# Patient Record
Sex: FEMALE | Race: WHITE | Employment: OTHER | URBAN - METROPOLITAN AREA
[De-identification: names, ages, dates, MRNs, and addresses within clinical notes are randomized per-mention and may not be internally consistent; named-entity substitution may affect disease eponyms.]

---

## 2017-01-05 ENCOUNTER — HOSPITAL ENCOUNTER (OUTPATIENT)
Dept: LAB | Age: 57
Discharge: HOME | End: 2017-01-05
Payer: MEDICARE

## 2017-01-05 DIAGNOSIS — C92.10 CML (CHRONIC MYELOCYTIC LEUKEMIA) (HCC): ICD-10-CM

## 2017-01-05 LAB
ALBUMIN SERPL BCP-MCNC: 4.3 G/DL (ref 3.5–5)
ALBUMIN/GLOB SERPL: 1.7 {RATIO} (ref 1.2–3.5)
ALP SERPL-CCNC: 75 U/L (ref 50–136)
ALT SERPL-CCNC: 35 U/L (ref 12–65)
ANION GAP BLD CALC-SCNC: 7 MMOL/L (ref 7–16)
AST SERPL W P-5'-P-CCNC: 17 U/L (ref 15–37)
BASOPHILS # BLD AUTO: 0 K/UL (ref 0–0.2)
BASOPHILS # BLD: 1 % (ref 0–2)
BILIRUB SERPL-MCNC: 0.6 MG/DL (ref 0.2–1.1)
BUN SERPL-MCNC: 7 MG/DL (ref 6–23)
CALCIUM SERPL-MCNC: 8.7 MG/DL (ref 8.3–10.4)
CHLORIDE SERPL-SCNC: 110 MMOL/L (ref 98–107)
CO2 SERPL-SCNC: 24 MMOL/L (ref 23–32)
CREAT SERPL-MCNC: 0.63 MG/DL (ref 0.6–1)
DIFFERENTIAL METHOD BLD: ABNORMAL
EOSINOPHIL # BLD: 0.2 K/UL (ref 0–0.8)
EOSINOPHIL NFR BLD: 3 % (ref 0.5–7.8)
ERYTHROCYTE [DISTWIDTH] IN BLOOD BY AUTOMATED COUNT: 17.7 % (ref 11.9–14.6)
GLOBULIN SER CALC-MCNC: 2.6 G/DL (ref 2.3–3.5)
GLUCOSE SERPL-MCNC: 108 MG/DL (ref 65–100)
HCT VFR BLD AUTO: 36 % (ref 35.8–46.3)
HGB BLD-MCNC: 9.8 G/DL (ref 11.7–15.4)
LYMPHOCYTES # BLD AUTO: 10 % (ref 13–44)
LYMPHOCYTES # BLD: 0.6 K/UL (ref 0.5–4.6)
MAGNESIUM SERPL-MCNC: 2.2 MG/DL (ref 1.8–2.4)
MCH RBC QN AUTO: 21.9 PG (ref 26.1–32.9)
MCHC RBC AUTO-ENTMCNC: 27.2 G/DL (ref 31.4–35)
MCV RBC AUTO: 80.4 FL (ref 79.6–97.8)
MONOCYTES # BLD: 0.3 K/UL (ref 0.1–1.3)
MONOCYTES NFR BLD AUTO: 5 % (ref 4–12)
NEUTS SEG # BLD: 4.6 K/UL (ref 1.7–8.2)
NEUTS SEG NFR BLD AUTO: 82 % (ref 43–78)
NRBC # BLD: 0 K/UL (ref 0–0.2)
PLATELET # BLD AUTO: 160 K/UL (ref 150–450)
PMV BLD AUTO: 11.3 FL (ref 10.8–14.1)
POTASSIUM SERPL-SCNC: 3.7 MMOL/L (ref 3.5–5.1)
PROT SERPL-MCNC: 6.9 G/DL (ref 6.3–8.2)
RBC # BLD AUTO: 4.48 M/UL (ref 4.05–5.25)
SODIUM SERPL-SCNC: 141 MMOL/L (ref 136–145)
WBC # BLD AUTO: 5.6 K/UL (ref 4.3–11.1)

## 2017-01-05 PROCEDURE — 80053 COMPREHEN METABOLIC PANEL: CPT | Performed by: INTERNAL MEDICINE

## 2017-01-05 PROCEDURE — 36415 COLL VENOUS BLD VENIPUNCTURE: CPT | Performed by: INTERNAL MEDICINE

## 2017-01-05 PROCEDURE — 85025 COMPLETE CBC W/AUTO DIFF WBC: CPT | Performed by: INTERNAL MEDICINE

## 2017-01-05 PROCEDURE — 83735 ASSAY OF MAGNESIUM: CPT | Performed by: INTERNAL MEDICINE

## 2017-01-08 ENCOUNTER — HOSPITAL ENCOUNTER (EMERGENCY)
Age: 57
Discharge: HOME | End: 2017-01-08
Attending: EMERGENCY MEDICINE
Payer: MEDICARE

## 2017-01-08 VITALS
TEMPERATURE: 98.9 F | BODY MASS INDEX: 22.2 KG/M2 | DIASTOLIC BLOOD PRESSURE: 59 MMHG | SYSTOLIC BLOOD PRESSURE: 111 MMHG | WEIGHT: 130 LBS | HEIGHT: 64 IN | OXYGEN SATURATION: 99 % | HEART RATE: 82 BPM | RESPIRATION RATE: 16 BRPM

## 2017-01-08 DIAGNOSIS — R30.0 DYSURIA: Primary | ICD-10-CM

## 2017-01-08 LAB
AMORPH CRY URNS QL MICRO: NORMAL
BACTERIA URNS QL MICRO: NORMAL /HPF
CASTS URNS QL MICRO: 0 /LPF
CRYSTALS URNS QL MICRO: 0 /LPF
EPI CELLS #/AREA URNS HPF: NORMAL /HPF
MUCOUS THREADS URNS QL MICRO: 0 /LPF
RBC #/AREA URNS HPF: NORMAL /HPF
WBC URNS QL MICRO: NORMAL /HPF

## 2017-01-08 PROCEDURE — 87088 URINE BACTERIA CULTURE: CPT | Performed by: EMERGENCY MEDICINE

## 2017-01-08 PROCEDURE — 87086 URINE CULTURE/COLONY COUNT: CPT | Performed by: EMERGENCY MEDICINE

## 2017-01-08 PROCEDURE — 74011250637 HC RX REV CODE- 250/637: Performed by: EMERGENCY MEDICINE

## 2017-01-08 PROCEDURE — 99284 EMERGENCY DEPT VISIT MOD MDM: CPT | Performed by: EMERGENCY MEDICINE

## 2017-01-08 PROCEDURE — 81015 MICROSCOPIC EXAM OF URINE: CPT | Performed by: EMERGENCY MEDICINE

## 2017-01-08 PROCEDURE — 87186 SC STD MICRODIL/AGAR DIL: CPT | Performed by: EMERGENCY MEDICINE

## 2017-01-08 RX ORDER — CEPHALEXIN 500 MG/1
500 CAPSULE ORAL 3 TIMES DAILY
Qty: 21 CAP | Refills: 0 | Status: SHIPPED | OUTPATIENT
Start: 2017-01-08 | End: 2017-01-15

## 2017-01-08 RX ORDER — CEPHALEXIN 500 MG/1
500 CAPSULE ORAL
Status: COMPLETED | OUTPATIENT
Start: 2017-01-08 | End: 2017-01-08

## 2017-01-08 RX ADMIN — CEPHALEXIN 500 MG: 500 CAPSULE ORAL at 19:24

## 2017-01-08 NOTE — ED PROVIDER NOTES
HPI Comments: For last 12-16 hours patient has had increasing urinary frequency associated with dysuria and some urgency. She has had no shaking chills. He has some slight left flank pain that she first noticed this afternoon. He has not been on antibiotics in a very long time cannot recall her most recent  Time she has had to take antibiotics. Has a history of polycythemia vera , splenomegaly, an clotting disorders. She has had no hematuria with this event. Patient is a 64 y.o. female presenting with urinary pain. The history is provided by the patient. Urinary Pain    This is a new problem. The current episode started 12 to 24 hours ago. The problem has been gradually worsening. The quality of the pain is described as burning. There has been no fever. Associated symptoms include frequency, urgency and flank pain. Pertinent negatives include no chills, no nausea, no vomiting, no hematuria and no hesitancy. Her past medical history does not include recurrent UTIs.         Past Medical History:   Diagnosis Date    Anemia     Chronic pain      from slpeen  - flank pain from kidney     Coagulation disorder ([address]      \"clotting and Bleeding Problem\" dr Christopher Hogue follows     Esophageal spasm      with banding     Esophageal varices (HCC)      grade 3    H/O craniotomy      3-29-15.  due to blood clot - which caused a seizure  - then pt fell and hit     HA (headache)     Hypothyroid     Leukemia ([address]     Nausea & vomiting     Polycythemia vera(238.4) 2008    Portal hypertension ([address]      with varices    Pulmonary embolism ([address] 2006     not on coumadin anymore     S/P small bowel resection      2012.  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 , spleenic and recently superior mesenteric    Transfusion history      many blood tranfusions - last 3-29-15 after brain surgery       Past Surgical History:   Procedure Laterality Date    Hx gi       liver biopsy    Hx gi       bowel removed small - 9 feet     Pr abdomen surgery proc unlisted       umbilical hernia repair    Hx cholecystectomy      Hx breast biopsy Bilateral      Lt - ; Rt -     Hx appendectomy       with small bowel resection    Hx gyn        x 2    Hx gyn       D&C following miscarriage    Hx orthopaedic Left 2008     torn labrum shoulder (screw in place)    Pr neurological procedure unlisted       craniotomy         Family History:   Problem Relation Age of Onset    Diabetes Mother     Stroke Mother      after surgery    Cancer Father      brain    Breast Cancer Maternal Aunt 48       Social History     Social History    Marital status: SINGLE     Spouse name: N/A    Number of children: N/A    Years of education: N/A     Occupational History    Not on file. Social History Main Topics    Smoking status: Former Smoker     Quit date:     Smokeless tobacco: Never Used    Alcohol use No    Drug use: No    Sexual activity: Not on file     Other Topics Concern    Not on file     Social History Narrative         ALLERGIES: Latex; Sulfa (sulfonamide antibiotics); Tramadol; Augmentin [amoxicillin-pot clavulanate]; Morphine; Rizatriptan; Tetanus immune globulin; Xanax [alprazolam]; and Zonegran [zonisamide]    Review of Systems   Constitutional: Negative for chills. Gastrointestinal: Negative for nausea and vomiting. Genitourinary: Positive for flank pain, frequency and urgency. Negative for hematuria and hesitancy. All other systems reviewed and are negative.       Vitals:    17 1600   BP: 117/52   Pulse: 94   Resp: 18   Temp: 99.1 °F (37.3 °C)   SpO2: 96%   Weight: 59 kg (130 lb)   Height: 5' 4\" (1.626 m)            Physical Exam   Constitutional: She appears well-developed and well-nourished. No distress. Pleasant , wearing mask, no distress   HENT:   Head: Atraumatic. Eyes: No scleral icterus. Neck: Neck supple. Cardiovascular: Normal rate and regular rhythm. Pulmonary/Chest: Effort normal. No respiratory distress. She has no wheezes. Abdominal: Soft. She exhibits no distension. There is no tenderness. There is no rebound. Musculoskeletal: Normal range of motion. Neurological: She is alert. Skin: Skin is warm and dry. She is not diaphoretic. Psychiatric: Thought content normal.   Nursing note and vitals reviewed. MDM  Number of Diagnoses or Management Options  Dysuria:   Diagnosis management comments: Will cover possibility of UTI .  Has tolerated Keflex in past. To recheck with any worsening       Amount and/or Complexity of Data Reviewed  Clinical lab tests: ordered and reviewed  Decide to obtain previous medical records or to obtain history from someone other than the patient: yes    Risk of Complications, Morbidity, and/or Mortality  Presenting problems: moderate  Diagnostic procedures: low  Management options: low    Patient Progress  Patient progress: stable    ED Course       Procedures

## 2017-01-08 NOTE — ED TRIAGE NOTES
Reports urinary pain and frequency. States left back pain now. Began 12 hours ago. States has taken pyridium.

## 2017-01-08 NOTE — Clinical Note
Extra fluids May take Pyridium or similar for discomfort Follow for fever Antibiotic: Keflex (you have taken this in the past per your report without problems)

## 2017-01-09 NOTE — DISCHARGE INSTRUCTIONS
Painful Urination (Dysuria): Care Instructions  Your Care Instructions  Burning pain with urination (dysuria) is a common symptom of a urinary tract infection or other urinary problems. The bladder may become inflamed. This can cause pain when the bladder fills and empties. You may also feel pain if the tube that carries urine from the bladder to the outside of the body (urethra) gets irritated or infected. Sexually transmitted infections (STIs) also may cause pain when you urinate. Sometimes the pain can be caused by things other than an infection. The urethra can be irritated by soaps, perfumes, or foreign objects in the urethra. Kidney stones can cause pain when they pass through the urethra. The cause may be hard to find. You may need tests. Treatment for painful urination depends on the cause. Follow-up care is a key part of your treatment and safety. Be sure to make and go to all appointments, and call your doctor if you are having problems. It's also a good idea to know your test results and keep a list of the medicines you take. How can you care for yourself at home? · Drink extra water and juices such as cranberry and blueberry juices for the next day or two. This will help make the urine less concentrated. And it may help wash out any bacteria that may be causing an infection. (If you have kidney, heart, or liver disease and have to limit fluids, talk with your doctor before you increase the amount of fluids you drink.)  · Avoid drinks that are carbonated or have caffeine. They can irritate the bladder. · Urinate often. Try to empty your bladder each time. For women:  · Urinate right after you have sex. · After going to the bathroom, wipe from front to back. · Avoid douches, bubble baths, and feminine hygiene sprays. And avoid other feminine hygiene products that have deodorants. When should you call for help?   Call your doctor now or seek immediate medical care if:  · You have new symptoms, such as fever, nausea, or vomiting. · You have new or worse symptoms of a urinary problem. For example:  ¨ You have blood or pus in your urine. ¨ You have chills or body aches. ¨ It hurts worse to urinate. ¨ You have groin or belly pain. ¨ You have pain in your back just below your rib cage (the flank area). Watch closely for changes in your health, and be sure to contact your doctor if you have any problems. Where can you learn more? Go to http://dena-gino.info/. Enter E560 in the search box to learn more about \"Painful Urination (Dysuria): Care Instructions. \"  Current as of: August 12, 2016  Content Version: 11.1  © 5654-6979 [employer]. Care instructions adapted under license by [employer] (which disclaims liability or warranty for this information). If you have questions about a medical condition or this instruction, always ask your healthcare professional. [place] [address] any warranty or liability for your use of this information.

## 2017-01-09 NOTE — ED NOTES
I have reviewed discharge instructions with the patient. The patient verbalized understanding. Pt aware she can  her prescription at her preferred pharmacy. Patient denies any further needs, questions, or concerns at this time. No adverse reactions to any treatments, meds, or procedures noted. Pt ambulatory with steady gait to waiting room to meet ride. Pt signed hard copy d/c form.

## 2017-01-10 LAB
Lab: NORMAL
REFERENCE LAB,REFLB: NORMAL
TEST DESCRIPTION:,ATST: NORMAL

## 2017-01-11 ENCOUNTER — APPOINTMENT (OUTPATIENT)
Dept: CT IMAGING | Age: 57
End: 2017-01-11
Attending: EMERGENCY MEDICINE
Payer: MEDICARE

## 2017-01-11 ENCOUNTER — HOSPITAL ENCOUNTER (EMERGENCY)
Age: 57
Discharge: HOME | End: 2017-01-11
Attending: EMERGENCY MEDICINE
Payer: MEDICARE

## 2017-01-11 VITALS
RESPIRATION RATE: 16 BRPM | DIASTOLIC BLOOD PRESSURE: 62 MMHG | TEMPERATURE: 98 F | OXYGEN SATURATION: 97 % | HEART RATE: 98 BPM | WEIGHT: 130 LBS | BODY MASS INDEX: 22.2 KG/M2 | SYSTOLIC BLOOD PRESSURE: 110 MMHG | HEIGHT: 64 IN

## 2017-01-11 DIAGNOSIS — M62.830 BACK SPASM: Primary | ICD-10-CM

## 2017-01-11 LAB
ALBUMIN SERPL BCP-MCNC: 4.2 G/DL (ref 3.5–5)
ALBUMIN/GLOB SERPL: 1.7 {RATIO} (ref 1.2–3.5)
ALP SERPL-CCNC: 76 U/L (ref 50–136)
ALT SERPL-CCNC: 24 U/L (ref 12–65)
ANION GAP BLD CALC-SCNC: 9 MMOL/L (ref 7–16)
AST SERPL W P-5'-P-CCNC: 17 U/L (ref 15–37)
BACTERIA SPEC CULT: NORMAL
BACTERIA URNS QL MICRO: 0 /HPF
BASOPHILS # BLD AUTO: 0 K/UL (ref 0–0.2)
BASOPHILS # BLD: 0 % (ref 0–2)
BILIRUB SERPL-MCNC: 0.4 MG/DL (ref 0.2–1.1)
BUN SERPL-MCNC: 11 MG/DL (ref 6–23)
CALCIUM SERPL-MCNC: 8.6 MG/DL (ref 8.3–10.4)
CASTS URNS QL MICRO: 0 /LPF
CHLORIDE SERPL-SCNC: 106 MMOL/L (ref 98–107)
CO2 SERPL-SCNC: 27 MMOL/L (ref 21–32)
CREAT SERPL-MCNC: 0.64 MG/DL (ref 0.6–1)
CRYSTALS URNS QL MICRO: 0 /LPF
D DIMER PPP FEU-MCNC: <0.19 UG/ML(FEU)
DIFFERENTIAL METHOD BLD: ABNORMAL
EOSINOPHIL # BLD: 0.1 K/UL (ref 0–0.8)
EOSINOPHIL NFR BLD: 2 % (ref 0.5–7.8)
EPI CELLS #/AREA URNS HPF: NORMAL /HPF
ERYTHROCYTE [DISTWIDTH] IN BLOOD BY AUTOMATED COUNT: 17.8 % (ref 11.9–14.6)
GLOBULIN SER CALC-MCNC: 2.5 G/DL (ref 2.3–3.5)
GLUCOSE SERPL-MCNC: 83 MG/DL (ref 65–100)
HCT VFR BLD AUTO: 33.1 % (ref 35.8–46.3)
HGB BLD-MCNC: 9.4 G/DL (ref 11.7–15.4)
IMM GRANULOCYTES # BLD: 0 K/UL (ref 0–0.5)
IMM GRANULOCYTES NFR BLD AUTO: 0.2 % (ref 0–5)
LIPASE SERPL-CCNC: 60 U/L (ref 73–393)
LYMPHOCYTES # BLD AUTO: 14 % (ref 13–44)
LYMPHOCYTES # BLD: 0.9 K/UL (ref 0.5–4.6)
MCH RBC QN AUTO: 22.2 PG (ref 26.1–32.9)
MCHC RBC AUTO-ENTMCNC: 28.4 G/DL (ref 31.4–35)
MCV RBC AUTO: 78.3 FL (ref 79.6–97.8)
MONOCYTES # BLD: 0.3 K/UL (ref 0.1–1.3)
MONOCYTES NFR BLD AUTO: 4 % (ref 4–12)
MUCOUS THREADS URNS QL MICRO: 0 /LPF
NEUTS SEG # BLD: 4.7 K/UL (ref 1.7–8.2)
NEUTS SEG NFR BLD AUTO: 80 % (ref 43–78)
PLATELET # BLD AUTO: 162 K/UL (ref 150–450)
PMV BLD AUTO: 10.5 FL (ref 10.8–14.1)
POTASSIUM SERPL-SCNC: 3.9 MMOL/L (ref 3.5–5.1)
PROT SERPL-MCNC: 6.7 G/DL (ref 6.3–8.2)
RBC # BLD AUTO: 4.23 M/UL (ref 4.05–5.25)
RBC #/AREA URNS HPF: 0 /HPF
SERVICE CMNT-IMP: NORMAL
SODIUM SERPL-SCNC: 142 MMOL/L (ref 136–145)
WBC # BLD AUTO: 6 K/UL (ref 4.3–11.1)
WBC URNS QL MICRO: NORMAL /HPF

## 2017-01-11 PROCEDURE — 81003 URINALYSIS AUTO W/O SCOPE: CPT | Performed by: EMERGENCY MEDICINE

## 2017-01-11 PROCEDURE — 80053 COMPREHEN METABOLIC PANEL: CPT | Performed by: EMERGENCY MEDICINE

## 2017-01-11 PROCEDURE — 99285 EMERGENCY DEPT VISIT HI MDM: CPT | Performed by: EMERGENCY MEDICINE

## 2017-01-11 PROCEDURE — 83690 ASSAY OF LIPASE: CPT | Performed by: EMERGENCY MEDICINE

## 2017-01-11 PROCEDURE — 96376 TX/PRO/DX INJ SAME DRUG ADON: CPT | Performed by: EMERGENCY MEDICINE

## 2017-01-11 PROCEDURE — 85379 FIBRIN DEGRADATION QUANT: CPT | Performed by: EMERGENCY MEDICINE

## 2017-01-11 PROCEDURE — 96374 THER/PROPH/DIAG INJ IV PUSH: CPT | Performed by: EMERGENCY MEDICINE

## 2017-01-11 PROCEDURE — 85025 COMPLETE CBC W/AUTO DIFF WBC: CPT | Performed by: EMERGENCY MEDICINE

## 2017-01-11 PROCEDURE — 81015 MICROSCOPIC EXAM OF URINE: CPT | Performed by: EMERGENCY MEDICINE

## 2017-01-11 PROCEDURE — 96375 TX/PRO/DX INJ NEW DRUG ADDON: CPT | Performed by: EMERGENCY MEDICINE

## 2017-01-11 PROCEDURE — 74176 CT ABD & PELVIS W/O CONTRAST: CPT

## 2017-01-11 PROCEDURE — 74011250636 HC RX REV CODE- 250/636: Performed by: EMERGENCY MEDICINE

## 2017-01-11 RX ORDER — HYDROMORPHONE HYDROCHLORIDE 1 MG/ML
1 INJECTION, SOLUTION INTRAMUSCULAR; INTRAVENOUS; SUBCUTANEOUS
Status: COMPLETED | OUTPATIENT
Start: 2017-01-11 | End: 2017-01-11

## 2017-01-11 RX ORDER — SODIUM CHLORIDE 0.9 % (FLUSH) 0.9 %
5-10 SYRINGE (ML) INJECTION AS NEEDED
Status: DISCONTINUED | OUTPATIENT
Start: 2017-01-11 | End: 2017-01-11 | Stop reason: HOSPADM

## 2017-01-11 RX ORDER — SODIUM CHLORIDE 0.9 % (FLUSH) 0.9 %
5-10 SYRINGE (ML) INJECTION EVERY 8 HOURS
Status: DISCONTINUED | OUTPATIENT
Start: 2017-01-11 | End: 2017-01-11 | Stop reason: HOSPADM

## 2017-01-11 RX ORDER — METHOCARBAMOL 750 MG/1
750 TABLET, FILM COATED ORAL 4 TIMES DAILY
Qty: 20 TAB | Refills: 0 | Status: SHIPPED | OUTPATIENT
Start: 2017-01-11 | End: 2017-01-12

## 2017-01-11 RX ORDER — ONDANSETRON 2 MG/ML
4 INJECTION INTRAMUSCULAR; INTRAVENOUS
Status: COMPLETED | OUTPATIENT
Start: 2017-01-11 | End: 2017-01-11

## 2017-01-11 RX ADMIN — HYDROMORPHONE HYDROCHLORIDE 1 MG: 1 INJECTION, SOLUTION INTRAMUSCULAR; INTRAVENOUS; SUBCUTANEOUS at 03:06

## 2017-01-11 RX ADMIN — HYDROMORPHONE HYDROCHLORIDE 1 MG: 1 INJECTION, SOLUTION INTRAMUSCULAR; INTRAVENOUS; SUBCUTANEOUS at 05:23

## 2017-01-11 RX ADMIN — ONDANSETRON 4 MG: 2 INJECTION INTRAMUSCULAR; INTRAVENOUS at 03:06

## 2017-01-11 NOTE — ED TRIAGE NOTES
Patient arrives [facility] for left flank pain back onset 1 hr ago. States here two days ago for uti on keflex.  States blood in urine pressure for ems 140/80 pulse 90

## 2017-01-11 NOTE — ED PROVIDER NOTES
HPI Comments: Patient presents with history of leukemia and left flank pain has been intermittent over the past several days but became significantly severe over the past 1-2 hours. She initially thought she may have injured her back doing some lifting as there was developed a dull ache in the left flank area however today while at rest he became suddenly very severe and coming in waves. The pain does increase somewhat with movement but seems to be better when standing and worse when lying down she denies hematuria or dysuria she has some nausea but no vomiting and no diarrhea or constipation. She was seen in the ER recently diagnosed urinary tract infection for which she is currently taking an antibiotic    Patient is a 64 y.o. female presenting with flank pain. The history is provided by the patient. Flank Pain    This is a new problem. The current episode started more than 2 days ago. The problem has been rapidly worsening. The problem occurs constantly. Patient reports not work related injury. The pain is associated with no known injury. The pain is present in the left side. The quality of the pain is described as sharp and shooting. The pain does not radiate. The pain is at a severity of 9/10. The pain is severe. The symptoms are aggravated by bending and certain positions. The pain is the same all the time. Pertinent negatives include no chest pain, no fever, no numbness, no weight loss, no headaches, no abdominal pain, no abdominal swelling, no bowel incontinence, no perianal numbness, no bladder incontinence, no dysuria, no pelvic pain, no leg pain, no paresthesias, no paresis, no tingling and no weakness. Treatments tried: oral prescription narcotic analgesic. The treatment provided no relief.         Past Medical History:   Diagnosis Date    Anemia     Chronic pain      from slpeen  - flank pain from kidney     Coagulation disorder ([address]      \"clotting and Bleeding Problem\" dr Isha Short follows    [facility] Esophageal spasm      with banding     Esophageal varices (HCC)      grade 3    H/O craniotomy      3-29-15.  due to blood clot - which caused a seizure  - then pt fell and hit     HA (headache)     Hypothyroid     Leukemia (HCC)     Nausea & vomiting     Polycythemia vera(238.4)     Portal hypertension ([address]      with varices    Pulmonary embolism ([address] 2006     not on coumadin anymore     S/P small bowel resection      2012.  9 feet removed due to  gangrene which wa due to blood clot    Seizures ([address]      last one 3- - followed by aniyah     Splenomegaly, congestive, chronic     Stroke ([address]      pt had stroke like symptoms     Thrombosis, portal vein      portal , spleenic and recently superior mesenteric    Transfusion history      many blood tranfusions - last 3-29-15 after brain surgery       Past Surgical History:   Procedure Laterality Date    Hx gi       liver biopsy    Hx gi       bowel removed small - 9 feet     Pr abdomen surgery proc unlisted       umbilical hernia repair    Hx cholecystectomy      Hx breast biopsy Bilateral      Lt - ; Rt -     Hx appendectomy       with small bowel resection    Hx gyn        x 2    Hx gyn       D&C following miscarriage    Hx orthopaedic Left 2008     torn labrum shoulder (screw in place)    Pr neurological procedure unlisted       craniotomy         Family History:   Problem Relation Age of Onset    Diabetes Mother     Stroke Mother      after surgery    Cancer Father      brain    Breast Cancer Maternal Aunt 48       Social History     Social History    Marital status: SINGLE     Spouse name: N/A    Number of children: N/A    Years of education: N/A     Occupational History    Not on file.      Social History Main Topics    Smoking status: Former Smoker     Quit date:     Smokeless tobacco: Never Used    Alcohol use No    Drug use: No    Sexual activity: Not on file     Other Topics Concern  Not on file     Social History Narrative         ALLERGIES: Latex; Sulfa (sulfonamide antibiotics); Tramadol; Augmentin [amoxicillin-pot clavulanate]; Morphine; Rizatriptan; Tetanus immune globulin; Xanax [alprazolam]; and Zonegran [zonisamide]    Review of Systems   Constitutional: Negative for chills, fever and weight loss. Cardiovascular: Negative for chest pain. Gastrointestinal: Negative for abdominal pain and bowel incontinence. Genitourinary: Positive for flank pain. Negative for bladder incontinence, dysuria and pelvic pain. Neurological: Negative for tingling, weakness, numbness, headaches and paresthesias. All other systems reviewed and are negative. Vitals:    01/11/17 0200   BP: 142/76   Pulse: (!) 110   Resp: 16   Temp: 97.8 °F (36.6 °C)   SpO2: 98%   Weight: 59 kg (130 lb)   Height: 5' 4\" (1.626 m)            Physical Exam   Constitutional: She is oriented to person, place, and time. She appears well-developed and well-nourished. She appears distressed. HENT:   Head: Normocephalic and atraumatic. Mouth/Throat: No oropharyngeal exudate. Eyes: Conjunctivae and EOM are normal. Pupils are equal, round, and reactive to light. Neck: Normal range of motion. Neck supple. Cardiovascular: Regular rhythm and normal heart sounds. Pulmonary/Chest: Effort normal.   Abdominal: Soft. She exhibits no distension. There is no tenderness. Musculoskeletal: Normal range of motion. She exhibits tenderness. She exhibits no edema. Some costovertebral angle tenderness is noted to percussion on the left no rashes are noted, and there is no spinal tenderness noted   Neurological: She is alert and oriented to person, place, and time. Skin: Skin is warm and dry. No rash noted. Psychiatric: She has a normal mood and affect. Nursing note and vitals reviewed.        MDM  Number of Diagnoses or Management Options  Diagnosis management comments: Differential diagnosis includes renal colic however given history in the location of the tenderness in the high left flank area possibility of a pleural based etiology or lung etiologies considered patient also has a history of enlarged spleen.   CT urogram will be obtained in addition to standard blood work and a d-dimer       Amount and/or Complexity of Data Reviewed  Clinical lab tests: ordered and reviewed  Tests in the radiology section of CPT®: ordered and reviewed    Risk of Complications, Morbidity, and/or Mortality  Presenting problems: high  Diagnostic procedures: high  Management options: moderate    Patient Progress  Patient progress: stable    ED Course       Procedures    Discussed the lab and CT results and the differential diagnosis now being more likely either neuropathic radiculopathic or musculoskeletal pain in origin also possibility of shingles was discussed and she was advised to monitor for development of a rash in the area of the pain

## 2017-01-25 ENCOUNTER — HOSPITAL ENCOUNTER (OUTPATIENT)
Dept: ONCOLOGY | Age: 57
Discharge: HOME | End: 2017-01-25
Payer: MEDICARE

## 2017-01-25 PROCEDURE — G8978 MOBILITY CURRENT STATUS: HCPCS

## 2017-01-25 PROCEDURE — 97161 PT EVAL LOW COMPLEX 20 MIN: CPT

## 2017-01-25 PROCEDURE — G8979 MOBILITY GOAL STATUS: HCPCS

## 2017-01-25 PROCEDURE — G8980 MOBILITY D/C STATUS: HCPCS

## 2017-01-25 NOTE — PROGRESS NOTES
Ambulatory/Rehab Services H2 Model Falls Risk Assessment    Risk Factor Pts. ·   Confusion/Disorientation/Impulsivity  []    4 ·   Symptomatic Depression  []   2 ·   Altered Elimination  []   1 ·   Dizziness/Vertigo  []   1 ·   Gender (Male)  []   1 ·   Any administered antiepileptics (anticonvulsants):  []   2 ·   Any administered benzodiazepines:  []   1 ·   Visual Impairment (specify):  []   1 ·   Portable Oxygen Use  []   1 ·   Orthostatic ? BP  []   1 ·   History of Recent Falls (within 3 mos.)  []   5     Ability to Rise from Chair (choose one) Pts. ·   Ability to rise in a single movement  [x]   0 ·   Pushes up, successful in one attempt  []   1 ·   Multiple attempts, but successful  []   3 ·   Unable to rise without assistance  []   4   Total: (5 or greater = High Risk) 0     Falls Prevention Plan:   []                Physical Limitations to Exercise (specify):   []                Mobility Assistance Device (type):   []                Exercise/Equipment Adaptation (specify):    ©2010 [facility] [address] Patent #[id number].  Federal Law prohibits the replication, distribution or use without written permission from [facility] [email or web address]

## 2017-01-25 NOTE — PROGRESS NOTES
Yuniel Teto  : 1960 [facility] On[facility]/B[facility]  [address], [place], [address]  [phone]   [phone]        OUTPATIENT PHYSICAL THERAPY:Initial Assessment and Discharge 2017    ICD-10: Treatment Diagnosis:   M62.81 Muscle weakness (generalized)   Precautions/Allergies:   Latex; Sulfa (sulfonamide antibiotics); Tramadol; Augmentin [amoxicillin-pot clavulanate]; Morphine; Rizatriptan; Tetanus immune globulin; Xanax [alprazolam]; and Zonegran [zonisamide]   Fall Risk Score: 0 (? 5 = High Risk)  MD Orders: Oncology Rehab MEDICAL/REFERRING DIAGNOSIS:  Chronic myeloid leukemia, BCR/ABL-positive, not having achieved remission [C92.10]   DATE OF ONSET:  (CML);  (CVA)  REFERRING PHYSICIAN: Mayelin Aranda MD  RETURN PHYSICIAN APPOINTMENT: 17     INITIAL ASSESSMENT:  Ms. Fidel Mcmillan presents with a diagnosis of chronic myeloid leukemia. She has a history of CVA and DVTs as well. Patient was seen in 2016 and demonstrated improved balance on the Dynamic Gait Index and increased distance on the 6 Minute Walk Test.  She does not demonstrate a fall risk with testing and does not have a history of falls. Patient would like to participate in an exercise program to improve her strength and activity tolerance, particularly in preparation for any upcoming surgeries. Patient does not require skilled therapy intervention, but she would benefit from participation in the Oncology Yoga program as well as the [facility] facility. Patient has an appointment scheduled with a medical  at [facility] for . No additional therapy needs identified at this time but patient to call with any concerns. PROBLEM LIST (Impacting functional limitations):  1. Decreased Strength INTERVENTIONS PLANNED:  1. Oncology Yoga and [facility]   TREATMENT PLAN:  Effective Dates: 2017 TO 2017.   Frequency/Duration: Patient does not require additional therapy intervention at this time. Regarding Reyna Couch's therapy, I certify that the treatment plan above will be carried out by a therapist or under their direction. Thank you for this referral,  Tere Resendez PT     Referring Physician Signature: Diego Neil MD              Date                    The information in this section was collected on 1/25/17 (except where otherwise noted). HISTORY:   History of Present Injury/Illness (Reason for Referral):  Patient presents with a diagnosis of chronic myeloid leukemia. Patient does have a history significant for CVA in March of 2015. Patient with superior sagittal sinus thrombosis with subsequent R occipital stroke with hemorrhagic conversion that required evacuation. CVA resulted in homonymous hemianopsia. Patient also has a history significant for DVTs. Patient is being followed for a R upper lobe nodule and is considering surgical resection. Patient presented to therapy in October of 2016 with complaints of weakness, decreased energy, and decreased stamina. She was to participate in therapy for a short term and transition to [facility]. She was only able to attend one therapy session before being hospitalized with a subclavian DVT. She returns today with the hope to still transition to [facility]. She would like to be in better shape prior to having any possible surgeries (lung and spleen). Patient denies any changes with her balance and denies any falls. Past Medical History/Comorbidities:   Ms. Chilo Álvarez  has a past medical history of Anemia; Chronic pain; Coagulation disorder ([address] Esophageal spasm; Esophageal varices ([address] H/O craniotomy; HA (headache); Hypothyroid; Leukemia ([address] Nausea & vomiting; Polycythemia vera(238.4) (2008); Portal hypertension ([address] Pulmonary embolism ([address] (2006); S/P small bowel resection; Seizures ([address] Splenomegaly, congestive, chronic; Stroke ([address]  Thrombosis, portal vein (2004); and Transfusion history. She also has no past medical history of Adverse effect of anesthesia; Difficult intubation; Malignant hyperthermia due to anesthesia; or Pseudocholinesterase deficiency. Ms. Mack Crockett  has a past surgical history that includes gi; gi; abdomen surgery proc unlisted; cholecystectomy; breast biopsy (Bilateral); appendectomy; gyn; gyn; orthopaedic (Left, 2008); and neurological procedure unlisted. Social History/Living Environment:     Patient lives in a [facility] apartment with ground level entry. Patient lives alone and is unable to drive secondary to visual impairments. She does not have any DME. Prior Level of Function/Work/Activity:  Patient is a retired nurse. Current Medications:       Current Outpatient Prescriptions:     oxyCODONE-acetaminophen (PERCOCET 10)  mg per tablet, Take 1 Tab by mouth every eight (8) hours as needed for Pain. Max Daily Amount: 3 Tabs., Disp: 50 Tab, Rfl: 0    lacosamide (VIMPAT) 150 mg tab tablet, Take 1 Tab by mouth two (2) times a day. Max Daily Amount: 300 mg., Disp: 60 Tab, Rfl: 1    ondansetron hcl (ZOFRAN, AS HYDROCHLORIDE,) 4 mg tablet, Take 4 mg by mouth every eight (8) hours as needed for Nausea., Disp: , Rfl:     LANSOPRAZOLE (PREVACID PO), Take  by mouth., Disp: , Rfl:     enoxaparin (LOVENOX) 60 mg/0.6 mL injection, 60 mg by SubCUTAneous route every twelve (12) hours. , Disp: 60 Syringe, Rfl: 2    dasatinib (SPRYCEL) 80 mg tab, Take 80 mg by mouth daily. (Patient taking differently: Take 80 mg by mouth every evening.), Disp: 30 Tab, Rfl: 5    diphenoxylate-atropine (LOMOTIL) 2.5-0.025 mg per tablet, Take 1 Tab by mouth four (4) times daily as needed for Diarrhea. Max Daily Amount: 4 Tabs., Disp: 90 Tab, Rfl: 1    calcium carbonate (TUMS) 200 mg calcium (500 mg) chew, Take 1 Tab by mouth daily as needed. , Disp: , Rfl:     levothyroxine (SYNTHROID) 112 mcg tablet, Take 112 mcg by mouth Daily (before breakfast).  Take day of surgery per anesthesia protocol. 1/2 tablet on Sundays, Disp: , Rfl:    Date Last Reviewed:  1/25/2017   Number of Personal Factors/Comorbidities that affect the Plan of Care: 0: LOW COMPLEXITY   EXAMINATION:   ROM:          WDL in B UEs and LEs  Strength:     RUE Strength  R Shoulder Flexion: 4+  R Shoulder ABduction: 4+  R Elbow Flexion: 4+  R Elbow Extension: 4+  R : 4+      LUE Strength  L Shoulder Flexion: 4+  L Shoulder ABduction: 4+  L Elbow Flexion: 4+  L Elbow Extension: 4+  L : 4+      R Hip Flexion: 4+  R Hip Extension: 4+  R Hip ABduction: 4+  R Knee Flexion: 4+  R Knee Extension: 4+  R Ankle Dorsiflexion: 4+  R Ankle Plantar Flexion: 4+      LLE Strength  L Hip Flexion: 4+  L Hip Extension: 4+  L Hip ABduction: 4+  L Knee Flexion: 4+  L Knee Extension: 4+  L Ankle Dorsiflexion: 4+  L Ankle Plantar Flexion: 4+     Functional Mobility:         Gait/Ambulation:  WDL        Transfers:     · Sit to Stand: Independent  · Stand to Sit: Independent     Balance:     · Sitting: Intact  · Standing: Intact        Body Structures Involved:  1. Muscles Body Functions Affected:  1. None Activities and Participation Affected:  1. Mobility  2. Community, Social and [place]   Number of elements (examined above) that affect the Plan of Care: 3: MODERATE COMPLEXITY   CLINICAL PRESENTATION:   Presentation: Stable and uncomplicated: LOW COMPLEXITY   CLINICAL DECISION MAKING:   Outcome Measure:    Tool Used: 6-MINUTE WALK TEST  Score:  Initial: 1756 feet Most Recent: X feet (Date: -- )   Interpretation of Score: Normal range varies but is approximately 6814-8732 Feet      Distance walked: 1756 feet               Baseline End of Test   Heart Rate 87 114   Dyspnea (Evan Scale) 0 0   Fatigue (Evan Scale) 0 0   SpO2 99 99   /71 130/68     Score 2133 4948-9890 5375-1149 [phone] 426-16 15-0   Modifier CH CI CJ CK CL CM CN     Tool Used: Corey Balance Scale  Score:  Initial: 56/56 Most Recent: X/56 (Date: -- )   Interpretation of Score: Each section is scored on a 0-4 scale, 0 representing the patients inability to perform the task and 4 representing independence. The scores of each section are added together for a total score of 56. The higher the patients score, the more independent the patient is. Any score below 45 indicates increased risk for falls. Score 56 55-45 44-34 33-23 22-12 11-1 0   Modifier CH CI CJ CK CL CM CN     ? Mobility - Walking and Moving Around:     - CURRENT STATUS: CH - 0% impaired, limited or restricted    - GOAL STATUS: CH - 0% impaired, limited or restricted    - D/C STATUS :  CH - 0% impaired, limited or restricted     Tool Used: Dynamic Gait Index  Score:  Initial: 22/24 Most Recent: X/24 (Date: -- )   Interpretation of Score: Each section is scored on a 0-3 scale, 0 representing the patients inability to perform the task and 3 representing independence. The scores of each section are added together for a total score of 24. Any score below 19 indicates increased risk for falls. Score 24 23-19 18-15 14-10 9-5 4-1 0   Modifier CH CI CJ CK CL CM CN     Tool Used: ECOG Performance Survey Score  Score:  Initial: 1 Most Recent:     Interpretation of Score:   0 Fully active, able to carry on all pre-disease performance without restriction   1 Restricted in physically strenuous activity but ambulatory and able to carry out work of a light or sedentary nature, e.g., light house work, office work   2 Ambulatory and capable of all selfcare but unable to carry out any work activities. Up and about more than 50% of waking hours   3 Capable of only limited selfcare, confined to bed or chair more than 50% of waking hours   4 Completely disabled. Cannot carry on any selfcare. Totally confined to bed or chair   5 Dead      Medical Necessity:  Skilled therapy intervention not indicated at this time.      Use of outcome tool(s) and clinical judgement create a POC that gives a: Clear prediction of patient's progress: LOW COMPLEXITY            TREATMENT:   (In addition to Assessment/Re-Assessment sessions the following treatments were rendered)  Pre-treatment Symptoms/Complaints:  No complaints prior to treatment. Patient here for referral to [facility]. Pain: Initial:   Pain Intensity 1: 0  Post Session:  0/10     Treatment/Session Assessment:    · Response to Treatment:  Patient tolerated assessment well. Better score/distance on Dynamic Gait Index and 6 Minute Walk today. Patient to transition to [facility] and participate in yoga. Additional therapy intervention not indicated.       Total Treatment Duration:  PT Patient Time In/Time Out  Time In: 1426  Time Out: Justin Tubbs PT

## 2017-02-15 ENCOUNTER — HOSPITAL ENCOUNTER (OUTPATIENT)
Dept: CT IMAGING | Age: 57
Discharge: HOME | End: 2017-02-15
Attending: SURGERY
Payer: MEDICARE

## 2017-02-15 VITALS — BODY MASS INDEX: 23.22 KG/M2 | HEIGHT: 64 IN | WEIGHT: 136 LBS

## 2017-02-15 DIAGNOSIS — R91.1 LUNG NODULE: ICD-10-CM

## 2017-02-15 PROCEDURE — 71260 CT THORAX DX C+: CPT

## 2017-02-15 PROCEDURE — 74011636320 HC RX REV CODE- 636/320: Performed by: SURGERY

## 2017-02-15 RX ORDER — SODIUM CHLORIDE 0.9 % (FLUSH) 0.9 %
10 SYRINGE (ML) INJECTION
Status: ACTIVE | OUTPATIENT
Start: 2017-02-15 | End: 2017-02-16

## 2017-02-15 RX ADMIN — IOVERSOL 80 ML: 741 INJECTION INTRA-ARTERIAL; INTRAVENOUS at 12:30

## 2017-02-16 ENCOUNTER — HOSPITAL ENCOUNTER (OUTPATIENT)
Dept: LAB | Age: 57
Discharge: HOME | End: 2017-02-16
Payer: MEDICARE

## 2017-02-16 ENCOUNTER — PATIENT OUTREACH (OUTPATIENT)
Dept: CASE MANAGEMENT | Age: 57
End: 2017-02-16

## 2017-02-16 DIAGNOSIS — C92.10 CML (CHRONIC MYELOCYTIC LEUKEMIA) (HCC): ICD-10-CM

## 2017-02-16 DIAGNOSIS — D45 POLYCYTHEMIA VERA (HCC): Chronic | ICD-10-CM

## 2017-02-16 LAB
ALBUMIN SERPL BCP-MCNC: 3.9 G/DL (ref 3.5–5)
ALBUMIN/GLOB SERPL: 1.4 {RATIO} (ref 1.2–3.5)
ALP SERPL-CCNC: 103 U/L (ref 50–136)
ALT SERPL-CCNC: 36 U/L (ref 12–65)
ANION GAP BLD CALC-SCNC: 9 MMOL/L (ref 7–16)
AST SERPL W P-5'-P-CCNC: 31 U/L (ref 15–37)
BASOPHILS # BLD AUTO: 0 K/UL (ref 0–0.2)
BASOPHILS # BLD: 1 % (ref 0–2)
BILIRUB SERPL-MCNC: 0.5 MG/DL (ref 0.2–1.1)
BUN SERPL-MCNC: 10 MG/DL (ref 6–23)
CALCIUM SERPL-MCNC: 8.7 MG/DL (ref 8.3–10.4)
CHLORIDE SERPL-SCNC: 105 MMOL/L (ref 98–107)
CO2 SERPL-SCNC: 26 MMOL/L (ref 23–32)
CREAT SERPL-MCNC: 0.7 MG/DL (ref 0.6–1)
DIFFERENTIAL METHOD BLD: ABNORMAL
EOSINOPHIL # BLD: 0.1 K/UL (ref 0–0.8)
EOSINOPHIL NFR BLD: 2 % (ref 0.5–7.8)
ERYTHROCYTE [DISTWIDTH] IN BLOOD BY AUTOMATED COUNT: 18.4 % (ref 11.9–14.6)
GLOBULIN SER CALC-MCNC: 2.7 G/DL (ref 2.3–3.5)
GLUCOSE SERPL-MCNC: 110 MG/DL (ref 65–100)
HCT VFR BLD AUTO: 35.7 % (ref 35.8–46.3)
HGB BLD-MCNC: 9.8 G/DL (ref 11.7–15.4)
LYMPHOCYTES # BLD AUTO: 12 % (ref 13–44)
LYMPHOCYTES # BLD: 0.9 K/UL (ref 0.5–4.6)
MAGNESIUM SERPL-MCNC: 2.4 MG/DL (ref 1.8–2.4)
MCH RBC QN AUTO: 21.5 PG (ref 26.1–32.9)
MCHC RBC AUTO-ENTMCNC: 27.5 G/DL (ref 31.4–35)
MCV RBC AUTO: 78.5 FL (ref 79.6–97.8)
MONOCYTES # BLD: 0.3 K/UL (ref 0.1–1.3)
MONOCYTES NFR BLD AUTO: 4 % (ref 4–12)
NEUTS SEG # BLD: 6.3 K/UL (ref 1.7–8.2)
NEUTS SEG NFR BLD AUTO: 81 % (ref 43–78)
NRBC # BLD: 0 K/UL (ref 0–0.2)
PHOSPHATE SERPL-MCNC: 4.2 MG/DL (ref 2.5–4.5)
PLATELET # BLD AUTO: 224 K/UL (ref 150–450)
PMV BLD AUTO: 11.3 FL (ref 10.8–14.1)
POTASSIUM SERPL-SCNC: 4 MMOL/L (ref 3.5–5.1)
PROT SERPL-MCNC: 6.6 G/DL (ref 6.3–8.2)
RBC # BLD AUTO: 4.55 M/UL (ref 4.05–5.25)
SODIUM SERPL-SCNC: 140 MMOL/L (ref 136–145)
WBC # BLD AUTO: 7.7 K/UL (ref 4.3–11.1)

## 2017-02-16 PROCEDURE — 83735 ASSAY OF MAGNESIUM: CPT | Performed by: INTERNAL MEDICINE

## 2017-02-16 PROCEDURE — 85025 COMPLETE CBC W/AUTO DIFF WBC: CPT | Performed by: INTERNAL MEDICINE

## 2017-02-16 PROCEDURE — 84100 ASSAY OF PHOSPHORUS: CPT | Performed by: INTERNAL MEDICINE

## 2017-02-16 PROCEDURE — 36415 COLL VENOUS BLD VENIPUNCTURE: CPT | Performed by: INTERNAL MEDICINE

## 2017-02-16 PROCEDURE — 80053 COMPREHEN METABOLIC PANEL: CPT | Performed by: INTERNAL MEDICINE

## 2017-02-16 NOTE — ACP (ADVANCE CARE PLANNING)
2/16/17:  Patient here for follow-up with Dr. Cesar Baltazar. Patient continues to report spleen pain and concerns about splenectomy. Patient also concerned about lung nodule. Patient with hesitations about radiation but willing to discuss with radiation again. Patient to be referred to Dr. Diana Page for second opinion in regards to splenectomy. Patient to follow-up with Dr. Cesar Baltazar in one month for follow-up on above consultations.

## 2017-02-22 LAB
Lab: NORMAL
REFERENCE LAB,REFLB: NORMAL
TEST DESCRIPTION:,ATST: NORMAL

## 2017-03-01 ENCOUNTER — HOSPITAL ENCOUNTER (OUTPATIENT)
Dept: SLEEP MEDICINE | Age: 57
Discharge: HOME | End: 2017-03-01
Payer: MEDICARE

## 2017-03-01 PROCEDURE — 95810 POLYSOM 6/> YRS 4/> PARAM: CPT

## 2017-03-13 ENCOUNTER — PATIENT OUTREACH (OUTPATIENT)
Dept: CASE MANAGEMENT | Age: 57
End: 2017-03-13

## 2017-03-16 ENCOUNTER — HOSPITAL ENCOUNTER (OUTPATIENT)
Dept: LAB | Age: 57
Discharge: HOME | End: 2017-03-16
Payer: MEDICARE

## 2017-03-16 DIAGNOSIS — C92.10 CML (CHRONIC MYELOCYTIC LEUKEMIA) (HCC): ICD-10-CM

## 2017-03-16 DIAGNOSIS — R91.1 LUNG NODULE: ICD-10-CM

## 2017-03-16 DIAGNOSIS — D45 POLYCYTHEMIA VERA (HCC): Chronic | ICD-10-CM

## 2017-03-16 LAB
ALBUMIN SERPL BCP-MCNC: 3.7 G/DL (ref 3.5–5)
ALBUMIN/GLOB SERPL: 1.4 {RATIO} (ref 1.2–3.5)
ALP SERPL-CCNC: 85 U/L (ref 50–136)
ALT SERPL-CCNC: 33 U/L (ref 12–65)
ANION GAP BLD CALC-SCNC: 7 MMOL/L (ref 7–16)
AST SERPL W P-5'-P-CCNC: 26 U/L (ref 15–37)
BASOPHILS # BLD AUTO: 0 K/UL (ref 0–0.2)
BASOPHILS # BLD: 1 % (ref 0–2)
BILIRUB SERPL-MCNC: 0.7 MG/DL (ref 0.2–1.1)
BUN SERPL-MCNC: 13 MG/DL (ref 6–23)
CALCIUM SERPL-MCNC: 8.3 MG/DL (ref 8.3–10.4)
CHLORIDE SERPL-SCNC: 108 MMOL/L (ref 98–107)
CO2 SERPL-SCNC: 25 MMOL/L (ref 23–32)
CREAT SERPL-MCNC: 0.74 MG/DL (ref 0.6–1)
DIFFERENTIAL METHOD BLD: ABNORMAL
EOSINOPHIL # BLD: 0.1 K/UL (ref 0–0.8)
EOSINOPHIL NFR BLD: 2 % (ref 0.5–7.8)
ERYTHROCYTE [DISTWIDTH] IN BLOOD BY AUTOMATED COUNT: 17.4 % (ref 11.9–14.6)
GLOBULIN SER CALC-MCNC: 2.6 G/DL (ref 2.3–3.5)
GLUCOSE SERPL-MCNC: 111 MG/DL (ref 65–100)
HCT VFR BLD AUTO: 28.6 % (ref 35.8–46.3)
HGB BLD-MCNC: 7.8 G/DL (ref 11.7–15.4)
LDH SERPL L TO P-CCNC: 242 U/L (ref 100–190)
LYMPHOCYTES # BLD AUTO: 14 % (ref 13–44)
LYMPHOCYTES # BLD: 0.9 K/UL (ref 0.5–4.6)
MAGNESIUM SERPL-MCNC: 2.3 MG/DL (ref 1.8–2.4)
MCH RBC QN AUTO: 20.7 PG (ref 26.1–32.9)
MCHC RBC AUTO-ENTMCNC: 27.3 G/DL (ref 31.4–35)
MCV RBC AUTO: 76.1 FL (ref 79.6–97.8)
MONOCYTES # BLD: 0.3 K/UL (ref 0.1–1.3)
MONOCYTES NFR BLD AUTO: 5 % (ref 4–12)
NEUTS SEG # BLD: 4.8 K/UL (ref 1.7–8.2)
NEUTS SEG NFR BLD AUTO: 78 % (ref 43–78)
NRBC # BLD: 0.01 K/UL (ref 0–0.2)
PHOSPHATE SERPL-MCNC: 4.3 MG/DL (ref 2.5–4.5)
PLATELET # BLD AUTO: 206 K/UL (ref 150–450)
PMV BLD AUTO: 10.6 FL (ref 10.8–14.1)
POTASSIUM SERPL-SCNC: 4.2 MMOL/L (ref 3.5–5.1)
PROT SERPL-MCNC: 6.3 G/DL (ref 6.3–8.2)
RBC # BLD AUTO: 3.76 M/UL (ref 4.05–5.25)
SODIUM SERPL-SCNC: 140 MMOL/L (ref 136–145)
WBC # BLD AUTO: 6.2 K/UL (ref 4.3–11.1)

## 2017-03-16 PROCEDURE — 80053 COMPREHEN METABOLIC PANEL: CPT | Performed by: INTERNAL MEDICINE

## 2017-03-16 PROCEDURE — 83735 ASSAY OF MAGNESIUM: CPT | Performed by: INTERNAL MEDICINE

## 2017-03-16 PROCEDURE — 83615 LACTATE (LD) (LDH) ENZYME: CPT | Performed by: INTERNAL MEDICINE

## 2017-03-16 PROCEDURE — 36415 COLL VENOUS BLD VENIPUNCTURE: CPT | Performed by: INTERNAL MEDICINE

## 2017-03-16 PROCEDURE — 84100 ASSAY OF PHOSPHORUS: CPT | Performed by: INTERNAL MEDICINE

## 2017-03-16 PROCEDURE — 85025 COMPLETE CBC W/AUTO DIFF WBC: CPT | Performed by: INTERNAL MEDICINE

## 2017-04-03 ENCOUNTER — PATIENT OUTREACH (OUTPATIENT)
Dept: CASE MANAGEMENT | Age: 57
End: 2017-04-03

## 2017-04-03 NOTE — ACP (ADVANCE CARE PLANNING)
4/3/17:  Patient called with c/o abdominal pain around spleen. Patient reports pain has lessened some with recent bowel movement but she is still experiencing pain. Patient unable to come to cancer center today for labs because of potential dangerous weather. Patient will call this navigator back in the am if she needs additional pain medications.

## 2017-04-04 ENCOUNTER — HOSPITAL ENCOUNTER (OUTPATIENT)
Dept: LAB | Age: 57
Discharge: HOME | End: 2017-04-04
Payer: MEDICARE

## 2017-04-04 DIAGNOSIS — D69.6 THROMBOCYTOPENIA (HCC): ICD-10-CM

## 2017-04-04 LAB
BASOPHILS # BLD AUTO: 0.1 K/UL (ref 0–0.2)
BASOPHILS # BLD: 1 % (ref 0–2)
DIFFERENTIAL METHOD BLD: ABNORMAL
EOSINOPHIL # BLD: 0.2 K/UL (ref 0–0.8)
EOSINOPHIL NFR BLD: 3 % (ref 0.5–7.8)
ERYTHROCYTE [DISTWIDTH] IN BLOOD BY AUTOMATED COUNT: 17.6 % (ref 11.9–14.6)
HCT VFR BLD AUTO: 28.8 % (ref 35.8–46.3)
HGB BLD-MCNC: 7.7 G/DL (ref 11.7–15.4)
LYMPHOCYTES # BLD AUTO: 12 % (ref 13–44)
LYMPHOCYTES # BLD: 0.7 K/UL (ref 0.5–4.6)
MCH RBC QN AUTO: 20.1 PG (ref 26.1–32.9)
MCHC RBC AUTO-ENTMCNC: 26.7 G/DL (ref 31.4–35)
MCV RBC AUTO: 75.2 FL (ref 79.6–97.8)
MONOCYTES # BLD: 0.4 K/UL (ref 0.1–1.3)
MONOCYTES NFR BLD AUTO: 6 % (ref 4–12)
NEUTS SEG # BLD: 4.5 K/UL (ref 1.7–8.2)
NEUTS SEG NFR BLD AUTO: 78 % (ref 43–78)
NRBC # BLD: 0 K/UL (ref 0–0.2)
PLATELET # BLD AUTO: 170 K/UL (ref 150–450)
PLATELET COMMENTS,PCOM: ADEQUATE
PMV BLD AUTO: 11.8 FL (ref 10.8–14.1)
RBC # BLD AUTO: 3.83 M/UL (ref 4.05–5.25)
RBC MORPH BLD: ABNORMAL
WBC # BLD AUTO: 5.9 K/UL (ref 4.3–11.1)
WBC MORPH BLD: ABNORMAL

## 2017-04-04 PROCEDURE — 85025 COMPLETE CBC W/AUTO DIFF WBC: CPT | Performed by: INTERNAL MEDICINE

## 2017-04-04 PROCEDURE — 36415 COLL VENOUS BLD VENIPUNCTURE: CPT | Performed by: INTERNAL MEDICINE

## 2017-04-13 ENCOUNTER — PATIENT OUTREACH (OUTPATIENT)
Dept: CASE MANAGEMENT | Age: 57
End: 2017-04-13

## 2017-04-13 ENCOUNTER — HOSPITAL ENCOUNTER (OUTPATIENT)
Dept: LAB | Age: 57
Discharge: HOME | End: 2017-04-13

## 2017-04-13 DIAGNOSIS — C92.10 CML (CHRONIC MYELOCYTIC LEUKEMIA) (HCC): ICD-10-CM

## 2017-04-13 NOTE — ACP (ADVANCE CARE PLANNING)
Pt was seen by Dr. Sharmin Gutierrez. Lab was unable to get blood for labs today, so pt to return on Monday for labs. Will transfuse 1 unit or PRBCs if hgb <8. Pt agrees to restart Jakafi, and verbalized understanding to not take any NSAIDs. Will repeat PET scan and follow up with Dr. Sharmin Gutierrez in 1 month.

## 2017-04-17 ENCOUNTER — HOSPITAL ENCOUNTER (OUTPATIENT)
Dept: LAB | Age: 57
Discharge: HOME | End: 2017-04-17
Payer: MEDICARE

## 2017-04-17 ENCOUNTER — PATIENT OUTREACH (OUTPATIENT)
Dept: CASE MANAGEMENT | Age: 57
End: 2017-04-17

## 2017-04-17 DIAGNOSIS — C92.10 CML (CHRONIC MYELOCYTIC LEUKEMIA) (HCC): ICD-10-CM

## 2017-04-17 DIAGNOSIS — R16.1 SPLENOMEGALY: ICD-10-CM

## 2017-04-17 DIAGNOSIS — R52 PAIN: ICD-10-CM

## 2017-04-17 LAB
ALBUMIN SERPL BCP-MCNC: 3.7 G/DL (ref 3.5–5)
ALBUMIN/GLOB SERPL: 1.5 {RATIO} (ref 1.2–3.5)
ALP SERPL-CCNC: 81 U/L (ref 50–136)
ALT SERPL-CCNC: 25 U/L (ref 12–65)
ANION GAP BLD CALC-SCNC: 9 MMOL/L (ref 7–16)
AST SERPL W P-5'-P-CCNC: 15 U/L (ref 15–37)
BASOPHILS # BLD AUTO: 0 K/UL (ref 0–0.2)
BASOPHILS # BLD: 0 % (ref 0–2)
BILIRUB SERPL-MCNC: 0.7 MG/DL (ref 0.2–1.1)
BUN SERPL-MCNC: 13 MG/DL (ref 6–23)
CALCIUM SERPL-MCNC: 8.5 MG/DL (ref 8.3–10.4)
CHLORIDE SERPL-SCNC: 106 MMOL/L (ref 98–107)
CO2 SERPL-SCNC: 26 MMOL/L (ref 21–32)
CREAT SERPL-MCNC: 0.74 MG/DL (ref 0.6–1)
DIFFERENTIAL METHOD BLD: ABNORMAL
EOSINOPHIL # BLD: 0.1 K/UL (ref 0–0.8)
EOSINOPHIL NFR BLD: 2 % (ref 0.5–7.8)
ERYTHROCYTE [DISTWIDTH] IN BLOOD BY AUTOMATED COUNT: 17.7 % (ref 11.9–14.6)
GLOBULIN SER CALC-MCNC: 2.4 G/DL (ref 2.3–3.5)
GLUCOSE SERPL-MCNC: 127 MG/DL (ref 65–100)
HCT VFR BLD AUTO: 27.6 % (ref 35.8–46.3)
HGB BLD-MCNC: 7.2 G/DL (ref 11.7–15.4)
LYMPHOCYTES # BLD AUTO: 12 % (ref 13–44)
LYMPHOCYTES # BLD: 0.7 K/UL (ref 0.5–4.6)
MAGNESIUM SERPL-MCNC: 2.3 MG/DL (ref 1.8–2.4)
MCH RBC QN AUTO: 19.3 PG (ref 26.1–32.9)
MCHC RBC AUTO-ENTMCNC: 26.1 G/DL (ref 31.4–35)
MCV RBC AUTO: 74 FL (ref 79.6–97.8)
MONOCYTES # BLD: 0.3 K/UL (ref 0.1–1.3)
MONOCYTES NFR BLD AUTO: 6 % (ref 4–12)
NEUTS SEG # BLD: 4.6 K/UL (ref 1.7–8.2)
NEUTS SEG NFR BLD AUTO: 80 % (ref 43–78)
NRBC # BLD: 0 K/UL (ref 0–0.2)
PHOSPHATE SERPL-MCNC: 4.1 MG/DL (ref 2.5–4.5)
PLATELET # BLD AUTO: 142 K/UL (ref 150–450)
PLATELET COMMENTS,PCOM: ABNORMAL
PMV BLD AUTO: 10.4 FL (ref 10.8–14.1)
POTASSIUM SERPL-SCNC: 3.8 MMOL/L (ref 3.5–5.1)
PROT SERPL-MCNC: 6.1 G/DL (ref 6.3–8.2)
RBC # BLD AUTO: 3.73 M/UL (ref 4.05–5.25)
RBC MORPH BLD: ABNORMAL
SODIUM SERPL-SCNC: 141 MMOL/L (ref 136–145)
WBC # BLD AUTO: 5.7 K/UL (ref 4.3–11.1)
WBC MORPH BLD: ABNORMAL

## 2017-04-17 PROCEDURE — 36415 COLL VENOUS BLD VENIPUNCTURE: CPT | Performed by: INTERNAL MEDICINE

## 2017-04-17 PROCEDURE — 84100 ASSAY OF PHOSPHORUS: CPT | Performed by: INTERNAL MEDICINE

## 2017-04-17 PROCEDURE — 80053 COMPREHEN METABOLIC PANEL: CPT | Performed by: INTERNAL MEDICINE

## 2017-04-17 PROCEDURE — 83735 ASSAY OF MAGNESIUM: CPT | Performed by: INTERNAL MEDICINE

## 2017-04-17 PROCEDURE — 85025 COMPLETE CBC W/AUTO DIFF WBC: CPT | Performed by: INTERNAL MEDICINE

## 2017-04-17 NOTE — ACP (ADVANCE CARE PLANNING)
4/17/17:  Patient's labs reviewed today with Dr. Kristin Alvarez. Hgb 7.2. Patient to have 1 unit of blood.

## 2017-04-18 RX ORDER — SODIUM CHLORIDE 9 MG/ML
250 INJECTION, SOLUTION INTRAVENOUS AS NEEDED
Status: DISCONTINUED | OUTPATIENT
Start: 2017-04-18 | End: 2017-05-20 | Stop reason: HOSPADM

## 2017-04-18 RX ORDER — DIPHENHYDRAMINE HCL 25 MG
25 CAPSULE ORAL
Status: DISCONTINUED | OUTPATIENT
Start: 2017-04-18 | End: 2017-05-20 | Stop reason: HOSPADM

## 2017-04-18 RX ORDER — ACETAMINOPHEN 325 MG/1
650 TABLET ORAL ONCE
Status: ACTIVE | OUTPATIENT
Start: 2017-04-18 | End: 2017-04-18

## 2017-04-19 ENCOUNTER — HOSPITAL ENCOUNTER (OUTPATIENT)
Dept: INFUSION THERAPY | Age: 57
Discharge: HOME | End: 2017-04-19

## 2017-04-19 RX ORDER — SODIUM CHLORIDE 9 MG/ML
250 INJECTION, SOLUTION INTRAVENOUS AS NEEDED
Status: CANCELLED | OUTPATIENT
Start: 2017-04-19

## 2017-04-21 ENCOUNTER — HOSPITAL ENCOUNTER (OUTPATIENT)
Dept: LAB | Age: 57
Discharge: HOME | End: 2017-04-21
Payer: MEDICARE

## 2017-04-21 DIAGNOSIS — C92.10 CML (CHRONIC MYELOCYTIC LEUKEMIA) (HCC): ICD-10-CM

## 2017-04-21 DIAGNOSIS — D45 POLYCYTHEMIA VERA (HCC): Chronic | ICD-10-CM

## 2017-04-21 LAB
BASOPHILS # BLD AUTO: 0.1 K/UL (ref 0–0.2)
BASOPHILS # BLD: 1 % (ref 0–2)
DIFFERENTIAL METHOD BLD: ABNORMAL
EOSINOPHIL # BLD: 0.2 K/UL (ref 0–0.8)
EOSINOPHIL NFR BLD: 3 % (ref 0.5–7.8)
ERYTHROCYTE [DISTWIDTH] IN BLOOD BY AUTOMATED COUNT: 17.9 % (ref 11.9–14.6)
HCT VFR BLD AUTO: 27.9 % (ref 35.8–46.3)
HGB BLD-MCNC: 7.3 G/DL (ref 11.7–15.4)
LYMPHOCYTES # BLD AUTO: 18 % (ref 13–44)
LYMPHOCYTES # BLD: 1.1 K/UL (ref 0.5–4.6)
MCH RBC QN AUTO: 19.5 PG (ref 26.1–32.9)
MCHC RBC AUTO-ENTMCNC: 26.2 G/DL (ref 31.4–35)
MCV RBC AUTO: 74.6 FL (ref 79.6–97.8)
MONOCYTES # BLD: 0.4 K/UL (ref 0.1–1.3)
MONOCYTES NFR BLD AUTO: 7 % (ref 4–12)
NEUTS SEG # BLD: 4.4 K/UL (ref 1.7–8.2)
NEUTS SEG NFR BLD AUTO: 71 % (ref 43–78)
NRBC # BLD: 0 K/UL (ref 0–0.2)
PLATELET # BLD AUTO: 138 K/UL (ref 150–450)
PLATELET COMMENTS,PCOM: SLIGHT
RBC # BLD AUTO: 3.74 M/UL (ref 4.05–5.25)
RBC MORPH BLD: ABNORMAL
WBC # BLD AUTO: 6.2 K/UL (ref 4.3–11.1)
WBC MORPH BLD: ABNORMAL

## 2017-04-21 PROCEDURE — 86923 COMPATIBILITY TEST ELECTRIC: CPT | Performed by: INTERNAL MEDICINE

## 2017-04-21 PROCEDURE — 36415 COLL VENOUS BLD VENIPUNCTURE: CPT | Performed by: INTERNAL MEDICINE

## 2017-04-21 PROCEDURE — 85025 COMPLETE CBC W/AUTO DIFF WBC: CPT | Performed by: INTERNAL MEDICINE

## 2017-04-21 PROCEDURE — 86900 BLOOD TYPING SEROLOGIC ABO: CPT | Performed by: INTERNAL MEDICINE

## 2017-04-21 RX ORDER — ACETAMINOPHEN 325 MG/1
650 TABLET ORAL ONCE
Status: CANCELLED | OUTPATIENT
Start: 2017-04-23 | End: 2017-04-23

## 2017-04-23 ENCOUNTER — HOSPITAL ENCOUNTER (OUTPATIENT)
Dept: INFUSION THERAPY | Age: 57
Discharge: HOME | End: 2017-04-23
Payer: MEDICARE

## 2017-04-23 VITALS
BODY MASS INDEX: 23.34 KG/M2 | OXYGEN SATURATION: 98 % | TEMPERATURE: 98.8 F | SYSTOLIC BLOOD PRESSURE: 107 MMHG | HEART RATE: 80 BPM | WEIGHT: 136 LBS | DIASTOLIC BLOOD PRESSURE: 60 MMHG | RESPIRATION RATE: 18 BRPM

## 2017-04-23 PROCEDURE — 36430 TRANSFUSION BLD/BLD COMPNT: CPT

## 2017-04-23 PROCEDURE — P9040 RBC LEUKOREDUCED IRRADIATED: HCPCS | Performed by: INTERNAL MEDICINE

## 2017-04-23 PROCEDURE — 74011250636 HC RX REV CODE- 250/636: Performed by: INTERNAL MEDICINE

## 2017-04-23 PROCEDURE — 74011250637 HC RX REV CODE- 250/637: Performed by: INTERNAL MEDICINE

## 2017-04-23 PROCEDURE — 77030018667 ADMN ST IV BLD FENW -A

## 2017-04-23 RX ORDER — SODIUM CHLORIDE 9 MG/ML
250 INJECTION, SOLUTION INTRAVENOUS AS NEEDED
Status: DISCONTINUED | OUTPATIENT
Start: 2017-04-23 | End: 2017-04-27 | Stop reason: HOSPADM

## 2017-04-23 RX ORDER — DIPHENHYDRAMINE HCL 25 MG
25 CAPSULE ORAL ONCE
Status: COMPLETED | OUTPATIENT
Start: 2017-04-23 | End: 2017-04-23

## 2017-04-23 RX ADMIN — SODIUM CHLORIDE 250 ML: 900 INJECTION, SOLUTION INTRAVENOUS at 09:00

## 2017-04-23 RX ADMIN — DIPHENHYDRAMINE HYDROCHLORIDE 25 MG: 25 CAPSULE ORAL at 08:47

## 2017-04-23 NOTE — PROGRESS NOTES
Arrived to the [facility]. 1 unit PRBC's  completed. Patient tolerated well. Any issues or concerns during appointment: none. Patient aware of next MD appointment on 5-22-17 at 2pm  Pt discharged via ambulatory.

## 2017-04-24 LAB
ABO + RH BLD: NORMAL
BLD PROD TYP BPU: NORMAL
BLOOD GROUP ANTIBODIES SERPL: NORMAL
BPU ID: NORMAL
CROSSMATCH RESULT,%XM: NORMAL
SPECIMEN EXP DATE BLD: NORMAL
STATUS OF UNIT,%ST: NORMAL
UNIT DIVISION, %UDIV: 0

## 2017-05-04 ENCOUNTER — HOSPITAL ENCOUNTER (OUTPATIENT)
Dept: LAB | Age: 57
Discharge: HOME | End: 2017-05-04
Payer: MEDICARE

## 2017-05-04 DIAGNOSIS — C92.10 CML (CHRONIC MYELOCYTIC LEUKEMIA) (HCC): ICD-10-CM

## 2017-05-04 LAB
BASOPHILS # BLD AUTO: 0.1 K/UL (ref 0–0.2)
BASOPHILS # BLD: 1 % (ref 0–2)
DIFFERENTIAL METHOD BLD: ABNORMAL
EOSINOPHIL # BLD: 0.2 K/UL (ref 0–0.8)
EOSINOPHIL NFR BLD: 3 % (ref 0.5–7.8)
ERYTHROCYTE [DISTWIDTH] IN BLOOD BY AUTOMATED COUNT: 20.9 % (ref 11.9–14.6)
HCT VFR BLD AUTO: 36 % (ref 35.8–46.3)
HGB BLD-MCNC: 9.7 G/DL (ref 11.7–15.4)
LYMPHOCYTES # BLD AUTO: 15 % (ref 13–44)
LYMPHOCYTES # BLD: 1.1 K/UL (ref 0.5–4.6)
MCH RBC QN AUTO: 20.6 PG (ref 26.1–32.9)
MCHC RBC AUTO-ENTMCNC: 26.9 G/DL (ref 31.4–35)
MCV RBC AUTO: 76.6 FL (ref 79.6–97.8)
MONOCYTES # BLD: 0.4 K/UL (ref 0.1–1.3)
MONOCYTES NFR BLD AUTO: 5 % (ref 4–12)
NEUTS SEG # BLD: 5.4 K/UL (ref 1.7–8.2)
NEUTS SEG NFR BLD AUTO: 76 % (ref 43–78)
NRBC # BLD: 0.01 K/UL (ref 0–0.2)
PLATELET # BLD AUTO: 196 K/UL (ref 150–450)
PLATELET COMMENTS,PCOM: ABNORMAL
PMV BLD AUTO: 10.8 FL (ref 10.8–14.1)
RBC # BLD AUTO: 4.7 M/UL (ref 4.05–5.25)
RBC MORPH BLD: ABNORMAL
RBC MORPH BLD: ABNORMAL
WBC # BLD AUTO: 7.2 K/UL (ref 4.3–11.1)
WBC MORPH BLD: ABNORMAL

## 2017-05-04 PROCEDURE — 36415 COLL VENOUS BLD VENIPUNCTURE: CPT | Performed by: INTERNAL MEDICINE

## 2017-05-04 PROCEDURE — 85025 COMPLETE CBC W/AUTO DIFF WBC: CPT | Performed by: INTERNAL MEDICINE

## 2017-05-25 ENCOUNTER — HOSPITAL ENCOUNTER (OUTPATIENT)
Dept: PET IMAGING | Age: 57
Discharge: HOME | End: 2017-05-25
Payer: MEDICARE

## 2017-05-25 DIAGNOSIS — C92.10 CML (CHRONIC MYELOCYTIC LEUKEMIA) (HCC): ICD-10-CM

## 2017-05-25 DIAGNOSIS — R16.1 SPLENOMEGALY: ICD-10-CM

## 2017-05-25 DIAGNOSIS — R52 PAIN: ICD-10-CM

## 2017-05-25 PROCEDURE — A9552 F18 FDG: HCPCS

## 2017-05-25 PROCEDURE — 74011636320 HC RX REV CODE- 636/320: Performed by: INTERNAL MEDICINE

## 2017-05-25 RX ADMIN — DIATRIZOATE MEGLUMINE AND DIATRIZOATE SODIUM 10 ML: 660; 100 LIQUID ORAL; RECTAL at 13:58

## 2017-06-01 ENCOUNTER — HOSPITAL ENCOUNTER (OUTPATIENT)
Dept: LAB | Age: 57
Discharge: HOME | End: 2017-06-01
Payer: MEDICARE

## 2017-06-01 ENCOUNTER — PATIENT OUTREACH (OUTPATIENT)
Dept: CASE MANAGEMENT | Age: 57
End: 2017-06-01

## 2017-06-01 DIAGNOSIS — R16.1 SPLENOMEGALY: ICD-10-CM

## 2017-06-01 DIAGNOSIS — R52 PAIN: ICD-10-CM

## 2017-06-01 DIAGNOSIS — D45 POLYCYTHEMIA VERA (HCC): Chronic | ICD-10-CM

## 2017-06-01 DIAGNOSIS — C92.10 CML (CHRONIC MYELOCYTIC LEUKEMIA) (HCC): ICD-10-CM

## 2017-06-01 LAB
ALBUMIN SERPL BCP-MCNC: 4.1 G/DL (ref 3.5–5)
ALBUMIN/GLOB SERPL: 1.6 {RATIO} (ref 1.2–3.5)
ALP SERPL-CCNC: 92 U/L (ref 50–136)
ALT SERPL-CCNC: 43 U/L (ref 12–65)
ANION GAP BLD CALC-SCNC: 7 MMOL/L (ref 7–16)
AST SERPL W P-5'-P-CCNC: 34 U/L (ref 15–37)
BASOPHILS # BLD AUTO: 0.1 K/UL (ref 0–0.2)
BASOPHILS # BLD: 1 % (ref 0–2)
BILIRUB SERPL-MCNC: 0.5 MG/DL (ref 0.2–1.1)
BUN SERPL-MCNC: 12 MG/DL (ref 6–23)
CALCIUM SERPL-MCNC: 8.4 MG/DL (ref 8.3–10.4)
CHLORIDE SERPL-SCNC: 110 MMOL/L (ref 98–107)
CO2 SERPL-SCNC: 23 MMOL/L (ref 21–32)
CREAT SERPL-MCNC: 0.61 MG/DL (ref 0.6–1)
DIFFERENTIAL METHOD BLD: ABNORMAL
EOSINOPHIL # BLD: 0.2 K/UL (ref 0–0.8)
EOSINOPHIL NFR BLD: 3 % (ref 0.5–7.8)
ERYTHROCYTE [DISTWIDTH] IN BLOOD BY AUTOMATED COUNT: 19.6 % (ref 11.9–14.6)
GLOBULIN SER CALC-MCNC: 2.5 G/DL (ref 2.3–3.5)
GLUCOSE SERPL-MCNC: 89 MG/DL (ref 65–100)
HCT VFR BLD AUTO: 32.6 % (ref 35.8–46.3)
HGB BLD-MCNC: 9.1 G/DL (ref 11.7–15.4)
LYMPHOCYTES # BLD AUTO: 19 % (ref 13–44)
LYMPHOCYTES # BLD: 1.3 K/UL (ref 0.5–4.6)
MAGNESIUM SERPL-MCNC: 2.2 MG/DL (ref 1.8–2.4)
MCH RBC QN AUTO: 21.1 PG (ref 26.1–32.9)
MCHC RBC AUTO-ENTMCNC: 27.9 G/DL (ref 31.4–35)
MCV RBC AUTO: 75.6 FL (ref 79.6–97.8)
MONOCYTES # BLD: 0.3 K/UL (ref 0.1–1.3)
MONOCYTES NFR BLD AUTO: 4 % (ref 4–12)
NEUTS SEG # BLD: 4.8 K/UL (ref 1.7–8.2)
NEUTS SEG NFR BLD AUTO: 73 % (ref 43–78)
NRBC # BLD: 0 K/UL (ref 0–0.2)
PLATELET # BLD AUTO: 196 K/UL (ref 150–450)
PLATELET COMMENTS,PCOM: ADEQUATE
PMV BLD AUTO: 11 FL (ref 10.8–14.1)
POTASSIUM SERPL-SCNC: 3.9 MMOL/L (ref 3.5–5.1)
PROT SERPL-MCNC: 6.6 G/DL (ref 6.3–8.2)
RBC # BLD AUTO: 4.31 M/UL (ref 4.05–5.25)
RBC MORPH BLD: ABNORMAL
RBC MORPH BLD: ABNORMAL
SODIUM SERPL-SCNC: 140 MMOL/L (ref 136–145)
TSH SERPL DL<=0.005 MIU/L-ACNC: 0.33 UIU/ML (ref 0.36–3.74)
WBC # BLD AUTO: 6.7 K/UL (ref 4.3–11.1)
WBC MORPH BLD: ABNORMAL

## 2017-06-01 PROCEDURE — 80053 COMPREHEN METABOLIC PANEL: CPT | Performed by: INTERNAL MEDICINE

## 2017-06-01 PROCEDURE — 36415 COLL VENOUS BLD VENIPUNCTURE: CPT | Performed by: INTERNAL MEDICINE

## 2017-06-01 PROCEDURE — 85025 COMPLETE CBC W/AUTO DIFF WBC: CPT | Performed by: INTERNAL MEDICINE

## 2017-06-01 PROCEDURE — 83735 ASSAY OF MAGNESIUM: CPT | Performed by: INTERNAL MEDICINE

## 2017-06-01 PROCEDURE — 84443 ASSAY THYROID STIM HORMONE: CPT | Performed by: INTERNAL MEDICINE

## 2017-06-01 NOTE — PROGRESS NOTES
6/1/17:   Patient in for follow-up after PET scan. Dr. Mir Link pleased with stability of lung nodule but could not absolutely promise patient that the nodule is not cancerous without a biopsy. Patient prefers surgery over radiation at this point. Patient to follow-up with Dr. Isadora Souza to further discuss surgical options. Patient to go back to Dr. Camilo Fang with GI as well for bile duct thickening noted. Dr. Mir Link pleased with labs and would like the patient to continue with Sprycel and Jakafi as prescribed. Labs in 2 weeks and f/u with Dr. Mir Link in one month.

## 2017-06-07 LAB
Lab: NORMAL
REFERENCE LAB,REFLB: NORMAL
TEST DESCRIPTION:,ATST: NORMAL

## 2017-06-22 ENCOUNTER — HOSPITAL ENCOUNTER (OUTPATIENT)
Dept: MRI IMAGING | Age: 57
Discharge: HOME | End: 2017-06-22
Attending: INTERNAL MEDICINE
Payer: MEDICARE

## 2017-06-22 DIAGNOSIS — K83.9 BILE DUCT ABNORMALITY: ICD-10-CM

## 2017-06-22 PROCEDURE — 74181 MRI ABDOMEN W/O CONTRAST: CPT

## 2017-06-29 ENCOUNTER — HOSPITAL ENCOUNTER (OUTPATIENT)
Dept: LAB | Age: 57
Discharge: HOME | End: 2017-06-29
Payer: MEDICARE

## 2017-06-29 DIAGNOSIS — D45 POLYCYTHEMIA VERA (HCC): Chronic | ICD-10-CM

## 2017-06-29 DIAGNOSIS — C92.10 CML (CHRONIC MYELOCYTIC LEUKEMIA) (HCC): ICD-10-CM

## 2017-06-29 LAB
ALBUMIN SERPL BCP-MCNC: 4.2 G/DL (ref 3.5–5)
ALBUMIN/GLOB SERPL: 1.6 {RATIO} (ref 1.2–3.5)
ALP SERPL-CCNC: 71 U/L (ref 50–136)
ALT SERPL-CCNC: 35 U/L (ref 12–65)
ANION GAP BLD CALC-SCNC: 11 MMOL/L (ref 7–16)
AST SERPL W P-5'-P-CCNC: 29 U/L (ref 15–37)
BASOPHILS # BLD AUTO: 0 K/UL (ref 0–0.2)
BASOPHILS # BLD: 1 % (ref 0–2)
BILIRUB SERPL-MCNC: 0.5 MG/DL (ref 0.2–1.1)
BUN SERPL-MCNC: 9 MG/DL (ref 6–23)
CALCIUM SERPL-MCNC: 8.5 MG/DL (ref 8.3–10.4)
CHLORIDE SERPL-SCNC: 113 MMOL/L (ref 98–107)
CO2 SERPL-SCNC: 18 MMOL/L (ref 21–32)
CREAT SERPL-MCNC: 0.6 MG/DL (ref 0.6–1)
DIFFERENTIAL METHOD BLD: ABNORMAL
EOSINOPHIL # BLD: 0.1 K/UL (ref 0–0.8)
EOSINOPHIL NFR BLD: 2 % (ref 0.5–7.8)
ERYTHROCYTE [DISTWIDTH] IN BLOOD BY AUTOMATED COUNT: 18.8 % (ref 11.9–14.6)
GLOBULIN SER CALC-MCNC: 2.6 G/DL (ref 2.3–3.5)
GLUCOSE SERPL-MCNC: 87 MG/DL (ref 65–100)
HCT VFR BLD AUTO: 34.1 % (ref 35.8–46.3)
HGB BLD-MCNC: 9.6 G/DL (ref 11.7–15.4)
LYMPHOCYTES # BLD AUTO: 18 % (ref 13–44)
LYMPHOCYTES # BLD: 0.8 K/UL (ref 0.5–4.6)
MAGNESIUM SERPL-MCNC: 2.4 MG/DL (ref 1.8–2.4)
MCH RBC QN AUTO: 21.6 PG (ref 26.1–32.9)
MCHC RBC AUTO-ENTMCNC: 28.2 G/DL (ref 31.4–35)
MCV RBC AUTO: 76.6 FL (ref 79.6–97.8)
MONOCYTES # BLD: 0.3 K/UL (ref 0.1–1.3)
MONOCYTES NFR BLD AUTO: 6 % (ref 4–12)
NEUTS SEG # BLD: 3.3 K/UL (ref 1.7–8.2)
NEUTS SEG NFR BLD AUTO: 73 % (ref 43–78)
NRBC # BLD: 0.01 K/UL (ref 0–0.2)
PHOSPHATE SERPL-MCNC: 3.6 MG/DL (ref 2.5–4.5)
PLATELET # BLD AUTO: 165 K/UL (ref 150–450)
PLATELET COMMENTS,PCOM: ADEQUATE
PMV BLD AUTO: 11.2 FL (ref 10.8–14.1)
POTASSIUM SERPL-SCNC: 4.3 MMOL/L (ref 3.5–5.1)
PROT SERPL-MCNC: 6.8 G/DL (ref 6.3–8.2)
RBC # BLD AUTO: 4.45 M/UL (ref 4.05–5.25)
RBC MORPH BLD: ABNORMAL
SODIUM SERPL-SCNC: 142 MMOL/L (ref 136–145)
WBC # BLD AUTO: 4.5 K/UL (ref 4.3–11.1)
WBC MORPH BLD: ABNORMAL

## 2017-06-29 PROCEDURE — 83735 ASSAY OF MAGNESIUM: CPT | Performed by: INTERNAL MEDICINE

## 2017-06-29 PROCEDURE — 85025 COMPLETE CBC W/AUTO DIFF WBC: CPT | Performed by: INTERNAL MEDICINE

## 2017-06-29 PROCEDURE — 84100 ASSAY OF PHOSPHORUS: CPT | Performed by: INTERNAL MEDICINE

## 2017-06-29 PROCEDURE — 80053 COMPREHEN METABOLIC PANEL: CPT | Performed by: INTERNAL MEDICINE

## 2017-06-29 PROCEDURE — 36415 COLL VENOUS BLD VENIPUNCTURE: CPT | Performed by: INTERNAL MEDICINE

## 2017-07-07 LAB
Lab: NORMAL
REFERENCE LAB,REFLB: NORMAL
TEST DESCRIPTION:,ATST: NORMAL

## 2017-08-03 ENCOUNTER — PATIENT OUTREACH (OUTPATIENT)
Dept: CASE MANAGEMENT | Age: 57
End: 2017-08-03

## 2017-08-03 ENCOUNTER — HOSPITAL ENCOUNTER (OUTPATIENT)
Dept: LAB | Age: 57
Discharge: HOME | End: 2017-08-03
Payer: MEDICARE

## 2017-08-03 DIAGNOSIS — D45 POLYCYTHEMIA VERA (HCC): Chronic | ICD-10-CM

## 2017-08-03 DIAGNOSIS — C92.10 CML (CHRONIC MYELOCYTIC LEUKEMIA) (HCC): ICD-10-CM

## 2017-08-03 LAB
ALBUMIN SERPL BCP-MCNC: 4.3 G/DL (ref 3.5–5)
ALBUMIN/GLOB SERPL: 1.5 {RATIO} (ref 1.2–3.5)
ALP SERPL-CCNC: 93 U/L (ref 50–136)
ALT SERPL-CCNC: 50 U/L (ref 12–65)
ANION GAP BLD CALC-SCNC: 7 MMOL/L (ref 7–16)
AST SERPL W P-5'-P-CCNC: 37 U/L (ref 15–37)
BASOPHILS # BLD AUTO: 0.1 K/UL (ref 0–0.2)
BASOPHILS # BLD: 1 % (ref 0–2)
BILIRUB SERPL-MCNC: 0.6 MG/DL (ref 0.2–1.1)
BUN SERPL-MCNC: 9 MG/DL (ref 6–23)
CALCIUM SERPL-MCNC: 8.7 MG/DL (ref 8.3–10.4)
CANCER AG19-9 SERPL-ACNC: 7.4 U/ML (ref 2–37)
CHLORIDE SERPL-SCNC: 106 MMOL/L (ref 98–107)
CO2 SERPL-SCNC: 25 MMOL/L (ref 21–32)
CREAT SERPL-MCNC: 0.68 MG/DL (ref 0.6–1)
DIFFERENTIAL METHOD BLD: ABNORMAL
EOSINOPHIL # BLD: 0.2 K/UL (ref 0–0.8)
EOSINOPHIL NFR BLD: 3 % (ref 0.5–7.8)
ERYTHROCYTE [DISTWIDTH] IN BLOOD BY AUTOMATED COUNT: 18 % (ref 11.9–14.6)
GLOBULIN SER CALC-MCNC: 2.8 G/DL (ref 2.3–3.5)
GLUCOSE SERPL-MCNC: 81 MG/DL (ref 65–100)
HCT VFR BLD AUTO: 36 % (ref 35.8–46.3)
HGB BLD-MCNC: 10 G/DL (ref 11.7–15.4)
LYMPHOCYTES # BLD AUTO: 12 % (ref 13–44)
LYMPHOCYTES # BLD: 0.8 K/UL (ref 0.5–4.6)
MCH RBC QN AUTO: 21 PG (ref 26.1–32.9)
MCHC RBC AUTO-ENTMCNC: 27.8 G/DL (ref 31.4–35)
MCV RBC AUTO: 75.5 FL (ref 79.6–97.8)
MONOCYTES # BLD: 0.4 K/UL (ref 0.1–1.3)
MONOCYTES NFR BLD AUTO: 6 % (ref 4–12)
NEUTS SEG # BLD: 5.3 K/UL (ref 1.7–8.2)
NEUTS SEG NFR BLD AUTO: 78 % (ref 43–78)
NRBC # BLD: 0 K/UL (ref 0–0.2)
PLATELET # BLD AUTO: 100 K/UL (ref 150–450)
PLATELET COMMENTS,PCOM: SLIGHT
POTASSIUM SERPL-SCNC: 4.1 MMOL/L (ref 3.5–5.1)
PROT SERPL-MCNC: 7.1 G/DL (ref 6.3–8.2)
RBC # BLD AUTO: 4.77 M/UL (ref 4.05–5.25)
RBC MORPH BLD: ABNORMAL
SODIUM SERPL-SCNC: 138 MMOL/L (ref 136–145)
WBC # BLD AUTO: 6.8 K/UL (ref 4.3–11.1)
WBC MORPH BLD: ABNORMAL

## 2017-08-03 PROCEDURE — 86301 IMMUNOASSAY TUMOR CA 19-9: CPT

## 2017-08-03 PROCEDURE — 36415 COLL VENOUS BLD VENIPUNCTURE: CPT | Performed by: INTERNAL MEDICINE

## 2017-08-03 PROCEDURE — 80053 COMPREHEN METABOLIC PANEL: CPT | Performed by: INTERNAL MEDICINE

## 2017-08-03 PROCEDURE — 85025 COMPLETE CBC W/AUTO DIFF WBC: CPT | Performed by: INTERNAL MEDICINE

## 2017-08-03 NOTE — PROGRESS NOTES
8/3/17:  Patient in for follow-up with NP. Patient reports overall she is doing well but has noticed increase in bone pain throughout. Patient with questions regarding re-start of Jakafi at low dose 5mg bid. Patient to check to see if she has 5mg at home. Navigation to follow-up with Humberto Mendez next week to see if he is ok with re-starting Jakafi at low dose. Patient met with Dr. Michela Tolliver (GI). Patient intends to make follow-up with Dr. Shy Reed (pulmonary). Patient reports she did see Dr. Candido Raygoza at [facility] who is hesitant to remove spleen at this time. Trying to obtain records. Patient to follow-up with NP with labs week prior in one month.

## 2017-09-08 ENCOUNTER — HOSPITAL ENCOUNTER (OUTPATIENT)
Dept: LAB | Age: 57
Discharge: HOME | End: 2017-09-08
Payer: MEDICARE

## 2017-09-08 DIAGNOSIS — D45 POLYCYTHEMIA VERA (HCC): Chronic | ICD-10-CM

## 2017-09-08 DIAGNOSIS — C92.10 CML (CHRONIC MYELOCYTIC LEUKEMIA) (HCC): ICD-10-CM

## 2017-09-08 LAB
ALBUMIN SERPL-MCNC: 4.3 G/DL (ref 3.5–5)
ALBUMIN/GLOB SERPL: 1.6 {RATIO} (ref 1.2–3.5)
ALP SERPL-CCNC: 103 U/L (ref 50–136)
ALT SERPL-CCNC: 49 U/L (ref 12–65)
ANION GAP SERPL CALC-SCNC: 8 MMOL/L (ref 7–16)
AST SERPL-CCNC: 34 U/L (ref 15–37)
BASOPHILS # BLD: 0 K/UL (ref 0–0.2)
BASOPHILS NFR BLD: 0 % (ref 0–2)
BILIRUB SERPL-MCNC: 0.6 MG/DL (ref 0.2–1.1)
BUN SERPL-MCNC: 12 MG/DL (ref 6–23)
CALCIUM SERPL-MCNC: 8.7 MG/DL (ref 8.3–10.4)
CHLORIDE SERPL-SCNC: 105 MMOL/L (ref 98–107)
CO2 SERPL-SCNC: 26 MMOL/L (ref 21–32)
CREAT SERPL-MCNC: 0.61 MG/DL (ref 0.6–1)
DIFFERENTIAL METHOD BLD: ABNORMAL
EOSINOPHIL # BLD: 0.2 K/UL (ref 0–0.8)
EOSINOPHIL NFR BLD: 3 % (ref 0.5–7.8)
ERYTHROCYTE [DISTWIDTH] IN BLOOD BY AUTOMATED COUNT: 18.5 % (ref 11.9–14.6)
GLOBULIN SER CALC-MCNC: 2.7 G/DL (ref 2.3–3.5)
GLUCOSE SERPL-MCNC: 85 MG/DL (ref 65–100)
HCT VFR BLD AUTO: 33.1 % (ref 35.8–46.3)
HGB BLD-MCNC: 9.2 G/DL (ref 11.7–15.4)
LDH SERPL L TO P-CCNC: 228 U/L (ref 100–190)
LYMPHOCYTES # BLD: 1 K/UL (ref 0.5–4.6)
LYMPHOCYTES NFR BLD: 21 % (ref 13–44)
MAGNESIUM SERPL-MCNC: 2.3 MG/DL (ref 1.8–2.4)
MCH RBC QN AUTO: 21.2 PG (ref 26.1–32.9)
MCHC RBC AUTO-ENTMCNC: 27.8 G/DL (ref 31.4–35)
MCV RBC AUTO: 76.4 FL (ref 79.6–97.8)
MONOCYTES # BLD: 0.3 K/UL (ref 0.1–1.3)
MONOCYTES NFR BLD: 6 % (ref 4–12)
NEUTS SEG # BLD: 3.3 K/UL (ref 1.7–8.2)
NEUTS SEG NFR BLD: 70 % (ref 43–78)
NRBC # BLD: 0 K/UL (ref 0–0.2)
PHOSPHATE SERPL-MCNC: 3.6 MG/DL (ref 2.5–4.5)
PLATELET # BLD AUTO: 164 K/UL (ref 150–450)
PMV BLD AUTO: 11.2 FL (ref 10.8–14.1)
POTASSIUM SERPL-SCNC: 3.9 MMOL/L (ref 3.5–5.1)
PROT SERPL-MCNC: 7 G/DL (ref 6.3–8.2)
RBC # BLD AUTO: 4.33 M/UL (ref 4.05–5.25)
SODIUM SERPL-SCNC: 139 MMOL/L (ref 136–145)
WBC # BLD AUTO: 4.6 K/UL (ref 4.3–11.1)

## 2017-09-08 PROCEDURE — 36415 COLL VENOUS BLD VENIPUNCTURE: CPT

## 2017-09-08 PROCEDURE — 84100 ASSAY OF PHOSPHORUS: CPT

## 2017-09-08 PROCEDURE — 85025 COMPLETE CBC W/AUTO DIFF WBC: CPT

## 2017-09-08 PROCEDURE — 83615 LACTATE (LD) (LDH) ENZYME: CPT

## 2017-09-08 PROCEDURE — 83735 ASSAY OF MAGNESIUM: CPT

## 2017-09-08 PROCEDURE — 80053 COMPREHEN METABOLIC PANEL: CPT

## 2017-09-18 LAB
Lab: NORMAL
REFERENCE LAB,REFLB: NORMAL
TEST DESCRIPTION:,ATST: NORMAL

## 2017-10-05 ENCOUNTER — HOSPITAL ENCOUNTER (OUTPATIENT)
Dept: LAB | Age: 57
Discharge: HOME | End: 2017-10-05
Payer: MEDICARE

## 2017-10-05 DIAGNOSIS — C92.10 CML (CHRONIC MYELOCYTIC LEUKEMIA) (HCC): ICD-10-CM

## 2017-10-05 LAB
ALBUMIN SERPL-MCNC: 4 G/DL (ref 3.5–5)
ALBUMIN/GLOB SERPL: 1.7 {RATIO} (ref 1.2–3.5)
ALP SERPL-CCNC: 76 U/L (ref 50–136)
ALT SERPL-CCNC: 37 U/L (ref 12–65)
ANION GAP SERPL CALC-SCNC: 8 MMOL/L (ref 7–16)
AST SERPL-CCNC: 27 U/L (ref 15–37)
BASOPHILS # BLD: 0 K/UL (ref 0–0.2)
BASOPHILS NFR BLD: 1 % (ref 0–2)
BILIRUB SERPL-MCNC: 0.5 MG/DL (ref 0.2–1.1)
BUN SERPL-MCNC: 8 MG/DL (ref 6–23)
CALCIUM SERPL-MCNC: 8.3 MG/DL (ref 8.3–10.4)
CHLORIDE SERPL-SCNC: 109 MMOL/L (ref 98–107)
CO2 SERPL-SCNC: 23 MMOL/L (ref 21–32)
CREAT SERPL-MCNC: 0.6 MG/DL (ref 0.6–1)
DIFFERENTIAL METHOD BLD: ABNORMAL
EOSINOPHIL # BLD: 0.1 K/UL (ref 0–0.8)
EOSINOPHIL NFR BLD: 2 % (ref 0.5–7.8)
ERYTHROCYTE [DISTWIDTH] IN BLOOD BY AUTOMATED COUNT: 19.3 % (ref 11.9–14.6)
GLOBULIN SER CALC-MCNC: 2.4 G/DL (ref 2.3–3.5)
GLUCOSE SERPL-MCNC: 88 MG/DL (ref 65–100)
HCT VFR BLD AUTO: 29.8 % (ref 35.8–46.3)
HGB BLD-MCNC: 8.6 G/DL (ref 11.7–15.4)
LYMPHOCYTES # BLD: 0.4 K/UL (ref 0.5–4.6)
LYMPHOCYTES NFR BLD: 12 % (ref 13–44)
MAGNESIUM SERPL-MCNC: 2.2 MG/DL (ref 1.8–2.4)
MCH RBC QN AUTO: 22 PG (ref 26.1–32.9)
MCHC RBC AUTO-ENTMCNC: 28.9 G/DL (ref 31.4–35)
MCV RBC AUTO: 76.2 FL (ref 79.6–97.8)
MONOCYTES # BLD: 0.3 K/UL (ref 0.1–1.3)
MONOCYTES NFR BLD: 7 % (ref 4–12)
NEUTS SEG # BLD: 3 K/UL (ref 1.7–8.2)
NEUTS SEG NFR BLD: 78 % (ref 43–78)
NRBC # BLD: 0 K/UL (ref 0–0.2)
PHOSPHATE SERPL-MCNC: 3.2 MG/DL (ref 2.5–4.5)
PLATELET # BLD AUTO: 136 K/UL (ref 150–450)
PMV BLD AUTO: 10.8 FL (ref 10.8–14.1)
POTASSIUM SERPL-SCNC: 3.7 MMOL/L (ref 3.5–5.1)
PROT SERPL-MCNC: 6.4 G/DL (ref 6.3–8.2)
RBC # BLD AUTO: 3.91 M/UL (ref 4.05–5.25)
SODIUM SERPL-SCNC: 140 MMOL/L (ref 136–145)
WBC # BLD AUTO: 3.8 K/UL (ref 4.3–11.1)

## 2017-10-05 PROCEDURE — 84100 ASSAY OF PHOSPHORUS: CPT | Performed by: INTERNAL MEDICINE

## 2017-10-05 PROCEDURE — 85025 COMPLETE CBC W/AUTO DIFF WBC: CPT | Performed by: INTERNAL MEDICINE

## 2017-10-05 PROCEDURE — 80053 COMPREHEN METABOLIC PANEL: CPT | Performed by: INTERNAL MEDICINE

## 2017-10-05 PROCEDURE — 83735 ASSAY OF MAGNESIUM: CPT | Performed by: INTERNAL MEDICINE

## 2017-10-05 PROCEDURE — 36415 COLL VENOUS BLD VENIPUNCTURE: CPT | Performed by: INTERNAL MEDICINE

## 2017-10-11 LAB
Lab: NORMAL
REFERENCE LAB,REFLB: NORMAL
TEST DESCRIPTION:,ATST: NORMAL

## 2017-11-03 ENCOUNTER — HOSPITAL ENCOUNTER (OUTPATIENT)
Dept: MRI IMAGING | Age: 57
Discharge: HOME | End: 2017-11-03
Attending: PSYCHIATRY & NEUROLOGY
Payer: MEDICARE

## 2017-11-03 DIAGNOSIS — G24.3 CERVICAL DYSTONIA: ICD-10-CM

## 2017-11-03 DIAGNOSIS — M54.2 NECK PAIN: ICD-10-CM

## 2017-11-03 PROCEDURE — 72141 MRI NECK SPINE W/O DYE: CPT

## 2017-11-11 NOTE — PROGRESS NOTES
C spine mri showed osteophyte and arthritic changes but no compression of spinal cord or nerves. Would explain pain but nothing damaging nerves. If she would like if she has arm pain or weakness we can get NCS/EMG.    But nothing really surgical.

## 2017-12-07 ENCOUNTER — HOSPITAL ENCOUNTER (OUTPATIENT)
Dept: LAB | Age: 57
Discharge: HOME | End: 2017-12-07
Payer: MEDICARE

## 2017-12-07 DIAGNOSIS — C92.10 CML (CHRONIC MYELOCYTIC LEUKEMIA) (HCC): ICD-10-CM

## 2017-12-07 DIAGNOSIS — R91.1 LUNG NODULE: ICD-10-CM

## 2017-12-07 DIAGNOSIS — D45 POLYCYTHEMIA VERA (HCC): Chronic | ICD-10-CM

## 2017-12-07 LAB
ALBUMIN SERPL-MCNC: 3.9 G/DL (ref 3.5–5)
ALBUMIN/GLOB SERPL: 1.4 {RATIO} (ref 1.2–3.5)
ALP SERPL-CCNC: 96 U/L (ref 50–136)
ALT SERPL-CCNC: 45 U/L (ref 12–65)
ANION GAP SERPL CALC-SCNC: 10 MMOL/L (ref 7–16)
AST SERPL-CCNC: 82 U/L (ref 15–37)
BASOPHILS # BLD: 0 K/UL (ref 0–0.2)
BASOPHILS NFR BLD: 1 % (ref 0–2)
BILIRUB SERPL-MCNC: 0.6 MG/DL (ref 0.2–1.1)
BUN SERPL-MCNC: 10 MG/DL (ref 6–23)
CALCIUM SERPL-MCNC: 7.9 MG/DL (ref 8.3–10.4)
CHLORIDE SERPL-SCNC: 107 MMOL/L (ref 98–107)
CO2 SERPL-SCNC: 24 MMOL/L (ref 21–32)
CREAT SERPL-MCNC: 0.71 MG/DL (ref 0.6–1)
DIFFERENTIAL METHOD BLD: ABNORMAL
EOSINOPHIL # BLD: 0.1 K/UL (ref 0–0.8)
EOSINOPHIL NFR BLD: 2 % (ref 0.5–7.8)
ERYTHROCYTE [DISTWIDTH] IN BLOOD BY AUTOMATED COUNT: 18.5 % (ref 11.9–14.6)
GLOBULIN SER CALC-MCNC: 2.7 G/DL (ref 2.3–3.5)
GLUCOSE SERPL-MCNC: 99 MG/DL (ref 65–100)
HCT VFR BLD AUTO: 33.1 % (ref 35.8–46.3)
HGB BLD-MCNC: 9.5 G/DL (ref 11.7–15.4)
LYMPHOCYTES # BLD: 0.8 K/UL (ref 0.5–4.6)
LYMPHOCYTES NFR BLD: 15 % (ref 13–44)
MAGNESIUM SERPL-MCNC: 2.2 MG/DL (ref 1.8–2.4)
MCH RBC QN AUTO: 22.2 PG (ref 26.1–32.9)
MCHC RBC AUTO-ENTMCNC: 28.7 G/DL (ref 31.4–35)
MCV RBC AUTO: 77.3 FL (ref 79.6–97.8)
MONOCYTES # BLD: 0.2 K/UL (ref 0.1–1.3)
MONOCYTES NFR BLD: 4 % (ref 4–12)
NEUTS SEG # BLD: 3.8 K/UL (ref 1.7–8.2)
NEUTS SEG NFR BLD: 78 % (ref 43–78)
NRBC # BLD: 0 K/UL (ref 0–0.2)
PHOSPHATE SERPL-MCNC: 3.6 MG/DL (ref 2.5–4.5)
PLATELET # BLD AUTO: 186 K/UL (ref 150–450)
PMV BLD AUTO: 10.8 FL (ref 10.8–14.1)
POTASSIUM SERPL-SCNC: 3.6 MMOL/L (ref 3.5–5.1)
PROT SERPL-MCNC: 6.6 G/DL (ref 6.3–8.2)
RBC # BLD AUTO: 4.28 M/UL (ref 4.05–5.25)
SODIUM SERPL-SCNC: 141 MMOL/L (ref 136–145)
WBC # BLD AUTO: 4.9 K/UL (ref 4.3–11.1)

## 2017-12-07 PROCEDURE — 84100 ASSAY OF PHOSPHORUS: CPT | Performed by: INTERNAL MEDICINE

## 2017-12-07 PROCEDURE — 80053 COMPREHEN METABOLIC PANEL: CPT | Performed by: INTERNAL MEDICINE

## 2017-12-07 PROCEDURE — 83735 ASSAY OF MAGNESIUM: CPT | Performed by: INTERNAL MEDICINE

## 2017-12-07 PROCEDURE — 85025 COMPLETE CBC W/AUTO DIFF WBC: CPT | Performed by: INTERNAL MEDICINE

## 2017-12-07 PROCEDURE — 36415 COLL VENOUS BLD VENIPUNCTURE: CPT | Performed by: INTERNAL MEDICINE

## 2017-12-14 LAB
Lab: NORMAL
REFERENCE LAB,REFLB: NORMAL
TEST DESCRIPTION:,ATST: NORMAL

## 2018-01-02 ENCOUNTER — PATIENT OUTREACH (OUTPATIENT)
Dept: CASE MANAGEMENT | Age: 58
End: 2018-01-02

## 2018-01-02 NOTE — PROGRESS NOTES
1/2/18:  Patient called reporting cough, body aches and low grade temp 99.2. Patient had previously spoken with NPHasmukh, on 1/1/18 about body aches. Patient reports the muscle relaxer that she was prescribed on 1/1/18 helped some but she continues to have joint pain. She also reported sore throat and feeling like she has the flu. Discussed above with NP, Hasmukh, and patient to come to be worked in.

## 2018-01-03 ENCOUNTER — HOSPITAL ENCOUNTER (OUTPATIENT)
Dept: LAB | Age: 58
Discharge: HOME | End: 2018-01-03
Payer: MEDICARE

## 2018-01-03 DIAGNOSIS — R50.9 LOW GRADE FEVER: ICD-10-CM

## 2018-01-03 DIAGNOSIS — R52 PAIN: ICD-10-CM

## 2018-01-03 DIAGNOSIS — R05.9 COUGH: ICD-10-CM

## 2018-01-03 DIAGNOSIS — J02.9 SORE THROAT: ICD-10-CM

## 2018-01-03 LAB
ALBUMIN SERPL-MCNC: 3.9 G/DL (ref 3.5–5)
ALBUMIN/GLOB SERPL: 1.5 {RATIO} (ref 1.2–3.5)
ALP SERPL-CCNC: 96 U/L (ref 50–136)
ALT SERPL-CCNC: 39 U/L (ref 12–65)
ANION GAP SERPL CALC-SCNC: 8 MMOL/L (ref 7–16)
APPEARANCE UR: CLEAR
AST SERPL-CCNC: 24 U/L (ref 15–37)
BASOPHILS # BLD: 0 K/UL (ref 0–0.2)
BASOPHILS NFR BLD: 1 % (ref 0–2)
BILIRUB SERPL-MCNC: 0.5 MG/DL (ref 0.2–1.1)
BILIRUB UR QL: NEGATIVE
BUN SERPL-MCNC: 8 MG/DL (ref 6–23)
CALCIUM SERPL-MCNC: 7.6 MG/DL (ref 8.3–10.4)
CHLORIDE SERPL-SCNC: 109 MMOL/L (ref 98–107)
CO2 SERPL-SCNC: 24 MMOL/L (ref 21–32)
COLOR UR: YELLOW
CREAT SERPL-MCNC: 0.61 MG/DL (ref 0.6–1)
DIFFERENTIAL METHOD BLD: ABNORMAL
EOSINOPHIL # BLD: 0.2 K/UL (ref 0–0.8)
EOSINOPHIL NFR BLD: 3 % (ref 0.5–7.8)
ERYTHROCYTE [DISTWIDTH] IN BLOOD BY AUTOMATED COUNT: 18.2 % (ref 11.9–14.6)
GLOBULIN SER CALC-MCNC: 2.6 G/DL (ref 2.3–3.5)
GLUCOSE SERPL-MCNC: 111 MG/DL (ref 65–100)
GLUCOSE UR STRIP.AUTO-MCNC: NEGATIVE MG/DL
HCT VFR BLD AUTO: 34.4 % (ref 35.8–46.3)
HGB BLD-MCNC: 9.7 G/DL (ref 11.7–15.4)
HGB UR QL STRIP: NEGATIVE
KETONES UR QL STRIP.AUTO: NEGATIVE MG/DL
LEUKOCYTE ESTERASE UR QL STRIP.AUTO: NEGATIVE
LYMPHOCYTES # BLD: 1 K/UL (ref 0.5–4.6)
LYMPHOCYTES NFR BLD: 19 % (ref 13–44)
MCH RBC QN AUTO: 22 PG (ref 26.1–32.9)
MCHC RBC AUTO-ENTMCNC: 28.2 G/DL (ref 31.4–35)
MCV RBC AUTO: 78 FL (ref 79.6–97.8)
MONOCYTES # BLD: 0.2 K/UL (ref 0.1–1.3)
MONOCYTES NFR BLD: 4 % (ref 4–12)
NEUTS SEG # BLD: 4.1 K/UL (ref 1.7–8.2)
NEUTS SEG NFR BLD: 74 % (ref 43–78)
NITRITE UR QL STRIP.AUTO: NEGATIVE
NRBC # BLD: 0 K/UL (ref 0–0.2)
PH UR STRIP: 6 [PH] (ref 5–9)
PLATELET # BLD AUTO: 187 K/UL (ref 150–450)
PMV BLD AUTO: 10.7 FL (ref 10.8–14.1)
POTASSIUM SERPL-SCNC: 4 MMOL/L (ref 3.5–5.1)
PROT SERPL-MCNC: 6.5 G/DL (ref 6.3–8.2)
PROT UR STRIP-MCNC: NEGATIVE MG/DL
RBC # BLD AUTO: 4.41 M/UL (ref 4.05–5.25)
SODIUM SERPL-SCNC: 141 MMOL/L (ref 136–145)
SP GR UR REFRACTOMETRY: 1.02 (ref 1–1.02)
UROBILINOGEN UR QL STRIP.AUTO: 1 EU/DL (ref 0.2–1)
WBC # BLD AUTO: 5.5 K/UL (ref 4.3–11.1)

## 2018-01-03 PROCEDURE — 81003 URINALYSIS AUTO W/O SCOPE: CPT | Performed by: NURSE PRACTITIONER

## 2018-01-03 PROCEDURE — 36415 COLL VENOUS BLD VENIPUNCTURE: CPT | Performed by: NURSE PRACTITIONER

## 2018-01-03 PROCEDURE — 80053 COMPREHEN METABOLIC PANEL: CPT | Performed by: NURSE PRACTITIONER

## 2018-01-03 PROCEDURE — 85025 COMPLETE CBC W/AUTO DIFF WBC: CPT | Performed by: NURSE PRACTITIONER

## 2018-01-05 ENCOUNTER — APPOINTMENT (OUTPATIENT)
Dept: GENERAL RADIOLOGY | Age: 58
End: 2018-01-05
Attending: EMERGENCY MEDICINE
Payer: MEDICARE

## 2018-01-05 ENCOUNTER — HOSPITAL ENCOUNTER (EMERGENCY)
Age: 58
Discharge: HOME | End: 2018-01-05
Attending: EMERGENCY MEDICINE
Payer: MEDICARE

## 2018-01-05 VITALS
BODY MASS INDEX: 22.2 KG/M2 | DIASTOLIC BLOOD PRESSURE: 62 MMHG | SYSTOLIC BLOOD PRESSURE: 127 MMHG | WEIGHT: 130 LBS | HEIGHT: 64 IN | OXYGEN SATURATION: 98 % | HEART RATE: 113 BPM | RESPIRATION RATE: 16 BRPM | TEMPERATURE: 99.1 F

## 2018-01-05 DIAGNOSIS — N39.0 URINARY TRACT INFECTION WITHOUT HEMATURIA, SITE UNSPECIFIED: Primary | ICD-10-CM

## 2018-01-05 DIAGNOSIS — K92.0 HEMATEMESIS WITH NAUSEA: ICD-10-CM

## 2018-01-05 LAB
ABO + RH BLD: NORMAL
ALBUMIN SERPL-MCNC: 4 G/DL (ref 3.5–5)
ALBUMIN/GLOB SERPL: 1.6 {RATIO} (ref 1.2–3.5)
ALP SERPL-CCNC: 91 U/L (ref 50–136)
ALT SERPL-CCNC: 32 U/L (ref 12–65)
ANION GAP SERPL CALC-SCNC: 9 MMOL/L (ref 7–16)
AST SERPL-CCNC: 21 U/L (ref 15–37)
ATRIAL RATE: 99 BPM
BACTERIA URNS QL MICRO: ABNORMAL /HPF
BASOPHILS # BLD: 0 K/UL (ref 0–0.2)
BASOPHILS NFR BLD: 0 % (ref 0–2)
BILIRUB SERPL-MCNC: 0.9 MG/DL (ref 0.2–1.1)
BLOOD GROUP ANTIBODIES SERPL: NORMAL
BUN SERPL-MCNC: 21 MG/DL (ref 6–23)
CALCIUM SERPL-MCNC: 8.2 MG/DL (ref 8.3–10.4)
CALCULATED P AXIS, ECG09: 47 DEGREES
CALCULATED R AXIS, ECG10: 70 DEGREES
CALCULATED T AXIS, ECG11: 28 DEGREES
CASTS URNS QL MICRO: 0 /LPF
CHLORIDE SERPL-SCNC: 106 MMOL/L (ref 98–107)
CO2 SERPL-SCNC: 27 MMOL/L (ref 21–32)
CREAT SERPL-MCNC: 0.63 MG/DL (ref 0.6–1)
CRYSTALS URNS QL MICRO: 0 /LPF
DIAGNOSIS, 93000: NORMAL
DIFFERENTIAL METHOD BLD: ABNORMAL
EOSINOPHIL # BLD: 0.1 K/UL (ref 0–0.8)
EOSINOPHIL NFR BLD: 1 % (ref 0.5–7.8)
EPI CELLS #/AREA URNS HPF: 0 /HPF
ERYTHROCYTE [DISTWIDTH] IN BLOOD BY AUTOMATED COUNT: 18 % (ref 11.9–14.6)
GLOBULIN SER CALC-MCNC: 2.5 G/DL (ref 2.3–3.5)
GLUCOSE SERPL-MCNC: 113 MG/DL (ref 65–100)
HCT VFR BLD AUTO: 33 % (ref 35.8–46.3)
HGB BLD-MCNC: 9.4 G/DL (ref 11.7–15.4)
IMM GRANULOCYTES # BLD: 0 K/UL (ref 0–0.5)
IMM GRANULOCYTES NFR BLD AUTO: 0 % (ref 0–5)
INR PPP: 1.3
LACTATE BLD-SCNC: 0.7 MMOL/L (ref 0.5–1.9)
LYMPHOCYTES # BLD: 0.5 K/UL (ref 0.5–4.6)
LYMPHOCYTES NFR BLD: 4 % (ref 13–44)
MCH RBC QN AUTO: 21.6 PG (ref 26.1–32.9)
MCHC RBC AUTO-ENTMCNC: 28.5 G/DL (ref 31.4–35)
MCV RBC AUTO: 75.9 FL (ref 79.6–97.8)
MONOCYTES # BLD: 0.4 K/UL (ref 0.1–1.3)
MONOCYTES NFR BLD: 4 % (ref 4–12)
MUCOUS THREADS URNS QL MICRO: 0 /LPF
NEUTS SEG # BLD: 10.1 K/UL (ref 1.7–8.2)
NEUTS SEG NFR BLD: 91 % (ref 43–78)
P-R INTERVAL, ECG05: 142 MS
PLATELET # BLD AUTO: 193 K/UL (ref 150–450)
PMV BLD AUTO: 11 FL (ref 10.8–14.1)
POTASSIUM SERPL-SCNC: 4.2 MMOL/L (ref 3.5–5.1)
PROCALCITONIN SERPL-MCNC: 0.1 NG/ML
PROT SERPL-MCNC: 6.5 G/DL (ref 6.3–8.2)
PROTHROMBIN TIME: 15.9 SEC (ref 11.5–14.5)
Q-T INTERVAL, ECG07: 338 MS
QRS DURATION, ECG06: 72 MS
QTC CALCULATION (BEZET), ECG08: 433 MS
RBC # BLD AUTO: 4.35 M/UL (ref 4.05–5.25)
RBC #/AREA URNS HPF: 0 /HPF
SODIUM SERPL-SCNC: 142 MMOL/L (ref 136–145)
SPECIMEN EXP DATE BLD: NORMAL
VENTRICULAR RATE, ECG03: 99 BPM
WBC # BLD AUTO: 11.1 K/UL (ref 4.3–11.1)
WBC URNS QL MICRO: ABNORMAL /HPF

## 2018-01-05 PROCEDURE — 96361 HYDRATE IV INFUSION ADD-ON: CPT | Performed by: EMERGENCY MEDICINE

## 2018-01-05 PROCEDURE — 87040 BLOOD CULTURE FOR BACTERIA: CPT | Performed by: EMERGENCY MEDICINE

## 2018-01-05 PROCEDURE — 81015 MICROSCOPIC EXAM OF URINE: CPT | Performed by: EMERGENCY MEDICINE

## 2018-01-05 PROCEDURE — 74011250637 HC RX REV CODE- 250/637: Performed by: EMERGENCY MEDICINE

## 2018-01-05 PROCEDURE — 85610 PROTHROMBIN TIME: CPT | Performed by: EMERGENCY MEDICINE

## 2018-01-05 PROCEDURE — 99285 EMERGENCY DEPT VISIT HI MDM: CPT | Performed by: EMERGENCY MEDICINE

## 2018-01-05 PROCEDURE — 74011250636 HC RX REV CODE- 250/636: Performed by: EMERGENCY MEDICINE

## 2018-01-05 PROCEDURE — 71045 X-RAY EXAM CHEST 1 VIEW: CPT

## 2018-01-05 PROCEDURE — 96374 THER/PROPH/DIAG INJ IV PUSH: CPT | Performed by: EMERGENCY MEDICINE

## 2018-01-05 PROCEDURE — 96375 TX/PRO/DX INJ NEW DRUG ADDON: CPT | Performed by: EMERGENCY MEDICINE

## 2018-01-05 PROCEDURE — 80053 COMPREHEN METABOLIC PANEL: CPT | Performed by: EMERGENCY MEDICINE

## 2018-01-05 PROCEDURE — 86900 BLOOD TYPING SEROLOGIC ABO: CPT | Performed by: EMERGENCY MEDICINE

## 2018-01-05 PROCEDURE — 93005 ELECTROCARDIOGRAM TRACING: CPT | Performed by: EMERGENCY MEDICINE

## 2018-01-05 PROCEDURE — 85025 COMPLETE CBC W/AUTO DIFF WBC: CPT | Performed by: EMERGENCY MEDICINE

## 2018-01-05 PROCEDURE — 74011000250 HC RX REV CODE- 250: Performed by: EMERGENCY MEDICINE

## 2018-01-05 PROCEDURE — 84145 PROCALCITONIN (PCT): CPT | Performed by: EMERGENCY MEDICINE

## 2018-01-05 PROCEDURE — 83605 ASSAY OF LACTIC ACID: CPT

## 2018-01-05 RX ORDER — ACETAMINOPHEN 500 MG
1000 TABLET ORAL
Status: COMPLETED | OUTPATIENT
Start: 2018-01-05 | End: 2018-01-05

## 2018-01-05 RX ORDER — MORPHINE SULFATE 10 MG/ML
4 INJECTION, SOLUTION INTRAMUSCULAR; INTRAVENOUS
Status: COMPLETED | OUTPATIENT
Start: 2018-01-05 | End: 2018-01-05

## 2018-01-05 RX ORDER — ONDANSETRON 2 MG/ML
4 INJECTION INTRAMUSCULAR; INTRAVENOUS
Status: COMPLETED | OUTPATIENT
Start: 2018-01-05 | End: 2018-01-05

## 2018-01-05 RX ORDER — ONDANSETRON 8 MG/1
8 TABLET, ORALLY DISINTEGRATING ORAL
Qty: 10 TAB | Refills: 0 | Status: SHIPPED | OUTPATIENT
Start: 2018-01-05 | End: 2019-01-10 | Stop reason: SDUPTHER

## 2018-01-05 RX ORDER — CEPHALEXIN 500 MG/1
500 CAPSULE ORAL 3 TIMES DAILY
Qty: 15 CAP | Refills: 0 | Status: SHIPPED | OUTPATIENT
Start: 2018-01-05 | End: 2018-01-10

## 2018-01-05 RX ADMIN — ONDANSETRON 4 MG: 2 INJECTION INTRAMUSCULAR; INTRAVENOUS at 02:26

## 2018-01-05 RX ADMIN — ACETAMINOPHEN 1000 MG: 500 TABLET, FILM COATED ORAL at 05:53

## 2018-01-05 RX ADMIN — CEFTRIAXONE SODIUM 1 G: 1 INJECTION, POWDER, FOR SOLUTION INTRAMUSCULAR; INTRAVENOUS at 05:22

## 2018-01-05 RX ADMIN — MORPHINE SULFATE 4 MG: 10 INJECTION INTRAMUSCULAR; INTRAVENOUS; SUBCUTANEOUS at 04:23

## 2018-01-05 RX ADMIN — SODIUM CHLORIDE 1000 ML: 900 INJECTION, SOLUTION INTRAVENOUS at 05:05

## 2018-01-05 RX ADMIN — SODIUM CHLORIDE 1000 ML: 900 INJECTION, SOLUTION INTRAVENOUS at 02:26

## 2018-01-05 NOTE — ED PROVIDER NOTES
HPI Comments: 40-year-old white female with history of CML, portal hypertension, esophageal varices presents with nausea and vomiting of blood onset approximately an hour and half prior to arrival.  She states she has had 2 episodes of grossly bloody emesis. No chest pain or abdominal pain. She denies blood in her stool and has had no melena over the past week. She is on Lovenox but denies other blood thinners. She states that she is in CML remission but is still on oral chemotherapy. No other complaints at this time. Patient is a 62 y.o. female presenting with vomiting. The history is provided by the patient. Vomiting    Associated symptoms include diarrhea. Pertinent negatives include no fever, no abdominal pain, no headaches, no cough and no headaches.         Past Medical History:   Diagnosis Date    Anemia     Chronic myelogenous leukemia ([address]     [place] chromosome    Chronic pain     Coagulation disorder ([address]     \"clotting and Bleeding Problem\" dr Savage Galvin follows     Esophageal spasm     with banding     Esophageal varices (HCC)     grade 3    H/O craniotomy     3-29-15.  due to blood clot - which caused a seizure  - then pt fell and hit     Polycythemia vera(238.4)     JAK2 mutation    Portal hypertension ([address]     with varices    Primary hypothyroidism     Pulmonary embolism ([address] 2006    not on coumadin anymore     S/P small bowel resection     2012.  9 feet removed due to  gangrene which wa due to blood clot    Seizures ([address]     last one 3- - followed by aniyah     Splenomegaly, congestive, chronic     Stroke ([address]     pt had stroke like symptoms     Thrombosis, portal vein 2004    portal , spleenic and recently superior mesenteric    Transfusion history     many blood tranfusions - last 3-29-15 after brain surgery       Past Surgical History:   Procedure Laterality Date    ABDOMEN SURGERY PROC UNLISTED      umbilical hernia repair    HX APPENDECTOMY      with small bowel resection    HX BREAST BIOPSY Bilateral     Lt - ; Rt -     HX CHOLECYSTECTOMY      HX GI      liver biopsy    HX GI      bowel removed small - 9 feet     HX GYN       x 2    HX GYN      D&C following miscarriage    HX ORTHOPAEDIC Left 2008    torn labrum shoulder (screw in place)    NEUROLOGICAL PROCEDURE UNLISTED      craniotomy to evacuate subdural hematoma following a fall from a seizure         Family History:   Problem Relation Age of Onset    Diabetes Mother     Stroke Mother      after surgery    Cancer Father      brain    Breast Cancer Maternal Aunt 48    Thyroid Disease Paternal Grandmother        Social History     Social History    Marital status: SINGLE     Spouse name: N/A    Number of children: N/A    Years of education: N/A     Occupational History    Not on file. Social History Main Topics    Smoking status: Former Smoker     Packs/day: 0.20     Years: 10.00     Types: Cigarettes     Quit date:     Smokeless tobacco: Never Used    Alcohol use No    Drug use: No    Sexual activity: Not on file     Other Topics Concern    Not on file     Social History Narrative         ALLERGIES: Latex; Sulfa (sulfonamide antibiotics); Tramadol; Augmentin [amoxicillin-pot clavulanate]; Morphine; Rizatriptan; Tetanus immune globulin; Xanax [alprazolam]; and Zonegran [zonisamide]    Review of Systems   Constitutional: Negative for fever. HENT: Negative for congestion. Respiratory: Negative for cough and shortness of breath. Cardiovascular: Negative for chest pain. Gastrointestinal: Positive for diarrhea, nausea and vomiting. Negative for abdominal pain, blood in stool and constipation. Genitourinary: Negative for dysuria. Musculoskeletal: Negative for back pain and neck pain. Skin: Negative for rash. Neurological: Negative for headaches.        Vitals:    18 0152 18 0153   BP: 151/65 151/65   Pulse: (!) 113 (!) 111   Resp:  20 Temp:  99.1 °F (37.3 °C)   SpO2: 97% 98%   Weight:  59 kg (130 lb)   Height:  5' 4\" (1.626 m)            Physical Exam   Constitutional: She is oriented to person, place, and time. She appears well-developed and well-nourished. No distress. HENT:   Head: Normocephalic and atraumatic. Mouth/Throat: Oropharynx is clear and moist.   Eyes: Conjunctivae are normal. Pupils are equal, round, and reactive to light. No scleral icterus. Neck: Normal range of motion. Neck supple. Cardiovascular: Regular rhythm. Tachycardia present. Murmur heard. Pulmonary/Chest: Effort normal and breath sounds normal. She has no wheezes. Abdominal: Soft. She exhibits no distension and no mass. There is no tenderness. Musculoskeletal: Normal range of motion. She exhibits no edema. Neurological: She is alert and oriented to person, place, and time. Skin: Skin is warm and dry. Psychiatric: She has a normal mood and affect. Nursing note and vitals reviewed. MDM  Number of Diagnoses or Management Options  Hematemesis with nausea:   Urinary tract infection without hematuria, site unspecified:   Diagnosis management comments: Blood work is unremarkable. Patient has a stable anemia with hemoglobin 9.4 hematocrit 33. Basic panel unremarkable. BUN is normal at 21. procalcitonin and lactic acid normal. INR 1.3. X-ray normal.  Patient has been hydrated with normal saline and treated with Zofran. She has had no vomiting while being observed in the emergency department. She is tolerating oral fluids. She has been able to ambulate to the bathroom several times. Urinalysis does show a mild urinary tract infection. She has received IV Rocephin. Heart rate has remained mildly elevated. This may be related to underlying infection. She has a nonsurgical abdominal exam and appears safe for outpatient management. We'll discharge home with Zofran and Keflex.        Amount and/or Complexity of Data Reviewed  Clinical lab tests: ordered and reviewed  Tests in the radiology section of CPT®: ordered and reviewed    Risk of Complications, Morbidity, and/or Mortality  Presenting problems: moderate  Diagnostic procedures: moderate  Management options: moderate      ED Course       Procedures

## 2018-01-05 NOTE — LETTER
[address] [facility] EMERGENCY DEPT [address] [address] 
[phone] Work/School Note Date: 1/5/2018 To Whom It May concern: 
 
Vincent Bray was seen and treated today in the emergency room by the following provider(s): 
Attending Provider: Farideh Whitten MD. Vincent Bray may return to work on 1/5/18. Sincerely, Jeane Crouch

## 2018-01-05 NOTE — DISCHARGE INSTRUCTIONS
Urinary Tract Infection in Women: Care Instructions  Your Care Instructions    A urinary tract infection, or UTI, is a general term for an infection anywhere between the kidneys and the urethra (where urine comes out). Most UTIs are bladder infections. They often cause pain or burning when you urinate. UTIs are caused by bacteria and can be cured with antibiotics. Be sure to complete your treatment so that the infection goes away. Follow-up care is a key part of your treatment and safety. Be sure to make and go to all appointments, and call your doctor if you are having problems. It's also a good idea to know your test results and keep a list of the medicines you take. How can you care for yourself at home? · Take your antibiotics as directed. Do not stop taking them just because you feel better. You need to take the full course of antibiotics. · Drink extra water and other fluids for the next day or two. This may help wash out the bacteria that are causing the infection. (If you have kidney, heart, or liver disease and have to limit fluids, talk with your doctor before you increase your fluid intake.)  · Avoid drinks that are carbonated or have caffeine. They can irritate the bladder. · Urinate often. Try to empty your bladder each time. · To relieve pain, take a hot bath or lay a heating pad set on low over your lower belly or genital area. Never go to sleep with a heating pad in place. To prevent UTIs  · Drink plenty of water each day. This helps you urinate often, which clears bacteria from your system. (If you have kidney, heart, or liver disease and have to limit fluids, talk with your doctor before you increase your fluid intake.)  · Urinate when you need to. · Urinate right after you have sex. · Change sanitary pads often. · Avoid douches, bubble baths, feminine hygiene sprays, and other feminine hygiene products that have deodorants.   · After going to the bathroom, wipe from front to back.  When should you call for help? Call your doctor now or seek immediate medical care if:  ? · Symptoms such as fever, chills, nausea, or vomiting get worse or appear for the first time. ? · You have new pain in your back just below your rib cage. This is called flank pain. ? · There is new blood or pus in your urine. ? · You have any problems with your antibiotic medicine. ? Watch closely for changes in your health, and be sure to contact your doctor if:  ? · You are not getting better after taking an antibiotic for 2 days. ? · Your symptoms go away but then come back. Where can you learn more? Go to http://dena-gino.info/. Enter T509 in the search box to learn more about \"Urinary Tract Infection in Women: Care Instructions. \"  Current as of: May 12, 2017  Content Version: 11.4  © 4019-9958 [employer]. Care instructions adapted under license by [employer] (which disclaims liability or warranty for this information). If you have questions about a medical condition or this instruction, always ask your healthcare professional. [address] any warranty or liability for your use of this information. Learning About a Melanie-Juarez Tear  What is a Melanie-Juarez tear? A Melanie-Juarez tear is a rip or tear of a mucous membrane in the digestive tract. Mucous membranes are tissues that line body cavities or canals. They make a thick, slippery liquid called mucus. The mucus protects the membranes and keeps them moist.  This kind of tear is most often caused by severe vomiting. It usually happens where the lower esophagus and the stomach meet. (The esophagus is the tube that connects the throat to the stomach.)  How is a Melanie-Juarez tear diagnosed? You will have a physical exam by your doctor. You may also have blood tests. And you might have an endoscopy.  This is a test that uses a thin, flexible, lighted viewing tool (endoscope) to allow a doctor to see the insides of organs, canals, and cavities in the body. What are the symptoms? Symptoms may include:  · Belly pain. · Vomiting blood. · Dark stools that look like tar or that contain dark red blood. How is a Melanie-Juarez tear treated? In most cases, the tear heals on its own in a few days. You may not need treatment. If you do need treatment, you may have to stay in the hospital. Treatment may include:  · Intravenous (IV) fluids to help prevent dehydration. · Medicines that block stomach acid. · Repair through the endoscope with a special clip (endoclip) to stop the bleeding. · A blood transfusion to replace heavy blood loss. · Surgery (only in severe cases). Follow-up care is a key part of your treatment and safety. Be sure to make and go to all appointments, and call your doctor if you are having problems. It's also a good idea to know your test results and keep a list of the medicines you take. Where can you learn more? Go to http://dena-gino.info/. Enter B410 in the search box to learn more about \"Learning About a Melanie-Juarez Tear. \"  Current as of: May 12, 2017  Content Version: 11.4  © 8558-4656 [employer]. Care instructions adapted under license by [employer] (which disclaims liability or warranty for this information). If you have questions about a medical condition or this instruction, always ask your healthcare professional. [place] [address] any warranty or liability for your use of this information.

## 2018-01-05 NOTE — ED TRIAGE NOTES
PT arrived to ED c/o vomiting blood that started to night secondary to taking NSAIDs. PT also has a Hx of cancer and esophageal veracious.

## 2018-01-10 LAB
BACTERIA SPEC CULT: NORMAL
BACTERIA SPEC CULT: NORMAL
SERVICE CMNT-IMP: NORMAL
SERVICE CMNT-IMP: NORMAL

## 2018-01-31 ENCOUNTER — HOSPITAL ENCOUNTER (OUTPATIENT)
Dept: MAMMOGRAPHY | Age: 58
Discharge: HOME | End: 2018-01-31
Attending: INTERNAL MEDICINE
Payer: MEDICARE

## 2018-01-31 DIAGNOSIS — Z12.31 VISIT FOR SCREENING MAMMOGRAM: ICD-10-CM

## 2018-01-31 PROCEDURE — 77067 SCR MAMMO BI INCL CAD: CPT

## 2018-02-06 ENCOUNTER — HOSPITAL ENCOUNTER (OUTPATIENT)
Dept: MAMMOGRAPHY | Age: 58
Discharge: HOME | End: 2018-02-06
Attending: INTERNAL MEDICINE
Payer: MEDICARE

## 2018-02-06 DIAGNOSIS — R92.8 ABNORMAL SCREENING MAMMOGRAM: ICD-10-CM

## 2018-02-06 PROCEDURE — 76642 ULTRASOUND BREAST LIMITED: CPT

## 2018-02-06 PROCEDURE — 77065 DX MAMMO INCL CAD UNI: CPT

## 2018-02-23 ENCOUNTER — HOSPITAL ENCOUNTER (OUTPATIENT)
Dept: PET IMAGING | Age: 58
Discharge: HOME | End: 2018-02-23
Payer: MEDICARE

## 2018-02-23 DIAGNOSIS — R91.1 LUNG NODULE: ICD-10-CM

## 2018-02-23 PROCEDURE — 74011636320 HC RX REV CODE- 636/320: Performed by: INTERNAL MEDICINE

## 2018-02-23 PROCEDURE — A9552 F18 FDG: HCPCS

## 2018-02-23 RX ORDER — SODIUM CHLORIDE 0.9 % (FLUSH) 0.9 %
5-10 SYRINGE (ML) INJECTION
Status: COMPLETED | OUTPATIENT
Start: 2018-02-23 | End: 2018-02-23

## 2018-02-23 RX ADMIN — Medication 10 ML: at 11:05

## 2018-02-23 RX ADMIN — DIATRIZOATE MEGLUMINE AND DIATRIZOATE SODIUM 10 ML: 660; 100 LIQUID ORAL; RECTAL at 11:05

## 2018-03-01 ENCOUNTER — HOSPITAL ENCOUNTER (OUTPATIENT)
Dept: LAB | Age: 58
Discharge: HOME | End: 2018-03-01
Payer: MEDICARE

## 2018-03-01 DIAGNOSIS — C92.10 CML (CHRONIC MYELOCYTIC LEUKEMIA) (HCC): ICD-10-CM

## 2018-03-01 DIAGNOSIS — D45 POLYCYTHEMIA VERA (HCC): Chronic | ICD-10-CM

## 2018-03-01 LAB
ALBUMIN SERPL-MCNC: 3.9 G/DL (ref 3.5–5)
ALBUMIN/GLOB SERPL: 1.5 {RATIO} (ref 1.2–3.5)
ALP SERPL-CCNC: 104 U/L (ref 50–136)
ALT SERPL-CCNC: 42 U/L (ref 12–65)
ANION GAP SERPL CALC-SCNC: 8 MMOL/L (ref 7–16)
AST SERPL-CCNC: 25 U/L (ref 15–37)
BASOPHILS # BLD: 0 K/UL (ref 0–0.2)
BASOPHILS NFR BLD: 1 % (ref 0–2)
BILIRUB SERPL-MCNC: 0.6 MG/DL (ref 0.2–1.1)
BUN SERPL-MCNC: 13 MG/DL (ref 6–23)
CALCIUM SERPL-MCNC: 8.4 MG/DL (ref 8.3–10.4)
CHLORIDE SERPL-SCNC: 108 MMOL/L (ref 98–107)
CO2 SERPL-SCNC: 24 MMOL/L (ref 21–32)
CREAT SERPL-MCNC: 0.62 MG/DL (ref 0.6–1)
DIFFERENTIAL METHOD BLD: ABNORMAL
EOSINOPHIL # BLD: 0.1 K/UL (ref 0–0.8)
EOSINOPHIL NFR BLD: 2 % (ref 0.5–7.8)
ERYTHROCYTE [DISTWIDTH] IN BLOOD BY AUTOMATED COUNT: 18.4 % (ref 11.9–14.6)
GLOBULIN SER CALC-MCNC: 2.6 G/DL (ref 2.3–3.5)
GLUCOSE SERPL-MCNC: 83 MG/DL (ref 65–100)
HCT VFR BLD AUTO: 29 % (ref 35.8–46.3)
HGB BLD-MCNC: 8.1 G/DL (ref 11.7–15.4)
LYMPHOCYTES # BLD: 0.6 K/UL (ref 0.5–4.6)
LYMPHOCYTES NFR BLD: 14 % (ref 13–44)
Lab: NORMAL
MAGNESIUM SERPL-MCNC: 2.4 MG/DL (ref 1.8–2.4)
MCH RBC QN AUTO: 21.3 PG (ref 26.1–32.9)
MCHC RBC AUTO-ENTMCNC: 27.9 G/DL (ref 31.4–35)
MCV RBC AUTO: 76.3 FL (ref 79.6–97.8)
MONOCYTES # BLD: 0.2 K/UL (ref 0.1–1.3)
MONOCYTES NFR BLD: 5 % (ref 4–12)
NEUTS SEG # BLD: 3.2 K/UL (ref 1.7–8.2)
NEUTS SEG NFR BLD: 77 % (ref 43–78)
NRBC # BLD: 0 K/UL (ref 0–0.2)
PLATELET # BLD AUTO: 158 K/UL (ref 150–450)
PMV BLD AUTO: 10.5 FL (ref 10.8–14.1)
POTASSIUM SERPL-SCNC: 3.6 MMOL/L (ref 3.5–5.1)
PROT SERPL-MCNC: 6.5 G/DL (ref 6.3–8.2)
RBC # BLD AUTO: 3.8 M/UL (ref 4.05–5.25)
REFERENCE LAB,REFLB: NORMAL
SODIUM SERPL-SCNC: 140 MMOL/L (ref 136–145)
TEST DESCRIPTION:,ATST: NORMAL
WBC # BLD AUTO: 4.1 K/UL (ref 4.3–11.1)

## 2018-03-01 PROCEDURE — 85025 COMPLETE CBC W/AUTO DIFF WBC: CPT | Performed by: INTERNAL MEDICINE

## 2018-03-01 PROCEDURE — 83735 ASSAY OF MAGNESIUM: CPT | Performed by: INTERNAL MEDICINE

## 2018-03-01 PROCEDURE — 36415 COLL VENOUS BLD VENIPUNCTURE: CPT | Performed by: INTERNAL MEDICINE

## 2018-03-01 PROCEDURE — 80053 COMPREHEN METABOLIC PANEL: CPT | Performed by: INTERNAL MEDICINE

## 2018-03-16 ENCOUNTER — HOSPITAL ENCOUNTER (OUTPATIENT)
Dept: GENERAL RADIOLOGY | Age: 58
Discharge: HOME | End: 2018-03-16
Attending: INTERNAL MEDICINE
Payer: MEDICARE

## 2018-03-16 DIAGNOSIS — C92.10 CML (CHRONIC MYELOCYTIC LEUKEMIA) (HCC): ICD-10-CM

## 2018-03-16 PROCEDURE — 73502 X-RAY EXAM HIP UNI 2-3 VIEWS: CPT

## 2018-04-02 ENCOUNTER — HOSPITAL ENCOUNTER (OUTPATIENT)
Dept: LAB | Age: 58
Discharge: HOME | End: 2018-04-02
Payer: MEDICARE

## 2018-04-02 DIAGNOSIS — C92.10 CML (CHRONIC MYELOCYTIC LEUKEMIA) (HCC): ICD-10-CM

## 2018-04-02 LAB
ALBUMIN SERPL-MCNC: 4 G/DL (ref 3.5–5)
ALBUMIN/GLOB SERPL: 1.4 {RATIO} (ref 1.2–3.5)
ALP SERPL-CCNC: 92 U/L (ref 50–136)
ALT SERPL-CCNC: 39 U/L (ref 12–65)
ANION GAP SERPL CALC-SCNC: 8 MMOL/L (ref 7–16)
AST SERPL-CCNC: 16 U/L (ref 15–37)
BASOPHILS # BLD: 0 K/UL (ref 0–0.2)
BASOPHILS NFR BLD: 0 % (ref 0–2)
BILIRUB SERPL-MCNC: 0.6 MG/DL (ref 0.2–1.1)
BUN SERPL-MCNC: 12 MG/DL (ref 6–23)
CALCIUM SERPL-MCNC: 8.8 MG/DL (ref 8.3–10.4)
CHLORIDE SERPL-SCNC: 109 MMOL/L (ref 98–107)
CO2 SERPL-SCNC: 25 MMOL/L (ref 21–32)
CREAT SERPL-MCNC: 0.54 MG/DL (ref 0.6–1)
DIFFERENTIAL METHOD BLD: ABNORMAL
EOSINOPHIL # BLD: 0.1 K/UL (ref 0–0.8)
EOSINOPHIL NFR BLD: 3 % (ref 0.5–7.8)
ERYTHROCYTE [DISTWIDTH] IN BLOOD BY AUTOMATED COUNT: 18.6 % (ref 11.9–14.6)
GLOBULIN SER CALC-MCNC: 2.8 G/DL (ref 2.3–3.5)
GLUCOSE SERPL-MCNC: 98 MG/DL (ref 65–100)
HCT VFR BLD AUTO: 29.3 % (ref 35.8–46.3)
HGB BLD-MCNC: 8 G/DL (ref 11.7–15.4)
LYMPHOCYTES # BLD: 0.5 K/UL (ref 0.5–4.6)
LYMPHOCYTES NFR BLD: 11 % (ref 13–44)
MCH RBC QN AUTO: 21 PG (ref 26.1–32.9)
MCHC RBC AUTO-ENTMCNC: 27.3 G/DL (ref 31.4–35)
MCV RBC AUTO: 76.9 FL (ref 79.6–97.8)
MONOCYTES # BLD: 0.2 K/UL (ref 0.1–1.3)
MONOCYTES NFR BLD: 5 % (ref 4–12)
NEUTS SEG # BLD: 3.7 K/UL (ref 1.7–8.2)
NEUTS SEG NFR BLD: 81 % (ref 43–78)
NRBC # BLD: 0 K/UL (ref 0–0.2)
PLATELET # BLD AUTO: 160 K/UL (ref 150–450)
PMV BLD AUTO: 10.6 FL (ref 10.8–14.1)
POTASSIUM SERPL-SCNC: 4 MMOL/L (ref 3.5–5.1)
PROT SERPL-MCNC: 6.8 G/DL (ref 6.3–8.2)
RBC # BLD AUTO: 3.81 M/UL (ref 4.05–5.25)
SODIUM SERPL-SCNC: 142 MMOL/L (ref 136–145)
WBC # BLD AUTO: 4.6 K/UL (ref 4.3–11.1)

## 2018-04-02 PROCEDURE — 36415 COLL VENOUS BLD VENIPUNCTURE: CPT | Performed by: INTERNAL MEDICINE

## 2018-04-02 PROCEDURE — 80053 COMPREHEN METABOLIC PANEL: CPT | Performed by: INTERNAL MEDICINE

## 2018-04-02 PROCEDURE — 85025 COMPLETE CBC W/AUTO DIFF WBC: CPT | Performed by: INTERNAL MEDICINE

## 2018-04-09 ENCOUNTER — HOSPITAL ENCOUNTER (OUTPATIENT)
Dept: LAB | Age: 58
Discharge: HOME | End: 2018-04-09
Payer: MEDICARE

## 2018-04-09 ENCOUNTER — HOSPITAL ENCOUNTER (OUTPATIENT)
Dept: ULTRASOUND IMAGING | Age: 58
Discharge: HOME | End: 2018-04-09
Attending: NURSE PRACTITIONER
Payer: MEDICARE

## 2018-04-09 DIAGNOSIS — R22.1 NECK SWELLING: ICD-10-CM

## 2018-04-09 DIAGNOSIS — C92.10 CML (CHRONIC MYELOCYTIC LEUKEMIA) (HCC): ICD-10-CM

## 2018-04-09 DIAGNOSIS — I82.90 THROMBUS: ICD-10-CM

## 2018-04-09 LAB
ALBUMIN SERPL-MCNC: 3.6 G/DL (ref 3.5–5)
ALBUMIN/GLOB SERPL: 1.4 {RATIO} (ref 1.2–3.5)
ALP SERPL-CCNC: 96 U/L (ref 50–136)
ALT SERPL-CCNC: 25 U/L (ref 12–65)
ANION GAP SERPL CALC-SCNC: 8 MMOL/L (ref 7–16)
AST SERPL-CCNC: 19 U/L (ref 15–37)
BASOPHILS # BLD: 0 K/UL (ref 0–0.2)
BASOPHILS NFR BLD: 1 % (ref 0–2)
BILIRUB SERPL-MCNC: 0.5 MG/DL (ref 0.2–1.1)
BUN SERPL-MCNC: 11 MG/DL (ref 6–23)
CALCIUM SERPL-MCNC: 8.4 MG/DL (ref 8.3–10.4)
CHLORIDE SERPL-SCNC: 111 MMOL/L (ref 98–107)
CO2 SERPL-SCNC: 23 MMOL/L (ref 21–32)
CREAT SERPL-MCNC: 0.61 MG/DL (ref 0.6–1)
D DIMER PPP FEU-MCNC: 2.25 UG/ML(FEU)
DIFFERENTIAL METHOD BLD: ABNORMAL
EOSINOPHIL # BLD: 0.1 K/UL (ref 0–0.8)
EOSINOPHIL NFR BLD: 2 % (ref 0.5–7.8)
ERYTHROCYTE [DISTWIDTH] IN BLOOD BY AUTOMATED COUNT: 20.4 % (ref 11.9–14.6)
GLOBULIN SER CALC-MCNC: 2.6 G/DL (ref 2.3–3.5)
GLUCOSE SERPL-MCNC: 137 MG/DL (ref 65–100)
HCT VFR BLD AUTO: 36.2 % (ref 35.8–46.3)
HGB BLD-MCNC: 10.1 G/DL (ref 11.7–15.4)
LYMPHOCYTES # BLD: 0.7 K/UL (ref 0.5–4.6)
LYMPHOCYTES NFR BLD: 11 % (ref 13–44)
MCH RBC QN AUTO: 21.8 PG (ref 26.1–32.9)
MCHC RBC AUTO-ENTMCNC: 27.9 G/DL (ref 31.4–35)
MCV RBC AUTO: 78 FL (ref 79.6–97.8)
MONOCYTES # BLD: 0.3 K/UL (ref 0.1–1.3)
MONOCYTES NFR BLD: 5 % (ref 4–12)
NEUTS SEG # BLD: 5.1 K/UL (ref 1.7–8.2)
NEUTS SEG NFR BLD: 82 % (ref 43–78)
NRBC # BLD: 0.01 K/UL (ref 0–0.2)
PLATELET # BLD AUTO: 157 K/UL (ref 150–450)
PMV BLD AUTO: 10.6 FL (ref 10.8–14.1)
POTASSIUM SERPL-SCNC: 3.7 MMOL/L (ref 3.5–5.1)
PROT SERPL-MCNC: 6.2 G/DL (ref 6.3–8.2)
RBC # BLD AUTO: 4.64 M/UL (ref 4.05–5.25)
SODIUM SERPL-SCNC: 142 MMOL/L (ref 136–145)
WBC # BLD AUTO: 6.2 K/UL (ref 4.3–11.1)

## 2018-04-09 PROCEDURE — 36415 COLL VENOUS BLD VENIPUNCTURE: CPT | Performed by: NURSE PRACTITIONER

## 2018-04-09 PROCEDURE — 93971 EXTREMITY STUDY: CPT

## 2018-04-09 PROCEDURE — 85379 FIBRIN DEGRADATION QUANT: CPT | Performed by: INTERNAL MEDICINE

## 2018-04-09 PROCEDURE — 85025 COMPLETE CBC W/AUTO DIFF WBC: CPT | Performed by: NURSE PRACTITIONER

## 2018-04-09 PROCEDURE — 76536 US EXAM OF HEAD AND NECK: CPT

## 2018-04-09 PROCEDURE — 80053 COMPREHEN METABOLIC PANEL: CPT | Performed by: NURSE PRACTITIONER

## 2018-04-10 DIAGNOSIS — K83.9 BILE DUCT ABNORMALITY: ICD-10-CM

## 2018-04-13 ENCOUNTER — HOSPITAL ENCOUNTER (OUTPATIENT)
Dept: LAB | Age: 58
Discharge: HOME | End: 2018-04-13
Payer: MEDICARE

## 2018-04-13 DIAGNOSIS — D64.9 ANEMIA, UNSPECIFIED TYPE: ICD-10-CM

## 2018-04-13 DIAGNOSIS — R10.9 ACUTE LEFT FLANK PAIN: ICD-10-CM

## 2018-04-13 DIAGNOSIS — C92.10 CML (CHRONIC MYELOCYTIC LEUKEMIA) (HCC): ICD-10-CM

## 2018-04-13 DIAGNOSIS — R10.9 FLANK PAIN: ICD-10-CM

## 2018-04-13 LAB
ALBUMIN SERPL-MCNC: 4.1 G/DL (ref 3.5–5)
ALBUMIN/GLOB SERPL: 1.6 {RATIO} (ref 1.2–3.5)
ALP SERPL-CCNC: 107 U/L (ref 50–136)
ALT SERPL-CCNC: 32 U/L (ref 12–65)
AMYLASE SERPL-CCNC: 27 U/L (ref 25–115)
APPEARANCE UR: CLEAR
AST SERPL-CCNC: 23 U/L (ref 15–37)
BACTERIA URNS QL MICRO: 0 /HPF
BILIRUB DIRECT SERPL-MCNC: 0.2 MG/DL
BILIRUB SERPL-MCNC: 0.5 MG/DL (ref 0.2–1.1)
BILIRUB UR QL: NEGATIVE
CASTS URNS QL MICRO: 0 /LPF
COLLECTION COMMENT, COLCM: NORMAL
COLOR UR: YELLOW
CRYSTALS URNS QL MICRO: 0 /LPF
DIFFERENTIAL METHOD BLD: ABNORMAL
EOSINOPHIL # BLD: 0.1 K/UL (ref 0–0.8)
EOSINOPHIL NFR BLD MANUAL: 2 % (ref 1–8)
EPI CELLS #/AREA URNS HPF: 0 /HPF
ERYTHROCYTE [DISTWIDTH] IN BLOOD BY AUTOMATED COUNT: 20.9 % (ref 11.9–14.6)
FERRITIN SERPL-MCNC: 18 NG/ML (ref 8–388)
GLOBULIN SER CALC-MCNC: 2.6 G/DL (ref 2.3–3.5)
GLUCOSE UR STRIP.AUTO-MCNC: NEGATIVE MG/DL
HCT VFR BLD AUTO: 38.5 % (ref 35.8–46.3)
HGB BLD-MCNC: 10.5 G/DL (ref 11.7–15.4)
HGB UR QL STRIP: NEGATIVE
IRON SATN MFR SERPL: 5 %
IRON SERPL-MCNC: 19 UG/DL (ref 35–150)
KETONES UR QL STRIP.AUTO: NEGATIVE MG/DL
LEUKOCYTE ESTERASE UR QL STRIP.AUTO: NEGATIVE
LIPASE SERPL-CCNC: 63 U/L (ref 73–393)
LYMPHOCYTES # BLD: 0.7 K/UL (ref 0.5–4.6)
LYMPHOCYTES NFR BLD MANUAL: 14 % (ref 16–44)
MCH RBC QN AUTO: 21.5 PG (ref 26.1–32.9)
MCHC RBC AUTO-ENTMCNC: 27.3 G/DL (ref 31.4–35)
MCV RBC AUTO: 78.7 FL (ref 79.6–97.8)
MONOCYTES # BLD: 0.2 K/UL (ref 0.1–1.3)
MONOCYTES NFR BLD MANUAL: 4 % (ref 3–9)
MUCOUS THREADS URNS QL MICRO: 0 /LPF
NEUTS BAND NFR BLD MANUAL: 2 % (ref 0–10)
NEUTS SEG # BLD: 3.7 K/UL (ref 1.7–8.2)
NEUTS SEG NFR BLD MANUAL: 78 % (ref 47–75)
NITRITE UR QL STRIP.AUTO: NEGATIVE
NRBC # BLD: 0.01 K/UL (ref 0–0.2)
PH UR STRIP: 7 [PH] (ref 5–9)
PLATELET # BLD AUTO: 143 K/UL (ref 150–450)
PLATELET COMMENTS,PCOM: ADEQUATE
PMV BLD AUTO: 10.7 FL (ref 10.8–14.1)
PROT SERPL-MCNC: 6.7 G/DL (ref 6.3–8.2)
PROT UR STRIP-MCNC: NEGATIVE MG/DL
RBC # BLD AUTO: 4.89 M/UL (ref 4.05–5.25)
RBC #/AREA URNS HPF: 0 /HPF
RBC MORPH BLD: ABNORMAL
SP GR UR REFRACTOMETRY: 1.02 (ref 1–1.02)
TIBC SERPL-MCNC: 361 UG/DL (ref 250–450)
UROBILINOGEN UR QL STRIP.AUTO: 1 EU/DL (ref 0.2–1)
WBC # BLD AUTO: 4.7 K/UL (ref 4.3–11.1)
WBC MORPH BLD: ABNORMAL
WBC URNS QL MICRO: 0 /HPF

## 2018-04-13 PROCEDURE — 36415 COLL VENOUS BLD VENIPUNCTURE: CPT | Performed by: INTERNAL MEDICINE

## 2018-04-13 PROCEDURE — 83540 ASSAY OF IRON: CPT | Performed by: INTERNAL MEDICINE

## 2018-04-13 PROCEDURE — 81015 MICROSCOPIC EXAM OF URINE: CPT | Performed by: NURSE PRACTITIONER

## 2018-04-13 PROCEDURE — 85025 COMPLETE CBC W/AUTO DIFF WBC: CPT | Performed by: INTERNAL MEDICINE

## 2018-04-13 PROCEDURE — 80076 HEPATIC FUNCTION PANEL: CPT | Performed by: INTERNAL MEDICINE

## 2018-04-13 PROCEDURE — 81003 URINALYSIS AUTO W/O SCOPE: CPT | Performed by: NURSE PRACTITIONER

## 2018-04-13 PROCEDURE — 83690 ASSAY OF LIPASE: CPT | Performed by: INTERNAL MEDICINE

## 2018-04-13 PROCEDURE — 82150 ASSAY OF AMYLASE: CPT | Performed by: INTERNAL MEDICINE

## 2018-04-13 PROCEDURE — 82728 ASSAY OF FERRITIN: CPT | Performed by: INTERNAL MEDICINE

## 2018-04-23 ENCOUNTER — HOSPITAL ENCOUNTER (OUTPATIENT)
Dept: MAMMOGRAPHY | Age: 58
Discharge: HOME | End: 2018-04-23
Attending: NURSE PRACTITIONER
Payer: MEDICARE

## 2018-04-23 DIAGNOSIS — S22.000A COMPRESSION FRACTURE OF BODY OF THORACIC VERTEBRA (HCC): ICD-10-CM

## 2018-04-23 PROCEDURE — 77080 DXA BONE DENSITY AXIAL: CPT

## 2018-04-30 ENCOUNTER — HOSPITAL ENCOUNTER (OUTPATIENT)
Dept: LAB | Age: 58
Discharge: HOME | End: 2018-04-30
Payer: MEDICARE

## 2018-04-30 DIAGNOSIS — C92.10 CML (CHRONIC MYELOCYTIC LEUKEMIA) (HCC): ICD-10-CM

## 2018-04-30 LAB
ALBUMIN SERPL-MCNC: 3.8 G/DL (ref 3.5–5)
ALBUMIN/GLOB SERPL: 1.5 {RATIO} (ref 1.2–3.5)
ALP SERPL-CCNC: 89 U/L (ref 50–136)
ALT SERPL-CCNC: 52 U/L (ref 12–65)
ANION GAP SERPL CALC-SCNC: 7 MMOL/L (ref 7–16)
AST SERPL-CCNC: 37 U/L (ref 15–37)
BASOPHILS # BLD: 0 K/UL (ref 0–0.2)
BASOPHILS NFR BLD: 0 % (ref 0–2)
BILIRUB SERPL-MCNC: 0.5 MG/DL (ref 0.2–1.1)
BUN SERPL-MCNC: 10 MG/DL (ref 6–23)
CALCIUM SERPL-MCNC: 9 MG/DL (ref 8.3–10.4)
CHLORIDE SERPL-SCNC: 105 MMOL/L (ref 98–107)
CO2 SERPL-SCNC: 25 MMOL/L (ref 21–32)
CREAT SERPL-MCNC: 0.62 MG/DL (ref 0.6–1)
DIFFERENTIAL METHOD BLD: ABNORMAL
EOSINOPHIL # BLD: 0.1 K/UL (ref 0–0.8)
EOSINOPHIL NFR BLD: 2 % (ref 0.5–7.8)
ERYTHROCYTE [DISTWIDTH] IN BLOOD BY AUTOMATED COUNT: 21 % (ref 11.9–14.6)
GLOBULIN SER CALC-MCNC: 2.6 G/DL (ref 2.3–3.5)
GLUCOSE SERPL-MCNC: 105 MG/DL (ref 65–100)
HCT VFR BLD AUTO: 33.6 % (ref 35.8–46.3)
HGB BLD-MCNC: 9.7 G/DL (ref 11.7–15.4)
LYMPHOCYTES # BLD: 0.8 K/UL (ref 0.5–4.6)
LYMPHOCYTES NFR BLD: 15 % (ref 13–44)
MCH RBC QN AUTO: 22.8 PG (ref 26.1–32.9)
MCHC RBC AUTO-ENTMCNC: 28.9 G/DL (ref 31.4–35)
MCV RBC AUTO: 79.1 FL (ref 79.6–97.8)
MONOCYTES # BLD: 0.3 K/UL (ref 0.1–1.3)
MONOCYTES NFR BLD: 6 % (ref 4–12)
NEUTS SEG # BLD: 3.7 K/UL (ref 1.7–8.2)
NEUTS SEG NFR BLD: 76 % (ref 43–78)
NRBC # BLD: 0 K/UL (ref 0–0.2)
PLATELET # BLD AUTO: 175 K/UL (ref 150–450)
PMV BLD AUTO: 10.8 FL (ref 10.8–14.1)
POTASSIUM SERPL-SCNC: 3.9 MMOL/L (ref 3.5–5.1)
PROT SERPL-MCNC: 6.4 G/DL (ref 6.3–8.2)
RBC # BLD AUTO: 4.25 M/UL (ref 4.05–5.25)
REFERENCE LAB,REFLB: NORMAL
SODIUM SERPL-SCNC: 137 MMOL/L (ref 136–145)
TEST DESCRIPTION:,ATST: NORMAL
WBC # BLD AUTO: 4.9 K/UL (ref 4.3–11.1)

## 2018-04-30 PROCEDURE — 85025 COMPLETE CBC W/AUTO DIFF WBC: CPT | Performed by: INTERNAL MEDICINE

## 2018-04-30 PROCEDURE — 36415 COLL VENOUS BLD VENIPUNCTURE: CPT | Performed by: INTERNAL MEDICINE

## 2018-04-30 PROCEDURE — 80053 COMPREHEN METABOLIC PANEL: CPT | Performed by: INTERNAL MEDICINE

## 2018-05-04 ENCOUNTER — HOSPITAL ENCOUNTER (OUTPATIENT)
Dept: INFUSION THERAPY | Age: 58
Discharge: HOME | End: 2018-05-04
Payer: MEDICARE

## 2018-05-04 VITALS
BODY MASS INDEX: 22.49 KG/M2 | SYSTOLIC BLOOD PRESSURE: 134 MMHG | DIASTOLIC BLOOD PRESSURE: 72 MMHG | TEMPERATURE: 98.5 F | HEART RATE: 76 BPM | WEIGHT: 131 LBS | OXYGEN SATURATION: 99 % | RESPIRATION RATE: 17 BRPM

## 2018-05-04 DIAGNOSIS — D50.9 IRON DEFICIENCY ANEMIA, UNSPECIFIED IRON DEFICIENCY ANEMIA TYPE: ICD-10-CM

## 2018-05-04 PROCEDURE — 74011250636 HC RX REV CODE- 250/636: Performed by: INTERNAL MEDICINE

## 2018-05-04 PROCEDURE — 74011000258 HC RX REV CODE- 258: Performed by: INTERNAL MEDICINE

## 2018-05-04 PROCEDURE — 96374 THER/PROPH/DIAG INJ IV PUSH: CPT

## 2018-05-04 RX ORDER — SODIUM CHLORIDE 0.9 % (FLUSH) 0.9 %
10 SYRINGE (ML) INJECTION AS NEEDED
Status: ACTIVE | OUTPATIENT
Start: 2018-05-04 | End: 2018-05-04

## 2018-05-04 RX ORDER — ACETAMINOPHEN 325 MG/1
650 TABLET ORAL AS NEEDED
Status: ACTIVE | OUTPATIENT
Start: 2018-05-04 | End: 2018-05-04

## 2018-05-04 RX ORDER — DIPHENHYDRAMINE HYDROCHLORIDE 50 MG/ML
50 INJECTION, SOLUTION INTRAMUSCULAR; INTRAVENOUS AS NEEDED
Status: ACTIVE | OUTPATIENT
Start: 2018-05-04 | End: 2018-05-04

## 2018-05-04 RX ADMIN — Medication 10 ML: at 07:45

## 2018-05-04 RX ADMIN — SODIUM CHLORIDE 500 ML: 900 INJECTION, SOLUTION INTRAVENOUS at 07:46

## 2018-05-04 RX ADMIN — FERUMOXYTOL 510 MG: 510 INJECTION INTRAVENOUS at 07:52

## 2018-05-04 NOTE — PROGRESS NOTES
Arrived to the [facility]. Allan completed.  Patient tolerated without problems  Any issues or concerns during appointment: Lovenox prescription called to [facility] by Xi Felder NP  Patient aware of next infusion appointment on 5/11/18 at [phone]  Discharged ambulatory

## 2018-05-08 ENCOUNTER — APPOINTMENT (OUTPATIENT)
Dept: INFUSION THERAPY | Age: 58
End: 2018-05-08

## 2018-05-11 ENCOUNTER — HOSPITAL ENCOUNTER (OUTPATIENT)
Dept: INFUSION THERAPY | Age: 58
Discharge: HOME | End: 2018-05-11
Payer: MEDICARE

## 2018-05-11 VITALS
OXYGEN SATURATION: 98 % | WEIGHT: 134.2 LBS | HEART RATE: 76 BPM | BODY MASS INDEX: 23.04 KG/M2 | SYSTOLIC BLOOD PRESSURE: 114 MMHG | DIASTOLIC BLOOD PRESSURE: 66 MMHG | RESPIRATION RATE: 18 BRPM | TEMPERATURE: 99 F

## 2018-05-11 DIAGNOSIS — D50.9 IRON DEFICIENCY ANEMIA, UNSPECIFIED IRON DEFICIENCY ANEMIA TYPE: ICD-10-CM

## 2018-05-11 PROCEDURE — 96374 THER/PROPH/DIAG INJ IV PUSH: CPT

## 2018-05-11 PROCEDURE — 74011250636 HC RX REV CODE- 250/636: Performed by: INTERNAL MEDICINE

## 2018-05-11 PROCEDURE — 74011000258 HC RX REV CODE- 258: Performed by: INTERNAL MEDICINE

## 2018-05-11 RX ORDER — SODIUM CHLORIDE 0.9 % (FLUSH) 0.9 %
10 SYRINGE (ML) INJECTION AS NEEDED
Status: ACTIVE | OUTPATIENT
Start: 2018-05-11 | End: 2018-05-12

## 2018-05-11 RX ORDER — SODIUM CHLORIDE 9 MG/ML
10 INJECTION INTRAMUSCULAR; INTRAVENOUS; SUBCUTANEOUS AS NEEDED
Status: ACTIVE | OUTPATIENT
Start: 2018-05-11 | End: 2018-05-12

## 2018-05-11 RX ADMIN — Medication 10 ML: at 14:15

## 2018-05-11 RX ADMIN — SODIUM CHLORIDE 500 ML: 900 INJECTION, SOLUTION INTRAVENOUS at 14:15

## 2018-05-11 RX ADMIN — FERUMOXYTOL 510 MG: 510 INJECTION INTRAVENOUS at 14:18

## 2018-05-11 NOTE — PROGRESS NOTES
Arrived to the [facility]. Labs reviewed. Assessment and feraheme completed. Patient tolerated well. Any issues or concerns during appointment: Patient had a temperature of 99 at end of 30 min observation and was asymptomatic. Patient was advised that if her temperature increases at home, to take tylenol prn. Patient verbalized understanding. Patient aware of next infusion appointment on 06/05/2018 (date) at 56 (time) with Dr. Severiano Saeed' office. Discharged ambulatory, with self. Patient advised that if she has any problems or concerns to contact Dr. Severiano Saeed' office. Patient verbalized understanding.

## 2018-05-15 ENCOUNTER — APPOINTMENT (OUTPATIENT)
Dept: INFUSION THERAPY | Age: 58
End: 2018-05-15

## 2018-05-31 ENCOUNTER — HOSPITAL ENCOUNTER (OUTPATIENT)
Dept: LAB | Age: 58
Discharge: HOME | End: 2018-05-31
Payer: MEDICARE

## 2018-05-31 DIAGNOSIS — C92.10 CML (CHRONIC MYELOCYTIC LEUKEMIA) (HCC): ICD-10-CM

## 2018-05-31 LAB
ALBUMIN SERPL-MCNC: 4.2 G/DL (ref 3.5–5)
ALBUMIN/GLOB SERPL: 1.5 {RATIO} (ref 1.2–3.5)
ALP SERPL-CCNC: 117 U/L (ref 50–136)
ALT SERPL-CCNC: 115 U/L (ref 12–65)
ANION GAP SERPL CALC-SCNC: 5 MMOL/L (ref 7–16)
AST SERPL-CCNC: 51 U/L (ref 15–37)
BASOPHILS # BLD: 0.1 K/UL (ref 0–0.2)
BASOPHILS NFR BLD: 1 % (ref 0–2)
BILIRUB SERPL-MCNC: 0.6 MG/DL (ref 0.2–1.1)
BUN SERPL-MCNC: 11 MG/DL (ref 6–23)
CALCIUM SERPL-MCNC: 8.8 MG/DL (ref 8.3–10.4)
CHLORIDE SERPL-SCNC: 106 MMOL/L (ref 98–107)
CO2 SERPL-SCNC: 27 MMOL/L (ref 21–32)
CREAT SERPL-MCNC: 0.63 MG/DL (ref 0.6–1)
DIFFERENTIAL METHOD BLD: ABNORMAL
EOSINOPHIL # BLD: 0.1 K/UL (ref 0–0.8)
EOSINOPHIL NFR BLD: 2 % (ref 0.5–7.8)
ERYTHROCYTE [DISTWIDTH] IN BLOOD BY AUTOMATED COUNT: 25.4 % (ref 11.9–14.6)
GLOBULIN SER CALC-MCNC: 2.8 G/DL (ref 2.3–3.5)
GLUCOSE SERPL-MCNC: 110 MG/DL (ref 65–100)
HCT VFR BLD AUTO: 41.8 % (ref 35.8–46.3)
HGB BLD-MCNC: 12.9 G/DL (ref 11.7–15.4)
LYMPHOCYTES # BLD: 0.7 K/UL (ref 0.5–4.6)
LYMPHOCYTES NFR BLD: 14 % (ref 13–44)
MCH RBC QN AUTO: 27.7 PG (ref 26.1–32.9)
MCHC RBC AUTO-ENTMCNC: 30.9 G/DL (ref 31.4–35)
MCV RBC AUTO: 89.9 FL (ref 79.6–97.8)
MONOCYTES # BLD: 0.2 K/UL (ref 0.1–1.3)
MONOCYTES NFR BLD: 4 % (ref 4–12)
NEUTS SEG # BLD: 4.1 K/UL (ref 1.7–8.2)
NEUTS SEG NFR BLD: 79 % (ref 43–78)
NRBC # BLD: 0 K/UL (ref 0–0.2)
PLATELET # BLD AUTO: 178 K/UL (ref 150–450)
PMV BLD AUTO: 10.7 FL (ref 10.8–14.1)
POTASSIUM SERPL-SCNC: 4.1 MMOL/L (ref 3.5–5.1)
PROT SERPL-MCNC: 7 G/DL (ref 6.3–8.2)
RBC # BLD AUTO: 4.65 M/UL (ref 4.05–5.25)
SODIUM SERPL-SCNC: 138 MMOL/L (ref 136–145)
WBC # BLD AUTO: 5.2 K/UL (ref 4.3–11.1)

## 2018-05-31 PROCEDURE — 80053 COMPREHEN METABOLIC PANEL: CPT | Performed by: NURSE PRACTITIONER

## 2018-05-31 PROCEDURE — 85025 COMPLETE CBC W/AUTO DIFF WBC: CPT | Performed by: NURSE PRACTITIONER

## 2018-05-31 PROCEDURE — 36415 COLL VENOUS BLD VENIPUNCTURE: CPT | Performed by: NURSE PRACTITIONER

## 2018-06-18 ENCOUNTER — HOSPITAL ENCOUNTER (OUTPATIENT)
Dept: MRI IMAGING | Age: 58
Discharge: HOME | End: 2018-06-18
Attending: RADIOLOGY
Payer: MEDICARE

## 2018-06-18 DIAGNOSIS — S22.009A CLOSED FRACTURE OF THORACIC VERTEBRA (HCC): ICD-10-CM

## 2018-06-18 DIAGNOSIS — S32.009A CLOSED FRACTURE OF LUMBAR VERTEBRA (HCC): ICD-10-CM

## 2018-06-18 PROCEDURE — 72148 MRI LUMBAR SPINE W/O DYE: CPT

## 2018-06-18 PROCEDURE — 72146 MRI CHEST SPINE W/O DYE: CPT

## 2018-07-03 ENCOUNTER — HOSPITAL ENCOUNTER (OUTPATIENT)
Dept: LAB | Age: 58
Discharge: HOME | End: 2018-07-03
Payer: MEDICARE

## 2018-07-03 DIAGNOSIS — C92.10 CML (CHRONIC MYELOCYTIC LEUKEMIA) (HCC): ICD-10-CM

## 2018-07-03 LAB
ALBUMIN SERPL-MCNC: 4.3 G/DL (ref 3.5–5)
ALBUMIN/GLOB SERPL: 1.8 {RATIO} (ref 1.2–3.5)
ALP SERPL-CCNC: 97 U/L (ref 50–136)
ALT SERPL-CCNC: 92 U/L (ref 12–65)
ANION GAP SERPL CALC-SCNC: 12 MMOL/L (ref 7–16)
AST SERPL-CCNC: 45 U/L (ref 15–37)
BASOPHILS # BLD: 0 K/UL (ref 0–0.2)
BASOPHILS NFR BLD: 1 % (ref 0–2)
BILIRUB SERPL-MCNC: 0.5 MG/DL (ref 0.2–1.1)
BUN SERPL-MCNC: 11 MG/DL (ref 6–23)
CALCIUM SERPL-MCNC: 8.8 MG/DL (ref 8.3–10.4)
CHLORIDE SERPL-SCNC: 107 MMOL/L (ref 98–107)
CO2 SERPL-SCNC: 22 MMOL/L (ref 21–32)
CREAT SERPL-MCNC: 0.62 MG/DL (ref 0.6–1)
DIFFERENTIAL METHOD BLD: ABNORMAL
EOSINOPHIL # BLD: 0.1 K/UL (ref 0–0.8)
EOSINOPHIL NFR BLD: 3 % (ref 0.5–7.8)
ERYTHROCYTE [DISTWIDTH] IN BLOOD BY AUTOMATED COUNT: 19.9 % (ref 11.9–14.6)
GLOBULIN SER CALC-MCNC: 2.4 G/DL (ref 2.3–3.5)
GLUCOSE SERPL-MCNC: 83 MG/DL (ref 65–100)
HCT VFR BLD AUTO: 41.1 % (ref 35.8–46.3)
HGB BLD-MCNC: 13.4 G/DL (ref 11.7–15.4)
LYMPHOCYTES # BLD: 0.7 K/UL (ref 0.5–4.6)
LYMPHOCYTES NFR BLD: 15 % (ref 13–44)
Lab: NORMAL
MAGNESIUM SERPL-MCNC: 2.2 MG/DL (ref 1.8–2.4)
MCH RBC QN AUTO: 29.6 PG (ref 26.1–32.9)
MCHC RBC AUTO-ENTMCNC: 32.6 G/DL (ref 31.4–35)
MCV RBC AUTO: 90.7 FL (ref 79.6–97.8)
MONOCYTES # BLD: 0.3 K/UL (ref 0.1–1.3)
MONOCYTES NFR BLD: 6 % (ref 4–12)
NEUTS SEG # BLD: 3.4 K/UL (ref 1.7–8.2)
NEUTS SEG NFR BLD: 75 % (ref 43–78)
NRBC # BLD: 0 K/UL (ref 0–0.2)
PLATELET # BLD AUTO: 177 K/UL (ref 150–450)
PMV BLD AUTO: 10.4 FL (ref 10.8–14.1)
POTASSIUM SERPL-SCNC: 3.8 MMOL/L (ref 3.5–5.1)
PROT SERPL-MCNC: 6.7 G/DL (ref 6.3–8.2)
RBC # BLD AUTO: 4.53 M/UL (ref 4.05–5.25)
REFERENCE LAB,REFLB: NORMAL
SODIUM SERPL-SCNC: 141 MMOL/L (ref 136–145)
TEST DESCRIPTION:,ATST: NORMAL
WBC # BLD AUTO: 4.5 K/UL (ref 4.3–11.1)

## 2018-07-03 PROCEDURE — 80053 COMPREHEN METABOLIC PANEL: CPT | Performed by: INTERNAL MEDICINE

## 2018-07-03 PROCEDURE — 85025 COMPLETE CBC W/AUTO DIFF WBC: CPT | Performed by: INTERNAL MEDICINE

## 2018-07-03 PROCEDURE — 36415 COLL VENOUS BLD VENIPUNCTURE: CPT | Performed by: INTERNAL MEDICINE

## 2018-07-03 PROCEDURE — 83735 ASSAY OF MAGNESIUM: CPT | Performed by: INTERNAL MEDICINE

## 2018-07-26 ENCOUNTER — HOSPITAL ENCOUNTER (OUTPATIENT)
Dept: PHYSICAL THERAPY | Age: 58
Discharge: HOME | End: 2018-07-26
Payer: MEDICARE

## 2018-07-26 ENCOUNTER — HOSPITAL ENCOUNTER (OUTPATIENT)
Dept: PHYSICAL THERAPY | Age: 58
Discharge: HOME | End: 2018-07-26
Attending: INTERNAL MEDICINE

## 2018-07-26 PROCEDURE — G8979 MOBILITY GOAL STATUS: HCPCS

## 2018-07-26 PROCEDURE — 97161 PT EVAL LOW COMPLEX 20 MIN: CPT

## 2018-07-26 PROCEDURE — 97110 THERAPEUTIC EXERCISES: CPT

## 2018-07-26 PROCEDURE — G8978 MOBILITY CURRENT STATUS: HCPCS

## 2018-07-26 NOTE — THERAPY EVALUATION
Zabrina Couch  : 1960  Primary: [facility]  Secondary:  [address]  at [facility]  [address] , [address], [address] [address]  [phone]   [phone]         OUTPATIENT PHYSICAL THERAPY:Initial Assessment 2018   ICD-10: Treatment Diagnosis:   · Generalized Muscle Weakness (M62.81)   · Other abnormalities of gait and mobility (R26.89)  Precautions/Allergies:   Latex; Sulfa (sulfonamide antibiotics); Tramadol; Augmentin [amoxicillin-pot clavulanate]; Morphine; Rizatriptan; Tetanus immune globulin; Xanax [alprazolam]; and Zonegran [zonisamide]   Fall Risk Score: 0 (? 5 = High Risk)  MD Orders: Evaluate and Treat MEDICAL/REFERRING DIAGNOSIS:  Muscular deconditioning [R29.898]   DATE OF ONSET: Chronic  REFERRING PHYSICIAN: Shahid Cohn MD  RETURN PHYSICIAN APPOINTMENT: TBD      INITIAL ASSESSMENT:  Ms. Ian Gonzalez presents with decreased mobility, decreased strength, and decreased endurance. After discussing with patient, she agreed she would benefit from physical therapy to improve above deficits. Please sign this plan of treatment if you concur. Thank you for the opportunity to serve this patient.         PROBLEM LIST (Impacting functional limitations):  1. Decreased Strength  2. Decreased ADL/Functional Activities  3. Decreased Balance  4. Decreased Activity Tolerance INTERVENTIONS PLANNED:  1. Balance Exercise  2. Manual Therapy  3. Range of Motion (ROM)  4. Therapeutic Activites  5. Therapeutic Exercise/Strengthening   TREATMENT PLAN:  Effective Dates: 2018 TO 10/23/2018 (90 days). Frequency/Duration: 2 times a week for 90 Days  GOALS: (Goals have been discussed and agreed upon with patient.)  Short-Term Functional Goals: Time Frame: 30 days  1. Patient will be independent with home exercise program without exacerbation of symptoms or cueing needed.    2. Patient will rate lower back pain no greater than 2-3/10 in order to improve ADL performance and safety. Discharge Goals: Time Frame: 90 days  1. Patient will be independent with all ADLs with minimal onset of back pain and no deficits with ADL tasks. 2. Patient will report no fear avoidance with social or recreational activities due to back pain. Rehabilitation Potential For Stated Goals: GOOD  Regarding Alireza Couch's therapy, I certify that the treatment plan above will be carried out by a therapist or under their direction. Thank you for this referral,  Yves Somers, PT, DPT      Referring Physician Signature: Cat Casper MD                                                                                    Date                                                     The information in this section was collected on 7/26/2018 (except where otherwise noted). 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 She reports a history also of a fall 3-4 years ago, which resulted in a craniotomy and partial L field vision loss. She also has been diagnosed with compression fractures at the lower lumbar spine. Past Medical History/Comorbidities:   Ms. Mack Crockett  has a past medical history of Anemia; C. difficile diarrhea (4/1/2015); Cerebral edema (HCC) (4/1/2015); Chronic migraine (9/22/2016); Chronic myelogenous leukemia ([address] Chronic pain; Coagulation disorder ([address] Esophageal spasm; Esophageal varices ([address] GI bleed (8/3/2016); H/O craniotomy; Leukocytosis (3/14/2015); MRSA colonization (6/25/2012); Polycythemia vera(238.4); Portal hypertension ([address] Portal vein thrombosis (6/20/2012); Primary hypothyroidism; Pulmonary embolism ([address] (2006); S/P exploratory laparotomy, 6/20/12  (6/25/2012); S/P small bowel resection; Seizure ([address] (3/14/2015); Seizure disorder ([address] (3/30/2015); Seizures ([address] Splenomegaly, congestive, chronic; Stroke (cerebrum) ([address] (4/11/2015);  Stroke [facility]); Thrombocytopenia, 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 She also has no past medical history of Adverse effect of anesthesia; Difficult intubation; Malignant hyperthermia due to anesthesia; or Pseudocholinesterase deficiency. Ms. Rowdy Donato  has a past surgical history that includes hx gi; hx gi; pr abdomen surgery proc unlisted; hx cholecystectomy; hx breast biopsy (Bilateral); hx appendectomy; hx gyn; hx gyn; hx orthopaedic (Left, 2008); and pr neurological procedure unlisted. Social History/Living Environment:   Home Environment: Private residence  Support Systems: Family member(s), Friends \ neighbors  Prior Level of Function/Work/Activity:  Independent  Dominant Side:         RIGHT  Personal Factors:          Sex:  female        Age:  62 y.o. Current Medications:         Current Outpatient Prescriptions:     oxyCODONE-acetaminophen (PERCOCET 10)  mg per tablet, 1 tab every 8 to 12 hours as needed for severe pain., Disp: 60 Tab, Rfl: 0    enoxaparin (LOVENOX) 60 mg/0.6 mL injection, INJECT 60 MG BY SUBCUTANEOUS ROUTE EVERY TWELVE (12) HOURS., Disp: 60 Syringe, Rfl: 3    cyclobenzaprine (FLEXERIL) 5 mg tablet, Take 1 Tab by mouth three (3) times daily as needed for Muscle Spasm(s). Indications: Muscle Spasm, Disp: 30 Tab, Rfl: 0    dasatinib (SPRYCEL) 80 mg tab, Take 1 Tab by mouth daily. , Disp: 30 Tab, Rfl: 5    ruxolitinib 5 mg tab, Take 1 Tab by mouth two (2) times a day., Disp: 60 Tab, Rfl: 5    SYNTHROID 112 mcg tablet, 1 tablet once a day with an extra 1/2 tablet on Sundays, Disp: 32 Tab, Rfl: 11    ondansetron (ZOFRAN ODT) 8 mg disintegrating tablet, Take 1 Tab by mouth every twelve (12) hours as needed for Nausea., Disp: 10 Tab, Rfl: 0    promethazine (PHENERGAN) 25 mg tablet, .5 tab every three times daily as needed for nausea, Disp: 30 Tab, Rfl: 0    lansoprazole (PREVACID) 30 mg capsule, Take  by mouth Daily (before breakfast). , Disp: , Rfl:     ibuprofen (ADVIL) 200 mg tablet, Take  by mouth., Disp: , Rfl:     OMEPRAZOLE (PRILOSEC PO), Take  by mouth., Disp: , Rfl:     diphenoxylate-atropine (LOMOTIL) 2.5-0.025 mg per tablet, Take 1 Tab by mouth four (4) times daily as needed for Diarrhea. Max Daily Amount: 4 Tabs., Disp: 90 Tab, Rfl: 1    calcium carbonate (TUMS) 200 mg calcium (500 mg) chew, Take 1 Tab by mouth daily as needed. , Disp: , Rfl:    Date Last Reviewed:  7/26/2018     Number of Personal Factors/Comorbidities that affect the Plan of Care: 1-2: MODERATE COMPLEXITY   EXAMINATION:                  Observation/Orthostatic Postural Assessment:          Posture Assessment: Rounded shoulders, Forward head   ROM:           UE Assessment (AROM):  All WNL      LE Assessment (AROM):    · All WNL             LE Assessment (Strength):    · Hip Flexion: 4/5 with manual muscle testing     · Hip Extension: 4/5 with manual muscle testing     · Hip Abduction: 4/5 with manual muscle testing     · Hip Adduction: 4/5 with manual muscle testing     · Hip Internal rotation: 4/5 with manual muscle testing     · Hip External rotation: 4/5 with manual muscle testing     · Knee Flexion: 4/5 with manual muscle testing     · Knee Extension: 4/5 with manual muscle testing           Body Structures Involved:  1. Bones  2. Joints  3. Muscles Body Functions Affected:  1. Sensory/Pain  2. Movement Related Activities and Participation Affected:  1. General Tasks and Demands  2. Mobility  3. Domestic Life  4. Interpersonal Interactions and Relationships  5. Community, Social and [place]   Number of elements (examined above) that affect the Plan of Care: 1-2: LOW COMPLEXITY   CLINICAL PRESENTATION:   Presentation: Stable and uncomplicated: LOW COMPLEXITY   CLINICAL DECISION MAKING:   Outcome Measure:    Tool Used: Modified Oswestry Low Back Pain Questionnaire  Score:  Initial: 7/50  Most Recent: X/50 (Date: -- ) Interpretation of Score: Each section is scored on a 0-5 scale, 5 representing the greatest disability. The scores of each section are added together for a total score of 50. Score 0 1-10 11-20 21-30 31-40 41-49 50   Modifier CH CI CJ CK CL CM CN     ? Mobility - Walking and Moving Around:     - CURRENT STATUS: CI - 1%-19% impaired, limited or restricted    - GOAL STATUS: CH - 0% impaired, limited or restricted    - D/C STATUS:  ---------------To be determined---------------    Medical Necessity:   · Patient is expected to demonstrate progress in strength, range of motion, balance and coordination to improve safety during daily activities. · Patient demonstrates good rehab potential due to higher previous functional level. · Skilled intervention continues to be required due to decreased mobility and endurance. Reason for Services/Other Comments:  · Patient continues to demonstrate capacity to improve overall mobility which will increase independence and increase safety. Use of outcome tool(s) and clinical judgement create a POC that gives a: Clear prediction of patient's progress: LOW COMPLEXITY                    TREATMENT:   (In addition to Assessment/Re-Assessment sessions the following treatments were rendered)  Pre-treatment Symptoms/Complaints:  7/26/2018: Patient reports her main concern is regaining muscular strength and conditioning. She rates back pain minimally, 3/10 at worst, which is aggravated with sitting in low positions such as a soft couch. Pain: Initial:     0/10 Post Session:  0/10      THERAPEUTIC EXERCISE: (15 minutes):  Exercises per grid below to improve mobility and strength. Required minimal verbal cues to promote proper body alignment and promote proper body posture.   Progressed complexity of movement as indicated.    Date:  7/26/2018   Activity/Exercise Parameters   Recumbent stepper 15 minutes  Level 2                                                   Treatment/Session Assessment:    · Response to Treatment:  Patient tolerated assessment without complaints of increased pain. Patient verbalized and demonstrated understanding of HEP . · Compliance with Program/Exercises: Will assess as treatment progresses. · Recommendations/Intent for next treatment session: \"Next visit will focus on advancements to more challenging activities\".   Total Treatment Duration:  PT Patient Time In/Time Out  Time In: 0815  Time Out: 0900       Rip Celaya, PT, DPT

## 2018-07-26 NOTE — PROGRESS NOTES
Ambulatory/Rehab Services H2 Model Falls Risk Assessment    Risk Factor Pts. ·   Confusion/Disorientation/Impulsivity  []    4 ·   Symptomatic Depression  []   2 ·   Altered Elimination  []   1 ·   Dizziness/Vertigo  []   1 ·   Gender (Male)  []   1 ·   Any administered antiepileptics (anticonvulsants):  []   2 ·   Any administered benzodiazepines:  []   1 ·   Visual Impairment (specify):  []   1 ·   Portable Oxygen Use  []   1 ·   Orthostatic ? BP  []   1 ·   History of Recent Falls (within 3 mos.)  []   5     Ability to Rise from Chair (choose one) Pts. ·   Ability to rise in a single movement  [x]   0 ·   Pushes up, successful in one attempt  []   1 ·   Multiple attempts, but successful  []   3 ·   Unable to rise without assistance  []   4   Total: (5 or greater = High Risk) 0     Falls Prevention Plan:   []                Physical Limitations to Exercise (specify):   []                Mobility Assistance Device (type):   []                Exercise/Equipment Adaptation (specify):    ©2010 [facility] [address] [place] [address] Patent #[id number].  Federal Law prohibits the replication, distribution or use without written permission from [facility]

## 2018-07-31 ENCOUNTER — HOSPITAL ENCOUNTER (OUTPATIENT)
Dept: LAB | Age: 58
Discharge: HOME | End: 2018-07-31
Payer: MEDICARE

## 2018-07-31 DIAGNOSIS — C92.10 CML (CHRONIC MYELOCYTIC LEUKEMIA) (HCC): ICD-10-CM

## 2018-07-31 LAB
ALBUMIN SERPL-MCNC: 3.8 G/DL (ref 3.5–5)
ALBUMIN/GLOB SERPL: 1.7 {RATIO} (ref 1.2–3.5)
ALP SERPL-CCNC: 94 U/L (ref 50–136)
ALT SERPL-CCNC: 86 U/L (ref 12–65)
ANION GAP SERPL CALC-SCNC: 8 MMOL/L (ref 7–16)
AST SERPL-CCNC: 56 U/L (ref 15–37)
BASOPHILS # BLD: 0 K/UL (ref 0–0.2)
BASOPHILS NFR BLD: 0 % (ref 0–2)
BILIRUB SERPL-MCNC: 0.6 MG/DL (ref 0.2–1.1)
BUN SERPL-MCNC: 17 MG/DL (ref 6–23)
CALCIUM SERPL-MCNC: 8.5 MG/DL (ref 8.3–10.4)
CHLORIDE SERPL-SCNC: 109 MMOL/L (ref 98–107)
CO2 SERPL-SCNC: 24 MMOL/L (ref 21–32)
CREAT SERPL-MCNC: 0.69 MG/DL (ref 0.6–1)
DIFFERENTIAL METHOD BLD: ABNORMAL
EOSINOPHIL # BLD: 0.1 K/UL (ref 0–0.8)
EOSINOPHIL NFR BLD: 2 % (ref 0.5–7.8)
ERYTHROCYTE [DISTWIDTH] IN BLOOD BY AUTOMATED COUNT: 17.3 % (ref 11.9–14.6)
GLOBULIN SER CALC-MCNC: 2.3 G/DL (ref 2.3–3.5)
GLUCOSE SERPL-MCNC: 115 MG/DL (ref 65–100)
HCT VFR BLD AUTO: 39 % (ref 35.8–46.3)
HGB BLD-MCNC: 12.8 G/DL (ref 11.7–15.4)
LYMPHOCYTES # BLD: 0.9 K/UL (ref 0.5–4.6)
LYMPHOCYTES NFR BLD: 16 % (ref 13–44)
MCH RBC QN AUTO: 30 PG (ref 26.1–32.9)
MCHC RBC AUTO-ENTMCNC: 32.8 G/DL (ref 31.4–35)
MCV RBC AUTO: 91.5 FL (ref 79.6–97.8)
MONOCYTES # BLD: 0.4 K/UL (ref 0.1–1.3)
MONOCYTES NFR BLD: 7 % (ref 4–12)
NEUTS SEG # BLD: 3.8 K/UL (ref 1.7–8.2)
NEUTS SEG NFR BLD: 74 % (ref 43–78)
NRBC # BLD: 0 K/UL (ref 0–0.2)
NRBC BLD-RTO: 0 PER 100 WBC (ref 0–2)
PLATELET # BLD AUTO: 159 K/UL (ref 150–450)
PMV BLD AUTO: 10.6 FL (ref 10.8–14.1)
POTASSIUM SERPL-SCNC: 3.7 MMOL/L (ref 3.5–5.1)
PROT SERPL-MCNC: 6.1 G/DL (ref 6.3–8.2)
RBC # BLD AUTO: 4.26 M/UL (ref 4.05–5.25)
SODIUM SERPL-SCNC: 141 MMOL/L (ref 136–145)
WBC # BLD AUTO: 5.2 K/UL (ref 4.3–11.1)

## 2018-07-31 PROCEDURE — 85025 COMPLETE CBC W/AUTO DIFF WBC: CPT | Performed by: INTERNAL MEDICINE

## 2018-07-31 PROCEDURE — 36415 COLL VENOUS BLD VENIPUNCTURE: CPT | Performed by: INTERNAL MEDICINE

## 2018-07-31 PROCEDURE — 80053 COMPREHEN METABOLIC PANEL: CPT | Performed by: INTERNAL MEDICINE

## 2018-08-02 ENCOUNTER — HOSPITAL ENCOUNTER (OUTPATIENT)
Dept: PHYSICAL THERAPY | Age: 58
Discharge: HOME | End: 2018-08-02
Payer: MEDICARE

## 2018-08-02 PROCEDURE — 97110 THERAPEUTIC EXERCISES: CPT

## 2018-08-02 NOTE — PROGRESS NOTES
Kelsey Couch  : 1960  Primary: [facility]  Secondary:  [address]  at [facility]  [address], [address], [place]  [phone]   [phone]         OUTPATIENT PHYSICAL THERAPY: Daily NOTE 2018   ICD-10: Treatment Diagnosis:   · Generalized Muscle Weakness (M62.81)   · Other abnormalities of gait and mobility (R26.89)  Precautions/Allergies:   Latex; Sulfa (sulfonamide antibiotics); Tramadol; Augmentin [amoxicillin-pot clavulanate]; Morphine; Rizatriptan; Tetanus immune globulin; Xanax [alprazolam]; and Zonegran [zonisamide]   Fall Risk Score: 0 (? 5 = High Risk)  MD Orders: Evaluate and Treat MEDICAL/REFERRING DIAGNOSIS:  Muscular deconditioning [R29.898]   DATE OF ONSET: Chronic  REFERRING PHYSICIAN: Stella Fofana MD  RETURN PHYSICIAN APPOINTMENT: TBD       ASSESSMENT:  Ms. Cristal Pickett initiated PT on 18 and has attended 2 out of 3 sessions, not showing for her visit on 18. She was initially scheduled for PT at the [address] location, but came here to [facility] instead. She agreed with evaluation here because she did not want to organize transportation back to [address] and stated that she was comfortable with this location due to proximity to her CA care. However, today she stated that she would like to transfer to [address], due to proximity to her home. I gave her the number for [address] and instructed her to call and schedule follow-up PT there.        PROBLEM LIST (Impacting functional limitations):  1. Decreased Strength  2. Decreased ADL/Functional Activities  3. Decreased Balance  4. Decreased Activity Tolerance INTERVENTIONS PLANNED:  1. Balance Exercise  2. Manual Therapy  3. Range of Motion (ROM)  4. Therapeutic Activites  5. Therapeutic Exercise/Strengthening   TREATMENT PLAN:  Effective Dates: 2018 TO 10/23/2018 (90 days).   Frequency/Duration: 2 times a week for 90 Days  GOALS: (Goals have been discussed and agreed upon with patient.)  Short-Term Functional Goals: Time Frame: 30 days  1. Patient will be independent with home exercise program without exacerbation of symptoms or cueing needed. 2. Patient will rate lower back pain no greater than 2-3/10 in order to improve ADL performance and safety. Discharge Goals: Time Frame: 90 days  1. Patient will be independent with all ADLs with minimal onset of back pain and no deficits with ADL tasks. 2. Patient will report no fear avoidance with social or recreational activities due to back pain. Rehabilitation Potential For Stated Goals: GOOD  Regarding Hoa Couch's therapy, I certify that the treatment plan above will be carried out by a therapist or under their direction. Thank you for this referral,  Bruna Powell, PT, DPT      Referring Physician Signature: Roseanna Rose MD                                                                                    Date                                                     The information in this section was collected on 7/26/2018 (except where otherwise noted). 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 She reports a history also of a fall 3-4 years ago, which resulted in a craniotomy and partial L field vision loss. She also has been diagnosed with compression fractures at the lower lumbar spine. Past Medical History/Comorbidities:   Ms. Gloria Trujillo  has a past medical history of Anemia; C. difficile diarrhea (4/1/2015); Cerebral edema (HCC) (4/1/2015); Chronic migraine (9/22/2016); Chronic myelogenous leukemia ([address] Chronic pain; Coagulation disorder ([address] Esophageal spasm; Esophageal varices ([address] GI bleed (8/3/2016); H/O craniotomy; Leukocytosis (3/14/2015); MRSA colonization (6/25/2012); Polycythemia vera(238.4); Portal hypertension ([address] Portal vein thrombosis (6/20/2012);  Primary hypothyroidism; Pulmonary embolism ([facility] (2006); S/P exploratory laparotomy, 6/20/12  (6/25/2012); S/P small bowel resection; Seizure ([facility] (3/14/2015); Seizure disorder ([facility] (3/30/2015); Seizures ([facility] Splenomegaly, congestive, chronic; St[facility]cerebrum) [facility] (4/11/2015); Stroke [facility]); Thrombocytopenia, 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 She also has no past medical history of Adverse effect of anesthesia; Difficult intubation; Malignant hyperthermia due to anesthesia; or Pseudocholinesterase deficiency. Ms. Phillip Laboy  has a past surgical history that includes hx gi; hx gi; pr abdomen surgery proc unlisted; hx cholecystectomy; hx breast biopsy (Bilateral); hx appendectomy; hx gyn; hx gyn; hx orthopaedic (Left, 2008); and pr neurological procedure unlisted. Social History/Living Environment:   Home Environment: Private residence  Support Systems: Family member(s), Friends \ neighbors  Prior Level of Function/Work/Activity:  Independent  Dominant Side:         RIGHT  Personal Factors:          Sex:  female        Age:  62 y.o. Current Medications:         Current Outpatient Prescriptions:     oxyCODONE-acetaminophen (PERCOCET 10)  mg per tablet, 1 tab every 8 to 12 hours as needed for severe pain., Disp: 60 Tab, Rfl: 0    enoxaparin (LOVENOX) 60 mg/0.6 mL injection, INJECT 60 MG BY SUBCUTANEOUS ROUTE EVERY TWELVE (12) HOURS., Disp: 60 Syringe, Rfl: 3    cyclobenzaprine (FLEXERIL) 5 mg tablet, Take 1 Tab by mouth three (3) times daily as needed for Muscle Spasm(s). Indications: Muscle Spasm, Disp: 30 Tab, Rfl: 0    dasatinib (SPRYCEL) 80 mg tab, Take 1 Tab by mouth daily. , Disp: 30 Tab, Rfl: 5    ruxolitinib 5 mg tab, Take 1 Tab by mouth two (2) times a day., Disp: 60 Tab, Rfl: 5    SYNTHROID 112 mcg tablet, 1 tablet once a day with an extra 1/2 tablet on Sundays, Disp: 32 Tab, Rfl: 11    ondansetron (ZOFRAN ODT) 8 mg disintegrating tablet, Take 1 Tab by mouth every twelve (12) hours as needed for Nausea., Disp: 10 Tab, Rfl: 0    promethazine (PHENERGAN) 25 mg tablet, .5 tab every three times daily as needed for nausea, Disp: 30 Tab, Rfl: 0    lansoprazole (PREVACID) 30 mg capsule, Take  by mouth Daily (before breakfast). , Disp: , Rfl:     ibuprofen (ADVIL) 200 mg tablet, Take  by mouth., Disp: , Rfl:     OMEPRAZOLE (PRILOSEC PO), Take  by mouth., Disp: , Rfl:     diphenoxylate-atropine (LOMOTIL) 2.5-0.025 mg per tablet, Take 1 Tab by mouth four (4) times daily as needed for Diarrhea. Max Daily Amount: 4 Tabs., Disp: 90 Tab, Rfl: 1    calcium carbonate (TUMS) 200 mg calcium (500 mg) chew, Take 1 Tab by mouth daily as needed. , Disp: , Rfl:    Date Last Reviewed:  8/2/2018     Number of Personal Factors/Comorbidities that affect the Plan of Care: 1-2: MODERATE COMPLEXITY   EXAMINATION:                  Observation/Orthostatic Postural Assessment:          Posture Assessment: Rounded shoulders, Forward head   ROM:           UE Assessment (AROM):  All WNL      LE Assessment (AROM):    · All WNL             LE Assessment (Strength):    · Hip Flexion: 4/5 with manual muscle testing     · Hip Extension: 4/5 with manual muscle testing     · Hip Abduction: 4/5 with manual muscle testing     · Hip Adduction: 4/5 with manual muscle testing     · Hip Internal rotation: 4/5 with manual muscle testing     · Hip External rotation: 4/5 with manual muscle testing     · Knee Flexion: 4/5 with manual muscle testing     · Knee Extension: 4/5 with manual muscle testing           Body Structures Involved:  1. Bones  2. Joints  3. Muscles Body Functions Affected:  1. Sensory/Pain  2. Movement Related Activities and Participation Affected:  1. General Tasks and Demands  2. Mobility  3. Domestic Life  4. Interpersonal Interactions and Relationships  5.  Community, Social and [place]   Number of elements (examined above) that affect the Plan of Care: 1-2: LOW COMPLEXITY   CLINICAL PRESENTATION:   Presentation: Stable and uncomplicated: LOW COMPLEXITY   CLINICAL DECISION MAKING:   Outcome Measure: Tool Used: Modified Oswestry Low Back Pain Questionnaire  Score:  Initial: 7/50  Most Recent: X/50 (Date: -- )   Interpretation of Score: Each section is scored on a 0-5 scale, 5 representing the greatest disability. The scores of each section are added together for a total score of 50. Score 0 1-10 11-20 21-30 31-40 41-49 50   Modifier CH CI CJ CK CL CM CN     ? Mobility - Walking and Moving Around:     - CURRENT STATUS: CI - 1%-19% impaired, limited or restricted    - GOAL STATUS: CH - 0% impaired, limited or restricted    - D/C STATUS:  ---------------To be determined---------------    Medical Necessity:   · Patient is expected to demonstrate progress in strength, range of motion, balance and coordination to improve safety during daily activities. · Patient demonstrates good rehab potential due to higher previous functional level. · Skilled intervention continues to be required due to decreased mobility and endurance. Reason for Services/Other Comments:  · Patient continues to demonstrate capacity to improve overall mobility which will increase independence and increase safety. Use of outcome tool(s) and clinical judgement create a POC that gives a: Clear prediction of patient's progress: LOW COMPLEXITY                    TREATMENT:   (In addition to Assessment/Re-Assessment sessions the following treatments were rendered)  Pre-treatment Symptoms/Complaints:  Patient reports she is feeling stronger, but that lack of sleep will significantly interfere with his function and overall strength. Pain: Initial:     0/10 Post Session:  0/10      THERAPEUTIC EXERCISE: (45 minutes):  Exercises per grid below to improve mobility and strength. Required minimal verbal cues to promote proper body alignment and promote proper body posture. Progressed complexity of movement as indicated.    Date:  7/26/2018 Date  8-2-18   Activity/Exercise Parameters    Recumbent stepper 15 minutes  Level 2 10 minutes  Level 1    Machine hamstring     25 lb  2x10   Machine leg extension    20 lb   2x10   Machine leg press      40 lb  2x10   Machine row       40 lb   2x10                        Treatment/Session Assessment:    · Response to Treatment:  Patient did well with focus on machine strengthening, to familiarize herself with machines at [place]. She verbalized understanding to call [facility] to schedule more PT sessions. · Compliance with Program/Exercises: Will assess as treatment progresses. · Recommendations/Intent for next treatment session: \"Next visit will focus on advancements to more challenging activities\".   Total Treatment Duration:  PT Patient Time In/Time Out  Time In: 0986  Time Out: 1030       Sebastian Gonzales PT, DPT

## 2018-09-06 ENCOUNTER — HOSPITAL ENCOUNTER (OUTPATIENT)
Dept: LAB | Age: 58
Discharge: HOME | End: 2018-09-06
Payer: MEDICARE

## 2018-09-06 DIAGNOSIS — C92.10 CML (CHRONIC MYELOCYTIC LEUKEMIA) (HCC): ICD-10-CM

## 2018-09-06 DIAGNOSIS — D50.9 IRON DEFICIENCY ANEMIA, UNSPECIFIED IRON DEFICIENCY ANEMIA TYPE: ICD-10-CM

## 2018-09-06 DIAGNOSIS — D45 POLYCYTHEMIA VERA (HCC): Chronic | ICD-10-CM

## 2018-09-06 LAB
ALBUMIN SERPL-MCNC: 4 G/DL (ref 3.5–5)
ALBUMIN/GLOB SERPL: 1.6 {RATIO} (ref 1.2–3.5)
ALP SERPL-CCNC: 70 U/L (ref 50–136)
ALT SERPL-CCNC: 57 U/L (ref 12–65)
ANION GAP SERPL CALC-SCNC: 8 MMOL/L (ref 7–16)
AST SERPL-CCNC: 36 U/L (ref 15–37)
BASOPHILS # BLD: 0 K/UL (ref 0–0.2)
BASOPHILS NFR BLD: 0 % (ref 0–2)
BILIRUB SERPL-MCNC: 0.8 MG/DL (ref 0.2–1.1)
BUN SERPL-MCNC: 16 MG/DL (ref 6–23)
CALCIUM SERPL-MCNC: 8.3 MG/DL (ref 8.3–10.4)
CHLORIDE SERPL-SCNC: 111 MMOL/L (ref 98–107)
CO2 SERPL-SCNC: 21 MMOL/L (ref 21–32)
CREAT SERPL-MCNC: 0.66 MG/DL (ref 0.6–1)
DIFFERENTIAL METHOD BLD: ABNORMAL
EOSINOPHIL # BLD: 0.1 K/UL (ref 0–0.8)
EOSINOPHIL NFR BLD: 2 % (ref 0.5–7.8)
ERYTHROCYTE [DISTWIDTH] IN BLOOD BY AUTOMATED COUNT: 16.4 % (ref 11.9–14.6)
FERRITIN SERPL-MCNC: 25 NG/ML (ref 8–388)
GLOBULIN SER CALC-MCNC: 2.5 G/DL (ref 2.3–3.5)
GLUCOSE SERPL-MCNC: 77 MG/DL (ref 65–100)
HCT VFR BLD AUTO: 40.2 % (ref 35.8–46.3)
HGB BLD-MCNC: 13.3 G/DL (ref 11.7–15.4)
HGB RETIC QN AUTO: 38 PG (ref 29–35)
IMM GRANULOCYTES # BLD: 0 K/UL (ref 0–0.5)
IMM GRANULOCYTES NFR BLD AUTO: 0 % (ref 0–5)
IMM RETICS NFR: 12.9 % (ref 3–15.9)
IRON SATN MFR SERPL: 33 %
IRON SERPL-MCNC: 121 UG/DL (ref 35–150)
LYMPHOCYTES # BLD: 0.8 K/UL (ref 0.5–4.6)
LYMPHOCYTES NFR BLD: 17 % (ref 13–44)
MAGNESIUM SERPL-MCNC: 2.2 MG/DL (ref 1.8–2.4)
MCH RBC QN AUTO: 31.6 PG (ref 26.1–32.9)
MCHC RBC AUTO-ENTMCNC: 33.1 G/DL (ref 31.4–35)
MCV RBC AUTO: 95.5 FL (ref 79.6–97.8)
MONOCYTES # BLD: 0.3 K/UL (ref 0.1–1.3)
MONOCYTES NFR BLD: 6 % (ref 4–12)
NEUTS SEG # BLD: 3.5 K/UL (ref 1.7–8.2)
NEUTS SEG NFR BLD: 75 % (ref 43–78)
NRBC # BLD: 0 K/UL (ref 0–0.2)
PLATELET # BLD AUTO: 143 K/UL (ref 150–450)
PMV BLD AUTO: 10.7 FL (ref 9.4–12.3)
POTASSIUM SERPL-SCNC: 3.9 MMOL/L (ref 3.5–5.1)
PROT SERPL-MCNC: 6.5 G/DL (ref 6.3–8.2)
RBC # BLD AUTO: 4.21 M/UL (ref 4.05–5.25)
REFERENCE LAB,REFLB: NORMAL
RETICS # AUTO: 0.08 M/UL (ref 0.03–0.1)
RETICS/RBC NFR AUTO: 2 % (ref 0.3–2)
SODIUM SERPL-SCNC: 140 MMOL/L (ref 136–145)
TEST DESCRIPTION:,ATST: NORMAL
TIBC SERPL-MCNC: 366 UG/DL (ref 250–450)
WBC # BLD AUTO: 4.6 K/UL (ref 4.3–11.1)

## 2018-09-06 PROCEDURE — 85046 RETICYTE/HGB CONCENTRATE: CPT

## 2018-09-06 PROCEDURE — 83540 ASSAY OF IRON: CPT

## 2018-09-06 PROCEDURE — 83735 ASSAY OF MAGNESIUM: CPT

## 2018-09-06 PROCEDURE — 82728 ASSAY OF FERRITIN: CPT

## 2018-09-06 PROCEDURE — 85025 COMPLETE CBC W/AUTO DIFF WBC: CPT

## 2018-09-06 PROCEDURE — 80053 COMPREHEN METABOLIC PANEL: CPT

## 2018-09-06 PROCEDURE — 36415 COLL VENOUS BLD VENIPUNCTURE: CPT

## 2018-10-01 ENCOUNTER — HOSPITAL ENCOUNTER (OUTPATIENT)
Dept: LAB | Age: 58
Discharge: HOME | End: 2018-10-01
Payer: MEDICARE

## 2018-10-01 DIAGNOSIS — I81 PORTAL VEIN THROMBOSIS: ICD-10-CM

## 2018-10-01 DIAGNOSIS — R52 PAIN: ICD-10-CM

## 2018-10-01 DIAGNOSIS — R16.1 SPLENOMEGALY: ICD-10-CM

## 2018-10-01 DIAGNOSIS — D45 POLYCYTHEMIA VERA (HCC): ICD-10-CM

## 2018-10-01 DIAGNOSIS — C92.10 CML (CHRONIC MYELOCYTIC LEUKEMIA) (HCC): ICD-10-CM

## 2018-10-01 DIAGNOSIS — D50.9 IRON DEFICIENCY ANEMIA, UNSPECIFIED IRON DEFICIENCY ANEMIA TYPE: ICD-10-CM

## 2018-10-01 LAB
ALBUMIN SERPL-MCNC: 3.8 G/DL (ref 3.5–5)
ALBUMIN/GLOB SERPL: 1.5 {RATIO} (ref 1.2–3.5)
ALP SERPL-CCNC: 83 U/L (ref 50–136)
ALT SERPL-CCNC: 85 U/L (ref 12–65)
ANION GAP SERPL CALC-SCNC: 5 MMOL/L (ref 7–16)
AST SERPL-CCNC: 60 U/L (ref 15–37)
BASOPHILS # BLD: 0 K/UL (ref 0–0.2)
BASOPHILS NFR BLD: 1 % (ref 0–2)
BILIRUB SERPL-MCNC: 0.7 MG/DL (ref 0.2–1.1)
BUN SERPL-MCNC: 10 MG/DL (ref 6–23)
CALCIUM SERPL-MCNC: 8.9 MG/DL (ref 8.3–10.4)
CHLORIDE SERPL-SCNC: 107 MMOL/L (ref 98–107)
CO2 SERPL-SCNC: 28 MMOL/L (ref 21–32)
CREAT SERPL-MCNC: 0.7 MG/DL (ref 0.6–1)
DIFFERENTIAL METHOD BLD: ABNORMAL
EOSINOPHIL # BLD: 0.1 K/UL (ref 0–0.8)
EOSINOPHIL NFR BLD: 2 % (ref 0.5–7.8)
ERYTHROCYTE [DISTWIDTH] IN BLOOD BY AUTOMATED COUNT: 15.7 % (ref 11.9–14.6)
GLOBULIN SER CALC-MCNC: 2.6 G/DL (ref 2.3–3.5)
GLUCOSE SERPL-MCNC: 76 MG/DL (ref 65–100)
HCT VFR BLD AUTO: 39.1 % (ref 35.8–46.3)
HGB BLD-MCNC: 13.2 G/DL (ref 11.7–15.4)
IMM GRANULOCYTES # BLD: 0 K/UL (ref 0–0.5)
IMM GRANULOCYTES NFR BLD AUTO: 0 % (ref 0–5)
LYMPHOCYTES # BLD: 0.9 K/UL (ref 0.5–4.6)
LYMPHOCYTES NFR BLD: 15 % (ref 13–44)
MCH RBC QN AUTO: 31.7 PG (ref 26.1–32.9)
MCHC RBC AUTO-ENTMCNC: 33.8 G/DL (ref 31.4–35)
MCV RBC AUTO: 94 FL (ref 79.6–97.8)
MONOCYTES # BLD: 0.3 K/UL (ref 0.1–1.3)
MONOCYTES NFR BLD: 5 % (ref 4–12)
NEUTS SEG # BLD: 4.5 K/UL (ref 1.7–8.2)
NEUTS SEG NFR BLD: 77 % (ref 43–78)
NRBC # BLD: 0 K/UL (ref 0–0.2)
PLATELET # BLD AUTO: 155 K/UL (ref 150–450)
PMV BLD AUTO: 10.2 FL (ref 9.4–12.3)
POTASSIUM SERPL-SCNC: 4 MMOL/L (ref 3.5–5.1)
PROT SERPL-MCNC: 6.4 G/DL (ref 6.3–8.2)
RBC # BLD AUTO: 4.16 M/UL (ref 4.05–5.25)
SODIUM SERPL-SCNC: 140 MMOL/L (ref 136–145)
WBC # BLD AUTO: 5.9 K/UL (ref 4.3–11.1)

## 2018-10-01 PROCEDURE — 80053 COMPREHEN METABOLIC PANEL: CPT

## 2018-10-01 PROCEDURE — 36415 COLL VENOUS BLD VENIPUNCTURE: CPT

## 2018-10-01 PROCEDURE — 85025 COMPLETE CBC W/AUTO DIFF WBC: CPT

## 2018-10-22 ENCOUNTER — HOSPITAL ENCOUNTER (OUTPATIENT)
Dept: LAB | Age: 58
Discharge: HOME | End: 2018-10-22
Attending: INTERNAL MEDICINE
Payer: MEDICARE

## 2018-10-22 ENCOUNTER — HOSPITAL ENCOUNTER (OUTPATIENT)
Dept: CT IMAGING | Age: 58
Discharge: HOME | End: 2018-10-22
Attending: INTERNAL MEDICINE
Payer: MEDICARE

## 2018-10-22 VITALS
RESPIRATION RATE: 16 BRPM | WEIGHT: 130 LBS | HEART RATE: 86 BPM | SYSTOLIC BLOOD PRESSURE: 100 MMHG | TEMPERATURE: 98.6 F | OXYGEN SATURATION: 94 % | BODY MASS INDEX: 22.31 KG/M2 | DIASTOLIC BLOOD PRESSURE: 52 MMHG

## 2018-10-22 DIAGNOSIS — D50.9 IRON DEFICIENCY ANEMIA, UNSPECIFIED IRON DEFICIENCY ANEMIA TYPE: ICD-10-CM

## 2018-10-22 DIAGNOSIS — C92.10 CML (CHRONIC MYELOCYTIC LEUKEMIA) (HCC): ICD-10-CM

## 2018-10-22 DIAGNOSIS — D45 POLYCYTHEMIA VERA (HCC): ICD-10-CM

## 2018-10-22 DIAGNOSIS — I81 PORTAL VEIN THROMBOSIS: ICD-10-CM

## 2018-10-22 DIAGNOSIS — R52 PAIN: ICD-10-CM

## 2018-10-22 DIAGNOSIS — R16.1 SPLENOMEGALY: ICD-10-CM

## 2018-10-22 LAB
BASOPHILS # BLD: 0.1 K/UL (ref 0–0.2)
BASOPHILS NFR BLD MANUAL: 1 % (ref 0–2)
BONE MARROW PREP & W,BMA: NORMAL
DIFFERENTIAL METHOD BLD: ABNORMAL
EOSINOPHIL # BLD: 0.1 K/UL (ref 0–0.8)
EOSINOPHIL NFR BLD MANUAL: 1 % (ref 1–8)
ERYTHROCYTE [DISTWIDTH] IN BLOOD BY AUTOMATED COUNT: 15.5 %
HCT VFR BLD AUTO: 41.4 % (ref 35.8–46.3)
HGB BLD-MCNC: 13.3 G/DL (ref 11.7–15.4)
LYMPHOCYTES # BLD: 1.4 K/UL (ref 0.5–4.6)
LYMPHOCYTES NFR BLD MANUAL: 23 % (ref 16–44)
MCH RBC QN AUTO: 31.7 PG (ref 26.1–32.9)
MCHC RBC AUTO-ENTMCNC: 32.1 G/DL (ref 31.4–35)
MCV RBC AUTO: 98.6 FL (ref 79.6–97.8)
MONOCYTES # BLD: 0.3 K/UL (ref 0.1–1.3)
MONOCYTES NFR BLD MANUAL: 5 % (ref 3–9)
MYELOCYTES NFR BLD MANUAL: 1 %
NEUTS SEG # BLD: 4.4 K/UL (ref 1.7–8.2)
NEUTS SEG NFR BLD MANUAL: 69 % (ref 47–75)
NRBC # BLD: 0 K/UL (ref 0–0.2)
PLATELET # BLD AUTO: 160 K/UL (ref 150–450)
PLATELET COMMENTS,PCOM: ADEQUATE
PMV BLD AUTO: 10.5 FL (ref 9.4–12.3)
RBC # BLD AUTO: 4.2 M/UL (ref 4.05–5.2)
RBC MORPH BLD: ABNORMAL
WBC # BLD AUTO: 6.3 K/UL (ref 4.3–11.1)

## 2018-10-22 PROCEDURE — 88341 IMHCHEM/IMCYTCHM EA ADD ANTB: CPT

## 2018-10-22 PROCEDURE — 77030028872 CT BX BONE MARROW AND ASPIRATION

## 2018-10-22 PROCEDURE — 88369 M/PHMTRC ALYSISHQUANT/SEMIQ: CPT

## 2018-10-22 PROCEDURE — 99152 MOD SED SAME PHYS/QHP 5/>YRS: CPT

## 2018-10-22 PROCEDURE — 88184 FLOWCYTOMETRY/ TC 1 MARKER: CPT

## 2018-10-22 PROCEDURE — 88377 M/PHMTRC ALYS ISHQUANT/SEMIQ: CPT

## 2018-10-22 PROCEDURE — 74011250636 HC RX REV CODE- 250/636

## 2018-10-22 PROCEDURE — 85025 COMPLETE CBC W/AUTO DIFF WBC: CPT

## 2018-10-22 PROCEDURE — 88342 IMHCHEM/IMCYTCHM 1ST ANTB: CPT

## 2018-10-22 PROCEDURE — 88311 DECALCIFY TISSUE: CPT

## 2018-10-22 PROCEDURE — 88237 TISSUE CULTURE BONE MARROW: CPT

## 2018-10-22 PROCEDURE — 88305 TISSUE EXAM BY PATHOLOGIST: CPT

## 2018-10-22 PROCEDURE — 88264 CHROMOSOME ANALYSIS 20-25: CPT

## 2018-10-22 PROCEDURE — 88312 SPECIAL STAINS GROUP 1: CPT

## 2018-10-22 PROCEDURE — 88185 FLOWCYTOMETRY/TC ADD-ON: CPT

## 2018-10-22 PROCEDURE — 88368 INSITU HYBRIDIZATION MANUAL: CPT

## 2018-10-22 PROCEDURE — 88313 SPECIAL STAINS GROUP 2: CPT

## 2018-10-22 PROCEDURE — 74011250636 HC RX REV CODE- 250/636: Performed by: RADIOLOGY

## 2018-10-22 PROCEDURE — 99153 MOD SED SAME PHYS/QHP EA: CPT

## 2018-10-22 RX ORDER — MIDAZOLAM HYDROCHLORIDE 1 MG/ML
.25-2 INJECTION, SOLUTION INTRAMUSCULAR; INTRAVENOUS
Status: DISCONTINUED | OUTPATIENT
Start: 2018-10-22 | End: 2018-10-22 | Stop reason: ALTCHOICE

## 2018-10-22 RX ORDER — LIDOCAINE HYDROCHLORIDE 10 MG/ML
1-20 INJECTION, SOLUTION EPIDURAL; INFILTRATION; INTRACAUDAL; PERINEURAL
Status: DISCONTINUED | OUTPATIENT
Start: 2018-10-22 | End: 2018-10-22 | Stop reason: ALTCHOICE

## 2018-10-22 RX ORDER — FENTANYL CITRATE 50 UG/ML
25-100 INJECTION, SOLUTION INTRAMUSCULAR; INTRAVENOUS
Status: DISCONTINUED | OUTPATIENT
Start: 2018-10-22 | End: 2018-10-22 | Stop reason: ALTCHOICE

## 2018-10-22 RX ORDER — SODIUM CHLORIDE 9 MG/ML
25 INJECTION, SOLUTION INTRAVENOUS ONCE
Status: COMPLETED | OUTPATIENT
Start: 2018-10-22 | End: 2018-10-22

## 2018-10-22 RX ADMIN — FENTANYL CITRATE 50 MCG: 50 INJECTION, SOLUTION INTRAMUSCULAR; INTRAVENOUS at 11:50

## 2018-10-22 RX ADMIN — MIDAZOLAM HYDROCHLORIDE 1 MG: 1 INJECTION, SOLUTION INTRAMUSCULAR; INTRAVENOUS at 11:59

## 2018-10-22 RX ADMIN — FENTANYL CITRATE 50 MCG: 50 INJECTION, SOLUTION INTRAMUSCULAR; INTRAVENOUS at 11:44

## 2018-10-22 RX ADMIN — FENTANYL CITRATE 50 MCG: 50 INJECTION, SOLUTION INTRAMUSCULAR; INTRAVENOUS at 11:58

## 2018-10-22 RX ADMIN — MIDAZOLAM HYDROCHLORIDE 1 MG: 1 INJECTION, SOLUTION INTRAMUSCULAR; INTRAVENOUS at 11:44

## 2018-10-22 RX ADMIN — MIDAZOLAM HYDROCHLORIDE 1 MG: 1 INJECTION, SOLUTION INTRAMUSCULAR; INTRAVENOUS at 11:50

## 2018-10-22 RX ADMIN — SODIUM CHLORIDE 25 ML/HR: 900 INJECTION, SOLUTION INTRAVENOUS at 11:43

## 2018-10-22 RX ADMIN — Medication 8 ML: at 12:01

## 2018-10-22 NOTE — H&P
Department of Interventional Radiology  [phone]    History and Physical    Patient:  Nahomy Siddiqui MRN:  [id number]  SSN:  [id number]    YOB: 1960  Age:  62 y.o. Sex:  female      Primary Care Provider:  Krystle Briseno MD  Referring Physician:  Marta Foley MD    Subjective:     Chief Complaint: biopsy    History of the Present Illness: The patient is a 62 y.o. female who presents for bone marrow biopsy. Lymphoma. Splenomegaly with associated tenderness; therefore, requesting sedation. Lovenox for UE DVT held. NPO. Past Medical History:   Diagnosis Date    Anemia     C. difficile diarrhea 4/1/2015    Cerebral edema (HCC) 4/1/2015    Chronic migraine 9/22/2016    Chronic myelogenous leukemia ([facility]     Pickett chromosome    Chronic pain     Coagulation disorder ([facility]     \"clotting and Bleeding Problem\" dr Farnaz Ellis follows     Esophageal spasm     with banding     Esophageal varices (HCC)     grade 3    GI bleed 8/3/2016    H/O craniotomy     3-29-15.  due to blood clot - which caused a seizure  - then pt fell and hit     Leukocytosis 3/14/2015    MRSA colonization 6/25/2012    Polycythemia vera(238.4)     JAK2 mutation    Portal hypertension (HCC)     with varices    Portal vein thrombosis 6/20/2012    Ct scan 6-21-2 Apparent occlusion of the portal vein. In addition, the splenic vein, and superior mesenteric vein are not definitely seen and are also likely  occluded. Splenomegaly, and extensive varices are seen as described above consistent with the portal vein occlusion 7-05-12 on arixtra HIT negative on repeat 7-27-12 re admitted Cirrhotic appearance of the liver. Mild to moderate ascites, as    Primary hypothyroidism     Pulmonary embolism ([facility] 2006    not on coumadin anymore     S/P exploratory laparotomy, 6/20/12 6/25/2012    Bowel resection:  336 cm of small bowel removed, approximately 9 feet and placement of feeding jejunostomy.      S/P small bowel resection     .  9 feet removed due to  gangrene which wa due to blood clot    Seizure ([facility] 3/14/2015    Seizure disorder ([facility] 3/30/2015    Seizures ([address]     last one 3- - followed by aniyah     Splenomegaly, congestive, chronic     Stroke (cerebrum) ([address] 2015    Stroke ([facility]     pt had stroke like symptoms     Thrombocytopenia, HIT negative 2012 platelets lower repeat HIT 12 platelets in 02,547'Y    Thrombosis, portal vein     portal , spleenic and recently superior mesenteric    Transfusion history     many blood tranfusions - last 3-29-15 after brain surgery    Traumatic hemorrhage of right cerebrum ([facility] 3/30/2015     Past Surgical History:   Procedure Laterality Date    ABDOMEN SURGERY PROC UNLISTED      umbilical hernia repair    HX APPENDECTOMY      with small bowel resection    HX BREAST BIOPSY Bilateral     Lt - ; Rt -     HX CHOLECYSTECTOMY      HX GI      liver biopsy    HX GI      bowel removed small - 9 feet     HX GYN       x 2    HX GYN      D&C following miscarriage    HX ORTHOPAEDIC Left 2008    torn labrum shoulder (screw in place)    NEUROLOGICAL PROCEDURE UNLISTED      craniotomy to evacuate subdural hematoma following a fall from a seizure        Review of Systems:    Pertinent items are noted in the History of Present Illness. Current Outpatient Medications   Medication Sig    oxyCODONE-acetaminophen (PERCOCET 10)  mg per tablet 1 tab every 8 to 12 hours as needed for severe pain.  zolpidem (AMBIEN) 5 mg tablet Take 1 Tab by mouth nightly as needed for Sleep. Max Daily Amount: 5 mg.  dasatinib (SPRYCEL) 80 mg tab Take 1 Tab by mouth daily.  calcium carbonate (CALTREX) 600 mg calcium (1,500 mg) tablet Take 600 mg by mouth daily.  cholecalciferol (VITAMIN D3) 1,000 unit cap Take 1,000 Units by mouth daily.     SYNTHROID 112 mcg tablet 1 tablet once a day with an extra 1/2 tablet on Sundays    ondansetron (ZOFRAN ODT) 8 mg disintegrating tablet Take 1 Tab by mouth every twelve (12) hours as needed for Nausea.  lansoprazole (PREVACID) 30 mg capsule Take  by mouth Daily (before breakfast).  ibuprofen (ADVIL) 200 mg tablet Take  by mouth.  diphenoxylate-atropine (LOMOTIL) 2.5-0.025 mg per tablet Take 1 Tab by mouth four (4) times daily as needed for Diarrhea. Max Daily Amount: 4 Tabs.  calcium carbonate (TUMS) 200 mg calcium (500 mg) chew Take 1 Tab by mouth daily as needed.  ruxolitinib 5 mg tab Take 1 Tab by mouth two (2) times a day.  enoxaparin (LOVENOX) 60 mg/0.6 mL injection INJECT 60 MG BY SUBCUTANEOUS ROUTE EVERY TWELVE (12) HOURS. Current Facility-Administered Medications   Medication Dose Route Frequency    0.9% sodium chloride infusion  25 mL/hr IntraVENous ONCE    fentaNYL citrate (PF) injection  mcg   mcg IntraVENous Multiple    midazolam (VERSED) injection 0.25-2 mg  0.25-2 mg IntraVENous Multiple    lidocaine (PF) (XYLOCAINE) 10 mg/mL (1 %) injection 1-20 mL  1-20 mL SubCUTAneous Rad Multiple        Allergies   Allergen Reactions    Latex Other (comments)     Testing for latex allergy was positive as a 2 (on a scale of 1 to 3).     Sulfa (Sulfonamide Antibiotics) Anaphylaxis    Tramadol Other (comments)     Top lip swelled    Augmentin [Amoxicillin-Pot Clavulanate] Rash    Morphine Nausea and Vomiting    Rizatriptan Rash    Tetanus Immune Globulin Other (comments)     Heat, bruising, and swelling at  Site of injection    Xanax [Alprazolam] Other (comments)     Hallucinations    Zonegran [Zonisamide] Rash       Family History   Problem Relation Age of Onset    Diabetes Mother     Stroke Mother         after surgery    Cancer Father         brain    Breast Cancer Maternal Aunt 48    Thyroid Disease Paternal Grandmother      Social History     Tobacco Use    Smoking status: Former Smoker     Packs/day: 0.20     Years: 10.00     Pack years: 2.00     Types: Cigarettes     Last attempt to quit: 1990     Years since quittin.8    Smokeless tobacco: Never Used   Substance Use Topics    Alcohol use: No        Objective:       Physical Examination:    Vitals:    10/22/18 1045   BP: 130/75   Pulse: 86   Resp: 20   Temp: 98.6 °F (37 °C)   SpO2: 98%     Blood pressure 130/75, pulse 86, temperature 98.6 °F (37 °C), resp. rate 20, SpO2 98 %, not currently breastfeeding.   HEART: regular rate and rhythm  LUNG: clear to auscultation bilaterally  ABDOMEN: soft, protuberant, tender, splenomegaly  EXTREMITIES: normal strength, tone, and muscle mass, no deformities, no erythema, induration, or nodules    Laboratory:     Lab Results   Component Value Date/Time    Sodium 140 10/01/2018 01:12 PM    Sodium 140 2018 01:26 PM    Potassium 4.0 10/01/2018 01:12 PM    Potassium 3.9 2018 01:26 PM    Chloride 107 10/01/2018 01:12 PM    Chloride 111 (H) 2018 01:26 PM    CO2 28 10/01/2018 01:12 PM    CO2 21 2018 01:26 PM    Anion gap 5 (L) 10/01/2018 01:12 PM    Anion gap 8 2018 01:26 PM    Glucose 76 10/01/2018 01:12 PM    Glucose 77 2018 01:26 PM    BUN 10 10/01/2018 01:12 PM    BUN 16 2018 01:26 PM    Creatinine 0.70 10/01/2018 01:12 PM    Creatinine 0.66 2018 01:26 PM    GFR est AA >60 10/01/2018 01:12 PM    GFR est AA >60 2018 01:26 PM    GFR est non-AA >60 10/01/2018 01:12 PM    GFR est non-AA >60 2018 01:26 PM    Calcium 8.9 10/01/2018 01:12 PM    Calcium 8.3 2018 01:26 PM    Magnesium 2.2 2018 01:26 PM    Magnesium 2.2 2018 10:34 AM    Phosphorus 3.6 2017 02:34 PM    Phosphorus 3.2 10/05/2017 02:48 PM    Albumin 3.8 10/01/2018 01:12 PM    Albumin 4.0 2018 01:26 PM    Protein, total 6.4 10/01/2018 01:12 PM    Protein, total 6.5 2018 01:26 PM    Globulin 2.6 10/01/2018 01:12 PM    Globulin 2.5 2018 01:26 PM    A-G Ratio 1.5 10/01/2018 01:12 PM    A-G Ratio 1.6 09/06/2018 01:26 PM    AST (SGOT) 60 (H) 10/01/2018 01:12 PM    AST (SGOT) 36 09/06/2018 01:26 PM    ALT (SGPT) 85 (H) 10/01/2018 01:12 PM    ALT (SGPT) 57 09/06/2018 01:26 PM     Lab Results   Component Value Date/Time    WBC 5.9 10/01/2018 01:12 PM    WBC 4.6 09/06/2018 01:26 PM    HGB 13.2 10/01/2018 01:12 PM    HGB 13.3 09/06/2018 01:26 PM    HCT 39.1 10/01/2018 01:12 PM    HCT 40.2 09/06/2018 01:26 PM    PLATELET 511 71/49/8581 01:12 PM    PLATELET 964 (L) 44/88/9452 01:26 PM     Lab Results   Component Value Date/Time    aPTT 21.1 (L) 10/26/2016 08:00 PM    aPTT 27.3 08/05/2016 07:12 AM    Prothrombin time 15.9 (H) 01/05/2018 02:00 AM    Prothrombin time 11.8 10/26/2016 08:00 PM    INR 1.3 01/05/2018 02:00 AM    INR 1.1 10/26/2016 08:00 PM       Assessment:     lymphoma    Plan:     Planned Procedure:  Bone marrow biopsy    Risks, benefits, and alternatives reviewed with patient and she agrees to proceed with the procedure.       Signed By: Miguel Garcia PA-C     October 22, 2018

## 2018-10-22 NOTE — DISCHARGE INSTRUCTIONS
[address]  Department of Interventional Radiology  [facility]  [phone] Office  [phone] Fax    BIOPSY DISCHARGE INSTRUCTIONS    General Instructions:     A biopsy is the removal of a small piece of tissue for microscopic examination or testing. Healthy tissue can be obtained for the purpose of tissue-type matching for transplants. Unhealthy tissues are more commonly biopsied to diagnose disease. Lung Biopsy:     A needle lung biopsy is performed when there is a mass discovered in the lung area. The most serious risk is infection, bleeding, and pneumothorax (a collapsed lung). Signs of pneumothorax include shortness of breath, rapid heart rate, and blueness of the skin. If any of these occur, call 911. Liver Biopsy: This test helps detect cancer, infections, and the cause of an enlargement of the liver or elevated liver enzymes. It also helps to diagnose a number of liver diseases. The pain associated with the procedure may be felt in the shoulder. The risks include internal bleeding, pneumothorax, and injury to the surrounding organs. Renal Biopsy: This procedure is sometimes done to evaluate a transplanted kidney. It is also used to evaluate unexplained decrease in kidney function, blood, or protein in the urine. You may see bright red blood in the urine the first 24 hours after the test. If the bleeding lasts longer, you need to call your doctor. There is a risk of infection and bleeding into the muscle, which may cause soreness. Spinal Biopsy: This test is sometimes done in conjunction with other procedures. Your back will be sore, as we are taking a small sample of bone, which is slightly more difficult to sample than tissue. General Biopsy:     A mass can grow in any area of the body, and we are taking a specimen as ordered by your doctor. The risks are the same.  They include bleeding, pain, and infection. Home Care Instructions: You may resume your regular diet and medication regimen. Do not drink alcohol, drive, or make any important legal decisions in the next 24 hours. Do not lift anything heavier than a gallon of milk until the soreness goes away. You may use over the counter acetaminophen or ibuprofen for the soreness. You may apply an ice pack to the affected area for 20-30 minutes at time for the first 24 hours. After that, you may apply a heat pack. Call If: You should call your Physician and/or the Radiology Nurse if you have any questions or concerns about the biopsy site. Call if you should have increased pain, fever, redness, drainage, or bleeding more than a small spot on the bandage. Follow-Up Instructions: Please see your ordering doctor as he/she has requested. To Reach Us: If you have any questions about your procedure, please call the Interventional Radiology department at [phone]. After business hours (5pm) and weekends, call the answering service at [phone] and ask for the Radiologist on call to be paged. Si tiene Preguntas acerca del procedimiento, por favor llame al departamento de Radiología Intervencional al [phone]. Después de horas de oficina (5 pm) y los fines de [place], llamar al [facility] de llamadas al [phone] y pregunte por el Radiologo de [facility].        Patient Signature:  Date: 10/22/2018  Discharging Nurse: Deven Pete RN

## 2018-10-22 NOTE — PROGRESS NOTES
IR Nurse Pre-Procedure Checklist Part 2          Consent signed: Yes    H&P complete:  Yes    Antibiotics: No    Airway/Mallampati Done: Yes    Shaved: Not applicable    Pregnancy Form:Not applicable    Patient Position: Yes    MD Side: Yes     Biopsy Worksheet: Not applicable    Specimen Medium: Not applicable

## 2018-10-22 NOTE — PROCEDURES
Department of Interventional Radiology  [phone]        Interventional Radiology Brief Procedure Note    Patient: Matteo Rodriguez MRN: [id number]  SSN: [id number]    YOB: 1960  Age: 62 y.o.   Sex: female      Date of Procedure: 10/22/2018    Pre-Procedure Diagnosis: CML    Post-Procedure Diagnosis: SAME    Procedure(s): Image Guided Biopsy    Brief Description of Procedure: CT guided bone marrow biopsy    Performed By: Emi Krishnan MD     Assistants: None    Anesthesia:Moderate Sedation    Estimated Blood Loss: Less than 10ml    Specimens: 1 x core and 10 ml  aspirate    Implants: None    Findings: Left iliac    Complications: None    Recommendations: routine post care     Follow Up: as needed    Signed By: Emi Krishnan MD     October 22, 2018

## 2018-10-22 NOTE — PROGRESS NOTES
TRANSFER - OUT REPORT:    Verbal report given to Maricruz Adhikari RN (name) on [facility]  being transferred to IR recovery (unit) for routine progression of care       Report consisted of patients Situation, Background, Assessment and   Recommendations(SBAR). Information from the following report(s) Procedure Summary and MAR was reviewed with the receiving nurse. Opportunity for questions and clarification was provided. Conscious Sedation:   150 Mcg of Fentanyl administered  3 Mg of Versed administered    Pt tolerated procedure well. Visit Vitals  /58   Pulse 88   Temp 98.6 °F (37 °C)   Resp 16   Wt 59 kg (130 lb)   SpO2 100%   Breastfeeding? No   BMI 22.31 kg/m²     Past Medical History:   Diagnosis Date    Anemia     C. difficile diarrhea 4/1/2015    Cerebral edema (HCC) 4/1/2015    Chronic migraine 9/22/2016    Chronic myelogenous leukemia ([facility] [address]     [place] chromosome    Chronic pain     Coagulation disorder ([facility] [address]     \"clotting and Bleeding Problem\" dr Anabelle Cortes follows     Esophageal spasm     with banding     Esophageal varices (HCC)     grade 3    GI bleed 8/3/2016    H/O craniotomy     3-29-15.  due to blood clot - which caused a seizure  - then pt fell and hit     Leukocytosis 3/14/2015    MRSA colonization 6/25/2012    Polycythemia vera(238.4)     JAK2 mutation    Portal hypertension (HCC)     with varices    Portal vein thrombosis 6/20/2012    Ct scan 6-21-2 Apparent occlusion of the portal vein. In addition, the splenic vein, and superior mesenteric vein are not definitely seen and are also likely  occluded. Splenomegaly, and extensive varices are seen as described above consistent with the portal vein occlusion 7-05-12 on arixtra HIT negative on repeat 7-27-12 re admitted Cirrhotic appearance of the liver.  Mild to moderate ascites, as    Primary hypothyroidism     Pulmonary embolism ([facility] [address] 2006    not on coumadin anymore     S/P exploratory laparotomy, 6/20/12 6/25/2012    Bowel resection:  336 cm of small bowel removed, approximately 9 feet and placement of feeding jejunostomy.      S/P small bowel resection     2012.  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 6/25/2012 7-01-12 platelets lower repeat HIT 7-02-12 platelets in 10,340'U    Thrombosis, portal vein 2004    portal , spleenic and recently superior mesenteric    Transfusion history     many blood tranfusions - last 3-29-15 after brain surgery    Traumatic hemorrhage of right cerebrum ([address] 3/30/2015     Peripheral IV 10/22/18 Left;Posterior Hand (Active)   Site Assessment Clean, dry, & intact 10/22/2018 11:25 AM   Phlebitis Assessment 0 10/22/2018 11:25 AM   Infiltration Assessment 0 10/22/2018 11:25 AM   Dressing Status Clean, dry, & intact 10/22/2018 11:25 AM   Dressing Type Tape;Transparent 10/22/2018 11:25 AM   Hub Color/Line Status Blue;Flushed;Patent 10/22/2018 11:25 AM

## 2018-10-26 LAB
FLOW CYTOMETRY, FBTC1: NORMAL
SPECIMEN SOURCE: NORMAL
TEST ORDERED:: NORMAL

## 2018-11-01 ENCOUNTER — HOSPITAL ENCOUNTER (OUTPATIENT)
Dept: MRI IMAGING | Age: 58
Discharge: HOME | End: 2018-11-01
Attending: INTERNAL MEDICINE
Payer: MEDICARE

## 2018-11-01 DIAGNOSIS — D45 POLYCYTHEMIA VERA (HCC): ICD-10-CM

## 2018-11-01 DIAGNOSIS — D50.9 IRON DEFICIENCY ANEMIA, UNSPECIFIED IRON DEFICIENCY ANEMIA TYPE: ICD-10-CM

## 2018-11-01 DIAGNOSIS — R16.1 SPLENOMEGALY: ICD-10-CM

## 2018-11-01 DIAGNOSIS — C92.10 CML (CHRONIC MYELOCYTIC LEUKEMIA) (HCC): ICD-10-CM

## 2018-11-01 DIAGNOSIS — R52 PAIN: ICD-10-CM

## 2018-11-01 DIAGNOSIS — I81 PORTAL VEIN THROMBOSIS: ICD-10-CM

## 2018-11-01 PROCEDURE — A9575 INJ GADOTERATE MEGLUMI 0.1ML: HCPCS | Performed by: INTERNAL MEDICINE

## 2018-11-01 PROCEDURE — 74011000258 HC RX REV CODE- 258: Performed by: INTERNAL MEDICINE

## 2018-11-01 PROCEDURE — 74011250636 HC RX REV CODE- 250/636: Performed by: INTERNAL MEDICINE

## 2018-11-01 PROCEDURE — 74183 MRI ABD W/O CNTR FLWD CNTR: CPT

## 2018-11-01 RX ORDER — GADOTERATE MEGLUMINE 376.9 MG/ML
12 INJECTION INTRAVENOUS
Status: COMPLETED | OUTPATIENT
Start: 2018-11-01 | End: 2018-11-01

## 2018-11-01 RX ADMIN — GADOTERATE MEGLUMINE 12 ML: 376.9 INJECTION INTRAVENOUS at 17:17

## 2018-11-01 RX ADMIN — SODIUM CHLORIDE 100 ML: 900 INJECTION, SOLUTION INTRAVENOUS at 17:17

## 2018-11-08 ENCOUNTER — HOSPITAL ENCOUNTER (OUTPATIENT)
Dept: LAB | Age: 58
Discharge: HOME | End: 2018-11-08
Payer: MEDICARE

## 2018-11-08 DIAGNOSIS — C92.10 CML (CHRONIC MYELOCYTIC LEUKEMIA) (HCC): ICD-10-CM

## 2018-11-08 LAB
ALBUMIN SERPL-MCNC: 4.1 G/DL (ref 3.5–5)
ALBUMIN/GLOB SERPL: 1.5 {RATIO} (ref 1.2–3.5)
ALP SERPL-CCNC: 91 U/L (ref 50–136)
ALT SERPL-CCNC: 70 U/L (ref 12–65)
ANION GAP SERPL CALC-SCNC: 6 MMOL/L (ref 7–16)
AST SERPL-CCNC: 37 U/L (ref 15–37)
BASOPHILS # BLD: 0 K/UL (ref 0–0.2)
BASOPHILS NFR BLD: 1 % (ref 0–2)
BILIRUB SERPL-MCNC: 0.9 MG/DL (ref 0.2–1.1)
BUN SERPL-MCNC: 17 MG/DL (ref 6–23)
CALCIUM SERPL-MCNC: 8.7 MG/DL (ref 8.3–10.4)
CHLORIDE SERPL-SCNC: 109 MMOL/L (ref 98–107)
CO2 SERPL-SCNC: 24 MMOL/L (ref 21–32)
CREAT SERPL-MCNC: 0.72 MG/DL (ref 0.6–1)
DIFFERENTIAL METHOD BLD: ABNORMAL
EOSINOPHIL # BLD: 0.1 K/UL (ref 0–0.8)
EOSINOPHIL NFR BLD: 2 % (ref 0.5–7.8)
ERYTHROCYTE [DISTWIDTH] IN BLOOD BY AUTOMATED COUNT: 15.3 % (ref 11.9–14.6)
GLOBULIN SER CALC-MCNC: 2.7 G/DL (ref 2.3–3.5)
GLUCOSE SERPL-MCNC: 85 MG/DL (ref 65–100)
HCT VFR BLD AUTO: 38.4 % (ref 35.8–46.3)
HGB BLD-MCNC: 12.9 G/DL (ref 11.7–15.4)
IMM GRANULOCYTES # BLD: 0 K/UL (ref 0–0.5)
IMM GRANULOCYTES NFR BLD AUTO: 0 % (ref 0–5)
LYMPHOCYTES # BLD: 0.8 K/UL (ref 0.5–4.6)
LYMPHOCYTES NFR BLD: 13 % (ref 13–44)
Lab: NORMAL
MCH RBC QN AUTO: 31.2 PG (ref 26.1–32.9)
MCHC RBC AUTO-ENTMCNC: 33.6 G/DL (ref 31.4–35)
MCV RBC AUTO: 93 FL (ref 79.6–97.8)
MONOCYTES # BLD: 0.4 K/UL (ref 0.1–1.3)
MONOCYTES NFR BLD: 7 % (ref 4–12)
NEUTS SEG # BLD: 4.4 K/UL (ref 1.7–8.2)
NEUTS SEG NFR BLD: 77 % (ref 43–78)
NRBC # BLD: 0.01 K/UL (ref 0–0.2)
PLATELET # BLD AUTO: 179 K/UL (ref 150–450)
PMV BLD AUTO: 10.5 FL (ref 9.4–12.3)
POTASSIUM SERPL-SCNC: 3.8 MMOL/L (ref 3.5–5.1)
PROT SERPL-MCNC: 6.8 G/DL (ref 6.3–8.2)
RBC # BLD AUTO: 4.13 M/UL (ref 4.05–5.25)
REFERENCE LAB,REFLB: NORMAL
SODIUM SERPL-SCNC: 139 MMOL/L (ref 136–145)
TEST DESCRIPTION:,ATST: NORMAL
WBC # BLD AUTO: 5.7 K/UL (ref 4.3–11.1)

## 2018-11-08 PROCEDURE — 36415 COLL VENOUS BLD VENIPUNCTURE: CPT

## 2018-11-08 PROCEDURE — 80053 COMPREHEN METABOLIC PANEL: CPT

## 2018-11-08 PROCEDURE — 85025 COMPLETE CBC W/AUTO DIFF WBC: CPT

## 2018-12-06 ENCOUNTER — HOSPITAL ENCOUNTER (OUTPATIENT)
Dept: LAB | Age: 58
Discharge: HOME | End: 2018-12-06
Payer: MEDICARE

## 2018-12-06 DIAGNOSIS — C92.10 CML (CHRONIC MYELOCYTIC LEUKEMIA) (HCC): ICD-10-CM

## 2018-12-06 LAB
ALBUMIN SERPL-MCNC: 3.9 G/DL (ref 3.5–5)
ALBUMIN/GLOB SERPL: 1.6 {RATIO} (ref 1.2–3.5)
ALP SERPL-CCNC: 78 U/L (ref 50–136)
ALT SERPL-CCNC: 53 U/L (ref 12–65)
ANION GAP SERPL CALC-SCNC: 6 MMOL/L (ref 7–16)
AST SERPL-CCNC: 29 U/L (ref 15–37)
BASOPHILS # BLD: 0 K/UL (ref 0–0.2)
BASOPHILS NFR BLD: 1 % (ref 0–2)
BILIRUB SERPL-MCNC: 0.6 MG/DL (ref 0.2–1.1)
BUN SERPL-MCNC: 9 MG/DL (ref 6–23)
CALCIUM SERPL-MCNC: 8.7 MG/DL (ref 8.3–10.4)
CHLORIDE SERPL-SCNC: 110 MMOL/L (ref 98–107)
CO2 SERPL-SCNC: 24 MMOL/L (ref 21–32)
CREAT SERPL-MCNC: 0.63 MG/DL (ref 0.6–1)
DIFFERENTIAL METHOD BLD: ABNORMAL
EOSINOPHIL # BLD: 0.1 K/UL (ref 0–0.8)
EOSINOPHIL NFR BLD: 3 % (ref 0.5–7.8)
ERYTHROCYTE [DISTWIDTH] IN BLOOD BY AUTOMATED COUNT: 15 % (ref 11.9–14.6)
GLOBULIN SER CALC-MCNC: 2.4 G/DL (ref 2.3–3.5)
GLUCOSE SERPL-MCNC: 101 MG/DL (ref 65–100)
HCT VFR BLD AUTO: 37.4 % (ref 35.8–46.3)
HGB BLD-MCNC: 12.2 G/DL (ref 11.7–15.4)
IMM GRANULOCYTES # BLD: 0 K/UL (ref 0–0.5)
IMM GRANULOCYTES NFR BLD AUTO: 1 % (ref 0–5)
LYMPHOCYTES # BLD: 0.6 K/UL (ref 0.5–4.6)
LYMPHOCYTES NFR BLD: 17 % (ref 13–44)
MCH RBC QN AUTO: 30.3 PG (ref 26.1–32.9)
MCHC RBC AUTO-ENTMCNC: 32.6 G/DL (ref 31.4–35)
MCV RBC AUTO: 92.8 FL (ref 79.6–97.8)
MONOCYTES # BLD: 0.2 K/UL (ref 0.1–1.3)
MONOCYTES NFR BLD: 6 % (ref 4–12)
NEUTS SEG # BLD: 2.8 K/UL (ref 1.7–8.2)
NEUTS SEG NFR BLD: 73 % (ref 43–78)
NRBC # BLD: 0 K/UL (ref 0–0.2)
PLATELET # BLD AUTO: 139 K/UL (ref 150–450)
PMV BLD AUTO: 10.5 FL (ref 9.4–12.3)
POTASSIUM SERPL-SCNC: 3.6 MMOL/L (ref 3.5–5.1)
PROT SERPL-MCNC: 6.3 G/DL (ref 6.3–8.2)
RBC # BLD AUTO: 4.03 M/UL (ref 4.05–5.25)
SODIUM SERPL-SCNC: 140 MMOL/L (ref 136–145)
WBC # BLD AUTO: 3.8 K/UL (ref 4.3–11.1)

## 2018-12-06 PROCEDURE — 80053 COMPREHEN METABOLIC PANEL: CPT

## 2018-12-06 PROCEDURE — 85025 COMPLETE CBC W/AUTO DIFF WBC: CPT

## 2018-12-06 PROCEDURE — 36415 COLL VENOUS BLD VENIPUNCTURE: CPT

## 2019-01-10 ENCOUNTER — HOSPITAL ENCOUNTER (EMERGENCY)
Age: 59
Discharge: HOME | End: 2019-01-10
Attending: EMERGENCY MEDICINE
Payer: MEDICARE

## 2019-01-10 ENCOUNTER — APPOINTMENT (OUTPATIENT)
Dept: GENERAL RADIOLOGY | Age: 59
End: 2019-01-10
Attending: EMERGENCY MEDICINE
Payer: MEDICARE

## 2019-01-10 VITALS
WEIGHT: 130 LBS | HEIGHT: 64 IN | SYSTOLIC BLOOD PRESSURE: 152 MMHG | HEART RATE: 80 BPM | TEMPERATURE: 98.2 F | BODY MASS INDEX: 22.2 KG/M2 | DIASTOLIC BLOOD PRESSURE: 75 MMHG | OXYGEN SATURATION: 99 % | RESPIRATION RATE: 18 BRPM

## 2019-01-10 DIAGNOSIS — K92.0 HEMATEMESIS WITH NAUSEA: Primary | ICD-10-CM

## 2019-01-10 LAB
ABO + RH BLD: NORMAL
ALBUMIN SERPL-MCNC: 3.9 G/DL (ref 3.5–5)
ALBUMIN/GLOB SERPL: 1.4 {RATIO} (ref 1.2–3.5)
ALP SERPL-CCNC: 96 U/L (ref 50–136)
ALT SERPL-CCNC: 65 U/L (ref 12–65)
ANION GAP SERPL CALC-SCNC: 5 MMOL/L (ref 7–16)
AST SERPL-CCNC: 48 U/L (ref 15–37)
BASOPHILS # BLD: 0 K/UL (ref 0–0.2)
BASOPHILS NFR BLD: 1 % (ref 0–2)
BILIRUB SERPL-MCNC: 0.5 MG/DL (ref 0.2–1.1)
BLOOD GROUP ANTIBODIES SERPL: NORMAL
BUN SERPL-MCNC: 13 MG/DL (ref 6–23)
CALCIUM SERPL-MCNC: 8.2 MG/DL (ref 8.3–10.4)
CHLORIDE SERPL-SCNC: 106 MMOL/L (ref 98–107)
CO2 SERPL-SCNC: 27 MMOL/L (ref 21–32)
CREAT SERPL-MCNC: 0.74 MG/DL (ref 0.6–1)
DIFFERENTIAL METHOD BLD: ABNORMAL
EOSINOPHIL # BLD: 0.1 K/UL (ref 0–0.8)
EOSINOPHIL NFR BLD: 3 % (ref 0.5–7.8)
ERYTHROCYTE [DISTWIDTH] IN BLOOD BY AUTOMATED COUNT: 15.9 % (ref 11.9–14.6)
GLOBULIN SER CALC-MCNC: 2.8 G/DL (ref 2.3–3.5)
GLUCOSE SERPL-MCNC: 102 MG/DL (ref 65–100)
HCT VFR BLD AUTO: 40.8 % (ref 35.8–46.3)
HGB BLD-MCNC: 13.3 G/DL (ref 11.7–15.4)
IMM GRANULOCYTES # BLD AUTO: 0 K/UL (ref 0–0.5)
IMM GRANULOCYTES NFR BLD AUTO: 1 % (ref 0–5)
INR PPP: 1.2
LIPASE SERPL-CCNC: 74 U/L (ref 73–393)
LYMPHOCYTES # BLD: 0.8 K/UL (ref 0.5–4.6)
LYMPHOCYTES NFR BLD: 18 % (ref 13–44)
MCH RBC QN AUTO: 30 PG (ref 26.1–32.9)
MCHC RBC AUTO-ENTMCNC: 32.6 G/DL (ref 31.4–35)
MCV RBC AUTO: 92.1 FL (ref 79.6–97.8)
MONOCYTES # BLD: 0.3 K/UL (ref 0.1–1.3)
MONOCYTES NFR BLD: 6 % (ref 4–12)
NEUTS SEG # BLD: 3.3 K/UL (ref 1.7–8.2)
NEUTS SEG NFR BLD: 71 % (ref 43–78)
NRBC # BLD: 0 K/UL (ref 0–0.2)
PLATELET # BLD AUTO: 126 K/UL (ref 150–450)
PMV BLD AUTO: 11.3 FL (ref 9.4–12.3)
POTASSIUM SERPL-SCNC: 4.4 MMOL/L (ref 3.5–5.1)
PROT SERPL-MCNC: 6.7 G/DL (ref 6.3–8.2)
PROTHROMBIN TIME: 15.1 SEC (ref 11.7–14.5)
RBC # BLD AUTO: 4.43 M/UL (ref 4.05–5.2)
SODIUM SERPL-SCNC: 138 MMOL/L (ref 136–145)
SPECIMEN EXP DATE BLD: NORMAL
WBC # BLD AUTO: 4.5 K/UL (ref 4.3–11.1)

## 2019-01-10 PROCEDURE — 85610 PROTHROMBIN TIME: CPT

## 2019-01-10 PROCEDURE — 86900 BLOOD TYPING SEROLOGIC ABO: CPT

## 2019-01-10 PROCEDURE — 85025 COMPLETE CBC W/AUTO DIFF WBC: CPT

## 2019-01-10 PROCEDURE — 74011250636 HC RX REV CODE- 250/636: Performed by: EMERGENCY MEDICINE

## 2019-01-10 PROCEDURE — 99284 EMERGENCY DEPT VISIT MOD MDM: CPT | Performed by: EMERGENCY MEDICINE

## 2019-01-10 PROCEDURE — 83690 ASSAY OF LIPASE: CPT

## 2019-01-10 PROCEDURE — C9113 INJ PANTOPRAZOLE SODIUM, VIA: HCPCS | Performed by: EMERGENCY MEDICINE

## 2019-01-10 PROCEDURE — 96374 THER/PROPH/DIAG INJ IV PUSH: CPT | Performed by: EMERGENCY MEDICINE

## 2019-01-10 PROCEDURE — 80053 COMPREHEN METABOLIC PANEL: CPT

## 2019-01-10 PROCEDURE — 74011000250 HC RX REV CODE- 250: Performed by: EMERGENCY MEDICINE

## 2019-01-10 PROCEDURE — 96375 TX/PRO/DX INJ NEW DRUG ADDON: CPT | Performed by: EMERGENCY MEDICINE

## 2019-01-10 PROCEDURE — 71045 X-RAY EXAM CHEST 1 VIEW: CPT

## 2019-01-10 RX ORDER — ONDANSETRON 2 MG/ML
4 INJECTION INTRAMUSCULAR; INTRAVENOUS
Status: COMPLETED | OUTPATIENT
Start: 2019-01-10 | End: 2019-01-10

## 2019-01-10 RX ADMIN — SODIUM CHLORIDE 1000 ML: 900 INJECTION, SOLUTION INTRAVENOUS at 03:23

## 2019-01-10 RX ADMIN — ONDANSETRON 4 MG: 2 INJECTION INTRAMUSCULAR; INTRAVENOUS at 03:23

## 2019-01-10 RX ADMIN — SODIUM CHLORIDE 40 MG: 9 INJECTION, SOLUTION INTRAMUSCULAR; INTRAVENOUS; SUBCUTANEOUS at 03:23

## 2019-01-10 NOTE — ED TRIAGE NOTES
Pt called EMS, after she vomited bright red blood about an hour ago, pt estimates about a cup full. Pt was feeling sick prior to this, pt has nausea currently. Pt does have leukemia and another cancer that causes her to clot. Pt took her lovenox tonight, she takes this twice a day. Pt explains that she has had her liver biopsied and her pressures tested and they were normal however they have told her that she does have increased pressure in her esophagus. Pt denies dark, tarry stools. Pt states she also took some advil and her percocet tonight. Pt denies abd pain, denies chest pain, denies shob, denies fever/chills.

## 2019-01-10 NOTE — DISCHARGE INSTRUCTIONS
Follow-up with GI tomorrow as discussed  Continue all your current medications  Return to the ER for any severe nausea, recurrent vomiting of blood or any other concerning symptoms

## 2019-01-10 NOTE — ED PROVIDER NOTES
27-year-old female with multiple medical problems including esophageal varices, cirrhosis, splenomegaly, factor VIII deficiency    Presents to the ER via EMS after an isolated episode of bright red blood with vomiting  She has some persistent nausea. No fever    She takes Lovenox twice daily. She reports that if she stops she always develops   clotting  Patient denies dark stools or any recent diarrhea  She has ongoing abdominal pain which is not significantly worse from her baseline. Last endoscopy by Dr. Armstrong Fell about one year ago      The history is provided by the patient and the EMS personnel. Hemoptysis    This is a recurrent problem. The current episode started 1 to 2 hours ago. Episode frequency: Patient with persistent nausea, one episode of vomiting with bright red blood. The problem has not changed since onset. The emesis has an appearance of bright red blood. There has been no fever. Associated symptoms include abdominal pain. Pertinent negatives include no chills, no fever, no diarrhea, no headaches, no arthralgias, no myalgias, no cough and no headaches. Risk factors: patient on Lovenox twice a day due to factor VIII deficiency. Her past medical history is significant for bowel resection.         Past Medical History:   Diagnosis Date    Anemia     C. difficile diarrhea 4/1/2015    Cerebral edema (HCC) 4/1/2015    Chronic migraine 9/22/2016    Chronic myelogenous leukemia ([facility] [address]     [place] chromosome    Chronic pain     Coagulation disorder ([address]     \"clotting and Bleeding Problem\" dr Zee Augustin follows     Esophageal spasm     with banding     Esophageal varices (HCC)     grade 3    GI bleed 8/3/2016    H/O craniotomy     3-29-15.  due to blood clot - which caused a seizure  - then pt fell and hit     Leukocytosis 3/14/2015    MRSA colonization 6/25/2012    Polycythemia vera(238.4)     JAK2 mutation    Portal hypertension ([address]     with varices    Portal vein thrombosis 6/20/2012 Ct scan  Apparent occlusion of the portal vein. In addition, the splenic vein, and superior mesenteric vein are not definitely seen and are also likely  occluded. Splenomegaly, and extensive varices are seen as described above consistent with the portal vein occlusion 12 on arixtra HIT negative on repeat 12 re admitted Cirrhotic appearance of the liver. Mild to moderate ascites, as    Primary hypothyroidism     Pulmonary embolism ([address]     not on coumadin anymore     S/P exploratory laparotomy, 12    Bowel resection:  336 cm of small bowel removed, approximately 9 feet and placement of feeding jejunostomy.      S/P small bowel resection     .  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 2012 platelets lower repeat HIT 12 platelets in 37,366'E    Thrombosis, portal vein 2004    portal , spleenic and recently superior mesenteric    Transfusion history     many blood tranfusions - last 3-29-15 after brain surgery    Traumatic hemorrhage of right cerebrum ([address] 3/30/2015       Past Surgical History:   Procedure Laterality Date    ABDOMEN SURGERY PROC UNLISTED      umbilical hernia repair    HX APPENDECTOMY      with small bowel resection    HX BREAST BIOPSY Bilateral     Lt - ; Rt -     HX CHOLECYSTECTOMY      HX GI      liver biopsy    HX GI      bowel removed small - 9 feet     HX GYN       x 2    HX GYN      D&C following miscarriage    HX ORTHOPAEDIC Left 2008    torn labrum shoulder (screw in place)    NEUROLOGICAL PROCEDURE UNLISTED      craniotomy to evacuate subdural hematoma following a fall from a seizure Family History:   Problem Relation Age of Onset   Rene Mcfadden Diabetes Mother     Stroke Mother         after surgery    Cancer Father         brain    Breast Cancer Maternal Aunt 48    Thyroid Disease Paternal Grandmother        Social History     Socioeconomic History    Marital status: SINGLE     Spouse name: Not on file    Number of children: Not on file    Years of education: Not on file    Highest education level: Not on file   Social Needs    Financial resource strain: Not on file    Food insecurity - worry: Not on file    Food insecurity - inability: Not on file   Penemarie K Murphy needs - medical: Not on file   Penemarie K Murphy needs - non-medical: Not on file   Occupational History    Not on file   Tobacco Use    Smoking status: Former Smoker     Packs/day: 0.20     Years: 10.00     Pack years: 2.00     Types: Cigarettes     Last attempt to quit:      Years since quittin.0    Smokeless tobacco: Never Used   Substance and Sexual Activity    Alcohol use: No    Drug use: No    Sexual activity: Not on file   Other Topics Concern    Not on file   Social History Narrative    Not on file         ALLERGIES: Latex; Sulfa (sulfonamide antibiotics); Tramadol; Augmentin [amoxicillin-pot clavulanate]; Morphine; Rizatriptan; Tetanus immune globulin; Xanax [alprazolam]; and Zonegran [zonisamide]    Review of Systems   Constitutional: Negative for chills and fever. HENT: Negative for congestion, ear pain and rhinorrhea. Eyes: Negative for photophobia and discharge. Respiratory: Positive for hemoptysis. Negative for cough and shortness of breath. Cardiovascular: Negative for chest pain and palpitations. Gastrointestinal: Positive for abdominal pain. Negative for constipation, diarrhea and vomiting. Endocrine: Negative for cold intolerance and heat intolerance. Genitourinary: Negative for dysuria and flank pain. Musculoskeletal: Negative for arthralgias, myalgias and neck pain.    Skin: Negative for rash and wound. Allergic/Immunologic: Negative for environmental allergies and food allergies. Neurological: Negative for syncope and headaches. Hematological: Negative for adenopathy. Does not bruise/bleed easily. Psychiatric/Behavioral: Negative for dysphoric mood. The patient is not nervous/anxious. All other systems reviewed and are negative. Vitals:    01/10/19 0203   BP: 157/69   Pulse: 91   Resp: 16   Temp: 98.2 °F (36.8 °C)   SpO2: 98%   Weight: 59 kg (130 lb)   Height: 5' 4\" (1.626 m)            Physical Exam   Constitutional: She is oriented to person, place, and time. She appears well-developed and well-nourished. She appears distressed. HENT:   Head: Normocephalic and atraumatic. Right Ear: External ear normal.   Left Ear: External ear normal.   Mouth/Throat: Oropharynx is clear and moist. No oropharyngeal exudate. Eyes: Conjunctivae and EOM are normal. Pupils are equal, round, and reactive to light. Neck: Normal range of motion. Neck supple. No JVD present. Cardiovascular: Normal rate, regular rhythm, normal heart sounds and intact distal pulses. Exam reveals no gallop and no friction rub. No murmur heard. Pulmonary/Chest: Effort normal and breath sounds normal.   Abdominal: Soft. Bowel sounds are normal. She exhibits distension. She exhibits no mass. There is splenomegaly and hepatomegaly. There is generalized tenderness. There is no CVA tenderness. Musculoskeletal: Normal range of motion. She exhibits no edema or deformity. Neurological: She is alert and oriented to person, place, and time. She has normal strength. No cranial nerve deficit or sensory deficit. She displays a negative Romberg sign. Gait normal.   Skin: Skin is warm and dry. Capillary refill takes less than 2 seconds. No rash noted. Psychiatric: She has a normal mood and affect.  Her speech is normal and behavior is normal. Judgment and thought content normal. Cognition and memory are normal.   Nursing note and vitals reviewed. MDM  Number of Diagnoses or Management Options  Hematemesis with nausea: established and worsening  Diagnosis management comments: Differential diagnosis  Gastritis, peptic ulcer disease, esophageal varices    Case reviewed with Dr. Méndez Sessions  Patient cleared for discharge  They will follow her up in the GI office in the next 1-2 days    Patient is comfortable with plan, understands reasons to return to the ER and need for follow-up with both her GI and hematology physicians       Amount and/or Complexity of Data Reviewed  Clinical lab tests: ordered and reviewed  Tests in the radiology section of CPT®: ordered and reviewed  Tests in the medicine section of CPT®: ordered and reviewed  Review and summarize past medical records: yes    Risk of Complications, Morbidity, and/or Mortality  Presenting problems: moderate  Diagnostic procedures: moderate  Management options: moderate  General comments: Elements of this note have been dictated via voice recognition software. Text and phrases may be limited by the accuracy of the software. The chart has been reviewed, but errors may still be present.       Patient Progress  Patient progress: stable         Procedures

## 2019-01-10 NOTE — ED NOTES
I have reviewed discharge instructions with the patient. The patient verbalized understanding. Patient left ED via Discharge Method: ambulatory to Home with self    Opportunity for questions and clarification provided. Patient given 0 scripts. To continue your aftercare when you leave the hospital, you may receive an automated call from our care team to check in on how you are doing. This is a free service and part of our promise to provide the best care and service to meet your aftercare needs.  If you have questions, or wish to unsubscribe from this service please call [phone]. Thank you for Choosing our [facility] Emergency Department.

## 2019-02-14 ENCOUNTER — HOSPITAL ENCOUNTER (OUTPATIENT)
Dept: LAB | Age: 59
Discharge: HOME | End: 2019-02-14
Payer: SELF-PAY

## 2019-02-14 DIAGNOSIS — C92.10 CML (CHRONIC MYELOCYTIC LEUKEMIA) (HCC): ICD-10-CM

## 2019-02-14 DIAGNOSIS — D45 POLYCYTHEMIA VERA (HCC): Chronic | ICD-10-CM

## 2019-02-14 LAB
ALBUMIN SERPL-MCNC: 4 G/DL (ref 3.5–5)
ALBUMIN/GLOB SERPL: 1.5 {RATIO} (ref 1.2–3.5)
ALP SERPL-CCNC: 74 U/L (ref 50–136)
ALT SERPL-CCNC: 32 U/L (ref 12–65)
ANION GAP SERPL CALC-SCNC: 4 MMOL/L (ref 7–16)
AST SERPL-CCNC: 20 U/L (ref 15–37)
BASOPHILS # BLD: 0 K/UL (ref 0–0.2)
BASOPHILS NFR BLD: 1 % (ref 0–2)
BILIRUB SERPL-MCNC: 0.7 MG/DL (ref 0.2–1.1)
BUN SERPL-MCNC: 14 MG/DL (ref 6–23)
CALCIUM SERPL-MCNC: 8.7 MG/DL (ref 8.3–10.4)
CHLORIDE SERPL-SCNC: 108 MMOL/L (ref 98–107)
CO2 SERPL-SCNC: 28 MMOL/L (ref 21–32)
CREAT SERPL-MCNC: 0.75 MG/DL (ref 0.6–1)
DIFFERENTIAL METHOD BLD: ABNORMAL
EOSINOPHIL # BLD: 0.1 K/UL (ref 0–0.8)
EOSINOPHIL NFR BLD: 2 % (ref 0.5–7.8)
ERYTHROCYTE [DISTWIDTH] IN BLOOD BY AUTOMATED COUNT: 15.8 % (ref 11.9–14.6)
GLOBULIN SER CALC-MCNC: 2.6 G/DL (ref 2.3–3.5)
GLUCOSE SERPL-MCNC: 93 MG/DL (ref 65–100)
HCT VFR BLD AUTO: 41.1 % (ref 35.8–46.3)
HGB BLD-MCNC: 13 G/DL (ref 11.7–15.4)
IMM GRANULOCYTES # BLD AUTO: 0 K/UL (ref 0–0.5)
IMM GRANULOCYTES NFR BLD AUTO: 0 % (ref 0–5)
LYMPHOCYTES # BLD: 0.6 K/UL (ref 0.5–4.6)
LYMPHOCYTES NFR BLD: 11 % (ref 13–44)
Lab: NORMAL
MCH RBC QN AUTO: 29.2 PG (ref 26.1–32.9)
MCHC RBC AUTO-ENTMCNC: 31.6 G/DL (ref 31.4–35)
MCV RBC AUTO: 92.4 FL (ref 79.6–97.8)
MONOCYTES # BLD: 0.2 K/UL (ref 0.1–1.3)
MONOCYTES NFR BLD: 4 % (ref 4–12)
NEUTS SEG # BLD: 4.7 K/UL (ref 1.7–8.2)
NEUTS SEG NFR BLD: 82 % (ref 43–78)
NRBC # BLD: 0 K/UL (ref 0–0.2)
PLATELET # BLD AUTO: 163 K/UL (ref 150–450)
PMV BLD AUTO: 10.3 FL (ref 9.4–12.3)
POTASSIUM SERPL-SCNC: 3.9 MMOL/L (ref 3.5–5.1)
PROT SERPL-MCNC: 6.6 G/DL (ref 6.3–8.2)
RBC # BLD AUTO: 4.45 M/UL (ref 4.05–5.25)
REFERENCE LAB,REFLB: NORMAL
SODIUM SERPL-SCNC: 140 MMOL/L (ref 136–145)
TEST DESCRIPTION:,ATST: NORMAL
WBC # BLD AUTO: 5.7 K/UL (ref 4.3–11.1)

## 2019-02-14 PROCEDURE — 85025 COMPLETE CBC W/AUTO DIFF WBC: CPT

## 2019-02-14 PROCEDURE — 80053 COMPREHEN METABOLIC PANEL: CPT

## 2019-02-14 PROCEDURE — 36415 COLL VENOUS BLD VENIPUNCTURE: CPT

## 2019-02-28 ENCOUNTER — HOSPITAL ENCOUNTER (OUTPATIENT)
Dept: PHYSICAL THERAPY | Age: 59
Discharge: HOME | End: 2019-02-28
Attending: INTERNAL MEDICINE
Payer: MEDICARE

## 2019-02-28 DIAGNOSIS — R53.1 GENERALIZED WEAKNESS: ICD-10-CM

## 2019-02-28 DIAGNOSIS — C92.10 CML (CHRONIC MYELOCYTIC LEUKEMIA) (HCC): ICD-10-CM

## 2019-02-28 PROCEDURE — 97161 PT EVAL LOW COMPLEX 20 MIN: CPT

## 2019-02-28 PROCEDURE — 97110 THERAPEUTIC EXERCISES: CPT

## 2019-02-28 NOTE — THERAPY EVALUATION
Laura Menchaca Armandjani  : 1960  Primary: Malathi Espitia Self Pay  Secondary:  [address]  at [facility]  [address], [address], [address]  [phone]   [phone]          OUTPATIENT PHYSICAL THERAPY:Initial Assessment 2019   ICD-10: Treatment Diagnosis: muscle weakness generalized M 62.81  Precautions/Allergies:   Latex; Sulfa (sulfonamide antibiotics); Tramadol; Augmentin [amoxicillin-pot clavulanate]; Morphine; Rizatriptan; Tetanus immune globulin; Xanax [alprazolam]; and Zonegran [zonisamide]    MD Orders: oncology rehab MEDICAL/REFERRING DIAGNOSIS:  CML (chronic myelocytic leukemia) ([facility] [C92.10]  Generalized weakness [R53.1]    DATE OF ONSET:   REFERRING PHYSICIAN: Suzette Amato MD  RETURN PHYSICIAN APPOINTMENT: 19  Will see physician 3/6/19 at [facility]. Will also go to [address] [place] [address]:  Ms. Varma presents due to generalized weakness following treatment of CML. She is preparing for upcoming surgery to remove her spleen. She would like to build her strength prior to surgery. She would like an extensive HEP. PROBLEM LIST (Impacting functional limitations):  1. Decreased Strength  2. Decreased Activity Tolerance  3. Increased Fatigue  4. Decreased [place] with Home Exercise Program INTERVENTIONS PLANNED: (Treatment may consist of any combination of the following)  1. Home Exercise Program (HEP)  2. Therapeutic Exercise/Strengthening   TREATMENT PLAN:  Effective Dates: 2019 TO 2019 (90 days). Frequency/Duration: 1 time a week for 90 Day(s)  GOALS: (Goals have been discussed and agreed upon with patient.)  Short-Term Functional Goals: Time Frame: 4 weeks  1. The patient will be independent with HEP for strength within 4 weeks. 2. The patient will report a fatigue level of 54 or less within  4 weeks. 3. The patient will gain 1/2 grade strength of all 4 extremities within 4 weeks.   Discharge Goals: Time Frame: 8 weeks  1. The patient will improve her 6 minute walk by 50 feet within 8 weeks. Rehabilitation Potential For Stated Goals: Good  Regarding Sherri Couch's therapy, I certify that the treatment plan above will be carried out by a therapist or under their direction. Thank you for this referral,  Lashay Granados PT                 The information in this section was collected on 2/28/19 (except where otherwise noted). HISTORY:   History of Present Injury/Illness (Reason for Referral):  Treatment for polycythemia and CML. General weakness. Would like to prepare for upcoming splenectomy. Past Medical History/Comorbidities:   Ms. Vikram Lin  has a past medical history of Anemia, C. difficile diarrhea (4/1/2015), Cerebral edema ([address] (4/1/2015), Chronic migraine (9/22/2016), Chronic myelogenous leukemia ([address] Chronic pain, Coagulation disorder ([address] Esophageal spasm, Esophageal varices ([address] GI bleed (8/3/2016), H/O craniotomy, Leukocytosis (3/14/2015), MRSA colonization (6/25/2012), Polycythemia vera(238.4), Portal hypertension ([address] Portal vein thrombosis (6/20/2012), Primary hypothyroidism, Pulmonary embolism ([address] (2006), S/P exploratory laparotomy, 6/20/12  (6/25/2012), S/P small bowel resection, Seizure ([address] (3/14/2015), Seizure disorder ([address] (3/30/2015), Seizures ([address] Splenomegaly, congestive, chronic, Stroke (cerebrum) ([address] (4/11/2015), Stroke ([address] Thrombocytopenia, 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 She also has no past medical history of Adverse effect of anesthesia, Difficult intubation, Malignant hyperthermia due to anesthesia, or Pseudocholinesterase deficiency.   Ms. Vikram Lin  has a past surgical history that includes hx gyn; hx gyn; pr neurological procedure unlisted (2010-12); hx gi; hx gi; pr abdomen surgery proc unlisted; hx cholecystectomy; pr abdomen surgery proc unlisted; hx appendectomy; hx breast biopsy (Bilateral); and hx orthopaedic (Left, 2008). Past Medical History:   Diagnosis Date    Anemia     C. difficile diarrhea 4/1/2015    Cerebral edema ([address] 4/1/2015    Chronic migraine 9/22/2016    Chronic myelogenous leukemia ([address]     Vista chromosome/ converted from polycythemia in 2009- to 2012 when she was dx w leukemia    Chronic pain     Coagulation disorder ([address]     \"clotting and Bleeding Problem\" dr Pia Oneil follows     Esophageal spasm     with banding     Esophageal varices ([address]     grade 3    GI bleed 8/3/2016    H/O craniotomy     3-29-15.  due to blood clot - which caused a seizure  - then pt fell and hit     Leukocytosis 3/14/2015    MRSA colonization 6/25/2012    Polycythemia vera(238.4)     JAK2 mutation    Portal hypertension ([address]     with varices    Portal vein thrombosis 6/20/2012    Ct scan 6-21-2 Apparent occlusion of the portal vein. In addition, the splenic vein, and superior mesenteric vein are not definitely seen and are also likely  occluded. Splenomegaly, and extensive varices are seen as described above consistent with the portal vein occlusion 7-05-12 on arixtra HIT negative on repeat 7-27-12 re admitted Cirrhotic appearance of the liver. Mild to moderate ascites, as    Primary hypothyroidism     Pulmonary embolism ([address] 2006    not on coumadin anymore     S/P exploratory laparotomy, 6/20/12 6/25/2012    Bowel resection:  336 cm of small bowel removed, approximately 9 feet and placement of feeding jejunostomy.      S/P small bowel resection     2012.  9 feet removed due to  gangrene which wa due to blood clot    Seizure ([address] 3/14/2015    Seizure disorder ([address] 3/30/2015    due to clotting factor    Seizures ([address]     last one 3- - followed by aniyah     Splenomegaly, congestive, chronic     Stroke (cerebrum) ([address] 4/11/2015    had bled into head- surgery    Stroke [facility])     pt had stroke like symptoms     Thrombocytopenia, HIT negative 2012 platelets lower repeat HIT 12 platelets in 61,888'V    Thrombosis, portal vein     portal , spleenic and recently superior mesenteric    Transfusion history     many blood tranfusions - last 3-29-15 after brain surgery    Traumatic hemorrhage of right cerebrum ([facility] 3/30/2015     Past Surgical History:   Procedure Laterality Date    ABDOMEN SURGERY PROC UNLISTED      umbilical hernia repair    ABDOMEN SURGERY PROC UNLISTED      9ft of small bowel excised then reconnected    HX APPENDECTOMY      with small bowel resection    HX BREAST BIOPSY Bilateral     Lt - ; Rt -     HX CHOLECYSTECTOMY      HX GI      liver biopsy    HX GI      bowel removed small - 9 feet     HX GYN       x 2    HX GYN      D&C following miscarriage    HX ORTHOPAEDIC Left 2008    torn labrum shoulder (screw in place)    NEUROLOGICAL PROCEDURE UNLISTED  2010    craniotomy to evacuate subdural hematoma following a fall from a seizure       Social History/Living Environment:     lives alone, independent  Prior Level of Function/Work/Activity:  Nurse, not currently working  Dominant Side:         RIGHT     Ambulatory/Rehab Services H2 Model Falls Risk Assessment    Risk Factors:       No Risk Factors Identified Ability to Rise from Chair:       (0)  Ability to rise in a single movement    Falls Prevention Plan:       No modifications necessary   Total: (5 or greater = High Risk): 0     [facility] [address] [place] [address] States Patent #[id number]. Federal Law prohibits the replication, distribution or use without written permission from [facility] Everywhere     Current Medications:       Current Outpatient Medications:     oxyCODONE-acetaminophen (PERCOCET 10)  mg per tablet, Take 1 Tab by mouth every eight (8) hours as needed for Pain for up to 30 days. Max Daily Amount: 3 Tabs.  1 tab every 8 to 12 hours as needed for severe pain., Disp: 90 Tab, Rfl: 0    SYNTHROID 112 mcg tablet, 1 tablet once a day with an extra 1/2 tablet on Sundays, Disp: 32 Tab, Rfl: 11    zolpidem (AMBIEN) 5 mg tablet, Take 1 Tab by mouth nightly as needed for Sleep. Max Daily Amount: 5 mg., Disp: 30 Tab, Rfl: 2    ondansetron (ZOFRAN ODT) 4 mg disintegrating tablet, Take 1 Tab by mouth every eight (8) hours as needed for Nausea., Disp: 90 Tab, Rfl: 0    dasatinib (SPRYCEL) 80 mg tab, Take 1 Tab by mouth daily. , Disp: 30 Tab, Rfl: 5    ruxolitinib 5 mg tab, Take 1 Tab by mouth two (2) times a day., Disp: 60 Tab, Rfl: 5    enoxaparin (LOVENOX) 60 mg/0.6 mL injection, INJECT 60 MG BY SUBCUTANEOUS ROUTE EVERY TWELVE (12) HOURS. (Patient taking differently: INJECT 60 MG BY SUBCUTANEOUS ROUTE EVERY TWELVE (12) HOURS. Pt says she is to hold this the night prior to and the morning of the GI procedure per Dr Dianna Davis), Disp: 60 Syringe, Rfl: 11    calcium carbonate (CALTREX) 600 mg calcium (1,500 mg) tablet, Take 600 mg by mouth daily. , Disp: , Rfl:     cholecalciferol (VITAMIN D3) 1,000 unit cap, Take 1,000 Units by mouth daily. , Disp: , Rfl:     lansoprazole (PREVACID) 30 mg capsule, Take  by mouth two (2) times a day., Disp: , Rfl:     ibuprofen (ADVIL) 200 mg tablet, Take  by mouth. Last dose 1/16/19, Disp: , Rfl:     diphenoxylate-atropine (LOMOTIL) 2.5-0.025 mg per tablet, Take 1 Tab by mouth four (4) times daily as needed for Diarrhea. Max Daily Amount: 4 Tabs., Disp: 90 Tab, Rfl: 1    calcium carbonate (TUMS) 200 mg calcium (500 mg) chew, Take 1 Tab by mouth daily as needed. , Disp: , Rfl:    Date Last Reviewed:  2/28/19   Number of Personal Factors/Comorbidities that affect the Plan of Care: 0: LOW COMPLEXITY   EXAMINATION:   ROM:          Within normal limts  Strength:          Grossly 4+/5 x 4 extremities  Functional Mobility:         Gait/Ambulation:  independent        Transfers:  independent  Balance:          intact   Body Structures Involved:  1.  Muscles Body Functions Affected:  1. Neuromusculoskeletal Activities and Participation Affected:  1. None   Number of elements (examined above) that affect the Plan of Care: 1-2: LOW COMPLEXITY   CLINICAL PRESENTATION:   Presentation: Stable and uncomplicated: LOW COMPLEXITY   CLINICAL DECISION MAKING:   Outcome Measure: Tool Used: 6-MINUTE WALK TEST  Score:  Initial: 1675 feet Most Recent: X feet (Date: -- )   Interpretation of Score: Normal range varies but is approximately 6146-3509 Feet      Distance walked: 1675 feet               Baseline End of Test   Heart Rate 72 99   Dyspnea (Evan Scale)     Fatigue (Evan Scale) 5/10 4/10   SpO2 98 95   /70 122/73             Tool Used: TINETTI  Score:  Initial:   Gait: 12/12  Balance: 16/16  TOTAL: 28/28 Most Recent:  Gait: /12  Balance: /16  TOTAL: /28   Interpretation of Score: The maximum score for the gait component is 12 points. The maximum score for the balance component is 16 points. The maximum total score is 28 points. In general, patients who score below 19 are at a high risk for falls. Patients who score in the range of 19-24 indicate that the patient has a risk for falls. Tool Used: Timed Up and Go (TUG)  Score:  Initial: 7 seconds Most Recent: X seconds (Date: -- )   Interpretation of Score: The test measures, in seconds, the time taken by an individual to stand up from a standard arm chair (seat height 46 cm [18 in], arm height 65 cm [25.6 in]), walk a distance of 3 meters (118 in, approx 10 ft), turn, walk back to the chair and sit down. If the individual takes longer than 14 seconds to complete TUG, this indicates risk for falls.       Tool Used: ECOG Performance Survey Score  Score:  Initial: 1 Most Recent:      Interpretation of Score:   0 Fully active, able to carry on all pre-disease performance without restriction   1 Restricted in physically strenuous activity but ambulatory and able to carry out work of a light or sedentary nature, e.g., light house work, office work   2 Ambulatory and capable of all selfcare but unable to carry out any work activities. Up and about more than 50% of waking hours   3 Capable of only limited selfcare, confined to bed or chair more than 50% of waking hours   4 Completely disabled. Cannot carry on any selfcare. Totally confined to bed or chair   5 Dead        Medical Necessity:   · Patient is expected to demonstrate progress in strength and overall  conditioning to increase strength prior to surgery. Reason for Services/Other Comments:  · Patient continues to demonstrate capacity to improve strength and overall conditnioning  which will allow her to recover from surgery sooner.    Use of outcome tool(s) and clinical judgement create a POC that gives a: Clear prediction of patient's progress: LOW COMPLEXITY                 Gurmeet Lawton, PT    Future Appointments   Date Time Provider Maggy Toney   3/7/2019  1:45 PM Nadeen Rodriguez, [facility]   3/14/2019 11:15 AM Zoe Dinh, PT [facility]   3/14/2019  1:30 PM [address] [place] [address] OUTREACH INSURANCE [facility] [address] [place] [address]   3/14/2019  2:00 PM Sourav Lord NP [facility]   4/1/2019 10:00 AM Devorah Goodell, MD END BS ENDO   4/25/2019  1:50 PM [address] [place] [address] OUTREACH INSURANCE [facility]   4/25/2019  2:15 PM Willistine Cowden, MD [facility]

## 2019-02-28 NOTE — PROGRESS NOTES
Shelby Couch  : 1960  Primary: Fantasma Kimball Self Pay  Secondary:  [address]  at [facility]  [address], [address], [address]  [phone]   [phone]        OUTPATIENT PHYSICAL THERAPY: Daily Treatment Note 2019  Pre-treatment Symptoms/Complaints:  The patient reports she would like to improve her strength prior to her surgery  Pain: Initial: Pain Intensity 1: 0  Post Session:  0/10   Medications Last Reviewed:  19  Updated Objective Findings:  See evaluation note from today   TREATMENT:   THERAPEUTIC EXERCISE: (15 minutes):  Exercises per grid below to improve mobility, strength and balance. Required minimal verbal cues to promote proper body breathing techniques. Progressed complexity of movement as indicated.   Activity  Date   19 Date    Date    Date  Date  Date  Date    Fatigue Score   weekly  5         Resting BP  110/70         Heart Rate   Before   After    72             O2 level Before   After    80             Machines  Wt/   sets/   reps  Wt/   sets/   reps  Wt/   sets/   reps  Wt/   sets/   reps  Wt/   sets/   reps  Wt/   sets/   reps  Wt/   sets/   reps    Leg Press            Leg Extension            Leg Curl            Dips/Pull Ups            Overhead Press            Compound Row            Low Back Ext            Cardiovascular  Time   Intensity, etc  Time   Intensity, etc  Time   Intensity, etc  Time   Intensity, etc  Time   Intensity, etc  Time   Intensity, etc  Time   Intensity, etc    Upper body ergometer   level 1 x 2 min         Airdyne bikes            Recumbent Bike            Treadmill            Nustep   level 1 x 5 min         Elliptical            Other Exercises/   Activities  Parameters  Parameters  Parameters  Parameters  Parameters  Parameters  Parameters    Gastroc stretch on incline board            Sit to stand  X 10 reps                     [place] [address]  Treatment/Session Summary:    · Response to Treatment: tolerated the treatment well. Enjoyed the exercise. Felt more energetic. .  · Communication/Consultation:  advised to walk daily  · Equipment provided today:  None today  · Recommendations/Intent for next treatment session: Next visit will focus on advancing minutes and resistance.   Treatment Plan of Care Effective Dates:  2/28/19 to 4/28/19  Total Treatment Billable Duration:  47  PT Patient Time In/Time Out  Time In: 1311  Time Out: [facility]    Future Appointments   Date Time Provider Maggy Toney   3/7/2019  1:45 PM Bucky Cota, PT [facility]   3/14/2019 11:15 AM Zoe Dinh, PT [facility]   3/14/2019  1:30 PM [address] [place] [address] OUTREACH INSURANCE [facility] [address] [place] [address]   3/14/2019  2:00 PM Avinash Aponte NP [facility]   4/1/2019 10:00 AM Keila Palomino MD END BS ENDO   4/25/2019  1:50 PM [address] [place] [address] OUTREACH INSURANCE [facility] [address] [place] [address]   4/25/2019  2:15 PM Katalina Blair MD [facility]

## 2019-03-14 ENCOUNTER — HOSPITAL ENCOUNTER (OUTPATIENT)
Dept: LAB | Age: 59
Discharge: HOME | End: 2019-03-14
Payer: MEDICARE

## 2019-03-14 ENCOUNTER — HOSPITAL ENCOUNTER (OUTPATIENT)
Dept: PHYSICAL THERAPY | Age: 59
Discharge: HOME | End: 2019-03-14
Attending: INTERNAL MEDICINE
Payer: MEDICARE

## 2019-03-14 DIAGNOSIS — C92.10 CML (CHRONIC MYELOCYTIC LEUKEMIA) (HCC): ICD-10-CM

## 2019-03-14 LAB
ALBUMIN SERPL-MCNC: 4.1 G/DL (ref 3.5–5)
ALBUMIN/GLOB SERPL: 1.7 {RATIO} (ref 1.2–3.5)
ALP SERPL-CCNC: 73 U/L (ref 50–136)
ALT SERPL-CCNC: 29 U/L (ref 12–65)
ANION GAP SERPL CALC-SCNC: 6 MMOL/L (ref 7–16)
AST SERPL-CCNC: 18 U/L (ref 15–37)
BASOPHILS # BLD: 0 K/UL (ref 0–0.2)
BASOPHILS NFR BLD: 0 % (ref 0–2)
BILIRUB SERPL-MCNC: 0.6 MG/DL (ref 0.2–1.1)
BUN SERPL-MCNC: 19 MG/DL (ref 6–23)
CALCIUM SERPL-MCNC: 8.6 MG/DL (ref 8.3–10.4)
CHLORIDE SERPL-SCNC: 106 MMOL/L (ref 98–107)
CO2 SERPL-SCNC: 28 MMOL/L (ref 21–32)
CREAT SERPL-MCNC: 0.72 MG/DL (ref 0.6–1)
DIFFERENTIAL METHOD BLD: ABNORMAL
EOSINOPHIL # BLD: 0.2 K/UL (ref 0–0.8)
EOSINOPHIL NFR BLD: 3 % (ref 0.5–7.8)
ERYTHROCYTE [DISTWIDTH] IN BLOOD BY AUTOMATED COUNT: 15.3 % (ref 11.9–14.6)
GLOBULIN SER CALC-MCNC: 2.4 G/DL (ref 2.3–3.5)
GLUCOSE SERPL-MCNC: 83 MG/DL (ref 65–100)
HCT VFR BLD AUTO: 36.6 % (ref 35.8–46.3)
HGB BLD-MCNC: 11.8 G/DL (ref 11.7–15.4)
IMM GRANULOCYTES # BLD AUTO: 0 K/UL (ref 0–0.5)
IMM GRANULOCYTES NFR BLD AUTO: 0 % (ref 0–5)
LYMPHOCYTES # BLD: 1 K/UL (ref 0.5–4.6)
LYMPHOCYTES NFR BLD: 21 % (ref 13–44)
MCH RBC QN AUTO: 29.1 PG (ref 26.1–32.9)
MCHC RBC AUTO-ENTMCNC: 32.2 G/DL (ref 31.4–35)
MCV RBC AUTO: 90.1 FL (ref 79.6–97.8)
MONOCYTES # BLD: 0.3 K/UL (ref 0.1–1.3)
MONOCYTES NFR BLD: 6 % (ref 4–12)
NEUTS SEG # BLD: 3.1 K/UL (ref 1.7–8.2)
NEUTS SEG NFR BLD: 69 % (ref 43–78)
NRBC # BLD: 0.01 K/UL (ref 0–0.2)
PLATELET # BLD AUTO: 143 K/UL (ref 150–450)
PMV BLD AUTO: 11 FL (ref 9.4–12.3)
POTASSIUM SERPL-SCNC: 3.9 MMOL/L (ref 3.5–5.1)
PROT SERPL-MCNC: 6.5 G/DL (ref 6.3–8.2)
RBC # BLD AUTO: 4.06 M/UL (ref 4.05–5.25)
SODIUM SERPL-SCNC: 140 MMOL/L (ref 136–145)
WBC # BLD AUTO: 4.5 K/UL (ref 4.3–11.1)

## 2019-03-14 PROCEDURE — 36415 COLL VENOUS BLD VENIPUNCTURE: CPT

## 2019-03-14 PROCEDURE — 97110 THERAPEUTIC EXERCISES: CPT

## 2019-03-14 PROCEDURE — 85025 COMPLETE CBC W/AUTO DIFF WBC: CPT

## 2019-03-14 PROCEDURE — 80053 COMPREHEN METABOLIC PANEL: CPT

## 2019-03-14 NOTE — PROGRESS NOTES
Blanca Florentino Fojani  : 1960  Primary: Jordan Lopez Self Pay  Secondary:  [address]  at [facility]  [address], [address], [address]  [phone]   [phone]        OUTPATIENT PHYSICAL THERAPY: Daily Treatment Note 3/14/2019  Pre-treatment Symptoms/Complaints:  The patient reports she is doing well today. Pain: Initial: Pain Intensity 1: 0  Post Session:  0/10   Medications Last Reviewed:  3/14/19  Updated Objective Findings:  None Today   TREATMENT:   THERAPEUTIC EXERCISE: (15 minutes):  Exercises per grid below to improve mobility, strength and balance. Required minimal verbal cues to promote proper body breathing techniques. Progressed complexity of movement as indicated.   Activity  Date   19 Date   3/14/19   Date    Date  Date  Date  Date    Fatigue Score   weekly  5 3        Resting BP  110/70 114/79        Heart Rate   Before   After    72     85  122        O2 level Before   After    98     98  95        Machines  Wt/   sets/   reps  Wt/   sets/   reps  Wt/   sets/   reps  Wt/   sets/   reps  Wt/   sets/   reps  Wt/   sets/   reps  Wt/   sets/   reps    Leg Press            Leg Extension    7)X 10 reps with 5 count hold        Leg Curl            Dips/Pull Ups            Overhead Press    8)X 10 reps with 3#        Compound Row            Low Back Ext            Cardiovascular  Time   Intensity, etc  Time   Intensity, etc  Time   Intensity, etc  Time   Intensity, etc  Time   Intensity, etc  Time   Intensity, etc  Time   Intensity, etc    Upper body ergometer   level 1 x 2 min 2) level 1 x 4 min        Airdyne bikes            Recumbent Bike            laps   1) 650' x 1  3) 650' x 1  5) 650' x 1  9) 650' x 1          Nustep   level 1 x 5 min 4) level 2 x 7 min  Spm 72  METS 2.8        Elliptical            Other Exercises/   Activities  Parameters  Parameters  Parameters  Parameters  Parameters  Parameters  Parameters    Bicep curls, abduction and flexion 8)X 10 reps with 3#        Sit to stand  X 10 reps 6)X 10 reps                    [place] Portal  Treatment/Session Summary:    · Response to Treatment:  tolerated the treatment well. Enjoyed the exercise. Felt more energetic. .  · Communication/Consultation:  advised to incorporate sit to stands and long arc quads into daily routine  · Equipment provided today:  None today  · Recommendations/Intent for next treatment session: Next visit will focus on advancing minutes and resistance.   Treatment Plan of Care Effective Dates:  2/28/19 to 4/28/19  Total Treatment Billable Duration:  39  PT Patient Time In/Time Out  Time In: 9064  Time Out: [address], PT    Future Appointments   Date Time Provider Maggy Toney   3/14/2019  1:30 PM [facility] OUTREACH INSURANCE [facility]   3/14/2019  2:00 PM Mari Lee NP [facility]   3/18/2019 11:15 AM Zoe Dinh, PT [facility]   4/1/2019 10:00 AM eJnnifer Argueta MD END BS ENDO   4/25/2019  1:50 PM [facility] OUTREACH INSURANCE [facility]   4/25/2019  2:15 PM Bonnie Ellis MD [facility]

## 2019-03-21 ENCOUNTER — HOSPITAL ENCOUNTER (OUTPATIENT)
Dept: LAB | Age: 59
Discharge: HOME | End: 2019-03-21
Payer: MEDICARE

## 2019-03-21 DIAGNOSIS — C92.10 CML (CHRONIC MYELOCYTIC LEUKEMIA) (HCC): ICD-10-CM

## 2019-03-21 PROBLEM — R16.1 SPLENOMEGALY: Status: ACTIVE | Noted: 2019-03-21

## 2019-03-21 LAB
ALBUMIN SERPL-MCNC: 4.2 G/DL (ref 3.5–5)
ALBUMIN/GLOB SERPL: 1.7 {RATIO} (ref 1.2–3.5)
ALP SERPL-CCNC: 71 U/L (ref 50–136)
ALT SERPL-CCNC: 58 U/L (ref 12–65)
ANION GAP SERPL CALC-SCNC: 6 MMOL/L (ref 7–16)
AST SERPL-CCNC: 35 U/L (ref 15–37)
BASOPHILS # BLD: 0 K/UL (ref 0–0.2)
BASOPHILS NFR BLD: 1 % (ref 0–2)
BILIRUB SERPL-MCNC: 0.7 MG/DL (ref 0.2–1.1)
BUN SERPL-MCNC: 14 MG/DL (ref 6–23)
CALCIUM SERPL-MCNC: 8.8 MG/DL (ref 8.3–10.4)
CHLORIDE SERPL-SCNC: 107 MMOL/L (ref 98–107)
CO2 SERPL-SCNC: 25 MMOL/L (ref 21–32)
CREAT SERPL-MCNC: 0.73 MG/DL (ref 0.6–1)
DIFFERENTIAL METHOD BLD: ABNORMAL
EOSINOPHIL # BLD: 0.1 K/UL (ref 0–0.8)
EOSINOPHIL NFR BLD: 3 % (ref 0.5–7.8)
ERYTHROCYTE [DISTWIDTH] IN BLOOD BY AUTOMATED COUNT: 15.2 % (ref 11.9–14.6)
GLOBULIN SER CALC-MCNC: 2.5 G/DL (ref 2.3–3.5)
GLUCOSE SERPL-MCNC: 90 MG/DL (ref 65–100)
HCT VFR BLD AUTO: 39.6 % (ref 35.8–46.3)
HGB BLD-MCNC: 12.7 G/DL (ref 11.7–15.4)
IMM GRANULOCYTES # BLD AUTO: 0 K/UL (ref 0–0.5)
IMM GRANULOCYTES NFR BLD AUTO: 0 % (ref 0–5)
LYMPHOCYTES # BLD: 0.9 K/UL (ref 0.5–4.6)
LYMPHOCYTES NFR BLD: 20 % (ref 13–44)
MCH RBC QN AUTO: 28.7 PG (ref 26.1–32.9)
MCHC RBC AUTO-ENTMCNC: 32.1 G/DL (ref 31.4–35)
MCV RBC AUTO: 89.6 FL (ref 79.6–97.8)
MONOCYTES # BLD: 0.3 K/UL (ref 0.1–1.3)
MONOCYTES NFR BLD: 6 % (ref 4–12)
NEUTS SEG # BLD: 3.2 K/UL (ref 1.7–8.2)
NEUTS SEG NFR BLD: 70 % (ref 43–78)
NRBC # BLD: 0 K/UL (ref 0–0.2)
PLATELET # BLD AUTO: 158 K/UL (ref 150–450)
PMV BLD AUTO: 10.4 FL (ref 9.4–12.3)
POTASSIUM SERPL-SCNC: 4 MMOL/L (ref 3.5–5.1)
PROT SERPL-MCNC: 6.7 G/DL (ref 6.3–8.2)
RBC # BLD AUTO: 4.42 M/UL (ref 4.05–5.25)
SODIUM SERPL-SCNC: 138 MMOL/L (ref 136–145)
WBC # BLD AUTO: 4.5 K/UL (ref 4.3–11.1)

## 2019-03-21 PROCEDURE — 80053 COMPREHEN METABOLIC PANEL: CPT

## 2019-03-21 PROCEDURE — 85025 COMPLETE CBC W/AUTO DIFF WBC: CPT

## 2019-03-21 PROCEDURE — 36415 COLL VENOUS BLD VENIPUNCTURE: CPT

## 2019-03-28 ENCOUNTER — HOSPITAL ENCOUNTER (OUTPATIENT)
Dept: PHYSICAL THERAPY | Age: 59
Discharge: HOME | End: 2019-03-28
Attending: INTERNAL MEDICINE
Payer: MEDICARE

## 2019-03-28 PROCEDURE — 97110 THERAPEUTIC EXERCISES: CPT

## 2019-03-28 NOTE — PROGRESS NOTES
Felicia Couch  : 1960  Primary: [address] Medic*  Secondary:  [address]  at [facility]  [address], [address], [address]  [phone]   [phone]        OUTPATIENT PHYSICAL THERAPY: Daily Treatment Note 3/28/2019  Pre-treatment Symptoms/Complaints:  The patient reports she is doing well today. She reports she is wearing a back brace to help support her hernia. She would like to join [employer] now. Pain: Initial: Pain Intensity 1: 4  Post Session:  4/10   Medications Last Reviewed:  3/28/19  Updated Objective Findings:  None Today   TREATMENT:   THERAPEUTIC EXERCISE: (31 minutes):  Exercises per grid below to improve mobility, strength and balance. Required minimal verbal cues to promote proper body breathing techniques. Progressed complexity of movement as indicated.   Activity  Date   19 Date   3/14/19   Date   3/28/19 Date  Date  Date  Date    Fatigue Score   weekly  5 3 5       Resting BP  110/70 114/79 121/82       Heart Rate   Before   After    72     85  122 82       O2 level Before   After    98     98  95 97       Machines  Wt/   sets/   reps  Wt/   sets/   reps  Wt/   sets/   reps  Wt/   sets/   reps  Wt/   sets/   reps  Wt/   sets/   reps  Wt/   sets/   reps    Leg Press            Leg Extension    7)X 10 reps with 5 count hold        Leg Curl            Dips/Pull Ups            Overhead Press    8)X 10 reps with 3#        Compound Row            Low Back Ext            Cardiovascular  Time   Intensity, etc  Time   Intensity, etc  Time   Intensity, etc  Time   Intensity, etc  Time   Intensity, etc  Time   Intensity, etc  Time   Intensity, etc    Upper body ergometer   level 1 x 2 min 2) level 1 x 4 min 2) level 1 x 5 min       Airdyne bikes            Recumbent Bike            laps   1) 650' x 1  3) 650' x 1  5) 650' x 1  9) 650' x 1   1) 650' x 1  4) 650' x 1       Nustep   level 1 x 5 min 4) level 2 x 7 min  Spm 72  METS 2. 8 3)Level 2 x 9 min       Elliptical            Other Exercises/   Activities  Parameters  Parameters  Parameters  Parameters  Parameters  Parameters  Parameters    Bicep curls, abduction and flexion   8)X 10 reps with 3#        Sit to stand  X 10 reps 6)X 10 reps                    [place] Portal  Treatment/Session Summary:    · Response to Treatment:  tolerated the treatment well. Enjoyed the exercise. Felt more energetic. .  · Communication/Consultation:  given information on joining [employer]. · Equipment provided today:  None today  · Recommendations/Intent for next treatment session: Next visit will focus on advancing minutes and resistance.   Treatment Plan of Care Effective Dates:  2/28/19 to 4/28/19  Total Treatment Billable Duration:  31  PT Patient Time In/Time Out  Time In: 4774  Time Out: 1200  Maria Teresa Santos PT    Future Appointments   Date Time Provider Maggy Toney   4/1/2019 10:00 AM Micha Duenas MD END BS ENDO   4/1/2019 11:00 AM NUR7 [facility]   4/25/2019 11:30 AM [facility] OUTREACH INSURANCE [facility]   4/25/2019 12:00 PM Glenn Avitia NP [facility]

## 2019-04-03 ENCOUNTER — HOSPITAL ENCOUNTER (OUTPATIENT)
Dept: INFUSION THERAPY | Age: 59
Discharge: HOME | End: 2019-04-03
Payer: MEDICARE

## 2019-04-03 VITALS
HEART RATE: 76 BPM | WEIGHT: 133.2 LBS | DIASTOLIC BLOOD PRESSURE: 70 MMHG | RESPIRATION RATE: 16 BRPM | SYSTOLIC BLOOD PRESSURE: 121 MMHG | TEMPERATURE: 98 F | OXYGEN SATURATION: 98 % | BODY MASS INDEX: 22.86 KG/M2

## 2019-04-03 DIAGNOSIS — R16.1 SPLENOMEGALY: Primary | ICD-10-CM

## 2019-04-03 PROCEDURE — 90670 PCV13 VACCINE IM: CPT | Performed by: INTERNAL MEDICINE

## 2019-04-03 PROCEDURE — 90472 IMMUNIZATION ADMIN EACH ADD: CPT

## 2019-04-03 PROCEDURE — 90648 HIB PRP-T VACCINE 4 DOSE IM: CPT | Performed by: INTERNAL MEDICINE

## 2019-04-03 PROCEDURE — 90471 IMMUNIZATION ADMIN: CPT

## 2019-04-03 PROCEDURE — 74011250636 HC RX REV CODE- 250/636: Performed by: INTERNAL MEDICINE

## 2019-04-03 RX ADMIN — Medication 0.5 ML: at 09:43

## 2019-04-03 RX ADMIN — PNEUMOCOCCAL 13-VALENT CONJUGATE VACCINE 0.5 ML: 2.2; 2.2; 2.2; 2.2; 2.2; 4.4; 2.2; 2.2; 2.2; 2.2; 2.2; 2.2; 2.2 INJECTION, SUSPENSION INTRAMUSCULAR at 09:49

## 2019-04-03 NOTE — PROGRESS NOTES
Arrived to the [facility]. PREVNAR-13 and ACTHIB vaccines completed. Patient tolerated well. Any issues or concerns during appointment: none. Patient aware of next infusion appointment on 04.25.2019 (date) at [facility] (time). Discharged ambulatory.

## 2019-04-23 ENCOUNTER — HOSPITAL ENCOUNTER (OUTPATIENT)
Dept: LAB | Age: 59
Discharge: HOME | End: 2019-04-23
Payer: MEDICARE

## 2019-04-23 DIAGNOSIS — E03.9 PRIMARY HYPOTHYROIDISM: ICD-10-CM

## 2019-04-23 DIAGNOSIS — C92.10 CML (CHRONIC MYELOCYTIC LEUKEMIA) (HCC): ICD-10-CM

## 2019-04-23 DIAGNOSIS — I82.409 DEEP VEIN THROMBOSIS (DVT) OF LOWER EXTREMITY, UNSPECIFIED CHRONICITY, UNSPECIFIED LATERALITY, UNSPECIFIED VEIN (HCC): ICD-10-CM

## 2019-04-23 LAB
ALBUMIN SERPL-MCNC: 4.1 G/DL (ref 3.5–5)
ALBUMIN/GLOB SERPL: 1.6 {RATIO} (ref 1.2–3.5)
ALP SERPL-CCNC: 89 U/L (ref 50–136)
ALT SERPL-CCNC: 63 U/L (ref 12–65)
ANION GAP SERPL CALC-SCNC: 8 MMOL/L (ref 7–16)
AST SERPL-CCNC: 41 U/L (ref 15–37)
BASOPHILS # BLD: 0 K/UL (ref 0–0.2)
BASOPHILS NFR BLD: 1 % (ref 0–2)
BILIRUB SERPL-MCNC: 0.5 MG/DL (ref 0.2–1.1)
BUN SERPL-MCNC: 8 MG/DL (ref 6–23)
CALCIUM SERPL-MCNC: 8.4 MG/DL (ref 8.3–10.4)
CHLORIDE SERPL-SCNC: 110 MMOL/L (ref 98–107)
CO2 SERPL-SCNC: 23 MMOL/L (ref 21–32)
CREAT SERPL-MCNC: 0.73 MG/DL (ref 0.6–1)
DIFFERENTIAL METHOD BLD: ABNORMAL
EOSINOPHIL # BLD: 0.1 K/UL (ref 0–0.8)
EOSINOPHIL NFR BLD: 2 % (ref 0.5–7.8)
ERYTHROCYTE [DISTWIDTH] IN BLOOD BY AUTOMATED COUNT: 15.9 % (ref 11.9–14.6)
GLOBULIN SER CALC-MCNC: 2.6 G/DL (ref 2.3–3.5)
GLUCOSE SERPL-MCNC: 92 MG/DL (ref 65–100)
HCT VFR BLD AUTO: 38.9 % (ref 35.8–46.3)
HGB BLD-MCNC: 12.4 G/DL (ref 11.7–15.4)
IMM GRANULOCYTES # BLD AUTO: 0 K/UL (ref 0–0.5)
IMM GRANULOCYTES NFR BLD AUTO: 1 % (ref 0–5)
LYMPHOCYTES # BLD: 0.8 K/UL (ref 0.5–4.6)
LYMPHOCYTES NFR BLD: 17 % (ref 13–44)
MCH RBC QN AUTO: 28.5 PG (ref 26.1–32.9)
MCHC RBC AUTO-ENTMCNC: 31.9 G/DL (ref 31.4–35)
MCV RBC AUTO: 89.4 FL (ref 79.6–97.8)
MONOCYTES # BLD: 0.3 K/UL (ref 0.1–1.3)
MONOCYTES NFR BLD: 7 % (ref 4–12)
NEUTS SEG # BLD: 3.2 K/UL (ref 1.7–8.2)
NEUTS SEG NFR BLD: 73 % (ref 43–78)
NRBC # BLD: 0 K/UL (ref 0–0.2)
PLATELET # BLD AUTO: 151 K/UL (ref 150–450)
PMV BLD AUTO: 10.8 FL (ref 9.4–12.3)
POTASSIUM SERPL-SCNC: 3.7 MMOL/L (ref 3.5–5.1)
PROT SERPL-MCNC: 6.7 G/DL (ref 6.3–8.2)
RBC # BLD AUTO: 4.35 M/UL (ref 4.05–5.25)
SODIUM SERPL-SCNC: 141 MMOL/L (ref 136–145)
T4 FREE SERPL-MCNC: 1.4 NG/DL (ref 0.78–1.46)
TSH SERPL DL<=0.005 MIU/L-ACNC: 0.2 UIU/ML (ref 0.36–3.74)
WBC # BLD AUTO: 4.3 K/UL (ref 4.3–11.1)

## 2019-04-23 PROCEDURE — 36415 COLL VENOUS BLD VENIPUNCTURE: CPT

## 2019-04-23 PROCEDURE — 80053 COMPREHEN METABOLIC PANEL: CPT

## 2019-04-23 PROCEDURE — 84443 ASSAY THYROID STIM HORMONE: CPT

## 2019-04-23 PROCEDURE — 84439 ASSAY OF FREE THYROXINE: CPT

## 2019-04-23 PROCEDURE — 85025 COMPLETE CBC W/AUTO DIFF WBC: CPT

## 2019-05-24 ENCOUNTER — HOSPITAL ENCOUNTER (OUTPATIENT)
Dept: LAB | Age: 59
Discharge: HOME | End: 2019-05-24
Payer: MEDICARE

## 2019-05-24 DIAGNOSIS — C92.10 CML (CHRONIC MYELOCYTIC LEUKEMIA) (HCC): ICD-10-CM

## 2019-05-24 LAB
ALBUMIN SERPL-MCNC: 3.8 G/DL (ref 3.5–5)
ALBUMIN/GLOB SERPL: 1.6 {RATIO} (ref 1.2–3.5)
ALP SERPL-CCNC: 93 U/L (ref 50–136)
ALT SERPL-CCNC: 70 U/L (ref 12–65)
ANION GAP SERPL CALC-SCNC: 8 MMOL/L (ref 7–16)
AST SERPL-CCNC: 36 U/L (ref 15–37)
BASOPHILS # BLD: 0.1 K/UL (ref 0–0.2)
BASOPHILS NFR BLD: 1 % (ref 0–2)
BILIRUB SERPL-MCNC: 0.7 MG/DL (ref 0.2–1.1)
BUN SERPL-MCNC: 12 MG/DL (ref 6–23)
CALCIUM SERPL-MCNC: 9.2 MG/DL (ref 8.3–10.4)
CHLORIDE SERPL-SCNC: 106 MMOL/L (ref 98–107)
CO2 SERPL-SCNC: 24 MMOL/L (ref 21–32)
CREAT SERPL-MCNC: 0.72 MG/DL (ref 0.6–1)
DIFFERENTIAL METHOD BLD: ABNORMAL
EOSINOPHIL # BLD: 0.1 K/UL (ref 0–0.8)
EOSINOPHIL NFR BLD: 3 % (ref 0.5–7.8)
ERYTHROCYTE [DISTWIDTH] IN BLOOD BY AUTOMATED COUNT: 15.5 % (ref 11.9–14.6)
GLOBULIN SER CALC-MCNC: 2.4 G/DL (ref 2.3–3.5)
GLUCOSE SERPL-MCNC: 78 MG/DL (ref 65–100)
HCT VFR BLD AUTO: 38.3 % (ref 35.8–46.3)
HGB BLD-MCNC: 11.8 G/DL (ref 11.7–15.4)
IMM GRANULOCYTES # BLD AUTO: 0 K/UL (ref 0–0.5)
IMM GRANULOCYTES NFR BLD AUTO: 0 % (ref 0–5)
LYMPHOCYTES # BLD: 0.9 K/UL (ref 0.5–4.6)
LYMPHOCYTES NFR BLD: 17 % (ref 13–44)
Lab: NORMAL
MCH RBC QN AUTO: 27.4 PG (ref 26.1–32.9)
MCHC RBC AUTO-ENTMCNC: 30.8 G/DL (ref 31.4–35)
MCV RBC AUTO: 88.9 FL (ref 79.6–97.8)
MONOCYTES # BLD: 0.3 K/UL (ref 0.1–1.3)
MONOCYTES NFR BLD: 5 % (ref 4–12)
NEUTS SEG # BLD: 3.8 K/UL (ref 1.7–8.2)
NEUTS SEG NFR BLD: 73 % (ref 43–78)
NRBC # BLD: 0 K/UL (ref 0–0.2)
PLATELET # BLD AUTO: 143 K/UL (ref 150–450)
PMV BLD AUTO: 11.3 FL (ref 9.4–12.3)
POTASSIUM SERPL-SCNC: 4 MMOL/L (ref 3.5–5.1)
PROT SERPL-MCNC: 6.2 G/DL (ref 6.3–8.2)
RBC # BLD AUTO: 4.31 M/UL (ref 4.05–5.25)
REFERENCE LAB,REFLB: NORMAL
SODIUM SERPL-SCNC: 138 MMOL/L (ref 136–145)
TEST DESCRIPTION:,ATST: NORMAL
WBC # BLD AUTO: 5.2 K/UL (ref 4.3–11.1)

## 2019-05-24 PROCEDURE — 80053 COMPREHEN METABOLIC PANEL: CPT

## 2019-05-24 PROCEDURE — 36415 COLL VENOUS BLD VENIPUNCTURE: CPT

## 2019-05-24 PROCEDURE — 85025 COMPLETE CBC W/AUTO DIFF WBC: CPT

## 2019-06-18 ENCOUNTER — HOSPITAL ENCOUNTER (OUTPATIENT)
Dept: LAB | Age: 59
Discharge: HOME | End: 2019-06-18
Payer: MEDICARE

## 2019-06-18 DIAGNOSIS — C92.10 CML (CHRONIC MYELOCYTIC LEUKEMIA) (HCC): ICD-10-CM

## 2019-06-18 LAB
ALBUMIN SERPL-MCNC: 4.1 G/DL (ref 3.5–5)
ALBUMIN/GLOB SERPL: 1.6 {RATIO} (ref 1.2–3.5)
ALP SERPL-CCNC: 88 U/L (ref 50–136)
ALT SERPL-CCNC: 43 U/L (ref 12–65)
ANION GAP SERPL CALC-SCNC: 9 MMOL/L (ref 7–16)
AST SERPL-CCNC: 26 U/L (ref 15–37)
BASOPHILS # BLD: 0 K/UL (ref 0–0.2)
BASOPHILS NFR BLD: 1 % (ref 0–2)
BILIRUB SERPL-MCNC: 0.6 MG/DL (ref 0.2–1.1)
BUN SERPL-MCNC: 11 MG/DL (ref 6–23)
CALCIUM SERPL-MCNC: 8.5 MG/DL (ref 8.3–10.4)
CHLORIDE SERPL-SCNC: 107 MMOL/L (ref 98–107)
CO2 SERPL-SCNC: 24 MMOL/L (ref 21–32)
CREAT SERPL-MCNC: 0.68 MG/DL (ref 0.6–1)
DIFFERENTIAL METHOD BLD: ABNORMAL
EOSINOPHIL # BLD: 0.2 K/UL (ref 0–0.8)
EOSINOPHIL NFR BLD: 3 % (ref 0.5–7.8)
ERYTHROCYTE [DISTWIDTH] IN BLOOD BY AUTOMATED COUNT: 16.6 % (ref 11.9–14.6)
GLOBULIN SER CALC-MCNC: 2.5 G/DL (ref 2.3–3.5)
GLUCOSE SERPL-MCNC: 83 MG/DL (ref 65–100)
HCT VFR BLD AUTO: 41.5 % (ref 35.8–46.3)
HGB BLD-MCNC: 13 G/DL (ref 11.7–15.4)
IMM GRANULOCYTES # BLD AUTO: 0 K/UL (ref 0–0.5)
IMM GRANULOCYTES NFR BLD AUTO: 1 % (ref 0–5)
LYMPHOCYTES # BLD: 0.7 K/UL (ref 0.5–4.6)
LYMPHOCYTES NFR BLD: 11 % (ref 13–44)
MCH RBC QN AUTO: 27.5 PG (ref 26.1–32.9)
MCHC RBC AUTO-ENTMCNC: 31.3 G/DL (ref 31.4–35)
MCV RBC AUTO: 87.9 FL (ref 79.6–97.8)
MONOCYTES # BLD: 0.4 K/UL (ref 0.1–1.3)
MONOCYTES NFR BLD: 6 % (ref 4–12)
NEUTS SEG # BLD: 5 K/UL (ref 1.7–8.2)
NEUTS SEG NFR BLD: 79 % (ref 43–78)
NRBC # BLD: 0 K/UL (ref 0–0.2)
PLATELET # BLD AUTO: 156 K/UL (ref 150–450)
PMV BLD AUTO: 11.5 FL (ref 9.4–12.3)
POTASSIUM SERPL-SCNC: 3.8 MMOL/L (ref 3.5–5.1)
PROT SERPL-MCNC: 6.6 G/DL (ref 6.3–8.2)
RBC # BLD AUTO: 4.72 M/UL (ref 4.05–5.25)
REFERENCE LAB,REFLB: NORMAL
SODIUM SERPL-SCNC: 140 MMOL/L (ref 136–145)
TEST DESCRIPTION:,ATST: NORMAL
WBC # BLD AUTO: 6.3 K/UL (ref 4.3–11.1)

## 2019-06-18 PROCEDURE — 85025 COMPLETE CBC W/AUTO DIFF WBC: CPT

## 2019-06-18 PROCEDURE — 36415 COLL VENOUS BLD VENIPUNCTURE: CPT

## 2019-06-18 PROCEDURE — 80053 COMPREHEN METABOLIC PANEL: CPT

## 2019-07-18 ENCOUNTER — HOSPITAL ENCOUNTER (OUTPATIENT)
Dept: LAB | Age: 59
Discharge: HOME | End: 2019-07-18
Payer: MEDICARE

## 2019-07-18 DIAGNOSIS — C92.10 CML (CHRONIC MYELOCYTIC LEUKEMIA) (HCC): ICD-10-CM

## 2019-07-18 LAB
ALBUMIN SERPL-MCNC: 4 G/DL (ref 3.5–5)
ALBUMIN/GLOB SERPL: 1.6 {RATIO} (ref 1.2–3.5)
ALP SERPL-CCNC: 82 U/L (ref 50–136)
ALT SERPL-CCNC: 60 U/L (ref 12–65)
ANION GAP SERPL CALC-SCNC: 6 MMOL/L (ref 7–16)
AST SERPL-CCNC: 40 U/L (ref 15–37)
BASOPHILS # BLD: 0 K/UL (ref 0–0.2)
BASOPHILS NFR BLD: 1 % (ref 0–2)
BILIRUB SERPL-MCNC: 0.5 MG/DL (ref 0.2–1.1)
BUN SERPL-MCNC: 12 MG/DL (ref 6–23)
CALCIUM SERPL-MCNC: 8.6 MG/DL (ref 8.3–10.4)
CHLORIDE SERPL-SCNC: 106 MMOL/L (ref 98–107)
CO2 SERPL-SCNC: 27 MMOL/L (ref 21–32)
CREAT SERPL-MCNC: 0.7 MG/DL (ref 0.6–1)
DIFFERENTIAL METHOD BLD: ABNORMAL
EOSINOPHIL # BLD: 0.1 K/UL (ref 0–0.8)
EOSINOPHIL NFR BLD: 2 % (ref 0.5–7.8)
ERYTHROCYTE [DISTWIDTH] IN BLOOD BY AUTOMATED COUNT: 16.5 % (ref 11.9–14.6)
GLOBULIN SER CALC-MCNC: 2.5 G/DL (ref 2.3–3.5)
GLUCOSE SERPL-MCNC: 87 MG/DL (ref 65–100)
HCT VFR BLD AUTO: 39.7 % (ref 35.8–46.3)
HGB BLD-MCNC: 12.3 G/DL (ref 11.7–15.4)
IMM GRANULOCYTES # BLD AUTO: 0 K/UL (ref 0–0.5)
IMM GRANULOCYTES NFR BLD AUTO: 0 % (ref 0–5)
LYMPHOCYTES # BLD: 0.8 K/UL (ref 0.5–4.6)
LYMPHOCYTES NFR BLD: 16 % (ref 13–44)
Lab: NORMAL
MCH RBC QN AUTO: 27.5 PG (ref 26.1–32.9)
MCHC RBC AUTO-ENTMCNC: 31 G/DL (ref 31.4–35)
MCV RBC AUTO: 88.8 FL (ref 79.6–97.8)
MONOCYTES # BLD: 0.3 K/UL (ref 0.1–1.3)
MONOCYTES NFR BLD: 6 % (ref 4–12)
NEUTS SEG # BLD: 3.8 K/UL (ref 1.7–8.2)
NEUTS SEG NFR BLD: 75 % (ref 43–78)
NRBC # BLD: 0 K/UL (ref 0–0.2)
PLATELET # BLD AUTO: 156 K/UL (ref 150–450)
PMV BLD AUTO: 11.1 FL (ref 9.4–12.3)
POTASSIUM SERPL-SCNC: 3.8 MMOL/L (ref 3.5–5.1)
PROT SERPL-MCNC: 6.5 G/DL (ref 6.3–8.2)
RBC # BLD AUTO: 4.47 M/UL (ref 4.05–5.25)
REFERENCE LAB,REFLB: NORMAL
SODIUM SERPL-SCNC: 139 MMOL/L (ref 136–145)
TEST DESCRIPTION:,ATST: NORMAL
WBC # BLD AUTO: 5.1 K/UL (ref 4.3–11.1)

## 2019-07-18 PROCEDURE — 85025 COMPLETE CBC W/AUTO DIFF WBC: CPT

## 2019-07-18 PROCEDURE — 80053 COMPREHEN METABOLIC PANEL: CPT

## 2019-07-18 PROCEDURE — 36415 COLL VENOUS BLD VENIPUNCTURE: CPT

## 2019-08-16 ENCOUNTER — HOSPITAL ENCOUNTER (OUTPATIENT)
Dept: LAB | Age: 59
Discharge: HOME | End: 2019-08-16
Payer: MEDICARE

## 2019-08-16 DIAGNOSIS — C92.10 CML (CHRONIC MYELOCYTIC LEUKEMIA) (HCC): ICD-10-CM

## 2019-08-16 DIAGNOSIS — D64.9 ANEMIA, UNSPECIFIED TYPE: ICD-10-CM

## 2019-08-16 LAB
ALBUMIN SERPL-MCNC: 4.2 G/DL (ref 3.5–5)
ALBUMIN/GLOB SERPL: 1.8 {RATIO} (ref 1.2–3.5)
ALP SERPL-CCNC: 79 U/L (ref 50–136)
ALT SERPL-CCNC: 52 U/L (ref 12–65)
ANION GAP SERPL CALC-SCNC: 4 MMOL/L (ref 7–16)
AST SERPL-CCNC: 29 U/L (ref 15–37)
BASOPHILS # BLD: 0 K/UL (ref 0–0.2)
BASOPHILS NFR BLD: 1 % (ref 0–2)
BILIRUB SERPL-MCNC: 0.6 MG/DL (ref 0.2–1.1)
BUN SERPL-MCNC: 13 MG/DL (ref 6–23)
CALCIUM SERPL-MCNC: 8.8 MG/DL (ref 8.3–10.4)
CHLORIDE SERPL-SCNC: 109 MMOL/L (ref 98–107)
CO2 SERPL-SCNC: 29 MMOL/L (ref 21–32)
CREAT SERPL-MCNC: 0.68 MG/DL (ref 0.6–1)
DIFFERENTIAL METHOD BLD: ABNORMAL
EOSINOPHIL # BLD: 0.1 K/UL (ref 0–0.8)
EOSINOPHIL NFR BLD: 2 % (ref 0.5–7.8)
ERYTHROCYTE [DISTWIDTH] IN BLOOD BY AUTOMATED COUNT: 16.2 % (ref 11.9–14.6)
FERRITIN SERPL-MCNC: 7 NG/ML (ref 8–388)
GLOBULIN SER CALC-MCNC: 2.4 G/DL (ref 2.3–3.5)
GLUCOSE SERPL-MCNC: 74 MG/DL (ref 65–100)
HCT VFR BLD AUTO: 35.9 % (ref 35.8–46.3)
HGB BLD-MCNC: 10.9 G/DL (ref 11.7–15.4)
IMM GRANULOCYTES # BLD AUTO: 0 K/UL (ref 0–0.5)
IMM GRANULOCYTES NFR BLD AUTO: 0 % (ref 0–5)
IRON SATN MFR SERPL: 13 %
IRON SERPL-MCNC: 45 UG/DL (ref 35–150)
LYMPHOCYTES # BLD: 0.9 K/UL (ref 0.5–4.6)
LYMPHOCYTES NFR BLD: 16 % (ref 13–44)
MCH RBC QN AUTO: 27 PG (ref 26.1–32.9)
MCHC RBC AUTO-ENTMCNC: 30.4 G/DL (ref 31.4–35)
MCV RBC AUTO: 88.9 FL (ref 79.6–97.8)
MONOCYTES # BLD: 0.3 K/UL (ref 0.1–1.3)
MONOCYTES NFR BLD: 6 % (ref 4–12)
NEUTS SEG # BLD: 4.1 K/UL (ref 1.7–8.2)
NEUTS SEG NFR BLD: 75 % (ref 43–78)
NRBC # BLD: 0 K/UL (ref 0–0.2)
PLATELET # BLD AUTO: 180 K/UL (ref 150–450)
PMV BLD AUTO: 10.1 FL (ref 9.4–12.3)
POTASSIUM SERPL-SCNC: 4.2 MMOL/L (ref 3.5–5.1)
PROT SERPL-MCNC: 6.6 G/DL (ref 6.3–8.2)
RBC # BLD AUTO: 4.04 M/UL (ref 4.05–5.25)
SODIUM SERPL-SCNC: 142 MMOL/L (ref 136–145)
TIBC SERPL-MCNC: 352 UG/DL (ref 250–450)
WBC # BLD AUTO: 5.4 K/UL (ref 4.3–11.1)

## 2019-08-16 PROCEDURE — 82728 ASSAY OF FERRITIN: CPT

## 2019-08-16 PROCEDURE — 36415 COLL VENOUS BLD VENIPUNCTURE: CPT

## 2019-08-16 PROCEDURE — 85025 COMPLETE CBC W/AUTO DIFF WBC: CPT

## 2019-08-16 PROCEDURE — 83540 ASSAY OF IRON: CPT

## 2019-08-16 PROCEDURE — 80053 COMPREHEN METABOLIC PANEL: CPT

## 2019-09-10 ENCOUNTER — HOSPITAL ENCOUNTER (OUTPATIENT)
Dept: LAB | Age: 59
Discharge: HOME | End: 2019-09-10
Payer: MEDICARE

## 2019-09-10 DIAGNOSIS — C92.10 CML (CHRONIC MYELOCYTIC LEUKEMIA) (HCC): ICD-10-CM

## 2019-09-10 LAB
ALBUMIN SERPL-MCNC: 3.5 G/DL (ref 3.5–5)
ALBUMIN/GLOB SERPL: 1.3 {RATIO} (ref 1.2–3.5)
ALP SERPL-CCNC: 91 U/L (ref 50–136)
ALT SERPL-CCNC: 38 U/L (ref 12–65)
ANION GAP SERPL CALC-SCNC: 6 MMOL/L (ref 7–16)
AST SERPL-CCNC: 21 U/L (ref 15–37)
BASOPHILS # BLD: 0 K/UL (ref 0–0.2)
BASOPHILS NFR BLD: 1 % (ref 0–2)
BILIRUB SERPL-MCNC: 0.6 MG/DL (ref 0.2–1.1)
BUN SERPL-MCNC: 8 MG/DL (ref 6–23)
CALCIUM SERPL-MCNC: 8.6 MG/DL (ref 8.3–10.4)
CHLORIDE SERPL-SCNC: 110 MMOL/L (ref 98–107)
CO2 SERPL-SCNC: 26 MMOL/L (ref 21–32)
CREAT SERPL-MCNC: 0.68 MG/DL (ref 0.6–1)
DIFFERENTIAL METHOD BLD: ABNORMAL
EOSINOPHIL # BLD: 0.1 K/UL (ref 0–0.8)
EOSINOPHIL NFR BLD: 3 % (ref 0.5–7.8)
ERYTHROCYTE [DISTWIDTH] IN BLOOD BY AUTOMATED COUNT: 15.9 % (ref 11.9–14.6)
GLOBULIN SER CALC-MCNC: 2.7 G/DL (ref 2.3–3.5)
GLUCOSE SERPL-MCNC: 107 MG/DL (ref 65–100)
HCT VFR BLD AUTO: 25.8 % (ref 35.8–46.3)
HGB BLD-MCNC: 7.5 G/DL (ref 11.7–15.4)
IMM GRANULOCYTES # BLD AUTO: 0 K/UL (ref 0–0.5)
IMM GRANULOCYTES NFR BLD AUTO: 1 % (ref 0–5)
LYMPHOCYTES # BLD: 0.5 K/UL (ref 0.5–4.6)
LYMPHOCYTES NFR BLD: 9 % (ref 13–44)
Lab: NORMAL
MCH RBC QN AUTO: 24.8 PG (ref 26.1–32.9)
MCHC RBC AUTO-ENTMCNC: 29.1 G/DL (ref 31.4–35)
MCV RBC AUTO: 85.1 FL (ref 79.6–97.8)
MONOCYTES # BLD: 0.3 K/UL (ref 0.1–1.3)
MONOCYTES NFR BLD: 6 % (ref 4–12)
NEUTS SEG # BLD: 4.2 K/UL (ref 1.7–8.2)
NEUTS SEG NFR BLD: 81 % (ref 43–78)
NRBC # BLD: 0 K/UL (ref 0–0.2)
PLATELET # BLD AUTO: 131 K/UL (ref 150–450)
PMV BLD AUTO: 11.4 FL (ref 9.4–12.3)
POTASSIUM SERPL-SCNC: 3.4 MMOL/L (ref 3.5–5.1)
PROT SERPL-MCNC: 6.2 G/DL (ref 6.3–8.2)
RBC # BLD AUTO: 3.03 M/UL (ref 4.05–5.25)
REFERENCE LAB,REFLB: NORMAL
SODIUM SERPL-SCNC: 142 MMOL/L (ref 136–145)
TEST DESCRIPTION:,ATST: NORMAL
WBC # BLD AUTO: 5.2 K/UL (ref 4.3–11.1)

## 2019-09-10 PROCEDURE — 85025 COMPLETE CBC W/AUTO DIFF WBC: CPT

## 2019-09-10 PROCEDURE — 36415 COLL VENOUS BLD VENIPUNCTURE: CPT

## 2019-09-10 PROCEDURE — 80053 COMPREHEN METABOLIC PANEL: CPT

## 2019-09-16 ENCOUNTER — HOSPITAL ENCOUNTER (OUTPATIENT)
Dept: INFUSION THERAPY | Age: 59
Discharge: HOME | End: 2019-09-16
Payer: MEDICARE

## 2019-09-16 VITALS
RESPIRATION RATE: 16 BRPM | BODY MASS INDEX: 23.53 KG/M2 | SYSTOLIC BLOOD PRESSURE: 131 MMHG | DIASTOLIC BLOOD PRESSURE: 72 MMHG | OXYGEN SATURATION: 98 % | WEIGHT: 132.8 LBS | TEMPERATURE: 99.4 F | HEART RATE: 96 BPM

## 2019-09-16 DIAGNOSIS — R16.1 SPLENOMEGALY: ICD-10-CM

## 2019-09-16 DIAGNOSIS — D50.9 IRON DEFICIENCY ANEMIA, UNSPECIFIED IRON DEFICIENCY ANEMIA TYPE: Primary | ICD-10-CM

## 2019-09-16 PROCEDURE — 96374 THER/PROPH/DIAG INJ IV PUSH: CPT

## 2019-09-16 PROCEDURE — 74011250636 HC RX REV CODE- 250/636: Performed by: INTERNAL MEDICINE

## 2019-09-16 PROCEDURE — 74011000258 HC RX REV CODE- 258: Performed by: INTERNAL MEDICINE

## 2019-09-16 RX ORDER — SODIUM CHLORIDE 9 MG/ML
10 INJECTION INTRAMUSCULAR; INTRAVENOUS; SUBCUTANEOUS AS NEEDED
Status: DISCONTINUED | OUTPATIENT
Start: 2019-09-16 | End: 2019-09-17 | Stop reason: HOSPADM

## 2019-09-16 RX ADMIN — SODIUM CHLORIDE 500 ML: 900 INJECTION, SOLUTION INTRAVENOUS at 15:25

## 2019-09-16 RX ADMIN — FERUMOXYTOL 510 MG: 510 INJECTION INTRAVENOUS at 15:31

## 2019-09-16 RX ADMIN — SODIUM CHLORIDE 10 ML: 9 INJECTION INTRAMUSCULAR; INTRAVENOUS; SUBCUTANEOUS at 15:05

## 2019-09-16 NOTE — PROGRESS NOTES
Arrived to the [facility]. Feraheme infusion completed. Patient tolerated well. Any issues or concerns during appointment: none. Patient aware of next infusion appointment on 09.23.2019 (date) at 36 (time). Discharged ambulatory to home.

## 2019-09-23 ENCOUNTER — HOSPITAL ENCOUNTER (OUTPATIENT)
Dept: INFUSION THERAPY | Age: 59
Discharge: HOME | End: 2019-09-23
Payer: MEDICARE

## 2019-09-23 VITALS
DIASTOLIC BLOOD PRESSURE: 61 MMHG | WEIGHT: 136.6 LBS | HEART RATE: 81 BPM | BODY MASS INDEX: 24.2 KG/M2 | TEMPERATURE: 98.1 F | OXYGEN SATURATION: 100 % | RESPIRATION RATE: 16 BRPM | SYSTOLIC BLOOD PRESSURE: 121 MMHG

## 2019-09-23 DIAGNOSIS — D50.9 IRON DEFICIENCY ANEMIA, UNSPECIFIED IRON DEFICIENCY ANEMIA TYPE: Primary | ICD-10-CM

## 2019-09-23 DIAGNOSIS — R16.1 SPLENOMEGALY: ICD-10-CM

## 2019-09-23 LAB
BASOPHILS # BLD: 0.1 K/UL (ref 0–0.2)
BASOPHILS NFR BLD: 1 % (ref 0–2)
DIFFERENTIAL METHOD BLD: ABNORMAL
EOSINOPHIL # BLD: 0.2 K/UL (ref 0–0.8)
EOSINOPHIL NFR BLD: 2 % (ref 0.5–7.8)
ERYTHROCYTE [DISTWIDTH] IN BLOOD BY AUTOMATED COUNT: 22.9 % (ref 11.9–14.6)
FERRITIN SERPL-MCNC: 82 NG/ML (ref 8–388)
HCT VFR BLD AUTO: 30.1 % (ref 35.8–46.3)
HGB BLD-MCNC: 8.6 G/DL (ref 11.7–15.4)
IMM GRANULOCYTES # BLD AUTO: 0 K/UL (ref 0–0.5)
IMM GRANULOCYTES NFR BLD AUTO: 1 % (ref 0–5)
IRON SATN MFR SERPL: 19 %
IRON SERPL-MCNC: 53 UG/DL (ref 35–150)
LYMPHOCYTES # BLD: 1.1 K/UL (ref 0.5–4.6)
LYMPHOCYTES NFR BLD: 15 % (ref 13–44)
MCH RBC QN AUTO: 25.5 PG (ref 26.1–32.9)
MCHC RBC AUTO-ENTMCNC: 28.6 G/DL (ref 31.4–35)
MCV RBC AUTO: 89.3 FL (ref 79.6–97.8)
MONOCYTES # BLD: 0.4 K/UL (ref 0.1–1.3)
MONOCYTES NFR BLD: 5 % (ref 4–12)
NEUTS SEG # BLD: 5.4 K/UL (ref 1.7–8.2)
NEUTS SEG NFR BLD: 76 % (ref 43–78)
NRBC # BLD: 0 K/UL (ref 0–0.2)
PLATELET # BLD AUTO: 140 K/UL (ref 150–450)
PMV BLD AUTO: 10.9 FL (ref 9.4–12.3)
RBC # BLD AUTO: 3.37 M/UL (ref 4.05–5.25)
TIBC SERPL-MCNC: 283 UG/DL (ref 250–450)
WBC # BLD AUTO: 7.1 K/UL (ref 4.3–11.1)

## 2019-09-23 PROCEDURE — 74011000258 HC RX REV CODE- 258: Performed by: INTERNAL MEDICINE

## 2019-09-23 PROCEDURE — 82728 ASSAY OF FERRITIN: CPT

## 2019-09-23 PROCEDURE — 83540 ASSAY OF IRON: CPT

## 2019-09-23 PROCEDURE — 74011250636 HC RX REV CODE- 250/636: Performed by: INTERNAL MEDICINE

## 2019-09-23 PROCEDURE — 36415 COLL VENOUS BLD VENIPUNCTURE: CPT

## 2019-09-23 PROCEDURE — 85025 COMPLETE CBC W/AUTO DIFF WBC: CPT

## 2019-09-23 PROCEDURE — 96374 THER/PROPH/DIAG INJ IV PUSH: CPT

## 2019-09-23 RX ORDER — SODIUM CHLORIDE 9 MG/ML
10 INJECTION INTRAMUSCULAR; INTRAVENOUS; SUBCUTANEOUS AS NEEDED
Status: ACTIVE | OUTPATIENT
Start: 2019-09-23 | End: 2019-09-23

## 2019-09-23 RX ADMIN — FERUMOXYTOL 510 MG: 510 INJECTION INTRAVENOUS at 12:15

## 2019-09-23 RX ADMIN — SODIUM CHLORIDE 500 ML: 900 INJECTION, SOLUTION INTRAVENOUS at 11:50

## 2019-09-23 NOTE — PROGRESS NOTES
Arrived to the [facility]. Feraheme infusion completed. Patient tolerated well. Any issues or concerns during appointment: none. Patient aware of next infusion appointment not scheduled at this time. Discharged ambulatory to home.

## 2019-10-15 ENCOUNTER — HOSPITAL ENCOUNTER (OUTPATIENT)
Dept: LAB | Age: 59
Discharge: HOME | End: 2019-10-15
Payer: MEDICARE

## 2019-10-15 DIAGNOSIS — C92.10 CML (CHRONIC MYELOCYTIC LEUKEMIA) (HCC): ICD-10-CM

## 2019-10-15 LAB
ALBUMIN SERPL-MCNC: 4 G/DL (ref 3.5–5)
ALBUMIN/GLOB SERPL: 1.5 {RATIO} (ref 1.2–3.5)
ALP SERPL-CCNC: 94 U/L (ref 50–136)
ALT SERPL-CCNC: 81 U/L (ref 12–65)
ANION GAP SERPL CALC-SCNC: 7 MMOL/L (ref 7–16)
AST SERPL-CCNC: 66 U/L (ref 15–37)
BASOPHILS # BLD: 0 K/UL (ref 0–0.2)
BASOPHILS NFR BLD: 1 % (ref 0–2)
BILIRUB SERPL-MCNC: 0.6 MG/DL (ref 0.2–1.1)
BUN SERPL-MCNC: 11 MG/DL (ref 6–23)
CALCIUM SERPL-MCNC: 8.6 MG/DL (ref 8.3–10.4)
CHLORIDE SERPL-SCNC: 109 MMOL/L (ref 98–107)
CO2 SERPL-SCNC: 25 MMOL/L (ref 21–32)
CREAT SERPL-MCNC: 0.75 MG/DL (ref 0.6–1)
DIFFERENTIAL METHOD BLD: ABNORMAL
EOSINOPHIL # BLD: 0.1 K/UL (ref 0–0.8)
EOSINOPHIL NFR BLD: 2 % (ref 0.5–7.8)
ERYTHROCYTE [DISTWIDTH] IN BLOOD BY AUTOMATED COUNT: 20 % (ref 11.9–14.6)
GLOBULIN SER CALC-MCNC: 2.6 G/DL (ref 2.3–3.5)
GLUCOSE SERPL-MCNC: 93 MG/DL (ref 65–100)
HCT VFR BLD AUTO: 37 % (ref 35.8–46.3)
HGB BLD-MCNC: 11.2 G/DL (ref 11.7–15.4)
IMM GRANULOCYTES # BLD AUTO: 0 K/UL (ref 0–0.5)
IMM GRANULOCYTES NFR BLD AUTO: 0 % (ref 0–5)
LYMPHOCYTES # BLD: 0.7 K/UL (ref 0.5–4.6)
LYMPHOCYTES NFR BLD: 18 % (ref 13–44)
MCH RBC QN AUTO: 27.7 PG (ref 26.1–32.9)
MCHC RBC AUTO-ENTMCNC: 30.3 G/DL (ref 31.4–35)
MCV RBC AUTO: 91.6 FL (ref 79.6–97.8)
MONOCYTES # BLD: 0.2 K/UL (ref 0.1–1.3)
MONOCYTES NFR BLD: 6 % (ref 4–12)
NEUTS SEG # BLD: 2.9 K/UL (ref 1.7–8.2)
NEUTS SEG NFR BLD: 73 % (ref 43–78)
NRBC # BLD: 0 K/UL (ref 0–0.2)
PLATELET # BLD AUTO: 158 K/UL (ref 150–450)
PMV BLD AUTO: 11 FL (ref 9.4–12.3)
POTASSIUM SERPL-SCNC: 3.9 MMOL/L (ref 3.5–5.1)
PROT SERPL-MCNC: 6.6 G/DL (ref 6.3–8.2)
RBC # BLD AUTO: 4.04 M/UL (ref 4.05–5.25)
SODIUM SERPL-SCNC: 141 MMOL/L (ref 136–145)
WBC # BLD AUTO: 4 K/UL (ref 4.3–11.1)

## 2019-10-15 PROCEDURE — 80053 COMPREHEN METABOLIC PANEL: CPT

## 2019-10-15 PROCEDURE — 36415 COLL VENOUS BLD VENIPUNCTURE: CPT

## 2019-10-15 PROCEDURE — 85025 COMPLETE CBC W/AUTO DIFF WBC: CPT

## 2019-11-05 ENCOUNTER — HOSPITAL ENCOUNTER (OUTPATIENT)
Dept: LAB | Age: 59
Discharge: HOME | End: 2019-11-05
Payer: MEDICARE

## 2019-11-05 DIAGNOSIS — C92.10 CML (CHRONIC MYELOCYTIC LEUKEMIA) (HCC): ICD-10-CM

## 2019-11-05 LAB
ALBUMIN SERPL-MCNC: 3.9 G/DL (ref 3.5–5)
ALBUMIN/GLOB SERPL: 1.6 {RATIO} (ref 1.2–3.5)
ALP SERPL-CCNC: 95 U/L (ref 50–136)
ALT SERPL-CCNC: 42 U/L (ref 12–65)
ANION GAP SERPL CALC-SCNC: 9 MMOL/L (ref 7–16)
AST SERPL-CCNC: 25 U/L (ref 15–37)
BASOPHILS # BLD: 0 K/UL (ref 0–0.2)
BASOPHILS NFR BLD: 1 % (ref 0–2)
BILIRUB SERPL-MCNC: 0.4 MG/DL (ref 0.2–1.1)
BUN SERPL-MCNC: 6 MG/DL (ref 6–23)
CALCIUM SERPL-MCNC: 8.6 MG/DL (ref 8.3–10.4)
CHLORIDE SERPL-SCNC: 109 MMOL/L (ref 98–107)
CO2 SERPL-SCNC: 25 MMOL/L (ref 21–32)
CREAT SERPL-MCNC: 0.65 MG/DL (ref 0.6–1)
DIFFERENTIAL METHOD BLD: ABNORMAL
EOSINOPHIL # BLD: 0.1 K/UL (ref 0–0.8)
EOSINOPHIL NFR BLD: 2 % (ref 0.5–7.8)
ERYTHROCYTE [DISTWIDTH] IN BLOOD BY AUTOMATED COUNT: 17.4 % (ref 11.9–14.6)
FERRITIN SERPL-MCNC: 18 NG/ML (ref 8–388)
GLOBULIN SER CALC-MCNC: 2.5 G/DL (ref 2.3–3.5)
GLUCOSE SERPL-MCNC: 103 MG/DL (ref 65–100)
HCT VFR BLD AUTO: 37.7 % (ref 35.8–46.3)
HGB BLD-MCNC: 11.7 G/DL (ref 11.7–15.4)
HGB RETIC QN AUTO: 35 PG (ref 29–35)
IMM GRANULOCYTES # BLD AUTO: 0 K/UL (ref 0–0.5)
IMM GRANULOCYTES NFR BLD AUTO: 1 % (ref 0–5)
IMM RETICS NFR: 6.2 % (ref 3–15.9)
IRON SATN MFR SERPL: 20 %
IRON SERPL-MCNC: 51 UG/DL (ref 35–150)
LYMPHOCYTES # BLD: 0.3 K/UL (ref 0.5–4.6)
LYMPHOCYTES NFR BLD: 8 % (ref 13–44)
MCH RBC QN AUTO: 28.4 PG (ref 26.1–32.9)
MCHC RBC AUTO-ENTMCNC: 31 G/DL (ref 31.4–35)
MCV RBC AUTO: 91.5 FL (ref 79.6–97.8)
MONOCYTES # BLD: 0.2 K/UL (ref 0.1–1.3)
MONOCYTES NFR BLD: 4 % (ref 4–12)
NEUTS SEG # BLD: 3.2 K/UL (ref 1.7–8.2)
NEUTS SEG NFR BLD: 85 % (ref 43–78)
NRBC # BLD: 0 K/UL (ref 0–0.2)
PLATELET # BLD AUTO: 164 K/UL (ref 150–450)
PMV BLD AUTO: 9.9 FL (ref 9.4–12.3)
POTASSIUM SERPL-SCNC: 3.5 MMOL/L (ref 3.5–5.1)
PROT SERPL-MCNC: 6.4 G/DL (ref 6.3–8.2)
RBC # BLD AUTO: 4.12 M/UL (ref 4.05–5.25)
REFERENCE LAB,REFLB: NORMAL
RETICS # AUTO: 0.06 M/UL (ref 0.03–0.1)
RETICS/RBC NFR AUTO: 1.4 % (ref 0.3–2)
SODIUM SERPL-SCNC: 143 MMOL/L (ref 136–145)
TEST DESCRIPTION:,ATST: NORMAL
TIBC SERPL-MCNC: 252 UG/DL (ref 250–450)
WBC # BLD AUTO: 3.8 K/UL (ref 4.3–11.1)

## 2019-11-05 PROCEDURE — 36415 COLL VENOUS BLD VENIPUNCTURE: CPT

## 2019-11-05 PROCEDURE — 80053 COMPREHEN METABOLIC PANEL: CPT

## 2019-11-05 PROCEDURE — 85046 RETICYTE/HGB CONCENTRATE: CPT

## 2019-11-05 PROCEDURE — 85025 COMPLETE CBC W/AUTO DIFF WBC: CPT

## 2019-11-05 PROCEDURE — 83540 ASSAY OF IRON: CPT

## 2019-11-05 PROCEDURE — 82728 ASSAY OF FERRITIN: CPT

## 2019-11-14 ENCOUNTER — HOSPITAL ENCOUNTER (OUTPATIENT)
Dept: MRI IMAGING | Age: 59
Discharge: HOME | End: 2019-11-14
Payer: MEDICARE

## 2019-11-14 DIAGNOSIS — K43.9 VENTRAL HERNIA: ICD-10-CM

## 2019-11-14 DIAGNOSIS — I81 PORTAL VEIN THROMBOSIS: ICD-10-CM

## 2019-11-14 DIAGNOSIS — R16.1 SPLENOMEGALY: ICD-10-CM

## 2019-11-14 DIAGNOSIS — K76.6 PORTAL HYPERTENSION (HCC): ICD-10-CM

## 2019-11-14 DIAGNOSIS — I82.891 CHRONIC THROMBOSIS OF SPLENIC VEIN: ICD-10-CM

## 2019-11-14 PROCEDURE — 74183 MRI ABD W/O CNTR FLWD CNTR: CPT

## 2019-11-14 PROCEDURE — 74011000258 HC RX REV CODE- 258

## 2019-11-14 PROCEDURE — A9575 INJ GADOTERATE MEGLUMI 0.1ML: HCPCS

## 2019-11-14 PROCEDURE — 74011250636 HC RX REV CODE- 250/636

## 2019-11-14 RX ORDER — GADOTERATE MEGLUMINE 376.9 MG/ML
11 INJECTION INTRAVENOUS
Status: COMPLETED | OUTPATIENT
Start: 2019-11-14 | End: 2019-11-14

## 2019-11-14 RX ORDER — SODIUM CHLORIDE 0.9 % (FLUSH) 0.9 %
10 SYRINGE (ML) INJECTION
Status: COMPLETED | OUTPATIENT
Start: 2019-11-14 | End: 2019-11-14

## 2019-11-14 RX ADMIN — SODIUM CHLORIDE 100 ML: 900 INJECTION, SOLUTION INTRAVENOUS at 16:36

## 2019-11-14 RX ADMIN — GADOTERATE MEGLUMINE 11 ML: 376.9 INJECTION INTRAVENOUS at 16:36

## 2019-11-14 RX ADMIN — Medication 10 ML: at 16:36

## 2019-11-19 ENCOUNTER — HOSPITAL ENCOUNTER (OUTPATIENT)
Dept: INFUSION THERAPY | Age: 59
Discharge: HOME | End: 2019-11-19
Payer: MEDICARE

## 2019-11-19 VITALS
SYSTOLIC BLOOD PRESSURE: 127 MMHG | TEMPERATURE: 98.9 F | BODY MASS INDEX: 24.14 KG/M2 | RESPIRATION RATE: 18 BRPM | HEART RATE: 80 BPM | WEIGHT: 132 LBS | DIASTOLIC BLOOD PRESSURE: 67 MMHG | OXYGEN SATURATION: 97 %

## 2019-11-19 DIAGNOSIS — D50.9 IRON DEFICIENCY ANEMIA, UNSPECIFIED IRON DEFICIENCY ANEMIA TYPE: Primary | ICD-10-CM

## 2019-11-19 PROCEDURE — 74011000258 HC RX REV CODE- 258: Performed by: INTERNAL MEDICINE

## 2019-11-19 PROCEDURE — 74011250636 HC RX REV CODE- 250/636: Performed by: INTERNAL MEDICINE

## 2019-11-19 PROCEDURE — 96361 HYDRATE IV INFUSION ADD-ON: CPT

## 2019-11-19 PROCEDURE — 96374 THER/PROPH/DIAG INJ IV PUSH: CPT

## 2019-11-19 RX ORDER — SODIUM CHLORIDE 0.9 % (FLUSH) 0.9 %
10 SYRINGE (ML) INJECTION AS NEEDED
Status: DISCONTINUED | OUTPATIENT
Start: 2019-11-19 | End: 2019-11-20 | Stop reason: HOSPADM

## 2019-11-19 RX ADMIN — FERUMOXYTOL 510 MG: 510 INJECTION INTRAVENOUS at 15:10

## 2019-11-19 RX ADMIN — SODIUM CHLORIDE 500 ML: 900 INJECTION, SOLUTION INTRAVENOUS at 15:25

## 2019-11-19 RX ADMIN — Medication 10 ML: at 14:50

## 2019-11-19 NOTE — PROGRESS NOTES
Arrived to the [facility]. Allan completed. Patient tolerated well. Any issues or concerns during appointment: None. Patient aware of next appointment on December 5th at 1400. Discharged ambulatory.

## 2019-12-05 ENCOUNTER — HOSPITAL ENCOUNTER (OUTPATIENT)
Dept: LAB | Age: 59
Discharge: HOME | End: 2019-12-05
Payer: MEDICARE

## 2019-12-05 DIAGNOSIS — C92.10 CML (CHRONIC MYELOCYTIC LEUKEMIA) (HCC): ICD-10-CM

## 2019-12-05 LAB
ALBUMIN SERPL-MCNC: 3.9 G/DL (ref 3.5–5)
ALBUMIN/GLOB SERPL: 1.6 {RATIO} (ref 1.2–3.5)
ALP SERPL-CCNC: 77 U/L (ref 50–136)
ALT SERPL-CCNC: 56 U/L (ref 12–65)
ANION GAP SERPL CALC-SCNC: 5 MMOL/L (ref 7–16)
AST SERPL-CCNC: 26 U/L (ref 15–37)
BASOPHILS # BLD: 0 K/UL (ref 0–0.2)
BASOPHILS NFR BLD: 0 % (ref 0–2)
BILIRUB SERPL-MCNC: 0.4 MG/DL (ref 0.2–1.1)
BUN SERPL-MCNC: 13 MG/DL (ref 6–23)
CALCIUM SERPL-MCNC: 9.1 MG/DL (ref 8.3–10.4)
CHLORIDE SERPL-SCNC: 108 MMOL/L (ref 98–107)
CO2 SERPL-SCNC: 25 MMOL/L (ref 21–32)
CREAT SERPL-MCNC: 0.67 MG/DL (ref 0.6–1)
DIFFERENTIAL METHOD BLD: ABNORMAL
EOSINOPHIL # BLD: 0.1 K/UL (ref 0–0.8)
EOSINOPHIL NFR BLD: 2 % (ref 0.5–7.8)
ERYTHROCYTE [DISTWIDTH] IN BLOOD BY AUTOMATED COUNT: 17.7 % (ref 11.9–14.6)
GLOBULIN SER CALC-MCNC: 2.5 G/DL (ref 2.3–3.5)
GLUCOSE SERPL-MCNC: 94 MG/DL (ref 65–100)
HCT VFR BLD AUTO: 34.3 % (ref 35.8–46.3)
HGB BLD-MCNC: 11.1 G/DL (ref 11.7–15.4)
IMM GRANULOCYTES # BLD AUTO: 0 K/UL (ref 0–0.5)
IMM GRANULOCYTES NFR BLD AUTO: 1 % (ref 0–5)
LYMPHOCYTES # BLD: 0.6 K/UL (ref 0.5–4.6)
LYMPHOCYTES NFR BLD: 11 % (ref 13–44)
MAGNESIUM SERPL-MCNC: 2.2 MG/DL (ref 1.8–2.4)
MCH RBC QN AUTO: 29.1 PG (ref 26.1–32.9)
MCHC RBC AUTO-ENTMCNC: 32.4 G/DL (ref 31.4–35)
MCV RBC AUTO: 90 FL (ref 79.6–97.8)
MONOCYTES # BLD: 0.3 K/UL (ref 0.1–1.3)
MONOCYTES NFR BLD: 5 % (ref 4–12)
NEUTS SEG # BLD: 4 K/UL (ref 1.7–8.2)
NEUTS SEG NFR BLD: 80 % (ref 43–78)
NRBC # BLD: 0 K/UL (ref 0–0.2)
PLATELET # BLD AUTO: 176 K/UL (ref 150–450)
PMV BLD AUTO: 9.8 FL (ref 9.4–12.3)
POTASSIUM SERPL-SCNC: 3.9 MMOL/L (ref 3.5–5.1)
PROT SERPL-MCNC: 6.4 G/DL (ref 6.3–8.2)
RBC # BLD AUTO: 3.81 M/UL (ref 4.05–5.25)
SODIUM SERPL-SCNC: 138 MMOL/L (ref 136–145)
WBC # BLD AUTO: 5 K/UL (ref 4.3–11.1)

## 2019-12-05 PROCEDURE — 36415 COLL VENOUS BLD VENIPUNCTURE: CPT

## 2019-12-05 PROCEDURE — 83735 ASSAY OF MAGNESIUM: CPT

## 2019-12-05 PROCEDURE — 80053 COMPREHEN METABOLIC PANEL: CPT

## 2019-12-05 PROCEDURE — 85025 COMPLETE CBC W/AUTO DIFF WBC: CPT

## 2020-01-06 ENCOUNTER — HOSPITAL ENCOUNTER (OUTPATIENT)
Dept: LAB | Age: 60
Discharge: HOME | End: 2020-01-06
Payer: MEDICARE

## 2020-01-06 DIAGNOSIS — C92.10 CML (CHRONIC MYELOCYTIC LEUKEMIA) (HCC): ICD-10-CM

## 2020-01-06 LAB
ALBUMIN SERPL-MCNC: 3.9 G/DL (ref 3.5–5)
ALBUMIN/GLOB SERPL: 1.7 {RATIO} (ref 1.2–3.5)
ALP SERPL-CCNC: 83 U/L (ref 50–136)
ALT SERPL-CCNC: 57 U/L (ref 12–65)
ANION GAP SERPL CALC-SCNC: 4 MMOL/L (ref 7–16)
AST SERPL-CCNC: 42 U/L (ref 15–37)
BASOPHILS # BLD: 0 K/UL (ref 0–0.2)
BASOPHILS NFR BLD: 1 % (ref 0–2)
BILIRUB SERPL-MCNC: 0.5 MG/DL (ref 0.2–1.1)
BUN SERPL-MCNC: 11 MG/DL (ref 6–23)
CALCIUM SERPL-MCNC: 8.6 MG/DL (ref 8.3–10.4)
CHLORIDE SERPL-SCNC: 109 MMOL/L (ref 98–107)
CO2 SERPL-SCNC: 28 MMOL/L (ref 21–32)
CREAT SERPL-MCNC: 0.68 MG/DL (ref 0.6–1)
DIFFERENTIAL METHOD BLD: ABNORMAL
EOSINOPHIL # BLD: 0.2 K/UL (ref 0–0.8)
EOSINOPHIL NFR BLD: 4 % (ref 0.5–7.8)
ERYTHROCYTE [DISTWIDTH] IN BLOOD BY AUTOMATED COUNT: 17 % (ref 11.9–14.6)
FERRITIN SERPL-MCNC: 14 NG/ML (ref 8–388)
GLOBULIN SER CALC-MCNC: 2.3 G/DL (ref 2.3–3.5)
GLUCOSE SERPL-MCNC: 96 MG/DL (ref 65–100)
HCT VFR BLD AUTO: 34.1 % (ref 35.8–46.3)
HGB BLD-MCNC: 10.9 G/DL (ref 11.7–15.4)
HGB RETIC QN AUTO: 30 PG (ref 29–35)
IMM GRANULOCYTES # BLD AUTO: 0 K/UL (ref 0–0.5)
IMM GRANULOCYTES NFR BLD AUTO: 1 % (ref 0–5)
IMM RETICS NFR: 17.7 % (ref 3–15.9)
IRON SATN MFR SERPL: 16 %
IRON SERPL-MCNC: 45 UG/DL (ref 35–150)
LYMPHOCYTES # BLD: 0.6 K/UL (ref 0.5–4.6)
LYMPHOCYTES NFR BLD: 14 % (ref 13–44)
Lab: NORMAL
MAGNESIUM SERPL-MCNC: 2.1 MG/DL (ref 1.8–2.4)
MCH RBC QN AUTO: 30.1 PG (ref 26.1–32.9)
MCHC RBC AUTO-ENTMCNC: 32 G/DL (ref 31.4–35)
MCV RBC AUTO: 94.2 FL (ref 79.6–97.8)
MONOCYTES # BLD: 0.3 K/UL (ref 0.1–1.3)
MONOCYTES NFR BLD: 7 % (ref 4–12)
NEUTS SEG # BLD: 3.1 K/UL (ref 1.7–8.2)
NEUTS SEG NFR BLD: 75 % (ref 43–78)
NRBC # BLD: 0 K/UL (ref 0–0.2)
PLATELET # BLD AUTO: 171 K/UL (ref 150–450)
PMV BLD AUTO: 9.9 FL (ref 9.4–12.3)
POTASSIUM SERPL-SCNC: 3.7 MMOL/L (ref 3.5–5.1)
PROT SERPL-MCNC: 6.2 G/DL (ref 6.3–8.2)
RBC # BLD AUTO: 3.62 M/UL (ref 4.05–5.25)
REFERENCE LAB,REFLB: NORMAL
RETICS # AUTO: 0.11 M/UL (ref 0.03–0.1)
RETICS/RBC NFR AUTO: 2.9 % (ref 0.3–2)
SODIUM SERPL-SCNC: 141 MMOL/L (ref 136–145)
TEST DESCRIPTION:,ATST: NORMAL
TIBC SERPL-MCNC: 274 UG/DL (ref 250–450)
WBC # BLD AUTO: 4.2 K/UL (ref 4.3–11.1)

## 2020-01-06 PROCEDURE — 80053 COMPREHEN METABOLIC PANEL: CPT

## 2020-01-06 PROCEDURE — 82728 ASSAY OF FERRITIN: CPT

## 2020-01-06 PROCEDURE — 36415 COLL VENOUS BLD VENIPUNCTURE: CPT

## 2020-01-06 PROCEDURE — 83735 ASSAY OF MAGNESIUM: CPT

## 2020-01-06 PROCEDURE — 83540 ASSAY OF IRON: CPT

## 2020-01-06 PROCEDURE — 85025 COMPLETE CBC W/AUTO DIFF WBC: CPT

## 2020-01-06 PROCEDURE — 85046 RETICYTE/HGB CONCENTRATE: CPT

## 2020-01-22 ENCOUNTER — HOSPITAL ENCOUNTER (OUTPATIENT)
Dept: INFUSION THERAPY | Age: 60
Discharge: HOME | End: 2020-01-22
Payer: MEDICARE

## 2020-01-22 VITALS
HEART RATE: 85 BPM | DIASTOLIC BLOOD PRESSURE: 59 MMHG | TEMPERATURE: 99.1 F | OXYGEN SATURATION: 94 % | RESPIRATION RATE: 18 BRPM | SYSTOLIC BLOOD PRESSURE: 127 MMHG

## 2020-01-22 DIAGNOSIS — D50.9 IRON DEFICIENCY ANEMIA, UNSPECIFIED IRON DEFICIENCY ANEMIA TYPE: Primary | ICD-10-CM

## 2020-01-22 PROCEDURE — 74011000258 HC RX REV CODE- 258: Performed by: INTERNAL MEDICINE

## 2020-01-22 PROCEDURE — 74011250636 HC RX REV CODE- 250/636: Performed by: INTERNAL MEDICINE

## 2020-01-22 PROCEDURE — 96374 THER/PROPH/DIAG INJ IV PUSH: CPT

## 2020-01-22 RX ORDER — SODIUM CHLORIDE 0.9 % (FLUSH) 0.9 %
10 SYRINGE (ML) INJECTION AS NEEDED
Status: ACTIVE | OUTPATIENT
Start: 2020-01-22 | End: 2020-01-22

## 2020-01-22 RX ADMIN — Medication 10 ML: at 12:31

## 2020-01-22 RX ADMIN — FERUMOXYTOL 510 MG: 510 INJECTION INTRAVENOUS at 11:55

## 2020-01-22 RX ADMIN — SODIUM CHLORIDE 500 ML: 900 INJECTION, SOLUTION INTRAVENOUS at 11:48

## 2020-01-22 NOTE — PROGRESS NOTES
Arrived to the [facility]. Allan completed. Patient tolerated well. Any issues or concerns during appointment: none. Discharged ambulatory.     Marie Ga RN

## 2020-02-12 ENCOUNTER — HOSPITAL ENCOUNTER (OUTPATIENT)
Dept: LAB | Age: 60
Discharge: HOME | End: 2020-02-12
Payer: MEDICARE

## 2020-02-12 DIAGNOSIS — C92.10 CML (CHRONIC MYELOCYTIC LEUKEMIA) (HCC): ICD-10-CM

## 2020-02-12 LAB
ALBUMIN SERPL-MCNC: 3.8 G/DL (ref 3.5–5)
ALBUMIN/GLOB SERPL: 1.6 {RATIO} (ref 1.2–3.5)
ALP SERPL-CCNC: 75 U/L (ref 50–136)
ALT SERPL-CCNC: 53 U/L (ref 12–65)
ANION GAP SERPL CALC-SCNC: 6 MMOL/L (ref 7–16)
AST SERPL-CCNC: 35 U/L (ref 15–37)
BASOPHILS # BLD: 0 K/UL (ref 0–0.2)
BASOPHILS NFR BLD: 1 % (ref 0–2)
BILIRUB SERPL-MCNC: 0.6 MG/DL (ref 0.2–1.1)
BUN SERPL-MCNC: 18 MG/DL (ref 6–23)
CALCIUM SERPL-MCNC: 8.4 MG/DL (ref 8.3–10.4)
CHLORIDE SERPL-SCNC: 109 MMOL/L (ref 98–107)
CO2 SERPL-SCNC: 25 MMOL/L (ref 21–32)
CREAT SERPL-MCNC: 0.74 MG/DL (ref 0.6–1)
DIFFERENTIAL METHOD BLD: ABNORMAL
EOSINOPHIL # BLD: 0.2 K/UL (ref 0–0.8)
EOSINOPHIL NFR BLD: 3 % (ref 0.5–7.8)
ERYTHROCYTE [DISTWIDTH] IN BLOOD BY AUTOMATED COUNT: 16.1 % (ref 11.9–14.6)
FERRITIN SERPL-MCNC: 34 NG/ML (ref 8–388)
GLOBULIN SER CALC-MCNC: 2.4 G/DL (ref 2.3–3.5)
GLUCOSE SERPL-MCNC: 84 MG/DL (ref 65–100)
HCT VFR BLD AUTO: 30.8 % (ref 35.8–46.3)
HGB BLD-MCNC: 9.4 G/DL (ref 11.7–15.4)
IMM GRANULOCYTES # BLD AUTO: 0 K/UL (ref 0–0.5)
IMM GRANULOCYTES NFR BLD AUTO: 0 % (ref 0–5)
IRON SATN MFR SERPL: 11 %
IRON SERPL-MCNC: 37 UG/DL (ref 35–150)
LYMPHOCYTES # BLD: 0.8 K/UL (ref 0.5–4.6)
LYMPHOCYTES NFR BLD: 17 % (ref 13–44)
MCH RBC QN AUTO: 28.2 PG (ref 26.1–32.9)
MCHC RBC AUTO-ENTMCNC: 30.5 G/DL (ref 31.4–35)
MCV RBC AUTO: 92.5 FL (ref 79.6–97.8)
MONOCYTES # BLD: 0.3 K/UL (ref 0.1–1.3)
MONOCYTES NFR BLD: 7 % (ref 4–12)
NEUTS SEG # BLD: 3.4 K/UL (ref 1.7–8.2)
NEUTS SEG NFR BLD: 72 % (ref 43–78)
NRBC # BLD: 0 K/UL (ref 0–0.2)
PLATELET # BLD AUTO: 165 K/UL (ref 150–450)
PMV BLD AUTO: 10.4 FL (ref 9.4–12.3)
POTASSIUM SERPL-SCNC: 3.6 MMOL/L (ref 3.5–5.1)
PROT SERPL-MCNC: 6.2 G/DL (ref 6.3–8.2)
RBC # BLD AUTO: 3.33 M/UL (ref 4.05–5.25)
SODIUM SERPL-SCNC: 140 MMOL/L (ref 136–145)
TIBC SERPL-MCNC: 332 UG/DL (ref 250–450)
WBC # BLD AUTO: 4.7 K/UL (ref 4.3–11.1)

## 2020-02-12 PROCEDURE — 85025 COMPLETE CBC W/AUTO DIFF WBC: CPT

## 2020-02-12 PROCEDURE — 80053 COMPREHEN METABOLIC PANEL: CPT

## 2020-02-12 PROCEDURE — 82728 ASSAY OF FERRITIN: CPT

## 2020-02-12 PROCEDURE — 83540 ASSAY OF IRON: CPT

## 2020-02-12 PROCEDURE — 36415 COLL VENOUS BLD VENIPUNCTURE: CPT

## 2020-02-20 ENCOUNTER — HOSPITAL ENCOUNTER (OUTPATIENT)
Dept: INFUSION THERAPY | Age: 60
Discharge: HOME | End: 2020-02-20
Payer: MEDICARE

## 2020-02-20 VITALS
SYSTOLIC BLOOD PRESSURE: 111 MMHG | OXYGEN SATURATION: 98 % | DIASTOLIC BLOOD PRESSURE: 65 MMHG | RESPIRATION RATE: 18 BRPM | TEMPERATURE: 98.7 F | HEART RATE: 87 BPM

## 2020-02-20 DIAGNOSIS — D50.9 IRON DEFICIENCY ANEMIA, UNSPECIFIED IRON DEFICIENCY ANEMIA TYPE: Primary | ICD-10-CM

## 2020-02-20 PROCEDURE — 74011000258 HC RX REV CODE- 258: Performed by: NURSE PRACTITIONER

## 2020-02-20 PROCEDURE — 96361 HYDRATE IV INFUSION ADD-ON: CPT

## 2020-02-20 PROCEDURE — 96374 THER/PROPH/DIAG INJ IV PUSH: CPT

## 2020-02-20 PROCEDURE — 74011250636 HC RX REV CODE- 250/636: Performed by: NURSE PRACTITIONER

## 2020-02-20 RX ORDER — SODIUM CHLORIDE 0.9 % (FLUSH) 0.9 %
10 SYRINGE (ML) INJECTION AS NEEDED
Status: ACTIVE | OUTPATIENT
Start: 2020-02-20 | End: 2020-02-20

## 2020-02-20 RX ADMIN — Medication 10 ML: at 11:00

## 2020-02-20 RX ADMIN — FERUMOXYTOL 510 MG: 510 INJECTION INTRAVENOUS at 11:20

## 2020-02-20 RX ADMIN — SODIUM CHLORIDE 500 ML: 900 INJECTION, SOLUTION INTRAVENOUS at 11:35

## 2020-02-20 NOTE — PROGRESS NOTES
Arrived to the [facility]. Allan completed. Patient tolerated well. Any issues or concerns during appointment: None  Patient aware of next infusion appointment on February 27th at 1030. Discharged ambulatory.

## 2020-02-27 ENCOUNTER — HOSPITAL ENCOUNTER (OUTPATIENT)
Dept: INFUSION THERAPY | Age: 60
Discharge: HOME | End: 2020-02-27
Payer: MEDICARE

## 2020-02-27 VITALS
TEMPERATURE: 98.6 F | HEART RATE: 81 BPM | OXYGEN SATURATION: 98 % | SYSTOLIC BLOOD PRESSURE: 146 MMHG | RESPIRATION RATE: 18 BRPM | DIASTOLIC BLOOD PRESSURE: 77 MMHG

## 2020-02-27 DIAGNOSIS — D50.9 IRON DEFICIENCY ANEMIA, UNSPECIFIED IRON DEFICIENCY ANEMIA TYPE: Primary | ICD-10-CM

## 2020-02-27 PROCEDURE — 96374 THER/PROPH/DIAG INJ IV PUSH: CPT

## 2020-02-27 PROCEDURE — 74011000258 HC RX REV CODE- 258: Performed by: NURSE PRACTITIONER

## 2020-02-27 PROCEDURE — 74011250636 HC RX REV CODE- 250/636: Performed by: NURSE PRACTITIONER

## 2020-02-27 RX ORDER — SODIUM CHLORIDE 0.9 % (FLUSH) 0.9 %
10 SYRINGE (ML) INJECTION AS NEEDED
Status: ACTIVE | OUTPATIENT
Start: 2020-02-27 | End: 2020-02-27

## 2020-02-27 RX ADMIN — SODIUM CHLORIDE 500 ML: 900 INJECTION, SOLUTION INTRAVENOUS at 11:25

## 2020-02-27 RX ADMIN — Medication 10 ML: at 11:00

## 2020-02-27 RX ADMIN — FERUMOXYTOL 510 MG: 510 INJECTION INTRAVENOUS at 11:10

## 2020-02-27 NOTE — PROGRESS NOTES
Arrived to the [facility]. Feraheme infusion completed. Patient tolerated well. Any issues or concerns during appointment: NO.  Patient aware of next infusion appointment on 03/05/20 (date) at 0 (time). Discharged ambulatory.

## 2020-03-13 ENCOUNTER — HOSPITAL ENCOUNTER (OUTPATIENT)
Dept: LAB | Age: 60
Discharge: HOME | End: 2020-03-13
Payer: MEDICARE

## 2020-03-13 DIAGNOSIS — C92.10 CML (CHRONIC MYELOCYTIC LEUKEMIA) (HCC): ICD-10-CM

## 2020-03-13 DIAGNOSIS — D50.9 IRON DEFICIENCY ANEMIA, UNSPECIFIED IRON DEFICIENCY ANEMIA TYPE: ICD-10-CM

## 2020-03-13 LAB
ALBUMIN SERPL-MCNC: 4 G/DL (ref 3.5–5)
ALBUMIN/GLOB SERPL: 1.5 {RATIO} (ref 1.2–3.5)
ALP SERPL-CCNC: 93 U/L (ref 50–136)
ALT SERPL-CCNC: 53 U/L (ref 12–65)
ANION GAP SERPL CALC-SCNC: 4 MMOL/L (ref 7–16)
AST SERPL-CCNC: 36 U/L (ref 15–37)
BASOPHILS # BLD: 0.1 K/UL (ref 0–0.2)
BASOPHILS NFR BLD: 1 % (ref 0–2)
BILIRUB SERPL-MCNC: 0.5 MG/DL (ref 0.2–1.1)
BUN SERPL-MCNC: 15 MG/DL (ref 6–23)
CALCIUM SERPL-MCNC: 9.1 MG/DL (ref 8.3–10.4)
CHLORIDE SERPL-SCNC: 106 MMOL/L (ref 98–107)
CO2 SERPL-SCNC: 28 MMOL/L (ref 21–32)
CREAT SERPL-MCNC: 0.73 MG/DL (ref 0.6–1)
DIFFERENTIAL METHOD BLD: ABNORMAL
EOSINOPHIL # BLD: 0.1 K/UL (ref 0–0.8)
EOSINOPHIL NFR BLD: 2 % (ref 0.5–7.8)
ERYTHROCYTE [DISTWIDTH] IN BLOOD BY AUTOMATED COUNT: 19.9 % (ref 11.9–14.6)
FERRITIN SERPL-MCNC: 177 NG/ML (ref 8–388)
GLOBULIN SER CALC-MCNC: 2.7 G/DL (ref 2.3–3.5)
GLUCOSE SERPL-MCNC: 114 MG/DL (ref 65–100)
HCT VFR BLD AUTO: 39.4 % (ref 35.8–46.3)
HGB BLD-MCNC: 12.3 G/DL (ref 11.7–15.4)
HGB RETIC QN AUTO: 38 PG (ref 29–35)
IMM GRANULOCYTES # BLD AUTO: 0 K/UL (ref 0–0.5)
IMM GRANULOCYTES NFR BLD AUTO: 1 % (ref 0–5)
IMM RETICS NFR: 12.7 % (ref 3–15.9)
IRON SATN MFR SERPL: 48 %
IRON SERPL-MCNC: 129 UG/DL (ref 35–150)
LYMPHOCYTES # BLD: 0.9 K/UL (ref 0.5–4.6)
LYMPHOCYTES NFR BLD: 18 % (ref 13–44)
MCH RBC QN AUTO: 28.5 PG (ref 26.1–32.9)
MCHC RBC AUTO-ENTMCNC: 31.2 G/DL (ref 31.4–35)
MCV RBC AUTO: 91.2 FL (ref 79.6–97.8)
MONOCYTES # BLD: 0.2 K/UL (ref 0.1–1.3)
MONOCYTES NFR BLD: 4 % (ref 4–12)
NEUTS SEG # BLD: 3.8 K/UL (ref 1.7–8.2)
NEUTS SEG NFR BLD: 74 % (ref 43–78)
NRBC # BLD: 0 K/UL (ref 0–0.2)
PLATELET # BLD AUTO: 195 K/UL (ref 150–450)
PMV BLD AUTO: 10.3 FL (ref 9.4–12.3)
POTASSIUM SERPL-SCNC: 3.6 MMOL/L (ref 3.5–5.1)
PROT SERPL-MCNC: 6.7 G/DL (ref 6.3–8.2)
RBC # BLD AUTO: 4.32 M/UL (ref 4.05–5.25)
REFERENCE LAB,REFLB: NORMAL
RETICS # AUTO: 0.12 M/UL (ref 0.03–0.1)
RETICS/RBC NFR AUTO: 2.7 % (ref 0.3–2)
SODIUM SERPL-SCNC: 138 MMOL/L (ref 136–145)
T4 FREE SERPL-MCNC: 1.4 NG/DL (ref 0.78–1.46)
TEST DESCRIPTION:,ATST: NORMAL
TIBC SERPL-MCNC: 270 UG/DL (ref 250–450)
TSH SERPL DL<=0.005 MIU/L-ACNC: 1.32 UIU/ML (ref 0.36–3.74)
WBC # BLD AUTO: 5.1 K/UL (ref 4.3–11.1)

## 2020-03-13 PROCEDURE — 85025 COMPLETE CBC W/AUTO DIFF WBC: CPT

## 2020-03-13 PROCEDURE — 36415 COLL VENOUS BLD VENIPUNCTURE: CPT

## 2020-03-13 PROCEDURE — 85046 RETICYTE/HGB CONCENTRATE: CPT

## 2020-03-13 PROCEDURE — 84443 ASSAY THYROID STIM HORMONE: CPT

## 2020-03-13 PROCEDURE — 80053 COMPREHEN METABOLIC PANEL: CPT

## 2020-03-13 PROCEDURE — 82728 ASSAY OF FERRITIN: CPT

## 2020-03-13 PROCEDURE — 83540 ASSAY OF IRON: CPT

## 2020-03-13 PROCEDURE — 84439 ASSAY OF FREE THYROXINE: CPT

## 2020-05-05 ENCOUNTER — HOSPITAL ENCOUNTER (OUTPATIENT)
Dept: LAB | Age: 60
Discharge: HOME | End: 2020-05-05
Payer: MEDICARE

## 2020-05-05 DIAGNOSIS — C92.10 CML (CHRONIC MYELOCYTIC LEUKEMIA) (HCC): ICD-10-CM

## 2020-05-05 LAB
ALBUMIN SERPL-MCNC: 3.8 G/DL (ref 3.5–5)
ALBUMIN/GLOB SERPL: 1.5 {RATIO} (ref 1.2–3.5)
ALP SERPL-CCNC: 82 U/L (ref 50–136)
ALT SERPL-CCNC: 58 U/L (ref 12–65)
ANION GAP SERPL CALC-SCNC: 4 MMOL/L (ref 7–16)
AST SERPL-CCNC: 36 U/L (ref 15–37)
BASOPHILS # BLD: 0 K/UL (ref 0–0.2)
BASOPHILS NFR BLD: 1 % (ref 0–2)
BILIRUB SERPL-MCNC: 0.5 MG/DL (ref 0.2–1.1)
BUN SERPL-MCNC: 12 MG/DL (ref 6–23)
CALCIUM SERPL-MCNC: 8.6 MG/DL (ref 8.3–10.4)
CHLORIDE SERPL-SCNC: 107 MMOL/L (ref 98–107)
CO2 SERPL-SCNC: 28 MMOL/L (ref 21–32)
CREAT SERPL-MCNC: 0.63 MG/DL (ref 0.6–1)
DIFFERENTIAL METHOD BLD: ABNORMAL
EOSINOPHIL # BLD: 0.1 K/UL (ref 0–0.8)
EOSINOPHIL NFR BLD: 2 % (ref 0.5–7.8)
ERYTHROCYTE [DISTWIDTH] IN BLOOD BY AUTOMATED COUNT: 18.1 % (ref 11.9–14.6)
GLOBULIN SER CALC-MCNC: 2.6 G/DL (ref 2.3–3.5)
GLUCOSE SERPL-MCNC: 73 MG/DL (ref 65–100)
HCT VFR BLD AUTO: 38.3 % (ref 35.8–46.3)
HGB BLD-MCNC: 12.6 G/DL (ref 11.7–15.4)
IMM GRANULOCYTES # BLD AUTO: 0 K/UL (ref 0–0.5)
IMM GRANULOCYTES NFR BLD AUTO: 0 % (ref 0–5)
LYMPHOCYTES # BLD: 0.7 K/UL (ref 0.5–4.6)
LYMPHOCYTES NFR BLD: 12 % (ref 13–44)
Lab: NORMAL
MCH RBC QN AUTO: 29.7 PG (ref 26.1–32.9)
MCHC RBC AUTO-ENTMCNC: 32.9 G/DL (ref 31.4–35)
MCV RBC AUTO: 90.3 FL (ref 79.6–97.8)
MONOCYTES # BLD: 0.3 K/UL (ref 0.1–1.3)
MONOCYTES NFR BLD: 5 % (ref 4–12)
NEUTS SEG # BLD: 4.2 K/UL (ref 1.7–8.2)
NEUTS SEG NFR BLD: 80 % (ref 43–78)
NRBC # BLD: 0 K/UL (ref 0–0.2)
PLATELET # BLD AUTO: 177 K/UL (ref 150–450)
PMV BLD AUTO: 9.9 FL (ref 9.4–12.3)
POTASSIUM SERPL-SCNC: 3.6 MMOL/L (ref 3.5–5.1)
PROT SERPL-MCNC: 6.4 G/DL (ref 6.3–8.2)
RBC # BLD AUTO: 4.24 M/UL (ref 4.05–5.25)
REFERENCE LAB,REFLB: NORMAL
SODIUM SERPL-SCNC: 139 MMOL/L (ref 136–145)
TEST DESCRIPTION:,ATST: NORMAL
WBC # BLD AUTO: 5.2 K/UL (ref 4.3–11.1)

## 2020-05-05 PROCEDURE — 80053 COMPREHEN METABOLIC PANEL: CPT

## 2020-05-05 PROCEDURE — 36415 COLL VENOUS BLD VENIPUNCTURE: CPT

## 2020-05-05 PROCEDURE — 85025 COMPLETE CBC W/AUTO DIFF WBC: CPT

## 2020-06-09 ENCOUNTER — HOSPITAL ENCOUNTER (OUTPATIENT)
Dept: LAB | Age: 60
Discharge: HOME | End: 2020-06-09
Payer: MEDICARE

## 2020-06-09 DIAGNOSIS — D75.81 MYELOFIBROSIS (HCC): ICD-10-CM

## 2020-06-09 LAB
ALBUMIN SERPL-MCNC: 3.9 G/DL (ref 3.5–5)
ALBUMIN/GLOB SERPL: 1.4 {RATIO} (ref 1.2–3.5)
ALP SERPL-CCNC: 91 U/L (ref 50–136)
ALT SERPL-CCNC: 82 U/L (ref 12–65)
ANION GAP SERPL CALC-SCNC: 5 MMOL/L (ref 7–16)
AST SERPL-CCNC: 56 U/L (ref 15–37)
BASOPHILS # BLD: 0.1 K/UL (ref 0–0.2)
BASOPHILS NFR BLD: 1 % (ref 0–2)
BILIRUB SERPL-MCNC: 0.6 MG/DL (ref 0.2–1.1)
BUN SERPL-MCNC: 13 MG/DL (ref 6–23)
CALCIUM SERPL-MCNC: 8.3 MG/DL (ref 8.3–10.4)
CHLORIDE SERPL-SCNC: 107 MMOL/L (ref 98–107)
CO2 SERPL-SCNC: 26 MMOL/L (ref 21–32)
CREAT SERPL-MCNC: 0.7 MG/DL (ref 0.6–1)
DIFFERENTIAL METHOD BLD: ABNORMAL
EOSINOPHIL # BLD: 0.2 K/UL (ref 0–0.8)
EOSINOPHIL NFR BLD: 4 % (ref 0.5–7.8)
ERYTHROCYTE [DISTWIDTH] IN BLOOD BY AUTOMATED COUNT: 16.2 % (ref 11.9–14.6)
FERRITIN SERPL-MCNC: 14 NG/ML (ref 8–388)
GLOBULIN SER CALC-MCNC: 2.8 G/DL (ref 2.3–3.5)
GLUCOSE SERPL-MCNC: 78 MG/DL (ref 65–100)
HCT VFR BLD AUTO: 39.7 % (ref 35.8–46.3)
HGB BLD-MCNC: 13 G/DL (ref 11.7–15.4)
HGB RETIC QN AUTO: 35 PG (ref 29–35)
IMM GRANULOCYTES # BLD AUTO: 0 K/UL (ref 0–0.5)
IMM GRANULOCYTES NFR BLD AUTO: 0 % (ref 0–5)
IMM RETICS NFR: 10.2 % (ref 3–15.9)
IRON SATN MFR SERPL: 15 %
IRON SERPL-MCNC: 50 UG/DL (ref 35–150)
LYMPHOCYTES # BLD: 0.7 K/UL (ref 0.5–4.6)
LYMPHOCYTES NFR BLD: 12 % (ref 13–44)
MCH RBC QN AUTO: 30.4 PG (ref 26.1–32.9)
MCHC RBC AUTO-ENTMCNC: 32.7 G/DL (ref 31.4–35)
MCV RBC AUTO: 92.8 FL (ref 79.6–97.8)
MONOCYTES # BLD: 0.3 K/UL (ref 0.1–1.3)
MONOCYTES NFR BLD: 5 % (ref 4–12)
NEUTS SEG # BLD: 4.7 K/UL (ref 1.7–8.2)
NEUTS SEG NFR BLD: 78 % (ref 43–78)
NRBC # BLD: 0 K/UL (ref 0–0.2)
PLATELET # BLD AUTO: 198 K/UL (ref 150–450)
PMV BLD AUTO: 10.6 FL (ref 9.4–12.3)
POTASSIUM SERPL-SCNC: 4.1 MMOL/L (ref 3.5–5.1)
PROT SERPL-MCNC: 6.7 G/DL (ref 6.3–8.2)
RBC # BLD AUTO: 4.28 M/UL (ref 4.05–5.25)
RETICS # AUTO: 0.09 M/UL (ref 0.03–0.1)
RETICS/RBC NFR AUTO: 2.1 % (ref 0.3–2)
SODIUM SERPL-SCNC: 138 MMOL/L (ref 136–145)
TIBC SERPL-MCNC: 323 UG/DL (ref 250–450)
WBC # BLD AUTO: 6 K/UL (ref 4.3–11.1)

## 2020-06-09 PROCEDURE — 85046 RETICYTE/HGB CONCENTRATE: CPT

## 2020-06-09 PROCEDURE — 85025 COMPLETE CBC W/AUTO DIFF WBC: CPT

## 2020-06-09 PROCEDURE — 83540 ASSAY OF IRON: CPT

## 2020-06-09 PROCEDURE — 82728 ASSAY OF FERRITIN: CPT

## 2020-06-09 PROCEDURE — 80053 COMPREHEN METABOLIC PANEL: CPT

## 2020-06-09 PROCEDURE — 36415 COLL VENOUS BLD VENIPUNCTURE: CPT

## 2020-06-15 ENCOUNTER — HOSPITAL ENCOUNTER (OUTPATIENT)
Dept: LAB | Age: 60
Discharge: HOME | End: 2020-06-15
Payer: MEDICARE

## 2020-06-15 DIAGNOSIS — R16.1 SPLENOMEGALY: ICD-10-CM

## 2020-06-15 DIAGNOSIS — C92.10 CML (CHRONIC MYELOCYTIC LEUKEMIA) (HCC): ICD-10-CM

## 2020-06-15 LAB
ABO + RH BLD: NORMAL
BASOPHILS # BLD: 0.1 K/UL (ref 0–0.2)
BASOPHILS NFR BLD: 1 % (ref 0–2)
BLOOD GROUP ANTIBODIES SERPL: NORMAL
DIFFERENTIAL METHOD BLD: ABNORMAL
EOSINOPHIL # BLD: 0.3 K/UL (ref 0–0.8)
EOSINOPHIL NFR BLD: 3 % (ref 0.5–7.8)
ERYTHROCYTE [DISTWIDTH] IN BLOOD BY AUTOMATED COUNT: 15.9 % (ref 11.9–14.6)
HCT VFR BLD AUTO: 38.3 % (ref 35.8–46.3)
HGB BLD-MCNC: 12.5 G/DL (ref 11.7–15.4)
IMM GRANULOCYTES # BLD AUTO: 0 K/UL (ref 0–0.5)
IMM GRANULOCYTES NFR BLD AUTO: 0 % (ref 0–5)
LYMPHOCYTES # BLD: 0.7 K/UL (ref 0.5–4.6)
LYMPHOCYTES NFR BLD: 8 % (ref 13–44)
MCH RBC QN AUTO: 30.3 PG (ref 26.1–32.9)
MCHC RBC AUTO-ENTMCNC: 32.6 G/DL (ref 31.4–35)
MCV RBC AUTO: 92.7 FL (ref 79.6–97.8)
MONOCYTES # BLD: 0.4 K/UL (ref 0.1–1.3)
MONOCYTES NFR BLD: 5 % (ref 4–12)
NEUTS SEG # BLD: 7.1 K/UL (ref 1.7–8.2)
NEUTS SEG NFR BLD: 83 % (ref 43–78)
NRBC # BLD: 0 K/UL (ref 0–0.2)
PLATELET # BLD AUTO: 232 K/UL (ref 150–450)
PMV BLD AUTO: 10.7 FL (ref 9.4–12.3)
RBC # BLD AUTO: 4.13 M/UL (ref 4.05–5.2)
SPECIMEN EXP DATE BLD: NORMAL
WBC # BLD AUTO: 8.6 K/UL (ref 4.3–11.1)

## 2020-06-15 PROCEDURE — 86900 BLOOD TYPING SEROLOGIC ABO: CPT

## 2020-06-15 PROCEDURE — 85025 COMPLETE CBC W/AUTO DIFF WBC: CPT

## 2020-06-30 ENCOUNTER — HOSPITAL ENCOUNTER (OUTPATIENT)
Dept: INFUSION THERAPY | Age: 60
Discharge: HOME | End: 2020-06-30
Payer: MEDICARE

## 2020-06-30 VITALS
HEART RATE: 95 BPM | TEMPERATURE: 98.4 F | DIASTOLIC BLOOD PRESSURE: 66 MMHG | SYSTOLIC BLOOD PRESSURE: 130 MMHG | RESPIRATION RATE: 18 BRPM | OXYGEN SATURATION: 99 %

## 2020-06-30 DIAGNOSIS — D50.9 IRON DEFICIENCY ANEMIA, UNSPECIFIED IRON DEFICIENCY ANEMIA TYPE: Primary | ICD-10-CM

## 2020-06-30 PROCEDURE — 74011000258 HC RX REV CODE- 258: Performed by: INTERNAL MEDICINE

## 2020-06-30 PROCEDURE — 96365 THER/PROPH/DIAG IV INF INIT: CPT

## 2020-06-30 PROCEDURE — 74011250636 HC RX REV CODE- 250/636: Performed by: INTERNAL MEDICINE

## 2020-06-30 PROCEDURE — 96361 HYDRATE IV INFUSION ADD-ON: CPT

## 2020-06-30 RX ORDER — SODIUM CHLORIDE 0.9 % (FLUSH) 0.9 %
10 SYRINGE (ML) INJECTION AS NEEDED
Status: DISCONTINUED | OUTPATIENT
Start: 2020-06-30 | End: 2020-07-01 | Stop reason: HOSPADM

## 2020-06-30 RX ADMIN — Medication 10 ML: at 13:35

## 2020-06-30 RX ADMIN — FERUMOXYTOL 510 MG: 510 INJECTION INTRAVENOUS at 15:46

## 2020-06-30 RX ADMIN — SODIUM CHLORIDE 500 ML: 900 INJECTION, SOLUTION INTRAVENOUS at 16:02

## 2020-06-30 NOTE — PROGRESS NOTES
Arrived to the [facility]. Allan completed. Patient tolerated well. 30 minute wait time completed with no observed reaction. Any issues or concerns during appointment: none. Patient aware of next infusion appointment on 7/7 at 4:00pm.  Discharged ambulatory to home.

## 2020-07-07 ENCOUNTER — HOSPITAL ENCOUNTER (OUTPATIENT)
Dept: LAB | Age: 60
Discharge: HOME | End: 2020-07-07

## 2020-07-07 ENCOUNTER — HOSPITAL ENCOUNTER (OUTPATIENT)
Dept: INFUSION THERAPY | Age: 60
Discharge: HOME | End: 2020-07-07
Payer: MEDICARE

## 2020-07-07 DIAGNOSIS — D50.9 IRON DEFICIENCY ANEMIA, UNSPECIFIED IRON DEFICIENCY ANEMIA TYPE: ICD-10-CM

## 2020-07-07 DIAGNOSIS — R16.1 SPLENOMEGALY: Primary | ICD-10-CM

## 2020-07-07 DIAGNOSIS — C92.10 CML (CHRONIC MYELOCYTIC LEUKEMIA) (HCC): ICD-10-CM

## 2020-07-07 LAB
ALBUMIN SERPL-MCNC: 3.9 G/DL (ref 3.5–5)
ALBUMIN/GLOB SERPL: 1.4 {RATIO} (ref 1.2–3.5)
ALP SERPL-CCNC: 90 U/L (ref 50–136)
ALT SERPL-CCNC: 39 U/L (ref 12–65)
ANION GAP SERPL CALC-SCNC: 3 MMOL/L (ref 7–16)
AST SERPL-CCNC: 29 U/L (ref 15–37)
BASOPHILS # BLD: 0 K/UL (ref 0–0.2)
BASOPHILS NFR BLD: 1 % (ref 0–2)
BILIRUB SERPL-MCNC: 0.4 MG/DL (ref 0.2–1.1)
BUN SERPL-MCNC: 13 MG/DL (ref 6–23)
CALCIUM SERPL-MCNC: 8.3 MG/DL (ref 8.3–10.4)
CHLORIDE SERPL-SCNC: 107 MMOL/L (ref 98–107)
CO2 SERPL-SCNC: 27 MMOL/L (ref 21–32)
CREAT SERPL-MCNC: 0.8 MG/DL (ref 0.6–1)
DIFFERENTIAL METHOD BLD: ABNORMAL
EOSINOPHIL # BLD: 0.2 K/UL (ref 0–0.8)
EOSINOPHIL NFR BLD: 3 % (ref 0.5–7.8)
ERYTHROCYTE [DISTWIDTH] IN BLOOD BY AUTOMATED COUNT: 16 % (ref 11.9–14.6)
FERRITIN SERPL-MCNC: 252 NG/ML (ref 8–388)
GLOBULIN SER CALC-MCNC: 2.7 G/DL (ref 2.3–3.5)
GLUCOSE SERPL-MCNC: 87 MG/DL (ref 65–100)
HCT VFR BLD AUTO: 37.3 % (ref 35.8–46.3)
HGB BLD-MCNC: 11.9 G/DL (ref 11.7–15.4)
HGB RETIC QN AUTO: 38 PG (ref 29–35)
IMM GRANULOCYTES # BLD AUTO: 0 K/UL (ref 0–0.5)
IMM GRANULOCYTES NFR BLD AUTO: 0 % (ref 0–5)
IMM RETICS NFR: 7.9 % (ref 3–15.9)
IRON SATN MFR SERPL: 34 %
IRON SERPL-MCNC: 79 UG/DL (ref 35–150)
LYMPHOCYTES # BLD: 0.8 K/UL (ref 0.5–4.6)
LYMPHOCYTES NFR BLD: 14 % (ref 13–44)
Lab: NORMAL
MCH RBC QN AUTO: 29.2 PG (ref 26.1–32.9)
MCHC RBC AUTO-ENTMCNC: 31.9 G/DL (ref 31.4–35)
MCV RBC AUTO: 91.6 FL (ref 79.6–97.8)
MONOCYTES # BLD: 0.3 K/UL (ref 0.1–1.3)
MONOCYTES NFR BLD: 5 % (ref 4–12)
NEUTS SEG # BLD: 4.5 K/UL (ref 1.7–8.2)
NEUTS SEG NFR BLD: 78 % (ref 43–78)
NRBC # BLD: 0 K/UL (ref 0–0.2)
PLATELET # BLD AUTO: 175 K/UL (ref 150–450)
PMV BLD AUTO: 10.1 FL (ref 9.4–12.3)
POTASSIUM SERPL-SCNC: 3.8 MMOL/L (ref 3.5–5.1)
PROT SERPL-MCNC: 6.6 G/DL (ref 6.3–8.2)
RBC # BLD AUTO: 4.07 M/UL (ref 4.05–5.25)
REFERENCE LAB,REFLB: NORMAL
RETICS # AUTO: 0.13 M/UL (ref 0.03–0.1)
RETICS/RBC NFR AUTO: 3.2 % (ref 0.3–2)
SODIUM SERPL-SCNC: 137 MMOL/L (ref 136–145)
TEST DESCRIPTION:,ATST: NORMAL
TIBC SERPL-MCNC: 234 UG/DL (ref 250–450)
WBC # BLD AUTO: 5.7 K/UL (ref 4.3–11.1)

## 2020-07-07 PROCEDURE — 80053 COMPREHEN METABOLIC PANEL: CPT

## 2020-07-07 PROCEDURE — 74011250636 HC RX REV CODE- 250/636: Performed by: INTERNAL MEDICINE

## 2020-07-07 PROCEDURE — 74011000258 HC RX REV CODE- 258: Performed by: INTERNAL MEDICINE

## 2020-07-07 PROCEDURE — 96361 HYDRATE IV INFUSION ADD-ON: CPT

## 2020-07-07 PROCEDURE — 83540 ASSAY OF IRON: CPT

## 2020-07-07 PROCEDURE — 82728 ASSAY OF FERRITIN: CPT

## 2020-07-07 PROCEDURE — 85046 RETICYTE/HGB CONCENTRATE: CPT

## 2020-07-07 PROCEDURE — 85025 COMPLETE CBC W/AUTO DIFF WBC: CPT

## 2020-07-07 PROCEDURE — 96365 THER/PROPH/DIAG IV INF INIT: CPT

## 2020-07-07 PROCEDURE — 36415 COLL VENOUS BLD VENIPUNCTURE: CPT

## 2020-07-07 RX ORDER — SODIUM CHLORIDE 0.9 % (FLUSH) 0.9 %
10 SYRINGE (ML) INJECTION AS NEEDED
Status: DISCONTINUED | OUTPATIENT
Start: 2020-07-07 | End: 2020-07-08 | Stop reason: HOSPADM

## 2020-07-07 RX ADMIN — FERUMOXYTOL 510 MG: 510 INJECTION INTRAVENOUS at 16:28

## 2020-07-07 RX ADMIN — SODIUM CHLORIDE 500 ML: 900 INJECTION, SOLUTION INTRAVENOUS at 16:15

## 2020-07-07 RX ADMIN — Medication 10 ML: at 16:15

## 2020-07-07 NOTE — PROGRESS NOTES
Pt arrived ambulatory today at 1522, to receive IV iron. Pt tolerated without difficulty. Patient discharged via ambulatory accompanied by self. Instructed to notify physician of any problems, questions or concerns. Allowed opportunity for patient/family to ask questions. Verbalized understanding. Next appointment is July 10  with MCKENZIE.

## 2020-07-09 VITALS
TEMPERATURE: 97.8 F | RESPIRATION RATE: 18 BRPM | DIASTOLIC BLOOD PRESSURE: 74 MMHG | SYSTOLIC BLOOD PRESSURE: 132 MMHG | HEART RATE: 88 BPM

## 2020-07-31 ENCOUNTER — HOSPITAL ENCOUNTER (OUTPATIENT)
Dept: LAB | Age: 60
Discharge: HOME | End: 2020-07-31
Payer: MEDICARE

## 2020-07-31 DIAGNOSIS — C92.10 CML (CHRONIC MYELOCYTIC LEUKEMIA) (HCC): ICD-10-CM

## 2020-07-31 LAB
ALBUMIN SERPL-MCNC: 3.7 G/DL (ref 3.5–5)
ALBUMIN/GLOB SERPL: 1.4 {RATIO} (ref 1.2–3.5)
ALP SERPL-CCNC: 86 U/L (ref 50–136)
ALT SERPL-CCNC: 73 U/L (ref 12–65)
ANION GAP SERPL CALC-SCNC: 3 MMOL/L (ref 7–16)
AST SERPL-CCNC: 36 U/L (ref 15–37)
BASOPHILS # BLD: 0 K/UL (ref 0–0.2)
BASOPHILS NFR BLD: 1 % (ref 0–2)
BILIRUB SERPL-MCNC: 0.6 MG/DL (ref 0.2–1.1)
BUN SERPL-MCNC: 16 MG/DL (ref 6–23)
CALCIUM SERPL-MCNC: 8.4 MG/DL (ref 8.3–10.4)
CHLORIDE SERPL-SCNC: 108 MMOL/L (ref 98–107)
CO2 SERPL-SCNC: 28 MMOL/L (ref 21–32)
CREAT SERPL-MCNC: 0.7 MG/DL (ref 0.6–1)
DIFFERENTIAL METHOD BLD: ABNORMAL
EOSINOPHIL # BLD: 0.2 K/UL (ref 0–0.8)
EOSINOPHIL NFR BLD: 3 % (ref 0.5–7.8)
ERYTHROCYTE [DISTWIDTH] IN BLOOD BY AUTOMATED COUNT: 18.6 % (ref 11.9–14.6)
GLOBULIN SER CALC-MCNC: 2.6 G/DL (ref 2.3–3.5)
GLUCOSE SERPL-MCNC: 98 MG/DL (ref 65–100)
HCT VFR BLD AUTO: 35.6 % (ref 35.8–46.3)
HGB BLD-MCNC: 11.7 G/DL (ref 11.7–15.4)
IMM GRANULOCYTES # BLD AUTO: 0 K/UL (ref 0–0.5)
IMM GRANULOCYTES NFR BLD AUTO: 0 % (ref 0–5)
LYMPHOCYTES # BLD: 1 K/UL (ref 0.5–4.6)
LYMPHOCYTES NFR BLD: 19 % (ref 13–44)
MCH RBC QN AUTO: 30.5 PG (ref 26.1–32.9)
MCHC RBC AUTO-ENTMCNC: 32.9 G/DL (ref 31.4–35)
MCV RBC AUTO: 93 FL (ref 79.6–97.8)
MONOCYTES # BLD: 0.3 K/UL (ref 0.1–1.3)
MONOCYTES NFR BLD: 5 % (ref 4–12)
NEUTS SEG # BLD: 3.8 K/UL (ref 1.7–8.2)
NEUTS SEG NFR BLD: 72 % (ref 43–78)
NRBC # BLD: 0 K/UL (ref 0–0.2)
PLATELET # BLD AUTO: 203 K/UL (ref 150–450)
PMV BLD AUTO: 10.3 FL (ref 9.4–12.3)
POTASSIUM SERPL-SCNC: 3.9 MMOL/L (ref 3.5–5.1)
PROT SERPL-MCNC: 6.3 G/DL (ref 6.3–8.2)
RBC # BLD AUTO: 3.83 M/UL (ref 4.05–5.25)
SODIUM SERPL-SCNC: 139 MMOL/L (ref 136–145)
WBC # BLD AUTO: 5.3 K/UL (ref 4.3–11.1)

## 2020-07-31 PROCEDURE — 85025 COMPLETE CBC W/AUTO DIFF WBC: CPT

## 2020-07-31 PROCEDURE — 36415 COLL VENOUS BLD VENIPUNCTURE: CPT

## 2020-07-31 PROCEDURE — 80053 COMPREHEN METABOLIC PANEL: CPT

## 2020-08-31 ENCOUNTER — HOSPITAL ENCOUNTER (OUTPATIENT)
Dept: LAB | Age: 60
Discharge: HOME | End: 2020-08-31
Payer: MEDICARE

## 2020-08-31 DIAGNOSIS — C92.10 CML (CHRONIC MYELOCYTIC LEUKEMIA) (HCC): ICD-10-CM

## 2020-08-31 DIAGNOSIS — D50.9 IRON DEFICIENCY ANEMIA, UNSPECIFIED IRON DEFICIENCY ANEMIA TYPE: ICD-10-CM

## 2020-08-31 LAB
ALBUMIN SERPL-MCNC: 4 G/DL (ref 3.2–4.6)
ALBUMIN/GLOB SERPL: 1.4 {RATIO} (ref 1.2–3.5)
ALP SERPL-CCNC: 117 U/L (ref 50–136)
ALT SERPL-CCNC: 116 U/L (ref 12–65)
ANION GAP SERPL CALC-SCNC: 3 MMOL/L (ref 7–16)
AST SERPL-CCNC: 89 U/L (ref 15–37)
BASOPHILS # BLD: 0.1 K/UL (ref 0–0.2)
BASOPHILS NFR BLD: 1 % (ref 0–2)
BILIRUB SERPL-MCNC: 0.6 MG/DL (ref 0.2–1.1)
BUN SERPL-MCNC: 14 MG/DL (ref 8–23)
CALCIUM SERPL-MCNC: 8.9 MG/DL (ref 8.3–10.4)
CHLORIDE SERPL-SCNC: 107 MMOL/L (ref 98–107)
CO2 SERPL-SCNC: 28 MMOL/L (ref 21–32)
CREAT SERPL-MCNC: 0.7 MG/DL (ref 0.6–1)
DIFFERENTIAL METHOD BLD: ABNORMAL
EOSINOPHIL # BLD: 0.2 K/UL (ref 0–0.8)
EOSINOPHIL NFR BLD: 3 % (ref 0.5–7.8)
ERYTHROCYTE [DISTWIDTH] IN BLOOD BY AUTOMATED COUNT: 17 % (ref 11.9–14.6)
FERRITIN SERPL-MCNC: 35 NG/ML (ref 8–388)
GLOBULIN SER CALC-MCNC: 2.8 G/DL (ref 2.3–3.5)
GLUCOSE SERPL-MCNC: 73 MG/DL (ref 65–100)
HCT VFR BLD AUTO: 42.9 % (ref 35.8–46.3)
HGB BLD-MCNC: 14 G/DL (ref 11.7–15.4)
HGB RETIC QN AUTO: 33 PG (ref 29–35)
IMM GRANULOCYTES # BLD AUTO: 0 K/UL (ref 0–0.5)
IMM GRANULOCYTES NFR BLD AUTO: 1 % (ref 0–5)
IMM RETICS NFR: 11.2 % (ref 3–15.9)
IRON SATN MFR SERPL: 15 %
IRON SERPL-MCNC: 42 UG/DL (ref 35–150)
LYMPHOCYTES # BLD: 0.9 K/UL (ref 0.5–4.6)
LYMPHOCYTES NFR BLD: 13 % (ref 13–44)
Lab: NORMAL
MCH RBC QN AUTO: 30.6 PG (ref 26.1–32.9)
MCHC RBC AUTO-ENTMCNC: 32.6 G/DL (ref 31.4–35)
MCV RBC AUTO: 93.7 FL (ref 79.6–97.8)
MONOCYTES # BLD: 0.4 K/UL (ref 0.1–1.3)
MONOCYTES NFR BLD: 6 % (ref 4–12)
NEUTS SEG # BLD: 5 K/UL (ref 1.7–8.2)
NEUTS SEG NFR BLD: 76 % (ref 43–78)
NRBC # BLD: 0 K/UL (ref 0–0.2)
PLATELET # BLD AUTO: 172 K/UL (ref 150–450)
PMV BLD AUTO: 10.5 FL (ref 9.4–12.3)
POTASSIUM SERPL-SCNC: 4 MMOL/L (ref 3.5–5.1)
PROT SERPL-MCNC: 6.8 G/DL (ref 6.3–8.2)
RBC # BLD AUTO: 4.58 M/UL (ref 4.05–5.25)
REFERENCE LAB,REFLB: NORMAL
RETICS # AUTO: 0.12 M/UL (ref 0.03–0.1)
RETICS/RBC NFR AUTO: 2.6 % (ref 0.3–2)
SODIUM SERPL-SCNC: 138 MMOL/L (ref 136–145)
TEST DESCRIPTION:,ATST: NORMAL
TIBC SERPL-MCNC: 282 UG/DL (ref 250–450)
WBC # BLD AUTO: 6.6 K/UL (ref 4.3–11.1)

## 2020-08-31 PROCEDURE — 83540 ASSAY OF IRON: CPT

## 2020-08-31 PROCEDURE — 82728 ASSAY OF FERRITIN: CPT

## 2020-08-31 PROCEDURE — 85025 COMPLETE CBC W/AUTO DIFF WBC: CPT

## 2020-08-31 PROCEDURE — 80053 COMPREHEN METABOLIC PANEL: CPT

## 2020-08-31 PROCEDURE — 85046 RETICYTE/HGB CONCENTRATE: CPT

## 2020-08-31 PROCEDURE — 36415 COLL VENOUS BLD VENIPUNCTURE: CPT

## 2020-09-19 ENCOUNTER — HOSPITAL ENCOUNTER (OUTPATIENT)
Dept: INFUSION THERAPY | Age: 60
Discharge: HOME | End: 2020-09-19
Payer: MEDICARE

## 2020-09-19 VITALS
SYSTOLIC BLOOD PRESSURE: 125 MMHG | TEMPERATURE: 98.8 F | DIASTOLIC BLOOD PRESSURE: 68 MMHG | RESPIRATION RATE: 18 BRPM | OXYGEN SATURATION: 99 % | HEART RATE: 96 BPM

## 2020-09-19 DIAGNOSIS — D50.9 IRON DEFICIENCY ANEMIA, UNSPECIFIED IRON DEFICIENCY ANEMIA TYPE: Primary | ICD-10-CM

## 2020-09-19 PROCEDURE — 96374 THER/PROPH/DIAG INJ IV PUSH: CPT

## 2020-09-19 PROCEDURE — 74011000258 HC RX REV CODE- 258: Performed by: NURSE PRACTITIONER

## 2020-09-19 PROCEDURE — 74011250636 HC RX REV CODE- 250/636: Performed by: NURSE PRACTITIONER

## 2020-09-19 RX ADMIN — SODIUM CHLORIDE 500 ML: 900 INJECTION, SOLUTION INTRAVENOUS at 12:12

## 2020-09-19 RX ADMIN — FERUMOXYTOL 510 MG: 510 INJECTION INTRAVENOUS at 11:56

## 2020-09-19 NOTE — PROGRESS NOTES
Arrived to the [facility]. Allan completed. Patient tolerated well. Any issues or concerns during appointment: none  Patient aware of next infusion appointment on 9/26/20 at 1130am  Discharged ambulatory with self.

## 2020-09-25 ENCOUNTER — HOSPITAL ENCOUNTER (OUTPATIENT)
Dept: LAB | Age: 60
Discharge: HOME | End: 2020-09-25
Payer: MEDICARE

## 2020-09-25 DIAGNOSIS — C92.10 CML (CHRONIC MYELOCYTIC LEUKEMIA) (HCC): ICD-10-CM

## 2020-09-25 DIAGNOSIS — D50.9 IRON DEFICIENCY ANEMIA, UNSPECIFIED IRON DEFICIENCY ANEMIA TYPE: ICD-10-CM

## 2020-09-25 LAB
ALBUMIN SERPL-MCNC: 4.1 G/DL (ref 3.2–4.6)
ALBUMIN/GLOB SERPL: 1.8 {RATIO} (ref 1.2–3.5)
ALP SERPL-CCNC: 100 U/L (ref 50–136)
ALT SERPL-CCNC: 132 U/L (ref 12–65)
ANION GAP SERPL CALC-SCNC: 5 MMOL/L (ref 7–16)
AST SERPL-CCNC: 96 U/L (ref 15–37)
BASOPHILS # BLD: 0 K/UL (ref 0–0.2)
BASOPHILS NFR BLD: 1 % (ref 0–2)
BILIRUB SERPL-MCNC: 0.6 MG/DL (ref 0.2–1.1)
BUN SERPL-MCNC: 15 MG/DL (ref 8–23)
CALCIUM SERPL-MCNC: 8.5 MG/DL (ref 8.3–10.4)
CHLORIDE SERPL-SCNC: 107 MMOL/L (ref 98–107)
CO2 SERPL-SCNC: 27 MMOL/L (ref 21–32)
CREAT SERPL-MCNC: 0.7 MG/DL (ref 0.6–1)
DIFFERENTIAL METHOD BLD: ABNORMAL
EOSINOPHIL # BLD: 0.2 K/UL (ref 0–0.8)
EOSINOPHIL NFR BLD: 3 % (ref 0.5–7.8)
ERYTHROCYTE [DISTWIDTH] IN BLOOD BY AUTOMATED COUNT: 16.4 % (ref 11.9–14.6)
FERRITIN SERPL-MCNC: 311 NG/ML (ref 8–388)
GLOBULIN SER CALC-MCNC: 2.3 G/DL (ref 2.3–3.5)
GLUCOSE SERPL-MCNC: 85 MG/DL (ref 65–100)
HCT VFR BLD AUTO: 40.8 % (ref 35.8–46.3)
HGB BLD-MCNC: 13.5 G/DL (ref 11.7–15.4)
HGB RETIC QN AUTO: 39 PG (ref 29–35)
IMM GRANULOCYTES # BLD AUTO: 0 K/UL (ref 0–0.5)
IMM GRANULOCYTES NFR BLD AUTO: 1 % (ref 0–5)
IMM RETICS NFR: 6 % (ref 3–15.9)
IRON SATN MFR SERPL: 65 %
IRON SERPL-MCNC: 182 UG/DL (ref 35–150)
LYMPHOCYTES # BLD: 0.8 K/UL (ref 0.5–4.6)
LYMPHOCYTES NFR BLD: 15 % (ref 13–44)
Lab: NORMAL
MCH RBC QN AUTO: 31.2 PG (ref 26.1–32.9)
MCHC RBC AUTO-ENTMCNC: 33.1 G/DL (ref 31.4–35)
MCV RBC AUTO: 94.2 FL (ref 79.6–97.8)
MONOCYTES # BLD: 0.3 K/UL (ref 0.1–1.3)
MONOCYTES NFR BLD: 6 % (ref 4–12)
NEUTS SEG # BLD: 4.2 K/UL (ref 1.7–8.2)
NEUTS SEG NFR BLD: 75 % (ref 43–78)
NRBC # BLD: 0 K/UL (ref 0–0.2)
PLATELET # BLD AUTO: 191 K/UL (ref 150–450)
PMV BLD AUTO: 10.2 FL (ref 9.4–12.3)
POTASSIUM SERPL-SCNC: 4 MMOL/L (ref 3.5–5.1)
PROT SERPL-MCNC: 6.4 G/DL (ref 6.3–8.2)
RBC # BLD AUTO: 4.33 M/UL (ref 4.05–5.25)
REFERENCE LAB,REFLB: NORMAL
RETICS # AUTO: 0.1 M/UL (ref 0.03–0.1)
RETICS/RBC NFR AUTO: 2.2 % (ref 0.3–2)
SODIUM SERPL-SCNC: 139 MMOL/L (ref 136–145)
TEST DESCRIPTION:,ATST: NORMAL
TIBC SERPL-MCNC: 280 UG/DL (ref 250–450)
WBC # BLD AUTO: 5.6 K/UL (ref 4.3–11.1)

## 2020-09-25 PROCEDURE — 85025 COMPLETE CBC W/AUTO DIFF WBC: CPT

## 2020-09-25 PROCEDURE — 82728 ASSAY OF FERRITIN: CPT

## 2020-09-25 PROCEDURE — 85046 RETICYTE/HGB CONCENTRATE: CPT

## 2020-09-25 PROCEDURE — 83540 ASSAY OF IRON: CPT

## 2020-09-25 PROCEDURE — 80053 COMPREHEN METABOLIC PANEL: CPT

## 2020-09-25 PROCEDURE — 36415 COLL VENOUS BLD VENIPUNCTURE: CPT

## 2020-09-26 ENCOUNTER — HOSPITAL ENCOUNTER (OUTPATIENT)
Dept: INFUSION THERAPY | Age: 60
Discharge: HOME | End: 2020-09-26
Payer: MEDICARE

## 2020-09-26 VITALS
SYSTOLIC BLOOD PRESSURE: 133 MMHG | HEART RATE: 90 BPM | OXYGEN SATURATION: 98 % | TEMPERATURE: 98.9 F | RESPIRATION RATE: 18 BRPM | DIASTOLIC BLOOD PRESSURE: 78 MMHG

## 2020-09-26 DIAGNOSIS — D50.9 IRON DEFICIENCY ANEMIA, UNSPECIFIED IRON DEFICIENCY ANEMIA TYPE: Primary | ICD-10-CM

## 2020-09-26 PROCEDURE — 74011000258 HC RX REV CODE- 258: Performed by: NURSE PRACTITIONER

## 2020-09-26 PROCEDURE — 96374 THER/PROPH/DIAG INJ IV PUSH: CPT

## 2020-09-26 PROCEDURE — 74011250636 HC RX REV CODE- 250/636: Performed by: NURSE PRACTITIONER

## 2020-09-26 RX ORDER — SODIUM CHLORIDE 0.9 % (FLUSH) 0.9 %
10 SYRINGE (ML) INJECTION AS NEEDED
Status: ACTIVE | OUTPATIENT
Start: 2020-09-26 | End: 2020-09-26

## 2020-09-26 RX ADMIN — SODIUM CHLORIDE 500 ML: 900 INJECTION, SOLUTION INTRAVENOUS at 11:50

## 2020-09-26 RX ADMIN — Medication 10 ML: at 12:38

## 2020-09-26 RX ADMIN — FERUMOXYTOL 510 MG: 510 INJECTION INTRAVENOUS at 12:00

## 2020-10-06 ENCOUNTER — HOSPITAL ENCOUNTER (OUTPATIENT)
Age: 60
Setting detail: OBSERVATION
Discharge: HOME | End: 2020-10-08
Attending: EMERGENCY MEDICINE | Admitting: HOSPITALIST
Payer: MEDICARE

## 2020-10-06 DIAGNOSIS — K92.0 HEMATEMESIS, PRESENCE OF NAUSEA NOT SPECIFIED: Primary | ICD-10-CM

## 2020-10-06 LAB
ABO + RH BLD: NORMAL
ALBUMIN SERPL-MCNC: 3.8 G/DL (ref 3.2–4.6)
ALBUMIN/GLOB SERPL: 1.4 {RATIO} (ref 1.2–3.5)
ALP SERPL-CCNC: 90 U/L (ref 50–136)
ALT SERPL-CCNC: 45 U/L (ref 12–65)
ANION GAP SERPL CALC-SCNC: 3 MMOL/L (ref 7–16)
APTT PPP: 41 SEC (ref 24.3–35.4)
AST SERPL-CCNC: 29 U/L (ref 15–37)
BASOPHILS # BLD: 0.1 K/UL (ref 0–0.2)
BASOPHILS NFR BLD: 1 % (ref 0–2)
BILIRUB SERPL-MCNC: 0.5 MG/DL (ref 0.2–1.1)
BLOOD GROUP ANTIBODIES SERPL: NORMAL
BUN SERPL-MCNC: 24 MG/DL (ref 8–23)
CALCIUM SERPL-MCNC: 8.5 MG/DL (ref 8.3–10.4)
CHLORIDE SERPL-SCNC: 106 MMOL/L (ref 98–107)
CO2 SERPL-SCNC: 30 MMOL/L (ref 21–32)
CREAT SERPL-MCNC: 0.65 MG/DL (ref 0.6–1)
DIFFERENTIAL METHOD BLD: ABNORMAL
EOSINOPHIL # BLD: 0.3 K/UL (ref 0–0.8)
EOSINOPHIL NFR BLD: 3 % (ref 0.5–7.8)
ERYTHROCYTE [DISTWIDTH] IN BLOOD BY AUTOMATED COUNT: 16.6 % (ref 11.9–14.6)
ERYTHROCYTE [DISTWIDTH] IN BLOOD BY AUTOMATED COUNT: 16.8 % (ref 11.9–14.6)
GLOBULIN SER CALC-MCNC: 2.8 G/DL (ref 2.3–3.5)
GLUCOSE SERPL-MCNC: 97 MG/DL (ref 65–100)
HCT VFR BLD AUTO: 32 % (ref 35.8–46.3)
HCT VFR BLD AUTO: 34.6 % (ref 35.8–46.3)
HCT VFR BLD AUTO: 36 % (ref 35.8–46.3)
HCT VFR BLD AUTO: 41.3 % (ref 35.8–46.3)
HGB BLD-MCNC: 10.7 G/DL (ref 11.7–15.4)
HGB BLD-MCNC: 11 G/DL (ref 11.7–15.4)
HGB BLD-MCNC: 11.9 G/DL (ref 11.7–15.4)
HGB BLD-MCNC: 13.8 G/DL (ref 11.7–15.4)
IMM GRANULOCYTES # BLD AUTO: 0 K/UL (ref 0–0.5)
IMM GRANULOCYTES NFR BLD AUTO: 0 % (ref 0–5)
INR PPP: 1.2
LIPASE SERPL-CCNC: 74 U/L (ref 73–393)
LYMPHOCYTES # BLD: 1.4 K/UL (ref 0.5–4.6)
LYMPHOCYTES NFR BLD: 16 % (ref 13–44)
MAGNESIUM SERPL-MCNC: 2.1 MG/DL (ref 1.8–2.4)
MCH RBC QN AUTO: 31.4 PG (ref 26.1–32.9)
MCH RBC QN AUTO: 31.6 PG (ref 26.1–32.9)
MCHC RBC AUTO-ENTMCNC: 33.1 G/DL (ref 31.4–35)
MCHC RBC AUTO-ENTMCNC: 33.4 G/DL (ref 31.4–35)
MCV RBC AUTO: 93.9 FL (ref 79.6–97.8)
MCV RBC AUTO: 95.7 FL (ref 79.6–97.8)
MONOCYTES # BLD: 0.3 K/UL (ref 0.1–1.3)
MONOCYTES NFR BLD: 4 % (ref 4–12)
NEUTS SEG # BLD: 6.5 K/UL (ref 1.7–8.2)
NEUTS SEG NFR BLD: 76 % (ref 43–78)
NRBC # BLD: 0 K/UL (ref 0–0.2)
NRBC # BLD: 0 K/UL (ref 0–0.2)
PLATELET # BLD AUTO: 157 K/UL (ref 150–450)
PLATELET # BLD AUTO: 173 K/UL (ref 150–450)
PMV BLD AUTO: 10.4 FL (ref 9.4–12.3)
PMV BLD AUTO: 10.5 FL (ref 9.4–12.3)
POTASSIUM SERPL-SCNC: 4.4 MMOL/L (ref 3.5–5.1)
PROT SERPL-MCNC: 6.6 G/DL (ref 6.3–8.2)
PROTHROMBIN TIME: 15.1 SEC (ref 12–14.7)
RBC # BLD AUTO: 3.76 M/UL (ref 4.05–5.2)
RBC # BLD AUTO: 4.4 M/UL (ref 4.05–5.2)
SODIUM SERPL-SCNC: 139 MMOL/L (ref 136–145)
SPECIMEN EXP DATE BLD: NORMAL
WBC # BLD AUTO: 6.5 K/UL (ref 4.3–11.1)
WBC # BLD AUTO: 8.5 K/UL (ref 4.3–11.1)

## 2020-10-06 PROCEDURE — 99218 HC RM OBSERVATION: CPT

## 2020-10-06 PROCEDURE — 96375 TX/PRO/DX INJ NEW DRUG ADDON: CPT

## 2020-10-06 PROCEDURE — 83735 ASSAY OF MAGNESIUM: CPT

## 2020-10-06 PROCEDURE — 74011000250 HC RX REV CODE- 250: Performed by: HOSPITALIST

## 2020-10-06 PROCEDURE — C9113 INJ PANTOPRAZOLE SODIUM, VIA: HCPCS | Performed by: EMERGENCY MEDICINE

## 2020-10-06 PROCEDURE — 2709999900 HC NON-CHARGEABLE SUPPLY

## 2020-10-06 PROCEDURE — 96374 THER/PROPH/DIAG INJ IV PUSH: CPT

## 2020-10-06 PROCEDURE — 85018 HEMOGLOBIN: CPT

## 2020-10-06 PROCEDURE — 74011250637 HC RX REV CODE- 250/637: Performed by: INTERNAL MEDICINE

## 2020-10-06 PROCEDURE — 74011250636 HC RX REV CODE- 250/636: Performed by: INTERNAL MEDICINE

## 2020-10-06 PROCEDURE — 36415 COLL VENOUS BLD VENIPUNCTURE: CPT

## 2020-10-06 PROCEDURE — 86900 BLOOD TYPING SEROLOGIC ABO: CPT

## 2020-10-06 PROCEDURE — 83690 ASSAY OF LIPASE: CPT

## 2020-10-06 PROCEDURE — 74011000250 HC RX REV CODE- 250: Performed by: EMERGENCY MEDICINE

## 2020-10-06 PROCEDURE — 96376 TX/PRO/DX INJ SAME DRUG ADON: CPT

## 2020-10-06 PROCEDURE — 85610 PROTHROMBIN TIME: CPT

## 2020-10-06 PROCEDURE — 85730 THROMBOPLASTIN TIME PARTIAL: CPT

## 2020-10-06 PROCEDURE — 74011250636 HC RX REV CODE- 250/636: Performed by: HOSPITALIST

## 2020-10-06 PROCEDURE — 74011250636 HC RX REV CODE- 250/636: Performed by: EMERGENCY MEDICINE

## 2020-10-06 PROCEDURE — 85027 COMPLETE CBC AUTOMATED: CPT

## 2020-10-06 PROCEDURE — C9113 INJ PANTOPRAZOLE SODIUM, VIA: HCPCS | Performed by: HOSPITALIST

## 2020-10-06 PROCEDURE — 85025 COMPLETE CBC W/AUTO DIFF WBC: CPT

## 2020-10-06 PROCEDURE — 80053 COMPREHEN METABOLIC PANEL: CPT

## 2020-10-06 PROCEDURE — 99284 EMERGENCY DEPT VISIT MOD MDM: CPT

## 2020-10-06 RX ORDER — SODIUM CHLORIDE 0.9 % (FLUSH) 0.9 %
5-40 SYRINGE (ML) INJECTION EVERY 8 HOURS
Status: DISCONTINUED | OUTPATIENT
Start: 2020-10-06 | End: 2020-10-08 | Stop reason: HOSPADM

## 2020-10-06 RX ORDER — HYDROMORPHONE HYDROCHLORIDE 1 MG/ML
0.5 INJECTION, SOLUTION INTRAMUSCULAR; INTRAVENOUS; SUBCUTANEOUS
Status: DISCONTINUED | OUTPATIENT
Start: 2020-10-06 | End: 2020-10-08 | Stop reason: HOSPADM

## 2020-10-06 RX ORDER — LEVOTHYROXINE SODIUM 112 UG/1
112 TABLET ORAL
Status: DISCONTINUED | OUTPATIENT
Start: 2020-10-06 | End: 2020-10-08 | Stop reason: HOSPADM

## 2020-10-06 RX ORDER — LEVOTHYROXINE SODIUM 112 UG/1
168 TABLET ORAL
Status: DISCONTINUED | OUTPATIENT
Start: 2020-10-11 | End: 2020-10-08 | Stop reason: HOSPADM

## 2020-10-06 RX ORDER — OXYCODONE HYDROCHLORIDE 5 MG/1
5 TABLET ORAL
Status: DISCONTINUED | OUTPATIENT
Start: 2020-10-06 | End: 2020-10-08 | Stop reason: HOSPADM

## 2020-10-06 RX ORDER — ONDANSETRON 2 MG/ML
4 INJECTION INTRAMUSCULAR; INTRAVENOUS
Status: DISCONTINUED | OUTPATIENT
Start: 2020-10-06 | End: 2020-10-08 | Stop reason: HOSPADM

## 2020-10-06 RX ORDER — METOCLOPRAMIDE HYDROCHLORIDE 5 MG/ML
10 INJECTION INTRAMUSCULAR; INTRAVENOUS
Status: DISPENSED | OUTPATIENT
Start: 2020-10-06 | End: 2020-10-06

## 2020-10-06 RX ORDER — ONDANSETRON 2 MG/ML
4 INJECTION INTRAMUSCULAR; INTRAVENOUS
Status: COMPLETED | OUTPATIENT
Start: 2020-10-06 | End: 2020-10-06

## 2020-10-06 RX ORDER — SODIUM CHLORIDE 0.9 % (FLUSH) 0.9 %
5-40 SYRINGE (ML) INJECTION AS NEEDED
Status: DISCONTINUED | OUTPATIENT
Start: 2020-10-06 | End: 2020-10-08 | Stop reason: HOSPADM

## 2020-10-06 RX ORDER — SODIUM CHLORIDE 9 MG/ML
75 INJECTION, SOLUTION INTRAVENOUS CONTINUOUS
Status: DISCONTINUED | OUTPATIENT
Start: 2020-10-06 | End: 2020-10-08 | Stop reason: HOSPADM

## 2020-10-06 RX ORDER — METOCLOPRAMIDE HYDROCHLORIDE 5 MG/ML
5 INJECTION INTRAMUSCULAR; INTRAVENOUS ONCE
Status: ACTIVE | OUTPATIENT
Start: 2020-10-06 | End: 2020-10-07

## 2020-10-06 RX ORDER — SODIUM CHLORIDE 9 MG/ML
1000 INJECTION, SOLUTION INTRAVENOUS ONCE
Status: COMPLETED | OUTPATIENT
Start: 2020-10-06 | End: 2020-10-06

## 2020-10-06 RX ORDER — OCTREOTIDE ACETATE 100 UG/ML
50 INJECTION, SOLUTION INTRAVENOUS; SUBCUTANEOUS ONCE
Status: COMPLETED | OUTPATIENT
Start: 2020-10-06 | End: 2020-10-06

## 2020-10-06 RX ADMIN — Medication 10 ML: at 05:40

## 2020-10-06 RX ADMIN — SODIUM CHLORIDE 75 ML/HR: 900 INJECTION, SOLUTION INTRAVENOUS at 18:03

## 2020-10-06 RX ADMIN — OCTREOTIDE ACETATE 50 MCG: 100 INJECTION, SOLUTION INTRAVENOUS; SUBCUTANEOUS at 09:34

## 2020-10-06 RX ADMIN — LEVOTHYROXINE SODIUM 112 MCG: 0.11 TABLET ORAL at 09:34

## 2020-10-06 RX ADMIN — OXYCODONE HYDROCHLORIDE 5 MG: 5 TABLET ORAL at 17:54

## 2020-10-06 RX ADMIN — ONDANSETRON 4 MG: 2 INJECTION INTRAMUSCULAR; INTRAVENOUS at 03:14

## 2020-10-06 RX ADMIN — HYDROMORPHONE HYDROCHLORIDE 0.5 MG: 1 INJECTION, SOLUTION INTRAMUSCULAR; INTRAVENOUS; SUBCUTANEOUS at 06:10

## 2020-10-06 RX ADMIN — SODIUM CHLORIDE 40 MG: 9 INJECTION, SOLUTION INTRAMUSCULAR; INTRAVENOUS; SUBCUTANEOUS at 08:43

## 2020-10-06 RX ADMIN — Medication 10 ML: at 14:28

## 2020-10-06 RX ADMIN — OCTREOTIDE ACETATE 50 MCG/HR: 500 INJECTION, SOLUTION INTRAVENOUS; SUBCUTANEOUS at 11:10

## 2020-10-06 RX ADMIN — SODIUM CHLORIDE 40 MG: 9 INJECTION, SOLUTION INTRAMUSCULAR; INTRAVENOUS; SUBCUTANEOUS at 17:55

## 2020-10-06 RX ADMIN — SODIUM CHLORIDE 40 MG: 9 INJECTION, SOLUTION INTRAMUSCULAR; INTRAVENOUS; SUBCUTANEOUS at 03:14

## 2020-10-06 RX ADMIN — SODIUM CHLORIDE 1000 ML/HR: 9 INJECTION, SOLUTION INTRAVENOUS at 19:00

## 2020-10-06 NOTE — ED PROVIDER NOTES
80-year-old  female with history of portal vein thrombosis currently on Lovenox with a history of splenomegaly and esophageal varices presents the emergency department complaining of vomiting bright red blood x2. Patient denies any significant abdominal pain, denies any change in bowel habits, melena or hematochezia. As dose of Lovenox was this earlier this evening. The history is provided by the patient. Vomiting Blood    This is a new problem. The current episode started less than 1 hour ago. The problem occurs 2 to 4 times per day. The problem has been gradually improving. The emesis has an appearance of bright red blood. There has been no fever. Pertinent negatives include no chills, no fever, no sweats, no abdominal pain, no diarrhea, no headaches, no arthralgias, no myalgias, no cough, no URI and no headaches. Risk factors: Lovenox injections, esophageal varices. Her pertinent negatives include no irritable bowel syndrome, no inflammatory bowel disease, no short gut syndrome, no bowel resection, no recent abdominal surgery, no malabsorption, no gastric bypass and no DM.         Past Medical History:   Diagnosis Date    Anemia     C. difficile diarrhea 4/1/2015    Cerebral edema ([address] 4/1/2015    Chronic migraine 9/22/2016    Chronic myelogenous leukemia ([address]     [place] chromosome/ converted from polycythemia in 2009- to 2012 when she was dx w leukemia    Chronic pain     Coagulation disorder ([address]     \"clotting and Bleeding Problem\" dr Jm Menchaca follows     Esophageal spasm     with banding     Esophageal varices ([address]     grade 3    GI bleed 8/3/2016    H/O craniotomy     3-29-15.  due to blood clot - which caused a seizure  - then pt fell and hit     Leukocytosis 3/14/2015    MRSA colonization 6/25/2012    Polycythemia vera(238.4)     JAK2 mutation    Portal hypertension ([address]     with varices    Portal vein thrombosis 6/20/2012    Ct scan 6-21-2 Apparent occlusion of the portal vein. In addition, the splenic vein, and superior mesenteric vein are not definitely seen and are also likely  occluded. Splenomegaly, and extensive varices are seen as described above consistent with the portal vein occlusion 12 on arixtra HIT negative on repeat 12 re admitted Cirrhotic appearance of the liver. Mild to moderate ascites, as    Primary hypothyroidism     Pulmonary embolism ([address]     not on coumadin anymore     S/P exploratory laparotomy, 12    Bowel resection:  336 cm of small bowel removed, approximately 9 feet and placement of feeding jejunostomy.      S/P small bowel resection     .  9 feet removed due to  gangrene which wa due to blood clot    Seizure ([address] 3/14/2015    Seizure disorder ([address] 3/30/2015    due to clotting factor    Seizures ([address]     last one 3- - followed by aniyah     Splenomegaly, congestive, chronic     Stroke (cerebrum) ([address] 2015    had bled into head- surgery    Stroke [facility])     pt had stroke like symptoms     Thrombocytopenia, HIT negative 2012 platelets lower repeat HIT 12 platelets in 27,717'R    Thrombosis, portal vein 2004    portal , spleenic and recently superior mesenteric    Transfusion history     many blood tranfusions - last 3-29-15 after brain surgery    Traumatic hemorrhage of right cerebrum ([address] 3/30/2015       Past Surgical History:   Procedure Laterality Date    ABDOMEN SURGERY PROC UNLISTED      umbilical hernia repair    ABDOMEN SURGERY PROC UNLISTED      9ft of small bowel excised then reconnected    HX APPENDECTOMY      with small bowel resection    HX BREAST BIOPSY Bilateral     Lt - ; Rt -     HX CHOLECYSTECTOMY      HX GI      liver biopsy    HX GI      bowel removed small - 9 feet     HX GYN       x 2    HX GYN      D&C following miscarriage    HX ORTHOPAEDIC Left 2008    torn labrum shoulder (screw in place)    NEUROLOGICAL PROCEDURE UNLISTED  2010    craniotomy to evacuate subdural hematoma following a fall from a seizure         Family History:   Problem Relation Age of Onset    Diabetes Mother     Stroke Mother         after surgery    Cancer Father         brain    No Known Problems Sister     Other Sister         diverticulitis    Other Sister         diverticulitis    Cancer Sister         lyjmphoma    Breast Cancer Maternal Aunt 48    Thyroid Disease Paternal Grandmother        Social History     Socioeconomic History    Marital status: SINGLE     Spouse name: Not on file    Number of children: Not on file    Years of education: Not on file    Highest education level: Not on file   Occupational History    Not on file   Social Needs    Financial resource strain: Not on file    Food insecurity     Worry: Not on file     Inability: Not on file    Transportation needs     Medical: Not on file     Non-medical: Not on file   Tobacco Use    Smoking status: Former Smoker     Packs/day: 0.20     Years: 10.00     Pack years: 2.00     Types: Cigarettes     Last attempt to quit:      Years since quittin.7    Smokeless tobacco: Never Used   Substance and Sexual Activity    Alcohol use: No    Drug use: No    Sexual activity: Not on file   Lifestyle    Physical activity     Days per week: Not on file     Minutes per session: Not on file    Stress: Not on file   Relationships    Social connections     Talks on phone: Not on file     Gets together: Not on file     Attends Yazdanism service: Not on file     Active member of club or organization: Not on file     Attends meetings of clubs or organizations: Not on file     Relationship status: Not on file    Intimate partner violence     Fear of current or ex partner: Not on file     Emotionally abused: Not on file     Physically abused: Not on file     Forced sexual activity: Not on file   Other Topics Concern    Not on file   Social History Narrative    Not on file ALLERGIES: Latex; Sulfa (sulfonamide antibiotics); Tramadol; Augmentin [amoxicillin-pot clavulanate]; Morphine; Rizatriptan; Tetanus immune globulin; Xanax [alprazolam]; and Zonegran [zonisamide]    Review of Systems   Constitutional: Negative for chills and fever. Respiratory: Negative for cough. Gastrointestinal: Positive for blood in stool, nausea and vomiting. Negative for abdominal pain, constipation and diarrhea. Genitourinary: Negative for dysuria. Musculoskeletal: Negative for arthralgias and myalgias. Neurological: Negative for headaches. All other systems reviewed and are negative. Vitals:    10/06/20 0148 10/06/20 0235   BP: (!) 152/74 (!) 146/67   Pulse: 92 78   Resp: 16    Temp: 98.7 °F (37.1 °C)    SpO2: 94% 99%   Weight: 58.1 kg (128 lb)    Height: 5' 3\" (1.6 m)             Physical Exam  Vitals signs and nursing note reviewed. Constitutional:       General: She is not in acute distress. Appearance: She is well-developed. HENT:      Head: Normocephalic and atraumatic. Right Ear: External ear normal.      Left Ear: External ear normal.   Eyes:      Conjunctiva/sclera: Conjunctivae normal.      Pupils: Pupils are equal, round, and reactive to light. Neck:      Musculoskeletal: Normal range of motion and neck supple. Cardiovascular:      Rate and Rhythm: Normal rate and regular rhythm. Pulses: Normal pulses. Heart sounds: Murmur (2/6) present. Pulmonary:      Effort: Pulmonary effort is normal.      Breath sounds: Normal breath sounds. Abdominal:      General: Bowel sounds are normal. There is no abdominal bruit. Palpations: Abdomen is soft. There is splenomegaly. There is no shifting dullness, hepatomegaly or mass. Tenderness: There is abdominal tenderness in the epigastric area. There is no right CVA tenderness, left CVA tenderness, guarding or rebound. Negative signs include Saunders's sign and McBurney's sign.       Hernia: No hernia is present. Musculoskeletal: Normal range of motion. Skin:     General: Skin is warm and dry. Capillary Refill: Capillary refill takes less than 2 seconds. Neurological:      Mental Status: She is alert and oriented to person, place, and time. MDM  Number of Diagnoses or Management Options     Amount and/or Complexity of Data Reviewed  Clinical lab tests: ordered and reviewed  Review and summarize past medical records: yes (Copy of the patient's last oncology notes:  Heraclio Hawley MD   Oncology  Myelofibrosis [facility]) +5 more   Dx  Referred by Heraclio Hawley MD   Reason for Visit   Progress Notes          Humberto Casiano is a 61 y.o. female who was seen by synchronous (real-time) audio-video technology on 9/28/2020 for No chief complaint on file.             Assessment & Plan:  Diagnoses and all orders for this visit:     1. Myelofibrosis ([address]     2. Splenomegaly     3. Portal vein thrombosis     4. CML (chronic myelocytic leukemia) (HCC)     5. Iron deficiency anemia, unspecified iron deficiency anemia type           9/28/2020: Patient seen over telemedicine. Reports being in touch with [place] Dr. Enedelia Donovan, with further studies including EGD/[place]/abdominal MRI planned for 10/28/2020 in anticipation of splenectomy thereafter. Last BCR ABL PCR with detectable disease at 0.036% for first time (she had been negative up till now, remains in MMR). Reports at times misses her dasatinib 3-4 times last month. Educated on compliance. Also on ruxolitinib however only able to take this 5 mg twice daily every other day or so. Remains on enoxaparin, denies any significant GI bleeding or change in color of stools. Status post recent IV iron x2. Not anemic today, iron stores adequate today. Percocet as needed refills provided. BCR ABL PCR pending from today and will be followed.   Continue monthly visits.         )    Risk of Complications, Morbidity, and/or Mortality  Presenting problems: high  Diagnostic procedures: minimal  Management options: moderate    Patient Progress  Patient progress: stable         Procedures    The patient was observed in the ED. Thus far, there is been no further vomiting and vital signs remained stable. For that reason there was no rush to reverse her Lovenox with protamine. Case was discussed with the hospitalist due to the high risk nature of the patient's problem who will evaluate in the emergency department. Results Reviewed:      Recent Results (from the past 24 hour(s))   CBC WITH AUTOMATED DIFF    Collection Time: 10/06/20  1:57 AM   Result Value Ref Range    WBC 8.5 4.3 - 11.1 K/uL    RBC 4.40 4.05 - 5.2 M/uL    HGB 13.8 11.7 - 15.4 g/dL    HCT 41.3 35.8 - 46.3 %    MCV 93.9 79.6 - 97.8 FL    MCH 31.4 26.1 - 32.9 PG    MCHC 33.4 31.4 - 35.0 g/dL    RDW 16.8 (H) 11.9 - 14.6 %    PLATELET 322 046 - 407 K/uL    MPV 10.5 9.4 - 12.3 FL    ABSOLUTE NRBC 0.00 0.0 - 0.2 K/uL    DF AUTOMATED      NEUTROPHILS 76 43 - 78 %    LYMPHOCYTES 16 13 - 44 %    MONOCYTES 4 4.0 - 12.0 %    EOSINOPHILS 3 0.5 - 7.8 %    BASOPHILS 1 0.0 - 2.0 %    IMMATURE GRANULOCYTES 0 0.0 - 5.0 %    ABS. NEUTROPHILS 6.5 1.7 - 8.2 K/UL    ABS. LYMPHOCYTES 1.4 0.5 - 4.6 K/UL    ABS. MONOCYTES 0.3 0.1 - 1.3 K/UL    ABS. EOSINOPHILS 0.3 0.0 - 0.8 K/UL    ABS. BASOPHILS 0.1 0.0 - 0.2 K/UL    ABS. IMM. GRANS. 0.0 0.0 - 0.5 K/UL   METABOLIC PANEL, COMPREHENSIVE    Collection Time: 10/06/20  1:57 AM   Result Value Ref Range    Sodium 139 136 - 145 mmol/L    Potassium 4.4 3.5 - 5.1 mmol/L    Chloride 106 98 - 107 mmol/L    CO2 30 21 - 32 mmol/L    Anion gap 3 (L) 7 - 16 mmol/L    Glucose 97 65 - 100 mg/dL    BUN 24 (H) 8 - 23 MG/DL    Creatinine 0.65 0.6 - 1.0 MG/DL    GFR est AA >60 >60 ml/min/1.73m2    GFR est non-AA >60 >60 ml/min/1.73m2    Calcium 8.5 8.3 - 10.4 MG/DL    Bilirubin, total 0.5 0.2 - 1.1 MG/DL    ALT (SGPT) 45 12 - 65 U/L    AST (SGOT) 29 15 - 37 U/L    Alk.  phosphatase 90 50 - 136 U/L    Protein, total 6.6 6.3 - 8.2 g/dL    Albumin 3.8 3.2 - 4.6 g/dL    Globulin 2.8 2.3 - 3.5 g/dL    A-G Ratio 1.4 1.2 - 3.5     LIPASE    Collection Time: 10/06/20  1:57 AM   Result Value Ref Range    Lipase 74 73 - 393 U/L   MAGNESIUM    Collection Time: 10/06/20  1:57 AM   Result Value Ref Range    Magnesium 2.1 1.8 - 2.4 mg/dL   PROTHROMBIN TIME + INR    Collection Time: 10/06/20  1:57 AM   Result Value Ref Range    Prothrombin time 15.1 (H) 12.0 - 14.7 sec    INR 1.2     PTT    Collection Time: 10/06/20  1:57 AM   Result Value Ref Range    aPTT 41.0 (H) 24.3 - 35.4 SEC   TYPE & SCREEN    Collection Time: 10/06/20  2:08 AM   Result Value Ref Range    Crossmatch Expiration 10/09/2020     ABO/Rh(D) B NEGATIVE     Antibody screen NEG

## 2020-10-06 NOTE — PROGRESS NOTES
10/06/20 0500   Dual Skin Pressure Injury Assessment   Dual Skin Pressure Injury Assessment WDL   Second Care Provider (Based on [address] [place] [address]) Julian Payne RN   Skin Integumentary   Skin Integumentary (WDL) WDL    Pressure  Injury Documentation No Pressure Injury Noted-Pressure Ulcer Prevention Initiated   Skin Color Appropriate for ethnicity   Skin Condition/Temp Flaky; Warm;Dry   Skin Integrity Intact   Turgor Epidermis thin w/ loss of subcut tissue   Wound Prevention and Protection Methods   Orientation of Wound Prevention Posterior   Location of Wound Prevention Sacrum/Coccyx   Dressing Present  No   Wound Offloading (Prevention Methods) Bed, pressure reduction mattress

## 2020-10-06 NOTE — ED TRIAGE NOTES
Arrives with face mask in place. Arrives from home via [facility] with report vomiting bright red blood x2 episodes. Onset approx 45mins pta. Reports LUQ abdominal pain, lightheadedness. Denies blood to stool. Hx esophageal varices r/t spleen. Scheduled for EGD/colonoscopy 10/28 at [facility].  Currently on lovenox

## 2020-10-06 NOTE — PROGRESS NOTES
Hospitalist Note     Admit Date:  10/6/2020  1:54 AM   Name:  Terence Becker   Age:  61 y.o.  :  1960   MRN:  [id number]   PCP:  Anatoliy Reich MD  Treatment Team: Attending Provider: Juarez Davalos MD; Consulting Provider: Efrain Baxter MD; Utilization Review: Qi Hahn RN    HPI/Subjective:   Ms. Marion Cleveland is a nice 62 y/o WF with a h/o CML, PV thrombosis on Lovenox, esophageal varices, splenomegaly, polycythemia who presented on 10/6 with hematemesis. Hb at time of presentation was 13.8.    10/6: No further bleeding since admission. Some left sided abdominal pain. HD stable. She refused octreotide this AM but is now agreeable to it. No other complaints  Objective:     Patient Vitals for the past 24 hrs:   Temp Pulse Resp BP SpO2   10/06/20 0710 98.5 °F (36.9 °C) 67 18 118/64 97 %   10/06/20 0500 98.9 °F (37.2 °C) 82 19 109/67 99 %   10/06/20 0433 98 °F (36.7 °C) 80 16 (!) 116/58 99 %   10/06/20 0306 -- -- -- (!) 116/58 --   10/06/20 0235 -- 78 -- (!) 146/67 99 %   10/06/20 0148 98.7 °F (37.1 °C) 92 16 (!) 152/74 94 %     Oxygen Therapy  O2 Sat (%): 97 % (10/06/20 0710)  Pulse via Oximetry: 78 beats per minute (10/06/20 0235)  O2 Device: Room air (10/06/20 0433)    Estimated body mass index is 22.67 kg/m² as calculated from the following:    Height as of this encounter: 5' 3\" (1.6 m). Weight as of this encounter: 58.1 kg (128 lb). Intake/Output Summary (Last 24 hours) at 10/6/2020 0839  Last data filed at 10/6/2020 3595  Gross per 24 hour   Intake --   Output 200 ml   Net -200 ml       *Note that automatically entered I/Os may not be accurate; dependent on patient compliance with collection and accurate  by [employer]. General:    Well nourished. Alert. CV:   RRR. No murmur, rub, or gallop. Lungs:   CTAB. No wheezing, rhonchi, or rales. Abdomen:   Soft, nontender, nondistended. Easily appreciable splenomegaly, non-tender. Extremities: Warm and dry.   No cyanosis or edema. Skin:     No rashes or jaundice. Neuro:  No gross focal deficits    Data Reviewed:  I have reviewed all labs, meds, and studies from the last 24 hours:  Recent Results (from the past 24 hour(s))   CBC WITH AUTOMATED DIFF    Collection Time: 10/06/20  1:57 AM   Result Value Ref Range    WBC 8.5 4.3 - 11.1 K/uL    RBC 4.40 4.05 - 5.2 M/uL    HGB 13.8 11.7 - 15.4 g/dL    HCT 41.3 35.8 - 46.3 %    MCV 93.9 79.6 - 97.8 FL    MCH 31.4 26.1 - 32.9 PG    MCHC 33.4 31.4 - 35.0 g/dL    RDW 16.8 (H) 11.9 - 14.6 %    PLATELET 644 076 - 764 K/uL    MPV 10.5 9.4 - 12.3 FL    ABSOLUTE NRBC 0.00 0.0 - 0.2 K/uL    DF AUTOMATED      NEUTROPHILS 76 43 - 78 %    LYMPHOCYTES 16 13 - 44 %    MONOCYTES 4 4.0 - 12.0 %    EOSINOPHILS 3 0.5 - 7.8 %    BASOPHILS 1 0.0 - 2.0 %    IMMATURE GRANULOCYTES 0 0.0 - 5.0 %    ABS. NEUTROPHILS 6.5 1.7 - 8.2 K/UL    ABS. LYMPHOCYTES 1.4 0.5 - 4.6 K/UL    ABS. MONOCYTES 0.3 0.1 - 1.3 K/UL    ABS. EOSINOPHILS 0.3 0.0 - 0.8 K/UL    ABS. BASOPHILS 0.1 0.0 - 0.2 K/UL    ABS. IMM. GRANS. 0.0 0.0 - 0.5 K/UL   METABOLIC PANEL, COMPREHENSIVE    Collection Time: 10/06/20  1:57 AM   Result Value Ref Range    Sodium 139 136 - 145 mmol/L    Potassium 4.4 3.5 - 5.1 mmol/L    Chloride 106 98 - 107 mmol/L    CO2 30 21 - 32 mmol/L    Anion gap 3 (L) 7 - 16 mmol/L    Glucose 97 65 - 100 mg/dL    BUN 24 (H) 8 - 23 MG/DL    Creatinine 0.65 0.6 - 1.0 MG/DL    GFR est AA >60 >60 ml/min/1.73m2    GFR est non-AA >60 >60 ml/min/1.73m2    Calcium 8.5 8.3 - 10.4 MG/DL    Bilirubin, total 0.5 0.2 - 1.1 MG/DL    ALT (SGPT) 45 12 - 65 U/L    AST (SGOT) 29 15 - 37 U/L    Alk.  phosphatase 90 50 - 136 U/L    Protein, total 6.6 6.3 - 8.2 g/dL    Albumin 3.8 3.2 - 4.6 g/dL    Globulin 2.8 2.3 - 3.5 g/dL    A-G Ratio 1.4 1.2 - 3.5     LIPASE    Collection Time: 10/06/20  1:57 AM   Result Value Ref Range    Lipase 74 73 - 393 U/L   MAGNESIUM    Collection Time: 10/06/20  1:57 AM   Result Value Ref Range    Magnesium 2.1 1.8 - 2.4 mg/dL   PROTHROMBIN TIME + INR    Collection Time: 10/06/20  1:57 AM   Result Value Ref Range    Prothrombin time 15.1 (H) 12.0 - 14.7 sec    INR 1.2     PTT    Collection Time: 10/06/20  1:57 AM   Result Value Ref Range    aPTT 41.0 (H) 24.3 - 35.4 SEC   TYPE & SCREEN    Collection Time: 10/06/20  2:08 AM   Result Value Ref Range    Crossmatch Expiration 10/09/2020     ABO/Rh(D) B NEGATIVE     Antibody screen NEG    CBC W/O DIFF    Collection Time: 10/06/20  7:14 AM   Result Value Ref Range    WBC 6.5 4.3 - 11.1 K/uL    RBC 3.76 (L) 4.05 - 5.2 M/uL    HGB 11.9 11.7 - 15.4 g/dL    HCT 36.0 35.8 - 46.3 %    MCV 95.7 79.6 - 97.8 FL    MCH 31.6 26.1 - 32.9 PG    MCHC 33.1 31.4 - 35.0 g/dL    RDW 16.6 (H) 11.9 - 14.6 %    PLATELET 865 454 - 166 K/uL    MPV 10.4 9.4 - 12.3 FL    ABSOLUTE NRBC 0.00 0.0 - 0.2 K/uL        Current Meds:  Current Facility-Administered Medications   Medication Dose Route Frequency    sodium chloride (NS) flush 5-40 mL  5-40 mL IntraVENous Q8H    sodium chloride (NS) flush 5-40 mL  5-40 mL IntraVENous PRN    ondansetron (ZOFRAN) injection 4 mg  4 mg IntraVENous Q4H PRN    pantoprazole (PROTONIX) 40 mg in 0.9% sodium chloride 10 mL injection  40 mg IntraVENous BID    octreotide (SANDOSTATIN) 500 mcg in 0.9% sodium chloride 500 mL infusion  50 mcg/hr IntraVENous ONCE    octreotide (SANDOSTATIN) injection 50 mcg  50 mcg IntraVENous ONCE    HYDROmorphone (PF) (DILAUDID) injection 0.5 mg  0.5 mg IntraVENous Q4H PRN       Other Studies:  No results found for this visit on 10/06/20. No results found.     All Micro Results     None          SARS-CoV-2 Lab Results  \"Novel Coronavirus\" Test: No results found for: COV2NT   \"Emergent Disease\" Test: No results found for: EDPR  \"SARS-COV-2\" Test: No results found for: XGCOVT  Rapid Test: No results found for: COVR         Assessment and Plan:     Hospital Problems as of 10/6/2020 Date Reviewed: 8/31/2020          Codes Class Noted - Resolved POA    * (Principal) Hematemesis ICD-10-CM: K92.0  ICD-9-CM: 578.0  10/6/2020 - Present Yes        Esophageal varices ([facility] ICD-10-CM: I85.00  ICD-9-CM: 456.1  Unknown - Present Yes    Overview Signed 10/6/2020  4:12 AM by Trena Murray MD     grade 3             Splenomegaly ICD-10-CM: R16.1  ICD-9-CM: 789.2  3/21/2019 - Present Yes        Primary hypothyroidism ICD-10-CM: E03.9  ICD-9-CM: 244.9  Unknown - Present Yes        DVT (deep venous thrombosis) ([facility] ICD-10-CM: I82.409  ICD-9-CM: 453.40  10/26/2016 - Present Yes    Overview Signed 10/26/2016 10:27 PM by Kaity Sanders. Right subclavian               CML (chronic myelocytic leukemia) ([facility] ICD-10-CM: C92.10  ICD-9-CM: 205.10  3/30/2015 - Present Yes        Acquired hypercoagulable state ([facility] ICD-10-CM: M83.67  ICD-9-CM: 289.81  3/30/2015 - Present Yes        AKOSUA (iron deficiency anemia) ICD-10-CM: D50.9  ICD-9-CM: 280.9  7/28/2012 - Present Yes        Cirrhosis ([facility] ICD-10-CM: K74.60  ICD-9-CM: 571.5  7/27/2012 - Present Yes        Portal vein thrombosis ICD-10-CM: S87  ICD-9-CM: 526  6/20/2012 - Present Yes    Overview Signed 10/6/2020  4:12 AM by Trena Murray MD     Ct scan 6-21-2 Apparent occlusion of the portal vein. In addition, the splenic vein, and superior mesenteric vein are not definitely seen and are also likely  occluded. Splenomegaly, and extensive varices are seen as described above consistent with the portal vein occlusion 7-05-12 on arixtra HIT negative on repeat 7-27-12 re admitted Cirrhotic appearance of the liver. Mild to moderate ascites, as                   Plan:  # Hematemesis   - Hx esophageal varices, on Lovenox for PV thrombosis. Now agreeable to octreotide, monitor H/H, PPI, GI to see, NPO. # H/o PV thrombosis   - Lovenox held 2/2 above    # H/o CML   - Outpatient f/u    # Hypothyroidism   - Synthroid    DC planning/Dispo: Likely home when able. Diet:  DIET NPO  DVT ppx: SCDs only. Signed:  Dylan Anaya MD

## 2020-10-06 NOTE — ED NOTES
TRANSFER - OUT REPORT:    Verbal report given to [facility] RN on Rubi Knight  being transferred to 2 floor for routine progression of care       Report consisted of patients Situation, Background, Assessment and   Recommendations(SBAR). Information from the following report(s) SBAR, ED Summary and MAR was reviewed with the receiving nurse. Lines:   Peripheral IV 10/06/20 Right Antecubital (Active)        Opportunity for questions and clarification was provided.       Patient transported with:   [employer]

## 2020-10-06 NOTE — PROGRESS NOTES
END OF SHIFT NOTE:    INTAKE/OUTPUT  10/05 0701 - 10/06 0700  In: -   Out: 200 [Urine:200]  Voiding: YES  Catheter: NO  Drain:              Flatus: Patient does have flatus present. Stool:  1 occurrences. Characteristics:  Stool Assessment  Stool Color: Leydi Ignacio  Stool Appearance: Loose  Stool Amount: Medium  Stool Source/Status: Rectum    Emesis: 0 occurrences. Characteristics:        VITAL SIGNS  Patient Vitals for the past 12 hrs:   Temp Pulse Resp BP SpO2   10/06/20 1819 -- 74 -- 98/62 98 %   10/06/20 1522 98 °F (36.7 °C) 76 19 (!) 96/51 96 %   10/06/20 1055 98.7 °F (37.1 °C) 81 18 (!) 104/59 97 %   10/06/20 0710 98.5 °F (36.9 °C) 67 18 118/64 97 %       Pain Assessment  Pain Intensity 1: 0 (10/06/20 1430)  Pain Location 1: Flank, Abdomen  Pain Intervention(s) 1: Medication (see MAR)  Patient Stated Pain Goal: 0    Ambulating  Yes    Shift report given to oncoming nurse at the bedside.     Katie Slater

## 2020-10-06 NOTE — PROGRESS NOTES
END OF SHIFT NOTE:    INTAKE/OUTPUT  10/05 0701 - 10/06 0700  In: -   Out: 200 [Urine:200]  Voiding: YES  Catheter: NO  Drain:              Flatus: Patient does have flatus present. Stool:  0 occurrences. Characteristics:       Emesis: 0 occurrences. Characteristics:        VITAL SIGNS  Patient Vitals for the past 12 hrs:   Temp Pulse Resp BP SpO2   10/06/20 0710 98.5 °F (36.9 °C) 67 18 118/64 97 %   10/06/20 0500 98.9 °F (37.2 °C) 82 19 109/67 99 %   10/06/20 0433 98 °F (36.7 °C) 80 16 (!) 116/58 99 %   10/06/20 0306 -- -- -- (!) 116/58 --   10/06/20 0235 -- 78 -- (!) 146/67 99 %   10/06/20 0148 98.7 °F (37.1 °C) 92 16 (!) 152/74 94 %       Pain Assessment  Pain Intensity 1: 5 (10/06/20 0610)  Pain Location 1: Flank, Abdomen  Pain Intervention(s) 1: Medication (see MAR)  Patient Stated Pain Goal: 0    Ambulating  Yes    Shift report given to oncoming nurse at the bedside.     [address]

## 2020-10-06 NOTE — CONSULTS
[facility] Consult Note       Primary GI Physician: Dr Rosalind Verma    Referring Provider:  Dr Norris Born Date:  10/6/2020    Admit Date:  10/6/2020    Chief Complaint:  GIB    Subjective:     History of Present Illness:  Patient is a 61 y.o. 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 Prashant Espino for UGIB. Her last EGD was 2/25/19 with grade 2 esophageal varices, diffuse portal hypertensive gastropathy, more prominent in the antrum and gastric varices suspected at the GE junction. Colonscopy 1/31/25 with diminuative transverse colon polyp not removed due to pt being on coumadin at that time. She has been followed with [place]  Dr. Ray Sands, with further studies including EGD/[place]/abdominal MRI planned for 10/28/2020 in anticipation of splenectomy thereafter. Last BCR ABL PCR with detectable disease at 0.036% for first time (she had been negative up till now, remains in MMR). Reports at times misses her dasatinib 3-4 times last month. Also on ruxolitinib however only able to take this 5 mg twice daily every other day or so. She is also followed by hematology and gets IV iron. She was admitted last night after vomiting bright red blood x2. She reports feeling hungry w/o nausea and vomiting a large amount of bright red blood with clots. No nausea now and no more vomiting since last night. Reports a normal stool yesterday with no tarry stools or diarrhea. She denies NSAID or ETOH use. Hgb on admission was 11.9 with elevated BUN 24.   She has been put on Sandostatin, and PPI IV BID        ESOPHAGOGASTRODUODENOSCOPY  2/25/2019  FINDINGS:  OROPHARYNX: Cords and pyriform recesses normal.  ESOPHAGUS: The esophagus is normal. The proximal, mid and distal portions are normal. The Z-Line is intact. 2 columns grade 2 varices  STOMACH: There is diffuse portal hypertensive gastropathy, more prominent in the antrum. There are gastric varices suspected at the GE junction. DUODENUM-the bulb and second portions are normal  ASSESSMENT:  1. Gastric and esophageal varices, begin nadolol unless there is a contraindication. Rahul Delcid MD  [facility]    Electronically signed by Rahul Delcid MD at 02/25/2019 12:30 PM EST          PMH:  Past Medical History:   Diagnosis Date    Anemia     C. difficile diarrhea 4/1/2015    Cerebral edema ([address] 4/1/2015    Chronic migraine 9/22/2016    Chronic myelogenous leukemia ([address]     [place] chromosome/ converted from polycythemia in 2009- to 2012 when she was dx w leukemia    Chronic pain     Coagulation disorder ([address]     \"clotting and Bleeding Problem\" dr Terence Sandoval follows     Esophageal spasm     with banding     Esophageal varices ([address]     grade 3    GI bleed 8/3/2016    H/O craniotomy     3-29-15.  due to blood clot - which caused a seizure  - then pt fell and hit     Leukocytosis 3/14/2015    MRSA colonization 6/25/2012    Polycythemia vera(238.4)     JAK2 mutation    Portal hypertension ([address]     with varices    Portal vein thrombosis 6/20/2012    Ct scan 6-21-2 Apparent occlusion of the portal vein. In addition, the splenic vein, and superior mesenteric vein are not definitely seen and are also likely  occluded. Splenomegaly, and extensive varices are seen as described above consistent with the portal vein occlusion 7-05-12 on arixtra HIT negative on repeat 7-27-12 re admitted Cirrhotic appearance of the liver.  Mild to moderate ascites, as    Primary hypothyroidism     Pulmonary embolism ([address] 2006    not on coumadin anymore     S/P exploratory laparotomy, 6/20/12 6/25/2012    Bowel resection:  336 cm of small bowel removed, approximately 9 feet and placement of feeding jejunostomy.  S/P small bowel resection     .  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 2012 platelets lower repeat HIT 12 platelets in 03,930'K    Thrombosis, portal vein     portal , spleenic and recently superior mesenteric    Transfusion history     many blood tranfusions - last 3-29-15 after brain surgery    Traumatic hemorrhage of right cerebrum ([address] 3/30/2015       PSH:  Past Surgical History:   Procedure Laterality Date    ABDOMEN SURGERY PROC UNLISTED      umbilical hernia repair    ABDOMEN SURGERY PROC UNLISTED      9ft of small bowel excised then reconnected    HX APPENDECTOMY      with small bowel resection    HX BREAST BIOPSY Bilateral     Lt - 1998; Rt -     HX CHOLECYSTECTOMY      HX GI      liver biopsy    HX GI      bowel removed small - 9 feet     HX GYN       x 2    HX GYN      D&C following miscarriage    HX ORTHOPAEDIC Left 2008    torn labrum shoulder (screw in place)    NEUROLOGICAL PROCEDURE UNLISTED  2010    craniotomy to evacuate subdural hematoma following a fall from a seizure       Allergies: Allergies   Allergen Reactions    Latex Other (comments)     Testing for latex allergy was positive as a 2 (on a scale of 1 to 3).     Sulfa (Sulfonamide Antibiotics) Anaphylaxis    Tramadol Other (comments)     Top lip swelled    Augmentin [Amoxicillin-Pot Clavulanate] Rash    Morphine Nausea and Vomiting    Rizatriptan Rash    Tetanus Immune Globulin Other (comments)     Heat, bruising, and swelling at  Site of injection    Xanax [Alprazolam] Other (comments)     Hallucinations    Zonegran [Zonisamide] Rash       Home Medications:  Prior to Admission medications    Medication Sig Start Date End Date Taking? Authorizing Provider   oxyCODONE-acetaminophen (PERCOCET 10)  mg per tablet Take 1 Tab by mouth every eight (8) hours as needed for Pain for up to 30 days. Max Daily Amount: 3 Tabs. 9/29/20 10/29/20 Yes Marta Kennedy MD   ondansetron (ZOFRAN ODT) 4 mg disintegrating tablet Take 1 Tab by mouth every eight (8) hours as needed for Nausea. 8/3/20  Yes Marta Kennedy MD   zolpidem (AMBIEN) 10 mg tablet Take 1 Tab by mouth nightly as needed for Sleep. Max Daily Amount: 10 mg. 7/30/20  Yes Marta Kennedy MD   Synthroid 112 mcg tablet 1 tablet once a day with an extra 1/2 tablet on Sundays 4/3/20  Yes Claude Berliner, MD   ruxolitinib 5 mg tab Take 1 Tab by mouth two (2) times a day. 2/27/20  Yes Marta Kennedy MD   dasatinib [facility]) 80 mg tab Take 1 Tab by mouth daily. 1/13/20  Yes Marta Kennedy MD   enoxaparin (LOVENOX) 60 mg/0.6 mL injection INJECT 60 MG BY SUBCUTANEOUS ROUTE EVERY TWELVE (12) HOURS. 9/10/19  Yes Marta Kennedy MD   pseudoephedrine (SUDAFED) 30 mg tablet Take  by mouth every four (4) hours as needed for Congestion. Yes Provider, Historical   calcium carbonate (CALTREX) 600 mg calcium (1,500 mg) tablet Take 600 mg by mouth daily. Yes Provider, Historical   cholecalciferol (VITAMIN D3) 1,000 unit cap Take 1,000 Units by mouth daily. Yes Provider, Historical   lansoprazole (PREVACID) 30 mg capsule Take 30 mg by mouth daily. Yes Provider, Historical   ibuprofen (ADVIL) 200 mg tablet Take  by mouth. Last dose 1/16/19   Yes Provider, Historical   diphenoxylate-atropine (LOMOTIL) 2.5-0.025 mg per tablet Take 1 Tab by mouth four (4) times daily as needed for Diarrhea. Max Daily Amount: 4 Tabs.  3/21/17  Yes Marta Kennedy MD   predniSONE (DELTASONE) 10 mg tablet Take 40 mg (4 pills) for 2 days, then 30 mg (3 pills) x 2 days, then 20 mg (2 pills) x 2 days, then 10 mg (1 pill) x 2 days 20   Marco Antonio MD   calcium carbonate (TUMS) 200 mg calcium (500 mg) chew Take 1 Tab by mouth daily as needed. Provider, Historical       Hospital Medications:  Current Facility-Administered Medications   Medication Dose Route Frequency    sodium chloride (NS) flush 5-40 mL  5-40 mL IntraVENous Q8H    sodium chloride (NS) flush 5-40 mL  5-40 mL IntraVENous PRN    ondansetron (ZOFRAN) injection 4 mg  4 mg IntraVENous Q4H PRN    pantoprazole (PROTONIX) 40 mg in 0.9% sodium chloride 10 mL injection  40 mg IntraVENous BID    octreotide (SANDOSTATIN) 500 mcg in 0.9% sodium chloride 500 mL infusion  50 mcg/hr IntraVENous ONCE    octreotide (SANDOSTATIN) injection 50 mcg  50 mcg IntraVENous ONCE    HYDROmorphone (PF) (DILAUDID) injection 0.5 mg  0.5 mg IntraVENous Q4H PRN       Social History:  Social History     Tobacco Use    Smoking status: Former Smoker     Packs/day: 0.20     Years: 10.00     Pack years: 2.00     Types: Cigarettes     Last attempt to quit:      Years since quittin.7    Smokeless tobacco: Never Used   Substance Use Topics    Alcohol use: No       Pt denies any history of drug use, blood transfusions, or tattoos. Family History:  Family History   Problem Relation Age of Onset    Diabetes Mother     Stroke Mother         after surgery    Cancer Father         brain    No Known Problems Sister     Other Sister         diverticulitis    Other Sister         diverticulitis    Cancer Sister         lyjmphoma    Breast Cancer Maternal Aunt 48    Thyroid Disease Paternal Grandmother        Review of Systems:  A detailed 10 system ROS is obtained, with pertinent positives as listed above. All others are negative.     Diet:  NPO    Objective:     Physical Exam:  Vitals:  Visit Vitals  /64 (BP 1 Location: Left arm, BP Patient Position: Supine)   Pulse 67   Temp 98.5 °F (36.9 °C)   Resp 18   Ht 5' 3\" (1.6 m)   Wt 58.1 kg (128 lb)   SpO2 97%   BMI 22.67 kg/m²     Gen:  Pt is alert, cooperative, no acute distress  Skin:  Extremities and face reveal no rashes. HEENT: Sclerae anicteric. Extra-occular muscles are intact. No oral ulcers. No abnormal pigmentation of the lips. The neck is supple. Cardiovascular: Regular rate and rhythm. No murmurs, gallops, or rubs. Respiratory:  Comfortable breathing with no accessory muscle use. Clear breath sounds anteriorly with no wheezes, rales, or rhonchi. GI:  Abdomen nondistended, soft, and milt ttp LUQ. Normal active bowel sounds. No enlargement of the liver or spleen. No masses palpable. Rectal:  Deferred  Musculoskeletal:  +1 pitting edema of the lower legs. Neurological:  Gross memory appears intact. Patient is alert and oriented. Psychiatric:  Mood appears appropriate with judgement intact. Laboratory:    Recent Labs     10/06/20  0157   WBC 8.5   HGB 13.8   HCT 41.3      MCV 93.9      K 4.4      CO2 30   BUN 24*   CREA 0.65   CA 8.5   MG 2.1   GLU 97   AP 90   ALT 45   TBILI 0.5   ALB 3.8   TP 6.6   LPSE 74   PTP 15.1*   INR 1.2   APTT 41.0*          Assessment:     Principal Problem:    Hematemesis (10/6/2020)    Active Problems:    Cirrhosis ([facility] (7/27/2012)      AKOSUA (iron deficiency anemia) (7/28/2012)      CML (chronic myelocytic leukemia) ([facility] (3/30/2015)      Acquired hypercoagulable state ([facility] (3/30/2015)      DVT (deep venous thrombosis) ([facility] (10/26/2016)      Overview: Right subclavian            Primary hypothyroidism ()      Splenomegaly (3/21/2019)      Esophageal varices ([facility]) ()      Overview: grade 3      Portal vein thrombosis (6/20/2012)      Overview: Ct scan 6-21-2 Apparent occlusion of the portal vein. In addition, the       splenic vein, and superior mesenteric vein are not definitely seen and are       also likely  occluded.  Splenomegaly, and extensive varices are seen as       described above consistent with the portal vein occlusion 7-05-12 on       arixtra HIT negative on repeat 7-27-12 re admitted Cirrhotic appearance of       the liver. Mild to moderate ascites, as      61 y.o. 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 Last Colonscopy 1/31/25 with diminuative transverse colon polyp not removed due to pt being on coumadin at that time. She has been followed with [place]  Dr. Horacio Hayward, with further studies including EGD/[place]/abdominal MRI planned for 10/28/2020 in anticipation of splenectomy thereafter. Plan:     - NPO  - Monitor for signs of GIG, tarry stool, hematemesis  - Monitor H&H and transfuse for hgb < 7  - EGD - pt would like to defer esophageal banding as has appt 10/28 for this   - [place] appointment for EGD/[place]/abdominal MRI planned for 10/28/2020 in anticipation of splenectomy thereafter.    - Continue Sandostatin and PPI IV BID    Munira Paige NP  Patient is seen and examined in collaboration with Dr. Antwan Gonzalez. Assessment and plan as per Dr. Vinicius Ellis.

## 2020-10-06 NOTE — H&P (VIEW-ONLY)
[facility] Consult Note Primary GI Physician: Dr Bruce Lora Referring Provider:  Dr Cam Files Consult Date:  10/6/2020 Admit Date:  10/6/2020 Chief Complaint:  GIB Subjective:  
 
History of Present Illness:  Patient is a 61 y.o. 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 Hoang Conroy for UGIB. Her last EGD was 2/25/19 with grade 2 esophageal varices, diffuse portal hypertensive gastropathy, more prominent in the antrum and gastric varices suspected at the GE junction. Colonscopy 1/31/25 with diminuative transverse colon polyp not removed due to pt being on coumadin at that time. She has been followed with [facility]  Dr. Devaughn Pate, with further studies including EGD/[place]/abdominal MRI planned for 10/28/2020 in anticipation of splenectomy thereafter. Last BCR ABL PCR with detectable disease at 0.036% for first time (she had been negative up till now, remains in MMR). Reports at times misses her dasatinib 3-4 times last month. Also on ruxolitinib however only able to take this 5 mg twice daily every other day or so. She is also followed by hematology and gets IV iron. She was admitted last night after vomiting bright red blood x2. She reports feeling hungry w/o nausea and vomiting a large amount of bright red blood with clots. No nausea now and no more vomiting since last night. Reports a normal stool yesterday with no tarry stools or diarrhea. She denies NSAID or ETOH use. Hgb on admission was 11.9 with elevated BUN 24. She has been put on Sandostatin, and PPI IV BID 
  
  
ESOPHAGOGASTRODUODENOSCOPY  2/25/2019 FINDINGS: 
 OROPHARYNX: Cords and pyriform recesses normal. 
ESOPHAGUS: The esophagus is normal. The proximal, mid and distal portions are normal. The Z-Line is intact. 2 columns grade 2 varices STOMACH: There is diffuse portal hypertensive gastropathy, more prominent in the antrum. There are gastric varices suspected at the GE junction. DUODENUM-the bulb and second portions are normal 
ASSESSMENT: 
1. Gastric and esophageal varices, begin nadolol unless there is a contraindication. Naun Dickerson MD 
[facility]   
Electronically signed by Naun Dickerson MD at 02/25/2019 12:30 PM EST   
 
 
 
PMH: 
Past Medical History:  
Diagnosis Date  Anemia  C. difficile diarrhea 4/1/2015  Cerebral edema ([address] 4/1/2015  Chronic migraine 9/22/2016  Chronic myelogenous leukemia ([address] chromosome/ converted from polycythemia in 2009- to 2012 when she was dx w leukemia  Chronic pain  Coagulation disorder ([address] \"clotting and Bleeding Problem\" dr Eula Falcon follows  Esophageal spasm   
 with banding  Esophageal varices ([address] grade 3  
 GI bleed 8/3/2016  H/O craniotomy 3-29-15.  due to blood clot - which caused a seizure  - then pt fell and hit  Leukocytosis 3/14/2015  MRSA colonization 6/25/2012  Polycythemia vera(238.4) JAK2 mutation  Portal hypertension ([address] with varices  Portal vein thrombosis 6/20/2012 Ct scan 6-21-2 Apparent occlusion of the portal vein. In addition, the splenic vein, and superior mesenteric vein are not definitely seen and are also likely  occluded. Splenomegaly, and extensive varices are seen as described above consistent with the portal vein occlusion 7-05-12 on arixtra HIT negative on repeat 7-27-12 re admitted Cirrhotic appearance of the liver. Mild to moderate ascites, as  
 Primary hypothyroidism  Pulmonary embolism ([address] 2006  
 not on coumadin anymore  S/P exploratory laparotomy, 12 Bowel resection:  336 cm of small bowel removed, approximately 9 feet and placement of feeding jejunostomy.  S/P small bowel resection .  9 feet removed due to  gangrene which wa due to blood clot  Seizure ([address] 3/14/2015  Seizure disorder ([address] 3/30/2015  
 due to clotting factor  Seizures ([address]   
 last one 3- - followed by [facility]iy[facility]  Splenomegaly, congestive, chronic  Stroke (cerebrum) ([address] 2015  
 had bled into head- surgery  Stroke [facility])   
 pt had stroke like symptoms  Thrombocytopenia, HIT negative 2012 platelets lower repeat HIT 12 platelets in 77,681'R  Thrombosis, portal vein 2004  
 portal , spleenic and recently superior mesenteric  Transfusion history   
 many blood tranfusions - last 3-29-15 after brain surgery  Traumatic hemorrhage of right cerebrum ([address] 3/30/2015 PSH: 
Past Surgical History:  
Procedure Laterality Date  ABDOMEN SURGERY PROC UNLISTED    
 umbilical hernia repair  ABDOMEN SURGERY PROC UNLISTED    
 9ft of small bowel excised then reconnected  HX APPENDECTOMY    
 with small bowel resection  HX BREAST BIOPSY Bilateral   
 Lt - ; Rt -   HX CHOLECYSTECTOMY  HX GI    
 liver biopsy  HX GI    
 bowel removed small - 9 feet  HX GYN    
  x 2  
 HX GYN    
 D&C following miscarriage  HX ORTHOPAEDIC Left   
 torn labrum shoulder (screw in place)  NEUROLOGICAL PROCEDURE UNLISTED  2010  
 craniotomy to evacuate subdural hematoma following a fall from a seizure Allergies: Allergies Allergen Reactions  Latex Other (comments) Testing for latex allergy was positive as a 2 (on a scale of 1 to 3).  Sulfa (Sulfonamide Antibiotics) Anaphylaxis  Tramadol Other (comments) Top lip swelled  Augmentin [Amoxicillin-Pot Clavulanate] Rash  Morphine Nausea and Vomiting  Rizatriptan Rash  Tetanus Immune Globulin Other (comments) Heat, bruising, and swelling at  Site of injection  Xanax [Alprazolam] Other (comments) Hallucinations  Zonegran [Zonisamide] Rash Home Medications: 
Prior to Admission medications Medication Sig Start Date End Date Taking? Authorizing Provider  
oxyCODONE-acetaminophen (PERCOCET 10)  mg per tablet Take 1 Tab by mouth every eight (8) hours as needed for Pain for up to 30 days. Max Daily Amount: 3 Tabs. 9/29/20 10/29/20 Yes Sarita Henriquez MD  
ondansetron (ZOFRAN ODT) 4 mg disintegrating tablet Take 1 Tab by mouth every eight (8) hours as needed for Nausea. 8/3/20  Yes Sarita Henriquez MD  
zolpidem (AMBIEN) 10 mg tablet Take 1 Tab by mouth nightly as needed for Sleep. Max Daily Amount: 10 mg. 7/30/20  Yes Sarita Henriquez MD  
Synthroid 112 mcg tablet 1 tablet once a day with an extra 1/2 tablet on Sundays 4/3/20  Yes Ko Rodriguez MD  
ruxolitinib 5 mg tab Take 1 Tab by mouth two (2) times a day. 2/27/20  Yes Sarita Henriquez MD  
dasatinib [facility]) 80 mg tab Take 1 Tab by mouth daily. 1/13/20  Yes Sarita Henriquez MD  
enoxaparin (LOVENOX) 60 mg/0.6 mL injection INJECT 60 MG BY SUBCUTANEOUS ROUTE EVERY TWELVE (12) HOURS. 9/10/19  Yes Sarita Henriquez MD  
pseudoephedrine (SUDAFED) 30 mg tablet Take  by mouth every four (4) hours as needed for Congestion. Yes Provider, Historical  
calcium carbonate (CALTREX) 600 mg calcium (1,500 mg) tablet Take 600 mg by mouth daily. Yes Provider, Historical  
cholecalciferol (VITAMIN D3) 1,000 unit cap Take 1,000 Units by mouth daily. Yes Provider, Historical  
lansoprazole (PREVACID) 30 mg capsule Take 30 mg by mouth daily. Yes Provider, Historical  
ibuprofen (ADVIL) 200 mg tablet Take  by mouth. Last dose 1/16/19   Yes Provider, Historical  
diphenoxylate-atropine (LOMOTIL) 2.5-0.025 mg per tablet Take 1 Tab by mouth four (4) times daily as needed for Diarrhea.  Max Daily Amount: 4 Tabs. 3/21/17  Yes Bentley Couch MD  
predniSONE (DELTASONE) 10 mg tablet Take 40 mg (4 pills) for 2 days, then 30 mg (3 pills) x 2 days, then 20 mg (2 pills) x 2 days, then 10 mg (1 pill) x 2 days 20   Bentley Couch MD  
calcium carbonate (TUMS) 200 mg calcium (500 mg) chew Take 1 Tab by mouth daily as needed. Provider, Historical  
 
 
Hospital Medications: 
Current Facility-Administered Medications Medication Dose Route Frequency  sodium chloride (NS) flush 5-40 mL  5-40 mL IntraVENous Q8H  
 sodium chloride (NS) flush 5-40 mL  5-40 mL IntraVENous PRN  
 ondansetron (ZOFRAN) injection 4 mg  4 mg IntraVENous Q4H PRN  pantoprazole (PROTONIX) 40 mg in 0.9% sodium chloride 10 mL injection  40 mg IntraVENous BID  
 octreotide (SANDOSTATIN) 500 mcg in 0.9% sodium chloride 500 mL infusion  50 mcg/hr IntraVENous ONCE  
 octreotide (SANDOSTATIN) injection 50 mcg  50 mcg IntraVENous ONCE  
 HYDROmorphone (PF) (DILAUDID) injection 0.5 mg  0.5 mg IntraVENous Q4H PRN Social History: 
Social History Tobacco Use  Smoking status: Former Smoker Packs/day: 0.20 Years: 10.00 Pack years: 2.00 Types: Cigarettes Last attempt to quit:  Years since quittin.7  Smokeless tobacco: Never Used Substance Use Topics  Alcohol use: No  
 
 
Pt denies any history of drug use, blood transfusions, or tattoos. Family History: 
Family History Problem Relation Age of Onset  Diabetes Mother  Stroke Mother   
     after surgery  Cancer Father   
     brain  No Known Problems Sister  Other Sister   
     diverticulitis  Other Sister   
     diverticulitis  Cancer Sister   
     lyjmphoma  Breast Cancer Maternal Aunt 50  Thyroid Disease Paternal Grandmother Review of Systems: A detailed 10 system ROS is obtained, with pertinent positives as listed above. All others are negative. Diet:  NPO Objective:  
 
Physical Exam: 
Vitals: Visit Vitals /64 (BP 1 Location: Left arm, BP Patient Position: Supine) Pulse 67 Temp 98.5 °F (36.9 °C) Resp 18 Ht 5' 3\" (1.6 m) Wt 58.1 kg (128 lb) SpO2 97% BMI 22.67 kg/m² Gen:  Pt is alert, cooperative, no acute distress Skin:  Extremities and face reveal no rashes. HEENT: Sclerae anicteric. Extra-occular muscles are intact. No oral ulcers. No abnormal pigmentation of the lips. The neck is supple. Cardiovascular: Regular rate and rhythm. No murmurs, gallops, or rubs. Respiratory:  Comfortable breathing with no accessory muscle use. Clear breath sounds anteriorly with no wheezes, rales, or rhonchi. GI:  Abdomen nondistended, soft, and milt ttp LUQ. Normal active bowel sounds. No enlargement of the liver or spleen. No masses palpable. Rectal:  Deferred Musculoskeletal:  +1 pitting edema of the lower legs. Neurological:  Gross memory appears intact. Patient is alert and oriented. Psychiatric:  Mood appears appropriate with judgement intact. Laboratory:   
Recent Labs 10/06/20 
0157 WBC 8.5 HGB 13.8 HCT 41.3  MCV 93.9   
K 4.4  
 CO2 30 BUN 24* CREA 0.65 CA 8.5 MG 2.1 GLU 97 AP 90 ALT 45  
TBILI 0.5 ALB 3.8 TP 6.6 LPSE 74 PTP 15.1* INR 1.2 APTT 41.0* Assessment:  
 
Principal Problem: 
  Hematemesis (10/6/2020) Active Problems: 
  Cirrhosis ([facility] (7/27/2012) AKOSUA (iron deficiency anemia) (7/28/2012) CML (chronic myelocytic leukemia) ([facility] (3/30/2015) Acquired hypercoagulable state ([facility] (3/30/2015) DVT (deep venous thrombosis) [facility] (10/26/2016) Overview: Right subclavian Primary hypothyroidism () Splenomegaly (3/21/2019) Esophageal varices ([facility]) () Overview: grade 3 Portal vein thrombosis (6/20/2012) Overview: Ct scan 6-21-2 Apparent occlusion of the portal vein.  In addition, the  
 splenic vein, and superior mesenteric vein are not definitely seen and are  
    also likely  occluded. Splenomegaly, and extensive varices are seen as  
    described above consistent with the portal vein occlusion 7-05-12 on  
    arixtra HIT negative on repeat 7-27-12 re admitted Cirrhotic appearance of  
    the liver. Mild to moderate ascites, as 
 
 
61 y.o. 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 Last Colonscopy 1/31/25 with diminuative transverse colon polyp not removed due to pt being on coumadin at that time. She has been followed with [place]  Dr. Nancy Kinsey, with further studies including EGD/[place]/abdominal MRI planned for 10/28/2020 in anticipation of splenectomy thereafter. Plan:  
 
- NPO 
- Monitor for signs of GIG, tarry stool, hematemesis - Monitor H&H and transfuse for hgb < 7 
- EGD - pt would like to defer esophageal banding as has appt 10/28 for this - [facility] appointment for EGD/[place]/abdominal MRI planned for 10/28/2020 in anticipation of splenectomy thereafter.   
- Continue Sandostatin and PPI IV BID Marino Esquivel NP Patient is seen and examined in collaboration with Dr. Susie Regalado. Assessment and plan as per Dr. Lurdes Joseph.

## 2020-10-06 NOTE — PROGRESS NOTES
TRANSFER - IN REPORT:    Verbal report received from Andres roper RN(name) on Shahana Favorite  being received from ED(unit) for routine progression of care      Report consisted of patients Situation, Background, Assessment and   Recommendations(SBAR). Information from the following report(s) SBAR, ED Summary, Intake/Output, MAR and Recent Results was reviewed with the receiving nurse. Opportunity for questions and clarification was provided. Assessment to be completed upon patients arrival to unit and care assumed.

## 2020-10-06 NOTE — PROGRESS NOTES
Chart screened by  for discharge planning. No needs identified at this time. Please consult  if any new issues arise. CM will remain available for dc needs. Care Management Interventions  PCP Verified by CM:  Yes  Discharge Durable Medical Equipment: No  Physical Therapy Consult: No  Occupational Therapy Consult: No  Speech Therapy Consult: No  Current Support Network: Own Home, Lives Alone  Confirm Follow Up Transport: Family  Discharge Location  Discharge Placement: Home

## 2020-10-06 NOTE — H&P
Hospitalist H&P/Consult Note     Admit Date:  10/6/2020  1:54 AM   Name:  Bernardino Johnston   Age:  61 y.o.  :  1960   MRN:  [id number]   PCP:  Jeanne Bates MD  Treatment Team: Attending Provider: Scooby Garcia MD; Primary Nurse: Abdullahi Tran RN    HPI:   Patient is a pleasant 60 y/o female with hx CML, PCV, hypercoagulable state--on lovenox, esophageal varices, splenomegaly, portal HTN with hx portal vein thrombosis, seizure disorder who presents to ED tonight with 2 episodes of vomiting of bright red blood. She describes it as somewhat large volume. No real clots. Is not dark or coffee ground appearance. She has some epigastric discomfort. Has chronic pain from her splenic enlargement. She is due for EGD 10/28 at [facility]. She is also being evaluated for possible splenectomy. Her hemoglobin tonight is 13.8 which is actually up from  when it was 13.5. she is hemodynamically stable at time of admission. Hospitalist service called for admission. Will consult GI in am for possible EGD. 10 systems reviewed and negative except as noted in HPI. Past Medical History:   Diagnosis Date    Anemia     C. difficile diarrhea 2015    Cerebral edema ([address] 2015    Chronic migraine 2016    Chronic myelogenous leukemia ([address]     [place] chromosome/ converted from polycythemia in - to  when she was dx w leukemia    Chronic pain     Coagulation disorder ([address]     \"clotting and Bleeding Problem\" dr Roro Howard follows     Esophageal spasm     with banding     Esophageal varices ([address]     grade 3    GI bleed 8/3/2016    H/O craniotomy     3-29-15.  due to blood clot - which caused a seizure  - then pt fell and hit     Leukocytosis 3/14/2015    MRSA colonization 2012    Polycythemia vera(238.4)     JAK2 mutation    Portal hypertension ([address]     with varices    Portal vein thrombosis 2012    Ct scan  Apparent occlusion of the portal vein.  In addition, the splenic vein, and superior mesenteric vein are not definitely seen and are also likely  occluded. Splenomegaly, and extensive varices are seen as described above consistent with the portal vein occlusion 12 on arixtra HIT negative on repeat 12 re admitted Cirrhotic appearance of the liver. Mild to moderate ascites, as    Primary hypothyroidism     Pulmonary embolism ([address]     not on coumadin anymore     S/P exploratory laparotomy, 12    Bowel resection:  336 cm of small bowel removed, approximately 9 feet and placement of feeding jejunostomy.      S/P small bowel resection     .  9 feet removed due to  gangrene which wa due to blood clot    Seizure ([address] 3/14/2015    Seizure disorder ([address] 3/30/2015    due to clotting factor    Seizures ([address]     last one 3- - followed by aniyah     Splenomegaly, congestive, chronic     Stroke (cerebrum) ([address] 2015    had bled into head- surgery    Stroke [facility])     pt had stroke like symptoms     Thrombocytopenia, HIT negative 2012 platelets lower repeat HIT 12 platelets in 36,654'M    Thrombosis, portal vein 2004    portal , spleenic and recently superior mesenteric    Transfusion history     many blood tranfusions - last 3-29-15 after brain surgery    Traumatic hemorrhage of right cerebrum ([address] 3/30/2015      Past Surgical History:   Procedure Laterality Date    ABDOMEN SURGERY PROC UNLISTED      umbilical hernia repair    ABDOMEN SURGERY PROC UNLISTED      9ft of small bowel excised then reconnected    HX APPENDECTOMY      with small bowel resection    HX BREAST BIOPSY Bilateral     Lt - ; Rt -     HX CHOLECYSTECTOMY      HX GI      liver biopsy    HX GI      bowel removed small - 9 feet     HX GYN       x 2    HX GYN      D&C following miscarriage    HX ORTHOPAEDIC Left 2008    torn labrum shoulder (screw in place)    NEUROLOGICAL PROCEDURE UNLISTED  2010 craniotomy to evacuate subdural hematoma following a fall from a seizure      Prior to Admission Medications   Prescriptions Last Dose Informant Patient Reported? Taking? Synthroid 112 mcg tablet   No No   Si tablet once a day with an extra 1/2 tablet on Sundays   calcium carbonate (CALTREX) 600 mg calcium (1,500 mg) tablet   Yes No   Sig: Take 600 mg by mouth daily. calcium carbonate (TUMS) 200 mg calcium (500 mg) chew   Yes No   Sig: Take 1 Tab by mouth daily as needed. cholecalciferol (VITAMIN D3) 1,000 unit cap   Yes No   Sig: Take 1,000 Units by mouth daily. dasatinib (SPRYCEL) 80 mg tab   No No   Sig: Take 1 Tab by mouth daily. diphenoxylate-atropine (LOMOTIL) 2.5-0.025 mg per tablet   No No   Sig: Take 1 Tab by mouth four (4) times daily as needed for Diarrhea. Max Daily Amount: 4 Tabs.   enoxaparin (LOVENOX) 60 mg/0.6 mL injection   No No   Sig: INJECT 60 MG BY SUBCUTANEOUS ROUTE EVERY TWELVE (12) HOURS.   ibuprofen (ADVIL) 200 mg tablet   Yes No   Sig: Take  by mouth. Last dose 19   lansoprazole (PREVACID) 30 mg capsule   Yes No   Sig: Take 30 mg by mouth daily. ondansetron (ZOFRAN ODT) 4 mg disintegrating tablet   No No   Sig: Take 1 Tab by mouth every eight (8) hours as needed for Nausea. oxyCODONE-acetaminophen (PERCOCET 10)  mg per tablet   No No   Sig: Take 1 Tab by mouth every eight (8) hours as needed for Pain for up to 30 days. Max Daily Amount: 3 Tabs. predniSONE (DELTASONE) 10 mg tablet   No No   Sig: Take 40 mg (4 pills) for 2 days, then 30 mg (3 pills) x 2 days, then 20 mg (2 pills) x 2 days, then 10 mg (1 pill) x 2 days   pseudoephedrine (SUDAFED) 30 mg tablet   Yes No   Sig: Take  by mouth every four (4) hours as needed for Congestion. ruxolitinib 5 mg tab   No No   Sig: Take 1 Tab by mouth two (2) times a day. zolpidem (AMBIEN) 10 mg tablet   No No   Sig: Take 1 Tab by mouth nightly as needed for Sleep. Max Daily Amount: 10 mg.       Facility-Administered Medications: None     Allergies   Allergen Reactions    Latex Other (comments)     Testing for latex allergy was positive as a 2 (on a scale of 1 to 3).  Sulfa (Sulfonamide Antibiotics) Anaphylaxis    Tramadol Other (comments)     Top lip swelled    Augmentin [Amoxicillin-Pot Clavulanate] Rash    Morphine Nausea and Vomiting    Rizatriptan Rash    Tetanus Immune Globulin Other (comments)     Heat, bruising, and swelling at  Site of injection    Xanax [Alprazolam] Other (comments)     Hallucinations    Zonegran [Zonisamide] Rash      Social History     Tobacco Use    Smoking status: Former Smoker     Packs/day: 0.20     Years: 10.00     Pack years: 2.00     Types: Cigarettes     Last attempt to quit:      Years since quittin.7    Smokeless tobacco: Never Used   Substance Use Topics    Alcohol use: No      Family History   Problem Relation Age of Onset    Diabetes Mother     Stroke Mother         after surgery    Cancer Father         brain    No Known Problems Sister     Other Sister         diverticulitis    Other Sister         diverticulitis    Cancer Sister         lyjmphoma    Breast Cancer Maternal Aunt 48    Thyroid Disease Paternal Grandmother       Immunization History   Administered Date(s) Administered    (RETIRED) Pneumococcal Vaccine (Unspecified Type) 2008    Hib (PRP-T) 2019    Influenza Vaccine 10/01/2016    Pneumococcal Conjugate (PCV-13) 2019    TB Skin Test (PPD) Intradermal 2014       Objective:     Patient Vitals for the past 24 hrs:   Temp Pulse Resp BP SpO2   10/06/20 0306 -- -- -- (!) 116/58 --   10/06/20 0235 -- 78 -- (!) 146/67 99 %   10/06/20 0148 98.7 °F (37.1 °C) 92 16 (!) 152/74 94 %     Oxygen Therapy  O2 Sat (%): 99 % (10/06/20 0235)  Pulse via Oximetry: 78 beats per minute (10/06/20 0235)  O2 Device: Room air (10/06/20 0148)  No intake or output data in the 24 hours ending 10/06/20 0417    Physical Exam:  General:    thin. Alert.    Eyes:   Normal sclera. Extraocular movements intact. ENT:  Normocephalic, atraumatic. Moist mucous membranes  CV:   RRR. No murmur, rub, or gallop. Lungs:  CTAB. No wheezing, rhonchi, or rales. Abdomen: Soft, mild epigastric tenderness. Markedly enlarged spleen which is mildly, diffusely tender  Extremities: Warm and dry. No cyanosis or edema. Neurologic: CN II-XII grossly intact. Sensation intact. Skin:     No rashes or jaundice. No wounds. Psych:  Normal mood and affect. I reviewed the labs, imaging, EKGs, telemetry, and other studies done this admission. Data Review:   Recent Results (from the past 24 hour(s))   CBC WITH AUTOMATED DIFF    Collection Time: 10/06/20  1:57 AM   Result Value Ref Range    WBC 8.5 4.3 - 11.1 K/uL    RBC 4.40 4.05 - 5.2 M/uL    HGB 13.8 11.7 - 15.4 g/dL    HCT 41.3 35.8 - 46.3 %    MCV 93.9 79.6 - 97.8 FL    MCH 31.4 26.1 - 32.9 PG    MCHC 33.4 31.4 - 35.0 g/dL    RDW 16.8 (H) 11.9 - 14.6 %    PLATELET 007 038 - 580 K/uL    MPV 10.5 9.4 - 12.3 FL    ABSOLUTE NRBC 0.00 0.0 - 0.2 K/uL    DF AUTOMATED      NEUTROPHILS 76 43 - 78 %    LYMPHOCYTES 16 13 - 44 %    MONOCYTES 4 4.0 - 12.0 %    EOSINOPHILS 3 0.5 - 7.8 %    BASOPHILS 1 0.0 - 2.0 %    IMMATURE GRANULOCYTES 0 0.0 - 5.0 %    ABS. NEUTROPHILS 6.5 1.7 - 8.2 K/UL    ABS. LYMPHOCYTES 1.4 0.5 - 4.6 K/UL    ABS. MONOCYTES 0.3 0.1 - 1.3 K/UL    ABS. EOSINOPHILS 0.3 0.0 - 0.8 K/UL    ABS. BASOPHILS 0.1 0.0 - 0.2 K/UL    ABS. IMM.  GRANS. 0.0 0.0 - 0.5 K/UL   METABOLIC PANEL, COMPREHENSIVE    Collection Time: 10/06/20  1:57 AM   Result Value Ref Range    Sodium 139 136 - 145 mmol/L    Potassium 4.4 3.5 - 5.1 mmol/L    Chloride 106 98 - 107 mmol/L    CO2 30 21 - 32 mmol/L    Anion gap 3 (L) 7 - 16 mmol/L    Glucose 97 65 - 100 mg/dL    BUN 24 (H) 8 - 23 MG/DL    Creatinine 0.65 0.6 - 1.0 MG/DL    GFR est AA >60 >60 ml/min/1.73m2    GFR est non-AA >60 >60 ml/min/1.73m2    Calcium 8.5 8.3 - 10.4 MG/DL Bilirubin, total 0.5 0.2 - 1.1 MG/DL    ALT (SGPT) 45 12 - 65 U/L    AST (SGOT) 29 15 - 37 U/L    Alk. phosphatase 90 50 - 136 U/L    Protein, total 6.6 6.3 - 8.2 g/dL    Albumin 3.8 3.2 - 4.6 g/dL    Globulin 2.8 2.3 - 3.5 g/dL    A-G Ratio 1.4 1.2 - 3.5     LIPASE    Collection Time: 10/06/20  1:57 AM   Result Value Ref Range    Lipase 74 73 - 393 U/L   MAGNESIUM    Collection Time: 10/06/20  1:57 AM   Result Value Ref Range    Magnesium 2.1 1.8 - 2.4 mg/dL   PROTHROMBIN TIME + INR    Collection Time: 10/06/20  1:57 AM   Result Value Ref Range    Prothrombin time 15.1 (H) 12.0 - 14.7 sec    INR 1.2     PTT    Collection Time: 10/06/20  1:57 AM   Result Value Ref Range    aPTT 41.0 (H) 24.3 - 35.4 SEC   TYPE & SCREEN    Collection Time: 10/06/20  2:08 AM   Result Value Ref Range    Crossmatch Expiration 10/09/2020     ABO/Rh(D) B NEGATIVE     Antibody screen NEG        Imaging Studies:  CXR Results  (Last 48 hours)    None        CT Results  (Last 48 hours)    None          Assessment and Plan:     Hospital Problems as of 10/6/2020 Date Reviewed: 8/31/2020          Codes Class Noted - Resolved POA    * (Principal) Hematemesis ICD-10-CM: K92.0  ICD-9-CM: 578.0  10/6/2020 - Present Yes        Esophageal varices ([facility] ICD-10-CM: I85.00  ICD-9-CM: 456.1  Unknown - Present Yes    Overview Signed 10/6/2020  4:12 AM by Brandie Daley MD     grade 3             Splenomegaly ICD-10-CM: R16.1  ICD-9-CM: 789.2  3/21/2019 - Present Yes        Primary hypothyroidism ICD-10-CM: E03.9  ICD-9-CM: 244.9  Unknown - Present Yes        DVT (deep venous thrombosis) ([facility] ICD-10-CM: I82.409  ICD-9-CM: 453.40  10/26/2016 - Present Yes    Overview Signed 10/26/2016 10:27 PM by Leo Falcon.      Right subclavian               CML (chronic myelocytic leukemia) ([facility] ICD-10-CM: C92.10  ICD-9-CM: 205.10  3/30/2015 - Present Yes        Acquired hypercoagulable state ([facility] ICD-10-CM: P17.62  ICD-9-CM: 289.81  3/30/2015 - Present Yes AKOSUA (iron deficiency anemia) ICD-10-CM: D50.9  ICD-9-CM: 280.9  7/28/2012 - Present Yes        Cirrhosis ([address] ICD-10-CM: K74.60  ICD-9-CM: 571.5  7/27/2012 - Present Yes        Portal vein thrombosis ICD-10-CM: O37  ICD-9-CM: 250  6/20/2012 - Present Yes    Overview Signed 10/6/2020  4:12 AM by Juan Miguel Castellon MD     Ct scan 6-21-2 Apparent occlusion of the portal vein. In addition, the splenic vein, and superior mesenteric vein are not definitely seen and are also likely  occluded. Splenomegaly, and extensive varices are seen as described above consistent with the portal vein occlusion 7-05-12 on arixtra HIT negative on repeat 7-27-12 re admitted Cirrhotic appearance of the liver. Mild to moderate ascites, as                   PLAN:  · Admit as observation  · Keep NPO for now  · Bedrest with bedside commode  · Keep head of bed elevated  · Type and screen. Transfuse if needed  · Hold lovenox for now. Use SCDs  · IV protonix BID.  Octeotride bolus and infusion  · CBC Q 8 hours and prn  · zofran IV prn N/V  · GI consultation in am      Estimated LOS:  Pending clinical course    Signed:  Jeramy Bowie MD

## 2020-10-07 ENCOUNTER — ANESTHESIA EVENT (OUTPATIENT)
Dept: ENDOSCOPY | Age: 60
End: 2020-10-07
Payer: MEDICARE

## 2020-10-07 ENCOUNTER — ANESTHESIA (OUTPATIENT)
Dept: ENDOSCOPY | Age: 60
End: 2020-10-07
Payer: MEDICARE

## 2020-10-07 LAB
HCT VFR BLD AUTO: 32.2 % (ref 35.8–46.3)
HCT VFR BLD AUTO: 32.7 % (ref 35.8–46.3)
HCT VFR BLD AUTO: 33.4 % (ref 35.8–46.3)
HGB BLD-MCNC: 10.3 G/DL (ref 11.7–15.4)
HGB BLD-MCNC: 10.4 G/DL (ref 11.7–15.4)
HGB BLD-MCNC: 10.9 G/DL (ref 11.7–15.4)

## 2020-10-07 PROCEDURE — 76060000031 HC ANESTHESIA FIRST 0.5 HR: Performed by: INTERNAL MEDICINE

## 2020-10-07 PROCEDURE — 74011250637 HC RX REV CODE- 250/637: Performed by: INTERNAL MEDICINE

## 2020-10-07 PROCEDURE — 96372 THER/PROPH/DIAG INJ SC/IM: CPT

## 2020-10-07 PROCEDURE — 85018 HEMOGLOBIN: CPT

## 2020-10-07 PROCEDURE — 96376 TX/PRO/DX INJ SAME DRUG ADON: CPT

## 2020-10-07 PROCEDURE — 2709999900 HC NON-CHARGEABLE SUPPLY

## 2020-10-07 PROCEDURE — 74011250636 HC RX REV CODE- 250/636: Performed by: NURSE ANESTHETIST, CERTIFIED REGISTERED

## 2020-10-07 PROCEDURE — C9113 INJ PANTOPRAZOLE SODIUM, VIA: HCPCS | Performed by: HOSPITALIST

## 2020-10-07 PROCEDURE — 74011250636 HC RX REV CODE- 250/636: Performed by: ANESTHESIOLOGY

## 2020-10-07 PROCEDURE — 74011250636 HC RX REV CODE- 250/636: Performed by: HOSPITALIST

## 2020-10-07 PROCEDURE — 74011250636 HC RX REV CODE- 250/636: Performed by: INTERNAL MEDICINE

## 2020-10-07 PROCEDURE — 2709999900 HC NON-CHARGEABLE SUPPLY: Performed by: INTERNAL MEDICINE

## 2020-10-07 PROCEDURE — 96375 TX/PRO/DX INJ NEW DRUG ADDON: CPT

## 2020-10-07 PROCEDURE — 76040000025: Performed by: INTERNAL MEDICINE

## 2020-10-07 PROCEDURE — 99218 HC RM OBSERVATION: CPT

## 2020-10-07 PROCEDURE — 36415 COLL VENOUS BLD VENIPUNCTURE: CPT

## 2020-10-07 PROCEDURE — 74011000250 HC RX REV CODE- 250: Performed by: ANESTHESIOLOGY

## 2020-10-07 RX ORDER — ENOXAPARIN SODIUM 100 MG/ML
60 INJECTION SUBCUTANEOUS EVERY 12 HOURS
Status: DISCONTINUED | OUTPATIENT
Start: 2020-10-07 | End: 2020-10-08 | Stop reason: HOSPADM

## 2020-10-07 RX ORDER — FAMOTIDINE 10 MG/ML
INJECTION INTRAVENOUS
Status: ACTIVE
Start: 2020-10-07 | End: 2020-10-07

## 2020-10-07 RX ORDER — PROPOFOL 10 MG/ML
INJECTION, EMULSION INTRAVENOUS AS NEEDED
Status: DISCONTINUED | OUTPATIENT
Start: 2020-10-07 | End: 2020-10-07 | Stop reason: HOSPADM

## 2020-10-07 RX ORDER — SODIUM CHLORIDE, SODIUM LACTATE, POTASSIUM CHLORIDE, CALCIUM CHLORIDE 600; 310; 30; 20 MG/100ML; MG/100ML; MG/100ML; MG/100ML
1000 INJECTION, SOLUTION INTRAVENOUS CONTINUOUS
Status: DISCONTINUED | OUTPATIENT
Start: 2020-10-07 | End: 2020-10-07 | Stop reason: HOSPADM

## 2020-10-07 RX ADMIN — OXYCODONE HYDROCHLORIDE 5 MG: 5 TABLET ORAL at 01:29

## 2020-10-07 RX ADMIN — SODIUM CHLORIDE 40 MG: 9 INJECTION, SOLUTION INTRAMUSCULAR; INTRAVENOUS; SUBCUTANEOUS at 09:56

## 2020-10-07 RX ADMIN — ENOXAPARIN SODIUM 60 MG: 60 INJECTION SUBCUTANEOUS at 18:14

## 2020-10-07 RX ADMIN — SODIUM CHLORIDE 40 MG: 9 INJECTION, SOLUTION INTRAMUSCULAR; INTRAVENOUS; SUBCUTANEOUS at 16:31

## 2020-10-07 RX ADMIN — LEVOTHYROXINE SODIUM 112 MCG: 0.11 TABLET ORAL at 05:07

## 2020-10-07 RX ADMIN — PROPOFOL 50 MG: 10 INJECTION, EMULSION INTRAVENOUS at 09:04

## 2020-10-07 RX ADMIN — SODIUM CHLORIDE 75 ML/HR: 900 INJECTION, SOLUTION INTRAVENOUS at 10:55

## 2020-10-07 RX ADMIN — SODIUM CHLORIDE, SODIUM LACTATE, POTASSIUM CHLORIDE, AND CALCIUM CHLORIDE 1000 ML: 600; 310; 30; 20 INJECTION, SOLUTION INTRAVENOUS at 08:41

## 2020-10-07 RX ADMIN — OXYCODONE HYDROCHLORIDE 5 MG: 5 TABLET ORAL at 21:57

## 2020-10-07 RX ADMIN — PROPOFOL 20 MG: 10 INJECTION, EMULSION INTRAVENOUS at 09:05

## 2020-10-07 RX ADMIN — FAMOTIDINE 20 MG: 10 INJECTION INTRAVENOUS at 08:41

## 2020-10-07 RX ADMIN — Medication 10 ML: at 13:47

## 2020-10-07 RX ADMIN — OXYCODONE HYDROCHLORIDE 5 MG: 5 TABLET ORAL at 13:44

## 2020-10-07 NOTE — INTERVAL H&P NOTE
Update History & Physical    The Patient's History and Physical of October 6, 2020 was reviewed with the patient and I examined the patient. There was no change. The surgical site was confirmed by the patient and me. Plan:  The risk, benefits, expected outcome, and alternative to the recommended procedure have been discussed with the patient. Patient understands and wants to proceed with the procedure.     Electronically signed by Valery Lomas MD on 10/7/2020 at 8:36 AM

## 2020-10-07 NOTE — PROCEDURES
Esophagogastroduodenoscopy    DATE of PROCEDURE: 10/7/2020    INDICATION:  1. Upper GI bleeding    POSTPROCEDURE DIAGNOSIS:  1. Esophageal varices  2. Portal hypertensive gastropathy    MEDICATIONS ADMINISTERED: MAC. Further details per anesthesia note. INSTRUMENT: [id number]    PROCEDURE:  After obtaining informed consent, the patient was placed in the left lateral position and sedated. The endoscope was advanced under direct vision without difficulty. The esophagus, stomach (including retroflexed views) and duodenum were evaluated. The patient was taken to the recovery area in stable condition. FINDINGS:  ESOPHAGUS: Four columns of grade II-III varices. Single varix with very faint possible red venkat sign. Banding was not performed. Otherwise normal exam.  STOMACH: Exam negative for gastric varices. Mild to moderate PHG. Otherwise normal exam.  DUODENUM: Normal    Estimated blood loss: 0-minimal   Specimens obtained during procedure: none    PLAN:  1. Would consider non-selective beta-blocker if BP improves  2. Possible discharge home in next 24 hours if no further bleeding  3. Full liquid diet  4. Keep outpatient follow-up at [facility] as scheduled    Please call with questions. Will be available as needed.     Dolores Youngblood MD

## 2020-10-07 NOTE — ROUTINE PROCESS
TRANSFER - OUT REPORT:    Verbal report given to Elke Hartman RN(name) on Fareed Lock  being transferred to room 207(unit) for routine post - op       Report consisted of patients Situation, Background, Assessment and   Recommendations(SBAR). Information from the following report(s) SBAR and MAR was reviewed with the receiving nurse. Lines:   Peripheral IV 10/06/20 Right Antecubital (Active)   Site Assessment Clean, dry, & intact 10/07/20 0825   Phlebitis Assessment 0 10/07/20 0825   Infiltration Assessment 0 10/07/20 0825   Dressing Status Clean, dry, & intact 10/07/20 0825   Dressing Type Tape;Transparent 10/07/20 0825   Hub Color/Line Status Infusing;Flushed 10/07/20 0825   Alcohol Cap Used No 10/07/20 0825        Opportunity for questions and clarification was provided.

## 2020-10-07 NOTE — PROGRESS NOTES
END OF SHIFT NOTE:    INTAKE/OUTPUT  10/06 0701 - 10/07 0700  In: 1300 [I.V.:1300]  Out: 2400 [Urine:2400]  Voiding: YES  Catheter: NO  Drain:              Flatus: Patient does have flatus present. Stool:  4 occurrences. Characteristics:  Stool Assessment  Stool Color: Brown  Stool Appearance: Watery  Stool Amount: Medium  Stool Source/Status: Rectum    Emesis: 0 occurrences. Characteristics:        VITAL SIGNS  Patient Vitals for the past 12 hrs:   Temp Pulse Resp BP SpO2   10/07/20 0259 98 °F (36.7 °C) 81 16 106/73 94 %   10/06/20 2249 98.4 °F (36.9 °C) 71 16 (!) 95/53 95 %   10/06/20 1957 98 °F (36.7 °C) 80 16 114/60 96 %       Pain Assessment  Pain Intensity 1: 7 (10/07/20 0129)  Pain Location 1: Flank, Abdomen  Pain Intervention(s) 1: Medication (see MAR)  Patient Stated Pain Goal: 0    Ambulating  Yes    Shift report given to oncoming nurse at the bedside.     [address]

## 2020-10-07 NOTE — ANESTHESIA PREPROCEDURE EVALUATION
Relevant Problems   No relevant active problems       Anesthetic History     PONV          Review of Systems / Medical History  Patient summary reviewed and pertinent labs reviewed    Pulmonary  Within defined limits                 Neuro/Psych     seizures: well controlled  CVA (2015 visual field disturbance)  TIA and headaches     Cardiovascular      Valvular problems/murmurs            Exercise tolerance: >4 METS  Comments: Hx murmur no treatment  On lovenox 60 mg BID   GI/Hepatic/Renal                Endo/Other      Hypothyroidism       Other Findings   Comments: PCV  Portal vein thrombosis  Esophageal varices  hemtemesis    Pt is RN           Physical Exam    Airway  Mallampati: II  TM Distance: 4 - 6 cm  Neck ROM: normal range of motion   Mouth opening: Normal     Cardiovascular  Regular rate and rhythm,  S1 and S2 normal,  no murmur, click, rub, or gallop  Rhythm: regular  Rate: normal         Dental    Dentition: Caps/crowns  Comments: right upper cap missing   Pulmonary  Breath sounds clear to auscultation               Abdominal  Abdominal exam normal       Other Findings            Anesthetic Plan    ASA: 3  Anesthesia type: total IV anesthesia          Induction: Intravenous  Anesthetic plan and risks discussed with: Patient

## 2020-10-07 NOTE — ROUTINE PROCESS
TRANSFER - OUT REPORT:    Verbal report given to Jevon Carvalho on Bernardino [place]  being transferred to GI LAB  for ordered procedure       Report consisted of patients Situation, Background, Assessment and   Recommendations(SBAR). Information from the following report(s) SBAR and [employer] was reviewed with the receiving nurse. Lines:   Peripheral IV 10/06/20 Right Antecubital (Active)   Site Assessment Clean, dry, & intact 10/07/20 0710   Phlebitis Assessment 0 10/07/20 0710   Infiltration Assessment 0 10/07/20 0710   Dressing Status Clean, dry, & intact 10/07/20 0710   Dressing Type Tape;Transparent 10/07/20 0710   Hub Color/Line Status Pink; Infusing 10/07/20 0710        Opportunity for questions and clarification was provided.       Patient transported with:   [employer]

## 2020-10-07 NOTE — ANESTHESIA POSTPROCEDURE EVALUATION
Procedure(s):  ESOPHAGOGASTRODUODENOSCOPY (EGD) /22/ Room 207. total IV anesthesia    Anesthesia Post Evaluation      Multimodal analgesia: multimodal analgesia used between 6 hours prior to anesthesia start to PACU discharge  Patient location during evaluation: bedside  Patient participation: complete - patient participated  Level of consciousness: awake  Pain management: adequate  Airway patency: patent  Anesthetic complications: no  Cardiovascular status: acceptable and stable  Respiratory status: acceptable and room air  Hydration status: acceptable  Post anesthesia nausea and vomiting:  none      INITIAL Post-op Vital signs:   Vitals Value Taken Time   /59 10/7/2020  9:16 AM   Temp 36.7 °C (98 °F) 10/7/2020  9:16 AM   Pulse 81 10/7/2020  9:23 AM   Resp 16 10/7/2020  9:16 AM   SpO2 95 % 10/7/2020  9:23 AM   Vitals shown include unvalidated device data.

## 2020-10-07 NOTE — PROGRESS NOTES
Hospitalist Note     Admit Date:  10/6/2020  1:54 AM   Name:  Rosie Mishra   Age:  61 y.o.  :  1960   MRN:  [id number]   PCP:  Robbie Hayes MD  Treatment Team: Attending Provider: Serena Guerra MD; Consulting Provider: Corey Martinez MD; Care Manager: Karen Hernandez RN; Utilization Review: Yesi Smallwood RN    HPI/Subjective:   Ms. 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 Hb at time of presentation was 13.8. Drifted down to low 10s but has not had recurrent hematemesis. Had EGD 10/7, no active bleeding, no banding. 10/7: EGD today, no banding or active bleeding. She is anxious to resume Lovenox tonight. Tolerating liquids so far. No pain. ROS neg otherwise. No other complaints  Objective:     Patient Vitals for the past 24 hrs:   Temp Pulse Resp BP SpO2   10/07/20 0933 -- 77 18 (!) 110/59 93 %   10/07/20 0916 98 °F (36.7 °C) 65 16 (!) 106/59 97 %   10/07/20 0915 -- 82 14 (!) 106/59 97 %   10/07/20 0859 -- 78 16 115/65 94 %   10/07/20 0820 97.6 °F (36.4 °C) 68 16 111/64 97 %   10/07/20 0750 97.5 °F (36.4 °C) 75 17 (!) 97/42 95 %   10/07/20 0259 98 °F (36.7 °C) 81 16 106/73 94 %   10/06/20 2249 98.4 °F (36.9 °C) 71 16 (!) 95/53 95 %   10/06/20 1957 98 °F (36.7 °C) 80 16 114/60 96 %   10/06/20 1819 -- 74 -- 98/62 98 %   10/06/20 1522 98 °F (36.7 °C) 76 19 (!) 96/51 96 %     Oxygen Therapy  O2 Sat (%): 93 % (10/07/20 0933)  Pulse via Oximetry: 75 beats per minute (10/07/20 0933)  O2 Device: Room air (10/07/20 0933)    Estimated body mass index is 22.67 kg/m² as calculated from the following:    Height as of this encounter: 5' 3\" (1.6 m). Weight as of this encounter: 58.1 kg (128 lb).       Intake/Output Summary (Last 24 hours) at 10/7/2020 1120  Last data filed at 10/7/2020 0912  Gross per 24 hour   Intake 1800 ml   Output 1901 ml   Net -101 ml       *Note that automatically entered I/Os may not be accurate; dependent on patient compliance with collection and accurate  by techs. General:    Well nourished. Alert. CV:   RRR. No murmur, rub, or gallop. Lungs:   CTAB. No wheezing, rhonchi, or rales. Abdomen:   Soft, nontender, nondistended. Palpable splenomegaly. Extremities: Warm and dry. No cyanosis or edema. Skin:     No rashes or jaundice. Neuro:  No gross focal deficits    Data Reviewed:  I have reviewed all labs, meds, and studies from the last 24 hours:  Recent Results (from the past 24 hour(s))   HGB & HCT    Collection Time: 10/06/20  1:39 PM   Result Value Ref Range    HGB 10.7 (L) 11.7 - 15.4 g/dL    HCT 32.0 (L) 35.8 - 46.3 %   HGB & HCT    Collection Time: 10/06/20  8:36 PM   Result Value Ref Range    HGB 11.0 (L) 11.7 - 15.4 g/dL    HCT 34.6 (L) 35.8 - 46.3 %   HGB & HCT    Collection Time: 10/07/20  4:22 AM   Result Value Ref Range    HGB 10.3 (L) 11.7 - 15.4 g/dL    HCT 32.7 (L) 35.8 - 46.3 %        Current Meds:  Current Facility-Administered Medications   Medication Dose Route Frequency    famotidine (PF) (PEPCID) 20 mg/2 mL injection        sodium chloride (NS) flush 5-40 mL  5-40 mL IntraVENous Q8H    sodium chloride (NS) flush 5-40 mL  5-40 mL IntraVENous PRN    ondansetron (ZOFRAN) injection 4 mg  4 mg IntraVENous Q4H PRN    pantoprazole (PROTONIX) 40 mg in 0.9% sodium chloride 10 mL injection  40 mg IntraVENous BID    HYDROmorphone (PF) (DILAUDID) injection 0.5 mg  0.5 mg IntraVENous Q4H PRN    levothyroxine (SYNTHROID) tablet 112 mcg  112 mcg Oral Once per day on Mon Tue Wed Thu Fri Sat    [START ON 10/11/2020] levothyroxine (SYNTHROID) tablet 168 mcg  168 mcg Oral every Sunday    oxyCODONE IR (ROXICODONE) tablet 5 mg  5 mg Oral Q6H PRN    0.9% sodium chloride infusion  75 mL/hr IntraVENous CONTINUOUS       Other Studies:  No results found for this visit on 10/06/20. No results found.     All Micro Results None          SARS-CoV-2 Lab Results  \"Novel Coronavirus\" Test: No results found for: COV2NT   \"Emergent Disease\" Test: No results found for: EDPR  \"SARS-COV-2\" Test: No results found for: XGCOVT  Rapid Test: No results found for: COVR         Assessment and Plan:     Hospital Problems as of 10/7/2020 Date Reviewed: 8/31/2020          Codes Class Noted - Resolved POA    * (Principal) Hematemesis ICD-10-CM: K92.0  ICD-9-CM: 578.0  10/6/2020 - Present Yes        Esophageal varices ([facility] ICD-10-CM: I85.00  ICD-9-CM: 456.1  Unknown - Present Yes    Overview Signed 10/6/2020  4:12 AM by Garrison Diego MD     grade 3             Splenomegaly ICD-10-CM: R16.1  ICD-9-CM: 789.2  3/21/2019 - Present Yes        Primary hypothyroidism ICD-10-CM: E03.9  ICD-9-CM: 244.9  Unknown - Present Yes        DVT (deep venous thrombosis) ([facility] ICD-10-CM: I82.409  ICD-9-CM: 453.40  10/26/2016 - Present Yes    Overview Signed 10/26/2016 10:27 PM by Cassi Farris. Right subclavian               CML (chronic myelocytic leukemia) ([facility] ICD-10-CM: C92.10  ICD-9-CM: 205.10  3/30/2015 - Present Yes        Acquired hypercoagulable state ([facility] ICD-10-CM: O26.32  ICD-9-CM: 289.81  3/30/2015 - Present Yes        AKOSUA (iron deficiency anemia) ICD-10-CM: D50.9  ICD-9-CM: 280.9  7/28/2012 - Present Yes        Cirrhosis ([facility] ICD-10-CM: K74.60  ICD-9-CM: 571.5  7/27/2012 - Present Yes        Portal vein thrombosis ICD-10-CM: V17  ICD-9-CM: 005  6/20/2012 - Present Yes    Overview Signed 10/6/2020  4:12 AM by Garrison Diego MD     Ct scan 6-21-2 Apparent occlusion of the portal vein. In addition, the splenic vein, and superior mesenteric vein are not definitely seen and are also likely  occluded. Splenomegaly, and extensive varices are seen as described above consistent with the portal vein occlusion 7-05-12 on arixtra HIT negative on repeat 7-27-12 re admitted Cirrhotic appearance of the liver.  Mild to moderate ascites, as Plan:  # Hematemesis              - Con't octreotide. EGD 10/7 with EV and PHG, no banding performed, though did see one varix with faint red venkat sign. No bleeding since admission, Hb stable low 10s. Follow H/H today and potentially can resume Lovenox tonight vs tomorrow AM.     # H/o PV thrombosis              - Lovenox held 2/2 above. She is eager to resume. No bleeding since admission.     # H/o CML              - Outpatient f/u     # Hypothyroidism              - Synthroid    DC planning/Dispo: Home when able, likely tomorrow.   Diet:  DIET FULL LIQUID  DVT ppx: SCDs     Signed:  Rose Abraham MD

## 2020-10-08 VITALS
BODY MASS INDEX: 22.68 KG/M2 | DIASTOLIC BLOOD PRESSURE: 61 MMHG | HEART RATE: 84 BPM | WEIGHT: 128 LBS | OXYGEN SATURATION: 94 % | SYSTOLIC BLOOD PRESSURE: 101 MMHG | TEMPERATURE: 98.2 F | RESPIRATION RATE: 19 BRPM | HEIGHT: 63 IN

## 2020-10-08 PROBLEM — K92.0 HEMATEMESIS: Status: RESOLVED | Noted: 2020-10-06 | Resolved: 2020-10-08

## 2020-10-08 LAB
HCT VFR BLD AUTO: 30.2 % (ref 35.8–46.3)
HGB BLD-MCNC: 10.1 G/DL (ref 11.7–15.4)

## 2020-10-08 PROCEDURE — 36415 COLL VENOUS BLD VENIPUNCTURE: CPT

## 2020-10-08 PROCEDURE — 2709999900 HC NON-CHARGEABLE SUPPLY

## 2020-10-08 PROCEDURE — 74011250636 HC RX REV CODE- 250/636: Performed by: HOSPITALIST

## 2020-10-08 PROCEDURE — 99218 HC RM OBSERVATION: CPT

## 2020-10-08 PROCEDURE — 74011250637 HC RX REV CODE- 250/637: Performed by: INTERNAL MEDICINE

## 2020-10-08 PROCEDURE — 74011250636 HC RX REV CODE- 250/636: Performed by: INTERNAL MEDICINE

## 2020-10-08 PROCEDURE — 74011000250 HC RX REV CODE- 250: Performed by: HOSPITALIST

## 2020-10-08 PROCEDURE — 85018 HEMOGLOBIN: CPT

## 2020-10-08 PROCEDURE — 96376 TX/PRO/DX INJ SAME DRUG ADON: CPT

## 2020-10-08 PROCEDURE — C9113 INJ PANTOPRAZOLE SODIUM, VIA: HCPCS | Performed by: HOSPITALIST

## 2020-10-08 RX ADMIN — SODIUM CHLORIDE 40 MG: 9 INJECTION, SOLUTION INTRAMUSCULAR; INTRAVENOUS; SUBCUTANEOUS at 08:48

## 2020-10-08 RX ADMIN — LEVOTHYROXINE SODIUM 112 MCG: 0.11 TABLET ORAL at 05:35

## 2020-10-08 RX ADMIN — OXYCODONE HYDROCHLORIDE 5 MG: 5 TABLET ORAL at 10:16

## 2020-10-08 RX ADMIN — OXYCODONE HYDROCHLORIDE 5 MG: 5 TABLET ORAL at 06:12

## 2020-10-08 NOTE — DISCHARGE INSTRUCTIONS
Esophageal Varices: Care Instructions  Your Care Instructions     Esophageal varices (say \"ee-sof-uh-PEDRO-ul GXVJ-ax-fgdz\") are veins in your esophagus that are bigger than normal. Your esophagus is a tube. It carries food from your throat to your stomach. These veins get big because of extra pressure. This pressure makes the walls of the veins weak. Then they can rupture and cause very serious bleeding. This problem is usually found in people who have serious liver disease. Treatments include medicines and procedures to help lower the pressure in the veins. Talk with your doctor about the best treatment for you. If you have bleeding from this problem, there is a risk that it will happen again. In this case, it's important to go back and follow up with your doctor. Follow-up care is a key part of your treatment and safety. Be sure to make and go to all appointments, and call your doctor if you are having problems. It's also a good idea to know your test results and keep a list of the medicines you take. How can you care for yourself at home? · Be safe with medicines. Take your medicines exactly as prescribed. Call your doctor if you think you are having a problem with your medicine. You will get more details on the specific medicines your doctor prescribes. · Talk to your doctor before you take any other medicines. These include over-the-counter medicines, vitamins, and herbal products. · Avoid aspirin, ibuprofen (Advil, Motrin), and naproxen (Aleve). These can cause sores in your stomach or esophagus. · Do not drink alcohol. It increases your risk of bleeding. It can also make liver damage worse. Tell your doctor if you need help to quit. Counseling, support groups, and sometimes medicines can help you stay sober. When should you call for help? Call 911 anytime you think you may need emergency care.  For example, call if:    · You have trouble breathing.     · You vomit blood or what looks like coffee grounds. Call your doctor now or seek immediate medical care if:    · You feel very sleepy or confused.     · You have new or worse belly pain.     · You have a fever.     · There is a new or increasing yellow tint to your skin or the whites of your eyes.     · You have any abnormal bleeding, such as:  ? Nosebleeds. ? Vaginal bleeding that is different (heavier, more frequent, at a different time of the month) than what you are used to.  ? Bloody or black stools, or rectal bleeding. ? Bloody or pink urine. Watch closely for changes in your health, and be sure to contact your doctor if:    · You have any problems.     · Your belly is getting bigger.     · You are gaining weight. Where can you learn more? Go to http://www.gray.com/  Enter [id number] in the search box to learn more about \"Esophageal Varices: Care Instructions. \"  Current as of: April 15, 2020               Content Version: 12.6  © 2006-2020 [employer]. Care instructions adapted under license by [employer] (which disclaims liability or warranty for this information). If you have questions about a medical condition or this instruction, always ask your healthcare professional. [address] any warranty or liability for your use of this information.

## 2020-10-08 NOTE — DISCHARGE SUMMARY
Hospitalist Discharge Summary     Admit Date:  10/6/2020  1:54 AM   DC note date: 10/8/2020  Name:  Dada Alexander   Age:  61 y.o.  :  1960   MRN:  [id number]   PCP:  Higinio Le MD  Treatment Team: Attending Provider: Paul Mancilla MD; Care Manager: Enriqueta Frost RN; Utilization Review: Yossi Navas RN    Problem List for this Hospitalization:  Hospital Problems as of 10/8/2020 Date Reviewed: 2020          Codes Class Noted - Resolved POA    Esophageal varices ([facility] ICD-10-CM: I85.00  ICD-9-CM: 456.1  Unknown - Present Yes    Overview Signed 10/6/2020  4:12 AM by Noe Youssef MD     grade 3             Splenomegaly ICD-10-CM: R16.1  ICD-9-CM: 789.2  3/21/2019 - Present Yes        Primary hypothyroidism ICD-10-CM: E03.9  ICD-9-CM: 244.9  Unknown - Present Yes        DVT (deep venous thrombosis) ([facility] ICD-10-CM: I82.409  ICD-9-CM: 453.40  10/26/2016 - Present Yes    Overview Signed 10/26/2016 10:27 PM by Mary Odonnell. Right subclavian               CML (chronic myelocytic leukemia) ([facility] ICD-10-CM: C92.10  ICD-9-CM: 205.10  3/30/2015 - Present Yes        Acquired hypercoagulable state ([facility] ICD-10-CM: Z39.91  ICD-9-CM: 289.81  3/30/2015 - Present Yes        AKOSUA (iron deficiency anemia) ICD-10-CM: D50.9  ICD-9-CM: 280.9  2012 - Present Yes        Cirrhosis ([facility] ICD-10-CM: K74.60  ICD-9-CM: 571.5  2012 - Present Yes        Portal vein thrombosis ICD-10-CM: Y56  ICD-9-CM: 643  2012 - Present Yes    Overview Signed 10/6/2020  4:12 AM by Noe Youssef MD     Ct scan 6-21-2 Apparent occlusion of the portal vein. In addition, the splenic vein, and superior mesenteric vein are not definitely seen and are also likely  occluded. Splenomegaly, and extensive varices are seen as described above consistent with the portal vein occlusion 12 on arixtra HIT negative on repeat 12 re admitted Cirrhotic appearance of the liver.  Mild to moderate ascites, as             * (Principal) RESOLVED: Hematemesis ICD-10-CM: K92.0  ICD-9-CM: 578.0  10/6/2020 - 10/8/2020 Yes            Hospital Course:  Ms. Juancarlos Arreguin is a nice 60 y/o WF with a h/o CML, PV thrombosis on Lovenox, esophageal varices, splenomegaly, polycythemia who presented on 10/6 with hematemesis. Hb at time of presentation was 13.8. This drifted down to low 10s and has remained stable. GI was consulted. She was started on octreotide. She initially refused octreotide and EGD but eventually changed her mind. EGD was done on 10/7 and showed EV, PHG; one single varix with a faint possible red venkat sign; EVBL was not performed. She has not had recurrent hematemesis. Hb remains stable >10. She is tolerating her diet. Lovenox was resumed. She has scheduled follow up with Heme/Onc and a GI appointment at [facility] later this month. Red flag signs and symptoms given. Con't PPI at home. Hospital course was otherwise unremarkable and she is medically stable for discharge home today. Disposition: Home or Self Care  Activity: Activity as tolerated  Diet: DIET FULL LIQUID  Code Status: Full Code    Follow Up Orders:  No orders of the defined types were placed in this encounter. Follow-up Information     Follow up With Specialties Details Why Contact Info    Lupillo Kwon MD [address]  [address]  [phone]      Marta Kennedy MD Hematology and Oncology, Internal Medicine, Hematology, Oncology   [address]  [phone]      [facility]  Schedule an appointment as soon as possible for a visit As needed. Hospital follow up, established patient. Esophageal varices. [phone] [phone]          Discharge meds at bottom of this note. Plan was discussed with patient, nursing, CM. All questions answered. Patient was stable at time of discharge.   Given instructions to call a physician or return if any concerns. Discharge summary and encounter summary was sent to PCP electronically via \"Comm Mgt\" link in [facility], if possible. Diagnostic Imaging/Tests:   No results found. Echocardiogram results:  No results found for this visit on 10/06/20. Procedures done this admission:  Procedure(s):  ESOPHAGOGASTRODUODENOSCOPY (EGD) /22/ Room 207    All Micro Results     None          SARS-CoV-2 Lab Results  \"Novel Coronavirus\" Test: No results found for: COV2NT   \"Emergent Disease\" Test: No results found for: EDPR  \"SARS-COV-2\" Test: No results found for: XGCOVT  Rapid Test: No results found for: COVR         Labs: Results:       BMP, Mg, Phos Recent Labs     10/06/20  0157      K 4.4      CO2 30   AGAP 3*   BUN 24*   CREA 0.65   CA 8.5   GLU 97   MG 2.1      CBC Recent Labs     10/08/20  0422 10/07/20  2018 10/07/20  1308  10/06/20  0714 10/06/20  0157   WBC  --   --   --   --  6.5 8.5   RBC  --   --   --   --  3.76* 4.40   HGB 10.1* 10.9* 10.4*   < > 11.9 13.8   HCT 30.2* 33.4* 32.2*   < > 36.0 41.3   PLT  --   --   --   --  173 157   GRANS  --   --   --   --   --  76   LYMPH  --   --   --   --   --  16   EOS  --   --   --   --   --  3   MONOS  --   --   --   --   --  4   BASOS  --   --   --   --   --  1   IG  --   --   --   --   --  0   ANEU  --   --   --   --   --  6.5   ABL  --   --   --   --   --  1.4   ORESTES  --   --   --   --   --  0.3   ABM  --   --   --   --   --  0.3   ABB  --   --   --   --   --  0.1   AIG  --   --   --   --   --  0.0    < > = values in this interval not displayed.       LFT Recent Labs     10/06/20  0157   ALT 45   AP 90   TP 6.6   ALB 3.8   GLOB 2.8   AGRAT 1.4      Cardiac Testing No results found for: BNPP, BNP, CPK, RCK1, RCK2, RCK3, RCK4, CKMB, CKNDX, CKND1, TROPT, TROIQ   Coagulation Tests Lab Results   Component Value Date/Time    Prothrombin time 15.1 (H) 10/06/2020 01:57 AM    Prothrombin time 15.1 (H) 01/10/2019 02:10 AM    Prothrombin time 15.9 (H) 01/05/2018 02:00 AM    INR 1.2 10/06/2020 01:57 AM    INR 1.2 01/10/2019 02:10 AM    INR 1.3 01/05/2018 02:00 AM    aPTT 41.0 (H) 10/06/2020 01:57 AM    aPTT 21.1 (L) 10/26/2016 08:00 PM    aPTT 27.3 08/05/2016 07:12 AM      A1c No results found for: HBA1C, HGBE8, WAE3NEBC   Lipid Panel Lab Results   Component Value Date/Time    Cholesterol, total 105 03/16/2015 06:30 AM    HDL Cholesterol 37 (L) 03/16/2015 06:30 AM    LDL, calculated 54.8 03/16/2015 06:30 AM    VLDL, calculated 13.2 03/16/2015 06:30 AM    Triglyceride 66 03/16/2015 06:30 AM    CHOL/HDL Ratio 2.8 03/16/2015 06:30 AM      Thyroid Panel Lab Results   Component Value Date/Time    TSH 1.320 03/13/2020 09:39 AM    TSH 0.205 (L) 04/23/2019 09:22 AM    T4, Total 9.7 03/16/2015 10:15 AM    T4, Free 1.4 03/13/2020 09:39 AM    T4, Free 1.4 04/23/2019 09:22 AM        Most Recent UA Lab Results   Component Value Date/Time    Color YELLOW 04/13/2018 12:34 PM    Appearance CLEAR 04/13/2018 12:34 PM    Specific gravity 1.007 03/31/2015 03:00 PM    pH (UA) 7.0 04/13/2018 12:34 PM    Protein NEGATIVE  04/13/2018 12:34 PM    Glucose NEGATIVE  04/13/2018 12:34 PM    Ketone NEGATIVE  04/13/2018 12:34 PM    Bilirubin NEGATIVE  04/13/2018 12:34 PM    Blood NEGATIVE  04/13/2018 12:34 PM    Urobilinogen 1.0 04/13/2018 12:34 PM    Nitrites NEGATIVE  04/13/2018 12:34 PM    Leukocyte Esterase NEGATIVE  04/13/2018 12:34 PM    WBC 0 04/13/2018 12:34 PM    RBC 0 04/13/2018 12:34 PM    Epithelial cells 0 04/13/2018 12:34 PM    Bacteria 0 04/13/2018 12:34 PM    Casts 0 04/13/2018 12:34 PM    Crystals, urine 0 04/13/2018 12:34 PM    Mucus 0 04/13/2018 12:34 PM        Allergies   Allergen Reactions    Latex Other (comments)     Testing for latex allergy was positive as a 2 (on a scale of 1 to 3).     Sulfa (Sulfonamide Antibiotics) Anaphylaxis    Tramadol Other (comments)     Top lip swelled    Augmentin [Amoxicillin-Pot Clavulanate] Rash    Morphine Nausea and Vomiting    Rizatriptan Rash    Tetanus Immune Globulin Other (comments)     Heat, bruising, and swelling at  Site of injection    Xanax [Alprazolam] Other (comments)     Hallucinations    Zonegran [Zonisamide] Rash     Immunization History   Administered Date(s) Administered    (RETIRED) Pneumococcal Vaccine (Unspecified Type) 09/27/2008    Hib (PRP-T) 04/03/2019    Influenza Vaccine 10/01/2016    Pneumococcal Conjugate (PCV-13) 04/03/2019    TB Skin Test (PPD) Intradermal 07/21/2014       All Labs from Last 24 Hrs:  Recent Results (from the past 24 hour(s))   HGB & HCT    Collection Time: 10/07/20  1:08 PM   Result Value Ref Range    HGB 10.4 (L) 11.7 - 15.4 g/dL    HCT 32.2 (L) 35.8 - 46.3 %   HGB & HCT    Collection Time: 10/07/20  8:18 PM   Result Value Ref Range    HGB 10.9 (L) 11.7 - 15.4 g/dL    HCT 33.4 (L) 35.8 - 46.3 %   HGB & HCT    Collection Time: 10/08/20  4:22 AM   Result Value Ref Range    HGB 10.1 (L) 11.7 - 15.4 g/dL    HCT 30.2 (L) 35.8 - 46.3 %       Discharge Exam:  Patient Vitals for the past 24 hrs:   Temp Pulse Resp BP SpO2   10/08/20 0820 98.2 °F (36.8 °C) 84 19 101/61 94 %   10/08/20 0354 98.3 °F (36.8 °C) 69 19 (!) 99/55 92 %   10/08/20 0032 98.1 °F (36.7 °C) 90 18 (!) 103/54 90 %   10/07/20 2009 98.3 °F (36.8 °C) 85 18 (!) 99/56 93 %   10/07/20 1545 97.8 °F (36.6 °C) 78 20 113/65 96 %   10/07/20 1110 97.8 °F (36.6 °C) 80 19 108/62 95 %   10/07/20 0933 -- 77 18 (!) 110/59 93 %   10/07/20 0916 98 °F (36.7 °C) 65 16 (!) 106/59 97 %   10/07/20 0915 -- 82 14 (!) 106/59 97 %     Oxygen Therapy  O2 Sat (%): 94 % (10/08/20 0820)  Pulse via Oximetry: 75 beats per minute (10/07/20 0933)  O2 Device: Room air (10/07/20 0933)    Estimated body mass index is 22.67 kg/m² as calculated from the following:    Height as of this encounter: 5' 3\" (1.6 m). Weight as of this encounter: 58.1 kg (128 lb).       Intake/Output Summary (Last 24 hours) at 10/8/2020 0902  Last data filed at 10/8/2020 0537  Gross per 24 hour   Intake 1258.58 ml   Output 2251 ml   Net -992.42 ml       *Note that automatically entered I/Os may not be accurate; dependent on patient compliance with collection and accurate  by assistants. General:    Well nourished. Alert. Eyes:   Normal sclerae. Extraocular movements intact. ENT:  Normocephalic, atraumatic. Moist mucous membranes  CV:   Regular rate and rhythm. No murmur, rub, or gallop. Lungs:  Clear to auscultation bilaterally. No wheezing, rhonchi, or rales. Abdomen: Soft, nondistended. Palpable splenomegaly, mild tenderness. Extremities: Warm and dry. No cyanosis or edema. Neurologic: CN II-XII grossly intact. No gross focal deficits   Skin:     No rashes or jaundice. Psych:  Normal mood and affect. Current Med List in Hospital:   Current Facility-Administered Medications   Medication Dose Route Frequency    enoxaparin (LOVENOX) injection 60 mg  60 mg SubCUTAneous Q12H    sodium chloride (NS) flush 5-40 mL  5-40 mL IntraVENous Q8H    sodium chloride (NS) flush 5-40 mL  5-40 mL IntraVENous PRN    ondansetron (ZOFRAN) injection 4 mg  4 mg IntraVENous Q4H PRN    pantoprazole (PROTONIX) 40 mg in 0.9% sodium chloride 10 mL injection  40 mg IntraVENous BID    HYDROmorphone (PF) (DILAUDID) injection 0.5 mg  0.5 mg IntraVENous Q4H PRN    levothyroxine (SYNTHROID) tablet 112 mcg  112 mcg Oral Once per day on Mon Tue Wed Thu Fri Sat    [START ON 10/11/2020] levothyroxine (SYNTHROID) tablet 168 mcg  168 mcg Oral every Sunday    oxyCODONE IR (ROXICODONE) tablet 5 mg  5 mg Oral Q6H PRN    0.9% sodium chloride infusion  75 mL/hr IntraVENous CONTINUOUS       Discharge Info:   Current Discharge Medication List      CONTINUE these medications which have NOT CHANGED    Details   oxyCODONE-acetaminophen (PERCOCET 10)  mg per tablet Take 1 Tab by mouth every eight (8) hours as needed for Pain for up to 30 days.  Max Daily Amount: 3 Tabs.  Qty: 90 Tab, Refills: 0    Associated Diagnoses: CML (chronic myelocytic leukemia) ([facility] Chronic pain due to neoplasm      ondansetron (ZOFRAN ODT) 4 mg disintegrating tablet Take 1 Tab by mouth every eight (8) hours as needed for Nausea. Qty: 90 Tab, Refills: 0      zolpidem (AMBIEN) 10 mg tablet Take 1 Tab by mouth nightly as needed for Sleep. Max Daily Amount: 10 mg.  Qty: 30 Tab, Refills: 2    Associated Diagnoses: Insomnia due to medical condition      Synthroid 112 mcg tablet 1 tablet once a day with an extra 1/2 tablet on Sundays  Qty: 100 Tab, Refills: 3    Associated Diagnoses: Primary hypothyroidism      ruxolitinib 5 mg tab Take 1 Tab by mouth two (2) times a day. Qty: 60 Tab, Refills: 11    Comments: Called in to pharmacy      dasatinib (SPRYCEL) 80 mg tab Take 1 Tab by mouth daily. Qty: 30 Tab, Refills: 11    Associated Diagnoses: Polycythemia vera ([facility] CML (chronic myelocytic leukemia) (HCC)      enoxaparin (LOVENOX) 60 mg/0.6 mL injection INJECT 60 MG BY SUBCUTANEOUS ROUTE EVERY TWELVE (12) HOURS. Qty: 60 Syringe, Refills: 11    Associated Diagnoses: CML (chronic myelocytic leukemia) ([facility] Splenomegaly; Pain; Portal vein thrombosis; Iron deficiency anemia, unspecified iron deficiency anemia type; Polycythemia vera (HCC)      pseudoephedrine (SUDAFED) 30 mg tablet Take  by mouth every four (4) hours as needed for Congestion. calcium carbonate (CALTREX) 600 mg calcium (1,500 mg) tablet Take 600 mg by mouth daily. cholecalciferol (VITAMIN D3) 1,000 unit cap Take 1,000 Units by mouth daily. lansoprazole (PREVACID) 30 mg capsule Take 30 mg by mouth daily. ibuprofen (ADVIL) 200 mg tablet Take  by mouth. Last dose 1/16/19      diphenoxylate-atropine (LOMOTIL) 2.5-0.025 mg per tablet Take 1 Tab by mouth four (4) times daily as needed for Diarrhea. Max Daily Amount: 4 Tabs.   Qty: 90 Tab, Refills: 1      predniSONE (DELTASONE) 10 mg tablet Take 40 mg (4 pills) for 2 days, then 30 mg (3 pills) x 2 days, then 20 mg (2 pills) x 2 days, then 10 mg (1 pill) x 2 days  Qty: 20 Tab, Refills: 0      calcium carbonate (TUMS) 200 mg calcium (500 mg) chew Take 1 Tab by mouth daily as needed. Time spent in patient discharge planning and coordination 35 minutes.     Signed:  Juventino Cerda MD

## 2020-10-08 NOTE — PROGRESS NOTES
Pt medically ready for dc home today with family, no dc needs noted. Care Management Interventions  PCP Verified by CM:  Yes  Discharge Durable Medical Equipment: No  Physical Therapy Consult: No  Occupational Therapy Consult: No  Speech Therapy Consult: No  Current Support Network: Own Home, Lives Alone  Confirm Follow Up Transport: Family  Discharge Location  Discharge Placement: Home

## 2020-10-08 NOTE — PROGRESS NOTES
END OF SHIFT NOTE:    INTAKE/OUTPUT  10/07 0701 - 10/08 0700  In: 1258.6 [I.V.:1258.6]  Out: 7948 [Urine:2250]  Voiding: YES  Catheter: NO  Drain:              Flatus: Patient does have flatus present. Stool:  2 occurrences. Characteristics:  Stool Assessment  Stool Color: Brown, Yellow  Stool Appearance: Loose, Watery  Stool Amount: Medium  Stool Source/Status: Rectum    Emesis: 0 occurrences. Characteristics:        VITAL SIGNS  Patient Vitals for the past 12 hrs:   Temp Pulse Resp BP SpO2   10/08/20 0354 98.3 °F (36.8 °C) 69 19 (!) 99/55 92 %   10/08/20 0032 98.1 °F (36.7 °C) 90 18 (!) 103/54 90 %   10/07/20 2009 98.3 °F (36.8 °C) 85 18 (!) 99/56 93 %       Pain Assessment  Pain Intensity 1: 6 (10/08/20 0613)  Pain Location 1: Abdomen, Flank  Pain Intervention(s) 1: Medication (see MAR)  Patient Stated Pain Goal: 0    Ambulating  Yes    Shift report to be given to oncoming nurse at the bedside.     [facility]

## 2020-10-08 NOTE — PROGRESS NOTES
Patient refused morning Lovenox injection. Patient states she \"received a shot last night and does not want to over do it. \"

## 2020-10-09 ENCOUNTER — PATIENT OUTREACH (OUTPATIENT)
Dept: CASE MANAGEMENT | Age: 60
End: 2020-10-09

## 2020-10-12 NOTE — PROGRESS NOTES
Patient contacted regarding recent visit for viral symptoms. This Shelva Hallstead contacted the patient by telephone to perform post discharge call. Verified name and  with patient as identifiers. Provided introduction to self, and reason for call due to high risk for COVID-19. Discussed COVID-19 related testing which was not done at this time. Test results were not done. Patient informed of results, if available? Not done    Advance Care Planning:   Does patient have an Advance Directive: Reviewed and current    Discussed exposure protocols and quarantine with CDC Guidelines What To Do If You Are Sick    patient was given an opportunity for questions and concerns. Stay home except to get medical care  Separate yourself from other people and animals in your home  Call ahead before visiting your doctor  Wear a facemask  Cover your coughs and sneezes  Clean your hands often  Avoid sharing personal household items  Clean all high-touch surfaces everyday    Monitor your symptoms  Seek prompt medical attention if your illness is worsening (e.g., difficulty breathing). Before seeking care, call your healthcare provider and tell them that you have, or are being evaluated for, COVID-19. Put on a facemask before you enter the facility. These steps will help the healthcare provider's office to keep other people in the office or waiting room from getting infected or exposed. Ask your healthcare provider to call the local or state health department. Persons who are placed under If you have a medical emergency and need to call 911, notify the dispatch personnel that you have, or are being evaluated for COVID-19. If possible, put on a facemask before emergency medical services arrive. The patient agrees to contact the [facility] exposure line [phone], local health department [facility] and PCP office for questions related to their healthcare.  Author provided contact information for future reference.     Patient/family/caregiver given information for [employer] and agrees to enroll NO

## 2020-10-20 ENCOUNTER — APPOINTMENT (OUTPATIENT)
Dept: CT IMAGING | Age: 60
End: 2020-10-20
Attending: EMERGENCY MEDICINE
Payer: MEDICARE

## 2020-10-20 ENCOUNTER — HOSPITAL ENCOUNTER (EMERGENCY)
Age: 60
Discharge: HOME | End: 2020-10-20
Attending: EMERGENCY MEDICINE
Payer: MEDICARE

## 2020-10-20 VITALS
RESPIRATION RATE: 20 BRPM | HEART RATE: 80 BPM | OXYGEN SATURATION: 100 % | SYSTOLIC BLOOD PRESSURE: 154 MMHG | HEIGHT: 63 IN | WEIGHT: 120 LBS | TEMPERATURE: 98.7 F | BODY MASS INDEX: 21.26 KG/M2 | DIASTOLIC BLOOD PRESSURE: 62 MMHG

## 2020-10-20 DIAGNOSIS — R10.9 FLANK PAIN: Primary | ICD-10-CM

## 2020-10-20 LAB
ALBUMIN SERPL-MCNC: 3.1 G/DL (ref 3.2–4.6)
ALBUMIN/GLOB SERPL: 1.2 {RATIO} (ref 1.2–3.5)
ALP SERPL-CCNC: 82 U/L (ref 50–130)
ALT SERPL-CCNC: 32 U/L (ref 12–65)
ANION GAP SERPL CALC-SCNC: 7 MMOL/L (ref 7–16)
APTT PPP: 23.5 SEC (ref 24.3–35.4)
AST SERPL-CCNC: 32 U/L (ref 15–37)
BASOPHILS # BLD: 0.1 K/UL (ref 0–0.2)
BASOPHILS NFR BLD: 1 % (ref 0–2)
BILIRUB SERPL-MCNC: 0.4 MG/DL (ref 0.2–1.1)
BUN SERPL-MCNC: 2 MG/DL (ref 8–23)
CALCIUM SERPL-MCNC: 8.5 MG/DL (ref 8.3–10.4)
CHLORIDE SERPL-SCNC: 109 MMOL/L (ref 98–107)
CO2 SERPL-SCNC: 26 MMOL/L (ref 21–32)
CREAT SERPL-MCNC: 0.54 MG/DL (ref 0.6–1)
DIFFERENTIAL METHOD BLD: ABNORMAL
EOSINOPHIL # BLD: 0.6 K/UL (ref 0–0.8)
EOSINOPHIL NFR BLD: 8 % (ref 0.5–7.8)
ERYTHROCYTE [DISTWIDTH] IN BLOOD BY AUTOMATED COUNT: 17.9 % (ref 11.9–14.6)
GLOBULIN SER CALC-MCNC: 2.6 G/DL (ref 2.3–3.5)
GLUCOSE SERPL-MCNC: 92 MG/DL (ref 65–100)
HCT VFR BLD AUTO: 35.7 % (ref 35.8–46.3)
HGB BLD-MCNC: 12 G/DL (ref 11.7–15.4)
IMM GRANULOCYTES # BLD AUTO: 0.1 K/UL (ref 0–0.5)
IMM GRANULOCYTES NFR BLD AUTO: 1 % (ref 0–5)
INR PPP: 1.2
LIPASE SERPL-CCNC: 86 U/L (ref 73–393)
LYMPHOCYTES # BLD: 0.5 K/UL (ref 0.5–4.6)
LYMPHOCYTES NFR BLD: 8 % (ref 13–44)
MCH RBC QN AUTO: 32.2 PG (ref 26.1–32.9)
MCHC RBC AUTO-ENTMCNC: 33.6 G/DL (ref 31.4–35)
MCV RBC AUTO: 95.7 FL (ref 79.6–97.8)
MONOCYTES # BLD: 0.3 K/UL (ref 0.1–1.3)
MONOCYTES NFR BLD: 5 % (ref 4–12)
NEUTS SEG # BLD: 5.4 K/UL (ref 1.7–8.2)
NEUTS SEG NFR BLD: 78 % (ref 43–78)
NRBC # BLD: 0 K/UL (ref 0–0.2)
PLATELET # BLD AUTO: 171 K/UL (ref 150–450)
PMV BLD AUTO: 10.3 FL (ref 9.4–12.3)
POTASSIUM SERPL-SCNC: 3.5 MMOL/L (ref 3.5–5.1)
PROT SERPL-MCNC: 5.7 G/DL (ref 6.3–8.2)
PROTHROMBIN TIME: 15.7 SEC (ref 12–14.7)
RBC # BLD AUTO: 3.73 M/UL (ref 4.05–5.2)
SODIUM SERPL-SCNC: 142 MMOL/L (ref 136–145)
WBC # BLD AUTO: 6.9 K/UL (ref 4.3–11.1)

## 2020-10-20 PROCEDURE — 80053 COMPREHEN METABOLIC PANEL: CPT

## 2020-10-20 PROCEDURE — 99283 EMERGENCY DEPT VISIT LOW MDM: CPT

## 2020-10-20 PROCEDURE — 83690 ASSAY OF LIPASE: CPT

## 2020-10-20 PROCEDURE — 74177 CT ABD & PELVIS W/CONTRAST: CPT

## 2020-10-20 PROCEDURE — 96375 TX/PRO/DX INJ NEW DRUG ADDON: CPT

## 2020-10-20 PROCEDURE — 74011250636 HC RX REV CODE- 250/636: Performed by: EMERGENCY MEDICINE

## 2020-10-20 PROCEDURE — 85610 PROTHROMBIN TIME: CPT

## 2020-10-20 PROCEDURE — 74011000636 HC RX REV CODE- 636: Performed by: EMERGENCY MEDICINE

## 2020-10-20 PROCEDURE — 85730 THROMBOPLASTIN TIME PARTIAL: CPT

## 2020-10-20 PROCEDURE — 96374 THER/PROPH/DIAG INJ IV PUSH: CPT

## 2020-10-20 PROCEDURE — 74011000258 HC RX REV CODE- 258: Performed by: EMERGENCY MEDICINE

## 2020-10-20 PROCEDURE — 85025 COMPLETE CBC W/AUTO DIFF WBC: CPT

## 2020-10-20 RX ORDER — MORPHINE SULFATE 4 MG/ML
6 INJECTION INTRAVENOUS
Status: COMPLETED | OUTPATIENT
Start: 2020-10-20 | End: 2020-10-20

## 2020-10-20 RX ORDER — VANCOMYCIN HYDROCHLORIDE 125 MG/1
125 CAPSULE ORAL 4 TIMES DAILY
COMMUNITY
End: 2021-01-18 | Stop reason: ALTCHOICE

## 2020-10-20 RX ORDER — SODIUM CHLORIDE 0.9 % (FLUSH) 0.9 %
10 SYRINGE (ML) INJECTION
Status: COMPLETED | OUTPATIENT
Start: 2020-10-20 | End: 2020-10-20

## 2020-10-20 RX ORDER — ONDANSETRON 2 MG/ML
4 INJECTION INTRAMUSCULAR; INTRAVENOUS
Status: COMPLETED | OUTPATIENT
Start: 2020-10-20 | End: 2020-10-20

## 2020-10-20 RX ORDER — OXYCODONE AND ACETAMINOPHEN 10; 325 MG/1; MG/1
1 TABLET ORAL
Qty: 15 TAB | Refills: 0 | Status: SHIPPED | OUTPATIENT
Start: 2020-10-20 | End: 2020-10-25

## 2020-10-20 RX ADMIN — MORPHINE SULFATE 6 MG: 4 INJECTION INTRAVENOUS at 10:40

## 2020-10-20 RX ADMIN — ONDANSETRON 4 MG: 2 INJECTION INTRAMUSCULAR; INTRAVENOUS at 10:40

## 2020-10-20 RX ADMIN — SODIUM CHLORIDE 100 ML: 900 INJECTION, SOLUTION INTRAVENOUS at 11:59

## 2020-10-20 RX ADMIN — DIATRIZOATE MEGLUMINE AND DIATRIZOATE SODIUM 15 ML: 660; 100 LIQUID ORAL; RECTAL at 10:41

## 2020-10-20 RX ADMIN — SODIUM CHLORIDE 1000 ML: 900 INJECTION, SOLUTION INTRAVENOUS at 10:40

## 2020-10-20 RX ADMIN — Medication 10 ML: at 11:59

## 2020-10-20 RX ADMIN — IOPAMIDOL 100 ML: 755 INJECTION, SOLUTION INTRAVENOUS at 11:59

## 2020-10-20 NOTE — DISCHARGE INSTRUCTIONS
Patient Education        Flank Pain: Care Instructions  Your Care Instructions  Flank pain is pain on the side of the back just below the rib cage and above the waist. It can be on one or both sides. Flank pain has many possible causes, including a kidney stone, a urinary tract infection, or back strain. Flank pain may get better on its own. But don't ignore new symptoms, such as fever, nausea and vomiting, urination problems, pain that gets worse, and dizziness. These may be signs of a more serious problem. You may have to have tests or other treatment. Follow-up care is a key part of your treatment and safety. Be sure to make and go to all appointments, and call your doctor if you are having problems. It's also a good idea to know your test results and keep a list of the medicines you take. How can you care for yourself at home? · Rest until you feel better. · Take pain medicines exactly as directed. ? If the doctor gave you a prescription medicine for pain, take it as prescribed. ? If you are not taking a prescription pain medicine, ask your doctor if you can take an over-the-counter pain medicine, such as acetaminophen (Tylenol), ibuprofen (Advil, Motrin), or naproxen (Aleve). Read and follow all instructions on the label. · If your doctor prescribed antibiotics, take them as directed. Do not stop taking them just because you feel better. You need to take the full course of antibiotics. · To apply heat, put a warm water bottle, a heating pad set on low, or a warm cloth on the painful area. Do not go to sleep with a heating pad on your skin. · To prevent dehydration, drink plenty of fluids, enough so that your urine is light yellow or clear like water. Choose water and other caffeine-free clear liquids until you feel better. If you have kidney, heart, or liver disease and have to limit fluids, talk with your doctor before you increase the amount of fluids you drink. When should you call for help? Call your doctor now or seek immediate medical care if:    · Your flank pain gets worse.     · You have new symptoms, such as fever, nausea, or vomiting.     · You have symptoms of a urinary problem. For example:  ? You have blood or pus in your urine. ? You have chills or body aches. ? It hurts to urinate. ? You have groin or belly pain. Watch closely for changes in your health, and be sure to contact your doctor if you do not get better as expected. Where can you learn more? Go to http://www.story.com/  Enter S191 in the search box to learn more about \"Flank Pain: Care Instructions. \"  Current as of: June 26, 2019               Content Version: 12.6  © 6594-2351 [employer]. Care instructions adapted under license by [employer] (which disclaims liability or warranty for this information). If you have questions about a medical condition or this instruction, always ask your healthcare professional. [place] [address] any warranty or liability for your use of this information.

## 2020-10-20 NOTE — ED TRIAGE NOTES
Pt presents to ED today with c/o left mid/side back pain that hurts when she takes a deep breath and move. Hx of Cdiff as of last week at [facility] and hx pf enlarged spleen. Pt masked from home.

## 2020-10-20 NOTE — ED NOTES
I have reviewed discharge instructions with the patient. The patient verbalized understanding. Patient left ED via Discharge Method: ambulatory to Home with self. Opportunity for questions and clarification provided. Patient given 1 scripts. To continue your aftercare when you leave the hospital, you may receive an automated call from our care team to check in on how you are doing. This is a free service and part of our promise to provide the best care and service to meet your aftercare needs.  If you have questions, or wish to unsubscribe from this service please call [phone]. Thank you for Choosing our [facility] Emergency Department.

## 2020-10-20 NOTE — ED PROVIDER NOTES
Patient with a history of polycythemia vera and CML managed by Dr. Paulo Espinal. Was at [facility] recently with a GI bleed. He did not discharge. Then admitted to [address] [place] [address] for diarrhea diagnosed with C. difficile. On oral vancomycin currently with some improvement in this. On Lovenox twice a day. With the C. difficile infection had to stop her cancer treatments. Has an enlarged spleen and is having some increased left mid back pain wrapping around the left flank. Feels this is worse after stopping the chemo. Also had increased pain with the C. difficile and went through her Percocet at home quicker than normal.  No chest pain or shortness of breath. No dysuria or hematuria. The history is provided by the patient. No  was used. Other   This is a new problem. The current episode started more than 2 days ago. The problem occurs constantly. The problem has been gradually worsening. Associated symptoms include abdominal pain (left flank). Pertinent negatives include no chest pain, no headaches and no shortness of breath. Nothing aggravates the symptoms. Nothing relieves the symptoms. She has tried nothing for the symptoms. Back Pain    Associated symptoms include abdominal pain (left flank). Pertinent negatives include no chest pain, no fever, no numbness, no headaches, no dysuria and no weakness.         Past Medical History:   Diagnosis Date    Anemia     C. difficile diarrhea 4/1/2015    Cerebral edema ([address] 4/1/2015    Chronic migraine 9/22/2016    Chronic myelogenous leukemia ([address]     Dubois chromosome/ converted from polycythemia in 2009- to 2012 when she was dx w leukemia    Chronic pain     Coagulation disorder ([address]     \"clotting and Bleeding Problem\" dr Stein Angry follows     Esophageal spasm     with banding     Esophageal varices ([address]     grade 3    GI bleed 8/3/2016    H/O craniotomy     3-29-15.  due to blood clot - which caused a seizure  - then pt fell and hit     Leukocytosis 3/14/2015    MRSA colonization 6/25/2012    Polycythemia vera(238.4)     JAK2 mutation    Portal hypertension (HCC)     with varices    Portal vein thrombosis 6/20/2012    Ct scan 6-21-2 Apparent occlusion of the portal vein. In addition, the splenic vein, and superior mesenteric vein are not definitely seen and are also likely  occluded. Splenomegaly, and extensive varices are seen as described above consistent with the portal vein occlusion 7-05-12 on arixtra HIT negative on repeat 7-27-12 re admitted Cirrhotic appearance of the liver. Mild to moderate ascites, as    Primary hypothyroidism     Pulmonary embolism ([address] 2006    not on coumadin anymore     S/P exploratory laparotomy, 6/20/12 6/25/2012    Bowel resection:  336 cm of small bowel removed, approximately 9 feet and placement of feeding jejunostomy.      S/P small bowel resection     2012.  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 6/25/2012 7-01-12 platelets lower repeat HIT 7-02-12 platelets in 02,841'E    Thrombosis, portal vein 2004    portal , spleenic and recently superior mesenteric    Transfusion history     many blood tranfusions - last 3-29-15 after brain surgery    Traumatic hemorrhage of right cerebrum ([address] 3/30/2015       Past Surgical History:   Procedure Laterality Date    ABDOMEN SURGERY PROC UNLISTED      umbilical hernia repair    ABDOMEN SURGERY PROC UNLISTED      9ft of small bowel excised then reconnected    HX APPENDECTOMY      with small bowel resection    HX BREAST BIOPSY Bilateral     Lt - 1998; Rt - 2001    HX CHOLECYSTECTOMY  HX GI      liver biopsy    HX GI      bowel removed small - 9 feet     HX GYN       x 2    HX GYN      D&C following miscarriage    HX ORTHOPAEDIC Left 2008    torn labrum shoulder (screw in place)    NEUROLOGICAL PROCEDURE UNLISTED  2010    craniotomy to evacuate subdural hematoma following a fall from a seizure         Family History:   Problem Relation Age of Onset    Diabetes Mother     Stroke Mother         after surgery    Cancer Father         brain    No Known Problems Sister     Other Sister         diverticulitis    Other Sister         diverticulitis    Cancer Sister         lyjmphoma    Breast Cancer Maternal Aunt 48    Thyroid Disease Paternal Grandmother        Social History     Socioeconomic History    Marital status: SINGLE     Spouse name: Not on file    Number of children: Not on file    Years of education: Not on file    Highest education level: Not on file   Occupational History    Not on file   Social Needs    Financial resource strain: Not on file    Food insecurity     Worry: Not on file     Inability: Not on file    Transportation needs     Medical: Not on file     Non-medical: Not on file   Tobacco Use    Smoking status: Former Smoker     Packs/day: 0.20     Years: 10.00     Pack years: 2.00     Types: Cigarettes     Last attempt to quit: 1990     Years since quittin.8    Smokeless tobacco: Never Used   Substance and Sexual Activity    Alcohol use: No    Drug use: No    Sexual activity: Not on file   Lifestyle    Physical activity     Days per week: Not on file     Minutes per session: Not on file    Stress: Not on file   Relationships    Social connections     Talks on phone: Not on file     Gets together: Not on file     Attends Christianity service: Not on file     Active member of club or organization: Not on file     Attends meetings of clubs or organizations: Not on file     Relationship status: Not on file    Intimate partner violence Fear of current or ex partner: Not on file     Emotionally abused: Not on file     Physically abused: Not on file     Forced sexual activity: Not on file   Other Topics Concern    Not on file   Social History Narrative    Not on file         ALLERGIES: Latex; Sulfa (sulfonamide antibiotics); Tramadol; Augmentin [amoxicillin-pot clavulanate]; Morphine; Rizatriptan; Tetanus immune globulin; Xanax [alprazolam]; and Zonegran [zonisamide]    Review of Systems   Constitutional: Negative for chills and fever. HENT: Negative for rhinorrhea and sore throat. Eyes: Negative for pain and redness. Respiratory: Negative for chest tightness, shortness of breath and wheezing. Cardiovascular: Negative for chest pain and leg swelling. Gastrointestinal: Positive for abdominal pain (left flank). Negative for diarrhea, nausea and vomiting. Genitourinary: Positive for flank pain. Negative for dysuria and hematuria. Musculoskeletal: Positive for back pain. Negative for gait problem, neck pain and neck stiffness. Skin: Negative for color change and rash. Neurological: Negative for weakness, numbness and headaches. Vitals:    10/20/20 0938   BP: (!) 166/74   Pulse: 84   Resp: 20   Temp: 98.3 °F (36.8 °C)   SpO2: 100%   Weight: 54.4 kg (120 lb)   Height: 5' 3\" (1.6 m)            Physical Exam  Constitutional:       Appearance: Normal appearance. She is well-developed. HENT:      Head: Normocephalic and atraumatic. Neck:      Musculoskeletal: Normal range of motion and neck supple. Cardiovascular:      Rate and Rhythm: Normal rate and regular rhythm. Pulmonary:      Effort: Pulmonary effort is normal.      Breath sounds: Normal breath sounds. Abdominal:      General: Bowel sounds are normal.      Palpations: Abdomen is soft. Tenderness: There is abdominal tenderness (LUQ TTP). Musculoskeletal: Normal range of motion. General: Tenderness (left mid back. ) present.    Skin:     General: Skin is warm and dry. Neurological:      Mental Status: She is alert and oriented to person, place, and time. MDM  Number of Diagnoses or Management Options  Diagnosis management comments: Enlarge spleen on CT known to pt. Will discharge with refill of pain medication and follow up with Dr. Corey Ferguson. Amount and/or Complexity of Data Reviewed  Clinical lab tests: ordered and reviewed  Tests in the radiology section of CPT®: ordered and reviewed  Tests in the medicine section of CPT®: ordered and reviewed    Patient Progress  Patient progress: stable         Procedures          Results Include:    Recent Results (from the past 24 hour(s))   CBC WITH AUTOMATED DIFF    Collection Time: 10/20/20 10:50 AM   Result Value Ref Range    WBC 6.9 4.3 - 11.1 K/uL    RBC 3.73 (L) 4.05 - 5.2 M/uL    HGB 12.0 11.7 - 15.4 g/dL    HCT 35.7 (L) 35.8 - 46.3 %    MCV 95.7 79.6 - 97.8 FL    MCH 32.2 26.1 - 32.9 PG    MCHC 33.6 31.4 - 35.0 g/dL    RDW 17.9 (H) 11.9 - 14.6 %    PLATELET 921 160 - 539 K/uL    MPV 10.3 9.4 - 12.3 FL    ABSOLUTE NRBC 0.00 0.0 - 0.2 K/uL    DF AUTOMATED      NEUTROPHILS 78 43 - 78 %    LYMPHOCYTES 8 (L) 13 - 44 %    MONOCYTES 5 4.0 - 12.0 %    EOSINOPHILS 8 (H) 0.5 - 7.8 %    BASOPHILS 1 0.0 - 2.0 %    IMMATURE GRANULOCYTES 1 0.0 - 5.0 %    ABS. NEUTROPHILS 5.4 1.7 - 8.2 K/UL    ABS. LYMPHOCYTES 0.5 0.5 - 4.6 K/UL    ABS. MONOCYTES 0.3 0.1 - 1.3 K/UL    ABS. EOSINOPHILS 0.6 0.0 - 0.8 K/UL    ABS. BASOPHILS 0.1 0.0 - 0.2 K/UL    ABS. IMM.  GRANS. 0.1 0.0 - 0.5 K/UL   METABOLIC PANEL, COMPREHENSIVE    Collection Time: 10/20/20 10:50 AM   Result Value Ref Range    Sodium 142 136 - 145 mmol/L    Potassium 3.5 3.5 - 5.1 mmol/L    Chloride 109 (H) 98 - 107 mmol/L    CO2 26 21 - 32 mmol/L    Anion gap 7 7 - 16 mmol/L    Glucose 92 65 - 100 mg/dL    BUN 2 (L) 8 - 23 MG/DL    Creatinine 0.54 (L) 0.6 - 1.0 MG/DL    GFR est AA >60 >60 ml/min/1.73m2    GFR est non-AA >60 >60 ml/min/1.73m2    Calcium 8.5 8.3 - 10.4 MG/DL    Bilirubin, total 0.4 0.2 - 1.1 MG/DL    ALT (SGPT) 32 12 - 65 U/L    AST (SGOT) 32 15 - 37 U/L    Alk. phosphatase 82 50 - 130 U/L    Protein, total 5.7 (L) 6.3 - 8.2 g/dL    Albumin 3.1 (L) 3.2 - 4.6 g/dL    Globulin 2.6 2.3 - 3.5 g/dL    A-G Ratio 1.2 1.2 - 3.5     PTT    Collection Time: 10/20/20 10:50 AM   Result Value Ref Range    aPTT 23.5 (L) 24.3 - 35.4 SEC   PROTHROMBIN TIME + INR    Collection Time: 10/20/20 10:50 AM   Result Value Ref Range    Prothrombin time 15.7 (H) 12.0 - 14.7 sec    INR 1.2     LIPASE    Collection Time: 10/20/20 10:50 AM   Result Value Ref Range    Lipase 86 73 - 393 U/L              CT ABD PELV W CONT (Final result)   Result time 10/20/20 13:03:32   Final result by Meliza Downs MD (10/20/20 13:03:32)                 Impression:     IMPRESSION:     1. Upper abdominal ascites, small volume. 2. Colon not well opacified with oral contrast. Cannot exclude mild colitis at   the level of the hepatic flexure. 3. Marked splenomegaly. No definite portal vein demonstrated. 4. Trace pericardial effusion. Narrative:     HISTORY:  Abd pain, left mid side. History of C. diff colitis. History of   splenomegaly. COMPARISON: MRI abdomen dated 11/14/2019     EXAM: CT abdomen and pelvis with iv contrast     TECHNIQUE:   Thin section axial CT was performed from the lung bases through the symphysis   pubis during uneventful rapid bolus intravenous administration of 125 mL of   Isovue 370. Oral contrast was also administered. Radiation dose reduction   techniques were used for this study.  Our CT scanners use one or all of the   following: Automated exposure control, adjustment of the mA and/or kV according   to patient size, use of iterative reconstruction. FINDINGS:     The lung bases are clear. There is trace pericardial effusion. CT abdomen: There is small volume perihepatic and perisplenic ascites. There is   marked splenomegaly.  There are perisplenic varices. The portal vein is not well   opacified. There is no biliary ductal dilatation. The patient is status post   cholecystectomy. The pancreas and adrenal glands are normal. The kidneys enhance   symmetrically. Bowel loops in the upper abdomen are normal. No definite upper   abdominal lymphadenopathy seen. CT pelvis: There is edema within the ventral subcutaneous soft tissues. The   colon is not well opacified. The level of the hepatic flexure, there is question   of colonic bowel wall thickening. The bladder and rectum are normal. No pelvic   adenopathy seen. No free air or free fluid seen within the pelvis.      Bone window evaluation demonstrates no aggressive osseous lesions.

## 2020-10-29 ENCOUNTER — PATIENT OUTREACH (OUTPATIENT)
Dept: CASE MANAGEMENT | Age: 60
End: 2020-10-29

## 2020-10-29 NOTE — PROGRESS NOTES
Referral via Shared Patient ED Discharge List  Patient sent to ED 10/20 by oncology support team and subsequently followed up by them  Reena education provided previously - see note 10/12 KEILA Browne    No plans for further outreach

## 2020-11-23 ENCOUNTER — HOSPITAL ENCOUNTER (OUTPATIENT)
Dept: LAB | Age: 60
Discharge: HOME | End: 2020-11-23
Payer: MEDICARE

## 2020-11-23 DIAGNOSIS — D50.9 IRON DEFICIENCY ANEMIA, UNSPECIFIED IRON DEFICIENCY ANEMIA TYPE: ICD-10-CM

## 2020-11-23 DIAGNOSIS — C92.10 CML (CHRONIC MYELOCYTIC LEUKEMIA) (HCC): ICD-10-CM

## 2020-11-23 LAB
ALBUMIN SERPL-MCNC: 4 G/DL (ref 3.2–4.6)
ALBUMIN/GLOB SERPL: 1.5 {RATIO} (ref 1.2–3.5)
ALP SERPL-CCNC: 90 U/L (ref 50–136)
ALT SERPL-CCNC: 61 U/L (ref 12–65)
ANION GAP SERPL CALC-SCNC: 6 MMOL/L (ref 7–16)
AST SERPL-CCNC: 38 U/L (ref 15–37)
BASOPHILS # BLD: 0 K/UL (ref 0–0.2)
BASOPHILS NFR BLD: 1 % (ref 0–2)
BILIRUB SERPL-MCNC: 0.7 MG/DL (ref 0.2–1.1)
BUN SERPL-MCNC: 9 MG/DL (ref 8–23)
CALCIUM SERPL-MCNC: 9 MG/DL (ref 8.3–10.4)
CHLORIDE SERPL-SCNC: 107 MMOL/L (ref 98–107)
CO2 SERPL-SCNC: 28 MMOL/L (ref 21–32)
CREAT SERPL-MCNC: 0.6 MG/DL (ref 0.6–1)
DIFFERENTIAL METHOD BLD: ABNORMAL
EOSINOPHIL # BLD: 0.2 K/UL (ref 0–0.8)
EOSINOPHIL NFR BLD: 3 % (ref 0.5–7.8)
ERYTHROCYTE [DISTWIDTH] IN BLOOD BY AUTOMATED COUNT: 15.9 % (ref 11.9–14.6)
FERRITIN SERPL-MCNC: 35 NG/ML (ref 8–388)
GLOBULIN SER CALC-MCNC: 2.7 G/DL (ref 2.3–3.5)
GLUCOSE SERPL-MCNC: 81 MG/DL (ref 65–100)
HCT VFR BLD AUTO: 37.7 % (ref 35.8–46.3)
HGB BLD-MCNC: 12.6 G/DL (ref 11.7–15.4)
HGB RETIC QN AUTO: 37 PG (ref 29–35)
IMM GRANULOCYTES # BLD AUTO: 0 K/UL (ref 0–0.5)
IMM GRANULOCYTES NFR BLD AUTO: 0 % (ref 0–5)
IMM RETICS NFR: 10.6 % (ref 3–15.9)
IRON SATN MFR SERPL: 32 %
IRON SERPL-MCNC: 87 UG/DL (ref 35–150)
LYMPHOCYTES # BLD: 0.4 K/UL (ref 0.5–4.6)
LYMPHOCYTES NFR BLD: 8 % (ref 13–44)
Lab: NORMAL
MCH RBC QN AUTO: 32.1 PG (ref 26.1–32.9)
MCHC RBC AUTO-ENTMCNC: 33.4 G/DL (ref 31.4–35)
MCV RBC AUTO: 96.2 FL (ref 79.6–97.8)
MONOCYTES # BLD: 0.3 K/UL (ref 0.1–1.3)
MONOCYTES NFR BLD: 6 % (ref 4–12)
NEUTS SEG # BLD: 3.7 K/UL (ref 1.7–8.2)
NEUTS SEG NFR BLD: 81 % (ref 43–78)
NRBC # BLD: 0 K/UL (ref 0–0.2)
PLATELET # BLD AUTO: 173 K/UL (ref 150–450)
PMV BLD AUTO: 10 FL (ref 9.4–12.3)
POTASSIUM SERPL-SCNC: 3.6 MMOL/L (ref 3.5–5.1)
PROT SERPL-MCNC: 6.7 G/DL (ref 6.3–8.2)
RBC # BLD AUTO: 3.92 M/UL (ref 4.05–5.25)
REFERENCE LAB,REFLB: NORMAL
RETICS # AUTO: 0.1 M/UL (ref 0.03–0.1)
RETICS/RBC NFR AUTO: 2.6 % (ref 0.3–2)
SODIUM SERPL-SCNC: 141 MMOL/L (ref 136–145)
TEST DESCRIPTION:,ATST: NORMAL
TIBC SERPL-MCNC: 272 UG/DL (ref 250–450)
WBC # BLD AUTO: 4.5 K/UL (ref 4.3–11.1)

## 2020-11-23 PROCEDURE — 36415 COLL VENOUS BLD VENIPUNCTURE: CPT

## 2020-11-23 PROCEDURE — 80053 COMPREHEN METABOLIC PANEL: CPT

## 2020-11-23 PROCEDURE — 85046 RETICYTE/HGB CONCENTRATE: CPT

## 2020-11-23 PROCEDURE — 82728 ASSAY OF FERRITIN: CPT

## 2020-11-23 PROCEDURE — 85025 COMPLETE CBC W/AUTO DIFF WBC: CPT

## 2020-11-23 PROCEDURE — 83540 ASSAY OF IRON: CPT

## 2020-12-01 ENCOUNTER — HOSPITAL ENCOUNTER (OUTPATIENT)
Dept: INFUSION THERAPY | Age: 60
Discharge: HOME | End: 2020-12-01
Payer: MEDICARE

## 2020-12-01 VITALS
HEART RATE: 87 BPM | OXYGEN SATURATION: 97 % | RESPIRATION RATE: 18 BRPM | TEMPERATURE: 98.9 F | DIASTOLIC BLOOD PRESSURE: 71 MMHG | SYSTOLIC BLOOD PRESSURE: 129 MMHG

## 2020-12-01 DIAGNOSIS — D50.9 IRON DEFICIENCY ANEMIA, UNSPECIFIED IRON DEFICIENCY ANEMIA TYPE: ICD-10-CM

## 2020-12-01 DIAGNOSIS — R16.1 SPLENOMEGALY: Primary | ICD-10-CM

## 2020-12-01 PROCEDURE — 96361 HYDRATE IV INFUSION ADD-ON: CPT

## 2020-12-01 PROCEDURE — 74011000258 HC RX REV CODE- 258: Performed by: INTERNAL MEDICINE

## 2020-12-01 PROCEDURE — 96374 THER/PROPH/DIAG INJ IV PUSH: CPT

## 2020-12-01 PROCEDURE — 74011250636 HC RX REV CODE- 250/636: Performed by: INTERNAL MEDICINE

## 2020-12-01 RX ORDER — SODIUM CHLORIDE 0.9 % (FLUSH) 0.9 %
10 SYRINGE (ML) INJECTION AS NEEDED
Status: DISCONTINUED | OUTPATIENT
Start: 2020-12-01 | End: 2020-12-02 | Stop reason: HOSPADM

## 2020-12-01 RX ADMIN — SODIUM CHLORIDE 500 ML: 900 INJECTION, SOLUTION INTRAVENOUS at 16:40

## 2020-12-01 RX ADMIN — FERUMOXYTOL 510 MG: 510 INJECTION INTRAVENOUS at 16:25

## 2020-12-01 RX ADMIN — Medication 10 ML: at 17:15

## 2020-12-01 RX ADMIN — Medication 10 ML: at 16:25

## 2020-12-01 NOTE — PROGRESS NOTES
Arrived to the [facility]. Allan completed. Patient tolerated well. Any issues or concerns during appointment: none. Patient aware of next infusion appointment on 12/8/2020 at 3pm.  Discharged ambulatory.

## 2020-12-15 ENCOUNTER — HOSPITAL ENCOUNTER (OUTPATIENT)
Dept: INFUSION THERAPY | Age: 60
Discharge: HOME | End: 2020-12-15
Payer: MEDICARE

## 2020-12-15 VITALS
RESPIRATION RATE: 16 BRPM | OXYGEN SATURATION: 99 % | SYSTOLIC BLOOD PRESSURE: 121 MMHG | TEMPERATURE: 98.4 F | DIASTOLIC BLOOD PRESSURE: 63 MMHG | HEART RATE: 89 BPM

## 2020-12-15 DIAGNOSIS — D50.9 IRON DEFICIENCY ANEMIA, UNSPECIFIED IRON DEFICIENCY ANEMIA TYPE: Primary | ICD-10-CM

## 2020-12-15 PROCEDURE — 74011000258 HC RX REV CODE- 258: Performed by: INTERNAL MEDICINE

## 2020-12-15 PROCEDURE — 74011250636 HC RX REV CODE- 250/636: Performed by: INTERNAL MEDICINE

## 2020-12-15 PROCEDURE — 96374 THER/PROPH/DIAG INJ IV PUSH: CPT

## 2020-12-15 RX ORDER — SODIUM CHLORIDE 0.9 % (FLUSH) 0.9 %
10 SYRINGE (ML) INJECTION AS NEEDED
Status: DISCONTINUED | OUTPATIENT
Start: 2020-12-15 | End: 2020-12-16 | Stop reason: HOSPADM

## 2020-12-15 RX ADMIN — Medication 10 ML: at 15:50

## 2020-12-15 RX ADMIN — SODIUM CHLORIDE 500 ML: 900 INJECTION, SOLUTION INTRAVENOUS at 15:50

## 2020-12-15 RX ADMIN — Medication 10 ML: at 16:56

## 2020-12-15 RX ADMIN — FERUMOXYTOL 510 MG: 510 INJECTION INTRAVENOUS at 16:13

## 2020-12-15 NOTE — PROGRESS NOTES
Arrived to the [facility]. Allan completed. Patient tolerated well. Any issues or concerns during appointment: no.  Patient aware of next lab/OV appointment on 12/22/2020 (date) at 12:30 pm (time). Discharged ambulatory with self.

## 2020-12-22 ENCOUNTER — HOSPITAL ENCOUNTER (OUTPATIENT)
Dept: LAB | Age: 60
Discharge: HOME | End: 2020-12-22
Payer: MEDICARE

## 2020-12-22 DIAGNOSIS — D50.9 IRON DEFICIENCY ANEMIA, UNSPECIFIED IRON DEFICIENCY ANEMIA TYPE: ICD-10-CM

## 2020-12-22 LAB
ALBUMIN SERPL-MCNC: 4.1 G/DL (ref 3.2–4.6)
ALBUMIN/GLOB SERPL: 1.6 {RATIO} (ref 1.2–3.5)
ALP SERPL-CCNC: 103 U/L (ref 50–136)
ALT SERPL-CCNC: 84 U/L (ref 12–65)
ANION GAP SERPL CALC-SCNC: 6 MMOL/L (ref 7–16)
AST SERPL-CCNC: 53 U/L (ref 15–37)
BASOPHILS # BLD: 0.1 K/UL (ref 0–0.2)
BASOPHILS NFR BLD: 1 % (ref 0–2)
BILIRUB SERPL-MCNC: 0.7 MG/DL (ref 0.2–1.1)
BUN SERPL-MCNC: 11 MG/DL (ref 8–23)
CALCIUM SERPL-MCNC: 8.4 MG/DL (ref 8.3–10.4)
CHLORIDE SERPL-SCNC: 109 MMOL/L (ref 98–107)
CO2 SERPL-SCNC: 25 MMOL/L (ref 21–32)
CREAT SERPL-MCNC: 0.6 MG/DL (ref 0.6–1)
DIFFERENTIAL METHOD BLD: ABNORMAL
EOSINOPHIL # BLD: 0.2 K/UL (ref 0–0.8)
EOSINOPHIL NFR BLD: 3 % (ref 0.5–7.8)
ERYTHROCYTE [DISTWIDTH] IN BLOOD BY AUTOMATED COUNT: 15.9 % (ref 11.9–14.6)
FERRITIN SERPL-MCNC: 343 NG/ML (ref 8–388)
GLOBULIN SER CALC-MCNC: 2.5 G/DL (ref 2.3–3.5)
GLUCOSE SERPL-MCNC: 81 MG/DL (ref 65–100)
HCT VFR BLD AUTO: 42.9 % (ref 35.8–46.3)
HGB BLD-MCNC: 14.4 G/DL (ref 11.7–15.4)
HGB RETIC QN AUTO: 39 PG (ref 29–35)
IMM GRANULOCYTES # BLD AUTO: 0 K/UL (ref 0–0.5)
IMM GRANULOCYTES NFR BLD AUTO: 0 % (ref 0–5)
IMM RETICS NFR: 7.9 % (ref 3–15.9)
IRON SATN MFR SERPL: 54 %
IRON SERPL-MCNC: 143 UG/DL (ref 35–150)
LYMPHOCYTES # BLD: 1.1 K/UL (ref 0.5–4.6)
LYMPHOCYTES NFR BLD: 16 % (ref 13–44)
MCH RBC QN AUTO: 32.1 PG (ref 26.1–32.9)
MCHC RBC AUTO-ENTMCNC: 33.6 G/DL (ref 31.4–35)
MCV RBC AUTO: 95.5 FL (ref 79.6–97.8)
MONOCYTES # BLD: 0.5 K/UL (ref 0.1–1.3)
MONOCYTES NFR BLD: 7 % (ref 4–12)
NEUTS SEG # BLD: 5.2 K/UL (ref 1.7–8.2)
NEUTS SEG NFR BLD: 73 % (ref 43–78)
NRBC # BLD: 0 K/UL (ref 0–0.2)
PLATELET # BLD AUTO: 139 K/UL (ref 150–450)
PMV BLD AUTO: 10.5 FL (ref 9.4–12.3)
POTASSIUM SERPL-SCNC: 4 MMOL/L (ref 3.5–5.1)
PROT SERPL-MCNC: 6.6 G/DL (ref 6.3–8.2)
RBC # BLD AUTO: 4.49 M/UL (ref 4.05–5.25)
RETICS # AUTO: 0.09 M/UL (ref 0.03–0.1)
RETICS/RBC NFR AUTO: 2.1 % (ref 0.3–2)
SODIUM SERPL-SCNC: 140 MMOL/L (ref 136–145)
TIBC SERPL-MCNC: 265 UG/DL (ref 250–450)
WBC # BLD AUTO: 7 K/UL (ref 4.3–11.1)

## 2020-12-22 PROCEDURE — 85025 COMPLETE CBC W/AUTO DIFF WBC: CPT

## 2020-12-22 PROCEDURE — 83540 ASSAY OF IRON: CPT

## 2020-12-22 PROCEDURE — 80053 COMPREHEN METABOLIC PANEL: CPT

## 2020-12-22 PROCEDURE — 36415 COLL VENOUS BLD VENIPUNCTURE: CPT

## 2020-12-22 PROCEDURE — 82728 ASSAY OF FERRITIN: CPT

## 2020-12-22 PROCEDURE — 85046 RETICYTE/HGB CONCENTRATE: CPT

## 2021-01-01 ENCOUNTER — HOSPITAL ENCOUNTER (OUTPATIENT)
Dept: LAB | Age: 61
Discharge: HOME | End: 2021-09-01
Payer: MEDICARE

## 2021-01-01 ENCOUNTER — APPOINTMENT (OUTPATIENT)
Dept: ULTRASOUND IMAGING | Age: 61
DRG: 603 | End: 2021-01-01
Attending: STUDENT IN AN ORGANIZED HEALTH CARE EDUCATION/TRAINING PROGRAM
Payer: MEDICARE

## 2021-01-01 ENCOUNTER — HOSPITAL ENCOUNTER (OUTPATIENT)
Age: 61
Setting detail: OBSERVATION
Discharge: HOME | End: 2021-07-14
Attending: EMERGENCY MEDICINE | Admitting: HOSPITALIST
Payer: MEDICARE

## 2021-01-01 ENCOUNTER — HOSPITAL ENCOUNTER (OUTPATIENT)
Dept: LAB | Age: 61
Discharge: HOME | End: 2021-04-15
Payer: MEDICARE

## 2021-01-01 ENCOUNTER — HOSPITAL ENCOUNTER (INPATIENT)
Age: 61
LOS: 6 days | Discharge: HOME | DRG: 432 | End: 2021-08-26
Attending: EMERGENCY MEDICINE | Admitting: INTERNAL MEDICINE
Payer: MEDICARE

## 2021-01-01 ENCOUNTER — APPOINTMENT (OUTPATIENT)
Dept: GENERAL RADIOLOGY | Age: 61
End: 2021-01-01
Attending: EMERGENCY MEDICINE
Payer: MEDICARE

## 2021-01-01 ENCOUNTER — HOSPITAL ENCOUNTER (OUTPATIENT)
Dept: LAB | Age: 61
Discharge: HOME | End: 2021-06-17
Payer: MEDICARE

## 2021-01-01 ENCOUNTER — HOSPITAL ENCOUNTER (OUTPATIENT)
Dept: INFUSION THERAPY | Age: 61
End: 2021-01-01
Payer: MEDICARE

## 2021-01-01 ENCOUNTER — HOSPITAL ENCOUNTER (OUTPATIENT)
Dept: LAB | Age: 61
Discharge: HOME | End: 2021-12-13
Payer: MEDICARE

## 2021-01-01 ENCOUNTER — HOSPITAL ENCOUNTER (OUTPATIENT)
Dept: INFUSION THERAPY | Age: 61
Discharge: HOME | End: 2021-12-13
Payer: MEDICARE

## 2021-01-01 ENCOUNTER — HOSPITAL ENCOUNTER (OUTPATIENT)
Dept: INFUSION THERAPY | Age: 61
End: 2021-01-01

## 2021-01-01 ENCOUNTER — HOSPITAL ENCOUNTER (OUTPATIENT)
Dept: LAB | Age: 61
Discharge: HOME | End: 2021-07-15
Payer: MEDICARE

## 2021-01-01 ENCOUNTER — HOSPITAL ENCOUNTER (EMERGENCY)
Age: 61
Discharge: ANOTHER HOSPITAL | DRG: 603 | End: 2021-08-08
Payer: MEDICARE

## 2021-01-01 ENCOUNTER — HOSPITAL ENCOUNTER (EMERGENCY)
Age: 61
Discharge: HOME | End: 2021-07-26
Attending: EMERGENCY MEDICINE
Payer: MEDICARE

## 2021-01-01 ENCOUNTER — HOSPITAL ENCOUNTER (EMERGENCY)
Age: 61
Discharge: LEFT BEFORE DISCHARGE | End: 2021-07-25
Payer: MEDICARE

## 2021-01-01 ENCOUNTER — HOSPITAL ENCOUNTER (OUTPATIENT)
Dept: LAB | Age: 61
Discharge: HOME | End: 2021-10-15
Payer: MEDICARE

## 2021-01-01 ENCOUNTER — APPOINTMENT (OUTPATIENT)
Dept: GENERAL RADIOLOGY | Age: 61
DRG: 432 | End: 2021-01-01
Attending: INTERNAL MEDICINE
Payer: MEDICARE

## 2021-01-01 ENCOUNTER — HOSPITAL ENCOUNTER (OUTPATIENT)
Dept: LAB | Age: 61
Discharge: HOME | End: 2021-10-06
Payer: MEDICARE

## 2021-01-01 ENCOUNTER — ANESTHESIA EVENT (OUTPATIENT)
Dept: ENDOSCOPY | Age: 61
DRG: 432 | End: 2021-01-01
Payer: MEDICARE

## 2021-01-01 ENCOUNTER — ANESTHESIA (OUTPATIENT)
Dept: ENDOSCOPY | Age: 61
DRG: 432 | End: 2021-01-01
Payer: MEDICARE

## 2021-01-01 ENCOUNTER — HOSPITAL ENCOUNTER (INPATIENT)
Age: 61
LOS: 3 days | Discharge: HOME | DRG: 603 | End: 2021-08-11
Attending: FAMILY MEDICINE | Admitting: INTERNAL MEDICINE
Payer: MEDICARE

## 2021-01-01 ENCOUNTER — HOSPITAL ENCOUNTER (OUTPATIENT)
Dept: INFUSION THERAPY | Age: 61
Discharge: HOME | End: 2021-07-05
Payer: MEDICARE

## 2021-01-01 ENCOUNTER — HOSPITAL ENCOUNTER (EMERGENCY)
Age: 61
Discharge: HOME | DRG: 603 | End: 2021-08-05
Attending: STUDENT IN AN ORGANIZED HEALTH CARE EDUCATION/TRAINING PROGRAM
Payer: MEDICARE

## 2021-01-01 ENCOUNTER — HOSPITAL ENCOUNTER (OUTPATIENT)
Dept: LAB | Age: 61
Discharge: HOME | End: 2021-09-17
Payer: MEDICARE

## 2021-01-01 ENCOUNTER — HOSPITAL ENCOUNTER (OUTPATIENT)
Dept: INFUSION THERAPY | Age: 61
Discharge: HOME | End: 2021-05-19
Payer: MEDICARE

## 2021-01-01 ENCOUNTER — HOSPITAL ENCOUNTER (OUTPATIENT)
Dept: LAB | Age: 61
Discharge: HOME | End: 2021-05-19
Payer: MEDICARE

## 2021-01-01 ENCOUNTER — HOSPITAL ENCOUNTER (OUTPATIENT)
Dept: INFUSION THERAPY | Age: 61
Discharge: HOME | End: 2021-11-29
Payer: MEDICARE

## 2021-01-01 ENCOUNTER — APPOINTMENT (OUTPATIENT)
Dept: GENERAL RADIOLOGY | Age: 61
DRG: 603 | End: 2021-01-01
Payer: MEDICARE

## 2021-01-01 ENCOUNTER — HOSPITAL ENCOUNTER (OUTPATIENT)
Dept: LAB | Age: 61
Discharge: HOME | End: 2021-11-12
Payer: MEDICARE

## 2021-01-01 ENCOUNTER — HOSPITAL ENCOUNTER (OUTPATIENT)
Dept: INFUSION THERAPY | Age: 61
Discharge: HOME | End: 2021-06-28
Payer: MEDICARE

## 2021-01-01 ENCOUNTER — APPOINTMENT (OUTPATIENT)
Dept: CT IMAGING | Age: 61
DRG: 432 | End: 2021-01-01
Attending: NURSE PRACTITIONER
Payer: MEDICARE

## 2021-01-01 ENCOUNTER — HOSPITAL ENCOUNTER (OUTPATIENT)
Dept: LAB | Age: 61
Discharge: HOME | End: 2021-08-13
Payer: MEDICARE

## 2021-01-01 ENCOUNTER — APPOINTMENT (OUTPATIENT)
Dept: CT IMAGING | Age: 61
DRG: 603 | End: 2021-01-01
Attending: NURSE PRACTITIONER
Payer: MEDICARE

## 2021-01-01 ENCOUNTER — HOSPITAL ENCOUNTER (OUTPATIENT)
Dept: INFUSION THERAPY | Age: 61
Discharge: HOME | End: 2021-04-30
Payer: MEDICARE

## 2021-01-01 ENCOUNTER — HOSPITAL ENCOUNTER (OUTPATIENT)
Dept: LAB | Age: 61
Discharge: HOME | DRG: 432 | End: 2021-08-19
Payer: MEDICARE

## 2021-01-01 VITALS
TEMPERATURE: 99 F | DIASTOLIC BLOOD PRESSURE: 80 MMHG | RESPIRATION RATE: 16 BRPM | OXYGEN SATURATION: 100 % | HEART RATE: 96 BPM | SYSTOLIC BLOOD PRESSURE: 142 MMHG

## 2021-01-01 VITALS
RESPIRATION RATE: 18 BRPM | DIASTOLIC BLOOD PRESSURE: 59 MMHG | HEART RATE: 75 BPM | TEMPERATURE: 98.1 F | SYSTOLIC BLOOD PRESSURE: 132 MMHG | OXYGEN SATURATION: 97 %

## 2021-01-01 VITALS
WEIGHT: 125 LBS | BODY MASS INDEX: 22.15 KG/M2 | OXYGEN SATURATION: 100 % | HEART RATE: 78 BPM | TEMPERATURE: 98.3 F | SYSTOLIC BLOOD PRESSURE: 107 MMHG | HEIGHT: 63 IN | RESPIRATION RATE: 20 BRPM | DIASTOLIC BLOOD PRESSURE: 61 MMHG

## 2021-01-01 VITALS
HEART RATE: 94 BPM | WEIGHT: 130 LBS | TEMPERATURE: 98.6 F | OXYGEN SATURATION: 97 % | SYSTOLIC BLOOD PRESSURE: 134 MMHG | DIASTOLIC BLOOD PRESSURE: 67 MMHG | BODY MASS INDEX: 23.04 KG/M2 | HEIGHT: 63 IN | RESPIRATION RATE: 18 BRPM

## 2021-01-01 VITALS
OXYGEN SATURATION: 99 % | TEMPERATURE: 98.6 F | HEART RATE: 97 BPM | RESPIRATION RATE: 16 BRPM | BODY MASS INDEX: 23.04 KG/M2 | HEIGHT: 63 IN | SYSTOLIC BLOOD PRESSURE: 134 MMHG | DIASTOLIC BLOOD PRESSURE: 72 MMHG | WEIGHT: 130 LBS

## 2021-01-01 VITALS
HEIGHT: 63 IN | WEIGHT: 138 LBS | HEART RATE: 75 BPM | TEMPERATURE: 99 F | DIASTOLIC BLOOD PRESSURE: 72 MMHG | BODY MASS INDEX: 24.45 KG/M2 | OXYGEN SATURATION: 98 % | SYSTOLIC BLOOD PRESSURE: 113 MMHG | RESPIRATION RATE: 17 BRPM

## 2021-01-01 VITALS
OXYGEN SATURATION: 100 % | DIASTOLIC BLOOD PRESSURE: 70 MMHG | HEART RATE: 82 BPM | TEMPERATURE: 98 F | SYSTOLIC BLOOD PRESSURE: 121 MMHG | RESPIRATION RATE: 16 BRPM

## 2021-01-01 VITALS
HEART RATE: 94 BPM | HEIGHT: 63 IN | RESPIRATION RATE: 16 BRPM | OXYGEN SATURATION: 100 % | SYSTOLIC BLOOD PRESSURE: 104 MMHG | TEMPERATURE: 98.6 F | WEIGHT: 130 LBS | BODY MASS INDEX: 23.04 KG/M2 | DIASTOLIC BLOOD PRESSURE: 55 MMHG

## 2021-01-01 VITALS
HEART RATE: 85 BPM | RESPIRATION RATE: 16 BRPM | TEMPERATURE: 98.6 F | BODY MASS INDEX: 24.56 KG/M2 | OXYGEN SATURATION: 93 % | WEIGHT: 138.6 LBS | SYSTOLIC BLOOD PRESSURE: 131 MMHG | DIASTOLIC BLOOD PRESSURE: 75 MMHG | HEIGHT: 63 IN

## 2021-01-01 VITALS
RESPIRATION RATE: 18 BRPM | DIASTOLIC BLOOD PRESSURE: 83 MMHG | OXYGEN SATURATION: 98 % | TEMPERATURE: 98.4 F | SYSTOLIC BLOOD PRESSURE: 147 MMHG | HEART RATE: 77 BPM

## 2021-01-01 VITALS
RESPIRATION RATE: 25 BRPM | BODY MASS INDEX: 23.04 KG/M2 | SYSTOLIC BLOOD PRESSURE: 132 MMHG | HEIGHT: 63 IN | HEART RATE: 115 BPM | OXYGEN SATURATION: 98 % | DIASTOLIC BLOOD PRESSURE: 94 MMHG | TEMPERATURE: 100 F | WEIGHT: 130 LBS

## 2021-01-01 VITALS
RESPIRATION RATE: 18 BRPM | OXYGEN SATURATION: 100 % | DIASTOLIC BLOOD PRESSURE: 68 MMHG | SYSTOLIC BLOOD PRESSURE: 130 MMHG | TEMPERATURE: 98.1 F | HEART RATE: 84 BPM

## 2021-01-01 DIAGNOSIS — R16.1 SPLEEN ENLARGED: ICD-10-CM

## 2021-01-01 DIAGNOSIS — D75.81 MYELOFIBROSIS (HCC): ICD-10-CM

## 2021-01-01 DIAGNOSIS — G89.3 CHRONIC PAIN DUE TO NEOPLASM: ICD-10-CM

## 2021-01-01 DIAGNOSIS — D50.9 IRON DEFICIENCY ANEMIA, UNSPECIFIED IRON DEFICIENCY ANEMIA TYPE: ICD-10-CM

## 2021-01-01 DIAGNOSIS — C92.10 CML (CHRONIC MYELOCYTIC LEUKEMIA) (HCC): ICD-10-CM

## 2021-01-01 DIAGNOSIS — D50.9 IRON DEFICIENCY ANEMIA, UNSPECIFIED IRON DEFICIENCY ANEMIA TYPE: Primary | ICD-10-CM

## 2021-01-01 DIAGNOSIS — K92.1 GASTROINTESTINAL HEMORRHAGE WITH MELENA: Primary | ICD-10-CM

## 2021-01-01 DIAGNOSIS — R16.1 SPLENOMEGALY: ICD-10-CM

## 2021-01-01 DIAGNOSIS — D45 POLYCYTHEMIA VERA (HCC): ICD-10-CM

## 2021-01-01 DIAGNOSIS — I85.11 SECONDARY ESOPHAGEAL VARICES WITH BLEEDING (HCC): ICD-10-CM

## 2021-01-01 DIAGNOSIS — I77.6 ACUTE VASCULITIS (HCC): Primary | ICD-10-CM

## 2021-01-01 DIAGNOSIS — D45 POLYCYTHEMIA VERA (HCC): Chronic | ICD-10-CM

## 2021-01-01 DIAGNOSIS — A41.9 SEPSIS, DUE TO UNSPECIFIED ORGANISM, UNSPECIFIED WHETHER ACUTE ORGAN DYSFUNCTION PRESENT (HCC): Primary | ICD-10-CM

## 2021-01-01 DIAGNOSIS — C92.10 CHRONIC MYELOGENOUS LEUKEMIA (HCC): ICD-10-CM

## 2021-01-01 DIAGNOSIS — G47.01 INSOMNIA DUE TO MEDICAL CONDITION: ICD-10-CM

## 2021-01-01 DIAGNOSIS — C92.10 CML (CHRONIC MYELOCYTIC LEUKEMIA) (HCC): Chronic | ICD-10-CM

## 2021-01-01 DIAGNOSIS — I77.6 VASCULITIS (HCC): Primary | ICD-10-CM

## 2021-01-01 DIAGNOSIS — R60.9 PERIPHERAL EDEMA: Primary | ICD-10-CM

## 2021-01-01 DIAGNOSIS — Z79.01 CHRONIC ANTICOAGULATION: ICD-10-CM

## 2021-01-01 DIAGNOSIS — L03.90 CELLULITIS, UNSPECIFIED CELLULITIS SITE: ICD-10-CM

## 2021-01-01 LAB
ABO + RH BLD: NORMAL
ACC. NO. FROM MICRO ORDER, ACCP: ABNORMAL
ALBUMIN SERPL-MCNC: 2.7 G/DL (ref 3.2–4.6)
ALBUMIN SERPL-MCNC: 2.7 G/DL (ref 3.2–4.6)
ALBUMIN SERPL-MCNC: 2.9 G/DL (ref 3.2–4.6)
ALBUMIN SERPL-MCNC: 3.1 G/DL (ref 3.2–4.6)
ALBUMIN SERPL-MCNC: 3.1 G/DL (ref 3.2–4.6)
ALBUMIN SERPL-MCNC: 3.2 G/DL (ref 3.2–4.6)
ALBUMIN SERPL-MCNC: 3.2 G/DL (ref 3.2–4.6)
ALBUMIN SERPL-MCNC: 3.3 G/DL (ref 3.2–4.6)
ALBUMIN SERPL-MCNC: 3.4 G/DL (ref 3.2–4.6)
ALBUMIN SERPL-MCNC: 3.5 G/DL (ref 3.2–4.6)
ALBUMIN SERPL-MCNC: 3.6 G/DL (ref 3.2–4.6)
ALBUMIN SERPL-MCNC: 3.7 G/DL (ref 3.2–4.6)
ALBUMIN SERPL-MCNC: 3.7 G/DL (ref 3.2–4.6)
ALBUMIN SERPL-MCNC: 3.9 G/DL (ref 3.2–4.6)
ALBUMIN SERPL-MCNC: 4 G/DL (ref 3.2–4.6)
ALBUMIN SERPL-MCNC: 4.1 G/DL (ref 3.2–4.6)
ALBUMIN/GLOB SERPL: 0.7 {RATIO} (ref 1.2–3.5)
ALBUMIN/GLOB SERPL: 0.8 {RATIO} (ref 1.2–3.5)
ALBUMIN/GLOB SERPL: 0.9 {RATIO} (ref 1.2–3.5)
ALBUMIN/GLOB SERPL: 1 {RATIO} (ref 1.2–3.5)
ALBUMIN/GLOB SERPL: 1 {RATIO} (ref 1.2–3.5)
ALBUMIN/GLOB SERPL: 1.1 {RATIO} (ref 1.2–3.5)
ALBUMIN/GLOB SERPL: 1.2 {RATIO} (ref 1.2–3.5)
ALBUMIN/GLOB SERPL: 1.4 {RATIO} (ref 1.2–3.5)
ALBUMIN/GLOB SERPL: 1.4 {RATIO} (ref 1.2–3.5)
ALBUMIN/GLOB SERPL: 1.5 {RATIO} (ref 1.2–3.5)
ALBUMIN/GLOB SERPL: 1.6 {RATIO} (ref 1.2–3.5)
ALP SERPL-CCNC: 100 U/L (ref 50–136)
ALP SERPL-CCNC: 101 U/L (ref 50–136)
ALP SERPL-CCNC: 104 U/L (ref 50–136)
ALP SERPL-CCNC: 104 U/L (ref 50–136)
ALP SERPL-CCNC: 107 U/L (ref 50–136)
ALP SERPL-CCNC: 109 U/L (ref 50–136)
ALP SERPL-CCNC: 110 U/L (ref 50–136)
ALP SERPL-CCNC: 69 U/L (ref 50–136)
ALP SERPL-CCNC: 78 U/L (ref 50–136)
ALP SERPL-CCNC: 81 U/L (ref 50–136)
ALP SERPL-CCNC: 87 U/L (ref 50–136)
ALP SERPL-CCNC: 91 U/L (ref 50–136)
ALP SERPL-CCNC: 93 U/L (ref 50–130)
ALP SERPL-CCNC: 93 U/L (ref 50–136)
ALP SERPL-CCNC: 93 U/L (ref 50–136)
ALP SERPL-CCNC: 94 U/L (ref 50–136)
ALP SERPL-CCNC: 98 U/L (ref 50–136)
ALP SERPL-CCNC: 99 U/L (ref 50–136)
ALT SERPL-CCNC: 16 U/L (ref 12–65)
ALT SERPL-CCNC: 16 U/L (ref 12–65)
ALT SERPL-CCNC: 20 U/L (ref 12–65)
ALT SERPL-CCNC: 24 U/L (ref 12–65)
ALT SERPL-CCNC: 32 U/L (ref 12–65)
ALT SERPL-CCNC: 32 U/L (ref 12–65)
ALT SERPL-CCNC: 37 U/L (ref 12–65)
ALT SERPL-CCNC: 38 U/L (ref 12–65)
ALT SERPL-CCNC: 38 U/L (ref 12–65)
ALT SERPL-CCNC: 44 U/L (ref 12–65)
ALT SERPL-CCNC: 44 U/L (ref 12–65)
ALT SERPL-CCNC: 46 U/L (ref 12–65)
ALT SERPL-CCNC: 50 U/L (ref 12–65)
ALT SERPL-CCNC: 62 U/L (ref 12–65)
ALT SERPL-CCNC: 74 U/L (ref 12–65)
ALT SERPL-CCNC: 79 U/L (ref 12–65)
ALT SERPL-CCNC: 84 U/L (ref 12–65)
ALT SERPL-CCNC: 84 U/L (ref 12–65)
ANION GAP SERPL CALC-SCNC: 10 MMOL/L (ref 7–16)
ANION GAP SERPL CALC-SCNC: 3 MMOL/L (ref 7–16)
ANION GAP SERPL CALC-SCNC: 4 MMOL/L (ref 7–16)
ANION GAP SERPL CALC-SCNC: 5 MMOL/L (ref 7–16)
ANION GAP SERPL CALC-SCNC: 6 MMOL/L (ref 7–16)
ANION GAP SERPL CALC-SCNC: 7 MMOL/L (ref 7–16)
ANION GAP SERPL CALC-SCNC: 7 MMOL/L (ref 7–16)
ANION GAP SERPL CALC-SCNC: 8 MMOL/L (ref 7–16)
APTT PPP: 55.9 SEC (ref 24.1–35.1)
AST SERPL-CCNC: 11 U/L (ref 15–37)
AST SERPL-CCNC: 16 U/L (ref 15–37)
AST SERPL-CCNC: 17 U/L (ref 15–37)
AST SERPL-CCNC: 21 U/L (ref 15–37)
AST SERPL-CCNC: 25 U/L (ref 15–37)
AST SERPL-CCNC: 27 U/L (ref 15–37)
AST SERPL-CCNC: 28 U/L (ref 15–37)
AST SERPL-CCNC: 29 U/L (ref 15–37)
AST SERPL-CCNC: 31 U/L (ref 15–37)
AST SERPL-CCNC: 37 U/L (ref 15–37)
AST SERPL-CCNC: 39 U/L (ref 15–37)
AST SERPL-CCNC: 42 U/L (ref 15–37)
AST SERPL-CCNC: 57 U/L (ref 15–37)
AST SERPL-CCNC: 80 U/L (ref 15–37)
ATRIAL RATE: 85 BPM
ATRIAL RATE: 93 BPM
ATRIAL RATE: 98 BPM
B HENSELAE IGG TITR SER IF: NEGATIVE TITER
B HENSELAE IGM TITR SER IF: NEGATIVE TITER
B PERT DNA SPEC QL NAA+PROBE: NOT DETECTED
B QUINTANA IGG TITR SER: NEGATIVE TITER
B QUINTANA IGM TITR SER: NEGATIVE TITER
BACTERIA SPEC CULT: ABNORMAL
BACTERIA SPEC CULT: ABNORMAL
BACTERIA SPEC CULT: NORMAL
BASOPHILS # BLD: 0 K/UL (ref 0–0.2)
BASOPHILS # BLD: 0.1 K/UL (ref 0–0.2)
BASOPHILS NFR BLD: 0 % (ref 0–2)
BASOPHILS NFR BLD: 1 % (ref 0–2)
BILIRUB DIRECT SERPL-MCNC: 0.2 MG/DL
BILIRUB INDIRECT SERPL-MCNC: 0.3 MG/DL (ref 0–1.1)
BILIRUB SERPL-MCNC: 0.2 MG/DL (ref 0.2–1.1)
BILIRUB SERPL-MCNC: 0.3 MG/DL (ref 0.2–1.1)
BILIRUB SERPL-MCNC: 0.3 MG/DL (ref 0.2–1.1)
BILIRUB SERPL-MCNC: 0.4 MG/DL (ref 0.2–1.1)
BILIRUB SERPL-MCNC: 0.5 MG/DL (ref 0.2–1.1)
BILIRUB SERPL-MCNC: 0.6 MG/DL (ref 0.2–1.1)
BILIRUB SERPL-MCNC: 0.7 MG/DL (ref 0.2–1.1)
BILIRUB SERPL-MCNC: 0.7 MG/DL (ref 0.2–1.1)
BILIRUB SERPL-MCNC: 0.8 MG/DL (ref 0.2–1.1)
BILIRUB SERPL-MCNC: 0.9 MG/DL (ref 0.2–1.1)
BILIRUB SERPL-MCNC: 1.2 MG/DL (ref 0.2–1.1)
BLD PROD TYP BPU: NORMAL
BLOOD GROUP ANTIBODIES SERPL: NORMAL
BNP SERPL-MCNC: 176 PG/ML (ref 5–125)
BORDETELLA PARAPERTUSSIS PCR, BORPAR: NOT DETECTED
BPU ID: NORMAL
BUN SERPL-MCNC: 10 MG/DL (ref 8–23)
BUN SERPL-MCNC: 10 MG/DL (ref 8–23)
BUN SERPL-MCNC: 11 MG/DL (ref 8–23)
BUN SERPL-MCNC: 12 MG/DL (ref 8–23)
BUN SERPL-MCNC: 13 MG/DL (ref 8–23)
BUN SERPL-MCNC: 13 MG/DL (ref 8–23)
BUN SERPL-MCNC: 14 MG/DL (ref 8–23)
BUN SERPL-MCNC: 14 MG/DL (ref 8–23)
BUN SERPL-MCNC: 16 MG/DL (ref 8–23)
BUN SERPL-MCNC: 19 MG/DL (ref 8–23)
BUN SERPL-MCNC: 19 MG/DL (ref 8–23)
BUN SERPL-MCNC: 22 MG/DL (ref 8–23)
BUN SERPL-MCNC: 34 MG/DL (ref 8–23)
BUN SERPL-MCNC: 4 MG/DL (ref 8–23)
BUN SERPL-MCNC: 5 MG/DL (ref 8–23)
BUN SERPL-MCNC: 6 MG/DL (ref 8–23)
BUN SERPL-MCNC: 7 MG/DL (ref 8–23)
BUN SERPL-MCNC: 8 MG/DL (ref 8–23)
BUN SERPL-MCNC: 8 MG/DL (ref 8–23)
BUN SERPL-MCNC: 9 MG/DL (ref 8–23)
C PNEUM DNA SPEC QL NAA+PROBE: NOT DETECTED
CALCIUM SERPL-MCNC: 7.6 MG/DL (ref 8.3–10.4)
CALCIUM SERPL-MCNC: 7.6 MG/DL (ref 8.3–10.4)
CALCIUM SERPL-MCNC: 7.7 MG/DL (ref 8.3–10.4)
CALCIUM SERPL-MCNC: 7.8 MG/DL (ref 8.3–10.4)
CALCIUM SERPL-MCNC: 7.9 MG/DL (ref 8.3–10.4)
CALCIUM SERPL-MCNC: 8.1 MG/DL (ref 8.3–10.4)
CALCIUM SERPL-MCNC: 8.2 MG/DL (ref 8.3–10.4)
CALCIUM SERPL-MCNC: 8.3 MG/DL (ref 8.3–10.4)
CALCIUM SERPL-MCNC: 8.4 MG/DL (ref 8.3–10.4)
CALCIUM SERPL-MCNC: 8.5 MG/DL (ref 8.3–10.4)
CALCIUM SERPL-MCNC: 8.6 MG/DL (ref 8.3–10.4)
CALCIUM SERPL-MCNC: 8.6 MG/DL (ref 8.3–10.4)
CALCIUM SERPL-MCNC: 8.7 MG/DL (ref 8.3–10.4)
CALCIUM SERPL-MCNC: 8.8 MG/DL (ref 8.3–10.4)
CALCIUM SERPL-MCNC: 8.8 MG/DL (ref 8.3–10.4)
CALCIUM SERPL-MCNC: 8.9 MG/DL (ref 8.3–10.4)
CALCIUM SERPL-MCNC: 9 MG/DL (ref 8.3–10.4)
CALCIUM SERPL-MCNC: 9 MG/DL (ref 8.3–10.4)
CALCIUM SERPL-MCNC: 9.1 MG/DL (ref 8.3–10.4)
CALCULATED P AXIS, ECG09: 65 DEGREES
CALCULATED P AXIS, ECG09: 70 DEGREES
CALCULATED P AXIS, ECG09: 71 DEGREES
CALCULATED R AXIS, ECG10: 53 DEGREES
CALCULATED R AXIS, ECG10: 62 DEGREES
CALCULATED R AXIS, ECG10: 75 DEGREES
CALCULATED T AXIS, ECG11: 41 DEGREES
CALCULATED T AXIS, ECG11: 47 DEGREES
CALCULATED T AXIS, ECG11: 52 DEGREES
CHLORIDE SERPL-SCNC: 102 MMOL/L (ref 98–107)
CHLORIDE SERPL-SCNC: 102 MMOL/L (ref 98–107)
CHLORIDE SERPL-SCNC: 105 MMOL/L (ref 98–107)
CHLORIDE SERPL-SCNC: 106 MMOL/L (ref 98–107)
CHLORIDE SERPL-SCNC: 107 MMOL/L (ref 98–107)
CHLORIDE SERPL-SCNC: 108 MMOL/L (ref 98–107)
CHLORIDE SERPL-SCNC: 109 MMOL/L (ref 98–107)
CHLORIDE SERPL-SCNC: 110 MMOL/L (ref 98–107)
CHLORIDE SERPL-SCNC: 112 MMOL/L (ref 98–107)
CHLORIDE SERPL-SCNC: 114 MMOL/L (ref 98–107)
CK SERPL-CCNC: 162 U/L (ref 21–215)
CK SERPL-CCNC: 63 U/L (ref 21–215)
CO2 SERPL-SCNC: 24 MMOL/L (ref 21–32)
CO2 SERPL-SCNC: 25 MMOL/L (ref 21–32)
CO2 SERPL-SCNC: 26 MMOL/L (ref 21–32)
CO2 SERPL-SCNC: 27 MMOL/L (ref 21–32)
CO2 SERPL-SCNC: 28 MMOL/L (ref 21–32)
CO2 SERPL-SCNC: 29 MMOL/L (ref 21–32)
CO2 SERPL-SCNC: 30 MMOL/L (ref 21–32)
CREAT SERPL-MCNC: 0.4 MG/DL (ref 0.6–1)
CREAT SERPL-MCNC: 0.41 MG/DL (ref 0.6–1)
CREAT SERPL-MCNC: 0.43 MG/DL (ref 0.6–1)
CREAT SERPL-MCNC: 0.46 MG/DL (ref 0.6–1)
CREAT SERPL-MCNC: 0.47 MG/DL (ref 0.6–1)
CREAT SERPL-MCNC: 0.49 MG/DL (ref 0.6–1)
CREAT SERPL-MCNC: 0.5 MG/DL (ref 0.6–1)
CREAT SERPL-MCNC: 0.51 MG/DL (ref 0.6–1)
CREAT SERPL-MCNC: 0.54 MG/DL (ref 0.6–1)
CREAT SERPL-MCNC: 0.55 MG/DL (ref 0.6–1)
CREAT SERPL-MCNC: 0.56 MG/DL (ref 0.6–1)
CREAT SERPL-MCNC: 0.56 MG/DL (ref 0.6–1)
CREAT SERPL-MCNC: 0.58 MG/DL (ref 0.6–1)
CREAT SERPL-MCNC: 0.6 MG/DL (ref 0.6–1)
CREAT SERPL-MCNC: 0.7 MG/DL (ref 0.6–1)
CREAT SERPL-MCNC: 0.7 MG/DL (ref 0.6–1)
CREAT SERPL-MCNC: 0.8 MG/DL (ref 0.6–1)
CROSSMATCH RESULT,%XM: NORMAL
CRP SERPL-MCNC: 6.2 MG/DL (ref 0–0.9)
CRP SERPL-MCNC: 7.3 MG/DL (ref 0–0.9)
D DIMER PPP FEU-MCNC: 6.37 UG/ML(FEU)
DIAGNOSIS, 93000: NORMAL
DIFFERENTIAL METHOD BLD: ABNORMAL
EOSINOPHIL # BLD: 0.1 K/UL (ref 0–0.8)
EOSINOPHIL # BLD: 0.2 K/UL (ref 0–0.8)
EOSINOPHIL # BLD: 0.3 K/UL (ref 0–0.8)
EOSINOPHIL NFR BLD: 0 % (ref 0.5–7.8)
EOSINOPHIL NFR BLD: 1 % (ref 0.5–7.8)
EOSINOPHIL NFR BLD: 1 % (ref 0.5–7.8)
EOSINOPHIL NFR BLD: 2 % (ref 0.5–7.8)
EOSINOPHIL NFR BLD: 3 % (ref 0.5–7.8)
ERYTHROCYTE [DISTWIDTH] IN BLOOD BY AUTOMATED COUNT: 14.8 % (ref 11.9–14.6)
ERYTHROCYTE [DISTWIDTH] IN BLOOD BY AUTOMATED COUNT: 16.2 % (ref 11.9–14.6)
ERYTHROCYTE [DISTWIDTH] IN BLOOD BY AUTOMATED COUNT: 16.5 % (ref 11.9–14.6)
ERYTHROCYTE [DISTWIDTH] IN BLOOD BY AUTOMATED COUNT: 16.7 % (ref 11.9–14.6)
ERYTHROCYTE [DISTWIDTH] IN BLOOD BY AUTOMATED COUNT: 16.8 % (ref 11.9–14.6)
ERYTHROCYTE [DISTWIDTH] IN BLOOD BY AUTOMATED COUNT: 17.2 % (ref 11.9–14.6)
ERYTHROCYTE [DISTWIDTH] IN BLOOD BY AUTOMATED COUNT: 17.5 % (ref 11.9–14.6)
ERYTHROCYTE [DISTWIDTH] IN BLOOD BY AUTOMATED COUNT: 18.1 % (ref 11.9–14.6)
ERYTHROCYTE [DISTWIDTH] IN BLOOD BY AUTOMATED COUNT: 18.4 % (ref 11.9–14.6)
ERYTHROCYTE [DISTWIDTH] IN BLOOD BY AUTOMATED COUNT: 18.4 % (ref 11.9–14.6)
ERYTHROCYTE [DISTWIDTH] IN BLOOD BY AUTOMATED COUNT: 18.8 % (ref 11.9–14.6)
ERYTHROCYTE [DISTWIDTH] IN BLOOD BY AUTOMATED COUNT: 18.9 % (ref 11.9–14.6)
ERYTHROCYTE [DISTWIDTH] IN BLOOD BY AUTOMATED COUNT: 18.9 % (ref 11.9–14.6)
ERYTHROCYTE [DISTWIDTH] IN BLOOD BY AUTOMATED COUNT: 19.1 % (ref 11.9–14.6)
ERYTHROCYTE [DISTWIDTH] IN BLOOD BY AUTOMATED COUNT: 19.1 % (ref 11.9–14.6)
ERYTHROCYTE [DISTWIDTH] IN BLOOD BY AUTOMATED COUNT: 19.2 % (ref 11.9–14.6)
ERYTHROCYTE [DISTWIDTH] IN BLOOD BY AUTOMATED COUNT: 19.3 % (ref 11.9–14.6)
ERYTHROCYTE [DISTWIDTH] IN BLOOD BY AUTOMATED COUNT: 19.3 % (ref 11.9–14.6)
ERYTHROCYTE [DISTWIDTH] IN BLOOD BY AUTOMATED COUNT: 19.6 % (ref 11.9–14.6)
ERYTHROCYTE [DISTWIDTH] IN BLOOD BY AUTOMATED COUNT: 20.3 % (ref 11.9–14.6)
ERYTHROCYTE [DISTWIDTH] IN BLOOD BY AUTOMATED COUNT: 20.4 % (ref 11.9–14.6)
ERYTHROCYTE [DISTWIDTH] IN BLOOD BY AUTOMATED COUNT: 20.9 % (ref 11.9–14.6)
ERYTHROCYTE [DISTWIDTH] IN BLOOD BY AUTOMATED COUNT: 21 % (ref 11.9–14.6)
ERYTHROCYTE [SEDIMENTATION RATE] IN BLOOD: 34 MM/HR (ref 0–30)
ESCHERICHIA COLI: DETECTED
EST. AVERAGE GLUCOSE BLD GHB EST-MCNC: NORMAL MG/DL
FERRITIN SERPL-MCNC: 107 NG/ML (ref 8–388)
FERRITIN SERPL-MCNC: 110 NG/ML (ref 8–388)
FERRITIN SERPL-MCNC: 143 NG/ML (ref 8–388)
FERRITIN SERPL-MCNC: 17 NG/ML (ref 8–388)
FERRITIN SERPL-MCNC: 256 NG/ML (ref 8–388)
FERRITIN SERPL-MCNC: 274 NG/ML (ref 8–388)
FERRITIN SERPL-MCNC: 28 NG/ML (ref 8–388)
FERRITIN SERPL-MCNC: 353 NG/ML (ref 8–388)
FERRITIN SERPL-MCNC: 39 NG/ML (ref 8–388)
FERRITIN SERPL-MCNC: 43 NG/ML (ref 8–388)
FIBRINOGEN PPP-MCNC: 591 MG/DL (ref 190–501)
FLUAV SUBTYP SPEC NAA+PROBE: NOT DETECTED
FLUBV RNA SPEC QL NAA+PROBE: NOT DETECTED
FOLATE SERPL-MCNC: 2.6 NG/ML (ref 3.1–17.5)
FOLATE SERPL-MCNC: 3.5 NG/ML (ref 3.1–17.5)
GLOBULIN SER CALC-MCNC: 2.2 G/DL (ref 2.3–3.5)
GLOBULIN SER CALC-MCNC: 2.5 G/DL (ref 2.3–3.5)
GLOBULIN SER CALC-MCNC: 2.6 G/DL (ref 2.3–3.5)
GLOBULIN SER CALC-MCNC: 2.7 G/DL (ref 2.3–3.5)
GLOBULIN SER CALC-MCNC: 2.7 G/DL (ref 2.3–3.5)
GLOBULIN SER CALC-MCNC: 2.8 G/DL (ref 2.3–3.5)
GLOBULIN SER CALC-MCNC: 2.8 G/DL (ref 2.3–3.5)
GLOBULIN SER CALC-MCNC: 2.9 G/DL (ref 2.3–3.5)
GLOBULIN SER CALC-MCNC: 3.1 G/DL (ref 2.3–3.5)
GLOBULIN SER CALC-MCNC: 3.3 G/DL (ref 2.3–3.5)
GLOBULIN SER CALC-MCNC: 3.4 G/DL (ref 2.3–3.5)
GLOBULIN SER CALC-MCNC: 3.6 G/DL (ref 2.3–3.5)
GLOBULIN SER CALC-MCNC: 3.8 G/DL (ref 2.3–3.5)
GLOBULIN SER CALC-MCNC: 4.4 G/DL (ref 2.3–3.5)
GLUCOSE SERPL-MCNC: 101 MG/DL (ref 65–100)
GLUCOSE SERPL-MCNC: 104 MG/DL (ref 65–100)
GLUCOSE SERPL-MCNC: 107 MG/DL (ref 65–100)
GLUCOSE SERPL-MCNC: 108 MG/DL (ref 65–100)
GLUCOSE SERPL-MCNC: 109 MG/DL (ref 65–100)
GLUCOSE SERPL-MCNC: 111 MG/DL (ref 65–100)
GLUCOSE SERPL-MCNC: 113 MG/DL (ref 65–100)
GLUCOSE SERPL-MCNC: 116 MG/DL (ref 65–100)
GLUCOSE SERPL-MCNC: 116 MG/DL (ref 65–100)
GLUCOSE SERPL-MCNC: 118 MG/DL (ref 65–100)
GLUCOSE SERPL-MCNC: 120 MG/DL (ref 65–100)
GLUCOSE SERPL-MCNC: 122 MG/DL (ref 65–100)
GLUCOSE SERPL-MCNC: 123 MG/DL (ref 65–100)
GLUCOSE SERPL-MCNC: 124 MG/DL (ref 65–100)
GLUCOSE SERPL-MCNC: 126 MG/DL (ref 65–100)
GLUCOSE SERPL-MCNC: 138 MG/DL (ref 65–100)
GLUCOSE SERPL-MCNC: 58 MG/DL (ref 65–100)
GLUCOSE SERPL-MCNC: 74 MG/DL (ref 65–100)
GLUCOSE SERPL-MCNC: 82 MG/DL (ref 65–100)
GLUCOSE SERPL-MCNC: 87 MG/DL (ref 65–100)
GLUCOSE SERPL-MCNC: 88 MG/DL (ref 65–100)
GLUCOSE SERPL-MCNC: 91 MG/DL (ref 65–100)
GLUCOSE SERPL-MCNC: 93 MG/DL (ref 65–100)
GLUCOSE SERPL-MCNC: 96 MG/DL (ref 65–100)
GRAM STN SPEC: ABNORMAL
HADV DNA SPEC QL NAA+PROBE: NOT DETECTED
HAPTOGLOB SERPL-MCNC: 203 MG/DL (ref 30–200)
HBA1C MFR BLD: 4.9 % (ref 4.2–6.3)
HCOV 229E RNA SPEC QL NAA+PROBE: NOT DETECTED
HCOV HKU1 RNA SPEC QL NAA+PROBE: NOT DETECTED
HCOV NL63 RNA SPEC QL NAA+PROBE: NOT DETECTED
HCOV OC43 RNA SPEC QL NAA+PROBE: NOT DETECTED
HCT VFR BLD AUTO: 20.5 % (ref 35.8–46.3)
HCT VFR BLD AUTO: 20.7 % (ref 35.8–46.3)
HCT VFR BLD AUTO: 21.7 % (ref 35.8–46.3)
HCT VFR BLD AUTO: 22.3 % (ref 35.8–46.3)
HCT VFR BLD AUTO: 23.2 % (ref 35.8–46.3)
HCT VFR BLD AUTO: 23.8 % (ref 35.8–46.3)
HCT VFR BLD AUTO: 24 % (ref 35.8–46.3)
HCT VFR BLD AUTO: 24.3 % (ref 35.8–46.3)
HCT VFR BLD AUTO: 24.6 % (ref 35.8–46.3)
HCT VFR BLD AUTO: 24.8 % (ref 35.8–46.3)
HCT VFR BLD AUTO: 26 % (ref 35.8–46.3)
HCT VFR BLD AUTO: 26 % (ref 35.8–46.3)
HCT VFR BLD AUTO: 30.6 % (ref 35.8–46.3)
HCT VFR BLD AUTO: 31.1 % (ref 35.8–46.3)
HCT VFR BLD AUTO: 31.4 % (ref 35.8–46.3)
HCT VFR BLD AUTO: 32.9 % (ref 35.8–46.3)
HCT VFR BLD AUTO: 34.2 % (ref 35.8–46.3)
HCT VFR BLD AUTO: 35.6 % (ref 35.8–46.3)
HCT VFR BLD AUTO: 37.1 % (ref 35.8–46.3)
HCT VFR BLD AUTO: 37.7 % (ref 35.8–46.3)
HCT VFR BLD AUTO: 38.4 % (ref 35.8–46.3)
HCT VFR BLD AUTO: 38.8 % (ref 35.8–46.3)
HCT VFR BLD AUTO: 39.1 % (ref 35.8–46.3)
HCT VFR BLD AUTO: 39.4 % (ref 35.8–46.3)
HCT VFR BLD AUTO: 41.4 % (ref 35.8–46.3)
HCT VFR BLD AUTO: 42.1 % (ref 35.8–46.3)
HCT VFR BLD AUTO: 42.6 % (ref 35.8–46.3)
HCT VFR BLD AUTO: 44.1 % (ref 35.8–46.3)
HCT VFR BLD AUTO: 44.9 % (ref 35.8–46.3)
HGB BLD-MCNC: 10.2 G/DL (ref 11.7–15.4)
HGB BLD-MCNC: 10.3 G/DL (ref 11.7–15.4)
HGB BLD-MCNC: 10.7 G/DL (ref 11.7–15.4)
HGB BLD-MCNC: 10.7 G/DL (ref 11.7–15.4)
HGB BLD-MCNC: 11 G/DL (ref 11.7–15.4)
HGB BLD-MCNC: 12 G/DL (ref 11.7–15.4)
HGB BLD-MCNC: 12.2 G/DL (ref 11.7–15.4)
HGB BLD-MCNC: 12.2 G/DL (ref 11.7–15.4)
HGB BLD-MCNC: 12.3 G/DL (ref 11.7–15.4)
HGB BLD-MCNC: 12.7 G/DL (ref 11.7–15.4)
HGB BLD-MCNC: 12.8 G/DL (ref 11.7–15.4)
HGB BLD-MCNC: 13.4 G/DL (ref 11.7–15.4)
HGB BLD-MCNC: 13.4 G/DL (ref 11.7–15.4)
HGB BLD-MCNC: 13.7 G/DL (ref 11.7–15.4)
HGB BLD-MCNC: 14.3 G/DL (ref 11.7–15.4)
HGB BLD-MCNC: 14.3 G/DL (ref 11.7–15.4)
HGB BLD-MCNC: 6.6 G/DL (ref 11.7–15.4)
HGB BLD-MCNC: 6.6 G/DL (ref 11.7–15.4)
HGB BLD-MCNC: 7.1 G/DL (ref 11.7–15.4)
HGB BLD-MCNC: 7.2 G/DL (ref 11.7–15.4)
HGB BLD-MCNC: 7.4 G/DL (ref 11.7–15.4)
HGB BLD-MCNC: 7.6 G/DL (ref 11.7–15.4)
HGB BLD-MCNC: 7.7 G/DL (ref 11.7–15.4)
HGB BLD-MCNC: 7.7 G/DL (ref 11.7–15.4)
HGB BLD-MCNC: 7.8 G/DL (ref 11.7–15.4)
HGB BLD-MCNC: 7.9 G/DL (ref 11.7–15.4)
HGB BLD-MCNC: 8 G/DL (ref 11.7–15.4)
HGB BLD-MCNC: 8.3 G/DL (ref 11.7–15.4)
HGB BLD-MCNC: 8.3 G/DL (ref 11.7–15.4)
HGB BLD-MCNC: 8.7 G/DL (ref 11.7–15.4)
HGB BLD-MCNC: 9.7 G/DL (ref 11.7–15.4)
HGB RETIC QN AUTO: 28 PG (ref 29–35)
HGB RETIC QN AUTO: 30 PG (ref 29–35)
HGB RETIC QN AUTO: 31 PG (ref 29–35)
HGB RETIC QN AUTO: 33 PG (ref 29–35)
HGB RETIC QN AUTO: 33 PG (ref 29–35)
HGB RETIC QN AUTO: 36 PG (ref 29–35)
HGB RETIC QN AUTO: 38 PG (ref 29–35)
HISTORY CHECKED?,CKHIST: NORMAL
HMPV RNA SPEC QL NAA+PROBE: NOT DETECTED
HPIV1 RNA SPEC QL NAA+PROBE: NOT DETECTED
HPIV2 RNA SPEC QL NAA+PROBE: NOT DETECTED
HPIV3 RNA SPEC QL NAA+PROBE: NOT DETECTED
HPIV4 RNA SPEC QL NAA+PROBE: NOT DETECTED
IMM GRANULOCYTES # BLD AUTO: 0 K/UL (ref 0–0.5)
IMM GRANULOCYTES # BLD AUTO: 0.1 K/UL (ref 0–0.5)
IMM GRANULOCYTES # BLD AUTO: 0.4 K/UL (ref 0–0.5)
IMM GRANULOCYTES NFR BLD AUTO: 0 % (ref 0–5)
IMM GRANULOCYTES NFR BLD AUTO: 1 % (ref 0–5)
IMM GRANULOCYTES NFR BLD AUTO: 5 % (ref 0–5)
IMM RETICS NFR: 11.3 % (ref 3–15.9)
IMM RETICS NFR: 11.5 % (ref 3–15.9)
IMM RETICS NFR: 14.7 % (ref 3–15.9)
IMM RETICS NFR: 16.5 % (ref 3–15.9)
IMM RETICS NFR: 17.5 % (ref 3–15.9)
IMM RETICS NFR: 25.6 % (ref 3–15.9)
IMM RETICS NFR: 26.4 % (ref 3–15.9)
IMM RETICS NFR: 6 % (ref 3–15.9)
IMM RETICS NFR: 9 % (ref 3–15.9)
INR PPP: 1.2
INR PPP: 1.8
INTERPRETATION: ABNORMAL
IRON SATN MFR SERPL: 11 %
IRON SATN MFR SERPL: 14 %
IRON SATN MFR SERPL: 16 %
IRON SATN MFR SERPL: 20 %
IRON SATN MFR SERPL: 20 %
IRON SATN MFR SERPL: 31 %
IRON SATN MFR SERPL: 41 %
IRON SATN MFR SERPL: 46 %
IRON SATN MFR SERPL: 66 %
IRON SATN MFR SERPL: 82 %
IRON SERPL-MCNC: 121 UG/DL (ref 35–150)
IRON SERPL-MCNC: 146 UG/DL (ref 35–150)
IRON SERPL-MCNC: 154 UG/DL (ref 35–150)
IRON SERPL-MCNC: 25 UG/DL (ref 35–150)
IRON SERPL-MCNC: 33 UG/DL (ref 35–150)
IRON SERPL-MCNC: 47 UG/DL (ref 35–150)
IRON SERPL-MCNC: 54 UG/DL (ref 35–150)
IRON SERPL-MCNC: 54 UG/DL (ref 35–150)
IRON SERPL-MCNC: 64 UG/DL (ref 35–150)
IRON SERPL-MCNC: 86 UG/DL (ref 35–150)
KPC (CARBAPENEM RESISTANCE GENE): NOT DETECTED
LACTATE SERPL-SCNC: 0.9 MMOL/L (ref 0.4–2)
LACTATE SERPL-SCNC: 0.9 MMOL/L (ref 0.4–2)
LACTATE SERPL-SCNC: 1.2 MMOL/L (ref 0.4–2)
LACTATE SERPL-SCNC: 2.9 MMOL/L (ref 0.4–2)
LDH SERPL L TO P-CCNC: 337 U/L (ref 110–210)
LIPASE SERPL-CCNC: 92 U/L (ref 73–393)
LYMPHOCYTES # BLD: 0.4 K/UL (ref 0.5–4.6)
LYMPHOCYTES # BLD: 0.4 K/UL (ref 0.5–4.6)
LYMPHOCYTES # BLD: 0.5 K/UL (ref 0.5–4.6)
LYMPHOCYTES # BLD: 0.5 K/UL (ref 0.5–4.6)
LYMPHOCYTES # BLD: 0.6 K/UL (ref 0.5–4.6)
LYMPHOCYTES # BLD: 0.7 K/UL (ref 0.5–4.6)
LYMPHOCYTES # BLD: 0.8 K/UL (ref 0.5–4.6)
LYMPHOCYTES # BLD: 0.9 K/UL (ref 0.5–4.6)
LYMPHOCYTES # BLD: 0.9 K/UL (ref 0.5–4.6)
LYMPHOCYTES # BLD: 1 K/UL (ref 0.5–4.6)
LYMPHOCYTES # BLD: 1.1 K/UL (ref 0.5–4.6)
LYMPHOCYTES NFR BLD: 10 % (ref 13–44)
LYMPHOCYTES NFR BLD: 11 % (ref 13–44)
LYMPHOCYTES NFR BLD: 12 % (ref 13–44)
LYMPHOCYTES NFR BLD: 13 % (ref 13–44)
LYMPHOCYTES NFR BLD: 14 % (ref 13–44)
LYMPHOCYTES NFR BLD: 14 % (ref 13–44)
LYMPHOCYTES NFR BLD: 5 % (ref 13–44)
LYMPHOCYTES NFR BLD: 5 % (ref 13–44)
LYMPHOCYTES NFR BLD: 6 % (ref 13–44)
LYMPHOCYTES NFR BLD: 6 % (ref 13–44)
LYMPHOCYTES NFR BLD: 7 % (ref 13–44)
LYMPHOCYTES NFR BLD: 7 % (ref 13–44)
LYMPHOCYTES NFR BLD: 8 % (ref 13–44)
LYMPHOCYTES NFR BLD: 9 % (ref 13–44)
Lab: NORMAL
M PNEUMO DNA SPEC QL NAA+PROBE: NOT DETECTED
MAGNESIUM SERPL-MCNC: 2 MG/DL (ref 1.8–2.4)
MCH RBC QN AUTO: 28.9 PG (ref 26.1–32.9)
MCH RBC QN AUTO: 29.1 PG (ref 26.1–32.9)
MCH RBC QN AUTO: 29.2 PG (ref 26.1–32.9)
MCH RBC QN AUTO: 29.3 PG (ref 26.1–32.9)
MCH RBC QN AUTO: 29.4 PG (ref 26.1–32.9)
MCH RBC QN AUTO: 29.5 PG (ref 26.1–32.9)
MCH RBC QN AUTO: 29.6 PG (ref 26.1–32.9)
MCH RBC QN AUTO: 29.7 PG (ref 26.1–32.9)
MCH RBC QN AUTO: 29.8 PG (ref 26.1–32.9)
MCH RBC QN AUTO: 29.8 PG (ref 26.1–32.9)
MCH RBC QN AUTO: 29.9 PG (ref 26.1–32.9)
MCH RBC QN AUTO: 30 PG (ref 26.1–32.9)
MCH RBC QN AUTO: 30.2 PG (ref 26.1–32.9)
MCH RBC QN AUTO: 30.2 PG (ref 26.1–32.9)
MCH RBC QN AUTO: 30.3 PG (ref 26.1–32.9)
MCH RBC QN AUTO: 30.6 PG (ref 26.1–32.9)
MCH RBC QN AUTO: 30.8 PG (ref 26.1–32.9)
MCH RBC QN AUTO: 30.8 PG (ref 26.1–32.9)
MCH RBC QN AUTO: 30.9 PG (ref 26.1–32.9)
MCH RBC QN AUTO: 31.1 PG (ref 26.1–32.9)
MCH RBC QN AUTO: 31.1 PG (ref 26.1–32.9)
MCH RBC QN AUTO: 31.4 PG (ref 26.1–32.9)
MCHC RBC AUTO-ENTMCNC: 30.9 G/DL (ref 31.4–35)
MCHC RBC AUTO-ENTMCNC: 30.9 G/DL (ref 31.4–35)
MCHC RBC AUTO-ENTMCNC: 31 G/DL (ref 31.4–35)
MCHC RBC AUTO-ENTMCNC: 31.2 G/DL (ref 31.4–35)
MCHC RBC AUTO-ENTMCNC: 31.3 G/DL (ref 31.4–35)
MCHC RBC AUTO-ENTMCNC: 31.4 G/DL (ref 31.4–35)
MCHC RBC AUTO-ENTMCNC: 31.7 G/DL (ref 31.4–35)
MCHC RBC AUTO-ENTMCNC: 31.7 G/DL (ref 31.4–35)
MCHC RBC AUTO-ENTMCNC: 31.8 G/DL (ref 31.4–35)
MCHC RBC AUTO-ENTMCNC: 31.8 G/DL (ref 31.4–35)
MCHC RBC AUTO-ENTMCNC: 31.9 G/DL (ref 31.4–35)
MCHC RBC AUTO-ENTMCNC: 31.9 G/DL (ref 31.4–35)
MCHC RBC AUTO-ENTMCNC: 32.2 G/DL (ref 31.4–35)
MCHC RBC AUTO-ENTMCNC: 32.2 G/DL (ref 31.4–35)
MCHC RBC AUTO-ENTMCNC: 32.3 G/DL (ref 31.4–35)
MCHC RBC AUTO-ENTMCNC: 32.3 G/DL (ref 31.4–35)
MCHC RBC AUTO-ENTMCNC: 32.4 G/DL (ref 31.4–35)
MCHC RBC AUTO-ENTMCNC: 32.4 G/DL (ref 31.4–35)
MCHC RBC AUTO-ENTMCNC: 32.5 G/DL (ref 31.4–35)
MCHC RBC AUTO-ENTMCNC: 32.6 G/DL (ref 31.4–35)
MCHC RBC AUTO-ENTMCNC: 32.7 G/DL (ref 31.4–35)
MCHC RBC AUTO-ENTMCNC: 33.1 G/DL (ref 31.4–35)
MCHC RBC AUTO-ENTMCNC: 33.1 G/DL (ref 31.4–35)
MCHC RBC AUTO-ENTMCNC: 33.3 G/DL (ref 31.4–35)
MCV RBC AUTO: 90.2 FL (ref 79.6–97.8)
MCV RBC AUTO: 90.6 FL (ref 79.6–97.8)
MCV RBC AUTO: 91.8 FL (ref 79.6–97.8)
MCV RBC AUTO: 91.9 FL (ref 79.6–97.8)
MCV RBC AUTO: 92.3 FL (ref 79.6–97.8)
MCV RBC AUTO: 92.4 FL (ref 79.6–97.8)
MCV RBC AUTO: 92.5 FL (ref 79.6–97.8)
MCV RBC AUTO: 92.5 FL (ref 79.6–97.8)
MCV RBC AUTO: 92.8 FL (ref 79.6–97.8)
MCV RBC AUTO: 93 FL (ref 79.6–97.8)
MCV RBC AUTO: 93.2 FL (ref 79.6–97.8)
MCV RBC AUTO: 93.3 FL (ref 79.6–97.8)
MCV RBC AUTO: 93.7 FL (ref 79.6–97.8)
MCV RBC AUTO: 93.9 FL (ref 79.6–97.8)
MCV RBC AUTO: 94 FL (ref 79.6–97.8)
MCV RBC AUTO: 94.2 FL (ref 79.6–97.8)
MCV RBC AUTO: 94.3 FL (ref 79.6–97.8)
MCV RBC AUTO: 94.4 FL (ref 79.6–97.8)
MCV RBC AUTO: 95 FL (ref 79.6–97.8)
MCV RBC AUTO: 95.1 FL (ref 79.6–97.8)
MCV RBC AUTO: 95.2 FL (ref 79.6–97.8)
MCV RBC AUTO: 96 FL (ref 79.6–97.8)
MCV RBC AUTO: 96.5 FL (ref 79.6–97.8)
MCV RBC AUTO: 96.7 FL (ref 79.6–97.8)
MONOCYTES # BLD: 0.2 K/UL (ref 0.1–1.3)
MONOCYTES # BLD: 0.3 K/UL (ref 0.1–1.3)
MONOCYTES # BLD: 0.4 K/UL (ref 0.1–1.3)
MONOCYTES # BLD: 0.5 K/UL (ref 0.1–1.3)
MONOCYTES # BLD: 0.6 K/UL (ref 0.1–1.3)
MONOCYTES # BLD: 0.8 K/UL (ref 0.1–1.3)
MONOCYTES NFR BLD: 3 % (ref 4–12)
MONOCYTES NFR BLD: 4 % (ref 4–12)
MONOCYTES NFR BLD: 5 % (ref 4–12)
MONOCYTES NFR BLD: 6 % (ref 4–12)
MONOCYTES NFR BLD: 7 % (ref 4–12)
MONOCYTES NFR BLD: 8 % (ref 4–12)
MONOCYTES NFR BLD: 8 % (ref 4–12)
NEUTS SEG # BLD: 10.2 K/UL (ref 1.7–8.2)
NEUTS SEG # BLD: 13.1 K/UL (ref 1.7–8.2)
NEUTS SEG # BLD: 3.4 K/UL (ref 1.7–8.2)
NEUTS SEG # BLD: 3.9 K/UL (ref 1.7–8.2)
NEUTS SEG # BLD: 4.2 K/UL (ref 1.7–8.2)
NEUTS SEG # BLD: 4.3 K/UL (ref 1.7–8.2)
NEUTS SEG # BLD: 4.3 K/UL (ref 1.7–8.2)
NEUTS SEG # BLD: 4.4 K/UL (ref 1.7–8.2)
NEUTS SEG # BLD: 4.5 K/UL (ref 1.7–8.2)
NEUTS SEG # BLD: 5.1 K/UL (ref 1.7–8.2)
NEUTS SEG # BLD: 5.2 K/UL (ref 1.7–8.2)
NEUTS SEG # BLD: 6 K/UL (ref 1.7–8.2)
NEUTS SEG # BLD: 7.2 K/UL (ref 1.7–8.2)
NEUTS SEG # BLD: 7.7 K/UL (ref 1.7–8.2)
NEUTS SEG # BLD: 8 K/UL (ref 1.7–8.2)
NEUTS SEG # BLD: 8.3 K/UL (ref 1.7–8.2)
NEUTS SEG # BLD: 8.3 K/UL (ref 1.7–8.2)
NEUTS SEG # BLD: 9.5 K/UL (ref 1.7–8.2)
NEUTS SEG # BLD: 9.6 K/UL (ref 1.7–8.2)
NEUTS SEG NFR BLD: 76 % (ref 43–78)
NEUTS SEG NFR BLD: 77 % (ref 43–78)
NEUTS SEG NFR BLD: 79 % (ref 43–78)
NEUTS SEG NFR BLD: 80 % (ref 43–78)
NEUTS SEG NFR BLD: 81 % (ref 43–78)
NEUTS SEG NFR BLD: 81 % (ref 43–78)
NEUTS SEG NFR BLD: 82 % (ref 43–78)
NEUTS SEG NFR BLD: 83 % (ref 43–78)
NEUTS SEG NFR BLD: 83 % (ref 43–78)
NEUTS SEG NFR BLD: 84 % (ref 43–78)
NEUTS SEG NFR BLD: 84 % (ref 43–78)
NEUTS SEG NFR BLD: 88 % (ref 43–78)
NEUTS SEG NFR BLD: 88 % (ref 43–78)
NEUTS SEG NFR BLD: 89 % (ref 43–78)
NEUTS SEG NFR BLD: 90 % (ref 43–78)
NRBC # BLD: 0 K/UL (ref 0–0.2)
NRBC # BLD: 0.02 K/UL (ref 0–0.2)
NRBC # BLD: 0.04 K/UL (ref 0–0.2)
NRBC # BLD: 0.04 K/UL (ref 0–0.2)
NRBC # BLD: 0.05 K/UL (ref 0–0.2)
NRBC # BLD: 0.07 K/UL (ref 0–0.2)
NRBC # BLD: 0.12 K/UL (ref 0–0.2)
P-R INTERVAL, ECG05: 136 MS
P-R INTERVAL, ECG05: 136 MS
P-R INTERVAL, ECG05: 138 MS
PATH REV BLD -IMP: NORMAL
PLATELET # BLD AUTO: 111 K/UL (ref 150–450)
PLATELET # BLD AUTO: 116 K/UL (ref 150–450)
PLATELET # BLD AUTO: 121 K/UL (ref 150–450)
PLATELET # BLD AUTO: 132 K/UL (ref 150–450)
PLATELET # BLD AUTO: 139 K/UL (ref 150–450)
PLATELET # BLD AUTO: 143 K/UL (ref 150–450)
PLATELET # BLD AUTO: 143 K/UL (ref 150–450)
PLATELET # BLD AUTO: 144 K/UL (ref 150–450)
PLATELET # BLD AUTO: 165 K/UL (ref 150–450)
PLATELET # BLD AUTO: 168 K/UL (ref 150–450)
PLATELET # BLD AUTO: 172 K/UL (ref 150–450)
PLATELET # BLD AUTO: 189 K/UL (ref 150–450)
PLATELET # BLD AUTO: 192 K/UL (ref 150–450)
PLATELET # BLD AUTO: 196 K/UL (ref 150–450)
PLATELET # BLD AUTO: 197 K/UL (ref 150–450)
PLATELET # BLD AUTO: 201 K/UL (ref 150–450)
PLATELET # BLD AUTO: 201 K/UL (ref 150–450)
PLATELET # BLD AUTO: 212 K/UL (ref 150–450)
PLATELET # BLD AUTO: 213 K/UL (ref 150–450)
PLATELET # BLD AUTO: 219 K/UL (ref 150–450)
PLATELET # BLD AUTO: 220 K/UL (ref 150–450)
PLATELET # BLD AUTO: 221 K/UL (ref 150–450)
PLATELET # BLD AUTO: 238 K/UL (ref 150–450)
PLATELET # BLD AUTO: 252 K/UL (ref 150–450)
PLATELET # BLD AUTO: 286 K/UL (ref 150–450)
PLATELET # BLD AUTO: 73 K/UL (ref 150–450)
PMV BLD AUTO: 10.1 FL (ref 9.4–12.3)
PMV BLD AUTO: 10.1 FL (ref 9.4–12.3)
PMV BLD AUTO: 10.2 FL (ref 9.4–12.3)
PMV BLD AUTO: 10.3 FL (ref 9.4–12.3)
PMV BLD AUTO: 10.4 FL (ref 9.4–12.3)
PMV BLD AUTO: 10.5 FL (ref 9.4–12.3)
PMV BLD AUTO: 10.5 FL (ref 9.4–12.3)
PMV BLD AUTO: 10.6 FL (ref 9.4–12.3)
PMV BLD AUTO: 10.7 FL (ref 9.4–12.3)
PMV BLD AUTO: 10.8 FL (ref 9.4–12.3)
PMV BLD AUTO: 10.9 FL (ref 9.4–12.3)
PMV BLD AUTO: 11.1 FL (ref 9.4–12.3)
PMV BLD AUTO: 11.1 FL (ref 9.4–12.3)
PMV BLD AUTO: 11.3 FL (ref 9.4–12.3)
PMV BLD AUTO: 11.6 FL (ref 9.4–12.3)
PMV BLD AUTO: 9.9 FL (ref 9.4–12.3)
POTASSIUM SERPL-SCNC: 3.3 MMOL/L (ref 3.5–5.1)
POTASSIUM SERPL-SCNC: 3.4 MMOL/L (ref 3.5–5.1)
POTASSIUM SERPL-SCNC: 3.4 MMOL/L (ref 3.5–5.1)
POTASSIUM SERPL-SCNC: 3.5 MMOL/L (ref 3.5–5.1)
POTASSIUM SERPL-SCNC: 3.6 MMOL/L (ref 3.5–5.1)
POTASSIUM SERPL-SCNC: 3.7 MMOL/L (ref 3.5–5.1)
POTASSIUM SERPL-SCNC: 3.8 MMOL/L (ref 3.5–5.1)
POTASSIUM SERPL-SCNC: 3.8 MMOL/L (ref 3.5–5.1)
POTASSIUM SERPL-SCNC: 3.9 MMOL/L (ref 3.5–5.1)
POTASSIUM SERPL-SCNC: 4 MMOL/L (ref 3.5–5.1)
POTASSIUM SERPL-SCNC: 4.1 MMOL/L (ref 3.5–5.1)
POTASSIUM SERPL-SCNC: 4.2 MMOL/L (ref 3.5–5.1)
PROCALCITONIN SERPL-MCNC: 0.14 NG/ML
PROCALCITONIN SERPL-MCNC: 2.94 NG/ML
PROT SERPL-MCNC: 5.4 G/DL (ref 6.3–8.2)
PROT SERPL-MCNC: 5.4 G/DL (ref 6.3–8.2)
PROT SERPL-MCNC: 6.1 G/DL (ref 6.3–8.2)
PROT SERPL-MCNC: 6.5 G/DL (ref 6.3–8.2)
PROT SERPL-MCNC: 6.5 G/DL (ref 6.3–8.2)
PROT SERPL-MCNC: 6.6 G/DL (ref 6.3–8.2)
PROT SERPL-MCNC: 6.7 G/DL (ref 6.3–8.2)
PROT SERPL-MCNC: 6.8 G/DL (ref 6.3–8.2)
PROT SERPL-MCNC: 7.1 G/DL (ref 6.3–8.2)
PROT SERPL-MCNC: 7.3 G/DL (ref 6.3–8.2)
PROTHROMBIN TIME: 15.3 SEC (ref 12.6–14.5)
PROTHROMBIN TIME: 21 SEC (ref 12.6–14.5)
Q-T INTERVAL, ECG07: 342 MS
Q-T INTERVAL, ECG07: 360 MS
Q-T INTERVAL, ECG07: 376 MS
QRS DURATION, ECG06: 80 MS
QRS DURATION, ECG06: 80 MS
QRS DURATION, ECG06: 84 MS
QTC CALCULATION (BEZET), ECG08: 436 MS
QTC CALCULATION (BEZET), ECG08: 447 MS
QTC CALCULATION (BEZET), ECG08: 447 MS
RBC # BLD AUTO: 2.23 M/UL (ref 4.05–5.2)
RBC # BLD AUTO: 2.35 M/UL (ref 4.05–5.2)
RBC # BLD AUTO: 2.4 M/UL (ref 4.05–5.2)
RBC # BLD AUTO: 2.43 M/UL (ref 4.05–5.2)
RBC # BLD AUTO: 2.64 M/UL (ref 4.05–5.2)
RBC # BLD AUTO: 2.66 M/UL (ref 4.05–5.2)
RBC # BLD AUTO: 2.81 M/UL (ref 4.05–5.2)
RBC # BLD AUTO: 3.25 M/UL (ref 4.05–5.2)
RBC # BLD AUTO: 3.27 M/UL (ref 4.05–5.2)
RBC # BLD AUTO: 3.31 M/UL (ref 4.05–5.2)
RBC # BLD AUTO: 3.46 M/UL (ref 4.05–5.2)
RBC # BLD AUTO: 3.6 M/UL (ref 4.05–5.2)
RBC # BLD AUTO: 3.8 M/UL (ref 4.05–5.2)
RBC # BLD AUTO: 3.98 M/UL (ref 4.05–5.2)
RBC # BLD AUTO: 4 M/UL (ref 4.05–5.2)
RBC # BLD AUTO: 4.02 M/UL (ref 4.05–5.2)
RBC # BLD AUTO: 4.09 M/UL (ref 4.05–5.2)
RBC # BLD AUTO: 4.11 M/UL (ref 4.05–5.2)
RBC # BLD AUTO: 4.13 M/UL (ref 4.05–5.2)
RBC # BLD AUTO: 4.19 M/UL (ref 4.05–5.2)
RBC # BLD AUTO: 4.26 M/UL (ref 4.05–5.2)
RBC # BLD AUTO: 4.42 M/UL (ref 4.05–5.2)
RBC # BLD AUTO: 4.48 M/UL (ref 4.05–5.2)
RBC # BLD AUTO: 4.59 M/UL (ref 4.05–5.2)
RBC # BLD AUTO: 4.83 M/UL (ref 4.05–5.2)
RBC # BLD AUTO: 4.87 M/UL (ref 4.05–5.2)
REFERENCE LAB,REFLB: NORMAL
RETICS # AUTO: 0.06 M/UL (ref 0.03–0.1)
RETICS # AUTO: 0.06 M/UL (ref 0.03–0.1)
RETICS # AUTO: 0.08 M/UL (ref 0.03–0.1)
RETICS # AUTO: 0.09 M/UL (ref 0.03–0.1)
RETICS # AUTO: 0.09 M/UL (ref 0.03–0.1)
RETICS # AUTO: 0.1 M/UL (ref 0.03–0.1)
RETICS # AUTO: 0.11 M/UL (ref 0.03–0.1)
RETICS # AUTO: 0.11 M/UL (ref 0.03–0.1)
RETICS # AUTO: 0.15 M/UL (ref 0.03–0.1)
RETICS/RBC NFR AUTO: 1.3 % (ref 0.3–2)
RETICS/RBC NFR AUTO: 1.6 % (ref 0.3–2)
RETICS/RBC NFR AUTO: 1.8 % (ref 0.3–2)
RETICS/RBC NFR AUTO: 2 % (ref 0.3–2)
RETICS/RBC NFR AUTO: 2 % (ref 0.3–2)
RETICS/RBC NFR AUTO: 2.6 % (ref 0.3–2)
RETICS/RBC NFR AUTO: 2.8 % (ref 0.3–2)
RETICS/RBC NFR AUTO: 2.9 % (ref 0.3–2)
RETICS/RBC NFR AUTO: 6.7 % (ref 0.3–2)
RSV RNA SPEC QL NAA+PROBE: NOT DETECTED
RV+EV RNA SPEC QL NAA+PROBE: NOT DETECTED
SARS-COV-2 PCR, COVPCR: NOT DETECTED
SERVICE CMNT-IMP: ABNORMAL
SERVICE CMNT-IMP: NORMAL
SODIUM SERPL-SCNC: 136 MMOL/L (ref 136–145)
SODIUM SERPL-SCNC: 137 MMOL/L (ref 136–145)
SODIUM SERPL-SCNC: 138 MMOL/L (ref 136–145)
SODIUM SERPL-SCNC: 139 MMOL/L (ref 136–145)
SODIUM SERPL-SCNC: 140 MMOL/L (ref 136–145)
SODIUM SERPL-SCNC: 141 MMOL/L (ref 136–145)
SODIUM SERPL-SCNC: 142 MMOL/L (ref 136–145)
SODIUM SERPL-SCNC: 142 MMOL/L (ref 136–145)
SODIUM SERPL-SCNC: 143 MMOL/L (ref 136–145)
SODIUM SERPL-SCNC: 143 MMOL/L (ref 136–145)
SPECIMEN EXP DATE BLD: NORMAL
STATUS OF UNIT,%ST: NORMAL
T4 FREE SERPL-MCNC: 1.1 NG/DL (ref 0.78–1.46)
TEST DESCRIPTION:,ATST: NORMAL
TIBC SERPL-MCNC: 177 UG/DL (ref 250–450)
TIBC SERPL-MCNC: 187 UG/DL (ref 250–450)
TIBC SERPL-MCNC: 204 UG/DL (ref 250–450)
TIBC SERPL-MCNC: 212 UG/DL (ref 250–450)
TIBC SERPL-MCNC: 220 UG/DL (ref 250–450)
TIBC SERPL-MCNC: 261 UG/DL (ref 250–450)
TIBC SERPL-MCNC: 273 UG/DL (ref 250–450)
TIBC SERPL-MCNC: 275 UG/DL (ref 250–450)
TIBC SERPL-MCNC: 294 UG/DL (ref 250–450)
TIBC SERPL-MCNC: 305 UG/DL (ref 250–450)
TSH SERPL DL<=0.005 MIU/L-ACNC: 10.7 UIU/ML
TSH SERPL DL<=0.005 MIU/L-ACNC: 12.1 UIU/ML (ref 0.36–3.74)
TSH SERPL DL<=0.005 MIU/L-ACNC: 2.64 UIU/ML (ref 0.36–3.74)
UNIT DIVISION, %UDIV: 0
URATE SERPL-MCNC: 3.6 MG/DL (ref 2.6–6)
VANCOMYCIN TROUGH SERPL-MCNC: 14.2 UG/ML (ref 5–20)
VANCOMYCIN TROUGH SERPL-MCNC: 19 UG/ML (ref 5–20)
VENTRICULAR RATE, ECG03: 85 BPM
VENTRICULAR RATE, ECG03: 93 BPM
VENTRICULAR RATE, ECG03: 98 BPM
VIT B12 SERPL-MCNC: 1466 PG/ML (ref 193–986)
VIT B12 SERPL-MCNC: 562 PG/ML (ref 193–986)
WBC # BLD AUTO: 10.1 K/UL (ref 4.3–11.1)
WBC # BLD AUTO: 10.7 K/UL (ref 4.3–11.1)
WBC # BLD AUTO: 10.8 K/UL (ref 4.3–11.1)
WBC # BLD AUTO: 10.9 K/UL (ref 4.3–11.1)
WBC # BLD AUTO: 11.3 K/UL (ref 4.3–11.1)
WBC # BLD AUTO: 11.3 K/UL (ref 4.3–11.1)
WBC # BLD AUTO: 13.5 K/UL (ref 4.3–11.1)
WBC # BLD AUTO: 14.9 K/UL (ref 4.3–11.1)
WBC # BLD AUTO: 4.3 K/UL (ref 4.3–11.1)
WBC # BLD AUTO: 5.1 K/UL (ref 4.3–11.1)
WBC # BLD AUTO: 5.2 K/UL (ref 4.3–11.1)
WBC # BLD AUTO: 5.3 K/UL (ref 4.3–11.1)
WBC # BLD AUTO: 5.3 K/UL (ref 4.3–11.1)
WBC # BLD AUTO: 5.6 K/UL (ref 4.3–11.1)
WBC # BLD AUTO: 5.6 K/UL (ref 4.3–11.1)
WBC # BLD AUTO: 5.7 K/UL (ref 4.3–11.1)
WBC # BLD AUTO: 6.4 K/UL (ref 4.3–11.1)
WBC # BLD AUTO: 6.5 K/UL (ref 4.3–11.1)
WBC # BLD AUTO: 6.8 K/UL (ref 4.3–11.1)
WBC # BLD AUTO: 7.4 K/UL (ref 4.3–11.1)
WBC # BLD AUTO: 8.5 K/UL (ref 4.3–11.1)
WBC # BLD AUTO: 8.7 K/UL (ref 4.3–11.1)
WBC # BLD AUTO: 9.1 K/UL (ref 4.3–11.1)
WBC # BLD AUTO: 9.5 K/UL (ref 4.3–11.1)
WBC # BLD AUTO: 9.6 K/UL (ref 4.3–11.1)
WBC # BLD AUTO: 9.8 K/UL (ref 4.3–11.1)

## 2021-01-01 PROCEDURE — 80053 COMPREHEN METABOLIC PANEL: CPT

## 2021-01-01 PROCEDURE — 65270000029 HC RM PRIVATE

## 2021-01-01 PROCEDURE — 87205 SMEAR GRAM STAIN: CPT

## 2021-01-01 PROCEDURE — 74011250636 HC RX REV CODE- 250/636: Performed by: INTERNAL MEDICINE

## 2021-01-01 PROCEDURE — 87040 BLOOD CULTURE FOR BACTERIA: CPT

## 2021-01-01 PROCEDURE — 0DJ08ZZ INSPECTION OF UPPER INTESTINAL TRACT, VIA NATURAL OR ARTIFICIAL OPENING ENDOSCOPIC: ICD-10-PCS | Performed by: INTERNAL MEDICINE

## 2021-01-01 PROCEDURE — 74011636637 HC RX REV CODE- 636/637: Performed by: INTERNAL MEDICINE

## 2021-01-01 PROCEDURE — 74011000258 HC RX REV CODE- 258: Performed by: NURSE PRACTITIONER

## 2021-01-01 PROCEDURE — 74011250636 HC RX REV CODE- 250/636: Performed by: EMERGENCY MEDICINE

## 2021-01-01 PROCEDURE — 81003 URINALYSIS AUTO W/O SCOPE: CPT

## 2021-01-01 PROCEDURE — 74011000636 HC RX REV CODE- 636: Performed by: HOSPITALIST

## 2021-01-01 PROCEDURE — 85046 RETICYTE/HGB CONCENTRATE: CPT

## 2021-01-01 PROCEDURE — 80048 BASIC METABOLIC PNL TOTAL CA: CPT

## 2021-01-01 PROCEDURE — 94762 N-INVAS EAR/PLS OXIMTRY CONT: CPT

## 2021-01-01 PROCEDURE — 74011250637 HC RX REV CODE- 250/637: Performed by: INTERNAL MEDICINE

## 2021-01-01 PROCEDURE — 36415 COLL VENOUS BLD VENIPUNCTURE: CPT

## 2021-01-01 PROCEDURE — 83540 ASSAY OF IRON: CPT

## 2021-01-01 PROCEDURE — 85025 COMPLETE CBC W/AUTO DIFF WBC: CPT

## 2021-01-01 PROCEDURE — 74011250637 HC RX REV CODE- 250/637: Performed by: HOSPITALIST

## 2021-01-01 PROCEDURE — 71045 X-RAY EXAM CHEST 1 VIEW: CPT

## 2021-01-01 PROCEDURE — 85014 HEMATOCRIT: CPT

## 2021-01-01 PROCEDURE — 74011250637 HC RX REV CODE- 250/637: Performed by: NURSE PRACTITIONER

## 2021-01-01 PROCEDURE — 82248 BILIRUBIN DIRECT: CPT

## 2021-01-01 PROCEDURE — 82728 ASSAY OF FERRITIN: CPT

## 2021-01-01 PROCEDURE — 2709999900 HC NON-CHARGEABLE SUPPLY: Performed by: INTERNAL MEDICINE

## 2021-01-01 PROCEDURE — 96374 THER/PROPH/DIAG INJ IV PUSH: CPT

## 2021-01-01 PROCEDURE — 74011250637 HC RX REV CODE- 250/637: Performed by: FAMILY MEDICINE

## 2021-01-01 PROCEDURE — APPSS45 APP SPLIT SHARED TIME 31-45 MINUTES: Performed by: NURSE PRACTITIONER

## 2021-01-01 PROCEDURE — 73700 CT LOWER EXTREMITY W/O DYE: CPT

## 2021-01-01 PROCEDURE — C9113 INJ PANTOPRAZOLE SODIUM, VIA: HCPCS | Performed by: EMERGENCY MEDICINE

## 2021-01-01 PROCEDURE — 74011000250 HC RX REV CODE- 250: Performed by: INTERNAL MEDICINE

## 2021-01-01 PROCEDURE — 96361 HYDRATE IV INFUSION ADD-ON: CPT

## 2021-01-01 PROCEDURE — 80202 ASSAY OF VANCOMYCIN: CPT

## 2021-01-01 PROCEDURE — 83605 ASSAY OF LACTIC ACID: CPT

## 2021-01-01 PROCEDURE — 83735 ASSAY OF MAGNESIUM: CPT

## 2021-01-01 PROCEDURE — 99283 EMERGENCY DEPT VISIT LOW MDM: CPT

## 2021-01-01 PROCEDURE — 74011000250 HC RX REV CODE- 250: Performed by: EMERGENCY MEDICINE

## 2021-01-01 PROCEDURE — 83550 IRON BINDING TEST: CPT

## 2021-01-01 PROCEDURE — 99232 SBSQ HOSP IP/OBS MODERATE 35: CPT | Performed by: INTERNAL MEDICINE

## 2021-01-01 PROCEDURE — 99218 HC RM OBSERVATION: CPT

## 2021-01-01 PROCEDURE — 85027 COMPLETE CBC AUTOMATED: CPT

## 2021-01-01 PROCEDURE — 77030020751 HC FLTR TBNG TRNSFUS HAEM -A: Performed by: ANESTHESIOLOGY

## 2021-01-01 PROCEDURE — 74011250636 HC RX REV CODE- 250/636: Performed by: HOSPITALIST

## 2021-01-01 PROCEDURE — 36430 TRANSFUSION BLD/BLD COMPNT: CPT

## 2021-01-01 PROCEDURE — 74011000258 HC RX REV CODE- 258: Performed by: INTERNAL MEDICINE

## 2021-01-01 PROCEDURE — 82746 ASSAY OF FOLIC ACID SERUM: CPT

## 2021-01-01 PROCEDURE — 82607 VITAMIN B-12: CPT

## 2021-01-01 PROCEDURE — 82550 ASSAY OF CK (CPK): CPT

## 2021-01-01 PROCEDURE — 85384 FIBRINOGEN ACTIVITY: CPT

## 2021-01-01 PROCEDURE — 93005 ELECTROCARDIOGRAM TRACING: CPT | Performed by: STUDENT IN AN ORGANIZED HEALTH CARE EDUCATION/TRAINING PROGRAM

## 2021-01-01 PROCEDURE — 74011000250 HC RX REV CODE- 250: Performed by: REGISTERED NURSE

## 2021-01-01 PROCEDURE — 74011250637 HC RX REV CODE- 250/637: Performed by: EMERGENCY MEDICINE

## 2021-01-01 PROCEDURE — 96372 THER/PROPH/DIAG INJ SC/IM: CPT

## 2021-01-01 PROCEDURE — 96376 TX/PRO/DX INJ SAME DRUG ADON: CPT

## 2021-01-01 PROCEDURE — 2709999900 HC NON-CHARGEABLE SUPPLY

## 2021-01-01 PROCEDURE — 83880 ASSAY OF NATRIURETIC PEPTIDE: CPT

## 2021-01-01 PROCEDURE — 99285 EMERGENCY DEPT VISIT HI MDM: CPT

## 2021-01-01 PROCEDURE — 85018 HEMOGLOBIN: CPT

## 2021-01-01 PROCEDURE — 96365 THER/PROPH/DIAG IV INF INIT: CPT

## 2021-01-01 PROCEDURE — 77030020407 HC IV BLD WRMR ST 3M -A: Performed by: ANESTHESIOLOGY

## 2021-01-01 PROCEDURE — 93970 EXTREMITY STUDY: CPT

## 2021-01-01 PROCEDURE — 84439 ASSAY OF FREE THYROXINE: CPT

## 2021-01-01 PROCEDURE — 74011250636 HC RX REV CODE- 250/636: Performed by: NURSE PRACTITIONER

## 2021-01-01 PROCEDURE — 74011000250 HC RX REV CODE- 250: Performed by: ANESTHESIOLOGY

## 2021-01-01 PROCEDURE — 74011250637 HC RX REV CODE- 250/637: Performed by: PHYSICIAN ASSISTANT

## 2021-01-01 PROCEDURE — 76040000026: Performed by: INTERNAL MEDICINE

## 2021-01-01 PROCEDURE — 84443 ASSAY THYROID STIM HORMONE: CPT

## 2021-01-01 PROCEDURE — 87077 CULTURE AEROBIC IDENTIFY: CPT

## 2021-01-01 PROCEDURE — 76937 US GUIDE VASCULAR ACCESS: CPT

## 2021-01-01 PROCEDURE — 85610 PROTHROMBIN TIME: CPT

## 2021-01-01 PROCEDURE — 86611 BARTONELLA ANTIBODY: CPT

## 2021-01-01 PROCEDURE — 87186 SC STD MICRODIL/AGAR DIL: CPT

## 2021-01-01 PROCEDURE — 76060000033 HC ANESTHESIA 1 TO 1.5 HR: Performed by: INTERNAL MEDICINE

## 2021-01-01 PROCEDURE — 83690 ASSAY OF LIPASE: CPT

## 2021-01-01 PROCEDURE — 77030039425 HC BLD LARYNG TRULITE DISP TELE -A: Performed by: STUDENT IN AN ORGANIZED HEALTH CARE EDUCATION/TRAINING PROGRAM

## 2021-01-01 PROCEDURE — 74011000258 HC RX REV CODE- 258: Performed by: HOSPITALIST

## 2021-01-01 PROCEDURE — 83036 HEMOGLOBIN GLYCOSYLATED A1C: CPT

## 2021-01-01 PROCEDURE — 74011000258 HC RX REV CODE- 258: Performed by: EMERGENCY MEDICINE

## 2021-01-01 PROCEDURE — 0202U NFCT DS 22 TRGT SARS-COV-2: CPT

## 2021-01-01 PROCEDURE — 85730 THROMBOPLASTIN TIME PARTIAL: CPT

## 2021-01-01 PROCEDURE — 74011250636 HC RX REV CODE- 250/636: Performed by: FAMILY MEDICINE

## 2021-01-01 PROCEDURE — 85652 RBC SED RATE AUTOMATED: CPT

## 2021-01-01 PROCEDURE — 74011250636 HC RX REV CODE- 250/636

## 2021-01-01 PROCEDURE — 84145 PROCALCITONIN (PCT): CPT

## 2021-01-01 PROCEDURE — 86923 COMPATIBILITY TEST ELECTRIC: CPT

## 2021-01-01 PROCEDURE — 77030014243 HC BND LIG VRCES BSC -D: Performed by: INTERNAL MEDICINE

## 2021-01-01 PROCEDURE — 99222 1ST HOSP IP/OBS MODERATE 55: CPT | Performed by: INTERNAL MEDICINE

## 2021-01-01 PROCEDURE — 30233N1 TRANSFUSION OF NONAUTOLOGOUS RED BLOOD CELLS INTO PERIPHERAL VEIN, PERCUTANEOUS APPROACH: ICD-10-PCS | Performed by: INTERNAL MEDICINE

## 2021-01-01 PROCEDURE — APPSS180 APP SPLIT SHARED TIME > 60 MINUTES: Performed by: NURSE PRACTITIONER

## 2021-01-01 PROCEDURE — 74011250636 HC RX REV CODE- 250/636: Performed by: ANESTHESIOLOGY

## 2021-01-01 PROCEDURE — 93005 ELECTROCARDIOGRAM TRACING: CPT

## 2021-01-01 PROCEDURE — P9040 RBC LEUKOREDUCED IRRADIATED: HCPCS

## 2021-01-01 PROCEDURE — 77030037088 HC TUBE ENDOTRACH ORAL NSL COVD-A: Performed by: STUDENT IN AN ORGANIZED HEALTH CARE EDUCATION/TRAINING PROGRAM

## 2021-01-01 PROCEDURE — 83615 LACTATE (LD) (LDH) ENZYME: CPT

## 2021-01-01 PROCEDURE — 84550 ASSAY OF BLOOD/URIC ACID: CPT

## 2021-01-01 PROCEDURE — 86901 BLOOD TYPING SEROLOGIC RH(D): CPT

## 2021-01-01 PROCEDURE — 96375 TX/PRO/DX INJ NEW DRUG ADDON: CPT

## 2021-01-01 PROCEDURE — 74011250636 HC RX REV CODE- 250/636: Performed by: REGISTERED NURSE

## 2021-01-01 PROCEDURE — 85379 FIBRIN DEGRADATION QUANT: CPT

## 2021-01-01 PROCEDURE — 93005 ELECTROCARDIOGRAM TRACING: CPT | Performed by: EMERGENCY MEDICINE

## 2021-01-01 PROCEDURE — 77030020847 HC STATLOK BARD -A

## 2021-01-01 PROCEDURE — 86140 C-REACTIVE PROTEIN: CPT

## 2021-01-01 PROCEDURE — 75810000275 HC EMERGENCY DEPT VISIT NO LEVEL OF CARE

## 2021-01-01 PROCEDURE — 87150 DNA/RNA AMPLIFIED PROBE: CPT

## 2021-01-01 PROCEDURE — 77030019908 HC STETH ESOPH SIMS -A: Performed by: ANESTHESIOLOGY

## 2021-01-01 PROCEDURE — 74177 CT ABD & PELVIS W/CONTRAST: CPT

## 2021-01-01 PROCEDURE — 77030041974 HC CATH SYS PERIPH TELE -B

## 2021-01-01 PROCEDURE — 99284 EMERGENCY DEPT VISIT MOD MDM: CPT

## 2021-01-01 PROCEDURE — 83010 ASSAY OF HAPTOGLOBIN QUANT: CPT

## 2021-01-01 RX ORDER — CEFADROXIL 1000 MG/1
500 TABLET ORAL 2 TIMES DAILY
Qty: 14 TABLET | Refills: 0 | Status: SHIPPED | OUTPATIENT
Start: 2021-01-01 | End: 2021-01-01

## 2021-01-01 RX ORDER — LEVOTHYROXINE SODIUM 112 UG/1
112 TABLET ORAL
Status: DISCONTINUED | OUTPATIENT
Start: 2021-01-01 | End: 2021-01-01 | Stop reason: HOSPADM

## 2021-01-01 RX ORDER — HYDROCORTISONE SODIUM SUCCINATE 100 MG/2ML
100 INJECTION, POWDER, FOR SOLUTION INTRAMUSCULAR; INTRAVENOUS AS NEEDED
Status: DISCONTINUED | OUTPATIENT
Start: 2021-01-01 | End: 2021-01-01 | Stop reason: HOSPADM

## 2021-01-01 RX ORDER — ONDANSETRON 2 MG/ML
4 INJECTION INTRAMUSCULAR; INTRAVENOUS
Status: COMPLETED | OUTPATIENT
Start: 2021-01-01 | End: 2021-01-01

## 2021-01-01 RX ORDER — POTASSIUM CHLORIDE 14.9 MG/ML
20 INJECTION INTRAVENOUS
Status: COMPLETED | OUTPATIENT
Start: 2021-01-01 | End: 2021-01-01

## 2021-01-01 RX ORDER — SODIUM CHLORIDE 0.9 % (FLUSH) 0.9 %
10 SYRINGE (ML) INJECTION AS NEEDED
Status: CANCELLED | OUTPATIENT
Start: 2021-01-01

## 2021-01-01 RX ORDER — MORPHINE SULFATE 4 MG/ML
4 INJECTION INTRAVENOUS
Status: DISCONTINUED | OUTPATIENT
Start: 2021-01-01 | End: 2021-01-01

## 2021-01-01 RX ORDER — SODIUM CHLORIDE 0.9 % (FLUSH) 0.9 %
5-10 SYRINGE (ML) INJECTION EVERY 8 HOURS
Status: DISCONTINUED | OUTPATIENT
Start: 2021-01-01 | End: 2021-01-01 | Stop reason: HOSPADM

## 2021-01-01 RX ORDER — VANCOMYCIN HYDROCHLORIDE
1250 ONCE
Status: COMPLETED | OUTPATIENT
Start: 2021-01-01 | End: 2021-01-01

## 2021-01-01 RX ORDER — SODIUM CHLORIDE 0.9 % (FLUSH) 0.9 %
5-40 SYRINGE (ML) INJECTION AS NEEDED
Status: DISCONTINUED | OUTPATIENT
Start: 2021-01-01 | End: 2021-01-01 | Stop reason: HOSPADM

## 2021-01-01 RX ORDER — CIPROFLOXACIN 2 MG/ML
400 INJECTION, SOLUTION INTRAVENOUS EVERY 12 HOURS
Status: DISCONTINUED | OUTPATIENT
Start: 2021-01-01 | End: 2021-01-01

## 2021-01-01 RX ORDER — ONDANSETRON 2 MG/ML
8 INJECTION INTRAMUSCULAR; INTRAVENOUS AS NEEDED
Status: CANCELLED | OUTPATIENT
Start: 2021-01-01

## 2021-01-01 RX ORDER — ZOLPIDEM TARTRATE 5 MG/1
5 TABLET ORAL
Status: DISCONTINUED | OUTPATIENT
Start: 2021-01-01 | End: 2021-01-01 | Stop reason: HOSPADM

## 2021-01-01 RX ORDER — ONDANSETRON 2 MG/ML
4 INJECTION INTRAMUSCULAR; INTRAVENOUS
Status: DISCONTINUED | OUTPATIENT
Start: 2021-01-01 | End: 2021-01-01 | Stop reason: HOSPADM

## 2021-01-01 RX ORDER — ALBUTEROL SULFATE 0.83 MG/ML
2.5 SOLUTION RESPIRATORY (INHALATION) AS NEEDED
Status: CANCELLED
Start: 2021-01-01

## 2021-01-01 RX ORDER — FLUCONAZOLE 50 MG/1
150 TABLET ORAL
Qty: 6 TABLET | Refills: 0 | Status: SHIPPED | OUTPATIENT
Start: 2021-01-01 | End: 2021-01-01

## 2021-01-01 RX ORDER — POLYETHYLENE GLYCOL 3350 17 G/17G
17 POWDER, FOR SOLUTION ORAL DAILY PRN
Status: DISCONTINUED | OUTPATIENT
Start: 2021-01-01 | End: 2021-01-01 | Stop reason: HOSPADM

## 2021-01-01 RX ORDER — ACETAMINOPHEN 325 MG/1
650 TABLET ORAL
Status: CANCELLED | OUTPATIENT
Start: 2021-01-01

## 2021-01-01 RX ORDER — ONDANSETRON 4 MG/1
4 TABLET, ORALLY DISINTEGRATING ORAL
Status: DISCONTINUED | OUTPATIENT
Start: 2021-01-01 | End: 2021-01-01 | Stop reason: HOSPADM

## 2021-01-01 RX ORDER — CLINDAMYCIN HYDROCHLORIDE 150 MG/1
300 CAPSULE ORAL EVERY 6 HOURS
Status: DISCONTINUED | OUTPATIENT
Start: 2021-01-01 | End: 2021-01-01 | Stop reason: HOSPADM

## 2021-01-01 RX ORDER — MORPHINE SULFATE 4 MG/ML
4 INJECTION INTRAVENOUS
Status: COMPLETED | OUTPATIENT
Start: 2021-01-01 | End: 2021-01-01

## 2021-01-01 RX ORDER — ACETAMINOPHEN 650 MG/1
650 SUPPOSITORY RECTAL
Status: DISCONTINUED | OUTPATIENT
Start: 2021-01-01 | End: 2021-01-01 | Stop reason: HOSPADM

## 2021-01-01 RX ORDER — SODIUM CHLORIDE 0.9 % (FLUSH) 0.9 %
10 SYRINGE (ML) INJECTION AS NEEDED
Status: DISCONTINUED | OUTPATIENT
Start: 2021-01-01 | End: 2021-01-01 | Stop reason: HOSPADM

## 2021-01-01 RX ORDER — SAME BUTANEDISULFONATE/BETAINE 400-600 MG
500 POWDER IN PACKET (EA) ORAL 2 TIMES DAILY
Status: DISCONTINUED | OUTPATIENT
Start: 2021-01-01 | End: 2021-01-01 | Stop reason: HOSPADM

## 2021-01-01 RX ORDER — SODIUM CHLORIDE 9 MG/ML
250 INJECTION, SOLUTION INTRAVENOUS AS NEEDED
Status: DISCONTINUED | OUTPATIENT
Start: 2021-01-01 | End: 2021-01-01

## 2021-01-01 RX ORDER — SODIUM CHLORIDE 9 MG/ML
10 INJECTION INTRAMUSCULAR; INTRAVENOUS; SUBCUTANEOUS AS NEEDED
Status: ACTIVE | OUTPATIENT
Start: 2021-01-01 | End: 2021-01-01

## 2021-01-01 RX ORDER — ENOXAPARIN SODIUM 100 MG/ML
60 INJECTION SUBCUTANEOUS EVERY 12 HOURS
Status: DISCONTINUED | OUTPATIENT
Start: 2021-01-01 | End: 2021-01-01 | Stop reason: HOSPADM

## 2021-01-01 RX ORDER — SODIUM CHLORIDE 9 MG/ML
10 INJECTION INTRAMUSCULAR; INTRAVENOUS; SUBCUTANEOUS AS NEEDED
Status: CANCELLED | OUTPATIENT
Start: 2021-01-01

## 2021-01-01 RX ORDER — DIPHENHYDRAMINE HYDROCHLORIDE 50 MG/ML
50 INJECTION, SOLUTION INTRAMUSCULAR; INTRAVENOUS AS NEEDED
Status: DISCONTINUED | OUTPATIENT
Start: 2021-01-01 | End: 2021-01-01 | Stop reason: HOSPADM

## 2021-01-01 RX ORDER — METRONIDAZOLE 500 MG/1
500 TABLET ORAL 2 TIMES DAILY
Qty: 6 TABLET | Refills: 0 | Status: SHIPPED | OUTPATIENT
Start: 2021-01-01 | End: 2021-01-01

## 2021-01-01 RX ORDER — ACETAMINOPHEN 500 MG
1000 TABLET ORAL
Status: COMPLETED | OUTPATIENT
Start: 2021-01-01 | End: 2021-01-01

## 2021-01-01 RX ORDER — ONDANSETRON 2 MG/ML
4 INJECTION INTRAMUSCULAR; INTRAVENOUS
Status: DISCONTINUED | OUTPATIENT
Start: 2021-01-01 | End: 2021-01-01

## 2021-01-01 RX ORDER — ENOXAPARIN SODIUM 100 MG/ML
40 INJECTION SUBCUTANEOUS DAILY
Status: DISCONTINUED | OUTPATIENT
Start: 2021-01-01 | End: 2021-01-01

## 2021-01-01 RX ORDER — HEPARIN 100 UNIT/ML
300-500 SYRINGE INTRAVENOUS AS NEEDED
Status: CANCELLED
Start: 2021-01-01

## 2021-01-01 RX ORDER — OXYCODONE HYDROCHLORIDE 5 MG/1
10 TABLET ORAL
Status: DISCONTINUED | OUTPATIENT
Start: 2021-01-01 | End: 2021-01-01 | Stop reason: HOSPADM

## 2021-01-01 RX ORDER — METOCLOPRAMIDE HYDROCHLORIDE 5 MG/ML
10 INJECTION INTRAMUSCULAR; INTRAVENOUS ONCE
Status: ACTIVE | OUTPATIENT
Start: 2021-01-01 | End: 2021-01-01

## 2021-01-01 RX ORDER — LANOLIN ALCOHOL/MO/W.PET/CERES
400 CREAM (GRAM) TOPICAL
Status: DISCONTINUED | OUTPATIENT
Start: 2021-01-01 | End: 2021-01-01 | Stop reason: HOSPADM

## 2021-01-01 RX ORDER — SENNOSIDES 8.6 MG/1
2 TABLET ORAL
Status: DISCONTINUED | OUTPATIENT
Start: 2021-01-01 | End: 2021-01-01 | Stop reason: HOSPADM

## 2021-01-01 RX ORDER — HYDROMORPHONE HYDROCHLORIDE 1 MG/ML
0.5 INJECTION, SOLUTION INTRAMUSCULAR; INTRAVENOUS; SUBCUTANEOUS
Status: DISCONTINUED | OUTPATIENT
Start: 2021-01-01 | End: 2021-01-01 | Stop reason: HOSPADM

## 2021-01-01 RX ORDER — OXYCODONE AND ACETAMINOPHEN 10; 325 MG/1; MG/1
1 TABLET ORAL
Status: DISCONTINUED | OUTPATIENT
Start: 2021-01-01 | End: 2021-01-01 | Stop reason: HOSPADM

## 2021-01-01 RX ORDER — POTASSIUM CHLORIDE 20 MEQ/1
40 TABLET, EXTENDED RELEASE ORAL
Status: DISCONTINUED | OUTPATIENT
Start: 2021-01-01 | End: 2021-01-01 | Stop reason: HOSPADM

## 2021-01-01 RX ORDER — ACETAMINOPHEN 325 MG/1
650 TABLET ORAL AS NEEDED
Status: CANCELLED | OUTPATIENT
Start: 2021-01-01

## 2021-01-01 RX ORDER — FOLIC ACID 1 MG/1
1 TABLET ORAL DAILY
Status: DISCONTINUED | OUTPATIENT
Start: 2021-01-01 | End: 2021-01-01 | Stop reason: HOSPADM

## 2021-01-01 RX ORDER — SODIUM CHLORIDE 0.9 % (FLUSH) 0.9 %
5-40 SYRINGE (ML) INJECTION EVERY 8 HOURS
Status: DISCONTINUED | OUTPATIENT
Start: 2021-01-01 | End: 2021-01-01 | Stop reason: HOSPADM

## 2021-01-01 RX ORDER — SODIUM CHLORIDE 0.9 % (FLUSH) 0.9 %
5-10 SYRINGE (ML) INJECTION AS NEEDED
Status: DISCONTINUED | OUTPATIENT
Start: 2021-01-01 | End: 2021-01-01 | Stop reason: HOSPADM

## 2021-01-01 RX ORDER — MORPHINE SULFATE 4 MG/ML
4 INJECTION INTRAVENOUS
Status: DISCONTINUED | OUTPATIENT
Start: 2021-01-01 | End: 2021-01-01 | Stop reason: HOSPADM

## 2021-01-01 RX ORDER — EPINEPHRINE 1 MG/ML
0.3 INJECTION, SOLUTION, CONCENTRATE INTRAVENOUS AS NEEDED
Status: CANCELLED | OUTPATIENT
Start: 2021-01-01

## 2021-01-01 RX ORDER — CLINDAMYCIN PHOSPHATE 900 MG/50ML
900 INJECTION INTRAVENOUS
Status: COMPLETED | OUTPATIENT
Start: 2021-01-01 | End: 2021-01-01

## 2021-01-01 RX ORDER — MORPHINE SULFATE 15 MG/1
15 TABLET ORAL
Status: CANCELLED | OUTPATIENT
Start: 2021-01-01

## 2021-01-01 RX ORDER — ACETAMINOPHEN 650 MG/1
650 SUPPOSITORY RECTAL
Status: CANCELLED | OUTPATIENT
Start: 2021-01-01

## 2021-01-01 RX ORDER — DIPHENHYDRAMINE HYDROCHLORIDE 50 MG/ML
25 INJECTION, SOLUTION INTRAMUSCULAR; INTRAVENOUS
Status: DISCONTINUED | OUTPATIENT
Start: 2021-01-01 | End: 2021-01-01 | Stop reason: HOSPADM

## 2021-01-01 RX ORDER — CLINDAMYCIN HYDROCHLORIDE 300 MG/1
300 CAPSULE ORAL EVERY 6 HOURS
Qty: 16 CAPSULE | Refills: 0 | Status: SHIPPED | OUTPATIENT
Start: 2021-01-01 | End: 2021-01-01

## 2021-01-01 RX ORDER — AZITHROMYCIN 250 MG/1
500 TABLET, FILM COATED ORAL DAILY
Status: DISCONTINUED | OUTPATIENT
Start: 2021-01-01 | End: 2021-01-01 | Stop reason: HOSPADM

## 2021-01-01 RX ORDER — DIPHENHYDRAMINE HYDROCHLORIDE 50 MG/ML
50 INJECTION, SOLUTION INTRAMUSCULAR; INTRAVENOUS AS NEEDED
Status: CANCELLED | OUTPATIENT
Start: 2021-01-01

## 2021-01-01 RX ORDER — DIPHENHYDRAMINE HYDROCHLORIDE 50 MG/ML
50 INJECTION, SOLUTION INTRAMUSCULAR; INTRAVENOUS AS NEEDED
Status: CANCELLED
Start: 2021-01-01

## 2021-01-01 RX ORDER — SODIUM CHLORIDE 0.9 % (FLUSH) 0.9 %
10 SYRINGE (ML) INJECTION
Status: COMPLETED | OUTPATIENT
Start: 2021-01-01 | End: 2021-01-01

## 2021-01-01 RX ORDER — HYDROCORTISONE SODIUM SUCCINATE 100 MG/2ML
100 INJECTION, POWDER, FOR SOLUTION INTRAMUSCULAR; INTRAVENOUS AS NEEDED
Status: CANCELLED | OUTPATIENT
Start: 2021-01-01

## 2021-01-01 RX ORDER — ACETAMINOPHEN 325 MG/1
650 TABLET ORAL
Status: DISCONTINUED | OUTPATIENT
Start: 2021-01-01 | End: 2021-01-01 | Stop reason: HOSPADM

## 2021-01-01 RX ORDER — LIDOCAINE HYDROCHLORIDE 20 MG/ML
INJECTION, SOLUTION EPIDURAL; INFILTRATION; INTRACAUDAL; PERINEURAL AS NEEDED
Status: DISCONTINUED | OUTPATIENT
Start: 2021-01-01 | End: 2021-01-01 | Stop reason: HOSPADM

## 2021-01-01 RX ORDER — PROMETHAZINE HYDROCHLORIDE 25 MG/1
12.5 TABLET ORAL
Status: DISCONTINUED | OUTPATIENT
Start: 2021-01-01 | End: 2021-01-01

## 2021-01-01 RX ORDER — ROCURONIUM BROMIDE 10 MG/ML
INJECTION, SOLUTION INTRAVENOUS AS NEEDED
Status: DISCONTINUED | OUTPATIENT
Start: 2021-01-01 | End: 2021-01-01 | Stop reason: HOSPADM

## 2021-01-01 RX ORDER — SODIUM CHLORIDE, SODIUM LACTATE, POTASSIUM CHLORIDE, CALCIUM CHLORIDE 600; 310; 30; 20 MG/100ML; MG/100ML; MG/100ML; MG/100ML
1000 INJECTION, SOLUTION INTRAVENOUS CONTINUOUS
Status: DISCONTINUED | OUTPATIENT
Start: 2021-01-01 | End: 2021-01-01 | Stop reason: HOSPADM

## 2021-01-01 RX ORDER — MORPHINE SULFATE 2 MG/ML
2 INJECTION, SOLUTION INTRAMUSCULAR; INTRAVENOUS
Status: DISCONTINUED | OUTPATIENT
Start: 2021-01-01 | End: 2021-01-01

## 2021-01-01 RX ORDER — UREA 10 %
2 LOTION (ML) TOPICAL 2 TIMES DAILY
Status: DISCONTINUED | OUTPATIENT
Start: 2021-01-01 | End: 2021-01-01 | Stop reason: HOSPADM

## 2021-01-01 RX ORDER — LEVOTHYROXINE SODIUM 125 UG/1
125 TABLET ORAL
Status: DISCONTINUED | OUTPATIENT
Start: 2021-01-01 | End: 2021-01-01 | Stop reason: HOSPADM

## 2021-01-01 RX ORDER — ENOXAPARIN SODIUM 100 MG/ML
60 INJECTION SUBCUTANEOUS
Status: DISCONTINUED | OUTPATIENT
Start: 2021-01-01 | End: 2021-01-01

## 2021-01-01 RX ORDER — PROPOFOL 10 MG/ML
INJECTION, EMULSION INTRAVENOUS AS NEEDED
Status: DISCONTINUED | OUTPATIENT
Start: 2021-01-01 | End: 2021-01-01 | Stop reason: HOSPADM

## 2021-01-01 RX ORDER — OXYCODONE AND ACETAMINOPHEN 10; 325 MG/1; MG/1
1 TABLET ORAL
Status: DISCONTINUED | OUTPATIENT
Start: 2021-01-01 | End: 2021-01-01

## 2021-01-01 RX ORDER — MELATONIN
1000 DAILY
Status: DISCONTINUED | OUTPATIENT
Start: 2021-01-01 | End: 2021-01-01 | Stop reason: HOSPADM

## 2021-01-01 RX ORDER — SODIUM CHLORIDE, SODIUM LACTATE, POTASSIUM CHLORIDE, CALCIUM CHLORIDE 600; 310; 30; 20 MG/100ML; MG/100ML; MG/100ML; MG/100ML
75 INJECTION, SOLUTION INTRAVENOUS CONTINUOUS
Status: DISCONTINUED | OUTPATIENT
Start: 2021-01-01 | End: 2021-01-01 | Stop reason: HOSPADM

## 2021-01-01 RX ORDER — ZOLPIDEM TARTRATE 5 MG/1
5 TABLET ORAL
Status: DISCONTINUED | OUTPATIENT
Start: 2021-01-01 | End: 2021-01-01 | Stop reason: SDUPTHER

## 2021-01-01 RX ORDER — SODIUM CHLORIDE 9 MG/ML
100 INJECTION, SOLUTION INTRAVENOUS CONTINUOUS
Status: DISPENSED | OUTPATIENT
Start: 2021-01-01 | End: 2021-01-01

## 2021-01-01 RX ORDER — VANCOMYCIN/0.9 % SOD CHLORIDE 1.5G/250ML
1500 PLASTIC BAG, INJECTION (ML) INTRAVENOUS ONCE
Status: COMPLETED | OUTPATIENT
Start: 2021-01-01 | End: 2021-01-01

## 2021-01-01 RX ORDER — PANTOPRAZOLE SODIUM 40 MG/1
40 TABLET, DELAYED RELEASE ORAL
Status: DISCONTINUED | OUTPATIENT
Start: 2021-01-01 | End: 2021-01-01 | Stop reason: HOSPADM

## 2021-01-01 RX ORDER — DIPHENHYDRAMINE HCL 25 MG
25 CAPSULE ORAL
Status: DISCONTINUED | OUTPATIENT
Start: 2021-01-01 | End: 2021-01-01 | Stop reason: HOSPADM

## 2021-01-01 RX ORDER — ONDANSETRON 2 MG/ML
8 INJECTION INTRAMUSCULAR; INTRAVENOUS AS NEEDED
Status: DISCONTINUED | OUTPATIENT
Start: 2021-01-01 | End: 2021-01-01 | Stop reason: HOSPADM

## 2021-01-01 RX ORDER — SODIUM CHLORIDE, SODIUM LACTATE, POTASSIUM CHLORIDE, CALCIUM CHLORIDE 600; 310; 30; 20 MG/100ML; MG/100ML; MG/100ML; MG/100ML
100 INJECTION, SOLUTION INTRAVENOUS CONTINUOUS
Status: DISCONTINUED | OUTPATIENT
Start: 2021-01-01 | End: 2021-01-01

## 2021-01-01 RX ORDER — OXYCODONE AND ACETAMINOPHEN 5; 325 MG/1; MG/1
1 TABLET ORAL AS NEEDED
Status: DISCONTINUED | OUTPATIENT
Start: 2021-01-01 | End: 2021-01-01 | Stop reason: HOSPADM

## 2021-01-01 RX ORDER — NALOXONE HYDROCHLORIDE 0.4 MG/ML
0.2 INJECTION, SOLUTION INTRAMUSCULAR; INTRAVENOUS; SUBCUTANEOUS AS NEEDED
Status: DISCONTINUED | OUTPATIENT
Start: 2021-01-01 | End: 2021-01-01 | Stop reason: HOSPADM

## 2021-01-01 RX ORDER — CEPHALEXIN 500 MG/1
500 CAPSULE ORAL 3 TIMES DAILY
Qty: 18 CAPSULE | Refills: 0 | Status: SHIPPED | OUTPATIENT
Start: 2021-01-01 | End: 2021-01-01

## 2021-01-01 RX ORDER — HYDROMORPHONE HYDROCHLORIDE 1 MG/ML
1 INJECTION, SOLUTION INTRAMUSCULAR; INTRAVENOUS; SUBCUTANEOUS
Status: COMPLETED | OUTPATIENT
Start: 2021-01-01 | End: 2021-01-01

## 2021-01-01 RX ORDER — ZOLPIDEM TARTRATE 5 MG/1
10 TABLET ORAL
Status: DISCONTINUED | OUTPATIENT
Start: 2021-01-01 | End: 2021-01-01 | Stop reason: HOSPADM

## 2021-01-01 RX ORDER — SUCCINYLCHOLINE CHLORIDE 20 MG/ML
INJECTION INTRAMUSCULAR; INTRAVENOUS AS NEEDED
Status: DISCONTINUED | OUTPATIENT
Start: 2021-01-01 | End: 2021-01-01 | Stop reason: HOSPADM

## 2021-01-01 RX ORDER — ACETAMINOPHEN 325 MG/1
650 TABLET ORAL AS NEEDED
Status: CANCELLED
Start: 2021-01-01

## 2021-01-01 RX ORDER — POTASSIUM CHLORIDE 20 MEQ/1
40 TABLET, EXTENDED RELEASE ORAL
Status: COMPLETED | OUTPATIENT
Start: 2021-01-01 | End: 2021-01-01

## 2021-01-01 RX ORDER — ACETAMINOPHEN 500 MG
500 TABLET ORAL
Status: DISCONTINUED | OUTPATIENT
Start: 2021-01-01 | End: 2021-01-01 | Stop reason: HOSPADM

## 2021-01-01 RX ORDER — METRONIDAZOLE 500 MG/1
500 TABLET ORAL EVERY 12 HOURS
Status: DISCONTINUED | OUTPATIENT
Start: 2021-01-01 | End: 2021-01-01 | Stop reason: HOSPADM

## 2021-01-01 RX ORDER — DOXYCYCLINE 100 MG/1
100 TABLET ORAL 2 TIMES DAILY
Qty: 12 TABLET | Refills: 0 | Status: SHIPPED | OUTPATIENT
Start: 2021-01-01 | End: 2021-01-01

## 2021-01-01 RX ADMIN — METRONIDAZOLE 500 MG: 500 TABLET ORAL at 21:31

## 2021-01-01 RX ADMIN — PANTOPRAZOLE SODIUM 40 MG: 40 TABLET, DELAYED RELEASE ORAL at 22:10

## 2021-01-01 RX ADMIN — Medication 10 ML: at 21:32

## 2021-01-01 RX ADMIN — VITAMIN D, TAB 1000IU (100/BT) 1000 UNITS: 25 TAB at 08:53

## 2021-01-01 RX ADMIN — Medication 10 ML: at 06:20

## 2021-01-01 RX ADMIN — Medication 10 ML: at 14:00

## 2021-01-01 RX ADMIN — LEVOTHYROXINE SODIUM 112 MCG: 0.11 TABLET ORAL at 09:04

## 2021-01-01 RX ADMIN — IOPAMIDOL 100 ML: 755 INJECTION, SOLUTION INTRAVENOUS at 17:55

## 2021-01-01 RX ADMIN — SODIUM CHLORIDE 40 MG: 9 INJECTION, SOLUTION INTRAMUSCULAR; INTRAVENOUS; SUBCUTANEOUS at 09:04

## 2021-01-01 RX ADMIN — SODIUM CHLORIDE 40 MG: 9 INJECTION, SOLUTION INTRAMUSCULAR; INTRAVENOUS; SUBCUTANEOUS at 08:19

## 2021-01-01 RX ADMIN — OXYCODONE 10 MG: 5 TABLET ORAL at 01:40

## 2021-01-01 RX ADMIN — LEVOTHYROXINE SODIUM 112 MCG: 0.11 TABLET ORAL at 06:04

## 2021-01-01 RX ADMIN — AZITHROMYCIN MONOHYDRATE 500 MG: 250 TABLET ORAL at 08:53

## 2021-01-01 RX ADMIN — Medication 10 ML: at 15:06

## 2021-01-01 RX ADMIN — LACTOBACILLUS TAB 2 TABLET: TAB at 08:16

## 2021-01-01 RX ADMIN — OXYCODONE 10 MG: 5 TABLET ORAL at 01:49

## 2021-01-01 RX ADMIN — PIPERACILLIN AND TAZOBACTAM 3.38 G: 3; .375 INJECTION, POWDER, LYOPHILIZED, FOR SOLUTION INTRAVENOUS at 00:22

## 2021-01-01 RX ADMIN — SODIUM CHLORIDE 40 MG: 9 INJECTION, SOLUTION INTRAMUSCULAR; INTRAVENOUS; SUBCUTANEOUS at 21:18

## 2021-01-01 RX ADMIN — Medication 10 ML: at 03:15

## 2021-01-01 RX ADMIN — CLINDAMYCIN HYDROCHLORIDE 300 MG: 150 CAPSULE ORAL at 03:57

## 2021-01-01 RX ADMIN — CIPROFLOXACIN 400 MG: 2 INJECTION, SOLUTION INTRAVENOUS at 21:14

## 2021-01-01 RX ADMIN — ROCURONIUM BROMIDE 5 MG: 10 INJECTION, SOLUTION INTRAVENOUS at 16:57

## 2021-01-01 RX ADMIN — RDII 250 MG CAPSULE 500 MG: at 13:01

## 2021-01-01 RX ADMIN — SODIUM CHLORIDE, SODIUM LACTATE, POTASSIUM CHLORIDE, AND CALCIUM CHLORIDE 100 ML/HR: 600; 310; 30; 20 INJECTION, SOLUTION INTRAVENOUS at 05:42

## 2021-01-01 RX ADMIN — VANCOMYCIN HYDROCHLORIDE 1500 MG: 10 INJECTION, POWDER, LYOPHILIZED, FOR SOLUTION INTRAVENOUS at 18:23

## 2021-01-01 RX ADMIN — SODIUM CHLORIDE 500 ML: 900 INJECTION, SOLUTION INTRAVENOUS at 12:15

## 2021-01-01 RX ADMIN — OXYCODONE 10 MG: 5 TABLET ORAL at 08:17

## 2021-01-01 RX ADMIN — OXYCODONE 10 MG: 5 TABLET ORAL at 04:20

## 2021-01-01 RX ADMIN — OXYCODONE AND ACETAMINOPHEN 1 TABLET: 10; 325 TABLET ORAL at 00:52

## 2021-01-01 RX ADMIN — SODIUM CHLORIDE 10 ML: 9 INJECTION INTRAMUSCULAR; INTRAVENOUS; SUBCUTANEOUS at 07:50

## 2021-01-01 RX ADMIN — SODIUM CHLORIDE 500 ML: 900 INJECTION, SOLUTION INTRAVENOUS at 14:15

## 2021-01-01 RX ADMIN — Medication 10 ML: at 05:42

## 2021-01-01 RX ADMIN — ONDANSETRON 4 MG: 2 INJECTION INTRAMUSCULAR; INTRAVENOUS at 03:02

## 2021-01-01 RX ADMIN — ONDANSETRON 4 MG: 2 INJECTION INTRAMUSCULAR; INTRAVENOUS at 17:33

## 2021-01-01 RX ADMIN — OXYCODONE 10 MG: 5 TABLET ORAL at 03:20

## 2021-01-01 RX ADMIN — DIATRIZOATE MEGLUMINE AND DIATRIZOATE SODIUM 15 ML: 660; 100 LIQUID ORAL; RECTAL at 15:47

## 2021-01-01 RX ADMIN — Medication 10 ML: at 17:27

## 2021-01-01 RX ADMIN — VANCOMYCIN HYDROCHLORIDE 1000 MG: 1 INJECTION, POWDER, LYOPHILIZED, FOR SOLUTION INTRAVENOUS at 05:32

## 2021-01-01 RX ADMIN — MORPHINE SULFATE 4 MG: 4 INJECTION INTRAVENOUS at 06:38

## 2021-01-01 RX ADMIN — PANTOPRAZOLE SODIUM 40 MG: 40 TABLET, DELAYED RELEASE ORAL at 09:25

## 2021-01-01 RX ADMIN — Medication 10 ML: at 13:02

## 2021-01-01 RX ADMIN — FOLIC ACID 1 MG: 1 TABLET ORAL at 08:35

## 2021-01-01 RX ADMIN — CIPROFLOXACIN 400 MG: 2 INJECTION, SOLUTION INTRAVENOUS at 08:19

## 2021-01-01 RX ADMIN — Medication 10 ML: at 23:06

## 2021-01-01 RX ADMIN — AZITHROMYCIN MONOHYDRATE 500 MG: 250 TABLET ORAL at 09:28

## 2021-01-01 RX ADMIN — OXYCODONE HYDROCHLORIDE AND ACETAMINOPHEN 1 TABLET: 10; 325 TABLET ORAL at 23:13

## 2021-01-01 RX ADMIN — CEFEPIME HYDROCHLORIDE 2 G: 2 INJECTION, POWDER, FOR SOLUTION INTRAVENOUS at 15:54

## 2021-01-01 RX ADMIN — ONDANSETRON 4 MG: 2 INJECTION INTRAMUSCULAR; INTRAVENOUS at 16:05

## 2021-01-01 RX ADMIN — OCTREOTIDE ACETATE 50 MCG/HR: 500 INJECTION, SOLUTION INTRAVENOUS; SUBCUTANEOUS at 20:22

## 2021-01-01 RX ADMIN — ACETAMINOPHEN 650 MG: 325 TABLET ORAL at 01:51

## 2021-01-01 RX ADMIN — ENOXAPARIN SODIUM 60 MG: 60 INJECTION SUBCUTANEOUS at 15:05

## 2021-01-01 RX ADMIN — FERUMOXYTOL 510 MG: 510 INJECTION INTRAVENOUS at 14:20

## 2021-01-01 RX ADMIN — Medication 10 ML: at 05:35

## 2021-01-01 RX ADMIN — OXYCODONE 10 MG: 5 TABLET ORAL at 12:33

## 2021-01-01 RX ADMIN — VANCOMYCIN HYDROCHLORIDE 1000 MG: 1 INJECTION, POWDER, LYOPHILIZED, FOR SOLUTION INTRAVENOUS at 05:41

## 2021-01-01 RX ADMIN — SUCCINYLCHOLINE CHLORIDE 140 MG: 20 INJECTION, SOLUTION INTRAMUSCULAR; INTRAVENOUS at 16:59

## 2021-01-01 RX ADMIN — SODIUM CHLORIDE 40 MG: 9 INJECTION, SOLUTION INTRAMUSCULAR; INTRAVENOUS; SUBCUTANEOUS at 20:29

## 2021-01-01 RX ADMIN — Medication 10 ML: at 15:51

## 2021-01-01 RX ADMIN — ENOXAPARIN SODIUM 60 MG: 60 INJECTION SUBCUTANEOUS at 05:32

## 2021-01-01 RX ADMIN — OXYCODONE 10 MG: 5 TABLET ORAL at 06:17

## 2021-01-01 RX ADMIN — Medication 10 ML: at 16:04

## 2021-01-01 RX ADMIN — CIPROFLOXACIN 400 MG: 2 INJECTION, SOLUTION INTRAVENOUS at 20:00

## 2021-01-01 RX ADMIN — Medication 5 ML: at 17:34

## 2021-01-01 RX ADMIN — ACETAMINOPHEN 650 MG: 325 TABLET ORAL at 21:38

## 2021-01-01 RX ADMIN — RDII 250 MG CAPSULE 500 MG: at 08:17

## 2021-01-01 RX ADMIN — SODIUM CHLORIDE 500 ML: 900 INJECTION, SOLUTION INTRAVENOUS at 15:20

## 2021-01-01 RX ADMIN — SODIUM CHLORIDE 100 ML/HR: 900 INJECTION, SOLUTION INTRAVENOUS at 23:05

## 2021-01-01 RX ADMIN — LACTOBACILLUS TAB 2 TABLET: TAB at 17:00

## 2021-01-01 RX ADMIN — Medication 10 ML: at 05:34

## 2021-01-01 RX ADMIN — Medication 10 ML: at 05:25

## 2021-01-01 RX ADMIN — Medication 10 ML: at 14:43

## 2021-01-01 RX ADMIN — SODIUM CHLORIDE 40 MG: 9 INJECTION, SOLUTION INTRAMUSCULAR; INTRAVENOUS; SUBCUTANEOUS at 20:00

## 2021-01-01 RX ADMIN — OXYCODONE 10 MG: 5 TABLET ORAL at 07:09

## 2021-01-01 RX ADMIN — Medication 10 ML: at 17:28

## 2021-01-01 RX ADMIN — PROMETHAZINE HYDROCHLORIDE 6.25 MG: 25 INJECTION INTRAMUSCULAR; INTRAVENOUS at 18:19

## 2021-01-01 RX ADMIN — PIPERACILLIN SODIUM AND TAZOBACTAM SODIUM 4.5 G: 4; .5 INJECTION, POWDER, LYOPHILIZED, FOR SOLUTION INTRAVENOUS at 17:49

## 2021-01-01 RX ADMIN — PIPERACILLIN SODIUM AND TAZOBACTAM SODIUM 3.38 G: 3; .375 INJECTION, POWDER, LYOPHILIZED, FOR SOLUTION INTRAVENOUS at 04:08

## 2021-01-01 RX ADMIN — OXYCODONE 10 MG: 5 TABLET ORAL at 09:55

## 2021-01-01 RX ADMIN — POTASSIUM CHLORIDE 40 MEQ: 20 TABLET, EXTENDED RELEASE ORAL at 14:00

## 2021-01-01 RX ADMIN — Medication 10 ML: at 06:00

## 2021-01-01 RX ADMIN — Medication 10 ML: at 07:10

## 2021-01-01 RX ADMIN — FERUMOXYTOL 510 MG: 510 INJECTION INTRAVENOUS at 08:22

## 2021-01-01 RX ADMIN — Medication 10 ML: at 15:48

## 2021-01-01 RX ADMIN — PIPERACILLIN AND TAZOBACTAM 3.38 G: 3; .375 INJECTION, POWDER, LYOPHILIZED, FOR SOLUTION INTRAVENOUS at 06:35

## 2021-01-01 RX ADMIN — PIPERACILLIN SODIUM AND TAZOBACTAM SODIUM 3.38 G: 3; .375 INJECTION, POWDER, LYOPHILIZED, FOR SOLUTION INTRAVENOUS at 20:59

## 2021-01-01 RX ADMIN — Medication 10 ML: at 05:27

## 2021-01-01 RX ADMIN — OXYCODONE AND ACETAMINOPHEN 1 TABLET: 10; 325 TABLET ORAL at 21:06

## 2021-01-01 RX ADMIN — PANTOPRAZOLE SODIUM 40 MG: 40 TABLET, DELAYED RELEASE ORAL at 21:43

## 2021-01-01 RX ADMIN — OXYCODONE AND ACETAMINOPHEN 1 TABLET: 10; 325 TABLET ORAL at 20:20

## 2021-01-01 RX ADMIN — SODIUM CHLORIDE 500 ML: 900 INJECTION, SOLUTION INTRAVENOUS at 13:45

## 2021-01-01 RX ADMIN — Medication 400 MG: at 21:43

## 2021-01-01 RX ADMIN — OXYCODONE 10 MG: 5 TABLET ORAL at 21:36

## 2021-01-01 RX ADMIN — LEVOTHYROXINE SODIUM 125 MCG: 0.12 TABLET ORAL at 06:20

## 2021-01-01 RX ADMIN — AZITHROMYCIN MONOHYDRATE 500 MG: 250 TABLET ORAL at 08:16

## 2021-01-01 RX ADMIN — ACETAMINOPHEN 650 MG: 325 TABLET ORAL at 00:44

## 2021-01-01 RX ADMIN — CEFEPIME HYDROCHLORIDE 2 G: 2 INJECTION, POWDER, FOR SOLUTION INTRAVENOUS at 15:47

## 2021-01-01 RX ADMIN — OXYCODONE 10 MG: 5 TABLET ORAL at 00:44

## 2021-01-01 RX ADMIN — OXYCODONE 10 MG: 5 TABLET ORAL at 19:52

## 2021-01-01 RX ADMIN — ONDANSETRON 4 MG: 2 INJECTION INTRAMUSCULAR; INTRAVENOUS at 11:10

## 2021-01-01 RX ADMIN — PANTOPRAZOLE SODIUM 40 MG: 40 TABLET, DELAYED RELEASE ORAL at 21:51

## 2021-01-01 RX ADMIN — POTASSIUM CHLORIDE 20 MEQ: 14.9 INJECTION, SOLUTION INTRAVENOUS at 17:14

## 2021-01-01 RX ADMIN — CIPROFLOXACIN 400 MG: 2 INJECTION, SOLUTION INTRAVENOUS at 09:55

## 2021-01-01 RX ADMIN — OXYCODONE 10 MG: 5 TABLET ORAL at 08:20

## 2021-01-01 RX ADMIN — SODIUM CHLORIDE 40 MG: 9 INJECTION, SOLUTION INTRAMUSCULAR; INTRAVENOUS; SUBCUTANEOUS at 09:56

## 2021-01-01 RX ADMIN — SODIUM CHLORIDE, SODIUM LACTATE, POTASSIUM CHLORIDE, AND CALCIUM CHLORIDE 75 ML/HR: 600; 310; 30; 20 INJECTION, SOLUTION INTRAVENOUS at 20:19

## 2021-01-01 RX ADMIN — ENOXAPARIN SODIUM 60 MG: 60 INJECTION SUBCUTANEOUS at 05:04

## 2021-01-01 RX ADMIN — FOLIC ACID 1 MG: 1 TABLET ORAL at 09:03

## 2021-01-01 RX ADMIN — FOLIC ACID 1 MG: 1 TABLET ORAL at 09:25

## 2021-01-01 RX ADMIN — Medication 10 ML: at 16:05

## 2021-01-01 RX ADMIN — FERUMOXYTOL 510 MG: 510 INJECTION INTRAVENOUS at 15:05

## 2021-01-01 RX ADMIN — SODIUM CHLORIDE 1000 ML: 900 INJECTION, SOLUTION INTRAVENOUS at 11:09

## 2021-01-01 RX ADMIN — PROPOFOL 160 MG: 10 INJECTION, EMULSION INTRAVENOUS at 16:58

## 2021-01-01 RX ADMIN — LEVOTHYROXINE SODIUM 112 MCG: 0.11 TABLET ORAL at 05:34

## 2021-01-01 RX ADMIN — VANCOMYCIN HYDROCHLORIDE 1000 MG: 1 INJECTION, POWDER, LYOPHILIZED, FOR SOLUTION INTRAVENOUS at 04:54

## 2021-01-01 RX ADMIN — CLINDAMYCIN HYDROCHLORIDE 300 MG: 150 CAPSULE ORAL at 08:16

## 2021-01-01 RX ADMIN — OXYCODONE 10 MG: 5 TABLET ORAL at 17:27

## 2021-01-01 RX ADMIN — CEFEPIME HYDROCHLORIDE 2 G: 2 INJECTION, POWDER, FOR SOLUTION INTRAVENOUS at 16:00

## 2021-01-01 RX ADMIN — SODIUM CHLORIDE, SODIUM LACTATE, POTASSIUM CHLORIDE, AND CALCIUM CHLORIDE 100 ML/HR: 600; 310; 30; 20 INJECTION, SOLUTION INTRAVENOUS at 06:43

## 2021-01-01 RX ADMIN — POTASSIUM CHLORIDE 20 MEQ: 14.9 INJECTION, SOLUTION INTRAVENOUS at 15:47

## 2021-01-01 RX ADMIN — OXYCODONE 10 MG: 5 TABLET ORAL at 12:02

## 2021-01-01 RX ADMIN — Medication 10 ML: at 17:02

## 2021-01-01 RX ADMIN — METRONIDAZOLE 500 MG: 500 TABLET ORAL at 08:35

## 2021-01-01 RX ADMIN — ONDANSETRON 4 MG: 2 INJECTION INTRAMUSCULAR; INTRAVENOUS at 06:38

## 2021-01-01 RX ADMIN — VANCOMYCIN HYDROCHLORIDE 1500 MG: 10 INJECTION, POWDER, LYOPHILIZED, FOR SOLUTION INTRAVENOUS at 17:18

## 2021-01-01 RX ADMIN — SODIUM CHLORIDE, SODIUM LACTATE, POTASSIUM CHLORIDE, AND CALCIUM CHLORIDE 1000 ML: 600; 310; 30; 20 INJECTION, SOLUTION INTRAVENOUS at 05:27

## 2021-01-01 RX ADMIN — OXYCODONE 10 MG: 5 TABLET ORAL at 17:20

## 2021-01-01 RX ADMIN — SODIUM CHLORIDE, SODIUM LACTATE, POTASSIUM CHLORIDE, AND CALCIUM CHLORIDE 1000 ML: 600; 310; 30; 20 INJECTION, SOLUTION INTRAVENOUS at 15:11

## 2021-01-01 RX ADMIN — Medication 10 ML: at 21:03

## 2021-01-01 RX ADMIN — OXYCODONE 10 MG: 5 TABLET ORAL at 17:14

## 2021-01-01 RX ADMIN — SODIUM CHLORIDE 80 MG: 9 INJECTION, SOLUTION INTRAMUSCULAR; INTRAVENOUS; SUBCUTANEOUS at 11:10

## 2021-01-01 RX ADMIN — VANCOMYCIN HYDROCHLORIDE 1250 MG: 10 INJECTION, POWDER, LYOPHILIZED, FOR SOLUTION INTRAVENOUS at 21:04

## 2021-01-01 RX ADMIN — SODIUM CHLORIDE, SODIUM LACTATE, POTASSIUM CHLORIDE, AND CALCIUM CHLORIDE 75 ML/HR: 600; 310; 30; 20 INJECTION, SOLUTION INTRAVENOUS at 16:07

## 2021-01-01 RX ADMIN — METRONIDAZOLE 500 MG: 500 TABLET ORAL at 20:27

## 2021-01-01 RX ADMIN — OXYCODONE AND ACETAMINOPHEN 1 TABLET: 10; 325 TABLET ORAL at 14:06

## 2021-01-01 RX ADMIN — METRONIDAZOLE 500 MG: 500 TABLET ORAL at 09:03

## 2021-01-01 RX ADMIN — MORPHINE SULFATE 4 MG: 4 INJECTION INTRAVENOUS at 03:02

## 2021-01-01 RX ADMIN — HYDROMORPHONE HYDROCHLORIDE 1 MG: 1 INJECTION, SOLUTION INTRAMUSCULAR; INTRAVENOUS; SUBCUTANEOUS at 17:33

## 2021-01-01 RX ADMIN — Medication 10 ML: at 21:15

## 2021-01-01 RX ADMIN — SODIUM CHLORIDE, SODIUM LACTATE, POTASSIUM CHLORIDE, AND CALCIUM CHLORIDE 1000 ML: 600; 310; 30; 20 INJECTION, SOLUTION INTRAVENOUS at 15:09

## 2021-01-01 RX ADMIN — Medication 10 ML: at 03:16

## 2021-01-01 RX ADMIN — PIPERACILLIN AND TAZOBACTAM 3.38 G: 3; .375 INJECTION, POWDER, LYOPHILIZED, FOR SOLUTION INTRAVENOUS at 14:57

## 2021-01-01 RX ADMIN — SODIUM CHLORIDE 40 MG: 9 INJECTION, SOLUTION INTRAMUSCULAR; INTRAVENOUS; SUBCUTANEOUS at 09:33

## 2021-01-01 RX ADMIN — SODIUM CHLORIDE 40 MG: 9 INJECTION, SOLUTION INTRAMUSCULAR; INTRAVENOUS; SUBCUTANEOUS at 21:37

## 2021-01-01 RX ADMIN — OXYCODONE 10 MG: 5 TABLET ORAL at 16:05

## 2021-01-01 RX ADMIN — OXYCODONE 10 MG: 5 TABLET ORAL at 02:12

## 2021-01-01 RX ADMIN — OXYCODONE AND ACETAMINOPHEN 1 TABLET: 10; 325 TABLET ORAL at 06:26

## 2021-01-01 RX ADMIN — SODIUM CHLORIDE, SODIUM LACTATE, POTASSIUM CHLORIDE, AND CALCIUM CHLORIDE 1743 ML: 600; 310; 30; 20 INJECTION, SOLUTION INTRAVENOUS at 17:34

## 2021-01-01 RX ADMIN — SODIUM CHLORIDE 40 MG: 9 INJECTION, SOLUTION INTRAMUSCULAR; INTRAVENOUS; SUBCUTANEOUS at 20:22

## 2021-01-01 RX ADMIN — OXYCODONE AND ACETAMINOPHEN 1 TABLET: 10; 325 TABLET ORAL at 08:52

## 2021-01-01 RX ADMIN — ACETAMINOPHEN 650 MG: 325 TABLET ORAL at 16:18

## 2021-01-01 RX ADMIN — ENOXAPARIN SODIUM 60 MG: 60 INJECTION SUBCUTANEOUS at 18:31

## 2021-01-01 RX ADMIN — ENOXAPARIN SODIUM 60 MG: 60 INJECTION SUBCUTANEOUS at 21:31

## 2021-01-01 RX ADMIN — Medication 10 ML: at 21:43

## 2021-01-01 RX ADMIN — Medication 10 ML: at 21:57

## 2021-01-01 RX ADMIN — CIPROFLOXACIN 400 MG: 2 INJECTION, SOLUTION INTRAVENOUS at 20:26

## 2021-01-01 RX ADMIN — VITAMIN D, TAB 1000IU (100/BT) 1000 UNITS: 25 TAB at 08:16

## 2021-01-01 RX ADMIN — Medication 400 MG: at 22:10

## 2021-01-01 RX ADMIN — SODIUM CHLORIDE 40 MG: 9 INJECTION, SOLUTION INTRAMUSCULAR; INTRAVENOUS; SUBCUTANEOUS at 20:17

## 2021-01-01 RX ADMIN — FERUMOXYTOL 510 MG: 510 INJECTION INTRAVENOUS at 14:25

## 2021-01-01 RX ADMIN — ONDANSETRON 4 MG: 2 INJECTION INTRAMUSCULAR; INTRAVENOUS at 08:19

## 2021-01-01 RX ADMIN — Medication 10 ML: at 04:21

## 2021-01-01 RX ADMIN — Medication 10 ML: at 17:55

## 2021-01-01 RX ADMIN — METRONIDAZOLE 500 MG: 500 TABLET ORAL at 20:17

## 2021-01-01 RX ADMIN — VANCOMYCIN HYDROCHLORIDE 1000 MG: 1 INJECTION, POWDER, LYOPHILIZED, FOR SOLUTION INTRAVENOUS at 05:34

## 2021-01-01 RX ADMIN — SODIUM CHLORIDE, SODIUM LACTATE, POTASSIUM CHLORIDE, AND CALCIUM CHLORIDE 100 ML/HR: 600; 310; 30; 20 INJECTION, SOLUTION INTRAVENOUS at 12:40

## 2021-01-01 RX ADMIN — OXYCODONE 10 MG: 5 TABLET ORAL at 17:26

## 2021-01-01 RX ADMIN — LEVOTHYROXINE SODIUM 112 MCG: 0.11 TABLET ORAL at 05:24

## 2021-01-01 RX ADMIN — LEVOTHYROXINE SODIUM 112 MCG: 0.11 TABLET ORAL at 05:42

## 2021-01-01 RX ADMIN — CEFEPIME HYDROCHLORIDE 2 G: 2 INJECTION, POWDER, FOR SOLUTION INTRAVENOUS at 03:21

## 2021-01-01 RX ADMIN — OXYCODONE 10 MG: 5 TABLET ORAL at 10:17

## 2021-01-01 RX ADMIN — OCTREOTIDE ACETATE 50 MCG/HR: 500 INJECTION, SOLUTION INTRAVENOUS; SUBCUTANEOUS at 12:59

## 2021-01-01 RX ADMIN — PIPERACILLIN AND TAZOBACTAM 3.38 G: 3; .375 INJECTION, POWDER, LYOPHILIZED, FOR SOLUTION INTRAVENOUS at 15:00

## 2021-01-01 RX ADMIN — FERUMOXYTOL 510 MG: 510 INJECTION INTRAVENOUS at 14:15

## 2021-01-01 RX ADMIN — CEFEPIME HYDROCHLORIDE 2 G: 2 INJECTION, POWDER, FOR SOLUTION INTRAVENOUS at 06:23

## 2021-01-01 RX ADMIN — OCTREOTIDE ACETATE 50 MCG/HR: 500 INJECTION, SOLUTION INTRAVENOUS; SUBCUTANEOUS at 22:50

## 2021-01-01 RX ADMIN — SODIUM CHLORIDE, SODIUM LACTATE, POTASSIUM CHLORIDE, AND CALCIUM CHLORIDE 100 ML/HR: 600; 310; 30; 20 INJECTION, SOLUTION INTRAVENOUS at 14:56

## 2021-01-01 RX ADMIN — ACETAMINOPHEN 1000 MG: 500 TABLET ORAL at 17:49

## 2021-01-01 RX ADMIN — Medication 10 ML: at 05:33

## 2021-01-01 RX ADMIN — Medication 10 ML: at 15:08

## 2021-01-01 RX ADMIN — CLINDAMYCIN HYDROCHLORIDE 300 MG: 150 CAPSULE ORAL at 17:00

## 2021-01-01 RX ADMIN — OXYCODONE 10 MG: 5 TABLET ORAL at 15:56

## 2021-01-01 RX ADMIN — OXYCODONE 10 MG: 5 TABLET ORAL at 13:45

## 2021-01-01 RX ADMIN — LEVOTHYROXINE SODIUM 112 MCG: 0.11 TABLET ORAL at 04:20

## 2021-01-01 RX ADMIN — ONDANSETRON 4 MG: 2 INJECTION INTRAMUSCULAR; INTRAVENOUS at 19:13

## 2021-01-01 RX ADMIN — FERUMOXYTOL 510 MG: 510 INJECTION INTRAVENOUS at 12:39

## 2021-01-01 RX ADMIN — VANCOMYCIN HYDROCHLORIDE 1000 MG: 1 INJECTION, POWDER, LYOPHILIZED, FOR SOLUTION INTRAVENOUS at 17:20

## 2021-01-01 RX ADMIN — PIPERACILLIN AND TAZOBACTAM 3.38 G: 3; .375 INJECTION, POWDER, LYOPHILIZED, FOR SOLUTION INTRAVENOUS at 22:10

## 2021-01-01 RX ADMIN — VITAMIN D, TAB 1000IU (100/BT) 1000 UNITS: 25 TAB at 09:27

## 2021-01-01 RX ADMIN — SODIUM CHLORIDE 500 ML: 900 INJECTION, SOLUTION INTRAVENOUS at 08:40

## 2021-01-01 RX ADMIN — Medication 10 ML: at 21:53

## 2021-01-01 RX ADMIN — ENOXAPARIN SODIUM 60 MG: 60 INJECTION SUBCUTANEOUS at 23:05

## 2021-01-01 RX ADMIN — Medication 10 ML: at 21:31

## 2021-01-01 RX ADMIN — VANCOMYCIN HYDROCHLORIDE 1000 MG: 1 INJECTION, POWDER, LYOPHILIZED, FOR SOLUTION INTRAVENOUS at 17:27

## 2021-01-01 RX ADMIN — Medication 10 ML: at 21:21

## 2021-01-01 RX ADMIN — SODIUM CHLORIDE, SODIUM LACTATE, POTASSIUM CHLORIDE, AND CALCIUM CHLORIDE 75 ML/HR: 600; 310; 30; 20 INJECTION, SOLUTION INTRAVENOUS at 09:15

## 2021-01-01 RX ADMIN — RDII 250 MG CAPSULE 500 MG: at 17:11

## 2021-01-01 RX ADMIN — SODIUM CHLORIDE 500 ML: 9 INJECTION, SOLUTION INTRAVENOUS at 14:00

## 2021-01-01 RX ADMIN — Medication 400 MG: at 21:51

## 2021-01-01 RX ADMIN — METRONIDAZOLE 500 MG: 500 TABLET ORAL at 09:25

## 2021-01-01 RX ADMIN — SODIUM CHLORIDE 100 ML: 900 INJECTION, SOLUTION INTRAVENOUS at 17:55

## 2021-01-01 RX ADMIN — HYDROMORPHONE HYDROCHLORIDE 0.5 MG: 1 INJECTION, SOLUTION INTRAMUSCULAR; INTRAVENOUS; SUBCUTANEOUS at 18:25

## 2021-01-01 RX ADMIN — PIPERACILLIN AND TAZOBACTAM 3.38 G: 3; .375 INJECTION, POWDER, LYOPHILIZED, FOR SOLUTION INTRAVENOUS at 07:24

## 2021-01-01 RX ADMIN — ACETAMINOPHEN 650 MG: 325 TABLET ORAL at 02:13

## 2021-01-01 RX ADMIN — CLINDAMYCIN HYDROCHLORIDE 300 MG: 150 CAPSULE ORAL at 21:50

## 2021-01-01 RX ADMIN — FAMOTIDINE 20 MG: 10 INJECTION, SOLUTION INTRAVENOUS at 14:36

## 2021-01-01 RX ADMIN — Medication 10 ML: at 22:11

## 2021-01-01 RX ADMIN — OXYCODONE AND ACETAMINOPHEN 1 TABLET: 10; 325 TABLET ORAL at 12:43

## 2021-01-01 RX ADMIN — AZITHROMYCIN MONOHYDRATE 500 MG: 250 TABLET ORAL at 15:00

## 2021-01-01 RX ADMIN — Medication 10 ML: at 12:15

## 2021-01-01 RX ADMIN — LEVOTHYROXINE SODIUM 112 MCG: 0.11 TABLET ORAL at 05:33

## 2021-01-01 RX ADMIN — SODIUM CHLORIDE 40 MG: 9 INJECTION, SOLUTION INTRAMUSCULAR; INTRAVENOUS; SUBCUTANEOUS at 10:06

## 2021-01-01 RX ADMIN — CLINDAMYCIN PHOSPHATE 900 MG: 900 INJECTION, SOLUTION INTRAVENOUS at 06:37

## 2021-01-01 RX ADMIN — SODIUM CHLORIDE, SODIUM LACTATE, POTASSIUM CHLORIDE, AND CALCIUM CHLORIDE 75 ML/HR: 600; 310; 30; 20 INJECTION, SOLUTION INTRAVENOUS at 21:38

## 2021-01-01 RX ADMIN — LEVOTHYROXINE SODIUM 112 MCG: 0.11 TABLET ORAL at 06:25

## 2021-01-01 RX ADMIN — LIDOCAINE HYDROCHLORIDE 100 MG: 20 INJECTION, SOLUTION EPIDURAL; INFILTRATION; INTRACAUDAL; PERINEURAL at 16:57

## 2021-01-01 RX ADMIN — Medication 10 ML: at 13:45

## 2021-01-18 ENCOUNTER — HOSPITAL ENCOUNTER (OUTPATIENT)
Dept: LAB | Age: 61
Discharge: HOME | End: 2021-01-18
Payer: MEDICARE

## 2021-01-18 DIAGNOSIS — C92.10 CML (CHRONIC MYELOCYTIC LEUKEMIA) (HCC): ICD-10-CM

## 2021-01-18 DIAGNOSIS — D50.9 IRON DEFICIENCY ANEMIA, UNSPECIFIED IRON DEFICIENCY ANEMIA TYPE: ICD-10-CM

## 2021-01-18 LAB
ALBUMIN SERPL-MCNC: 3.7 G/DL (ref 3.2–4.6)
ALBUMIN/GLOB SERPL: 1.4 {RATIO} (ref 1.2–3.5)
ALP SERPL-CCNC: 94 U/L (ref 50–136)
ALT SERPL-CCNC: 49 U/L (ref 12–65)
ANION GAP SERPL CALC-SCNC: 7 MMOL/L (ref 7–16)
AST SERPL-CCNC: 31 U/L (ref 15–37)
BASOPHILS # BLD: 0.1 K/UL (ref 0–0.2)
BASOPHILS NFR BLD: 1 % (ref 0–2)
BILIRUB SERPL-MCNC: 0.4 MG/DL (ref 0.2–1.1)
BUN SERPL-MCNC: 15 MG/DL (ref 8–23)
CALCIUM SERPL-MCNC: 8.9 MG/DL (ref 8.3–10.4)
CHLORIDE SERPL-SCNC: 107 MMOL/L (ref 98–107)
CO2 SERPL-SCNC: 25 MMOL/L (ref 21–32)
CREAT SERPL-MCNC: 0.7 MG/DL (ref 0.6–1)
DIFFERENTIAL METHOD BLD: ABNORMAL
EOSINOPHIL # BLD: 0.3 K/UL (ref 0–0.8)
EOSINOPHIL NFR BLD: 3 % (ref 0.5–7.8)
ERYTHROCYTE [DISTWIDTH] IN BLOOD BY AUTOMATED COUNT: 17.9 % (ref 11.9–14.6)
FERRITIN SERPL-MCNC: 102 NG/ML (ref 8–388)
GLOBULIN SER CALC-MCNC: 2.7 G/DL (ref 2.3–3.5)
GLUCOSE SERPL-MCNC: 84 MG/DL (ref 65–100)
HCT VFR BLD AUTO: 35 % (ref 35.8–46.3)
HGB BLD-MCNC: 11.3 G/DL (ref 11.7–15.4)
HGB RETIC QN AUTO: 39 PG (ref 29–35)
IMM GRANULOCYTES # BLD AUTO: 0.1 K/UL (ref 0–0.5)
IMM GRANULOCYTES NFR BLD AUTO: 1 % (ref 0–5)
IMM RETICS NFR: 15.4 % (ref 3–15.9)
IRON SATN MFR SERPL: 45 %
IRON SERPL-MCNC: 105 UG/DL (ref 35–150)
LYMPHOCYTES # BLD: 1.2 K/UL (ref 0.5–4.6)
LYMPHOCYTES NFR BLD: 13 % (ref 13–44)
Lab: NORMAL
MCH RBC QN AUTO: 32 PG (ref 26.1–32.9)
MCHC RBC AUTO-ENTMCNC: 32.3 G/DL (ref 31.4–35)
MCV RBC AUTO: 99.2 FL (ref 79.6–97.8)
MONOCYTES # BLD: 0.4 K/UL (ref 0.1–1.3)
MONOCYTES NFR BLD: 5 % (ref 4–12)
NEUTS SEG # BLD: 7.4 K/UL (ref 1.7–8.2)
NEUTS SEG NFR BLD: 77 % (ref 43–78)
NRBC # BLD: 0 K/UL (ref 0–0.2)
PLATELET # BLD AUTO: 222 K/UL (ref 150–450)
PMV BLD AUTO: 11 FL (ref 9.4–12.3)
POTASSIUM SERPL-SCNC: 3.8 MMOL/L (ref 3.5–5.1)
PROT SERPL-MCNC: 6.4 G/DL (ref 6.3–8.2)
RBC # BLD AUTO: 3.53 M/UL (ref 4.05–5.25)
REFERENCE LAB,REFLB: NORMAL
RETICS # AUTO: 0.16 M/UL (ref 0.03–0.1)
RETICS/RBC NFR AUTO: 4.4 % (ref 0.3–2)
SODIUM SERPL-SCNC: 139 MMOL/L (ref 136–145)
TEST DESCRIPTION:,ATST: NORMAL
TIBC SERPL-MCNC: 235 UG/DL (ref 250–450)
WBC # BLD AUTO: 9.5 K/UL (ref 4.3–11.1)

## 2021-01-18 PROCEDURE — 82728 ASSAY OF FERRITIN: CPT

## 2021-01-18 PROCEDURE — 85046 RETICYTE/HGB CONCENTRATE: CPT

## 2021-01-18 PROCEDURE — 83540 ASSAY OF IRON: CPT

## 2021-01-18 PROCEDURE — 85025 COMPLETE CBC W/AUTO DIFF WBC: CPT

## 2021-01-18 PROCEDURE — 36415 COLL VENOUS BLD VENIPUNCTURE: CPT

## 2021-01-18 PROCEDURE — 80053 COMPREHEN METABOLIC PANEL: CPT

## 2021-01-27 ENCOUNTER — HOSPITAL ENCOUNTER (OUTPATIENT)
Dept: INFUSION THERAPY | Age: 61
Discharge: HOME | End: 2021-01-27

## 2021-01-29 ENCOUNTER — HOSPITAL ENCOUNTER (OUTPATIENT)
Dept: INFUSION THERAPY | Age: 61
Discharge: HOME | End: 2021-01-29
Payer: MEDICARE

## 2021-01-29 VITALS
DIASTOLIC BLOOD PRESSURE: 54 MMHG | HEART RATE: 80 BPM | SYSTOLIC BLOOD PRESSURE: 112 MMHG | TEMPERATURE: 98.2 F | RESPIRATION RATE: 18 BRPM | OXYGEN SATURATION: 94 %

## 2021-01-29 DIAGNOSIS — D50.9 IRON DEFICIENCY ANEMIA, UNSPECIFIED IRON DEFICIENCY ANEMIA TYPE: Primary | ICD-10-CM

## 2021-01-29 PROCEDURE — 96409 CHEMO IV PUSH SNGL DRUG: CPT

## 2021-01-29 PROCEDURE — 74011000258 HC RX REV CODE- 258: Performed by: INTERNAL MEDICINE

## 2021-01-29 PROCEDURE — 74011250636 HC RX REV CODE- 250/636: Performed by: INTERNAL MEDICINE

## 2021-01-29 PROCEDURE — 96365 THER/PROPH/DIAG IV INF INIT: CPT

## 2021-01-29 RX ORDER — ONDANSETRON 2 MG/ML
8 INJECTION INTRAMUSCULAR; INTRAVENOUS AS NEEDED
Status: DISCONTINUED | OUTPATIENT
Start: 2021-01-29 | End: 2021-01-30 | Stop reason: HOSPADM

## 2021-01-29 RX ORDER — DIPHENHYDRAMINE HYDROCHLORIDE 50 MG/ML
50 INJECTION, SOLUTION INTRAMUSCULAR; INTRAVENOUS AS NEEDED
Status: DISCONTINUED | OUTPATIENT
Start: 2021-01-29 | End: 2021-01-30 | Stop reason: HOSPADM

## 2021-01-29 RX ORDER — HYDROCORTISONE SODIUM SUCCINATE 100 MG/2ML
100 INJECTION, POWDER, FOR SOLUTION INTRAMUSCULAR; INTRAVENOUS AS NEEDED
Status: DISCONTINUED | OUTPATIENT
Start: 2021-01-29 | End: 2021-01-30 | Stop reason: HOSPADM

## 2021-01-29 RX ADMIN — SODIUM CHLORIDE 500 ML: 900 INJECTION, SOLUTION INTRAVENOUS at 16:10

## 2021-01-29 RX ADMIN — FERUMOXYTOL 510 MG: 510 INJECTION INTRAVENOUS at 16:25

## 2021-01-29 NOTE — PROGRESS NOTES
Arrived to the [facility]. Assessment completed, labs reviewed. Feraheme completed. Patient tolerated without problems. Any issues or concerns during appointment: None  Instructed to call Dr Hudson Rodrigues  with any side effects or concerns  Patient aware of next infusion appointment on 2/5/21(date) at 9 AM (time).   Discharged ambulatory

## 2021-02-01 ENCOUNTER — APPOINTMENT (OUTPATIENT)
Dept: INFUSION THERAPY | Age: 61
End: 2021-02-01

## 2021-02-18 ENCOUNTER — HOSPITAL ENCOUNTER (OUTPATIENT)
Dept: LAB | Age: 61
Discharge: HOME | End: 2021-02-18
Payer: MEDICARE

## 2021-02-18 DIAGNOSIS — C92.10 CML (CHRONIC MYELOCYTIC LEUKEMIA) (HCC): ICD-10-CM

## 2021-02-18 DIAGNOSIS — D50.9 IRON DEFICIENCY ANEMIA, UNSPECIFIED IRON DEFICIENCY ANEMIA TYPE: ICD-10-CM

## 2021-02-18 LAB
ALBUMIN SERPL-MCNC: 4.1 G/DL (ref 3.2–4.6)
ALBUMIN/GLOB SERPL: 1.6 {RATIO} (ref 1.2–3.5)
ALP SERPL-CCNC: 97 U/L (ref 50–136)
ALT SERPL-CCNC: 57 U/L (ref 12–65)
ANION GAP SERPL CALC-SCNC: 6 MMOL/L (ref 7–16)
AST SERPL-CCNC: 35 U/L (ref 15–37)
BASOPHILS # BLD: 0.1 K/UL (ref 0–0.2)
BASOPHILS NFR BLD: 1 % (ref 0–2)
BILIRUB SERPL-MCNC: 0.6 MG/DL (ref 0.2–1.1)
BUN SERPL-MCNC: 9 MG/DL (ref 8–23)
CALCIUM SERPL-MCNC: 8.4 MG/DL (ref 8.3–10.4)
CHLORIDE SERPL-SCNC: 106 MMOL/L (ref 98–107)
CO2 SERPL-SCNC: 27 MMOL/L (ref 21–32)
CREAT SERPL-MCNC: 0.7 MG/DL (ref 0.6–1)
DIFFERENTIAL METHOD BLD: ABNORMAL
EOSINOPHIL # BLD: 0.2 K/UL (ref 0–0.8)
EOSINOPHIL NFR BLD: 3 % (ref 0.5–7.8)
ERYTHROCYTE [DISTWIDTH] IN BLOOD BY AUTOMATED COUNT: 16.4 % (ref 11.9–14.6)
FERRITIN SERPL-MCNC: 91 NG/ML (ref 8–388)
GLOBULIN SER CALC-MCNC: 2.6 G/DL (ref 2.3–3.5)
GLUCOSE SERPL-MCNC: 90 MG/DL (ref 65–100)
HCT VFR BLD AUTO: 40.2 % (ref 35.8–46.3)
HGB BLD-MCNC: 13.4 G/DL (ref 11.7–15.4)
HGB RETIC QN AUTO: 37 PG (ref 29–35)
IMM GRANULOCYTES # BLD AUTO: 0 K/UL (ref 0–0.5)
IMM GRANULOCYTES NFR BLD AUTO: 1 % (ref 0–5)
IMM RETICS NFR: 10.2 % (ref 3–15.9)
IRON SATN MFR SERPL: 39 %
IRON SERPL-MCNC: 107 UG/DL (ref 35–150)
LYMPHOCYTES # BLD: 0.7 K/UL (ref 0.5–4.6)
LYMPHOCYTES NFR BLD: 12 % (ref 13–44)
MCH RBC QN AUTO: 32.3 PG (ref 26.1–32.9)
MCHC RBC AUTO-ENTMCNC: 33.3 G/DL (ref 31.4–35)
MCV RBC AUTO: 96.9 FL (ref 79.6–97.8)
MONOCYTES # BLD: 0.3 K/UL (ref 0.1–1.3)
MONOCYTES NFR BLD: 5 % (ref 4–12)
NEUTS SEG # BLD: 4.8 K/UL (ref 1.7–8.2)
NEUTS SEG NFR BLD: 79 % (ref 43–78)
NRBC # BLD: 0 K/UL (ref 0–0.2)
PLATELET # BLD AUTO: 198 K/UL (ref 150–450)
PMV BLD AUTO: 10.3 FL (ref 9.4–12.3)
POTASSIUM SERPL-SCNC: 3.8 MMOL/L (ref 3.5–5.1)
PROT SERPL-MCNC: 6.7 G/DL (ref 6.3–8.2)
RBC # BLD AUTO: 4.15 M/UL (ref 4.05–5.25)
RETICS # AUTO: 0.12 M/UL (ref 0.03–0.1)
RETICS/RBC NFR AUTO: 2.9 % (ref 0.3–2)
SODIUM SERPL-SCNC: 139 MMOL/L (ref 136–145)
TIBC SERPL-MCNC: 271 UG/DL (ref 250–450)
WBC # BLD AUTO: 6.1 K/UL (ref 4.3–11.1)

## 2021-02-18 PROCEDURE — 83540 ASSAY OF IRON: CPT

## 2021-02-18 PROCEDURE — 85046 RETICYTE/HGB CONCENTRATE: CPT

## 2021-02-18 PROCEDURE — 36415 COLL VENOUS BLD VENIPUNCTURE: CPT

## 2021-02-18 PROCEDURE — 80053 COMPREHEN METABOLIC PANEL: CPT

## 2021-02-18 PROCEDURE — 85025 COMPLETE CBC W/AUTO DIFF WBC: CPT

## 2021-02-18 PROCEDURE — 82728 ASSAY OF FERRITIN: CPT

## 2021-03-18 ENCOUNTER — HOSPITAL ENCOUNTER (OUTPATIENT)
Dept: LAB | Age: 61
Discharge: HOME | End: 2021-03-18
Payer: MEDICARE

## 2021-03-18 DIAGNOSIS — E03.9 PRIMARY HYPOTHYROIDISM: ICD-10-CM

## 2021-03-18 DIAGNOSIS — C92.10 CML (CHRONIC MYELOCYTIC LEUKEMIA) (HCC): ICD-10-CM

## 2021-03-18 DIAGNOSIS — D50.9 IRON DEFICIENCY ANEMIA, UNSPECIFIED IRON DEFICIENCY ANEMIA TYPE: ICD-10-CM

## 2021-03-18 LAB
ALBUMIN SERPL-MCNC: 4.1 G/DL (ref 3.2–4.6)
ALBUMIN/GLOB SERPL: 1.8 {RATIO} (ref 1.2–3.5)
ALP SERPL-CCNC: 84 U/L (ref 50–136)
ALT SERPL-CCNC: 53 U/L (ref 12–65)
ANION GAP SERPL CALC-SCNC: 4 MMOL/L (ref 7–16)
AST SERPL-CCNC: 31 U/L (ref 15–37)
BASOPHILS # BLD: 0.1 K/UL (ref 0–0.2)
BASOPHILS NFR BLD: 1 % (ref 0–2)
BILIRUB SERPL-MCNC: 0.6 MG/DL (ref 0.2–1.1)
BUN SERPL-MCNC: 16 MG/DL (ref 8–23)
CALCIUM SERPL-MCNC: 8.9 MG/DL (ref 8.3–10.4)
CHLORIDE SERPL-SCNC: 105 MMOL/L (ref 98–107)
CO2 SERPL-SCNC: 30 MMOL/L (ref 21–32)
CREAT SERPL-MCNC: 0.6 MG/DL (ref 0.6–1)
DIFFERENTIAL METHOD BLD: ABNORMAL
EOSINOPHIL # BLD: 0.2 K/UL (ref 0–0.8)
EOSINOPHIL NFR BLD: 3 % (ref 0.5–7.8)
ERYTHROCYTE [DISTWIDTH] IN BLOOD BY AUTOMATED COUNT: 16 % (ref 11.9–14.6)
FERRITIN SERPL-MCNC: 52 NG/ML (ref 8–388)
GLOBULIN SER CALC-MCNC: 2.3 G/DL (ref 2.3–3.5)
GLUCOSE SERPL-MCNC: 60 MG/DL (ref 65–100)
HCT VFR BLD AUTO: 42 % (ref 35.8–46.3)
HGB BLD-MCNC: 13.8 G/DL (ref 11.7–15.4)
HGB RETIC QN AUTO: 37 PG (ref 29–35)
IMM GRANULOCYTES # BLD AUTO: 0 K/UL (ref 0–0.5)
IMM GRANULOCYTES NFR BLD AUTO: 0 % (ref 0–5)
IMM RETICS NFR: 9.7 % (ref 3–15.9)
IRON SATN MFR SERPL: 41 %
IRON SERPL-MCNC: 108 UG/DL (ref 35–150)
LYMPHOCYTES # BLD: 1 K/UL (ref 0.5–4.6)
LYMPHOCYTES NFR BLD: 16 % (ref 13–44)
Lab: NORMAL
MCH RBC QN AUTO: 32.2 PG (ref 26.1–32.9)
MCHC RBC AUTO-ENTMCNC: 32.9 G/DL (ref 31.4–35)
MCV RBC AUTO: 97.9 FL (ref 79.6–97.8)
MONOCYTES # BLD: 0.4 K/UL (ref 0.1–1.3)
MONOCYTES NFR BLD: 7 % (ref 4–12)
NEUTS SEG # BLD: 4.5 K/UL (ref 1.7–8.2)
NEUTS SEG NFR BLD: 73 % (ref 43–78)
NRBC # BLD: 0 K/UL (ref 0–0.2)
PLATELET # BLD AUTO: 205 K/UL (ref 150–450)
PMV BLD AUTO: 10.2 FL (ref 9.4–12.3)
POTASSIUM SERPL-SCNC: 4 MMOL/L (ref 3.5–5.1)
PROT SERPL-MCNC: 6.4 G/DL (ref 6.3–8.2)
RBC # BLD AUTO: 4.29 M/UL (ref 4.05–5.25)
REFERENCE LAB,REFLB: NORMAL
RETICS # AUTO: 0.1 M/UL (ref 0.03–0.1)
RETICS/RBC NFR AUTO: 2.3 % (ref 0.3–2)
SODIUM SERPL-SCNC: 139 MMOL/L (ref 136–145)
TEST DESCRIPTION:,ATST: NORMAL
TIBC SERPL-MCNC: 263 UG/DL (ref 250–450)
TSH SERPL DL<=0.005 MIU/L-ACNC: 21.4 UIU/ML (ref 0.36–3.74)
WBC # BLD AUTO: 6.2 K/UL (ref 4.3–11.1)

## 2021-03-18 PROCEDURE — 85046 RETICYTE/HGB CONCENTRATE: CPT

## 2021-03-18 PROCEDURE — 82728 ASSAY OF FERRITIN: CPT

## 2021-03-18 PROCEDURE — 36415 COLL VENOUS BLD VENIPUNCTURE: CPT

## 2021-03-18 PROCEDURE — 85025 COMPLETE CBC W/AUTO DIFF WBC: CPT

## 2021-03-18 PROCEDURE — 83540 ASSAY OF IRON: CPT

## 2021-03-18 PROCEDURE — 84443 ASSAY THYROID STIM HORMONE: CPT

## 2021-03-18 PROCEDURE — 80053 COMPREHEN METABOLIC PANEL: CPT

## 2021-04-30 NOTE — PROGRESS NOTES
Pt arrived ambulatory, fereheme completed, pt tolerated well, monitored for 30 min, discharged home ambulatory

## 2021-05-19 NOTE — PROGRESS NOTES
Arrived to the [facility]. Allan completed. Patient tolerated well. Any issues or concerns during appointment: none. Discharged ambulatory.     Gabriel Collado RN

## 2021-06-28 NOTE — PROGRESS NOTES
Pt arrived ambulatory today at [phone], to receive IV iron. Pt tolerated without difficulty. Patient discharged via ambulatory accompanied by self. Instructed to notify physician of any problems, questions or concerns. Allowed opportunity for patient/family to ask questions. Verbalized understanding. Next appointment is July 5 at 1330 with Theresa 84Valentino.

## 2021-07-05 NOTE — PROGRESS NOTES
Arrived to the [facility]. Assessment and Feraheme completed. Patient tolerated well. Any issues or concerns during appointment: Approximately 10 min into infusion, pt c/o of nausea. Pepcid given and iron restarted after 10 min. No further issues. Discharged ambulatory.

## 2021-07-12 PROBLEM — L03.90 CELLULITIS: Status: ACTIVE | Noted: 2021-01-01

## 2021-07-12 NOTE — ED PROVIDER NOTES
59-year-old female with a history of CML on oral chemotherapy, polycythemia vera on Lovenox, intracranial hemorrhage, small bowel resection from intestinal ischemia, seizure, splenomegaly presents with redness extending from left abdomen to breast since waking up this morning. She reports extreme pain at the site and chills at home. She has nausea and diarrhea, no vomiting. Patient denies cough or shortness of breath. She has a headache and diffuse body soreness is concerned she may have had a seizure last night. She has been compliant with Keppra. She is prescribed 60 mg of Lovenox twice daily, but usually only takes 30. She recently increased dose to 60 and states this is when the redness occurred. Rash     Abdominal Pain   Associated symptoms include a fever, diarrhea, headaches and chest pain. Pertinent negatives include no nausea, no vomiting, no dysuria and no back pain. Past Medical History:   Diagnosis Date    Anemia     C. difficile diarrhea 4/1/2015    Cerebral edema ([address] 4/1/2015    Chronic migraine 9/22/2016    Chronic myelogenous leukemia ([facility]     [place] chromosome/ converted from polycythemia in 2009- to 2012 when she was dx w leukemia    Chronic pain     Coagulation disorder ([address]     \"clotting and Bleeding Problem\" dr Shruthi Weiss follows     Esophageal spasm     with banding     Esophageal varices ([address]     grade 3    GI bleed 8/3/2016    H/O craniotomy     3-29-15.  due to blood clot - which caused a seizure  - then pt fell and hit     Leukocytosis 3/14/2015    MRSA colonization 6/25/2012    Polycythemia vera(238.4)     JAK2 mutation    Portal hypertension ([address]     with varices    Portal vein thrombosis 6/20/2012    Ct scan 6-21-2 Apparent occlusion of the portal vein. In addition, the splenic vein, and superior mesenteric vein are not definitely seen and are also likely  occluded.  Splenomegaly, and extensive varices are seen as described above consistent with the portal vein occlusion 12 on arixtra HIT negative on repeat 12 re admitted Cirrhotic appearance of the liver. Mild to moderate ascites, as    Primary hypothyroidism     Pulmonary embolism ([address]     not on coumadin anymore     S/P exploratory laparotomy, 12    Bowel resection:  336 cm of small bowel removed, approximately 9 feet and placement of feeding jejunostomy.      S/P small bowel resection     .  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 2012 platelets lower repeat HIT 12 platelets in 12,168'F    Thrombosis, portal vein 2004    portal , spleenic and recently superior mesenteric    Transfusion history     many blood tranfusions - last 3-29-15 after brain surgery    Traumatic hemorrhage of right cerebrum ([address] 3/30/2015       Past Surgical History:   Procedure Laterality Date    HX APPENDECTOMY      with small bowel resection    HX BREAST BIOPSY Bilateral     Lt - ; Rt -     HX CHOLECYSTECTOMY      HX GI      liver biopsy    HX GI      bowel removed small - 9 feet     HX GYN       x 2    HX GYN      D&C following miscarriage    HX ORTHOPAEDIC Left     torn labrum shoulder (screw in place)    NEUROLOGICAL PROCEDURE UNLISTED  2010    craniotomy to evacuate subdural hematoma following a fall from a seizure    MA ABDOMEN SURGERY PROC UNLISTED      umbilical hernia repair    MA ABDOMEN SURGERY PROC UNLISTED      9ft of small bowel excised then reconnected         Family History:   Problem Relation Age of Onset    Diabetes Mother     Stroke Mother         after surgery    Cancer Father         brain    No Known Problems Sister     Other Sister         diverticulitis    Other Sister         diverticulitis    Cancer Sister         lyjmphoma    Breast Cancer Maternal Aunt 48    Thyroid Disease Paternal Grandmother        Social History     Socioeconomic History    Marital status: SINGLE     Spouse name: Not on file    Number of children: Not on file    Years of education: Not on file    Highest education level: Not on file   Occupational History    Not on file   Tobacco Use    Smoking status: Former Smoker     Packs/day: 0.20     Years: 10.00     Pack years: 2.00     Types: Cigarettes     Quit date:      Years since quittin.    Smokeless tobacco: Never Used   Substance and Sexual Activity    Alcohol use: No    Drug use: No    Sexual activity: Not on file   Other Topics Concern    Not on file   Social History Narrative    Not on file     Social Determinants of Health     Financial Resource Strain:     Difficulty of Paying Living Expenses:    Food Insecurity:     Worried About [address] in the Last Year:     [address] in the Last Year:    Transportation Needs:     Lack of Transportation (Medical):  Lack of Transportation (Non-Medical):    Physical Activity:     Days of Exercise per Week:     Minutes of Exercise per Session:    Stress:     Feeling of Stress :    Social Connections:     Frequency of Communication with Friends and Family:     Frequency of Social Gatherings with Friends and Family:     Attends Jain Services:     Active Member of Clubs or Organizations:     Attends Club or Organization Meetings:     Marital Status:    Intimate Partner Violence:     Fear of Current or Ex-Partner:     Emotionally Abused:     Physically Abused:     Sexually Abused:           ALLERGIES: Latex, Sulfa (sulfonamide antibiotics), Tramadol, Augmentin [amoxicillin-pot clavulanate], Morphine, Rizatriptan, Tetanus immune globulin, Xanax [alprazolam], and Zonegran [zonisamide]    Review of Systems   Constitutional: Positive for chills, fatigue and fever. HENT: Negative for hearing loss. Eyes: Negative for visual disturbance. Respiratory: Negative for cough and shortness of breath. Cardiovascular: Positive for chest pain. Gastrointestinal: Positive for abdominal pain and diarrhea. Negative for nausea and vomiting. Genitourinary: Negative for dysuria. Musculoskeletal: Negative for back pain. Skin: Positive for rash. Neurological: Positive for headaches. Psychiatric/Behavioral: Negative for confusion. All other systems reviewed and are negative. Vitals:    07/12/21 1428   BP: (!) 144/82   Pulse: (!) 120   Resp: 18   Temp: (!) 100.8 °F (38.2 °C)   SpO2: 98%   Weight: 58.1 kg (128 lb)   Height: 5' 3\" (1.6 m)            Physical Exam  Vitals and nursing note reviewed. Constitutional:       Appearance: Normal appearance. She is well-developed. She is ill-appearing. HENT:      Head: Normocephalic and atraumatic. Nose: Nose normal.      Mouth/Throat:      Mouth: Mucous membranes are moist.   Eyes:      Pupils: Pupils are equal, round, and reactive to light. Cardiovascular:      Rate and Rhythm: Regular rhythm. Tachycardia present. Heart sounds: Normal heart sounds. Pulmonary:      Effort: Pulmonary effort is normal.      Breath sounds: Normal breath sounds. Abdominal:      Palpations: Abdomen is soft. Tenderness: There is abdominal tenderness in the left upper quadrant and left lower quadrant. Musculoskeletal:         General: No deformity. Normal range of motion. Cervical back: Normal range of motion and neck supple. Skin:     General: Skin is warm and dry. Findings: Erythema and rash present. Neurological:      General: No focal deficit present. Mental Status: She is alert. Mental status is at baseline.    Psychiatric:         Mood and Affect: Mood normal. Behavior: Behavior normal.          MDM  Number of Diagnoses or Management Options  Diagnosis management comments: Parts of this document were created using dragon voice recognition software. The chart has been reviewed but errors may still be present. I wore appropriate PPE throughout this patient's ED visit. Maricarmen Spencer MD, 5:08 PM    Sepsis from cellulitis in immunocompromised patient. Broad-spectrum antibiotics initiated. Will await labs and admit to hospitalist.       Amount and/or Complexity of Data Reviewed  Clinical lab tests: ordered and reviewed (Results for orders placed or performed during the hospital encounter of 07/12/21  -CBC WITH AUTOMATED DIFF       Result                      Value             Ref Range           WBC                         14.9 (H)          4.3 - 11.1 K*       RBC                         4.83              4.05 - 5.2 M*       HGB                         14.3              11.7 - 15.4 *       HCT                         44.9              35.8 - 46.3 %       MCV                         93.0              79.6 - 97.8 *       MCH                         29.6              26.1 - 32.9 *       MCHC                        31.8              31.4 - 35.0 *       RDW                         18.4 (H)          11.9 - 14.6 %       PLATELET                    212               150 - 450 K/*       MPV                         10.8              9.4 - 12.3 FL       ABSOLUTE NRBC               0.00              0.0 - 0.2 K/*       DF                          AUTOMATED                             NEUTROPHILS                 88 (H)            43 - 78 %           LYMPHOCYTES                 5 (L)             13 - 44 %           MONOCYTES                   6                 4.0 - 12.0 %        EOSINOPHILS                 0 (L)             0.5 - 7.8 %         BASOPHILS                   0                 0.0 - 2.0 %         IMMATURE GRANULOCYTES       1                 0.0 - 5.0 %         ABS. NEUTROPHILS            13.1 (H)          1.7 - 8.2 K/*       ABS. LYMPHOCYTES            0.8               0.5 - 4.6 K/*       ABS. MONOCYTES              0.8               0.1 - 1.3 K/*       ABS. EOSINOPHILS            0.1               0.0 - 0.8 K/*       ABS. BASOPHILS              0.0               0.0 - 0.2 K/*       ABS. IMM. GRANS.            0.1               0.0 - 0.5 K/*  -METABOLIC PANEL, COMPREHENSIVE       Result                      Value             Ref Range           Sodium                      139               136 - 145 mm*       Potassium                   3.9               3.5 - 5.1 mm*       Chloride                    107               98 - 107 mmo*       CO2                         26                21 - 32 mmol*       Anion gap                   6 (L)             7 - 16 mmol/L       Glucose                     91                65 - 100 mg/*       BUN                         13                8 - 23 MG/DL        Creatinine                  0.46 (L)          0.6 - 1.0 MG*       GFR est AA                  >60               >60 ml/min/1*       GFR est non-AA              >60               >60 ml/min/1*       Calcium                     8.8               8.3 - 10.4 M*       Bilirubin, total            1.2 (H)           0.2 - 1.1 MG*       ALT (SGPT)                  84 (H)            12 - 65 U/L         AST (SGOT)                  57 (H)            15 - 37 U/L         Alk.  phosphatase            91                50 - 136 U/L        Protein, total              6.6               6.3 - 8.2 g/*       Albumin                     4.0               3.2 - 4.6 g/*       Globulin                    2.6               2.3 - 3.5 g/*       A-G Ratio                   1.5               1.2 - 3.5      -LIPASE       Result                      Value             Ref Range           Lipase                      92                73 - 393 U/L   -LACTIC ACID       Result                      Value             Ref Range           Lactic acid                 1.2               0.4 - 2.0 MM*    *Note: Due to a large number of results and/or encounters for the requested time period, some results have not been displayed. A complete set of results can be found in Results Review.  )  Tests in the radiology section of CPT®: ordered and reviewed (XR CHEST PORT    Result Date: 7/12/2021  PORTABLE CHEST, July 12, 2021 at 1711 hours CLINICAL HISTORY:  Abdominal and left breast rash and erythema. COMPARISON:  January 10, 2019. FINDINGS:  AP erect image demonstrates no confluent infiltrate or significant pleural fluid. The heart size is within normal limits without evidence of congestive heart failure or pneumothorax. The bony thorax appears intact on this view. There are overlying radiopaque support devices.      NO ACUTE CARDIOPULMONARY DISEASE IDENTIFIED.    )  Tests in the medicine section of CPT®: reviewed and ordered           EKG    Date/Time: 7/12/2021 5:09 PM  Performed by: Mike Butt MD  Authorized by: Mike Butt MD     ECG reviewed by ED Physician in the absence of a cardiologist: yes    Interpretation:     Interpretation: normal    Rate:     ECG rate:  98    ECG rate assessment: normal    Rhythm:     Rhythm: sinus rhythm    Ectopy:     Ectopy: none    Conduction:     Conduction: normal    ST segments:     ST segments:  Normal

## 2021-07-12 NOTE — ED TRIAGE NOTES
Pt arrives via POV c/o redness to the abd and left breast area. Hx of enlarged spleen and polycythemia vera, taking heparin. Pt reports taking an increased dose of heparin last night and this redness appeared. Pt has hx of brain surgery and seizures. Presents with even and unlabored respirations. States possibly had a seizure last night, presents with headache and body soreness.

## 2021-07-12 NOTE — H&P
INTERNAL MEDICINE H&P/CONSULT    Subjective:     80-year-old female with a history of CML on oral chemotherapy, polycythemia vera on Lovenox, DCT and PV thrombosis, intracranial hemorrhage, small bowel resection from intestinal ischemia, seizure, splenomegaly presents with redness extending from left abdomen to breast since waking up this morning. She reports extreme pain at the site and chills at home. She has nausea and diarrhea, no vomiting. Patient denies cough or shortness of breath. She has a headache and diffuse body soreness is concerned she may have had a seizure last night. She has been compliant with Keppra. She is prescribed 60 mg of Lovenox twice daily, but usually only takes 30. She recently increased dose to 60 and states this is when the redness occurred. On further questioning, her skin redness extending all the way from LEFT lower abdomen to her left side of chest started today. No fever or chills or injuries other than her daily lovenox SQ,    Past Medical History:   Diagnosis Date    Anemia     C. difficile diarrhea 4/1/2015    Cerebral edema (HCC) 4/1/2015    Chronic migraine 9/22/2016    Chronic myelogenous leukemia ([address]     [place] chromosome/ converted from polycythemia in 2009- to 2012 when she was dx w leukemia    Chronic pain     Coagulation disorder ([address]     \"clotting and Bleeding Problem\" dr Gonzales Brightly follows     Esophageal spasm     with banding     Esophageal varices ([address]     grade 3    GI bleed 8/3/2016    H/O craniotomy     3-29-15.  due to blood clot - which caused a seizure  - then pt fell and hit     Leukocytosis 3/14/2015    MRSA colonization 6/25/2012    Polycythemia vera(238.4)     JAK2 mutation    Portal hypertension ([address]     with varices    Portal vein thrombosis 6/20/2012    Ct scan 6-21-2 Apparent occlusion of the portal vein.  In addition, the splenic vein, and superior mesenteric vein are not definitely seen and are also likely occluded. Splenomegaly, and extensive varices are seen as described above consistent with the portal vein occlusion 12 on arixtra HIT negative on repeat 12 re admitted Cirrhotic appearance of the liver. Mild to moderate ascites, as    Primary hypothyroidism     Pulmonary embolism ([address]     not on coumadin anymore     S/P exploratory laparotomy, 12    Bowel resection:  336 cm of small bowel removed, approximately 9 feet and placement of feeding jejunostomy.      S/P small bowel resection     .  9 feet removed due to  gangrene which wa due to blood clot    Seizure ([address] 3/14/2015    Seizure disorder ([address] 3/30/2015    due to clotting factor    Seizures ([address]     last one 3- - followed by aniyah     Splenomegaly, congestive, chronic     Stroke (cerebrum) ([address] 2015    had bled into head- surgery    Stroke [facility])     pt had stroke like symptoms     Thrombocytopenia, HIT negative 2012 platelets lower repeat HIT 12 platelets in 99,922'P    Thrombosis, portal vein 2004    portal , spleenic and recently superior mesenteric    Transfusion history     many blood tranfusions - last 3-29-15 after brain surgery    Traumatic hemorrhage of right cerebrum ([address] 3/30/2015      Past Surgical History:   Procedure Laterality Date    HX APPENDECTOMY      with small bowel resection    HX BREAST BIOPSY Bilateral     Lt - ; Rt -     HX CHOLECYSTECTOMY      HX GI      liver biopsy    HX GI      bowel removed small - 9 feet     HX GYN       x 2    HX GYN      D&C following miscarriage    HX ORTHOPAEDIC Left 2008    torn labrum shoulder (screw in place)    NEUROLOGICAL PROCEDURE UNLISTED  2010    craniotomy to evacuate subdural hematoma following a fall from a seizure    RI ABDOMEN SURGERY PROC UNLISTED      umbilical hernia repair    RI ABDOMEN SURGERY PROC UNLISTED      9ft of small bowel excised then reconnected      Prior to Admission medications    Medication Sig Start Date End Date Taking? Authorizing Provider   oxyCODONE-acetaminophen (PERCOCET 10)  mg per tablet Take 1 Tablet by mouth every eight (8) hours as needed for Pain for up to 30 days. Max Daily Amount: 3 Tablets. 6/17/21 7/17/21  Van Domingo MD   zolpidem (AMBIEN) 10 mg tablet Take 1 Tablet by mouth nightly as needed for Sleep. Max Daily Amount: 10 mg. 6/17/21   Van Domingo MD   ondansetron (ZOFRAN ODT) 8 mg disintegrating tablet Take 1 Tab by mouth every eight (8) hours as needed for Nausea or Vomiting. 4/23/21   Edwin Garcia NP   magnesium 250 mg tab Take  by mouth. Provider, Historical   Synthroid 112 mcg tablet 1 tablet once a day with an extra 1/2 tablet on Sundays 3/29/21   Marcello To MD   ruxolitinib 5 mg tab Take 1 Tab by mouth two (2) times a day. 3/10/21   Van Domingo MD   enoxaparin (LOVENOX) 60 mg/0.6 mL injection INJECT 60 MG BY SUBCUTANEOUS ROUTE EVERY TWELVE (12) HOURS. 1/18/21   Van Domingo MD   dasatinib (SpryceL) 80 mg tab Take 1 Tab by mouth daily. 11/9/20   Van Domingo MD   ondansetron (ZOFRAN ODT) 4 mg disintegrating tablet Take 1 Tab by mouth every eight (8) hours as needed for Nausea. 8/3/20   Van Domingo MD   pseudoephedrine (SUDAFED) 30 mg tablet Take  by mouth every four (4) hours as needed for Congestion. Provider, Historical   calcium carbonate (CALTREX) 600 mg calcium (1,500 mg) tablet Take 600 mg by mouth daily. Provider, Historical   cholecalciferol (VITAMIN D3) 1,000 unit cap Take 1,000 Units by mouth daily. Provider, Historical   lansoprazole (PREVACID) 30 mg capsule Take 30 mg by mouth daily. prn    Provider, Historical   ibuprofen (ADVIL) 200 mg tablet Take  by mouth. Last dose 1/16/19    Provider, Historical   diphenoxylate-atropine (LOMOTIL) 2.5-0.025 mg per tablet Take 1 Tab by mouth four (4) times daily as needed for Diarrhea. Max Daily Amount: 4 Tabs.  3/21/17   Van Domingo MD   calcium carbonate (TUMS) 200 mg calcium (500 mg) chew Take 1 Tab by mouth daily as needed. Provider, Historical     Allergies   Allergen Reactions    Latex Other (comments)     Testing for latex allergy was positive as a 2 (on a scale of 1 to 3).  Sulfa (Sulfonamide Antibiotics) Anaphylaxis    Tramadol Other (comments)     Top lip swelled    Augmentin [Amoxicillin-Pot Clavulanate] Rash    Morphine Nausea and Vomiting    Rizatriptan Rash    Tetanus Immune Globulin Other (comments)     Heat, bruising, and swelling at  Site of injection    Xanax [Alprazolam] Other (comments)     Hallucinations    Zonegran [Zonisamide] Rash      Social History     Tobacco Use    Smoking status: Former Smoker     Packs/day: 0.20     Years: 10.00     Pack years: 2.00     Types: Cigarettes     Quit date:      Years since quittin.5    Smokeless tobacco: Never Used   Substance Use Topics    Alcohol use: No        Family History:  HTN    Review of Systems   A comprehensive review of systems was negative except for that written in the HPI. Objective: Intake / Output:  No intake/output data recorded. No intake/output data recorded. Physical Exam:  Visit Vitals  BP (!) 143/80   Pulse 97   Temp 99.9 °F (37.7 °C)   Resp 18   Ht 5' 3\" (1.6 m)   Wt 58.1 kg (128 lb)   SpO2 99%   BMI 22.67 kg/m²     General appearance: awake, alert, cooperative, moderate distress, appears stated age - Pale, No icterus  Head: Normocephalic, without obvious abnormality, atraumatic  Back: symmetric, no curvature. ROM normal. No CVA tenderness. Lungs: clear to auscultation bilaterally   Heart: regular rhythm, S1, S2 normal, no murmur, click, rub or gallop. - Tachycardia, Mild  Abdomen: soft, no tenderness, no distension, normal bowel sound, no masses, no organomegaly  Extremities: atraumatic, no cyanosis - Bilateral lower limbs edema none.   Skin: extensive redness of left admen and left chest wall w/ intact skin  Neurologic: Grossly intact ECG: sinus rhythm     Data Review (Labs):   Recent Results (from the past 24 hour(s))   CBC WITH AUTOMATED DIFF    Collection Time: 07/12/21  2:47 PM   Result Value Ref Range    WBC 14.9 (H) 4.3 - 11.1 K/uL    RBC 4.83 4.05 - 5.2 M/uL    HGB 14.3 11.7 - 15.4 g/dL    HCT 44.9 35.8 - 46.3 %    MCV 93.0 79.6 - 97.8 FL    MCH 29.6 26.1 - 32.9 PG    MCHC 31.8 31.4 - 35.0 g/dL    RDW 18.4 (H) 11.9 - 14.6 %    PLATELET 926 245 - 355 K/uL    MPV 10.8 9.4 - 12.3 FL    ABSOLUTE NRBC 0.00 0.0 - 0.2 K/uL    DF AUTOMATED      NEUTROPHILS 88 (H) 43 - 78 %    LYMPHOCYTES 5 (L) 13 - 44 %    MONOCYTES 6 4.0 - 12.0 %    EOSINOPHILS 0 (L) 0.5 - 7.8 %    BASOPHILS 0 0.0 - 2.0 %    IMMATURE GRANULOCYTES 1 0.0 - 5.0 %    ABS. NEUTROPHILS 13.1 (H) 1.7 - 8.2 K/UL    ABS. LYMPHOCYTES 0.8 0.5 - 4.6 K/UL    ABS. MONOCYTES 0.8 0.1 - 1.3 K/UL    ABS. EOSINOPHILS 0.1 0.0 - 0.8 K/UL    ABS. BASOPHILS 0.0 0.0 - 0.2 K/UL    ABS. IMM. GRANS. 0.1 0.0 - 0.5 K/UL   METABOLIC PANEL, COMPREHENSIVE    Collection Time: 07/12/21  2:47 PM   Result Value Ref Range    Sodium 139 136 - 145 mmol/L    Potassium 3.9 3.5 - 5.1 mmol/L    Chloride 107 98 - 107 mmol/L    CO2 26 21 - 32 mmol/L    Anion gap 6 (L) 7 - 16 mmol/L    Glucose 91 65 - 100 mg/dL    BUN 13 8 - 23 MG/DL    Creatinine 0.46 (L) 0.6 - 1.0 MG/DL    GFR est AA >60 >60 ml/min/1.73m2    GFR est non-AA >60 >60 ml/min/1.73m2    Calcium 8.8 8.3 - 10.4 MG/DL    Bilirubin, total 1.2 (H) 0.2 - 1.1 MG/DL    ALT (SGPT) 84 (H) 12 - 65 U/L    AST (SGOT) 57 (H) 15 - 37 U/L    Alk.  phosphatase 91 50 - 136 U/L    Protein, total 6.6 6.3 - 8.2 g/dL    Albumin 4.0 3.2 - 4.6 g/dL    Globulin 2.6 2.3 - 3.5 g/dL    A-G Ratio 1.5 1.2 - 3.5     LIPASE    Collection Time: 07/12/21  2:47 PM   Result Value Ref Range    Lipase 92 73 - 393 U/L   PROCALCITONIN    Collection Time: 07/12/21  2:47 PM   Result Value Ref Range    Procalcitonin 0.14 ng/mL   EKG, 12 LEAD, INITIAL    Collection Time: 07/12/21  4:58 PM   Result Value Ref Range    Ventricular Rate 98 BPM    Atrial Rate 98 BPM    P-R Interval 138 ms    QRS Duration 80 ms    Q-T Interval 342 ms    QTC Calculation (Bezet) 436 ms    Calculated P Axis 71 degrees    Calculated R Axis 75 degrees    Calculated T Axis 52 degrees    Diagnosis       Normal sinus rhythm  Normal ECG  No previous ECGs available     LACTIC ACID    Collection Time: 07/12/21  5:30 PM   Result Value Ref Range    Lactic acid 1.2 0.4 - 2.0 MMOL/L       Assessment:     1- Abdominal wall cellulitis, on daily Lovenox SQ bid  2- Hx of DVT, PV thrombosis, cirrhosis, CML and PCV. Stable. Plan:     Vancomycin IV  Follow cultures  Pain control  IVF hydration, gentle  AM lab as needed  Continue essential home medications. Patient is full code. Further management depends on patient progress. Thank you for the oppourtinity to contribute in the care of your patient.     Signed By: Lenin Santo MD     July 12, 2021

## 2021-07-13 NOTE — ED NOTES
TRANSFER - OUT REPORT:    Verbal report given to 2nd floor nurse(name) on Sharkey Pass  being transferred to 2nd floor(unit) for routine progression of care       Report consisted of patients Situation, Background, Assessment and   Recommendations(SBAR). Information from the following report(s) SBAR was reviewed with the receiving nurse. Lines:   Peripheral IV 07/12/21 Right Antecubital (Active)   Site Assessment Clean, dry, & intact 07/12/21 1449       Peripheral IV 07/12/21 Left Antecubital (Active)   Site Assessment Clean, dry, & intact 07/12/21 1744   Phlebitis Assessment 0 07/12/21 1744   Infiltration Assessment 0 07/12/21 1744   Dressing Status Clean, dry, & intact 07/12/21 1744        Opportunity for questions and clarification was provided.       Patient transported with:   [employer]

## 2021-07-13 NOTE — PROGRESS NOTES
07/12/21 2104   Dual Skin Pressure Injury Assessment   Dual Skin Pressure Injury Assessment WDL   Second Care Provider (Based on Facility Policy) Marichuy RN   Skin Integumentary   Skin Integumentary (WDL) X   Skin Color Red  (reddness to abd; L chest; L groin)   Skin Condition/Temp Dry; Warm   Skin Integrity Other (comment); Scars (comment)  (reddness; abd scar )   Hair Growth Present   Nails WDL    Pressure  Injury Documentation No Pressure Injury Noted-Pressure Ulcer Prevention Initiated   Wound Prevention and Protection Methods   Orientation of Wound Prevention Posterior   Location of Wound Prevention Sacrum/Coccyx   Dressing Present  No   Wound Offloading (Prevention Methods) Bed, pressure reduction mattress   no pressure injury noted at this time

## 2021-07-13 NOTE — PROGRESS NOTES
Pharmacokinetic Consult to Pharmacist    Maria R Funes is a 61 y.o. female being treated with vancomycin. Height: 5' 3\" (160 cm)  Weight: 58.1 kg (128 lb)  Lab Results   Component Value Date/Time    BUN 13 07/12/2021 02:47 PM    Creatinine 0.46 (L) 07/12/2021 02:47 PM    WBC 14.9 (H) 07/12/2021 02:47 PM    Procalcitonin 0.14 07/12/2021 02:47 PM    Lactic acid 1.2 07/12/2021 05:30 PM    Lactic acid 1.2 04/12/2016 11:08 AM    Lactic Acid (POC) 0.7 01/05/2018 02:08 AM      Estimated Creatinine Clearance: 70.7 mL/min (A) (by C-G formula based on SCr of 0.46 mg/dL (L)). Day 1 of vancomycin. Goal trough is 10-20. Vancomycin dose initiated at 1500 mg x 1 dose; followed by Vanc 1000 mg IV q12h. Will continue to follow patient and order levels when clinically indicated.     Thank you,  Lucas Castro, PharmD

## 2021-07-13 NOTE — PROGRESS NOTES
TRANSFER - IN REPORT:    Verbal report received from NISHA Noe on Trupti Mittal  being received from ER(unit) for routine progression of care      Report consisted of patients Situation, Background, Assessment and   Recommendations(SBAR). Information from the following report(s) SBAR, Kardex, Intake/Output and MAR was reviewed with the receiving nurse. Opportunity for questions and clarification was provided. Assessment to be completed upon patients arrival to unit and care assumed.

## 2021-07-13 NOTE — PROGRESS NOTES
Comprehensive Nutrition Assessment    Type and Reason for Visit: Initial, Positive nutrition screen  Best Practice Alert for Malnutrition Screening Tool: Recently Lost Weight Without Trying: Unsure,  , Eating Poorly Due to Decreased Appetite: No    Nutrition Recommendations/Plan:   Meals and Snacks:  Continue current diet. Nutrition Supplement Therapy:   Medical food supplement therapy:  Initiate Ensure Enlive twice per day (this provides 350 kcal and 20 grams protein per bottle) vanilla preference noted     Malnutrition Assessment:  Malnutrition Status: At risk for malnutrition (specify) (variable tolerance of oral diet and variable wt)    Nutrition Assessment:   Nutrition History: Nutrition history notable for EN and PN in 2012 following significant bowel surgery. Patient confirms this stating that she had both TPN and jtube feeds at one point, but it has been a long time. She does voice several diet restrictions due to her cancer and enlarged spleen. She reports early satiety and difficulty digesting certain foods as primary barrier to PO intake. She reports she usually eats 2 meals per day and several snacks. She states that breakfast is usually eggs, hall, and toast and will sometimes eat over several hours. She states other meal is a light dinner which is variable. She states that she feels like she digests chicken and fish better, but does occasionally eats beef and pork depending on how it is cooked. She states that she tends to eat her portein and vegetable portions first and will hold off on her starches if she starts becoming too full. She states that snacks include fruit, yogurt, ice cream. She states that she occasionally drinks Ensure at home depending on her ablilty to prepare and consume meals, but prefers to eat real food most often. She states that she does not know current body weight but was ~128# at most recent office visit.  She states that her weight fluctuates due to her PO intake and diarrhea when taking chemo. Nutrition Background: Patient with complex medical history significant for CML, polycythemia vera, DCT, intracranial hemorrage s/p craniotomy, small bowel resection from due to bowel ischemia (noted 9 ft removed), seizure, GI bleed, esophageal varices. She presented with redness to abdomen and headache with questionable seizure. She was admitted with cellulitis. Daily Update:  Patient seen lying in bed. She states that so far she has been happy with the meals. She states that she ate all of breakfast except the potatoes. She states that her lunch was taken from her because she was going to have and abdominal ultrasound. She states that she was told that dinner might also have to be held so she asked if procedure could be postponed until tomorrow am. She states that she does not like to miss subsequent meals and snacks due to variable tolerances, wt fluctuations, and overall nutrition status due to multiple medical conditions. She is agreeable to Ensure while inpatient to use as a snack or if food tolerances limit PO intake of meals. Nutrition Related Findings: Thin appearing but no physical signs of malnutrition. Belly noted to appear more full than rest of body habitus. Current Nutrition Therapies:  ADULT DIET Regular    Current Intake:   Average Meal Intake: 51-75% Average Supplement Intake: None ordered      Anthropometric Measures:  Height: 5' 3\" (160 cm)  Current Body Wt: 62.6 kg (138 lb 0.1 oz) (7/13), Weight source: Bed scale  BMI: 24.5, Normal weight (BMI 18.5-24. 9)  Admission Body Weight: 128 lb 1.4 oz (stated 7/12)  Ideal Body Weight (lbs) (Calculated): 115 lbs (52 kg), 120 %  Usual Body Wt:  , Percent weight change:            Edema: No data recorded   Estimated Daily Nutrient Needs:  Energy (kcal/day): 1913-6524 (Kcal/kg (25-30), Weight Used: Current (62.6 kg (7/13)))  Protein (g/day): 63-75 Weight Used: (Current)  Fluid (ml/day):   (1 ml/kcal)    Nutrition Diagnosis:   · Predicted inadequate energy intake related to  (early satiety) as evidenced by  (patient reported barrier to PO intake, diet recall)    Nutrition Interventions:   Food and/or Nutrient Delivery: Continue current diet, Start oral nutrition supplement     Coordination of Nutrition Care: Continue to monitor while inpatient  Plan of Care discussed with Maryann Gonzalez RN    Goals: Active Goal: Meet at least 75% nutrition needs by next RD follow-up.     Nutrition Monitoring and Evaluation:      Food/Nutrient Intake Outcomes: Food and nutrient intake, Supplement intake       Discharge Planning:    Continue oral nutrition supplement, Continue current diet    Alhaji Mack RD, , LD on 7/13/2021 at 4:16 PM  Contact: [phone]

## 2021-07-13 NOTE — PROGRESS NOTES
END OF SHIFT NOTE:    INTAKE/OUTPUT  07/12 0701 - 07/13 0700  In: 3410 [I.V.:3099]  Out: 600 [Urine:600]  Voiding: YES  Catheter: NO  Drain:              Flatus: Patient does have flatus present. Stool:  0 occurrences. Characteristics:       Emesis: 0 occurrences. Characteristics:        VITAL SIGNS  Patient Vitals for the past 12 hrs:   Temp Pulse Resp BP SpO2   07/13/21 0350 98.6 °F (37 °C) 96 18 103/62 95 %   07/12/21 2240 98.6 °F (37 °C) 88 18 111/60 94 %   07/12/21 2054 98.6 °F (37 °C) 81 18 107/70 97 %   07/12/21 2001 -- 81 -- (!) 116/57 98 %       Pain Assessment  Pain Intensity 1: 6 (07/13/21 0617)  Pain Location 1: Abdomen  Pain Intervention(s) 1: Medication (see MAR)  Patient Stated Pain Goal: 3    Ambulating  Yes    Shift report given to oncoming nurse at the bedside.     Adrienne Mercado RN [FreeTextEntry1] : RADHA Patient compliant to CPAP therapy and having positive clinical response to treatment. Continue present settings.\par \par DM\par Anxiety

## 2021-07-13 NOTE — PROGRESS NOTES
Problem: Cellulitis Care Plan (Adult)  Goal: *Control of acute pain  7/12/2021 2330 by Lindsey Stratton RN  Outcome: Progressing Towards Goal  7/12/2021 2328 by Lindsey Stratton RN  Outcome: Progressing Towards Goal  Goal: *Skin integrity maintained  7/12/2021 2330 by Lindsey Stratton RN  Outcome: Progressing Towards Goal  7/12/2021 2328 by Lindsey Stratton RN  Outcome: Progressing Towards Goal  Goal: *Absence of infection signs and symptoms  7/12/2021 2330 by Lindsey Stratton RN  Outcome: Progressing Towards Goal  7/12/2021 2328 by Lindsey Stratton RN  Outcome: Not Met     Problem: Patient Education: Go to Patient Education Activity  Goal: Patient/Family Education  7/12/2021 2330 by Lindsey Stratton RN  Outcome: Progressing Towards Goal  7/12/2021 2328 by Lindsey Stratton RN  Outcome: Not Met

## 2021-07-13 NOTE — PROGRESS NOTES
Care Management Interventions  Mode of Transport at Discharge: Self  Transition of Care Consult (CM Consult): Discharge Planning  Discharge Durable Medical Equipment: No  Physical Therapy Consult: No  Occupational Therapy Consult: No  Speech Therapy Consult: No  Confirm Follow Up Transport: Self   Resource Information Provided?: No  Discharge Location  Discharge Placement: Unable to determine at this time    Chart screened by  for discharge planning. Patient will likely return home at discharge. PT/OT have not been consulted at this time. CM will continue to follow patient during hospitalization for discharge planning and needs. No needs identified at this time. Please consult  if any new issues arise.

## 2021-07-13 NOTE — PROGRESS NOTES
Physician Progress Note      PATIENT:               James Israel  CSN #:                  [id number]  :                       1960  ADMIT DATE:       2021 4:30 PM  DISCH DATE:  RESPONDING  PROVIDER #:        MARTHA SOUZA DO          QUERY TEXT:    Pt admitted with Abdominal Wall Cellulitis. Pt noted to have 100.8--120--18--144/82 98% RA, WBC 14.9 . If possible, please document in the progress notes and discharge summary if you are evaluating and /or treating any of the following:  [SIRS without Sepsis[de-identified] This patient has SIRS without Sepsis.]]    The medical record reflects the following:  Risk Factors: CML with oral Chemotherapy, self-injections of Lovenox. MRSA colonization  Clinical Indicators: T 100.8 in a immuno-compromised patient 2/2 to oral chemotherapy for CML, tachycardia, Leukocytosis (WBC 14.9)  Treatment: Monitoring, NS @ 100 cc/HR, LR IV Bolus 1,743 cc x1, IV Zosyn, IV Vancomycin,    Thank you,  Merlin Sinha RN, C BSN  Clinical   [email or web address]  Options provided:  -- Sepsis, present on admission  -- Sepsis, present on admission now resolved  -- Sepsis, not present on admission  -- Abdominal Wall Cellulitis without Sepsis  -- Sepsis was ruled out  -- Other - I will add my own diagnosis  -- Disagree - Not applicable / Not valid  -- Disagree - Clinically unable to determine / Unknown  -- Refer to Clinical Documentation Reviewer    PROVIDER RESPONSE TEXT:    This patient has sepsis which was present on admission. Query created by:  Lalito duckworth 2021 4:58 PM      Electronically signed by:  Larisa Morales DO 2021 6:08 PM

## 2021-07-14 PROBLEM — A41.9 SEPSIS DUE TO CELLULITIS (HCC): Status: ACTIVE | Noted: 2021-01-01

## 2021-07-14 NOTE — PROGRESS NOTES
Care Management Interventions  Mode of Transport at Discharge: Self  Transition of Care Consult (CM Consult): Discharge Planning  Discharge Durable Medical Equipment: No  Physical Therapy Consult: No  Occupational Therapy Consult: No  Speech Therapy Consult: No  Current Support Network: Own Home  Confirm Follow Up Transport: Family  The Patient and/or Patient Representative was Provided with a Choice of Provider and Agrees with the Discharge Plan?: Yes  Name of the Patient Representative Who was Provided with a Choice of Provider and Agrees with the Discharge Plan: self  Freedom of Choice List was Provided with Basic Dialogue that Supports the Patient's Individualized Plan of Care/Goals, Treatment Preferences and Shares the Quality Data Associated with the Providers?: Yes  [place] Resource Information Provided?: No  Discharge Location  Discharge Placement: Home    Patient to discharge home today. No CM needs have been identified. All milestones for discharge have been met. Patient to transport home with family.

## 2021-07-14 NOTE — PROGRESS NOTES
END OF SHIFT NOTE:    INTAKE/OUTPUT  07/13 0701 - 07/14 0700  In: 1628 [P.O.:600; I.V.:799]  Out: 2250 [Urine:2250]  Voiding: YES  Catheter: NO  Drain:              Flatus: Patient does have flatus present. Stool:  1 occurrences. Characteristics:  Stool Assessment  Stool Color: Brown  Stool Appearance: Loose  Stool Amount: Medium  Stool Source/Status: Rectum    Emesis: 0 occurrences. Characteristics:        VITAL SIGNS  Patient Vitals for the past 12 hrs:   Temp Pulse Resp BP SpO2   07/14/21 0313 98.2 °F (36.8 °C) 85 17 106/63 --   07/14/21 0312 98.2 °F (36.8 °C) 86 17 106/63 92 %   07/14/21 0023 -- -- -- 113/65 --   07/13/21 2308 98.6 °F (37 °C) 78 18 (!) 94/46 93 %   07/13/21 1937 99.2 °F (37.3 °C) 84 18 (!) 122/54 94 %       Pain Assessment  Pain Intensity 1: 0 (07/14/21 0311)  Pain Location 1: Abdomen, Groin  Pain Intervention(s) 1: Medication (see MAR), Environmental changes  Patient Stated Pain Goal: 3    Ambulating  Yes    Shift report given to oncoming nurse at the bedside.     Santosh Sarkar RN

## 2021-07-14 NOTE — PROGRESS NOTES
Problem: Cellulitis Care Plan (Adult)  Goal: *Control of acute pain  Outcome: Resolved/Met  Goal: *Skin integrity maintained  Outcome: Resolved/Met  Goal: *Absence of infection signs and symptoms  Outcome: Resolved/Met     Problem: Patient Education: Go to Patient Education Activity  Goal: Patient/Family Education  Outcome: Resolved/Met

## 2021-07-14 NOTE — DISCHARGE SUMMARY
Hospitalist Discharge Summary     Admit Date:  2021  4:30 PM   DC note date: 2021  Name:  Shanel Anton   Age:  61 y.o.  :  1960   MRN:  [id number]   PCP:  Van Domingo MD  Treatment Team: Attending Provider: Earlyne Schwab, MD; Care Manager: Quincy Ross RN; Utilization Review: Asael Mcclain; Staff Nurse: Ale Reyes RN; Consulting Provider: Van Domingo MD  Presenting Complaint: Rash and Abdominal Pain    Initial Admission Diagnosis: Cellulitis [L03.90]     Problem List for this Hospitalization:  Hospital Problems as of 2021 Date Reviewed: 2021        Codes Class Noted - Resolved POA    * (Principal) Sepsis due to cellulitis [facility]) ICD-10-CM: L03.90, A41.9  ICD-9-CM: 682.9, 995.91  2021 - Present Yes        Esophageal varices ([facility] (Chronic) ICD-10-CM: I85.00  ICD-9-CM: 456.1  Unknown - Present Yes    Overview Signed 10/6/2020  4:12 AM by Mirela Mccarthy MD     grade 3             CML (chronic myelocytic leukemia) ([facility] (Chronic) ICD-10-CM: C92.10  ICD-9-CM: 205.10  3/30/2015 - Present Yes        Acquired hypercoagulable state ([facility] (Chronic) ICD-10-CM: Q14.23  ICD-9-CM: 289.81  3/30/2015 - Present Yes        Cirrhosis ([facility] (Chronic) ICD-10-CM: K74.60  ICD-9-CM: 571.5  2012 - Present Yes        Polycythemia vera ([facility] (Chronic) ICD-10-CM: D45  ICD-9-CM: 238.4  2012 - Present Yes    Overview Addendum 2012 12:06 PM by Twin Levy     Diagnosed 2008 by kiana-2 analysis however portal vein thrombosis  And and splenomegaly since   Hydroxyurea for 6 months poor tolerance spleen did not shrink   Pegasys 90 mcg weekly  till    Restarted 12-30-09 45 mcg q 2 weeks   reduced to q month   Increased 45 mcg 2/month   Held  thrombocytopenia  12 restarted 45 mcg q 2 weeks  11 45 mcg q 3 weeks  11 45 mcg q 4 weeks  Uncertain dosing thereafter   Possibly q 3 weeks 10-15-11 and held and restarted on 4-1-12     6-12 ct scan Small bowel wall thickening suggesting an enteritis. In addition, there is well thickening of the right colon which can suggest an additional   colitis although this can also be seen with severe portal hypertension. Likely reactive edematous changes it scattered fluid collections are seen of   the small bowel mesentery. . Apparent occlusion of the portal vein. In addition, the splenic vein, and superior mesenteric vein are not definitely seen and are also likely   occluded. Splenomegaly, and extensive varices are seen as described above consistent with the portal vein occlusion. Omid Brittle Heterogeneous enhancement of the liver and mild periportal edema. An acute hepatitis cannot be excluded. Admission HPI from 7/12/2021:    \" 70-year-old female with a history of CML on oral chemotherapy, polycythemia vera on Lovenox, DCT and PV thrombosis, intracranial hemorrhage, small bowel resection from intestinal ischemia, seizure, splenomegaly presents with redness extending from left abdomen to breast since waking up this morning.  She reports extreme pain at the site and chills at home. Robby Rush has nausea and diarrhea, no vomiting.  Patient denies cough or shortness of breath.  She has a headache and diffuse body soreness is concerned she may have had a seizure last night.  She has been compliant with Keppra.  She is prescribed 60 mg of Lovenox twice daily, but usually only takes 30.  She recently increased dose to 60 and states this is when the redness occurred. On further questioning, her skin redness extending all the way from LEFT lower abdomen to her left side of chest started today. No fever or chills or injuries other than her daily lovenox SQ, \"    Hospital Course:  Pt admitted for sepsis due to cellulitis. Started on vanc with improvement in symptoms and WBC count. Suspect infection from lovenox injection.   She follows prescribed practices for sterilization of injection site.  She says she \"scrubs\" her sloughing skin every other day which actually may make her more susceptible to infection by breaking down the skin barrier. She is noncompliant with prescribed dose of lovenox; she only takes it once daily most days because she says taking it twice makes her \"skinny\", which is strange to me. unclear if there is an underlying psych issue or misunderstanding about how lovenox works. I told her she should be taking twice daily and informed her of the risks of noncompliance. Will have her follow up with her hematologist for this and to recheck her abdomen. Blood cx neg x2d.  6 more days of abx prescribed. Disposition: Home or Self Care  Activity: Activity as tolerated  Diet: ADULT DIET Regular  ADULT ORAL NUTRITION SUPPLEMENT Breakfast, Dinner; Standard High Calorie/High Protein  Code Status: Full Code    Follow Up Orders: Follow-up Appointments   Procedures    FOLLOW UP VISIT Appointment in: One Week Dr Bettye Mares oncology for follow up     Dr Bettye Mares oncology for follow up     Standing Status:   Standing     Number of Occurrences:   1     Order Specific Question:   Appointment in     Answer: One Week       Follow-up Information     Follow up With Specialties Details Why Contact Info    Van Domingo MD Hematology and Oncology, Internal Medicine, Hematology, Oncology In 1 week follow up hospital stay [address]  [phone]            Time spent in patient discharge and coordination 36 minutes. Plan was discussed with pt. All questions answered. Patient was stable at time of discharge. Given instructions to call a physician or return if any concerns. Discharge summary and encounter summary was sent to PCP electronically via \"Comm Mgt\" link in [facility], if possible.     Discharge Info:   Current Discharge Medication List      START taking these medications    Details   cephALEXin (KEFLEX) 500 mg capsule Take 1 Capsule by mouth three (3) times daily for 6 days. Qty: 18 Capsule, Refills: 0  Start date: 7/14/2021, End date: 7/20/2021      doxycycline (ADOXA) 100 mg tablet Take 1 Tablet by mouth two (2) times a day for 6 days. Qty: 12 Tablet, Refills: 0  Start date: 7/14/2021, End date: 7/20/2021         CONTINUE these medications which have NOT CHANGED    Details   oxyCODONE-acetaminophen (PERCOCET 10)  mg per tablet Take 1 Tablet by mouth every eight (8) hours as needed for Pain for up to 30 days. Max Daily Amount: 3 Tablets. Qty: 90 Tablet, Refills: 0    Comments: Okay to fill early per Dr. Isidro Araujo Diagnoses: [facility]chronic myel[facility]) [facility] Spleen enlarged; Chronic pain due to neoplasm      zolpidem (AMBIEN) 10 mg tablet Take 1 Tablet by mouth nightly as needed for Sleep. Max Daily Amount: 10 mg.  Qty: 20 Tablet, Refills: 0    Comments: Okay to fill early per Dr. Isidro Araujo Diagnoses: Insomnia due to medical condition      !! ondansetron (ZOFRAN ODT) 8 mg disintegrating tablet Take 1 Tab by mouth every eight (8) hours as needed for Nausea or Vomiting. Qty: 60 Tab, Refills: 0      magnesium 250 mg tab Take  by mouth. Synthroid 112 mcg tablet 1 tablet once a day with an extra 1/2 tablet on Sundays  Qty: 100 Tab, Refills: 3    Associated Diagnoses: Primary hypothyroidism      ruxolitinib 5 mg tab Take 1 Tab by mouth two (2) times a day. Qty: 60 Tab, Refills: 11    Comments: Called in to pharmacy      enoxaparin (LOVENOX) 60 mg/0.6 mL injection INJECT 60 MG BY SUBCUTANEOUS ROUTE EVERY TWELVE (12) HOURS. Qty: 60 Syringe, Refills: 11    Associated Diagnoses: CML (chronic myelocytic leukemia) ([facility] Splenomegaly; Pain; Portal vein thrombosis; Iron deficiency anemia, unspecified iron deficiency anemia type; Polycythemia vera (HCC)      dasatinib (SpryceL) 80 mg tab Take 1 Tab by mouth daily.   Qty: 30 Tab, Refills: 11    Associated Diagnoses: Polycythemia vera ([facility] CML (chronic myelocytic leukemia) ([facility]      !! ondansetron (ZOFRAN ODT) 4 mg disintegrating tablet Take 1 Tab by mouth every eight (8) hours as needed for Nausea. Qty: 90 Tab, Refills: 0      pseudoephedrine (SUDAFED) 30 mg tablet Take  by mouth every four (4) hours as needed for Congestion. calcium carbonate (CALTREX) 600 mg calcium (1,500 mg) tablet Take 600 mg by mouth nightly. cholecalciferol (VITAMIN D3) 1,000 unit cap Take 1,000 Units by mouth nightly. lansoprazole (PREVACID) 30 mg capsule Take 30 mg by mouth daily. prn      ibuprofen (ADVIL) 200 mg tablet Take  by mouth. Last dose 1/16/19      diphenoxylate-atropine (LOMOTIL) 2.5-0.025 mg per tablet Take 1 Tab by mouth four (4) times daily as needed for Diarrhea. Max Daily Amount: 4 Tabs. Qty: 90 Tab, Refills: 1      calcium carbonate (TUMS) 200 mg calcium (500 mg) chew Take 1 Tab by mouth daily as needed. !! - Potential duplicate medications found. Please discuss with provider. Procedures done this admission:  * No surgery found *    Consults this admission:  None    Echocardiogram/EKG results:  Results for orders placed or performed during the hospital encounter of 07/12/21   EKG, 12 LEAD, INITIAL   Result Value Ref Range    Ventricular Rate 98 BPM    Atrial Rate 98 BPM    P-R Interval 138 ms    QRS Duration 80 ms    Q-T Interval 342 ms    QTC Calculation (Bezet) 436 ms    Calculated P Axis 71 degrees    Calculated R Axis 75 degrees    Calculated T Axis 52 degrees    Diagnosis       Normal sinus rhythm  Normal ECG  No previous ECGs available  Confirmed by Boby Paige ([id number]) on 7/12/2021 8:48:13 PM       *Note: Due to a large number of results and/or encounters for the requested time period, some results have not been displayed. A complete set of results can be found in Results Review.        Diagnostic Imaging/Tests:   XR CHEST PORT    Result Date: 7/12/2021  PORTABLE CHEST, July 12, 2021 at 1711 hours CLINICAL HISTORY:  Abdominal and left breast rash and erythema. COMPARISON:  January 10, 2019. FINDINGS:  AP erect image demonstrates no confluent infiltrate or significant pleural fluid. The heart size is within normal limits without evidence of congestive heart failure or pneumothorax. The bony thorax appears intact on this view. There are overlying radiopaque support devices. NO ACUTE CARDIOPULMONARY DISEASE IDENTIFIED.       All Micro Results     Procedure Component Value Units Date/Time    BLOOD CULTURE [[id number]] Collected: 07/12/21 1730    Order Status: Completed Specimen: Blood Updated: 07/14/21 0714     Special Requests: --        LEFT  FOREARM       Culture result: NO GROWTH 2 DAYS       BLOOD CULTURE [[id number]] Collected: 07/12/21 1742    Order Status: Completed Specimen: Blood Updated: 07/14/21 0714     Special Requests: --        LEFT  Antecubital       Culture result: NO GROWTH 2 DAYS             SARS-CoV-2 Lab Results  \"Novel Coronavirus\" Test: No results found for: COV2NT   \"Emergent Disease\" Test: No results found for: EDPR  \"SARS-COV-2\" Test: No results found for: XGCOVT  Rapid Test: No results found for: COVR         Labs: Results:       BMP, Mg, Phos Recent Labs     07/14/21  0527 07/12/21  1447    139   K 3.9 3.9   * 107   CO2 25 26   AGAP 3* 6*   BUN 13 13   CREA 0.49* 0.46*   CA 8.2* 8.8   GLU 88 91      CBC Recent Labs     07/14/21 0527 07/12/21  1447   WBC 5.6 14.9*   RBC 4.11 4.83   HGB 12.2 14.3   HCT 38.8 44.9   * 212   GRANS 76 88*   LYMPH 13 5*   EOS 3 0*   MONOS 8 6   BASOS 1 0   IG 0 1   ANEU 4.2 13.1*   ABL 0.7 0.8   ORESTES 0.1 0.1   ABM 0.4 0.8   ABB 0.0 0.0   AIG 0.0 0.1      LFT Recent Labs     07/14/21 0527 07/12/21  1447   ALT 74* 84*   AP 78 91   TP 5.4* 6.6   ALB 3.2 4.0   GLOB 2.2* 2.6   AGRAT 1.5 1.5      Cardiac Testing No results found for: BNPP, BNP, CPK, RCK1, RCK2, RCK3, RCK4, CKMB, CKNDX, CKND1, TROPT, TROIQ   Coagulation Tests Lab Results   Component Value Date/Time    Prothrombin time 15.7 (H) 10/20/2020 10:50 AM    Prothrombin time 15.1 (H) 10/06/2020 01:57 AM    Prothrombin time 15.1 (H) 01/10/2019 02:10 AM    INR 1.2 10/20/2020 10:50 AM    INR 1.2 10/06/2020 01:57 AM    INR 1.2 01/10/2019 02:10 AM    aPTT 23.5 (L) 10/20/2020 10:50 AM    aPTT 41.0 (H) 10/06/2020 01:57 AM    aPTT 21.1 (L) 10/26/2016 08:00 PM      A1c No results found for: HBA1C, ITN1JGZN, FAO1VIRY   Lipid Panel Lab Results   Component Value Date/Time    Cholesterol, total 105 03/16/2015 06:30 AM    HDL Cholesterol 37 (L) 03/16/2015 06:30 AM    LDL, calculated 54.8 03/16/2015 06:30 AM    VLDL, calculated 13.2 03/16/2015 06:30 AM    Triglyceride 66 03/16/2015 06:30 AM    CHOL/HDL Ratio 2.8 03/16/2015 06:30 AM      Thyroid Panel Lab Results   Component Value Date/Time    TSH 2.640 05/19/2021 12:14 PM    TSH 21.400 (H) 03/18/2021 12:29 PM    T4, Total 9.7 03/16/2015 10:15 AM    T4, Free 1.1 05/19/2021 12:14 PM    T4, Free 1.4 03/13/2020 09:39 AM        Most Recent UA Lab Results   Component Value Date/Time    Color YELLOW 04/13/2018 12:34 PM    Appearance CLEAR 04/13/2018 12:34 PM    Specific gravity 1.007 03/31/2015 03:00 PM    pH (UA) 7.0 04/13/2018 12:34 PM    Protein NEGATIVE  04/13/2018 12:34 PM    Glucose NEGATIVE  04/13/2018 12:34 PM    Ketone NEGATIVE  04/13/2018 12:34 PM    Bilirubin NEGATIVE  04/13/2018 12:34 PM    Blood NEGATIVE  04/13/2018 12:34 PM    Urobilinogen 1.0 04/13/2018 12:34 PM    Nitrites NEGATIVE  04/13/2018 12:34 PM    Leukocyte Esterase NEGATIVE  04/13/2018 12:34 PM    WBC 0 04/13/2018 12:34 PM    RBC 0 04/13/2018 12:34 PM    Epithelial cells 0 04/13/2018 12:34 PM    Bacteria 0 04/13/2018 12:34 PM    Casts 0 04/13/2018 12:34 PM    Crystals, urine 0 04/13/2018 12:34 PM    Mucus 0 04/13/2018 12:34 PM          All Labs from Last 24 Hrs:  Recent Results (from the past 24 hour(s))   VANCOMYCIN, TROUGH    Collection Time: 07/14/21  5:27 AM   Result Value Ref Range    Vancomycin,trough 14.2 5 - 20 ug/mL   METABOLIC PANEL, COMPREHENSIVE    Collection Time: 07/14/21  5:27 AM   Result Value Ref Range    Sodium 142 136 - 145 mmol/L    Potassium 3.9 3.5 - 5.1 mmol/L    Chloride 114 (H) 98 - 107 mmol/L    CO2 25 21 - 32 mmol/L    Anion gap 3 (L) 7 - 16 mmol/L    Glucose 88 65 - 100 mg/dL    BUN 13 8 - 23 MG/DL    Creatinine 0.49 (L) 0.6 - 1.0 MG/DL    GFR est AA >60 >60 ml/min/1.73m2    GFR est non-AA >60 >60 ml/min/1.73m2    Calcium 8.2 (L) 8.3 - 10.4 MG/DL    Bilirubin, total 0.7 0.2 - 1.1 MG/DL    ALT (SGPT) 74 (H) 12 - 65 U/L    AST (SGOT) 39 (H) 15 - 37 U/L    Alk. phosphatase 78 50 - 136 U/L    Protein, total 5.4 (L) 6.3 - 8.2 g/dL    Albumin 3.2 3.2 - 4.6 g/dL    Globulin 2.2 (L) 2.3 - 3.5 g/dL    A-G Ratio 1.5 1.2 - 3.5     CBC WITH AUTOMATED DIFF    Collection Time: 07/14/21  5:27 AM   Result Value Ref Range    WBC 5.6 4.3 - 11.1 K/uL    RBC 4.11 4.05 - 5.2 M/uL    HGB 12.2 11.7 - 15.4 g/dL    HCT 38.8 35.8 - 46.3 %    MCV 94.4 79.6 - 97.8 FL    MCH 29.7 26.1 - 32.9 PG    MCHC 31.4 31.4 - 35.0 g/dL    RDW 18.8 (H) 11.9 - 14.6 %    PLATELET 423 (L) 727 - 450 K/uL    MPV 11.1 9.4 - 12.3 FL    ABSOLUTE NRBC 0.00 0.0 - 0.2 K/uL    DF AUTOMATED      NEUTROPHILS 76 43 - 78 %    LYMPHOCYTES 13 13 - 44 %    MONOCYTES 8 4.0 - 12.0 %    EOSINOPHILS 3 0.5 - 7.8 %    BASOPHILS 1 0.0 - 2.0 %    IMMATURE GRANULOCYTES 0 0.0 - 5.0 %    ABS. NEUTROPHILS 4.2 1.7 - 8.2 K/UL    ABS. LYMPHOCYTES 0.7 0.5 - 4.6 K/UL    ABS. MONOCYTES 0.4 0.1 - 1.3 K/UL    ABS. EOSINOPHILS 0.1 0.0 - 0.8 K/UL    ABS. BASOPHILS 0.0 0.0 - 0.2 K/UL    ABS. IMM.  GRANS. 0.0 0.0 - 0.5 K/UL       Discharge Exam:  Patient Vitals for the past 24 hrs:   Temp Pulse Resp BP SpO2   07/14/21 0744 98.5 °F (36.9 °C) 82 17 (!) 92/51 95 %   07/14/21 0313 98.2 °F (36.8 °C) 85 17 106/63 --   07/14/21 0312 98.2 °F (36.8 °C) 86 17 106/63 92 %   07/14/21 0023 -- -- -- 113/65 --   07/13/21 2308 98.6 °F (37 °C) 78 18 (!) 94/46 93 %   07/13/21 1937 99.2 °F (37.3 °C) 84 18 (!) 122/54 94 % 07/13/21 1533 99.1 °F (37.3 °C) 89 18 125/75 97 %   07/13/21 1136 97.7 °F (36.5 °C) 83 18 (!) 106/56 98 %     Oxygen Therapy  O2 Sat (%): 95 % (07/14/21 0744)  Pulse via Oximetry: 82 beats per minute (07/12/21 2001)  O2 Device: None (Room air) (07/12/21 1428)    Estimated body mass index is 24.45 kg/m² as calculated from the following:    Height as of this encounter: 5' 3\" (1.6 m). Weight as of this encounter: 62.6 kg (138 lb). Intake/Output Summary (Last 24 hours) at 7/14/2021 0854  Last data filed at 7/14/2021 0548  Gross per 24 hour   Intake 1159 ml   Output 2250 ml   Net -1091 ml       *Note that automatically entered I/Os may not be accurate; dependent on patient compliance with collection and accurate  by assistants. General:    Well nourished. No overt distress  Eyes:   Normal sclerae. Extraocular movements intact. ENT:  Normocephalic, atraumatic. Moist mucous membranes  CV:   No edema. Lungs:  Even Unlabored  Abdomen: Soft, nontender, nondistended. Erythema on abd and chest faded compared to prior per pt  Extremities: Warm and dry. No cyanosis or clubbing  Neurologic: CN II-XII grossly intact. No gross focal deficits. Alert. Skin:     No rashes. No jaundice. Psych:  Normal mood and affect.     Current Med List in Hospital:   Current Facility-Administered Medications   Medication Dose Route Frequency    [START ON 7/15/2021] Vancomycin trough reminder   Other ONCE    oxyCODONE IR (ROXICODONE) tablet 10 mg  10 mg Oral Q4H PRN    Saccharomyces boulardii (FLORASTOR) capsule 500 mg  500 mg Oral BID    sodium chloride (NS) flush 5-10 mL  5-10 mL IntraVENous Q8H    sodium chloride (NS) flush 5-10 mL  5-10 mL IntraVENous PRN    enoxaparin (LOVENOX) injection 60 mg  60 mg SubCUTAneous Q12H    levothyroxine (SYNTHROID) tablet 112 mcg  112 mcg Oral 6am    ruxolitinib tab 5 mg (Patient Supplied)  5 mg Oral BID    zolpidem (AMBIEN) tablet 10 mg  10 mg Oral QHS PRN    sodium chloride (NS) flush 5-40 mL  5-40 mL IntraVENous Q8H    sodium chloride (NS) flush 5-40 mL  5-40 mL IntraVENous PRN    senna (SENOKOT) tablet 17.2 mg  2 Tablet Oral BID PRN    ondansetron (ZOFRAN) injection 4 mg  4 mg IntraVENous Q6H PRN    vancomycin (VANCOCIN) 1,000 mg in 0.9% sodium chloride 250 mL (VIAL-MATE)  1,000 mg IntraVENous Q12H    acetaminophen (TYLENOL) tablet 500 mg  500 mg Oral Q6H PRN    dasatinib tab 80 mg (Patient Supplied)  80 mg Oral QHS       Allergies   Allergen Reactions    Latex Other (comments)     Testing for latex allergy was positive as a 2 (on a scale of 1 to 3).     Sulfa (Sulfonamide Antibiotics) Anaphylaxis    Tramadol Other (comments)     Top lip swelled    Augmentin [Amoxicillin-Pot Clavulanate] Rash    Morphine Nausea and Vomiting     Not a true allergy    Rizatriptan Rash    Tetanus Immune Globulin Other (comments)     Heat, bruising, and swelling at  Site of injection    Xanax [Alprazolam] Other (comments)     Hallucinations    Zonegran [Zonisamide] Rash     Immunization History   Administered Date(s) Administered    (RETIRED) Pneumococcal Vaccine (Unspecified Type) 09/27/2008    Covid-19, MODERNA, Mrna, Lnp-s, Pf, 100mcg/0.5mL 03/27/2021, 04/27/2021    Hib (PRP-T) 04/03/2019    Influenza Vaccine 10/01/2014, 10/01/2016    Influenza Vaccine (Quad) Mdck Pf (>4 Yrs Flucelvax QUAD 25058) 09/28/2020    Influenza Vaccine [employer]) PF (>6 Mo Flulaval, Fluarix, and >3 Yrs Afluria, Fluzone 51482) 10/25/2018, 10/08/2019    Pneumococcal Conjugate (PCV-13) 04/03/2019    Pneumococcal Polysaccharide (PPSV-23) 01/01/2004    TB Skin Test (PPD) Intradermal 07/21/2014    Zoster Recombinant 03/13/2020, 08/09/2020       Signed:  Shon Bradley MD

## 2021-07-14 NOTE — PROGRESS NOTES
Patient discharged home at this time with friend. A&ox4. IV's taken out and no tele on. Discharge instructions gone over with patient and she is aware to  prescriptions at the pharmacy. Her abdomen looks much better and is just slightly pink. MD aware. Safety maintained. Discharged home at this time.

## 2021-07-14 NOTE — H&P
Hospitalist Progress Note     Name:  Ashley Xavier  Age:60 y.o. 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  She reports extreme pain at the site and chills at home.  She has nausea and diarrhea, no vomiting.  Patient denies cough or shortness of breath.  She has a headache and diffuse body soreness is concerned she may have had a seizure last night.  She has been compliant with Keppra.  She is prescribed 60 mg of Lovenox twice daily, but usually only takes 30.  She recently increased dose to 60 and states this is when the redness occurred. On further questioning, her skin redness extending all the way from LEFT lower abdomen to her left side of chest started today. No fever or chills or injuries other than her daily lovenox SQ,    Patient admitted for abdominal wall/chest cellulitis. Started on IV atbx. Subjective (21):    Reports she is feeling better and thinks her redness of abdomen is improving but redness of chest is not at that point. Denies itching, fever, nausea. No new complaints. Discussed abdominal girth and she believes it is baseline and r/t splenomegaly. Has had an episode of diarrhea but relates this to her Jakafi (for PV). Review of Systems: 14 point review of systems is otherwise negative with the exception of the elements mentioned above.   Assessment & Plan     # abdominal wall/chest cellulitis  -  - continue vancomycin IV D2, small clinical improvement  - repeat procal in AM    # CML/ PV/ Splenomegaly/ hx of DVT's  - pt reports taking lovenox only once daily (rx'd twice daily)  - ?s/e's of Jakafi as diarrhea  - will consult heme/onc for AM to address these issues as well as review if cellulitis could be medication related?   - add florastor    # hypothyroidism  - synthroid      Dispo/Discharge Planning:  ?home 1-2 days pending cellulitis improvement    Diet:  ADULT DIET Regular  ADULT ORAL NUTRITION SUPPLEMENT Breakfast, Dinner; Standard High Calorie/High Protein  DVT PPx: lovenox sq bid  Code status: Full Code    Objective:     Patient Vitals for the past 24 hrs:   Temp Pulse Resp BP SpO2   07/13/21 1937 99.2 °F (37.3 °C) 84 18 (!) 122/54 94 %   07/13/21 1533 99.1 °F (37.3 °C) 89 18 125/75 97 %   07/13/21 1136 97.7 °F (36.5 °C) 83 18 (!) 106/56 98 %   07/13/21 0717 98.7 °F (37.1 °C) 93 18 111/64 94 %   07/13/21 0350 98.6 °F (37 °C) 96 18 103/62 95 %   07/12/21 2240 98.6 °F (37 °C) 88 18 111/60 94 %   07/12/21 2054 98.6 °F (37 °C) 81 18 107/70 97 %     Oxygen Therapy  O2 Sat (%): 94 % (07/13/21 1937)  Pulse via Oximetry: 82 beats per minute (07/12/21 2001)  O2 Device: None (Room air) (07/12/21 1428)    Body mass index is 24.45 kg/m². Physical Exam:   General:  No acute distress, speaking in full sentences, no use of accessory muscles   Lungs:  Clear to auscultation bilaterally   CV:  Regular rate and rhythm with normal S1 and S2   Abdomen:  Soft distended with palpable spleen below rib margins and healed vertical scar. Diffuse erythema over abdomen and chest without RAGHAV noted or flunctuance. Extremities:  No cyanosis clubbing or edema   Neuro:  Nonfocal, A&O x3   Psych:  Normal affect     Data Review:  I have reviewed all labs, meds, and studies from the last 24 hours:    Labs:    No results found for this or any previous visit (from the past 24 hour(s)).     All Micro Results     Procedure Component Value Units Date/Time    BLOOD CULTURE [[id number]] Collected: 07/12/21 1730    Order Status: Completed Specimen: Blood Updated: 07/13/21 0651     Special Requests: --        LEFT  FOREARM       Culture result: NO GROWTH AFTER 12 HOURS       BLOOD CULTURE [[id number]] Collected: 07/12/21 1742    Order Status: Completed Specimen: Blood Updated: 07/13/21 0651     Special Requests: --        LEFT  Antecubital       Culture result: NO GROWTH AFTER 12 HOURS             EKG Results     Procedure 720 Value Units Date/Time    EKG, 12 LEAD, INITIAL [[id number]] Collected: 07/12/21 1658    Order Status: Completed Updated: 07/12/21 2048     Ventricular Rate 98 BPM      Atrial Rate 98 BPM      P-R Interval 138 ms      QRS Duration 80 ms      Q-T Interval 342 ms      QTC Calculation (Bezet) 436 ms      Calculated P Axis 71 degrees      Calculated R Axis 75 degrees      Calculated T Axis 52 degrees      Diagnosis --     Normal sinus rhythm  Normal ECG  No previous ECGs available  Confirmed by Lauri Vasquez ([id number]) on 7/12/2021 8:48:13 PM            Other Studies:  No results found.     Current Meds:   Current Facility-Administered Medications   Medication Dose Route Frequency    morphine injection 4 mg  4 mg IntraVENous Q4H PRN    oxyCODONE IR (ROXICODONE) tablet 10 mg  10 mg Oral Q4H PRN    [START ON 7/14/2021] Vancomycin Trough Level Reminder   Other ONCE    Saccharomyces boulardii (FLORASTOR) capsule 500 mg  500 mg Oral BID    sodium chloride (NS) flush 5-10 mL  5-10 mL IntraVENous Q8H    sodium chloride (NS) flush 5-10 mL  5-10 mL IntraVENous PRN    enoxaparin (LOVENOX) injection 60 mg  60 mg SubCUTAneous Q12H    levothyroxine (SYNTHROID) tablet 112 mcg  112 mcg Oral 6am    ruxolitinib tab 5 mg (Patient Supplied)  5 mg Oral BID    zolpidem (AMBIEN) tablet 10 mg  10 mg Oral QHS PRN    sodium chloride (NS) flush 5-40 mL  5-40 mL IntraVENous Q8H    sodium chloride (NS) flush 5-40 mL  5-40 mL IntraVENous PRN    senna (SENOKOT) tablet 17.2 mg  2 Tablet Oral BID PRN    ondansetron (ZOFRAN) injection 4 mg  4 mg IntraVENous Q6H PRN    vancomycin (VANCOCIN) 1,000 mg in 0.9% sodium chloride 250 mL (VIAL-MATE)  1,000 mg IntraVENous Q12H    acetaminophen (TYLENOL) tablet 500 mg  500 mg Oral Q6H PRN    dasatinib tab 80 mg (Patient Supplied)  80 mg Oral QHS       Problem List:  Hospital Problems as of 7/13/2021 Date Reviewed: 5/19/2021        Codes Class Noted - Resolved POA    Cellulitis ICD-10-CM: L03.90  ICD-9-CM: 682.9  7/12/2021 - Present Unknown                   Part of this note was written by using a voice dictation software and the note has been proof read but may still contain some grammatical/other typographical errors.     Signed By: DO Shruthi Chou Hospitalist Service    July 13, 2021  5:15 PM

## 2021-07-26 NOTE — ED TRIAGE NOTES
Arrives with face mask in place. Reports redness/swelling/pain to bilateral LEs. Onset this AM approx 0300 waking pt. Increased redness noted to lateral aspect left knee. Also reports redness/pain to back. Recently admitted here for sepsis/cellulitis to \"trunk\". Discharged home on doxycycline and keflex and reports completed. Denies other med changes. Associated chills. Denies n/v/d, cough, shortness of breath.

## 2021-07-26 NOTE — ED TRIAGE NOTES
Pt is a leukemia pt, states she take oral chemo BID. Pt states she was recently hospitalized. Pt states over the last week she has developed red splotchy areas that are warm to touch through out her body. Some areas are darker in color than others. Pt states the darker areas are painful.

## 2021-07-27 NOTE — ED PROVIDER NOTES
72-year-old female with chronic myelogenous leukemia and myelofibrosis presents to the ER with concerns of a rash and ankle swelling. Symptoms onset about 2 days ago, ankles are swollen with a stocking glove degree of mild hyperpigmentation to the right foot and ankle, both feet are swollen    She has a purple slightly painful bruise-like macular rash to the left lateral knee and a smaller scattered amount to the left medial knee    Patient basically feels fine no fevers or chills no nausea no vomiting no pains other than the rash. She has a different rash about the lower and mid back which is blanching and looks like a sunburn, though she has not been in the sun. Patient recently admitted to the hospital from [facility] to July 14, for DIS-similar rash felt to be cellulitis related to Lovenox injection sites. She was deemed to have sepsis and was admitted on IV antibiotics temporarily, discharged home on doxycycline and Keflex. Past Medical History:   Diagnosis Date    Anemia     C. difficile diarrhea 4/1/2015    Cerebral edema ([address] 4/1/2015    Chronic migraine 9/22/2016    Chronic myelogenous leukemia ([address]     Juniata chromosome/ converted from polycythemia in 2009- to 2012 when she was dx w leukemia    Chronic pain     Coagulation disorder ([address]     \"clotting and Bleeding Problem\" dr Fang Mathur follows     Esophageal spasm     with banding     Esophageal varices ([address]     grade 3    GI bleed 8/3/2016    H/O craniotomy     3-29-15.  due to blood clot - which caused a seizure  - then pt fell and hit     Leukocytosis 3/14/2015    MRSA colonization 6/25/2012    Polycythemia vera(238.4)     JAK2 mutation    Portal hypertension ([address]     with varices    Portal vein thrombosis 6/20/2012    Ct scan 6-21-2 Apparent occlusion of the portal vein. In addition, the splenic vein, and superior mesenteric vein are not definitely seen and are also likely  occluded.  Splenomegaly, and extensive varices are seen as described above consistent with the portal vein occlusion 12 on arixtra HIT negative on repeat 12 re admitted Cirrhotic appearance of the liver. Mild to moderate ascites, as    Primary hypothyroidism     Pulmonary embolism ([address]     not on coumadin anymore     S/P exploratory laparotomy, 12    Bowel resection:  336 cm of small bowel removed, approximately 9 feet and placement of feeding jejunostomy.      S/P small bowel resection     .  9 feet removed due to  gangrene which wa due to blood clot    Seizure ([address] 3/14/2015    Seizure disorder ([address] 3/30/2015    due to clotting factor    Seizures ([address]     last one 3- - followed by aniyah     Splenomegaly, congestive, chronic     Stroke (cerebrum) ([address] 2015    had bled into head- surgery    Stroke [facility])     pt had stroke like symptoms     Thrombocytopenia, HIT negative 2012 platelets lower repeat HIT 12 platelets in 31,941'D    Thrombosis, portal vein 2004    portal , spleenic and recently superior mesenteric    Transfusion history     many blood tranfusions - last 3-29-15 after brain surgery    Traumatic hemorrhage of right cerebrum ([address] 3/30/2015       Past Surgical History:   Procedure Laterality Date    HX APPENDECTOMY      with small bowel resection    HX BREAST BIOPSY Bilateral     Lt - ; Rt -     HX CHOLECYSTECTOMY      HX GI      liver biopsy    HX GI      bowel removed small - 9 feet     HX GYN       x 2    HX GYN      D&C following miscarriage    HX ORTHOPAEDIC Left 2008    torn labrum shoulder (screw in place)    NEUROLOGICAL PROCEDURE UNLISTED  2010    craniotomy to evacuate subdural hematoma following a fall from a seizure    OK ABDOMEN SURGERY PROC UNLISTED      umbilical hernia repair    OK ABDOMEN SURGERY PROC UNLISTED      9ft of small bowel excised then reconnected         Family History:   Problem Relation Age of Onset  Diabetes Mother     Stroke Mother         after surgery    Cancer Father         brain    No Known Problems Sister     Other Sister         diverticulitis    Other Sister         diverticulitis    Cancer Sister         lyjmphoma    Breast Cancer Maternal Aunt 48    Thyroid Disease Paternal Grandmother        Social History     Socioeconomic History    Marital status: SINGLE     Spouse name: Not on file    Number of children: Not on file    Years of education: Not on file    Highest education level: Not on file   Occupational History    Not on file   Tobacco Use    Smoking status: Former Smoker     Packs/day: 0.20     Years: 10.00     Pack years: 2.00     Types: Cigarettes     Quit date:      Years since quittin.    Smokeless tobacco: Never Used   Substance and Sexual Activity    Alcohol use: No    Drug use: No    Sexual activity: Not on file   Other Topics Concern    Not on file   Social History Narrative    Not on file     Social Determinants of Health     Financial Resource Strain:     Difficulty of Paying Living Expenses:    Food Insecurity:     Worried About Running Out of Food in the Last Year:     [address] in the Last Year:    Transportation Needs:     Lack of Transportation (Medical):  Lack of Transportation (Non-Medical):    Physical Activity:     Days of Exercise per Week:     Minutes of Exercise per Session:    Stress:     Feeling of Stress :    Social Connections:     Frequency of Communication with Friends and Family:     Frequency of Social Gatherings with Friends and Family:     Attends Yaz[employer] Services:     Active Member of Clubs or Organizations:     Attends Club or Organization Meetings:     Marital Status:    Intimate Partner Violence:     Fear of Current or Ex-Partner:     Emotionally Abused:     Physically Abused:     Sexually Abused:           ALLERGIES: Latex, Sulfa (sulfonamide antibiotics), Tramadol, Augmentin [amoxicillin-pot clavulanate], Morphine, Rizatriptan, Tetanus immune globulin, Xanax [alprazolam], and Zonegran [zonisamide]    Review of Systems   Constitutional: Negative for chills and fever. HENT: Negative for rhinorrhea and sore throat. Eyes: Negative for discharge and redness. Respiratory: Negative for cough and shortness of breath. Cardiovascular: Positive for leg swelling. Negative for chest pain and palpitations. Gastrointestinal: Negative for abdominal pain, diarrhea, nausea and vomiting. Musculoskeletal: Negative for arthralgias and back pain. Skin: Positive for rash. Neurological: Negative for dizziness and headaches. All other systems reviewed and are negative. Vitals:    07/26/21 1817 07/26/21 2129   BP: 136/77 134/67   Pulse: (!) 107 94   Resp: 18 18   Temp: 98.8 °F (37.1 °C) 98.6 °F (37 °C)   SpO2: 98% 97%   Weight: 59 kg (130 lb)    Height: 5' 3\" (1.6 m)             Physical Exam  Vitals and nursing note reviewed. Constitutional:       General: She is not in acute distress. Appearance: Normal appearance. She is well-developed. She is not ill-appearing, toxic-appearing or diaphoretic. HENT:      Head: Normocephalic and atraumatic. Right Ear: External ear normal.      Left Ear: External ear normal.   Eyes:      General: No scleral icterus. Right eye: No discharge. Left eye: No discharge. Extraocular Movements: Extraocular movements intact. Conjunctiva/sclera: Conjunctivae normal.      Pupils: Pupils are equal, round, and reactive to light. Cardiovascular:      Rate and Rhythm: Normal rate and regular rhythm. Pulmonary:      Effort: Pulmonary effort is normal. No respiratory distress. Breath sounds: Normal breath sounds. No wheezing or rales. Abdominal:      General: Abdomen is flat. Palpations: Abdomen is soft. Tenderness: There is no abdominal tenderness. There is no guarding or rebound.    Musculoskeletal:         General: Normal range of motion. Cervical back: Normal range of motion and neck supple. Skin:     General: Skin is warm and dry. Findings: Rash present. Comments: See photos under the scans/media section   Neurological:      General: No focal deficit present. Mental Status: She is alert and oriented to person, place, and time. Mental status is at baseline. Motor: No abnormal muscle tone. Comments: cni 2-12 grossly  Nl gait,  Nl speech     Psychiatric:         Mood and Affect: Mood normal.         Behavior: Behavior normal.          MDM  Number of Diagnoses or Management Options  Vasculitis [facility]): new and requires workup  Diagnosis management comments: Medical decision making note:  Vasculitic rash no suggestion of infection patient looks well clinically, discussed with oncology on-call, will add sed rate and CRP, they will see in the office. No indication for antibiotics  This concludes the \"medical decision making note\" part of this emergency department visit note.          Amount and/or Complexity of Data Reviewed  Clinical lab tests: ordered and reviewed (Results Include:    Recent Results (from the past 24 hour(s))  -CBC WITH AUTOMATED DIFF  Collection Time: 07/26/21  5:04 PM       Result                      Value             Ref Range           WBC                         10.8              4.3 - 11.1 K*       RBC                         4.26              4.05 - 5.2 M*       HGB                         12.8              11.7 - 15.4 *       HCT                         39.4              35.8 - 46.3 %       MCV                         92.5              79.6 - 97.8 *       MCH                         30.0              26.1 - 32.9 *       MCHC                        32.5              31.4 - 35.0 *       RDW                         19.1 (H)          11.9 - 14.6 %       PLATELET                    252               150 - 450 K/*       MPV                         11.6              9.4 - 12.3 FL ABSOLUTE NRBC               0.00              0.0 - 0.2 K/*       DF                          AUTOMATED                             NEUTROPHILS                 89 (H)            43 - 78 %           LYMPHOCYTES                 5 (L)             13 - 44 %           MONOCYTES                   3 (L)             4.0 - 12.0 %        EOSINOPHILS                 1                 0.5 - 7.8 %         BASOPHILS                   1                 0.0 - 2.0 %         IMMATURE GRANULOCYTES       1                 0.0 - 5.0 %         ABS. NEUTROPHILS            9.6 (H)           1.7 - 8.2 K/*       ABS. LYMPHOCYTES            0.6               0.5 - 4.6 K/*       ABS. MONOCYTES              0.4               0.1 - 1.3 K/*       ABS. EOSINOPHILS            0.1               0.0 - 0.8 K/*       ABS. BASOPHILS              0.1               0.0 - 0.2 K/*       ABS. IMM.  GRANS.            0.1               0.0 - 0.5 K/*  -METABOLIC PANEL, COMPREHENSIVE  Collection Time: 07/26/21  5:04 PM       Result                      Value             Ref Range           Sodium                      136               136 - 145 mm*       Potassium                   4.2               3.5 - 5.1 mm*       Chloride                    102               98 - 107 mmo*       CO2                         26                21 - 32 mmol*       Anion gap                   8                 7 - 16 mmol/L       Glucose                     87                65 - 100 mg/*       BUN                         22                8 - 23 MG/DL        Creatinine                  0.54 (L)          0.6 - 1.0 MG*       GFR est AA                  >60               >60 ml/min/1*       GFR est non-AA              >60               >60 ml/min/1*       Calcium                     8.6               8.3 - 10.4 M*       Bilirubin, total            0.8               0.2 - 1.1 MG*       ALT (SGPT)                  50                12 - 65 U/L         AST (SGOT)                  37 15 - 37 U/L         Alk.  phosphatase            93                50 - 130 U/L        Protein, total              6.8               6.3 - 8.2 g/*       Albumin                     3.7               3.2 - 4.6 g/*       Globulin                    3.1               2.3 - 3.5 g/*       A-G Ratio                   1.2               1.2 - 3.5      )  Decide to obtain previous medical records or to obtain history from someone other than the patient: yes  Discuss the patient with other providers: yes (SANDI  )    Risk of Complications, Morbidity, and/or Mortality  Presenting problems: low  Diagnostic procedures: minimal  Management options: minimal    Patient Progress  Patient progress: improved                     Procedures

## 2021-07-27 NOTE — ED NOTES
I have reviewed discharge instructions with the patient. The patient verbalized understanding. Patient left ED via Discharge Method: ambulatory to Home with self. Opportunity for questions and clarification provided. Patient given 0 scripts. To continue your aftercare when you leave the hospital, you may receive an automated call from our care team to check in on how you are doing. This is a free service and part of our promise to provide the best care and service to meet your aftercare needs.  If you have questions, or wish to unsubscribe from this service please call [phone]. Thank you for Choosing our [facility] Emergency Department.

## 2021-08-05 NOTE — ED PROVIDER NOTES
71-year-old female presents to the ER complaining of bilateral lower extremity swelling. States has been ongoing and worsening for the last 2 weeks. Patient reports her right lower extremity is worse than left. Reports tenderness to the dorsum of her feet as well as distal aspect of bilateral calves. Reports normal sensation and muscle strength. Denies any trauma. Has a history of polycythemia vera is on chemotherapy agents for this. Patient does have history of DVT in the past but has been compliant on her twice daily Lovenox 60 mg. Patient reports previously she did develop a blood clot while on Lovenox. Denies chest pain or shortness of breath. Denies fever or chills. Past Medical History:   Diagnosis Date    Anemia     C. difficile diarrhea 4/1/2015    Cerebral edema ([address] 4/1/2015    Chronic migraine 9/22/2016    Chronic myelogenous leukemia ([facility]     [place] chromosome/ converted from polycythemia in 2009- to 2012 when she was dx w leukemia    Chronic pain     Coagulation disorder ([facility]     \"clotting and Bleeding Problem\" dr Villegas Flank follows     Esophageal spasm     with banding     Esophageal varices ([address]     grade 3    GI bleed 8/3/2016    H/O craniotomy     3-29-15.  due to blood clot - which caused a seizure  - then pt fell and hit     Leukocytosis 3/14/2015    MRSA colonization 6/25/2012    Polycythemia vera(238.4)     JAK2 mutation    Portal hypertension ([facility]     with varices    Portal vein thrombosis 6/20/2012    Ct scan 6-21-2 Apparent occlusion of the portal vein. In addition, the splenic vein, and superior mesenteric vein are not definitely seen and are also likely  occluded. Splenomegaly, and extensive varices are seen as described above consistent with the portal vein occlusion 7-05-12 on arixtra HIT negative on repeat 7-27-12 re admitted Cirrhotic appearance of the liver.  Mild to moderate ascites, as    Primary hypothyroidism     Pulmonary embolism (HCC) 2006    not on coumadin anymore     S/P exploratory laparotomy, 12    Bowel resection:  336 cm of small bowel removed, approximately 9 feet and placement of feeding jejunostomy.      S/P small bowel resection     .  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 2012 platelets lower repeat HIT 12 platelets in 30,785'J    Thrombosis, portal vein 2004    portal , spleenic and recently superior mesenteric    Transfusion history     many blood tranfusions - last 3-29-15 after brain surgery    Traumatic hemorrhage of right cerebrum ([address] 3/30/2015       Past Surgical History:   Procedure Laterality Date    HX APPENDECTOMY      with small bowel resection    HX BREAST BIOPSY Bilateral     Lt - ; Rt -     HX CHOLECYSTECTOMY      HX GI      liver biopsy    HX GI      bowel removed small - 9 feet     HX GYN       x 2    HX GYN      D&C following miscarriage    HX ORTHOPAEDIC Left 2008    torn labrum shoulder (screw in place)    NEUROLOGICAL PROCEDURE UNLISTED  2010    craniotomy to evacuate subdural hematoma following a fall from a seizure    CT ABDOMEN SURGERY PROC UNLISTED      umbilical hernia repair    CT ABDOMEN SURGERY PROC UNLISTED      9ft of small bowel excised then reconnected         Family History:   Problem Relation Age of Onset    Diabetes Mother     Stroke Mother         after surgery    Cancer Father         brain    No Known Problems Sister     Other Sister         diverticulitis    Other Sister         diverticulitis    Cancer Sister         lyjmphoma    Breast Cancer Maternal Aunt 48    Thyroid Disease Paternal Grandmother        Social History     Socioeconomic History    Marital status: SINGLE     Spouse name: Not on file    Number of children: Not on file    Years of education: Not on file    Highest education level: Not on file   Occupational History    Not on file   Tobacco Use    Smoking status: Former Smoker     Packs/day: 0.20     Years: 10.00     Pack years: 2.00     Types: Cigarettes     Quit date:      Years since quittin.6    Smokeless tobacco: Never Used   Substance and Sexual Activity    Alcohol use: No    Drug use: No    Sexual activity: Not on file   Other Topics Concern    Not on file   Social History Narrative    Not on file     Social Determinants of Health     Financial Resource Strain:     Difficulty of Paying Living Expenses:    Food Insecurity:     Worried About [address] in the Last Year:     [employer] [employer] in the Last Year:    Transportation Needs:     Lack of Transportation (Medical):  Lack of Transportation (Non-Medical):    Physical Activity:     Days of Exercise per Week:     Minutes of Exercise per Session:    Stress:     Feeling of Stress :    Social Connections:     Frequency of Communication with Friends and Family:     Frequency of Social Gatherings with Friends and Family:     Attends Zoroastrianism Services:     Active Member of Clubs or Organizations:     Attends Club or Organization Meetings:     Marital Status:    Intimate Partner Violence:     Fear of Current or Ex-Partner:     Emotionally Abused:     Physically Abused:     Sexually Abused: ALLERGIES: Latex, Sulfa (sulfonamide antibiotics), Tramadol, Augmentin [amoxicillin-pot clavulanate], Morphine, Rizatriptan, Tetanus immune globulin, Xanax [alprazolam], and Zonegran [zonisamide]    Review of Systems   Constitutional: Negative for chills and fever. HENT: Negative for sinus pressure and sore throat.     Eyes: Negative for visual disturbance. Respiratory: Negative for cough and shortness of breath. Cardiovascular: Negative for chest pain. Gastrointestinal: Negative for abdominal pain, diarrhea, nausea and vomiting. Endocrine: Negative for polyuria. Genitourinary: Negative for difficulty urinating and dysuria. Musculoskeletal: Negative for neck pain and neck stiffness. Skin: Negative for rash. Neurological: Negative for weakness and headaches. Psychiatric/Behavioral: Negative for confusion. All other systems reviewed and are negative. Vitals:    08/05/21 1635   BP: 132/75   Pulse: 94   Resp: 16   Temp: 98.6 °F (37 °C)   SpO2: 100%   Weight: 59 kg (130 lb)   Height: 5' 3\" (1.6 m)            Physical Exam  Vitals and nursing note reviewed. Constitutional:       Appearance: Normal appearance. She is not ill-appearing or toxic-appearing. HENT:      Head: Normocephalic and atraumatic. Nose: Nose normal.      Mouth/Throat:      Mouth: Mucous membranes are moist.   Eyes:      Extraocular Movements: Extraocular movements intact. Cardiovascular:      Rate and Rhythm: Normal rate and regular rhythm. Pulses: Normal pulses. Heart sounds: Normal heart sounds. Pulmonary:      Effort: Pulmonary effort is normal. No respiratory distress. Breath sounds: Normal breath sounds. Abdominal:      General: Abdomen is flat. There is no distension. Palpations: Abdomen is soft. Tenderness: There is no abdominal tenderness. Musculoskeletal:         General: Normal range of motion. Cervical back: Normal range of motion. No rigidity. Right lower leg: Edema present. Left lower leg: Edema present. Comments: 2+ pitting edema bilateral lower extremities, distal aspect including the dorsum of bilateral feet, right greater than left. Skin:     General: Skin is warm and dry. Neurological:      General: No focal deficit present.       Mental Status: She is alert and oriented to person, place, and time. Psychiatric:         Mood and Affect: Mood normal.          MDM  Number of Diagnoses or Management Options  Peripheral edema  Diagnosis management comments: 25-year-old female presents the ER complaining of bilateral lower extremity swelling. No evidence of cellulitis, no history of trauma. No history of congestive heart failure. Labs are normal white count, stable H&H, normal electrolytes and kidney function, normal liver enzymes, . Duplex bilateral lower extremities was ordered, Revealed no evidence of DVT. Patient will be prescribed compression stockings advised to consume a low-sodium diet and elevate her lower extremities as much as possible. Patient will follow up with family physician within 1 week. Return to the ER for worsening or worrisome symptoms. She voiced understanding and agreement.         Amount and/or Complexity of Data Reviewed  Clinical lab tests: ordered and reviewed  Tests in the radiology section of CPT®: ordered and reviewed    Risk of Complications, Morbidity, and/or Mortality  Presenting problems: moderate  Diagnostic procedures: moderate  Management options: moderate           Procedures

## 2021-08-05 NOTE — ED TRIAGE NOTES
Pt reports bilateral foot swelling x 2 weeks. Pt states the right foot has become more swollen. Pt sates she has been elevating. Pt reports history of vasculitis.     Juany Edmonds RN

## 2021-08-06 NOTE — DISCHARGE INSTRUCTIONS
Wear compression stockings daily. Continue to elevate your lower extremities bilaterally as much as able. Consume a low sodium diet. Follow-up with primary care physician within 1 week. Return to the ER for worsening or worrisome symptoms.

## 2021-08-06 NOTE — ED NOTES
I have reviewed discharge instructions with the patient. The patient verbalized understanding. Patient left ED via Discharge Method: ambulatory to Home with , self, ). Opportunity for questions and clarification provided. Patient given 1 scripts. To continue your aftercare when you leave the hospital, you may receive an automated call from our care team to check in on how you are doing. This is a free service and part of our promise to provide the best care and service to meet your aftercare needs.  If you have questions, or wish to unsubscribe from this service please call [phone]. Thank you for Choosing our [facility] Emergency Department.

## 2021-08-08 PROBLEM — L03.115 CELLULITIS OF LEG, RIGHT: Status: ACTIVE | Noted: 2021-01-01

## 2021-08-08 NOTE — PROGRESS NOTES
INITIAL SPIRITUAL ASSESSMENT     NOTED:    PATIENT IS BEING ADMITTED TO FLOOR FROM ER    Zoroastrianism    DAUGHTER -  DEZ    PRIOR CODE    HAS ACP ON FILE    4TH ER VISIT PAST YR    CHAPLAINS HAVE VISITED WITH PT SINCE 2012    HIGH RISK FOR READMISSION      WILL ASSESS HOW WE CAN BEST SERVE THIS FAMILY

## 2021-08-08 NOTE — H&P
I have examined and evaluated patient independently. Briefly, 51-year-old female with extensive past medical history including polycythemia vera [reportedly progressed to myelofibrosis] and subsequently diagnosis of CML [on regimen], history of thromboembolic episode [on chronic Lovenox], recent hospitalization for abdominal cellulitis presented to emergency room with right leg swelling. As per patient her symptoms started approximately 3 days ago and got worse. Simultaneously there is also history of a cat scratching her right leg. Her pain in right lower extremity got worse so she went to the emergency room. Past medical history, social history, family history reviewed. Vitals: Reviewed. Significant physical examination: Right lower extremity cellulitis involving the upper thigh from almost ankle [no pain out of area involved]. Tender lymphadenopathy of right inguinal area. Mucous membranes slightly dry. Nose red and concern for rhinorrhea. Labs and imaging: Reviewed. Recent Doppler negative for DVT and patient is on anticoagulation. Assessment: Right lower extremity cellulitis, history of cat scratch, CML, history of thromboembolism on chronic anticoagulation, history of polycythemia vera. Plan: 1 L IV fluid bolus followed by IV infusion. CK level ordered.  -Started patient on vancomycin [given pseudoephedrine cannot use linezolid]. Given her immunocompromise status, started patient on Zosyn for now. Given history of cat scratch, started patient on azithromycin and Bartonella test ordered. Resume home anticoagulation. Oncology consultation. Rule out Covid. Patient is of high risk given significant cellulitis requiring vancomycin, Zosyn and monitoring of vancomycin.     Full note from Chriss Strauss NP to follow

## 2021-08-08 NOTE — ROUTINE PROCESS
Received from [facility], [place]. Right leg with redness and edema noted. Positive pulses noted. Moves all extremities. Patient says its very painful. Ambulated to bathroom with one assist with much difficulty. Room air noted. Saline well intact . Requesting a diet. Lungs clear. Bowel sounds in all 4 quadrants. Skin intact.

## 2021-08-08 NOTE — ROUTINE PROCESS
TRANSFER - IN REPORT:    Verbal report received from Jose AlejandroRN(name) on Ashley Xavier  being received from ER(unit) for routine progression of care      Report consisted of patients Situation, Background, Assessment and   Recommendations(SBAR). Information from the following report(s) SBAR was reviewed with the receiving nurse. Opportunity for questions and clarification was provided. Assessment completed upon patients arrival to unit and care assumed.

## 2021-08-08 NOTE — H&P
Hospitalist History and Physical   Admit Date:  2021 12:10 PM   Name:  Redmond Soulier   Age:  61 y.o. Sex:  female  :  1960   MRN:  [id number]     Presenting Complaint: No chief complaint on file. Reason(s) for Admission: Cellulitis of leg, right [L03.115]     History of Present Illness:   Redmond Soulier is a 61 y.o. 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 She was recently discharged from the hospital on cephalexin and doxycycline on  for cellulitis to her abdomen and chest.  She states that this R leg problem started 2 weeks ago but it worsened over the past 3 days. She was experiencing low-grade fevers at home. She denies any CP/SOB/N/V/D. On exam, her R leg is red, warm, and tender to touch. L leg appears normal.  Pedal pulses are 2+ bilaterally. Check CT to r/o any necrotizing fasciitis. Blood cultures were collected. Broad spectrum abx started. Give bolus and start IVF. Also, start azithromycin d/t her report of cat bite/scratch about 2 weeks ago; check Bartonella. Covid PCR ordered due to recent URI and irritated nares noted on exam.  Will ask Hem/Onc to see her in the morning regarding her other chronic issues and if recurrent cellulitis is med related. Ms. Deep Leon states she does not have a PCP. Review of Systems:  10 systems reviewed and negative except as noted in HPI. Assessment & Plan:    Active Problems:  # Cellulitis of leg, right (2021)  - Discharged on  w/ Keflex and doxy; was treated for cellulitis of abdomen, chest at that time  - Reports low-grade fevers for past 3 days with increased redness and pain to leg  - Blood cultures collected today  - Give IVF bolus and start continuous IVF  - Start vancomycin  - Start Zosyn  - Check CT to eval for necrotizing fasciitis  - US 3 days prior in ER negative for DVT; on enoxaparin 60mg BID at home but takes daily. # Feline scratch/bite to R leg  - Patient reports this occurred 2 weeks ago  - Check Bartonella  - Start azithromycin 500mg x 5 days    # Recent URI  - Order Covid PCR    # CML  # PV  # Splenomegaly  # Hx of DVTs  - On enoxaparin 60mg BID at home but taking only daily  - Cont dasatinib and ruxolitinib although questionable home compliance  - Consulted Heme/onc (patient of Dr. Carlyn Campbell) to address these problems and see if cellulitis could be medication related     # Hx hypothyroidism  - Cont home levothyroxine      Disposition/Discharge Plannin-2 midnights    Diet: ADULT DIET Regular  VTE ppx: enoxaparin  Code status: Full Code      Past Medical History:   Diagnosis Date    Anemia     C. difficile diarrhea 2015    Cerebral edema ([address] 2015    Chronic migraine 2016    Chronic myel[facility] [address]     [place] chromosome/ converted from polycythemia in - to  when she was dx w leukemia    Chronic pain     Coagulation disorder ([address]     \"clotting and Bleeding Problem\" dr Karthik Fall follows     Esophageal spasm     with banding     Esophageal varices ([address]     grade 3    GI bleed 8/3/2016    H/O craniotomy     3-29-15.  due to blood clot - which caused a seizure  - then pt fell and hit     Leukocytosis 3/14/2015    MRSA colonization 2012    Polycythemia vera(238.4)     JAK2 mutation    Portal hypertension ([address]     with varices    Portal vein thrombosis 2012    Ct scan  Apparent occlusion of the portal vein. In addition, the splenic vein, and superior mesenteric vein are not definitely seen and are also likely  occluded.  Splenomegaly, and extensive varices are seen as described above consistent with the portal vein occlusion 12 on arixtra HIT negative on repeat 12 re admitted Cirrhotic appearance of the liver. Mild to moderate ascites, as    Primary hypothyroidism     Pulmonary embolism ([address]     not on coumadin anymore     S/P exploratory laparotomy, 12    Bowel resection:  336 cm of small bowel removed, approximately 9 feet and placement of feeding jejunostomy.  S/P small bowel resection     .  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 2012 platelets lower repeat HIT 12 platelets in 15,601'Z    Thrombosis, portal vein 2004    portal , spleenic and recently superior mesenteric    Transfusion history     many blood tranfusions - last 3-29-15 after brain surgery    Traumatic hemorrhage of right cerebrum ([address] 3/30/2015     Past Surgical History:   Procedure Laterality Date    HX APPENDECTOMY      with small bowel resection    HX BREAST BIOPSY Bilateral     Lt - ; Rt -     HX CHOLECYSTECTOMY      HX GI      liver biopsy    HX GI      bowel removed small - 9 feet     HX GYN       x 2    HX GYN      D&C following miscarriage    HX ORTHOPAEDIC Left     torn labrum shoulder (screw in place)    NEUROLOGICAL PROCEDURE UNLISTED  2010    craniotomy to evacuate subdural hematoma following a fall from a seizure    HI ABDOMEN SURGERY PROC UNLISTED      umbilical hernia repair    HI ABDOMEN SURGERY PROC UNLISTED      9ft of small bowel excised then reconnected      Allergies   Allergen Reactions    Latex Other (comments)     Testing for latex allergy was positive as a 2 (on a scale of 1 to 3).     Sulfa (Sulfonamide Antibiotics) Anaphylaxis    Tramadol Other (comments)     Top lip swelled    Augmentin [Amoxicillin-Pot Clavulanate] Rash    Morphine Nausea and Vomiting     Not a true allergy    Rizatriptan Rash    Tetanus Immune Globulin Other (comments)     Heat, bruising, and swelling at  Site of injection    Xanax [Alprazolam] Other (comments)     Hallucinations    Zonegran [Zonisamide] Rash      Social History     Tobacco Use    Smoking status: Former Smoker     Packs/day: 0.20     Years: 10.00     Pack years: 2.00     Types: Cigarettes     Quit date:      Years since quittin.6    Smokeless tobacco: Never Used   Substance Use Topics    Alcohol use: No      Family History   Problem Relation Age of Onset    Diabetes Mother     Stroke Mother         after surgery    Cancer Father         brain    No Known Problems Sister     Other Sister         diverticulitis    Other Sister         diverticulitis    Cancer Sister         lyjmphoma    Breast Cancer Maternal Aunt 48    Thyroid Disease Paternal Grandmother       Family history reviewed and noncontributory to patient's acute condition; no relevant family history unless otherwise noted above.   Immunization History   Administered Date(s) Administered    (RETIRED) Pneumococcal Vaccine (Unspecified Type) 2008    Covid-19, MODERNA, Mrna, Lnp-s, Pf, 100mcg/0.5mL 2021, 2021    Hib (PRP-T) 2019    Influenza Vaccine 10/01/2014, 10/01/2016    Influenza Vaccine (Quad) Mdck Pf (>4 Yrs Flucelvax QUAD [id number]) 2020    Influenza Vaccine (Quad) PF (>6 Mo Flulaval, Fluarix, and >3 Yrs Afluria, Fluzone [id number]) 10/25/2018, 10/08/2019    Pneumococcal Conjugate (PCV-13) 2019    Pneumococcal Polysaccharide (PPSV-23) 2004    TB Skin Test (PPD) Intradermal 2014    Zoster Recombinant 2020, 2020     PTA Medications:  Current Outpatient Medications   Medication Instructions    calcium carbonate (CALTREX) 600 mg, Oral, EVERY BEDTIME    cholecalciferol (VITAMIN D3) 1,000 Units, Oral, EVERY BEDTIME    Comp Stocking,Knee,Regular,Sml misc 1 Each, Does Not Apply, DAILY    diphenoxylate-atropine (LOMOTIL) 2.5-0.025 mg per tablet 1 Tablet, Oral, 4 TIMES DAILY AS NEEDED    enoxaparin (LOVENOX) 60 mg/0.6 mL injection INJECT 60 MG BY SUBCUTANEOUS ROUTE EVERY TWELVE (12) HOURS.  lansoprazole (PREVACID) 30 mg, Oral, DAILY, prn    magnesium 250 mg tab Oral    ondansetron (ZOFRAN ODT) 4 mg, Oral, EVERY 8 HOURS AS NEEDED    ondansetron (ZOFRAN ODT) 8 mg, Oral, EVERY 8 HOURS AS NEEDED    ruxolitinib 5 mg, Oral, 2 TIMES DAILY    SpryceL 80 mg, Oral, DAILY    Synthroid 112 mcg tablet 1 tablet once a day with an extra 1/2 tablet on Sundays    zolpidem (AMBIEN) 10 mg, Oral, BEDTIME PRN       Objective:     Patient Vitals for the past 24 hrs:   Temp Pulse Resp BP SpO2   08/08/21 1225 99.8 °F (37.7 °C) 88 16 101/70 97 %     Oxygen Therapy  O2 Sat (%): 97 % (08/08/21 1225)    Estimated body mass index is 23.03 kg/m² as calculated from the following:    Height as of an earlier encounter on 8/8/21: 5' 3\" (1.6 m). Weight as of an earlier encounter on 8/8/21: 59 kg (130 lb). No intake or output data in the 24 hours ending 08/08/21 1341      Physical Exam:  General:    No acute distress  Head:  Normocephalic  Eyes:  Sclerae appear normal.  Pupils equally round. HENT:  Nares erythematous. Dry mucous membranes  Neck:  No restricted ROM. Trachea midline  CV:   RRR.  (+) murmur. No JVD  Lungs:   CTAB. No wheezing, rhonchi, or rales. Appears even, unlabored on room air. Abdomen: Bowel sounds present. Masses, tender to palpation, splenomegaly   Extremities: No cyanosis or clubbing. 1+ edema to R foot. 2+ pedal pulses bilaterally. Skin:     Erythema and warmth to R LL. Normal coloration to L LL. Neuro:  Cranial nerves II-XII grossly intact. Sensation intact  Psych:  Anxious.   Alert and oriented x3    Data Ordered and Personally Reviewed:    Last 24hr Labs:  Recent Results (from the past 24 hour(s))   EKG, 12 LEAD, INITIAL    Collection Time: 08/08/21  5:04 AM   Result Value Ref Range    Ventricular Rate 93 BPM    Atrial Rate 93 BPM    P-R Interval 136 ms    QRS Duration 84 ms    Q-T Interval 360 ms    QTC Calculation (Bezet) 447 ms    Calculated P Axis 70 degrees    Calculated R Axis 62 degrees    Calculated T Axis 41 degrees    Diagnosis       !! AGE AND GENDER SPECIFIC ECG ANALYSIS !! Normal sinus rhythm  Cannot rule out Anterior infarct , age undetermined  Abnormal ECG  When compared with ECG of 05-AUG-2021 16:46,  No significant change was found     LACTIC ACID    Collection Time: 08/08/21  5:06 AM   Result Value Ref Range    Lactic acid 0.9 0.4 - 2.0 MMOL/L   CBC WITH AUTOMATED DIFF    Collection Time: 08/08/21  5:06 AM   Result Value Ref Range    WBC 11.3 (H) 4.3 - 11.1 K/uL    RBC 4.00 (L) 4.05 - 5.2 M/uL    HGB 12.3 11.7 - 15.4 g/dL    HCT 37.7 35.8 - 46.3 %    MCV 94.3 79.6 - 97.8 FL    MCH 30.8 26.1 - 32.9 PG    MCHC 32.6 31.4 - 35.0 g/dL    RDW 19.2 (H) 11.9 - 14.6 %    PLATELET 893 527 - 046 K/uL    MPV 10.1 9.4 - 12.3 FL    ABSOLUTE NRBC 0.00 0.0 - 0.2 K/uL    DF AUTOMATED      NEUTROPHILS 90 (H) 43 - 78 %    LYMPHOCYTES 6 (L) 13 - 44 %    MONOCYTES 3 (L) 4.0 - 12.0 %    EOSINOPHILS 1 0.5 - 7.8 %    BASOPHILS 0 0.0 - 2.0 %    IMMATURE GRANULOCYTES 1 0.0 - 5.0 %    ABS. NEUTROPHILS 10.2 (H) 1.7 - 8.2 K/UL    ABS. LYMPHOCYTES 0.7 0.5 - 4.6 K/UL    ABS. MONOCYTES 0.3 0.1 - 1.3 K/UL    ABS. EOSINOPHILS 0.1 0.0 - 0.8 K/UL    ABS. BASOPHILS 0.0 0.0 - 0.2 K/UL    ABS. IMM.  GRANS. 0.1 0.0 - 0.5 K/UL   METABOLIC PANEL, COMPREHENSIVE    Collection Time: 08/08/21  5:06 AM   Result Value Ref Range    Sodium 137 136 - 145 mmol/L    Potassium 3.7 3.5 - 5.1 mmol/L    Chloride 105 98 - 107 mmol/L    CO2 26 21 - 32 mmol/L    Anion gap 6 (L) 7 - 16 mmol/L    Glucose 101 (H) 65 - 100 mg/dL    BUN 8 8 - 23 MG/DL    Creatinine 0.60 0.6 - 1.0 MG/DL    GFR est AA >60 >60 ml/min/1.73m2    GFR est non-AA >60 >60 ml/min/1.73m2    Calcium 8.7 8.3 - 10.4 MG/DL    Bilirubin, total 0.9 0.2 - 1.1 MG/DL    ALT (SGPT) 38 12 - 65 U/L    AST (SGOT) 29 15 - 37 U/L    Alk. phosphatase 98 50 - 136 U/L    Protein, total 6.6 6.3 - 8.2 g/dL    Albumin 3.5 3.2 - 4.6 g/dL    Globulin 3.1 2.3 - 3.5 g/dL    A-G Ratio 1.1 (L) 1.2 - 3.5     SED RATE, AUTOMATED    Collection Time: 08/08/21  5:06 AM   Result Value Ref Range    Sed rate, automated 34 (H) 0 - 30 mm/hr   C REACTIVE PROTEIN, QT    Collection Time: 08/08/21  5:06 AM   Result Value Ref Range    C-Reactive protein 6.2 (H) 0.0 - 0.9 mg/dL       All Micro Results     None          Other Studies:  XR CHEST PORT    Result Date: 8/8/2021  EXAM: XR CHEST PORT HISTORY: meets SIRS criteria. TECHNIQUE: Frontal chest. COMPARISON: 7/12/2021 FINDINGS: The cardiac silhouette, mediastinum, and pulmonary vasculature are within normal limits. There is no consolidation, pleural effusion, or pneumothorax. No significant osseous abnormalities are observed. No evidence of an acute intrathoracic process. Signed:  Unknown LAZARO Boyd    Part of this note may have been written by using a voice dictation software. The note has been proof read but may still contain some grammatical/other typographical errors.

## 2021-08-08 NOTE — PROGRESS NOTES
08/08/21 1248   Dual Skin Pressure Injury Assessment   Second Care Provider (Based on [facility]) NISHA Browne   intact

## 2021-08-08 NOTE — ED PROVIDER NOTES
HPI     Past Medical History:   Diagnosis Date    Anemia     C. difficile diarrhea 4/1/2015    Cerebral edema ([address] 4/1/2015    Chronic migraine 9/22/2016    Chronic myelogenous leukemia ([address]     [place] chromosome/ converted from polycythemia in 2009- to 2012 when she was dx w leukemia    Chronic pain     Coagulation disorder ([address]     \"clotting and Bleeding Problem\" dr Sandra Treadwell follows     Esophageal spasm     with banding     Esophageal varices ([address]     grade 3    GI bleed 8/3/2016    H/O craniotomy     3-29-15.  due to blood clot - which caused a seizure  - then pt fell and hit     Leukocytosis 3/14/2015    MRSA colonization 6/25/2012    Polycythemia vera(238.4)     JAK2 mutation    Portal hypertension ([address]     with varices    Portal vein thrombosis 6/20/2012    Ct scan 6-21-2 Apparent occlusion of the portal vein. In addition, the splenic vein, and superior mesenteric vein are not definitely seen and are also likely  occluded. Splenomegaly, and extensive varices are seen as described above consistent with the portal vein occlusion 7-05-12 on arixtra HIT negative on repeat 7-27-12 re admitted Cirrhotic appearance of the liver. Mild to moderate ascites, as    Primary hypothyroidism     Pulmonary embolism ([address] 2006    not on coumadin anymore     S/P exploratory laparotomy, 6/20/12 6/25/2012    Bowel resection:  336 cm of small bowel removed, approximately 9 feet and placement of feeding jejunostomy.      S/P small bowel resection     2012.  9 feet removed due to  gangrene which wa due to blood clot    Seizure ([address] 3/14/2015    Seizure disorder ([address] 3/30/2015    due to clotting factor    Seizures ([address]     last one 3- - followed by aniyah     Splenomegaly, congestive, chronic     Stroke (cerebrum) ([address] 4/11/2015    had bled into head- surgery    Stroke [facility])     pt had stroke like symptoms     Thrombocytopenia, HIT negative 6/25/2012 7-01-12 platelets lower repeat HIT 12 platelets in 26,336'K    Thrombosis, portal vein     portal , spleenic and recently superior mesenteric    Transfusion history     many blood tranfusions - last 3-29-15 after brain surgery    Traumatic hemorrhage of right cerebrum ([facility] 3/30/2015       Past Surgical History:   Procedure Laterality Date    HX APPENDECTOMY      with small bowel resection    HX BREAST BIOPSY Bilateral     Lt - ; Rt -     HX CHOLECYSTECTOMY      HX GI      liver biopsy    HX GI      bowel removed small - 9 feet     HX GYN       x 2    HX GYN      D&C following miscarriage    HX ORTHOPAEDIC Left 2008    torn labrum shoulder (screw in place)    NEUROLOGICAL PROCEDURE UNLISTED  2010    craniotomy to evacuate subdural hematoma following a fall from a seizure    [place] ABDOMEN SURGERY PROC UNLISTED      umbilical hernia repair    [place] ABDOMEN SURGERY PROC UNLISTED      9ft of small bowel excised then reconnected         Family History:   Problem Relation Age of Onset    Diabetes Mother     Stroke Mother         after surgery    Cancer Father         brain    No Known Problems Sister     Other Sister         diverticulitis    Other Sister         diverticulitis    Cancer Sister         lyjmphoma    Breast Cancer Maternal Aunt 48    Thyroid Disease Paternal Grandmother        Social History     Socioeconomic History    Marital status: SINGLE     Spouse name: Not on file    Number of children: Not on file    Years of education: Not on file    Highest education level: Not on file   Occupational History    Not on file   Tobacco Use    Smoking status: Former Smoker     Packs/day: 0.20     Years: 10.00     Pack years: 2.00     Types: Cigarettes     Quit date:      Years since quittin.6    Smokeless tobacco: Never Used   Substance and Sexual Activity    Alcohol use: No    Drug use: No    Sexual activity: Not on file   Other Topics Concern    Not on file   Social History Narrative  Not on file     Social Determinants of Health     Financial Resource Strain:     Difficulty of Paying Living Expenses:    Food Insecurity:     Worried About Running Out of Food in the Last Year:     [address] in the Last Year:    Transportation Needs:     Lack of Transportation (Medical):  Lack of Transportation (Non-Medical):    Physical Activity:     Days of Exercise per Week:     Minutes of Exercise per Session:    Stress:     Feeling of Stress :    Social Connections:     Frequency of Communication with Friends and Family:     Frequency of Social Gatherings with Friends and Family:     Attends [employer] Services:     Active Member of Clubs or Organizations:     Attends Club or Organization Meetings:     Marital Status:    Intimate Partner Violence:     Fear of Current or Ex-Partner:     Emotionally Abused:     Physically Abused:     Sexually Abused:           ALLERGIES: Latex, Sulfa (sulfonamide antibiotics), Tramadol, Augmentin [amoxicillin-pot clavulanate], Morphine, Rizatriptan, Tetanus immune globulin, Xanax [alprazolam], and Zonegran [zonisamide]    Review of Systems    Vitals:    08/08/21 0438   BP: 108/60   Pulse: (!) 109   Resp: 18   Temp: 100 °F (37.8 °C)   SpO2: 97%   Weight: 59 kg (130 lb)   Height: 5' 3\" (1.6 m)            Physical Exam     MDM       Procedures

## 2021-08-08 NOTE — ED PROVIDER NOTES
80-year-old female complaining of right lower leg pain and redness. Patient was seen in the emergency department 3 days ago for the same complaint had a pretty extensive work-up including a negative Doppler of that leg patient was diagnosed with pedal edema. Patient is concerned of vasculitis versus cellulitis. She was seen at several facilities in the past for this. The history is provided by the patient. Leg Pain   This is a recurrent problem. The current episode started more than 2 days ago. The problem occurs constantly. The problem has been gradually worsening. The pain is present in the right lower leg. The quality of the pain is described as aching. The pain is at a severity of 10/10. The pain is severe. Pertinent negatives include no numbness and no stiffness. She has tried nothing for the symptoms. Past Medical History:   Diagnosis Date    Anemia     C. difficile diarrhea 4/1/2015    Cerebral edema ([address] 4/1/2015    Chronic migraine 9/22/2016    Chronic myelogenous leukemia ([address]     Newton chromosome/ converted from polycythemia in 2009- to 2012 when she was dx w leukemia    Chronic pain     Coagulation disorder ([address]     \"clotting and Bleeding Problem\" dr Prachi Herzog follows     Esophageal spasm     with banding     Esophageal varices ([address]     grade 3    GI bleed 8/3/2016    H/O craniotomy     3-29-15.  due to blood clot - which caused a seizure  - then pt fell and hit     Leukocytosis 3/14/2015    MRSA colonization 6/25/2012    Polycythemia vera(238.4)     JAK2 mutation    Portal hypertension ([address]     with varices    Portal vein thrombosis 6/20/2012    Ct scan 6-21-2 Apparent occlusion of the portal vein. In addition, the splenic vein, and superior mesenteric vein are not definitely seen and are also likely  occluded.  Splenomegaly, and extensive varices are seen as described above consistent with the portal vein occlusion 7-05-12 on arixtra HIT negative on repeat 7-27-12 re admitted Cirrhotic appearance of the liver. Mild to moderate ascites, as    Primary hypothyroidism     Pulmonary embolism ([address]     not on coumadin anymore     S/P exploratory laparotomy, 12    Bowel resection:  336 cm of small bowel removed, approximately 9 feet and placement of feeding jejunostomy.      S/P small bowel resection     .  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 2012 platelets lower repeat HIT 12 platelets in 41,251'Y    Thrombosis, portal vein 2004    portal , spleenic and recently superior mesenteric    Transfusion history     many blood tranfusions - last 3-29-15 after brain surgery    Traumatic hemorrhage of right cerebrum ([address] 3/30/2015       Past Surgical History:   Procedure Laterality Date    HX APPENDECTOMY      with small bowel resection    HX BREAST BIOPSY Bilateral     Lt - ; Rt -     HX CHOLECYSTECTOMY      HX GI      liver biopsy    HX GI      bowel removed small - 9 feet     HX GYN       x 2    HX GYN      D&C following miscarriage    HX ORTHOPAEDIC Left 2008    torn labrum shoulder (screw in place)    NEUROLOGICAL PROCEDURE UNLISTED  2010    craniotomy to evacuate subdural hematoma following a fall from a seizure    IL ABDOMEN SURGERY PROC UNLISTED      umbilical hernia repair    IL ABDOMEN SURGERY PROC UNLISTED      9ft of small bowel excised then reconnected         Family History:   Problem Relation Age of Onset    Diabetes Mother     Stroke Mother         after surgery    Cancer Father         brain    No Known Problems Sister    [facility] Other Sister         diverticulitis    Other Sister         diverticulitis    Cancer Sister         lyjmphoma    Breast Cancer Maternal Aunt 48    Thyroid Disease Paternal Grandmother        Social History     Socioeconomic History    Marital status: SINGLE     Spouse name: Not on file    Number of children: Not on file    Years of education: Not on file    Highest education level: Not on file   Occupational History    Not on file   Tobacco Use    Smoking status: Former Smoker     Packs/day: 0.20     Years: 10.00     Pack years: 2.00     Types: Cigarettes     Quit date:      Years since quittin.6    Smokeless tobacco: Never Used   Substance and Sexual Activity    Alcohol use: No    Drug use: No    Sexual activity: Not on file   Other Topics Concern    Not on file   Social History Narrative    Not on file     Social Determinants of Health     Financial Resource Strain:     Difficulty of Paying Living Expenses:    Food Insecurity:     Worried About [employer] in the Last Year:     [employer] in the Last Year:    Transportation Needs:     Lack of Transportation (Medical):  Lack of Transportation (Non-Medical):    Physical Activity:     Days of Exercise per Week:     Minutes of Exercise per Session:    Stress:     Feeling of Stress :    Social Connections:     Frequency of Communication with Friends and Family:     Frequency of Social Gatherings with Friends and Family:     Attends Islam Services:     Active Member of Clubs or Organizations:     Attends Club or Organization Meetings:     Marital Status:    Intimate Partner Violence:     Fear of Current or Ex-Partner:     Emotionally Abused:     Physically Abused:     Sexually Abused:           ALLERGIES: Latex, Sulfa (sulfonamide antibiotics), Tramadol, Augmentin [amoxicillin-pot clavulanate], Morphine, Rizatriptan, Tetanus immune globulin, Xanax [alprazolam], and Zonegran [zonisamide]    Review of Systems Constitutional: Negative. Negative for activity change. HENT: Negative. Eyes: Negative. Respiratory: Negative. Cardiovascular: Negative. Gastrointestinal: Negative. Genitourinary: Negative. Musculoskeletal: Negative. Negative for stiffness. Skin: Negative. Neurological: Negative. Negative for numbness. Psychiatric/Behavioral: Negative. All other systems reviewed and are negative. Vitals:    08/08/21 0438   BP: 108/60   Pulse: (!) 109   Resp: 18   Temp: 100 °F (37.8 °C)   SpO2: 97%   Weight: 59 kg (130 lb)   Height: 5' 3\" (1.6 m)            Physical Exam  Vitals and nursing note reviewed. Constitutional:       General: She is not in acute distress. Appearance: She is well-developed. HENT:      Head: Normocephalic and atraumatic. Right Ear: External ear normal.      Left Ear: External ear normal.      Nose: Nose normal.   Eyes:      General: No scleral icterus. Right eye: No discharge. Left eye: No discharge. Conjunctiva/sclera: Conjunctivae normal.      Pupils: Pupils are equal, round, and reactive to light. Cardiovascular:      Rate and Rhythm: Regular rhythm. Pulmonary:      Effort: Pulmonary effort is normal. No respiratory distress. Breath sounds: Normal breath sounds. No stridor. No wheezing or rales. Abdominal:      General: Bowel sounds are normal. There is no distension. Palpations: Abdomen is soft. Tenderness: There is no abdominal tenderness. Musculoskeletal:         General: Normal range of motion. Cervical back: Normal range of motion. Right lower leg: Tenderness present. Edema present. Legs:    Skin:     General: Skin is warm and dry. Findings: No rash. Neurological:      Mental Status: She is alert and oriented to person, place, and time. Motor: No abnormal muscle tone.       Coordination: Coordination normal.   Psychiatric:         Behavior: Behavior normal.          [facility]  Number of Diagnoses or Management Options  Acute vasculitis ([facility]  Diagnosis management comments: Patient is very anxious does not want to be sent home she is afraid that her health is getting worse. Her leg could be early cellulitis versus vasculitis. We will have the hospitalist admit. Hospital wants non-Covid patients at the side.        Amount and/or Complexity of Data Reviewed  Clinical lab tests: ordered and reviewed  Tests in the radiology section of CPT®: reviewed and ordered  Tests in the medicine section of CPT®: ordered and reviewed  Decide to obtain previous medical records or to obtain history from someone other than the patient: yes  Review and summarize past medical records: yes  Independent visualization of images, tracings, or specimens: yes    Risk of Complications, Morbidity, and/or Mortality  Presenting problems: high  Diagnostic procedures: high  Management options: high    Patient Progress  Patient progress: stable         Procedures

## 2021-08-09 NOTE — PROGRESS NOTES
Care Management Interventions  PCP Verified by CM: No  Current Support Network: Lives Alone  Confirm Follow Up Transport: Friends  Discharge Location  Discharge Placement: Home    Saw pt in interdisciplinarily rounds with the following disciplines: Physician, Case Management, Nursing, Dietary,Therapy and Pharmacy. The plan of care was discussed along with goals for discharge date/ location. SW met w/ pt and pt confirmed demographic info. Pt lives alone and is independent (bathes, dresses, and cooks). Pt' home does not have any steps. Pt can drive, but does not own a car, does not have any transportation barriers, pt's friends pick her up and take her to appts. Pt does not use any medical equipment at home. Pt does not have a PCP. [facility] gave pt a pt's choice list and pt is interested in using [facility], SW will put in a referral. Pt's pharmacy of choice is [facility] on [address] does not have any issues getting her medications    Per  pt may possibly be  medically ready for d/c on tomorrow. Pt does not agree and feels that she needs to stay longer because of her cellulitis.      Kameron Purdy MSW

## 2021-08-09 NOTE — PROGRESS NOTES
Pt in bed resting, stated that she was just feeling unwell. Allowed patient to continue resting. No other complaints verbalized.

## 2021-08-09 NOTE — PROGRESS NOTES
Hospitalist Progress Note   Admit Date:  2021 12:10 PM   Name:  Ko Fontaine   Age:  61 y.o. Sex:  female  :  1960   MRN:  [id number]     Presenting Complaint: No chief complaint on file. Reason(s) for Admission: Cellulitis of leg, right [L03.115]     Hospital Course & Interval History:     Ko Fontaine is a 61 y.o. 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 She was recently discharged from the hospital on cephalexin and doxycycline on  for cellulitis to her abdomen and chest.  She states that this R leg problem started 2 weeks ago but it worsened over the past 3 days. She was experiencing low-grade fevers at home. Pt started on Vanc and zosyn, azithromycin. Did report recent cat scratch/bite about 2 weeks ago. Bartonella pending. CT RLE shows cellulitis, no gas collection or focal fluid collections. Subjective (21):  Reports erythema and pain improved today. Questioning her synthroid dose, she is on 112 mcg at home and getting 125 mcg inpatient. TSH 10.70, explained reasoning. Denies CP, SOB, n/v/d, abd pain. Oncology consult pending. Assessment & Plan:     Cellulitis of leg, right (2021)  - Discharged on  w/ Keflex and doxy; was treated for cellulitis of abdomen, chest at that time  - Reports low-grade fevers for past 3 days with increased redness and pain to leg  - Blood cultures collected today  - Give IVF bolus and start continuous IVF  - Start vancomycin  - Start Zosyn  - Check CT to eval for necrotizing fasciitis  - US 3 days prior in ER negative for DVT; on enoxaparin 60mg BID at home but takes daily.    CT RLE without gas or fluid collections, shows cellulitis.      # Feline scratch/bite to R leg  - Patient reports this occurred 2 weeks ago  - Check Bartonella  - Start azithromycin 500mg x 5 days     # Recent URI  - Order Covid PCR     # CML  # PV  # Splenomegaly  # Hx of DVTs  - On enoxaparin 60mg BID at home but taking only daily  - Cont dasatinib and ruxolitinib although questionable home compliance  - Consulted Heme/onc (patient of Dr. Cachorro [place]) to address these problems and see if cellulitis could be medication related     # Hx hypothyroidism  - Cont home levothyroxine   TSH 10.7,  Increased synthroid         Disposition/Discharge Plannin-2 midnights     Diet: ADULT DIET Regular  VTE ppx: enoxaparin  Code status: Full Code       Active Hospital Problems    Diagnosis Date Noted    Cellulitis of leg, right 2021       Objective:     Patient Vitals for the past 24 hrs:   Temp Pulse Resp BP SpO2   21 0735 98.4 °F (36.9 °C) 96 18 125/83 100 %   21 0358 98.5 °F (36.9 °C) 87 18 (!) 93/55 93 %   21 2321 98.8 °F (37.1 °C) 85 16 103/71 94 %   21 1936 97.7 °F (36.5 °C) 82 16 102/60 97 %   21 1606 98.7 °F (37.1 °C) 87 16 99/62 93 %   21 1225 99.8 °F (37.7 °C) 88 16 101/70 97 %     Oxygen Therapy  O2 Sat (%): 100 % (21 0735)    Estimated body mass index is 24.55 kg/m² as calculated from the following:    Height as of this encounter: 5' 3\" (1.6 m). Weight as of this encounter: 62.9 kg (138 lb 9.6 oz). Intake/Output Summary (Last 24 hours) at 2021 1027  Last data filed at 2021 1013  Gross per 24 hour   Intake 3037 ml   Output 575 ml   Net 2462 ml         Physical Exam:   General:          No acute distress  Head:               Normocephalic  Eyes:               Sclerae appear normal.  Pupils equally round. HENT:             Nares erythematous. Dry mucous membranes  Neck:               No restricted ROM. Trachea midline  CV:                  RRR.  (+) murmur. No JVD  Lungs:             CTAB. No wheezing, rhonchi, or rales. Appears even, unlabored on room air. Abdomen: Bowel sounds present. Masses, tender to palpation, splenomegaly   Extremities:     No cyanosis or clubbing. 1+ edema to R foot. 2+ pedal pulses bilaterally. Skin:                Erythema and warmth to R LL improved. Normal coloration to L LL. Neuro:             Cranial nerves II-XII grossly intact. Sensation intact  Psych:             Anxious.   Alert and oriented x3    Data Ordered and Personally Reviewed:    Last 24hr Labs:  Recent Results (from the past 24 hour(s))   RESPIRATORY VIRUS PANEL W/COVID-19, PCR    Collection Time: 08/08/21  2:30 PM    Specimen: Nasopharyngeal   Result Value Ref Range    Adenovirus NOT DETECTED NOTDET      Coronavirus 229E NOT DETECTED NOTDET      Coronavirus HKU1 NOT DETECTED NOTDET      Coronavirus CVNL63 NOT DETECTED NOTDET      Coronavirus OC43 NOT DETECTED NOTDET      SARS-CoV-2, PCR NOT DETECTED NOTDET      Metapneumovirus NOT DETECTED NOTDET      Rhinovirus and Enterovirus NOT DETECTED NOTDET      Influenza A NOT DETECTED NOTDET      Influenza B NOT DETECTED NOTDET      Parainfluenza 1 NOT DETECTED NOTDET      Parainfluenza 2 NOT DETECTED NOTDET      Parainfluenza 3 NOT DETECTED NOTDET      Parainfluenza virus 4 NOT DETECTED NOTDET      RSV by PCR NOT DETECTED NOTDET      B. parapertussis, PCR NOT DETECTED NOTDET      Bordetella pertussis - PCR NOT DETECTED NOTDET      Chlamydophila pneumoniae DNA, QL, PCR NOT DETECTED NOTDET      Mycoplasma pneumoniae DNA, QL, PCR NOT DETECTED NOTDET     METABOLIC PANEL, BASIC    Collection Time: 08/09/21  4:41 AM   Result Value Ref Range    Sodium 139 136 - 145 mmol/L    Potassium 4.2 3.5 - 5.1 mmol/L    Chloride 107 98 - 107 mmol/L    CO2 27 21 - 32 mmol/L    Anion gap 5 (L) 7 - 16 mmol/L    Glucose 122 (H) 65 - 100 mg/dL    BUN 11 8 - 23 MG/DL    Creatinine 0.56 (L) 0.6 - 1.0 MG/DL    GFR est AA >60 >60 ml/min/1.73m2    GFR est non-AA >60 >60 ml/min/1.73m2    Calcium 7.8 (L) 8.3 - 10.4 MG/DL   CBC W/O DIFF    Collection Time: 08/09/21  4:41 AM   Result Value Ref Range    WBC 6.5 4.3 - 11.1 K/uL    RBC 3.25 (L) 4.05 - 5.2 M/uL    HGB 10.2 (L) 11.7 - 15.4 g/dL    HCT 30.6 (L) 35.8 - 46.3 %    MCV 94.2 79.6 - 97.8 FL    MCH 31.4 26.1 - 32.9 PG    MCHC 33.3 31.4 - 35.0 g/dL    RDW 19.3 (H) 11.9 - 14.6 %    PLATELET 352 (L) 302 - 450 K/uL    MPV 11.3 9.4 - 12.3 FL    ABSOLUTE NRBC 0.00 0.0 - 0.2 K/uL   TSH 3RD GENERATION    Collection Time: 08/09/21  4:41 AM   Result Value Ref Range    TSH 10.700 uIU/mL   HEMOGLOBIN A1C WITH EAG    Collection Time: 08/09/21  4:41 AM   Result Value Ref Range    Hemoglobin A1c 4.9 4.2 - 6.3 %    Est. average glucose Cannot be calculated mg/dL       All Micro Results     Procedure Component Value Units Date/Time    RESPIRATORY VIRUS PANEL W/COVID-19, PCR [[id number]] Collected: 08/08/21 1430    Order Status: Completed Specimen: Nasopharyngeal Updated: 08/08/21 1759     Adenovirus NOT DETECTED        Coronavirus 229E NOT DETECTED        Coronavirus HKU1 NOT DETECTED        Coronavirus CVNL63 NOT DETECTED        Coronavirus OC43 NOT DETECTED        SARS-CoV-2, PCR NOT DETECTED        Metapneumovirus NOT DETECTED        Rhinovirus and Enterovirus NOT DETECTED        Influenza A NOT DETECTED        Influenza B NOT DETECTED        Parainfluenza 1 NOT DETECTED        Parainfluenza 2 NOT DETECTED        Parainfluenza 3 NOT DETECTED        Parainfluenza virus 4 NOT DETECTED        RSV by PCR NOT DETECTED        B. parapertussis, PCR NOT DETECTED        Bordetella pertussis - PCR NOT DETECTED        Chlamydophila pneumoniae DNA, QL, PCR NOT DETECTED        Mycoplasma pneumoniae DNA, QL, PCR NOT DETECTED             Other Studies:  CT LOW EXT RT WO CONT    Result Date: 8/8/2021  CT of the right lower extremity without contrast INDICATION:  Subacute worsening severe swelling, pain, discomfort and skin color changes comments cellulitis. Evaluate for necrotizing fasciitis. Patient with history of DVT, CML, polycythemia, splenomegaly, portal venous thrombus, colitis. COMPARISON: Ultrasound duplex of the lower extremity 8/5/2021 TECHNIQUE: Dose reduction technique used: Automated exposure Control and/or adjustment of mA and kV according to patient size. Multiple axial images were obtained through the right lower extremity without intravenous contrast. Coronal and sagittal reformatted images were also performed for further evaluation of osseous structures. FINDINGS: There is no evidence of fracture or subluxation. No focal aggressive bony lesions are seen. Note that fine detail of bones is limited given the large field of view on this examination to include the entire lower extremity, and settings tailored for soft tissue evaluation given the indication. No evidence of a focal fluid collection to suggest drainable abscess. Unchanged mildly enlarged inguinal lymph nodes, nonspecific but most likely benign reactive in this setting. Generalized skin thickening and subcutaneous edema are noted in keeping with cellulitis. No abnormal subcutaneous gas collections are evident. Note of partially visualized abdominopelvic small volume ascites, and nonspecific appearance of several bowel loops. 1. No evidence of abnormal subcutaneous gas collection or focal fluid collection. 2. Cellulitis. 3. No acute bony abnormalities. Note that necrotizing fasciitis is predominantly a clinical diagnosis.       Current Meds:  Current Facility-Administered Medications   Medication Dose Route Frequency    [START ON 8/10/2021] VANCOMYCIN INFORMATION NOTE   Other ONCE    sodium chloride (NS) flush 5-40 mL  5-40 mL IntraVENous Q8H    sodium chloride (NS) flush 5-40 mL  5-40 mL IntraVENous PRN    acetaminophen (TYLENOL) tablet 650 mg  650 mg Oral Q6H PRN    Or    acetaminophen (TYLENOL) suppository 650 mg  650 mg Rectal Q6H PRN    polyethylene glycol (MIRALAX) packet 17 g  17 g Oral DAILY PRN    ondansetron (ZOFRAN ODT) tablet 4 mg  4 mg Oral Q8H PRN    Or    ondansetron (ZOFRAN) injection 4 mg  4 mg IntraVENous Q6H PRN    cholecalciferol (VITAMIN D3) (1000 Units /25 mcg) tablet 1,000 Units  1,000 Units Oral DAILY    magnesium oxide (MAG-OX) tablet 400 mg  400 mg Oral QHS    levothyroxine (SYNTHROID) tablet 125 mcg  125 mcg Oral 6am    zolpidem (AMBIEN) tablet 5 mg  5 mg Oral QHS PRN    JAKAFI (ruxolitinib) 5 mg (Patient Supplied)  5 mg Oral BID    dasatinib (SpryceL) 80 mg tab (Patient Supplied)  80 mg Oral QHS    pantoprazole (PROTONIX) tablet 40 mg  40 mg Oral QHS    oxyCODONE-acetaminophen (PERCOCET 10)  mg per tablet 1 Tablet  1 Tablet Oral Q8H PRN    lactated Ringers infusion  100 mL/hr IntraVENous CONTINUOUS    enoxaparin (LOVENOX) injection 60 mg  60 mg SubCUTAneous Q12H    azithromycin (ZITHROMAX) tablet 500 mg  500 mg Oral DAILY    piperacillin-tazobactam (ZOSYN) 3.375 g in 0.9% sodium chloride (MBP/ADV) 100 mL MBP  3.375 g IntraVENous Q8H    vancomycin (VANCOCIN) 1,000 mg in 0.9% sodium chloride 250 mL (VIAL-MATE)  1,000 mg IntraVENous Q12H       Signed:  Elier Lancaster NP    Notes, labs, VS, diagnostic testing reviewed  Case discussed with pt, care team, Dr. Dexter Ryan

## 2021-08-09 NOTE — PROGRESS NOTES
Awake with no c/o. No distress noted. Call light in reach, door open. Report given to Kristopher Aguirre RN.

## 2021-08-10 NOTE — PROGRESS NOTES
Progress Note    Patient: Mattie Cardneas MRN: [id number]  SSN: [id number]    YOB: 1960  Age: 61 y.o. Sex: female      Admit Date: 8/8/2021    LOS: 2 days     Subjective:   F/U right leg cellulitis    \"60 y.o. 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  She was recently discharged from the hospital on cephalexin and doxycycline on Jul/14/2021 for cellulitis to her abdomen and chest.  She states that this R leg problem started 2 weeks ago but it worsened over the past 3 days. Linwood Joseph was experiencing low-grade fevers at home.  Pt started on Vanc and zosyn, azithromycin. Did report recent cat scratch/bite about 2 weeks ago. Bartonella pending. CT RLE shows cellulitis, no gas collection or focal fluid collections. \" Cellulitis has resolved but she is very afraid her cellulitis will worsen with oral antibiotics alone. Discontinued Vancomycin and Zosyn and changed to oral azithromycin on 8/8 and Clindamycin on 8/10. Afebrile, no chest pain or SOB. Platelets decreased.    Current Facility-Administered Medications   Medication Dose Route Frequency    clindamycin (CLEOCIN) capsule 300 mg  300 mg Oral Q6H    zolpidem (AMBIEN) tablet 5 mg  5 mg Oral QHS PRN    sodium chloride (NS) flush 5-40 mL  5-40 mL IntraVENous Q8H    sodium chloride (NS) flush 5-40 mL  5-40 mL IntraVENous PRN    acetaminophen (TYLENOL) tablet 650 mg  650 mg Oral Q6H PRN    Or    acetaminophen (TYLENOL) suppository 650 mg  650 mg Rectal Q6H PRN    polyethylene glycol (MIRALAX) packet 17 g  17 g Oral DAILY PRN    ondansetron (ZOFRAN ODT) tablet 4 mg  4 mg Oral Q8H PRN    Or    ondansetron (ZOFRAN) injection 4 mg  4 mg IntraVENous Q6H PRN    cholecalciferol (VITAMIN D3) (1000 Units /25 mcg) tablet 1,000 Units  1,000 Units Oral DAILY    magnesium oxide (MAG-OX) tablet 400 mg  400 mg Oral QHS    levothyroxine (SYNTHROID) tablet 125 mcg  125 mcg Oral 6am    JAKAFI (ruxolitinib) 5 mg (Patient Supplied)  5 mg Oral BID    dasatinib (SpryceL) 80 mg tab (Patient Supplied)  80 mg Oral QHS    pantoprazole (PROTONIX) tablet 40 mg  40 mg Oral QHS    oxyCODONE-acetaminophen (PERCOCET 10)  mg per tablet 1 Tablet  1 Tablet Oral Q8H PRN    lactated Ringers infusion  100 mL/hr IntraVENous CONTINUOUS    enoxaparin (LOVENOX) injection 60 mg  60 mg SubCUTAneous Q12H    azithromycin (ZITHROMAX) tablet 500 mg  500 mg Oral DAILY       Objective:     Vitals:    08/09/21 2258 08/10/21 0350 08/10/21 0726 08/10/21 1116   BP: (!) 106/58 108/62 (!) 97/52 119/61   Pulse: 85 92 82 85   Resp: 18 16 18 16   Temp: 98 °F (36.7 °C) 98.3 °F (36.8 °C) 97.9 °F (36.6 °C) 97.9 °F (36.6 °C)   SpO2: 92% 94% 91% 99%   Weight:       Height:             Intake and Output:  Current Shift: No intake/output data recorded. Last three shifts: 08/08 1901 - 08/10 0700  In: 3423 [I.V.:3423]  Out: 2025 [Urine:2025]    Physical Exam:   General:  Alert, cooperative, no distress, appears stated age. Eyes:  Conjunctivae/corneas clear. Ears:  Normal TMs and external ear canals both ears. Nose: Nares normal. Septum midline. Mouth/Throat: Lips, mucosa, and tongue normal.   Neck:  no JVD. Back:   deferred. Lungs:   Clear to auscultation bilaterally. Heart:  Regular rate and rhythm, S1, S2 normal   Abdomen:   Soft, non-tender. Bowel sounds normal.    Extremities: Right LE with 1+ edema, good right popliteal pulse. Good right femoral pulse. Small lymph node can be palpated at right groin. No erythema noted to either legs   Pulses: 2+ and symmetric all extremities.    Skin: Skin color, texture, turgor normal. No rashes or lesions   Lymph nodes: Cervical, supraclavicular, and axillary nodes normal.   Neurologic: CNII-XII intact. Normal strength, sensation and reflexes throughout. Lab/Data Review:    Recent Results (from the past 24 hour(s))   METABOLIC PANEL, BASIC    Collection Time: 08/10/21  4:15 AM   Result Value Ref Range    Sodium 143 136 - 145 mmol/L    Potassium 4.0 3.5 - 5.1 mmol/L    Chloride 112 (H) 98 - 107 mmol/L    CO2 26 21 - 32 mmol/L    Anion gap 5 (L) 7 - 16 mmol/L    Glucose 104 (H) 65 - 100 mg/dL    BUN 10 8 - 23 MG/DL    Creatinine 0.58 (L) 0.6 - 1.0 MG/DL    GFR est AA >60 >60 ml/min/1.73m2    GFR est non-AA >60 >60 ml/min/1.73m2    Calcium 7.8 (L) 8.3 - 10.4 MG/DL   CBC W/O DIFF    Collection Time: 08/10/21  4:15 AM   Result Value Ref Range    WBC 5.3 4.3 - 11.1 K/uL    RBC 3.46 (L) 4.05 - 5.2 M/uL    HGB 10.7 (L) 11.7 - 15.4 g/dL    HCT 32.9 (L) 35.8 - 46.3 %    MCV 95.1 79.6 - 97.8 FL    MCH 30.9 26.1 - 32.9 PG    MCHC 32.5 31.4 - 35.0 g/dL    RDW 19.1 (H) 11.9 - 14.6 %    PLATELET 73 (L) 338 - 450 K/uL    MPV 10.4 9.4 - 12.3 FL    ABSOLUTE NRBC 0.00 0.0 - 0.2 K/uL   VANCOMYCIN, TROUGH    Collection Time: 08/10/21  4:15 AM   Result Value Ref Range    Vancomycin,trough 19.0 5 - 20 ug/mL       Assessment/ Plan:     Principal Problem:    Cellulitis of leg, right (8/8/2021)    Right leg cellulitis - Azithromycin for 5 days. DC vancomycin and zosyn. Add clindamycin 300mg 4x/day for the next 5 days. If leg still looks well like it does today, likely can dc tomorrow. Patient is comfortable with this plan. CLL- sprycel, JAKAFI    Synthroid    PPI    DVT prophylaxis - SCDs.      Dc hopefully tomorrow if afebrile and no worsening cellulitis   Signed By: Ginette Fisher DO     August 10, 2021

## 2021-08-10 NOTE — PROGRESS NOTES
Pharmacokinetic Consult to Pharmacist    Se Olivo is a 61 y.o. female being treated for SSTI with zosyn and vancomycin. Height: 5' 3\" (160 cm)  Weight: 62.9 kg (138 lb 9.6 oz)  Lab Results   Component Value Date/Time    BUN 10 08/10/2021 04:15 AM    Creatinine 0.58 (L) 08/10/2021 04:15 AM    WBC 5.3 08/10/2021 04:15 AM    Procalcitonin 0.14 07/12/2021 02:47 PM    Lactic acid 0.9 08/08/2021 05:06 AM    Lactic acid 1.2 04/12/2016 11:08 AM    Lactic Acid (POC) 0.7 01/05/2018 02:08 AM      Estimated Creatinine Clearance: 76.4 mL/min (A) (by C-G formula based on SCr of 0.58 mg/dL (L)).     CULTURES:  Results     Procedure Component Value Units Date/Time    RESPIRATORY VIRUS PANEL W/COVID-19, PCR [[id number]] Collected: 08/08/21 1430    Order Status: Completed Specimen: Nasopharyngeal Updated: 08/08/21 1759     Adenovirus NOT DETECTED        Coronavirus 229E NOT DETECTED        Coronavirus HKU1 NOT DETECTED        Coronavirus CVNL63 NOT DETECTED        Coronavirus OC43 NOT DETECTED        SARS-CoV-2, PCR NOT DETECTED        Metapneumovirus NOT DETECTED        Rhinovirus and Enterovirus NOT DETECTED        Influenza A NOT DETECTED        Influenza B NOT DETECTED        Parainfluenza 1 NOT DETECTED        Parainfluenza 2 NOT DETECTED        Parainfluenza 3 NOT DETECTED        Parainfluenza virus 4 NOT DETECTED        RSV by PCR NOT DETECTED        B. parapertussis, PCR NOT DETECTED        Bordetella pertussis - PCR NOT DETECTED        Chlamydophila pneumoniae DNA, QL, PCR NOT DETECTED        Mycoplasma pneumoniae DNA, QL, PCR NOT DETECTED       BLOOD CULTURE [[id number]] Collected: 08/08/21 0602    Order Status: Completed Specimen: Blood Updated: 08/10/21 0708     Special Requests: --        RIGHT  HAND       Culture result: NO GROWTH 2 DAYS       BLOOD CULTURE [[id number]] Collected: 08/08/21 0506    Order Status: Completed Specimen: Blood Updated: 08/10/21 0708     Special Requests: --        RIGHT  FOREARM Culture result: NO GROWTH 2 DAYS               Lab Results   Component Value Date/Time    Vancomycin,trough 19.0 08/10/2021 04:15 AM       Day 3 of vancomycin. Goal trough is 10-20. We will decrease the dose to 750mg q12h. We will continue to follow the patient and order levels when clinically indicated.       Thank you,  Swetha Griffin, AndreaD

## 2021-08-11 NOTE — PROGRESS NOTES
Problem: Falls - Risk of  Goal: *Absence of Falls  Description: Document Karen Mcclellan Fall Risk and appropriate interventions in the flowsheet.   Outcome: Progressing Towards Goal  Note: Fall Risk Interventions:  Mobility Interventions: Patient to call before getting OOB         Medication Interventions: Teach patient to arise slowly    Elimination Interventions: Call light in reach, Patient to call for help with toileting needs              Problem: Pain  Goal: *Control of Pain  Outcome: Progressing Towards Goal     Problem: Patient Education: Go to Patient Education Activity  Goal: Patient/Family Education  Outcome: Progressing Towards Goal

## 2021-08-11 NOTE — PROGRESS NOTES
Care Management Interventions  PCP Verified by CM: No  Current Support Network: Lives Alone  Confirm Follow Up Transport: Friends  Discharge Location  Discharge Placement: Home    Saw pt in interdisciplinarily rounds with the following disciplines: Physician, Case Management, Nursing, Dietary,Therapy and Pharmacy. The plan of care was discussed along with goals for discharge date/ location. Per , Pt is medically ready for d/c today. Pt is being  by family member and going home.  Pt states that she doesn't have any needs or concerns at this time    BG López

## 2021-08-11 NOTE — DISCHARGE SUMMARY
Hospitalist Discharge Summary     Patient ID:  Manolo Maldonado  [id number]  86 y.o.  1960  Admit date: 8/8/2021 12:10 PM  Discharge date and time: 8/11/2021  Attending: Heber Peng DO  PCP:  Jhonny Smiley MD  Treatment Team: Attending Provider: Heber Peng DO; Hospitalist: Estephania Gonzalez NP; Consulting Provider: Jhonny Smiley MD; Utilization Review: Pool Logan; : Cherry Alegre    Principal Diagnosis Cellulitis of leg, right   Principal Problem:    Cellulitis of leg, right (8/8/2021)     Hospital Course: \"60 y.o. 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  She was recently discharged from the hospital on cephalexin and doxycycline on Jul/14/2021 for cellulitis to her abdomen and chest.  She states that this R leg problem started 2 weeks ago but it worsened over the past 3 days. Josh Sánchez was experiencing low-grade fevers at home.  Pt started on Vanc and zosyn, azithromycin.  Did report recent cat scratch/bite about 2 weeks ago. Rustam Cowart pending.  CT RLE shows cellulitis, no gas collection or focal fluid collections. \" Cellulitis has resolved but she is very afraid her cellulitis will worsen with oral antibiotics alone. Discontinued Vancomycin and Zosyn and changed to oral azithromycin on 8/8 and Clindamycin on 8/10. Bartonella negative so will stop Azithromycin. No worsening of leg wound and actually appears back to her baseline. Continue Clindamycin for the next 4 days. Of note, platelets as low as 73. No obvious bleeding on exam. Hg actually has improved to 10.7. Currently on Lovenox BID for her hx of polycythemia vera. Hematology aware of patient and will gladly see outpatient per previous notes I have reviewed.  Repeat CBC without diff in two days. Can continue Lovenox for now. If obvious bleeding, AMS, worsening leg lesions, please come back to the ED. Please refer to the admission H&P for details of presentation. In summary, the patient is stable for discharge. Significant Diagnostic Studies:       Labs: Results:       Chemistry Recent Labs     08/10/21  0415 08/09/21  0441   * 122*    139   K 4.0 4.2   * 107   CO2 26 27   BUN 10 11   CREA 0.58* 0.56*   CA 7.8* 7.8*   AGAP 5* 5*      CBC w/Diff Recent Labs     08/10/21  0415 08/09/21  0441   WBC 5.3 6.5   RBC 3.46* 3.25*   HGB 10.7* 10.2*   HCT 32.9* 30.6*   PLT 73* 121*      Cardiac Enzymes No results for input(s): CPK, CKND1, ZAID in the last 72 hours. No lab exists for component: CKRMB, TROIP   Coagulation No results for input(s): PTP, INR, APTT, INREXT in the last 72 hours. Lipid Panel Lab Results   Component Value Date/Time    Cholesterol, total 105 03/16/2015 06:30 AM    HDL Cholesterol 37 (L) 03/16/2015 06:30 AM    LDL, calculated 54.8 03/16/2015 06:30 AM    VLDL, calculated 13.2 03/16/2015 06:30 AM    Triglyceride 66 03/16/2015 06:30 AM    CHOL/HDL Ratio 2.8 03/16/2015 06:30 AM      BNP No results for input(s): BNPP in the last 72 hours. Liver Enzymes No results for input(s): TP, ALB, TBIL, AP in the last 72 hours. No lab exists for component: SGOT, GPT, DBIL   Thyroid Studies Lab Results   Component Value Date/Time    T4, Total 9.7 03/16/2015 10:15 AM    TSH 10.700 08/09/2021 04:41 AM            Discharge Exam:  Visit Vitals  BP (!) 109/51 (BP 1 Location: Left upper arm, BP Patient Position: At rest)   Pulse 81   Temp 98.2 °F (36.8 °C)   Resp 16   Ht 5' 3\" (1.6 m)   Wt 62.9 kg (138 lb 9.6 oz)   SpO2 94%   Breastfeeding No   BMI 24.55 kg/m²     General appearance: alert, cooperative, no distress, appears stated age  Lungs: clear to auscultation bilaterally  Heart: regular rate and rhythm, S1, S2 normal  Abdomen: soft, non-tender. Bowel sounds normal. No masses,  no organomegaly  Extremities: Right leg with no erythema. Good right popliteal, DP, PT pulses. Mild chronic edema to right LE. Neurologic: Grossly normal    Disposition:Home   Discharge Condition: stable  Patient Instructions: As above   Current Discharge Medication List      START taking these medications    Details   clindamycin (CLEOCIN) 300 mg capsule Take 1 Capsule by mouth every six (6) hours. Qty: 16 Capsule, Refills: 0  Start date: 8/11/2021         CONTINUE these medications which have NOT CHANGED    Details   Synthroid 112 mcg tablet 1 tablet once a day with an extra 1/2 tablet on Sundays  Qty: 100 Tab, Refills: 3    Associated Diagnoses: Primary hypothyroidism      Comp Stocking,Knee,Regular,Sml misc 1 Each by Does Not Apply route daily. Qty: 1 Box, Refills: 0    Comments: Please dispense 2 pairs of compression stockings      zolpidem (AMBIEN) 10 mg tablet Take 1 Tablet by mouth nightly as needed for Sleep. Max Daily Amount: 10 mg.  Qty: 20 Tablet, Refills: 0    Comments: Okay to fill early per Dr. Santiago January Diagnoses: Insomnia due to medical condition      !! ondansetron (ZOFRAN ODT) 8 mg disintegrating tablet Take 1 Tab by mouth every eight (8) hours as needed for Nausea or Vomiting. Qty: 60 Tab, Refills: 0      magnesium 250 mg tab Take  by mouth. ruxolitinib 5 mg tab Take 1 Tab by mouth two (2) times a day. Qty: 60 Tab, Refills: 11    Comments: Called in to pharmacy      enoxaparin (LOVENOX) 60 mg/0.6 mL injection INJECT 60 MG BY SUBCUTANEOUS ROUTE EVERY TWELVE (12) HOURS. Qty: 60 Syringe, Refills: 11    Associated Diagnoses: [facility]chronic myelocytic [facility]) ([facility] Splenomegaly; Pain; Portal vein thrombosis; Iron deficiency anemia, unspecified iron deficiency anemia type; Polycythemia vera (HCC)      dasatinib (SpryceL) 80 mg tab Take 1 Tab by mouth daily.   Qty: 30 Tab, Refills: 11    Associated Diagnoses: Polycythemia vera ([facility] CML (chronic myelocytic leukemia) ([facility]      ! ! ondansetron (ZOFRAN ODT) 4 mg disintegrating tablet Take 1 Tab by mouth every eight (8) hours as needed for Nausea. Qty: 90 Tab, Refills: 0      calcium carbonate (CALTREX) 600 mg calcium (1,500 mg) tablet Take 600 mg by mouth nightly. cholecalciferol (VITAMIN D3) 1,000 unit cap Take 1,000 Units by mouth nightly. lansoprazole (PREVACID) 30 mg capsule Take 30 mg by mouth daily. prn      diphenoxylate-atropine (LOMOTIL) 2.5-0.025 mg per tablet Take 1 Tab by mouth four (4) times daily as needed for Diarrhea. Max Daily Amount: 4 Tabs. Qty: 90 Tab, Refills: 1       !! - Potential duplicate medications found. Please discuss with provider. STOP taking these medications       oxyCODONE-acetaminophen (PERCOCET 10)  mg per tablet Comments:   Reason for Stopping:               Activity:Up and polina   Diet:Regular   Wound Care:None     Follow-up PCP in one week. CBC in two days. Hematology in one week.    ·     Time spent to discharge patient 35 minutes  Signed:  lCif Mccarthy DO  8/11/2021  10:02 AM

## 2021-08-20 PROBLEM — K92.0 HEMATEMESIS: Status: ACTIVE | Noted: 2021-01-01

## 2021-08-20 PROBLEM — D62 ACUTE BLOOD LOSS ANEMIA: Status: ACTIVE | Noted: 2021-01-01

## 2021-08-20 PROBLEM — K92.1 MELENA: Status: ACTIVE | Noted: 2021-01-01

## 2021-08-20 NOTE — ANESTHESIA POSTPROCEDURE EVALUATION
Procedure(s):  ESOPHAGOGASTRODUODENOSCOPY (EGD) ROOM 215  ENDOSCOPIC BANDING OR LIGATION. general    Anesthesia Post Evaluation      Multimodal analgesia: multimodal analgesia used between 6 hours prior to anesthesia start to PACU discharge  Patient location during evaluation: PACU  Patient participation: complete - patient participated  Level of consciousness: awake and alert  Pain management: adequate  Airway patency: patent  Anesthetic complications: no  Cardiovascular status: acceptable  Respiratory status: acceptable  Hydration status: acceptable  Post anesthesia nausea and vomiting:  none  Final Post Anesthesia Temperature Assessment:  Normothermia (36.0-37.5 degrees C)      INITIAL Post-op Vital signs:   Vitals Value Taken Time   /58 08/20/21 1914   Temp 37.9 °C (100.2 °F) 08/20/21 1900   Pulse 100 08/20/21 1917   Resp 18 08/20/21 1900   SpO2 100 % 08/20/21 1916   Vitals shown include unvalidated device data.

## 2021-08-20 NOTE — PROCEDURES
Esophagogastroduodenoscopy    DATE of PROCEDURE: 8/20/2021    INDICATION:  1. Upper GI bleed  2. History of esophageal varices    POSTPROCEDURE DIAGNOSIS:  1. Esophageal varices  2. Large blood in stomach    MEDICATIONS ADMINISTERED: General. Further details per anesthesia note. INSTRUMENT: [id number]    PROCEDURE:  After obtaining informed consent, the patient was placed in the left lateral position and sedated. The endoscope was advanced under direct vision without difficulty. The esophagus, stomach (including retroflexed views) and duodenum were evaluated. The patient was taken to the recovery area in stable condition. FINDINGS:  ESOPHAGUS: 4 columns of grade II/III varices with positive red venkat sign. Banding performed x6 successfully. Otherwise normal exam.  STOMACH: Single prominent mucosal fold in cardia but no clear gastric varix. Large mass of blood clots in body limited views of body and fundus. No significant PHG seen on exam.  No clear bleed source in distal body, incisura, antrum, and pylorus. Otherwise normal exam.  DUODENUM: Normal    Estimated blood loss: 0-minimal   Specimens obtained during procedure: none    PLAN:  1. NPO overnight. Will consider advancing to clear liquids in AM  2. Continue octreotide 50 mcg/hr  3. Continue Cipro  4. Monitor Hgb and transfuse pRBC PRN for goal Hgb of 7.  Platelets and INR pravin at goal.      Will follow    Dolores Youngblood MD

## 2021-08-20 NOTE — CONSULTS
[facility] Consult Note       Primary GI Physician: Dr. Richard Abraham    Referring Provider:  Dr. Rafael Ellis Date:  8/20/2021    Admit Date:  8/20/2021    Chief Complaint:  GI bleed, known hx varices    Subjective:     History of Present Illness:  Patient is a 64 y.o. 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 Kayla Parker for UGI bleed. Beginning this morning at 8:15AM she started vomiting red blood with clots and she did josé 3-4 separate times, each time with similar appearance. She has not vomited any more since arriving to the ED. She did pass several black and tarry stools since 8:15AM and this has continued 2-3 additional times since arriving to the ED. She has had associated nausea and weakness. She did have some postprandial epigastric discomfort yesterday evening after eating hot wings and currently she has some epigastric tenderness on palpation. Otherwise she reports recent \"spleen pains. \"  Her last dose of Lovenox was last evening. She will take occasional NSAIDs but says none recently. She denies tobacco or ETOH. Labs in ED reveal Hgb 6.6 from Hgb 7.4, decreased from 10.3 yesterday and Hgb 12.3 on 8/13/21 with normal MCV, platelets, WBC, INR 1.2, LFTs. BUN is elevated at 34 with Creatinine of only 0.47. She was seen by GI for hematemesis in 10/2020 and then EGD 10/7/20 for UGIB showed mild to moderate PHG, and four columns of grade II-III esophageal varices with single varix that showed very faint possible red venkat sign. No banding was performed. She was seen by Dr. Мария Galo yesterday.   She has followed at [facility] with Dr. Atul Ball. She was supposed to be seen at [facility] around the end of 10/2020 for possible EGD, Colonoscopy, eventual splenectomy but she says this has been delayed due to [place] pandemic. PMH:  Past Medical History:   Diagnosis Date    Anemia     C. difficile diarrhea 4/1/2015    Cerebral edema ([address] 4/1/2015    Chronic migraine 9/22/2016    Chronic myelogenous leukemia ([address]     [place] chromosome/ converted from polycythemia in 2009- to 2012 when she was dx w leukemia    Chronic pain     Coagulation disorder ([address]     \"clotting and Bleeding Problem\" dr Haley Rodriguez follows     Esophageal spasm     with banding     Esophageal varices ([address]     grade 3    GI bleed 8/3/2016    H/O craniotomy     3-29-15.  due to blood clot - which caused a seizure  - then pt fell and hit     Leukocytosis 3/14/2015    MRSA colonization 6/25/2012    Polycythemia vera(238.4)     JAK2 mutation    Portal hypertension ([address]     with varices    Portal vein thrombosis 6/20/2012    Ct scan 6-21-2 Apparent occlusion of the portal vein. In addition, the splenic vein, and superior mesenteric vein are not definitely seen and are also likely  occluded. Splenomegaly, and extensive varices are seen as described above consistent with the portal vein occlusion 7-05-12 on arixtra HIT negative on repeat 7-27-12 re admitted Cirrhotic appearance of the liver. Mild to moderate ascites, as    Primary hypothyroidism     Pulmonary embolism ([address] 2006    not on coumadin anymore     S/P exploratory laparotomy, 6/20/12 6/25/2012    Bowel resection:  336 cm of small bowel removed, approximately 9 feet and placement of feeding jejunostomy.      S/P small bowel resection     2012.  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 2012 platelets lower repeat HIT 12 platelets in 60,795'U    Thrombosis, portal vein 2004    portal , spleenic and recently superior mesenteric    Transfusion history     many blood tranfusions - last 3--15 after brain surgery    Traumatic hemorrhage of right cerebrum ([facility] 3/30/2015       PSH:  Past Surgical History:   Procedure Laterality Date    HX APPENDECTOMY      with small bowel resection    HX BREAST BIOPSY Bilateral     Lt - ; Rt -     HX CHOLECYSTECTOMY      HX GI      liver biopsy    HX GI      bowel removed small - 9 feet     HX GYN       x 2    HX GYN      D&C following miscarriage    HX ORTHOPAEDIC Left 2008    torn labrum shoulder (screw in place)    NEUROLOGICAL PROCEDURE UNLISTED  2010    craniotomy to evacuate subdural hematoma following a fall from a seizure    MN ABDOMEN SURGERY PROC UNLISTED      umbilical hernia repair    MN ABDOMEN SURGERY PROC UNLISTED      9ft of small bowel excised then reconnected       Allergies: Allergies   Allergen Reactions    Latex Other (comments)     Testing for latex allergy was positive as a 2 (on a scale of 1 to 3).  Sulfa (Sulfonamide Antibiotics) Anaphylaxis    Tramadol Other (comments)     Top lip swelled    Augmentin [Amoxicillin-Pot Clavulanate] Rash    Morphine Nausea and Vomiting     Not a true allergy    Rizatriptan Rash    Tetanus Immune Globulin Other (comments)     Heat, bruising, and swelling at  Site of injection    Xanax [Alprazolam] Other (comments)     Hallucinations    Zonegran [Zonisamide] Rash       Home Medications:  Prior to Admission medications    Medication Sig Start Date End Date Taking?  Authorizing Provider   b complex vitamins tablet     Provider, Historical   zolpidem (AMBIEN) 10 mg tablet Take 1 Tablet by mouth nightly as needed for Sleep. Max Daily Amount: 10 mg. 8/17/21   Sandrine Weldon MD   oxyCODONE-acetaminophen (PERCOCET 10)  mg per tablet Take 1 Tablet by mouth every eight (8) hours as needed for Pain for up to 30 days. Max Daily Amount: 3 Tablets. 8/17/21 9/16/21  Sandrine Weldon MD   clindamycin (CLEOCIN) 300 mg capsule Take 1 Capsule by mouth every six (6) hours. Patient not taking: Reported on 8/19/2021 8/11/21   Sammye Kehr,    Comp Stocking,Knee,Regular,Sml misc 1 Each by Does Not Apply route daily. 8/5/21   Herb Roy DO   ondansetron (ZOFRAN ODT) 8 mg disintegrating tablet Take 1 Tab by mouth every eight (8) hours as needed for Nausea or Vomiting. 4/23/21   Felix Chao NP   magnesium 250 mg tab Take  by mouth. Provider, Historical   Synthroid 112 mcg tablet 1 tablet once a day with an extra 1/2 tablet on Sundays 3/29/21   Rishabh Cantu MD   ruxolitinib 5 mg tab Take 1 Tab by mouth two (2) times a day. Patient taking differently: Take 5 mg by mouth two (2) times a day. Sometimes only takes at night due to N/V/D 3/10/21   Sandrine Weldon MD   enoxaparin (LOVENOX) 60 mg/0.6 mL injection INJECT 60 MG BY SUBCUTANEOUS ROUTE EVERY TWELVE (12) HOURS. Patient taking differently: 60 mg nightly. INJECT 60 MG BY SUBCUTANEOUS ROUTE EVERY TWELVE (12) HOURS. Pt has only been taking lovenox nightly. 1/18/21   Sandrine Weldon MD   dasatinib (SpryceL) 80 mg tab Take 1 Tab by mouth daily. Patient taking differently: Take 80 mg by mouth nightly. 11/9/20   Sandrine Weldon MD   ondansetron (ZOFRAN ODT) 4 mg disintegrating tablet Take 1 Tab by mouth every eight (8) hours as needed for Nausea. 8/3/20   Sandrine Weldon MD   calcium carbonate (CALTREX) 600 mg calcium (1,500 mg) tablet Take 600 mg by mouth nightly. Provider, Historical   cholecalciferol (VITAMIN D3) 1,000 unit cap Take 1,000 Units by mouth nightly.     Provider, Historical   lansoprazole (PREVACID) 30 mg capsule Take 30 mg by mouth daily. prn  Patient not taking: Reported on 8/19/2021    Provider, Historical   diphenoxylate-atropine (LOMOTIL) 2.5-0.025 mg per tablet Take 1 Tab by mouth four (4) times daily as needed for Diarrhea. Max Daily Amount: 4 Tabs. 3/21/17   Elizabeth Vo MD       [facility] Medications:  Current Facility-Administered Medications   Medication Dose Route Frequency    sodium chloride (NS) flush 5-10 mL  5-10 mL IntraVENous Q8H    sodium chloride (NS) flush 5-10 mL  5-10 mL IntraVENous PRN    sodium chloride 0.9 % bolus infusion 1,000 mL  1,000 mL IntraVENous ONCE    octreotide (SANDOSTATIN) 500 mcg in 0.9% sodium chloride 500 mL infusion  50 mcg/hr IntraVENous CONTINUOUS    0.9% sodium chloride infusion 250 mL  250 mL IntraVENous PRN    pantoprazole (PROTONIX) 40 mg in 0.9% sodium chloride 10 mL injection  40 mg IntraVENous Q12H     Current Outpatient Medications   Medication Sig    b complex vitamins tablet     zolpidem (AMBIEN) 10 mg tablet Take 1 Tablet by mouth nightly as needed for Sleep. Max Daily Amount: 10 mg.    oxyCODONE-acetaminophen (PERCOCET 10)  mg per tablet Take 1 Tablet by mouth every eight (8) hours as needed for Pain for up to 30 days. Max Daily Amount: 3 Tablets.  clindamycin (CLEOCIN) 300 mg capsule Take 1 Capsule by mouth every six (6) hours. (Patient not taking: Reported on 8/19/2021)    Comp Stocking,Knee,Regular,Sml misc 1 Each by Does Not Apply route daily.  ondansetron (ZOFRAN ODT) 8 mg disintegrating tablet Take 1 Tab by mouth every eight (8) hours as needed for Nausea or Vomiting.  magnesium 250 mg tab Take  by mouth.  Synthroid 112 mcg tablet 1 tablet once a day with an extra 1/2 tablet on Sundays    ruxolitinib 5 mg tab Take 1 Tab by mouth two (2) times a day. (Patient taking differently: Take 5 mg by mouth two (2) times a day.  Sometimes only takes at night due to N/V/D)    enoxaparin (LOVENOX) 60 mg/0.6 mL injection INJECT 60 MG BY SUBCUTANEOUS ROUTE EVERY TWELVE (12) HOURS. (Patient taking differently: 60 mg nightly. INJECT 60 MG BY SUBCUTANEOUS ROUTE EVERY TWELVE (12) HOURS. Pt has only been taking lovenox nightly.)    dasatinib (SpryceL) 80 mg tab Take 1 Tab by mouth daily. (Patient taking differently: Take 80 mg by mouth nightly.)    ondansetron (ZOFRAN ODT) 4 mg disintegrating tablet Take 1 Tab by mouth every eight (8) hours as needed for Nausea.  calcium carbonate (CALTREX) 600 mg calcium (1,500 mg) tablet Take 600 mg by mouth nightly.  cholecalciferol (VITAMIN D3) 1,000 unit cap Take 1,000 Units by mouth nightly.  lansoprazole (PREVACID) 30 mg capsule Take 30 mg by mouth daily. prn (Patient not taking: Reported on 2021)    diphenoxylate-atropine (LOMOTIL) 2.5-0.025 mg per tablet Take 1 Tab by mouth four (4) times daily as needed for Diarrhea. Max Daily Amount: 4 Tabs. Social History:  Social History     Tobacco Use    Smoking status: Former Smoker     Packs/day: 0.20     Years: 10.00     Pack years: 2.00     Types: Cigarettes     Quit date:      Years since quittin.6    Smokeless tobacco: Never Used   Substance Use Topics    Alcohol use: No     Family History:  Family History   Problem Relation Age of Onset    Diabetes Mother     Stroke Mother         after surgery    Cancer Father         brain    No Known Problems Sister     Other Sister         diverticulitis    Other Sister         diverticulitis    Cancer Sister         lyjmphoma    Breast Cancer Maternal Aunt 48    Thyroid Disease Paternal Grandmother        Review of Systems:  A detailed 10 system ROS is obtained, with pertinent positives as listed above. All others are negative.     Diet:  NPO    Objective:     Physical Exam:  Vitals:  Visit Vitals  BP (!) 117/57   Pulse 93   Temp 99.1 °F (37.3 °C)   Resp 18   Ht 5' 3\" (1.6 m)   Wt 56.7 kg (125 lb)   SpO2 96%   BMI 22.14 kg/m²     Gen:  Pt is alert, cooperative, no acute distress  Skin:  Extremities and face reveal no rashes. HEENT: Sclerae anicteric. Cardiovascular: Regular rate and rhythm. Respiratory:  Comfortable breathing with no accessory muscle use. Clear breath sounds anteriorly with no wheezes, rales, or rhonchi. GI:  Abdomen nondistended, soft. +Mild epigastric and LUQ ttp. Normal active bowel sounds. No masses palpable. Rectal:  Deferred  Musculoskeletal:  No pitting edema of the lower legs. Neurological:  Gross memory appears intact. Patient is alert and oriented. Psychiatric:  Mood appears appropriate with judgement intact. Laboratory:    Recent Labs     08/20/21  1025 08/19/21  1335   WBC 9.5 8.5   HGB 7.4* 10.3*   HCT 23.2* 31.1*    221   MCV 96.7 94.0    139   K 4.0 3.9   * 109*   CO2 26 24   BUN 34* 19   CREA 0.47* 0.60   CA 8.1* 8.4   * 126*   AP 69 81   AST 11* 16   ALT 16 20   TBILI 0.2 0.4   ALB 2.7* 3.1*   TP 5.4* 6.5   PTP 15.3*  --    INR 1.2  --       EGD 2/25/19 grade 2 esophageal varices, diffuse portal hypertensive gastropathy, more prominent in the antrum and gastric varices suspected at the GE junction. Colonscopy with diminuative transverse colon polyp not removed due to pt being on coumadin at that time. EGD 10/7/20 for UGIB: mild to moderate PHG, and four columns of grade II-III esophageal varices with single varix that showed very faint possible red venkat sign. No banding was performed. Assessment:     Active Problems:    * No active hospital problems. *    62 y.o. female being seen in GI consult for UGIB in pt who presented with hematemesis and melena since 8:15AM today, no ongoing hematemesis but ongoing melanotic stools x2-3 since arriving to ED. Also, nausea, postprandial epigastric discomfort last night, recent \"spleen pain. 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 Labs in ED reveal Hgb 6.6 from Hgb 7.4, decreased from 10.3 yesterday and Hgb 12.3 on 8/13/21 with normal MCV, platelets, WBC, INR 1.2, LFTs. BUN is elevated at 34 with Creatinine of only 0.47. EGD 10/7/20 for UGIB showed mild to moderate PHG, and four columns of grade II-III esophageal varices with single varix that showed very faint possible red venkat sign. No banding was performed. She is followed by Dr. Antwan Sands and Dr. Arnold Green at [facility]. Plan:     - Keep NPO  - Monitor for ongoing GI bleeding.  - Agree with transfusion 2u pRBCs. Monitor H&H and transfuse for hgb < 7  - Continue Octreotide IV gtt and PPI IV BID  - EGD today to evaluate for source of UGIB (e.g. varices, pHG, PUD, AVMs, polypoid lesions). Further recommendations will be based upon findings on EGD, pt clinical course. Leon Romero PA-C  [facility]    Patient is seen and examined in collaboration with Dr. Cnostance Callaway. Assessment and plan as per Dr. Constance Callaway.

## 2021-08-20 NOTE — PROGRESS NOTES
Patient complaining of 6/10 pain in her spleen. She states the pain started after her bowel movement. BP is 97/50 HR 98 O2 96 on RA. Blood and sandostatin is currently transfusing.

## 2021-08-20 NOTE — ED PROVIDER NOTES
Presents with complaint of vomiting blood at 8 AM.  Patient reports he vomited clots. She had a nosebleed the other night but took her Lovenox last p.m. She took it approximately 2200 . She had abdominal pain prior to vomiting but it has improved since throwing up. She had black tarry loose stools x1 at home. Patient has a complicated past medical history and sees oncology. She has history of chronic splenomegaly and is being seen at [facility] for anticipated splenectomy. She denies pain at this time. The history is provided by the patient. GI Problem  This is a new problem. Episode onset: 0800. The problem occurs constantly. The problem has been gradually worsening. Associated symptoms include abdominal pain. Pertinent negatives include no chest pain and no shortness of breath. Nothing aggravates the symptoms. Nothing relieves the symptoms. She has tried nothing for the symptoms. Past Medical History:   Diagnosis Date    Anemia     C. difficile diarrhea 4/1/2015    Cerebral edema ([address] 4/1/2015    Chronic migraine 9/22/2016    Chronic myelogenous leukemia ([address]     Milliken chromosome/ converted from polycythemia in 2009- to 2012 when she was dx w leukemia    Chronic pain     Coagulation disorder ([address]     \"clotting and Bleeding Problem\" dr Lisa Valentino follows     Esophageal spasm     with banding     Esophageal varices ([address]     grade 3    GI bleed 8/3/2016    H/O craniotomy     3-29-15.  due to blood clot - which caused a seizure  - then pt fell and hit     Leukocytosis 3/14/2015    MRSA colonization 6/25/2012    Polycythemia vera(238.4)     JAK2 mutation    Portal hypertension ([address]     with varices    Portal vein thrombosis 6/20/2012    Ct scan 6-21-2 Apparent occlusion of the portal vein. In addition, the splenic vein, and superior mesenteric vein are not definitely seen and are also likely  occluded.  Splenomegaly, and extensive varices are seen as described above consistent with the portal vein occlusion 12 on arixtra HIT negative on repeat 12 re admitted Cirrhotic appearance of the liver. Mild to moderate ascites, as    Primary hypothyroidism     Pulmonary embolism ([address]     not on coumadin anymore     S/P exploratory laparotomy, 12    Bowel resection:  336 cm of small bowel removed, approximately 9 feet and placement of feeding jejunostomy.      S/P small bowel resection     .  9 feet removed due to  gangrene which wa due to blood clot    Seizure ([address] 3/14/2015    Seizure disorder ([address] 3/30/2015    due to clotting factor    Seizures ([address]     last one 3- - followed by aniyah     Splenomegaly, congestive, chronic     Stroke (cerebrum) ([address] 2015    had bled into head- surgery    Stroke [facility])     pt had stroke like symptoms     Thrombocytopenia, HIT negative 2012 platelets lower repeat HIT 12 platelets in 33,451'L    Thrombosis, portal vein 2004    portal , spleenic and recently superior mesenteric    Transfusion history     many blood tranfusions - last 3-29-15 after brain surgery    Traumatic hemorrhage of right cerebrum ([address] 3/30/2015       Past Surgical History:   Procedure Laterality Date    HX APPENDECTOMY      with small bowel resection    HX BREAST BIOPSY Bilateral     Lt - ; Rt -     HX CHOLECYSTECTOMY      HX GI      liver biopsy    HX GI      bowel removed small - 9 feet     HX GYN       x 2    HX GYN      D&C following miscarriage    HX ORTHOPAEDIC Left 2008    torn labrum shoulder (screw in place)    NEUROLOGICAL PROCEDURE UNLISTED  2010    craniotomy to evacuate subdural hematoma following a fall from a seizure    [place] ABDOM[place] SUR[place] PROC UNLISTED      umbilical hernia repair    [place] ABDOM[place] SURG[place] PROC UNLISTED      9ft of small bowel excised then reconnected         Family History:   Problem Relation Age of Onset    Diabetes Mother     Stroke Mother after surgery    Cancer Father         brain    No Known Problems Sister     Other Sister         diverticulitis    Other Sister         diverticulitis    Cancer Sister         lyjmphoma    Breast Cancer Maternal Aunt 48    Thyroid Disease Paternal Grandmother        Social History     Socioeconomic History    Marital status: SINGLE     Spouse name: Not on file    Number of children: Not on file    Years of education: Not on file    Highest education level: Not on file   Occupational History    Not on file   Tobacco Use    Smoking status: Former Smoker     Packs/day: 0.20     Years: 10.00     Pack years: 2.00     Types: Cigarettes     Quit date:      Years since quittin.6    Smokeless tobacco: Never Used   Substance and Sexual Activity    Alcohol use: No    Drug use: No    Sexual activity: Not on file   Other Topics Concern    Not on file   Social History Narrative    Not on file     Social Determinants of Health     Financial Resource Strain:     Difficulty of Paying Living Expenses:    Food Insecurity:     Worried About [address] [employer] in the Last Year:     [address] [employer] in the Last Year:    Transportation Needs:     Lack of Transportation (Medical):  Lack of Transportation (Non-Medical):    Physical Activity:     Days of Exercise per Week:     Minutes of Exercise per Session:    Stress:     Feeling of Stress :    Social Connections:     Frequency of Communication with Friends and Family:     Frequency of Social Gatherings with Friends and Family:     Attends Jewish Services:     Active Member of Clubs or Organizations:     Attends Club or Organization Meetings:     Marital Status:    Intimate Partner Violence:     Fear of Current or Ex-Partner:     Emotionally Abused:     Physically Abused:     Sexually Abused:           ALLERGIES: Latex, Sulfa (sulfonamide antibiotics), Tramadol, Augmentin [amoxicillin-pot clavulanate], Morphine, Rizatriptan, Tetanus immune globulin, Xanax [alprazolam], and Zonegran [zonisamide]    Review of Systems   Constitutional: Negative for chills and fever. Respiratory: Negative for cough and shortness of breath. Cardiovascular: Negative for chest pain and leg swelling. Gastrointestinal: Positive for abdominal pain, diarrhea, nausea and vomiting. Skin: Negative for rash and wound. All other systems reviewed and are negative. Vitals:    08/20/21 1020 08/20/21 1038 08/20/21 1044 08/20/21 1124   BP: (!) 106/54  (!) 117/57    Pulse: 86 93     Resp: 18      Temp: 99.1 °F (37.3 °C)      SpO2: 99% 99% 96% 98%   Weight: 56.7 kg (125 lb)      Height: 5' 3\" (1.6 m)               Physical Exam  Vitals and nursing note reviewed. Constitutional:       General: She is in acute distress. Appearance: Normal appearance. She is well-developed. She is ill-appearing. She is not diaphoretic. HENT:      Head: Normocephalic and atraumatic. Eyes:      General:         Right eye: No discharge. Left eye: No discharge. Conjunctiva/sclera: Conjunctivae normal.   Cardiovascular:      Rate and Rhythm: Normal rate and regular rhythm. Pulmonary:      Effort: Pulmonary effort is normal. No respiratory distress. Breath sounds: Normal breath sounds. Abdominal:      General: There is no distension. Palpations: Abdomen is soft. There is splenomegaly. Tenderness: There is no abdominal tenderness. Musculoskeletal:         General: Normal range of motion. Cervical back: Normal range of motion and neck supple. Right lower leg: No edema. Left lower leg: No edema. Skin:     General: Skin is warm and dry. Capillary Refill: Capillary refill takes less than 2 seconds. Neurological:      General: No focal deficit present. Mental Status: She is alert. Cranial Nerves: No cranial nerve deficit. Motor: Weakness (generalized) present.    Psychiatric:         Mood and Affect: Mood normal. Behavior: Behavior normal.          MDM  Number of Diagnoses or Management Options  Chronic anticoagulation  Gastrointestinal hemorrhage with melena  Secondary esophageal varices with bleeding (HCC)  Splenomegaly  Diagnosis management comments: Patient had 2 loose black bloody bowel movements while in the ER. Her hemoglobin has dropped approximately 3 g since yesterday. She will get H&H's every 2 hours. She was given Protonix. I spoke with oncology Dr. Jayshree Reyes and no reversal agents were recommended as patient's last dose of Lovenox was over 12 hours ago. I spoke with GI to see the patient today. I recommended to the hospitalist that the patient be admitted to the ICU. Amount and/or Complexity of Data Reviewed  Clinical lab tests: ordered and reviewed  Review and summarize past medical records: yes (Extensive past medical history with both clotting and bleeding issues. Seen by oncology yesterday.)  Discuss the patient with other providers: yes    Risk of Complications, Morbidity, and/or Mortality  Presenting problems: high  Diagnostic procedures: minimal  Management options: high    Patient Progress  Patient progress: stable         Critical Care  Performed by: Susy Mcclain MD  Authorized by: Susy Mcclain MD     Critical care provider statement:     Critical care time (minutes):  40    Critical care time was exclusive of:  Separately billable procedures and treating other patients    Critical care was necessary to treat or prevent imminent or life-threatening deterioration of the following conditions: Acute GI bleeding.     Critical care was time spent personally by me on the following activities:  Blood draw for specimens, re-evaluation of patient's condition, review of old charts, obtaining history from patient or surrogate, examination of patient, evaluation of patient's response to treatment, discussions with consultants and ordering and review of laboratory studies    I assumed direction of critical care for this patient from another provider in my specialty: no

## 2021-08-20 NOTE — PERIOP NOTES
[id number]: Patient found with tarry stool underneath her. She says, \"I think I'm going to the bathroom I feel wet. \" VSS. Large amount of black, tarry liquid stool noted on bed linens, and gown. Stool on inner thighs and perineal area. Patient cleaned with warm wipes, skin care provided. All stool removed. Bed linens and gown changed. Patient tolerated well and verbalized gratitude. VSS.

## 2021-08-20 NOTE — H&P
[facility]   History and Physical       Name:  Shanel Anton  Age: 64 y.o. Sex: female   :  1960    MRN:  [id number]   PCP:  Van Domingo MD      Admit Date:  2021 10:16 AM   Presenting Complaint: GI Problem      Reason for Admission:  Acute blood loss anemia [D62]  Hematemesis [K92.0]  Melena [K92.1]    Assessment & Plan:     Acute symptomatic blood loss anemia   Hematemesis and melanotic stools likely due to upper and lower GI bleed  Esophageal Varices  - GI consulted  - start on octreotide gtt  - NPO  - hold Lovenox   - Protonix BID  - Cipro Q12 for ppx  - 2units of PRBC ordered     CML on chemotherapy  -Follows with Hematology and Oncology. ED spoke with hematology and Ramin Lee will take see her. Polycythemia vera  DVT // Portal vein thrombosis  Acquired hypercoagulable state  -Patient is on enoxaparin 60mg BID but will need to be held given current acute bleed. Discussed with patient and patient is agreeable to this plan. -Cont dasatinib and ruxolitinib although questionable home compliance  -ED spoke with hematology and Ramin Lee will take see her. Diet: NPO pending GI recs  VTE ppx: SCDs  GI ppx: PPI  CODE STATUS: FULL      History of Presenting Illness:     Shanel Anton is a 64 y.o. female with medical history of CML on oral chemotherapy, polycythemia vera on Lovenox, DVT and PV thrombosis, intracranial hemorrhage s/p craniotomy, small bowel resection from intestinal ischemia, seizure, splenomegaly presented to ED with hematemesis and melena that started this morning. She reports abdominal pain around her spleen and stomach after eating yesterday. Pain appeared to be pressured sensation, 8-9 out of 10. Reports she woke up this morning with 3 episodes of hematemesis with clots which she described as a pile of cottage cheese. She also reports loose watery black tarry stool right after her hematemesis.   Upon arrival to the ED, she continues to have 3 more episodes of tarry stools. Reports dizziness this morning which has gotten worse in the ED. Reports taking Lovenox twice daily for polycythemia and DCT, portal vein thrombosis. Last dose was last night. Of note, patient was admitted to [address] [place] [address] on 8/8-8/11 for right lower leg cellulitis and completed treatment with clindamycin. In the ED, patient is saturating well on room air with SBP around 100s. Laboratory work-up is notable for hemoglobin of 7.4 down to 6.6 (after 3 episodes of melanotic stool in the ED), BUN 34, creatinine 0.47, iron 154, TIBC 187, INR 1.2, thrombin time of 15.3. Review of Systems: 10 systems reviewed and negative except as noted in HPI. Past Medical History:   Diagnosis Date    Anemia     C. difficile diarrhea 4/1/2015    Cerebral edema ([address] 4/1/2015    Chronic migraine 9/22/2016    Chronic myelogenous leukemia ([facility]     [place] chromosome/ converted from polycythemia in 2009- to 2012 when she was dx w leukemia    Chronic pain     Coagulation disorder ([address]     \"clotting and Bleeding Problem\" dr Lisa Valentino follows     Esophageal spasm     with banding     Esophageal varices ([address]     grade 3    GI bleed 8/3/2016    H/O craniotomy     3-29-15.  due to blood clot - which caused a seizure  - then pt fell and hit     Leukocytosis 3/14/2015    MRSA colonization 6/25/2012    Polycythemia vera(238.4)     JAK2 mutation    Portal hypertension ([address]     with varices    Portal vein thrombosis 6/20/2012    Ct scan 6-21-2 Apparent occlusion of the portal vein. In addition, the splenic vein, and superior mesenteric vein are not definitely seen and are also likely  occluded. Splenomegaly, and extensive varices are seen as described above consistent with the portal vein occlusion 7-05-12 on arixtra HIT negative on repeat 7-27-12 re admitted Cirrhotic appearance of the liver.  Mild to moderate ascites, as    Primary hypothyroidism     Pulmonary embolism ([address] 2006    not on coumadin anymore     S/P exploratory laparotomy, 12    Bowel resection:  336 cm of small bowel removed, approximately 9 feet and placement of feeding jejunostomy.  S/P small bowel resection     .  9 feet removed due to  gangrene which wa due to blood clot    Seizure ([address] 3/14/2015    Seizure disorder ([address] 3/30/2015    due to clotting factor    Seizures ([address]     last one 3- - followed by aniyah     Splenomegaly, congestive, chronic     Stroke (cerebrum) ([address] 2015    had bled into head- surgery    Stroke [facility])     pt had stroke like symptoms     Thrombocytopenia, HIT negative 2012 platelets lower repeat HIT 12 platelets in 39,902'A    Thrombosis, portal vein 2004    portal , spleenic and recently superior mesenteric    Transfusion history     many blood tranfusions - last 3-29-15 after brain surgery    Traumatic hemorrhage of right cerebrum ([address] 3/30/2015       Past Surgical History:   Procedure Laterality Date    HX APPENDECTOMY      with small bowel resection    HX BREAST BIOPSY Bilateral     Lt - ; Rt -     HX CHOLECYSTECTOMY      HX GI      liver biopsy    HX GI      bowel removed small - 9 feet     HX GYN       x 2    HX GYN      D&C following miscarriage    HX ORTHOPAEDIC Left 2008    torn labrum shoulder (screw in place)    NEUROLOGICAL PROCEDURE UNLISTED  2010    craniotomy to evacuate subdural hematoma following a fall from a seizure    [place] ABDOMEN SURGERY PROC UNLISTED      umbilical hernia repair    [place] ABDOMEN SURGERY PROC UNLISTED      9ft of small bowel excised then reconnected       Family History : reviewed.    Family History   Problem Relation Age of Onset    Diabetes Mother     Stroke Mother         after surgery    Cancer Father         brain    No Known Problems Sister     Other Sister         diverticulitis    Other Sister         diverticulitis    Cancer Sister         lyjmphoma    Breast Cancer Maternal Aunt 48    Thyroid Disease Paternal Grandmother         Social History     Tobacco Use    Smoking status: Former Smoker     Packs/day: 0.20     Years: 10.00     Pack years: 2.00     Types: Cigarettes     Quit date:      Years since quittin.6    Smokeless tobacco: Never Used   Substance Use Topics    Alcohol use: No       Allergies   Allergen Reactions    Latex Other (comments)     Testing for latex allergy was positive as a 2 (on a scale of 1 to 3).  Sulfa (Sulfonamide Antibiotics) Anaphylaxis    Tramadol Other (comments)     Top lip swelled    Augmentin [Amoxicillin-Pot Clavulanate] Rash    Morphine Nausea and Vomiting     Not a true allergy    Rizatriptan Rash    Tetanus Immune Globulin Other (comments)     Heat, bruising, and swelling at  Site of injection    Xanax [Alprazolam] Other (comments)     Hallucinations    Zonegran [Zonisamide] Rash       Immunization History   Administered Date(s) Administered    (RETIRED) Pneumococcal Vaccine (Unspecified Type) 2008    Covid-19, MODERNA, Mrna, Lnp-s, Pf, 100mcg/0.5mL 2021, 2021    Hib (PRP-T) 2019    Influenza Vaccine 10/01/2014, 10/01/2016    Influenza Vaccine (Quad) Mdck Pf (>4 Yrs Flucelvax QUAD 48968) 2020    Influenza Vaccine Shoebox) PF (>6 Mo Flulaval, Fluarix, and >3 Yrs Afluria, Fluzone 79549) 10/25/2018, 10/08/2019    Pneumococcal Conjugate (PCV-13) 2019    Pneumococcal Polysaccharide (PPSV-23) 2004    TB Skin Test (PPD) Intradermal 2014    Zoster Recombinant 2020, 2020         PTA Medications:  Current Outpatient Medications   Medication Instructions    b complex vitamins tablet No dose, route, or frequency recorded.     calcium carbonate (CALTREX) 600 mg, Oral, EVERY BEDTIME    cholecalciferol (VITAMIN D3) 1,000 Units, Oral, EVERY BEDTIME    Comp Stocking,Knee,Regular,Sml misc 1 Each, Does Not Apply, DAILY    diphenoxylate-atropine (LOMOTIL) 2.5-0.025 mg per tablet 1 Tablet, Oral, 4 TIMES DAILY AS NEEDED    enoxaparin (LOVENOX) 60 mg/0.6 mL injection INJECT 60 MG BY SUBCUTANEOUS ROUTE EVERY TWELVE (12) HOURS.  lansoprazole (PREVACID) 30 mg, DAILY    magnesium 250 mg tab Oral    ondansetron (ZOFRAN ODT) 4 mg, Oral, EVERY 8 HOURS AS NEEDED    ondansetron (ZOFRAN ODT) 8 mg, Oral, EVERY 8 HOURS AS NEEDED    oxyCODONE-acetaminophen (PERCOCET 10)  mg per tablet 1 Tablet, Oral, EVERY 8 HOURS AS NEEDED    ruxolitinib 5 mg, Oral, 2 TIMES DAILY    SpryceL 80 mg, Oral, DAILY    Synthroid 112 mcg tablet 1 tablet once a day with an extra 1/2 tablet on Sundays    zolpidem (AMBIEN) 10 mg, Oral, BEDTIME PRN       Objective:     Patient Vitals for the past 24 hrs:   Temp Pulse Resp BP SpO2   08/20/21 1330 -- 92 -- -- 100 %   08/20/21 1231 -- 96 -- 100/64 97 %   08/20/21 1124 -- -- -- -- 98 %   08/20/21 1044 -- -- -- (!) 117/57 96 %   08/20/21 1038 -- 93 -- -- 99 %   08/20/21 1020 99.1 °F (37.3 °C) 86 18 (!) 106/54 99 %       Oxygen Therapy  O2 Sat (%): 100 % (08/20/21 1330)  Pulse via Oximetry: 92 beats per minute (08/20/21 1330)  O2 Device: None (Room air) (08/20/21 1020)    Body mass index is 22.14 kg/m². Physical Exam:     General:     alert, awake, pale appearing. HENT:   normocephalic, atraumatic. Eyes:   anicteric sclerae, moist conjunctiva, EOMI  Neck:    supple, non-tender. Trachea midline. Lungs:   CTAB, no wheezing  Cardiac:   Tachycardic, Normal S1 and S2. .   Abdomen:   Soft, non distended, nontender, +BS, no guarding/rebound  Extremities:   No edema , pedal pulses present  Skin:   Warn, dry, normnal turgor and texture  Neuro:  AAOx3. No gross focal neurological deficit  Psychiatric:  No anxiety, calm, cooperative    Data Reviewed: I have reviewed all labs, meds, and studies.     Recent Results (from the past 24 hour(s))   TYPE & SCREEN    Collection Time: 08/20/21 10:24 AM   Result Value Ref Range    Crossmatch Expiration 08/23/2021,2359     ABO/Rh(D) B NEGATIVE     Antibody screen NEG     Unit number [id number]     Blood component type RC LRIR     Unit division 00     Status of unit ALLOCATED     Crossmatch result Compatible    CBC WITH AUTOMATED DIFF    Collection Time: 08/20/21 10:25 AM   Result Value Ref Range    WBC 9.5 4.3 - 11.1 K/uL    RBC 2.40 (L) 4.05 - 5.2 M/uL    HGB 7.4 (L) 11.7 - 15.4 g/dL    HCT 23.2 (L) 35.8 - 46.3 %    MCV 96.7 79.6 - 97.8 FL    MCH 30.8 26.1 - 32.9 PG    MCHC 31.9 31.4 - 35.0 g/dL    RDW 19.3 (H) 11.9 - 14.6 %    PLATELET 791 504 - 322 K/uL    MPV 10.8 9.4 - 12.3 FL    ABSOLUTE NRBC 0.00 0.0 - 0.2 K/uL    DF AUTOMATED      NEUTROPHILS 88 (H) 43 - 78 %    LYMPHOCYTES 6 (L) 13 - 44 %    MONOCYTES 3 (L) 4.0 - 12.0 %    EOSINOPHILS 2 0.5 - 7.8 %    BASOPHILS 0 0.0 - 2.0 %    IMMATURE GRANULOCYTES 1 0.0 - 5.0 %    ABS. NEUTROPHILS 8.3 (H) 1.7 - 8.2 K/UL    ABS. LYMPHOCYTES 0.6 0.5 - 4.6 K/UL    ABS. MONOCYTES 0.3 0.1 - 1.3 K/UL    ABS. EOSINOPHILS 0.1 0.0 - 0.8 K/UL    ABS. BASOPHILS 0.0 0.0 - 0.2 K/UL    ABS. IMM. GRANS. 0.1 0.0 - 0.5 K/UL   METABOLIC PANEL, COMPREHENSIVE    Collection Time: 08/20/21 10:25 AM   Result Value Ref Range    Sodium 139 136 - 145 mmol/L    Potassium 4.0 3.5 - 5.1 mmol/L    Chloride 108 (H) 98 - 107 mmol/L    CO2 26 21 - 32 mmol/L    Anion gap 5 (L) 7 - 16 mmol/L    Glucose 109 (H) 65 - 100 mg/dL    BUN 34 (H) 8 - 23 MG/DL    Creatinine 0.47 (L) 0.6 - 1.0 MG/DL    GFR est AA >60 >60 ml/min/1.73m2    GFR est non-AA >60 >60 ml/min/1.73m2    Calcium 8.1 (L) 8.3 - 10.4 MG/DL    Bilirubin, total 0.2 0.2 - 1.1 MG/DL    ALT (SGPT) 16 12 - 65 U/L    AST (SGOT) 11 (L) 15 - 37 U/L    Alk.  phosphatase 69 50 - 136 U/L    Protein, total 5.4 (L) 6.3 - 8.2 g/dL    Albumin 2.7 (L) 3.2 - 4.6 g/dL    Globulin 2.7 2.3 - 3.5 g/dL    A-G Ratio 1.0 (L) 1.2 - 3.5     PROTHROMBIN TIME + INR    Collection Time: 08/20/21 10:25 AM   Result Value Ref Range    Prothrombin time 15.3 (H) 12.6 - 14.5 sec INR 1.2     TRANSFERRIN SATURATION    Collection Time: 08/20/21 10:25 AM   Result Value Ref Range    Iron 154 (H) 35 - 150 ug/dL    TIBC 187 (L) 250 - 450 ug/dL    Transferrin Saturation 82 >20 %   HGB & HCT    Collection Time: 08/20/21 12:00 PM   Result Value Ref Range    HGB 6.6 (LL) 11.7 - 15.4 g/dL    HCT 20.7 (L) 35.8 - 46.3 %   RBC, ALLOCATE    Collection Time: 08/20/21 12:00 PM   Result Value Ref Range    HISTORY CHECKED? Historical check performed        All Micro Results     None          Other Studies:  No results found.       Medications:  Medications Administered     octreotide (SANDOSTATIN) 500 mcg in 0.9% sodium chloride 500 mL infusion     Admin Date  08/20/2021 Action  New Bag Dose  50 mcg/hr Rate  50 mL/hr Route  IntraVENous Administered By  Doroteo Robles RN          ondansetron [facility]) injection 4 mg     Admin Date  08/20/2021 Action  Given Dose  4 mg Route  IntraVENous Administered By  Nuokang Medicinee [employer]          pantoprazole (PROTONIX) 80 mg in 0.9% sodium chloride 20 mL injection     Admin Date  08/20/2021 Action  Given Dose  80 mg Route  IntraVENous Administered By  Nuokang Medicinee Press          sodium chloride 0.9 % bolus infusion 1,000 mL     Admin Date  08/20/2021 Action  New Bag Dose  1,000 mL Rate  1,000 mL/hr Route  IntraVENous Administered By  ARPU Press                Problem List:     Hospital Problems as of 8/20/2021 Date Reviewed: 7/15/2021        Codes Class Noted - Resolved POA    Hematemesis ICD-10-CM: K92.0  ICD-9-CM: 578.0  8/20/2021 - Present Yes        Melena ICD-10-CM: K92.1  ICD-9-CM: 578.1  8/20/2021 - Present Yes        Acute blood loss anemia ICD-10-CM: D62  ICD-9-CM: 285.1  8/20/2021 - Present Yes        Esophageal varices (HCC) (Chronic) ICD-10-CM: I85.00  ICD-9-CM: 456.1  Unknown - Present Yes        Splenomegaly ICD-10-CM: R16.1  ICD-9-CM: 789.2  3/21/2019 - Present Yes        Primary hypothyroidism ICD-10-CM: E03.9  ICD-9-CM: 244.9  Unknown - Present Yes        DVT (deep venous thrombosis) ([facility] ICD-10-CM: I82.409  ICD-9-CM: 453.40  10/26/2016 - Present Yes        CML (chronic myelocytic leukemia) (HCC) (Chronic) ICD-10-CM: C92.10  ICD-9-CM: 205.10  3/30/2015 - Present Yes        Acquired hypercoagulable state ([facility] (Chronic) ICD-10-CM: O66.57  ICD-9-CM: 289.81  3/30/2015 - Present Yes        Anemia ICD-10-CM: D64.9  ICD-9-CM: 285.9  7/28/2012 - Present Yes        Portal vein thrombosis ICD-10-CM: W52  ICD-9-CM: 482  6/20/2012 - Present Yes        Polycythemia vera ([facility] (Chronic) ICD-10-CM: D45  ICD-9-CM: 238.4  6/19/2012 - Present Yes                         Part of this note was written by using a voice dictation software and the note has been proof read but may still contain some grammatical/other typographical errors.        Josué Day MD  [facility] Hospitalist Service  August 20, 2021    1:12 PM

## 2021-08-20 NOTE — ED TRIAGE NOTES
Pt arrives via [facility] from home with mask in place. Pt complains of vomiting blood with clots x1 hour. Reports she is also having copious black tarry stools that began around the same time. Pt reports she is taking heparin and has a \"unknown bleeding disorder\". Denies abdominal pain.

## 2021-08-20 NOTE — PERIOP NOTES
TRANSFER - OUT REPORT:    Verbal report given to Adrien Sullivan RN on Gwbaytte Ards being transferred to room 215 for routine post - op       Report consisted of patients Situation, Background, Assessment and   Recommendations(SBAR). Information from the following report(s) SBAR, OR Summary, Procedure Summary, Intake/Output, MAR and Recent Results was reviewed with the receiving nurse. Lines:   Peripheral IV 08/20/21 Left Antecubital (Active)   Site Assessment Clean, dry, & intact 08/20/21 1750   Phlebitis Assessment 0 08/20/21 1750   Infiltration Assessment 0 08/20/21 1750   Dressing Status Clean, dry, & intact; Occlusive 08/20/21 1750   Dressing Type Tape;Transparent 08/20/21 1750   Hub Color/Line Status Pink; Infusing;Patent; Positional;Capped 08/20/21 1750   Alcohol Cap Used No 08/20/21 1750       Peripheral IV 08/20/21 Right Antecubital (Active)   Site Assessment Clean, dry, & intact 08/20/21 1750   Phlebitis Assessment 0 08/20/21 1750   Infiltration Assessment 0 08/20/21 1750   Dressing Status Clean, dry, & intact; Occlusive 08/20/21 1750   Dressing Type Tape;Transparent 08/20/21 1750   Hub Color/Line Status Blue; Infusing;Capped; Patent; Positional 08/20/21 1750   Alcohol Cap Used No 08/20/21 1750       Peripheral IV 08/20/21 Left;Posterior Hand (Active)   Site Assessment Clean, dry, & intact 08/20/21 1750   Phlebitis Assessment 0 08/20/21 1750   Infiltration Assessment 0 08/20/21 1750   Dressing Status Clean, dry, & intact; Occlusive 08/20/21 1750   Dressing Type Tape;Transparent 08/20/21 1750   Hub Color/Line Status Blue; Infusing;Patent 08/20/21 1750   Alcohol Cap Used No 08/20/21 1750        Opportunity for questions and clarification was provided. Patient transported with:   O2 @ 2 liters    VTE prophylaxis orders have been written for Gwenette Ards. Patient and family given floor number and nurses name. Family updated re: pt status after security code verified.

## 2021-08-20 NOTE — PERIOP NOTES
[id number]: Patient placed on bedpan for urge to defecate. Flatus passed. No stool noted in bedpan. Cleansing skin wipes to perineal/rectal area with minimal dark tarry stool.

## 2021-08-20 NOTE — PROGRESS NOTES
Initial visit in the ER, a spiritual presence, emotional presence and prayer were provided for the patient.        Maribell Rooney, [address] [facility], [facility]

## 2021-08-20 NOTE — H&P
Gastroenterology Outpatient History and Physical    Patient: Ritesh Couch    Physician: Ginette Yadav MD    Vital Signs: Blood pressure (!) 97/55, pulse 94, temperature 98.7 °F (37.1 °C), resp. rate 16, height 5' 3\" (1.6 m), weight 56.7 kg (125 lb), SpO2 95 %. Allergies: Allergies   Allergen Reactions    Latex Other (comments)     Testing for latex allergy was positive as a 2 (on a scale of 1 to 3).  Sulfa (Sulfonamide Antibiotics) Anaphylaxis    Tramadol Other (comments)     Top lip swelled    Augmentin [Amoxicillin-Pot Clavulanate] Rash    Morphine Nausea and Vomiting     Not a true allergy    Rizatriptan Rash    Tetanus Immune Globulin Other (comments)     Heat, bruising, and swelling at  Site of injection    Xanax [Alprazolam] Other (comments)     Hallucinations    Zonegran [Zonisamide] Rash       Chief Complaint: GI bleed    History of Present Illness: As Above    Justification for Procedure: As Above    History:  Past Medical History:   Diagnosis Date    Anemia     C. difficile diarrhea 4/1/2015    Cerebral edema ([address] 4/1/2015    Chronic migraine 9/22/2016    Chronic myelogenous leukemia ([address]     Hendry chromosome/ converted from polycythemia in 2009- to 2012 when she was dx w leukemia    Chronic pain     Coagulation disorder ([address]     \"clotting and Bleeding Problem\" dr Ashley Sun follows     Esophageal spasm     with banding     Esophageal varices (HCC)     grade 3    GI bleed 8/3/2016    H/O craniotomy     3-29-15.  due to blood clot - which caused a seizure  - then pt fell and hit     Leukocytosis 3/14/2015    MRSA colonization 6/25/2012    Polycythemia vera(238.4)     JAK2 mutation    Portal hypertension (HCC)     with varices    Portal vein thrombosis 6/20/2012    Ct scan 6-21-2 Apparent occlusion of the portal vein. In addition, the splenic vein, and superior mesenteric vein are not definitely seen and are also likely  occluded.  Splenomegaly, and extensive varices are seen as described above consistent with the portal vein occlusion 12 on arixtra HIT negative on repeat 12 re admitted Cirrhotic appearance of the liver. Mild to moderate ascites, as    Primary hypothyroidism     Pulmonary embolism ([address]     not on coumadin anymore     S/P exploratory laparotomy, 12    Bowel resection:  336 cm of small bowel removed, approximately 9 feet and placement of feeding jejunostomy.      S/P small bowel resection     .  9 feet removed due to  gangrene which wa due to blood clot    Seizure ([address] 3/14/2015    Seizure disorder ([address] 3/30/2015    due to clotting factor    Seizures ([address]     last one 3- - followed by aniyah     Splenomegaly, congestive, chronic     Stroke (cerebrum) ([address] 2015    had bled into head- surgery    Stroke [facility])     pt had stroke like symptoms     Thrombocytopenia, HIT negative 2012 platelets lower repeat HIT 12 platelets in 70,443'T    Thrombosis, portal vein     portal , spleenic and recently superior mesenteric    Transfusion history     many blood tranfusions - last 3-29-15 after brain surgery    Traumatic hemorrhage of right cerebrum ([address] 3/30/2015      Past Surgical History:   Procedure Laterality Date    HX APPENDECTOMY      with small bowel resection    HX BREAST BIOPSY Bilateral     Lt - ; Rt -     HX CHOLECYSTECTOMY      HX GI      liver biopsy    HX GI      bowel removed small - 9 feet     HX GYN       x 2    HX GYN      D&C following miscarriage    HX ORTHOPAEDIC Left     torn labrum shoulder (screw in place)    NEUROLOGICAL PROCEDURE UNLISTED  2010    craniotomy to evacuate subdural hematoma following a fall from a seizure    MO ABDOMEN SURGERY PROC UNLISTED      umbilical hernia repair    MO ABDOMEN SURGERY PROC UNLISTED      9ft of small bowel excised then reconnected      Social History     Socioeconomic History    Marital status: SINGLE     Spouse name: Not on file    Number of children: Not on file    Years of education: Not on file    Highest education level: Not on file   Tobacco Use    Smoking status: Former Smoker     Packs/day: 0.20     Years: 10.00     Pack years: 2.00     Types: Cigarettes     Quit date:      Years since quittin.6    Smokeless tobacco: Never Used   Substance and Sexual Activity    Alcohol use: No    Drug use: No     Social Determinants of Health     Financial Resource Strain:     Difficulty of Paying Living Expenses:    Food Insecurity:     Worried About Running Out of Food in the Last Year:     [address] in the Last Year:    Transportation Needs:     Lack of Transportation (Medical):  Lack of Transportation (Non-Medical):    Physical Activity:     Days of Exercise per Week:     Minutes of Exercise per Session:    Stress:     Feeling of Stress :    Social Connections:     Frequency of Communication with Friends and Family:     Frequency of Social Gatherings with Friends and Family:     Attends Taoism Services:     Active Member of Clubs or Organizations:     Attends Club or Organization Meetings:     Marital Status:       Family History   Problem Relation Age of Onset    Diabetes Mother     Stroke Mother         after surgery    Cancer Father         brain    No Known Problems Sister     Other Sister         diverticulitis    Other Sister         diverticulitis    Cancer Sister         lyjmphoma    Breast Cancer Maternal Aunt 48    Thyroid Disease Paternal Grandmother        Medications:   Prior to Admission medications    Medication Sig Start Date End Date Taking? Authorizing Provider   b complex vitamins tablet     ProviderNoa   zolpidem (AMBIEN) 10 mg tablet Take 1 Tablet by mouth nightly as needed for Sleep.  Max Daily Amount: 10 mg. 21   Bola Chery MD   oxyCODONE-acetaminophen (PERCOCET 10)  mg per tablet Take 1 Tablet by mouth every eight (8) hours as needed for Pain for up to 30 days. Max Daily Amount: 3 Tablets. 8/17/21 9/16/21  Juliette Boast, MD   Comp Stocking,Knee,Regular,Sml misc 1 Each by Does Not Apply route daily. 8/5/21   Herb Roy DO   ondansetron (ZOFRAN ODT) 8 mg disintegrating tablet Take 1 Tab by mouth every eight (8) hours as needed for Nausea or Vomiting. 4/23/21   Mohamud Duarte NP   magnesium 250 mg tab Take  by mouth. Provider, Historical   Synthroid 112 mcg tablet 1 tablet once a day with an extra 1/2 tablet on Sundays 3/29/21   Adeola Anderson MD   ruxolitinib 5 mg tab Take 1 Tab by mouth two (2) times a day. Patient taking differently: Take 5 mg by mouth two (2) times a day. Sometimes only takes at night due to N/V/D 3/10/21   Juliette Boast, MD   enoxaparin (LOVENOX) 60 mg/0.6 mL injection INJECT 60 MG BY SUBCUTANEOUS ROUTE EVERY TWELVE (12) HOURS. Patient taking differently: 60 mg nightly. INJECT 60 MG BY SUBCUTANEOUS ROUTE EVERY TWELVE (12) HOURS. Pt has only been taking lovenox nightly. 1/18/21   Juliette Boast, MD   dasatinib (SpryceL) 80 mg tab Take 1 Tab by mouth daily. Patient taking differently: Take 80 mg by mouth nightly. 11/9/20   Juliette Boast, MD   ondansetron (ZOFRAN ODT) 4 mg disintegrating tablet Take 1 Tab by mouth every eight (8) hours as needed for Nausea. 8/3/20   Juliette Boast, MD   calcium carbonate (CALTREX) 600 mg calcium (1,500 mg) tablet Take 600 mg by mouth nightly. Provider, Historical   cholecalciferol (VITAMIN D3) 1,000 unit cap Take 1,000 Units by mouth nightly. Provider, Historical   lansoprazole (PREVACID) 30 mg capsule Take 30 mg by mouth daily. prn  Patient not taking: Reported on 8/19/2021    Provider, Historical   diphenoxylate-atropine (LOMOTIL) 2.5-0.025 mg per tablet Take 1 Tab by mouth four (4) times daily as needed for Diarrhea. Max Daily Amount: 4 Tabs.  3/21/17   Juliette Boast, MD       Physical Exam:         Heart: regular rate and rhythm, S1, S2 normal, no murmur, click, rub or gallop   Lungs: chest clear, no wheezing, rales, normal symmetric air entry, Heart exam - S1, S2 normal, no murmur, no gallop, rate regular   Abdominal: Bowel sounds are normal, Bowel sounds active, liver is not enlarged, spleen is not enlarged           Findings/Diagnosis: GI bleed  EGD with possible banding        Signed:  Madelaine Pacheco MD 8/20/2021

## 2021-08-20 NOTE — ED NOTES
Pt ambulated to restroom and became weak, states \"feels like I'm going to pass out\". Black tarry stools noted.

## 2021-08-20 NOTE — ED NOTES
TRANSFER - OUT REPORT:    Verbal report given to Zoë Jackson RN on Maria R Fuens  being transferred to GI Lab for ordered procedure       Report consisted of patients Situation, Background, Assessment and   Recommendations(SBAR). Information from the following report(s) SBAR, Kardex, ED Summary, Intake/Output, MAR and Recent Results was reviewed with the receiving nurse. Lines:   Peripheral IV 08/20/21 Left Antecubital (Active)        Opportunity for questions and clarification was provided.       Patient transported with:   Registered Nurse when GI Lab is ready for patient to arrive

## 2021-08-20 NOTE — PROGRESS NOTES
TRANSFER - IN REPORT:    Verbal report received from [facility] on Gloria Galo  being received from ER A for ordered procedure      Report consisted of patients Situation, Background, Assessment and   Recommendations(SBAR). Information from the following report(s) SBAR, Intake/Output, MAR, Recent Results, Med Rec Status and Pre Procedure Checklist was reviewed with the receiving nurse. Opportunity for questions and clarification was provided.

## 2021-08-20 NOTE — ANESTHESIA PREPROCEDURE EVALUATION
Relevant Problems   NEUROLOGY   (+) Analgesic rebound headache      GASTROINTESTINAL   (+) Cirrhosis (HCC)   (+) Portal vein thrombosis      ENDOCRINE   (+) Primary hypothyroidism      HEMATOLOGY   (+) Acute blood loss anemia   (+) CML (chronic myelocytic leukemia) (HCC)   (+) AKOSUA (iron deficiency anemia)   (+) Polycythemia vera (HCC)   (+) Splenomegaly   (+) Symptomatic anemia      PERSONAL HX & FAMILY HX OF CANCER   (+) CML (chronic myelocytic leukemia) (HCC)   (+) Polycythemia vera (HCC)       Anesthetic History     PONV          Review of Systems / Medical History  Patient summary reviewed and pertinent labs reviewed    Pulmonary  Within defined limits              Comments: H/O PE in 2006   Neuro/Psych     seizures: well controlled  CVA (2015 visual field disturbance)  TIA and headaches     Cardiovascular      Valvular problems/murmurs            Exercise tolerance: >4 METS  Comments: Hx murmur no treatment     GI/Hepatic/Renal           Liver disease (Portal vein thrombosis with splenomegaly)     Endo/Other      Hypothyroidism  Cancer (CML) and anemia     Other Findings   Comments: PCV  Portal vein thrombosis  Esophageal varices  hemtemesis    Pt is RN           Physical Exam    Airway  Mallampati: II  TM Distance: 4 - 6 cm  Neck ROM: normal range of motion   Mouth opening: Normal     Cardiovascular  Regular rate and rhythm,  S1 and S2 normal,  no murmur, click, rub, or gallop  Rhythm: regular  Rate: normal         Dental    Dentition: Caps/crowns  Comments: right upper cap missing   Pulmonary  Breath sounds clear to auscultation               Abdominal  Abdominal exam normal       Other Findings            Anesthetic Plan    ASA: 3  Anesthesia type: general          Induction: Intravenous  Anesthetic plan and risks discussed with: Patient

## 2021-08-20 NOTE — ED NOTES
Transport arrives to take pt to GI Lab. Roger Esquivel RN in GI Lab is aware that pt's blood transfusion is not ready yet in the lab and has not yet been started. Prior to pt being transported to GI Lab, NEERU BECERRA states that pt needs to be held in ER until the blood has been started, then pt can be transported to GI Lab. Pt will remain in the ER at this time.

## 2021-08-20 NOTE — PROGRESS NOTES
Patient aware waiting for procedure to be completed. Vitals stable blood continues to infuse. Patient awake alert and oriented times 4.

## 2021-08-20 NOTE — ED NOTES
Report given to [facility], rn to resume care at this time

## 2021-08-21 NOTE — PROGRESS NOTES
END OF SHIFT NOTE:    INTAKE/OUTPUT  08/20 0701 - 08/21 0700  In: 3544.9 [I.V.:2887]  Out: 800 [Urine:800]  Voiding: YES  Catheter: NO  Drain:              Flatus: Patient does have flatus present. Stool:  2 occurrences. Characteristics:  Stool Assessment  Stool Color: Black, Maroon  Stool Appearance: Loose, Watery  Stool Amount: Medium  Stool Source/Status: Rectum    Emesis: 0 occurrences. Characteristics:        VITAL SIGNS  Patient Vitals for the past 12 hrs:   Temp Pulse Resp BP SpO2   08/21/21 1528 99.2 °F (37.3 °C) 100 17 117/66 90 %   08/21/21 1118 99.5 °F (37.5 °C) (!) 103 17 (!) 96/51 93 %   08/21/21 0731 99.3 °F (37.4 °C) (!) 107 16 (!) 101/54 93 %       Pain Assessment  Pain Intensity 1: 0 (08/21/21 1245)  Pain Location 1: Abdomen  Pain Intervention(s) 1: Medication (see MAR)  Patient Stated Pain Goal: 0    Ambulating  Yes; to bathroom. Shift report given to oncoming nurse at the bedside.     Gayla Fisher RN

## 2021-08-21 NOTE — PROGRESS NOTES
END OF SHIFT NOTE:    INTAKE/OUTPUT  08/20 0701 - 08/21 0700  In: 3544.9 [I.V.:2887]  Out: 800 [Urine:800]  Voiding: YES  Catheter: NO  Drain:              Flatus: Patient does have flatus present. Stool:  1 occurrences. Characteristics:  Stool Assessment  Stool Color: Maroon  Stool Appearance: Loose  Stool Amount: Large  Stool Source/Status: Rectum    Emesis: 0 occurrences. Characteristics:        VITAL SIGNS  Patient Vitals for the past 12 hrs:   Temp Pulse Resp BP SpO2   08/21/21 0307 98.6 °F (37 °C) 87 20 110/61 99 %   08/20/21 2233 98.5 °F (36.9 °C) 91 17 (!) 97/59 98 %   08/20/21 1937 99.4 °F (37.4 °C) 92 17 119/70 100 %       Pain Assessment  Pain Intensity 1: 7 (08/21/21 0407)  Pain Location 1: Abdomen  Pain Intervention(s) 1: Medication (see MAR)  Patient Stated Pain Goal: 0    Ambulating  Yes few steps in room to go to bedside commode  0630-Calm, pain lessened but not completely gone. She will speak with MDs regarding pain meds. Shift report given to oncoming nurse at the bedside.     Destiny Marie RN

## 2021-08-21 NOTE — PROGRESS NOTES
GI DAILY PROGRESS NOTE    Admit Date:  8/20/2021    Today's Date:  8/21/2021    CC:  Upper GI bleed    Subjective:     Patient is s/p EGD with banding of varices yesterday. She has had one small episode of bleeding this AM.  Amount of blood passed is greatly decreased from initial onset of bleeding. Mild discomfort s/p banding that is improved. Mild abdominal pain that is chronic. +Nausesa. Negative emesis today. Asking for clear liquids. Medications:   Current Facility-Administered Medications   Medication Dose Route Frequency    oxyCODONE IR (ROXICODONE) tablet 10 mg  10 mg Oral Q4H PRN    morphine injection 4 mg  4 mg IntraVENous Q4H PRN    sodium chloride (NS) flush 5-10 mL  5-10 mL IntraVENous Q8H    sodium chloride (NS) flush 5-10 mL  5-10 mL IntraVENous PRN    octreotide (SANDOSTATIN) 500 mcg in 0.9% sodium chloride 500 mL infusion  50 mcg/hr IntraVENous CONTINUOUS    0.9% sodium chloride infusion 250 mL  250 mL IntraVENous PRN    pantoprazole (PROTONIX) 40 mg in 0.9% sodium chloride 10 mL injection  40 mg IntraVENous Q12H    sodium chloride (NS) flush 5-40 mL  5-40 mL IntraVENous Q8H    sodium chloride (NS) flush 5-40 mL  5-40 mL IntraVENous PRN    acetaminophen (TYLENOL) tablet 650 mg  650 mg Oral Q6H PRN    Or    acetaminophen (TYLENOL) suppository 650 mg  650 mg Rectal Q6H PRN    polyethylene glycol (MIRALAX) packet 17 g  17 g Oral DAILY PRN    levothyroxine (SYNTHROID) tablet 112 mcg  112 mcg Oral 6am    ciprofloxacin (CIPRO) 400 mg in D5W IVPB (premix)  400 mg IntraVENous Q12H    0.9% sodium chloride infusion 250 mL  250 mL IntraVENous PRN    0.9% sodium chloride infusion 250 mL  250 mL IntraVENous PRN    ondansetron (ZOFRAN) injection 4 mg  4 mg IntraVENous Q6H PRN       Review of Systems:  ROS was obtained, with pertinent positives as listed above. No chest pain or SOB.     Diet:  NPO    Objective:   Vitals:  Visit Vitals  BP (!) 96/51   Pulse (!) 103   Temp 99.5 °F (37.5 °C) Resp 17   Ht 5' 3\" (1.6 m)   Wt 56.7 kg (125 lb)   SpO2 93%   BMI 22.14 kg/m²     Intake/Output:  No intake/output data recorded.   08/19 1901 - 08/21 0700  In: 3544.9 [I.V.:2887]  Out: 800 [Urine:800]  Exam:  General appearance: NAD  HEENT: anicteric sclera, mmm  Lungs: clear to auscultation bilaterally anteriorly  Heart: regular rate and rhythm  Abdomen: soft, ND, +BS, mild LUQ tenderness, neg rebound/guarding  Neuro:  alert and oriented x3    Data Review (Labs):    Recent Labs     08/21/21  1155 08/21/21  0449 08/20/21 2027 08/20/21  1200 08/20/21  1025 08/19/21  1335   WBC 13.5*  --   --   --  9.5 8.5   HGB 7.8* 8.7* 8.3* 6.6* 7.4* 10.3*   HCT 24.0*  --  26.0* 20.7* 23.2* 31.1*     --   --   --  286 221   MCV 90.2  --   --   --  96.7 94.0     --   --   --  139 139   K 3.5  --   --   --  4.0 3.9   *  --   --   --  108* 109*   CO2 24  --   --   --  26 24   BUN 19  --   --   --  34* 19   CREA 0.60  --   --   --  0.47* 0.60   CA 7.6*  --   --   --  8.1* 8.4   *  --   --   --  109* 126*   AP  --   --   --   --  69 81   ALB  --   --   --   --  2.7* 3.1*   TP  --   --   --   --  5.4* 6.5   PTP  --   --   --   --  15.3*  --    INR  --   --   --   --  1.2  --        Assessment:     Principal Problem:    Acute blood loss anemia (8/20/2021)    Active Problems:    Polycythemia vera ([facility] (6/19/2012)      Overview: Diagnosed February 2008 by kiana-2 analysis however portal vein thrombosis        And and splenomegaly since 2004      Hydroxyurea for 6 months poor tolerance spleen did not shrink       Pegasys 90 mcg weekly 4-2009 till        Restarted 12-30-09 45 mcg q 2 weeks      2-2010 reduced to q month       Increased 45 mcg 2/month 4-2010      Held 8-2010 thrombocytopenia      12-29-12 restarted 45 mcg q 2 weeks      4-1-11 45 mcg q 3 weeks      4-22-11 45 mcg q 4 weeks      Uncertain dosing thereafter       Possibly q 3 weeks 10-15-11 and held and restarted on 4-1-12 6-12 ct scan Small bowel wall thickening suggesting an enteritis. In       addition, there is well thickening of the right colon which can suggest an       additional       colitis although this can also be seen with severe portal hypertension. Likely reactive edematous changes it scattered fluid collections are seen       of       the small bowel mesentery. . Apparent occlusion of the portal vein. In       addition, the splenic vein, and superior mesenteric vein are not       definitely seen and are also likely       occluded. Splenomegaly, and extensive varices are seen as described above       consistent with the portal vein occlusion. Xuan Linger Heterogeneous enhancement of       the liver and mild periportal edema. An acute hepatitis cannot be       excluded. Symptomatic anemia (7/28/2012)      Overview: Hemoglobin 8.0 gram       7/25/2012 17:03       Iron 27 (L)       TIBC 221 (L)       Transferrin Saturation 12 (L)             CML (chronic myelocytic leukemia) ([facility] (3/30/2015)      Acquired hypercoagulable state ([facility] (3/30/2015)      DVT (deep venous thrombosis) ([facility] (10/26/2016)      Overview: Right subclavian            Primary hypothyroidism ()      Splenomegaly (3/21/2019)      Esophageal varices (HCC) ()      Overview: grade 3      Portal vein thrombosis (6/20/2012)      Overview: Ct scan 6-21-2 Apparent occlusion of the portal vein. In addition, the       splenic vein, and superior mesenteric vein are not definitely seen and are       also likely  occluded. Splenomegaly, and extensive varices are seen as       described above consistent with the portal vein occlusion 7-05-12 on       arixtra HIT negative on repeat 7-27-12 re admitted Cirrhotic appearance of       the liver.  Mild to moderate ascites, as      Hematemesis (8/20/2021)      Melena (8/20/2021)      64 y.o. female with h/o liver cirrhosis complicated by esophageal varices, portal hypertensive gastropathy, CML on oral chemotherapy, intracranial hemorrhage s/p craniotomy, small bowel resection from intestinal ischemia, seizures, hypothyroidism,  polycythemia vera, hx Pulmonary embolism, PV thrombosis on Lovenox with last dose on 8/19/21, splenomegaly who was admitted on 8/20/20 with hematemesis, melena, and anemia. Prior EGD 10/2020 with medium varices, red venkat sign on single varix, and PHG. Banding was not performed at that time due to patient's wishes. She is followed by Dr. Madai De Jesus with Hematology and Dr. Shilo Parnell with [facility] hepatology. EGD on 8/20/21 with Grade II varices with red venkat. Banding was performed. Large blood noted in stomach. Gastric cardia was seen and did not demonstrate gastric varices. Plan:     1. Sips of clear liquids. Patient agrees to discontinue if causes nausea. 2. Continue Octreotide 50 mcg/hr  3. Continue Cipro  4. Monitor Hgb. Transfuse for goal Hgb of 7-8  5.  Protonix 40 mg BID    Will follow    Matthew Foy MD

## 2021-08-21 NOTE — PROGRESS NOTES
TRANSFER - IN REPORT:    Verbal report received from Juan(name) on Bobby Lopez  being received from [facility]) for routine progression of care      Report consisted of patients Situation, Background, Assessment and   Recommendations(SBAR). Information from the following report(s) Kardex and ED Summary was reviewed with the receiving nurse. Opportunity for questions and clarification was provided. Assessment completed upon patients arrival to unit and care assumed. Arrived drowsy, pale. Opens eyes to  Name. and when moved to bed. Sleeping with O2 on. Sats 95 and above. No bleeding seen at this time. Passing flatus.

## 2021-08-21 NOTE — PROGRESS NOTES
Had been sleeping well but awoke suddenly in pain, crying, gripping right side of abdomen stating that the \"pain is in my spleen. It's always in my spleen\" from misshapen blood cells. Tearful, in obvious pain. Had gotten up to bedside commode to void and have large loose  marroon stool mixed with urine. States ordered prn tylenol won't be enough to help her. Messaged hospitalists with orders received for Morphine. This was given with prn Zofran to prevent nausea. Calmer after 30 minutes. 0425 Still in pain. Declines Tylenol. \"it's not good for my liver. \" given oxycodone. Sats 95% on 1 Liter nc.

## 2021-08-21 NOTE — PROGRESS NOTES
08/20/21 1937   Dual Skin Pressure Injury Assessment   Dual Skin Pressure Injury Assessment WDL   Second Care Provider (Based on [address] [place] [address]) [place] rn   Skin Integumentary   Skin Integumentary (WDL) WDL    Pressure  Injury Documentation No Pressure Injury Noted-Pressure Ulcer Prevention Initiated   Wound Prevention and Protection Methods   Orientation of Wound Prevention Posterior   Location of Wound Prevention Sacrum/Coccyx   Dressing Present  No   Wound Offloading (Prevention Methods) Bed, pressure reduction mattress

## 2021-08-21 NOTE — PROGRESS NOTES
Hospitalist Progress Note   Admit Date:  2021 10:16 AM   Name:  Brandy Molina   Age:  64 y.o. Sex:  female  :  1960   MRN:  [id number]     Presenting Complaint: GI Problem    Reason(s) for Admission: Acute blood loss anemia [D62]  Hematemesis [K92.0]  Melena [K92.1]     Hospital Course & Interval History:   Brandy Molina is a 64 y.o. 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 Reports taking Lovenox twice daily for polycythemia and DCT, portal vein thrombosis. Of note, patient was admitted to [facility] on - for right lower leg cellulitis and completed treatment with clindamycin. Subjective (21):  EGD performed yesterday w/ findings of varices that required banding x 6. Large blood clots also noted in stomach. Monitoring H&H. Ms. Salvador Rivera endorses a generalized abdominal pain. She wants a diet. Assessment & Plan:     Acute symptomatic blood loss anemia   Hematemesis and melanotic stools likely due to upper and lower GI bleed  Esophageal Varices  - GI consulted  - start on octreotide gtt  - NPO  - hold Lovenox   - Protonix BID  - Cipro Q12 for ppx  - 2units of PRBC ordered   Aug/21: EGD yesterday; varices were banded; blood clots noted in stomach. - Cont octreotide; cont cipro; cont PPI   - Hgb 8.7 this morning; transfuse if <  7     CML on chemotherapy  -Follows with Hematology and Oncology. ED spoke with hematology and Joceline Stephens will see her.      Polycythemia vera  DVT // Portal vein thrombosis  Acquired hypercoagulable state  -Patient is on enoxaparin 60mg BID but will need to be held given current acute bleed. Discussed with patient and patient is agreeable to this plan.   -Cont dasatinib and ruxolitinib although questionable home compliance  -ER spoke with hematology and Art Jose Raul will see her. Dispo/Discharge Plannin-3 midnights    Diet:  DIET NPO  DVT PPx:   Code status: Full Code    Active Hospital Problems    Diagnosis Date Noted    Hematemesis 2021    Melena 2021    Acute blood loss anemia 2021    Esophageal varices (HCC)      grade 3      Splenomegaly 2019    Primary hypothyroidism     DVT (deep venous thrombosis) (HCC) 10/26/2016     Right subclavian        CML [facility]chronic myelocytic [facility]) ([facility] 2015    Acquired hypercoagulable state ([facility] 2015    Symptomatic anemia 2012     Hemoglobin 8.0 gram   2012 17:03   Iron 27 (L)   TIBC 221 (L)   Transferrin Saturation 12 (L)         Portal vein thrombosis 2012     Ct scan  Apparent occlusion of the portal vein. In addition, the splenic vein, and superior mesenteric vein are not definitely seen and are also likely  occluded. Splenomegaly, and extensive varices are seen as described above consistent with the portal vein occlusion 12 on arixtra HIT negative on repeat 12 re admitted Cirrhotic appearance of the liver. Mild to moderate ascites, as      Polycythemia vera ([facility] 2012     Diagnosed 2008 by kiana-2 analysis however portal vein thrombosis  And and splenomegaly since 2004  Hydroxyurea for 6 months poor tolerance spleen did not shrink   Pegasys 90 mcg weekly  till    Restarted 09 45 mcg q 2 weeks   reduced to q month   Increased 45 mcg 2/month   Held  thrombocytopenia  12 restarted 45 mcg q 2 weeks  11 45 mcg q 3 weeks  11 45 mcg q 4 weeks  Uncertain dosing thereafter   Possibly q 3 weeks 10-15-11 and held and restarted on 12 ct scan Small bowel wall thickening suggesting an enteritis.  In addition, there is well thickening of the right colon which can suggest an additional   colitis although this can also be seen with severe portal hypertension. Likely reactive edematous changes it scattered fluid collections are seen of   the small bowel mesentery. . Apparent occlusion of the portal vein. In addition, the splenic vein, and superior mesenteric vein are not definitely seen and are also likely   occluded. Splenomegaly, and extensive varices are seen as described above consistent with the portal vein occlusion. Willetta Smiles Heterogeneous enhancement of the liver and mild periportal edema. An acute hepatitis cannot be excluded.                   Objective:     Patient Vitals for the past 24 hrs:   Temp Pulse Resp BP SpO2   08/21/21 0731 99.3 °F (37.4 °C) (!) 107 16 (!) 101/54 93 %   08/21/21 0307 98.6 °F (37 °C) 87 20 110/61 99 %   08/20/21 2233 98.5 °F (36.9 °C) 91 17 (!) 97/59 98 %   08/20/21 1937 99.4 °F (37.4 °C) 92 17 119/70 100 %   08/20/21 1915 -- 93 16 (!) 116/58 93 %   08/20/21 1900 100.2 °F (37.9 °C) 97 18 128/61 100 %   08/20/21 1845 -- 96 18 126/62 100 %   08/20/21 1841 100.1 °F (37.8 °C) 98 17 134/62 100 %   08/20/21 1830 -- 97 18 128/60 100 %   08/20/21 1815 -- 87 18 (!) 131/58 100 %   08/20/21 1810 -- 92 19 (!) 121/57 100 %   08/20/21 1805 -- 91 20 (!) 103/51 95 %   08/20/21 1800 -- 91 20 (!) 105/59 100 %   08/20/21 1755 99 °F (37.2 °C) 86 18 (!) 111/52 100 %   08/20/21 1750 -- 91 17 (!) 112/55 100 %   08/20/21 1701 -- 100 16 132/66 100 %   08/20/21 1623 -- 98 18 116/64 --   08/20/21 1605 -- (!) 102 16 105/62 95 %   08/20/21 1555 -- (!) 102 16 102/61 95 %   08/20/21 1533 -- 99 16 (!) 93/55 94 %   08/20/21 1447 -- 94 16 (!) 97/55 95 %   08/20/21 1428 98.7 °F (37.1 °C) 94 16 (!) 95/55 97 %   08/20/21 1426 -- 95 18 (!) 95/55 97 %   08/20/21 1423 98.7 °F (37.1 °C) 90 16 (!) 104/59 99 %   08/20/21 1418 98.4 °F (36.9 °C) 89 16 (!) 101/55 98 %   08/20/21 1411 98.6 °F (37 °C) 97 16 (!) 98/54 96 %   08/20/21 1403 -- 88 16 (!) 98/54 96 %   08/20/21 1401 -- 89 16 (!) 99/52 98 %   08/20/21 1358 98.6 °F (37 °C) 93 16 (!) 99/52 97 % 08/20/21 1330 -- 92 -- -- 100 %   08/20/21 1231 -- 96 -- 100/64 97 %     Oxygen Therapy  O2 Sat (%): 93 % (08/21/21 0731)  Pulse via Oximetry: 100 beats per minute (08/20/21 1915)  O2 Device: Nasal cannula (08/20/21 1937)  O2 Flow Rate (L/min): 2 l/min (08/20/21 1937)    Estimated body mass index is 22.14 kg/m² as calculated from the following:    Height as of this encounter: 5' 3\" (1.6 m). Weight as of this encounter: 56.7 kg (125 lb). Intake/Output Summary (Last 24 hours) at 8/21/2021 1200  Last data filed at 8/21/2021 0631  Gross per 24 hour   Intake 3544.9 ml   Output 800 ml   Net 2744.9 ml         Physical Exam:   General:    No overt distress  Head:  Normocephalic, atraumatic  Eyes:  Sclerae appear normal.  Pupils equally round. ENT:  Nares appear normal, no drainage. Moist oral mucosa  Neck:  No restricted ROM. Trachea midline   CV:   RRR. No jugular venous distension. Lungs: No wheezing. Respirations even, unlabored on room air. Abdomen:   Soft, tender, nondistended. Extremities: No cyanosis or clubbing. No edema  Skin:     No rashes and normal coloration. Warm and dry. Neuro:  Cranial nerves II-XII grossly intact. Psych:  Anxious. Alert and oriented x3    I have reviewed ordered lab tests and independently visualized imaging below:    Last 24hr Labs:  Recent Results (from the past 24 hour(s))   HGB & HCT    Collection Time: 08/20/21  8:27 PM   Result Value Ref Range    HGB 8.3 (L) 11.7 - 15.4 g/dL    HCT 26.0 (L) 35.8 - 46.3 %   HEMOGLOBIN    Collection Time: 08/21/21  4:49 AM   Result Value Ref Range    HGB 8.7 (L) 11.7 - 15.4 g/dL       All Micro Results     None          Other Studies:  No results found.     Current Meds:  Current Facility-Administered Medications   Medication Dose Route Frequency    oxyCODONE IR (ROXICODONE) tablet 10 mg  10 mg Oral Q4H PRN    morphine injection 4 mg  4 mg IntraVENous Q4H PRN    sodium chloride (NS) flush 5-10 mL  5-10 mL IntraVENous Q8H    sodium chloride (NS) flush 5-10 mL  5-10 mL IntraVENous PRN    octreotide (SANDOSTATIN) 500 mcg in 0.9% sodium chloride 500 mL infusion  50 mcg/hr IntraVENous CONTINUOUS    0.9% sodium chloride infusion 250 mL  250 mL IntraVENous PRN    pantoprazole (PROTONIX) 40 mg in 0.9% sodium chloride 10 mL injection  40 mg IntraVENous Q12H    sodium chloride (NS) flush 5-40 mL  5-40 mL IntraVENous Q8H    sodium chloride (NS) flush 5-40 mL  5-40 mL IntraVENous PRN    acetaminophen (TYLENOL) tablet 650 mg  650 mg Oral Q6H PRN    Or    acetaminophen (TYLENOL) suppository 650 mg  650 mg Rectal Q6H PRN    polyethylene glycol (MIRALAX) packet 17 g  17 g Oral DAILY PRN    levothyroxine (SYNTHROID) tablet 112 mcg  112 mcg Oral 6am    ciprofloxacin (CIPRO) 400 mg in D5W IVPB (premix)  400 mg IntraVENous Q12H    0.9% sodium chloride infusion 250 mL  250 mL IntraVENous PRN    0.9% sodium chloride infusion 250 mL  250 mL IntraVENous PRN    ondansetron (ZOFRAN) injection 4 mg  4 mg IntraVENous Q6H PRN       Signed:  Bunny Baltazar NP    Part of this note may have been written by using a voice dictation software. The note has been proof read but may still contain some grammatical/other typographical errors.

## 2021-08-22 NOTE — PROGRESS NOTES
Pt politely requests octreotide gtt to be stopped, states \"it gives me horrible headache ever since it was started\".  Octreotide ivf stopped

## 2021-08-22 NOTE — PROGRESS NOTES
GI DAILY PROGRESS NOTE    Admit Date:  8/20/2021    Today's Date:  8/22/2021    CC:  Upper GI bleed    Subjective:     Patient negative for N/V. Tolerating clear liquids. Once small BM with dark stool overnight. She refused octreotide drip due to side effects and this has been discontinued. Medications:   Current Facility-Administered Medications   Medication Dose Route Frequency    oxyCODONE IR (ROXICODONE) tablet 10 mg  10 mg Oral Q4H PRN    morphine injection 4 mg  4 mg IntraVENous Q4H PRN    sodium chloride (NS) flush 5-10 mL  5-10 mL IntraVENous Q8H    sodium chloride (NS) flush 5-10 mL  5-10 mL IntraVENous PRN    octreotide (SANDOSTATIN) 500 mcg in 0.9% sodium chloride 500 mL infusion  50 mcg/hr IntraVENous CONTINUOUS    0.9% sodium chloride infusion 250 mL  250 mL IntraVENous PRN    pantoprazole (PROTONIX) 40 mg in 0.9% sodium chloride 10 mL injection  40 mg IntraVENous Q12H    sodium chloride (NS) flush 5-40 mL  5-40 mL IntraVENous Q8H    sodium chloride (NS) flush 5-40 mL  5-40 mL IntraVENous PRN    acetaminophen (TYLENOL) tablet 650 mg  650 mg Oral Q6H PRN    Or    acetaminophen (TYLENOL) suppository 650 mg  650 mg Rectal Q6H PRN    polyethylene glycol (MIRALAX) packet 17 g  17 g Oral DAILY PRN    levothyroxine (SYNTHROID) tablet 112 mcg  112 mcg Oral 6am    ciprofloxacin (CIPRO) 400 mg in D5W IVPB (premix)  400 mg IntraVENous Q12H    0.9% sodium chloride infusion 250 mL  250 mL IntraVENous PRN    0.9% sodium chloride infusion 250 mL  250 mL IntraVENous PRN    ondansetron (ZOFRAN) injection 4 mg  4 mg IntraVENous Q6H PRN       Review of Systems:  ROS was obtained, with pertinent positives as listed above. No chest pain or SOB.     Diet:  Clear liquid diet    Objective:   Vitals:  Visit Vitals  BP (!) 100/57   Pulse (!) 116   Temp 100.1 °F (37.8 °C)   Resp 17   Ht 5' 3\" (1.6 m)   Wt 56.7 kg (125 lb)   SpO2 95%   BMI 22.14 kg/m²     Intake/Output:  08/22 0701 - 08/22 1900  In: -   Out: 350 [Urine:350]  08/20 1901 - 08/22 0700  In: 2692 [I.V.:2692]  Out: 1300 [Urine:1300]  Exam:  General appearance: NAD  HEENT: anicteric sclera, mmm  Lungs: clear to auscultation bilaterally anteriorly  Heart: regular rate and rhythm  Abdomen: soft, ND, +BS, mild LUQ tenderness (chronic), neg rebound/guarding  Neuro:  alert and oriented x3    Data Review (Labs):    Recent Labs     08/22/21  1003 08/22/21  0426 08/21/21  1155 08/21/21  0449 08/20/21 2027 08/20/21  1200 08/20/21  1025 08/19/21  1335   WBC  --  10.7 13.5*  --   --   --  9.5 8.5   HGB 7.9* 7.1* 7.8* 8.7* 8.3* 6.6* 7.4* 10.3*   HCT  --  21.7* 24.0*  --  26.0* 20.7* 23.2* 31.1*   PLT  --  192 219  --   --   --  286 221   MCV  --  92.3 90.2  --   --   --  96.7 94.0   NA  --  140 142  --   --   --  139 139   K  --  3.7 3.5  --   --   --  4.0 3.9   CL  --  109* 112*  --   --   --  108* 109*   CO2  --  25 24  --   --   --  26 24   BUN  --  16 19  --   --   --  34* 19   CREA  --  0.56* 0.60  --   --   --  0.47* 0.60   CA  --  7.8* 7.6*  --   --   --  8.1* 8.4   GLU  --  124* 138*  --   --   --  109* 126*   AP  --   --   --   --   --   --  69 81   ALB  --   --   --   --   --   --  2.7* 3.1*   TP  --   --   --   --   --   --  5.4* 6.5   PTP  --   --   --   --   --   --  15.3*  --    INR  --   --   --   --   --   --  1.2  --        Assessment:     Principal Problem:    Acute blood loss anemia (8/20/2021)    Active Problems:    [facility] (6/19/2012)      Overview: Diagnosed February 2008 by kiana-2 analysis however portal vein thrombosis        And and splenomegaly since 2004      Hydroxyurea for 6 months poor tolerance spleen did not shrink       Pegasys 90 mcg weekly 4-2009 till        Restarted 12-30-09 45 mcg q 2 weeks      2-2010 reduced to q month       Increased 45 mcg 2/month 4-2010      Held 8-2010 thrombocytopenia      12-29-12 restarted 45 mcg q 2 weeks      4-1-11 45 mcg q 3 weeks      4-22-11 45 mcg q 4 weeks      Uncertain dosing thereafter       Possibly q 3 weeks 10-15-11 and held and restarted on 4-1-12 6-12 ct scan Small bowel wall thickening suggesting an enteritis. In       addition, there is well thickening of the right colon which can suggest an       additional       colitis although this can also be seen with severe portal hypertension. Likely reactive edematous changes it scattered fluid collections are seen       of       the small bowel mesentery. . Apparent occlusion of the portal vein. In       addition, the splenic vein, and superior mesenteric vein are not       definitely seen and are also likely       occluded. Splenomegaly, and extensive varices are seen as described above       consistent with the portal vein occlusion. Theodora Grumbles Heterogeneous enhancement of       the liver and mild periportal edema. An acute hepatitis cannot be       excluded. Symptomatic anemia (7/28/2012)      Overview: Hemoglobin 8.0 gram       7/25/2012 17:03       Iron 27 (L)       TIBC 221 (L)       Transferrin Saturation 12 (L)             CML (chronic myelocytic leukemia) ([facility] [address] (3/30/2015)      Acquired hypercoagulable state ([facility] [facility] (3/30/2015)      DVT (deep venous thrombosis) ([facility] (10/26/2016)      Overview: Right subclavian            Primary hypothyroidism ()      Splenomegaly (3/21/2019)      Esophageal varices (HCC) ()      Overview: grade 3      Portal vein thrombosis (6/20/2012)      Overview: Ct scan 6-21-2 Apparent occlusion of the portal vein. In addition, the       splenic vein, and superior mesenteric vein are not definitely seen and are       also likely  occluded. Splenomegaly, and extensive varices are seen as       described above consistent with the portal vein occlusion 7-05-12 on       arixtra HIT negative on repeat 7-27-12 re admitted Cirrhotic appearance of       the liver.  Mild to moderate ascites, as      Hematemesis (8/20/2021)      Melena (8/20/2021)      61 y.o. 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 Prior EGD 10/2020 with medium varices, red venkat sign on single varix, and PHG. Banding was not performed at that time due to patient's wishes.  Linwood Joseph is followed by Dr. Linda Bliss with Hematology and Dr. Skylar Bianchi with [facility] hepatology. EGD on 8/20/21 with Grade II/III varices with red venkat. Banding was performed. Large blood noted in stomach at time of EGD. Gastric cardia was seen and did not demonstrate gastric varices. Bleeding is clinically much improved. Plan:     1. Clear liquid diet. Advance diet in AM if no further bleeding. 2. Continue Cipro for total of 5 days  3. Octreotide discontinued as patient refused medication  4. Monitor Hgb. Transfuse for goal Hgb of 7-8. Hgb stabilized in last 24 hours  5. Patient would normally have repeat EGD in 2-4 weeks to reevaluate varices for additional banding but has stated she refuses to have a repeat exam  6. She wishes to follow-up with [facility] GI/Hepatology  7. Protonix 40 mg BID  8. Will not be able to resume Lovenox for a lest 7 days following banding of esophageal varices   9. Heme/Onc reportedly will see her as inpatient per ER request. However, I do not see an order for consult. Will be available as needed.  Please call with questions.     Ashkan Khan MD

## 2021-08-22 NOTE — PROGRESS NOTES
END OF SHIFT NOTE:    INTAKE/OUTPUT  08/21 0701 - 08/22 0700  In: 3258 [I.V.:1305]  Out: 500 [Urine:500]  Voiding: YES  Catheter: NO  Drain:              Flatus: Patient does have flatus present. Stool:  1 occurrences. Characteristics:  Stool Assessment  Stool Color: Black  Stool Appearance: Loose  Stool Amount: Medium  Stool Source/Status: Rectum    Emesis: 0 occurrences. Characteristics:        VITAL SIGNS  Patient Vitals for the past 12 hrs:   Temp Pulse Resp BP SpO2   08/22/21 0703 -- -- -- 106/75 --   08/22/21 0532 98.7 °F (37.1 °C) 99 17 (!) 90/44 94 %   08/22/21 0228 99.8 °F (37.7 °C) (!) 104 17 (!) 100/55 94 %   08/21/21 2310 99.7 °F (37.6 °C) 100 17 106/66 94 %   08/21/21 2137 99.7 °F (37.6 °C) 100 16 (!) 109/58 93 %       Pain Assessment  Pain Intensity 1: 0 (08/22/21 0140)  Pain Location 1: Abdomen, Head  Pain Intervention(s) 1: Medication (see MAR)  Patient Stated Pain Goal: 0    Ambulating  Yes    Shift report given to oncoming nurse at the bedside.     Julian Pulido RN

## 2021-08-23 NOTE — MANAGEMENT PLAN
[facility] Hematology & Oncology        Inpatient Hematology / Oncology Plan of Care    Reason for Consult:  Acute blood loss anemia [D62]  Hematemesis [K92.0]  Melena [K92.1]  Referring Physician:  Denice Romero MD    History of Present Illness:  Ms. Bard Wright is a 64 y.o. female admitted on 8/20/2021. The primary encounter diagnosis was Gastrointestinal hemorrhage with melena. Diagnoses of Secondary esophageal varices with bleeding ([address] Splenomegaly, and Chronic anticoagulation were also pertinent to this visit. José Samuels Her PMH includes splenomegaly, recurrent DVTs, esophageal/gastric varices, AKOSUA, intracranial hemorrhage s/p craniotomy, small bowel resection from intestinal ischemia, and seizures. She is a known patient of Dr. Nelli Pollard with CML on dasatinib/ruxolitinib and PV on Lovenox. She was recently admitted 8/8 - 8/11 for RLE cellulitis, treated with clindamycin. She presented to ED with c/o hematemesis, melena, and abdominal pain. Also endorsed dizziness. Hgb 7.4 on admission, down to 6.6. She was started on octreotide gtt which has since been DC'd as pt declined gtt. She is s/p EGD on 8/21 with esophageal varices and large blood in stomach. She spiked temp up to 101.3 on 8/22. BCx-NGTD. On Vanc/Zosyn (pt declining antibx as she feels this has lowered her platelets). Plt 144k today. We were consulted d/t pt request.    Allergies   Allergen Reactions    Latex Other (comments)     Testing for latex allergy was positive as a 2 (on a scale of 1 to 3).     Sulfa (Sulfonamide Antibiotics) Anaphylaxis    Tramadol Other (comments)     Top lip swelled    Augmentin [Amoxicillin-Pot Clavulanate] Rash    Morphine Nausea and Vomiting     Not a true allergy    Rizatriptan Rash    Tetanus Immune Globulin Other (comments)     Heat, bruising, and swelling at  Site of injection    Xanax [Alprazolam] Other (comments)     Hallucinations    Zonegran [Zonisamide] Rash     Past Medical History:   Diagnosis Date    Anemia     C. difficile diarrhea 4/1/2015    Cerebral edema (HCC) 4/1/2015    Chronic migraine 9/22/2016    Chronic myelogenous leukemia ([address]     Ponder chromosome/ converted from polycythemia in 2009- to 2012 when she was dx w leukemia    Chronic pain     Coagulation disorder ([address]     \"clotting and Bleeding Problem\" dr Shruthi Weiss follows     Esophageal spasm     with banding     Esophageal varices (HCC)     grade 3    GI bleed 8/3/2016    H/O craniotomy     3-29-15.  due to blood clot - which caused a seizure  - then pt fell and hit     Leukocytosis 3/14/2015    MRSA colonization 6/25/2012    Polycythemia vera(238.4)     JAK2 mutation    Portal hypertension ([address]     with varices    Portal vein thrombosis 6/20/2012    Ct scan 6-21-2 Apparent occlusion of the portal vein. In addition, the splenic vein, and superior mesenteric vein are not definitely seen and are also likely  occluded. Splenomegaly, and extensive varices are seen as described above consistent with the portal vein occlusion 7-05-12 on arixtra HIT negative on repeat 7-27-12 re admitted Cirrhotic appearance of the liver. Mild to moderate ascites, as    Primary hypothyroidism     Pulmonary embolism ([address] 2006    not on coumadin anymore     S/P exploratory laparotomy, 6/20/12 6/25/2012    Bowel resection:  336 cm of small bowel removed, approximately 9 feet and placement of feeding jejunostomy.      S/P small bowel resection     2012.  9 feet removed due to  gangrene which wa due to blood clot    Seizure ([address] 3/14/2015    Seizure disorder ([address] 3/30/2015    due to clotting factor    Seizures ([address]     last one 3- - followed by aniyah     Splenomegaly, congestive, chronic     Stroke (cerebrum) ([address] 4/11/2015    had bled into head- surgery    Stroke [facility])     pt had stroke like symptoms     Thrombocytopenia, HIT negative 6/25/2012 7-01-12 platelets lower repeat HIT 7-02-12 platelets in 53,411'E    Thrombosis, portal vein 2004    portal , spleenic and recently superior mesenteric    Transfusion history     many blood tranfusions - last 3-29-15 after brain surgery    Traumatic hemorrhage of right cerebrum ([facility] 3/30/2015     Past Surgical History:   Procedure Laterality Date    HX APPENDECTOMY      with small bowel resection    HX BREAST BIOPSY Bilateral     Lt - ; Rt -     HX CHOLECYSTECTOMY      HX GI      liver biopsy    HX GI      bowel removed small - 9 feet     HX GYN       x 2    HX GYN      D&C following miscarriage    HX ORTHOPAEDIC Left 2008    torn labrum shoulder (screw in place)    NEUROLOGICAL PROCEDURE UNLISTED  2010    craniotomy to evacuate subdural hematoma following a fall from a seizure    DC ABDOMEN SURGERY PROC UNLISTED      umbilical hernia repair    DC ABDOMEN SURGERY PROC UNLISTED      9ft of small bowel excised then reconnected     Family History   Problem Relation Age of Onset    Diabetes Mother     Stroke Mother         after surgery    Cancer Father         brain    No Known Problems Sister     Other Sister         diverticulitis    Other Sister         diverticulitis    Cancer Sister         lyjmphoma    Breast Cancer Maternal Aunt 48    Thyroid Disease Paternal Grandmother      Social History     Socioeconomic History    Marital status: SINGLE     Spouse name: Not on file    Number of children: Not on file    Years of education: Not on file    Highest education level: Not on file   Occupational History    Not on file   Tobacco Use    Smoking status: Former Smoker     Packs/day: 0.20     Years: 10.00     Pack years: 2.00     Types: Cigarettes     Quit date:      Years since quittin.6    Smokeless tobacco: Never Used   Substance and Sexual Activity    Alcohol use: No    Drug use: No    Sexual activity: Not on file   Other Topics Concern    Not on file   Social History Narrative    Not on file     Social Determinants of Health     Financial Resource Strain:     Difficulty of Paying Living Expenses:    Food Insecurity:     Worried About Running Out of Food in the Last Year:     [address] in the Last Year:    Transportation Needs:     Lack of Transportation (Medical):      Lack of Transportation (Non-Medical):    Physical Activity:     Days of Exercise per Week:     Minutes of Exercise per Session:    Stress:     Feeling of Stress :    Social Connections:     Frequency of Communication with Friends and Family:     Frequency of Social Gatherings with Friends and Family:     Attends Muslim Services:     Active Member of Clubs or Organizations:     Attends Club or Organization Meetings:     Marital Status:    Intimate Partner Violence:     Fear of Current or Ex-Partner:     Emotionally Abused:     Physically Abused:     Sexually Abused:      Current Facility-Administered Medications   Medication Dose Route Frequency Provider Last Rate Last Admin    0.9% sodium chloride infusion 250 mL  250 mL IntraVENous PRN Jm Hernandez MD        0.9% sodium chloride infusion 250 mL  250 mL IntraVENous PRN Batsheva Briscoe NP        oxyCODONE IR (ROXICODONE) tablet 10 mg  10 mg Oral Q4H PRN Jm Hernandez MD   10 mg at 08/23/21 9397    morphine injection 4 mg  4 mg IntraVENous Q4H PRN Jm Hernandez MD   4 mg at 08/21/21 0302    sodium chloride (NS) flush 5-10 mL  5-10 mL IntraVENous Q8H Mee Rodriguez MD   10 mL at 08/23/21 0534    sodium chloride (NS) flush 5-10 mL  5-10 mL IntraVENous PRN Mee Rodriguez MD        0.9% sodium chloride infusion 250 mL  250 mL IntraVENous PRN Isis Andrade MD        pantoprazole (PROTONIX) 40 mg in 0.9% sodium chloride 10 mL injection  40 mg IntraVENous Q12H Zoe Telles MD   40 mg at 08/23/21 1006    sodium chloride (NS) flush 5-40 mL  5-40 mL IntraVENous Q8H Alisha Mejias MD   10 mL at 08/23/21 0534    sodium chloride (NS) flush 5-40 mL  5-40 mL IntraVENous PRN Mindi Khan MD   10 mL at 21 0421    acetaminophen (TYLENOL) tablet 650 mg  650 mg Oral Q6H PRN Mindi Khan MD   650 mg at 21 0213    Or    acetaminophen (TYLENOL) suppository 650 mg  650 mg Rectal Q6H PRN Mindi Khan MD        polyethylene glycol (MIRALAX) packet 17 g  17 g Oral DAILY PRN Mindi Khan MD        levothyroxine (SYNTHROID) tablet 112 mcg  112 mcg Oral Sapphire Ramsay MD   112 mcg at 21 0533    ciprofloxacin (CIPRO) 400 mg in D5W IVPB (premix)  400 mg IntraVENous Q12H Danny Manjarrez  mL/hr at 21 400 mg at 21    0.9% sodium chloride infusion 250 mL  250 mL IntraVENous PRN NAPOLEON Aaron        0.9% sodium chloride infusion 250 mL  250 mL IntraVENous PRN NAPOLEON Aaron        ondansetron [facility]) injection 4 mg  4 mg IntraVENous Q6H PRN Danny Manjarrez MD   4 mg at 21 1605       OBJECTIVE:  Patient Vitals for the past 8 hrs:   BP Temp Pulse Resp SpO2   21 0722 (!) 106/56 98.3 °F (36.8 °C) 93 16 98 %   21 0231 (!) 121/55 100 °F (37.8 °C) (!) 111 17 98 %     Temp (24hrs), Av.6 °F (37.6 °C), Min:98.3 °F (36.8 °C), Max:101.3 °F (38.5 °C)    No intake/output data recorded. Physical Exam:  Constitutional: Well developed, well nourished female in no acute distress, sitting comfortably in the hospital bed. HEENT: Normocephalic and atraumatic. Oropharynx is clear, mucous membranes are moist.  Extraocular muscles are intact. Sclerae anicteric. Neck supple without JVD. No thyromegaly present. Skin Warm and dry. No bruising and no rash noted. No erythema. +pallor. Respiratory Lungs are clear to auscultation bilaterally without wheezes, rales or rhonchi, normal air exchange without accessory muscle use. CVS Normal rate, regular rhythm and normal S1 and S2. No murmurs, gallops, or rubs. Abdomen Soft, nontender and nondistended, normoactive bowel sounds. No palpable mass.   No hepatosplenomegaly. Neuro Grossly nonfocal with no obvious sensory or motor deficits. MSK Normal range of motion in general.  No edema and no tenderness. Psych Appropriate mood and affect. Labs:    Recent Results (from the past 24 hour(s))   CULTURE, BLOOD    Collection Time: 08/22/21  8:52 PM    Specimen: Blood   Result Value Ref Range    Special Requests: LEFT  Antecubital        Culture result: NO GROWTH AFTER 9 HOURS     CULTURE, BLOOD    Collection Time: 08/22/21  8:52 PM    Specimen: Blood   Result Value Ref Range    Special Requests: LEFT  HAND        Culture result: NO GROWTH AFTER 9 HOURS     LACTIC ACID    Collection Time: 08/22/21  8:52 PM   Result Value Ref Range    Lactic acid 2.9 (H) 0.4 - 2.0 MMOL/L   PROCALCITONIN    Collection Time: 08/22/21  8:52 PM   Result Value Ref Range    Procalcitonin 4.83 ng/mL   METABOLIC PANEL, BASIC    Collection Time: 08/23/21  4:17 AM   Result Value Ref Range    Sodium 137 136 - 145 mmol/L    Potassium 3.3 (L) 3.5 - 5.1 mmol/L    Chloride 106 98 - 107 mmol/L    CO2 24 21 - 32 mmol/L    Anion gap 7 7 - 16 mmol/L    Glucose 116 (H) 65 - 100 mg/dL    BUN 11 8 - 23 MG/DL    Creatinine 0.51 (L) 0.6 - 1.0 MG/DL    GFR est AA >60 >60 ml/min/1.73m2    GFR est non-AA >60 >60 ml/min/1.73m2    Calcium 7.8 (L) 8.3 - 10.4 MG/DL   CBC W/O DIFF    Collection Time: 08/23/21  4:17 AM   Result Value Ref Range    WBC 10.9 4.3 - 11.1 K/uL    RBC 2.23 (L) 4.05 - 5.2 M/uL    HGB 6.6 (LL) 11.7 - 15.4 g/dL    HCT 20.5 (L) 35.8 - 46.3 %    MCV 91.9 79.6 - 97.8 FL    MCH 29.6 26.1 - 32.9 PG    MCHC 32.2 31.4 - 35.0 g/dL    RDW 20.3 (H) 11.9 - 14.6 %    PLATELET 621 (L) 623 - 450 K/uL    MPV 10.4 9.4 - 12.3 FL    ABSOLUTE NRBC 0.05 0.0 - 0.2 K/uL   LACTIC ACID    Collection Time: 08/23/21  4:17 AM   Result Value Ref Range    Lactic acid 0.9 0.4 - 2.0 MMOL/L   RBC, ALLOCATE    Collection Time: 08/23/21  4:45 AM   Result Value Ref Range    HISTORY CHECKED?  Historical check performed Imaging:  XR CHEST Ailyn Macias [[id number]] Collected: 08/22/21 2041   Order Status: Completed Updated: 08/22/21 2043   Narrative:     Portable chest x-ray     CLINICAL INDICATION: Sepsis     FINDINGS: Single AP view the chest compared to a similar exam dated 8/8/2021   show the lungs to be slightly underexpanded but otherwise clear. No pleural   effusion or pneumothorax. The cardiac silhouette and mediastinum are   unremarkable. Impression:     Slightly under expanded lungs, otherwise no acute abnormality. ASSESSMENT:  Problem List  Date Reviewed: 7/15/2021        Codes Class Noted    Hematemesis ICD-10-CM: K92.0  ICD-9-CM: 578.0  8/20/2021        Melena ICD-10-CM: K92.1  ICD-9-CM: 578.1  8/20/2021        * (Principal) Acute blood loss anemia ICD-10-CM: D62  ICD-9-CM: 285.1  8/20/2021        Cellulitis of leg, right ICD-10-CM: L03.115  ICD-9-CM: 682.6  8/8/2021        Sepsis due to cellulitis [facility]) ICD-10-CM: L03.90, A41.9  ICD-9-CM: 682.9, 995.91  7/12/2021        Esophageal varices (HCC) (Chronic) ICD-10-CM: I85.00  ICD-9-CM: 456.1  Unknown    Overview Signed 10/6/2020  4:12 AM by Ilda Jolley MD     grade 3             Splenomegaly ICD-10-CM: R16.1  ICD-9-CM: 789.2  3/21/2019        Primary hypothyroidism ICD-10-CM: E03.9  ICD-9-CM: 244.9  Unknown        Chronic migraine without aura with status migrainosus, not intractable ICD-10-CM: F03.733  ICD-9-CM: 346.72  11/1/2016        Lung nodule ICD-10-CM: R91.1  ICD-9-CM: 793.11  10/28/2016    Overview Signed 10/28/2016 10:05 AM by Alistair Clifford     IMPRESSION:    1. Lobulated 2 cm mass in the right lung apex, malignancy versus an atypical  inflammatory process. 2. Occlusion of the right subclavian vein. 3. Chronic occlusion of the portal vein with associated abnormal tissue  encasing the common bile duct, most likely fibrosis. 4. Splenomegaly.              DVT (deep venous thrombosis) ([facility] ICD-10-CM: I82.409  ICD-9-CM: 453.40  10/26/2016 Overview Signed 10/26/2016 10:27 PM by Selin Dixon. Right subclavian               Analgesic rebound headache ICD-10-CM: G44.40, T39.95XA  ICD-9-CM: 339.3, E935.9  9/22/2016        Headache, chronic daily ICD-10-CM: R51.9  ICD-9-CM: 784.0  9/22/2016        Migraine with aura and with status migrainosus, not intractable ICD-10-CM: G43. 101  ICD-9-CM: 346.03  9/22/2016        Left homonymous hemianopsia ICD-10-CM: H53.462  ICD-9-CM: 368.46  3/30/2015        CML (chronic myelocytic leukemia) (HCC) (Chronic) ICD-10-CM: C92.10  ICD-9-CM: 205.10  3/30/2015        Acquired hypercoagulable state ([address] (Chronic) ICD-10-CM: W00.80  ICD-9-CM: 289.81  3/30/2015        AKOSUA (iron deficiency anemia) ICD-10-CM: D50.9  ICD-9-CM: 280.9  7/28/2012        Symptomatic anemia ICD-10-CM: D64.9  ICD-9-CM: 285.9  7/28/2012    Overview Signed 7/28/2012  2:11 PM by Graham Wisdom     Hemoglobin 8.0 gram   7/25/2012 17:03   Iron 27 (L)   TIBC 221 (L)   Transferrin Saturation 12 (L)                Cirrhosis (HCC) (Chronic) ICD-10-CM: K74.60  ICD-9-CM: 571.5  7/27/2012        Ascites ICD-10-CM: R18.8  ICD-9-CM: 789.59  7/26/2012        Portal vein thrombosis ICD-10-CM: D40  ICD-9-CM: 012  6/20/2012    Overview Signed 10/6/2020  4:12 AM by Rayo Anaya MD     Ct scan 6-21-2 Apparent occlusion of the portal vein. In addition, the splenic vein, and superior mesenteric vein are not definitely seen and are also likely  occluded. Splenomegaly, and extensive varices are seen as described above consistent with the portal vein occlusion 7-05-12 on arixtra HIT negative on repeat 7-27-12 re admitted Cirrhotic appearance of the liver.  Mild to moderate ascites, as             Polycythemia vera (HCC) (Chronic) ICD-10-CM: D45  ICD-9-CM: 238.4  6/19/2012    Overview Addendum 6/30/2012 12:06 PM by Graham Wisdom     Diagnosed February 2008 by [email or web address] analysis however portal vein thrombosis  And and splenomegaly since 2004  Hydroxyurea for 6 months poor tolerance spleen did not shrink   Pegasys 90 mcg weekly 4-2009 till    Restarted 12-30-09 45 mcg q 2 weeks  2-2010 reduced to q month   Increased 45 mcg 2/month 4-2010  Held 8-2010 thrombocytopenia  12-29-12 restarted 45 mcg q 2 weeks  4-1-11 45 mcg q 3 weeks  4-22-11 45 mcg q 4 weeks  Uncertain dosing thereafter   Possibly q 3 weeks 10-15-11 and held and restarted on 4-1-12 6-12 ct scan Small bowel wall thickening suggesting an enteritis. In addition, there is well thickening of the right colon which can suggest an additional   colitis although this can also be seen with severe portal hypertension. Likely reactive edematous changes it scattered fluid collections are seen of   the small bowel mesentery. . Apparent occlusion of the portal vein. In addition, the splenic vein, and superior mesenteric vein are not definitely seen and are also likely   occluded. Splenomegaly, and extensive varices are seen as described above consistent with the portal vein occlusion. Darvin Kincaid Heterogeneous enhancement of the liver and mild periportal edema. An acute hepatitis cannot be excluded. RECOMMENDATIONS:  CML  - on dasatinib/ruxolitinib    Polycythemia Vera  - on Lovenox, hold for GIB - she will also need to con't to hold this upon discharge    GI bleed  - s/p EGD on 8/21 with esophageal varices and large blood in stomach  - was on octreotide gtt, DC'd as pt declined gtt  - transfuse prn to keep Hgb >7    Fever  - CXR neg  - BCx-NGTD  - On Vanc/Zosyn (pt declining antibx)    Anemia / Thrombocytopenia secondary to chemotherapy  - Check iron studies, B12, folate, DIC labs, hemolysis labs, smear  - Transfuse prn to keep Hgb > 7 and Plt > 50k with active bleeding    Lab studies and imaging studies were personally reviewed. Thank you for allowing us to participate in the care of Ms. Couch. Formal consult note by Dr. Liban Retana to follow.          Gurmeet Fall, LAZARO   [facility] Hematology & Oncology  [address]  Office : [phone]  Fax : [phone]

## 2021-08-23 NOTE — PROGRESS NOTES
END OF SHIFT NOTE:    INTAKE/OUTPUT  08/21 0701 - 08/22 0700  In: 8279 [I.V.:1305]  Out: 500 [Urine:500]  Voiding: YES  Catheter: NO  Drain:        Flatus: Patient does have flatus present. Stool:  1 occurrences. Characteristics:  Stool Assessment  Stool Color: Black  Stool Appearance: Loose  Stool Amount: Medium  Stool Source/Status: Rectum    Emesis: 0 occurrences. Characteristics:        VITAL SIGNS  Patient Vitals for the past 12 hrs:   Temp Pulse Resp BP SpO2   08/22/21 1939 (!) 101.3 °F (38.5 °C) (!) 113 17 126/64 92 %   08/22/21 1554 98.5 °F (36.9 °C) (!) 112 17 122/66 93 %   08/22/21 1153 -- -- -- -- 95 %   08/22/21 1124 100.1 °F (37.8 °C) (!) 116 17 (!) 100/57 92 %       Pain Assessment  Pain Intensity 1: 0 (08/22/21 1230)  Pain Location 1: Abdomen, Head  Pain Intervention(s) 1: Medication (see MAR)  Patient Stated Pain Goal: 0    Ambulating  Yes; to bathroom. Shift report given to oncoming nurse at the bedside.     Pat Valentin RN

## 2021-08-23 NOTE — PROGRESS NOTES
Pt unhappy with having BC, abx added. Requests to speak with MD. Brittney Mittal all new orders to be completed.  Dr Junior Rodriguez notified

## 2021-08-23 NOTE — PROGRESS NOTES
Pt believes antibiotics are causing her platelets to decrease so asked that we stop, stopped antibiotics and will message primary MD.

## 2021-08-23 NOTE — PROGRESS NOTES
Temp 101.3; /64; , O2 92. Pt c/o generalized body aches which she contributes to \"being in the bed all day\". Refuses tylenol.  Dr Lurdes Grullon notified, new orders placed

## 2021-08-23 NOTE — PROGRESS NOTES
Chart review complete, CM met with pt at bedside, pt found laying on stretcher alert and oriented x4, CM maintained social distance and PPE in place. Pt states she lives alone in 2 level [facility] with no steps to enter and 13 steps to reach 2nd floor, no current DME in home for use, no working states she is disabled and receives income, does not drive d/t no car at this time, affords medications without difficulty. Demographics, insurance and PCP confirmed. Pt is pending Oncology consult today, currently receiving blood transfusion, pt verbalized no needs at this time, CM will remain available for DC needs, none anticipated at this time time. Care Management Interventions  PCP Verified by CM:  Yes (no PCP, Dr Carlyn Campbell is primary md at this time)  Discharge Durable Medical Equipment: No  Physical Therapy Consult: No  Occupational Therapy Consult: No  Speech Therapy Consult: No  Current Support Network: Own Home, Lives Alone (lives in [place])  Confirm Follow Up Transport: Family  Discharge Location  Discharge Placement: Home

## 2021-08-23 NOTE — PROGRESS NOTES
Informed Pt that Hgb 6.6 and order for  1 Unit PRBCs. Pt appears upset, concerned about drop in platelets and states \"this must be caused by antibiotics I have been getting. I want to talk to the hospitalist\".  Dr Smita Montelongo notified

## 2021-08-23 NOTE — PROGRESS NOTES
END OF SHIFT NOTE:    INTAKE/OUTPUT  08/22 0701 - 08/23 0700  In: 803 [I.V.:791]  Out: 1250 [Urine:1250]  Voiding: YES  Catheter: NO  Drain:              Flatus: Patient does not have flatus present. Stool:  0 occurrences. Characteristics:  Stool Assessment  Stool Color: Black  Stool Appearance: Loose  Stool Amount: Medium  Stool Source/Status: Rectum    Emesis: 0 occurrences. Characteristics:        VITAL SIGNS  Patient Vitals for the past 12 hrs:   Temp Pulse Resp BP SpO2   08/23/21 1556 98.8 °F (37.1 °C) 99 16 120/66 98 %   08/23/21 1137 99.8 °F (37.7 °C) 97 16 (!) 104/48 92 %   08/23/21 1037 98.6 °F (37 °C) 94 -- (!) 100/53 91 %   08/23/21 1012 98.4 °F (36.9 °C) 97 -- (!) 105/56 92 %   08/23/21 0722 98.3 °F (36.8 °C) 93 16 (!) 106/56 98 %       Pain Assessment  Pain Intensity 1: 8 (08/23/21 1712)  Pain Location 1: Abdomen  Pain Intervention(s) 1: Medication (see MAR)  Patient Stated Pain Goal: 0    Ambulating  Yes    Shift report given to oncoming nurse at the bedside.     Kaela Phillips, RN

## 2021-08-23 NOTE — PROGRESS NOTES
Asked to start a PIV. Using US assistance, placed a 20g 6cm Endurance catheter in patients left upper arm, brachial vein on first attempt. Primary RN informed.   Toro Guzmán RN, VAT

## 2021-08-23 NOTE — PROGRESS NOTES
Night shift note:    Paged by RN for fever of 101F and tachycardia, patient is refusing Tylenol. Will order blood cultures x2, LA, procalcitonin, and CXR. Start empiric vancomycin and Zosyn. Can hold ciprofloxacin. Follow labs. Rest per progress notes.

## 2021-08-23 NOTE — CONSULTS
Infectious Disease Consult    Today's Date: 8/23/2021   Admit Date: 8/20/2021    Impression:   · E Coli bacteremia, source GI  · Esophageal varices s/p banding 8/20 Secondary to portal hypertensive gastropathy  · CML on sprycel/ruxolitinib  · Sulfa allergy    Plan:   · Stop ceftriaxone-change to cefepime/Flagyl for now. · Follow E. coli susceptibilities  · Check CT A/P with contrast to evaluate intra-abdominal abscess    Anti-infectives:   · Vanc x 1 dose  · CTX 8/22-    Subjective:   Date of Consultation:  August 23, 2021  Referring Physician: Carmen Petersen NP     Patient is a 64 y.o. 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 She was recently discharged for RLE cellulitis, treated with Clindamycin. RLE cellulitis has resolved. BCs were negative at that time. For this admission, BCs 1/4 bottle + GNR, PCR E coli. GI was consulted and performed EGD--finding esophageal varices and underwent banding. Patient has mild abdominal pain. Hematemesis has stopped. She denies cough, shortness of breath, diarrhea, nausea, dysuria, recent cellulitis. Patient Active Problem List   Diagnosis Code    Polycythemia vera ([facility] D45    Ascites R18.8    Cirrhosis (HCC) K74.60    AKOSUA (iron deficiency anemia) D50.9    Symptomatic anemia D64.9    Left homonymous hemianopsia H53.462    CML (chronic myelocytic leukemia) (HCC) C92.10    Acquired hypercoagulable state ([facility] D68.59    Analgesic rebound headache G44.40, T39.95XA    Headache, chronic daily R51.9    Migraine with aura and with status migrainosus, not intractable G43. 101    DVT (deep venous thrombosis) (HCC) I82.409    Lung nodule R91.1    Chronic migraine without aura with status migrainosus, not intractable G43.701    Primary hypothyroidism E03.9    Splenomegaly R16.1    Esophageal varices (HCC) I85.00    Portal vein thrombosis I81    Sepsis due to cellulitis (HCC) L03.90, A41.9    Cellulitis of leg, right L03.115    Hematemesis K92.0    Melena K92.1    Acute blood loss anemia D62     Past Medical History:   Diagnosis Date    Anemia     C. difficile diarrhea 4/1/2015    Cerebral edema (HCC) 4/1/2015    Chronic migraine 9/22/2016    Chronic myelogenous leukemia ([address]     [place] chromosome/ converted from polycythemia in 2009- to 2012 when she was dx w leukemia    Chronic pain     Coagulation disorder ([address]     \"clotting and Bleeding Problem\" dr Shruthi Weiss follows     Esophageal spasm     with banding     Esophageal varices ([address]     grade 3    GI bleed 8/3/2016    H/O craniotomy     3-29-15.  due to blood clot - which caused a seizure  - then pt fell and hit     Leukocytosis 3/14/2015    MRSA colonization 6/25/2012    Polycythemia vera(238.4)     JAK2 mutation    Portal hypertension ([address]     with varices    Portal vein thrombosis 6/20/2012    Ct scan 6-21-2 Apparent occlusion of the portal vein. In addition, the splenic vein, and superior mesenteric vein are not definitely seen and are also likely  occluded. Splenomegaly, and extensive varices are seen as described above consistent with the portal vein occlusion 7-05-12 on arixtra HIT negative on repeat 7-27-12 re admitted Cirrhotic appearance of the liver. Mild to moderate ascites, as    Primary hypothyroidism     Pulmonary embolism ([address] 2006    not on coumadin anymore     S/P exploratory laparotomy, 6/20/12 6/25/2012    Bowel resection:  336 cm of small bowel removed, approximately 9 feet and placement of feeding jejunostomy.      S/P small bowel resection     2012.  9 feet removed due to  gangrene which wa due to blood clot    Seizure ([address] 3/14/2015    Seizure disorder ([address] 3/30/2015    due to clotting factor    Seizures ([address]     last one 3- - followed by aniyah     Splenomegaly, congestive, chronic     Stroke (cerebrum) ([address] 4/11/2015    had bled into head- surgery    Stroke [facility])     pt had stroke like symptoms     Thrombocytopenia, HIT negative 2012 platelets lower repeat HIT 12 platelets in 40,443'P    Thrombosis, portal vein     portal , spleenic and recently superior mesenteric    Transfusion history     many blood tranfusions - last 3--15 after brain surgery    Traumatic hemorrhage of right cerebrum ([address] 3/30/2015      Family History   Problem Relation Age of Onset    Diabetes Mother     Stroke Mother         after surgery    Cancer Father         brain    No Known Problems Sister     Other Sister         diverticulitis    Other Sister         diverticulitis    Cancer Sister         lyjmphoma    Breast Cancer Maternal Aunt 48    Thyroid Disease Paternal Grandmother       Social History     Tobacco Use    Smoking status: Former Smoker     Packs/day: 0.20     Years: 10.00     Pack years: 2.00     Types: Cigarettes     Quit date:      Years since quittin.6    Smokeless tobacco: Never Used   Substance Use Topics    Alcohol use: No     Past Surgical History:   Procedure Laterality Date    HX APPENDECTOMY      with small bowel resection    HX BREAST BIOPSY Bilateral     Lt - ; Rt -     HX CHOLECYSTECTOMY      HX GI      liver biopsy    HX GI      bowel removed small - 9 feet     HX GYN       x 2    HX GYN      D&C following miscarriage    HX ORTHOPAEDIC Left 2008    torn labrum shoulder (screw in place)    NEUROLOGICAL PROCEDURE UNLISTED  2010    craniotomy to evacuate subdural hematoma following a fall from a seizure    NM ABDOMEN SURGERY PROC UNLISTED      umbilical hernia repair    NM ABDOMEN SURGERY PROC UNLISTED      9ft of small bowel excised then reconnected      Prior to Admission medications    Medication Sig Start Date End Date Taking? Authorizing Provider   b complex vitamins tablet 1 Tablet daily.    Yes Provider, Historical   magnesium 250 mg tab Take 1 Tablet by mouth daily. Yes Provider, Historical   Synthroid 112 mcg tablet 1 tablet once a day with an extra 1/2 tablet on Sundays 3/29/21  Yes Bethann Burkitt, MD   ruxolitinib 5 mg tab Take 1 Tab by mouth two (2) times a day. Patient taking differently: Take 5 mg by mouth two (2) times a day. Sometimes only takes at night due to N/V/D 3/10/21  Yes Jeniffer Saravia MD   enoxaparin (LOVENOX) 60 mg/0.6 mL injection INJECT 60 MG BY SUBCUTANEOUS ROUTE EVERY TWELVE (12) HOURS. Patient taking differently: 60 mg nightly. INJECT 60 MG BY SUBCUTANEOUS ROUTE EVERY TWELVE (12) HOURS. Pt has only been taking lovenox nightly. 1/18/21  Yes Jeniffer Saravia MD   dasatinib (SpryceL) 80 mg tab Take 1 Tab by mouth daily. Patient taking differently: Take 80 mg by mouth nightly. 11/9/20  Yes Jeniffer Saravia MD   calcium carbonate (CALTREX) 600 mg calcium (1,500 mg) tablet Take 600 mg by mouth nightly. Yes Provider, Historical   cholecalciferol (VITAMIN D3) 1,000 unit cap Take 1,000 Units by mouth nightly. Yes Provider, Historical   lansoprazole (PREVACID) 30 mg capsule Take 30 mg by mouth daily. prn   Yes Provider, Historical   zolpidem (AMBIEN) 10 mg tablet Take 1 Tablet by mouth nightly as needed for Sleep. Max Daily Amount: 10 mg. 8/17/21   Jeniffer Saravia MD   oxyCODONE-acetaminophen (PERCOCET 10)  mg per tablet Take 1 Tablet by mouth every eight (8) hours as needed for Pain for up to 30 days. Max Daily Amount: 3 Tablets. 8/17/21 9/16/21  Jeniffer Saravia MD   Comp Stocking,Knee,Regular,Sml misc 1 Each by Does Not Apply route daily. 8/5/21   Herb Roy DO   ondansetron (ZOFRAN ODT) 8 mg disintegrating tablet Take 1 Tab by mouth every eight (8) hours as needed for Nausea or Vomiting. 4/23/21   Carla Westbrook NP   ondansetron (ZOFRAN ODT) 4 mg disintegrating tablet Take 1 Tab by mouth every eight (8) hours as needed for Nausea.  8/3/20   Jeniffer Saravia MD   diphenoxylate-atropine (LOMOTIL) 2.5-0.025 mg per tablet Take 1 Tab by mouth four (4) times daily as needed for Diarrhea. Max Daily Amount: 4 Tabs. 3/21/17   Chasity Sanchez MD       Allergies   Allergen Reactions    Latex Other (comments)     Testing for latex allergy was positive as a 2 (on a scale of 1 to 3).  Sulfa (Sulfonamide Antibiotics) Anaphylaxis    Tramadol Other (comments)     Top lip swelled    Augmentin [Amoxicillin-Pot Clavulanate] Rash    Morphine Nausea and Vomiting     Not a true allergy    Rizatriptan Rash    Tetanus Immune Globulin Other (comments)     Heat, bruising, and swelling at  Site of injection    Xanax [Alprazolam] Other (comments)     Hallucinations    Zonegran [Zonisamide] Rash        Review of Systems:  A comprehensive review of systems was negative except for that written in the History of Present Illness. Objective:     Visit Vitals  BP (!) 104/48   Pulse 97   Temp 99.8 °F (37.7 °C)   Resp 16   Ht 5' 3\" (1.6 m)   Wt 56.7 kg (125 lb)   SpO2 92%   BMI 22.14 kg/m²     Temp (24hrs), Av.3 °F (37.4 °C), Min:98.3 °F (36.8 °C), Max:101.3 °F (38.5 °C)       Lines:  Peripheral IV:       Physical Exam:    General:  Alert, cooperative, well noursished, well developed, appears stated age   Eyes:  Sclera anicteric. Pupils equally round and reactive to light. Mouth/Throat: Mucous membranes normal, oral pharynx clear   Neck: Supple   Lungs:   Clear to auscultation bilaterally, good effort   CV:  Regular rate and rhythm,no murmur, click, rub or gallop   Abdomen:   Soft, mild tenderness. bowel sounds normal. distended   Extremities: No cyanosis.  Trace edema   Skin: Skin color, texture, turgor normal. no acute rash or lesions   Lymph nodes: Cervical and supraclavicular normal   Musculoskeletal: No swelling or deformity   Lines/Devices:  Intact, no erythema, drainage or tenderness   Psych: Alert and oriented, normal mood affect given the setting       Data Review:     CBC:  Recent Labs     21  0417 21  1003 21  0426 21  1155 08/21/21  1155   WBC 10.9  --  10.7  --  13.5*   HGB 6.6* 7.9* 7.1*   < > 7.8*   HCT 20.5*  --  21.7*  --  24.0*   *  --  192  --  219    < > = values in this interval not displayed. BMP:  Recent Labs     08/23/21  0417 08/22/21  0426 08/21/21  1155   CREA 0.51* 0.56* 0.60   BUN 11 16 19    140 142   K 3.3* 3.7 3.5    109* 112*   CO2 24 25 24   AGAP 7 6* 6*   * 124* 138*       LFTS:  No results for input(s): TBILI, ALT, AP, TP, ALB in the last 72 hours. No lab exists for component: SGOT    Microbiology:     All Micro Results     Procedure Component Value Units Date/Time    CULTURE, BLOOD [[id number]] Collected: 08/22/21 2052    Order Status: Completed Specimen: Blood Updated: 08/23/21 1353     Special Requests: --        LEFT  Antecubital       GRAM STAIN GRAM NEGATIVE RODS         AEROBIC BOTTLE POSITIVE               RESULTS VERIFIED, PHONED TO AND READ BACK BY Thomas Oliver AT 2012 ON 8/23/21, [address]           Culture result:       CULTURE IN [address] UPDATES TO FOLLOW                  Refer to Blood Culture ID Panel Accession  [id number]      BLOOD CULTURE ID PANEL [[id number]]  (Abnormal) Collected: 08/22/21 2052    Order Status: Completed Specimen: Blood Updated: 08/23/21 1351     Acc. no. from Micro Order [id number]     Escherichia coli Detected        Comment: RESULTS VERIFIED, PHONED TO AND READ BACK BY  DYAN Ibarra AT 8063 ON 8/23/21, DERIK          KPC (Carbapenem Resistance Gene) NOT DETECTED        Comment: WARNING:  A Not Detected result for the KPC gene does not indicate susceptibility to carbapenems. Gram negative bacteria can be resistant to carbapenems by mechanisms other than carrying the KPC gene. INTERPRETATION       Gram negative gopi.  Identified by realtime PCR as E. coli          CULTURE, BLOOD [[id number]] Collected: 08/22/21 2052    Order Status: Completed Specimen: Blood Updated: 08/23/21 4716     Special Requests: --        LEFT  HAND       Culture result: NO GROWTH AFTER 9 HOURS             Imaging:   See HPI    Signed By: Veto Morales NP     August 23, 2021

## 2021-08-23 NOTE — PROGRESS NOTES
Pharmacokinetic Consult to Pharmacist    Alisamber Waite is a 64 y.o. female being treated for sepsis with vancomycin. Height: 5' 3\" (160 cm)  Weight: 56.7 kg (125 lb)  Lab Results   Component Value Date/Time    BUN 16 08/22/2021 04:26 AM    Creatinine 0.56 (L) 08/22/2021 04:26 AM    WBC 10.7 08/22/2021 04:26 AM    Procalcitonin 0.14 07/12/2021 02:47 PM    Lactic acid 2.9 (H) 08/22/2021 08:52 PM    Lactic acid 1.2 04/12/2016 11:08 AM    Lactic Acid (POC) 0.7 01/05/2018 02:08 AM      Estimated Creatinine Clearance: 69.8 mL/min (A) (by C-G formula based on SCr of 0.56 mg/dL (L)). CULTURES:  8/22 blood - pending        Day 1 of vancomycin. Goal trough is 15-20. Will treat with 1000 mg q12 after 1250 mg x1 dose. Will continue to follow patient and order levels when clinically indicated.       Thank you,  Olga Cheng, PharmD, BCPS

## 2021-08-23 NOTE — PROGRESS NOTES
Hospitalist Progress Note   Admit Date:  2021 10:16 AM   Name:  Redmond Soulier   Age:  64 y.o. Sex:  female  :  1960   MRN:  [id number]     Presenting Complaint: GI Problem    Reason(s) for Admission: Acute blood loss anemia [D62]  Hematemesis [K92.0]  Melena [K92.1]     Hospital Course & Interval History:   Ms. Deep Leon is a 64 y.o. CF with medical history of CML on oral chemotherapy, polycythemia vera on Lovenox, DVT and PV thrombosis, intracranial hemorrhage s/p craniotomy, small bowel resection from intestinal ischemia, seizure, splenomegaly who presented to ER with hematemesis and melena that started Aug/20.  Reports taking Lovenox twice daily for polycythemia and DCT, portal vein thrombosis.  Of note, patient was admitted to [facility] on - for right lower leg cellulitis and completed treatment with clindamycin. Subjective (21): Overnight patient developed fever. Nocturnist ordered blood cultures, LA, PCT, and CXR. Broad spectrum was started but patient began refusing these this morning as she states it is affecting her platelet count. Cipro was held but not resumed; she was to receive 5 days total starting Aug/20. PCT and LA elevated overnight; LA normalized this morning. Morning labs indicated hgb of 6.6; nocturnist ordered 1 unit PRBC. This afternoon a critical lab was called to the RN dmitry HODGE in her blood culture; based on this result we have consulted Infectious Disease for expert recommendations. Assessment & Plan:     Acute symptomatic blood loss anemia   Hematemesis and melanotic stools likely due to upper and lower GI bleed  Esophageal Varices  Aug/21: EGD yesterday; varices were banded; blood clots noted in stomach. - Cont octreotide; cont cipro; cont PPI              - Hgb 8.7 this morning; transfuse if <  7   hemoglobin 7.9. no active bleeding noted. Refusing Octreotide.   Aug/23: Hgb 6.6; receiving 1 unit PRBC today with another unit on standby   - Patient refuses another EGD; refuses octreotide. - Desires follow up with her Gastroenterologist in [place] was discontinued    BSI w/ blood culture positive for GNR   Aug/23: Critical result called this afternoon   - PCT and LA were elevated overnight   - Patient rcvd one dose of vanc but refusing vanc and Zosyn now   - Start LR IVF   - Ceftriaxone started   - Consult ID    Acute thrombocytopenia  Aug/23: Infection vs medication related; possibly both   - Trend    Acute hypokalemia  Aug/23: Replace     CML on chemotherapy  [facility]. ER spoke with hematology and Deandre Sandra will see her.   Aug/23: Appreciate Hem/Onc input; will f/u on their recommendations     Polycythemia vera  DVT // Portal vein thrombosis  Acquired hypercoagulable state  -Patient is on enoxaparin 60mg BID but will need to be held given current acute bleed.  Discussed with patient and patient is agreeable to this plan. -Cont dasatinib and ruxolitinib although questionable home compliance  -ER spoke with hematology and  to see her.         Dispo/Discharge Planning: > 2 midnights    Diet:  ADULT DIET Full Liquid  DVT PPx: SCD  Code status: Full Code    Active Hospital Problems    Diagnosis Date Noted    Hematemesis 08/20/2021    Melena 08/20/2021    Acute blood loss anemia 08/20/2021    Esophageal varices (HCC)      grade 3      Splenomegaly 03/21/2019    Primary hypothyroidism     DVT (deep venous thrombosis) ([facility] [address] 10/26/2016     Right subclavian        CML (chronic myelocytic leukemia) ([facility] [address] 03/30/2015    Acquired hypercoagulable state ([facility] [address] 03/30/2015    Symptomatic anemia 07/28/2012     Hemoglobin 8.0 gram   7/25/2012 17:03   Iron 27 (L)   TIBC 221 (L)   Transferrin Saturation 12 (L)         Cirrhosis ([facility] [address] 07/27/2012    Portal vein thrombosis 06/20/2012     Ct scan 6-21-2 Apparent occlusion of the portal vein.  In addition, the splenic vein, and superior mesenteric vein are not definitely seen and are also likely  occluded. Splenomegaly, and extensive varices are seen as described above consistent with the portal vein occlusion 7-05-12 on arixtra HIT negative on repeat 7-27-12 re admitted Cirrhotic appearance of the liver. Mild to moderate ascites, as      Polycythemia vera ([address] 06/19/2012     Diagnosed February 2008 by kiana-2 analysis however portal vein thrombosis  And and splenomegaly since 2004  Hydroxyurea for 6 months poor tolerance spleen did not shrink   Pegasys 90 mcg weekly 4-2009 till    Restarted 12-30-09 45 mcg q 2 weeks  2-2010 reduced to q month   Increased 45 mcg 2/month 4-2010  Held 8-2010 thrombocytopenia  12-29-12 restarted 45 mcg q 2 weeks  4-1-11 45 mcg q 3 weeks  4-22-11 45 mcg q 4 weeks  Uncertain dosing thereafter   Possibly q 3 weeks 10-15-11 and held and restarted on 4-1-12 6-12 ct scan Small bowel wall thickening suggesting an enteritis. In addition, there is well thickening of the right colon which can suggest an additional   colitis although this can also be seen with severe portal hypertension. Likely reactive edematous changes it scattered fluid collections are seen of   the small bowel mesentery. . Apparent occlusion of the portal vein. In addition, the splenic vein, and superior mesenteric vein are not definitely seen and are also likely   occluded. Splenomegaly, and extensive varices are seen as described above consistent with the portal vein occlusion. Yohana Biggs Heterogeneous enhancement of the liver and mild periportal edema. An acute hepatitis cannot be excluded.                   Objective:     Patient Vitals for the past 24 hrs:   Temp Pulse Resp BP SpO2   08/23/21 1137 99.8 °F (37.7 °C) 97 16 (!) 104/48 92 %   08/23/21 1037 98.6 °F (37 °C) 94 -- (!) 100/53 91 %   08/23/21 1012 98.4 °F (36.9 °C) 97 -- (!) 105/56 92 %   08/23/21 0722 98.3 °F (36.8 °C) 93 16 (!) 106/56 98 %   08/23/21 0231 100 °F (37.8 °C) (!) 111 17 (!) 121/55 98 %   08/22/21 2309 99.6 °F (37.6 °C) (!) 108 16 116/82 96 %   08/22/21 1939 (!) 101.3 °F (38.5 °C) (!) 113 17 126/64 92 %   08/22/21 1554 98.5 °F (36.9 °C) (!) 112 17 122/66 93 %     Oxygen Therapy  O2 Sat (%): 92 % (08/23/21 1137)  Pulse via Oximetry: 100 beats per minute (08/20/21 1915)  O2 Device: Nasal cannula (08/23/21 0754)  Skin Assessment: Clean, dry, & intact (08/23/21 0754)  Skin Protection for O2 Device: No (08/23/21 0754)  O2 Flow Rate (L/min): 1 l/min (08/23/21 0754)    Estimated body mass index is 22.14 kg/m² as calculated from the following:    Height as of this encounter: 5' 3\" (1.6 m). Weight as of this encounter: 56.7 kg (125 lb). Intake/Output Summary (Last 24 hours) at 8/23/2021 1400  Last data filed at 8/23/2021 1252  Gross per 24 hour   Intake 1126.8 ml   Output 900 ml   Net 226.8 ml         Physical Exam:   General:    No acute distress   Head:  Normocephalic  Eyes:  Sclerae appear normal.  Pupils equally round. ENT:  Nares appear normal, no drainage. Moist oral mucosa  Neck:  No restricted ROM. Trachea midline   CV:   Normal perfusion. No jugular venous distension. Lungs: No wheezing. Respirations even, unlabored on room air. Abdomen:   Tender, distended. Extremities: No cyanosis or clubbing. No edema  Skin:     No rashes and normal coloration. Warm and dry. Neuro:  Cranial nerves II-XII grossly intact. Sensation intact  Psych:  Normal mood and affect.   Alert and oriented x3    I have reviewed ordered lab tests and independently visualized imaging below:    Last 24hr Labs:  Recent Results (from the past 24 hour(s))   CULTURE, BLOOD    Collection Time: 08/22/21  8:52 PM    Specimen: Blood   Result Value Ref Range    Special Requests: LEFT  Antecubital        GRAM STAIN GRAM NEGATIVE RODS      GRAM STAIN AEROBIC BOTTLE POSITIVE      GRAM STAIN        RESULTS VERIFIED, PHONED TO AND READ BACK BY Jaspreet Doshi AT 6582 ON 8/23/21, [address]    Culture result: CULTURE IN PROGRESS,FURTHER UPDATES TO FOLLOW      Culture result:        Refer to Blood Culture ID Panel Accession  [id number]     CULTURE, BLOOD    Collection Time: 08/22/21  8:52 PM    Specimen: Blood   Result Value Ref Range    Special Requests: LEFT  HAND        Culture result: NO GROWTH AFTER 9 HOURS     LACTIC ACID    Collection Time: 08/22/21  8:52 PM   Result Value Ref Range    Lactic acid 2.9 (H) 0.4 - 2.0 MMOL/L   PROCALCITONIN    Collection Time: 08/22/21  8:52 PM   Result Value Ref Range    Procalcitonin 2.94 ng/mL   BLOOD CULTURE ID PANEL    Collection Time: 08/22/21  8:52 PM    Specimen: Blood   Result Value Ref Range    Acc. no. from Micro Order [id number]     Escherichia coli Detected (A) NOTDET      KPC (Carbapenem Resistance Gene) NOT DETECTED NOTDET      INTERPRETATION        Gram negative gopi. Identified by realtime PCR as E. coli   METABOLIC PANEL, BASIC    Collection Time: 08/23/21  4:17 AM   Result Value Ref Range    Sodium 137 136 - 145 mmol/L    Potassium 3.3 (L) 3.5 - 5.1 mmol/L    Chloride 106 98 - 107 mmol/L    CO2 24 21 - 32 mmol/L    Anion gap 7 7 - 16 mmol/L    Glucose 116 (H) 65 - 100 mg/dL    BUN 11 8 - 23 MG/DL    Creatinine 0.51 (L) 0.6 - 1.0 MG/DL    GFR est AA >60 >60 ml/min/1.73m2    GFR est non-AA >60 >60 ml/min/1.73m2    Calcium 7.8 (L) 8.3 - 10.4 MG/DL   CBC W/O DIFF    Collection Time: 08/23/21  4:17 AM   Result Value Ref Range    WBC 10.9 4.3 - 11.1 K/uL    RBC 2.23 (L) 4.05 - 5.2 M/uL    HGB 6.6 (LL) 11.7 - 15.4 g/dL    HCT 20.5 (L) 35.8 - 46.3 %    MCV 91.9 79.6 - 97.8 FL    MCH 29.6 26.1 - 32.9 PG    MCHC 32.2 31.4 - 35.0 g/dL    RDW 20.3 (H) 11.9 - 14.6 %    PLATELET 341 (L) 060 - 450 K/uL    MPV 10.4 9.4 - 12.3 FL    ABSOLUTE NRBC 0.05 0.0 - 0.2 K/uL   LACTIC ACID    Collection Time: 08/23/21  4:17 AM   Result Value Ref Range    Lactic acid 0.9 0.4 - 2.0 MMOL/L   RBC, ALLOCATE    Collection Time: 08/23/21  4:45 AM   Result Value Ref Range    HISTORY CHECKED?  Historical check performed        All Micro Results     Procedure Component Value Units Date/Time    CULTURE, BLOOD [[id number]] Collected: 08/22/21 2052    Order Status: Completed Specimen: Blood Updated: 08/23/21 1353     Special Requests: --        LEFT  Antecubital       GRAM STAIN GRAM NEGATIVE RODS         AEROBIC BOTTLE POSITIVE               RESULTS VERIFIED, PHONED TO AND READ BACK BY Jose Martin Velasquez AT 5892 ON 8/23/21, [address]           Culture result:       CULTURE IN [address] UPDATES TO FOLLOW                  Refer to Blood Culture ID Panel Accession  [id number]      BLOOD CULTURE ID PANEL [[id number]]  (Abnormal) Collected: 08/22/21 2052    Order Status: Completed Specimen: Blood Updated: 08/23/21 1351     Acc. no. from Micro Order [id number]     Escherichia coli Detected        Comment: RESULTS VERIFIED, PHONED TO AND READ BACK BY  DYAN Stewart AT 9463 ON 8/23/21, DERIK          KPC (Carbapenem Resistance Gene) NOT DETECTED        Comment: WARNING:  A Not Detected result for the KPC gene does not indicate susceptibility to carbapenems. Gram negative bacteria can be resistant to carbapenems by mechanisms other than carrying the KPC gene. INTERPRETATION       Gram negative gopi. Identified by realtime PCR as E. coli          CULTURE, BLOOD [[id number]] Collected: 08/22/21 2052    Order Status: Completed Specimen: Blood Updated: 08/23/21 0648     Special Requests: --        LEFT  HAND       Culture result: NO GROWTH AFTER 9 HOURS             Other Studies:  XR CHEST SNGL V    Result Date: 8/22/2021  Portable chest x-ray CLINICAL INDICATION: Sepsis FINDINGS: Single AP view the chest compared to a similar exam dated 8/8/2021 show the lungs to be slightly underexpanded but otherwise clear. No pleural effusion or pneumothorax. The cardiac silhouette and mediastinum are unremarkable. Slightly under expanded lungs, otherwise no acute abnormality.       Current Meds:  Current Facility-Administered Medications   Medication Dose Route Frequency    0.9% sodium chloride infusion 250 mL  250 mL IntraVENous PRN    0.9% sodium chloride infusion 250 mL  250 mL IntraVENous PRN    0.9% sodium chloride infusion 250 mL  250 mL IntraVENous PRN    oxyCODONE IR (ROXICODONE) tablet 10 mg  10 mg Oral Q4H PRN    morphine injection 4 mg  4 mg IntraVENous Q4H PRN    sodium chloride (NS) flush 5-10 mL  5-10 mL IntraVENous Q8H    sodium chloride (NS) flush 5-10 mL  5-10 mL IntraVENous PRN    0.9% sodium chloride infusion 250 mL  250 mL IntraVENous PRN    pantoprazole (PROTONIX) 40 mg in 0.9% sodium chloride 10 mL injection  40 mg IntraVENous Q12H    sodium chloride (NS) flush 5-40 mL  5-40 mL IntraVENous Q8H    sodium chloride (NS) flush 5-40 mL  5-40 mL IntraVENous PRN    acetaminophen (TYLENOL) tablet 650 mg  650 mg Oral Q6H PRN    Or    acetaminophen (TYLENOL) suppository 650 mg  650 mg Rectal Q6H PRN    polyethylene glycol (MIRALAX) packet 17 g  17 g Oral DAILY PRN    levothyroxine (SYNTHROID) tablet 112 mcg  112 mcg Oral 6am    ciprofloxacin (CIPRO) 400 mg in D5W IVPB (premix)  400 mg IntraVENous Q12H    0.9% sodium chloride infusion 250 mL  250 mL IntraVENous PRN    0.9% sodium chloride infusion 250 mL  250 mL IntraVENous PRN    ondansetron (ZOFRAN) injection 4 mg  4 mg IntraVENous Q6H PRN       Signed:  Zaira Mason NP    Part of this note may have been written by using a voice dictation software. The note has been proof read but may still contain some grammatical/other typographical errors.

## 2021-08-24 NOTE — PROGRESS NOTES
Hospitalist Progress Note   Admit Date:  2021 10:16 AM   Name:  Manolo Maldonado   Age:  64 y.o. Sex:  female  :  1960   MRN:  [id number]     Presenting Complaint: GI Problem    Reason(s) for Admission: Acute blood loss anemia [D62]  Hematemesis [K92.0]  Melena [K92.1]     Hospital Course & Interval History:   Ms. Ysabel Cabral is a 64 y.o. CF with medical history of CML on oral chemotherapy, polycythemia vera on Lovenox, DVT and PV thrombosis, intracranial hemorrhage s/p craniotomy, small bowel resection from intestinal ischemia, seizure, splenomegaly who presented to ER with hematemesis and melena that started Aug/20.  Reports taking Lovenox twice daily for polycythemia and DCT, portal vein thrombosis.  Of note, patient was admitted to [facility] on - for right lower leg cellulitis and completed treatment with clindamycin. Subjective (21):  Seen by ID and antibiotic regimen adjusted for E. Coli bacteremia. Tm 100.3 without leukocytosis and stable hemodynamics. CT abd / pelvis reviewed without evidence of abd abscess though interval development small-moderate b/l pleural effusions. Pt denies SOB and remains stable on RA. Assessment & Plan:     Acute symptomatic blood loss anemia   Hematemesis and melanotic stools likely due to upper and lower GI bleed  Esophageal Varices  Aug/21: EGD yesterday; varices were banded; blood clots noted in stomach. - Cont octreotide; cont cipro; cont PPI              - Hgb 8.7 this morning; transfuse if <  7   hemoglobin 7.9. no active bleeding noted. Refusing Octreotide. Aug/23: Hgb 6.6; receiving 1 unit PRBC today with another unit on standby   - Patient refuses another EGD; refuses octreotide.    - Desires follow up with her Gastroenterologist in [facility] 9 was discontinued  Aug/24: Hgb 7.2; plans to transfuse if Hgb <7.0   - again pt refuses repeat EGD and octreotide gtt      BSI w/ blood culture positive for GNR   Aug/23: Critical result called this afternoon   - PCT and LA were elevated overnight   - Patient rcvd one dose of vanc but refusing vanc and Zosyn now   - Start LR IVF   - Ceftriaxone started   - Consult ID  Aug/24: broad-spectrum abx regimen adjusted per ID (cefepime / flagyl)   - f/u susceptibilities to narrow abx course      Acute thrombocytopenia  Aug/23: Infection vs medication related; possibly both   - Trend  Aug/24: no gross bleeding, ongoing mgmt per hem/onc      Acute hypokalemia  Aug/23: Replace  Aug/24: Replete 40meq, check mag levels in AM     CML on chemotherapy  -[facility]. ER spoke with hematology and Mohit Ayala will see her.   Aug/23: Appreciate Hem/Onc input; will f/u on their recommendations  Aug/24: cont chemo agents per hem/onc     Polycythemia vera  DVT // Portal vein thrombosis  Acquired hypercoagulable state  -Patient is on enoxaparin 60mg BID but will need to be held given current acute bleed.  Discussed with patient and patient is agreeable to this plan.   -Cont dasatinib and ruxolitinib although questionable home compliance  -ER spoke with hematology and  to see her.   Aug/24: lovenox on hold in setting of acute GIB          Dispo/Discharge Planning: > 2 midnights    Diet:  ADULT DIET Easy to Chew  DVT PPx: SCD  Code status: Full Code    Active Hospital Problems    Diagnosis Date Noted    Hematemesis 08/20/2021    Melena 08/20/2021    Acute blood loss anemia 08/20/2021    Esophageal varices (HCC)      grade 3      Splenomegaly 03/21/2019    Primary hypothyroidism     DVT (deep venous thrombosis) ([facility] 10/26/2016     Right subclavian        CML (chronic myelocytic leukemia) ([facility] 03/30/2015    Acquired hypercoagulable state ([facility] 03/30/2015    Symptomatic anemia 07/28/2012     Hemoglobin 8.0 gram   7/25/2012 17:03   Iron 27 (L)   TIBC 221 (L)   Transferrin Saturation 12 (L)         Cirrhosis ([facility] 07/27/2012    Portal vein thrombosis 06/20/2012     Ct scan 6-21-2 Apparent occlusion of the portal vein. In addition, the splenic vein, and superior mesenteric vein are not definitely seen and are also likely  occluded. Splenomegaly, and extensive varices are seen as described above consistent with the portal vein occlusion 7-05-12 on arixtra HIT negative on repeat 7-27-12 re admitted Cirrhotic appearance of the liver. Mild to moderate ascites, as      Polycythemia vera ([address] 06/19/2012     Diagnosed February 2008 by kiana-2 analysis however portal vein thrombosis  And and splenomegaly since 2004  Hydroxyurea for 6 months poor tolerance spleen did not shrink   Pegasys 90 mcg weekly 4-2009 till    Restarted 12-30-09 45 mcg q 2 weeks  2-2010 reduced to q month   Increased 45 mcg 2/month 4-2010  Held 8-2010 thrombocytopenia  12-29-12 restarted 45 mcg q 2 weeks  4-1-11 45 mcg q 3 weeks  4-22-11 45 mcg q 4 weeks  Uncertain dosing thereafter   Possibly q 3 weeks 10-15-11 and held and restarted on 4-1-12 6-12 ct scan Small bowel wall thickening suggesting an enteritis. In addition, there is well thickening of the right colon which can suggest an additional   colitis although this can also be seen with severe portal hypertension. Likely reactive edematous changes it scattered fluid collections are seen of   the small bowel mesentery. . Apparent occlusion of the portal vein. In addition, the splenic vein, and superior mesenteric vein are not definitely seen and are also likely   occluded. Splenomegaly, and extensive varices are seen as described above consistent with the portal vein occlusion. Theodora Grumbles Heterogeneous enhancement of the liver and mild periportal edema. An acute hepatitis cannot be excluded.                   Objective:     Patient Vitals for the past 24 hrs:   Temp Pulse Resp BP SpO2   08/24/21 1135 98.8 °F (37.1 °C) 94 16 110/65 92 %   08/24/21 0725 98.9 °F (37.2 °C) 92 16 103/60 (!) 86 %   08/24/21 0413 99 °F (37.2 °C) (!) 102 17 109/65 95 %   08/23/21 2240 100.3 °F (37.9 °C) 99 16 (!) 109/55 94 %   08/23/21 2005 99.1 °F (37.3 °C) (!) 101 17 118/79 91 %   08/23/21 1556 98.8 °F (37.1 °C) 99 16 120/66 98 %     Oxygen Therapy  O2 Sat (%): 92 % (08/24/21 1135)  Pulse via Oximetry: 95 beats per minute (08/23/21 1437)  O2 Device: None (Room air) (08/24/21 1110)  Skin Assessment: Clean, dry, & intact (08/23/21 0754)  Skin Protection for O2 Device: No (08/23/21 0754)  O2 Flow Rate (L/min): 1 l/min (08/23/21 0754)    Estimated body mass index is 22.14 kg/m² as calculated from the following:    Height as of this encounter: 5' 3\" (1.6 m). Weight as of this encounter: 56.7 kg (125 lb). Intake/Output Summary (Last 24 hours) at 8/24/2021 0810  Last data filed at 8/24/2021 0413  Gross per 24 hour   Intake 335.8 ml   Output 1000 ml   Net -664.2 ml       Physical Exam  Vitals reviewed. Constitutional:       General: She is not in acute distress. Appearance: Normal appearance. She is ill-appearing. She is not toxic-appearing. HENT:      Head: Normocephalic and atraumatic. Right Ear: External ear normal.      Left Ear: External ear normal.      Nose: Nose normal.      Mouth/Throat:      Mouth: Mucous membranes are moist.      Pharynx: Oropharynx is clear. Eyes:      Extraocular Movements: Extraocular movements intact. Conjunctiva/sclera: Conjunctivae normal.      Pupils: Pupils are equal, round, and reactive to light. Cardiovascular:      Rate and Rhythm: Normal rate and regular rhythm. Pulmonary:      Effort: Pulmonary effort is normal.      Breath sounds: Normal breath sounds. Comments: decreased bases b/l  Abdominal:      General: Bowel sounds are normal.      Palpations: Abdomen is soft. Tenderness: There is no abdominal tenderness. There is no guarding. Comments: Protuberant, non-distended   Musculoskeletal:         General: Normal range of motion. Cervical back: Normal range of motion. No tenderness. Skin:     General: Skin is warm and dry. Neurological:      General: No focal deficit present. Mental Status: She is alert and oriented to person, place, and time. Psychiatric:         Mood and Affect: Mood normal.         Behavior: Behavior normal.       I have reviewed ordered lab tests and independently visualized imaging below:        Last 24hr Labs:  Recent Results (from the past 24 hour(s))   RBC, ALLOCATE    Collection Time: 08/23/21 11:00 AM   Result Value Ref Range    HISTORY CHECKED?  Historical check performed    HGB & HCT    Collection Time: 08/23/21  5:20 PM   Result Value Ref Range    HGB 7.7 (L) 11.7 - 15.4 g/dL    HCT 24.3 (L) 35.8 - 46.3 %   TRANSFERRIN SATURATION    Collection Time: 08/23/21  7:15 PM   Result Value Ref Range    Iron 25 (L) 35 - 150 ug/dL    TIBC 177 (L) 250 - 450 ug/dL    Transferrin Saturation 14 (L) >20 %   FERRITIN    Collection Time: 08/23/21  7:15 PM   Result Value Ref Range    Ferritin 353 8 - 388 NG/ML   VITAMIN B12    Collection Time: 08/23/21  7:15 PM   Result Value Ref Range    Vitamin B12 1,466 (H) 193 - 986 pg/mL   FOLATE    Collection Time: 08/23/21  7:15 PM   Result Value Ref Range    Folate 2.6 (L) 3.1 - 17.5 ng/mL   LD    Collection Time: 08/23/21  7:15 PM   Result Value Ref Range     (H) 110 - 210 U/L   BILIRUBIN, FRACTIONATED    Collection Time: 08/23/21  7:15 PM   Result Value Ref Range    Bilirubin, total 0.5 0.2 - 1.1 MG/DL    Bilirubin, direct 0.2 <0.4 MG/DL    Bilirubin, indirect 0.3 0.0 - 1.1 MG/DL   PROTHROMBIN TIME + INR    Collection Time: 08/23/21  7:15 PM   Result Value Ref Range    Prothrombin time 21.0 (H) 12.6 - 14.5 sec    INR 1.8     PTT    Collection Time: 08/23/21  7:15 PM   Result Value Ref Range    aPTT 55.9 (H) 24.1 - 35.1 SEC   FIBRINOGEN    Collection Time: 08/23/21  7:15 PM   Result Value Ref Range    Fibrinogen 591 (H) 190 - 501 mg/dL   D DIMER    Collection Time: 08/23/21  7:15 PM   Result Value Ref Range    D DIMER 6.37 (H) <0.56 ug/ml(FEU)   METABOLIC PANEL, BASIC    Collection Time: 08/24/21  5:47 AM   Result Value Ref Range    Sodium 139 136 - 145 mmol/L    Potassium 3.4 (L) 3.5 - 5.1 mmol/L    Chloride 109 (H) 98 - 107 mmol/L    CO2 24 21 - 32 mmol/L    Anion gap 6 (L) 7 - 16 mmol/L    Glucose 116 (H) 65 - 100 mg/dL    BUN 7 (L) 8 - 23 MG/DL    Creatinine 0.41 (L) 0.6 - 1.0 MG/DL    GFR est AA >60 >60 ml/min/1.73m2    GFR est non-AA >60 >60 ml/min/1.73m2    Calcium 7.6 (L) 8.3 - 10.4 MG/DL   CBC W/O DIFF    Collection Time: 08/24/21  5:47 AM   Result Value Ref Range    WBC 8.7 4.3 - 11.1 K/uL    RBC 2.43 (L) 4.05 - 5.2 M/uL    HGB 7.2 (L) 11.7 - 15.4 g/dL    HCT 22.3 (L) 35.8 - 46.3 %    MCV 91.8 79.6 - 97.8 FL    MCH 29.6 26.1 - 32.9 PG    MCHC 32.3 31.4 - 35.0 g/dL    RDW 19.2 (H) 11.9 - 14.6 %    PLATELET 051 (L) 966 - 450 K/uL    MPV 10.3 9.4 - 12.3 FL    ABSOLUTE NRBC 0.04 0.0 - 0.2 K/uL       All Micro Results     Procedure Component Value Units Date/Time    CULTURE, BLOOD [[id number]] Collected: 08/22/21 2052    Order Status: Completed Specimen: Blood Updated: 08/24/21 0657     Special Requests: --        LEFT  HAND       Culture result: NO GROWTH 2 DAYS       CULTURE, BLOOD [[id number]]  (Abnormal) Collected: 08/22/21 2052    Order Status: Completed Specimen: Blood Updated: 08/24/21 0650     Special Requests: --        LEFT  Antecubital       GRAM STAIN GRAM NEGATIVE RODS         AEROBIC BOTTLE POSITIVE               RESULTS VERIFIED, PHONED TO AND READ BACK BY Karan Smith AT 8200 ON 8/23/21, [address] [facility]           Culture result:       [id number] [facility] IDENTIFICATION AND SUSCEPTIBILITY TO FOLLOW                  Refer to Blood Culture ID Panel Accession  [id number]      BLOOD CULTURE ID PANEL [[id number]]  (Abnormal) Collected: 08/22/21 2052    Order Status: Completed Specimen: Blood Updated: 08/23/21 1351     Acc. no. from Micro Order [id number]     Escherichia coli Detected        Comment: RESULTS VERIFIED, PHONED TO AND READ BACK BY  DYAN Juarez AT 5626 ON 8/23/21, DERIK          KPC (Carbapenem Resistance Gene) NOT DETECTED        Comment: WARNING:  A Not Detected result for the KPC gene does not indicate susceptibility to carbapenems. Gram negative bacteria can be resistant to carbapenems by mechanisms other than carrying the KPC gene. INTERPRETATION       Gram negative gopi. Identified by realtime PCR as E. coli                Other Studies:  CT ABD PELV W CONT    Result Date: 8/23/2021  CT of the Abdomen and Pelvis with contrast CLINICAL INDICATION:  Severe weakness and anemia, recurrent blood loss, recurrent infection, E. coli bacteremia. History of colitis, splenomegaly, ascites, portal vein occlusion, CML polycythemia, lung nodules. COMPARISON: 10/20/2020 CT and 11/14/2019 MRI TECHNIQUE: Dose reduction technique used: Automated exposure Control and/or adjustment of mA and kV according to patient size. Multiple axial images were obtained through the abdomen and pelvis after intravenous injection of 125cc of isovue 370 IV contrast to further evaluate vessels and organs. Coronal reformatted images were done for further evaluation of bones and organs. Oral contrast was given for further evaluation of GI tract and adjacent organs. FINDINGS: Partially included lung bases demonstrate small bilateral posterior layering pleural effusions and partial atelectasis of both lower lungs, stable cardiomegaly, and a small pericardial effusion. There is generalized edema throughout the body wall. Abdomen: There are chronic varices in the paraesophageal mediastinum, upper abdomen, associated with chronic portal vein thrombus and occlusion (not definitely changed). Stable chronic splenomegaly. There are no acute abnormalities in the liver or spleen. The adrenal glands and pancreas appear unremarkable. Cholecystectomy again noted without interval complication evident. There is normal enhancement of the kidneys, no hydronephrosis.   Appendix not definitely seen but no retrocecal inflammation. No interval lymphadenopathy. Small volume ascites has slightly increased. No free air, focal inflammatory changes or focal drainable fluid collections in the abdomen. Aorta normal caliber. Patent major vessels. There is no evidence of bowel obstruction. Small chronic hernia in the anterior abdominal wall containing fluid and mesenteric vessels but no bowel. There is nonspecific wall thickening of multiple loops of small and large bowel, with similar finding on prior imaging, which could be passive congestion (less likely infection or inflammation). Surgical changes again noted compatible with partial resection of proximal small bowel. Pelvis:  No acute inflammatory changes or fluid collections in the pelvis. No lymphadenopathy or mass. Bones: Multilevel compression deformities are noted of the thoracolumbar spine, not definitely changed from prior imaging. No evidence of a displaced fracture or dislocation. 1. No evidence of abdominal abscess. 2. Ascites and bilateral pleural effusions, worsened since prior. 3. Chronic findings compatible with known portal vein thrombus including splenomegaly, varices.         Current Meds:  Current Facility-Administered Medications   Medication Dose Route Frequency    folic acid (FOLVITE) tablet 1 mg  1 mg Oral DAILY    0.9% sodium chloride infusion 250 mL  250 mL IntraVENous PRN    0.9% sodium chloride infusion 250 mL  250 mL IntraVENous PRN    0.9% sodium chloride infusion 250 mL  250 mL IntraVENous PRN    lactated Ringers infusion  75 mL/hr IntraVENous CONTINUOUS    cefepime (MAXIPIME) 2 g in 0.9% sodium chloride (MBP/ADV) 100 mL MBP  2 g IntraVENous Q12H    metroNIDAZOLE (FLAGYL) tablet 500 mg  500 mg Oral Q12H    oxyCODONE IR (ROXICODONE) tablet 10 mg  10 mg Oral Q4H PRN    morphine injection 4 mg  4 mg IntraVENous Q4H PRN    sodium chloride (NS) flush 5-10 mL  5-10 mL IntraVENous Q8H    sodium chloride (NS) flush 5-10 mL  5-10 mL IntraVENous PRN    0.9% sodium chloride infusion 250 mL  250 mL IntraVENous PRN    pantoprazole (PROTONIX) 40 mg in 0.9% sodium chloride 10 mL injection  40 mg IntraVENous Q12H    sodium chloride (NS) flush 5-40 mL  5-40 mL IntraVENous Q8H    sodium chloride (NS) flush 5-40 mL  5-40 mL IntraVENous PRN    acetaminophen (TYLENOL) tablet 650 mg  650 mg Oral Q6H PRN    Or    acetaminophen (TYLENOL) suppository 650 mg  650 mg Rectal Q6H PRN    polyethylene glycol (MIRALAX) packet 17 g  17 g Oral DAILY PRN    levothyroxine (SYNTHROID) tablet 112 mcg  112 mcg Oral 6am    0.9% sodium chloride infusion 250 mL  250 mL IntraVENous PRN    0.9% sodium chloride infusion 250 mL  250 mL IntraVENous PRN    ondansetron (ZOFRAN) injection 4 mg  4 mg IntraVENous Q6H PRN       Signed:  NAPOLEON Kimble

## 2021-08-24 NOTE — PROGRESS NOTES
Infectious Disease Note    Today's Date: 8/24/2021   Admit Date: 8/20/2021    Impression:   · E Coli bacteremia, source GI (?translocation from bleeding varices)  · Esophageal varices s/p banding 8/20 Secondary to portal hypertensive gastropathy  · CML on sprycel/ruxolitinib  · Sulfa allergy  · Hx of CDI    Plan:   · Continue Cefepime/Flagyl for now. I will discuss duration with Dr Gambino. · Follow E. coli susceptibilities    Anti-infectives:   · Vanc x 1 dose  · CTX 8/22-8/23  · Cefepime/Flagyl 8/23-    Subjective:   Doing well, I updated her on ID plans. + loose stools but \"not C diff loose. \"       Patient is a 64 y.o. 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 She was recently discharged for RLE cellulitis, treated with Clindamycin. RLE cellulitis has resolved. BCs were negative at that time. For this admission, BCs 1/4 bottle + GNR, PCR E coli. GI was consulted and performed EGD--finding esophageal varices and underwent banding. Patient has mild abdominal pain. Hematemesis has stopped. She denies cough, shortness of breath, diarrhea, nausea, dysuria, recent cellulitis. Allergies   Allergen Reactions    Latex Other (comments)     Testing for latex allergy was positive as a 2 (on a scale of 1 to 3).  Sulfa (Sulfonamide Antibiotics) Anaphylaxis    Tramadol Other (comments)     Top lip swelled    Augmentin [Amoxicillin-Pot Clavulanate] Rash    Morphine Nausea and Vomiting     Not a true allergy    Rizatriptan Rash    Tetanus Immune Globulin Other (comments)     Heat, bruising, and swelling at  Site of injection    Xanax [Alprazolam] Other (comments)     Hallucinations    Zonegran [Zonisamide] Rash        Review of Systems:  A comprehensive review of systems was negative except for that written in the History of Present Illness.     Objective:     Visit Vitals  /60 Pulse 92   Temp 98.9 °F (37.2 °C)   Resp 16   Ht 5' 3\" (1.6 m)   Wt 56.7 kg (125 lb)   SpO2 (!) 86%   BMI 22.14 kg/m²     Temp (24hrs), Av.1 °F (37.3 °C), Min:98.4 °F (36.9 °C), Max:100.3 °F (37.9 °C)       Lines:  Peripheral IV:       Physical Exam:    General:  Alert, cooperative, well noursished, well developed, appears stated age   Eyes:  Sclera anicteric. Pupils equally round and reactive to light. Mouth/Throat: Mucous membranes normal, oral pharynx clear   Neck: Supple   Lungs:   Clear to auscultation bilaterally, good effort   CV:  Regular rate and rhythm,no murmur, click, rub or gallop   Abdomen:   Soft, mild tenderness. bowel sounds normal. distended   Extremities: No cyanosis. Trace edema   Skin: Skin color, texture, turgor normal. no acute rash or lesions   Lymph nodes: Cervical and supraclavicular normal   Musculoskeletal: No swelling or deformity   Lines/Devices:  Intact, no erythema, drainage or tenderness   Psych: Alert and oriented, normal mood affect given the setting       Data Review:     CBC:  Recent Labs     21  0547 21  1720 21  0417 21  1003 21  0426   WBC 8.7  --  10.9  --  10.7   HGB 7.2* 7.7* 6.6*   < > 7.1*   HCT 22.3* 24.3* 20.5*  --  21.7*   *  --  144*  --  192    < > = values in this interval not displayed.        BMP:  Recent Labs     21  0547 21  0417 21  0426   CREA 0.41* 0.51* 0.56*   BUN 7* 11 16    137 140   K 3.4* 3.3* 3.7   * 106 109*   CO2 24 24 25   AGAP 6* 7 6*   * 116* 124*       LFTS:  Recent Labs     21  1915   TBILI 0.5       Microbiology:     All Micro Results     Procedure Component Value Units Date/Time    CULTURE, BLOOD [[id number]] Collected: 21    Order Status: Completed Specimen: Blood Updated: 2157     Special Requests: --        LEFT  HAND       Culture result: NO GROWTH 2 DAYS       CULTURE, BLOOD [[id number]]  (Abnormal) Collected: 21    Order Status: Completed Specimen: Blood Updated: 08/24/21 0650     Special Requests: --        LEFT  Antecubital       GRAM STAIN GRAM NEGATIVE RODS         AEROBIC BOTTLE POSITIVE               RESULTS VERIFIED, PHONED TO AND READ BACK BY Tyrell Cao AT 4876 ON 8/23/21, [address] [facility]           Culture result:       [id number] [facility] IDENTIFICATION AND SUSCEPTIBILITY TO FOLLOW                  Refer to Blood Culture ID Panel Accession  [id number]      BLOOD CULTURE ID PANEL [[id number]]  (Abnormal) Collected: 08/22/21 2052    Order Status: Completed Specimen: Blood Updated: 08/23/21 1351     Acc. no. from Micro Order [id number]     Escherichia coli Detected        Comment: RESULTS VERIFIED, PHONED TO AND READ BACK BY  DYAN Guidry AT 2609 ON 8/23/21, DERIK          KPC (Carbapenem Resistance Gene) NOT DETECTED        Comment: WARNING:  A Not Detected result for the KPC gene does not indicate susceptibility to carbapenems. Gram negative bacteria can be resistant to carbapenems by mechanisms other than carrying the KPC gene. INTERPRETATION       Gram negative gopi.  Identified by realtime PCR as E. coli                Imaging:   See HPI    Signed By: Veto Morales NP     August 24, 2021

## 2021-08-24 NOTE — CONSULTS
Inpatient Hematology / Oncology Consult Note    Reason for Consult:  Acute blood loss anemia [D62]; Hematemesis [K92.0]; Melena [K92.1]  Referring Physician:  Candance Nares, MD    History of Present Illness:  Ms. Jung Eisenberg is a 64 y.o. female admitted on 8/20/2021 with a primary diagnosis of The primary encounter diagnosis was Gastrointestinal hemorrhage with melena. Diagnoses of Secondary esophageal varices with bleeding ([address] Splenomegaly, and Chronic anticoagulation were also pertinent to this visit. .      She has CML, Polycythemia Vera and gastro-intestinal bleeding      Review of Systems:  Constitutional Denies fever, chills, weight loss, appetite changes, fatigue, night sweats. HEENT Denies trauma, blurry vision, hearing loss, ear pain, nosebleeds, sore throat, neck pain and ear discharge. Skin Denies lesions or rashes. Lungs Denies dyspnea, cough, sputum production or hemoptysis. Cardiovascular Denies chest pain, palpitations, or lower extremity edema. Gastrointestinal Denies nausea, vomiting, changes in bowel habits, bloody or black stools, abdominal pain.  Denies dysuria, frequency or hesitancy of urination. Neuro Denies headaches, visual changes or ataxia. Denies dizziness, tingling, tremors, sensory change, speech change, focal weakness or headaches. Hematology Denies easy bruising or bleeding, denies gingival bleeding or epistaxis. Endo Denies heat/cold intolerance, denies diabetes or thyroid abnormalities. MSK Denies back pain, arthralgias, myalgias or frequent falls. Psychiatric/Behavioral Denies depression and substance abuse. The patient is not nervous/anxious. Allergies   Allergen Reactions    Latex Other (comments)     Testing for latex allergy was positive as a 2 (on a scale of 1 to 3).     Sulfa (Sulfonamide Antibiotics) Anaphylaxis    Tramadol Other (comments)     Top lip swelled    Augmentin [Amoxicillin-Pot Clavulanate] Rash    Morphine Nausea and Vomiting     Not a true allergy    Rizatriptan Rash    Tetanus Immune Globulin Other (comments)     Heat, bruising, and swelling at  Site of injection    Xanax [Alprazolam] Other (comments)     Hallucinations    Zonegran [Zonisamide] Rash     Past Medical History:   Diagnosis Date    Anemia     C. difficile diarrhea 4/1/2015    Cerebral edema (HCC) 4/1/2015    Chronic migraine 9/22/2016    Chronic myelogenous leukemia ([facility]     [place] chromosome/ converted from polycythemia in 2009- to 2012 when she was dx w leukemia    Chronic pain     Coagulation disorder ([address]     \"clotting and Bleeding Problem\" dr Karthik Fall follows     Esophageal spasm     with banding     Esophageal varices ([address]     grade 3    GI bleed 8/3/2016    H/O craniotomy     3-29-15.  due to blood clot - which caused a seizure  - then pt fell and hit     Leukocytosis 3/14/2015    MRSA colonization 6/25/2012    Polycythemia vera(238.4)     JAK2 mutation    Portal hypertension ([address]     with varices    Portal vein thrombosis 6/20/2012    Ct scan 6-21-2 Apparent occlusion of the portal vein. In addition, the splenic vein, and superior mesenteric vein are not definitely seen and are also likely  occluded. Splenomegaly, and extensive varices are seen as described above consistent with the portal vein occlusion 7-05-12 on arixtra HIT negative on repeat 7-27-12 re admitted Cirrhotic appearance of the liver. Mild to moderate ascites, as    Primary hypothyroidism     Pulmonary embolism ([address] 2006    not on coumadin anymore     S/P exploratory laparotomy, 6/20/12 6/25/2012    Bowel resection:  336 cm of small bowel removed, approximately 9 feet and placement of feeding jejunostomy.      S/P small bowel resection     2012.  9 feet removed due to  gangrene which wa due to blood clot    Seizure ([address] 3/14/2015    Seizure disorder ([address] 3/30/2015    due to clotting factor    Seizures ([address]     last one 3- - followed by Lisa Yin  Splenomegaly, congestive, chronic     Stroke (cerebrum) ([facility] 2015    had bled into head- surgery    Stroke [facility])     pt had stroke like symptoms     Thrombocytopenia, HIT negative 2012 platelets lower repeat HIT 12 platelets in 02,570'Q    Thrombosis, portal vein     portal , spleenic and recently superior mesenteric    Transfusion history     many blood tranfusions - last 3-29-15 after brain surgery    Traumatic hemorrhage of right cerebrum ([facility] 3/30/2015     Past Surgical History:   Procedure Laterality Date    HX APPENDECTOMY      with small bowel resection    HX BREAST BIOPSY Bilateral     Lt - ; Rt -     HX CHOLECYSTECTOMY      HX GI      liver biopsy    HX GI      bowel removed small - 9 feet     HX GYN       x 2    HX GYN      D&C following miscarriage    HX ORTHOPAEDIC Left 2008    torn labrum shoulder (screw in place)    NEUROLOGICAL PROCEDURE UNLISTED  2010    craniotomy to evacuate subdural hematoma following a fall from a seizure    OK ABDOMEN SURGERY PROC UNLISTED      umbilical hernia repair    OK ABDOMEN SURGERY PROC UNLISTED      9ft of small bowel excised then reconnected     Family History   Problem Relation Age of Onset    Diabetes Mother     Stroke Mother         after surgery    Cancer Father         brain    No Known Problems Sister     Other Sister         diverticulitis    Other Sister         diverticulitis    Cancer Sister         lyjmphoma    Breast Cancer Maternal Aunt 48    Thyroid Disease Paternal Grandmother      Social History     Socioeconomic History    Marital status: SINGLE     Spouse name: Not on file    Number of children: Not on file    Years of education: Not on file    Highest education level: Not on file   Occupational History    Not on file   Tobacco Use    Smoking status: Former Smoker     Packs/day: 0.20     Years: 10.00     Pack years: 2.00     Types: Cigarettes     Quit date:  Years since quittin.6    Smokeless tobacco: Never Used   Substance and Sexual Activity    Alcohol use: No    Drug use: No    Sexual activity: Not on file   Other Topics Concern    Not on file   Social History Narrative    Not on file     Social Determinants of Health     Financial Resource Strain:     Difficulty of Paying Living Expenses:    Food Insecurity:     Worried About Running Out of Food in the Last Year:     [address] in the Last Year:    Transportation Needs:     Lack of Transportation (Medical):      Lack of Transportation (Non-Medical):    Physical Activity:     Days of Exercise per Week:     Minutes of Exercise per Session:    Stress:     Feeling of Stress :    Social Connections:     Frequency of Communication with Friends and Family:     Frequency of Social Gatherings with Friends and Family:     Attends [employer] Services:     Active Member of Clubs or Organizations:     Attends Club or Organization Meetings:     Marital Status:    Intimate Partner Violence:     Fear of Current or Ex-Partner:     Emotionally Abused:     Physically Abused:     Sexually Abused:      Current Facility-Administered Medications   Medication Dose Route Frequency Provider Last Rate Last Admin    0.9% sodium chloride infusion 250 mL  250 mL IntraVENous PRN Steve Verma MD        0.9% sodium chloride infusion 250 mL  250 mL IntraVENous PRN Liang Moise NP        0.9% sodium chloride infusion 250 mL  250 mL IntraVENous PRN Liang Moise NP        lactated Ringers infusion  75 mL/hr IntraVENous CONTINUOUS Liang Moise NP 75 mL/hr at 21 1607 75 mL/hr at 21 1607    cefepime (MAXIPIME) 2 g in 0.9% sodium chloride (MBP/ADV) 100 mL MBP  2 g IntraVENous Q12H Yaron Galvez  mL/hr at 21 1547 2 g at 21 1547    metroNIDAZOLE (FLAGYL) tablet 500 mg  500 mg Oral Q12H April Joyce NP   500 mg at 21    oxyCODONE IR (Norma Poe) tablet 10 mg  10 mg Oral Q4H PRN Eileen Nielson MD   10 mg at 21 1714    morphine injection 4 mg  4 mg IntraVENous Q4H PRN Eileen Nielson MD   4 mg at 21 0302    sodium chloride (NS) flush 5-10 mL  5-10 mL IntraVENous Q8H Zoe Telles MD   10 mL at 21 1548    sodium chloride (NS) flush 5-10 mL  5-10 mL IntraVENous PRN Luis Armando Telles MD        0.9% sodium chloride infusion 250 mL  250 mL IntraVENous PRN Robyn Myles MD        pantoprazole (PROTONIX) 40 mg in 0.9% sodium chloride 10 mL injection  40 mg IntraVENous Q12H Zoe Telles MD   40 mg at 21 2029    sodium chloride (NS) flush 5-40 mL  5-40 mL IntraVENous Q8H Eva George MD   10 mL at 21 1548    sodium chloride (NS) flush 5-40 mL  5-40 mL IntraVENous PRN Eva George MD   10 mL at 21 0421    acetaminophen (TYLENOL) tablet 650 mg  650 mg Oral Q6H PRN Eva George MD   650 mg at 21 0736    Or    acetaminophen (TYLENOL) suppository 650 mg  650 mg Rectal Q6H PRN Eva George MD        polyethylene glycol (MIRALAX) packet 17 g  17 g Oral DAILY PRN Eva George MD        levothyroxine (SYNTHROID) tablet 112 mcg  112 mcg Oral Rush Moscoso MD   112 mcg at 21 0533    0.9% sodium chloride infusion 250 mL  250 mL IntraVENous PRN NAPOLEON Gold        0.9% sodium chloride infusion 250 mL  250 mL IntraVENous PRN NAPOLEON Gold        [facility] injection 4 mg  4 mg IntraVENous Q6H PRN Quan Drake MD   4 mg at 21 1913       OBJECTIVE:  Patient Vitals for the past 8 hrs:   BP Temp Pulse Resp SpO2   21 118/79 99.1 °F (37.3 °C) (!) 101 17 91 %   08/23/21 1556 120/66 98.8 °F (37.1 °C) 99 16 98 %     Temp (24hrs), Av.1 °F (37.3 °C), Min:98.3 °F (36.8 °C), Max:100 °F (37.8 °C)    No intake/output data recorded.     Physical Exam:  Constitutional: Well developed, well nourished female in no acute distress, sitting comfortably in the hospital bed.    HEENT: Normocephalic and atraumatic. Oropharynx is clear, mucous membranes are moist.  Pupils are equal, round, and reactive to light. Extraocular muscles are intact. Sclerae anicteric. Neck supple without JVD. No thyromegaly present. Lymph node   No palpable submandibular, cervical, supraclavicular, axillary or inguinal lymph nodes. Skin Warm and dry. No bruising and no rash noted. No erythema. No pallor. Respiratory Lungs are clear to auscultation bilaterally without wheezes, rales or rhonchi, normal air exchange without accessory muscle use. CVS Normal rate, regular rhythm and normal S1 and S2. No murmurs, gallops, or rubs. Abdomen Soft, nontender and nondistended, normoactive bowel sounds. No palpable mass. No hepatosplenomegaly. Neuro Grossly nonfocal with no obvious sensory or motor deficits. MSK Normal range of motion in general.  No edema and no tenderness. Psych Appropriate mood and affect.         Labs:    Recent Results (from the past 24 hour(s))   METABOLIC PANEL, BASIC    Collection Time: 08/23/21  4:17 AM   Result Value Ref Range    Sodium 137 136 - 145 mmol/L    Potassium 3.3 (L) 3.5 - 5.1 mmol/L    Chloride 106 98 - 107 mmol/L    CO2 24 21 - 32 mmol/L    Anion gap 7 7 - 16 mmol/L    Glucose 116 (H) 65 - 100 mg/dL    BUN 11 8 - 23 MG/DL    Creatinine 0.51 (L) 0.6 - 1.0 MG/DL    GFR est AA >60 >60 ml/min/1.73m2    GFR est non-AA >60 >60 ml/min/1.73m2    Calcium 7.8 (L) 8.3 - 10.4 MG/DL   CBC W/O DIFF    Collection Time: 08/23/21  4:17 AM   Result Value Ref Range    WBC 10.9 4.3 - 11.1 K/uL    RBC 2.23 (L) 4.05 - 5.2 M/uL    HGB 6.6 (LL) 11.7 - 15.4 g/dL    HCT 20.5 (L) 35.8 - 46.3 %    MCV 91.9 79.6 - 97.8 FL    MCH 29.6 26.1 - 32.9 PG    MCHC 32.2 31.4 - 35.0 g/dL    RDW 20.3 (H) 11.9 - 14.6 %    PLATELET 064 (L) 577 - 450 K/uL    MPV 10.4 9.4 - 12.3 FL    ABSOLUTE NRBC 0.05 0.0 - 0.2 K/uL   LACTIC ACID    Collection Time: 08/23/21  4:17 AM   Result Value Ref Range Lactic acid 0.9 0.4 - 2.0 MMOL/L   PATHOLOGIST REVIEW SMEARS    Collection Time: 08/23/21  4:17 AM   Result Value Ref Range    PATHOLOGIST REVIEW PENDING    RETICULOCYTE COUNT    Collection Time: 08/23/21  4:17 AM   Result Value Ref Range    Reticulocyte count 6.7 (H) 0.3 - 2.0 %    Absolute Retic Cnt. 0.1543 (H) 0.026 - 0.095 M/ul    Immature Retic Fraction 25.6 (H) 3.0 - 15.9 %    Retic Hgb Conc. 33 29 - 35 pg   RBC, ALLOCATE    Collection Time: 08/23/21  4:45 AM   Result Value Ref Range    HISTORY CHECKED? Historical check performed    RBC, ALLOCATE    Collection Time: 08/23/21 11:00 AM   Result Value Ref Range    HISTORY CHECKED?  Historical check performed    HGB & HCT    Collection Time: 08/23/21  5:20 PM   Result Value Ref Range    HGB 7.7 (L) 11.7 - 15.4 g/dL    HCT 24.3 (L) 35.8 - 46.3 %   TRANSFERRIN SATURATION    Collection Time: 08/23/21  7:15 PM   Result Value Ref Range    Iron 25 (L) 35 - 150 ug/dL    TIBC 177 (L) 250 - 450 ug/dL    Transferrin Saturation 14 (L) >20 %   FERRITIN    Collection Time: 08/23/21  7:15 PM   Result Value Ref Range    Ferritin 353 8 - 388 NG/ML   VITAMIN B12    Collection Time: 08/23/21  7:15 PM   Result Value Ref Range    Vitamin B12 1,466 (H) 193 - 986 pg/mL   FOLATE    Collection Time: 08/23/21  7:15 PM   Result Value Ref Range    Folate 2.6 (L) 3.1 - 17.5 ng/mL   LD    Collection Time: 08/23/21  7:15 PM   Result Value Ref Range     (H) 110 - 210 U/L   BILIRUBIN, FRACTIONATED    Collection Time: 08/23/21  7:15 PM   Result Value Ref Range    Bilirubin, total 0.5 0.2 - 1.1 MG/DL    Bilirubin, direct 0.2 <0.4 MG/DL    Bilirubin, indirect 0.3 0.0 - 1.1 MG/DL   PROTHROMBIN TIME + INR    Collection Time: 08/23/21  7:15 PM   Result Value Ref Range    Prothrombin time 21.0 (H) 12.6 - 14.5 sec    INR 1.8     PTT    Collection Time: 08/23/21  7:15 PM   Result Value Ref Range    aPTT 55.9 (H) 24.1 - 35.1 SEC   FIBRINOGEN    Collection Time: 08/23/21  7:15 PM   Result Value Ref Range    Fibrinogen 591 (H) 190 - 501 mg/dL   D DIMER    Collection Time: 08/23/21  7:15 PM   Result Value Ref Range    D DIMER 6.37 (H) <0.56 ug/ml(FEU)       Imaging:                          ASSESSMENT:  Problem List  Date Reviewed: 7/15/2021        Codes Class Noted    Hematemesis ICD-10-CM: K92.0  ICD-9-CM: 578.0  8/20/2021        Melena ICD-10-CM: K92.1  ICD-9-CM: 578.1  8/20/2021        * (Principal) Acute blood loss anemia ICD-10-CM: D62  ICD-9-CM: 285.1  8/20/2021        Cellulitis of leg, right ICD-10-CM: L03.115  ICD-9-CM: 682.6  8/8/2021        Sepsis due to cellulitis [facility]) ICD-10-CM: L03.90, A41.9  ICD-9-CM: 682.9, 995.91  7/12/2021        Esophageal varices (HCC) (Chronic) ICD-10-CM: I85.00  ICD-9-CM: 456.1  Unknown    Overview Signed 10/6/2020  4:12 AM by Tracy Courtney MD     grade 3             Splenomegaly ICD-10-CM: R16.1  ICD-9-CM: 789.2  3/21/2019        Primary hypothyroidism ICD-10-CM: E03.9  ICD-9-CM: 244.9  Unknown        Chronic migraine without aura with status migrainosus, not intractable ICD-10-CM: F52.444  ICD-9-CM: 346.72  11/1/2016        Lung nodule ICD-10-CM: R91.1  ICD-9-CM: 793.11  10/28/2016    Overview Signed 10/28/2016 10:05 AM by Boston Sample     IMPRESSION:    1. Lobulated 2 cm mass in the right lung apex, malignancy versus an atypical  inflammatory process. 2. Occlusion of the right subclavian vein. 3. Chronic occlusion of the portal vein with associated abnormal tissue  encasing the common bile duct, most likely fibrosis. 4. Splenomegaly. DVT (deep venous thrombosis) ([facility] ICD-10-CM: I82.409  ICD-9-CM: 453.40  10/26/2016    Overview Signed 10/26/2016 10:27 PM by Lida Calle.      Right subclavian               Analgesic rebound headache ICD-10-CM: G44.40, T39.95XA  ICD-9-CM: 339.3, E935.9  9/22/2016        Headache, chronic daily ICD-10-CM: R51.9  ICD-9-CM: 784.0  9/22/2016        Migraine with aura and with status migrainosus, not intractable ICD-10-CM: G43. 101  ICD-9-CM: 346.03  9/22/2016        Left homonymous hemianopsia ICD-10-CM: H53.462  ICD-9-CM: 368.46  3/30/2015        CML (chronic myelocytic leukemia) (HCC) (Chronic) ICD-10-CM: C92.10  ICD-9-CM: 205.10  3/30/2015        Acquired hypercoagulable state ([address] (Chronic) ICD-10-CM: X33.60  ICD-9-CM: 289.81  3/30/2015        AKOSUA (iron deficiency anemia) ICD-10-CM: D50.9  ICD-9-CM: 280.9  7/28/2012        Symptomatic anemia ICD-10-CM: D64.9  ICD-9-CM: 285.9  7/28/2012    Overview Signed 7/28/2012  2:11 PM by Lanie Rhodes     Hemoglobin 8.0 gram   7/25/2012 17:03   Iron 27 (L)   TIBC 221 (L)   Transferrin Saturation 12 (L)                Cirrhosis (HCC) (Chronic) ICD-10-CM: K74.60  ICD-9-CM: 571.5  7/27/2012        Ascites ICD-10-CM: R18.8  ICD-9-CM: 789.59  7/26/2012        Portal vein thrombosis ICD-10-CM: X35  ICD-9-CM: 864  6/20/2012    Overview Signed 10/6/2020  4:12 AM by Kasia Naqvi MD     Ct scan 6-21-2 Apparent occlusion of the portal vein. In addition, the splenic vein, and superior mesenteric vein are not definitely seen and are also likely  occluded. Splenomegaly, and extensive varices are seen as described above consistent with the portal vein occlusion 7-05-12 on arixtra HIT negative on repeat 7-27-12 re admitted Cirrhotic appearance of the liver.  Mild to moderate ascites, as             Polycythemia vera (HCC) (Chronic) ICD-10-CM: D45  ICD-9-CM: 238.4  6/19/2012    Overview Addendum 6/30/2012 12:06 PM by Lanie Rhodes     Diagnosed February 2008 by [email or web address] analysis however portal vein thrombosis  And and splenomegaly since 2004  Hydroxyurea for 6 months poor tolerance spleen did not shrink   Pegasys 90 mcg weekly 4-2009 till    Restarted 12-30-09 45 mcg q 2 weeks  2-2010 reduced to q month   Increased 45 mcg 2/month 4-2010  Held 8-2010 thrombocytopenia  12-29-12 restarted 45 mcg q 2 weeks  4-1-11 45 mcg q 3 weeks  4-22-11 45 mcg q 4 weeks  Uncertain dosing thereafter   Possibly q 3 weeks 10-15-11 and held and restarted on 4-1-12 6-12 ct scan Small bowel wall thickening suggesting an enteritis. In addition, there is well thickening of the right colon which can suggest an additional   colitis although this can also be seen with severe portal hypertension. Likely reactive edematous changes it scattered fluid collections are seen of   the small bowel mesentery. . Apparent occlusion of the portal vein. In addition, the splenic vein, and superior mesenteric vein are not definitely seen and are also likely   occluded. Splenomegaly, and extensive varices are seen as described above consistent with the portal vein occlusion. Theodora Grumbles Heterogeneous enhancement of the liver and mild periportal edema. An acute hepatitis cannot be excluded. RECOMMENDATIONS:   Hold Lovenoix   Transfuse PRBCs to keep Hemoglobin above 7 gm/dl   We will follow this patient. Lab studies and imaging studies were personally reviewed. Pertinent old records were [facility]. Thank you for allowing us to participate in the care of Ms. Couch.               Fern Arciniega MD  [address] [place] [address]  Office : [phone]  Fax : [phone]

## 2021-08-24 NOTE — PROGRESS NOTES
[employer] [facility]        Inpatient Hematology / Oncology Plan of Care    Reason for Consult:  Acute blood loss anemia [D62]  Hematemesis [K92.0]  Melena [K92.1]  Referring Physician:  Bree Metzger MD    24 Hour Events:  Afebrile, VSS  Hgb 7.2 s/p tx  Plt 111  BCx +E Coli    ROS:  Constitutional: Negative for fever, chills. CV: Negative for chest pain, palpitations, edema. Respiratory: Negative for dyspnea, cough, wheezing. GI: +abd pain. Negative for nausea, diarrhea. 10 point review of systems is otherwise negative with the exception of the elements mentioned above in the HPI. Allergies   Allergen Reactions    Latex Other (comments)     Testing for latex allergy was positive as a 2 (on a scale of 1 to 3).     Sulfa (Sulfonamide Antibiotics) Anaphylaxis    Tramadol Other (comments)     Top lip swelled    Augmentin [Amoxicillin-Pot Clavulanate] Rash    Morphine Nausea and Vomiting     Not a true allergy    Rizatriptan Rash    Tetanus Immune Globulin Other (comments)     Heat, bruising, and swelling at  Site of injection    Xanax [Alprazolam] Other (comments)     Hallucinations    Zonegran [Zonisamide] Rash     Past Medical History:   Diagnosis Date    Anemia     C. difficile diarrhea 4/1/2015    Cerebral edema (HCC) 4/1/2015    Chronic migraine 9/22/2016    Chronic myelogenous leukemia ([facility]     Kings chromosome/ converted from polycythemia in 2009- to 2012 when she was dx w leukemia    Chronic pain     Coagulation disorder ([facility]     \"clotting and Bleeding Problem\" dr Gonzales Brightly follows     Esophageal spasm     with banding     Esophageal varices ([facility]     grade 3    GI bleed 8/3/2016    H/O craniotomy     3-29-15.  due to blood clot - which caused a seizure  - then pt fell and hit     Leukocytosis 3/14/2015    MRSA colonization 6/25/2012    Polycythemia vera(238.4)     JAK2 mutation    Portal hypertension ([facility]     with varices    Portal vein thrombosis 2012    Ct scan  Apparent occlusion of the portal vein. In addition, the splenic vein, and superior mesenteric vein are not definitely seen and are also likely  occluded. Splenomegaly, and extensive varices are seen as described above consistent with the portal vein occlusion 12 on arixtra HIT negative on repeat 12 re admitted Cirrhotic appearance of the liver. Mild to moderate ascites, as    Primary hypothyroidism     Pulmonary embolism ([address]     not on coumadin anymore     S/P exploratory laparotomy, 12    Bowel resection:  336 cm of small bowel removed, approximately 9 feet and placement of feeding jejunostomy.      S/P small bowel resection     .  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 2012 platelets lower repeat HIT 12 platelets in 76,816'Z    Thrombosis, portal vein 2004    portal , spleenic and recently superior mesenteric    Transfusion history     many blood tranfusions - last 3-29-15 after brain surgery    Traumatic hemorrhage of right cerebrum ([address] 3/30/2015     Past Surgical History:   Procedure Laterality Date    HX APPENDECTOMY      with small bowel resection    HX BREAST BIOPSY Bilateral     Lt - ; Rt -     HX CHOLECYSTECTOMY      HX GI      liver biopsy    HX GI      bowel removed small - 9 feet     HX GYN       x 2    HX GYN      D&C following miscarriage    HX ORTHOPAEDIC Left     torn labrum shoulder (screw in place)    NEUROLOGICAL PROCEDURE UNLISTED  2010    craniotomy to evacuate subdural hematoma following a fall from a seizure  NE ABDOMEN SURGERY PROC UNLISTED      umbilical hernia repair    NE ABDOMEN SURGERY PROC UNLISTED      9ft of small bowel excised then reconnected     Family History   Problem Relation Age of Onset    Diabetes Mother     Stroke Mother         after surgery    Cancer Father         brain    No Known Problems Sister     Other Sister         diverticulitis    Other Sister         diverticulitis    Cancer Sister         lyjmphoma    Breast Cancer Maternal Aunt 48    Thyroid Disease Paternal Grandmother      Social History     Socioeconomic History    Marital status: SINGLE     Spouse name: Not on file    Number of children: Not on file    Years of education: Not on file    Highest education level: Not on file   Occupational History    Not on file   Tobacco Use    Smoking status: Former Smoker     Packs/day: 0.20     Years: 10.00     Pack years: 2.00     Types: Cigarettes     Quit date:      Years since quittin.6    Smokeless tobacco: Never Used   Substance and Sexual Activity    Alcohol use: No    Drug use: No    Sexual activity: Not on file   Other Topics Concern    Not on file   Social History Narrative    Not on file     Social Determinants of Health     Financial Resource Strain:     Difficulty of Paying Living Expenses:    Food Insecurity:     Worried About Running Out of Food in the Last Year:     [address] in the Last Year:    Transportation Needs:     Lack of Transportation (Medical):      Lack of Transportation (Non-Medical):    Physical Activity:     Days of Exercise per Week:     Minutes of Exercise per Session:    Stress:     Feeling of Stress :    Social Connections:     Frequency of Communication with Friends and Family:     Frequency of Social Gatherings with Friends and Family:     Attends [employer] Services:     Active Member of Clubs or Organizations:     Attends Club or Organization Meetings:     Marital Status:    Intimate Partner Violence:     Fear of Current or Ex-Partner:     Emotionally Abused:     Physically Abused:     Sexually Abused:      Current Facility-Administered Medications   Medication Dose Route Frequency Provider Last Rate Last Admin    folic acid (FOLVITE) tablet 1 mg  1 mg Oral DAILY Sravan Mclain NP   1 mg at 08/24/21 8211    0.9% sodium chloride infusion 250 mL  250 mL IntraVENous PRN Gildardo Clark NP        lactated Ringers infusion  75 mL/hr IntraVENous CONTINUOUS Gildardo Clark NP 75 mL/hr at 08/24/21 0915 75 mL/hr at 08/24/21 0915    cefepime (MAXIPIME) 2 g in 0.9% sodium chloride (MBP/ADV) 100 mL MBP  2 g IntraVENous Q12H Matheus Akira,  mL/hr at 08/24/21 0321 2 g at 08/24/21 0321    metroNIDAZOLE (FLAGYL) tablet 500 mg  500 mg Oral Q12H Matheus Grippe, NP   500 mg at 08/24/21 0903    oxyCODONE IR (ROXICODONE) tablet 10 mg  10 mg Oral Q4H PRN Taylor Martinez MD   10 mg at 08/24/21 0320    morphine injection 4 mg  4 mg IntraVENous Q4H PRN Taylor Martinez MD   4 mg at 08/21/21 0302    sodium chloride (NS) flush 5-10 mL  5-10 mL IntraVENous Q8H Zoe Telles MD   10 mL at 08/24/21 0600    sodium chloride (NS) flush 5-10 mL  5-10 mL IntraVENous PRN Senia Silva MD        pantoprazole (PROTONIX) 40 mg in 0.9% sodium chloride 10 mL injection  40 mg IntraVENous Q12H Zoe Telles MD   40 mg at 08/24/21 0904    sodium chloride (NS) flush 5-40 mL  5-40 mL IntraVENous Q8H Ventura Piedra MD   10 mL at 08/24/21 0600    sodium chloride (NS) flush 5-40 mL  5-40 mL IntraVENous PRN Ventura Piedra MD   10 mL at 08/21/21 0421    acetaminophen (TYLENOL) tablet 650 mg  650 mg Oral Q6H PRN Ventura Piedra MD   650 mg at 08/23/21 8307    Or    acetaminophen (TYLENOL) suppository 650 mg  650 mg Rectal Q6H PRN Ventura Piedra MD        polyethylene glycol (MIRALAX) packet 17 g  17 g Oral DAILY PRN Ventura Piedra MD        levothyroxine (SYNTHROID) tablet 112 mcg  112 mcg Oral 6am Ventura Piedra MD   112 mcg at 21 0904    ondansetron (ZOFRAN) injection 4 mg  4 mg IntraVENous Q6H PRN Best Hurst MD   4 mg at 21       OBJECTIVE:  Patient Vitals for the past 8 hrs:   BP Temp Pulse Resp SpO2   21 0725 103/60 98.9 °F (37.2 °C) 92 16 (!) 86 %   21 0413 109/65 99 °F (37.2 °C) (!) 102 17 95 %     Temp (24hrs), Av.1 °F (37.3 °C), Min:98.4 °F (36.9 °C), Max:100.3 °F (37.9 °C)    701 - 1900  In: 120 [P.O.:120]  Out: -     Physical Exam:  Constitutional: Well developed, well nourished female in no acute distress, sitting comfortably in the hospital bed. HEENT: Normocephalic and atraumatic. Oropharynx is clear, mucous membranes are moist.  Extraocular muscles are intact. Sclerae anicteric. Neck supple without JVD. No thyromegaly present. Skin Warm and dry. No bruising and no rash noted. No erythema. +pallor. Respiratory Lungs are clear to auscultation bilaterally without wheezes, rales or rhonchi, normal air exchange without accessory muscle use. CVS Normal rate, regular rhythm and normal S1 and S2. No murmurs, gallops, or rubs. Abdomen Soft, nontender and nondistended, normoactive bowel sounds. No palpable mass. No hepatosplenomegaly. Neuro Grossly nonfocal with no obvious sensory or motor deficits. MSK Normal range of motion in general.  No edema and no tenderness. Psych Appropriate mood and affect. Labs:    Recent Results (from the past 24 hour(s))   RBC, ALLOCATE    Collection Time: 21 11:00 AM   Result Value Ref Range    HISTORY CHECKED?  Historical check performed    HGB & HCT    Collection Time: 21  5:20 PM   Result Value Ref Range    HGB 7.7 (L) 11.7 - 15.4 g/dL    HCT 24.3 (L) 35.8 - 46.3 %   TRANSFERRIN SATURATION    Collection Time: 21  7:15 PM   Result Value Ref Range    Iron 25 (L) 35 - 150 ug/dL    TIBC 177 (L) 250 - 450 ug/dL    Transferrin Saturation 14 (L) >20 %   FERRITIN    Collection Time: 21  7:15 PM Result Value Ref Range    Ferritin 353 8 - 388 NG/ML   VITAMIN B12    Collection Time: 08/23/21  7:15 PM   Result Value Ref Range    Vitamin B12 1,466 (H) 193 - 986 pg/mL   FOLATE    Collection Time: 08/23/21  7:15 PM   Result Value Ref Range    Folate 2.6 (L) 3.1 - 17.5 ng/mL   LD    Collection Time: 08/23/21  7:15 PM   Result Value Ref Range     (H) 110 - 210 U/L   BILIRUBIN, FRACTIONATED    Collection Time: 08/23/21  7:15 PM   Result Value Ref Range    Bilirubin, total 0.5 0.2 - 1.1 MG/DL    Bilirubin, direct 0.2 <0.4 MG/DL    Bilirubin, indirect 0.3 0.0 - 1.1 MG/DL   PROTHROMBIN TIME + INR    Collection Time: 08/23/21  7:15 PM   Result Value Ref Range    Prothrombin time 21.0 (H) 12.6 - 14.5 sec    INR 1.8     PTT    Collection Time: 08/23/21  7:15 PM   Result Value Ref Range    aPTT 55.9 (H) 24.1 - 35.1 SEC   FIBRINOGEN    Collection Time: 08/23/21  7:15 PM   Result Value Ref Range    Fibrinogen 591 (H) 190 - 501 mg/dL   D DIMER    Collection Time: 08/23/21  7:15 PM   Result Value Ref Range    D DIMER 6.37 (H) <0.56 ug/ml(FEU)   METABOLIC PANEL, BASIC    Collection Time: 08/24/21  5:47 AM   Result Value Ref Range    Sodium 139 136 - 145 mmol/L    Potassium 3.4 (L) 3.5 - 5.1 mmol/L    Chloride 109 (H) 98 - 107 mmol/L    CO2 24 21 - 32 mmol/L    Anion gap 6 (L) 7 - 16 mmol/L    Glucose 116 (H) 65 - 100 mg/dL    BUN 7 (L) 8 - 23 MG/DL    Creatinine 0.41 (L) 0.6 - 1.0 MG/DL    GFR est AA >60 >60 ml/min/1.73m2    GFR est non-AA >60 >60 ml/min/1.73m2    Calcium 7.6 (L) 8.3 - 10.4 MG/DL   CBC W/O DIFF    Collection Time: 08/24/21  5:47 AM   Result Value Ref Range    WBC 8.7 4.3 - 11.1 K/uL    RBC 2.43 (L) 4.05 - 5.2 M/uL    HGB 7.2 (L) 11.7 - 15.4 g/dL    HCT 22.3 (L) 35.8 - 46.3 %    MCV 91.8 79.6 - 97.8 FL    MCH 29.6 26.1 - 32.9 PG    MCHC 32.3 31.4 - 35.0 g/dL    RDW 19.2 (H) 11.9 - 14.6 %    PLATELET 664 (L) 315 - 450 K/uL    MPV 10.3 9.4 - 12.3 FL    ABSOLUTE NRBC 0.04 0.0 - 0.2 K/uL       Imaging:  XR CHEST Erikatraci Martinez [[id number]] Collected: 08/22/21 2041   Order Status: Completed Updated: 08/22/21 2043   Narrative:     Portable chest x-ray     CLINICAL INDICATION: Sepsis     FINDINGS: Single AP view the chest compared to a similar exam dated 8/8/2021   show the lungs to be slightly underexpanded but otherwise clear. No pleural   effusion or pneumothorax. The cardiac silhouette and mediastinum are   unremarkable. Impression:     Slightly under expanded lungs, otherwise no acute abnormality. ASSESSMENT:  Problem List  Date Reviewed: 7/15/2021        Codes Class Noted    Hematemesis ICD-10-CM: K92.0  ICD-9-CM: 578.0  8/20/2021        Melena ICD-10-CM: K92.1  ICD-9-CM: 578.1  8/20/2021        * (Principal) Acute blood loss anemia ICD-10-CM: D62  ICD-9-CM: 285.1  8/20/2021        Cellulitis of leg, right ICD-10-CM: L03.115  ICD-9-CM: 682.6  8/8/2021        Sepsis due to cellulitis [facility]) ICD-10-CM: L03.90, A41.9  ICD-9-CM: 682.9, 995.91  7/12/2021        Esophageal varices (HCC) (Chronic) ICD-10-CM: I85.00  ICD-9-CM: 456.1  Unknown    Overview Signed 10/6/2020  4:12 AM by Aniyah De Los Santos MD     grade 3             Splenomegaly ICD-10-CM: R16.1  ICD-9-CM: 789.2  3/21/2019        Primary hypothyroidism ICD-10-CM: E03.9  ICD-9-CM: 244.9  Unknown        Chronic migraine without aura with status migrainosus, not intractable ICD-10-CM: K70.363  ICD-9-CM: 346.72  11/1/2016        Lung nodule ICD-10-CM: R91.1  ICD-9-CM: 793.11  10/28/2016    Overview Signed 10/28/2016 10:05 AM by Jayro Valle     IMPRESSION:    1. Lobulated 2 cm mass in the right lung apex, malignancy versus an atypical  inflammatory process. 2. Occlusion of the right subclavian vein. 3. Chronic occlusion of the portal vein with associated abnormal tissue  encasing the common bile duct, most likely fibrosis. 4. Splenomegaly.              DVT (deep venous thrombosis) ([facility] ICD-10-CM: I82.409  ICD-9-CM: 453.40  10/26/2016    Overview Signed 10/26/2016 10:27 PM by Silverio Mckeon. Right subclavian               Analgesic rebound headache ICD-10-CM: G44.40, T39.95XA  ICD-9-CM: 339.3, E935.9  9/22/2016        Headache, chronic daily ICD-10-CM: R51.9  ICD-9-CM: 784.0  9/22/2016        Migraine with aura and with status migrainosus, not intractable ICD-10-CM: G43. 101  ICD-9-CM: 346.03  9/22/2016        Left homonymous hemianopsia ICD-10-CM: H53.462  ICD-9-CM: 368.46  3/30/2015        CML (chronic myelocytic leukemia) (HCC) (Chronic) ICD-10-CM: C92.10  ICD-9-CM: 205.10  3/30/2015        Acquired hypercoagulable state ([facility] (Chronic) ICD-10-CM: I89.47  ICD-9-CM: 289.81  3/30/2015        AKOSUA (iron deficiency anemia) ICD-10-CM: D50.9  ICD-9-CM: 280.9  7/28/2012        Symptomatic anemia ICD-10-CM: D64.9  ICD-9-CM: 285.9  7/28/2012    Overview Signed 7/28/2012  2:11 PM by Kari Wilburn     Hemoglobin 8.0 gram   7/25/2012 17:03   Iron 27 (L)   TIBC 221 (L)   Transferrin Saturation 12 (L)                Cirrhosis (HCC) (Chronic) ICD-10-CM: K74.60  ICD-9-CM: 571.5  7/27/2012        Ascites ICD-10-CM: R18.8  ICD-9-CM: 789.59  7/26/2012        Portal vein thrombosis ICD-10-CM: A89  ICD-9-CM: 708  6/20/2012    Overview Signed 10/6/2020  4:12 AM by Manuel Heard MD     Ct scan 6-21-2 Apparent occlusion of the portal vein. In addition, the splenic vein, and superior mesenteric vein are not definitely seen and are also likely  occluded. Splenomegaly, and extensive varices are seen as described above consistent with the portal vein occlusion 7-05-12 on arixtra HIT negative on repeat 7-27-12 re admitted Cirrhotic appearance of the liver.  Mild to moderate ascites, as             Polycythemia vera (HCC) (Chronic) ICD-10-CM: D45  ICD-9-CM: 238.4  6/19/2012    Overview Addendum 6/30/2012 12:06 PM by Kari Wilburn     Diagnosed February 2008 by [email or web address] analysis however portal vein thrombosis  And and splenomegaly since 2004  Hydroxyurea for 6 months poor tolerance spleen did not shrink   Pegasys 90 mcg weekly 4-2009 till    Restarted 12-30-09 45 mcg q 2 weeks  2-2010 reduced to q month   Increased 45 mcg 2/month 4-2010  Held 8-2010 thrombocytopenia  12-29-12 restarted 45 mcg q 2 weeks  4-1-11 45 mcg q 3 weeks  4-22-11 45 mcg q 4 weeks  Uncertain dosing thereafter   Possibly q 3 weeks 10-15-11 and held and restarted on 4-1-12 6-12 ct scan Small bowel wall thickening suggesting an enteritis. In addition, there is well thickening of the right colon which can suggest an additional   colitis although this can also be seen with severe portal hypertension. Likely reactive edematous changes it scattered fluid collections are seen of   the small bowel mesentery. . Apparent occlusion of the portal vein. In addition, the splenic vein, and superior mesenteric vein are not definitely seen and are also likely   occluded. Splenomegaly, and extensive varices are seen as described above consistent with the portal vein occlusion. Anya Tan Heterogeneous enhancement of the liver and mild periportal edema. An acute hepatitis cannot be excluded. Ms. Rubbie Gottron is a 64 y.o. female admitted on 8/20/2021. The primary encounter diagnosis was Gastrointestinal hemorrhage with melena. Diagnoses of Secondary esophageal varices with bleeding ([address] Splenomegaly, Chronic anticoagulation, CML (chronic myelocytic leukemia) ([address] and Polycythemia vera ([address] were also pertinent to this visit. Anya Tan Her PMH includes splenomegaly, recurrent DVTs, esophageal/gastric varices, AKOSUA, intracranial hemorrhage s/p craniotomy, small bowel resection from intestinal ischemia, and seizures. She is a known patient of Dr. Rena Tavarez with CML on dasatinib/ruxolitinib and PV on Lovenox. She was recently admitted 8/8 - 8/11 for RLE cellulitis, treated with clindamycin. She presented to ED with c/o hematemesis, melena, and abdominal pain. Also endorsed dizziness. Hgb 7.4 on admission, down to 6.6.   She was started on octreotide gtt which has since been DC'd as pt declined gtt. She is s/p EGD on 8/21 with esophageal varices and large blood in stomach. She spiked temp up to 101.3 on 8/22. BCx-NGTD. On Vanc/Zosyn (pt declining antibx as she feels this has lowered her platelets). Plt 144k today. We were consulted d/t pt request.    RECOMMENDATIONS:  CML  - on dasatinib/ruxolitinib    Polycythemia Vera  - on Lovenox, hold for GIB - she will also need to con't to hold this upon discharge    GI bleed  - s/p EGD on 8/21 with esophageal varices and large blood in stomach  - was on octreotide gtt, DC'd as pt declined gtt  - transfuse prn to keep Hgb >7    Fever / E Coli Bactermia  - CXR neg  - BCx-NGTD  - On Vanc/Zosyn (pt declining antibx)  8/24 BCx +E Coli, sensitivities pending. ID following. On Cef/Flagyl. Anemia / Thrombocytopenia secondary to chemotherapy / Folate deficiency  - Check iron studies, B12, folate, DIC labs, hemolysis labs, smear  - Transfuse prn to keep Hgb > 7 and Plt > 50k with active bleeding  8/24 Hgb 7.2 s/p tx. Plt down to 111. Folate deficiency noted, folic acid ordered. Hapto and smear pending. Thank you for allowing us to participate in the care of Ms. Couch. She will need to f/u with Dr. Tania Mcdonnell within one week of discharge. Cranford Hodgkins, NP   [facility]  [address] [place] [address]  Office : [phone]  Fax : [phone]         Attending Addendum:  I have personally performed a face to face diagnostic evaluation on this patient. I have reviewed and agree with the care plan as documented by Cranford Hodgkins, N.P. My findings are as follows: She has Polycythemia Vera and CML, appears weak, heart rate regular without murmurs, abdomen is non-tender, bowel sounds are positive, we will continue IV ABX and Folic acid.               Aziza Kenyon MD      [facility]  [address] [place] [address] [address]  Office : [phone]  Fax : [phone]

## 2021-08-24 NOTE — PROGRESS NOTES
END OF SHIFT NOTE:    INTAKE/OUTPUT  08/23 0701 - 08/24 0700  In: 335.8   Out: 1000 [Urine:1000]  Voiding: YES  Catheter: NO  Drain:              Flatus: Patient does have flatus present. Stool:  1 occurrences. Characteristics:  Stool Assessment  Stool Color: Yellow  Stool Appearance: Loose (per pt)  Stool Amount: Medium  Stool Source/Status: Rectum    Emesis: 0 occurrences. Characteristics:        VITAL SIGNS  Patient Vitals for the past 12 hrs:   Temp Pulse Resp BP SpO2   08/24/21 1506 98.7 °F (37.1 °C) 89 16 123/76 94 %   08/24/21 1135 98.8 °F (37.1 °C) 94 16 110/65 92 %   08/24/21 0725 98.9 °F (37.2 °C) 92 16 103/60 (!) 86 %       Pain Assessment  Pain Intensity 1: 8 (08/24/21 1345)  Pain Location 1: Abdomen  Pain Intervention(s) 1: Medication (see MAR)  Patient Stated Pain Goal: 0    Ambulating  Yes    Shift report given to oncoming nurse at the bedside.     Deena Officer, RN

## 2021-08-25 NOTE — PROGRESS NOTES
Problem: Falls - Risk of  Goal: *Absence of Falls  Description: Document Amy Chandra Fall Risk and appropriate interventions in the flowsheet.   Outcome: Progressing Towards Goal  Note: Fall Risk Interventions:  Mobility Interventions: Bed/chair exit alarm         Medication Interventions: Patient to call before getting OOB    Elimination Interventions: Bed/chair exit alarm, Call light in reach              Problem: Patient Education: Go to Patient Education Activity  Goal: Patient/Family Education  Outcome: Progressing Towards Goal     Problem: Patient Education: Go to Patient Education Activity  Goal: Patient/Family Education  Outcome: Progressing Towards Goal     Problem: Upper and Lower GI Bleed: Day 1  Goal: Off Pathway (Use only if patient is Off Pathway)  Outcome: Progressing Towards Goal  Goal: Activity/Safety  Outcome: Progressing Towards Goal  Goal: Consults, if ordered  Outcome: Progressing Towards Goal  Goal: Diagnostic Test/Procedures  Outcome: Progressing Towards Goal  Goal: Nutrition/Diet  Outcome: Progressing Towards Goal  Goal: Discharge Planning  Outcome: Progressing Towards Goal  Goal: Medications  Outcome: Progressing Towards Goal  Goal: Respiratory  Outcome: Progressing Towards Goal  Goal: Treatments/Interventions/Procedures  Outcome: Progressing Towards Goal  Goal: Psychosocial  Outcome: Progressing Towards Goal  Goal: *Optimal pain control at patient's stated goal  Outcome: Progressing Towards Goal  Goal: *Hemodynamically stable  Outcome: Progressing Towards Goal  Goal: *Demonstrates progressive activity  Outcome: Progressing Towards Goal     Problem: Upper and Lower GI Bleed: Day 2  Goal: Off Pathway (Use only if patient is Off Pathway)  Outcome: Progressing Towards Goal  Goal: Activity/Safety  Outcome: Progressing Towards Goal  Goal: Consults, if ordered  Outcome: Progressing Towards Goal  Goal: Diagnostic Test/Procedures  Outcome: Progressing Towards Goal  Goal: Nutrition/Diet  Outcome: Progressing Towards Goal  Goal: Discharge Planning  Outcome: Progressing Towards Goal  Goal: Medications  Outcome: Progressing Towards Goal  Goal: Respiratory  Outcome: Progressing Towards Goal  Goal: Treatments/Interventions/Procedures  Outcome: Progressing Towards Goal  Goal: Psychosocial  Outcome: Progressing Towards Goal  Goal: *Optimal pain control at patient's stated goal  Outcome: Progressing Towards Goal  Goal: *Hemodynamically stable  Outcome: Progressing Towards Goal  Goal: *Tolerating diet  Outcome: Progressing Towards Goal  Goal: *Demonstrates progressive activity  Outcome: Progressing Towards Goal     Problem: Upper and Lower GI Bleed: Day 3  Goal: Off Pathway (Use only if patient is Off Pathway)  Outcome: Progressing Towards Goal  Goal: Activity/Safety  Outcome: Progressing Towards Goal  Goal: Diagnostic Test/Procedures  Outcome: Progressing Towards Goal  Goal: Nutrition/Diet  Outcome: Progressing Towards Goal  Goal: Discharge Planning  Outcome: Progressing Towards Goal  Goal: Medications  Outcome: Progressing Towards Goal  Goal: Treatments/Interventions/Procedures  Outcome: Progressing Towards Goal  Goal: Psychosocial  Outcome: Progressing Towards Goal     Problem: Upper and Lower GI Bleed:  Discharge Outcomes  Goal: *Hemodynamically stable  Outcome: Progressing Towards Goal  Goal: *Lungs clear or at baseline  Outcome: Progressing Towards Goal  Goal: *Demonstrates independent activity or return to baseline  Outcome: Progressing Towards Goal  Goal: *Pain is controlled to three or less  Outcome: Progressing Towards Goal  Goal: *Verbalizes understanding and describes prescribed diet  Outcome: Progressing Towards Goal  Goal: *Tolerating diet  Outcome: Progressing Towards Goal  Goal: *Verbalizes name, dosage, time, side effects, and number of days to continue medications  Outcome: Progressing Towards Goal  Goal: *Anxiety reduced or absent  Outcome: Progressing Towards Goal  Goal: *Understands and describes signs and symptoms to report to providers(Stroke Metric)  Outcome: Progressing Towards Goal  Goal: *Describes follow-up/return visits to physicians  Outcome: Progressing Towards Goal  Goal: *Describes available resources and support systems  Outcome: Progressing Towards Goal

## 2021-08-25 NOTE — PROGRESS NOTES
Chart review complete, pt remains on IV antibiotics with EOT 8/30/2021, pt per NP noted refusing to have EGD done. CM will remain available to assist as needed with DC needs.

## 2021-08-25 NOTE — PROGRESS NOTES
[facility] Hematology & Oncology        Inpatient Hematology / Oncology Plan of Care    Reason for Consult:  Acute blood loss anemia [D62]  Hematemesis [K92.0]  Melena [K92.1]  Referring Physician:  Saurabh Gan MD    24 Hour Events:  Afebrile (.3), VSS  Hgb 7.7  Plt 116  BCx +E Coli, ID following  On Cef/Flagyl     ROS:  Constitutional: Negative for fever, chills. CV: Negative for chest pain, palpitations, edema. Respiratory: Negative for dyspnea, cough, wheezing. GI: +abd pain. Negative for nausea, diarrhea. 10 point review of systems is otherwise negative with the exception of the elements mentioned above in the HPI. Allergies   Allergen Reactions    Latex Other (comments)     Testing for latex allergy was positive as a 2 (on a scale of 1 to 3).     Sulfa (Sulfonamide Antibiotics) Anaphylaxis    Tramadol Other (comments)     Top lip swelled    Augmentin [Amoxicillin-Pot Clavulanate] Rash    Morphine Nausea and Vomiting     Not a true allergy    Rizatriptan Rash    Tetanus Immune Globulin Other (comments)     Heat, bruising, and swelling at  Site of injection    Xanax [Alprazolam] Other (comments)     Hallucinations    Zonegran [Zonisamide] Rash     Past Medical History:   Diagnosis Date    Anemia     C. difficile diarrhea 4/1/2015    Cerebral edema (HCC) 4/1/2015    Chronic migraine 9/22/2016    Chronic myelogenous leukemia ([facility]     [place] chromosome/ converted from polycythemia in 2009- to 2012 when she was dx w leukemia    Chronic pain     Coagulation disorder ([facility]     \"clotting and Bleeding Problem\" dr Nataly Young follows     Esophageal spasm     with banding     Esophageal varices ([facility]     grade 3    GI bleed 8/3/2016    H/O craniotomy     3-29-15.  due to blood clot - which caused a seizure  - then pt fell and hit     Leukocytosis 3/14/2015    MRSA colonization 6/25/2012    Polycythemia vera(238.4)     JAK2 mutation    Portal hypertension ([facility]     with varices    Portal vein thrombosis 2012    Ct scan  Apparent occlusion of the portal vein. In addition, the splenic vein, and superior mesenteric vein are not definitely seen and are also likely  occluded. Splenomegaly, and extensive varices are seen as described above consistent with the portal vein occlusion 12 on arixtra HIT negative on repeat 12 re admitted Cirrhotic appearance of the liver. Mild to moderate ascites, as    Primary hypothyroidism     Pulmonary embolism ([address]     not on coumadin anymore     S/P exploratory laparotomy, 12    Bowel resection:  336 cm of small bowel removed, approximately 9 feet and placement of feeding jejunostomy.      S/P small bowel resection     .  9 feet removed due to  gangrene which wa due to blood clot    Seizure ([address] 3/14/2015    Seizure disorder ([address] 3/30/2015    due to clotting factor    Seizures ([address]     last one 3- - followed by aniyah     Splenomegaly, congestive, chronic     Stroke (cerebrum) ([address] 2015    had bled into head- surgery    Stroke [facility])     pt had stroke like symptoms     Thrombocytopenia, HIT negative 2012 platelets lower repeat HIT 12 platelets in 62,732'C    Thrombosis, portal vein     portal , spleenic and recently superior mesenteric    Transfusion history     many blood tranfusions - last 3-29-15 after brain surgery    Traumatic hemorrhage of right cerebrum ([address] 3/30/2015     Past Surgical History:   Procedure Laterality Date    HX APPENDECTOMY      with small bowel resection    HX BREAST BIOPSY Bilateral     Lt - ; Rt -     HX CHOLECYSTECTOMY      HX GI      liver biopsy    HX GI      bowel removed small - 9 feet     HX GYN       x 2    HX GYN      D&C following miscarriage    HX ORTHOPAEDIC Left     torn labrum shoulder (screw in place)    NEUROLOGICAL PROCEDURE UNLISTED  2010    craniotomy to evacuate subdural hematoma following a fall from a seizure    OR ABDOMEN SURGERY PROC UNLISTED      umbilical hernia repair    OR ABDOMEN SURGERY PROC UNLISTED      9ft of small bowel excised then reconnected     Family History   Problem Relation Age of Onset    Diabetes Mother     Stroke Mother         after surgery    Cancer Father         brain    No Known Problems Sister     Other Sister         diverticulitis    Other Sister         diverticulitis    Cancer Sister         lyjmphoma    Breast Cancer Maternal Aunt 48    Thyroid Disease Paternal Grandmother      Social History     Socioeconomic History    Marital status: SINGLE     Spouse name: Not on file    Number of children: Not on file    Years of education: Not on file    Highest education level: Not on file   Occupational History    Not on file   Tobacco Use    Smoking status: Former Smoker     Packs/day: 0.20     Years: 10.00     Pack years: 2.00     Types: Cigarettes     Quit date:      Years since quittin.6    Smokeless tobacco: Never Used   Substance and Sexual Activity    Alcohol use: No    Drug use: No    Sexual activity: Not on file   Other Topics Concern    Not on file   Social History Narrative    Not on file     Social Determinants of Health     Financial Resource Strain:     Difficulty of Paying Living Expenses:    Food Insecurity:     Worried About Running Out of Food in the Last Year:     [address] in the Last Year:    Transportation Needs:     Lack of Transportation (Medical):      Lack of Transportation (Non-Medical):    Physical Activity:     Days of Exercise per Week:     Minutes of Exercise per Session:    Stress:     Feeling of Stress :    Social Connections:     Frequency of Communication with Friends and Family:     Frequency of Social Gatherings with Friends and Family:     Attends [employer] Services:     Active Member of Clubs or Organizations:     Attends Club or Organization Meetings:     Marital Status:    Intimate Partner Violence:     Fear of Current or Ex-Partner:     Emotionally Abused:     Physically Abused:     Sexually Abused:      Current Facility-Administered Medications   Medication Dose Route Frequency Provider Last Rate Last Admin    folic acid (FOLVITE) tablet 1 mg  1 mg Oral DAILY Xander Marrero NP   1 mg at 08/25/21 0835    diphenhydrAMINE (BENADRYL) injection 25 mg  25 mg IntraVENous Q6H PRN Lynne Maldonado PA        diphenhydrAMINE (BENADRYL) capsule 25 mg  25 mg Oral Q6H PRN Lynne Maldonado PA        0.9% sodium chloride infusion 250 mL  250 mL IntraVENous PRN Jhonny Fuentes NP        lactated Ringers infusion  75 mL/hr IntraVENous CONTINUOUS Jhonny Fuentes NP 75 mL/hr at 08/24/21 2138 75 mL/hr at 08/24/21 2138    cefepime (MAXIPIME) 2 g in 0.9% sodium chloride (MBP/ADV) 100 mL MBP  2 g IntraVENous Q12H Yeimy Valdez  mL/hr at 08/25/21 0623 2 g at 08/25/21 0015    metroNIDAZOLE (FLAGYL) tablet 500 mg  500 mg Oral Q12H Carlo Galvez NP   500 mg at 08/25/21 0835    oxyCODONE IR (ROXICODONE) tablet 10 mg  10 mg Oral Q4H PRN Yoanna Prasad MD   10 mg at 08/25/21 0709    morphine injection 4 mg  4 mg IntraVENous Q4H PRN Yoanna Prasad MD   4 mg at 08/21/21 0302    sodium chloride (NS) flush 5-10 mL  5-10 mL IntraVENous Q8H Zoe Telles MD   10 mL at 08/25/21 0710    sodium chloride (NS) flush 5-10 mL  5-10 mL IntraVENous PRN Zoe Telles MD        pantoprazole (PROTONIX) 40 mg in 0.9% sodium chloride 10 mL injection  40 mg IntraVENous Q12H Zoe Telles MD   40 mg at 08/25/21 0933    sodium chloride (NS) flush 5-40 mL  5-40 mL IntraVENous Q8H Surinder Huerta MD   10 mL at 08/25/21 0710    sodium chloride (NS) flush 5-40 mL  5-40 mL IntraVENous PRN Surinder Huerta MD   10 mL at 08/21/21 0421    acetaminophen (TYLENOL) tablet 650 mg  650 mg Oral Q6H PRN Surinder Huerta MD   650 mg at 08/25/21 0151    Or    acetaminophen (TYLENOL) suppository 650 mg  650 mg Rectal Q6H PRN Deion Pineda MD        polyethylene glycol (MIRALAX) packet 17 g  17 g Oral DAILY PRN Deion Pineda MD        levothyroxine (SYNTHROID) tablet 112 mcg  112 mcg Oral Dolores Dawn MD   112 mcg at 21 0625    ondansetron (ZOFRAN) injection 4 mg  4 mg IntraVENous Q6H PRN Diana Cruz MD   4 mg at 21 1913       OBJECTIVE:  Patient Vitals for the past 8 hrs:   BP Temp Pulse Resp SpO2   21 1119 (!) 103/57 98 °F (36.7 °C) 79 18 97 %   21 0706 (!) 104/54 98.2 °F (36.8 °C) 85 16 90 %     Temp (24hrs), Av.9 °F (37.2 °C), Min:98 °F (36.7 °C), Max:100.3 °F (37.9 °C)     07 -  190  In: 240 [P.O.:240]  Out: -     Physical Exam:  Constitutional: Well developed, well nourished female in no acute distress, sitting comfortably in the hospital bed. HEENT: Normocephalic and atraumatic. Oropharynx is clear, mucous membranes are moist.  Extraocular muscles are intact. Sclerae anicteric. Neck supple without JVD. No thyromegaly present. Skin Warm and dry. No bruising and no rash noted. No erythema. +pallor. Respiratory Lungs are clear to auscultation bilaterally without wheezes, rales or rhonchi, normal air exchange without accessory muscle use. CVS Normal rate, regular rhythm and normal S1 and S2. No murmurs, gallops, or rubs. Abdomen Soft, nontender and nondistended, normoactive bowel sounds. No palpable mass. No hepatosplenomegaly. Neuro Grossly nonfocal with no obvious sensory or motor deficits. MSK Normal range of motion in general.  No edema and no tenderness. Psych Appropriate mood and affect.         Labs:    Recent Results (from the past 24 hour(s))   HGB & HCT    Collection Time: 21  5:20 PM   Result Value Ref Range    HGB 7.6 (L) 11.7 - 15.4 g/dL    HCT 23.8 (L) 35.8 - 00.1 %   METABOLIC PANEL, BASIC    Collection Time: 21  4:42 AM   Result Value Ref Range    Sodium 140 136 - 145 mmol/L    Potassium 3.5 3.5 - 5.1 mmol/L    Chloride 109 (H) 98 - 107 mmol/L    CO2 27 21 - 32 mmol/L    Anion gap 4 (L) 7 - 16 mmol/L    Glucose 108 (H) 65 - 100 mg/dL    BUN 5 (L) 8 - 23 MG/DL    Creatinine 0.43 (L) 0.6 - 1.0 MG/DL    GFR est AA >60 >60 ml/min/1.73m2    GFR est non-AA >60 >60 ml/min/1.73m2    Calcium 7.9 (L) 8.3 - 10.4 MG/DL   CBC W/O DIFF    Collection Time: 08/25/21  4:42 AM   Result Value Ref Range    WBC 9.1 4.3 - 11.1 K/uL    RBC 2.64 (L) 4.05 - 5.2 M/uL    HGB 7.7 (L) 11.7 - 15.4 g/dL    HCT 24.8 (L) 35.8 - 46.3 %    MCV 93.9 79.6 - 97.8 FL    MCH 29.2 26.1 - 32.9 PG    MCHC 31.0 (L) 31.4 - 35.0 g/dL    RDW 19.2 (H) 11.9 - 14.6 %    PLATELET 606 (L) 940 - 450 K/uL    MPV 10.8 9.4 - 12.3 FL    ABSOLUTE NRBC 0.07 0.0 - 0.2 K/uL   MAGNESIUM    Collection Time: 08/25/21  4:42 AM   Result Value Ref Range    Magnesium 2.0 1.8 - 2.4 mg/dL       Imaging:  XR CHEST SNGL V [[id number]] Collected: 08/22/21 2041   Order Status: Completed Updated: 08/22/21 2043   Narrative:     Portable chest x-ray     CLINICAL INDICATION: Sepsis     FINDINGS: Single AP view the chest compared to a similar exam dated 8/8/2021   show the lungs to be slightly underexpanded but otherwise clear. No pleural   effusion or pneumothorax. The cardiac silhouette and mediastinum are   unremarkable. Impression:     Slightly under expanded lungs, otherwise no acute abnormality.          ASSESSMENT:  Problem List  Date Reviewed: 7/15/2021        Codes Class Noted    Hematemesis ICD-10-CM: K92.0  ICD-9-CM: 578.0  8/20/2021        Melena ICD-10-CM: K92.1  ICD-9-CM: 578.1  8/20/2021        * (Principal) Acute blood loss anemia ICD-10-CM: D62  ICD-9-CM: 285.1  8/20/2021        Cellulitis of leg, right ICD-10-CM: L03.115  ICD-9-CM: 682.6  8/8/2021        Sepsis due to cellulitis [facility]) ICD-10-CM: L03.90, A41.9  ICD-9-CM: 682.9, 995.91  7/12/2021        Esophageal varices (HCC) (Chronic) ICD-10-CM: I85.00  ICD-9-CM: 456.1  Unknown    Overview Signed 10/6/2020  4:12 AM by Osbaldo Lorenzana MD     grade 3             Splenomegaly ICD-10-CM: R16.1  ICD-9-CM: 789.2  3/21/2019        Primary hypothyroidism ICD-10-CM: E03.9  ICD-9-CM: 244.9  Unknown        Chronic migraine without aura with status migrainosus, not intractable ICD-10-CM: R43.161  ICD-9-CM: 346.72  11/1/2016        Lung nodule ICD-10-CM: R91.1  ICD-9-CM: 793.11  10/28/2016    Overview Signed 10/28/2016 10:05 AM by Jaylan Casiano     IMPRESSION:    1. Lobulated 2 cm mass in the right lung apex, malignancy versus an atypical  inflammatory process. 2. Occlusion of the right subclavian vein. 3. Chronic occlusion of the portal vein with associated abnormal tissue  encasing the common bile duct, most likely fibrosis. 4. Splenomegaly. DVT (deep venous thrombosis) ([facility] ICD-10-CM: I82.409  ICD-9-CM: 453.40  10/26/2016    Overview Signed 10/26/2016 10:27 PM by Hilda Santiago. Right subclavian               Analgesic rebound headache ICD-10-CM: G44.40, T39.95XA  ICD-9-CM: 339.3, E935.9  9/22/2016        Headache, chronic daily ICD-10-CM: R51.9  ICD-9-CM: 784.0  9/22/2016        Migraine with aura and with status migrainosus, not intractable ICD-10-CM: G43. 101  ICD-9-CM: 346.03  9/22/2016        Left homonymous hemianopsia ICD-10-CM: H53.462  ICD-9-CM: 368.46  3/30/2015        CML (chronic myelocytic leukemia) (HCC) (Chronic) ICD-10-CM: C92.10  ICD-9-CM: 205.10  3/30/2015        Acquired hypercoagulable state ([facility] (Chronic) ICD-10-CM: I43.64  ICD-9-CM: 289.81  3/30/2015        AKOSUA (iron deficiency anemia) ICD-10-CM: D50.9  ICD-9-CM: 280.9  7/28/2012        Symptomatic anemia ICD-10-CM: D64.9  ICD-9-CM: 285.9  7/28/2012    Overview Signed 7/28/2012  2:11 PM by Anthony Cheng     Hemoglobin 8.0 gram   7/25/2012 17:03   Iron 27 (L)   TIBC 221 (L)   Transferrin Saturation 12 (L)                Cirrhosis (HCC) (Chronic) ICD-10-CM: K74.60  ICD-9-CM: 571.5  7/27/2012        Ascites ICD-10-CM: R18.8  ICD-9-CM: 789.59  7/26/2012        Portal vein thrombosis ICD-10-CM: D22  ICD-9-CM: 337  6/20/2012    Overview Signed 10/6/2020  4:12 AM by Olivia Brunson MD     Ct scan 6-21-2 Apparent occlusion of the portal vein. In addition, the splenic vein, and superior mesenteric vein are not definitely seen and are also likely  occluded. Splenomegaly, and extensive varices are seen as described above consistent with the portal vein occlusion 7-05-12 on arixtra HIT negative on repeat 7-27-12 re admitted Cirrhotic appearance of the liver. Mild to moderate ascites, as             Polycythemia vera (HCC) (Chronic) ICD-10-CM: D45  ICD-9-CM: 238.4  6/19/2012    Overview Addendum 6/30/2012 12:06 PM by Bouchra Inman     Diagnosed February 2008 by [email or web address] analysis however portal vein thrombosis  And and splenomegaly since 2004  Hydroxyurea for 6 months poor tolerance spleen did not shrink   Pegasys 90 mcg weekly 4-2009 till    Restarted 12-30-09 45 mcg q 2 weeks  2-2010 reduced to q month   Increased 45 mcg 2/month 4-2010  Held 8-2010 thrombocytopenia  12-29-12 restarted 45 mcg q 2 weeks  4-1-11 45 mcg q 3 weeks  4-22-11 45 mcg q 4 weeks  Uncertain dosing thereafter   Possibly q 3 weeks 10-15-11 and held and restarted on 4-1-12 6-12 ct scan Small bowel wall thickening suggesting an enteritis. In addition, there is well thickening of the right colon which can suggest an additional   colitis although this can also be seen with severe portal hypertension. Likely reactive edematous changes it scattered fluid collections are seen of   the small bowel mesentery. . Apparent occlusion of the portal vein. In addition, the splenic vein, and superior mesenteric vein are not definitely seen and are also likely   occluded. Splenomegaly, and extensive varices are seen as described above consistent with the portal vein occlusion. Gregory Rich Heterogeneous enhancement of the liver and mild periportal edema. An acute hepatitis cannot be excluded. Ms. Elizabeth Wise is a 64 y.o. female admitted on 8/20/2021. The primary encounter diagnosis was Gastrointestinal hemorrhage with melena. Diagnoses of Secondary esophageal varices with bleeding ([facility] Splenomegaly, Chronic anticoagulation, CML (chronic myelocytic leukemia) ([facility] and Polycythemia vera ([facility] were also pertinent to this visit. Morales Nieto Her PMH includes splenomegaly, recurrent DVTs, esophageal/gastric varices, AKOSUA, intracranial hemorrhage s/p craniotomy, small bowel resection from intestinal ischemia, and seizures. She is a known patient of Dr. Linda Bliss with CML on dasatinib/ruxolitinib and PV on Lovenox. She was recently admitted 8/8 - 8/11 for RLE cellulitis, treated with clindamycin. She presented to ED with c/o hematemesis, melena, and abdominal pain. Also endorsed dizziness. Hgb 7.4 on admission, down to 6.6. She was started on octreotide gtt which has since been DC'd as pt declined gtt. She is s/p EGD on 8/21 with esophageal varices and large blood in stomach. She spiked temp up to 101.3 on 8/22. BCx-NGTD. On Vanc/Zosyn (pt declining antibx as she feels this has lowered her platelets). Plt 144k today. We were consulted d/t pt request.    RECOMMENDATIONS:  CML  - on dasatinib/ruxolitinib  8/25 Holding these for now d/t decreasing counts, GIB    Polycythemia Vera  - on Lovenox, hold for GIB - she will also need to con't to hold this upon discharge  8/25 Do not restart Lovenox until evaluated at clinic OP      GI bleed  - s/p EGD on 8/21 with esophageal varices and large blood in stomach  - was on octreotide gtt, DC'd as pt declined gtt  - transfuse prn to keep Hgb >7    Fever / E Coli Bactermia  - CXR neg  - BCx-NGTD  - On Vanc/Zosyn (pt declining antibx)  8/24 BCx +E Coli, sensitivities pending. ID following. On Cef/Flagyl. 8/25 Sensitivities pending and should result tomorrow. EOT 8/30 for ABX.       Anemia / Thrombocytopenia secondary to chemotherapy / Folate deficiency  - Check iron studies, B12, folate, DIC labs, hemolysis labs, smear  - Transfuse prn to keep Hgb > 7 and Plt > 50k with active bleeding  8/24 Hgb 7.2 s/p tx. Plt down to 111. Folate deficiency noted, folic acid ordered. Hapto and smear pending. 8/25 Hgb 7.7. Plt 116k. Hapto just slightly above ULN at 203. Smear with no evidence of schistocytes, with absolute neutrophilia with toxic changes such as vacuolated cytoplasm. Thank you for allowing us to participate in the care of Ms. Couch. She will need to f/u with Dr. Jarrod Mujica within one week of discharge. Madi Kuo NP   [facility]  [address] [place] [address]  Office : [phone]  Fax : [phone]         Attending Addendum:  I have personally performed a face to face diagnostic evaluation on this patient. I have reviewed and agree with the care plan as documented by Madi Kuo N.P. My findings are as follows: She has CML, Polycythemia Vera and GI bleeding, appears weak, heart rate regular without murmurs, abdomen is non-tender, bowel sounds are positive, we will hold Lovenox and continue Folic acid.               Bee Bruner MD      [facility]  [address] [place] [address]  Office : [phone]  Fax : [phone]

## 2021-08-25 NOTE — PROGRESS NOTES
Infectious Disease Note    Today's Date: 8/25/2021   Admit Date: 8/20/2021    Impression:   · E Coli bacteremia (8/22), source GI (?translocation from bleeding varices)  · Esophageal varices s/p banding 8/20 Secondary to portal hypertensive gastropathy  · CML on sprycel/ruxolitinib  · Sulfa allergy  · Hx of CDI    Plan:   · Continue Cefepime/Flagyl for now. Duration 7 days eot 8/30/21  · Follow E. coli susceptibilities--I talked to Micro and repeat susceptibility is being done, should result tomorrow AM. Pt is high risk for resistant organisms so we all prefer to wait for this to come back before discharge. Anti-infectives:   · Vanc x 1 dose  · CTX 8/22-8/23  · Cefepime/Flagyl 8/23-    Subjective:   Doing well, long discussion about above info. Tolerating Cefepime and Flagyl well. Patient is a 64 y.o. 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 She was recently discharged for RLE cellulitis, treated with Clindamycin. RLE cellulitis has resolved. BCs were negative at that time. For this admission, BCs 1/4 bottle + GNR, PCR E coli. GI was consulted and performed EGD--finding esophageal varices and underwent banding. Patient has mild abdominal pain. Hematemesis has stopped. She denies cough, shortness of breath, diarrhea, nausea, dysuria, recent cellulitis. Allergies   Allergen Reactions    Latex Other (comments)     Testing for latex allergy was positive as a 2 (on a scale of 1 to 3).     Sulfa (Sulfonamide Antibiotics) Anaphylaxis    Tramadol Other (comments)     Top lip swelled    Augmentin [Amoxicillin-Pot Clavulanate] Rash    Morphine Nausea and Vomiting     Not a true allergy    Rizatriptan Rash    Tetanus Immune Globulin Other (comments)     Heat, bruising, and swelling at  Site of injection    Xanax [Alprazolam] Other (comments)     Hallucinations    Zonegran [Zonisamide] Rash        Review of Systems:  A comprehensive review of systems was negative except for that written in the History of Present Illness. Objective:     Visit Vitals  BP (!) 104/54   Pulse 85   Temp 98.2 °F (36.8 °C)   Resp 16   Ht 5' 3\" (1.6 m)   Wt 56.7 kg (125 lb)   SpO2 90%   BMI 22.14 kg/m²     Temp (24hrs), Av °F (37.2 °C), Min:98.2 °F (36.8 °C), Max:100.3 °F (37.9 °C)       Lines:  Peripheral IV:       Physical Exam:    General:  Alert, cooperative, well noursished, well developed, appears stated age   Eyes:  Sclera anicteric. Pupils equally round and reactive to light. Mouth/Throat: Mucous membranes normal, oral pharynx clear   Neck: Supple   Lungs:   Clear to auscultation bilaterally, good effort   CV:  Regular rate and rhythm,no murmur, click, rub or gallop   Abdomen:   Soft, mild tenderness. bowel sounds normal. distended   Extremities: No cyanosis. Trace edema   Skin: Skin color, texture, turgor normal. no acute rash or lesions   Lymph nodes: Cervical and supraclavicular normal   Musculoskeletal: No swelling or deformity   Lines/Devices:  Intact, no erythema, drainage or tenderness   Psych: Alert and oriented, normal mood affect given the setting       Data Review:     CBC:  Recent Labs     21  0442 21  1720 21  0547 21  1720 217   WBC 9.1  --  8.7  --  10.9   HGB 7.7* 7.6* 7.2*   < > 6.6*   HCT 24.8* 23.8* 22.3*   < > 20.5*   *  --  111*  --  144*    < > = values in this interval not displayed.        BMP:  Recent Labs     21  0442 21  0547 21   CREA 0.43* 0.41* 0.51*   BUN 5* 7* 11    139 137   K 3.5 3.4* 3.3*   * 109* 106   CO2 27 24 24   AGAP 4* 6* 7   * 116* 116*       LFTS:  Recent Labs     21   TBILI 0.5       Microbiology:     All Micro Results     Procedure Component Value Units Date/Time    CULTURE, BLOOD [[id number]]  (Abnormal) Collected: 21    Order Status: Completed Specimen: Blood Updated: 08/25/21 0658     Special Requests: --        LEFT  Antecubital       GRAM STAIN GRAM NEGATIVE RODS         AEROBIC BOTTLE POSITIVE               RESULTS VERIFIED, PHONED TO AND READ BACK BY Lawrence Rai AT 9965 ON 8/23/21, [address] [facility] 285           Culture result: [id number] [facility]               Refer to Blood Culture ID Panel Accession  [id number]        REPEATING ID AND SUSCEPTIBILITY    CULTURE, BLOOD [[id number]] Collected: 08/22/21 2052    Order Status: Completed Specimen: Blood Updated: 08/24/21 0657     Special Requests: --        LEFT  HAND       Culture result: NO GROWTH 2 DAYS       BLOOD CULTURE ID PANEL [[id number]]  (Abnormal) Collected: 08/22/21 2052    Order Status: Completed Specimen: Blood Updated: 08/23/21 1351     Acc. no. from Micro Order [id number]     Escherichia coli Detected        Comment: RESULTS VERIFIED, PHONED TO AND READ BACK BY  DYAN Huffman AT 9356 ON 8/23/21, DERIK          KPC (Carbapenem Resistance Gene) NOT DETECTED        Comment: WARNING:  A Not Detected result for the KPC gene does not indicate susceptibility to carbapenems. Gram negative bacteria can be resistant to carbapenems by mechanisms other than carrying the KPC gene. INTERPRETATION       Gram negative gopi.  Identified by realtime PCR as E. coli                Imaging:   See HPI    Signed By: Doe Solis NP     August 25, 2021

## 2021-08-25 NOTE — PROGRESS NOTES
END OF SHIFT NOTE:    INTAKE/OUTPUT  08/24 0701 - 08/25 0700  In: 120 [P.O.:120]  Out: 260 [Urine:260]  Voiding: YES  Catheter: NO  Drain:              Flatus: Patient does have flatus present. Stool:  0 occurrences. Characteristics:  Stool Assessment  Stool Color: Yellow  Stool Appearance: Loose (per pt)  Stool Amount: Medium  Stool Source/Status: Rectum    Emesis: 0 occurrences. Characteristics:        VITAL SIGNS  Patient Vitals for the past 12 hrs:   Temp Pulse Resp BP SpO2   08/25/21 1515 99.2 °F (37.3 °C) 88 17 (!) 105/56 92 %   08/25/21 1119 98 °F (36.7 °C) 79 18 (!) 103/57 97 %   08/25/21 0706 98.2 °F (36.8 °C) 85 16 (!) 104/54 90 %       Pain Assessment  Pain Intensity 1: 3 (08/25/21 1650)  Pain Location 1: Abdomen  Pain Intervention(s) 1: Medication (see MAR)  Patient Stated Pain Goal: 2    Ambulating  Yes    Shift report given to oncoming nurse at the bedside.     Suzanne Khan RN

## 2021-08-25 NOTE — PROGRESS NOTES
Pt with hx of luis eduardo dvt's  So Davion mar NP was contacted about lack of iv access and pt unable to receive abx  Per NP ok to place piv for tonight and she will place order for pt to go to IR tomorrow. IV access was not able to be obtained by nursing staff due to the patient having very difficult vein access. Skin was cleaned and disinfected prior to IV puncture. Ultrasound was used to find the vein which was compressible and does not have any ultrasound features of an artery or nerve bundle. Under real-time ultrasound guidance 22g peripheral access was obtained in lfa  Blood return was present and IV flushed without difficulty with no clinical signs of infiltration. IV dressing applied and there were no immediate complications noted and patient tolerated the procedure well.

## 2021-08-25 NOTE — PROGRESS NOTES
Hospitalist Progress Note   Admit Date:  2021 10:16 AM   Name:  Kaitlin Hernandez   Age:  64 y.o. Sex:  female  :  1960   MRN:  [id number]     Presenting Complaint: GI Problem    Reason(s) for Admission: Acute blood loss anemia [D62]  Hematemesis [K92.0]  Melena [K92.1]     Hospital Course & Interval History:   Ms. Neda Solorzano is a 64 y.o. CF with medical history of CML on oral chemotherapy, polycythemia vera on Lovenox, DVT and PV thrombosis, intracranial hemorrhage s/p craniotomy, small bowel resection from intestinal ischemia, seizure, splenomegaly who presented to ER with hematemesis and melena that started Aug/20.  Reports taking Lovenox twice daily for polycythemia and DCT, portal vein thrombosis.  Of note, patient was admitted to [place] on - for right lower leg cellulitis and completed treatment with clindamycin. Subjective (21):  Clinically stable, awaiting for susceptibilities to appropriately tx E. Coli bactermia. Chemotx agents for CML on hold given thrombocytopenia; h/h stable with lovenox (for PCV) on hold secondary to GI bleed. Good spirited today feeling overall well, denying SOB, abd pain, N/V, hematemesis, BRBPR    Assessment & Plan:     Acute symptomatic blood loss anemia   Hematemesis and melanotic stools likely due to upper and lower GI bleed  Esophageal Varices  Aug/21: EGD yesterday; varices were banded; blood clots noted in stomach. - Cont octreotide; cont cipro; cont PPI              - Hgb 8.7 this morning; transfuse if <  7  Aug/22: hemoglobin 7.9. no active bleeding noted. Refusing Octreotide. Aug/23: Hgb 6.6; receiving 1 unit PRBC today with another unit on standby   - Patient refuses another EGD; refuses octreotide.    - Desires follow up with her Gastroenterologist in [facility] 9 was discontinued  Aug/24: Hgb 7.2; plans to transfuse if Hgb <7.0   - again pt refuses repeat EGD and octreotide gtt  Aug/25: Hgb 7.7; stable   - no overt signs of further bleeding          BSI w/ blood culture positive for GNR (E. Coli)   Aug/23: Critical result called this afternoon   - PCT and LA were elevated overnight   - Patient rcvd one dose of vanc but refusing vanc and Zosyn now   - Start LR IVF   - Ceftriaxone started   - Consult ID  Aug/24: broad-spectrum abx regimen adjusted per ID (cefepime / flagyl)   - f/u susceptibilities to narrow abx course  Aug/25: cont'd on cefepime / flagyl pending susceptibilities    - appreciate ID's cont'd assistance; EOT 8/30/2021      Acute thrombocytopenia  Aug/23: Infection vs medication related; possibly both   - Trend  Aug/24: no gross bleeding, ongoing mgmt per hem/onc  Aug/25: chemotx agents remain on hold due to thrombocytopenia   - h/h remains stable, platelet count slightly improved this AM, appreciate hem/onc ongoing assistance   - etiology could be from bone marrow suppression in setting of bacteremia / sepsis ongoing assistance      Acute hypokalemia  Aug/23: Replace  Aug/24: Replete 40meq, check mag levels in AM  Aug/25: not on diuretics, mag levels wnl; cont monitoring, no need to replace today     CML on chemotherapy  -[facility]. ER spoke with hematology and Gomez Negro will see her.   Aug/23: Appreciate Hem/Onc input; will f/u on their recommendations  Aug/24: cont chemo agents per hem/onc  Aug/25: as above, hem/onc continues to follow; chemotx agents on hold     Polycythemia vera  DVT // Portal vein thrombosis  Acquired hypercoagulable state  -Patient is on enoxaparin 60mg BID but will need to be held given current acute bleed.  Discussed with patient and patient is agreeable to this plan.   -Cont dasatinib and ruxolitinib although questionable home compliance  -ER spoke with hematology and  to see her.   Aug/24: lovenox on hold in setting of acute GIB  Aug/25: per hem/onc notes, lovenox cont to be held until reassess outpatient          Dispo/Discharge Planning: > 2 midnights; potential dc next 24hrs pending bcx susceptibilities    Diet:  ADULT DIET Easy to Chew  DVT PPx: SCD  Code status: Full Code    Active Hospital Problems    Diagnosis Date Noted    Hematemesis 08/20/2021    Melena 08/20/2021    Acute blood loss anemia 08/20/2021    Esophageal varices (HCC)      grade 3      Splenomegaly 03/21/2019    Primary hypothyroidism     DVT (deep venous thrombosis) (HCC) 10/26/2016     Right subclavian        CML (chronic myelocytic leukemia) ([facility] 03/30/2015    Acquired hypercoagulable state ([facility] 03/30/2015    Symptomatic anemia 07/28/2012     Hemoglobin 8.0 gram   7/25/2012 17:03   Iron 27 (L)   TIBC 221 (L)   Transferrin Saturation 12 (L)         Cirrhosis ([facility] 07/27/2012    Portal vein thrombosis 06/20/2012     Ct scan 6-21-2 Apparent occlusion of the portal vein. In addition, the splenic vein, and superior mesenteric vein are not definitely seen and are also likely  occluded. Splenomegaly, and extensive varices are seen as described above consistent with the portal vein occlusion 7-05-12 on arixtra HIT negative on repeat 7-27-12 re admitted Cirrhotic appearance of the liver. Mild to moderate ascites, as      Polycythemia vera ([facility] 06/19/2012     Diagnosed February 2008 by kiana-2 analysis however portal vein thrombosis  And and splenomegaly since 2004  Hydroxyurea for 6 months poor tolerance spleen did not shrink   Pegasys 90 mcg weekly 4-2009 till    Restarted 12-30-09 45 mcg q 2 weeks  2-2010 reduced to q month   Increased 45 mcg 2/month 4-2010  Held 8-2010 thrombocytopenia  12-29-12 restarted 45 mcg q 2 weeks  4-1-11 45 mcg q 3 weeks  4-22-11 45 mcg q 4 weeks  Uncertain dosing thereafter   Possibly q 3 weeks 10-15-11 and held and restarted on 4-1-12 6-12 ct scan Small bowel wall thickening suggesting an enteritis.  In addition, there is well thickening of the right colon which can suggest an additional   colitis although this can also be seen with severe portal hypertension. Likely reactive edematous changes it scattered fluid collections are seen of   the small bowel mesentery. . Apparent occlusion of the portal vein. In addition, the splenic vein, and superior mesenteric vein are not definitely seen and are also likely   occluded. Splenomegaly, and extensive varices are seen as described above consistent with the portal vein occlusion. [place] Heterogeneous enhancement of the liver and mild periportal edema. An acute hepatitis cannot be excluded. Objective:     Patient Vitals for the past 24 hrs:   Temp Pulse Resp BP SpO2   08/25/21 1119 98 °F (36.7 °C) 79 18 (!) 103/57 97 %   08/25/21 0706 98.2 °F (36.8 °C) 85 16 (!) 104/54 90 %   08/25/21 0313 98.4 °F (36.9 °C) 86 18 114/62 95 %   08/25/21 0033 100.3 °F (37.9 °C) 87 16 120/74 95 %   08/24/21 2139 98.8 °F (37.1 °C) 92 18 120/76 --   08/24/21 1915 99.8 °F (37.7 °C) 95 17 120/76 93 %   08/24/21 1506 98.7 °F (37.1 °C) 89 16 123/76 94 %     Oxygen Therapy  O2 Sat (%): 97 % (08/25/21 1119)  Pulse via Oximetry: 95 beats per minute (08/23/21 1437)  O2 Device: None (Room air) (08/24/21 7220)  Skin Assessment: Clean, dry, & intact (08/23/21 0754)  Skin Protection for O2 Device: No (08/23/21 0754)  O2 Flow Rate (L/min): 1 l/min (08/23/21 0754)    Estimated body mass index is 22.14 kg/m² as calculated from the following:    Height as of this encounter: 5' 3\" (1.6 m). Weight as of this encounter: 56.7 kg (125 lb). Intake/Output Summary (Last 24 hours) at 8/25/2021 1324  Last data filed at 8/25/2021 0928  Gross per 24 hour   Intake 240 ml   Output 260 ml   Net -20 ml       Physical Exam  Vitals reviewed. Constitutional:       General: She is not in acute distress. Appearance: Normal appearance. She is not toxic-appearing or diaphoretic. HENT:      Head: Normocephalic and atraumatic.       Right Ear: External ear normal.      Left Ear: External ear normal.      Nose: Nose normal. Mouth/Throat:      Mouth: Mucous membranes are moist.      Pharynx: Oropharynx is clear. Eyes:      Extraocular Movements: Extraocular movements intact. Conjunctiva/sclera: Conjunctivae normal.      Pupils: Pupils are equal, round, and reactive to light. Cardiovascular:      Rate and Rhythm: Normal rate and regular rhythm. Pulmonary:      Effort: Pulmonary effort is normal.      Breath sounds: Normal breath sounds. Comments: decreased bases b/l  Abdominal:      General: Bowel sounds are normal.      Palpations: Abdomen is soft. Tenderness: There is no abdominal tenderness. There is no guarding or rebound. Comments: Protuberant, non-distended   Musculoskeletal:         General: Normal range of motion. Cervical back: Normal range of motion. No tenderness. Skin:     General: Skin is warm and dry. Neurological:      General: No focal deficit present. Mental Status: She is alert and oriented to person, place, and time.    Psychiatric:         Mood and Affect: Mood normal.         Behavior: Behavior normal.           Last 24hr Labs:  Recent Results (from the past 24 hour(s))   HGB & HCT    Collection Time: 08/24/21  5:20 PM   Result Value Ref Range    HGB 7.6 (L) 11.7 - 15.4 g/dL    HCT 23.8 (L) 35.8 - 03.5 %   METABOLIC PANEL, BASIC    Collection Time: 08/25/21  4:42 AM   Result Value Ref Range    Sodium 140 136 - 145 mmol/L    Potassium 3.5 3.5 - 5.1 mmol/L    Chloride 109 (H) 98 - 107 mmol/L    CO2 27 21 - 32 mmol/L    Anion gap 4 (L) 7 - 16 mmol/L    Glucose 108 (H) 65 - 100 mg/dL    BUN 5 (L) 8 - 23 MG/DL    Creatinine 0.43 (L) 0.6 - 1.0 MG/DL    GFR est AA >60 >60 ml/min/1.73m2    GFR est non-AA >60 >60 ml/min/1.73m2    Calcium 7.9 (L) 8.3 - 10.4 MG/DL   CBC W/O DIFF    Collection Time: 08/25/21  4:42 AM   Result Value Ref Range    WBC 9.1 4.3 - 11.1 K/uL    RBC 2.64 (L) 4.05 - 5.2 M/uL    HGB 7.7 (L) 11.7 - 15.4 g/dL    HCT 24.8 (L) 35.8 - 46.3 %    MCV 93.9 79.6 - 97.8 FL MCH 29.2 26.1 - 32.9 PG    MCHC 31.0 (L) 31.4 - 35.0 g/dL    RDW 19.2 (H) 11.9 - 14.6 %    PLATELET 260 (L) 197 - 450 K/uL    MPV 10.8 9.4 - 12.3 FL    ABSOLUTE NRBC 0.07 0.0 - 0.2 K/uL   MAGNESIUM    Collection Time: 08/25/21  4:42 AM   Result Value Ref Range    Magnesium 2.0 1.8 - 2.4 mg/dL       All Micro Results     Procedure Component Value Units Date/Time    CULTURE, BLOOD [[id number]]  (Abnormal) Collected: 08/22/21 2052    Order Status: Completed Specimen: Blood Updated: 08/25/21 0658     Special Requests: --        LEFT  Antecubital       GRAM STAIN GRAM NEGATIVE RODS         AEROBIC BOTTLE POSITIVE               RESULTS VERIFIED, PHONED TO AND READ BACK BY Prachi Barth AT 6295 ON 8/23/21, [address] [facility] [place] 285           Culture result: [id number] [facility]               Refer to Blood Culture ID Panel Accession  [id number]        REPEATING ID AND SUSCEPTIBILITY    CULTURE, BLOOD [[id number]] Collected: 08/22/21 2052    Order Status: Completed Specimen: Blood Updated: 08/24/21 0657     Special Requests: --        LEFT  HAND       Culture result: NO GROWTH 2 DAYS       BLOOD CULTURE ID PANEL [[id number]]  (Abnormal) Collected: 08/22/21 2052    Order Status: Completed Specimen: Blood Updated: 08/23/21 1351     Acc. no. from Micro Order [id number]     Escherichia coli Detected        Comment: RESULTS VERIFIED, PHONED TO AND READ BACK BY  DYAN Umaña AT 4941 ON 8/23/21, DERIK          KPC (Carbapenem Resistance Gene) NOT DETECTED        Comment: WARNING:  A Not Detected result for the KPC gene does not indicate susceptibility to carbapenems. Gram negative bacteria can be resistant to carbapenems by mechanisms other than carrying the KPC gene. INTERPRETATION       Gram negative gopi. Identified by realtime PCR as E. coli                Other Studies:  No results found.     Current Meds:  Current Facility-Administered Medications   Medication Dose Route Frequency    folic acid (FOLVITE) tablet 1 mg  1 mg Oral DAILY    diphenhydrAMINE (BENADRYL) injection 25 mg  25 mg IntraVENous Q6H PRN    diphenhydrAMINE (BENADRYL) capsule 25 mg  25 mg Oral Q6H PRN    0.9% sodium chloride infusion 250 mL  250 mL IntraVENous PRN    lactated Ringers infusion  75 mL/hr IntraVENous CONTINUOUS    cefepime (MAXIPIME) 2 g in 0.9% sodium chloride (MBP/ADV) 100 mL MBP  2 g IntraVENous Q12H    metroNIDAZOLE (FLAGYL) tablet 500 mg  500 mg Oral Q12H    oxyCODONE IR (ROXICODONE) tablet 10 mg  10 mg Oral Q4H PRN    morphine injection 4 mg  4 mg IntraVENous Q4H PRN    sodium chloride (NS) flush 5-10 mL  5-10 mL IntraVENous Q8H    sodium chloride (NS) flush 5-10 mL  5-10 mL IntraVENous PRN    pantoprazole (PROTONIX) 40 mg in 0.9% sodium chloride 10 mL injection  40 mg IntraVENous Q12H    sodium chloride (NS) flush 5-40 mL  5-40 mL IntraVENous Q8H    sodium chloride (NS) flush 5-40 mL  5-40 mL IntraVENous PRN    acetaminophen (TYLENOL) tablet 650 mg  650 mg Oral Q6H PRN    Or    acetaminophen (TYLENOL) suppository 650 mg  650 mg Rectal Q6H PRN    polyethylene glycol (MIRALAX) packet 17 g  17 g Oral DAILY PRN    levothyroxine (SYNTHROID) tablet 112 mcg  112 mcg Oral 6am    ondansetron (ZOFRAN) injection 4 mg  4 mg IntraVENous Q6H PRN       Signed:  NAPOLEON Higuera

## 2021-08-26 PROBLEM — K92.0 HEMATEMESIS: Status: RESOLVED | Noted: 2021-01-01 | Resolved: 2021-01-01

## 2021-08-26 PROBLEM — D62 ACUTE BLOOD LOSS ANEMIA: Status: ACTIVE | Noted: 2018-04-06

## 2021-08-26 PROBLEM — D62 ACUTE BLOOD LOSS ANEMIA: Status: RESOLVED | Noted: 2018-04-06 | Resolved: 2021-01-01

## 2021-08-26 PROBLEM — K92.1 MELENA: Status: RESOLVED | Noted: 2021-01-01 | Resolved: 2021-01-01

## 2021-08-26 NOTE — PROGRESS NOTES
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 Continue flagyl 500mg oral BID til 8/29/21. I have sent in outpt prescriptions. · No ID appt needed. · **Will sign off at this time. Please call with questions or concerns. Anti-infectives:   · Vanc x 1 dose  · CTX 8/22-8/23  · Cefepime/Flagyl 8/23-    Subjective:   Afebrile, feeling well and ready to go home. Patient is a 64 y.o. 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 She was recently discharged for RLE cellulitis, treated with Clindamycin. RLE cellulitis has resolved. BCs were negative at that time. For this admission, BCs 1/4 bottle + GNR, PCR E coli. GI was consulted and performed EGD--finding esophageal varices and underwent banding. Patient has mild abdominal pain. Hematemesis has stopped. She denies cough, shortness of breath, diarrhea, nausea, dysuria, recent cellulitis. Allergies   Allergen Reactions    Latex Other (comments)     Testing for latex allergy was positive as a 2 (on a scale of 1 to 3).     Sulfa (Sulfonamide Antibiotics) Anaphylaxis    Tramadol Other (comments)     Top lip swelled    Augmentin [Amoxicillin-Pot Clavulanate] Rash    Morphine Nausea and Vomiting     Not a true allergy    Rizatriptan Rash    Tetanus Immune Globulin Other (comments)     Heat, bruising, and swelling at  Site of injection    Xanax [Alprazolam] Other (comments)     Hallucinations    Zonegran [Zonisamide] Rash        Review of Systems:  A comprehensive review of systems was negative except for that written in the History of Present Illness. Objective:     Visit Vitals  BP (!) 107/57   Pulse 80   Temp 98.7 °F (37.1 °C)   Resp 22   Ht 5' 3\" (1.6 m)   Wt 56.7 kg (125 lb)   SpO2 92%   BMI 22.14 kg/m²     Temp (24hrs), Av.6 °F (37 °C), Min:98 °F (36.7 °C), Max:99.2 °F (37.3 °C)       Lines:  Peripheral IV:       Physical Exam:    General:  Alert, cooperative, well noursished, well developed, appears stated age   Eyes:  Sclera anicteric. Pupils equally round and reactive to light. Mouth/Throat: Mucous membranes normal, oral pharynx clear   Neck: Supple   Lungs:   Clear to auscultation bilaterally, good effort   CV:  Regular rate and rhythm,no murmur, click, rub or gallop   Abdomen:   Soft, mild tenderness. bowel sounds normal. distended   Extremities: No cyanosis. Trace edema   Skin: Skin color, texture, turgor normal. no acute rash or lesions   Lymph nodes: Cervical and supraclavicular normal   Musculoskeletal: No swelling or deformity   Lines/Devices:  Intact, no erythema, drainage or tenderness   Psych: Alert and oriented, normal mood affect given the setting       Data Review:     CBC:  Recent Labs     21  18021  1720 21  0547   WBC 9.8  --  9.1  --  8.7   GRANS 79*  --   --   --   --    MONOS 6  --   --   --   --    EOS 3  --   --   --   --    ANEU 7.7  --   --   --   --    ABL 0.7  --   --   --   --    HGB 8.3* 8.0* 7.7*   < > 7.2*   HCT 26.0* 24.6* 24.8*   < > 22.3*   *  --  116*  --  111*    < > = values in this interval not displayed.        BMP:  Recent Labs     21  0547   CREA 0.40* 0.43* 0.41*   BUN 5* 5* 7*    140 139   K 3.4* 3.5 3.4*   * 109* 109*   CO2 28 27 24   AGAP 5* 4* 6*   * 108* 116*       LFTS:  Recent Labs     21   TBILI 0.5       Microbiology:     All Micro Results     Procedure Component Value Units Date/Time    CULTURE, BLOOD [[id number]] (Abnormal)  (Susceptibility) Collected: 08/22/21 2052    Order Status: Completed Specimen: Blood Updated: 08/26/21 0748     Special Requests: --        LEFT  Antecubital       GRAM STAIN GRAM NEGATIVE RODS         AEROBIC BOTTLE POSITIVE               RESULTS VERIFIED, PHONED TO AND READ BACK BY Cassie La AT 6680 ON 8/23/21, [address]           Culture result: ESCHERICHIA COLI               Refer to Blood Culture ID Panel Accession  [id number]      CULTURE, BLOOD [[id number]] Collected: 08/22/21 2052    Order Status: Completed Specimen: Blood Updated: 08/24/21 0657     Special Requests: --        LEFT  HAND       Culture result: NO GROWTH 2 DAYS       BLOOD CULTURE ID PANEL [[id number]]  (Abnormal) Collected: 08/22/21 2052    Order Status: Completed Specimen: Blood Updated: 08/23/21 1351     Acc. no. from Micro Order [id number]     Escherichia coli Detected        Comment: RESULTS VERIFIED, PHONED TO AND READ BACK BY  DYAN Stewart AT 9908 ON 8/23/21, DERIK          KPC (Carbapenem Resistance Gene) NOT DETECTED        Comment: WARNING:  A Not Detected result for the KPC gene does not indicate susceptibility to carbapenems. Gram negative bacteria can be resistant to carbapenems by mechanisms other than carrying the KPC gene. INTERPRETATION       Gram negative gopi.  Identified by realtime PCR as E. coli                Imaging:   See HPI    Signed By: Jaclyn Velasco NP     August 26, 2021

## 2021-08-26 NOTE — DISCHARGE SUMMARY
Hospitalist Discharge Summary   Admit Date:  2021 10:16 AM   Name:  Ina Ganser   Age:  64 y.o. Sex:  female  :  1960   MRN:  [id number]   PCP:  Emi Yuan MD    Presenting Complaint: GI Problem    Initial Admission Diagnosis: Acute blood loss anemia [D62]  Hematemesis [K92.0]  Melena [K92.1]     Problem List for this Hospitalization:  Hospital Problems as of 2021 Date Reviewed: 7/15/2021        Codes Class Noted - Resolved POA    Body mass index (BMI) of 22.0 to 22.9 in adult ICD-10-CM: Z68.22  ICD-9-CM: V85.1  2021 - Present Yes        DVT (deep venous thrombosis) ([facility] ICD-10-CM: I82.409  ICD-9-CM: 453.40  10/26/2016 - Present Yes    Overview Signed 10/26/2016 10:27 PM by Tiara Salmeron.      Right subclavian               Chronic myelogenous leukemia ([facility] ICD-10-CM: C92.10  ICD-9-CM: 205.10  3/30/2015 - Present Yes        Acquired hypercoagulable state ([facility] (Chronic) ICD-10-CM: F61.20  ICD-9-CM: 289.81  3/30/2015 - Present Yes        Symptomatic anemia ICD-10-CM: D64.9  ICD-9-CM: 285.9  2012 - Present Yes    Overview Signed 2012  2:11 PM by Gm Vo     Hemoglobin 8.0 gram   2012 17:03   Iron 27 (L)   TIBC 221 (L)   Transferrin Saturation 12 (L)                Cirrhosis (HCC) (Chronic) ICD-10-CM: K74.60  ICD-9-CM: 571.5  2012 - Present Yes        Polycythemia vera ([facility] (Chronic) ICD-10-CM: D45  ICD-9-CM: 238.4  2009 - Present Yes    Overview Addendum 2021  2:31 PM by Mariza Munoz MD     2008 by [email or web address] analysis however portal vein thrombosis  And and splenomegaly since   Hydroxyurea for 6 months poor tolerance spleen did not shrink   Pegasys 90 mcg weekly  till    Restarted 09 45 mcg q 2 weeks   reduced to q month   Increased 45 mcg 2/month   Held  thrombocytopenia  12 restarted 45 mcg q 2 weeks  11 45 mcg q 3 weeks  11 45 mcg q 4 weeks  Uncertain dosing thereafter   Possibly q 3 weeks 10-15-11 and held and restarted on 4-1-12 6-12 ct scan Small bowel wall thickening suggesting an enteritis. In addition, there is well thickening of the right colon which can suggest an additional   colitis although this can also be seen with severe portal hypertension. Likely reactive edematous changes it scattered fluid collections are seen of   the small bowel mesentery. . Apparent occlusion of the portal vein. In addition, the splenic vein, and superior mesenteric vein are not definitely seen and are also likely occluded. Splenomegaly, and extensive varices are seen as described above consistent with the portal vein occlusion. Urban Diaz Heterogeneous enhancement of the liver and mild periportal edema. An acute hepatitis cannot be excluded. Primary hypothyroidism ICD-10-CM: E03.9  ICD-9-CM: 244.9  2/16/2009 - Present Yes        Splenomegaly ICD-10-CM: R16.1  ICD-9-CM: 789.2  2/16/2009 - Present Yes        Esophageal varices (HCC) (Chronic) ICD-10-CM: I85.00  ICD-9-CM: 456.1  2/16/2009 - Present Yes    Overview Addendum 8/26/2021  2:30 PM by Jesus Cortes MD     grade 3             Portal vein thrombosis ICD-10-CM: N10  ICD-9-CM: 624  2/16/2009 - Present Yes    Overview Addendum 8/26/2021  2:31 PM by Jesus Cortes MD     Ct scan 6-21-2 Apparent occlusion of the portal vein. In addition, the splenic vein, and superior mesenteric vein are not definitely seen and are also likely  occluded. Splenomegaly, and extensive varices are seen as described above consistent with the portal vein occlusion 7-05-12 on arixtra HIT negative on repeat 7-27-12 re admitted Cirrhotic appearance of the liver.  Mild to moderate ascites             RESOLVED: Hematemesis ICD-10-CM: K92.0  ICD-9-CM: 578.0  8/20/2021 - 8/26/2021 Yes        RESOLVED: Melena ICD-10-CM: K92.1  ICD-9-CM: 578.1  8/20/2021 - 8/26/2021 Yes        * (Principal) RESOLVED: Acute blood loss anemia ICD-10-CM: D62  ICD-9-CM: 285.1  4/6/2018 - 8/26/2021 Yes            Did Patient have Sepsis (YES OR NO): No      Hospital Course:  Ms. Teresa Arias is a 64 y.o. CF with medical history of CML on oral chemotherapy, polycythemia vera on Lovenox, DVT and PV thrombosis, intracranial hemorrhage s/p craniotomy, small bowel resection from intestinal ischemia, seizure, splenomegaly who presented to ER with hematemesis and melena that started Aug/20.  Reports taking Lovenox twice daily for polycythemia and DCT, portal vein thrombosis.  Of note, patient was admitted to [address] [place] [address] on 8/8-8/11 for right lower leg cellulitis and completed treatment with clindamycin. EGD 8/20 with grade 2,3 varices and bands x6  BCx with E coli 8/22: ID consulted  8/23: 1U pRBC, stable on repeat evaluations  8/23: thrombocytopenia: heme consulted, hold lovenox    Assessment/Plan:  Follow up with PCP within 7 days. PCP should consider medication changes noted below, follow up appointments and resolution of symptoms. She should follow up with her desired gastroenterologist in [place] within 7 days. Disposition: Home or Self Care  Diet: ADULT DIET Easy to Chew  Code Status: Full Code    Follow Up Orders: Follow-up Appointments   Procedures    FOLLOW UP VISIT Appointment in: One Week Please schedule follow up appt with Dr. Tania Mcdonnell on 8/30 with labs prior (CBC, CMP) #[phone]     Please schedule follow up appt with Dr. Tania Mcdonnell on 8/30 with labs prior (CBC, CMP)    #[phone]     Standing Status:   Standing     Number of Occurrences:   1     Order Specific Question:   Appointment in     Answer: One Week    FOLLOW UP VISIT Appointment in: One Week Follow up with Primary Care Provider in 1 weeks. Follow up with Primary Care Provider in 1 weeks. Standing Status:   Standing     Number of Occurrences:   1     Standing Expiration Date:   8/27/2021     Order Specific Question:   Appointment in     Answer:    One Week    FOLLOW UP VISIT Appointment in: Two Weeks ID     ID     Standing Status:   Standing     Number of Occurrences:   1     Standing Expiration Date:   8/27/2021     Order Specific Question:   Appointment in     Answer: Two Weeks    FOLLOW UP VISIT Appointment in: Other [facility]) Hematology as scheduled     Hematology as scheduled     Standing Status:   Standing     Number of Occurrences:   1     Standing Expiration Date:   8/27/2021     Order Specific Question:   Appointment in     Answer: Other (Specify)       Follow-up Information     Follow up With Specialties Details Why Contact Info    Liset Darling MD Hematology and Oncology, Internal Medicine, Hematology, Oncology On 8/30/2021  [address]  [phone]            Time spent in patient discharge and coordination 55 minutes. Plan was discussed with patient, , nurse. All questions answered. Patient was stable at time of discharge. Instructions given to call a physician or return if any concerns. Discharge Info:   Current Discharge Medication List      START taking these medications    Details   cefadroxil (DURICEF) 1 gram tablet Take 0.5 Tablets by mouth two (2) times a day. Qty: 14 Tablet, Refills: 0  Start date: 8/26/2021      metroNIDAZOLE (FLAGYL) 500 mg tablet Take 1 Tablet by mouth two (2) times a day. Qty: 6 Tablet, Refills: 0  Start date: 8/29/2021      fluconazole (DIFLUCAN) 50 mg tablet Take 3 Tablets by mouth every seven (7) days for 14 days. FDA advises cautious prescribing of oral fluconazole in pregnancy. Qty: 6 Tablet, Refills: 0  Start date: 8/26/2021, End date: 9/9/2021         CONTINUE these medications which have NOT CHANGED    Details   b complex vitamins tablet 1 Tablet daily. magnesium 250 mg tab Take 1 Tablet by mouth daily.       Synthroid 112 mcg tablet 1 tablet once a day with an extra 1/2 tablet on Sundays  Qty: 100 Tab, Refills: 3    Associated Diagnoses: Primary hypothyroidism      ruxolitinib 5 mg tab Take 1 Tab by mouth two (2) times a day. Qty: 60 Tab, Refills: 11    Comments: Called in to pharmacy      dasatinib (SpryceL) 80 mg tab Take 1 Tab by mouth daily. Qty: 30 Tab, Refills: 11    Associated Diagnoses: Polycythemia vera ([facility] CML (chronic myelocytic leukemia) ([facility])      calcium carbonate (CALTREX) 600 mg calcium (1,500 mg) tablet Take 600 mg by mouth nightly. cholecalciferol (VITAMIN D3) 1,000 unit cap Take 1,000 Units by mouth nightly. lansoprazole (PREVACID) 30 mg capsule Take 30 mg by mouth daily. prn      zolpidem (AMBIEN) 10 mg tablet Take 1 Tablet by mouth nightly as needed for Sleep. Max Daily Amount: 10 mg.  Qty: 20 Tablet, Refills: 0    Comments: Okay to fill early per Dr. Addis Mir Diagnoses: Insomnia due to medical condition      oxyCODONE-acetaminophen (PERCOCET 10)  mg per tablet Take 1 Tablet by mouth every eight (8) hours as needed for Pain for up to 30 days. Max Daily Amount: 3 Tablets. Qty: 90 Tablet, Refills: 0    Associated Diagnoses: CML (chronic myelocytic leukemia) ([facility] Spleen enlarged; Chronic pain due to neoplasm      Comp Stocking,Knee,Regular,Sml misc 1 Each by Does Not Apply route daily. Qty: 1 Box, Refills: 0    Comments: Please dispense 2 pairs of compression stockings      !! ondansetron (ZOFRAN ODT) 8 mg disintegrating tablet Take 1 Tab by mouth every eight (8) hours as needed for Nausea or Vomiting. Qty: 60 Tab, Refills: 0      !! ondansetron (ZOFRAN ODT) 4 mg disintegrating tablet Take 1 Tab by mouth every eight (8) hours as needed for Nausea. Qty: 90 Tab, Refills: 0       !! - Potential duplicate medications found. Please discuss with provider.       STOP taking these medications       enoxaparin (LOVENOX) 60 mg/0.6 mL injection Comments:   Reason for Stopping:         diphenoxylate-atropine (LOMOTIL) 2.5-0.025 mg per tablet Comments:   Reason for Stopping:               Procedures done this admission:  Procedure(s) with comments:  ESOPHAGOGASTRODUODENOSCOPY (EGD) ROOM 215  ENDOSCOPIC BANDING OR LIGATION - deployed 6 on 4 sites    Consults this admission:  [address] [facility]  IP CONSULT TO ONCOLOGY  IP CONSULT TO INFECTIOUS DISEASES    Echocardiogram/EKG results:  No results found for this visit on 08/20/21. EKG Results     None          Diagnostic Imaging/Tests:   XR CHEST SNGL V    Result Date: 8/22/2021  Portable chest x-ray CLINICAL INDICATION: Sepsis FINDINGS: Single AP view the chest compared to a similar exam dated 8/8/2021 show the lungs to be slightly underexpanded but otherwise clear. No pleural effusion or pneumothorax. The cardiac silhouette and mediastinum are unremarkable. Slightly under expanded lungs, otherwise no acute abnormality. CT LOW EXT RT WO CONT    Result Date: 8/8/2021  CT of the right lower extremity without contrast INDICATION:  Subacute worsening severe swelling, pain, discomfort and skin color changes comments cellulitis. Evaluate for necrotizing fasciitis. Patient with history of DVT, CML, polycythemia, splenomegaly, portal venous thrombus, colitis. COMPARISON: Ultrasound duplex of the lower extremity 8/5/2021 TECHNIQUE: Dose reduction technique used: Automated exposure Control and/or adjustment of mA and kV according to patient size. Multiple axial images were obtained through the right lower extremity without intravenous contrast. Coronal and sagittal reformatted images were also performed for further evaluation of osseous structures. FINDINGS: There is no evidence of fracture or subluxation. No focal aggressive bony lesions are seen. Note that fine detail of bones is limited given the large field of view on this examination to include the entire lower extremity, and settings tailored for soft tissue evaluation given the indication. No evidence of a focal fluid collection to suggest drainable abscess. Unchanged mildly enlarged inguinal lymph nodes, nonspecific but most likely benign reactive in this setting. Generalized skin thickening and subcutaneous edema are noted in keeping with cellulitis. No abnormal subcutaneous gas collections are evident. Note of partially visualized abdominopelvic small volume ascites, and nonspecific appearance of several bowel loops. 1. No evidence of abnormal subcutaneous gas collection or focal fluid collection. 2. Cellulitis. 3. No acute bony abnormalities. Note that necrotizing fasciitis is predominantly a clinical diagnosis. CT ABD PELV W CONT    Result Date: 8/23/2021  CT of the Abdomen and Pelvis with contrast CLINICAL INDICATION:  Severe weakness and anemia, recurrent blood loss, recurrent infection, E. coli bacteremia. History of colitis, splenomegaly, ascites, portal vein occlusion, CML polycythemia, lung nodules. COMPARISON: 10/20/2020 CT and 11/14/2019 MRI TECHNIQUE: Dose reduction technique used: Automated exposure Control and/or adjustment of mA and kV according to patient size. Multiple axial images were obtained through the abdomen and pelvis after intravenous injection of 125cc of isovue 370 IV contrast to further evaluate vessels and organs. Coronal reformatted images were done for further evaluation of bones and organs. Oral contrast was given for further evaluation of GI tract and adjacent organs. FINDINGS: Partially included lung bases demonstrate small bilateral posterior layering pleural effusions and partial atelectasis of both lower lungs, stable cardiomegaly, and a small pericardial effusion. There is generalized edema throughout the body wall. Abdomen: There are chronic varices in the paraesophageal mediastinum, upper abdomen, associated with chronic portal vein thrombus and occlusion (not definitely changed). Stable chronic splenomegaly. There are no acute abnormalities in the liver or spleen. The adrenal glands and pancreas appear unremarkable. Cholecystectomy again noted without interval complication evident.  There is normal enhancement of the kidneys, no hydronephrosis. Appendix not definitely seen but no retrocecal inflammation. No interval lymphadenopathy. Small volume ascites has slightly increased. No free air, focal inflammatory changes or focal drainable fluid collections in the abdomen. Aorta normal caliber. Patent major vessels. There is no evidence of bowel obstruction. Small chronic hernia in the anterior abdominal wall containing fluid and mesenteric vessels but no bowel. There is nonspecific wall thickening of multiple loops of small and large bowel, with similar finding on prior imaging, which could be passive congestion (less likely infection or inflammation). Surgical changes again noted compatible with partial resection of proximal small bowel. Pelvis:  No acute inflammatory changes or fluid collections in the pelvis. No lymphadenopathy or mass. Bones: Multilevel compression deformities are noted of the thoracolumbar spine, not definitely changed from prior imaging. No evidence of a displaced fracture or dislocation. 1. No evidence of abdominal abscess. 2. Ascites and bilateral pleural effusions, worsened since prior. 3. Chronic findings compatible with known portal vein thrombus including splenomegaly, varices. XR CHEST PORT    Result Date: 8/8/2021  EXAM: XR CHEST PORT HISTORY: meets SIRS criteria. TECHNIQUE: Frontal chest. COMPARISON: 7/12/2021 FINDINGS: The cardiac silhouette, mediastinum, and pulmonary vasculature are within normal limits. There is no consolidation, pleural effusion, or pneumothorax. No significant osseous abnormalities are observed. No evidence of an acute intrathoracic process. DUPLEX LOWER EXT VENOUS BILAT    Result Date: 8/5/2021  HISTORY: Bilateral lower extremity swelling, 2 weeks duration. History of DVT. Exam: Bilateral lower extremity ultrasound Technique: Realtime grayscale and color Doppler imaging is performed in the longitudinal and transverse planes.  FINDINGS: There is normal flow, augmentation, and compressibility within the deep venous system from the groin to popliteal fossa. Posterior tibial vein and peroneal vein are also normal. No Baker's cyst. IMPRESSIONS: No evidence of deep venous thrombosis within the lower extremities. All Micro Results     Procedure Component Value Units Date/Time    CULTURE, BLOOD [[id number]] Collected: 08/22/21 2052    Order Status: Completed Specimen: Blood Updated: 08/26/21 0858     Special Requests: --        LEFT  HAND       Culture result: NO GROWTH 4 DAYS       CULTURE, BLOOD [[id number]]  (Abnormal)  (Susceptibility) Collected: 08/22/21 2052    Order Status: Completed Specimen: Blood Updated: 08/26/21 0748     Special Requests: --        LEFT  Antecubital       GRAM STAIN GRAM NEGATIVE RODS         AEROBIC BOTTLE POSITIVE               RESULTS VERIFIED, PHONED TO AND READ BACK BY Lissette Multani AT 1736 ON 8/23/21, [id number]  [address]           Culture result: ESCHERICHIA COLI               Refer to Blood Culture ID Panel Accession  [id number]      BLOOD CULTURE ID PANEL [[id number]]  (Abnormal) Collected: 08/22/21 2052    Order Status: Completed Specimen: Blood Updated: 08/23/21 1351     Acc. no. from Micro Order [id number]     Escherichia coli Detected        Comment: RESULTS VERIFIED, PHONED TO AND READ BACK BY  DYAN Johnson AT 3735 ON 8/23/21, [place]          KPC (Carbapenem Resistance Gene) NOT DETECTED        Comment: WARNING:  A Not Detected result for the KPC gene does not indicate susceptibility to carbapenems. Gram negative bacteria can be resistant to carbapenems by mechanisms other than carrying the KPC gene. INTERPRETATION       Gram negative gopi.  Identified by realtime PCR as E. coli                Labs: Results:       BMP, Mg, Phos Recent Labs     08/26/21  0331 08/25/21  0442 08/24/21  0547    140 139   K 3.4* 3.5 3.4*   * 109* 109*   CO2 28 27 24   AGAP 5* 4* 6*   BUN 5* 5* 7*   CREA 0.40* 0.43* 0.41*   CA 7.7* 7.9* 7.6*   * 108* 116*   MG  --  2.0  --       CBC Recent Labs     08/26/21  0331 08/25/21  1805 08/25/21  0442 08/24/21  1720 08/24/21  0547   WBC 9.8  --  9.1  --  8.7   RBC 2.81*  --  2.64*  --  2.43*   HGB 8.3* 8.0* 7.7*   < > 7.2*   HCT 26.0* 24.6* 24.8*   < > 22.3*   *  --  116*  --  111*   GRANS 79*  --   --   --   --    LYMPH 7*  --   --   --   --    EOS 3  --   --   --   --    MONOS 6  --   --   --   --    BASOS 1  --   --   --   --    IG 5  --   --   --   --    ANEU 7.7  --   --   --   --    ABL 0.7  --   --   --   --    ORESTES 0.3  --   --   --   --    ABM 0.5  --   --   --   --    ABB 0.1  --   --   --   --    AIG 0.4  --   --   --   --     < > = values in this interval not displayed. LFT No results for input(s): ALT, TBIL, AP, TP, ALB, GLOB, AGRAT in the last 72 hours.     No lab exists for component: SGOT, GPT   Cardiac Testing Lab Results   Component Value Date/Time    CK 63 08/05/2021 04:58 PM     07/26/2021 05:04 PM      Coagulation Tests Lab Results   Component Value Date/Time    Prothrombin time 21.0 (H) 08/23/2021 07:15 PM    Prothrombin time 15.3 (H) 08/20/2021 10:25 AM    Prothrombin time 15.7 (H) 10/20/2020 10:50 AM    INR 1.8 08/23/2021 07:15 PM    INR 1.2 08/20/2021 10:25 AM    INR 1.2 10/20/2020 10:50 AM    aPTT 55.9 (H) 08/23/2021 07:15 PM    aPTT 23.5 (L) 10/20/2020 10:50 AM    aPTT 41.0 (H) 10/06/2020 01:57 AM      A1c Lab Results   Component Value Date/Time    Hemoglobin A1c 4.9 08/09/2021 04:41 AM      Lipid Panel Lab Results   Component Value Date/Time    Cholesterol, total 105 03/16/2015 06:30 AM    HDL Cholesterol 37 (L) 03/16/2015 06:30 AM    LDL, calculated 54.8 03/16/2015 06:30 AM    VLDL, calculated 13.2 03/16/2015 06:30 AM    Triglyceride 66 03/16/2015 06:30 AM    CHOL/HDL Ratio 2.8 03/16/2015 06:30 AM      Thyroid Panel Lab Results   Component Value Date/Time    TSH 10.700 08/09/2021 04:41 AM    TSH 2.640 05/19/2021 12:14 PM    T4, Total 9.7 03/16/2015 10:15 AM    T4, Free 1.1 05/19/2021 12:14 PM    T4, Free 1.4 03/13/2020 09:39 AM        Most Recent UA Lab Results   Component Value Date/Time    Color YELLOW 04/13/2018 12:34 PM    Appearance CLEAR 04/13/2018 12:34 PM    Specific gravity 1.007 03/31/2015 03:00 PM    pH (UA) 7.0 04/13/2018 12:34 PM    Protein NEGATIVE  04/13/2018 12:34 PM    Glucose NEGATIVE  04/13/2018 12:34 PM    Ketone NEGATIVE  04/13/2018 12:34 PM    Bilirubin NEGATIVE  04/13/2018 12:34 PM    Blood NEGATIVE  04/13/2018 12:34 PM    Urobilinogen 1.0 04/13/2018 12:34 PM    Nitrites NEGATIVE  04/13/2018 12:34 PM    Leukocyte Esterase NEGATIVE  04/13/2018 12:34 PM    WBC 0 04/13/2018 12:34 PM    RBC 0 04/13/2018 12:34 PM    Epithelial cells 0 04/13/2018 12:34 PM    Bacteria 0 04/13/2018 12:34 PM    Casts 0 04/13/2018 12:34 PM    Crystals, urine 0 04/13/2018 12:34 PM    Mucus 0 04/13/2018 12:34 PM          All Labs from Last 24 Hrs:  Recent Results (from the past 24 hour(s))   HGB & HCT    Collection Time: 08/25/21  6:05 PM   Result Value Ref Range    HGB 8.0 (L) 11.7 - 15.4 g/dL    HCT 24.6 (L) 35.8 - 46.3 %   CBC WITH AUTOMATED DIFF    Collection Time: 08/26/21  3:31 AM   Result Value Ref Range    WBC 9.8 4.3 - 11.1 K/uL    RBC 2.81 (L) 4.05 - 5.2 M/uL    HGB 8.3 (L) 11.7 - 15.4 g/dL    HCT 26.0 (L) 35.8 - 46.3 %    MCV 92.5 79.6 - 97.8 FL    MCH 29.5 26.1 - 32.9 PG    MCHC 31.9 31.4 - 35.0 g/dL    RDW 19.2 (H) 11.9 - 14.6 %    PLATELET 082 (L) 154 - 450 K/uL    MPV 10.5 9.4 - 12.3 FL    ABSOLUTE NRBC 0.12 0.0 - 0.2 K/uL    DF AUTOMATED      NEUTROPHILS 79 (H) 43 - 78 %    LYMPHOCYTES 7 (L) 13 - 44 %    MONOCYTES 6 4.0 - 12.0 %    EOSINOPHILS 3 0.5 - 7.8 %    BASOPHILS 1 0.0 - 2.0 %    IMMATURE GRANULOCYTES 5 0.0 - 5.0 %    ABS. NEUTROPHILS 7.7 1.7 - 8.2 K/UL    ABS. LYMPHOCYTES 0.7 0.5 - 4.6 K/UL    ABS. MONOCYTES 0.5 0.1 - 1.3 K/UL    ABS. EOSINOPHILS 0.3 0.0 - 0.8 K/UL    ABS. BASOPHILS 0.1 0.0 - 0.2 K/UL    ABS. IMM. GRANS.  0.4 0.0 - 0.5 K/UL   METABOLIC PANEL, BASIC Collection Time: 08/26/21  3:31 AM   Result Value Ref Range    Sodium 141 136 - 145 mmol/L    Potassium 3.4 (L) 3.5 - 5.1 mmol/L    Chloride 108 (H) 98 - 107 mmol/L    CO2 28 21 - 32 mmol/L    Anion gap 5 (L) 7 - 16 mmol/L    Glucose 111 (H) 65 - 100 mg/dL    BUN 5 (L) 8 - 23 MG/DL    Creatinine 0.40 (L) 0.6 - 1.0 MG/DL    GFR est AA >60 >60 ml/min/1.73m2    GFR est non-AA >60 >60 ml/min/1.73m2    Calcium 7.7 (L) 8.3 - 10.4 MG/DL       Current Med List in Hospital:   Current Facility-Administered Medications   Medication Dose Route Frequency    pantoprazole (PROTONIX) tablet 40 mg  40 mg Oral ACB&D    potassium chloride (K-DUR, KLOR-CON) SR tablet 40 mEq  40 mEq Oral NOW    folic acid (FOLVITE) tablet 1 mg  1 mg Oral DAILY    diphenhydrAMINE (BENADRYL) injection 25 mg  25 mg IntraVENous Q6H PRN    diphenhydrAMINE (BENADRYL) capsule 25 mg  25 mg Oral Q6H PRN    [Held by provider] lactated Ringers infusion  75 mL/hr IntraVENous CONTINUOUS    cefepime (MAXIPIME) 2 g in 0.9% sodium chloride (MBP/ADV) 100 mL MBP  2 g IntraVENous Q12H    metroNIDAZOLE (FLAGYL) tablet 500 mg  500 mg Oral Q12H    oxyCODONE IR (ROXICODONE) tablet 10 mg  10 mg Oral Q4H PRN    morphine injection 4 mg  4 mg IntraVENous Q4H PRN    sodium chloride (NS) flush 5-10 mL  5-10 mL IntraVENous Q8H    sodium chloride (NS) flush 5-10 mL  5-10 mL IntraVENous PRN    sodium chloride (NS) flush 5-40 mL  5-40 mL IntraVENous Q8H    sodium chloride (NS) flush 5-40 mL  5-40 mL IntraVENous PRN    acetaminophen (TYLENOL) tablet 650 mg  650 mg Oral Q6H PRN    Or    acetaminophen (TYLENOL) suppository 650 mg  650 mg Rectal Q6H PRN    polyethylene glycol (MIRALAX) packet 17 g  17 g Oral DAILY PRN    levothyroxine (SYNTHROID) tablet 112 mcg  112 mcg Oral 6am    ondansetron (ZOFRAN) injection 4 mg  4 mg IntraVENous Q6H PRN       Allergies   Allergen Reactions    Latex Other (comments)     Testing for latex allergy was positive as a 2 (on a scale of 1 to 3).    Sulfa (Sulfonamide Antibiotics) Anaphylaxis    Tramadol Other (comments)     Top lip swelled    Augmentin [Amoxicillin-Pot Clavulanate] Rash    Morphine Nausea and Vomiting     Not a true allergy    Rizatriptan Rash    Tetanus Immune Globulin Other (comments)     Heat, bruising, and swelling at  Site of injection    Xanax [Alprazolam] Other (comments)     Hallucinations    Zonegran [Zonisamide] Rash     Immunization History   Administered Date(s) Administered    (RETIRED) Pneumococcal Vaccine (Unspecified Type) 09/27/2008    Covid-19, MODERNA, Mrna, Lnp-s, Pf, 100mcg/0.5mL 03/27/2021, 04/27/2021    Hib (PRP-T) 04/03/2019    Influenza Vaccine 10/01/2014, 10/01/2016    Influenza Vaccine (Quad) Mdck Pf (>4 Yrs Flucelvax QUAD [id number]) 09/28/2020    Influenza Vaccine (Quad) PF (>6 Mo Flulaval, Fluarix, and >3 Yrs Afluria, Fluzone [id number]) 10/25/2018, 10/08/2019    Pneumococcal Conjugate (PCV-13) 04/03/2019    Pneumococcal Polysaccharide (PPSV-23) 01/01/2004    TB Skin Test (PPD) Intradermal 07/21/2014    Zoster Recombinant 03/13/2020, 08/09/2020       Recent Vital Data:  Patient Vitals for the past 24 hrs:   Temp Pulse Resp BP SpO2   08/26/21 1115 98.3 °F (36.8 °C) 78 20 107/61 100 %   08/26/21 0730 98.7 °F (37.1 °C) 80 22 (!) 107/57 92 %   08/26/21 0325 99.1 °F (37.3 °C) 87 17 109/66 93 %   08/25/21 2342 98.1 °F (36.7 °C) 81 18 115/65 94 %   08/25/21 1925 98.6 °F (37 °C) 85 17 104/67 93 %   08/25/21 1515 99.2 °F (37.3 °C) 88 17 (!) 105/56 92 %     Oxygen Therapy  O2 Sat (%): 100 % (08/26/21 1115)  Pulse via Oximetry: 95 beats per minute (08/23/21 1437)  O2 Device: None (Room air) (08/25/21 1435)  Skin Assessment: Clean, dry, & intact (08/23/21 0754)  Skin Protection for O2 Device: No (08/23/21 0754)  O2 Flow Rate (L/min): 1 l/min (08/23/21 0754)    Estimated body mass index is 22.14 kg/m² as calculated from the following:    Height as of this encounter: 5' 3\" (1.6 m).     Weight as of this encounter: 56.7 kg (125 lb). No intake or output data in the 24 hours ending 08/26/21 1229      Physical Exam  Vitals and nursing note reviewed. Constitutional:       General: She is not in acute distress. Appearance: Normal appearance. HENT:      Head: Normocephalic. Eyes:      Extraocular Movements: Extraocular movements intact. Cardiovascular:      Rate and Rhythm: Normal rate. Pulses:           Radial pulses are 2+ on the left side. Pulmonary:      Effort: Pulmonary effort is normal. No respiratory distress. Abdominal:      General: There is no distension. Tenderness: There is no abdominal tenderness. Musculoskeletal:         General: No deformity. Cervical back: No rigidity. Skin:     General: Skin is warm and dry. Neurological:      General: No focal deficit present. Mental Status: She is alert.    Psychiatric:         Mood and Affect: Mood normal.         Behavior: Behavior normal.         Signed:  Dalton Verduzco MD

## 2021-08-26 NOTE — PROGRESS NOTES
END OF SHIFT NOTE:    INTAKE/OUTPUT  08/25 0701 - 08/26 0700  In: 240 [P.O.:240]  Out: -   Voiding: YES  Catheter: NO  Drain:              Flatus: Patient does have flatus present. Stool:  0 occurrences. Characteristics:  Stool Assessment  Stool Color: Yellow  Stool Appearance: Loose (per pt)  Stool Amount: Medium  Stool Source/Status: Rectum    Emesis: 0 occurrences. Characteristics:        VITAL SIGNS  Patient Vitals for the past 12 hrs:   Temp Pulse Resp BP SpO2   08/26/21 0325 99.1 °F (37.3 °C) 87 17 109/66 93 %   08/25/21 2342 98.1 °F (36.7 °C) 81 18 115/65 94 %   08/25/21 1925 98.6 °F (37 °C) 85 17 104/67 93 %       Pain Assessment  Pain Intensity 1: 7 (08/26/21 0150)  Pain Location 1: Abdomen  Pain Intervention(s) 1: Medication (see MAR)  Patient Stated Pain Goal: 2    Ambulating  Yes    Shift report given to oncoming nurse at the bedside.     Gregoria Renae RN

## 2021-08-26 NOTE — PROGRESS NOTES
[facility] Hematology & Oncology        Inpatient Hematology / Oncology Plan of Care    Reason for Consult:  Acute blood loss anemia [D62]  Hematemesis [K92.0]  Melena [K92.1]  Referring Physician:  Rossy Daniel MD    24 Hour Events:  Afebrile, VSS  Counts improving  BCx +E Coli, ID following  On Cef/Flagyl     ROS:  Constitutional: Negative for fever, chills. CV: Negative for chest pain, palpitations, edema. Respiratory: Negative for dyspnea, cough, wheezing. GI: Negative for nausea, diarrhea, abd pain. 10 point review of systems is otherwise negative with the exception of the elements mentioned above in the HPI. Allergies   Allergen Reactions    Latex Other (comments)     Testing for latex allergy was positive as a 2 (on a scale of 1 to 3).     Sulfa (Sulfonamide Antibiotics) Anaphylaxis    Tramadol Other (comments)     Top lip swelled    Augmentin [Amoxicillin-Pot Clavulanate] Rash    Morphine Nausea and Vomiting     Not a true allergy    Rizatriptan Rash    Tetanus Immune Globulin Other (comments)     Heat, bruising, and swelling at  Site of injection    Xanax [Alprazolam] Other (comments)     Hallucinations    Zonegran [Zonisamide] Rash     Past Medical History:   Diagnosis Date    Anemia     C. difficile diarrhea 4/1/2015    Cerebral edema (HCC) 4/1/2015    Chronic migraine 9/22/2016    Chronic myelogenous leukemia ([facility] [address]     [place] chromosome/ converted from polycythemia in 2009- to 2012 when she was dx w leukemia    Chronic pain     Coagulation disorder ([facility] [address] [facility]     \"clotting and Bleeding Problem\" dr Melly Evans follows     Esophageal spasm     with banding     Esophageal varices ([facility] [address] [facility]     grade 3    GI bleed 8/3/2016    H/O craniotomy     3-29-15.  due to blood clot - which caused a seizure  - then pt fell and hit     Leukocytosis 3/14/2015    MRSA colonization 6/25/2012    Polycythemia vera(238.4)     JAK2 mutation    Portal hypertension ([facility] [address] [facility]     with varices    Portal vein thrombosis 2012    Ct scan  Apparent occlusion of the portal vein. In addition, the splenic vein, and superior mesenteric vein are not definitely seen and are also likely  occluded. Splenomegaly, and extensive varices are seen as described above consistent with the portal vein occlusion 12 on arixtra HIT negative on repeat 12 re admitted Cirrhotic appearance of the liver. Mild to moderate ascites, as    Primary hypothyroidism     Pulmonary embolism ([address]     not on coumadin anymore     S/P exploratory laparotomy, 12    Bowel resection:  336 cm of small bowel removed, approximately 9 feet and placement of feeding jejunostomy.      S/P small bowel resection     .  9 feet removed due to  gangrene which wa due to blood clot    Seizure ([address] 3/14/2015    Seizure disorder ([address] 3/30/2015    due to clotting factor    Seizures ([address]     last one 3- - followed by aniyah     Splenomegaly, congestive, chronic     Stroke (cerebrum) ([address] 2015    had bled into head- surgery    Stroke [facility])     pt had stroke like symptoms     Thrombocytopenia, HIT negative 2012 platelets lower repeat HIT 12 platelets in 89,588'J    Thrombosis, portal vein     portal , spleenic and recently superior mesenteric    Transfusion history     many blood tranfusions - last 3-29-15 after brain surgery    Traumatic hemorrhage of right cerebrum ([address] 3/30/2015     Past Surgical History:   Procedure Laterality Date    HX APPENDECTOMY      with small bowel resection    HX BREAST BIOPSY Bilateral     Lt - ; Rt -     HX CHOLECYSTECTOMY      HX GI      liver biopsy    HX GI      bowel removed small - 9 feet     HX GYN       x 2    HX GYN      D&C following miscarriage    HX ORTHOPAEDIC Left 2008    torn labrum shoulder (screw in place)    NEUROLOGICAL PROCEDURE UNLISTED  2010    craniotomy to evacuate subdural hematoma following a fall from a seizure   [address] UNLISTED      umbilical hernia repair    OK ABDOMEN SURGERY PROC UNLISTED      9ft of small bowel excised then reconnected     Family History   Problem Relation Age of Onset    Diabetes Mother     Stroke Mother         after surgery    Cancer Father         brain    No Known Problems Sister     Other Sister         diverticulitis    Other Sister         diverticulitis    Cancer Sister         lyjmphoma    Breast Cancer Maternal Aunt 48    Thyroid Disease Paternal Grandmother      Social History     Socioeconomic History    Marital status: SINGLE     Spouse name: Not on file    Number of children: Not on file    Years of education: Not on file    Highest education level: Not on file   Occupational History    Not on file   Tobacco Use    Smoking status: Former Smoker     Packs/day: 0.20     Years: 10.00     Pack years: 2.00     Types: Cigarettes     Quit date:      Years since quittin.6    Smokeless tobacco: Never Used   Substance and Sexual Activity    Alcohol use: No    Drug use: No    Sexual activity: Not on file   Other Topics Concern    Not on file   Social History Narrative    Not on file     Social Determinants of Health     Financial Resource Strain:     Difficulty of Paying Living Expenses:    Food Insecurity:     Worried About Running Out of Food in the Last Year:     [address] in the Last Year:    Transportation Needs:     Lack of Transportation (Medical):      Lack of Transportation (Non-Medical):    Physical Activity:     Days of Exercise per Week:     Minutes of Exercise per Session:    Stress:     Feeling of Stress :    Social Connections:     Frequency of Communication with Friends and Family:     Frequency of Social Gatherings with Friends and Family:     Attends Baptism Services:     Active Member of Clubs or Organizations:     Attends Club or Organization Meetings:     Marital Status:    Intimate Partner Violence:     Fear of Current or Ex-Partner:     Emotionally Abused:     Physically Abused:     Sexually Abused:      Current Facility-Administered Medications   Medication Dose Route Frequency Provider Last Rate Last Admin    folic acid (FOLVITE) tablet 1 mg  1 mg Oral DAILY Juan Mehta NP   1 mg at 08/25/21 0835    diphenhydrAMINE (BENADRYL) injection 25 mg  25 mg IntraVENous Q6H PRN Lynne Maldonado PA        diphenhydrAMINE (BENADRYL) capsule 25 mg  25 mg Oral Q6H PRN Lynne Maldonado PA        0.9% sodium chloride infusion 250 mL  250 mL IntraVENous PRN Ian Ramos NP        lactated Ringers infusion  75 mL/hr IntraVENous CONTINUOUS Ian Ramos NP 75 mL/hr at 08/25/21 2019 75 mL/hr at 08/25/21 2019    cefepime (MAXIPIME) 2 g in 0.9% sodium chloride (MBP/ADV) 100 mL MBP  2 g IntraVENous Q12H Lenny [place],  mL/hr at 08/25/21 1600 2 g at 08/25/21 1600    metroNIDAZOLE (FLAGYL) tablet 500 mg  500 mg Oral Q12H [facility] [place], NP   500 mg at 08/25/21 2017    oxyCODONE IR (ROXICODONE) tablet 10 mg  10 mg Oral Q4H PRN Josafat Peralta MD   10 mg at 08/26/21 0149    morphine injection 4 mg  4 mg IntraVENous Q4H PRN Josafat Peralta MD   4 mg at 08/21/21 0302    sodium chloride (NS) flush 5-10 mL  5-10 mL IntraVENous Q8H Zoe Telles MD   10 mL at 08/25/21 2157    sodium chloride (NS) flush 5-10 mL  5-10 mL IntraVENous PRN Zoe Telles MD        pantoprazole (PROTONIX) 40 mg in 0.9% sodium chloride 10 mL injection  40 mg IntraVENous Q12H Zoe Telles MD   40 mg at 08/25/21 2017    sodium chloride (NS) flush 5-40 mL  5-40 mL IntraVENous Q8H Bulah Skiff, MD   10 mL at 08/25/21 2157    sodium chloride (NS) flush 5-40 mL  5-40 mL IntraVENous PRN Bulah Skiff, MD   10 mL at 08/21/21 0421    acetaminophen (TYLENOL) tablet 650 mg  650 mg Oral Q6H PRN Bulah Skiff, MD   650 mg at 08/25/21 0151    Or    acetaminophen (TYLENOL) suppository 650 mg  650 mg Rectal Q6H PRN Saundra Voss MD        polyethylene glycol (MIRALAX) packet 17 g  17 g Oral DAILY PRN Saundra Voss MD        levothyroxine (SYNTHROID) tablet 112 mcg  112 mcg Oral Nasra Parks MD   112 mcg at 21 0604    ondansetron (ZOFRAN) injection 4 mg  4 mg IntraVENous Q6H PRN Katja Collier MD   4 mg at 21 1913       OBJECTIVE:  Patient Vitals for the past 8 hrs:   BP Temp Pulse Resp SpO2   21 0730 (!) 107/57 98.7 °F (37.1 °C) 80 22 92 %   21 0325 109/66 99.1 °F (37.3 °C) 87 17 93 %     Temp (24hrs), Av.6 °F (37 °C), Min:98 °F (36.7 °C), Max:99.2 °F (37.3 °C)    No intake/output data recorded. Physical Exam:  Constitutional: Well developed, well nourished female in no acute distress, sitting comfortably in the hospital bed. HEENT: Normocephalic and atraumatic. Oropharynx is clear, mucous membranes are moist.  Extraocular muscles are intact. Sclerae anicteric. Neck supple without JVD. No thyromegaly present. Skin Warm and dry. No bruising and no rash noted. No erythema. +pallor. Respiratory Lungs are clear to auscultation bilaterally without wheezes, rales or rhonchi, normal air exchange without accessory muscle use. CVS Normal rate, regular rhythm and normal S1 and S2. No murmurs, gallops, or rubs. Abdomen Soft, nontender and nondistended, normoactive bowel sounds. No palpable mass. No hepatosplenomegaly. Neuro Grossly nonfocal with no obvious sensory or motor deficits. MSK Normal range of motion in general.  No edema and no tenderness. Psych Appropriate mood and affect.         Labs:    Recent Results (from the past 24 hour(s))   HGB & HCT    Collection Time: 21  6:05 PM   Result Value Ref Range    HGB 8.0 (L) 11.7 - 15.4 g/dL    HCT 24.6 (L) 35.8 - 46.3 %   CBC WITH AUTOMATED DIFF    Collection Time: 21  3:31 AM   Result Value Ref Range    WBC 9.8 4.3 - 11.1 K/uL    RBC 2.81 (L) 4.05 - 5.2 M/uL    HGB 8.3 (L) 11.7 - 15.4 g/dL HCT 26.0 (L) 35.8 - 46.3 %    MCV 92.5 79.6 - 97.8 FL    MCH 29.5 26.1 - 32.9 PG    MCHC 31.9 31.4 - 35.0 g/dL    RDW 19.2 (H) 11.9 - 14.6 %    PLATELET 928 (L) 672 - 450 K/uL    MPV 10.5 9.4 - 12.3 FL    ABSOLUTE NRBC 0.12 0.0 - 0.2 K/uL    DF AUTOMATED      NEUTROPHILS 79 (H) 43 - 78 %    LYMPHOCYTES 7 (L) 13 - 44 %    MONOCYTES 6 4.0 - 12.0 %    EOSINOPHILS 3 0.5 - 7.8 %    BASOPHILS 1 0.0 - 2.0 %    IMMATURE GRANULOCYTES 5 0.0 - 5.0 %    ABS. NEUTROPHILS 7.7 1.7 - 8.2 K/UL    ABS. LYMPHOCYTES 0.7 0.5 - 4.6 K/UL    ABS. MONOCYTES 0.5 0.1 - 1.3 K/UL    ABS. EOSINOPHILS 0.3 0.0 - 0.8 K/UL    ABS. BASOPHILS 0.1 0.0 - 0.2 K/UL    ABS. IMM. GRANS. 0.4 0.0 - 0.5 K/UL   METABOLIC PANEL, BASIC    Collection Time: 08/26/21  3:31 AM   Result Value Ref Range    Sodium 141 136 - 145 mmol/L    Potassium 3.4 (L) 3.5 - 5.1 mmol/L    Chloride 108 (H) 98 - 107 mmol/L    CO2 28 21 - 32 mmol/L    Anion gap 5 (L) 7 - 16 mmol/L    Glucose 111 (H) 65 - 100 mg/dL    BUN 5 (L) 8 - 23 MG/DL    Creatinine 0.40 (L) 0.6 - 1.0 MG/DL    GFR est AA >60 >60 ml/min/1.73m2    GFR est non-AA >60 >60 ml/min/1.73m2    Calcium 7.7 (L) 8.3 - 10.4 MG/DL       Imaging:  XR CHEST SNGL V [[id number]] Collected: 08/22/21 2041   Order Status: Completed Updated: 08/22/21 2043   Narrative:     Portable chest x-ray     CLINICAL INDICATION: Sepsis     FINDINGS: Single AP view the chest compared to a similar exam dated 8/8/2021   show the lungs to be slightly underexpanded but otherwise clear. No pleural   effusion or pneumothorax. The cardiac silhouette and mediastinum are   unremarkable. Impression:     Slightly under expanded lungs, otherwise no acute abnormality.          ASSESSMENT:  Problem List  Date Reviewed: 7/15/2021        Codes Class Noted    Hematemesis ICD-10-CM: K92.0  ICD-9-CM: 578.0  8/20/2021        Melena ICD-10-CM: K92.1  ICD-9-CM: 578.1  8/20/2021        * (Principal) Acute blood loss anemia ICD-10-CM: D62  ICD-9-CM: 285.1  8/20/2021 Cellulitis of leg, right ICD-10-CM: L03.115  ICD-9-CM: 682.6  8/8/2021        Sepsis due to cellulitis [facility]) ICD-10-CM: L03.90, A41.9  ICD-9-CM: 682.9, 995.91  7/12/2021        Esophageal varices (HCC) (Chronic) ICD-10-CM: I85.00  ICD-9-CM: 456.1  Unknown    Overview Signed 10/6/2020  4:12 AM by Roque Lassiter MD     grade 3             Splenomegaly ICD-10-CM: R16.1  ICD-9-CM: 789.2  3/21/2019        Primary hypothyroidism ICD-10-CM: E03.9  ICD-9-CM: 244.9  Unknown        Chronic migraine without aura with status migrainosus, not intractable ICD-10-CM: X28.420  ICD-9-CM: 346.72  11/1/2016        Lung nodule ICD-10-CM: R91.1  ICD-9-CM: 793.11  10/28/2016    Overview Signed 10/28/2016 10:05 AM by Vincent Adams     IMPRESSION:    1. Lobulated 2 cm mass in the right lung apex, malignancy versus an atypical  inflammatory process. 2. Occlusion of the right subclavian vein. 3. Chronic occlusion of the portal vein with associated abnormal tissue  encasing the common bile duct, most likely fibrosis. 4. Splenomegaly. DVT (deep venous thrombosis) ([facility] [address] ICD-10-CM: I82.409  ICD-9-CM: 453.40  10/26/2016    Overview Signed 10/26/2016 10:27 PM by Arash Peterson. Right subclavian               Analgesic rebound headache ICD-10-CM: G44.40, T39.95XA  ICD-9-CM: 339.3, E935.9  9/22/2016        Headache, chronic daily ICD-10-CM: R51.9  ICD-9-CM: 784.0  9/22/2016        Migraine with aura and with status migrainosus, not intractable ICD-10-CM: G43. 101  ICD-9-CM: 346.03  9/22/2016        Left homonymous hemianopsia ICD-10-CM: H53.462  ICD-9-CM: 368.46  3/30/2015        CML (chronic myelocytic leukemia) (HCC) (Chronic) ICD-10-CM: C92.10  ICD-9-CM: 205.10  3/30/2015        Acquired hypercoagulable state ([facility] [address] (Chronic) ICD-10-CM: H42.06  ICD-9-CM: 289.81  3/30/2015        AKOSUA (iron deficiency anemia) ICD-10-CM: D50.9  ICD-9-CM: 280.9  7/28/2012        Symptomatic anemia ICD-10-CM: D64.9  ICD-9-CM: 285.9 7/28/2012    Overview Signed 7/28/2012  2:11 PM by Yasmeen North     Hemoglobin 8.0 gram   7/25/2012 17:03   Iron 27 (L)   TIBC 221 (L)   Transferrin Saturation 12 (L)                Cirrhosis (HCC) (Chronic) ICD-10-CM: K74.60  ICD-9-CM: 571.5  7/27/2012        Ascites ICD-10-CM: R18.8  ICD-9-CM: 789.59  7/26/2012        Portal vein thrombosis ICD-10-CM: X01  ICD-9-CM: 824  6/20/2012    Overview Signed 10/6/2020  4:12 AM by Janet Valle MD     Ct scan 6-21-2 Apparent occlusion of the portal vein. In addition, the splenic vein, and superior mesenteric vein are not definitely seen and are also likely  occluded. Splenomegaly, and extensive varices are seen as described above consistent with the portal vein occlusion 7-05-12 on arixtra HIT negative on repeat 7-27-12 re admitted Cirrhotic appearance of the liver. Mild to moderate ascites, as             Polycythemia vera (HCC) (Chronic) ICD-10-CM: D45  ICD-9-CM: 238.4  6/19/2012    Overview Addendum 6/30/2012 12:06 PM by Yasmeen North     Diagnosed February 2008 by [email or web address] analysis however portal vein thrombosis  And and splenomegaly since 2004  Hydroxyurea for 6 months poor tolerance spleen did not shrink   Pegasys 90 mcg weekly 4-2009 till    Restarted 12-30-09 45 mcg q 2 weeks  2-2010 reduced to q month   Increased 45 mcg 2/month 4-2010  Held 8-2010 thrombocytopenia  12-29-12 restarted 45 mcg q 2 weeks  4-1-11 45 mcg q 3 weeks  4-22-11 45 mcg q 4 weeks  Uncertain dosing thereafter   Possibly q 3 weeks 10-15-11 and held and restarted on 4-1-12 6-12 ct scan Small bowel wall thickening suggesting an enteritis. In addition, there is well thickening of the right colon which can suggest an additional   colitis although this can also be seen with severe portal hypertension. Likely reactive edematous changes it scattered fluid collections are seen of   the small bowel mesentery. . Apparent occlusion of the portal vein.  In addition, the splenic vein, and superior mesenteric vein are not definitely seen and are also likely   occluded. Splenomegaly, and extensive varices are seen as described above consistent with the portal vein occlusion. Kyle Crofts Heterogeneous enhancement of the liver and mild periportal edema. An acute hepatitis cannot be excluded. Ms. Deborah Briceño is a 64 y.o. female admitted on 8/20/2021. The primary encounter diagnosis was Gastrointestinal hemorrhage with melena. Diagnoses of Secondary esophageal varices with bleeding ([facility] [address] Splenomegaly, Chronic anticoagulation, CML (chronic myelocytic leukemia) ([facility] [address] and Polycythemia vera ([facility] [address] were also pertinent to this visit. Dana Hanks Her PMH includes splenomegaly, recurrent DVTs, esophageal/gastric varices, AKOSUA, intracranial hemorrhage s/p craniotomy, small bowel resection from intestinal ischemia, and seizures. She is a known patient of Dr. Rachael Villavicencio with CML on dasatinib/ruxolitinib and PV on Lovenox. She was recently admitted 8/8 - 8/11 for RLE cellulitis, treated with clindamycin. She presented to ED with c/o hematemesis, melena, and abdominal pain. Also endorsed dizziness. Hgb 7.4 on admission, down to 6.6. She was started on octreotide gtt which has since been DC'd as pt declined gtt. She is s/p EGD on 8/21 with esophageal varices and large blood in stomach. She spiked temp up to 101.3 on 8/22. BCx-NGTD. On Vanc/Zosyn (pt declining antibx as she feels this has lowered her platelets). Plt 144k today. We were consulted d/t pt request.    RECOMMENDATIONS:  CML  - on dasatinib/ruxolitinib  8/25 Holding these for now d/t decreasing counts/GIB. Con't to hold until f/u with Dr. Rachael Villavicencio. Polycythemia Vera  - on Lovenox, hold for GIB - she will also need to con't to hold this upon discharge  8/25 Do not restart Lovenox until evaluated at clinic OP    8/26 Recommend to hold Lovenox until f/u with Dr. Rachael Villavicencio, but pt states she plans to start Lovenox tomorrow at half dose.     GI bleed  - s/p EGD on 8/21 with esophageal varices and large blood in stomach  - was on octreotide gtt, DC'd as pt declined gtt  - transfuse prn to keep Hgb >7    Fever / E Coli Bactermia  - CXR neg  - BCx-NGTD  - On Vanc/Zosyn (pt declining antibx)  8/24 BCx +E Coli, sensitivities pending. ID following. On Cef/Flagyl. 8/25 Sensitivities pending. EOT 8/30 for ABX.    8/26 Sensitivities pending. Continues on Cef/Flagyl. ID following. Anemia / Thrombocytopenia secondary to chemotherapy / Folate deficiency  - Check iron studies, B12, folate, DIC labs, hemolysis labs, smear  - Transfuse prn to keep Hgb > 7 and Plt > 50k with active bleeding  8/24 Hgb 7.2 s/p tx. Plt down to 111. Folate deficiency noted, folic acid ordered. Hapto and smear pending. 8/25 Hgb 7.7. Plt 116k. Hapto just slightly above ULN at 203. Smear with no evidence of schistocytes, with absolute neutrophilia with toxic changes such as vacuolated cytoplasm. 8/26 Counts improving, Hgb 8.3, Plt 132k. Thank you for allowing us to participate in the care of Ms. Couch. We will sign off; please do not hesitate to call with any questions. She will need to f/u with Dr. Jearld Najjar on 8/30. Gregg Navarrete NP   [facility] Hematology & Oncology  [address] [address]  Office : [phone]  Fax : [phone]       Attending Addendum:  I have personally performed a face to face diagnostic evaluation on this patient. I have reviewed and agree with the care plan as documented by Gregg Navarrete, N.P. My findings are as follows: She has CML and Polycythemia Vera, appears weak, heart rate regular without murmurs, abdomen is non-tender, bowel sounds are positive, we will arrange for her to follow-up in clinic.               Tyson Mantilla MD      [facility] Hematology/Oncology  [address] [address]  Office : [phone]  Fax : [phone]

## 2021-08-26 NOTE — PROGRESS NOTES
Problem: Falls - Risk of  Goal: *Absence of Falls  Description: Document [place] Fall Risk and appropriate interventions in the flowsheet.   Outcome: Progressing Towards Goal  Note: Fall Risk Interventions:  Mobility Interventions: Bed/chair exit alarm         Medication Interventions: Patient to call before getting OOB    Elimination Interventions: Call light in reach

## 2021-08-26 NOTE — PROGRESS NOTES
Pt medically ready for dc home with family, no dc needs noted at this time. Care Management Interventions  PCP Verified by CM:  Yes (no PCP, Dr Veda Wright is primary md at this time)  Discharge Durable Medical Equipment: No  Physical Therapy Consult: No  Occupational Therapy Consult: No  Speech Therapy Consult: No  Current Support Network: Own Home, Lives Alone (lives in [place])  Confirm Follow Up Transport: Family  Discharge Location  Discharge Placement: Home

## 2021-08-27 NOTE — PROGRESS NOTES
Discharge instructions reviewed with patient. Prescriptions sent to patient's pharmacy. Follow up appoint info in summary. Waiting for transport in room.

## 2021-09-17 PROBLEM — F11.99 OPIOID USE, UNSPECIFIED WITH UNSPECIFIED OPIOID-INDUCED DISORDER (HCC): Status: ACTIVE | Noted: 2021-01-01

## 2021-09-28 NOTE — PROGRESS NOTES
END OF SHIFT NOTE:    INTAKE/OUTPUT  07/12 0701 - 07/13 0700  In: 9949 [I.V.:3099]  Out: 600 [Urine:600]  Voiding: YES  Catheter: NO  Drain:              Flatus: Patient does have flatus present. Stool:  1 occurrences. Characteristics:       Emesis: 0 occurrences. Characteristics:        VITAL SIGNS  Patient Vitals for the past 12 hrs:   Temp Pulse Resp BP SpO2   07/13/21 1533 99.1 °F (37.3 °C) 89 18 125/75 97 %   07/13/21 1136 97.7 °F (36.5 °C) 83 18 (!) 106/56 98 %   07/13/21 0717 98.7 °F (37.1 °C) 93 18 111/64 94 %       Pain Assessment  Pain Intensity 1: 7 (07/13/21 1720)  Pain Location 1: Abdomen, Groin  Pain Intervention(s) 1: Medication (see MAR), Environmental changes  Patient Stated Pain Goal: 3    Ambulating  Yes    Shift report given to oncoming nurse at the bedside.     Ken Tijerina RN See Ultrasound Report

## 2021-10-15 PROBLEM — D69.6 THROMBOCYTOPENIA, UNSPECIFIED (HCC): Status: ACTIVE | Noted: 2021-01-01

## 2021-11-29 NOTE — PROGRESS NOTES
Arrived to the [facility]. Feraheme infusion completed. Patient tolerated well. Any issues or concerns during appointment: NO.  Patient aware of next infusion appointment on 12/06/21 (date) at [phone] (time). Patient aware of next lab and [facility] office visit on 12/09/21 (date) at 1200 (time). Discharged ambulatory.

## 2021-12-13 NOTE — PROGRESS NOTES
Arrived to the [facility]. Allan completed. Patient tolerated well. Any issues or concerns during appointment: none. Patient to follow up with MD regarding any future infusion appts. Discharged ambulatory to home.

## 2022-01-01 ENCOUNTER — APPOINTMENT (OUTPATIENT)
Dept: GENERAL RADIOLOGY | Age: 62
DRG: 871 | End: 2022-01-01
Attending: INTERNAL MEDICINE
Payer: MEDICARE

## 2022-01-01 ENCOUNTER — APPOINTMENT (OUTPATIENT)
Dept: GENERAL RADIOLOGY | Age: 62
DRG: 871 | End: 2022-01-01
Attending: EMERGENCY MEDICINE
Payer: MEDICARE

## 2022-01-01 ENCOUNTER — APPOINTMENT (OUTPATIENT)
Dept: GENERAL RADIOLOGY | Age: 62
DRG: 871 | End: 2022-01-01
Attending: FAMILY MEDICINE
Payer: MEDICARE

## 2022-01-01 ENCOUNTER — APPOINTMENT (OUTPATIENT)
Dept: GENERAL RADIOLOGY | Age: 62
DRG: 291 | End: 2022-01-01
Attending: NURSE PRACTITIONER
Payer: MEDICARE

## 2022-01-01 ENCOUNTER — HOME HEALTH ADMISSION (OUTPATIENT)
Dept: HOME HEALTH SERVICES | Facility: HOME HEALTH | Age: 62
End: 2022-01-01

## 2022-01-01 ENCOUNTER — HOSPITAL ENCOUNTER (INPATIENT)
Age: 62
LOS: 5 days | Discharge: HOME HEALTH | DRG: 871 | End: 2022-03-14
Attending: EMERGENCY MEDICINE | Admitting: FAMILY MEDICINE
Payer: MEDICARE

## 2022-01-01 ENCOUNTER — APPOINTMENT (OUTPATIENT)
Dept: MRI IMAGING | Age: 62
DRG: 871 | End: 2022-01-01
Attending: INTERNAL MEDICINE
Payer: MEDICARE

## 2022-01-01 ENCOUNTER — HOSPITAL ENCOUNTER (INPATIENT)
Age: 62
LOS: 5 days | End: 2022-04-05
Attending: INTERNAL MEDICINE | Admitting: INTERNAL MEDICINE

## 2022-01-01 ENCOUNTER — APPOINTMENT (OUTPATIENT)
Dept: GENERAL RADIOLOGY | Age: 62
DRG: 291 | End: 2022-01-01
Attending: INTERNAL MEDICINE
Payer: MEDICARE

## 2022-01-01 ENCOUNTER — APPOINTMENT (OUTPATIENT)
Dept: GENERAL RADIOLOGY | Age: 62
DRG: 291 | End: 2022-01-01
Attending: STUDENT IN AN ORGANIZED HEALTH CARE EDUCATION/TRAINING PROGRAM
Payer: MEDICARE

## 2022-01-01 ENCOUNTER — HOSPITAL ENCOUNTER (INPATIENT)
Age: 62
LOS: 6 days | Discharge: OTHER INSTITUTION | DRG: 291 | End: 2022-03-25
Attending: EMERGENCY MEDICINE | Admitting: HOSPITALIST
Payer: MEDICARE

## 2022-01-01 ENCOUNTER — HOSPITAL ENCOUNTER (INPATIENT)
Age: 62
LOS: 4 days | Discharge: HOME | DRG: 871 | End: 2022-02-06
Attending: EMERGENCY MEDICINE | Admitting: FAMILY MEDICINE
Payer: MEDICARE

## 2022-01-01 ENCOUNTER — HOSPITAL ENCOUNTER (OUTPATIENT)
Dept: LAB | Age: 62
Discharge: HOME | End: 2022-02-08
Payer: MEDICARE

## 2022-01-01 ENCOUNTER — APPOINTMENT (OUTPATIENT)
Dept: NON INVASIVE DIAGNOSTICS | Age: 62
DRG: 871 | End: 2022-01-01
Attending: NURSE PRACTITIONER
Payer: MEDICARE

## 2022-01-01 ENCOUNTER — APPOINTMENT (OUTPATIENT)
Dept: CT IMAGING | Age: 62
DRG: 871 | End: 2022-01-01
Attending: EMERGENCY MEDICINE
Payer: MEDICARE

## 2022-01-01 ENCOUNTER — APPOINTMENT (OUTPATIENT)
Dept: CT IMAGING | Age: 62
DRG: 871 | End: 2022-01-01
Attending: FAMILY MEDICINE
Payer: MEDICARE

## 2022-01-01 ENCOUNTER — HOSPITAL ENCOUNTER (INPATIENT)
Age: 62
LOS: 8 days | Discharge: HOME HEALTH | DRG: 871 | End: 2022-03-07
Attending: EMERGENCY MEDICINE | Admitting: FAMILY MEDICINE
Payer: MEDICARE

## 2022-01-01 ENCOUNTER — APPOINTMENT (OUTPATIENT)
Dept: CT IMAGING | Age: 62
DRG: 291 | End: 2022-01-01
Attending: INTERNAL MEDICINE
Payer: MEDICARE

## 2022-01-01 ENCOUNTER — HOSPITAL ENCOUNTER (INPATIENT)
Age: 62
LOS: 6 days | Discharge: HOSPICE | DRG: 291 | End: 2022-03-31
Attending: FAMILY MEDICINE | Admitting: FAMILY MEDICINE
Payer: MEDICARE

## 2022-01-01 ENCOUNTER — HOSPICE ADMISSION (OUTPATIENT)
Dept: HOSPICE | Facility: HOSPICE | Age: 62
End: 2022-01-01
Payer: MEDICARE

## 2022-01-01 ENCOUNTER — APPOINTMENT (OUTPATIENT)
Dept: GENERAL RADIOLOGY | Age: 62
DRG: 291 | End: 2022-01-01
Attending: EMERGENCY MEDICINE
Payer: MEDICARE

## 2022-01-01 ENCOUNTER — APPOINTMENT (OUTPATIENT)
Dept: ULTRASOUND IMAGING | Age: 62
DRG: 291 | End: 2022-01-01
Attending: INTERNAL MEDICINE
Payer: MEDICARE

## 2022-01-01 ENCOUNTER — APPOINTMENT (OUTPATIENT)
Dept: CT IMAGING | Age: 62
DRG: 871 | End: 2022-01-01
Attending: INTERNAL MEDICINE
Payer: MEDICARE

## 2022-01-01 ENCOUNTER — APPOINTMENT (OUTPATIENT)
Dept: NON INVASIVE DIAGNOSTICS | Age: 62
DRG: 291 | End: 2022-01-01
Attending: FAMILY MEDICINE
Payer: MEDICARE

## 2022-01-01 ENCOUNTER — APPOINTMENT (OUTPATIENT)
Dept: CT IMAGING | Age: 62
DRG: 871 | End: 2022-01-01
Attending: PHYSICIAN ASSISTANT
Payer: MEDICARE

## 2022-01-01 ENCOUNTER — APPOINTMENT (OUTPATIENT)
Dept: CT IMAGING | Age: 62
DRG: 291 | End: 2022-01-01
Attending: NURSE PRACTITIONER
Payer: MEDICARE

## 2022-01-01 ENCOUNTER — HOSPITAL ENCOUNTER (OUTPATIENT)
Dept: LAB | Age: 62
Discharge: HOME | End: 2022-01-10
Payer: MEDICARE

## 2022-01-01 VITALS
RESPIRATION RATE: 15 BRPM | WEIGHT: 132.55 LBS | BODY MASS INDEX: 23.48 KG/M2 | TEMPERATURE: 97.3 F | HEART RATE: 89 BPM | HEIGHT: 63 IN | SYSTOLIC BLOOD PRESSURE: 95 MMHG | DIASTOLIC BLOOD PRESSURE: 73 MMHG | OXYGEN SATURATION: 94 %

## 2022-01-01 VITALS
TEMPERATURE: 98.9 F | RESPIRATION RATE: 18 BRPM | OXYGEN SATURATION: 94 % | HEART RATE: 97 BPM | HEIGHT: 63 IN | SYSTOLIC BLOOD PRESSURE: 100 MMHG | BODY MASS INDEX: 23.04 KG/M2 | DIASTOLIC BLOOD PRESSURE: 64 MMHG | WEIGHT: 130 LBS

## 2022-01-01 VITALS
SYSTOLIC BLOOD PRESSURE: 111 MMHG | OXYGEN SATURATION: 97 % | WEIGHT: 139.8 LBS | HEART RATE: 96 BPM | HEIGHT: 62 IN | TEMPERATURE: 98.3 F | DIASTOLIC BLOOD PRESSURE: 71 MMHG | BODY MASS INDEX: 25.73 KG/M2 | RESPIRATION RATE: 16 BRPM

## 2022-01-01 VITALS
WEIGHT: 138.23 LBS | TEMPERATURE: 99 F | HEART RATE: 97 BPM | DIASTOLIC BLOOD PRESSURE: 81 MMHG | OXYGEN SATURATION: 96 % | SYSTOLIC BLOOD PRESSURE: 133 MMHG | RESPIRATION RATE: 12 BRPM | BODY MASS INDEX: 24.49 KG/M2

## 2022-01-01 VITALS
DIASTOLIC BLOOD PRESSURE: 76 MMHG | OXYGEN SATURATION: 97 % | HEART RATE: 79 BPM | RESPIRATION RATE: 16 BRPM | TEMPERATURE: 98.3 F | BODY MASS INDEX: 23.74 KG/M2 | WEIGHT: 129 LBS | HEIGHT: 62 IN | SYSTOLIC BLOOD PRESSURE: 113 MMHG

## 2022-01-01 VITALS
SYSTOLIC BLOOD PRESSURE: 111 MMHG | TEMPERATURE: 98.8 F | RESPIRATION RATE: 28 BRPM | DIASTOLIC BLOOD PRESSURE: 61 MMHG | HEART RATE: 111 BPM

## 2022-01-01 DIAGNOSIS — D75.81 MYELOFIBROSIS (HCC): ICD-10-CM

## 2022-01-01 DIAGNOSIS — E77.8 HYPOPROTEINEMIA (HCC): ICD-10-CM

## 2022-01-01 DIAGNOSIS — D50.9 IRON DEFICIENCY ANEMIA, UNSPECIFIED IRON DEFICIENCY ANEMIA TYPE: ICD-10-CM

## 2022-01-01 DIAGNOSIS — B96.20 E COLI BACTEREMIA: ICD-10-CM

## 2022-01-01 DIAGNOSIS — E87.5 HYPERKALEMIA: ICD-10-CM

## 2022-01-01 DIAGNOSIS — D69.6 THROMBOCYTOPENIA (HCC): ICD-10-CM

## 2022-01-01 DIAGNOSIS — C92.10 CHRONIC MYELOGENOUS LEUKEMIA (HCC): ICD-10-CM

## 2022-01-01 DIAGNOSIS — R54 FRAILTY: ICD-10-CM

## 2022-01-01 DIAGNOSIS — I82.621 ACUTE DEEP VEIN THROMBOSIS (DVT) OF RIGHT UPPER EXTREMITY, UNSPECIFIED VEIN (HCC): ICD-10-CM

## 2022-01-01 DIAGNOSIS — C92.10 CML (CHRONIC MYELOCYTIC LEUKEMIA) (HCC): ICD-10-CM

## 2022-01-01 DIAGNOSIS — I81 PORTAL VEIN THROMBOSIS: ICD-10-CM

## 2022-01-01 DIAGNOSIS — A41.9 SEPSIS, DUE TO UNSPECIFIED ORGANISM, UNSPECIFIED WHETHER ACUTE ORGAN DYSFUNCTION PRESENT (HCC): Primary | ICD-10-CM

## 2022-01-01 DIAGNOSIS — D72.829 LEUKOCYTOSIS, UNSPECIFIED TYPE: ICD-10-CM

## 2022-01-01 DIAGNOSIS — J18.9 HCAP (HEALTHCARE-ASSOCIATED PNEUMONIA): Primary | ICD-10-CM

## 2022-01-01 DIAGNOSIS — R18.8 CIRRHOSIS OF LIVER WITH ASCITES, UNSPECIFIED HEPATIC CIRRHOSIS TYPE (HCC): Chronic | ICD-10-CM

## 2022-01-01 DIAGNOSIS — R50.9 FEVER OF UNKNOWN ORIGIN: ICD-10-CM

## 2022-01-01 DIAGNOSIS — R41.82 ALTERED MENTAL STATUS, UNSPECIFIED ALTERED MENTAL STATUS TYPE: ICD-10-CM

## 2022-01-01 DIAGNOSIS — G89.3 CHRONIC PAIN DUE TO NEOPLASM: ICD-10-CM

## 2022-01-01 DIAGNOSIS — J90 PLEURAL EFFUSION, BILATERAL: ICD-10-CM

## 2022-01-01 DIAGNOSIS — D65 DIC (DISSEMINATED INTRAVASCULAR COAGULATION) (HCC): ICD-10-CM

## 2022-01-01 DIAGNOSIS — B96.20 BACTEREMIA DUE TO ESCHERICHIA COLI: ICD-10-CM

## 2022-01-01 DIAGNOSIS — C92.10 CHRONIC MYELOGENOUS LEUKEMIA (HCC): Chronic | ICD-10-CM

## 2022-01-01 DIAGNOSIS — D45 POLYCYTHEMIA VERA (HCC): Chronic | ICD-10-CM

## 2022-01-01 DIAGNOSIS — I34.0 SEVERE MITRAL REGURGITATION: ICD-10-CM

## 2022-01-01 DIAGNOSIS — R16.1 SPLENOMEGALY: ICD-10-CM

## 2022-01-01 DIAGNOSIS — I33.0 VEGETATIVE ENDOCARDITIS OF MITRAL VALVE: ICD-10-CM

## 2022-01-01 DIAGNOSIS — G93.40 ENCEPHALOPATHY ACUTE: ICD-10-CM

## 2022-01-01 DIAGNOSIS — G00.9: ICD-10-CM

## 2022-01-01 DIAGNOSIS — I85.00 ESOPHAGEAL VARICES WITHOUT BLEEDING, UNSPECIFIED ESOPHAGEAL VARICES TYPE (HCC): Chronic | ICD-10-CM

## 2022-01-01 DIAGNOSIS — K72.01 ACUTE LIVER FAILURE WITH HEPATIC COMA (HCC): ICD-10-CM

## 2022-01-01 DIAGNOSIS — R41.0 CONFUSION AND DISORIENTATION: ICD-10-CM

## 2022-01-01 DIAGNOSIS — R53.81 DEBILITY: ICD-10-CM

## 2022-01-01 DIAGNOSIS — J96.01 ACUTE RESPIRATORY FAILURE WITH HYPOXEMIA (HCC): ICD-10-CM

## 2022-01-01 DIAGNOSIS — I50.31 ACUTE DIASTOLIC (CONGESTIVE) HEART FAILURE (HCC): ICD-10-CM

## 2022-01-01 DIAGNOSIS — D68.69 ACQUIRED HYPERCOAGULABLE STATE (HCC): Chronic | ICD-10-CM

## 2022-01-01 DIAGNOSIS — I61.9: ICD-10-CM

## 2022-01-01 DIAGNOSIS — I34.0 SEVERE MITRAL REGURGITATION BY PRIOR ECHOCARDIOGRAM: ICD-10-CM

## 2022-01-01 DIAGNOSIS — I50.9 ACUTE ON CHRONIC CONGESTIVE HEART FAILURE, UNSPECIFIED HEART FAILURE TYPE (HCC): Primary | ICD-10-CM

## 2022-01-01 DIAGNOSIS — N17.9 ACUTE RENAL FAILURE, UNSPECIFIED ACUTE RENAL FAILURE TYPE (HCC): ICD-10-CM

## 2022-01-01 DIAGNOSIS — I05.9 ENDOCARDITIS OF MITRAL VALVE: ICD-10-CM

## 2022-01-01 DIAGNOSIS — K74.60 CIRRHOSIS OF LIVER WITH ASCITES, UNSPECIFIED HEPATIC CIRRHOSIS TYPE (HCC): Chronic | ICD-10-CM

## 2022-01-01 DIAGNOSIS — R50.9 FEVER, UNSPECIFIED FEVER CAUSE: ICD-10-CM

## 2022-01-01 DIAGNOSIS — Z71.89 ADVANCED CARE PLANNING/COUNSELING DISCUSSION: ICD-10-CM

## 2022-01-01 DIAGNOSIS — R41.89: ICD-10-CM

## 2022-01-01 DIAGNOSIS — R78.81 BACTEREMIA DUE TO ESCHERICHIA COLI: ICD-10-CM

## 2022-01-01 DIAGNOSIS — A41.9 SEPSIS WITHOUT ACUTE ORGAN DYSFUNCTION, DUE TO UNSPECIFIED ORGANISM (HCC): Primary | ICD-10-CM

## 2022-01-01 DIAGNOSIS — Z51.5 ENCOUNTER FOR PALLIATIVE CARE: ICD-10-CM

## 2022-01-01 DIAGNOSIS — R78.81 E COLI BACTEREMIA: ICD-10-CM

## 2022-01-01 LAB
25(OH)D3 SERPL-MCNC: 15.5 NG/ML (ref 30–100)
ABO + RH BLD: NORMAL
ACC. NO. FROM MICRO ORDER, ACCP: ABNORMAL
ACC. NO. FROM MICRO ORDER, ACCP: ABNORMAL
ALBUMIN SERPL-MCNC: 1.8 G/DL (ref 3.2–4.6)
ALBUMIN SERPL-MCNC: 1.9 G/DL (ref 3.2–4.6)
ALBUMIN SERPL-MCNC: 2.1 G/DL (ref 3.2–4.6)
ALBUMIN SERPL-MCNC: 2.2 G/DL (ref 3.2–4.6)
ALBUMIN SERPL-MCNC: 2.5 G/DL (ref 3.2–4.6)
ALBUMIN SERPL-MCNC: 2.5 G/DL (ref 3.2–4.6)
ALBUMIN SERPL-MCNC: 2.9 G/DL (ref 3.2–4.6)
ALBUMIN SERPL-MCNC: 3.1 G/DL (ref 3.2–4.6)
ALBUMIN SERPL-MCNC: 3.2 G/DL (ref 3.2–4.6)
ALBUMIN SERPL-MCNC: 3.2 G/DL (ref 3.2–4.6)
ALBUMIN SERPL-MCNC: 3.3 G/DL (ref 3.2–4.6)
ALBUMIN SERPL-MCNC: 3.3 G/DL (ref 3.2–4.6)
ALBUMIN SERPL-MCNC: 3.4 G/DL (ref 3.2–4.6)
ALBUMIN SERPL-MCNC: 3.5 G/DL (ref 3.2–4.6)
ALBUMIN SERPL-MCNC: 3.7 G/DL (ref 3.2–4.6)
ALBUMIN/GLOB SERPL: 0.5 {RATIO} (ref 1.2–3.5)
ALBUMIN/GLOB SERPL: 0.6 {RATIO} (ref 1.2–3.5)
ALBUMIN/GLOB SERPL: 0.6 {RATIO} (ref 1.2–3.5)
ALBUMIN/GLOB SERPL: 0.7 {RATIO} (ref 1.2–3.5)
ALBUMIN/GLOB SERPL: 0.7 {RATIO} (ref 1.2–3.5)
ALBUMIN/GLOB SERPL: 0.8 {RATIO} (ref 1.2–3.5)
ALBUMIN/GLOB SERPL: 0.9 {RATIO} (ref 1.2–3.5)
ALBUMIN/GLOB SERPL: 1 {RATIO} (ref 1.2–3.5)
ALBUMIN/GLOB SERPL: 1.2 {RATIO} (ref 1.2–3.5)
ALP SERPL-CCNC: 102 U/L (ref 50–136)
ALP SERPL-CCNC: 103 U/L (ref 50–130)
ALP SERPL-CCNC: 103 U/L (ref 50–136)
ALP SERPL-CCNC: 114 U/L (ref 50–130)
ALP SERPL-CCNC: 115 U/L (ref 50–130)
ALP SERPL-CCNC: 117 U/L (ref 50–130)
ALP SERPL-CCNC: 117 U/L (ref 50–136)
ALP SERPL-CCNC: 131 U/L (ref 50–136)
ALP SERPL-CCNC: 143 U/L (ref 50–136)
ALP SERPL-CCNC: 149 U/L (ref 50–136)
ALP SERPL-CCNC: 158 U/L (ref 50–136)
ALP SERPL-CCNC: 169 U/L (ref 50–136)
ALP SERPL-CCNC: 193 U/L (ref 50–136)
ALP SERPL-CCNC: 96 U/L (ref 50–136)
ALP SERPL-CCNC: 99 U/L (ref 50–130)
ALT SERPL-CCNC: 1385 U/L (ref 12–65)
ALT SERPL-CCNC: 150 U/L (ref 12–65)
ALT SERPL-CCNC: 17 U/L (ref 12–65)
ALT SERPL-CCNC: 17 U/L (ref 12–65)
ALT SERPL-CCNC: 1729 U/L (ref 12–65)
ALT SERPL-CCNC: 18 U/L (ref 12–65)
ALT SERPL-CCNC: 22 U/L (ref 12–65)
ALT SERPL-CCNC: 28 U/L (ref 12–65)
ALT SERPL-CCNC: 31 U/L (ref 12–65)
ALT SERPL-CCNC: 32 U/L (ref 12–65)
ALT SERPL-CCNC: 38 U/L (ref 12–65)
ALT SERPL-CCNC: 49 U/L (ref 12–65)
ALT SERPL-CCNC: 49 U/L (ref 12–65)
ALT SERPL-CCNC: 58 U/L (ref 12–65)
ALT SERPL-CCNC: ABNORMAL U/L (ref 12–65)
AMMONIA PLAS-SCNC: 84 UMOL/L (ref 11–32)
ANION GAP SERPL CALC-SCNC: 12 MMOL/L (ref 7–16)
ANION GAP SERPL CALC-SCNC: 14 MMOL/L (ref 7–16)
ANION GAP SERPL CALC-SCNC: 15 MMOL/L (ref 7–16)
ANION GAP SERPL CALC-SCNC: 22 MMOL/L (ref 7–16)
ANION GAP SERPL CALC-SCNC: 25 MMOL/L (ref 7–16)
ANION GAP SERPL CALC-SCNC: 29 MMOL/L (ref 7–16)
ANION GAP SERPL CALC-SCNC: 5 MMOL/L (ref 7–16)
ANION GAP SERPL CALC-SCNC: 6 MMOL/L (ref 7–16)
ANION GAP SERPL CALC-SCNC: 7 MMOL/L (ref 7–16)
ANION GAP SERPL CALC-SCNC: 8 MMOL/L (ref 7–16)
ANION GAP SERPL CALC-SCNC: 8 MMOL/L (ref 7–16)
ANION GAP SERPL CALC-SCNC: 9 MMOL/L (ref 7–16)
APPEARANCE FLD: NORMAL
APPEARANCE UR: ABNORMAL
APPEARANCE UR: CLEAR
APTT PPP: 34.7 SEC (ref 24.1–35.1)
APTT PPP: 37.1 SEC (ref 24.1–35.1)
APTT PPP: 37.5 SEC (ref 24.1–35.1)
APTT PPP: 37.7 SEC (ref 24.1–35.1)
APTT PPP: 38.5 SEC (ref 24.1–35.1)
APTT PPP: 41.5 SEC (ref 24.1–35.1)
APTT PPP: 44.1 SEC (ref 24.1–35.1)
APTT PPP: 46 SEC (ref 24.1–35.1)
APTT PPP: 54.2 SEC (ref 24.1–35.1)
ARTERIAL PATENCY WRIST A: POSITIVE
AST SERPL-CCNC: 1606 U/L (ref 15–37)
AST SERPL-CCNC: 19 U/L (ref 15–37)
AST SERPL-CCNC: 23 U/L (ref 15–37)
AST SERPL-CCNC: 24 U/L (ref 15–37)
AST SERPL-CCNC: 24 U/L (ref 15–37)
AST SERPL-CCNC: 244 U/L (ref 15–37)
AST SERPL-CCNC: 25 U/L (ref 15–37)
AST SERPL-CCNC: 25 U/L (ref 15–37)
AST SERPL-CCNC: 26 U/L (ref 15–37)
AST SERPL-CCNC: 2971 U/L (ref 15–37)
AST SERPL-CCNC: 39 U/L (ref 15–37)
AST SERPL-CCNC: 41 U/L (ref 15–37)
AST SERPL-CCNC: 47 U/L (ref 15–37)
AST SERPL-CCNC: 51 U/L (ref 15–37)
AST SERPL-CCNC: ABNORMAL U/L (ref 15–37)
ATRIAL RATE: 109 BPM
ATRIAL RATE: 114 BPM
ATRIAL RATE: 81 BPM
ATRIAL RATE: 88 BPM
ATRIAL RATE: 97 BPM
BACTERIA SPEC CULT: ABNORMAL
BACTERIA SPEC CULT: NORMAL
BACTERIA URNS QL MICRO: 0 /HPF
BACTERIA URNS QL MICRO: ABNORMAL /HPF
BACTERIA URNS QL MICRO: NORMAL /HPF
BASE DEFICIT BLDV-SCNC: 9.4 MMOL/L
BASOPHILS # BLD: 0 K/UL (ref 0–0.2)
BASOPHILS # BLD: 0.1 K/UL (ref 0–0.2)
BASOPHILS NFR BLD: 0 % (ref 0–2)
BASOPHILS NFR BLD: 1 % (ref 0–2)
BDY SITE: ABNORMAL
BILIRUB DIRECT SERPL-MCNC: 0.4 MG/DL
BILIRUB DIRECT SERPL-MCNC: 3 MG/DL
BILIRUB DIRECT SERPL-MCNC: 4.2 MG/DL
BILIRUB INDIRECT SERPL-MCNC: 0.8 MG/DL (ref 0–1.1)
BILIRUB INDIRECT SERPL-MCNC: 2.4 MG/DL (ref 0–1.1)
BILIRUB SERPL-MCNC: 0.6 MG/DL (ref 0.2–1.1)
BILIRUB SERPL-MCNC: 0.7 MG/DL (ref 0.2–1.1)
BILIRUB SERPL-MCNC: 0.7 MG/DL (ref 0.2–1.1)
BILIRUB SERPL-MCNC: 0.8 MG/DL (ref 0.2–1.1)
BILIRUB SERPL-MCNC: 1.2 MG/DL (ref 0.2–1.1)
BILIRUB SERPL-MCNC: 1.2 MG/DL (ref 0.2–1.1)
BILIRUB SERPL-MCNC: 1.4 MG/DL (ref 0.2–1.1)
BILIRUB SERPL-MCNC: 1.4 MG/DL (ref 0.2–1.1)
BILIRUB SERPL-MCNC: 1.5 MG/DL (ref 0.2–1.1)
BILIRUB SERPL-MCNC: 1.6 MG/DL (ref 0.2–1.1)
BILIRUB SERPL-MCNC: 2.1 MG/DL (ref 0.2–1.1)
BILIRUB SERPL-MCNC: 4.9 MG/DL (ref 0.2–1.1)
BILIRUB SERPL-MCNC: 6 MG/DL (ref 0.2–1.1)
BILIRUB SERPL-MCNC: 6.6 MG/DL (ref 0.2–1.1)
BILIRUB SERPL-MCNC: 7.6 MG/DL (ref 0.2–1.1)
BILIRUB UR QL: NEGATIVE
BILIRUB UR QL: NEGATIVE
BLD PROD TYP BPU: NORMAL
BLOOD GROUP ANTIBODIES SERPL: NORMAL
BNP SERPL-MCNC: 4430 PG/ML (ref 5–125)
BNP SERPL-MCNC: ABNORMAL PG/ML (ref 5–125)
BPU ID: NORMAL
BUN SERPL-MCNC: 10 MG/DL (ref 8–23)
BUN SERPL-MCNC: 10 MG/DL (ref 8–23)
BUN SERPL-MCNC: 12 MG/DL (ref 8–23)
BUN SERPL-MCNC: 13 MG/DL (ref 8–23)
BUN SERPL-MCNC: 14 MG/DL (ref 8–23)
BUN SERPL-MCNC: 14 MG/DL (ref 8–23)
BUN SERPL-MCNC: 15 MG/DL (ref 8–23)
BUN SERPL-MCNC: 18 MG/DL (ref 8–23)
BUN SERPL-MCNC: 18 MG/DL (ref 8–23)
BUN SERPL-MCNC: 19 MG/DL (ref 8–23)
BUN SERPL-MCNC: 19 MG/DL (ref 8–23)
BUN SERPL-MCNC: 20 MG/DL (ref 8–23)
BUN SERPL-MCNC: 22 MG/DL (ref 8–23)
BUN SERPL-MCNC: 24 MG/DL (ref 8–23)
BUN SERPL-MCNC: 39 MG/DL (ref 8–23)
BUN SERPL-MCNC: 5 MG/DL (ref 8–23)
BUN SERPL-MCNC: 50 MG/DL (ref 8–23)
BUN SERPL-MCNC: 54 MG/DL (ref 8–23)
BUN SERPL-MCNC: 58 MG/DL (ref 8–23)
BUN SERPL-MCNC: 6 MG/DL (ref 8–23)
BUN SERPL-MCNC: 6 MG/DL (ref 8–23)
BUN SERPL-MCNC: 72 MG/DL (ref 8–23)
BUN SERPL-MCNC: 8 MG/DL (ref 8–23)
BUN SERPL-MCNC: 9 MG/DL (ref 8–23)
BUN SERPL-MCNC: 9 MG/DL (ref 8–23)
CALCIUM SERPL-MCNC: 7.5 MG/DL (ref 8.3–10.4)
CALCIUM SERPL-MCNC: 7.5 MG/DL (ref 8.3–10.4)
CALCIUM SERPL-MCNC: 7.6 MG/DL (ref 8.3–10.4)
CALCIUM SERPL-MCNC: 7.6 MG/DL (ref 8.3–10.4)
CALCIUM SERPL-MCNC: 7.7 MG/DL (ref 8.3–10.4)
CALCIUM SERPL-MCNC: 7.8 MG/DL (ref 8.3–10.4)
CALCIUM SERPL-MCNC: 7.8 MG/DL (ref 8.3–10.4)
CALCIUM SERPL-MCNC: 8 MG/DL (ref 8.3–10.4)
CALCIUM SERPL-MCNC: 8.1 MG/DL (ref 8.3–10.4)
CALCIUM SERPL-MCNC: 8.2 MG/DL (ref 8.3–10.4)
CALCIUM SERPL-MCNC: 8.3 MG/DL (ref 8.3–10.4)
CALCIUM SERPL-MCNC: 8.3 MG/DL (ref 8.3–10.4)
CALCIUM SERPL-MCNC: 8.4 MG/DL (ref 8.3–10.4)
CALCIUM SERPL-MCNC: 8.4 MG/DL (ref 8.3–10.4)
CALCIUM SERPL-MCNC: 8.5 MG/DL (ref 8.3–10.4)
CALCIUM SERPL-MCNC: 8.6 MG/DL (ref 8.3–10.4)
CALCIUM SERPL-MCNC: 8.6 MG/DL (ref 8.3–10.4)
CALCIUM SERPL-MCNC: 8.8 MG/DL (ref 8.3–10.4)
CALCIUM SERPL-MCNC: 8.8 MG/DL (ref 8.3–10.4)
CALCIUM SERPL-MCNC: 8.9 MG/DL (ref 8.3–10.4)
CALCIUM SERPL-MCNC: 9 MG/DL (ref 8.3–10.4)
CALCIUM SERPL-MCNC: 9.2 MG/DL (ref 8.3–10.4)
CALCIUM SERPL-MCNC: 9.6 MG/DL (ref 8.3–10.4)
CALCULATED P AXIS, ECG09: 56 DEGREES
CALCULATED P AXIS, ECG09: 67 DEGREES
CALCULATED P AXIS, ECG09: 73 DEGREES
CALCULATED P AXIS, ECG09: 73 DEGREES
CALCULATED P AXIS, ECG09: 82 DEGREES
CALCULATED R AXIS, ECG10: 122 DEGREES
CALCULATED R AXIS, ECG10: 48 DEGREES
CALCULATED R AXIS, ECG10: 67 DEGREES
CALCULATED R AXIS, ECG10: 69 DEGREES
CALCULATED R AXIS, ECG10: 87 DEGREES
CALCULATED T AXIS, ECG11: -2 DEGREES
CALCULATED T AXIS, ECG11: 48 DEGREES
CALCULATED T AXIS, ECG11: 55 DEGREES
CALCULATED T AXIS, ECG11: 60 DEGREES
CALCULATED T AXIS, ECG11: 70 DEGREES
CASTS URNS QL MICRO: 0 /LPF
CASTS URNS QL MICRO: ABNORMAL /LPF
CASTS URNS QL MICRO: NORMAL /LPF
CHLORIDE SERPL-SCNC: 100 MMOL/L (ref 98–107)
CHLORIDE SERPL-SCNC: 102 MMOL/L (ref 98–107)
CHLORIDE SERPL-SCNC: 103 MMOL/L (ref 98–107)
CHLORIDE SERPL-SCNC: 104 MMOL/L (ref 98–107)
CHLORIDE SERPL-SCNC: 105 MMOL/L (ref 98–107)
CHLORIDE SERPL-SCNC: 106 MMOL/L (ref 98–107)
CHLORIDE SERPL-SCNC: 107 MMOL/L (ref 98–107)
CHLORIDE SERPL-SCNC: 108 MMOL/L (ref 98–107)
CHLORIDE SERPL-SCNC: 95 MMOL/L (ref 98–107)
CHLORIDE SERPL-SCNC: 98 MMOL/L (ref 98–107)
CHLORIDE SERPL-SCNC: 98 MMOL/L (ref 98–107)
CHLORIDE SERPL-SCNC: 99 MMOL/L (ref 98–107)
CHLORIDE SERPL-SCNC: 99 MMOL/L (ref 98–107)
CO2 BLD-SCNC: 16 MMOL/L (ref 13–23)
CO2 SERPL-SCNC: 13 MMOL/L (ref 21–32)
CO2 SERPL-SCNC: 19 MMOL/L (ref 21–32)
CO2 SERPL-SCNC: 20 MMOL/L (ref 21–32)
CO2 SERPL-SCNC: 22 MMOL/L (ref 21–32)
CO2 SERPL-SCNC: 23 MMOL/L (ref 21–32)
CO2 SERPL-SCNC: 24 MMOL/L (ref 21–32)
CO2 SERPL-SCNC: 25 MMOL/L (ref 21–32)
CO2 SERPL-SCNC: 26 MMOL/L (ref 21–32)
CO2 SERPL-SCNC: 27 MMOL/L (ref 21–32)
CO2 SERPL-SCNC: 28 MMOL/L (ref 21–32)
CO2 SERPL-SCNC: 29 MMOL/L (ref 21–32)
CO2 SERPL-SCNC: 8 MMOL/L (ref 21–32)
CO2 SERPL-SCNC: 8 MMOL/L (ref 21–32)
COLLECTION COMMENT, COLCM: NORMAL
COLOR FLD: COLORLESS
COLOR UR: ABNORMAL
COLOR UR: ABNORMAL
CORTIS AM PEAK SERPL-MCNC: 133.4 UG/DL (ref 7–25)
COVID-19 RAPID TEST, COVR: NOT DETECTED
CREAT SERPL-MCNC: 0.31 MG/DL (ref 0.6–1)
CREAT SERPL-MCNC: 0.41 MG/DL (ref 0.6–1)
CREAT SERPL-MCNC: 0.42 MG/DL (ref 0.6–1)
CREAT SERPL-MCNC: 0.42 MG/DL (ref 0.6–1)
CREAT SERPL-MCNC: 0.43 MG/DL (ref 0.6–1)
CREAT SERPL-MCNC: 0.45 MG/DL (ref 0.6–1)
CREAT SERPL-MCNC: 0.5 MG/DL (ref 0.6–1)
CREAT SERPL-MCNC: 0.54 MG/DL (ref 0.6–1)
CREAT SERPL-MCNC: 0.54 MG/DL (ref 0.6–1)
CREAT SERPL-MCNC: 0.6 MG/DL (ref 0.6–1)
CREAT SERPL-MCNC: 0.6 MG/DL (ref 0.6–1)
CREAT SERPL-MCNC: 0.62 MG/DL (ref 0.6–1)
CREAT SERPL-MCNC: 0.65 MG/DL (ref 0.6–1)
CREAT SERPL-MCNC: 0.69 MG/DL (ref 0.6–1)
CREAT SERPL-MCNC: 0.69 MG/DL (ref 0.6–1)
CREAT SERPL-MCNC: 0.7 MG/DL (ref 0.6–1)
CREAT SERPL-MCNC: 0.72 MG/DL (ref 0.6–1)
CREAT SERPL-MCNC: 0.72 MG/DL (ref 0.6–1)
CREAT SERPL-MCNC: 0.74 MG/DL (ref 0.6–1)
CREAT SERPL-MCNC: 0.79 MG/DL (ref 0.6–1)
CREAT SERPL-MCNC: 0.8 MG/DL (ref 0.6–1)
CREAT SERPL-MCNC: 0.82 MG/DL (ref 0.6–1)
CREAT SERPL-MCNC: 0.84 MG/DL (ref 0.6–1)
CREAT SERPL-MCNC: 0.85 MG/DL (ref 0.6–1)
CREAT SERPL-MCNC: 0.85 MG/DL (ref 0.6–1)
CREAT SERPL-MCNC: 0.88 MG/DL (ref 0.6–1)
CREAT SERPL-MCNC: 1 MG/DL (ref 0.6–1)
CREAT SERPL-MCNC: 1.04 MG/DL (ref 0.6–1)
CREAT SERPL-MCNC: 1.2 MG/DL (ref 0.6–1)
CREAT SERPL-MCNC: 1.5 MG/DL (ref 0.6–1)
CREAT SERPL-MCNC: 1.9 MG/DL (ref 0.6–1)
CREAT SERPL-MCNC: 1.9 MG/DL (ref 0.6–1)
CREAT SERPL-MCNC: 2.1 MG/DL (ref 0.6–1)
CREAT SERPL-MCNC: 2.2 MG/DL (ref 0.6–1)
CRP SERPL-MCNC: 5.3 MG/DL (ref 0–0.9)
CRP SERPL-MCNC: 7.6 MG/DL (ref 0–0.9)
CRYSTALS URNS QL MICRO: 0 /LPF
CRYSTALS URNS QL MICRO: ABNORMAL /LPF
D DIMER PPP FEU-MCNC: 6.58 UG/ML(FEU)
D DIMER PPP FEU-MCNC: >20 UG/ML(FEU)
DIAGNOSIS, 93000: NORMAL
DIFFERENTIAL METHOD BLD: ABNORMAL
ECHO AO ASC DIAM: 3 CM
ECHO AO ASC DIAM: 3.1 CM
ECHO AO ASCENDING AORTA INDEX: 1.89 CM/M2
ECHO AO ASCENDING AORTA INDEX: 1.91 CM/M2
ECHO AO ROOT DIAM: 2.6 CM
ECHO AO ROOT DIAM: 2.7 CM
ECHO AO ROOT INDEX: 1.64 CM/M2
ECHO AO ROOT INDEX: 1.67 CM/M2
ECHO AV AREA PEAK VELOCITY: 1.8 CM2
ECHO AV AREA PEAK VELOCITY: 2.5 CM2
ECHO AV AREA VTI: 1.7 CM2
ECHO AV AREA VTI: 2.1 CM2
ECHO AV AREA/BSA PEAK VELOCITY: 1.1 CM2/M2
ECHO AV AREA/BSA PEAK VELOCITY: 1.5 CM2/M2
ECHO AV AREA/BSA VTI: 1.1 CM2/M2
ECHO AV AREA/BSA VTI: 1.3 CM2/M2
ECHO AV MEAN GRADIENT: 17 MMHG
ECHO AV MEAN GRADIENT: 4 MMHG
ECHO AV MEAN VELOCITY: 1 M/S
ECHO AV MEAN VELOCITY: 2 M/S
ECHO AV PEAK GRADIENT: 28 MMHG
ECHO AV PEAK GRADIENT: 7 MMHG
ECHO AV PEAK VELOCITY: 1.3 M/S
ECHO AV PEAK VELOCITY: 2.7 M/S
ECHO AV VELOCITY RATIO: 0.7
ECHO AV VELOCITY RATIO: 1
ECHO AV VTI: 20.1 CM
ECHO AV VTI: 54.2 CM
ECHO EST RA PRESSURE: 15 MMHG
ECHO EST RA PRESSURE: 3 MMHG
ECHO IVC EXP: 2.1 CM
ECHO IVC EXP: 2.5 CM
ECHO LA AREA 2C: 21.2 CM2
ECHO LA AREA 2C: 23.6 CM2
ECHO LA AREA 4C: 24.3 CM2
ECHO LA AREA 4C: 24.3 CM2
ECHO LA DIAMETER INDEX: 2.45 CM/M2
ECHO LA DIAMETER INDEX: 3.02 CM/M2
ECHO LA DIAMETER: 3.9 CM
ECHO LA DIAMETER: 4.9 CM
ECHO LA MAJOR AXIS: 6.1 CM
ECHO LA MAJOR AXIS: 6.5 CM
ECHO LA MINOR AXIS: 5.5 CM
ECHO LA MINOR AXIS: 5.7 CM
ECHO LA TO AORTIC ROOT RATIO: 1.5
ECHO LA TO AORTIC ROOT RATIO: 1.81
ECHO LA VOL BP: 76 ML (ref 22–52)
ECHO LA VOL BP: 79 ML (ref 22–52)
ECHO LA VOL/BSA BIPLANE: 48 ML/M2 (ref 16–34)
ECHO LA VOL/BSA BIPLANE: 49 ML/M2 (ref 16–34)
ECHO LV E' LATERAL VELOCITY: 8 CM/S
ECHO LV E' LATERAL VELOCITY: 8 CM/S
ECHO LV E' SEPTAL VELOCITY: 6 CM/S
ECHO LV E' SEPTAL VELOCITY: 9 CM/S
ECHO LV EDV A2C: 131 ML
ECHO LV EDV A2C: 78 ML
ECHO LV EDV A4C: 103 ML
ECHO LV EDV A4C: 105 ML
ECHO LV EDV BP: 94 ML (ref 56–104)
ECHO LV EDV INDEX A4C: 65 ML/M2
ECHO LV EDV INDEX A4C: 65 ML/M2
ECHO LV EDV INDEX BP: 59 ML/M2
ECHO LV EDV NDEX A2C: 49 ML/M2
ECHO LV EDV NDEX A2C: 81 ML/M2
ECHO LV EJECTION FRACTION A2C: 62 %
ECHO LV EJECTION FRACTION A2C: 62 %
ECHO LV EJECTION FRACTION A4C: 61 %
ECHO LV EJECTION FRACTION A4C: 63 %
ECHO LV EJECTION FRACTION BIPLANE: 60 % (ref 55–100)
ECHO LV EJECTION FRACTION BIPLANE: 62 % (ref 55–100)
ECHO LV ESV A2C: 30 ML
ECHO LV ESV A2C: 49 ML
ECHO LV ESV A4C: 39 ML
ECHO LV ESV A4C: 41 ML
ECHO LV ESV INDEX A2C: 19 ML/M2
ECHO LV ESV INDEX A2C: 30 ML/M2
ECHO LV ESV INDEX A4C: 25 ML/M2
ECHO LV ESV INDEX A4C: 25 ML/M2
ECHO LV FRACTIONAL SHORTENING: 31 % (ref 28–44)
ECHO LV FRACTIONAL SHORTENING: 31 % (ref 28–44)
ECHO LV INTERNAL DIMENSION DIASTOLE INDEX: 2.78 CM/M2
ECHO LV INTERNAL DIMENSION DIASTOLE INDEX: 3.02 CM/M2
ECHO LV INTERNAL DIMENSION DIASTOLIC: 4.5 CM (ref 3.9–5.3)
ECHO LV INTERNAL DIMENSION DIASTOLIC: 4.8 CM (ref 3.9–5.3)
ECHO LV INTERNAL DIMENSION SYSTOLIC INDEX: 1.91 CM/M2
ECHO LV INTERNAL DIMENSION SYSTOLIC INDEX: 2.08 CM/M2
ECHO LV INTERNAL DIMENSION SYSTOLIC: 3.1 CM
ECHO LV INTERNAL DIMENSION SYSTOLIC: 3.3 CM
ECHO LV IVSD: 0.9 CM (ref 0.6–0.9)
ECHO LV IVSD: 1 CM (ref 0.6–0.9)
ECHO LV MASS 2D: 153.3 G (ref 67–162)
ECHO LV MASS 2D: 170.2 G (ref 67–162)
ECHO LV MASS INDEX 2D: 107 G/M2 (ref 43–95)
ECHO LV MASS INDEX 2D: 94.6 G/M2 (ref 43–95)
ECHO LV POSTERIOR WALL DIASTOLIC: 1 CM (ref 0.6–0.9)
ECHO LV POSTERIOR WALL DIASTOLIC: 1.1 CM (ref 0.6–0.9)
ECHO LV RELATIVE WALL THICKNESS RATIO: 0.44
ECHO LV RELATIVE WALL THICKNESS RATIO: 0.46
ECHO LVOT AREA: 2.5 CM2
ECHO LVOT AREA: 2.5 CM2
ECHO LVOT AV VTI INDEX: 0.69
ECHO LVOT AV VTI INDEX: 0.84
ECHO LVOT DIAM: 1.8 CM
ECHO LVOT DIAM: 1.8 CM
ECHO LVOT MEAN GRADIENT: 3 MMHG
ECHO LVOT MEAN GRADIENT: 8 MMHG
ECHO LVOT PEAK GRADIENT: 15 MMHG
ECHO LVOT PEAK GRADIENT: 7 MMHG
ECHO LVOT PEAK VELOCITY: 1.3 M/S
ECHO LVOT PEAK VELOCITY: 1.9 M/S
ECHO LVOT STROKE VOLUME INDEX: 26.5 ML/M2
ECHO LVOT STROKE VOLUME INDEX: 59.5 ML/M2
ECHO LVOT SV: 43 ML
ECHO LVOT SV: 94.6 ML
ECHO LVOT VTI: 16.9 CM
ECHO LVOT VTI: 37.2 CM
ECHO MV A VELOCITY: 1.23 M/S
ECHO MV A VELOCITY: 1.27 M/S
ECHO MV AREA VTI: 2.8 CM2
ECHO MV E DECELERATION TIME (DT): 150 MS
ECHO MV E DECELERATION TIME (DT): 227 MS
ECHO MV E VELOCITY: 1.44 M/S
ECHO MV E VELOCITY: 1.55 M/S
ECHO MV E/A RATIO: 1.13
ECHO MV E/A RATIO: 1.26
ECHO MV E/E' LATERAL: 18
ECHO MV E/E' LATERAL: 19.38
ECHO MV E/E' RATIO (AVERAGED): 17
ECHO MV E/E' RATIO (AVERAGED): 22.6
ECHO MV E/E' SEPTAL: 16
ECHO MV E/E' SEPTAL: 25.83
ECHO MV LVOT VTI INDEX: 0.92
ECHO MV MAX VELOCITY: 1.6 M/S
ECHO MV MEAN GRADIENT: 5 MMHG
ECHO MV MEAN VELOCITY: 1.1 M/S
ECHO MV PEAK GRADIENT: 10 MMHG
ECHO MV REGURGITANT PEAK GRADIENT: 77 MMHG
ECHO MV REGURGITANT PEAK VELOCITY: 4.4 M/S
ECHO MV REGURGITANT VTIA: 108 CM
ECHO MV VTI: 34.2 CM
ECHO PV ACCELERATION TIME (AT): 103 MS
ECHO PV ACCELERATION TIME (AT): 140 MS
ECHO PV MAX VELOCITY: 1.1 M/S
ECHO PV MAX VELOCITY: 1.4 M/S
ECHO PV PEAK GRADIENT: 5 MMHG
ECHO PV PEAK GRADIENT: 8 MMHG
ECHO RIGHT VENTRICULAR SYSTOLIC PRESSURE (RVSP): 30 MMHG
ECHO RIGHT VENTRICULAR SYSTOLIC PRESSURE (RVSP): 56 MMHG
ECHO RV BASAL DIMENSION: 3.7 CM
ECHO RV BASAL DIMENSION: 4.4 CM
ECHO RV INTERNAL DIMENSION: 2.9 CM
ECHO RV INTERNAL DIMENSION: 3.6 CM
ECHO RV LONGITUDINAL DIMENSION: 5.9 CM
ECHO RV MID DIMENSION: 3 CM
ECHO RV MID DIMENSION: 3.2 CM
ECHO RV TAPSE: 1.7 CM (ref 1.5–2)
ECHO RV TAPSE: 2.1 CM (ref 1.5–2)
ECHO TV REGURGITANT MAX VELOCITY: 2.58 M/S
ECHO TV REGURGITANT MAX VELOCITY: 3.21 M/S
ECHO TV REGURGITANT PEAK GRADIENT: 27 MMHG
ECHO TV REGURGITANT PEAK GRADIENT: 41 MMHG
EOSINOPHIL # BLD: 0 K/UL (ref 0–0.8)
EOSINOPHIL # BLD: 0.1 K/UL (ref 0–0.8)
EOSINOPHIL # BLD: 0.2 K/UL (ref 0–0.8)
EOSINOPHIL # BLD: 0.3 K/UL (ref 0–0.8)
EOSINOPHIL NFR BLD: 0 % (ref 0.5–7.8)
EOSINOPHIL NFR BLD: 1 % (ref 0.5–7.8)
EOSINOPHIL NFR BLD: 2 % (ref 0.5–7.8)
EOSINOPHIL NFR BLD: 2 % (ref 0.5–7.8)
EOSINOPHIL NFR BLD: 3 % (ref 0.5–7.8)
EPI CELLS #/AREA URNS HPF: 0 /HPF
EPI CELLS #/AREA URNS HPF: ABNORMAL /HPF
EPI CELLS #/AREA URNS HPF: NORMAL /HPF
ERYTHROCYTE [DISTWIDTH] IN BLOOD BY AUTOMATED COUNT: 17.7 % (ref 11.9–14.6)
ERYTHROCYTE [DISTWIDTH] IN BLOOD BY AUTOMATED COUNT: 17.8 % (ref 11.9–14.6)
ERYTHROCYTE [DISTWIDTH] IN BLOOD BY AUTOMATED COUNT: 17.9 % (ref 11.9–14.6)
ERYTHROCYTE [DISTWIDTH] IN BLOOD BY AUTOMATED COUNT: 18.1 % (ref 11.9–14.6)
ERYTHROCYTE [DISTWIDTH] IN BLOOD BY AUTOMATED COUNT: 18.1 % (ref 11.9–14.6)
ERYTHROCYTE [DISTWIDTH] IN BLOOD BY AUTOMATED COUNT: 18.4 % (ref 11.9–14.6)
ERYTHROCYTE [DISTWIDTH] IN BLOOD BY AUTOMATED COUNT: 18.4 % (ref 11.9–14.6)
ERYTHROCYTE [DISTWIDTH] IN BLOOD BY AUTOMATED COUNT: 18.8 % (ref 11.9–14.6)
ERYTHROCYTE [DISTWIDTH] IN BLOOD BY AUTOMATED COUNT: 19.2 % (ref 11.9–14.6)
ERYTHROCYTE [DISTWIDTH] IN BLOOD BY AUTOMATED COUNT: 19.3 % (ref 11.9–14.6)
ERYTHROCYTE [DISTWIDTH] IN BLOOD BY AUTOMATED COUNT: 19.4 % (ref 11.9–14.6)
ERYTHROCYTE [DISTWIDTH] IN BLOOD BY AUTOMATED COUNT: 19.4 % (ref 11.9–14.6)
ERYTHROCYTE [DISTWIDTH] IN BLOOD BY AUTOMATED COUNT: 19.5 % (ref 11.9–14.6)
ERYTHROCYTE [DISTWIDTH] IN BLOOD BY AUTOMATED COUNT: 19.6 % (ref 11.9–14.6)
ERYTHROCYTE [DISTWIDTH] IN BLOOD BY AUTOMATED COUNT: 19.7 % (ref 11.9–14.6)
ERYTHROCYTE [DISTWIDTH] IN BLOOD BY AUTOMATED COUNT: 19.9 % (ref 11.9–14.6)
ERYTHROCYTE [DISTWIDTH] IN BLOOD BY AUTOMATED COUNT: 20 % (ref 11.9–14.6)
ERYTHROCYTE [DISTWIDTH] IN BLOOD BY AUTOMATED COUNT: 20.2 % (ref 11.9–14.6)
ERYTHROCYTE [DISTWIDTH] IN BLOOD BY AUTOMATED COUNT: 20.5 % (ref 11.9–14.6)
ERYTHROCYTE [DISTWIDTH] IN BLOOD BY AUTOMATED COUNT: 21.5 % (ref 11.9–14.6)
ERYTHROCYTE [DISTWIDTH] IN BLOOD BY AUTOMATED COUNT: 21.9 % (ref 11.9–14.6)
ERYTHROCYTE [DISTWIDTH] IN BLOOD BY AUTOMATED COUNT: 22.3 % (ref 11.9–14.6)
ERYTHROCYTE [DISTWIDTH] IN BLOOD BY AUTOMATED COUNT: 22.4 % (ref 11.9–14.6)
ERYTHROCYTE [DISTWIDTH] IN BLOOD BY AUTOMATED COUNT: 22.4 % (ref 11.9–14.6)
ERYTHROCYTE [DISTWIDTH] IN BLOOD BY AUTOMATED COUNT: 22.5 % (ref 11.9–14.6)
ERYTHROCYTE [DISTWIDTH] IN BLOOD BY AUTOMATED COUNT: 22.6 % (ref 11.9–14.6)
ERYTHROCYTE [DISTWIDTH] IN BLOOD BY AUTOMATED COUNT: 22.7 % (ref 11.9–14.6)
ERYTHROCYTE [DISTWIDTH] IN BLOOD BY AUTOMATED COUNT: 22.8 % (ref 11.9–14.6)
ERYTHROCYTE [DISTWIDTH] IN BLOOD BY AUTOMATED COUNT: 23.1 % (ref 11.9–14.6)
ERYTHROCYTE [DISTWIDTH] IN BLOOD BY AUTOMATED COUNT: 23.5 % (ref 11.9–14.6)
ESCHERICHIA COLI: DETECTED
ESCHERICHIA COLI: DETECTED
FERRITIN SERPL-MCNC: 117 NG/ML (ref 8–388)
FERRITIN SERPL-MCNC: 426 NG/ML (ref 8–388)
FERRITIN SERPL-MCNC: 835 NG/ML (ref 8–388)
FIBRINOGEN PPP-MCNC: 100 MG/DL (ref 190–501)
FIBRINOGEN PPP-MCNC: 106 MG/DL (ref 190–501)
FIBRINOGEN PPP-MCNC: 127 MG/DL (ref 190–501)
FIBRINOGEN PPP-MCNC: 133 MG/DL (ref 190–501)
FIBRINOGEN PPP-MCNC: 332 MG/DL (ref 190–501)
FIBRINOGEN PPP-MCNC: 79 MG/DL (ref 190–501)
FIBRINOGEN PPP-MCNC: 88 MG/DL (ref 190–501)
FIBRINOGEN PPP-MCNC: 96 MG/DL (ref 190–501)
FIBRINOGEN PPP-MCNC: <60 MG/DL (ref 190–501)
FLUAV AG NPH QL IA: NEGATIVE
FLUBV AG NPH QL IA: NEGATIVE
FLUID CULTURE, SPNG2: NORMAL
FOLATE SERPL-MCNC: 3.2 NG/ML (ref 3.1–17.5)
GAS FLOW.O2 O2 DELIVERY SYS: ABNORMAL L/MIN
GLOBULIN SER CALC-MCNC: 2.9 G/DL (ref 2.3–3.5)
GLOBULIN SER CALC-MCNC: 3 G/DL (ref 2.3–3.5)
GLOBULIN SER CALC-MCNC: 3.1 G/DL (ref 2.3–3.5)
GLOBULIN SER CALC-MCNC: 3.2 G/DL (ref 2.3–3.5)
GLOBULIN SER CALC-MCNC: 3.4 G/DL (ref 2.3–3.5)
GLOBULIN SER CALC-MCNC: 3.5 G/DL (ref 2.3–3.5)
GLOBULIN SER CALC-MCNC: 3.6 G/DL (ref 2.3–3.5)
GLOBULIN SER CALC-MCNC: 3.7 G/DL (ref 2.3–3.5)
GLOBULIN SER CALC-MCNC: 3.7 G/DL (ref 2.3–3.5)
GLOBULIN SER CALC-MCNC: 3.9 G/DL (ref 2.3–3.5)
GLOBULIN SER CALC-MCNC: 4 G/DL (ref 2.3–3.5)
GLOBULIN SER CALC-MCNC: 4.1 G/DL (ref 2.3–3.5)
GLOBULIN SER CALC-MCNC: 4.4 G/DL (ref 2.3–3.5)
GLUCOSE BLD STRIP.AUTO-MCNC: 149 MG/DL (ref 65–100)
GLUCOSE BLD STRIP.AUTO-MCNC: 150 MG/DL (ref 65–100)
GLUCOSE BLD STRIP.AUTO-MCNC: 158 MG/DL (ref 65–100)
GLUCOSE BLD STRIP.AUTO-MCNC: 196 MG/DL (ref 65–100)
GLUCOSE BLD STRIP.AUTO-MCNC: 246 MG/DL (ref 65–100)
GLUCOSE BLD STRIP.AUTO-MCNC: 29 MG/DL (ref 65–100)
GLUCOSE BLD STRIP.AUTO-MCNC: 30 MG/DL (ref 65–100)
GLUCOSE BLD STRIP.AUTO-MCNC: 52 MG/DL (ref 65–100)
GLUCOSE BLD STRIP.AUTO-MCNC: 81 MG/DL (ref 65–100)
GLUCOSE CSF-MCNC: 35 MG/DL (ref 40–70)
GLUCOSE SERPL-MCNC: 101 MG/DL (ref 65–100)
GLUCOSE SERPL-MCNC: 102 MG/DL (ref 65–100)
GLUCOSE SERPL-MCNC: 102 MG/DL (ref 65–100)
GLUCOSE SERPL-MCNC: 103 MG/DL (ref 65–100)
GLUCOSE SERPL-MCNC: 104 MG/DL (ref 65–100)
GLUCOSE SERPL-MCNC: 104 MG/DL (ref 65–100)
GLUCOSE SERPL-MCNC: 105 MG/DL (ref 65–100)
GLUCOSE SERPL-MCNC: 108 MG/DL (ref 65–100)
GLUCOSE SERPL-MCNC: 109 MG/DL (ref 65–100)
GLUCOSE SERPL-MCNC: 112 MG/DL (ref 65–100)
GLUCOSE SERPL-MCNC: 115 MG/DL (ref 65–100)
GLUCOSE SERPL-MCNC: 117 MG/DL (ref 65–100)
GLUCOSE SERPL-MCNC: 118 MG/DL (ref 65–100)
GLUCOSE SERPL-MCNC: 119 MG/DL (ref 65–100)
GLUCOSE SERPL-MCNC: 121 MG/DL (ref 65–100)
GLUCOSE SERPL-MCNC: 121 MG/DL (ref 65–100)
GLUCOSE SERPL-MCNC: 126 MG/DL (ref 65–100)
GLUCOSE SERPL-MCNC: 128 MG/DL (ref 65–100)
GLUCOSE SERPL-MCNC: 135 MG/DL (ref 65–100)
GLUCOSE SERPL-MCNC: 143 MG/DL (ref 65–100)
GLUCOSE SERPL-MCNC: 146 MG/DL (ref 65–100)
GLUCOSE SERPL-MCNC: 159 MG/DL (ref 65–100)
GLUCOSE SERPL-MCNC: 176 MG/DL (ref 65–100)
GLUCOSE SERPL-MCNC: 195 MG/DL (ref 65–100)
GLUCOSE SERPL-MCNC: 20 MG/DL (ref 65–100)
GLUCOSE SERPL-MCNC: 250 MG/DL (ref 65–100)
GLUCOSE SERPL-MCNC: 73 MG/DL (ref 65–100)
GLUCOSE SERPL-MCNC: 74 MG/DL (ref 65–100)
GLUCOSE SERPL-MCNC: 87 MG/DL (ref 65–100)
GLUCOSE SERPL-MCNC: 92 MG/DL (ref 65–100)
GLUCOSE SERPL-MCNC: 92 MG/DL (ref 65–100)
GLUCOSE SERPL-MCNC: 98 MG/DL (ref 65–100)
GLUCOSE SERPL-MCNC: 99 MG/DL (ref 65–100)
GLUCOSE SERPL-MCNC: 99 MG/DL (ref 65–100)
GLUCOSE UR STRIP.AUTO-MCNC: NEGATIVE MG/DL
GLUCOSE UR STRIP.AUTO-MCNC: NEGATIVE MG/DL
GRAM STN SPEC: ABNORMAL
HAPTOGLOB SERPL-MCNC: 51 MG/DL (ref 30–200)
HAPTOGLOB SERPL-MCNC: <8 MG/DL (ref 30–200)
HCO3 BLDV-SCNC: 14.7 MMOL/L (ref 23–28)
HCT VFR BLD AUTO: 23.9 % (ref 35.8–46.3)
HCT VFR BLD AUTO: 24.5 % (ref 35.8–46.3)
HCT VFR BLD AUTO: 24.7 % (ref 35.8–46.3)
HCT VFR BLD AUTO: 25 % (ref 35.8–46.3)
HCT VFR BLD AUTO: 25.3 % (ref 35.8–46.3)
HCT VFR BLD AUTO: 25.5 % (ref 35.8–46.3)
HCT VFR BLD AUTO: 25.5 % (ref 35.8–46.3)
HCT VFR BLD AUTO: 25.6 % (ref 35.8–46.3)
HCT VFR BLD AUTO: 25.8 % (ref 35.8–46.3)
HCT VFR BLD AUTO: 27.5 % (ref 35.8–46.3)
HCT VFR BLD AUTO: 27.8 % (ref 35.8–46.3)
HCT VFR BLD AUTO: 27.8 % (ref 35.8–46.3)
HCT VFR BLD AUTO: 28.5 % (ref 35.8–46.3)
HCT VFR BLD AUTO: 29 % (ref 35.8–46.3)
HCT VFR BLD AUTO: 29 % (ref 35.8–46.3)
HCT VFR BLD AUTO: 29.7 % (ref 35.8–46.3)
HCT VFR BLD AUTO: 30.2 % (ref 35.8–46.3)
HCT VFR BLD AUTO: 32.2 % (ref 35.8–46.3)
HCT VFR BLD AUTO: 33.1 % (ref 35.8–46.3)
HCT VFR BLD AUTO: 33.5 % (ref 35.8–46.3)
HCT VFR BLD AUTO: 33.7 % (ref 35.8–46.3)
HCT VFR BLD AUTO: 34.5 % (ref 35.8–46.3)
HCT VFR BLD AUTO: 34.9 % (ref 35.8–46.3)
HCT VFR BLD AUTO: 35 % (ref 35.8–46.3)
HCT VFR BLD AUTO: 35 % (ref 35.8–46.3)
HCT VFR BLD AUTO: 36.1 % (ref 35.8–46.3)
HCT VFR BLD AUTO: 37.6 % (ref 35.8–46.3)
HCT VFR BLD AUTO: 39.4 % (ref 35.8–46.3)
HCT VFR BLD AUTO: 39.8 % (ref 35.8–46.3)
HCT VFR BLD AUTO: 42.4 % (ref 35.8–46.3)
HCT VFR BLD AUTO: 42.6 % (ref 35.8–46.3)
HCT VFR BLD AUTO: 42.8 % (ref 35.8–46.3)
HCT VFR BLD AUTO: 46.4 % (ref 35.8–46.3)
HEPARIN INDUCED PLT,XHIPA: NORMAL
HGB BLD-MCNC: 10 G/DL (ref 11.7–15.4)
HGB BLD-MCNC: 10 G/DL (ref 11.7–15.4)
HGB BLD-MCNC: 10.4 G/DL (ref 11.7–15.4)
HGB BLD-MCNC: 10.5 G/DL (ref 11.7–15.4)
HGB BLD-MCNC: 10.5 G/DL (ref 11.7–15.4)
HGB BLD-MCNC: 10.6 G/DL (ref 11.7–15.4)
HGB BLD-MCNC: 11 G/DL (ref 11.7–15.4)
HGB BLD-MCNC: 11.4 G/DL (ref 11.7–15.4)
HGB BLD-MCNC: 11.7 G/DL (ref 11.7–15.4)
HGB BLD-MCNC: 11.8 G/DL (ref 11.7–15.4)
HGB BLD-MCNC: 12 G/DL (ref 11.7–15.4)
HGB BLD-MCNC: 12.8 G/DL (ref 11.7–15.4)
HGB BLD-MCNC: 13 G/DL (ref 11.7–15.4)
HGB BLD-MCNC: 13.1 G/DL (ref 11.7–15.4)
HGB BLD-MCNC: 13.9 G/DL (ref 11.7–15.4)
HGB BLD-MCNC: 14.5 G/DL (ref 11.7–15.4)
HGB BLD-MCNC: 8 G/DL (ref 11.7–15.4)
HGB BLD-MCNC: 8 G/DL (ref 11.7–15.4)
HGB BLD-MCNC: 8.1 G/DL (ref 11.7–15.4)
HGB BLD-MCNC: 8.4 G/DL (ref 11.7–15.4)
HGB BLD-MCNC: 8.5 G/DL (ref 11.7–15.4)
HGB BLD-MCNC: 8.6 G/DL (ref 11.7–15.4)
HGB BLD-MCNC: 8.6 G/DL (ref 11.7–15.4)
HGB BLD-MCNC: 8.7 G/DL (ref 11.7–15.4)
HGB BLD-MCNC: 9.1 G/DL (ref 11.7–15.4)
HGB BLD-MCNC: 9.3 G/DL (ref 11.7–15.4)
HGB BLD-MCNC: 9.6 G/DL (ref 11.7–15.4)
HGB BLD-MCNC: 9.9 G/DL (ref 11.7–15.4)
HGB RETIC QN AUTO: 31 PG (ref 29–35)
HGB RETIC QN AUTO: 40 PG (ref 29–35)
HGB RETIC QN AUTO: 40 PG (ref 29–35)
HGB RETIC QN AUTO: 41 PG (ref 29–35)
HGB UR QL STRIP: ABNORMAL
HGB UR QL STRIP: NEGATIVE
HIT INTERPRETATION,XINTPR: NORMAL
HIT PROFILE,XHITT: NORMAL
HIV 1+2 AB+HIV1 P24 AG SERPL QL IA: NONREACTIVE
HIV12 RESULT COMMENT, HHIVC: ABNORMAL
IGA SERPL-MCNC: 125 MG/DL (ref 87–352)
IGG SERPL-MCNC: 888 MG/DL (ref 586–1602)
IGM SERPL-MCNC: 75 MG/DL (ref 26–217)
IMM GRANULOCYTES # BLD AUTO: 0 K/UL (ref 0–0.5)
IMM GRANULOCYTES # BLD AUTO: 0.1 K/UL (ref 0–0.5)
IMM GRANULOCYTES # BLD AUTO: 0.2 K/UL (ref 0–0.5)
IMM GRANULOCYTES # BLD AUTO: 0.3 K/UL (ref 0–0.5)
IMM GRANULOCYTES # BLD AUTO: 0.4 K/UL (ref 0–0.5)
IMM GRANULOCYTES NFR BLD AUTO: 0 % (ref 0–5)
IMM GRANULOCYTES NFR BLD AUTO: 1 % (ref 0–5)
IMM GRANULOCYTES NFR BLD AUTO: 2 % (ref 0–5)
IMM GRANULOCYTES NFR BLD AUTO: 3 % (ref 0–5)
IMM GRANULOCYTES NFR BLD AUTO: 4 % (ref 0–5)
IMM RETICS NFR: 12.5 % (ref 3–15.9)
IMM RETICS NFR: 22 % (ref 3–15.9)
IMM RETICS NFR: 35.8 % (ref 3–15.9)
IMM RETICS NFR: 6.4 % (ref 3–15.9)
INR PPP: 1.3
INR PPP: 1.6
INR PPP: 2.2
INR PPP: 2.9
INR PPP: 3
INR PPP: 3.6
INR PPP: 3.7
INR PPP: 4.2
INR PPP: 4.3
INR PPP: 4.9
INR PPP: 6.4
INR PPP: 7
INTERPRETATION: ABNORMAL
INTERPRETATION: ABNORMAL
IRON SATN MFR SERPL: 19 %
IRON SATN MFR SERPL: 42 %
IRON SATN MFR SERPL: 56 %
IRON SERPL-MCNC: 131 UG/DL (ref 35–150)
IRON SERPL-MCNC: 28 UG/DL (ref 35–150)
IRON SERPL-MCNC: 97 UG/DL (ref 35–150)
KETONES UR QL STRIP.AUTO: ABNORMAL MG/DL
KETONES UR QL STRIP.AUTO: NEGATIVE MG/DL
KPC (CARBAPENEM RESISTANCE GENE): NOT DETECTED
KPC (CARBAPENEM RESISTANCE GENE): NOT DETECTED
LACTATE SERPL-SCNC: 1.3 MMOL/L (ref 0.4–2)
LACTATE SERPL-SCNC: 1.3 MMOL/L (ref 0.4–2)
LACTATE SERPL-SCNC: 1.4 MMOL/L (ref 0.4–2)
LACTATE SERPL-SCNC: 1.6 MMOL/L (ref 0.4–2)
LACTATE SERPL-SCNC: 12.6 MMOL/L (ref 0.4–2)
LACTATE SERPL-SCNC: 14.4 MMOL/L (ref 0.4–2)
LACTATE SERPL-SCNC: 16.2 MMOL/L (ref 0.4–2)
LACTATE SERPL-SCNC: 16.6 MMOL/L (ref 0.4–2)
LACTATE SERPL-SCNC: 17.2 MMOL/L (ref 0.4–2)
LACTATE SERPL-SCNC: 2.2 MMOL/L (ref 0.4–2)
LACTATE SERPL-SCNC: 2.3 MMOL/L (ref 0.4–2)
LACTATE SERPL-SCNC: 2.5 MMOL/L (ref 0.4–2)
LACTATE SERPL-SCNC: 2.8 MMOL/L (ref 0.4–2)
LACTATE SERPL-SCNC: 3.2 MMOL/L (ref 0.4–2)
LACTATE SERPL-SCNC: 4 MMOL/L (ref 0.4–2)
LACTATE SERPL-SCNC: 4.2 MMOL/L (ref 0.4–2)
LACTATE SERPL-SCNC: 4.3 MMOL/L (ref 0.4–2)
LACTATE SERPL-SCNC: 5.8 MMOL/L (ref 0.4–2)
LACTATE SERPL-SCNC: 7.6 MMOL/L (ref 0.4–2)
LDH SERPL L TO P-CCNC: 2062 U/L (ref 110–210)
LDH SERPL L TO P-CCNC: 436 U/L (ref 110–210)
LEUKOCYTE ESTERASE UR QL STRIP.AUTO: ABNORMAL
LEUKOCYTE ESTERASE UR QL STRIP.AUTO: NEGATIVE
LIPASE SERPL-CCNC: 1578 U/L (ref 73–393)
LIPASE SERPL-CCNC: 61 U/L (ref 73–393)
LYMPHOCYTES # BLD: 0.2 K/UL (ref 0.5–4.6)
LYMPHOCYTES # BLD: 0.3 K/UL (ref 0.5–4.6)
LYMPHOCYTES # BLD: 0.3 K/UL (ref 0.5–4.6)
LYMPHOCYTES # BLD: 0.4 K/UL (ref 0.5–4.6)
LYMPHOCYTES # BLD: 0.5 K/UL (ref 0.5–4.6)
LYMPHOCYTES # BLD: 0.5 K/UL (ref 0.5–4.6)
LYMPHOCYTES # BLD: 0.6 K/UL (ref 0.5–4.6)
LYMPHOCYTES # BLD: 0.6 K/UL (ref 0.5–4.6)
LYMPHOCYTES # BLD: 0.7 K/UL (ref 0.5–4.6)
LYMPHOCYTES # BLD: 0.9 K/UL (ref 0.5–4.6)
LYMPHOCYTES # BLD: 1 K/UL (ref 0.5–4.6)
LYMPHOCYTES # BLD: 1.1 K/UL (ref 0.5–4.6)
LYMPHOCYTES # BLD: 1.3 K/UL (ref 0.5–4.6)
LYMPHOCYTES # BLD: 1.4 K/UL (ref 0.5–4.6)
LYMPHOCYTES NFR BLD: 13 % (ref 13–44)
LYMPHOCYTES NFR BLD: 2 % (ref 13–44)
LYMPHOCYTES NFR BLD: 3 % (ref 13–44)
LYMPHOCYTES NFR BLD: 4 % (ref 13–44)
LYMPHOCYTES NFR BLD: 5 % (ref 13–44)
LYMPHOCYTES NFR BLD: 6 % (ref 13–44)
LYMPHOCYTES NFR BLD: 6 % (ref 13–44)
LYMPHOCYTES NFR BLD: 7 % (ref 13–44)
LYMPHOCYTES NFR BLD: 9 % (ref 13–44)
LYMPHOCYTES NFR FLD: 2 %
Lab: NORMAL
MAGNESIUM SERPL-MCNC: 1.8 MG/DL (ref 1.8–2.4)
MAGNESIUM SERPL-MCNC: 1.9 MG/DL (ref 1.8–2.4)
MAGNESIUM SERPL-MCNC: 1.9 MG/DL (ref 1.8–2.4)
MAGNESIUM SERPL-MCNC: 2 MG/DL (ref 1.8–2.4)
MAGNESIUM SERPL-MCNC: 2 MG/DL (ref 1.8–2.4)
MAGNESIUM SERPL-MCNC: 2.1 MG/DL (ref 1.8–2.4)
MAGNESIUM SERPL-MCNC: 2.1 MG/DL (ref 1.8–2.4)
MAGNESIUM SERPL-MCNC: 2.2 MG/DL (ref 1.8–2.4)
MAGNESIUM SERPL-MCNC: 2.3 MG/DL (ref 1.6–2.3)
MAGNESIUM SERPL-MCNC: 2.3 MG/DL (ref 1.8–2.4)
MAGNESIUM SERPL-MCNC: 2.4 MG/DL (ref 1.8–2.4)
MAGNESIUM SERPL-MCNC: 2.7 MG/DL (ref 1.8–2.4)
MCH RBC QN AUTO: 30.7 PG (ref 26.1–32.9)
MCH RBC QN AUTO: 31.1 PG (ref 26.1–32.9)
MCH RBC QN AUTO: 31.1 PG (ref 26.1–32.9)
MCH RBC QN AUTO: 31.2 PG (ref 26.1–32.9)
MCH RBC QN AUTO: 31.2 PG (ref 26.1–32.9)
MCH RBC QN AUTO: 31.3 PG (ref 26.1–32.9)
MCH RBC QN AUTO: 31.4 PG (ref 26.1–32.9)
MCH RBC QN AUTO: 31.4 PG (ref 26.1–32.9)
MCH RBC QN AUTO: 31.5 PG (ref 26.1–32.9)
MCH RBC QN AUTO: 31.6 PG (ref 26.1–32.9)
MCH RBC QN AUTO: 31.6 PG (ref 26.1–32.9)
MCH RBC QN AUTO: 31.7 PG (ref 26.1–32.9)
MCH RBC QN AUTO: 31.7 PG (ref 26.1–32.9)
MCH RBC QN AUTO: 31.9 PG (ref 26.1–32.9)
MCH RBC QN AUTO: 32 PG (ref 26.1–32.9)
MCH RBC QN AUTO: 32 PG (ref 26.1–32.9)
MCH RBC QN AUTO: 32.1 PG (ref 26.1–32.9)
MCH RBC QN AUTO: 32.2 PG (ref 26.1–32.9)
MCH RBC QN AUTO: 32.2 PG (ref 26.1–32.9)
MCH RBC QN AUTO: 32.4 PG (ref 26.1–32.9)
MCH RBC QN AUTO: 32.4 PG (ref 26.1–32.9)
MCH RBC QN AUTO: 32.5 PG (ref 26.1–32.9)
MCH RBC QN AUTO: 32.6 PG (ref 26.1–32.9)
MCH RBC QN AUTO: 32.7 PG (ref 26.1–32.9)
MCH RBC QN AUTO: 32.8 PG (ref 26.1–32.9)
MCH RBC QN AUTO: 32.9 PG (ref 26.1–32.9)
MCH RBC QN AUTO: 33.2 PG (ref 26.1–32.9)
MCH RBC QN AUTO: 33.3 PG (ref 26.1–32.9)
MCH RBC QN AUTO: 33.3 PG (ref 26.1–32.9)
MCHC RBC AUTO-ENTMCNC: 27.8 G/DL (ref 31.4–35)
MCHC RBC AUTO-ENTMCNC: 30 G/DL (ref 31.4–35)
MCHC RBC AUTO-ENTMCNC: 30.4 G/DL (ref 31.4–35)
MCHC RBC AUTO-ENTMCNC: 30.6 G/DL (ref 31.4–35)
MCHC RBC AUTO-ENTMCNC: 31.3 G/DL (ref 31.4–35)
MCHC RBC AUTO-ENTMCNC: 31.6 G/DL (ref 31.4–35)
MCHC RBC AUTO-ENTMCNC: 31.7 G/DL (ref 31.4–35)
MCHC RBC AUTO-ENTMCNC: 31.9 G/DL (ref 31.4–35)
MCHC RBC AUTO-ENTMCNC: 32.2 G/DL (ref 31.4–35)
MCHC RBC AUTO-ENTMCNC: 32.4 G/DL (ref 31.4–35)
MCHC RBC AUTO-ENTMCNC: 32.4 G/DL (ref 31.4–35)
MCHC RBC AUTO-ENTMCNC: 32.6 G/DL (ref 31.4–35)
MCHC RBC AUTO-ENTMCNC: 32.7 G/DL (ref 31.4–35)
MCHC RBC AUTO-ENTMCNC: 32.7 G/DL (ref 31.4–35)
MCHC RBC AUTO-ENTMCNC: 32.9 G/DL (ref 31.4–35)
MCHC RBC AUTO-ENTMCNC: 33 G/DL (ref 31.4–35)
MCHC RBC AUTO-ENTMCNC: 33.1 G/DL (ref 31.4–35)
MCHC RBC AUTO-ENTMCNC: 33.4 G/DL (ref 31.4–35)
MCHC RBC AUTO-ENTMCNC: 33.5 G/DL (ref 31.4–35)
MCHC RBC AUTO-ENTMCNC: 33.6 G/DL (ref 31.4–35)
MCHC RBC AUTO-ENTMCNC: 33.7 G/DL (ref 31.4–35)
MCHC RBC AUTO-ENTMCNC: 34 G/DL (ref 31.4–35)
MCHC RBC AUTO-ENTMCNC: 34.1 G/DL (ref 31.4–35)
MCHC RBC AUTO-ENTMCNC: 34.5 G/DL (ref 31.4–35)
MCHC RBC AUTO-ENTMCNC: 34.5 G/DL (ref 31.4–35)
MCHC RBC AUTO-ENTMCNC: 34.7 G/DL (ref 31.4–35)
MCHC RBC AUTO-ENTMCNC: 35 G/DL (ref 31.4–35)
MCV RBC AUTO: 100.5 FL (ref 79.6–97.8)
MCV RBC AUTO: 101.3 FL (ref 79.6–97.8)
MCV RBC AUTO: 101.4 FL (ref 79.6–97.8)
MCV RBC AUTO: 102.2 FL (ref 79.6–97.8)
MCV RBC AUTO: 104.7 FL (ref 79.6–97.8)
MCV RBC AUTO: 107.5 FL (ref 79.6–97.8)
MCV RBC AUTO: 107.8 FL (ref 79.6–97.8)
MCV RBC AUTO: 111.3 FL (ref 79.6–97.8)
MCV RBC AUTO: 119.8 FL (ref 79.6–97.8)
MCV RBC AUTO: 90.7 FL (ref 79.6–97.8)
MCV RBC AUTO: 90.8 FL (ref 79.6–97.8)
MCV RBC AUTO: 92 FL (ref 79.6–97.8)
MCV RBC AUTO: 92 FL (ref 79.6–97.8)
MCV RBC AUTO: 92.1 FL (ref 79.6–97.8)
MCV RBC AUTO: 92.8 FL (ref 79.6–97.8)
MCV RBC AUTO: 92.9 FL (ref 79.6–97.8)
MCV RBC AUTO: 93.1 FL (ref 79.6–97.8)
MCV RBC AUTO: 93.1 FL (ref 79.6–97.8)
MCV RBC AUTO: 93.4 FL (ref 79.6–97.8)
MCV RBC AUTO: 93.8 FL (ref 79.6–97.8)
MCV RBC AUTO: 93.8 FL (ref 79.6–97.8)
MCV RBC AUTO: 94.5 FL (ref 79.6–97.8)
MCV RBC AUTO: 94.8 FL (ref 79.6–97.8)
MCV RBC AUTO: 95.3 FL (ref 79.6–97.8)
MCV RBC AUTO: 95.6 FL (ref 79.6–97.8)
MCV RBC AUTO: 97.2 FL (ref 79.6–97.8)
MCV RBC AUTO: 97.3 FL (ref 79.6–97.8)
MCV RBC AUTO: 97.5 FL (ref 79.6–97.8)
MCV RBC AUTO: 98.4 FL (ref 79.6–97.8)
MCV RBC AUTO: 98.6 FL (ref 79.6–97.8)
MCV RBC AUTO: 99.4 FL (ref 79.6–97.8)
MENINGITIS PANEL, XMEPT: NORMAL
MM INDURATION POC: 0 MM (ref 0–5)
MM INDURATION POC: 0 MM (ref 0–5)
MONOCYTES # BLD: 0.2 K/UL (ref 0.1–1.3)
MONOCYTES # BLD: 0.3 K/UL (ref 0.1–1.3)
MONOCYTES # BLD: 0.4 K/UL (ref 0.1–1.3)
MONOCYTES # BLD: 0.5 K/UL (ref 0.1–1.3)
MONOCYTES # BLD: 0.6 K/UL (ref 0.1–1.3)
MONOCYTES # BLD: 0.7 K/UL (ref 0.1–1.3)
MONOCYTES # BLD: 0.9 K/UL (ref 0.1–1.3)
MONOCYTES # BLD: 1.6 K/UL (ref 0.1–1.3)
MONOCYTES NFR BLD: 1 % (ref 4–12)
MONOCYTES NFR BLD: 3 % (ref 4–12)
MONOCYTES NFR BLD: 4 % (ref 4–12)
MONOCYTES NFR BLD: 5 % (ref 4–12)
MONOCYTES NFR BLD: 6 % (ref 4–12)
MONOCYTES NFR BLD: 6 % (ref 4–12)
MONOS+MACROS NFR FLD: 10 %
MUCOUS THREADS URNS QL MICRO: 0 /LPF
NEUTROPHILS NFR FLD: 88 %
NEUTS SEG # BLD: 10.1 K/UL (ref 1.7–8.2)
NEUTS SEG # BLD: 10.6 K/UL (ref 1.7–8.2)
NEUTS SEG # BLD: 11.1 K/UL (ref 1.7–8.2)
NEUTS SEG # BLD: 11.4 K/UL (ref 1.7–8.2)
NEUTS SEG # BLD: 11.6 K/UL (ref 1.7–8.2)
NEUTS SEG # BLD: 11.8 K/UL (ref 1.7–8.2)
NEUTS SEG # BLD: 11.8 K/UL (ref 1.7–8.2)
NEUTS SEG # BLD: 11.9 K/UL (ref 1.7–8.2)
NEUTS SEG # BLD: 12.6 K/UL (ref 1.7–8.2)
NEUTS SEG # BLD: 13.7 K/UL (ref 1.7–8.2)
NEUTS SEG # BLD: 13.8 K/UL (ref 1.7–8.2)
NEUTS SEG # BLD: 14.3 K/UL (ref 1.7–8.2)
NEUTS SEG # BLD: 14.4 K/UL (ref 1.7–8.2)
NEUTS SEG # BLD: 14.5 K/UL (ref 1.7–8.2)
NEUTS SEG # BLD: 16 K/UL (ref 1.7–8.2)
NEUTS SEG # BLD: 16.3 K/UL (ref 1.7–8.2)
NEUTS SEG # BLD: 21.4 K/UL (ref 1.7–8.2)
NEUTS SEG # BLD: 23.3 K/UL (ref 1.7–8.2)
NEUTS SEG # BLD: 26.5 K/UL (ref 1.7–8.2)
NEUTS SEG # BLD: 4.2 K/UL (ref 1.7–8.2)
NEUTS SEG # BLD: 6 K/UL (ref 1.7–8.2)
NEUTS SEG # BLD: 8.4 K/UL (ref 1.7–8.2)
NEUTS SEG # BLD: 8.7 K/UL (ref 1.7–8.2)
NEUTS SEG # BLD: 9.6 K/UL (ref 1.7–8.2)
NEUTS SEG # BLD: 9.8 K/UL (ref 1.7–8.2)
NEUTS SEG NFR BLD: 76 % (ref 43–78)
NEUTS SEG NFR BLD: 83 % (ref 43–78)
NEUTS SEG NFR BLD: 84 % (ref 43–78)
NEUTS SEG NFR BLD: 85 % (ref 43–78)
NEUTS SEG NFR BLD: 86 % (ref 43–78)
NEUTS SEG NFR BLD: 87 % (ref 43–78)
NEUTS SEG NFR BLD: 88 % (ref 43–78)
NEUTS SEG NFR BLD: 89 % (ref 43–78)
NEUTS SEG NFR BLD: 90 % (ref 43–78)
NEUTS SEG NFR BLD: 91 % (ref 43–78)
NEUTS SEG NFR BLD: 92 % (ref 43–78)
NEUTS SEG NFR BLD: 93 % (ref 43–78)
NEUTS SEG NFR BLD: 94 % (ref 43–78)
NEUTS SEG NFR BLD: 96 % (ref 43–78)
NITRITE UR QL STRIP.AUTO: NEGATIVE
NITRITE UR QL STRIP.AUTO: NEGATIVE
NRBC # BLD: 0 K/UL (ref 0–0.2)
NRBC # BLD: 0.02 K/UL (ref 0–0.2)
NRBC # BLD: 0.03 K/UL (ref 0–0.2)
NRBC # BLD: 0.03 K/UL (ref 0–0.2)
NRBC # BLD: 0.05 K/UL (ref 0–0.2)
NRBC # BLD: 0.06 K/UL (ref 0–0.2)
NRBC # BLD: 0.06 K/UL (ref 0–0.2)
NRBC # BLD: 0.12 K/UL (ref 0–0.2)
NRBC # BLD: 0.15 K/UL (ref 0–0.2)
NUC CELL # FLD: 2630 /CU MM
OPTICAL DENSITY READ,XHITAO: NORMAL ABS
ORGANISM ID, SPNG3: NORMAL
OTHER OBSERVATIONS,UCOM: NORMAL
P-R INTERVAL, ECG05: 126 MS
P-R INTERVAL, ECG05: 132 MS
P-R INTERVAL, ECG05: 132 MS
P-R INTERVAL, ECG05: 138 MS
P-R INTERVAL, ECG05: 140 MS
PATH REV BLD -IMP: NORMAL
PATH REV BLD -IMP: NORMAL
PCO2 BLDV: 27.3 MMHG (ref 41–51)
PH BLDV: 7.34 [PH] (ref 7.32–7.42)
PH UR STRIP: 6 [PH] (ref 5–9)
PH UR STRIP: 6.5 [PH] (ref 5–9)
PHOSPHATE SERPL-MCNC: 2.1 MG/DL (ref 2.3–3.7)
PHOSPHATE SERPL-MCNC: 2.5 MG/DL (ref 2.3–3.7)
PHOSPHATE SERPL-MCNC: 2.5 MG/DL (ref 2.3–3.7)
PHOSPHATE SERPL-MCNC: 2.8 MG/DL (ref 2.3–3.7)
PHOSPHATE SERPL-MCNC: 2.9 MG/DL (ref 2.3–3.7)
PHOSPHATE SERPL-MCNC: 3.5 MG/DL (ref 2.3–3.7)
PHOSPHATE SERPL-MCNC: 3.6 MG/DL (ref 2.3–3.7)
PHOSPHATE SERPL-MCNC: 4.2 MG/DL (ref 2.3–3.7)
PLATELET # BLD AUTO: 101 K/UL (ref 150–450)
PLATELET # BLD AUTO: 101 K/UL (ref 150–450)
PLATELET # BLD AUTO: 102 K/UL (ref 150–450)
PLATELET # BLD AUTO: 102 K/UL (ref 150–450)
PLATELET # BLD AUTO: 106 K/UL (ref 150–450)
PLATELET # BLD AUTO: 107 K/UL (ref 150–450)
PLATELET # BLD AUTO: 109 K/UL (ref 150–450)
PLATELET # BLD AUTO: 114 K/UL (ref 150–450)
PLATELET # BLD AUTO: 115 K/UL (ref 150–450)
PLATELET # BLD AUTO: 119 K/UL (ref 150–450)
PLATELET # BLD AUTO: 121 K/UL (ref 150–450)
PLATELET # BLD AUTO: 121 K/UL (ref 150–450)
PLATELET # BLD AUTO: 132 K/UL (ref 150–450)
PLATELET # BLD AUTO: 136 K/UL (ref 150–450)
PLATELET # BLD AUTO: 145 K/UL (ref 150–450)
PLATELET # BLD AUTO: 146 K/UL (ref 150–450)
PLATELET # BLD AUTO: 150 K/UL (ref 150–450)
PLATELET # BLD AUTO: 172 K/UL (ref 150–450)
PLATELET # BLD AUTO: 177 K/UL (ref 150–450)
PLATELET # BLD AUTO: 182 K/UL (ref 150–450)
PLATELET # BLD AUTO: 2 K/UL (ref 150–450)
PLATELET # BLD AUTO: 42 K/UL (ref 150–450)
PLATELET # BLD AUTO: 44 K/UL (ref 150–450)
PLATELET # BLD AUTO: 47 K/UL (ref 150–450)
PLATELET # BLD AUTO: 67 K/UL (ref 150–450)
PLATELET # BLD AUTO: 76 K/UL (ref 150–450)
PLATELET # BLD AUTO: 77 K/UL (ref 150–450)
PLATELET # BLD AUTO: 80 K/UL (ref 150–450)
PLATELET # BLD AUTO: 85 K/UL (ref 150–450)
PLATELET # BLD AUTO: 93 K/UL (ref 150–450)
PLATELET # BLD AUTO: 97 K/UL (ref 150–450)
PLATELET # BLD AUTO: 98 K/UL (ref 150–450)
PLATELET # BLD AUTO: 99 K/UL (ref 150–450)
PLATELET # BLD AUTO: 99 K/UL (ref 150–450)
PLEASE NOTE, SPNG4: NORMAL
PMV BLD AUTO: 10 FL (ref 9.4–12.3)
PMV BLD AUTO: 10 FL (ref 9.4–12.3)
PMV BLD AUTO: 10.2 FL (ref 9.4–12.3)
PMV BLD AUTO: 10.3 FL (ref 9.4–12.3)
PMV BLD AUTO: 10.3 FL (ref 9.4–12.3)
PMV BLD AUTO: 10.4 FL (ref 9.4–12.3)
PMV BLD AUTO: 10.5 FL (ref 9.4–12.3)
PMV BLD AUTO: 10.6 FL (ref 9.4–12.3)
PMV BLD AUTO: 10.8 FL (ref 9.4–12.3)
PMV BLD AUTO: 11 FL (ref 9.4–12.3)
PMV BLD AUTO: 11 FL (ref 9.4–12.3)
PMV BLD AUTO: 11.1 FL (ref 9.4–12.3)
PMV BLD AUTO: 11.2 FL (ref 9.4–12.3)
PMV BLD AUTO: 11.3 FL (ref 9.4–12.3)
PMV BLD AUTO: 11.4 FL (ref 9.4–12.3)
PMV BLD AUTO: 11.4 FL (ref 9.4–12.3)
PMV BLD AUTO: 11.5 FL (ref 9.4–12.3)
PMV BLD AUTO: 11.5 FL (ref 9.4–12.3)
PMV BLD AUTO: 11.6 FL (ref 9.4–12.3)
PMV BLD AUTO: 11.7 FL (ref 9.4–12.3)
PMV BLD AUTO: 11.7 FL (ref 9.4–12.3)
PMV BLD AUTO: 12.2 FL (ref 9.4–12.3)
PMV BLD AUTO: 12.6 FL (ref 9.4–12.3)
PMV BLD AUTO: 13.1 FL (ref 9.4–12.3)
PMV BLD AUTO: 9.7 FL (ref 9.4–12.3)
PMV BLD AUTO: 9.9 FL (ref 9.4–12.3)
PMV BLD AUTO: ABNORMAL FL (ref 9.4–12.3)
PO2 BLDV: 41 MMHG
POTASSIUM SERPL-SCNC: 2.9 MMOL/L (ref 3.5–5.1)
POTASSIUM SERPL-SCNC: 3 MMOL/L (ref 3.5–5.1)
POTASSIUM SERPL-SCNC: 3.1 MMOL/L (ref 3.5–5.1)
POTASSIUM SERPL-SCNC: 3.3 MMOL/L (ref 3.5–5.1)
POTASSIUM SERPL-SCNC: 3.4 MMOL/L (ref 3.5–5.1)
POTASSIUM SERPL-SCNC: 3.5 MMOL/L (ref 3.5–5.1)
POTASSIUM SERPL-SCNC: 3.6 MMOL/L (ref 3.5–5.1)
POTASSIUM SERPL-SCNC: 3.7 MMOL/L (ref 3.5–5.1)
POTASSIUM SERPL-SCNC: 3.8 MMOL/L (ref 3.5–5.1)
POTASSIUM SERPL-SCNC: 3.9 MMOL/L (ref 3.5–5.1)
POTASSIUM SERPL-SCNC: 4 MMOL/L (ref 3.5–5.1)
POTASSIUM SERPL-SCNC: 4.1 MMOL/L (ref 3.5–5.1)
POTASSIUM SERPL-SCNC: 4.6 MMOL/L (ref 3.5–5.1)
POTASSIUM SERPL-SCNC: 4.9 MMOL/L (ref 3.5–5.1)
POTASSIUM SERPL-SCNC: 5 MMOL/L (ref 3.5–5.1)
POTASSIUM SERPL-SCNC: 5.6 MMOL/L (ref 3.5–5.1)
POTASSIUM SERPL-SCNC: 5.7 MMOL/L (ref 3.5–5.1)
POTASSIUM SERPL-SCNC: 5.8 MMOL/L (ref 3.5–5.1)
POTASSIUM SERPL-SCNC: 5.8 MMOL/L (ref 3.5–5.1)
POTASSIUM SERPL-SCNC: 6.2 MMOL/L (ref 3.5–5.1)
PPD POC: NEGATIVE NEGATIVE
PPD POC: NEGATIVE NEGATIVE
PROCALCITONIN SERPL-MCNC: 16.46 NG/ML (ref 0–0.49)
PROCALCITONIN SERPL-MCNC: 2.49 NG/ML (ref 0–0.49)
PROCALCITONIN SERPL-MCNC: 26.76 NG/ML (ref 0–0.49)
PROCALCITONIN SERPL-MCNC: 4.21 NG/ML (ref 0–0.49)
PROCALCITONIN SERPL-MCNC: 4.79 NG/ML (ref 0–0.49)
PROCALCITONIN SERPL-MCNC: 62.38 NG/ML (ref 0–0.49)
PROT CSF-MCNC: 110 MG/DL
PROT SERPL-MCNC: 4.9 G/DL (ref 6.3–8.2)
PROT SERPL-MCNC: 4.9 G/DL (ref 6.3–8.2)
PROT SERPL-MCNC: 5.2 G/DL (ref 6.3–8.2)
PROT SERPL-MCNC: 5.7 G/DL (ref 6.3–8.2)
PROT SERPL-MCNC: 6.2 G/DL (ref 6.3–8.2)
PROT SERPL-MCNC: 6.3 G/DL (ref 6.3–8.2)
PROT SERPL-MCNC: 6.3 G/DL (ref 6.3–8.2)
PROT SERPL-MCNC: 6.4 G/DL (ref 6.3–8.2)
PROT SERPL-MCNC: 6.6 G/DL (ref 6.3–8.2)
PROT SERPL-MCNC: 6.8 G/DL (ref 6.3–8.2)
PROT SERPL-MCNC: 6.9 G/DL (ref 6.3–8.2)
PROT SERPL-MCNC: 7.1 G/DL (ref 6.3–8.2)
PROT SERPL-MCNC: 7.2 G/DL (ref 6.3–8.2)
PROT SERPL-MCNC: 7.4 G/DL (ref 6.3–8.2)
PROT SERPL-MCNC: 7.4 G/DL (ref 6.3–8.2)
PROT UR STRIP-MCNC: 100 MG/DL
PROT UR STRIP-MCNC: ABNORMAL MG/DL
PROTHROMBIN TIME: 16.7 SEC (ref 12.6–14.5)
PROTHROMBIN TIME: 19 SEC (ref 12.6–14.5)
PROTHROMBIN TIME: 25.1 SEC (ref 12.6–14.5)
PROTHROMBIN TIME: 31.2 SEC (ref 12.6–14.5)
PROTHROMBIN TIME: 31.5 SEC (ref 12.6–14.5)
PROTHROMBIN TIME: 36.6 SEC (ref 12.6–14.5)
PROTHROMBIN TIME: 37.4 SEC (ref 12.6–14.5)
PROTHROMBIN TIME: 41.7 SEC (ref 12.6–14.5)
PROTHROMBIN TIME: 42 SEC (ref 12.6–14.5)
PROTHROMBIN TIME: 46.6 SEC (ref 12.6–14.5)
PROTHROMBIN TIME: 57.5 SEC (ref 12.6–14.5)
PROTHROMBIN TIME: 61.2 SEC (ref 12.6–14.5)
Q-T INTERVAL, ECG07: 296 MS
Q-T INTERVAL, ECG07: 300 MS
Q-T INTERVAL, ECG07: 360 MS
Q-T INTERVAL, ECG07: 374 MS
Q-T INTERVAL, ECG07: 396 MS
QRS DURATION, ECG06: 80 MS
QRS DURATION, ECG06: 82 MS
QTC CALCULATION (BEZET), ECG08: 404 MS
QTC CALCULATION (BEZET), ECG08: 407 MS
QTC CALCULATION (BEZET), ECG08: 434 MS
QTC CALCULATION (BEZET), ECG08: 435 MS
QTC CALCULATION (BEZET), ECG08: 502 MS
RBC # BLD AUTO: 2.46 M/UL (ref 4.05–5.2)
RBC # BLD AUTO: 2.51 M/UL (ref 4.05–5.2)
RBC # BLD AUTO: 2.57 M/UL (ref 4.05–5.2)
RBC # BLD AUTO: 2.69 M/UL (ref 4.05–5.2)
RBC # BLD AUTO: 2.69 M/UL (ref 4.05–5.2)
RBC # BLD AUTO: 2.7 M/UL (ref 4.05–5.2)
RBC # BLD AUTO: 2.71 M/UL (ref 4.05–5.2)
RBC # BLD AUTO: 2.72 M/UL (ref 4.05–5.2)
RBC # BLD AUTO: 2.73 M/UL (ref 4.05–5.2)
RBC # BLD AUTO: 2.73 M/UL (ref 4.05–5.2)
RBC # BLD AUTO: 2.85 M/UL (ref 4.05–5.2)
RBC # BLD AUTO: 2.98 M/UL (ref 4.05–5.2)
RBC # BLD AUTO: 3.06 M/UL (ref 4.05–5.2)
RBC # BLD AUTO: 3.12 M/UL (ref 4.05–5.2)
RBC # BLD AUTO: 3.15 M/UL (ref 4.05–5.2)
RBC # BLD AUTO: 3.16 M/UL (ref 4.05–5.2)
RBC # BLD AUTO: 3.17 M/UL (ref 4.05–5.2)
RBC # BLD AUTO: 3.2 M/UL (ref 4.05–5.2)
RBC # BLD AUTO: 3.23 M/UL (ref 4.05–5.2)
RBC # BLD AUTO: 3.24 M/UL (ref 4.05–5.2)
RBC # BLD AUTO: 3.39 M/UL (ref 4.05–5.2)
RBC # BLD AUTO: 3.52 M/UL (ref 4.05–5.2)
RBC # BLD AUTO: 3.54 M/UL (ref 4.05–5.2)
RBC # BLD AUTO: 3.54 M/UL (ref 4.05–5.2)
RBC # BLD AUTO: 3.56 M/UL (ref 4.05–5.2)
RBC # BLD AUTO: 3.64 M/UL (ref 4.05–5.2)
RBC # BLD AUTO: 3.74 M/UL (ref 4.05–5.2)
RBC # BLD AUTO: 3.76 M/UL (ref 4.05–5.2)
RBC # BLD AUTO: 3.93 M/UL (ref 4.05–5.2)
RBC # BLD AUTO: 3.98 M/UL (ref 4.05–5.2)
RBC # BLD AUTO: 4.17 M/UL (ref 4.05–5.2)
RBC # BLD AUTO: 4.23 M/UL (ref 4.05–5.2)
RBC # BLD AUTO: 4.59 M/UL (ref 4.05–5.2)
RBC # FLD: 18 /CU MM
RBC #/AREA URNS HPF: 0 /HPF
RBC #/AREA URNS HPF: ABNORMAL /HPF
RBC #/AREA URNS HPF: NORMAL /HPF
REFERENCE LAB,REFLB: NORMAL
RETICS # AUTO: 0.05 M/UL (ref 0.03–0.1)
RETICS # AUTO: 0.08 M/UL (ref 0.03–0.1)
RETICS # AUTO: 0.15 M/UL (ref 0.03–0.1)
RETICS # AUTO: 0.17 M/UL (ref 0.03–0.1)
RETICS/RBC NFR AUTO: 1.5 % (ref 0.3–2)
RETICS/RBC NFR AUTO: 2 % (ref 0.3–2)
RETICS/RBC NFR AUTO: 4.1 % (ref 0.3–2)
RETICS/RBC NFR AUTO: 4.8 % (ref 0.3–2)
S PNEUM AG SPEC QL LA: NEGATIVE
SAO2 % BLDV: 74.7 % (ref 65–88)
SERVICE CMNT-IMP: ABNORMAL
SERVICE CMNT-IMP: NORMAL
SODIUM SERPL-SCNC: 126 MMOL/L (ref 136–145)
SODIUM SERPL-SCNC: 132 MMOL/L (ref 136–145)
SODIUM SERPL-SCNC: 133 MMOL/L (ref 136–145)
SODIUM SERPL-SCNC: 134 MMOL/L (ref 136–145)
SODIUM SERPL-SCNC: 135 MMOL/L (ref 136–145)
SODIUM SERPL-SCNC: 136 MMOL/L (ref 136–145)
SODIUM SERPL-SCNC: 137 MMOL/L (ref 136–145)
SODIUM SERPL-SCNC: 138 MMOL/L (ref 136–145)
SODIUM SERPL-SCNC: 140 MMOL/L (ref 136–145)
SOURCE, COVRS: NORMAL
SP GR UR REFRACTOMETRY: 1.01 (ref 1–1.02)
SP GR UR REFRACTOMETRY: 1.02 (ref 1–1.02)
SPECIMEN EXP DATE BLD: NORMAL
SPECIMEN SOURCE FLD: NORMAL
SPECIMEN SOURCE: NORMAL
SPECIMEN SOURCE: NORMAL
SPECIMEN TYPE: ABNORMAL
SPECIMEN, SPNG1: NORMAL
STATUS OF UNIT,%ST: NORMAL
T4 FREE SERPL-MCNC: 1.2 NG/DL (ref 0.9–1.8)
TEST DESCRIPTION:,ATST: NORMAL
TIBC SERPL-MCNC: 145 UG/DL (ref 250–450)
TIBC SERPL-MCNC: 232 UG/DL (ref 250–450)
TIBC SERPL-MCNC: 232 UG/DL (ref 250–450)
TSH SERPL DL<=0.005 MIU/L-ACNC: 22.1 UIU/ML
TUBE # CSF: 2
TUBE # CSF: 2
UNIT DIVISION, %UDIV: 0
UROBILINOGEN UR QL STRIP.AUTO: 0.2 EU/DL (ref 0.2–1)
UROBILINOGEN UR QL STRIP.AUTO: 1 EU/DL (ref 0.2–1)
VANCOMYCIN SERPL-MCNC: 18.2 UG/ML
VANCOMYCIN SERPL-MCNC: 19.9 UG/ML
VENTRICULAR RATE, ECG03: 109 BPM
VENTRICULAR RATE, ECG03: 114 BPM
VENTRICULAR RATE, ECG03: 81 BPM
VENTRICULAR RATE, ECG03: 88 BPM
VENTRICULAR RATE, ECG03: 97 BPM
VIT B12 SERPL-MCNC: 938 PG/ML (ref 193–986)
VWF CP ACT/NOR PPP CHRO: 40.8 %
WBC # BLD AUTO: 10.3 K/UL (ref 4.3–11.1)
WBC # BLD AUTO: 11 K/UL (ref 4.3–11.1)
WBC # BLD AUTO: 11.2 K/UL (ref 4.3–11.1)
WBC # BLD AUTO: 11.3 K/UL (ref 4.3–11.1)
WBC # BLD AUTO: 12.1 K/UL (ref 4.3–11.1)
WBC # BLD AUTO: 12.3 K/UL (ref 4.3–11.1)
WBC # BLD AUTO: 12.5 K/UL (ref 4.3–11.1)
WBC # BLD AUTO: 12.8 K/UL (ref 4.3–11.1)
WBC # BLD AUTO: 13.3 K/UL (ref 4.3–11.1)
WBC # BLD AUTO: 13.4 K/UL (ref 4.3–11.1)
WBC # BLD AUTO: 13.6 K/UL (ref 4.3–11.1)
WBC # BLD AUTO: 13.7 K/UL (ref 4.3–11.1)
WBC # BLD AUTO: 13.7 K/UL (ref 4.3–11.1)
WBC # BLD AUTO: 15.4 K/UL (ref 4.3–11.1)
WBC # BLD AUTO: 15.7 K/UL (ref 4.3–11.1)
WBC # BLD AUTO: 15.8 K/UL (ref 4.3–11.1)
WBC # BLD AUTO: 15.8 K/UL (ref 4.3–11.1)
WBC # BLD AUTO: 16.2 K/UL (ref 4.3–11.1)
WBC # BLD AUTO: 16.6 K/UL (ref 4.3–11.1)
WBC # BLD AUTO: 17.1 K/UL (ref 4.3–11.1)
WBC # BLD AUTO: 18.2 K/UL (ref 4.3–11.1)
WBC # BLD AUTO: 19.7 K/UL (ref 4.3–11.1)
WBC # BLD AUTO: 22.6 K/UL (ref 4.3–11.1)
WBC # BLD AUTO: 23.5 K/UL (ref 4.3–11.1)
WBC # BLD AUTO: 25.5 K/UL (ref 4.3–11.1)
WBC # BLD AUTO: 25.8 K/UL (ref 4.3–11.1)
WBC # BLD AUTO: 29.4 K/UL (ref 4.3–11.1)
WBC # BLD AUTO: 5 K/UL (ref 4.3–11.1)
WBC # BLD AUTO: 5.1 K/UL (ref 4.3–11.1)
WBC # BLD AUTO: 5.2 K/UL (ref 4.3–11.1)
WBC # BLD AUTO: 5.7 K/UL (ref 4.3–11.1)
WBC # BLD AUTO: 7.9 K/UL (ref 4.3–11.1)
WBC # BLD AUTO: 9.4 K/UL (ref 4.3–11.1)
WBC URNS QL MICRO: >100 /HPF
WBC URNS QL MICRO: ABNORMAL /HPF
WBC URNS QL MICRO: ABNORMAL /HPF

## 2022-01-01 PROCEDURE — 83540 ASSAY OF IRON: CPT

## 2022-01-01 PROCEDURE — 36415 COLL VENOUS BLD VENIPUNCTURE: CPT

## 2022-01-01 PROCEDURE — 65270000029 HC RM PRIVATE

## 2022-01-01 PROCEDURE — 74011250637 HC RX REV CODE- 250/637: Performed by: FAMILY MEDICINE

## 2022-01-01 PROCEDURE — 80053 COMPREHEN METABOLIC PANEL: CPT

## 2022-01-01 PROCEDURE — 74011000250 HC RX REV CODE- 250: Performed by: FAMILY MEDICINE

## 2022-01-01 PROCEDURE — 74011250637 HC RX REV CODE- 250/637: Performed by: HOSPITALIST

## 2022-01-01 PROCEDURE — 74011000258 HC RX REV CODE- 258: Performed by: FAMILY MEDICINE

## 2022-01-01 PROCEDURE — 80048 BASIC METABOLIC PNL TOTAL CA: CPT

## 2022-01-01 PROCEDURE — 94640 AIRWAY INHALATION TREATMENT: CPT

## 2022-01-01 PROCEDURE — 83735 ASSAY OF MAGNESIUM: CPT

## 2022-01-01 PROCEDURE — 82962 GLUCOSE BLOOD TEST: CPT

## 2022-01-01 PROCEDURE — 87635 SARS-COV-2 COVID-19 AMP PRB: CPT

## 2022-01-01 PROCEDURE — 74011250636 HC RX REV CODE- 250/636: Performed by: NURSE PRACTITIONER

## 2022-01-01 PROCEDURE — 77030038269 HC DRN EXT URIN PURWCK BARD -A

## 2022-01-01 PROCEDURE — 96365 THER/PROPH/DIAG IV INF INIT: CPT

## 2022-01-01 PROCEDURE — 83880 ASSAY OF NATRIURETIC PEPTIDE: CPT

## 2022-01-01 PROCEDURE — 85610 PROTHROMBIN TIME: CPT

## 2022-01-01 PROCEDURE — 74011250636 HC RX REV CODE- 250/636: Performed by: FAMILY MEDICINE

## 2022-01-01 PROCEDURE — 83690 ASSAY OF LIPASE: CPT

## 2022-01-01 PROCEDURE — 81001 URINALYSIS AUTO W/SCOPE: CPT

## 2022-01-01 PROCEDURE — 85384 FIBRINOGEN ACTIVITY: CPT

## 2022-01-01 PROCEDURE — 65620000000 HC RM CCU GENERAL

## 2022-01-01 PROCEDURE — 99232 SBSQ HOSP IP/OBS MODERATE 35: CPT | Performed by: INTERNAL MEDICINE

## 2022-01-01 PROCEDURE — 3336500001 HSPC ELECTION

## 2022-01-01 PROCEDURE — 74011250636 HC RX REV CODE- 250/636: Performed by: EMERGENCY MEDICINE

## 2022-01-01 PROCEDURE — 74011000258 HC RX REV CODE- 258: Performed by: NURSE PRACTITIONER

## 2022-01-01 PROCEDURE — 84145 PROCALCITONIN (PCT): CPT

## 2022-01-01 PROCEDURE — 0656 HSPC GENERAL INPATIENT

## 2022-01-01 PROCEDURE — 97530 THERAPEUTIC ACTIVITIES: CPT

## 2022-01-01 PROCEDURE — 77010033678 HC OXYGEN DAILY

## 2022-01-01 PROCEDURE — 81003 URINALYSIS AUTO W/O SCOPE: CPT

## 2022-01-01 PROCEDURE — 74011250636 HC RX REV CODE- 250/636: Performed by: INTERNAL MEDICINE

## 2022-01-01 PROCEDURE — 74011250637 HC RX REV CODE- 250/637: Performed by: INTERNAL MEDICINE

## 2022-01-01 PROCEDURE — 87205 SMEAR GRAM STAIN: CPT

## 2022-01-01 PROCEDURE — 71045 X-RAY EXAM CHEST 1 VIEW: CPT

## 2022-01-01 PROCEDURE — P9037 PLATE PHERES LEUKOREDU IRRAD: HCPCS

## 2022-01-01 PROCEDURE — 2709999900 HC NON-CHARGEABLE SUPPLY

## 2022-01-01 PROCEDURE — 74011250637 HC RX REV CODE- 250/637: Performed by: NURSE PRACTITIONER

## 2022-01-01 PROCEDURE — 36430 TRANSFUSION BLD/BLD COMPNT: CPT

## 2022-01-01 PROCEDURE — 83605 ASSAY OF LACTIC ACID: CPT

## 2022-01-01 PROCEDURE — 82728 ASSAY OF FERRITIN: CPT

## 2022-01-01 PROCEDURE — 70450 CT HEAD/BRAIN W/O DYE: CPT

## 2022-01-01 PROCEDURE — 87086 URINE CULTURE/COLONY COUNT: CPT

## 2022-01-01 PROCEDURE — 77030041247 HC PROTECTOR HEEL HEELMEDIX MDII -B

## 2022-01-01 PROCEDURE — 74011000258 HC RX REV CODE- 258: Performed by: EMERGENCY MEDICINE

## 2022-01-01 PROCEDURE — 99233 SBSQ HOSP IP/OBS HIGH 50: CPT | Performed by: INTERNAL MEDICINE

## 2022-01-01 PROCEDURE — 96367 TX/PROPH/DG ADDL SEQ IV INF: CPT

## 2022-01-01 PROCEDURE — 96375 TX/PRO/DX INJ NEW DRUG ADDON: CPT

## 2022-01-01 PROCEDURE — 84443 ASSAY THYROID STIM HORMONE: CPT

## 2022-01-01 PROCEDURE — 74011000250 HC RX REV CODE- 250: Performed by: EMERGENCY MEDICINE

## 2022-01-01 PROCEDURE — P9059 PLASMA, FRZ BETWEEN 8-24HOUR: HCPCS

## 2022-01-01 PROCEDURE — C8929 TTE W OR WO FOL WCON,DOPPLER: HCPCS

## 2022-01-01 PROCEDURE — 85046 RETICYTE/HGB CONCENTRATE: CPT

## 2022-01-01 PROCEDURE — 76498 UNLISTED MR PROCEDURE: CPT

## 2022-01-01 PROCEDURE — BH4BZZZ ULTRASONOGRAPHY OF CHEST WALL: ICD-10-PCS | Performed by: INTERNAL MEDICINE

## 2022-01-01 PROCEDURE — 82140 ASSAY OF AMMONIA: CPT

## 2022-01-01 PROCEDURE — 65660000000 HC RM CCU STEPDOWN

## 2022-01-01 PROCEDURE — APPSS45 APP SPLIT SHARED TIME 31-45 MINUTES: Performed by: NURSE PRACTITIONER

## 2022-01-01 PROCEDURE — 85025 COMPLETE CBC W/AUTO DIFF WBC: CPT

## 2022-01-01 PROCEDURE — 85379 FIBRIN DEGRADATION QUANT: CPT

## 2022-01-01 PROCEDURE — 74011000258 HC RX REV CODE- 258: Performed by: INTERNAL MEDICINE

## 2022-01-01 PROCEDURE — 97110 THERAPEUTIC EXERCISES: CPT

## 2022-01-01 PROCEDURE — 74011000250 HC RX REV CODE- 250: Performed by: INTERNAL MEDICINE

## 2022-01-01 PROCEDURE — 83010 ASSAY OF HAPTOGLOBIN QUANT: CPT

## 2022-01-01 PROCEDURE — 94760 N-INVAS EAR/PLS OXIMETRY 1: CPT

## 2022-01-01 PROCEDURE — P9012 CRYOPRECIPITATE EACH UNIT: HCPCS

## 2022-01-01 PROCEDURE — 85049 AUTOMATED PLATELET COUNT: CPT

## 2022-01-01 PROCEDURE — APPSS60 APP SPLIT SHARED TIME 46-60 MINUTES: Performed by: NURSE PRACTITIONER

## 2022-01-01 PROCEDURE — 97165 OT EVAL LOW COMPLEX 30 MIN: CPT

## 2022-01-01 PROCEDURE — 74011250636 HC RX REV CODE- 250/636: Performed by: HOSPITALIST

## 2022-01-01 PROCEDURE — 85027 COMPLETE CBC AUTOMATED: CPT

## 2022-01-01 PROCEDURE — 85730 THROMBOPLASTIN TIME PARTIAL: CPT

## 2022-01-01 PROCEDURE — 30233M1 TRANSFUSION OF NONAUTOLOGOUS PLASMA CRYOPRECIPITATE INTO PERIPHERAL VEIN, PERCUTANEOUS APPROACH: ICD-10-PCS | Performed by: NURSE PRACTITIONER

## 2022-01-01 PROCEDURE — C9113 INJ PANTOPRAZOLE SODIUM, VIA: HCPCS | Performed by: NURSE PRACTITIONER

## 2022-01-01 PROCEDURE — 77030041974 HC CATH SYS PERIPH TELE -B

## 2022-01-01 PROCEDURE — 93005 ELECTROCARDIOGRAM TRACING: CPT | Performed by: EMERGENCY MEDICINE

## 2022-01-01 PROCEDURE — 84100 ASSAY OF PHOSPHORUS: CPT

## 2022-01-01 PROCEDURE — APPNB30 APP NON BILLABLE TIME 0-30 MINS: Performed by: NURSE PRACTITIONER

## 2022-01-01 PROCEDURE — 74176 CT ABD & PELVIS W/O CONTRAST: CPT

## 2022-01-01 PROCEDURE — 87150 DNA/RNA AMPLIFIED PROBE: CPT

## 2022-01-01 PROCEDURE — 74011000250 HC RX REV CODE- 250: Performed by: NURSE PRACTITIONER

## 2022-01-01 PROCEDURE — 74011000636 HC RX REV CODE- 636: Performed by: EMERGENCY MEDICINE

## 2022-01-01 PROCEDURE — 72148 MRI LUMBAR SPINE W/O DYE: CPT

## 2022-01-01 PROCEDURE — 75810000133 HC LUMBAR PUNCTURE

## 2022-01-01 PROCEDURE — 82248 BILIRUBIN DIRECT: CPT

## 2022-01-01 PROCEDURE — P9047 ALBUMIN (HUMAN), 25%, 50ML: HCPCS | Performed by: FAMILY MEDICINE

## 2022-01-01 PROCEDURE — 87483 CNS DNA AMP PROBE TYPE 12-25: CPT

## 2022-01-01 PROCEDURE — 76450000000

## 2022-01-01 PROCEDURE — 86022 PLATELET ANTIBODIES: CPT

## 2022-01-01 PROCEDURE — 97161 PT EVAL LOW COMPLEX 20 MIN: CPT

## 2022-01-01 PROCEDURE — 77030040393 HC DRSG OPTIFOAM GENT MDII -B

## 2022-01-01 PROCEDURE — 87899 AGENT NOS ASSAY W/OPTIC: CPT

## 2022-01-01 PROCEDURE — 96374 THER/PROPH/DIAG INJ IV PUSH: CPT

## 2022-01-01 PROCEDURE — 99223 1ST HOSP IP/OBS HIGH 75: CPT | Performed by: INTERNAL MEDICINE

## 2022-01-01 PROCEDURE — 87040 BLOOD CULTURE FOR BACTERIA: CPT

## 2022-01-01 PROCEDURE — 30233K1 TRANSFUSION OF NONAUTOLOGOUS FROZEN PLASMA INTO PERIPHERAL VEIN, PERCUTANEOUS APPROACH: ICD-10-PCS | Performed by: INTERNAL MEDICINE

## 2022-01-01 PROCEDURE — 81015 MICROSCOPIC EXAM OF URINE: CPT

## 2022-01-01 PROCEDURE — 80202 ASSAY OF VANCOMYCIN: CPT

## 2022-01-01 PROCEDURE — 97535 SELF CARE MNGMENT TRAINING: CPT

## 2022-01-01 PROCEDURE — 30233R1 TRANSFUSION OF NONAUTOLOGOUS PLATELETS INTO PERIPHERAL VEIN, PERCUTANEOUS APPROACH: ICD-10-PCS | Performed by: NURSE PRACTITIONER

## 2022-01-01 PROCEDURE — 86140 C-REACTIVE PROTEIN: CPT

## 2022-01-01 PROCEDURE — 82746 ASSAY OF FOLIC ACID SERUM: CPT

## 2022-01-01 PROCEDURE — 77030040832 HC IRR TRAY MDII -A

## 2022-01-01 PROCEDURE — 74011250636 HC RX REV CODE- 250/636: Performed by: PHYSICIAN ASSISTANT

## 2022-01-01 PROCEDURE — 87077 CULTURE AEROBIC IDENTIFY: CPT

## 2022-01-01 PROCEDURE — 71046 X-RAY EXAM CHEST 2 VIEWS: CPT

## 2022-01-01 PROCEDURE — 82607 VITAMIN B-12: CPT

## 2022-01-01 PROCEDURE — 94762 N-INVAS EAR/PLS OXIMTRY CONT: CPT

## 2022-01-01 PROCEDURE — 99285 EMERGENCY DEPT VISIT HI MDM: CPT

## 2022-01-01 PROCEDURE — 74011000636 HC RX REV CODE- 636: Performed by: FAMILY MEDICINE

## 2022-01-01 PROCEDURE — APPSS180 APP SPLIT SHARED TIME > 60 MINUTES: Performed by: NURSE PRACTITIONER

## 2022-01-01 PROCEDURE — 74018 RADEX ABDOMEN 1 VIEW: CPT

## 2022-01-01 PROCEDURE — 87804 INFLUENZA ASSAY W/OPTIC: CPT

## 2022-01-01 PROCEDURE — 89050 BODY FLUID CELL COUNT: CPT

## 2022-01-01 PROCEDURE — 99239 HOSP IP/OBS DSCHRG MGMT >30: CPT | Performed by: INTERNAL MEDICINE

## 2022-01-01 PROCEDURE — 74011250637 HC RX REV CODE- 250/637: Performed by: EMERGENCY MEDICINE

## 2022-01-01 PROCEDURE — 87186 SC STD MICRODIL/AGAR DIL: CPT

## 2022-01-01 PROCEDURE — 74011250637 HC RX REV CODE- 250/637: Performed by: PHYSICIAN ASSISTANT

## 2022-01-01 PROCEDURE — 99222 1ST HOSP IP/OBS MODERATE 55: CPT | Performed by: PSYCHIATRY & NEUROLOGY

## 2022-01-01 PROCEDURE — 96361 HYDRATE IV INFUSION ADD-ON: CPT

## 2022-01-01 PROCEDURE — 86900 BLOOD TYPING SEROLOGIC ABO: CPT

## 2022-01-01 PROCEDURE — 76937 US GUIDE VASCULAR ACCESS: CPT

## 2022-01-01 PROCEDURE — 83615 LACTATE (LD) (LDH) ENZYME: CPT

## 2022-01-01 PROCEDURE — G0299 HHS/HOSPICE OF RN EA 15 MIN: HCPCS

## 2022-01-01 PROCEDURE — 96366 THER/PROPH/DIAG IV INF ADDON: CPT

## 2022-01-01 PROCEDURE — 94645 CONT INHLJ TX EACH ADDL HOUR: CPT

## 2022-01-01 PROCEDURE — 86580 TB INTRADERMAL TEST: CPT | Performed by: INTERNAL MEDICINE

## 2022-01-01 PROCEDURE — 84157 ASSAY OF PROTEIN OTHER: CPT

## 2022-01-01 PROCEDURE — APPSS30 APP SPLIT SHARED TIME 16-30 MINUTES: Performed by: NURSE PRACTITIONER

## 2022-01-01 PROCEDURE — 82945 GLUCOSE OTHER FLUID: CPT

## 2022-01-01 PROCEDURE — 82533 TOTAL CORTISOL: CPT

## 2022-01-01 PROCEDURE — 94761 N-INVAS EAR/PLS OXIMETRY MLT: CPT

## 2022-01-01 PROCEDURE — 74177 CT ABD & PELVIS W/CONTRAST: CPT

## 2022-01-01 PROCEDURE — 93306 TTE W/DOPPLER COMPLETE: CPT

## 2022-01-01 PROCEDURE — 96376 TX/PRO/DX INJ SAME DRUG ADON: CPT

## 2022-01-01 PROCEDURE — 82306 VITAMIN D 25 HYDROXY: CPT

## 2022-01-01 PROCEDURE — 87389 HIV-1 AG W/HIV-1&-2 AB AG IA: CPT

## 2022-01-01 PROCEDURE — 99222 1ST HOSP IP/OBS MODERATE 55: CPT | Performed by: INTERNAL MEDICINE

## 2022-01-01 PROCEDURE — 85397 CLOTTING FUNCT ACTIVITY: CPT

## 2022-01-01 PROCEDURE — 74011250637 HC RX REV CODE- 250/637: Performed by: STUDENT IN AN ORGANIZED HEALTH CARE EDUCATION/TRAINING PROGRAM

## 2022-01-01 PROCEDURE — 82803 BLOOD GASES ANY COMBINATION: CPT

## 2022-01-01 PROCEDURE — 99497 ADVNCD CARE PLAN 30 MIN: CPT | Performed by: INTERNAL MEDICINE

## 2022-01-01 PROCEDURE — 84132 ASSAY OF SERUM POTASSIUM: CPT

## 2022-01-01 PROCEDURE — 76700 US EXAM ABDOM COMPLETE: CPT

## 2022-01-01 PROCEDURE — 76604 US EXAM CHEST: CPT | Performed by: INTERNAL MEDICINE

## 2022-01-01 PROCEDURE — APPNB15 APP NON BILLABLE TIME 0-15 MINS: Performed by: NURSE PRACTITIONER

## 2022-01-01 PROCEDURE — 94664 DEMO&/EVAL PT USE INHALER: CPT

## 2022-01-01 PROCEDURE — 82784 ASSAY IGA/IGD/IGG/IGM EACH: CPT

## 2022-01-01 PROCEDURE — 83520 IMMUNOASSAY QUANT NOS NONAB: CPT

## 2022-01-01 PROCEDURE — 84439 ASSAY OF FREE THYROXINE: CPT

## 2022-01-01 PROCEDURE — 71260 CT THORAX DX C+: CPT

## 2022-01-01 PROCEDURE — 74011000258 HC RX REV CODE- 258: Performed by: PHYSICIAN ASSISTANT

## 2022-01-01 RX ORDER — FUROSEMIDE 20 MG/1
20 TABLET ORAL DAILY
Qty: 30 TABLET | Refills: 0 | Status: SHIPPED | OUTPATIENT
Start: 2022-01-01 | End: 2022-01-01

## 2022-01-01 RX ORDER — POTASSIUM CHLORIDE 20 MEQ/1
40 TABLET, EXTENDED RELEASE ORAL
Status: COMPLETED | OUTPATIENT
Start: 2022-01-01 | End: 2022-01-01

## 2022-01-01 RX ORDER — SODIUM CHLORIDE 0.9 % (FLUSH) 0.9 %
10 SYRINGE (ML) INJECTION
Status: COMPLETED | OUTPATIENT
Start: 2022-01-01 | End: 2022-01-01

## 2022-01-01 RX ORDER — SODIUM CHLORIDE 0.9 % (FLUSH) 0.9 %
5-40 SYRINGE (ML) INJECTION AS NEEDED
Status: DISCONTINUED | OUTPATIENT
Start: 2022-01-01 | End: 2022-01-01 | Stop reason: HOSPADM

## 2022-01-01 RX ORDER — DIPHENHYDRAMINE HCL 25 MG
25 CAPSULE ORAL ONCE
Status: COMPLETED | OUTPATIENT
Start: 2022-01-01 | End: 2022-01-01

## 2022-01-01 RX ORDER — SODIUM CHLORIDE, SODIUM LACTATE, POTASSIUM CHLORIDE, CALCIUM CHLORIDE 600; 310; 30; 20 MG/100ML; MG/100ML; MG/100ML; MG/100ML
100 INJECTION, SOLUTION INTRAVENOUS CONTINUOUS
Status: DISCONTINUED | OUTPATIENT
Start: 2022-01-01 | End: 2022-01-01 | Stop reason: HOSPADM

## 2022-01-01 RX ORDER — ONDANSETRON 2 MG/ML
4 INJECTION INTRAMUSCULAR; INTRAVENOUS
Status: DISCONTINUED | OUTPATIENT
Start: 2022-01-01 | End: 2022-01-01 | Stop reason: HOSPADM

## 2022-01-01 RX ORDER — LORAZEPAM 2 MG/ML
1 INJECTION INTRAMUSCULAR
Status: DISCONTINUED | OUTPATIENT
Start: 2022-01-01 | End: 2022-01-01 | Stop reason: HOSPADM

## 2022-01-01 RX ORDER — HYDROCODONE BITARTRATE AND ACETAMINOPHEN 5; 325 MG/1; MG/1
1 TABLET ORAL
Status: COMPLETED | OUTPATIENT
Start: 2022-01-01 | End: 2022-01-01

## 2022-01-01 RX ORDER — ACETAMINOPHEN 325 MG/1
650 TABLET ORAL
Status: DISCONTINUED | OUTPATIENT
Start: 2022-01-01 | End: 2022-01-01 | Stop reason: HOSPADM

## 2022-01-01 RX ORDER — OXYCODONE AND ACETAMINOPHEN 7.5; 325 MG/1; MG/1
1 TABLET ORAL
Status: CANCELLED | OUTPATIENT
Start: 2022-01-01

## 2022-01-01 RX ORDER — ZOLPIDEM TARTRATE 5 MG/1
10 TABLET ORAL
Status: DISCONTINUED | OUTPATIENT
Start: 2022-01-01 | End: 2022-01-01 | Stop reason: HOSPADM

## 2022-01-01 RX ORDER — ENOXAPARIN SODIUM 100 MG/ML
50 INJECTION SUBCUTANEOUS EVERY 12 HOURS
Status: CANCELLED | OUTPATIENT
Start: 2022-01-01

## 2022-01-01 RX ORDER — OXYCODONE AND ACETAMINOPHEN 7.5; 325 MG/1; MG/1
1 TABLET ORAL
Status: COMPLETED | OUTPATIENT
Start: 2022-01-01 | End: 2022-01-01

## 2022-01-01 RX ORDER — OXYCODONE HYDROCHLORIDE 5 MG/1
5 TABLET ORAL
Status: DISCONTINUED | OUTPATIENT
Start: 2022-01-01 | End: 2022-01-01 | Stop reason: HOSPADM

## 2022-01-01 RX ORDER — MELATONIN
1000 DAILY
COMMUNITY

## 2022-01-01 RX ORDER — FENTANYL 12.5 UG/1
1 PATCH TRANSDERMAL
Status: DISCONTINUED | OUTPATIENT
Start: 2022-01-01 | End: 2022-01-01 | Stop reason: HOSPADM

## 2022-01-01 RX ORDER — FUROSEMIDE 10 MG/ML
40 INJECTION INTRAMUSCULAR; INTRAVENOUS
Status: DISCONTINUED | OUTPATIENT
Start: 2022-01-01 | End: 2022-01-01 | Stop reason: SDUPTHER

## 2022-01-01 RX ORDER — SODIUM CHLORIDE 9 MG/ML
250 INJECTION, SOLUTION INTRAVENOUS AS NEEDED
Status: DISCONTINUED | OUTPATIENT
Start: 2022-01-01 | End: 2022-01-01 | Stop reason: HOSPADM

## 2022-01-01 RX ORDER — CALCIUM CARBONATE 500(1250)
500 TABLET ORAL
Status: DISCONTINUED | OUTPATIENT
Start: 2022-01-01 | End: 2022-01-01 | Stop reason: HOSPADM

## 2022-01-01 RX ORDER — POLYMYXIN B SULFATE AND TRIMETHOPRIM 1; 10000 MG/ML; [USP'U]/ML
1 SOLUTION OPHTHALMIC 2 TIMES DAILY
Status: DISCONTINUED | OUTPATIENT
Start: 2022-01-01 | End: 2022-01-01 | Stop reason: HOSPADM

## 2022-01-01 RX ORDER — CALCIUM CARBONATE 600 MG
600 TABLET ORAL
Status: CANCELLED | OUTPATIENT
Start: 2022-01-01

## 2022-01-01 RX ORDER — HALOPERIDOL 5 MG/ML
2 INJECTION INTRAMUSCULAR
Status: DISCONTINUED | OUTPATIENT
Start: 2022-01-01 | End: 2022-01-01 | Stop reason: HOSPADM

## 2022-01-01 RX ORDER — OXYCODONE AND ACETAMINOPHEN 10; 325 MG/1; MG/1
1 TABLET ORAL
Status: DISCONTINUED | OUTPATIENT
Start: 2022-01-01 | End: 2022-01-01

## 2022-01-01 RX ORDER — LANOLIN ALCOHOL/MO/W.PET/CERES
400 CREAM (GRAM) TOPICAL DAILY
Status: DISCONTINUED | OUTPATIENT
Start: 2022-01-01 | End: 2022-01-01 | Stop reason: HOSPADM

## 2022-01-01 RX ORDER — LEVOTHYROXINE SODIUM 112 UG/1
56 TABLET ORAL
Status: DISCONTINUED | OUTPATIENT
Start: 2022-01-01 | End: 2022-01-01 | Stop reason: HOSPADM

## 2022-01-01 RX ORDER — POTASSIUM CHLORIDE 20 MEQ/1
40 TABLET, EXTENDED RELEASE ORAL 2 TIMES DAILY
Status: DISCONTINUED | OUTPATIENT
Start: 2022-01-01 | End: 2022-01-01 | Stop reason: HOSPADM

## 2022-01-01 RX ORDER — PANTOPRAZOLE SODIUM 40 MG/1
40 TABLET, DELAYED RELEASE ORAL
Status: DISCONTINUED | OUTPATIENT
Start: 2022-01-01 | End: 2022-01-01 | Stop reason: HOSPADM

## 2022-01-01 RX ORDER — POLYETHYLENE GLYCOL 3350 17 G/17G
17 POWDER, FOR SOLUTION ORAL DAILY PRN
Status: DISCONTINUED | OUTPATIENT
Start: 2022-01-01 | End: 2022-01-01 | Stop reason: HOSPADM

## 2022-01-01 RX ORDER — ONDANSETRON 4 MG/1
4 TABLET, ORALLY DISINTEGRATING ORAL
COMMUNITY

## 2022-01-01 RX ORDER — SODIUM CHLORIDE, SODIUM LACTATE, POTASSIUM CHLORIDE, CALCIUM CHLORIDE 600; 310; 30; 20 MG/100ML; MG/100ML; MG/100ML; MG/100ML
150 INJECTION, SOLUTION INTRAVENOUS ONCE
Status: COMPLETED | OUTPATIENT
Start: 2022-01-01 | End: 2022-01-01

## 2022-01-01 RX ORDER — ZINC GLUCONATE 10 MG
250 LOZENGE ORAL DAILY
COMMUNITY

## 2022-01-01 RX ORDER — ZOLPIDEM TARTRATE 10 MG/1
10 TABLET ORAL
COMMUNITY

## 2022-01-01 RX ORDER — SODIUM CHLORIDE, SODIUM LACTATE, POTASSIUM CHLORIDE, CALCIUM CHLORIDE 600; 310; 30; 20 MG/100ML; MG/100ML; MG/100ML; MG/100ML
150 INJECTION, SOLUTION INTRAVENOUS CONTINUOUS
Status: DISCONTINUED | OUTPATIENT
Start: 2022-01-01 | End: 2022-01-01

## 2022-01-01 RX ORDER — MORPHINE SULFATE 2 MG/ML
2 INJECTION, SOLUTION INTRAMUSCULAR; INTRAVENOUS
Status: COMPLETED | OUTPATIENT
Start: 2022-01-01 | End: 2022-01-01

## 2022-01-01 RX ORDER — MORPHINE SULFATE 2 MG/ML
2 INJECTION, SOLUTION INTRAMUSCULAR; INTRAVENOUS ONCE
Status: COMPLETED | OUTPATIENT
Start: 2022-01-01 | End: 2022-01-01

## 2022-01-01 RX ORDER — MORPHINE SULFATE 4 MG/ML
4 INJECTION INTRAVENOUS
Status: DISCONTINUED | OUTPATIENT
Start: 2022-01-01 | End: 2022-01-01

## 2022-01-01 RX ORDER — OXYCODONE AND ACETAMINOPHEN 10; 325 MG/1; MG/1
TABLET ORAL
COMMUNITY
Start: 2022-01-01 | End: 2022-01-01

## 2022-01-01 RX ORDER — MORPHINE SULFATE 100 MG/5ML
10 SOLUTION ORAL
Status: DISCONTINUED | OUTPATIENT
Start: 2022-01-01 | End: 2022-01-01

## 2022-01-01 RX ORDER — SODIUM CHLORIDE 0.9 % (FLUSH) 0.9 %
5-40 SYRINGE (ML) INJECTION EVERY 8 HOURS
Status: DISCONTINUED | OUTPATIENT
Start: 2022-01-01 | End: 2022-01-01 | Stop reason: HOSPADM

## 2022-01-01 RX ORDER — ONDANSETRON 2 MG/ML
4 INJECTION INTRAMUSCULAR; INTRAVENOUS
Status: COMPLETED | OUTPATIENT
Start: 2022-01-01 | End: 2022-01-01

## 2022-01-01 RX ORDER — ASPIRIN 325 MG
1 TABLET, DELAYED RELEASE (ENTERIC COATED) ORAL
Status: DISCONTINUED | OUTPATIENT
Start: 2022-01-01 | End: 2022-01-01 | Stop reason: HOSPADM

## 2022-01-01 RX ORDER — SODIUM CHLORIDE, SODIUM LACTATE, POTASSIUM CHLORIDE, CALCIUM CHLORIDE 600; 310; 30; 20 MG/100ML; MG/100ML; MG/100ML; MG/100ML
1000 INJECTION, SOLUTION INTRAVENOUS ONCE
Status: COMPLETED | OUTPATIENT
Start: 2022-01-01 | End: 2022-01-01

## 2022-01-01 RX ORDER — DIPHENOXYLATE HYDROCHLORIDE AND ATROPINE SULFATE 2.5; .025 MG/1; MG/1
1 TABLET ORAL
COMMUNITY

## 2022-01-01 RX ORDER — ALBUMIN HUMAN 250 G/1000ML
25 SOLUTION INTRAVENOUS EVERY 6 HOURS
Status: COMPLETED | OUTPATIENT
Start: 2022-01-01 | End: 2022-01-01

## 2022-01-01 RX ORDER — FAMOTIDINE 20 MG/1
40 TABLET, FILM COATED ORAL
Status: DISCONTINUED | OUTPATIENT
Start: 2022-01-01 | End: 2022-01-01 | Stop reason: HOSPADM

## 2022-01-01 RX ORDER — DIPHENHYDRAMINE HCL 25 MG
25 CAPSULE ORAL
Status: DISCONTINUED | OUTPATIENT
Start: 2022-01-01 | End: 2022-01-01 | Stop reason: HOSPADM

## 2022-01-01 RX ORDER — ENOXAPARIN SODIUM 100 MG/ML
50 INJECTION SUBCUTANEOUS EVERY 12 HOURS
Status: DISCONTINUED | OUTPATIENT
Start: 2022-01-01 | End: 2022-01-01

## 2022-01-01 RX ORDER — ZINC GLUCONATE 10 MG
1 LOZENGE ORAL DAILY
Status: CANCELLED | OUTPATIENT
Start: 2022-01-01

## 2022-01-01 RX ORDER — FUROSEMIDE 10 MG/ML
40 INJECTION INTRAMUSCULAR; INTRAVENOUS DAILY
Status: DISCONTINUED | OUTPATIENT
Start: 2022-01-01 | End: 2022-01-01

## 2022-01-01 RX ORDER — HYDROMORPHONE HYDROCHLORIDE 1 MG/ML
0.2 INJECTION, SOLUTION INTRAMUSCULAR; INTRAVENOUS; SUBCUTANEOUS
Status: DISCONTINUED | OUTPATIENT
Start: 2022-01-01 | End: 2022-01-01

## 2022-01-01 RX ORDER — IPRATROPIUM BROMIDE AND ALBUTEROL SULFATE 2.5; .5 MG/3ML; MG/3ML
3 SOLUTION RESPIRATORY (INHALATION)
Status: DISCONTINUED | OUTPATIENT
Start: 2022-01-01 | End: 2022-01-01 | Stop reason: HOSPADM

## 2022-01-01 RX ORDER — ALBUTEROL SULFATE 0.83 MG/ML
20 SOLUTION RESPIRATORY (INHALATION)
Status: COMPLETED | OUTPATIENT
Start: 2022-01-01 | End: 2022-01-01

## 2022-01-01 RX ORDER — VANCOMYCIN HYDROCHLORIDE 1 G/20ML
INJECTION, POWDER, LYOPHILIZED, FOR SOLUTION INTRAVENOUS ONCE
Status: DISCONTINUED | OUTPATIENT
Start: 2022-01-01 | End: 2022-01-01 | Stop reason: DRUGHIGH

## 2022-01-01 RX ORDER — VANCOMYCIN/0.9 % SOD CHLORIDE 1.5G/250ML
1500 PLASTIC BAG, INJECTION (ML) INTRAVENOUS ONCE
Status: COMPLETED | OUTPATIENT
Start: 2022-01-01 | End: 2022-01-01

## 2022-01-01 RX ORDER — OXYCODONE AND ACETAMINOPHEN 10; 325 MG/1; MG/1
1 TABLET ORAL
Status: DISCONTINUED | OUTPATIENT
Start: 2022-01-01 | End: 2022-01-01 | Stop reason: HOSPADM

## 2022-01-01 RX ORDER — POTASSIUM CHLORIDE 20 MEQ/1
20 TABLET, EXTENDED RELEASE ORAL 2 TIMES DAILY
Status: DISCONTINUED | OUTPATIENT
Start: 2022-01-01 | End: 2022-01-01 | Stop reason: HOSPADM

## 2022-01-01 RX ORDER — ISOSORBIDE DINITRATE 20 MG/1
10 TABLET ORAL 3 TIMES DAILY
Status: DISCONTINUED | OUTPATIENT
Start: 2022-01-01 | End: 2022-01-01

## 2022-01-01 RX ORDER — ACETAMINOPHEN 650 MG/1
650 SUPPOSITORY RECTAL
Status: DISCONTINUED | OUTPATIENT
Start: 2022-01-01 | End: 2022-01-01 | Stop reason: HOSPADM

## 2022-01-01 RX ORDER — SODIUM CHLORIDE 0.9 % (FLUSH) 0.9 %
5-10 SYRINGE (ML) INJECTION AS NEEDED
Status: DISCONTINUED | OUTPATIENT
Start: 2022-01-01 | End: 2022-01-01 | Stop reason: HOSPADM

## 2022-01-01 RX ORDER — MORPHINE SULFATE 4 MG/ML
4 INJECTION INTRAVENOUS
Status: COMPLETED | OUTPATIENT
Start: 2022-01-01 | End: 2022-01-01

## 2022-01-01 RX ORDER — ACETAMINOPHEN 500 MG
1000 TABLET ORAL
Status: COMPLETED | OUTPATIENT
Start: 2022-01-01 | End: 2022-01-01

## 2022-01-01 RX ORDER — SODIUM CHLORIDE 9 MG/ML
125 INJECTION, SOLUTION INTRAVENOUS CONTINUOUS
Status: DISCONTINUED | OUTPATIENT
Start: 2022-01-01 | End: 2022-01-01

## 2022-01-01 RX ORDER — CYCLOBENZAPRINE HCL 10 MG
5 TABLET ORAL
Status: CANCELLED | OUTPATIENT
Start: 2022-01-01

## 2022-01-01 RX ORDER — BUMETANIDE 0.25 MG/ML
1 INJECTION INTRAMUSCULAR; INTRAVENOUS 2 TIMES DAILY
Status: DISCONTINUED | OUTPATIENT
Start: 2022-01-01 | End: 2022-01-01

## 2022-01-01 RX ORDER — POTASSIUM CHLORIDE 20 MEQ/1
40 TABLET, EXTENDED RELEASE ORAL 2 TIMES DAILY
Status: COMPLETED | OUTPATIENT
Start: 2022-01-01 | End: 2022-01-01

## 2022-01-01 RX ORDER — SODIUM CHLORIDE 9 MG/ML
200 INJECTION, SOLUTION INTRAVENOUS CONTINUOUS
Status: DISCONTINUED | OUTPATIENT
Start: 2022-01-01 | End: 2022-01-01

## 2022-01-01 RX ORDER — DIPHENHYDRAMINE HYDROCHLORIDE 50 MG/ML
25 INJECTION, SOLUTION INTRAMUSCULAR; INTRAVENOUS
Status: DISCONTINUED | OUTPATIENT
Start: 2022-01-01 | End: 2022-01-01

## 2022-01-01 RX ORDER — VANCOMYCIN HYDROCHLORIDE 1 G/20ML
INJECTION, POWDER, LYOPHILIZED, FOR SOLUTION INTRAVENOUS EVERY 12 HOURS
Status: DISCONTINUED | OUTPATIENT
Start: 2022-01-01 | End: 2022-01-01 | Stop reason: DRUGHIGH

## 2022-01-01 RX ORDER — FUROSEMIDE 40 MG/1
40 TABLET ORAL 2 TIMES DAILY
Status: CANCELLED | OUTPATIENT
Start: 2022-01-01

## 2022-01-01 RX ORDER — FUROSEMIDE 10 MG/ML
20 INJECTION INTRAMUSCULAR; INTRAVENOUS ONCE
Status: COMPLETED | OUTPATIENT
Start: 2022-01-01 | End: 2022-01-01

## 2022-01-01 RX ORDER — MORPHINE SULFATE 2 MG/ML
1 INJECTION, SOLUTION INTRAMUSCULAR; INTRAVENOUS
Status: DISCONTINUED | OUTPATIENT
Start: 2022-01-01 | End: 2022-01-01 | Stop reason: HOSPADM

## 2022-01-01 RX ORDER — LEVOTHYROXINE SODIUM 112 UG/1
112 TABLET ORAL
Status: DISCONTINUED | OUTPATIENT
Start: 2022-01-01 | End: 2022-01-01 | Stop reason: HOSPADM

## 2022-01-01 RX ORDER — MORPHINE SULFATE 2 MG/ML
2 INJECTION, SOLUTION INTRAMUSCULAR; INTRAVENOUS
Status: DISCONTINUED | OUTPATIENT
Start: 2022-01-01 | End: 2022-01-01 | Stop reason: HOSPADM

## 2022-01-01 RX ORDER — LORAZEPAM 2 MG/ML
2 INJECTION INTRAMUSCULAR
Status: DISCONTINUED | OUTPATIENT
Start: 2022-01-01 | End: 2022-01-01 | Stop reason: HOSPADM

## 2022-01-01 RX ORDER — TRAZODONE HYDROCHLORIDE 50 MG/1
50 TABLET ORAL
Status: DISCONTINUED | OUTPATIENT
Start: 2022-01-01 | End: 2022-01-01 | Stop reason: HOSPADM

## 2022-01-01 RX ORDER — HYDROMORPHONE HYDROCHLORIDE 1 MG/ML
0.5 INJECTION, SOLUTION INTRAMUSCULAR; INTRAVENOUS; SUBCUTANEOUS ONCE
Status: COMPLETED | OUTPATIENT
Start: 2022-01-01 | End: 2022-01-01

## 2022-01-01 RX ORDER — FAMOTIDINE 20 MG/1
40 TABLET, FILM COATED ORAL
Status: DISCONTINUED | OUTPATIENT
Start: 2022-01-01 | End: 2022-01-01

## 2022-01-01 RX ORDER — PANTOPRAZOLE SODIUM 40 MG/1
40 TABLET, DELAYED RELEASE ORAL
Status: DISCONTINUED | OUTPATIENT
Start: 2022-01-01 | End: 2022-01-01

## 2022-01-01 RX ORDER — CETIRIZINE HCL 10 MG
10 TABLET ORAL DAILY
Status: CANCELLED | OUTPATIENT
Start: 2022-01-01

## 2022-01-01 RX ORDER — FLUTICASONE PROPIONATE 50 MCG
2 SPRAY, SUSPENSION (ML) NASAL DAILY
Status: DISCONTINUED | OUTPATIENT
Start: 2022-01-01 | End: 2022-01-01 | Stop reason: HOSPADM

## 2022-01-01 RX ORDER — POTASSIUM CHLORIDE 20 MEQ/1
40 TABLET, EXTENDED RELEASE ORAL DAILY
Status: DISCONTINUED | OUTPATIENT
Start: 2022-01-01 | End: 2022-01-01

## 2022-01-01 RX ORDER — ONDANSETRON 4 MG/1
4 TABLET, ORALLY DISINTEGRATING ORAL
Status: DISCONTINUED | OUTPATIENT
Start: 2022-01-01 | End: 2022-01-01 | Stop reason: HOSPADM

## 2022-01-01 RX ORDER — OXYCODONE AND ACETAMINOPHEN 7.5; 325 MG/1; MG/1
1 TABLET ORAL
Status: DISCONTINUED | OUTPATIENT
Start: 2022-01-01 | End: 2022-01-01 | Stop reason: HOSPADM

## 2022-01-01 RX ORDER — FACIAL-BODY WIPES
10 EACH TOPICAL AS NEEDED
Status: DISCONTINUED | OUTPATIENT
Start: 2022-01-01 | End: 2022-01-01 | Stop reason: HOSPADM

## 2022-01-01 RX ORDER — DASATINIB 80 MG/1
1 TABLET ORAL DAILY
COMMUNITY

## 2022-01-01 RX ORDER — ERGOCALCIFEROL 1.25 MG/1
50000 CAPSULE ORAL DAILY
Status: COMPLETED | OUTPATIENT
Start: 2022-01-01 | End: 2022-01-01

## 2022-01-01 RX ORDER — DIPHENHYDRAMINE HYDROCHLORIDE 50 MG/ML
12.5 INJECTION, SOLUTION INTRAMUSCULAR; INTRAVENOUS ONCE
Status: COMPLETED | OUTPATIENT
Start: 2022-01-01 | End: 2022-01-01

## 2022-01-01 RX ORDER — MAGNESIUM SULFATE 100 %
16 CRYSTALS MISCELLANEOUS AS NEEDED
Status: DISCONTINUED | OUTPATIENT
Start: 2022-01-01 | End: 2022-01-01

## 2022-01-01 RX ORDER — BUMETANIDE 0.5 MG/1
2 TABLET ORAL DAILY
Qty: 30 TABLET | Refills: 0 | Status: SHIPPED | OUTPATIENT
Start: 2022-01-01 | End: 2022-01-01

## 2022-01-01 RX ORDER — CETIRIZINE HCL 10 MG
10 TABLET ORAL DAILY
Status: DISCONTINUED | OUTPATIENT
Start: 2022-01-01 | End: 2022-01-01

## 2022-01-01 RX ORDER — ALBUMIN HUMAN 250 G/1000ML
25 SOLUTION INTRAVENOUS EVERY 6 HOURS
Status: DISPENSED | OUTPATIENT
Start: 2022-01-01 | End: 2022-01-01

## 2022-01-01 RX ORDER — ZOLPIDEM TARTRATE 5 MG/1
5 TABLET ORAL
Status: DISCONTINUED | OUTPATIENT
Start: 2022-01-01 | End: 2022-01-01 | Stop reason: HOSPADM

## 2022-01-01 RX ORDER — ENOXAPARIN SODIUM 100 MG/ML
50 INJECTION SUBCUTANEOUS EVERY 12 HOURS
Status: DISCONTINUED | OUTPATIENT
Start: 2022-01-01 | End: 2022-01-01 | Stop reason: HOSPADM

## 2022-01-01 RX ORDER — CALCIUM CARBONATE 500(1250)
500 TABLET ORAL
Status: DISCONTINUED | OUTPATIENT
Start: 2022-01-01 | End: 2022-01-01

## 2022-01-01 RX ORDER — POTASSIUM CHLORIDE 14.9 MG/ML
20 INJECTION INTRAVENOUS
Status: DISCONTINUED | OUTPATIENT
Start: 2022-01-01 | End: 2022-01-01

## 2022-01-01 RX ORDER — POTASSIUM CHLORIDE 20 MEQ/1
40 TABLET, EXTENDED RELEASE ORAL 2 TIMES DAILY
Status: DISCONTINUED | OUTPATIENT
Start: 2022-01-01 | End: 2022-01-01

## 2022-01-01 RX ORDER — TRAZODONE HYDROCHLORIDE 50 MG/1
50 TABLET ORAL
Status: CANCELLED | OUTPATIENT
Start: 2022-01-01

## 2022-01-01 RX ORDER — OXYCODONE AND ACETAMINOPHEN 10; 325 MG/1; MG/1
1 TABLET ORAL
Qty: 9 TABLET | Refills: 0 | Status: SHIPPED | OUTPATIENT
Start: 2022-01-01 | End: 2022-01-01

## 2022-01-01 RX ORDER — CYCLOBENZAPRINE HCL 10 MG
5 TABLET ORAL
Status: DISCONTINUED | OUTPATIENT
Start: 2022-01-01 | End: 2022-01-01 | Stop reason: HOSPADM

## 2022-01-01 RX ORDER — VANCOMYCIN/0.9 % SOD CHLORIDE 1.5G/250ML
1500 PLASTIC BAG, INJECTION (ML) INTRAVENOUS
Status: DISCONTINUED | OUTPATIENT
Start: 2022-01-01 | End: 2022-01-01

## 2022-01-01 RX ORDER — SENNOSIDES 8.6 MG/1
1 TABLET ORAL 2 TIMES DAILY
Status: DISCONTINUED | OUTPATIENT
Start: 2022-01-01 | End: 2022-01-01

## 2022-01-01 RX ORDER — CALCIUM CARBONATE 600 MG
600 TABLET ORAL DAILY
COMMUNITY

## 2022-01-01 RX ORDER — GLYCOPYRROLATE 0.2 MG/ML
0.2 INJECTION INTRAMUSCULAR; INTRAVENOUS
Status: DISCONTINUED | OUTPATIENT
Start: 2022-01-01 | End: 2022-01-01 | Stop reason: HOSPADM

## 2022-01-01 RX ORDER — HYOSCYAMINE SULFATE 0.12 MG/1
0.12 TABLET SUBLINGUAL
Status: DISCONTINUED | OUTPATIENT
Start: 2022-01-01 | End: 2022-01-01

## 2022-01-01 RX ORDER — MORPHINE SULFATE 2 MG/ML
2 INJECTION, SOLUTION INTRAMUSCULAR; INTRAVENOUS
Status: DISCONTINUED | OUTPATIENT
Start: 2022-01-01 | End: 2022-01-01

## 2022-01-01 RX ORDER — SODIUM CHLORIDE 9 MG/ML
75 INJECTION, SOLUTION INTRAVENOUS CONTINUOUS
Status: DISCONTINUED | OUTPATIENT
Start: 2022-01-01 | End: 2022-01-01

## 2022-01-01 RX ORDER — METRONIDAZOLE 500 MG/100ML
500 INJECTION, SOLUTION INTRAVENOUS EVERY 12 HOURS
Status: DISCONTINUED | OUTPATIENT
Start: 2022-01-01 | End: 2022-01-01

## 2022-01-01 RX ORDER — DEXTROSE MONOHYDRATE 100 MG/ML
125-250 INJECTION, SOLUTION INTRAVENOUS AS NEEDED
Status: DISCONTINUED | OUTPATIENT
Start: 2022-01-01 | End: 2022-01-01

## 2022-01-01 RX ORDER — CALCIUM GLUCONATE 20 MG/ML
2 INJECTION, SOLUTION INTRAVENOUS ONCE
Status: COMPLETED | OUTPATIENT
Start: 2022-01-01 | End: 2022-01-01

## 2022-01-01 RX ORDER — LEVOTHYROXINE SODIUM 125 UG/1
125 TABLET ORAL
Status: CANCELLED | OUTPATIENT
Start: 2022-01-01

## 2022-01-01 RX ORDER — ACETAMINOPHEN 650 MG/1
650 SUPPOSITORY RECTAL
Status: DISCONTINUED | OUTPATIENT
Start: 2022-01-01 | End: 2022-01-01

## 2022-01-01 RX ORDER — LANSOPRAZOLE 30 MG/1
30 CAPSULE, DELAYED RELEASE ORAL
COMMUNITY

## 2022-01-01 RX ORDER — FAMOTIDINE 20 MG/1
40 TABLET, FILM COATED ORAL
Status: CANCELLED | OUTPATIENT
Start: 2022-01-01

## 2022-01-01 RX ORDER — DIPHENHYDRAMINE HCL 25 MG
25 CAPSULE ORAL
Status: DISCONTINUED | OUTPATIENT
Start: 2022-01-01 | End: 2022-01-01

## 2022-01-01 RX ORDER — CEFPODOXIME PROXETIL 200 MG/1
200 TABLET, FILM COATED ORAL 2 TIMES DAILY
Qty: 14 TABLET | Refills: 0 | Status: SHIPPED | OUTPATIENT
Start: 2022-01-01 | End: 2022-01-01

## 2022-01-01 RX ORDER — ONDANSETRON 2 MG/ML
4 INJECTION INTRAMUSCULAR; INTRAVENOUS
Status: CANCELLED | OUTPATIENT
Start: 2022-01-01

## 2022-01-01 RX ORDER — HYDROMORPHONE HYDROCHLORIDE 1 MG/ML
1 INJECTION, SOLUTION INTRAMUSCULAR; INTRAVENOUS; SUBCUTANEOUS
Status: DISCONTINUED | OUTPATIENT
Start: 2022-01-01 | End: 2022-01-01 | Stop reason: HOSPADM

## 2022-01-01 RX ORDER — POTASSIUM CHLORIDE 14.9 MG/ML
20 INJECTION INTRAVENOUS
Status: DISPENSED | OUTPATIENT
Start: 2022-01-01 | End: 2022-01-01

## 2022-01-01 RX ORDER — POTASSIUM CHLORIDE 20 MEQ/1
40 TABLET, EXTENDED RELEASE ORAL 2 TIMES DAILY
Status: CANCELLED | OUTPATIENT
Start: 2022-01-01

## 2022-01-01 RX ORDER — FUROSEMIDE 40 MG/1
40 TABLET ORAL 2 TIMES DAILY
Status: DISCONTINUED | OUTPATIENT
Start: 2022-01-01 | End: 2022-01-01

## 2022-01-01 RX ORDER — ACETAMINOPHEN 325 MG/1
650 TABLET ORAL
Status: DISCONTINUED | OUTPATIENT
Start: 2022-01-01 | End: 2022-01-01

## 2022-01-01 RX ORDER — SODIUM,POTASSIUM PHOSPHATES 280-250MG
1 POWDER IN PACKET (EA) ORAL 4 TIMES DAILY
Status: DISCONTINUED | OUTPATIENT
Start: 2022-01-01 | End: 2022-01-01 | Stop reason: HOSPADM

## 2022-01-01 RX ORDER — PHYTONADIONE 5 MG/1
5 TABLET ORAL
Status: COMPLETED | OUTPATIENT
Start: 2022-01-01 | End: 2022-01-01

## 2022-01-01 RX ORDER — FUROSEMIDE 20 MG/1
20 TABLET ORAL DAILY
COMMUNITY

## 2022-01-01 RX ORDER — SODIUM CHLORIDE 0.9 % (FLUSH) 0.9 %
5-10 SYRINGE (ML) INJECTION EVERY 8 HOURS
Status: DISCONTINUED | OUTPATIENT
Start: 2022-01-01 | End: 2022-01-01

## 2022-01-01 RX ORDER — LOPERAMIDE HYDROCHLORIDE 2 MG/1
2 CAPSULE ORAL
Status: DISCONTINUED | OUTPATIENT
Start: 2022-01-01 | End: 2022-01-01 | Stop reason: HOSPADM

## 2022-01-01 RX ORDER — METOPROLOL SUCCINATE 25 MG/1
25 TABLET, EXTENDED RELEASE ORAL DAILY
Status: DISCONTINUED | OUTPATIENT
Start: 2022-01-01 | End: 2022-01-01 | Stop reason: HOSPADM

## 2022-01-01 RX ORDER — FUROSEMIDE 10 MG/ML
40 INJECTION INTRAMUSCULAR; INTRAVENOUS 2 TIMES DAILY
Status: DISCONTINUED | OUTPATIENT
Start: 2022-01-01 | End: 2022-01-01

## 2022-01-01 RX ORDER — PANTOPRAZOLE SODIUM 40 MG/1
40 TABLET, DELAYED RELEASE ORAL
Status: CANCELLED | OUTPATIENT
Start: 2022-01-01

## 2022-01-01 RX ORDER — SODIUM CHLORIDE 9 MG/ML
250 INJECTION, SOLUTION INTRAVENOUS AS NEEDED
Status: DISCONTINUED | OUTPATIENT
Start: 2022-01-01 | End: 2022-01-01

## 2022-01-01 RX ORDER — FAMOTIDINE 40 MG/1
40 TABLET, FILM COATED ORAL
COMMUNITY

## 2022-01-01 RX ORDER — CYCLOBENZAPRINE HCL 10 MG
5 TABLET ORAL
Status: DISCONTINUED | OUTPATIENT
Start: 2022-01-01 | End: 2022-01-01

## 2022-01-01 RX ORDER — SODIUM CHLORIDE 0.9 % (FLUSH) 0.9 %
5-10 SYRINGE (ML) INJECTION EVERY 8 HOURS
Status: DISCONTINUED | OUTPATIENT
Start: 2022-01-01 | End: 2022-01-01 | Stop reason: HOSPADM

## 2022-01-01 RX ORDER — BUMETANIDE 0.25 MG/ML
2 INJECTION INTRAMUSCULAR; INTRAVENOUS 2 TIMES DAILY
Status: DISCONTINUED | OUTPATIENT
Start: 2022-01-01 | End: 2022-01-01 | Stop reason: HOSPADM

## 2022-01-01 RX ORDER — GLYCOPYRROLATE 0.2 MG/ML
0.1 INJECTION INTRAMUSCULAR; INTRAVENOUS
Status: DISCONTINUED | OUTPATIENT
Start: 2022-01-01 | End: 2022-01-01 | Stop reason: HOSPADM

## 2022-01-01 RX ORDER — VANCOMYCIN HYDROCHLORIDE
1250 ONCE
Status: COMPLETED | OUTPATIENT
Start: 2022-01-01 | End: 2022-01-01

## 2022-01-01 RX ORDER — HYOSCYAMINE SULFATE 0.12 MG/5ML
0.12 LIQUID ORAL
Status: DISCONTINUED | OUTPATIENT
Start: 2022-01-01 | End: 2022-01-01 | Stop reason: HOSPADM

## 2022-01-01 RX ORDER — ONDANSETRON 4 MG/1
4 TABLET, ORALLY DISINTEGRATING ORAL
Status: DISCONTINUED | OUTPATIENT
Start: 2022-01-01 | End: 2022-01-01

## 2022-01-01 RX ORDER — ENOXAPARIN SODIUM 100 MG/ML
50 INJECTION SUBCUTANEOUS EVERY 12 HOURS
COMMUNITY

## 2022-01-01 RX ORDER — FUROSEMIDE 40 MG/1
40 TABLET ORAL 2 TIMES DAILY
Status: DISCONTINUED | OUTPATIENT
Start: 2022-01-01 | End: 2022-01-01 | Stop reason: HOSPADM

## 2022-01-01 RX ORDER — ENOXAPARIN SODIUM 100 MG/ML
60 INJECTION SUBCUTANEOUS EVERY 12 HOURS
Status: DISCONTINUED | OUTPATIENT
Start: 2022-01-01 | End: 2022-01-01 | Stop reason: HOSPADM

## 2022-01-01 RX ORDER — MELATONIN
1000
Status: DISCONTINUED | OUTPATIENT
Start: 2022-01-01 | End: 2022-01-01 | Stop reason: HOSPADM

## 2022-01-01 RX ORDER — LEVOTHYROXINE SODIUM 125 UG/1
125 TABLET ORAL
Status: DISCONTINUED | OUTPATIENT
Start: 2022-01-01 | End: 2022-01-01 | Stop reason: HOSPADM

## 2022-01-01 RX ORDER — GLYCOPYRROLATE 0.2 MG/ML
0.2 INJECTION INTRAMUSCULAR; INTRAVENOUS
Status: DISCONTINUED | OUTPATIENT
Start: 2022-01-01 | End: 2022-01-01

## 2022-01-01 RX ORDER — FUROSEMIDE 10 MG/ML
40 INJECTION INTRAMUSCULAR; INTRAVENOUS EVERY 12 HOURS
Status: DISCONTINUED | OUTPATIENT
Start: 2022-01-01 | End: 2022-01-01

## 2022-01-01 RX ORDER — SODIUM CHLORIDE, SODIUM LACTATE, POTASSIUM CHLORIDE, CALCIUM CHLORIDE 600; 310; 30; 20 MG/100ML; MG/100ML; MG/100ML; MG/100ML
75 INJECTION, SOLUTION INTRAVENOUS CONTINUOUS
Status: DISCONTINUED | OUTPATIENT
Start: 2022-01-01 | End: 2022-01-01

## 2022-01-01 RX ORDER — DEXTROSE MONOHYDRATE 100 MG/ML
50 INJECTION, SOLUTION INTRAVENOUS CONTINUOUS
Status: DISCONTINUED | OUTPATIENT
Start: 2022-01-01 | End: 2022-01-01

## 2022-01-01 RX ORDER — DIPHENOXYLATE HYDROCHLORIDE AND ATROPINE SULFATE 2.5; .025 MG/1; MG/1
1 TABLET ORAL
Status: DISCONTINUED | OUTPATIENT
Start: 2022-01-01 | End: 2022-01-01 | Stop reason: HOSPADM

## 2022-01-01 RX ORDER — SODIUM CHLORIDE 0.9 % (FLUSH) 0.9 %
3 SYRINGE (ML) INJECTION AS NEEDED
Status: DISCONTINUED | OUTPATIENT
Start: 2022-01-01 | End: 2022-01-01 | Stop reason: HOSPADM

## 2022-01-01 RX ORDER — POLYMYXIN B SULFATE AND TRIMETHOPRIM 1; 10000 MG/ML; [USP'U]/ML
1 SOLUTION OPHTHALMIC 2 TIMES DAILY
Status: CANCELLED | OUTPATIENT
Start: 2022-01-01

## 2022-01-01 RX ORDER — FLUTICASONE PROPIONATE 50 MCG
2 SPRAY, SUSPENSION (ML) NASAL DAILY
Status: CANCELLED | OUTPATIENT
Start: 2022-01-01

## 2022-01-01 RX ORDER — LEVOTHYROXINE SODIUM 112 UG/1
112 TABLET ORAL
COMMUNITY

## 2022-01-01 RX ORDER — METOPROLOL SUCCINATE 25 MG/1
25 TABLET, EXTENDED RELEASE ORAL DAILY
Status: CANCELLED | OUTPATIENT
Start: 2022-01-01

## 2022-01-01 RX ORDER — ONDANSETRON 4 MG/1
4 TABLET, ORALLY DISINTEGRATING ORAL
Status: CANCELLED | OUTPATIENT
Start: 2022-01-01

## 2022-01-01 RX ORDER — SODIUM CHLORIDE 0.9 % (FLUSH) 0.9 %
3 SYRINGE (ML) INJECTION EVERY 12 HOURS
Status: DISCONTINUED | OUTPATIENT
Start: 2022-01-01 | End: 2022-01-01 | Stop reason: HOSPADM

## 2022-01-01 RX ORDER — LEVOTHYROXINE SODIUM 112 UG/1
112 TABLET ORAL
Status: DISCONTINUED | OUTPATIENT
Start: 2022-01-01 | End: 2022-01-01

## 2022-01-01 RX ORDER — LANOLIN ALCOHOL/MO/W.PET/CERES
200 CREAM (GRAM) TOPICAL DAILY
Status: DISCONTINUED | OUTPATIENT
Start: 2022-01-01 | End: 2022-01-01

## 2022-01-01 RX ORDER — OXYCODONE AND ACETAMINOPHEN 7.5; 325 MG/1; MG/1
1 TABLET ORAL
Status: DISCONTINUED | OUTPATIENT
Start: 2022-01-01 | End: 2022-01-01

## 2022-01-01 RX ORDER — MULTIVIT WITH MINERALS/HERBS
1 TABLET ORAL DAILY
COMMUNITY

## 2022-01-01 RX ORDER — POTASSIUM CHLORIDE 14.9 MG/ML
20 INJECTION INTRAVENOUS
Status: COMPLETED | OUTPATIENT
Start: 2022-01-01 | End: 2022-01-01

## 2022-01-01 RX ORDER — CETIRIZINE HCL 10 MG
10 TABLET ORAL DAILY
Status: DISCONTINUED | OUTPATIENT
Start: 2022-01-01 | End: 2022-01-01 | Stop reason: HOSPADM

## 2022-01-01 RX ADMIN — FUROSEMIDE 40 MG: 10 INJECTION, SOLUTION INTRAMUSCULAR; INTRAVENOUS at 22:44

## 2022-01-01 RX ADMIN — SODIUM CHLORIDE, PRESERVATIVE FREE 10 ML: 5 INJECTION INTRAVENOUS at 21:51

## 2022-01-01 RX ADMIN — MEROPENEM 500 MG: 500 INJECTION, POWDER, FOR SOLUTION INTRAVENOUS at 16:48

## 2022-01-01 RX ADMIN — SODIUM CHLORIDE, PRESERVATIVE FREE 3 ML: 5 INJECTION INTRAVENOUS at 20:26

## 2022-01-01 RX ADMIN — MORPHINE SULFATE 1 MG: 2 INJECTION, SOLUTION INTRAMUSCULAR; INTRAVENOUS at 05:00

## 2022-01-01 RX ADMIN — POTASSIUM & SODIUM PHOSPHATES POWDER PACK 280-160-250 MG 1 PACKET: 280-160-250 PACK at 15:40

## 2022-01-01 RX ADMIN — LORAZEPAM 1 MG: 2 INJECTION INTRAMUSCULAR; INTRAVENOUS at 23:52

## 2022-01-01 RX ADMIN — CETIRIZINE HYDROCHLORIDE 10 MG: 10 TABLET, FILM COATED ORAL at 08:33

## 2022-01-01 RX ADMIN — CEFTRIAXONE SODIUM 2 G: 2 INJECTION, POWDER, FOR SOLUTION INTRAMUSCULAR; INTRAVENOUS at 09:59

## 2022-01-01 RX ADMIN — Medication 500 MG: at 21:28

## 2022-01-01 RX ADMIN — SODIUM CHLORIDE 75 ML/HR: 900 INJECTION, SOLUTION INTRAVENOUS at 00:05

## 2022-01-01 RX ADMIN — OXYCODONE AND ACETAMINOPHEN 1 TABLET: 10; 325 TABLET ORAL at 21:36

## 2022-01-01 RX ADMIN — LEVOTHYROXINE SODIUM 112 MCG: 0.11 TABLET ORAL at 10:57

## 2022-01-01 RX ADMIN — MORPHINE SULFATE 4 MG: 4 INJECTION INTRAVENOUS at 04:53

## 2022-01-01 RX ADMIN — SODIUM CHLORIDE, PRESERVATIVE FREE 10 ML: 5 INJECTION INTRAVENOUS at 15:40

## 2022-01-01 RX ADMIN — MORPHINE SULFATE 4 MG: 4 INJECTION INTRAVENOUS at 20:25

## 2022-01-01 RX ADMIN — POLYMYXIN B SULFATE, TRIMETHOPRIM SULFATE 1 DROP: 10000; 1 SOLUTION/ DROPS OPHTHALMIC at 17:13

## 2022-01-01 RX ADMIN — PHYTONADIONE 5 MG: 5 TABLET ORAL at 15:17

## 2022-01-01 RX ADMIN — POTASSIUM CHLORIDE 40 MEQ: 20 TABLET, EXTENDED RELEASE ORAL at 17:53

## 2022-01-01 RX ADMIN — SODIUM CHLORIDE, PRESERVATIVE FREE 3 ML: 5 INJECTION INTRAVENOUS at 20:05

## 2022-01-01 RX ADMIN — SODIUM CHLORIDE 40 MG: 9 INJECTION, SOLUTION INTRAMUSCULAR; INTRAVENOUS; SUBCUTANEOUS at 08:20

## 2022-01-01 RX ADMIN — LEVOTHYROXINE SODIUM 125 MCG: 0.12 TABLET ORAL at 05:42

## 2022-01-01 RX ADMIN — MEROPENEM 500 MG: 500 INJECTION, POWDER, FOR SOLUTION INTRAVENOUS at 15:43

## 2022-01-01 RX ADMIN — SODIUM CHLORIDE, PRESERVATIVE FREE 10 ML: 5 INJECTION INTRAVENOUS at 21:33

## 2022-01-01 RX ADMIN — PROCHLORPERAZINE EDISYLATE 10 MG: 5 INJECTION INTRAMUSCULAR; INTRAVENOUS at 00:04

## 2022-01-01 RX ADMIN — SODIUM CHLORIDE 1000 ML: 900 INJECTION, SOLUTION INTRAVENOUS at 20:08

## 2022-01-01 RX ADMIN — POLYMYXIN B SULFATE, TRIMETHOPRIM SULFATE 1 DROP: 10000; 1 SOLUTION/ DROPS OPHTHALMIC at 09:25

## 2022-01-01 RX ADMIN — CEFTRIAXONE SODIUM 2 G: 2 INJECTION, POWDER, FOR SOLUTION INTRAMUSCULAR; INTRAVENOUS at 21:51

## 2022-01-01 RX ADMIN — OXYCODONE AND ACETAMINOPHEN 1 TABLET: 325; 10 TABLET ORAL at 05:41

## 2022-01-01 RX ADMIN — ONDANSETRON 4 MG: 2 INJECTION INTRAMUSCULAR; INTRAVENOUS at 15:21

## 2022-01-01 RX ADMIN — OXYCODONE AND ACETAMINOPHEN 1 TABLET: 325; 10 TABLET ORAL at 23:09

## 2022-01-01 RX ADMIN — LEVOTHYROXINE SODIUM 112 MCG: 0.11 TABLET ORAL at 08:10

## 2022-01-01 RX ADMIN — PANTOPRAZOLE SODIUM 40 MG: 40 TABLET, DELAYED RELEASE ORAL at 06:19

## 2022-01-01 RX ADMIN — OXYCODONE AND ACETAMINOPHEN 1 TABLET: 325; 10 TABLET ORAL at 09:03

## 2022-01-01 RX ADMIN — OXYCODONE AND ACETAMINOPHEN 1 TABLET: 325; 10 TABLET ORAL at 13:47

## 2022-01-01 RX ADMIN — CLOTRIMAZOLE 1 APPLICATOR: 1 CREAM VAGINAL at 22:56

## 2022-01-01 RX ADMIN — ENOXAPARIN SODIUM 50 MG: 100 INJECTION SUBCUTANEOUS at 08:43

## 2022-01-01 RX ADMIN — ALBUMIN (HUMAN) 25 G: 0.25 INJECTION, SOLUTION INTRAVENOUS at 09:24

## 2022-01-01 RX ADMIN — OXYCODONE AND ACETAMINOPHEN 1 TABLET: 7.5; 325 TABLET ORAL at 18:37

## 2022-01-01 RX ADMIN — MEROPENEM 500 MG: 500 INJECTION, POWDER, FOR SOLUTION INTRAVENOUS at 03:15

## 2022-01-01 RX ADMIN — DIPHENHYDRAMINE HYDROCHLORIDE 25 MG: 25 CAPSULE ORAL at 15:31

## 2022-01-01 RX ADMIN — MORPHINE SULFATE 4 MG: 4 INJECTION INTRAVENOUS at 15:20

## 2022-01-01 RX ADMIN — ONDANSETRON 4 MG: 4 TABLET, ORALLY DISINTEGRATING ORAL at 09:02

## 2022-01-01 RX ADMIN — SODIUM CHLORIDE, PRESERVATIVE FREE 10 ML: 5 INJECTION INTRAVENOUS at 22:56

## 2022-01-01 RX ADMIN — CEFEPIME HYDROCHLORIDE 2 G: 2 INJECTION, POWDER, FOR SOLUTION INTRAVENOUS at 09:03

## 2022-01-01 RX ADMIN — MORPHINE SULFATE 4 MG: 4 INJECTION INTRAVENOUS at 15:13

## 2022-01-01 RX ADMIN — SODIUM CHLORIDE, PRESERVATIVE FREE 10 ML: 5 INJECTION INTRAVENOUS at 06:35

## 2022-01-01 RX ADMIN — ONDANSETRON 4 MG: 2 INJECTION INTRAMUSCULAR; INTRAVENOUS at 08:00

## 2022-01-01 RX ADMIN — MORPHINE SULFATE 2 MG: 2 INJECTION, SOLUTION INTRAMUSCULAR; INTRAVENOUS at 19:10

## 2022-01-01 RX ADMIN — OXYCODONE AND ACETAMINOPHEN 1 TABLET: 7.5; 325 TABLET ORAL at 08:29

## 2022-01-01 RX ADMIN — SODIUM CHLORIDE, PRESERVATIVE FREE 10 ML: 5 INJECTION INTRAVENOUS at 06:00

## 2022-01-01 RX ADMIN — MORPHINE SULFATE 4 MG: 4 INJECTION INTRAVENOUS at 07:08

## 2022-01-01 RX ADMIN — GLUCAGON HYDROCHLORIDE 1 MG: KIT at 12:06

## 2022-01-01 RX ADMIN — ALBUMIN (HUMAN) 25 G: 0.25 INJECTION, SOLUTION INTRAVENOUS at 05:56

## 2022-01-01 RX ADMIN — HALOPERIDOL LACTATE 2 MG: 5 INJECTION, SOLUTION INTRAMUSCULAR at 17:18

## 2022-01-01 RX ADMIN — SODIUM CHLORIDE, SODIUM LACTATE, POTASSIUM CHLORIDE, AND CALCIUM CHLORIDE 100 ML/HR: 600; 310; 30; 20 INJECTION, SOLUTION INTRAVENOUS at 10:02

## 2022-01-01 RX ADMIN — CEFEPIME HYDROCHLORIDE 2 G: 2 INJECTION, POWDER, FOR SOLUTION INTRAVENOUS at 14:25

## 2022-01-01 RX ADMIN — CEFEPIME HYDROCHLORIDE 2 G: 2 INJECTION, POWDER, FOR SOLUTION INTRAVENOUS at 16:23

## 2022-01-01 RX ADMIN — CEFTRIAXONE SODIUM 2 G: 2 INJECTION, POWDER, FOR SOLUTION INTRAMUSCULAR; INTRAVENOUS at 17:00

## 2022-01-01 RX ADMIN — ENOXAPARIN SODIUM 60 MG: 100 INJECTION SUBCUTANEOUS at 09:09

## 2022-01-01 RX ADMIN — CETIRIZINE HYDROCHLORIDE 10 MG: 10 TABLET, FILM COATED ORAL at 08:12

## 2022-01-01 RX ADMIN — SODIUM CHLORIDE, SODIUM LACTATE, POTASSIUM CHLORIDE, AND CALCIUM CHLORIDE 100 ML/HR: 600; 310; 30; 20 INJECTION, SOLUTION INTRAVENOUS at 01:59

## 2022-01-01 RX ADMIN — SODIUM CHLORIDE, PRESERVATIVE FREE 10 ML: 5 INJECTION INTRAVENOUS at 13:05

## 2022-01-01 RX ADMIN — SODIUM CHLORIDE, PRESERVATIVE FREE 10 ML: 5 INJECTION INTRAVENOUS at 22:00

## 2022-01-01 RX ADMIN — SODIUM CHLORIDE 100 ML: 9 INJECTION, SOLUTION INTRAVENOUS at 22:56

## 2022-01-01 RX ADMIN — MORPHINE SULFATE 4 MG: 4 INJECTION INTRAVENOUS at 18:06

## 2022-01-01 RX ADMIN — POTASSIUM CHLORIDE 40 MEQ: 20 TABLET, EXTENDED RELEASE ORAL at 17:04

## 2022-01-01 RX ADMIN — GLYCOPYRROLATE 0.2 MG: 0.2 INJECTION, SOLUTION INTRAMUSCULAR; INTRAVENOUS at 08:49

## 2022-01-01 RX ADMIN — METRONIDAZOLE 500 MG: 500 INJECTION, SOLUTION INTRAVENOUS at 02:20

## 2022-01-01 RX ADMIN — GLYCOPYRROLATE 0.1 MG: 0.4 INJECTION INTRAMUSCULAR; INTRAVENOUS at 14:39

## 2022-01-01 RX ADMIN — POTASSIUM CHLORIDE 40 MEQ: 20 TABLET, EXTENDED RELEASE ORAL at 16:56

## 2022-01-01 RX ADMIN — POTASSIUM CHLORIDE 20 MEQ: 14.9 INJECTION, SOLUTION INTRAVENOUS at 11:17

## 2022-01-01 RX ADMIN — CEFTRIAXONE SODIUM 2 G: 2 INJECTION, POWDER, FOR SOLUTION INTRAMUSCULAR; INTRAVENOUS at 09:10

## 2022-01-01 RX ADMIN — OXYCODONE HYDROCHLORIDE AND ACETAMINOPHEN 1 TABLET: 10; 325 TABLET ORAL at 08:40

## 2022-01-01 RX ADMIN — SODIUM CHLORIDE, PRESERVATIVE FREE 10 ML: 5 INJECTION INTRAVENOUS at 00:15

## 2022-01-01 RX ADMIN — TRAZODONE HYDROCHLORIDE 50 MG: 50 TABLET ORAL at 00:27

## 2022-01-01 RX ADMIN — MORPHINE SULFATE 1 MG: 2 INJECTION, SOLUTION INTRAMUSCULAR; INTRAVENOUS at 22:05

## 2022-01-01 RX ADMIN — LEVOTHYROXINE SODIUM 112 MCG: 0.11 TABLET ORAL at 08:00

## 2022-01-01 RX ADMIN — ENOXAPARIN SODIUM 50 MG: 100 INJECTION SUBCUTANEOUS at 22:05

## 2022-01-01 RX ADMIN — HALOPERIDOL LACTATE 2 MG: 5 INJECTION, SOLUTION INTRAMUSCULAR at 07:07

## 2022-01-01 RX ADMIN — SODIUM CHLORIDE, PRESERVATIVE FREE 5 ML: 5 INJECTION INTRAVENOUS at 05:45

## 2022-01-01 RX ADMIN — HALOPERIDOL LACTATE 2 MG: 5 INJECTION, SOLUTION INTRAMUSCULAR at 19:54

## 2022-01-01 RX ADMIN — POLYMYXIN B SULFATE, TRIMETHOPRIM SULFATE 1 DROP: 10000; 1 SOLUTION/ DROPS OPHTHALMIC at 17:40

## 2022-01-01 RX ADMIN — METOPROLOL SUCCINATE 25 MG: 25 TABLET, EXTENDED RELEASE ORAL at 08:32

## 2022-01-01 RX ADMIN — MORPHINE SULFATE 2 MG: 2 INJECTION, SOLUTION INTRAMUSCULAR; INTRAVENOUS at 13:18

## 2022-01-01 RX ADMIN — CEFTRIAXONE SODIUM 2 G: 2 INJECTION, POWDER, FOR SOLUTION INTRAMUSCULAR; INTRAVENOUS at 21:50

## 2022-01-01 RX ADMIN — FUROSEMIDE 40 MG: 40 TABLET ORAL at 17:34

## 2022-01-01 RX ADMIN — SODIUM CHLORIDE 125 ML/HR: 900 INJECTION, SOLUTION INTRAVENOUS at 08:13

## 2022-01-01 RX ADMIN — VITAMIN D, TAB 1000IU (100/BT) 1000 UNITS: 25 TAB at 21:17

## 2022-01-01 RX ADMIN — LEVOTHYROXINE SODIUM 112 MCG: 0.11 TABLET ORAL at 06:00

## 2022-01-01 RX ADMIN — ONDANSETRON 4 MG: 2 INJECTION INTRAMUSCULAR; INTRAVENOUS at 11:51

## 2022-01-01 RX ADMIN — METHYLPREDNISOLONE SODIUM SUCCINATE 50 MG: 40 INJECTION, POWDER, FOR SOLUTION INTRAMUSCULAR; INTRAVENOUS at 08:19

## 2022-01-01 RX ADMIN — LORAZEPAM 1 MG: 2 INJECTION INTRAMUSCULAR; INTRAVENOUS at 09:22

## 2022-01-01 RX ADMIN — MORPHINE SULFATE 4 MG: 4 INJECTION INTRAVENOUS at 04:19

## 2022-01-01 RX ADMIN — OXYCODONE AND ACETAMINOPHEN 1 TABLET: 325; 10 TABLET ORAL at 21:10

## 2022-01-01 RX ADMIN — SODIUM CHLORIDE, PRESERVATIVE FREE 3 ML: 5 INJECTION INTRAVENOUS at 04:00

## 2022-01-01 RX ADMIN — ONDANSETRON 4 MG: 2 INJECTION INTRAMUSCULAR; INTRAVENOUS at 09:13

## 2022-01-01 RX ADMIN — MORPHINE SULFATE 2 MG: 2 INJECTION, SOLUTION INTRAMUSCULAR; INTRAVENOUS at 11:29

## 2022-01-01 RX ADMIN — SODIUM CHLORIDE, PRESERVATIVE FREE 10 ML: 5 INJECTION INTRAVENOUS at 22:19

## 2022-01-01 RX ADMIN — LEVOTHYROXINE SODIUM 125 MCG: 0.12 TABLET ORAL at 05:46

## 2022-01-01 RX ADMIN — SODIUM CHLORIDE, SODIUM LACTATE, POTASSIUM CHLORIDE, AND CALCIUM CHLORIDE 1000 ML: 600; 310; 30; 20 INJECTION, SOLUTION INTRAVENOUS at 18:25

## 2022-01-01 RX ADMIN — OXYCODONE AND ACETAMINOPHEN 1 TABLET: 10; 325 TABLET ORAL at 17:29

## 2022-01-01 RX ADMIN — SODIUM CHLORIDE, PRESERVATIVE FREE 10 ML: 5 INJECTION INTRAVENOUS at 03:54

## 2022-01-01 RX ADMIN — SODIUM CHLORIDE, PRESERVATIVE FREE 10 ML: 5 INJECTION INTRAVENOUS at 05:06

## 2022-01-01 RX ADMIN — ONDANSETRON 4 MG: 2 INJECTION INTRAMUSCULAR; INTRAVENOUS at 21:58

## 2022-01-01 RX ADMIN — POLYMYXIN B SULFATE, TRIMETHOPRIM SULFATE 1 DROP: 10000; 1 SOLUTION/ DROPS OPHTHALMIC at 20:09

## 2022-01-01 RX ADMIN — SODIUM CHLORIDE, PRESERVATIVE FREE 3 ML: 5 INJECTION INTRAVENOUS at 17:57

## 2022-01-01 RX ADMIN — MEROPENEM 500 MG: 500 INJECTION, POWDER, FOR SOLUTION INTRAVENOUS at 21:30

## 2022-01-01 RX ADMIN — LEVOTHYROXINE SODIUM 56 MCG: 0.11 TABLET ORAL at 05:26

## 2022-01-01 RX ADMIN — Medication 10 ML: at 11:09

## 2022-01-01 RX ADMIN — CEFEPIME HYDROCHLORIDE 2 G: 2 INJECTION, POWDER, FOR SOLUTION INTRAVENOUS at 08:20

## 2022-01-01 RX ADMIN — DIPHENHYDRAMINE HYDROCHLORIDE 25 MG: 25 CAPSULE ORAL at 00:51

## 2022-01-01 RX ADMIN — VITAMIN D, TAB 1000IU (100/BT) 1000 UNITS: 25 TAB at 21:33

## 2022-01-01 RX ADMIN — Medication 400 MG: at 09:51

## 2022-01-01 RX ADMIN — OXYCODONE AND ACETAMINOPHEN 1 TABLET: 7.5; 325 TABLET ORAL at 08:02

## 2022-01-01 RX ADMIN — MORPHINE SULFATE 2 MG: 2 INJECTION, SOLUTION INTRAMUSCULAR; INTRAVENOUS at 19:17

## 2022-01-01 RX ADMIN — VANCOMYCIN HYDROCHLORIDE 1500 MG: 100 INJECTION, POWDER, LYOPHILIZED, FOR SOLUTION INTRAVENOUS at 05:12

## 2022-01-01 RX ADMIN — CEFTRIAXONE SODIUM 2 G: 2 INJECTION, POWDER, FOR SOLUTION INTRAMUSCULAR; INTRAVENOUS at 18:02

## 2022-01-01 RX ADMIN — Medication 200 MG: at 08:02

## 2022-01-01 RX ADMIN — CALCIUM GLUCONATE 2 G: 20 INJECTION, SOLUTION INTRAVENOUS at 12:55

## 2022-01-01 RX ADMIN — OXYCODONE AND ACETAMINOPHEN 1 TABLET: 325; 10 TABLET ORAL at 00:15

## 2022-01-01 RX ADMIN — OXYCODONE AND ACETAMINOPHEN 1 TABLET: 325; 10 TABLET ORAL at 06:00

## 2022-01-01 RX ADMIN — SODIUM CHLORIDE, PRESERVATIVE FREE 10 ML: 5 INJECTION INTRAVENOUS at 13:18

## 2022-01-01 RX ADMIN — OXYCODONE 5 MG: 5 TABLET ORAL at 21:42

## 2022-01-01 RX ADMIN — SODIUM CHLORIDE, PRESERVATIVE FREE 10 ML: 5 INJECTION INTRAVENOUS at 05:20

## 2022-01-01 RX ADMIN — POLYMYXIN B SULFATE, TRIMETHOPRIM SULFATE 1 DROP: 10000; 1 SOLUTION/ DROPS OPHTHALMIC at 11:00

## 2022-01-01 RX ADMIN — SODIUM CHLORIDE, PRESERVATIVE FREE 10 ML: 5 INJECTION INTRAVENOUS at 05:05

## 2022-01-01 RX ADMIN — MORPHINE SULFATE 2 MG: 2 INJECTION, SOLUTION INTRAMUSCULAR; INTRAVENOUS at 07:34

## 2022-01-01 RX ADMIN — OXYCODONE AND ACETAMINOPHEN 1 TABLET: 325; 10 TABLET ORAL at 17:56

## 2022-01-01 RX ADMIN — ALBUMIN (HUMAN) 25 G: 0.25 INJECTION, SOLUTION INTRAVENOUS at 22:55

## 2022-01-01 RX ADMIN — POLYMYXIN B SULFATE, TRIMETHOPRIM SULFATE 1 DROP: 10000; 1 SOLUTION/ DROPS OPHTHALMIC at 08:16

## 2022-01-01 RX ADMIN — CETIRIZINE HYDROCHLORIDE 10 MG: 10 TABLET, FILM COATED ORAL at 09:04

## 2022-01-01 RX ADMIN — SODIUM CHLORIDE, SODIUM LACTATE, POTASSIUM CHLORIDE, AND CALCIUM CHLORIDE 100 ML/HR: 600; 310; 30; 20 INJECTION, SOLUTION INTRAVENOUS at 05:09

## 2022-01-01 RX ADMIN — SODIUM CHLORIDE, SODIUM LACTATE, POTASSIUM CHLORIDE, AND CALCIUM CHLORIDE 100 ML/HR: 600; 310; 30; 20 INJECTION, SOLUTION INTRAVENOUS at 04:13

## 2022-01-01 RX ADMIN — Medication 400 MG: at 08:07

## 2022-01-01 RX ADMIN — FUROSEMIDE 40 MG: 40 TABLET ORAL at 08:12

## 2022-01-01 RX ADMIN — PIPERACILLIN SODIUM AND TAZOBACTAM SODIUM 4.5 G: 4; .5 INJECTION, POWDER, LYOPHILIZED, FOR SOLUTION INTRAVENOUS at 20:03

## 2022-01-01 RX ADMIN — CALCIUM 500 MG: 500 TABLET ORAL at 21:50

## 2022-01-01 RX ADMIN — ENOXAPARIN SODIUM 50 MG: 100 INJECTION SUBCUTANEOUS at 20:53

## 2022-01-01 RX ADMIN — ENOXAPARIN SODIUM 50 MG: 100 INJECTION SUBCUTANEOUS at 08:17

## 2022-01-01 RX ADMIN — SODIUM CHLORIDE, PRESERVATIVE FREE 10 ML: 5 INJECTION INTRAVENOUS at 16:38

## 2022-01-01 RX ADMIN — ALBUMIN (HUMAN) 25 G: 0.25 INJECTION, SOLUTION INTRAVENOUS at 17:26

## 2022-01-01 RX ADMIN — SODIUM CHLORIDE 500 ML: 900 INJECTION, SOLUTION INTRAVENOUS at 14:10

## 2022-01-01 RX ADMIN — OXYCODONE AND ACETAMINOPHEN 1 TABLET: 10; 325 TABLET ORAL at 09:59

## 2022-01-01 RX ADMIN — SODIUM CHLORIDE, PRESERVATIVE FREE 10 ML: 5 INJECTION INTRAVENOUS at 21:18

## 2022-01-01 RX ADMIN — SODIUM CHLORIDE, PRESERVATIVE FREE 10 ML: 5 INJECTION INTRAVENOUS at 14:00

## 2022-01-01 RX ADMIN — CEFTRIAXONE SODIUM 2 G: 2 INJECTION, POWDER, FOR SOLUTION INTRAMUSCULAR; INTRAVENOUS at 09:43

## 2022-01-01 RX ADMIN — CEFTRIAXONE SODIUM 2 G: 2 INJECTION, POWDER, FOR SOLUTION INTRAMUSCULAR; INTRAVENOUS at 09:15

## 2022-01-01 RX ADMIN — CEFTRIAXONE SODIUM 2 G: 2 INJECTION, POWDER, FOR SOLUTION INTRAMUSCULAR; INTRAVENOUS at 09:22

## 2022-01-01 RX ADMIN — OXYCODONE AND ACETAMINOPHEN 1 TABLET: 325; 10 TABLET ORAL at 09:09

## 2022-01-01 RX ADMIN — LEVOTHYROXINE SODIUM 112 MCG: 0.11 TABLET ORAL at 06:35

## 2022-01-01 RX ADMIN — IOPAMIDOL 100 ML: 755 INJECTION, SOLUTION INTRAVENOUS at 11:08

## 2022-01-01 RX ADMIN — MORPHINE SULFATE 2 MG: 2 INJECTION, SOLUTION INTRAMUSCULAR; INTRAVENOUS at 14:07

## 2022-01-01 RX ADMIN — MORPHINE SULFATE 4 MG: 4 INJECTION INTRAVENOUS at 20:18

## 2022-01-01 RX ADMIN — SODIUM CHLORIDE 125 ML/HR: 900 INJECTION, SOLUTION INTRAVENOUS at 01:00

## 2022-01-01 RX ADMIN — ENOXAPARIN SODIUM 50 MG: 100 INJECTION SUBCUTANEOUS at 21:28

## 2022-01-01 RX ADMIN — MORPHINE SULFATE 2 MG: 2 INJECTION, SOLUTION INTRAMUSCULAR; INTRAVENOUS at 17:57

## 2022-01-01 RX ADMIN — CLOTRIMAZOLE 1 APPLICATOR: 1 CREAM VAGINAL at 22:00

## 2022-01-01 RX ADMIN — MORPHINE SULFATE 2 MG: 2 INJECTION, SOLUTION INTRAMUSCULAR; INTRAVENOUS at 01:52

## 2022-01-01 RX ADMIN — CEFEPIME HYDROCHLORIDE 2 G: 2 INJECTION, POWDER, FOR SOLUTION INTRAVENOUS at 13:17

## 2022-01-01 RX ADMIN — CEFTRIAXONE SODIUM 2 G: 2 INJECTION, POWDER, FOR SOLUTION INTRAMUSCULAR; INTRAVENOUS at 08:12

## 2022-01-01 RX ADMIN — CYCLOBENZAPRINE 5 MG: 10 TABLET, FILM COATED ORAL at 03:41

## 2022-01-01 RX ADMIN — OXYCODONE AND ACETAMINOPHEN 1 TABLET: 10; 325 TABLET ORAL at 01:54

## 2022-01-01 RX ADMIN — FUROSEMIDE 40 MG: 40 TABLET ORAL at 09:21

## 2022-01-01 RX ADMIN — ACETAMINOPHEN 650 MG: 325 TABLET, FILM COATED ORAL at 00:51

## 2022-01-01 RX ADMIN — Medication 400 MG: at 09:59

## 2022-01-01 RX ADMIN — BUMETANIDE 2 MG: 0.25 INJECTION, SOLUTION INTRAMUSCULAR; INTRAVENOUS at 18:14

## 2022-01-01 RX ADMIN — CETIRIZINE HYDROCHLORIDE 10 MG: 10 TABLET, FILM COATED ORAL at 09:21

## 2022-01-01 RX ADMIN — CLOTRIMAZOLE 1 APPLICATOR: 1 CREAM VAGINAL at 21:57

## 2022-01-01 RX ADMIN — POTASSIUM CHLORIDE 20 MEQ: 20 TABLET, EXTENDED RELEASE ORAL at 10:40

## 2022-01-01 RX ADMIN — ENOXAPARIN SODIUM 50 MG: 100 INJECTION SUBCUTANEOUS at 09:07

## 2022-01-01 RX ADMIN — VANCOMYCIN HYDROCHLORIDE 750 MG: 750 INJECTION, POWDER, LYOPHILIZED, FOR SOLUTION INTRAVENOUS at 15:42

## 2022-01-01 RX ADMIN — Medication 400 MG: at 07:51

## 2022-01-01 RX ADMIN — LEVOTHYROXINE SODIUM 112 MCG: 0.11 TABLET ORAL at 09:37

## 2022-01-01 RX ADMIN — MORPHINE SULFATE 10 MG: 10 SOLUTION ORAL at 17:55

## 2022-01-01 RX ADMIN — SODIUM CHLORIDE, PRESERVATIVE FREE 10 ML: 5 INJECTION INTRAVENOUS at 05:42

## 2022-01-01 RX ADMIN — FUROSEMIDE 40 MG: 10 INJECTION, SOLUTION INTRAMUSCULAR; INTRAVENOUS at 17:13

## 2022-01-01 RX ADMIN — CEFTRIAXONE SODIUM 2 G: 2 INJECTION, POWDER, FOR SOLUTION INTRAMUSCULAR; INTRAVENOUS at 21:20

## 2022-01-01 RX ADMIN — ENOXAPARIN SODIUM 50 MG: 100 INJECTION SUBCUTANEOUS at 22:18

## 2022-01-01 RX ADMIN — OXYCODONE AND ACETAMINOPHEN 1 TABLET: 10; 325 TABLET ORAL at 11:10

## 2022-01-01 RX ADMIN — GLYCOPYRROLATE 0.2 MG: 0.2 INJECTION, SOLUTION INTRAMUSCULAR; INTRAVENOUS at 23:15

## 2022-01-01 RX ADMIN — OXYCODONE AND ACETAMINOPHEN 1 TABLET: 325; 10 TABLET ORAL at 04:05

## 2022-01-01 RX ADMIN — SODIUM CHLORIDE, PRESERVATIVE FREE 10 ML: 5 INJECTION INTRAVENOUS at 05:30

## 2022-01-01 RX ADMIN — OXYCODONE AND ACETAMINOPHEN 1 TABLET: 325; 10 TABLET ORAL at 12:30

## 2022-01-01 RX ADMIN — ENOXAPARIN SODIUM 50 MG: 100 INJECTION SUBCUTANEOUS at 22:31

## 2022-01-01 RX ADMIN — POLYMYXIN B SULFATE, TRIMETHOPRIM SULFATE 1 DROP: 10000; 1 SOLUTION/ DROPS OPHTHALMIC at 10:59

## 2022-01-01 RX ADMIN — CEFEPIME HYDROCHLORIDE 2 G: 2 INJECTION, POWDER, FOR SOLUTION INTRAVENOUS at 23:25

## 2022-01-01 RX ADMIN — POTASSIUM CHLORIDE 40 MEQ: 20 TABLET, EXTENDED RELEASE ORAL at 09:21

## 2022-01-01 RX ADMIN — LEVOTHYROXINE SODIUM 112 MCG: 0.11 TABLET ORAL at 08:07

## 2022-01-01 RX ADMIN — OXYCODONE 5 MG: 5 TABLET ORAL at 17:14

## 2022-01-01 RX ADMIN — FUROSEMIDE 40 MG: 10 INJECTION, SOLUTION INTRAMUSCULAR; INTRAVENOUS at 04:41

## 2022-01-01 RX ADMIN — OXYCODONE AND ACETAMINOPHEN 1 TABLET: 325; 10 TABLET ORAL at 05:39

## 2022-01-01 RX ADMIN — LORAZEPAM 1 MG: 2 INJECTION INTRAMUSCULAR; INTRAVENOUS at 04:05

## 2022-01-01 RX ADMIN — BUMETANIDE 2 MG: 0.25 INJECTION, SOLUTION INTRAMUSCULAR; INTRAVENOUS at 10:02

## 2022-01-01 RX ADMIN — POLYMYXIN B SULFATE, TRIMETHOPRIM SULFATE 1 DROP: 10000; 1 SOLUTION/ DROPS OPHTHALMIC at 08:20

## 2022-01-01 RX ADMIN — CEFEPIME HYDROCHLORIDE 2 G: 2 INJECTION, POWDER, FOR SOLUTION INTRAVENOUS at 22:20

## 2022-01-01 RX ADMIN — ENOXAPARIN SODIUM 50 MG: 100 INJECTION SUBCUTANEOUS at 09:34

## 2022-01-01 RX ADMIN — ERGOCALCIFEROL 50000 UNITS: 1.25 CAPSULE ORAL at 09:51

## 2022-01-01 RX ADMIN — MORPHINE SULFATE 4 MG: 4 INJECTION INTRAVENOUS at 08:49

## 2022-01-01 RX ADMIN — DIPHENHYDRAMINE HYDROCHLORIDE 12.5 MG: 50 INJECTION, SOLUTION INTRAMUSCULAR; INTRAVENOUS at 18:37

## 2022-01-01 RX ADMIN — OXYCODONE 5 MG: 5 TABLET ORAL at 16:58

## 2022-01-01 RX ADMIN — POLYMYXIN B SULFATE, TRIMETHOPRIM SULFATE 1 DROP: 10000; 1 SOLUTION/ DROPS OPHTHALMIC at 08:13

## 2022-01-01 RX ADMIN — OXYCODONE AND ACETAMINOPHEN 1 TABLET: 10; 325 TABLET ORAL at 12:08

## 2022-01-01 RX ADMIN — MORPHINE SULFATE 1 MG: 2 INJECTION, SOLUTION INTRAMUSCULAR; INTRAVENOUS at 02:04

## 2022-01-01 RX ADMIN — SODIUM CHLORIDE, PRESERVATIVE FREE 10 ML: 5 INJECTION INTRAVENOUS at 05:26

## 2022-01-01 RX ADMIN — VITAMIN D, TAB 1000IU (100/BT) 1000 UNITS: 25 TAB at 21:30

## 2022-01-01 RX ADMIN — LORAZEPAM 1 MG: 2 INJECTION INTRAMUSCULAR; INTRAVENOUS at 15:07

## 2022-01-01 RX ADMIN — SODIUM CHLORIDE 200 ML/HR: 900 INJECTION, SOLUTION INTRAVENOUS at 21:56

## 2022-01-01 RX ADMIN — OXYCODONE AND ACETAMINOPHEN 1 TABLET: 325; 10 TABLET ORAL at 07:39

## 2022-01-01 RX ADMIN — SODIUM CHLORIDE, PRESERVATIVE FREE 3 ML: 5 INJECTION INTRAVENOUS at 17:18

## 2022-01-01 RX ADMIN — POLYMYXIN B SULFATE, TRIMETHOPRIM SULFATE 1 DROP: 10000; 1 SOLUTION/ DROPS OPHTHALMIC at 13:42

## 2022-01-01 RX ADMIN — CEFEPIME HYDROCHLORIDE 2 G: 2 INJECTION, POWDER, FOR SOLUTION INTRAVENOUS at 05:25

## 2022-01-01 RX ADMIN — PANTOPRAZOLE SODIUM 40 MG: 40 TABLET, DELAYED RELEASE ORAL at 10:59

## 2022-01-01 RX ADMIN — POTASSIUM CHLORIDE 40 MEQ: 20 TABLET, EXTENDED RELEASE ORAL at 08:10

## 2022-01-01 RX ADMIN — ENOXAPARIN SODIUM 50 MG: 100 INJECTION SUBCUTANEOUS at 21:30

## 2022-01-01 RX ADMIN — OXYCODONE AND ACETAMINOPHEN 1 TABLET: 10; 325 TABLET ORAL at 10:18

## 2022-01-01 RX ADMIN — ENOXAPARIN SODIUM 50 MG: 100 INJECTION SUBCUTANEOUS at 17:02

## 2022-01-01 RX ADMIN — CEFTRIAXONE SODIUM 2 G: 2 INJECTION, POWDER, FOR SOLUTION INTRAMUSCULAR; INTRAVENOUS at 16:56

## 2022-01-01 RX ADMIN — ACETAMINOPHEN 1000 MG: 500 TABLET, FILM COATED ORAL at 18:49

## 2022-01-01 RX ADMIN — METRONIDAZOLE 500 MG: 500 INJECTION, SOLUTION INTRAVENOUS at 13:47

## 2022-01-01 RX ADMIN — ALBUTEROL SULFATE 20 MG: 2.5 SOLUTION RESPIRATORY (INHALATION) at 13:36

## 2022-01-01 RX ADMIN — OXYCODONE AND ACETAMINOPHEN 1 TABLET: 10; 325 TABLET ORAL at 06:12

## 2022-01-01 RX ADMIN — SODIUM CHLORIDE, PRESERVATIVE FREE 3 ML: 5 INJECTION INTRAVENOUS at 14:51

## 2022-01-01 RX ADMIN — GLYCOPYRROLATE 0.2 MG: 0.2 INJECTION, SOLUTION INTRAMUSCULAR; INTRAVENOUS at 09:26

## 2022-01-01 RX ADMIN — ERGOCALCIFEROL 50000 UNITS: 1.25 CAPSULE ORAL at 08:07

## 2022-01-01 RX ADMIN — ENOXAPARIN SODIUM 60 MG: 100 INJECTION SUBCUTANEOUS at 08:23

## 2022-01-01 RX ADMIN — POTASSIUM CHLORIDE 40 MEQ: 20 TABLET, EXTENDED RELEASE ORAL at 17:32

## 2022-01-01 RX ADMIN — MEROPENEM 500 MG: 500 INJECTION, POWDER, FOR SOLUTION INTRAVENOUS at 21:34

## 2022-01-01 RX ADMIN — LEVOTHYROXINE SODIUM 112 MCG: 0.11 TABLET ORAL at 08:44

## 2022-01-01 RX ADMIN — MORPHINE SULFATE 4 MG: 4 INJECTION INTRAVENOUS at 17:17

## 2022-01-01 RX ADMIN — SODIUM CHLORIDE, SODIUM LACTATE, POTASSIUM CHLORIDE, AND CALCIUM CHLORIDE 100 ML/HR: 600; 310; 30; 20 INJECTION, SOLUTION INTRAVENOUS at 16:49

## 2022-01-01 RX ADMIN — DIATRIZOATE MEGLUMINE AND DIATRIZOATE SODIUM 15 ML: 660; 100 LIQUID ORAL; RECTAL at 00:55

## 2022-01-01 RX ADMIN — ENOXAPARIN SODIUM 50 MG: 100 INJECTION SUBCUTANEOUS at 17:14

## 2022-01-01 RX ADMIN — CYCLOBENZAPRINE 5 MG: 10 TABLET, FILM COATED ORAL at 23:32

## 2022-01-01 RX ADMIN — BUMETANIDE 2 MG: 0.25 INJECTION, SOLUTION INTRAMUSCULAR; INTRAVENOUS at 17:52

## 2022-01-01 RX ADMIN — ENOXAPARIN SODIUM 50 MG: 100 INJECTION SUBCUTANEOUS at 09:21

## 2022-01-01 RX ADMIN — LEVOTHYROXINE SODIUM 125 MCG: 0.12 TABLET ORAL at 06:42

## 2022-01-01 RX ADMIN — ERGOCALCIFEROL 50000 UNITS: 1.25 CAPSULE ORAL at 09:59

## 2022-01-01 RX ADMIN — HALOPERIDOL LACTATE 2 MG: 5 INJECTION, SOLUTION INTRAMUSCULAR at 04:00

## 2022-01-01 RX ADMIN — HYDROMORPHONE HYDROCHLORIDE 1 MG: 1 INJECTION, SOLUTION INTRAMUSCULAR; INTRAVENOUS; SUBCUTANEOUS at 10:19

## 2022-01-01 RX ADMIN — OXYCODONE AND ACETAMINOPHEN 1 TABLET: 10; 325 TABLET ORAL at 10:02

## 2022-01-01 RX ADMIN — ACETAMINOPHEN 1000 MG: 500 TABLET, FILM COATED ORAL at 15:22

## 2022-01-01 RX ADMIN — MORPHINE SULFATE 1 MG: 2 INJECTION, SOLUTION INTRAMUSCULAR; INTRAVENOUS at 08:34

## 2022-01-01 RX ADMIN — MORPHINE SULFATE 1 MG: 2 INJECTION, SOLUTION INTRAMUSCULAR; INTRAVENOUS at 22:55

## 2022-01-01 RX ADMIN — POLYMYXIN B SULFATE, TRIMETHOPRIM SULFATE 1 DROP: 10000; 1 SOLUTION/ DROPS OPHTHALMIC at 17:33

## 2022-01-01 RX ADMIN — MEROPENEM 500 MG: 500 INJECTION, POWDER, FOR SOLUTION INTRAVENOUS at 09:31

## 2022-01-01 RX ADMIN — CEFTRIAXONE 1 G: 1 INJECTION, POWDER, FOR SOLUTION INTRAMUSCULAR; INTRAVENOUS at 18:49

## 2022-01-01 RX ADMIN — SODIUM CHLORIDE, PRESERVATIVE FREE 3 ML: 5 INJECTION INTRAVENOUS at 08:52

## 2022-01-01 RX ADMIN — POTASSIUM CHLORIDE 20 MEQ: 20 TABLET, EXTENDED RELEASE ORAL at 17:51

## 2022-01-01 RX ADMIN — OXYCODONE AND ACETAMINOPHEN 1 TABLET: 325; 10 TABLET ORAL at 19:39

## 2022-01-01 RX ADMIN — ENOXAPARIN SODIUM 50 MG: 100 INJECTION SUBCUTANEOUS at 08:12

## 2022-01-01 RX ADMIN — LORAZEPAM 1 MG: 2 INJECTION INTRAMUSCULAR; INTRAVENOUS at 20:41

## 2022-01-01 RX ADMIN — Medication 10 ML: at 22:56

## 2022-01-01 RX ADMIN — LEVOTHYROXINE SODIUM 112 MCG: 0.11 TABLET ORAL at 05:49

## 2022-01-01 RX ADMIN — CEFTRIAXONE SODIUM 2 G: 2 INJECTION, POWDER, FOR SOLUTION INTRAMUSCULAR; INTRAVENOUS at 21:11

## 2022-01-01 RX ADMIN — SODIUM CHLORIDE, PRESERVATIVE FREE 10 ML: 5 INJECTION INTRAVENOUS at 14:49

## 2022-01-01 RX ADMIN — ENOXAPARIN SODIUM 50 MG: 100 INJECTION SUBCUTANEOUS at 20:55

## 2022-01-01 RX ADMIN — CEFTRIAXONE SODIUM 2 G: 2 INJECTION, POWDER, FOR SOLUTION INTRAMUSCULAR; INTRAVENOUS at 09:50

## 2022-01-01 RX ADMIN — SODIUM CHLORIDE, PRESERVATIVE FREE 3 ML: 5 INJECTION INTRAVENOUS at 21:58

## 2022-01-01 RX ADMIN — HYDROMORPHONE HYDROCHLORIDE 0.2 MG: 1 INJECTION, SOLUTION INTRAMUSCULAR; INTRAVENOUS; SUBCUTANEOUS at 20:18

## 2022-01-01 RX ADMIN — Medication 400 MG: at 10:57

## 2022-01-01 RX ADMIN — VITAMIN D, TAB 1000IU (100/BT) 1000 UNITS: 25 TAB at 22:18

## 2022-01-01 RX ADMIN — ENOXAPARIN SODIUM 60 MG: 100 INJECTION SUBCUTANEOUS at 08:10

## 2022-01-01 RX ADMIN — POLYMYXIN B SULFATE, TRIMETHOPRIM SULFATE 1 DROP: 10000; 1 SOLUTION/ DROPS OPHTHALMIC at 18:00

## 2022-01-01 RX ADMIN — CEFEPIME HYDROCHLORIDE 2 G: 2 INJECTION, POWDER, FOR SOLUTION INTRAVENOUS at 14:32

## 2022-01-01 RX ADMIN — CYCLOBENZAPRINE 5 MG: 10 TABLET, FILM COATED ORAL at 06:34

## 2022-01-01 RX ADMIN — FUROSEMIDE 40 MG: 10 INJECTION, SOLUTION INTRAMUSCULAR; INTRAVENOUS at 08:18

## 2022-01-01 RX ADMIN — LOPERAMIDE HYDROCHLORIDE 2 MG: 2 CAPSULE ORAL at 09:13

## 2022-01-01 RX ADMIN — ENOXAPARIN SODIUM 50 MG: 100 INJECTION SUBCUTANEOUS at 08:58

## 2022-01-01 RX ADMIN — CEFEPIME HYDROCHLORIDE 2 G: 2 INJECTION, POWDER, FOR SOLUTION INTRAVENOUS at 06:12

## 2022-01-01 RX ADMIN — ONDANSETRON 4 MG: 2 INJECTION INTRAMUSCULAR; INTRAVENOUS at 01:52

## 2022-01-01 RX ADMIN — ACETAMINOPHEN 650 MG: 325 TABLET, FILM COATED ORAL at 03:53

## 2022-01-01 RX ADMIN — VANCOMYCIN HYDROCHLORIDE 750 MG: 750 INJECTION, POWDER, LYOPHILIZED, FOR SOLUTION INTRAVENOUS at 02:27

## 2022-01-01 RX ADMIN — ISOSORBIDE DINITRATE 10 MG: 20 TABLET ORAL at 13:49

## 2022-01-01 RX ADMIN — ONDANSETRON 4 MG: 2 INJECTION INTRAMUSCULAR; INTRAVENOUS at 20:04

## 2022-01-01 RX ADMIN — POLYMYXIN B SULFATE, TRIMETHOPRIM SULFATE 1 DROP: 10000; 1 SOLUTION/ DROPS OPHTHALMIC at 17:19

## 2022-01-01 RX ADMIN — POLYMYXIN B SULFATE, TRIMETHOPRIM SULFATE 1 DROP: 10000; 1 SOLUTION/ DROPS OPHTHALMIC at 17:50

## 2022-01-01 RX ADMIN — CYCLOBENZAPRINE 5 MG: 10 TABLET, FILM COATED ORAL at 17:50

## 2022-01-01 RX ADMIN — CEFEPIME HYDROCHLORIDE 2 G: 2 INJECTION, POWDER, FOR SOLUTION INTRAVENOUS at 00:00

## 2022-01-01 RX ADMIN — SODIUM CHLORIDE 674 ML: 900 INJECTION, SOLUTION INTRAVENOUS at 22:07

## 2022-01-01 RX ADMIN — ENOXAPARIN SODIUM 50 MG: 100 INJECTION SUBCUTANEOUS at 08:00

## 2022-01-01 RX ADMIN — SODIUM CHLORIDE, SODIUM LACTATE, POTASSIUM CHLORIDE, AND CALCIUM CHLORIDE 150 ML/HR: 600; 310; 30; 20 INJECTION, SOLUTION INTRAVENOUS at 12:00

## 2022-01-01 RX ADMIN — SODIUM CHLORIDE, PRESERVATIVE FREE 10 ML: 5 INJECTION INTRAVENOUS at 04:05

## 2022-01-01 RX ADMIN — PANTOPRAZOLE SODIUM 40 MG: 40 TABLET, DELAYED RELEASE ORAL at 05:47

## 2022-01-01 RX ADMIN — ENOXAPARIN SODIUM 60 MG: 100 INJECTION SUBCUTANEOUS at 20:16

## 2022-01-01 RX ADMIN — PERFLUTREN 1 ML: 6.52 INJECTION, SUSPENSION INTRAVENOUS at 09:35

## 2022-01-01 RX ADMIN — SODIUM CHLORIDE, PRESERVATIVE FREE 10 ML: 5 INJECTION INTRAVENOUS at 21:30

## 2022-01-01 RX ADMIN — CYCLOBENZAPRINE 5 MG: 10 TABLET, FILM COATED ORAL at 02:29

## 2022-01-01 RX ADMIN — ISOSORBIDE DINITRATE 10 MG: 20 TABLET ORAL at 18:03

## 2022-01-01 RX ADMIN — PANTOPRAZOLE SODIUM 40 MG: 40 TABLET, DELAYED RELEASE ORAL at 08:44

## 2022-01-01 RX ADMIN — CEFTRIAXONE SODIUM 2 G: 2 INJECTION, POWDER, FOR SOLUTION INTRAMUSCULAR; INTRAVENOUS at 09:53

## 2022-01-01 RX ADMIN — LEVOTHYROXINE SODIUM 112 MCG: 0.11 TABLET ORAL at 05:45

## 2022-01-01 RX ADMIN — TRAZODONE HYDROCHLORIDE 50 MG: 50 TABLET ORAL at 21:27

## 2022-01-01 RX ADMIN — CALCIUM 500 MG: 500 TABLET ORAL at 22:43

## 2022-01-01 RX ADMIN — MORPHINE SULFATE 2 MG: 2 INJECTION, SOLUTION INTRAMUSCULAR; INTRAVENOUS at 03:13

## 2022-01-01 RX ADMIN — CEFEPIME HYDROCHLORIDE 2 G: 2 INJECTION, POWDER, FOR SOLUTION INTRAVENOUS at 23:02

## 2022-01-01 RX ADMIN — IPRATROPIUM BROMIDE AND ALBUTEROL SULFATE 3 ML: .5; 3 SOLUTION RESPIRATORY (INHALATION) at 20:24

## 2022-01-01 RX ADMIN — POTASSIUM & SODIUM PHOSPHATES POWDER PACK 280-160-250 MG 1 PACKET: 280-160-250 PACK at 10:02

## 2022-01-01 RX ADMIN — POTASSIUM CHLORIDE 40 MEQ: 20 TABLET, EXTENDED RELEASE ORAL at 10:02

## 2022-01-01 RX ADMIN — CLOTRIMAZOLE 1 APPLICATOR: 1 CREAM VAGINAL at 21:20

## 2022-01-01 RX ADMIN — SODIUM CHLORIDE 40 MG: 9 INJECTION, SOLUTION INTRAMUSCULAR; INTRAVENOUS; SUBCUTANEOUS at 08:13

## 2022-01-01 RX ADMIN — MORPHINE SULFATE 2 MG: 2 INJECTION, SOLUTION INTRAMUSCULAR; INTRAVENOUS at 06:48

## 2022-01-01 RX ADMIN — SODIUM CHLORIDE, PRESERVATIVE FREE 3 ML: 5 INJECTION INTRAVENOUS at 14:01

## 2022-01-01 RX ADMIN — SODIUM CHLORIDE, PRESERVATIVE FREE 10 ML: 5 INJECTION INTRAVENOUS at 05:56

## 2022-01-01 RX ADMIN — FUROSEMIDE 40 MG: 10 INJECTION, SOLUTION INTRAMUSCULAR; INTRAVENOUS at 11:00

## 2022-01-01 RX ADMIN — OXYCODONE AND ACETAMINOPHEN 1 TABLET: 10; 325 TABLET ORAL at 05:55

## 2022-01-01 RX ADMIN — ENOXAPARIN SODIUM 50 MG: 100 INJECTION SUBCUTANEOUS at 21:33

## 2022-01-01 RX ADMIN — VITAMIN D, TAB 1000IU (100/BT) 1000 UNITS: 25 TAB at 21:27

## 2022-01-01 RX ADMIN — Medication 200 MG: at 08:33

## 2022-01-01 RX ADMIN — HYDROMORPHONE HYDROCHLORIDE 0.2 MG: 1 INJECTION, SOLUTION INTRAMUSCULAR; INTRAVENOUS; SUBCUTANEOUS at 05:34

## 2022-01-01 RX ADMIN — SODIUM CHLORIDE, SODIUM LACTATE, POTASSIUM CHLORIDE, AND CALCIUM CHLORIDE 100 ML/HR: 600; 310; 30; 20 INJECTION, SOLUTION INTRAVENOUS at 19:30

## 2022-01-01 RX ADMIN — METRONIDAZOLE 500 MG: 500 INJECTION, SOLUTION INTRAVENOUS at 14:32

## 2022-01-01 RX ADMIN — SODIUM CHLORIDE, PRESERVATIVE FREE 10 ML: 5 INJECTION INTRAVENOUS at 05:31

## 2022-01-01 RX ADMIN — MORPHINE SULFATE 4 MG: 4 INJECTION INTRAVENOUS at 00:49

## 2022-01-01 RX ADMIN — LOPERAMIDE HYDROCHLORIDE 2 MG: 2 CAPSULE ORAL at 01:59

## 2022-01-01 RX ADMIN — CETIRIZINE HYDROCHLORIDE 10 MG: 10 TABLET, FILM COATED ORAL at 08:18

## 2022-01-01 RX ADMIN — SODIUM CHLORIDE, SODIUM LACTATE, POTASSIUM CHLORIDE, AND CALCIUM CHLORIDE 674 ML: 600; 310; 30; 20 INJECTION, SOLUTION INTRAVENOUS at 15:30

## 2022-01-01 RX ADMIN — SODIUM CHLORIDE, PRESERVATIVE FREE 3 ML: 5 INJECTION INTRAVENOUS at 12:02

## 2022-01-01 RX ADMIN — POTASSIUM CHLORIDE 20 MEQ: 14.9 INJECTION, SOLUTION INTRAVENOUS at 14:57

## 2022-01-01 RX ADMIN — LORAZEPAM 1 MG: 2 INJECTION INTRAMUSCULAR; INTRAVENOUS at 23:11

## 2022-01-01 RX ADMIN — ACETAMINOPHEN 650 MG: 325 TABLET, FILM COATED ORAL at 03:26

## 2022-01-01 RX ADMIN — SODIUM CHLORIDE, PRESERVATIVE FREE 10 ML: 5 INJECTION INTRAVENOUS at 13:58

## 2022-01-01 RX ADMIN — VANCOMYCIN HYDROCHLORIDE 1500 MG: 10 INJECTION, POWDER, LYOPHILIZED, FOR SOLUTION INTRAVENOUS at 00:15

## 2022-01-01 RX ADMIN — FUROSEMIDE 40 MG: 10 INJECTION, SOLUTION INTRAMUSCULAR; INTRAVENOUS at 17:50

## 2022-01-01 RX ADMIN — ENOXAPARIN SODIUM 50 MG: 100 INJECTION SUBCUTANEOUS at 17:56

## 2022-01-01 RX ADMIN — DEXTROSE MONOHYDRATE 100 ML/HR: 100 INJECTION, SOLUTION INTRAVENOUS at 12:00

## 2022-01-01 RX ADMIN — ENOXAPARIN SODIUM 50 MG: 100 INJECTION SUBCUTANEOUS at 05:27

## 2022-01-01 RX ADMIN — ENOXAPARIN SODIUM 50 MG: 100 INJECTION SUBCUTANEOUS at 08:46

## 2022-01-01 RX ADMIN — LORAZEPAM 1 MG: 2 INJECTION INTRAMUSCULAR; INTRAVENOUS at 12:43

## 2022-01-01 RX ADMIN — MORPHINE SULFATE 2 MG: 2 INJECTION, SOLUTION INTRAMUSCULAR; INTRAVENOUS at 18:22

## 2022-01-01 RX ADMIN — ENOXAPARIN SODIUM 50 MG: 100 INJECTION SUBCUTANEOUS at 08:35

## 2022-01-01 RX ADMIN — PANTOPRAZOLE SODIUM 40 MG: 40 TABLET, DELAYED RELEASE ORAL at 09:37

## 2022-01-01 RX ADMIN — SODIUM CHLORIDE, PRESERVATIVE FREE 3 ML: 5 INJECTION INTRAVENOUS at 09:22

## 2022-01-01 RX ADMIN — SODIUM CHLORIDE, SODIUM LACTATE, POTASSIUM CHLORIDE, AND CALCIUM CHLORIDE 1000 ML: 600; 310; 30; 20 INJECTION, SOLUTION INTRAVENOUS at 18:45

## 2022-01-01 RX ADMIN — MORPHINE SULFATE 2 MG: 2 INJECTION, SOLUTION INTRAMUSCULAR; INTRAVENOUS at 05:46

## 2022-01-01 RX ADMIN — VANCOMYCIN HYDROCHLORIDE 1500 MG: 10 INJECTION, POWDER, LYOPHILIZED, FOR SOLUTION INTRAVENOUS at 01:30

## 2022-01-01 RX ADMIN — LEVOTHYROXINE SODIUM 125 MCG: 0.12 TABLET ORAL at 05:27

## 2022-01-01 RX ADMIN — SODIUM CHLORIDE, PRESERVATIVE FREE 10 ML: 5 INJECTION INTRAVENOUS at 21:00

## 2022-01-01 RX ADMIN — PANTOPRAZOLE SODIUM 40 MG: 40 TABLET, DELAYED RELEASE ORAL at 08:42

## 2022-01-01 RX ADMIN — SODIUM CHLORIDE, PRESERVATIVE FREE 3 ML: 5 INJECTION INTRAVENOUS at 20:41

## 2022-01-01 RX ADMIN — CETIRIZINE HYDROCHLORIDE 10 MG: 10 TABLET, FILM COATED ORAL at 08:11

## 2022-01-01 RX ADMIN — SODIUM CHLORIDE, PRESERVATIVE FREE 3 ML: 5 INJECTION INTRAVENOUS at 08:50

## 2022-01-01 RX ADMIN — MORPHINE SULFATE 2 MG: 2 INJECTION, SOLUTION INTRAMUSCULAR; INTRAVENOUS at 06:03

## 2022-01-01 RX ADMIN — OXYCODONE AND ACETAMINOPHEN 1 TABLET: 10; 325 TABLET ORAL at 01:56

## 2022-01-01 RX ADMIN — POTASSIUM CHLORIDE 20 MEQ: 20 TABLET, EXTENDED RELEASE ORAL at 09:07

## 2022-01-01 RX ADMIN — ENOXAPARIN SODIUM 60 MG: 100 INJECTION SUBCUTANEOUS at 21:50

## 2022-01-01 RX ADMIN — DEXTROSE MONOHYDRATE 100 ML/HR: 100 INJECTION, SOLUTION INTRAVENOUS at 23:52

## 2022-01-01 RX ADMIN — ENOXAPARIN SODIUM 50 MG: 100 INJECTION SUBCUTANEOUS at 21:51

## 2022-01-01 RX ADMIN — SODIUM CHLORIDE, PRESERVATIVE FREE 10 ML: 5 INJECTION INTRAVENOUS at 05:19

## 2022-01-01 RX ADMIN — SODIUM CHLORIDE, PRESERVATIVE FREE 10 ML: 5 INJECTION INTRAVENOUS at 20:16

## 2022-01-01 RX ADMIN — ALBUMIN (HUMAN) 25 G: 0.25 INJECTION, SOLUTION INTRAVENOUS at 17:34

## 2022-01-01 RX ADMIN — VANCOMYCIN HYDROCHLORIDE 750 MG: 750 INJECTION, POWDER, LYOPHILIZED, FOR SOLUTION INTRAVENOUS at 01:55

## 2022-01-01 RX ADMIN — CYCLOBENZAPRINE 5 MG: 10 TABLET, FILM COATED ORAL at 15:00

## 2022-01-01 RX ADMIN — SODIUM CHLORIDE, PRESERVATIVE FREE 3 ML: 5 INJECTION INTRAVENOUS at 07:08

## 2022-01-01 RX ADMIN — CEFEPIME HYDROCHLORIDE 2 G: 2 INJECTION, POWDER, FOR SOLUTION INTRAVENOUS at 15:21

## 2022-01-01 RX ADMIN — MEROPENEM 500 MG: 500 INJECTION, POWDER, FOR SOLUTION INTRAVENOUS at 04:00

## 2022-01-01 RX ADMIN — PANTOPRAZOLE SODIUM 40 MG: 40 TABLET, DELAYED RELEASE ORAL at 07:51

## 2022-01-01 RX ADMIN — CYCLOBENZAPRINE 5 MG: 10 TABLET, FILM COATED ORAL at 10:56

## 2022-01-01 RX ADMIN — OXYCODONE AND ACETAMINOPHEN 1 TABLET: 7.5; 325 TABLET ORAL at 12:09

## 2022-01-01 RX ADMIN — SODIUM CHLORIDE, SODIUM LACTATE, POTASSIUM CHLORIDE, AND CALCIUM CHLORIDE 1000 ML: 600; 310; 30; 20 INJECTION, SOLUTION INTRAVENOUS at 21:58

## 2022-01-01 RX ADMIN — SODIUM CHLORIDE, PRESERVATIVE FREE 10 ML: 5 INJECTION INTRAVENOUS at 21:11

## 2022-01-01 RX ADMIN — MEROPENEM 500 MG: 500 INJECTION, POWDER, FOR SOLUTION INTRAVENOUS at 09:35

## 2022-01-01 RX ADMIN — BUMETANIDE 2 MG: 0.25 INJECTION, SOLUTION INTRAMUSCULAR; INTRAVENOUS at 17:17

## 2022-01-01 RX ADMIN — ENOXAPARIN SODIUM 50 MG: 100 INJECTION SUBCUTANEOUS at 10:04

## 2022-01-01 RX ADMIN — CEFTRIAXONE SODIUM 2 G: 2 INJECTION, POWDER, FOR SOLUTION INTRAMUSCULAR; INTRAVENOUS at 20:16

## 2022-01-01 RX ADMIN — FUROSEMIDE 40 MG: 10 INJECTION, SOLUTION INTRAMUSCULAR; INTRAVENOUS at 08:03

## 2022-01-01 RX ADMIN — POLYMYXIN B SULFATE, TRIMETHOPRIM SULFATE 1 DROP: 10000; 1 SOLUTION/ DROPS OPHTHALMIC at 17:43

## 2022-01-01 RX ADMIN — ALBUMIN (HUMAN) 25 G: 0.25 INJECTION, SOLUTION INTRAVENOUS at 05:24

## 2022-01-01 RX ADMIN — HALOPERIDOL LACTATE 2 MG: 5 INJECTION, SOLUTION INTRAMUSCULAR at 14:00

## 2022-01-01 RX ADMIN — OXYCODONE AND ACETAMINOPHEN 1 TABLET: 325; 10 TABLET ORAL at 05:27

## 2022-01-01 RX ADMIN — GLYCOPYRROLATE 0.2 MG: 0.2 INJECTION, SOLUTION INTRAMUSCULAR; INTRAVENOUS at 19:53

## 2022-01-01 RX ADMIN — SODIUM CHLORIDE, SODIUM LACTATE, POTASSIUM CHLORIDE, AND CALCIUM CHLORIDE 75 ML/HR: 600; 310; 30; 20 INJECTION, SOLUTION INTRAVENOUS at 05:19

## 2022-01-01 RX ADMIN — LEVOTHYROXINE SODIUM 125 MCG: 0.12 TABLET ORAL at 05:11

## 2022-01-01 RX ADMIN — ENOXAPARIN SODIUM 60 MG: 100 INJECTION SUBCUTANEOUS at 20:03

## 2022-01-01 RX ADMIN — CYCLOBENZAPRINE 5 MG: 10 TABLET, FILM COATED ORAL at 15:05

## 2022-01-01 RX ADMIN — OXYCODONE HYDROCHLORIDE AND ACETAMINOPHEN 1 TABLET: 7.5; 325 TABLET ORAL at 17:33

## 2022-01-01 RX ADMIN — TRAZODONE HYDROCHLORIDE 50 MG: 50 TABLET ORAL at 21:58

## 2022-01-01 RX ADMIN — OXYCODONE AND ACETAMINOPHEN 1 TABLET: 10; 325 TABLET ORAL at 09:46

## 2022-01-01 RX ADMIN — MEROPENEM 500 MG: 500 INJECTION, POWDER, FOR SOLUTION INTRAVENOUS at 05:27

## 2022-01-01 RX ADMIN — ZINC OXIDE: 400 PASTE TOPICAL at 10:05

## 2022-01-01 RX ADMIN — LEVOTHYROXINE SODIUM 125 MCG: 0.12 TABLET ORAL at 06:19

## 2022-01-01 RX ADMIN — ALBUMIN (HUMAN) 25 G: 0.25 INJECTION, SOLUTION INTRAVENOUS at 16:42

## 2022-01-01 RX ADMIN — CYCLOBENZAPRINE 5 MG: 10 TABLET, FILM COATED ORAL at 08:02

## 2022-01-01 RX ADMIN — FLUTICASONE PROPIONATE 2 SPRAY: 50 SPRAY, METERED NASAL at 09:00

## 2022-01-01 RX ADMIN — MORPHINE SULFATE 4 MG: 4 INJECTION INTRAVENOUS at 14:51

## 2022-01-01 RX ADMIN — OXYCODONE AND ACETAMINOPHEN 1 TABLET: 325; 10 TABLET ORAL at 02:00

## 2022-01-01 RX ADMIN — FUROSEMIDE 40 MG: 10 INJECTION, SOLUTION INTRAMUSCULAR; INTRAVENOUS at 08:34

## 2022-01-01 RX ADMIN — LEVOTHYROXINE SODIUM 112 MCG: 0.11 TABLET ORAL at 10:02

## 2022-01-01 RX ADMIN — POTASSIUM CHLORIDE 40 MEQ: 20 TABLET, EXTENDED RELEASE ORAL at 08:02

## 2022-01-01 RX ADMIN — CEFEPIME HYDROCHLORIDE 2 G: 2 INJECTION, POWDER, FOR SOLUTION INTRAVENOUS at 05:26

## 2022-01-01 RX ADMIN — OXYCODONE 5 MG: 5 TABLET ORAL at 01:47

## 2022-01-01 RX ADMIN — SODIUM CHLORIDE, PRESERVATIVE FREE 10 ML: 5 INJECTION INTRAVENOUS at 13:59

## 2022-01-01 RX ADMIN — CEFTRIAXONE SODIUM 2 G: 2 INJECTION, POWDER, FOR SOLUTION INTRAMUSCULAR; INTRAVENOUS at 21:32

## 2022-01-01 RX ADMIN — ENOXAPARIN SODIUM 50 MG: 100 INJECTION SUBCUTANEOUS at 08:18

## 2022-01-01 RX ADMIN — DIATRIZOATE MEGLUMINE AND DIATRIZOATE SODIUM 15 ML: 660; 100 LIQUID ORAL; RECTAL at 09:59

## 2022-01-01 RX ADMIN — SODIUM CHLORIDE, PRESERVATIVE FREE 10 ML: 5 INJECTION INTRAVENOUS at 06:24

## 2022-01-01 RX ADMIN — PANTOPRAZOLE SODIUM 40 MG: 40 TABLET, DELAYED RELEASE ORAL at 05:45

## 2022-01-01 RX ADMIN — VANCOMYCIN HYDROCHLORIDE 750 MG: 750 INJECTION, POWDER, LYOPHILIZED, FOR SOLUTION INTRAVENOUS at 14:51

## 2022-01-01 RX ADMIN — PANTOPRAZOLE SODIUM 40 MG: 40 TABLET, DELAYED RELEASE ORAL at 08:07

## 2022-01-01 RX ADMIN — Medication 400 MG: at 08:44

## 2022-01-01 RX ADMIN — OXYCODONE AND ACETAMINOPHEN 1 TABLET: 325; 10 TABLET ORAL at 12:22

## 2022-01-01 RX ADMIN — ALBUMIN (HUMAN) 25 G: 0.25 INJECTION, SOLUTION INTRAVENOUS at 03:59

## 2022-01-01 RX ADMIN — ERGOCALCIFEROL 50000 UNITS: 1.25 CAPSULE ORAL at 09:37

## 2022-01-01 RX ADMIN — AZITHROMYCIN MONOHYDRATE 500 MG: 500 INJECTION, POWDER, LYOPHILIZED, FOR SOLUTION INTRAVENOUS at 23:28

## 2022-01-01 RX ADMIN — SODIUM CHLORIDE, SODIUM LACTATE, POTASSIUM CHLORIDE, AND CALCIUM CHLORIDE 1000 ML: 600; 310; 30; 20 INJECTION, SOLUTION INTRAVENOUS at 15:21

## 2022-01-01 RX ADMIN — SODIUM CHLORIDE, PRESERVATIVE FREE 3 ML: 5 INJECTION INTRAVENOUS at 12:43

## 2022-01-01 RX ADMIN — OXYCODONE AND ACETAMINOPHEN 1 TABLET: 10; 325 TABLET ORAL at 17:42

## 2022-01-01 RX ADMIN — MORPHINE SULFATE 2 MG: 2 INJECTION, SOLUTION INTRAMUSCULAR; INTRAVENOUS at 15:07

## 2022-01-01 RX ADMIN — SODIUM CHLORIDE, SODIUM LACTATE, POTASSIUM CHLORIDE, AND CALCIUM CHLORIDE 100 ML/HR: 600; 310; 30; 20 INJECTION, SOLUTION INTRAVENOUS at 14:26

## 2022-01-01 RX ADMIN — OXYCODONE AND ACETAMINOPHEN 1 TABLET: 10; 325 TABLET ORAL at 17:41

## 2022-01-01 RX ADMIN — ALBUMIN (HUMAN) 25 G: 0.25 INJECTION, SOLUTION INTRAVENOUS at 21:18

## 2022-01-01 RX ADMIN — OXYCODONE AND ACETAMINOPHEN 1 TABLET: 325; 10 TABLET ORAL at 09:58

## 2022-01-01 RX ADMIN — MORPHINE SULFATE 2 MG: 2 INJECTION, SOLUTION INTRAMUSCULAR; INTRAVENOUS at 09:31

## 2022-01-01 RX ADMIN — CALCIUM 500 MG: 500 TABLET ORAL at 21:58

## 2022-01-01 RX ADMIN — ACETAMINOPHEN 650 MG: 325 TABLET, FILM COATED ORAL at 10:46

## 2022-01-01 RX ADMIN — OXYCODONE AND ACETAMINOPHEN 1 TABLET: 10; 325 TABLET ORAL at 22:24

## 2022-01-01 RX ADMIN — Medication 400 MG: at 09:37

## 2022-01-01 RX ADMIN — ALBUMIN (HUMAN) 25 G: 0.25 INJECTION, SOLUTION INTRAVENOUS at 23:59

## 2022-01-01 RX ADMIN — SODIUM CHLORIDE, PRESERVATIVE FREE 3 ML: 5 INJECTION INTRAVENOUS at 15:15

## 2022-01-01 RX ADMIN — OXYCODONE AND ACETAMINOPHEN 1 TABLET: 325; 10 TABLET ORAL at 11:29

## 2022-01-01 RX ADMIN — MORPHINE SULFATE 1 MG: 2 INJECTION, SOLUTION INTRAMUSCULAR; INTRAVENOUS at 20:30

## 2022-01-01 RX ADMIN — POLYMYXIN B SULFATE, TRIMETHOPRIM SULFATE 1 DROP: 10000; 1 SOLUTION/ DROPS OPHTHALMIC at 08:49

## 2022-01-01 RX ADMIN — OXYCODONE AND ACETAMINOPHEN 1 TABLET: 10; 325 TABLET ORAL at 01:55

## 2022-01-01 RX ADMIN — ISOSORBIDE DINITRATE 10 MG: 20 TABLET ORAL at 21:53

## 2022-01-01 RX ADMIN — MORPHINE SULFATE 2 MG: 2 INJECTION, SOLUTION INTRAMUSCULAR; INTRAVENOUS at 20:43

## 2022-01-01 RX ADMIN — CEFTRIAXONE SODIUM 2 G: 2 INJECTION, POWDER, FOR SOLUTION INTRAMUSCULAR; INTRAVENOUS at 05:51

## 2022-01-01 RX ADMIN — ENOXAPARIN SODIUM 60 MG: 100 INJECTION SUBCUTANEOUS at 11:02

## 2022-01-01 RX ADMIN — CEFTRIAXONE SODIUM 2 G: 2 INJECTION, POWDER, FOR SOLUTION INTRAMUSCULAR; INTRAVENOUS at 10:03

## 2022-01-01 RX ADMIN — Medication 400 MG: at 08:42

## 2022-01-01 RX ADMIN — ALBUMIN (HUMAN) 25 G: 0.25 INJECTION, SOLUTION INTRAVENOUS at 15:10

## 2022-01-01 RX ADMIN — HALOPERIDOL LACTATE 2 MG: 5 INJECTION, SOLUTION INTRAMUSCULAR at 15:13

## 2022-01-01 RX ADMIN — FUROSEMIDE 40 MG: 10 INJECTION, SOLUTION INTRAMUSCULAR; INTRAVENOUS at 08:48

## 2022-01-01 RX ADMIN — VANCOMYCIN HYDROCHLORIDE 1250 MG: 10 INJECTION, POWDER, LYOPHILIZED, FOR SOLUTION INTRAVENOUS at 16:00

## 2022-01-01 RX ADMIN — ENOXAPARIN SODIUM 50 MG: 100 INJECTION SUBCUTANEOUS at 05:45

## 2022-01-01 RX ADMIN — SODIUM CHLORIDE, PRESERVATIVE FREE 3 ML: 5 INJECTION INTRAVENOUS at 23:11

## 2022-01-01 RX ADMIN — HYDROCODONE BITARTRATE AND ACETAMINOPHEN 1 TABLET: 5; 325 TABLET ORAL at 23:37

## 2022-01-01 RX ADMIN — POTASSIUM CHLORIDE 40 MEQ: 20 TABLET, EXTENDED RELEASE ORAL at 17:34

## 2022-01-01 RX ADMIN — POTASSIUM CHLORIDE 40 MEQ: 20 TABLET, EXTENDED RELEASE ORAL at 09:04

## 2022-01-01 RX ADMIN — VANCOMYCIN HYDROCHLORIDE 750 MG: 750 INJECTION, POWDER, LYOPHILIZED, FOR SOLUTION INTRAVENOUS at 02:04

## 2022-01-01 RX ADMIN — ACETAMINOPHEN 650 MG: 325 TABLET, FILM COATED ORAL at 08:26

## 2022-01-01 RX ADMIN — MORPHINE SULFATE 2 MG: 2 INJECTION, SOLUTION INTRAMUSCULAR; INTRAVENOUS at 21:50

## 2022-01-01 RX ADMIN — HYDROMORPHONE HYDROCHLORIDE 0.5 MG: 1 INJECTION, SOLUTION INTRAMUSCULAR; INTRAVENOUS; SUBCUTANEOUS at 21:58

## 2022-01-01 RX ADMIN — IOPAMIDOL 100 ML: 755 INJECTION, SOLUTION INTRAVENOUS at 22:56

## 2022-01-01 RX ADMIN — OXYCODONE AND ACETAMINOPHEN 1 TABLET: 10; 325 TABLET ORAL at 03:54

## 2022-01-01 RX ADMIN — CEFEPIME HYDROCHLORIDE 2 G: 2 INJECTION, POWDER, FOR SOLUTION INTRAVENOUS at 13:47

## 2022-01-01 RX ADMIN — ENOXAPARIN SODIUM 50 MG: 100 INJECTION SUBCUTANEOUS at 17:42

## 2022-01-01 RX ADMIN — SODIUM CHLORIDE, SODIUM LACTATE, POTASSIUM CHLORIDE, AND CALCIUM CHLORIDE 75 ML/HR: 600; 310; 30; 20 INJECTION, SOLUTION INTRAVENOUS at 14:22

## 2022-01-01 RX ADMIN — OXYCODONE AND ACETAMINOPHEN 1 TABLET: 10; 325 TABLET ORAL at 18:19

## 2022-01-01 RX ADMIN — POTASSIUM CHLORIDE 40 MEQ: 20 TABLET, EXTENDED RELEASE ORAL at 08:12

## 2022-01-01 RX ADMIN — OXYCODONE AND ACETAMINOPHEN 1 TABLET: 325; 10 TABLET ORAL at 22:04

## 2022-01-01 RX ADMIN — MORPHINE SULFATE 4 MG: 4 INJECTION INTRAVENOUS at 04:00

## 2022-01-01 RX ADMIN — SODIUM CHLORIDE, PRESERVATIVE FREE 3 ML: 5 INJECTION INTRAVENOUS at 18:33

## 2022-01-01 RX ADMIN — PANTOPRAZOLE SODIUM 40 MG: 40 TABLET, DELAYED RELEASE ORAL at 08:10

## 2022-01-01 RX ADMIN — CEFTRIAXONE SODIUM 2 G: 2 INJECTION, POWDER, FOR SOLUTION INTRAMUSCULAR; INTRAVENOUS at 16:17

## 2022-01-01 RX ADMIN — OXYCODONE AND ACETAMINOPHEN 1 TABLET: 10; 325 TABLET ORAL at 13:40

## 2022-01-01 RX ADMIN — LEVOTHYROXINE SODIUM 112 MCG: 0.11 TABLET ORAL at 08:42

## 2022-01-01 RX ADMIN — OXYCODONE AND ACETAMINOPHEN 1 TABLET: 7.5; 325 TABLET ORAL at 22:43

## 2022-01-01 RX ADMIN — TRAZODONE HYDROCHLORIDE 50 MG: 50 TABLET ORAL at 21:50

## 2022-01-01 RX ADMIN — MORPHINE SULFATE 2 MG: 2 INJECTION, SOLUTION INTRAMUSCULAR; INTRAVENOUS at 20:05

## 2022-01-01 RX ADMIN — MORPHINE SULFATE 2 MG: 2 INJECTION, SOLUTION INTRAMUSCULAR; INTRAVENOUS at 17:38

## 2022-01-01 RX ADMIN — CETIRIZINE HYDROCHLORIDE 10 MG: 10 TABLET, FILM COATED ORAL at 13:29

## 2022-01-01 RX ADMIN — ZOLPIDEM TARTRATE 10 MG: 5 TABLET ORAL at 00:15

## 2022-01-01 RX ADMIN — CETIRIZINE HYDROCHLORIDE 10 MG: 10 TABLET, FILM COATED ORAL at 08:48

## 2022-01-01 RX ADMIN — ENOXAPARIN SODIUM 50 MG: 100 INJECTION SUBCUTANEOUS at 08:48

## 2022-01-01 RX ADMIN — SODIUM CHLORIDE, PRESERVATIVE FREE 3 ML: 5 INJECTION INTRAVENOUS at 15:07

## 2022-01-01 RX ADMIN — ACETAMINOPHEN 650 MG: 325 TABLET, FILM COATED ORAL at 06:48

## 2022-01-01 RX ADMIN — CEFTRIAXONE SODIUM 2 G: 2 INJECTION, POWDER, FOR SOLUTION INTRAMUSCULAR; INTRAVENOUS at 21:58

## 2022-01-01 RX ADMIN — OXYCODONE AND ACETAMINOPHEN 1 TABLET: 325; 10 TABLET ORAL at 05:49

## 2022-01-01 RX ADMIN — SODIUM CHLORIDE, PRESERVATIVE FREE 3 ML: 5 INJECTION INTRAVENOUS at 09:32

## 2022-01-01 RX ADMIN — METRONIDAZOLE 500 MG: 500 INJECTION, SOLUTION INTRAVENOUS at 01:49

## 2022-01-01 RX ADMIN — OXYCODONE AND ACETAMINOPHEN 1 TABLET: 325; 10 TABLET ORAL at 08:34

## 2022-01-01 RX ADMIN — OXYCODONE AND ACETAMINOPHEN 1 TABLET: 325; 10 TABLET ORAL at 18:26

## 2022-01-01 RX ADMIN — VANCOMYCIN HYDROCHLORIDE 750 MG: 750 INJECTION, POWDER, LYOPHILIZED, FOR SOLUTION INTRAVENOUS at 12:45

## 2022-01-01 RX ADMIN — LEVOTHYROXINE SODIUM 112 MCG: 0.11 TABLET ORAL at 07:51

## 2022-01-01 RX ADMIN — LORAZEPAM 1 MG: 2 INJECTION INTRAMUSCULAR; INTRAVENOUS at 09:32

## 2022-01-01 RX ADMIN — SODIUM CHLORIDE, SODIUM LACTATE, POTASSIUM CHLORIDE, AND CALCIUM CHLORIDE 150 ML/HR: 600; 310; 30; 20 INJECTION, SOLUTION INTRAVENOUS at 21:12

## 2022-01-01 RX ADMIN — OXYCODONE AND ACETAMINOPHEN 1 TABLET: 325; 10 TABLET ORAL at 10:13

## 2022-01-01 RX ADMIN — FUROSEMIDE 20 MG: 10 INJECTION, SOLUTION INTRAMUSCULAR; INTRAVENOUS at 11:37

## 2022-01-01 RX ADMIN — POLYMYXIN B SULFATE, TRIMETHOPRIM SULFATE 1 DROP: 10000; 1 SOLUTION/ DROPS OPHTHALMIC at 17:04

## 2022-01-01 RX ADMIN — ENOXAPARIN SODIUM 50 MG: 100 INJECTION SUBCUTANEOUS at 16:58

## 2022-01-01 RX ADMIN — OXYCODONE 5 MG: 5 TABLET ORAL at 12:46

## 2022-01-01 RX ADMIN — CETIRIZINE HYDROCHLORIDE 10 MG: 10 TABLET, FILM COATED ORAL at 08:03

## 2022-01-01 RX ADMIN — CEFTRIAXONE SODIUM 2 G: 2 INJECTION, POWDER, FOR SOLUTION INTRAMUSCULAR; INTRAVENOUS at 09:46

## 2022-01-01 RX ADMIN — MEROPENEM 500 MG: 500 INJECTION, POWDER, FOR SOLUTION INTRAVENOUS at 15:48

## 2022-01-01 RX ADMIN — MEROPENEM 500 MG: 500 INJECTION, POWDER, FOR SOLUTION INTRAVENOUS at 21:17

## 2022-01-01 RX ADMIN — CLOTRIMAZOLE 1 APPLICATOR: 1 CREAM VAGINAL at 21:33

## 2022-01-01 RX ADMIN — ENOXAPARIN SODIUM 50 MG: 100 INJECTION SUBCUTANEOUS at 22:54

## 2022-01-01 RX ADMIN — TUBERCULIN PURIFIED PROTEIN DERIVATIVE 5 UNITS: 5 INJECTION, SOLUTION INTRADERMAL at 11:20

## 2022-01-01 RX ADMIN — FUROSEMIDE 40 MG: 40 TABLET ORAL at 08:11

## 2022-01-01 RX ADMIN — OXYCODONE AND ACETAMINOPHEN 1 TABLET: 7.5; 325 TABLET ORAL at 12:56

## 2022-01-01 RX ADMIN — FLUTICASONE PROPIONATE 2 SPRAY: 50 SPRAY, METERED NASAL at 09:03

## 2022-01-01 RX ADMIN — MORPHINE SULFATE 4 MG: 4 INJECTION INTRAVENOUS at 22:00

## 2022-01-01 RX ADMIN — GLYCOPYRROLATE 0.2 MG: 0.2 INJECTION, SOLUTION INTRAMUSCULAR; INTRAVENOUS at 21:04

## 2022-01-01 RX ADMIN — METHYLPREDNISOLONE SODIUM SUCCINATE 50 MG: 40 INJECTION, POWDER, FOR SOLUTION INTRAMUSCULAR; INTRAVENOUS at 18:02

## 2022-01-01 RX ADMIN — MORPHINE SULFATE 2 MG: 2 INJECTION, SOLUTION INTRAMUSCULAR; INTRAVENOUS at 21:57

## 2022-01-01 RX ADMIN — FUROSEMIDE 40 MG: 40 TABLET ORAL at 17:04

## 2022-01-01 RX ADMIN — POLYMYXIN B SULFATE, TRIMETHOPRIM SULFATE 1 DROP: 10000; 1 SOLUTION/ DROPS OPHTHALMIC at 08:14

## 2022-01-01 RX ADMIN — OXYCODONE AND ACETAMINOPHEN 1 TABLET: 325; 10 TABLET ORAL at 01:38

## 2022-01-01 RX ADMIN — LEVOTHYROXINE SODIUM 112 MCG: 0.11 TABLET ORAL at 05:25

## 2022-01-01 RX ADMIN — LEVOTHYROXINE SODIUM 125 MCG: 0.12 TABLET ORAL at 06:22

## 2022-01-01 RX ADMIN — ENOXAPARIN SODIUM 50 MG: 100 INJECTION SUBCUTANEOUS at 21:17

## 2022-01-01 RX ADMIN — SODIUM CHLORIDE, PRESERVATIVE FREE 3 ML: 5 INJECTION INTRAVENOUS at 08:57

## 2022-01-01 RX ADMIN — BUMETANIDE 1 MG: 0.25 INJECTION INTRAMUSCULAR; INTRAVENOUS at 18:32

## 2022-01-01 RX ADMIN — BUMETANIDE 1 MG: 0.25 INJECTION INTRAMUSCULAR; INTRAVENOUS at 08:41

## 2022-01-01 RX ADMIN — SODIUM CHLORIDE, PRESERVATIVE FREE 10 ML: 5 INJECTION INTRAVENOUS at 06:13

## 2022-01-01 RX ADMIN — ENOXAPARIN SODIUM 60 MG: 100 INJECTION SUBCUTANEOUS at 21:58

## 2022-01-01 RX ADMIN — SODIUM CHLORIDE 100 ML: 9 INJECTION, SOLUTION INTRAVENOUS at 11:08

## 2022-01-01 RX ADMIN — LEVOTHYROXINE SODIUM 112 MCG: 0.11 TABLET ORAL at 09:34

## 2022-01-01 RX ADMIN — LOPERAMIDE HYDROCHLORIDE 2 MG: 2 CAPSULE ORAL at 08:00

## 2022-01-01 RX ADMIN — OXYCODONE 5 MG: 5 TABLET ORAL at 18:59

## 2022-01-01 RX ADMIN — Medication 200 MG: at 09:03

## 2022-01-01 RX ADMIN — OXYCODONE AND ACETAMINOPHEN 1 TABLET: 10; 325 TABLET ORAL at 04:09

## 2022-01-01 RX ADMIN — LEVOTHYROXINE SODIUM 112 MCG: 0.11 TABLET ORAL at 09:08

## 2022-01-01 RX ADMIN — LORAZEPAM 1 MG: 2 INJECTION INTRAMUSCULAR; INTRAVENOUS at 18:33

## 2022-01-01 RX ADMIN — MORPHINE SULFATE 4 MG: 4 INJECTION INTRAVENOUS at 08:56

## 2022-01-01 RX ADMIN — CEFTRIAXONE SODIUM 2 G: 2 INJECTION, POWDER, FOR SOLUTION INTRAMUSCULAR; INTRAVENOUS at 21:57

## 2022-01-01 RX ADMIN — SODIUM CHLORIDE, PRESERVATIVE FREE 10 ML: 5 INJECTION INTRAVENOUS at 21:52

## 2022-01-01 RX ADMIN — LORAZEPAM 1 MG: 2 INJECTION INTRAMUSCULAR; INTRAVENOUS at 17:57

## 2022-01-01 RX ADMIN — PANTOPRAZOLE SODIUM 40 MG: 40 TABLET, DELAYED RELEASE ORAL at 05:11

## 2022-01-01 RX ADMIN — DIPHENHYDRAMINE HYDROCHLORIDE 25 MG: 25 CAPSULE ORAL at 02:27

## 2022-01-01 RX ADMIN — MORPHINE SULFATE 2 MG: 2 INJECTION, SOLUTION INTRAMUSCULAR; INTRAVENOUS at 09:10

## 2022-01-01 RX ADMIN — MORPHINE SULFATE 1 MG: 2 INJECTION, SOLUTION INTRAMUSCULAR; INTRAVENOUS at 13:19

## 2022-01-01 RX ADMIN — LEVOTHYROXINE SODIUM 112 MCG: 0.11 TABLET ORAL at 04:04

## 2022-01-01 RX ADMIN — OXYCODONE AND ACETAMINOPHEN 1 TABLET: 10; 325 TABLET ORAL at 14:12

## 2022-01-01 RX ADMIN — LEVOTHYROXINE SODIUM 125 MCG: 0.12 TABLET ORAL at 05:40

## 2022-01-01 RX ADMIN — LEVOTHYROXINE SODIUM 112 MCG: 0.11 TABLET ORAL at 08:57

## 2022-01-01 RX ADMIN — MORPHINE SULFATE 2 MG: 2 INJECTION, SOLUTION INTRAMUSCULAR; INTRAVENOUS at 23:11

## 2022-01-01 RX ADMIN — MORPHINE SULFATE 1 MG: 2 INJECTION, SOLUTION INTRAMUSCULAR; INTRAVENOUS at 16:38

## 2022-01-01 RX ADMIN — ZOLPIDEM TARTRATE 10 MG: 5 TABLET ORAL at 20:43

## 2022-01-01 RX ADMIN — CEFEPIME HYDROCHLORIDE 2 G: 2 INJECTION, POWDER, FOR SOLUTION INTRAVENOUS at 20:43

## 2022-01-01 RX ADMIN — ZOLPIDEM TARTRATE 10 MG: 5 TABLET ORAL at 23:00

## 2022-01-01 RX ADMIN — SODIUM CHLORIDE, PRESERVATIVE FREE 10 ML: 5 INJECTION INTRAVENOUS at 14:52

## 2022-01-01 RX ADMIN — SODIUM CHLORIDE, PRESERVATIVE FREE 10 ML: 5 INJECTION INTRAVENOUS at 21:20

## 2022-01-01 RX ADMIN — OXYCODONE AND ACETAMINOPHEN 1 TABLET: 325; 10 TABLET ORAL at 17:58

## 2022-02-02 PROBLEM — R50.9 FEVER OF UNKNOWN ORIGIN: Status: ACTIVE | Noted: 2022-01-01

## 2022-02-02 PROBLEM — A41.9 SEPSIS (HCC): Status: ACTIVE | Noted: 2022-01-01

## 2022-02-02 NOTE — ED PROVIDER NOTES
Mask was worn during the entire patient examination. Thom Leslie is a 64 y.o. female who presents to the ED with a chief complaint of fever. Patient states this started spontaneously today. She was having generalized chills and shakes. To get at home it is 103.8. She does have a history of leukemia and is on 2 oral chemotherapeutic agents currently. She is also been fully vaccinated for COVID-19 and flu. She denies any other associated symptoms besides the fever. Specifically no cough, congestion, vomiting, diarrhea, dysuria. The history is provided by the patient. Fever   This is a new problem. The current episode started 12 to 24 hours ago. The problem occurs constantly. The problem has not changed since onset. Pertinent negatives include no chest pain, no diarrhea, no vomiting, no congestion, no headaches, no sore throat, no cough, no shortness of breath and no rash. She has tried ibuprofen for the symptoms. Past Medical History:   Diagnosis Date    Acute blood loss anemia 4/6/2018    Anemia     C. difficile diarrhea 4/1/2015    Cerebral edema (HCC) 4/1/2015    Chronic migraine 9/22/2016    Chronic myelogenous leukemia ([address]     [place] chromosome/ converted from polycythemia in 2009- to 2012 when she was dx w leukemia    Chronic pain     Coagulation disorder ([address]     \"clotting and Bleeding Problem\" dr Kody Andrew follows     Esophageal spasm     with banding     Esophageal varices ([address]     grade 3    GI bleed 8/3/2016    H/O craniotomy     3-29-15.  due to blood clot - which caused a seizure  - then pt fell and hit     Hematemesis 8/20/2021    Leukocytosis 3/14/2015    Melena 8/20/2021    MRSA colonization 6/25/2012    Polycythemia vera(238.4)     JAK2 mutation    Portal hypertension ([address]     with varices    Portal vein thrombosis 6/20/2012    Ct scan 6-21-2 Apparent occlusion of the portal vein.  In addition, the splenic vein, and superior mesenteric vein are not definitely seen and are also likely  occluded. Splenomegaly, and extensive varices are seen as described above consistent with the portal vein occlusion 12 on arixtra HIT negative on repeat 12 re admitted Cirrhotic appearance of the liver. Mild to moderate ascites, as    Primary hypothyroidism     Pulmonary embolism ([address] 2006    not on coumadin anymore     S/P exploratory laparotomy, 12    Bowel resection:  336 cm of small bowel removed, approximately 9 feet and placement of feeding jejunostomy.      S/P small bowel resection     .  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 2012 platelets lower repeat HIT 12 platelets in 44,981'W    Thrombosis, portal vein 2004    portal , spleenic and recently superior mesenteric    Transfusion history     many blood tranfusions - last 3-29-15 after brain surgery    Traumatic hemorrhage of right cerebrum ([address] 3/30/2015       Past Surgical History:   Procedure Laterality Date    HX APPENDECTOMY      with small bowel resection    HX BREAST BIOPSY Bilateral     Lt - ; Rt -     HX CHOLECYSTECTOMY      HX GI      liver biopsy    HX GI      bowel removed small - 9 feet     HX GYN       x 2    HX GYN      D&C following miscarriage    HX ORTHOPAEDIC Left 2008    torn labrum shoulder (screw in place)    NEUROLOGICAL PROCEDURE UNLISTED  2010    craniotomy to evacuate subdural hematoma following a fall from a seizure    [place] ABDOMEN SURGERY PROC UNLISTED      umbilical hernia repair    [place] ABDOMEN SURGERY PROC UNLISTED      9ft of small bowel excised then reconnected         Family History:   Problem Relation Age of Onset   Ankit Greenwood Diabetes Mother     Stroke Mother         after surgery    Cancer Father         brain    No Known Problems Sister     Other Sister         diverticulitis    Other Sister         diverticulitis    Cancer Sister         lyjmphoma    Breast Cancer Maternal Aunt 48    Thyroid Disease Paternal Grandmother        Social History     Socioeconomic History    Marital status: SINGLE     Spouse name: Not on file    Number of children: Not on file    Years of education: Not on file    Highest education level: Not on file   Occupational History    Not on file   Tobacco Use    Smoking status: Former Smoker     Packs/day: 0.20     Years: 10.00     Pack years: 2.00     Types: Cigarettes     Quit date:      Years since quittin.1    Smokeless tobacco: Never Used   Substance and Sexual Activity    Alcohol use: No    Drug use: No    Sexual activity: Not on file   Other Topics Concern    Not on file   Social History Narrative    Not on file     Social Determinants of Health     Financial Resource Strain:     Difficulty of Paying Living Expenses: Not on file   Food Insecurity:     Worried About [employer] [employer] in the Last Year: Not on file    Ileana of Food in the Last Year: Not on file   Transportation Needs:     Lack of Transportation (Medical): Not on file    Lack of Transportation (Non-Medical):  Not on file   Physical Activity:     Days of Exercise per Week: Not on file    Minutes of Exercise per Session: Not on file   Stress:     Feeling of Stress : Not on file   Social Connections:     Frequency of Communication with Friends and Family: Not on file    Frequency of Social Gatherings with Friends and Family: Not on file    Attends Latter day Services: Not on file    Active Member of Clubs or Organizations: Not on file    Attends Club or Organization Meetings: Not on file    Marital Status: Not on file   Intimate Partner Violence:     Fear of Current or Ex-Partner: Not on file    Emotionally Abused: Not on file    Physically Abused: Not on file    Sexually Abused: Not on file   Housing Stability:     Unable to Pay for Housing in the Last Year: Not on file    Number of [place] in the Last Year: Not on file    Unstable Housing in the Last Year: Not on file         ALLERGIES: Latex, Sulfa (sulfonamide antibiotics), Tramadol, Augmentin [amoxicillin-pot clavulanate], Divalproex sodium, Morphine, Potassium clavulanate, Rizatriptan, Tetanus immune globulin, Xanax [alprazolam], and Zonegran [zonisamide]    Review of Systems   Constitutional: Positive for chills and fever. HENT: Negative for congestion and sore throat. Respiratory: Negative for cough, chest tightness and shortness of breath. Cardiovascular: Negative for chest pain and palpitations. Gastrointestinal: Negative for abdominal pain, diarrhea, nausea and vomiting. Skin: Negative for pallor and rash. Neurological: Negative for headaches. All other systems reviewed and are negative. Vitals:    02/02/22 1436   BP: (!) 146/80   Pulse: (!) 128   Resp: 22   Temp: (!) 103.2 °F (39.6 °C)   SpO2: 96%   Weight: 55.8 kg (123 lb)   Height: 5' 2\" (1.575 m)            Physical Exam  Vitals and nursing note reviewed. Constitutional:       Appearance: She is well-developed. She is ill-appearing. She is not toxic-appearing or diaphoretic. Comments: Thin female with a fever. HENT:      Head: Normocephalic and atraumatic. Eyes:      General: No scleral icterus. Conjunctiva/sclera: Conjunctivae normal.   Neck:      Thyroid: No thyromegaly. Cardiovascular:      Rate and Rhythm: Regular rhythm. Tachycardia present. Pulmonary:      Effort: Pulmonary effort is normal. No tachypnea, accessory muscle usage or respiratory distress. Breath sounds: No decreased breath sounds, wheezing, rhonchi or rales.    Abdominal:      Palpations: Abdomen is soft.      Tenderness: There is no abdominal tenderness. There is no guarding or rebound. Musculoskeletal:      Cervical back: Normal range of motion. No rigidity. Skin:     Capillary Refill: Capillary refill takes less than 2 seconds. Neurological:      General: No focal deficit present. Mental Status: She is alert. Mental status is at baseline. Psychiatric:         Mood and Affect: Mood normal. Mood is not anxious. Behavior: Behavior normal. Behavior is not agitated. MDM  Number of Diagnoses or Management Options  Diagnosis management comments: Patient given Tylenol and fluid bolus. She was treated for possible bacteremia. She does meet SIRS criteria cannot find an obvious infection but urine is still pending. I have messaged hospitalist for admission. Sepsis Reassessment note:     Zoe Ellis meets criteria for Sepsis -    Patient is receiving IV Fluids and Antibiotics per sepsis protocol. I did the Sepsis Reassessment at 4:20 PM 02/02/22. MD Evan Vogel MD; 2/2/2022 @4:19 PM Voice dictation software was used during the making of this note. This software is not perfect and grammatical and other typographical errors may be present.   This note has not been proofread for errors.  ====================================            Amount and/or Complexity of Data Reviewed  Clinical lab tests: ordered and reviewed (Results for orders placed or performed during the hospital encounter of 02/02/22  -INFLUENZA A & B AG (RAPID TEST):        Result                      Value             Ref Range           Influenza A Ag              Negative          NEG                 Influenza B Ag              Negative          NEG                 Source                                                        Nasopharyngeal  -COVID-19 RAPID TEST:        Result                      Value             Ref Range           Specimen source Nasopharyngeal       COVID-19 rapid test         Not detected      NOTD           -LACTIC ACID:        Result                      Value             Ref Range           Lactic acid                 1.4               0.4 - 2.0 MM*  -CBC WITH AUTOMATED DIFF:        Result                      Value             Ref Range           WBC                         12.1 (H)          4.3 - 11.1 K*       RBC                         4.59              4.05 - 5.2 M*       HGB                         14.5              11.7 - 15.4 *       HCT                         42.6              35.8 - 46.3 %       MCV                         92.8              79.6 - 97.8 *       MCH                         31.6              26.1 - 32.9 *       MCHC                        34.0              31.4 - 35.0 *       RDW                         19.9 (H)          11.9 - 14.6 %       PLATELET                    172               150 - 450 K/*       MPV                         10.0              9.4 - 12.3 FL       ABSOLUTE NRBC               0.00              0.0 - 0.2 K/*       DF                          AUTOMATED                             NEUTROPHILS                 94 (H)            43 - 78 %           LYMPHOCYTES                 2 (L)             13 - 44 %           MONOCYTES                   3 (L)             4.0 - 12.0 %        EOSINOPHILS                 0 (L)             0.5 - 7.8 %         BASOPHILS                   0                 0.0 - 2.0 %         IMMATURE GRANULOCYTES       1                 0.0 - 5.0 %         ABS. NEUTROPHILS            11.4 (H)          1.7 - 8.2 K/*       ABS. LYMPHOCYTES            0.2 (L)           0.5 - 4.6 K/*       ABS. MONOCYTES              0.4               0.1 - 1.3 K/*       ABS. EOSINOPHILS            0.0               0.0 - 0.8 K/*       ABS. BASOPHILS              0.0               0.0 - 0.2 K/*       ABS. IMM.  GRANS.            0.1               0.0 - 0.5 K/*  -METABOLIC PANEL, COMPREHENSIVE:        Result                      Value             Ref Range           Sodium                      135 (L)           136 - 145 mm*       Potassium                   4.1               3.5 - 5.1 mm*       Chloride                    102               98 - 107 mmo*       CO2                         27                21 - 32 mmol*       Anion gap                   6 (L)             7 - 16 mmol/L       Glucose                     104 (H)           65 - 100 mg/*       BUN                         13                8 - 23 MG/DL        Creatinine                  0.69              0.6 - 1.0 MG*       GFR est AA                  >60               >60 ml/min/1*       GFR est non-AA              >60               >60 ml/min/1*       Calcium                     8.6               8.3 - 10.4 M*       Bilirubin, total            1.6 (H)           0.2 - 1.1 MG*       ALT (SGPT)                  49                12 - 65 U/L         AST (SGOT)                  41 (H)            15 - 37 U/L         Alk.  phosphatase            143 (H)           50 - 136 U/L        Protein, total              7.4               6.3 - 8.2 g/*       Albumin                     3.3               3.2 - 4.6 g/*       Globulin                    4.1 (H)           2.3 - 3.5 g/*       A-G Ratio                   0.8 (L)           1.2 - 3.5      -PROCALCITONIN:        Result                      Value             Ref Range           Procalcitonin               2.49 (H)          0.00 - 0.49 *  -EKG, 12 LEAD, INITIAL:        Result                      Value             Ref Range           Ventricular Rate            114               BPM                 Atrial Rate                 114               BPM                 P-R Interval                126               ms                  QRS Duration                80                ms                  Q-T Interval                296               ms                  QTC Calculation (Deisy)     407 ms                  Calculated P Axis           73                degrees             Calculated R Axis           69                degrees             Calculated T Axis           70                degrees             Diagnosis                                                     !! AGE AND GENDER SPECIFIC ECG ANALYSIS !! Sinus tachycardia   Otherwise normal ECG   When compared with ECG of 08-AUG-2021 05:04,   No significant change was found       *Note: Due to a large number of results and/or encounters for the requested time period, some results have not been displayed. A complete set of results can be found in Results Review. )  Tests in the radiology section of CPT®: ordered and reviewed (XR CHEST PORT    Result Date: 2/2/2022  Chest X-ray INDICATION: Leukemia patient with fever and chills. COMPARISON: Chest x-ray 8/22/2021. A portable AP view of the chest was obtained. FINDINGS: The lungs are clear. There are no infiltrates or effusions. The heart size is normal.  The bony thorax is intact.       No acute findings in the chest    )  Discuss the patient with other providers: yes  Independent visualization of images, tracings, or specimens: yes           Procedures

## 2022-02-02 NOTE — H&P
Hospitalist History and Physical   Admit Date:  2022  2:44 PM   Name:  Coretta Parkinson   Age:  64 y.o. Sex:  female  :  1960   MRN:  [id number]   Room:      Presenting Complaint: Fever    Reason(s) for Admission: Sepsis ([facility] [A41.9]  Fever of unknown origin [R50.9]     History of Present Illness:   Coretta Parkinson is a 64 y.o. female with medical history of CML, polycythemia vera, vasculitis, hypothyroidism, traumatic head bleed,  Cirrhosis, esophageal varices, hx DVT, portal vein thrombosis, anemia who presented with c/o fever, chills starting last night that worsened this morning. She has hx of vasculitis and reports this is similar to her \"flare\" of vasculitis. UA neg. CXR neg. Flu and COVID neg. BC collected. PCT 2.9. Started on Vanc and cefepime in ED. Had rash develop after cefepime. Denies CP, SOB, n/v/d, abd pain, sick contacts, resp symptoms. Review of Systems:  10 systems reviewed and negative except as noted in HPI.   Assessment & Plan:     Principal Problem:    Sepsis ([facility] (2022)    Unknown origin  CXR no acute findings  Flu neg  COVID neg  UA without infection  No open wounds  Will start Vanc for now, has multiple allergies, had rash to cefepime when started in ED  Follow [facility]  Urine cx  Consult Oncology as she is followed by them for CML and hx vasculitis  Check echo    Hypothyroidism  Home meds    CML  Consult Oncology in AM, holding oral chemo meds tonight    Vasculitis/Polycythemia Vera  Hem/Onc consulted for tomorrow  Check CRP  Home regimen of lovenox BID      Dispo/Discharge Planning:         Diet: No diet orders on file  VTE ppx: lovenox  Code status: Full  Surrogate Decision maker: sister Paige    Hospital Problems as of 2022 Date Reviewed: 2021          Codes Class Noted - Resolved POA    * (Principal) Sepsis ([facility] ICD-10-CM: A41.9  ICD-9-CM: 038.9, 995.91  2022 - Present Unknown        Fever of unknown origin ICD-10-CM: R50.9  ICD-9-CM: 780.60  2/2/2022 - Present Unknown              Past History:  Past Medical History:   Diagnosis Date    Acute blood loss anemia 4/6/2018    Anemia     C. difficile diarrhea 4/1/2015    Cerebral edema ([address] 4/1/2015    Chronic migraine 9/22/2016    Chronic myelogenous leukemia ([address]     Schley chromosome/ converted from polycythemia in 2009- to 2012 when she was dx w leukemia    Chronic pain     Coagulation disorder ([address]     \"clotting and Bleeding Problem\" dr Maki Mcneal follows     Esophageal spasm     with banding     Esophageal varices ([address]     grade 3    GI bleed 8/3/2016    H/O craniotomy     3-29-15.  due to blood clot - which caused a seizure  - then pt fell and hit     Hematemesis 8/20/2021    Leukocytosis 3/14/2015    Melena 8/20/2021    MRSA colonization 6/25/2012    Polycythemia vera(238.4)     JAK2 mutation    Portal hypertension ([address]     with varices    Portal vein thrombosis 6/20/2012    Ct scan 6-21-2 Apparent occlusion of the portal vein. In addition, the splenic vein, and superior mesenteric vein are not definitely seen and are also likely  occluded. Splenomegaly, and extensive varices are seen as described above consistent with the portal vein occlusion 7-05-12 on arixtra HIT negative on repeat 7-27-12 re admitted Cirrhotic appearance of the liver. Mild to moderate ascites, as    Primary hypothyroidism     Pulmonary embolism ([address] 2006    not on coumadin anymore     S/P exploratory laparotomy, 6/20/12 6/25/2012    Bowel resection:  336 cm of small bowel removed, approximately 9 feet and placement of feeding jejunostomy.      S/P small bowel resection     2012.  9 feet removed due to  gangrene which wa due to blood clot    Seizure ([address] 3/14/2015    Seizure disorder ([address] 3/30/2015    due to clotting factor    Seizures ([address]     last one 3- - followed by aniyah     Splenomegaly, congestive, chronic     Stroke (cerebrum) ([address] 4/11/2015    had bled into head- surgery    Stroke [facility])     pt had stroke like symptoms     Thrombocytopenia, HIT negative 2012 platelets lower repeat HIT 12 platelets in 16,189'E    Thrombosis, portal vein     portal , spleenic and recently superior mesenteric    Transfusion history     many blood tranfusions - last 3--15 after brain surgery    Traumatic hemorrhage of right cerebrum ([address] 3/30/2015     Past Surgical History:   Procedure Laterality Date    HX APPENDECTOMY      with small bowel resection    HX BREAST BIOPSY Bilateral     Lt - ; Rt -     HX CHOLECYSTECTOMY      HX GI      liver biopsy    HX GI      bowel removed small - 9 feet     HX GYN       x 2    HX GYN      D&C following miscarriage    HX ORTHOPAEDIC Left 2008    torn labrum shoulder (screw in place)    NEUROLOGICAL PROCEDURE UNLISTED  2010    craniotomy to evacuate subdural hematoma following a fall from a seizure    WA ABDOMEN SURGERY PROC UNLISTED      umbilical hernia repair    WA ABDOMEN SURGERY PROC UNLISTED      9ft of small bowel excised then reconnected      Allergies   Allergen Reactions    Latex Other (comments)     Testing for latex allergy was positive as a 2 (on a scale of 1 to 3).     Sulfa (Sulfonamide Antibiotics) Anaphylaxis    Tramadol Other (comments)     Top lip swelled    Augmentin [Amoxicillin-Pot Clavulanate] Rash    Divalproex Sodium Hives    Morphine Nausea and Vomiting     Not a true allergy    Potassium Clavulanate Hives    Rizatriptan Rash    Tetanus Immune Globulin Other (comments)     Heat, bruising, and swelling at  Site of injection    Xanax [Alprazolam] Other (comments)     Hallucinations    Zonegran [Zonisamide] Rash      Social History     Tobacco Use    Smoking status: Former Smoker     Packs/day: 0.20     Years: 10.00     Pack years: 2.00     Types: Cigarettes     Quit date:      Years since quittin.1    Smokeless tobacco: Never Used Substance Use Topics    Alcohol use: No      Family History   Problem Relation Age of Onset    Diabetes Mother     Stroke Mother         after surgery    Cancer Father         brain    No Known Problems Sister     Other Sister         diverticulitis    Other Sister         diverticulitis    Cancer Sister         lyjmphoma    Breast Cancer Maternal Aunt 48    Thyroid Disease Paternal Grandmother       Family history reviewed and negative except as noted above.     Immunization History   Administered Date(s) Administered    (RETIRED) Pneumococcal Vaccine (Unspecified Type) 09/27/2008    COVID-19, Moderna, Primary or Immunocompromised Series, MRNA, PF, 100mcg/0.5mL 03/27/2021, 04/27/2021    Hib (PRP-T) 04/03/2019    Influenza Vaccine 10/01/2014, 10/01/2016    Influenza Vaccine (Quad) Mdck Pf (>2 Yrs Flucelvax QUAD 45043) 09/28/2020    Influenza Vaccine (Quad) PF (>6 Mo Flulaval, Fluarix, and >3 Yrs Afluria, Fluzone [id number]) 10/25/2018, 10/08/2019    Pneumococcal Conjugate (PCV-13) 04/03/2019    Pneumococcal Polysaccharide (PPSV-23) 01/01/2004    TB Skin Test (PPD) Intradermal 07/21/2014    Zoster Recombinant 03/13/2020, 08/09/2020     Prior to Admit Medications:  Current Outpatient Medications   Medication Instructions    b complex vitamins tablet 1 Tablet, DAILY    calcium carbonate (CALTREX) 600 mg, Oral, EVERY BEDTIME    cholecalciferol (VITAMIN D3) 1,000 Units, Oral, EVERY BEDTIME    clindamycin (CLEOCIN) 300 mg, Oral, 4 TIMES DAILY    Comp Stocking,Knee,Regular,Sml misc 1 Each, Does Not Apply, DAILY    diphenoxylate-atropine (LomotiL) 2.5-0.025 mg per tablet 1 Tablet, Oral, 4 TIMES DAILY AS NEEDED    enoxaparin (LOVENOX) 50 mg, SubCUTAneous, EVERY 12 HOURS    famotidine (PEPCID) 40 mg, Oral, DAILY AS NEEDED, Take at least 4 hours before or after sprycel    lansoprazole (PREVACID) 30 mg, Oral, DAILY, prn    magnesium 250 mg tab 1 Tablet, Oral, DAILY    ondansetron (ZOFRAN ODT) 4 mg, Oral, EVERY 8 HOURS AS NEEDED    ondansetron (ZOFRAN ODT) 8 mg, Oral, EVERY 8 HOURS AS NEEDED    oxyCODONE-acetaminophen (PERCOCET 10)  mg per tablet 1 Tablet, Oral, EVERY 8 HOURS AS NEEDED    ruxolitinib 5 mg, Oral, 2 TIMES DAILY    SpryceL 80 mg, Oral, DAILY    Synthroid 112 mcg tablet 1 tablet once a day with an extra 1/2 tablet on Sundays    zolpidem (AMBIEN) 10 mg, Oral, BEDTIME PRN       Objective:     Patient Vitals for the past 24 hrs:   Temp Pulse Resp BP SpO2   02/02/22 1822 -- 99 -- -- 93 %   02/02/22 1801 -- (!) 105 -- 108/60 95 %   02/02/22 1731 -- (!) 117 -- (!) 109/57 95 %   02/02/22 1656 -- (!) 108 21 (!) 105/56 94 %   02/02/22 1626 -- (!) 103 23 (!) 117/59 97 %   02/02/22 1526 -- (!) 113 (!) 32 133/61 98 %   02/02/22 1506 -- -- -- -- 96 %   02/02/22 1436 (!) 103.2 °F (39.6 °C) (!) 128 22 (!) 146/80 96 %     Oxygen Therapy  O2 Sat (%): 93 % (02/02/22 1822)  Pulse via Oximetry: 99 beats per minute (02/02/22 1822)  O2 Device: None (Room air) (02/02/22 1506)    Estimated body mass index is 22.5 kg/m² as calculated from the following:    Height as of this encounter: 5' 2\" (1.575 m). Weight as of this encounter: 55.8 kg (123 lb). Intake/Output Summary (Last 24 hours) at 2/2/2022 1827  Last data filed at 2/2/2022 1827  Gross per 24 hour   Intake 1100 ml   Output --   Net 1100 ml         Physical Exam:    Blood pressure 108/60, pulse 99, temperature (!) 103.2 °F (39.6 °C), resp. rate 21, height 5' 2\" (1.575 m), weight 55.8 kg (123 lb), SpO2 93 %. General:    Well nourished. No overt distress  Head:  Normocephalic, atraumatic  Eyes:  Sclerae appear normal.  Pupils equally round. ENT:  Nares appear normal, no drainage. Dry cracked oral mucosa  Neck:  No restricted ROM. Trachea midline   CV:   RRR. No m/r/g. No jugular venous distension. Lungs:   CTAB. No wheezing, rhonchi, or rales. Respirations even, unlabored  Abdomen: Bowel sounds present. Soft, nontender, nondistended. Umbilical hernia   Extremities: No cyanosis or clubbing. No edema  Skin:     Fine erythematous pruritic rash to back. hot and dry. Neuro:  CN II-XII grossly intact. Sensation intact. A&Ox3  Psych:  Normal mood and affect. I have reviewed ordered lab tests and independently visualized imaging below:    Last 24hr Labs:  Recent Results (from the past 24 hour(s))   LACTIC ACID    Collection Time: 02/02/22  2:54 PM   Result Value Ref Range    Lactic acid 1.4 0.4 - 2.0 MMOL/L   CBC WITH AUTOMATED DIFF    Collection Time: 02/02/22  2:54 PM   Result Value Ref Range    WBC 12.1 (H) 4.3 - 11.1 K/uL    RBC 4.59 4.05 - 5.2 M/uL    HGB 14.5 11.7 - 15.4 g/dL    HCT 42.6 35.8 - 46.3 %    MCV 92.8 79.6 - 97.8 FL    MCH 31.6 26.1 - 32.9 PG    MCHC 34.0 31.4 - 35.0 g/dL    RDW 19.9 (H) 11.9 - 14.6 %    PLATELET 000 201 - 241 K/uL    MPV 10.0 9.4 - 12.3 FL    ABSOLUTE NRBC 0.00 0.0 - 0.2 K/uL    DF AUTOMATED      NEUTROPHILS 94 (H) 43 - 78 %    LYMPHOCYTES 2 (L) 13 - 44 %    MONOCYTES 3 (L) 4.0 - 12.0 %    EOSINOPHILS 0 (L) 0.5 - 7.8 %    BASOPHILS 0 0.0 - 2.0 %    IMMATURE GRANULOCYTES 1 0.0 - 5.0 %    ABS. NEUTROPHILS 11.4 (H) 1.7 - 8.2 K/UL    ABS. LYMPHOCYTES 0.2 (L) 0.5 - 4.6 K/UL    ABS. MONOCYTES 0.4 0.1 - 1.3 K/UL    ABS. EOSINOPHILS 0.0 0.0 - 0.8 K/UL    ABS. BASOPHILS 0.0 0.0 - 0.2 K/UL    ABS. IMM. GRANS. 0.1 0.0 - 0.5 K/UL   METABOLIC PANEL, COMPREHENSIVE    Collection Time: 02/02/22  2:54 PM   Result Value Ref Range    Sodium 135 (L) 136 - 145 mmol/L    Potassium 4.1 3.5 - 5.1 mmol/L    Chloride 102 98 - 107 mmol/L    CO2 27 21 - 32 mmol/L    Anion gap 6 (L) 7 - 16 mmol/L    Glucose 104 (H) 65 - 100 mg/dL    BUN 13 8 - 23 MG/DL    Creatinine 0.69 0.6 - 1.0 MG/DL    GFR est AA >60 >60 ml/min/1.73m2    GFR est non-AA >60 >60 ml/min/1.73m2    Calcium 8.6 8.3 - 10.4 MG/DL    Bilirubin, total 1.6 (H) 0.2 - 1.1 MG/DL    ALT (SGPT) 49 12 - 65 U/L    AST (SGOT) 41 (H) 15 - 37 U/L    Alk.  phosphatase 143 (H) 50 - 136 U/L    Protein, total 7.4 6.3 - 8.2 g/dL    Albumin 3.3 3.2 - 4.6 g/dL    Globulin 4.1 (H) 2.3 - 3.5 g/dL    A-G Ratio 0.8 (L) 1.2 - 3.5     PROCALCITONIN    Collection Time: 02/02/22  2:54 PM   Result Value Ref Range    Procalcitonin 2.49 (H) 0.00 - 0.49 ng/mL   INFLUENZA A & B AG (RAPID TEST)    Collection Time: 02/02/22  2:54 PM   Result Value Ref Range    Influenza A Ag Negative NEG      Influenza B Ag Negative NEG      Source Nasopharyngeal     COVID-19 RAPID TEST    Collection Time: 02/02/22  2:54 PM   Result Value Ref Range    Specimen source Nasopharyngeal      COVID-19 rapid test Not detected NOTD     EKG, 12 LEAD, INITIAL    Collection Time: 02/02/22  3:09 PM   Result Value Ref Range    Ventricular Rate 114 BPM    Atrial Rate 114 BPM    P-R Interval 126 ms    QRS Duration 80 ms    Q-T Interval 296 ms    QTC Calculation (Bezet) 407 ms    Calculated P Axis 73 degrees    Calculated R Axis 69 degrees    Calculated T Axis 70 degrees    Diagnosis       Sinus tachycardia  Otherwise normal ECG  When compared with ECG of 08-AUG-2021 05:04,  No significant change was found  Confirmed by Saima Mauricio MD, Jefry Good ([id number]) on 2/2/2022 4:49:12 PM         All Micro Results     Procedure Component Value Units Date/Time    COVID-19 RAPID TEST [[id number]] Collected: 02/02/22 1454    Order Status: Completed Specimen: Nasopharyngeal Updated: 02/02/22 1554     Specimen source Nasopharyngeal        COVID-19 rapid test Not detected        Comment:      The specimen is NEGATIVE for SARS-CoV-2, the novel coronavirus associated with COVID-19. A negative result does not rule out COVID-19. This test has been authorized by the FDA under an Emergency Use Authorization (EUA) for use by authorized laboratories.         Fact sheet for Healthcare Providers: ConventionUpdate.co.nz  Fact sheet for Patients: ConventionUpdate.co.nz       Methodology: Isothermal Nucleic Acid Amplification INFLUENZA A & B AG (RAPID TEST) [[id number]] Collected: 02/02/22 1454    Order Status: Completed Specimen: Nasopharyngeal from Nasal washing Updated: 02/02/22 1545     Influenza A Ag Negative        Comment: NEGATIVE FOR THE PRESENCE OF INFLUENZA A ANTIGEN  INFECTION DUE TO INFLUENZA A CANNOT BE RULED OUT. BECAUSE THE ANTIGEN PRESENT IN THE SAMPLE MAY BE BELOW  THE DETECTION LIMIT OF THE TEST. A NEGATIVE TEST IS PRESUMPTIVE AND IT IS RECOMMENDED THAT THESE RESULTS BE CONFIRMED BY VIRAL CULTURE OR AN FDA-CLEARED INFLUENZA A AND B MOLECULAR ASSAY. Influenza B Ag Negative        Comment: NEGATIVE FOR THE PRESENCE OF INFLUENZA B ANTIGEN  INFECTION DUE TO INFLUENZA B CANNOT BE RULED OUT. BECAUSE THE ANTIGEN PRESENT IN THE SAMPLE MAY BE BELOW  THE DETECTION LIMIT OF THE TEST. A NEGATIVE TEST IS PRESUMPTIVE AND IT IS RECOMMENDED THAT THESE RESULTS BE CONFIRMED BY VIRAL CULTURE OR AN FDA-CLEARED INFLUENZA A AND B MOLECULAR ASSAY. Source Nasopharyngeal       BLOOD CULTURE [[id number]] Collected: 02/02/22 1454    Order Status: Completed Specimen: Blood Updated: 02/02/22 1521    BLOOD CULTURE [[id number]] Collected: 02/02/22 1454    Order Status: Completed Specimen: Blood Updated: 02/02/22 1521          Other Studies:  XR CHEST PORT    Result Date: 2/2/2022  Chest X-ray INDICATION: Leukemia patient with fever and chills. COMPARISON: Chest x-ray 8/22/2021. A portable AP view of the chest was obtained. FINDINGS: The lungs are clear. There are no infiltrates or effusions. The heart size is normal.  The bony thorax is intact.       No acute findings in the chest       Medications Administered     acetaminophen (TYLENOL) tablet 1,000 mg     Admin Date  02/02/2022 Action  Given Dose  1,000 mg Route  Oral Administered By  Lul Garza RN          cefepime (MAXIPIME) 2 g in 0.9% sodium chloride (MBP/ADV) 100 mL MBP     Admin Date  02/02/2022 Action  New Bag Dose  2 g Rate  200 mL/hr Route  IntraVENous Administered By  Josué Proctor RN          lactated ringers bolus infusion 1,000 mL     Admin Date  02/02/2022 Action  New Bag Dose  1,000 mL Rate  3,348 mL/hr Route  IntraVENous Administered By  Josué Proctor RN          morphine injection 4 mg     Admin Date  02/02/2022 Action  Given Dose  4 mg Route  IntraVENous Administered By  Josué Proctor RN          ondansetron [facility]) injection 4 mg     Admin Date  02/02/2022 Action  Given Dose  4 mg Route  IntraVENous Administered By  Josué Proctor RN          vancomycin (VANCOCIN) 1250 mg in  ml infusion     Admin Date  02/02/2022 Action  New Bag Dose  1,250 mg Rate  166.7 mL/hr Route  IntraVENous Administered By  Josué Proctor RN                Signed:  Yuliya Haji NP    Notes, labs, VS, diagnostic testing reviewed  Case discussed with pt, care team ,Dr. Jerry Eden

## 2022-02-02 NOTE — ED TRIAGE NOTES
Pt to ER with EMS due to temp of 103.8 at home and she has leukemia. Denies cough, vomiting, or diarrhea. Masked for triage.

## 2022-02-02 NOTE — ED NOTES
Patient with hx of CML, here for eval of fever. 103.8 today at home. Took Advil at 10am. Has been having chills and \"shivers\" today. No cough or congestion. Fully immunized against Covid. Patient says she was delirious about 10 days ago, says she spilled medication all over herself and she ended up having a \"vasculitis flare. \" After that, slept for 4 days, was feeling better for several days and now developed fever. Denies cough or SOB. Denies urinary sxs. Tachycardic, febrile, chronically ill appearing. Patient evaluated initially in triage. Rapid Medical Evaluation was conducted and necessary orders have been placed. I have performed a medical screening exam.  Care will now be transferred to the provider in the back of the emergency department.   Urban Galeas, [id number] 5:18 PM

## 2022-02-03 NOTE — PROGRESS NOTES
02/02/22 2010   Dual Skin Pressure Injury Assessment   Dual Skin Pressure Injury Assessment WDL   Second Care Provider (Based on Facility Policy) NISHA Li   Skin Integumentary   Skin Integumentary (WDL) X   Skin Condition/Temp Warm;Dry   Skin Integrity Scars (comment)  (surgical scars on abd)    Pressure  Injury Documentation No Pressure Injury Noted-Pressure Ulcer Prevention Initiated   Wound Prevention and Protection Methods   Orientation of Wound Prevention Posterior   Location of Wound Prevention Sacrum/Coccyx   Dressing Present  No   Wound Offloading (Prevention Methods) Bed, pressure reduction mattress;Pillows

## 2022-02-03 NOTE — PROGRESS NOTES
END OF SHIFT NOTE:    Intake/Output  02/02 1901 - 02/03 0700  In: -   Out: 800 [Urine:800]   Voiding: YES  Catheter: NO  Drain:              Stool:  0 occurrences. Emesis:  0 occurrences. VITAL SIGNS  Patient Vitals for the past 12 hrs:   Temp Pulse Resp BP SpO2   02/03/22 0235 98.9 °F (37.2 °C) 95 18 96/63 96 %   02/02/22 2254 99.5 °F (37.5 °C) 78 19 (!) 96/56 99 %   02/02/22 1925 100.1 °F (37.8 °C) 96 20 105/60 97 %   02/02/22 1856 (!) 101.3 °F (38.5 °C) 98 20 108/60 94 %       Pain Assessment  Pain 1  Pain Scale 1: Numeric (0 - 10) (02/03/22 0320)  Pain Intensity 1: 0 (02/03/22 0320)  Patient Stated Pain Goal: 0 (02/03/22 0320)  Pain Reassessment 1: Patient resting w/respiratory rate greater than 10 (02/03/22 0235)  Pain Intervention(s) 1: Medication (see MAR) (02/03/22 0154)    Ambulating  Yes    Additional Information:   Pt blood culture came back positive for gram negative rods, further identified as E. Coli. Pt received 1000L bolus and 674ml bolus ns. Vanc was held d/t pt stating it gave her a rash. Pt currently resting with no complaints at this time. Shift report given to oncoming nurse at the bedside.     Toshia Franks RN

## 2022-02-03 NOTE — PROGRESS NOTES
Care Management Interventions  Mode of Transport at Discharge: Self  Transition of Care Consult (CM Consult): Discharge Planning  Support Systems: Child(lauryn)  Confirm Follow Up Transport: Self  Discharge Location  Patient Expects to be Discharged to[de-identified] Home    Sw reviewed chart and talked with pt at length. 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 Pt has been dealing with two types of cancers since 2004. She had to stop working in 2009. She takes two types of oral chemo. She lives alone and manages all her own affairs. Pt has two children who live close by and help out where needed. 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 Discussed how difficult it is to be the pt and have no control over your diseases.  Sw will cont to follow and assist. Madhuri Arana

## 2022-02-03 NOTE — H&P
[facility] Hematology & Oncology        Inpatient Hematology / Oncology History and Physical    Reason for Admission:  Sepsis [facility]) [A41.9]  Fever of unknown origin [R50.9]    History of Present Illness:  Ms. Terry Ibarra is a 64 y.o. female admitted on 2/2/2022. The encounter diagnosis was Sepsis without acute organ dysfunction, due to unspecified organism [facility]). Her PMH includes vasculitis, hypothyroidism, traumatic head bleeding, cirrhosis, esophageal varices, anemia, migraines, recurrent DVTs / portal vein thrombosis on enoxaparin, hx PE 2006, seizures, and splenomegaly. She is a patient of Dr. Vincent Hoopered with PV and CML dx in 2013, on Dasatinib and Jakafi. She presented with c/o fever and chills x 1 day. UA neg. CXR neg. Flu and COVID neg. BCx +GNR, PCR +E Coli, awaiting sensitivities. Started on Cef/Vanc in ED. Pt developed rash after Cef and it has since been DC'd. Now on CTX. We were asked to assume care of our patient. Review of Systems:  Constitutional +fever, chills. Denies weight loss, appetite changes, night sweats. HEENT Denies trauma, blurry vision, hearing loss, ear pain, nosebleeds, sore throat, neck pain and ear discharge. Skin Denies lesions or rashes. Lungs Denies dyspnea, cough, sputum production or hemoptysis. Cardiovascular Denies chest pain, palpitations, or lower extremity edema. Gastrointestinal Denies nausea, vomiting, changes in bowel habits, bloody or black stools, abdominal pain.  Denies dysuria, frequency or hesitancy of urination. Neuro Denies headaches, visual changes or ataxia. Denies dizziness, tingling, tremors, sensory change, speech change, focal weakness or headaches. Hematology Denies easy bruising or bleeding, denies gingival bleeding or epistaxis. Endo +hypothyroidism   MSK Denies back pain, arthralgias, myalgias or frequent falls. Psychiatric/Behavioral Denies depression and substance abuse. The patient is not nervous/anxious. Allergies   Allergen Reactions    Latex Other (comments)     Testing for latex allergy was positive as a 2 (on a scale of 1 to 3).  Sulfa (Sulfonamide Antibiotics) Anaphylaxis    Tramadol Other (comments)     Top lip swelled    Augmentin [Amoxicillin-Pot Clavulanate] Rash    Divalproex Sodium Hives    Morphine Nausea and Vomiting     Not a true allergy    Potassium Clavulanate Hives    Rizatriptan Rash    Tetanus Immune Globulin Other (comments)     Heat, bruising, and swelling at  Site of injection    Vancomycin Rash    Xanax [Alprazolam] Other (comments)     Hallucinations    Zonegran [Zonisamide] Rash     Past Medical History:   Diagnosis Date    Acute blood loss anemia 4/6/2018    Anemia     C. difficile diarrhea 4/1/2015    Cerebral edema (HCC) 4/1/2015    Chronic migraine 9/22/2016    Chronic myelogenous leukemia ([facility]     Marshall chromosome/ converted from polycythemia in 2009- to 2012 when she was dx w leukemia    Chronic pain     Coagulation disorder ([facility]     \"clotting and Bleeding Problem\" dr Peter Rockwell follows     Esophageal spasm     with banding     Esophageal varices (HCC)     grade 3    GI bleed 8/3/2016    H/O craniotomy     3-29-15.  due to blood clot - which caused a seizure  - then pt fell and hit     Hematemesis 8/20/2021    Leukocytosis 3/14/2015    Melena 8/20/2021    MRSA colonization 6/25/2012    Polycythemia vera(238.4)     JAK2 mutation    Portal hypertension (HCC)     with varices    Portal vein thrombosis 6/20/2012    Ct scan 6-21-2 Apparent occlusion of the portal vein. In addition, the splenic vein, and superior mesenteric vein are not definitely seen and are also likely  occluded. Splenomegaly, and extensive varices are seen as described above consistent with the portal vein occlusion 7-05-12 on arixtra HIT negative on repeat 7-27-12 re admitted Cirrhotic appearance of the liver.  Mild to moderate ascites, as    Primary hypothyroidism     Pulmonary embolism ([address] 2006    not on coumadin anymore     S/P exploratory laparotomy, 12    Bowel resection:  336 cm of small bowel removed, approximately 9 feet and placement of feeding jejunostomy.      S/P small bowel resection     .  9 feet removed due to  gangrene which wa due to blood clot    Seizure ([address] 3/14/2015    Seizure disorder ([address] 3/30/2015    due to clotting factor    Seizures ([address]     last one 3- - followed by ainyah     Splenomegaly, congestive, chronic     Stroke (cerebrum) ([address] 2015    had bled into head- surgery    Stroke [facility])     pt had stroke like symptoms     Thrombocytopenia, HIT negative 2012 platelets lower repeat HIT 12 platelets in 67,657'R    Thrombosis, portal vein 2004    portal , spleenic and recently superior mesenteric    Transfusion history     many blood tranfusions - last 3-29-15 after brain surgery    Traumatic hemorrhage of right cerebrum ([address] 3/30/2015     Past Surgical History:   Procedure Laterality Date    HX APPENDECTOMY      with small bowel resection    HX BREAST BIOPSY Bilateral     Lt - ; Rt -     HX CHOLECYSTECTOMY      HX GI      liver biopsy    HX GI      bowel removed small - 9 feet     HX GYN       x 2    HX GYN      D&C following miscarriage    HX ORTHOPAEDIC Left 2008    torn labrum shoulder (screw in place)    NEUROLOGICAL PROCEDURE UNLISTED  2010    craniotomy to evacuate subdural hematoma following a fall from a seizure    CO ABDOMEN SURGERY PROC UNLISTED      umbilical hernia repair    CO ABDOMEN SURGERY PROC UNLISTED      9ft of small bowel excised then reconnected     Family History   Problem Relation Age of Onset    Diabetes Mother     Stroke Mother         after surgery    Cancer Father         brain    No Known Problems Sister     Other Sister         diverticulitis    Other Sister         diverticulitis    Cancer Sister         lyjmphoma    Breast Cancer Maternal Aunt 48    Thyroid Disease Paternal Grandmother      Social History     Socioeconomic History    Marital status: SINGLE     Spouse name: Not on file    Number of children: Not on file    Years of education: Not on file    Highest education level: Not on file   Occupational History    Not on file   Tobacco Use    Smoking status: Former Smoker     Packs/day: 0.20     Years: 10.00     Pack years: 2.00     Types: Cigarettes     Quit date:      Years since quittin.1    Smokeless tobacco: Never Used   Substance and Sexual Activity    Alcohol use: No    Drug use: No    Sexual activity: Not on file   Other Topics Concern    Not on file   Social History Narrative    Not on file     Social Determinants of Health     Financial Resource Strain:     Difficulty of Paying Living Expenses: Not on file   Food Insecurity:     Worried About [address] in the Last Year: Not on file    [address] [place] in the Last Year: Not on file   Transportation Needs:     Lack of Transportation (Medical): Not on file    Lack of Transportation (Non-Medical):  Not on file   Physical Activity:     Days of Exercise per Week: Not on file    Minutes of Exercise per Session: Not on file   Stress:     Feeling of Stress : Not on file   Social Connections:     Frequency of Communication with Friends and Family: Not on file    Frequency of Social Gatherings with Friends and Family: Not on file    Attends Latter-day Services: Not on file    Active Member of Clubs or Organizations: Not on file    Attends Club or Organization Meetings: Not on file    Marital Status: Not on file   Intimate Partner Violence:     Fear of Current or Ex-Partner: Not on file    Emotionally Abused: Not on file    Physically Abused: Not on file    Sexually Abused: Not on file   Housing Stability:     Unable to Pay for Housing in the Last Year: Not on file    Number of [place] in the Last Year: Not on file    Unstable Housing in the Last Year: Not on file     Current Facility-Administered Medications   Medication Dose Route Frequency Provider Last Rate Last Admin    cefTRIAXone (ROCEPHIN) 2 g in 0.9% sodium chloride (MBP/ADV) 50 mL MBP  2 g IntraVENous Q24H Judy Reed  mL/hr at 22 0551 2 g at 22 0551    lactated Ringers infusion  100 mL/hr IntraVENous CONTINUOUS Evan Celaya  mL/hr at 22 1002 100 mL/hr at 22 1002    sodium chloride (NS) flush 5-10 mL  5-10 mL IntraVENous Q8H Salo Troy MD   10 mL at 22 0600    sodium chloride (NS) flush 5-10 mL  5-10 mL IntraVENous PRN Salo Troy MD        cholecalciferol (VITAMIN D3) (1000 Units /25 mcg) tablet 1,000 Units  1,000 Units Oral QHS Nathalie Hazard B, NP   1,000 Units at 22 2218    famotidine (PEPCID) tablet 40 mg  40 mg Oral DAILY PRN Brenda Pore, NP        oxyCODONE-acetaminophen (PERCOCET 10)  mg per tablet 1 Tablet  1 Tablet Oral Q8H PRN Brenda Pore, NP   1 Tablet at 22 0154    levothyroxine (SYNTHROID) tablet 112 mcg  112 mcg Oral ACB Nathalie Hazard B, NP   112 mcg at 22 0857    zolpidem (AMBIEN) tablet 5 mg  5 mg Oral QHS PRN Brenda Pore, NP        sodium chloride (NS) flush 5-40 mL  5-40 mL IntraVENous Q8H Nathalie Hazard B, NP   10 mL at 22 0600    sodium chloride (NS) flush 5-40 mL  5-40 mL IntraVENous PRN Brenda Pore, NP        acetaminophen (TYLENOL) tablet 650 mg  650 mg Oral Q4H PRN Brenda Pore, NP        enoxaparin (LOVENOX) injection 50 mg  50 mg SubCUTAneous Q12H Nathalie Hazard B, NP   50 mg at 22 0858       OBJECTIVE:  Patient Vitals for the past 8 hrs:   BP Temp Pulse Resp SpO2   22 0834 (!) 103/56 (!) 102.1 °F (38.9 °C) 84 20 99 %   22 0235 96/63 98.9 °F (37.2 °C) 95 18 96 %     Temp (24hrs), Av.9 °F (38.3 °C), Min:98.9 °F (37.2 °C), Max:103.2 °F (39.6 °C)    No intake/output data recorded. Physical Exam:  Constitutional: Well developed, well nourished female in no acute distress, sitting comfortably in the hospital bed. HEENT: Normocephalic and atraumatic. Oropharynx is clear, mucous membranes are moist.  Extraocular muscles are intact. Sclerae anicteric. Neck supple without JVD. No thyromegaly present. Skin Warm and dry. No bruising and no rash noted. No erythema. No pallor. Respiratory Lungs are clear to auscultation bilaterally without wheezes, rales or rhonchi, normal air exchange without accessory muscle use. CVS Normal rate, regular rhythm and normal S1 and S2. No murmurs, gallops, or rubs. Abdomen Soft, nontender and nondistended, normoactive bowel sounds. No palpable mass. No hepatosplenomegaly. Neuro Grossly nonfocal with no obvious sensory or motor deficits. MSK Normal range of motion in general.  No edema and no tenderness. Psych Appropriate mood and affect.         Labs:    Recent Results (from the past 24 hour(s))   CULTURE, BLOOD    Collection Time: 02/02/22  2:54 PM    Specimen: Blood   Result Value Ref Range    Special Requests: RIGHT  Antecubital        GRAM STAIN GRAM NEGATIVE RODS      GRAM STAIN AEROBIC AND ANAEROBIC BOTTLES      GRAM STAIN        RESULTS VERIFIED, PHONED TO AND READ BACK BY GUSTAVO Tiwari RN AT 0148 ON 2/3/22    Culture result: CULTURE IN [facility] UPDATES TO FOLLOW      Culture result:        Refer to Blood Culture ID Panel Accession  [id number]     CULTURE, BLOOD    Collection Time: 02/02/22  2:54 PM    Specimen: Blood   Result Value Ref Range    Special Requests: RIGHT  FOREARM        GRAM STAIN GRAM NEGATIVE RODS      GRAM STAIN AEROBIC AND ANAEROBIC BOTTLES      GRAM STAIN        RESULTS VERIFIED, PHONED TO AND READ BACK BY GUSTAVO Tiwari RN AT 0148 ON 2/3/22    Culture result: CULTURE IN [address] UPDATES TO FOLLOW     LACTIC ACID    Collection Time: 02/02/22  2:54 PM   Result Value Ref Range Lactic acid 1.4 0.4 - 2.0 MMOL/L   CBC WITH AUTOMATED DIFF    Collection Time: 02/02/22  2:54 PM   Result Value Ref Range    WBC 12.1 (H) 4.3 - 11.1 K/uL    RBC 4.59 4.05 - 5.2 M/uL    HGB 14.5 11.7 - 15.4 g/dL    HCT 42.6 35.8 - 46.3 %    MCV 92.8 79.6 - 97.8 FL    MCH 31.6 26.1 - 32.9 PG    MCHC 34.0 31.4 - 35.0 g/dL    RDW 19.9 (H) 11.9 - 14.6 %    PLATELET 556 090 - 941 K/uL    MPV 10.0 9.4 - 12.3 FL    ABSOLUTE NRBC 0.00 0.0 - 0.2 K/uL    DF AUTOMATED      NEUTROPHILS 94 (H) 43 - 78 %    LYMPHOCYTES 2 (L) 13 - 44 %    MONOCYTES 3 (L) 4.0 - 12.0 %    EOSINOPHILS 0 (L) 0.5 - 7.8 %    BASOPHILS 0 0.0 - 2.0 %    IMMATURE GRANULOCYTES 1 0.0 - 5.0 %    ABS. NEUTROPHILS 11.4 (H) 1.7 - 8.2 K/UL    ABS. LYMPHOCYTES 0.2 (L) 0.5 - 4.6 K/UL    ABS. MONOCYTES 0.4 0.1 - 1.3 K/UL    ABS. EOSINOPHILS 0.0 0.0 - 0.8 K/UL    ABS. BASOPHILS 0.0 0.0 - 0.2 K/UL    ABS. IMM. GRANS. 0.1 0.0 - 0.5 K/UL   METABOLIC PANEL, COMPREHENSIVE    Collection Time: 02/02/22  2:54 PM   Result Value Ref Range    Sodium 135 (L) 136 - 145 mmol/L    Potassium 4.1 3.5 - 5.1 mmol/L    Chloride 102 98 - 107 mmol/L    CO2 27 21 - 32 mmol/L    Anion gap 6 (L) 7 - 16 mmol/L    Glucose 104 (H) 65 - 100 mg/dL    BUN 13 8 - 23 MG/DL    Creatinine 0.69 0.6 - 1.0 MG/DL    GFR est AA >60 >60 ml/min/1.73m2    GFR est non-AA >60 >60 ml/min/1.73m2    Calcium 8.6 8.3 - 10.4 MG/DL    Bilirubin, total 1.6 (H) 0.2 - 1.1 MG/DL    ALT (SGPT) 49 12 - 65 U/L    AST (SGOT) 41 (H) 15 - 37 U/L    Alk.  phosphatase 143 (H) 50 - 136 U/L    Protein, total 7.4 6.3 - 8.2 g/dL    Albumin 3.3 3.2 - 4.6 g/dL    Globulin 4.1 (H) 2.3 - 3.5 g/dL    A-G Ratio 0.8 (L) 1.2 - 3.5     PROCALCITONIN    Collection Time: 02/02/22  2:54 PM   Result Value Ref Range    Procalcitonin 2.49 (H) 0.00 - 0.49 ng/mL   INFLUENZA A & B AG (RAPID TEST)    Collection Time: 02/02/22  2:54 PM   Result Value Ref Range    Influenza A Ag Negative NEG      Influenza B Ag Negative NEG      Source Nasopharyngeal COVID-19 RAPID TEST    Collection Time: 02/02/22  2:54 PM   Result Value Ref Range    Specimen source Nasopharyngeal      COVID-19 rapid test Not detected NOTD     C REACTIVE PROTEIN, QT    Collection Time: 02/02/22  2:54 PM   Result Value Ref Range    C-Reactive protein 7.6 (H) 0.0 - 0.9 mg/dL   BLOOD CULTURE ID PANEL    Collection Time: 02/02/22  2:54 PM    Specimen: Blood   Result Value Ref Range    Acc. no. from Micro Order [id number]     Escherichia coli Detected (A) NOTDET      KPC (Carbapenem Resistance Gene) NOT DETECTED NOTDET      INTERPRETATION        Gram negative gopi. Identified by realtime PCR as E. coli   EKG, 12 LEAD, INITIAL    Collection Time: 02/02/22  3:09 PM   Result Value Ref Range    Ventricular Rate 114 BPM    Atrial Rate 114 BPM    P-R Interval 126 ms    QRS Duration 80 ms    Q-T Interval 296 ms    QTC Calculation (Bezet) 407 ms    Calculated P Axis 73 degrees    Calculated R Axis 69 degrees    Calculated T Axis 70 degrees    Diagnosis       Sinus tachycardia  Otherwise normal ECG  When compared with ECG of 08-AUG-2021 05:04,  No significant change was found  Confirmed by Paulina Weems MD, Cherylene Due ([id number]) on 2/2/2022 5:57:17 PM     METABOLIC PANEL, COMPREHENSIVE    Collection Time: 02/03/22  4:55 AM   Result Value Ref Range    Sodium 136 136 - 145 mmol/L    Potassium 4.0 3.5 - 5.1 mmol/L    Chloride 106 98 - 107 mmol/L    CO2 24 21 - 32 mmol/L    Anion gap 6 (L) 7 - 16 mmol/L    Glucose 101 (H) 65 - 100 mg/dL    BUN 14 8 - 23 MG/DL    Creatinine 0.54 (L) 0.6 - 1.0 MG/DL    GFR est AA >60 >60 ml/min/1.73m2    GFR est non-AA >60 >60 ml/min/1.73m2    Calcium 7.6 (L) 8.3 - 10.4 MG/DL    Bilirubin, total 2.1 (H) 0.2 - 1.1 MG/DL    ALT (SGPT) 49 12 - 65 U/L    AST (SGOT) 47 (H) 15 - 37 U/L    Alk.  phosphatase 117 50 - 130 U/L    Protein, total 5.7 (L) 6.3 - 8.2 g/dL    Albumin 2.5 (L) 3.2 - 4.6 g/dL    Globulin 3.2 2.3 - 3.5 g/dL    A-G Ratio 0.8 (L) 1.2 - 3.5     CBC WITH AUTOMATED DIFF    Collection Time: 02/03/22  4:55 AM   Result Value Ref Range    WBC 9.4 4.3 - 11.1 K/uL    RBC 3.64 (L) 4.05 - 5.2 M/uL    HGB 11.4 (L) 11.7 - 15.4 g/dL    HCT 33.5 (L) 35.8 - 46.3 %    MCV 92.0 79.6 - 97.8 FL    MCH 31.3 26.1 - 32.9 PG    MCHC 34.0 31.4 - 35.0 g/dL    RDW 19.3 (H) 11.9 - 14.6 %    PLATELET 088 508 - 225 K/uL    MPV 10.5 9.4 - 12.3 FL    ABSOLUTE NRBC 0.00 0.0 - 0.2 K/uL    NEUTROPHILS 89 (H) 43 - 78 %    LYMPHOCYTES 5 (L) 13 - 44 %    MONOCYTES 5 4.0 - 12.0 %    EOSINOPHILS 0 (L) 0.5 - 7.8 %    BASOPHILS 0 0.0 - 2.0 %    IMMATURE GRANULOCYTES 1 0.0 - 5.0 %    ABS. NEUTROPHILS 8.4 (H) 1.7 - 8.2 K/UL    ABS. LYMPHOCYTES 0.4 (L) 0.5 - 4.6 K/UL    ABS. MONOCYTES 0.4 0.1 - 1.3 K/UL    ABS. EOSINOPHILS 0.0 0.0 - 0.8 K/UL    ABS. BASOPHILS 0.0 0.0 - 0.2 K/UL    ABS. IMM.  GRANS. 0.1 0.0 - 0.5 K/UL    DF AUTOMATED     ECHO ADULT COMPLETE    Collection Time: 02/03/22  8:31 AM   Result Value Ref Range    LV EDV A2C 78 mL    LV EDV A4C 103 mL    LV EDV BP 94 56 - 104 mL    LV ESV A2C 30 mL    LV ESV A4C 39 mL    IVSd 0.9 0.6 - 0.9 cm    LVIDd 4.8 3.9 - 5.3 cm    LVIDs 3.3 cm    LVOT Diameter 1.8 cm    LVOT Mean Gradient 8 mmHg    LVOT VTI 37.2 cm    LVOT Peak Velocity 1.9 m/s    LVOT Peak Gradient 15 mmHg    LVPWd 1.1 (A) 0.6 - 0.9 cm    LV E' Lateral Velocity 8 cm/s    LV E' Septal Velocity 9 cm/s    LV Ejection Fraction A2C 62 %    LV Ejection Fraction A4C 63 %    EF BP 62 55 - 100 %    LVOT Area 2.5 cm2    LVOT SV 94.6 ml    LA Minor Axis 5.5 cm    LA Major Axis 6.5 cm    LA Area 2C 21.2 cm2    LA Area 4C 24.3 cm2    LA Volume BP 76 (A) 22 - 52 mL    LA Diameter 3.9 cm    AV Mean Velocity 2.0 m/s    AV Mean Gradient 17 mmHg    AV VTI 54.2 cm    AV Peak Velocity 2.7 m/s    AV Peak Gradient 28 mmHg    AV Area by VTI 1.7 cm2    AV Area by Peak Velocity 1.8 cm2    Aortic Root 2.6 cm    Ascending Aorta 3.0 cm    IVC Expiration 2.1 cm    MV E Wave Deceleration Time 227.0 ms    MV A Velocity 1.27 m/s    MV E Velocity 1.44 m/s MV Mean Gradient 5 mmHg    MV VTI 34.2 cm    MV Mean Velocity 1.1 m/s    MV Max Velocity 1.6 m/s    MV Peak Gradient 10 mmHg    MV Area by VTI 2.8 cm2    PV .0 ms    PV Max Velocity 1.4 m/s    PV Peak Gradient 8 mmHg    RVIDd 2.9 cm    RV Basal Dimension 3.7 cm    RV Mid Dimension 3.2 cm    TAPSE 2.1 (A) 1.5 - 2.0 cm    TR Max Velocity 2.58 m/s    TR Peak Gradient 27 mmHg    Fractional Shortening 2D 31 28 - 44 %    LV EDV Index BP 59 mL/m2    LV ESV Index A4C 25 mL/m2    LV EDV Index A4C 65 mL/m2    LV ESV Index A2C 19 mL/m2    LV EDV Index A2C 49 mL/m2    LVIDd Index 3.02 cm/m2    LVIDs Index 2.08 cm/m2    LV RWT Ratio 0.46     LV Mass 2D 170.2 (A) 67 - 162 g    LV Mass 2D Index 107.0 (A) 43 - 95 g/m2    MV E/A 1.13     E/E' Ratio (Averaged) 17.00     E/E' Lateral 18.00     E/E' Septal 16.00     LA Volume Index BP 48 (A) 16 - 34 ml/m2    LVOT Stroke Volume Index 59.5 mL/m2    LA Size Index 2.45 cm/m2    LA/AO Root Ratio 1.50     Ao Root Index 1.64 cm/m2    Ascending Aorta Index 1.89 cm/m2    AV Velocity Ratio 0.70     LVOT:AV VTI Index 0.69     JOSSY/BSA VTI 1.1 cm2/m2    JOSSY/BSA Peak Velocity 1.1 cm2/m2    MV:LVOT VTI Index 0.92     Est. RA Pressure 3 mmHg    RVSP 30 mmHg   EKG, 12 LEAD, INITIAL    Collection Time: 02/03/22  9:41 AM   Result Value Ref Range    Ventricular Rate 81 BPM    Atrial Rate 81 BPM    P-R Interval 132 ms    QRS Duration 80 ms    Q-T Interval 374 ms    QTC Calculation (Bezet) 434 ms    Calculated P Axis 56 degrees    Calculated R Axis 48 degrees    Calculated T Axis 55 degrees    Diagnosis       Sinus rhythm with Fusion complexes  Otherwise normal ECG  When compared with ECG of 02-FEB-2022 15:09,  Fusion complexes are now Present         Imaging:  XR CHEST PORT [[id number]] Collected: 02/02/22 1558   Order Status: Completed Updated: 02/02/22 1601   Narrative:     Chest X-ray     INDICATION: Leukemia patient with fever and chills. COMPARISON: Chest x-ray 8/22/2021.      A portable AP view of the chest was obtained. FINDINGS: The lungs are clear. There are no infiltrates or effusions.  The heart   size is normal.  The bony thorax is intact.      Impression:     No acute findings in the chest          ASSESSMENT:  Problem List  Date Reviewed: 12/13/2021          Codes Class Noted    Body mass index (BMI) of 22.0 to 22.9 in adult ICD-10-CM: Z68.22  ICD-9-CM: V85.1  8/26/2021        * (Principal) Sepsis ([facility] ICD-10-CM: A41.9  ICD-9-CM: 038.9, 995.91  2/2/2022        Fever of unknown origin ICD-10-CM: R50.9  ICD-9-CM: 780.60  2/2/2022        Thrombocytopenia, unspecified ICD-10-CM: D69.6  ICD-9-CM: 287.5  10/15/2021        Opioid use, unspecified with unspecified opioid-induced disorder ICD-10-CM: F11.99  ICD-9-CM: 292.9, 305.50  9/17/2021        Cellulitis of leg, right ICD-10-CM: L03.115  ICD-9-CM: 682.6  8/8/2021        Sepsis due to cellulitis [facility]) ICD-10-CM: L03.90, A41.9  ICD-9-CM: 682.9, 995.91  7/12/2021        Chronic migraine without aura with status migrainosus, not intractable ICD-10-CM: V04.036  ICD-9-CM: 346.72  11/1/2016        Lung nodule ICD-10-CM: R91.1  ICD-9-CM: 793.11  10/28/2016    Overview Signed 10/28/2016 10:05 AM by Xi Benavides     IMPRESSION:    1. Lobulated 2 cm mass in the right lung apex, malignancy versus an atypical  inflammatory process. 2. Occlusion of the right subclavian vein. 3. Chronic occlusion of the portal vein with associated abnormal tissue  encasing the common bile duct, most likely fibrosis. 4. Splenomegaly. DVT (deep venous thrombosis) ([facility] ICD-10-CM: I82.409  ICD-9-CM: 453.40  10/26/2016    Overview Signed 10/26/2016 10:27 PM by María Rubin.      Right subclavian               Analgesic rebound headache ICD-10-CM: G44.40, T39.95XA  ICD-9-CM: 339.3, E935.9  9/22/2016        Headache, chronic daily ICD-10-CM: R51.9  ICD-9-CM: 784.0  9/22/2016        Migraine with aura and with status migrainosus, not intractable ICD-10-CM: G43. 101  ICD-9-CM: 346.03  9/22/2016        Left homonymous hemianopsia ICD-10-CM: H53.462  ICD-9-CM: 368.46  3/30/2015        Chronic myelogenous leukemia ([facility] ICD-10-CM: C92.10  ICD-9-CM: 205.10  3/30/2015        Acquired hypercoagulable state ([facility] (Chronic) ICD-10-CM: H71.96  ICD-9-CM: 289.81  3/30/2015        AKOSUA (iron deficiency anemia) ICD-10-CM: D50.9  ICD-9-CM: 280.9  7/28/2012        Symptomatic anemia ICD-10-CM: D64.9  ICD-9-CM: 285.9  7/28/2012    Overview Signed 7/28/2012  2:11 PM by Jazmin Cheng     Hemoglobin 8.0 gram   7/25/2012 17:03   Iron 27 (L)   TIBC 221 (L)   Transferrin Saturation 12 (L)                Cirrhosis (HCC) (Chronic) ICD-10-CM: K74.60  ICD-9-CM: 571.5  7/27/2012        Ascites ICD-10-CM: R18.8  ICD-9-CM: 789.59  7/26/2012        Polycythemia vera ([facility] (Chronic) ICD-10-CM: D45  ICD-9-CM: 238.4  2/16/2009    Overview Addendum 8/26/2021  2:31 PM by Alejandra Villegas MD     2/2008 by [email or web address] analysis however portal vein thrombosis  And and splenomegaly since 2004  Hydroxyurea for 6 months poor tolerance spleen did not shrink   Pegasys 90 mcg weekly 4-2009 till    Restarted 12-30-09 45 mcg q 2 weeks  2-2010 reduced to q month   Increased 45 mcg 2/month 4-2010  Held 8-2010 thrombocytopenia  12-29-12 restarted 45 mcg q 2 weeks  4-1-11 45 mcg q 3 weeks  4-22-11 45 mcg q 4 weeks  Uncertain dosing thereafter   Possibly q 3 weeks 10-15-11 and held and restarted on 4-1-12 6-12 ct scan Small bowel wall thickening suggesting an enteritis. In addition, there is well thickening of the right colon which can suggest an additional   colitis although this can also be seen with severe portal hypertension. Likely reactive edematous changes it scattered fluid collections are seen of   the small bowel mesentery. . Apparent occlusion of the portal vein. In addition, the splenic vein, and superior mesenteric vein are not definitely seen and are also likely occluded.  Splenomegaly, and extensive varices are seen as described above consistent with the portal vein occlusion. Robbie Nunn Heterogeneous enhancement of the liver and mild periportal edema. An acute hepatitis cannot be excluded. Primary hypothyroidism ICD-10-CM: E03.9  ICD-9-CM: 244.9  2/16/2009        Splenomegaly ICD-10-CM: R16.1  ICD-9-CM: 789.2  2/16/2009        Esophageal varices (HCC) (Chronic) ICD-10-CM: I85.00  ICD-9-CM: 456.1  2/16/2009    Overview Addendum 8/26/2021  2:30 PM by Maria Del Carmen Montero MD     grade 3             Portal vein thrombosis ICD-10-CM: T17  ICD-9-CM: 389  2/16/2009    Overview Addendum 8/26/2021  2:31 PM by Maria Del Carmen Montero MD     Ct scan 6-21-2 Apparent occlusion of the portal vein. In addition, the splenic vein, and superior mesenteric vein are not definitely seen and are also likely  occluded. Splenomegaly, and extensive varices are seen as described above consistent with the portal vein occlusion 7-05-12 on arixtra HIT negative on repeat 7-27-12 re admitted Cirrhotic appearance of the liver. Mild to moderate ascites                     PLAN:  CML  - on Dasatinib    Polycythemia Vera / Hx DVT, portal vein thrombosis, PE  - on Jakafi and therapeutic Lovenox    Sepsis / E Coli bacteremia  - CXR neg  - COVID and flu neg  - BCx +E Coli, awaiting sensitivities  - Started on Cef/Vanc in ED. Developed rash after Cef. Now on CTX. Will switch to merrem as fevers continue (Tmax 102.1) this AM.    Diarrhea  - pt asking to hold [facility] for now    Goals and plan of care reviewed with the patient. All questions answered to the best of our ability. Lab studies and imaging studies were personally reviewed. Thank you for allowing us to participate in the care of Ms. Couch. We will assume care of our patient.               JESSICA Pritchett. [address] Hematology & Oncology  [address] [address]  Office : [phone]  Fax : [phone]

## 2022-02-03 NOTE — PROGRESS NOTES
VANCO DAILY FOLLOW UP NOTE  [address] [facility] Pharmacokinetic Monitoring Service - Vancomycin    Consulting Provider: Marcy Crocker   Indication: sepsis  Target Concentration: Goal AUC/ALEXEY 400-600 mg*hr/L  Day of Therapy: 2  Additional Antimicrobials: merrem    Pertinent Laboratory Values: Wt Readings from Last 1 Encounters:   02/02/22 59.2 kg (130 lb 8 oz)     Temp Readings from Last 1 Encounters:   02/03/22 99.4 °F (37.4 °C)     No components found for: PROCAL  Recent Labs     02/03/22  0455 02/02/22  1454   BUN 14 13   CREA 0.54* 0.69   WBC 9.4 12.1*   PCT  --  2.49*   LAC  --  1.4     Estimated Creatinine Clearance: 66.8 mL/min (A) (by C-G formula based on SCr of 0.54 mg/dL (L)). Lab Results   Component Value Date/Time    Vancomycin,trough 19.0 08/10/2021 04:15 AM       Assessment:  Date/Time Dose Concentration AUC         Note: Serum concentrations collected for AUC dosing may appear elevated if collected in close proximity to the dose administered, this is not necessarily an indication of toxicity    Plan:  Dosing recommendations based on Bayesian software  Start vancomycin 1500mg q 18 hours  Anticipated AUC of 504 and trough concentration of 12.9 at steady state  Renal labs as indicated   Vancomycin concentration ordered as clinically necessary  Pharmacy will continue to monitor patient and adjust therapy as indicated    Thank you for the consult,  Honey Casiano.  Sharda Miranda, PharmD, BCPS  Clinical Pharmacist

## 2022-02-03 NOTE — ED NOTES
TRANSFER - OUT REPORT:    Verbal report given to Bharti on [place] [facility]  being transferred to [phone] Oncology  for routine progression of care       Report consisted of patients Situation, Background, Assessment and   Recommendations(SBAR). Information from the following report(s) SBAR, Kardex, ED Summary, MAR, Accordion and Recent Results was reviewed with the receiving nurse. Lines:   Peripheral IV 02/02/22 Left Antecubital (Active)   Site Assessment Clean, dry, & intact 02/02/22 1521   Phlebitis Assessment 0 02/02/22 1521   Infiltration Assessment 0 02/02/22 1521   Dressing Status Clean, dry, & intact 02/02/22 1521   Dressing Type Tape;Transparent 02/02/22 1521   Hub Color/Line Status Blue;Flushed;Patent 02/02/22 1521        Opportunity for questions and clarification was provided.       Patient transported with:   Registered Nurse

## 2022-02-04 NOTE — PROGRESS NOTES
END OF SHIFT NOTE:       VITAL SIGNS  Patient Vitals for the past 12 hrs:   Temp Pulse Resp BP SpO2   02/04/22 0340 98.2 °F (36.8 °C) 85 14 (!) 89/47 92 %   02/03/22 2352 99.1 °F (37.3 °C) 82 18 (!) 89/51 93 %   02/03/22 2000 -- 84 -- -- --   02/03/22 1958 98.2 °F (36.8 °C) 83 16 (!) 99/54 94 %     -pt given medications per mar  -pt given percocet 1x for pain  -pt resting in bed with no further needs

## 2022-02-04 NOTE — PROGRESS NOTES
[employer] Hematology & Oncology        Inpatient Hematology / Oncology Progress Note    Reason for Admission:  Sepsis [facility]) [A41.9]  Fever of unknown origin [R50.9]    24 Hour Events:  VSS, afebrile x24 hrs, mild hypotension (asymptomatic)  Continues on Merrem   BCx 2/2 with E coli - sensitivities pending  Repeat BC NGTD  Diarrhea improving  Feeling better      ROS:  Constitutional: Negative for fever, chills, weakness, malaise, fatigue. CV: Negative for chest pain, palpitations, edema. Respiratory: Negative for dyspnea, cough, wheezing. GI: +diarrhea. Negative for nausea, abdominal pain. 10 point review of systems is otherwise negative with the exception of the elements mentioned above in the HPI. Allergies   Allergen Reactions    Latex Other (comments)     Testing for latex allergy was positive as a 2 (on a scale of 1 to 3).     Sulfa (Sulfonamide Antibiotics) Anaphylaxis    Tramadol Other (comments)     Top lip swelled    Augmentin [Amoxicillin-Pot Clavulanate] Rash    Divalproex Sodium Hives    Morphine Nausea and Vomiting     Not a true allergy    Potassium Clavulanate Hives    Rizatriptan Rash    Tetanus Immune Globulin Other (comments)     Heat, bruising, and swelling at  Site of injection    Vancomycin Rash    Xanax [Alprazolam] Other (comments)     Hallucinations    Zonegran [Zonisamide] Rash     Past Medical History:   Diagnosis Date    Acute blood loss anemia 4/6/2018    Anemia     C. difficile diarrhea 4/1/2015    Cerebral edema (HCC) 4/1/2015    Chronic migraine 9/22/2016    Chronic myelogenous leukemia ([facility]     [place] chromosome/ converted from polycythemia in 2009- to 2012 when she was dx w leukemia    Chronic pain     Coagulation disorder ([facility] [address]     \"clotting and Bleeding Problem\" dr Geetha Linares follows     Esophageal spasm     with banding     Esophageal varices (HCC)     grade 3    GI bleed 8/3/2016    H/O craniotomy     3-29-15.  due to blood clot - which caused a seizure  - then pt fell and hit     Hematemesis 8/20/2021    Leukocytosis 3/14/2015    Melena 8/20/2021    MRSA colonization 6/25/2012    Polycythemia vera(238.4)     JAK2 mutation    Portal hypertension (HCC)     with varices    Portal vein thrombosis 6/20/2012    Ct scan 6-21-2 Apparent occlusion of the portal vein. In addition, the splenic vein, and superior mesenteric vein are not definitely seen and are also likely  occluded. Splenomegaly, and extensive varices are seen as described above consistent with the portal vein occlusion 7-05-12 on arixtra HIT negative on repeat 7-27-12 re admitted Cirrhotic appearance of the liver. Mild to moderate ascites, as    Primary hypothyroidism     Pulmonary embolism ([address] 2006    not on coumadin anymore     S/P exploratory laparotomy, 6/20/12 6/25/2012    Bowel resection:  336 cm of small bowel removed, approximately 9 feet and placement of feeding jejunostomy.      S/P small bowel resection     2012.  9 feet removed due to  gangrene which wa due to blood clot    Seizure ([address] 3/14/2015    Seizure disorder ([address] 3/30/2015    due to clotting factor    Seizures ([address]     last one 3- - followed by aniyah     Splenomegaly, congestive, chronic     Stroke (cerebrum) ([address] 4/11/2015    had bled into head- surgery    Stroke [facility])     pt had stroke like symptoms     Thrombocytopenia, HIT negative 6/25/2012 7-01-12 platelets lower repeat HIT 7-02-12 platelets in 08,760'W    Thrombosis, portal vein 2004    portal , spleenic and recently superior mesenteric    Transfusion history     many blood tranfusions - last 3-29-15 after brain surgery    Traumatic hemorrhage of right cerebrum ([address] 3/30/2015     Past Surgical History:   Procedure Laterality Date    HX APPENDECTOMY      with small bowel resection    HX BREAST BIOPSY Bilateral     Lt - 1998; Rt - 2001    HX CHOLECYSTECTOMY      HX GI      liver biopsy    HX GI      bowel removed small - 9 feet     HX GYN       x 2    HX GYN      D&C following miscarriage    HX ORTHOPAEDIC Left 2008    torn labrum shoulder (screw in place)    NEUROLOGICAL PROCEDURE UNLISTED  2010    craniotomy to evacuate subdural hematoma following a fall from a seizure    NE ABDOMEN SURGERY PROC UNLISTED      umbilical hernia repair    NE ABDOMEN SURGERY PROC UNLISTED      9ft of small bowel excised then reconnected     Family History   Problem Relation Age of Onset    Diabetes Mother     Stroke Mother         after surgery    Cancer Father         brain    No Known Problems Sister     Other Sister         diverticulitis    Other Sister         diverticulitis    Cancer Sister         lyjmphoma    Breast Cancer Maternal Aunt 48    Thyroid Disease Paternal Grandmother      Social History     Socioeconomic History    Marital status: SINGLE     Spouse name: Not on file    Number of children: Not on file    Years of education: Not on file    Highest education level: Not on file   Occupational History    Not on file   Tobacco Use    Smoking status: Former Smoker     Packs/day: 0.20     Years: 10.00     Pack years: 2.00     Types: Cigarettes     Quit date:      Years since quittin.1    Smokeless tobacco: Never Used   Substance and Sexual Activity    Alcohol use: No    Drug use: No    Sexual activity: Not on file   Other Topics Concern    Not on file   Social History Narrative    Not on file     Social Determinants of Health     Financial Resource Strain:     Difficulty of Paying Living Expenses: Not on file   Food Insecurity:     Worried About [address] in the Last Year: Not on file    [address] in the Last Year: Not on file   Transportation Needs:     Lack of Transportation (Medical): Not on file    Lack of Transportation (Non-Medical):  Not on file   Physical Activity:     Days of Exercise per Week: Not on file    Minutes of Exercise per Session: Not on file Stress:     Feeling of Stress : Not on file   Social Connections:     Frequency of Communication with Friends and Family: Not on file    Frequency of Social Gatherings with Friends and Family: Not on file    Attends [employer] Services: Not on file    Active Member of [address] [place] [address] or Organizations: Not on file    Attends Club or Organization Meetings: Not on file    Marital Status: Not on file   Intimate Partner Violence:     Fear of Current or Ex-Partner: Not on file    Emotionally Abused: Not on file    Physically Abused: Not on file    Sexually Abused: Not on file   Housing Stability:     Unable to Pay for Housing in the Last Year: Not on file    Number of [place] in the Last Year: Not on file    Unstable Housing in the Last Year: Not on file     Current Facility-Administered Medications   Medication Dose Route Frequency Provider Last Rate Last Admin    lactated Ringers infusion  100 mL/hr IntraVENous CONTINUOUS Efrain Vaughn  mL/hr at 02/04/22 0509 100 mL/hr at 02/04/22 0509    meropenem (MERREM) 500 mg in 0.9% sodium chloride (MBP/ADV) 50 mL MBP  0.5 g IntraVENous Q6H GABBY Albarran ChaP 100 mL/hr at 02/04/22 0935 500 mg at 02/04/22 0935    dasatinib tab 80 mg  80 mg Oral DAILY Deion Milligan FNP        sodium chloride (NS) flush 5-10 mL  5-10 mL IntraVENous PRN Salo Troy MD        cholecalciferol (VITAMIN D3) (1000 Units /25 mcg) tablet 1,000 Units  1,000 Units Oral QHS Alec Plaster B, NP   1,000 Units at 02/03/22 2117    famotidine (PEPCID) tablet 40 mg  40 mg Oral DAILY PRN Chris Wright NP        oxyCODONE-acetaminophen (PERCOCET 10)  mg per tablet 1 Tablet  1 Tablet Oral Q8H PRN Chris Wright NP   1 Tablet at 02/04/22 0354    levothyroxine (SYNTHROID) tablet 112 mcg  112 mcg Oral ACB Alec Plaster B, NP   112 mcg at 02/04/22 0934    zolpidem (AMBIEN) tablet 5 mg  5 mg Oral QHS PRN Chris Wright NP        sodium chloride (NS) flush 5-40 mL  5-40 mL IntraVENous Q8H Teodora Mort B, NP   10 mL at 22 0505    sodium chloride (NS) flush 5-40 mL  5-40 mL IntraVENous PRN Vida Verdin NP        acetaminophen (TYLENOL) tablet 650 mg  650 mg Oral Q4H PRN Teodora Mort B, NP   650 mg at 22 1046    enoxaparin (LOVENOX) injection 50 mg  50 mg SubCUTAneous Q12H Teodora Mort B, NP   50 mg at 22 0934       OBJECTIVE:  Patient Vitals for the past 8 hrs:   BP Temp Pulse Resp SpO2   22 0800 (!) 93/44 98.7 °F (37.1 °C) 70 15 96 %   22 0340 (!) 89/47 98.2 °F (36.8 °C) 85 14 92 %     Temp (24hrs), Av.8 °F (37.1 °C), Min:98.2 °F (36.8 °C), Max:99.4 °F (37.4 °C)     07 -  1900  In: -   Out: 400 [Urine:400]    Physical Exam:  Constitutional: Well developed, well nourished female in no acute distress, sitting comfortably in the hospital bed. HEENT: Normocephalic and atraumatic. Oropharynx is clear, mucous membranes are moist.  Extraocular muscles are intact. Sclerae anicteric. Neck supple without JVD. No thyromegaly present. Skin Warm and dry. No bruising and no rash noted. No erythema. No pallor. Respiratory Lungs are clear to auscultation bilaterally without wheezes, rales or rhonchi, normal air exchange without accessory muscle use. CVS Normal rate, regular rhythm and normal S1 and S2. No murmurs, gallops, or rubs. Abdomen Soft, nontender and nondistended, normoactive bowel sounds. No palpable mass. No hepatosplenomegaly. Neuro Grossly nonfocal with no obvious sensory or motor deficits. MSK Normal range of motion in general.  No edema and no tenderness. Psych Appropriate mood and affect.         Labs:    Recent Results (from the past 24 hour(s))   CULTURE, URINE    Collection Time: 22  1:43 PM    Specimen: Clean catch; Urine   Result Value Ref Range    Special Requests: NO SPECIAL REQUESTS      Culture result: NO GROWTH 1 DAY     URINALYSIS W/ RFLX MICROSCOPIC Collection Time: 02/03/22  1:43 PM   Result Value Ref Range    Color ISAEL      Appearance CLEAR      Specific gravity 1.009 1.001 - 1.023      pH (UA) 6.5 5.0 - 9.0      Protein TRACE (A) NEG mg/dL    Glucose Negative mg/dL    Ketone Negative NEG mg/dL    Bilirubin Negative NEG      Blood Negative NEG      Urobilinogen 1.0 0.2 - 1.0 EU/dL    Nitrites Negative NEG      Leukocyte Esterase Negative NEG      WBC 0-3 0 /hpf    RBC 0 0 /hpf    Epithelial cells 0 0 /hpf    Bacteria 0 0 /hpf    Casts 0 0 /lpf   CBC W/O DIFF    Collection Time: 02/04/22  6:26 AM   Result Value Ref Range    WBC 5.7 4.3 - 11.1 K/uL    RBC 3.12 (L) 4.05 - 5.2 M/uL    HGB 9.9 (L) 11.7 - 15.4 g/dL    HCT 29.0 (L) 35.8 - 46.3 %    MCV 92.9 79.6 - 97.8 FL    MCH 31.7 26.1 - 32.9 PG    MCHC 34.1 31.4 - 35.0 g/dL    RDW 19.7 (H) 11.9 - 14.6 %    PLATELET 482 (L) 207 - 450 K/uL    MPV 10.5 9.4 - 12.3 FL    ABSOLUTE NRBC 0.00 0.0 - 0.2 K/uL   METABOLIC PANEL, BASIC    Collection Time: 02/04/22  6:26 AM   Result Value Ref Range    Sodium 137 136 - 145 mmol/L    Potassium 3.5 3.5 - 5.1 mmol/L    Chloride 107 98 - 107 mmol/L    CO2 25 21 - 32 mmol/L    Anion gap 5 (L) 7 - 16 mmol/L    Glucose 117 (H) 65 - 100 mg/dL    BUN 10 8 - 23 MG/DL    Creatinine 0.45 (L) 0.6 - 1.0 MG/DL    GFR est AA >60 >60 ml/min/1.73m2    GFR est non-AA >60 >60 ml/min/1.73m2    Calcium 7.7 (L) 8.3 - 10.4 MG/DL       Imaging:  XR CHEST PORT [[id number]] Collected: 02/02/22 6656   Order Status: Completed Updated: 02/02/22 1601   Narrative:     Chest X-ray     INDICATION: Leukemia patient with fever and chills. COMPARISON: Chest x-ray 8/22/2021. A portable AP view of the chest was obtained. FINDINGS: The lungs are clear.  There are no infiltrates or effusions.  The heart   size is normal.  The bony thorax is intact.      Impression:     No acute findings in the chest          ASSESSMENT:  Problem List  Date Reviewed: 12/13/2021          Codes Class Noted    Body mass index (BMI) of 22.0 to 22.9 in adult ICD-10-CM: Z68.22  ICD-9-CM: V85.1  8/26/2021        * (Principal) Sepsis ([facility] ICD-10-CM: A41.9  ICD-9-CM: 038.9, 995.91  2/2/2022        Fever of unknown origin ICD-10-CM: R50.9  ICD-9-CM: 780.60  2/2/2022        Thrombocytopenia, unspecified ICD-10-CM: D69.6  ICD-9-CM: 287.5  10/15/2021        Opioid use, unspecified with unspecified opioid-induced disorder ICD-10-CM: F11.99  ICD-9-CM: 292.9, 305.50  9/17/2021        Cellulitis of leg, right ICD-10-CM: L03.115  ICD-9-CM: 682.6  8/8/2021        Sepsis due to cellulitis [facility]) ICD-10-CM: L03.90, A41.9  ICD-9-CM: 682.9, 995.91  7/12/2021        Chronic migraine without aura with status migrainosus, not intractable ICD-10-CM: P11.301  ICD-9-CM: 346.72  11/1/2016        Lung nodule ICD-10-CM: R91.1  ICD-9-CM: 793.11  10/28/2016    Overview Signed 10/28/2016 10:05 AM by Jonathan Dubose     IMPRESSION:    1. Lobulated 2 cm mass in the right lung apex, malignancy versus an atypical  inflammatory process. 2. Occlusion of the right subclavian vein. 3. Chronic occlusion of the portal vein with associated abnormal tissue  encasing the common bile duct, most likely fibrosis. 4. Splenomegaly. DVT (deep venous thrombosis) ([facility] ICD-10-CM: I82.409  ICD-9-CM: 453.40  10/26/2016    Overview Signed 10/26/2016 10:27 PM by Jacobo Reynoso. Right subclavian               Analgesic rebound headache ICD-10-CM: G44.40, T39.95XA  ICD-9-CM: 339.3, E935.9  9/22/2016        Headache, chronic daily ICD-10-CM: R51.9  ICD-9-CM: 784.0  9/22/2016        Migraine with aura and with status migrainosus, not intractable ICD-10-CM: G43. 101  ICD-9-CM: 346.03  9/22/2016        Left homonymous hemianopsia ICD-10-CM: H53.462  ICD-9-CM: 368.46  3/30/2015        Chronic myelogenous leukemia (HCC) ICD-10-CM: C92.10  ICD-9-CM: 205.10  3/30/2015        Acquired hypercoagulable state ([facility] (Chronic) ICD-10-CM: Q79.89  ICD-9-CM: 289.81  3/30/2015 AKOSUA (iron deficiency anemia) ICD-10-CM: D50.9  ICD-9-CM: 280.9  7/28/2012        Symptomatic anemia ICD-10-CM: D64.9  ICD-9-CM: 285.9  7/28/2012    Overview Signed 7/28/2012  2:11 PM by Compa Flores     Hemoglobin 8.0 gram   7/25/2012 17:03   Iron 27 (L)   TIBC 221 (L)   Transferrin Saturation 12 (L)                Cirrhosis (HCC) (Chronic) ICD-10-CM: K74.60  ICD-9-CM: 571.5  7/27/2012        Ascites ICD-10-CM: R18.8  ICD-9-CM: 789.59  7/26/2012        Polycythemia vera ([address] (Chronic) ICD-10-CM: D45  ICD-9-CM: 238.4  2/16/2009    Overview Addendum 8/26/2021  2:31 PM by Valeria Billingsley MD     2/2008 by [email or web address] analysis however portal vein thrombosis  And and splenomegaly since 2004  Hydroxyurea for 6 months poor tolerance spleen did not shrink   Pegasys 90 mcg weekly 4-2009 till    Restarted 12-30-09 45 mcg q 2 weeks  2-2010 reduced to q month   Increased 45 mcg 2/month 4-2010  Held 8-2010 thrombocytopenia  12-29-12 restarted 45 mcg q 2 weeks  4-1-11 45 mcg q 3 weeks  4-22-11 45 mcg q 4 weeks  Uncertain dosing thereafter   Possibly q 3 weeks 10-15-11 and held and restarted on 4-1-12 6-12 ct scan Small bowel wall thickening suggesting an enteritis. In addition, there is well thickening of the right colon which can suggest an additional   colitis although this can also be seen with severe portal hypertension. Likely reactive edematous changes it scattered fluid collections are seen of   the small bowel mesentery. . Apparent occlusion of the portal vein. In addition, the splenic vein, and superior mesenteric vein are not definitely seen and are also likely occluded. Splenomegaly, and extensive varices are seen as described above consistent with the portal vein occlusion. Julianne Monae Heterogeneous enhancement of the liver and mild periportal edema. An acute hepatitis cannot be excluded.               Primary hypothyroidism ICD-10-CM: E03.9  ICD-9-CM: 244.9  2/16/2009        Splenomegaly ICD-10-CM: R16.1  ICD-9-CM: 789.2  2/16/2009        Esophageal varices (HCC) (Chronic) ICD-10-CM: I85.00  ICD-9-CM: 456.1  2/16/2009    Overview Addendum 8/26/2021  2:30 PM by Mariaelena Harrell MD     grade 3             Portal vein thrombosis ICD-10-CM: B61  ICD-9-CM: 512  2/16/2009    Overview Addendum 8/26/2021  2:31 PM by Mariaelena Harrell MD     Ct scan 6-21-2 Apparent occlusion of the portal vein. In addition, the splenic vein, and superior mesenteric vein are not definitely seen and are also likely  occluded. Splenomegaly, and extensive varices are seen as described above consistent with the portal vein occlusion 7-05-12 on arixtra HIT negative on repeat 7-27-12 re admitted Cirrhotic appearance of the liver. Mild to moderate ascites                 Ms. Luis Fernando Lopez is a 64 y.o. female admitted on 2/2/2022. The primary encounter diagnosis was Sepsis without acute organ dysfunction, due to unspecified organism [facility]). Diagnoses of Chronic myelogenous leukemia ([facility] [address] Fever of unknown origin, Acquired hypercoagulable state ([facility] and E coli bacteremia were also pertinent to this visit. Her PMH includes vasculitis, hypothyroidism, traumatic head bleeding, cirrhosis, esophageal varices, anemia, migraines, recurrent DVTs / portal vein thrombosis on enoxaparin, hx PE 2006, seizures, and splenomegaly. She is a patient of Dr. Laurita Brian with PV and CML dx in 2013, on Dasatinib and Jakafi. She presented with c/o fever and chills x 1 day. UA neg. CXR neg. Flu and COVID neg. BCx +GNR, PCR +E Coli, awaiting sensitivities. Started on Cef/Vanc in ED. Pt developed rash after Cef and it has since been DC'd. Now on CTX. We were asked to assume care of our patient. PLAN:  CML  - on Dasatinib    Polycythemia Vera / Hx DVT, portal vein thrombosis, PE  - on Jakafi and therapeutic Lovenox    Sepsis / E Coli bacteremia  - CXR neg  - COVID and flu neg  - BCx +E Coli, awaiting sensitivities  - Started on Cef/Vanc in ED. Developed rash after Cef.   Now on CTX. Will switch to merrem as fevers continue (Tmax 102.1) this AM.  2/4 Feeling much better. Continues on Merrem. Vanc DC'd yesterday. Awaiting sensitivities of E coli. Afebrile x24 hrs. Diarrhea  - pt asking to hold Jakafi for now  2/4 Diarrhea improving - Jakafi on hold for now. Goals and plan of care reviewed with the patient. All questions answered to the best of our ability. Lab studies and imaging studies were personally reviewed. Thank you for allowing us to participate in the care of Ms. Couch.               Jacque Mesa  Hematology & Oncology  [address] [address]  Office : [phone]  Fax : [phone]

## 2022-02-05 NOTE — PROGRESS NOTES
[facility] Hematology & Oncology        Inpatient Hematology / Oncology Progress Note    Reason for Admission:  Sepsis ([facility] [A41.9]  Fever of unknown origin [R50.9]    24 Hour Events:  VSS, Tmax 100.6, asymptomatic hypotension  Continues on Merrem  BCx 2/2 with E coli - sensitive to Merrem  Repeat BC NGTD  Diarrhea stable  Feeling better      ROS:  Constitutional: Negative for fever, chills, weakness, malaise, fatigue. CV: Negative for chest pain, palpitations, edema. Respiratory: Negative for dyspnea, cough, wheezing. GI: +diarrhea. Negative for nausea, abdominal pain. 10 point review of systems is otherwise negative with the exception of the elements mentioned above in the HPI. Allergies   Allergen Reactions    Latex Other (comments)     Testing for latex allergy was positive as a 2 (on a scale of 1 to 3).     Sulfa (Sulfonamide Antibiotics) Anaphylaxis    Tramadol Other (comments)     Top lip swelled    Augmentin [Amoxicillin-Pot Clavulanate] Rash    Divalproex Sodium Hives    Morphine Nausea and Vomiting     Not a true allergy    Potassium Clavulanate Hives    Rizatriptan Rash    Tetanus Immune Globulin Other (comments)     Heat, bruising, and swelling at  Site of injection    Vancomycin Rash    Xanax [Alprazolam] Other (comments)     Hallucinations    Zonegran [Zonisamide] Rash     Past Medical History:   Diagnosis Date    Acute blood loss anemia 4/6/2018    Anemia     C. difficile diarrhea 4/1/2015    Cerebral edema (HCC) 4/1/2015    Chronic migraine 9/22/2016    Chronic myelogenous leukemia ([facility]     [place] chromosome/ converted from polycythemia in 2009- to 2012 when she was dx w leukemia    Chronic pain     Coagulation disorder ([facility]     \"clotting and Bleeding Problem\" dr Yvonne Bustos follows     Esophageal spasm     with banding     Esophageal varices (HCC)     grade 3    GI bleed 8/3/2016    H/O craniotomy     3-29-15.  due to blood clot - which caused a seizure - then pt fell and hit     Hematemesis 8/20/2021    Leukocytosis 3/14/2015    Melena 8/20/2021    MRSA colonization 6/25/2012    Polycythemia vera(238.4)     JAK2 mutation    Portal hypertension (HCC)     with varices    Portal vein thrombosis 6/20/2012    Ct scan 6-21-2 Apparent occlusion of the portal vein. In addition, the splenic vein, and superior mesenteric vein are not definitely seen and are also likely  occluded. Splenomegaly, and extensive varices are seen as described above consistent with the portal vein occlusion 7-05-12 on arixtra HIT negative on repeat 7-27-12 re admitted Cirrhotic appearance of the liver. Mild to moderate ascites, as    Primary hypothyroidism     Pulmonary embolism ([address] 2006    not on coumadin anymore     S/P exploratory laparotomy, 6/20/12 6/25/2012    Bowel resection:  336 cm of small bowel removed, approximately 9 feet and placement of feeding jejunostomy.      S/P small bowel resection     2012.  9 feet removed due to  gangrene which wa due to blood clot    Seizure ([address] 3/14/2015    Seizure disorder ([address] 3/30/2015    due to clotting factor    Seizures ([address]     last one 3- - followed by aniyah     Splenomegaly, congestive, chronic     Stroke (cerebrum) ([address] 4/11/2015    had bled into head- surgery    Stroke [facility])     pt had stroke like symptoms     Thrombocytopenia, HIT negative 6/25/2012 7-01-12 platelets lower repeat HIT 7-02-12 platelets in 32,174'P    Thrombosis, portal vein 2004    portal , spleenic and recently superior mesenteric    Transfusion history     many blood tranfusions - last 3-29-15 after brain surgery    Traumatic hemorrhage of right cerebrum ([address] 3/30/2015     Past Surgical History:   Procedure Laterality Date    HX APPENDECTOMY      with small bowel resection    HX BREAST BIOPSY Bilateral     Lt - 1998; Rt - 2001    HX CHOLECYSTECTOMY      HX GI      liver biopsy    HX GI      bowel removed small - 9 feet     HX GYN  x 2    HX GYN      D&C following miscarriage    HX ORTHOPAEDIC Left 2008    torn labrum shoulder (screw in place)    NEUROLOGICAL PROCEDURE UNLISTED  2010    craniotomy to evacuate subdural hematoma following a fall from a seizure    NV ABDOMEN SURGERY PROC UNLISTED      umbilical hernia repair    NV ABDOMEN SURGERY PROC UNLISTED      9ft of small bowel excised then reconnected     Family History   Problem Relation Age of Onset    Diabetes Mother     Stroke Mother         after surgery    Cancer Father         brain    No Known Problems Sister     Other Sister         diverticulitis    Other Sister         diverticulitis    Cancer Sister         lyjmphoma    Breast Cancer Maternal Aunt 48    Thyroid Disease Paternal Grandmother      Social History     Socioeconomic History    Marital status: SINGLE     Spouse name: Not on file    Number of children: Not on file    Years of education: Not on file    Highest education level: Not on file   Occupational History    Not on file   Tobacco Use    Smoking status: Former Smoker     Packs/day: 0.20     Years: 10.00     Pack years: 2.00     Types: Cigarettes     Quit date:      Years since quittin.1    Smokeless tobacco: Never Used   Substance and Sexual Activity    Alcohol use: No    Drug use: No    Sexual activity: Not on file   Other Topics Concern    Not on file   Social History Narrative    Not on file     Social Determinants of Health     Financial Resource Strain:     Difficulty of Paying Living Expenses: Not on file   Food Insecurity:     Worried About [employer] [employer] in the Last Year: Not on file    [address] in the Last Year: Not on file   Transportation Needs:     Lack of Transportation (Medical): Not on file    Lack of Transportation (Non-Medical):  Not on file   Physical Activity:     Days of Exercise per Week: Not on file    Minutes of Exercise per Session: Not on file   Stress:     Feeling of Stress : Not on file   Social Connections:     Frequency of Communication with Friends and Family: Not on file    Frequency of Social Gatherings with Friends and Family: Not on file    Attends Latter day Services: Not on file    Active Member of Clubs or Organizations: Not on file    Attends Club or Organization Meetings: Not on file    Marital Status: Not on file   Intimate Partner Violence:     Fear of Current or Ex-Partner: Not on file    Emotionally Abused: Not on file    Physically Abused: Not on file    Sexually Abused: Not on file   Housing Stability:     Unable to Pay for Housing in the Last Year: Not on file    Number of [place] in the Last Year: Not on file    Unstable Housing in the Last Year: Not on file     Current Facility-Administered Medications   Medication Dose Route Frequency Provider Last Rate Last Admin    ondansetron (ZOFRAN) injection 4 mg  4 mg IntraVENous Q6H PRN Thea Haile MD   4 mg at 02/05/22 0800    loperamide (IMODIUM) capsule 2 mg  2 mg Oral Q4H PRN Thea Haile MD   2 mg at 02/05/22 0800    hyoscyamine (LEVSIN) elixir 0.125 mg  0.125 mg Oral Q4H PRN Thea Haile MD        morphine injection 2 mg  2 mg IntraVENous Q4H PRN Thea Haile MD   2 mg at 02/04/22 1917    lactated Ringers infusion  100 mL/hr IntraVENous CONTINUOUS Alejandro Lung,  mL/hr at 02/05/22 0413 100 mL/hr at 02/05/22 0413    meropenem (MERREM) 500 mg in 0.9% sodium chloride (MBP/ADV) 50 mL MBP  0.5 g IntraVENous Q6H Gretchen De Los Santos,  mL/hr at 02/05/22 0527 500 mg at 02/05/22 0527    dasatinib tab 80 mg  80 mg Oral DAILY Sherblaisen Filiberto, FNP        sodium chloride (NS) flush 5-10 mL  5-10 mL IntraVENous PRN Salo Troy MD        cholecalciferol (VITAMIN D3) (1000 Units /25 mcg) tablet 1,000 Units  1,000 Units Oral QHS Jaqueline JONES, NP   1,000 Units at 02/04/22 2133    famotidine (PEPCID) tablet 40 mg  40 mg Oral DAILY PRN Vishnu Ayala NP  oxyCODONE-acetaminophen (PERCOCET 10)  mg per tablet 1 Tablet  1 Tablet Oral Q8H PRN Roxanna Roberson, NP   1 Tablet at 22 0156    levothyroxine (SYNTHROID) tablet 112 mcg  112 mcg Oral ACB Andriette Earle B, NP   112 mcg at 22 0800    zolpidem (AMBIEN) tablet 5 mg  5 mg Oral QHS PRN Roxanna Roberson, NP        sodium chloride (NS) flush 5-40 mL  5-40 mL IntraVENous Q8H Andriette Earle B, NP   10 mL at 22 0526    sodium chloride (NS) flush 5-40 mL  5-40 mL IntraVENous PRN Roxanna Roberson, NP        acetaminophen (TYLENOL) tablet 650 mg  650 mg Oral Q4H PRN Andriette Earle B, NP   650 mg at 22 1046    enoxaparin (LOVENOX) injection 50 mg  50 mg SubCUTAneous Q12H Andriette Earle B, NP   50 mg at 22 0800       OBJECTIVE:  Patient Vitals for the past 8 hrs:   BP Temp Pulse Resp SpO2 Weight   22 0755 120/69 98.5 °F (36.9 °C) 77 16 94 % --   22 0401 (!) 102/59 98.4 °F (36.9 °C) 77 14 93 % 128 lb 1.6 oz (58.1 kg)     Temp (24hrs), Av.1 °F (37.3 °C), Min:97.8 °F (36.6 °C), Max:100.6 °F (38.1 °C)    No intake/output data recorded. Physical Exam:  Constitutional: Well developed, well nourished female in no acute distress, sitting comfortably in the hospital bed. HEENT: Normocephalic and atraumatic. Oropharynx is clear, mucous membranes are moist.  Extraocular muscles are intact. Sclerae anicteric. Neck supple without JVD. No thyromegaly present. Skin Warm and dry. No bruising and no rash noted. No erythema. No pallor. Respiratory Lungs are clear to auscultation bilaterally without wheezes, rales or rhonchi, normal air exchange without accessory muscle use. CVS Normal rate, regular rhythm and normal S1 and S2. No murmurs, gallops, or rubs. Abdomen Soft, nontender and nondistended, normoactive bowel sounds. No palpable mass. No hepatosplenomegaly. Neuro Grossly nonfocal with no obvious sensory or motor deficits.    MSK Normal range of motion in general.  No edema and no tenderness. Psych Appropriate mood and affect. Labs:    Recent Results (from the past 24 hour(s))   CBC W/O DIFF    Collection Time: 02/05/22  5:47 AM   Result Value Ref Range    WBC 5.2 4.3 - 11.1 K/uL    RBC 3.06 (L) 4.05 - 5.2 M/uL    HGB 9.6 (L) 11.7 - 15.4 g/dL    HCT 27.8 (L) 35.8 - 46.3 %    MCV 90.8 79.6 - 97.8 FL    MCH 31.4 26.1 - 32.9 PG    MCHC 34.5 31.4 - 35.0 g/dL    RDW 19.4 (H) 11.9 - 14.6 %    PLATELET 154 (L) 870 - 450 K/uL    MPV 9.7 9.4 - 12.3 FL    ABSOLUTE NRBC 0.00 0.0 - 0.2 K/uL   METABOLIC PANEL, BASIC    Collection Time: 02/05/22  5:47 AM   Result Value Ref Range    Sodium 138 136 - 145 mmol/L    Potassium 3.4 (L) 3.5 - 5.1 mmol/L    Chloride 108 (H) 98 - 107 mmol/L    CO2 25 21 - 32 mmol/L    Anion gap 5 (L) 7 - 16 mmol/L    Glucose 103 (H) 65 - 100 mg/dL    BUN 6 (L) 8 - 23 MG/DL    Creatinine 0.31 (L) 0.6 - 1.0 MG/DL    GFR est AA >60 >60 ml/min/1.73m2    GFR est non-AA >60 >60 ml/min/1.73m2    Calcium 7.8 (L) 8.3 - 10.4 MG/DL       Imaging:  XR CHEST PORT [[id number]] Collected: 02/02/22 1550   Order Status: Completed Updated: 02/02/22 1601   Narrative:     Chest X-ray     INDICATION: Leukemia patient with fever and chills. COMPARISON: Chest x-ray 8/22/2021. A portable AP view of the chest was obtained. FINDINGS: The lungs are clear.  There are no infiltrates or effusions.  The heart   size is normal.  The bony thorax is intact.      Impression:     No acute findings in the chest          ASSESSMENT:  Problem List  Date Reviewed: 12/13/2021          Codes Class Noted    Body mass index (BMI) of 22.0 to 22.9 in adult ICD-10-CM: Z68.22  ICD-9-CM: V85.1  8/26/2021        * (Principal) Sepsis ([facility] ICD-10-CM: A41.9  ICD-9-CM: 038.9, 995.91  2/2/2022        Fever of unknown origin ICD-10-CM: R50.9  ICD-9-CM: 780.60  2/2/2022        Thrombocytopenia, unspecified ICD-10-CM: D69.6  ICD-9-CM: 287.5  10/15/2021        Opioid use, unspecified with unspecified opioid-induced disorder ICD-10-CM: F11.99  ICD-9-CM: 292.9, 305.50  9/17/2021        Cellulitis of leg, right ICD-10-CM: L03.115  ICD-9-CM: 682.6  8/8/2021        Sepsis due to cellulitis [facility]) ICD-10-CM: L03.90, A41.9  ICD-9-CM: 682.9, 995.91  7/12/2021        Chronic migraine without aura with status migrainosus, not intractable ICD-10-CM: N94.118  ICD-9-CM: 346.72  11/1/2016        Lung nodule ICD-10-CM: R91.1  ICD-9-CM: 793.11  10/28/2016    Overview Signed 10/28/2016 10:05 AM by Shante Holbrook     IMPRESSION:    1. Lobulated 2 cm mass in the right lung apex, malignancy versus an atypical  inflammatory process. 2. Occlusion of the right subclavian vein. 3. Chronic occlusion of the portal vein with associated abnormal tissue  encasing the common bile duct, most likely fibrosis. 4. Splenomegaly. DVT (deep venous thrombosis) ([facility] ICD-10-CM: I82.409  ICD-9-CM: 453.40  10/26/2016    Overview Signed 10/26/2016 10:27 PM by Khushbu Tomlinson. Right subclavian               Analgesic rebound headache ICD-10-CM: G44.40, T39.95XA  ICD-9-CM: 339.3, E935.9  9/22/2016        Headache, chronic daily ICD-10-CM: R51.9  ICD-9-CM: 784.0  9/22/2016        Migraine with aura and with status migrainosus, not intractable ICD-10-CM: G43. 101  ICD-9-CM: 346.03  9/22/2016        Left homonymous hemianopsia ICD-10-CM: H53.462  ICD-9-CM: 368.46  3/30/2015        Chronic myelogenous leukemia ([facility] ICD-10-CM: C92.10  ICD-9-CM: 205.10  3/30/2015        Acquired hypercoagulable state ([facility] (Chronic) ICD-10-CM: I08.19  ICD-9-CM: 289.81  3/30/2015        AKOSUA (iron deficiency anemia) ICD-10-CM: D50.9  ICD-9-CM: 280.9  7/28/2012        Symptomatic anemia ICD-10-CM: D64.9  ICD-9-CM: 285.9  7/28/2012    Overview Signed 7/28/2012  2:11 PM by Richard Fairbanks     Hemoglobin 8.0 gram   7/25/2012 17:03   Iron 27 (L)   TIBC 221 (L)   Transferrin Saturation 12 (L)                Cirrhosis (HCC) (Chronic) ICD-10-CM: K74.60  ICD-9-CM: 571.5  7/27/2012        Ascites ICD-10-CM: R18.8  ICD-9-CM: 789.59  7/26/2012        Polycythemia vera ([address] (Chronic) ICD-10-CM: D45  ICD-9-CM: 238.4  2/16/2009    Overview Addendum 8/26/2021  2:31 PM by Terrell Bethea MD     2/2008 by [email or web address] analysis however portal vein thrombosis  And and splenomegaly since 2004  Hydroxyurea for 6 months poor tolerance spleen did not shrink   Pegasys 90 mcg weekly 4-2009 till    Restarted 12-30-09 45 mcg q 2 weeks  2-2010 reduced to q month   Increased 45 mcg 2/month 4-2010  Held 8-2010 thrombocytopenia  12-29-12 restarted 45 mcg q 2 weeks  4-1-11 45 mcg q 3 weeks  4-22-11 45 mcg q 4 weeks  Uncertain dosing thereafter   Possibly q 3 weeks 10-15-11 and held and restarted on 4-1-12 6-12 ct scan Small bowel wall thickening suggesting an enteritis. In addition, there is well thickening of the right colon which can suggest an additional   colitis although this can also be seen with severe portal hypertension. Likely reactive edematous changes it scattered fluid collections are seen of   the small bowel mesentery. . Apparent occlusion of the portal vein. In addition, the splenic vein, and superior mesenteric vein are not definitely seen and are also likely occluded. Splenomegaly, and extensive varices are seen as described above consistent with the portal vein occlusion. Michelle Kar Heterogeneous enhancement of the liver and mild periportal edema. An acute hepatitis cannot be excluded.               Primary hypothyroidism ICD-10-CM: E03.9  ICD-9-CM: 244.9  2/16/2009        Splenomegaly ICD-10-CM: R16.1  ICD-9-CM: 789.2  2/16/2009        Esophageal varices (HCC) (Chronic) ICD-10-CM: I85.00  ICD-9-CM: 456.1  2/16/2009    Overview Addendum 8/26/2021  2:30 PM by Terrell Bethea MD     grade 3             Portal vein thrombosis ICD-10-CM: S94  ICD-9-CM: 744  2/16/2009    Overview Addendum 8/26/2021  2:31 PM by Terrell Behtea MD     Ct scan 6-21-2 Apparent occlusion of the portal vein. In addition, the splenic vein, and superior mesenteric vein are not definitely seen and are also likely  occluded. Splenomegaly, and extensive varices are seen as described above consistent with the portal vein occlusion 7-05-12 on arixtra HIT negative on repeat 7-27-12 re admitted Cirrhotic appearance of the liver. Mild to moderate ascites                 Ms. Jona Hurley is a 64 y.o. female admitted on 2/2/2022. The primary encounter diagnosis was Sepsis without acute organ dysfunction, due to unspecified organism [facility]). Diagnoses of Chronic myelogenous leukemia ([facility] Fever of unknown origin, Acquired hypercoagulable state ([facility] and E coli bacteremia were also pertinent to this visit. Her PMH includes vasculitis, hypothyroidism, traumatic head bleeding, cirrhosis, esophageal varices, anemia, migraines, recurrent DVTs / portal vein thrombosis on enoxaparin, hx PE 2006, seizures, and splenomegaly. She is a patient of Dr. Opal Reddy with PV and CML dx in 2013, on Dasatinib and Jakafi. She presented with c/o fever and chills x 1 day. UA neg. CXR neg. Flu and COVID neg. BCx +GNR, PCR +E Coli, awaiting sensitivities. Started on Cef/Vanc in ED. Pt developed rash after Cef and it has since been DC'd. Now on CTX. We were asked to assume care of our patient. PLAN:  CML  - on Dasatinib    Polycythemia Vera / Hx DVT, portal vein thrombosis, PE  - on Jakafi and therapeutic Lovenox    Sepsis / E Coli bacteremia  - CXR neg  - COVID and flu neg  - BCx +E Coli, awaiting sensitivities  - Started on Cef/Vanc in ED. Developed rash after Cef. Now on CTX. Will switch to merrem as fevers continue (Tmax 102.1) this AM.  2/4 Feeling much better. Continues on Merrem. Vanc DC'd yesterday. Awaiting sensitivities of E coli. Afebrile x24 hrs.  2/5 continues on Merrem. E coli species susceptible to meropenem. Tmax 100.6. Will hopefully be able to switch to PO soon and will remain afebrile. Diarrhea  - pt asking to hold Jakafi for now  2/5 Diarrhea improving - Jakafi on hold for now. Goals and plan of care reviewed with the patient. All questions answered to the best of our ability. Lab studies and imaging studies were personally reviewed. Thank you for allowing us to participate in the care of Ms. Couch.               [address] [address] Hematology & Oncology  [id number] [place] [address]  Office : [phone]  Fax : [phone]

## 2022-02-06 NOTE — PROGRESS NOTES
END OF SHIFT NOTE:    Intake/Output  No intake/output data recorded. Voiding: YES  Catheter: NO  Drain:              Stool:  0 occurrences. Stool Assessment  Stool Color: Brown (02/05/22 0401)  Stool Appearance: Loose (02/05/22 0810)  Stool Amount: Medium (02/05/22 0401)  Stool Source/Status: Rectum (02/05/22 0401)    Emesis:  0 occurrences. VITAL SIGNS  Patient Vitals for the past 12 hrs:   Temp Pulse Resp BP SpO2   02/06/22 0419 98.3 °F (36.8 °C) 80 14 104/79 95 %   02/05/22 2356 98.4 °F (36.9 °C) 79 14 (!) 108/56 97 %   02/05/22 2002 98.3 °F (36.8 °C) 77 16 130/64 96 %       Pain Assessment  Pain 1  Pain Scale 1: Numeric (0 - 10) (02/06/22 0623)  Pain Intensity 1: 0 (02/06/22 0623)  Patient Stated Pain Goal: 0 (02/06/22 4222)  Pain Reassessment 1: Patient resting w/respiratory rate greater than 10 (02/06/22 0623)  Pain Location 1: Abdomen (02/05/22 0810)  Pain Orientation 1: Mid (02/05/22 0810)  Pain Description 1: Aching (02/05/22 0810)  Pain Intervention(s) 1: Medication (see MAR) (02/06/22 0546)    Ambulating  Yes    Additional Information: gave prn morphine x2 and prn percocet x1. Pt resting with no complaints at this time    Shift report given to oncoming nurse at the bedside.     Summer Martínez RN

## 2022-02-06 NOTE — DISCHARGE SUMMARY
[facility] Hematology and Oncology: Inpatient Hematology / Oncology Discharge Summary Note    Patient ID:  Eliazar Rehman  [id number]  89 y.o.  1960    Admit Date: 2/2/2022    Discharge Date: 2/6/2022    Admission Diagnoses: Sepsis ([address] [A41.9]  Fever of unknown origin [R50.9]    Discharge Diagnoses:  Principal Diagnosis: Sepsis ([address]  Principal Problem:    Sepsis ([address] (2/2/2022)    Active Problems:    Cirrhosis ([address] (7/27/2012)      Chronic myelogenous leukemia ([address] (3/30/2015)      Fever of unknown origin (2/2/2022)        Hospital Course:     Ms. Oma Dan is a 64 y.o. female admitted on 2/2/2022. The primary encounter diagnosis was Sepsis without acute organ dysfunction, due to unspecified organism [facility]). Diagnoses of Chronic myelogenous leukemia ([address] Fever of unknown origin, Acquired hypercoagulable state ([address] and E coli bacteremia were also pertinent to this visit.      Her PMH includes vasculitis, hypothyroidism, traumatic head bleeding, cirrhosis, esophageal varices, anemia, migraines, recurrent DVTs / portal vein thrombosis on enoxaparin, hx PE 2006, seizures, and splenomegaly. She is a patient of Dr. Filiberto Riddle with PV and CML dx in 2013, on Dasatinib and Jakafi. She presented with c/o fever and chills x 1 day. UA neg. CXR neg. Flu and COVID neg. BCx +GNR, PCR +E Coli. Initially started on Cef/Vanc followed by CTX due to rash and then Merrem due to continued fevers. E coli susceptible to Merrem as well as cephalosporins. She can now transition to [place]. She has remained afebrile since low-grade fever yesterday. She is feeling much improved. She does complain of diarrhea on Jakafi so it has been held since admission. She did have some diffuse low back pain but this is manageable on home regimen. She is now ready for DC. She will f/u as scheduled on 2/8/22 and can discuss resuming Jakafi at that point.  She knows to call with questions or concerns, including fever or symptoms not managed with medications. Consults:  IP CONSULT TO ONCOLOGY    Pertinent Diagnostic Studies:   Labs:    Recent Labs     02/06/22  0529 02/05/22  0547 02/04/22  0626   WBC 5.0 5.2 5.7   HGB 10.4* 9.6* 9.9*   * 119* 136*      Recent Labs     02/06/22  0529 02/05/22  0547 02/04/22  0626    138 137   K 4.0 3.4* 3.5   * 108* 107   CO2 27 25 25   GLU 92 103* 117*   BUN 5* 6* 10   CREA 0.41* 0.31* 0.45*   CA 8.5 7.8* 7.7*       Imaging:    XR CHEST PORT [[id number]] Collected: 02/02/22 1558   Order Status: Completed Updated: 02/02/22 1601   Narrative:     Chest X-ray     INDICATION: Leukemia patient with fever and chills. COMPARISON: Chest x-ray 8/22/2021. A portable AP view of the chest was obtained. FINDINGS: The lungs are clear. There are no infiltrates or effusions.  The heart   size is normal.  The bony thorax is intact.      Impression:     No acute findings in the chest        Current Discharge Medication List      START taking these medications    Details   cefpodoxime (VANTIN) 200 mg tablet Take 1 Tablet by mouth two (2) times a day for 7 days. Qty: 14 Tablet, Refills: 0  Start date: 2/6/2022, End date: 2/13/2022         CONTINUE these medications which have NOT CHANGED    Details   oxyCODONE-acetaminophen (PERCOCET 10)  mg per tablet Take 1 Tablet by mouth every eight (8) hours as needed for Pain for up to 30 days. Max Daily Amount: 3 Tablets. Qty: 90 Tablet, Refills: 0    Associated Diagnoses: CML (chronic myelocytic leukemia) ([facility] [address] Chronic pain due to neoplasm      zolpidem (AMBIEN) 10 mg tablet Take 1 Tablet by mouth nightly as needed for Sleep. Max Daily Amount: 10 mg.  Qty: 20 Tablet, Refills: 2    Comments: Okay to fill early per Dr. Seven Caceres Diagnoses: Insomnia due to medical condition      diphenoxylate-atropine (LomotiL) 2.5-0.025 mg per tablet Take 1 Tablet by mouth four (4) times daily as needed for Diarrhea. Max Daily Amount: 4 Tablets.   Qty: 90 Tablet, Refills: 1    Associated Diagnoses: Myelofibrosis ([facility] [address] Chronic pain due to neoplasm      famotidine (PEPCID) 40 mg tablet Take 1 Tablet by mouth daily as needed for Heartburn. Take at least 4 hours before or after sprycel  Qty: 30 Tablet, Refills: 0      dasatinib (SpryceL) 80 mg tab Take 1 Tablet by mouth daily. Qty: 30 Tablet, Refills: 11    Associated Diagnoses: CML (chronic myelocytic leukemia) ([facility] [address] Polycythemia vera (HCC)      enoxaparin (Lovenox) 60 mg/0.6 mL injection 50 mg by SubCUTAneous route every twelve (12) hours. Qty: 60 Each, Refills: 5      b complex vitamins tablet 1 Tablet daily. Comp Stocking,Knee,Regular,Sml misc 1 Each by Does Not Apply route daily. Qty: 1 Box, Refills: 0    Comments: Please dispense 2 pairs of compression stockings      magnesium 250 mg tab Take 1 Tablet by mouth daily. Synthroid 112 mcg tablet 1 tablet once a day with an extra 1/2 tablet on Sundays  Qty: 100 Tab, Refills: 3    Associated Diagnoses: Primary hypothyroidism      ruxolitinib 5 mg tab Take 1 Tab by mouth two (2) times a day. Qty: 60 Tab, Refills: 11    Comments: Called in to pharmacy      !! ondansetron (ZOFRAN ODT) 4 mg disintegrating tablet Take 1 Tab by mouth every eight (8) hours as needed for Nausea. Qty: 90 Tab, Refills: 0      calcium carbonate (CALTREX) 600 mg calcium (1,500 mg) tablet Take 600 mg by mouth nightly. cholecalciferol (VITAMIN D3) 1,000 unit cap Take 1,000 Units by mouth nightly. !! ondansetron (ZOFRAN ODT) 8 mg disintegrating tablet Take 1 Tab by mouth every eight (8) hours as needed for Nausea or Vomiting. Qty: 60 Tab, Refills: 0      lansoprazole (PREVACID) 30 mg capsule Take 30 mg by mouth daily. prn       !! - Potential duplicate medications found. Please discuss with provider.       STOP taking these medications       clindamycin (CLEOCIN) 300 mg capsule Comments:   Reason for Stopping:                   OBJECTIVE:  Patient Vitals for the past 8 hrs:   BP Temp Pulse Resp SpO2   22 0755 113/76 98.3 °F (36.8 °C) 79 16 97 %   22 0419 104/79 98.3 °F (36.8 °C) 80 14 95 %     Temp (24hrs), Av.4 °F (36.9 °C), Min:98.2 °F (36.8 °C), Max:98.8 °F (37.1 °C)    No intake/output data recorded. Physical Exam:  Constitutional: Well developed, well nourished female in no acute distress, sitting comfortably in bed. HEENT: Normocephalic and atraumatic. Oropharynx is clear, mucous membranes are moist. Extraocular muscles are intact. Sclerae anicteric. Neck supple without JVD. No thyromegaly present. Skin Warm and dry. No bruising and no rash noted. No erythema. No pallor. Respiratory Lungs are clear to auscultation bilaterally without wheezes, rales or rhonchi, normal air exchange without accessory muscle use. CVS Normal rate, regular rhythm and normal S1 and S2. No murmurs, gallops, or rubs. Abdomen +mildly distended/firm. Normoactive bowel sounds. No palpable mass. No hepatosplenomegaly. Neuro Grossly nonfocal with no obvious sensory or motor deficits. MSK Normal range of motion in general.  No edema and no tenderness. Psych Appropriate mood and affect. ASSESSMENT:    Principal Problem:    Sepsis ([facility] (2022)    Active Problems:    Cirrhosis ([facility] [address] (2012)      Chronic myelogenous leukemia ([facility] [address] (3/30/2015)      Fever of unknown origin (2022)        DISPOSITION:    Follow-up Appointments   Procedures    FOLLOW UP VISIT Appointment in: Other ([facility]) As scheduled 22     As scheduled 22     Standing Status:   Standing     Number of Occurrences:   1     Order Specific Question:   Appointment in     Answer: Other (Specify)         Over 30 minutes was spent in discharge planning and coordination of care.          Jaime Alanis P.OHerlinda [address] Hematology & Oncology  [address] [facility] [address]  Office : [phone]  Fax : [phone]

## 2022-02-06 NOTE — PROGRESS NOTES
Patient discharged. Instructions reviewed with patient. Verbalized understanding. Left floor via wheelchair with daughter at 5.

## 2022-02-06 NOTE — PROGRESS NOTES
Patient is anticipated to discharge today. Plan for discharge is as follows:    Care Management Interventions  Mode of Transport at Discharge: Self  Transition of Care Consult (CM Consult): Discharge Planning  Support Systems: Child(lauryn)  Confirm Follow Up Transport: Self  Discharge Location  Patient Expects to be Discharged to[de-identified] Home    Milestones and interventions have been entered.      Xuan Morgan LMSW    [facility]    * [email or web address] no

## 2022-02-27 PROBLEM — L03.90 SEPSIS DUE TO CELLULITIS (HCC): Status: RESOLVED | Noted: 2021-01-01 | Resolved: 2022-01-01

## 2022-02-27 PROBLEM — A41.9 SEPSIS DUE TO CELLULITIS (HCC): Status: RESOLVED | Noted: 2021-01-01 | Resolved: 2022-01-01

## 2022-02-27 PROBLEM — R50.9 FEVER: Status: ACTIVE | Noted: 2022-01-01

## 2022-02-27 PROBLEM — D72.829 LEUKOCYTOSIS: Status: ACTIVE | Noted: 2022-01-01

## 2022-02-27 NOTE — ED PROVIDER NOTES
Mask was worn during the entire patient examination. Roddy Bloch is a 64 y.o. female who presents to the ED with a chief complaint of fever. Patient reports she has been having fever for the past several days as well as urinary incontinence. She states she has noticed some low back pain and thinks she might be having some abdominal pain. She reports she has been confused and seemingly disoriented the last several days. I spoke with the patient's son on the phone and he reports she has been sitting on the toilet for multiple hours on and due to the incontinence. They were unsure whether or not she was in constant fevers but does report 48 hours ago she had a fever of 102.1F. The son reports she has been in [place] and seems very out of it. Eating and drinking has diminished and less they made a consistent effort to bring her food they report. Her bilateral peripheral edema of the ankles is new onset today which reportedly started after she sat on the toilet for 4 hours. patient also reports she has been having intermittent headaches. Denies any chest pain, cough, shortness of breath. Patient reports she was recently admitted earlier this month for sepsis of unknown origin as well. The history is provided by the patient and a relative.         Past Medical History:   Diagnosis Date    Acute blood loss anemia 4/6/2018    Anemia     C. difficile diarrhea 4/1/2015    Cerebral edema (HCC) 4/1/2015    Chronic migraine 9/22/2016    Chronic myelogenous leukemia ([facility] [address]     Warne chromosome/ converted from polycythemia in 2009- to 2012 when she was dx w leukemia    Chronic pain     Coagulation disorder ([address]     \"clotting and Bleeding Problem\" dr Wen Laughter follows     Esophageal spasm     with banding     Esophageal varices ([address]     grade 3    GI bleed 8/3/2016    H/O craniotomy     3-29-15.  due to blood clot - which caused a seizure  - then pt fell and hit     Hematemesis 8/20/2021    Leukocytosis 3/14/2015    Melena 2021    MRSA colonization 2012    Polycythemia vera(238.4)     JAK2 mutation    Portal hypertension (HCC)     with varices    Portal vein thrombosis 2012    Ct scan  Apparent occlusion of the portal vein. In addition, the splenic vein, and superior mesenteric vein are not definitely seen and are also likely  occluded. Splenomegaly, and extensive varices are seen as described above consistent with the portal vein occlusion 12 on arixtra HIT negative on repeat 12 re admitted Cirrhotic appearance of the liver. Mild to moderate ascites, as    Primary hypothyroidism     Pulmonary embolism ([address]     not on coumadin anymore     S/P exploratory laparotomy, 12    Bowel resection:  336 cm of small bowel removed, approximately 9 feet and placement of feeding jejunostomy.      S/P small bowel resection     .  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 2012 platelets lower repeat HIT 12 platelets in 13,139'L    Thrombosis, portal vein 2004    portal , spleenic and recently superior mesenteric    Transfusion history     many blood tranfusions - last 3-29-15 after brain surgery    Traumatic hemorrhage of right cerebrum ([address] 3/30/2015       Past Surgical History:   Procedure Laterality Date    HX APPENDECTOMY      with small bowel resection    HX BREAST BIOPSY Bilateral     Lt - ; Rt -     HX CHOLECYSTECTOMY      HX GI      liver biopsy    HX GI      bowel removed small - 9 feet     HX GYN       x 2    HX GYN      D&C following miscarriage    HX ORTHOPAEDIC Left 2008    torn labrum shoulder (screw in place)    NEUROLOGICAL PROCEDURE UNLISTED  2010    craniotomy to evacuate subdural hematoma following a fall from a seizure    IL ABDOMEN SURGERY PROC UNLISTED      umbilical hernia repair    IL ABDOMEN SURGERY PROC UNLISTED      9ft of small bowel excised then reconnected         Family History:   Problem Relation Age of Onset    Diabetes Mother     Stroke Mother         after surgery    Cancer Father         brain    No Known Problems Sister     Other Sister         diverticulitis    Other Sister         diverticulitis    Cancer Sister         lyjmphoma    Breast Cancer Maternal Aunt 48    Thyroid Disease Paternal Grandmother        Social History     Socioeconomic History    Marital status: SINGLE     Spouse name: Not on file    Number of children: Not on file    Years of education: Not on file    Highest education level: Not on file   Occupational History    Not on file   Tobacco Use    Smoking status: Former Smoker     Packs/day: 0.20     Years: 10.00     Pack years: 2.00     Types: Cigarettes     Quit date:      Years since quittin.1    Smokeless tobacco: Never Used   Substance and Sexual Activity    Alcohol use: No    Drug use: No    Sexual activity: Not on file   Other Topics Concern    Not on file   Social History Narrative    Not on file     Social Determinants of Health     Financial Resource Strain:     Difficulty of Paying Living Expenses: Not on file   Food Insecurity:     Worried About [address] in the Last Year: Not on file    [address] in the Last Year: Not on file   Transportation Needs:     Lack of Transportation (Medical): Not on file    Lack of Transportation (Non-Medical):  Not on file   Physical Activity:     Days of Exercise per Week: Not on file    Minutes of Exercise per Session: Not on file   Stress:     Feeling of Stress : Not on file   Social Connections:  Frequency of Communication with Friends and Family: Not on file    Frequency of Social Gatherings with Friends and Family: Not on file    Attends Gnosticist Services: Not on file    Active Member of Clubs or Organizations: Not on file    Attends Club or Organization Meetings: Not on file    Marital Status: Not on file   Intimate Partner Violence:     Fear of Current or Ex-Partner: Not on file    Emotionally Abused: Not on file    Physically Abused: Not on file    Sexually Abused: Not on file   Housing Stability:     Unable to Pay for Housing in the Last Year: Not on file    Number of [place] in the Last Year: Not on file    Unstable Housing in the Last Year: Not on file         ALLERGIES: Latex, Sulfa (sulfonamide antibiotics), Tramadol, Augmentin [amoxicillin-pot clavulanate], Divalproex sodium, Morphine, Potassium clavulanate, Rizatriptan, Tetanus immune globulin, Vancomycin, Xanax [alprazolam], and Zonegran [zonisamide]    Review of Systems   Constitutional: Positive for fever. Negative for chills and fatigue. HENT: Negative for congestion, rhinorrhea and voice change. Respiratory: Negative for cough and shortness of breath. Cardiovascular: Positive for leg swelling. Negative for chest pain. Gastrointestinal: Positive for abdominal pain. Negative for abdominal distention, constipation, diarrhea, nausea and vomiting. Genitourinary: Positive for frequency. Negative for dysuria, flank pain, pelvic pain and urgency. Urinary incontinence   Musculoskeletal: Positive for back pain (low back pain). Negative for arthralgias and myalgias. Skin: Negative for color change and rash. Neurological: Positive for headaches. Negative for dizziness, syncope and weakness. Psychiatric/Behavioral: Positive for confusion. Negative for agitation and behavioral problems. All other systems reviewed and are negative.       Vitals:    02/27/22 1807   BP: (!) 124/58   Resp: 20   Temp: (!) 102.9 °F (39.4 °C)   SpO2: 97%            Physical Exam  Vitals and nursing note reviewed. Constitutional:       General: She is in acute distress. Appearance: Normal appearance. She is toxic-appearing. HENT:      Head: Normocephalic and atraumatic. Right Ear: External ear normal.      Left Ear: External ear normal.   Eyes:      Extraocular Movements: Extraocular movements intact. Conjunctiva/sclera: Conjunctivae normal.   Cardiovascular:      Rate and Rhythm: Normal rate and regular rhythm. Pulses: Normal pulses. Heart sounds: Normal heart sounds. Comments: Borderline tachycardia  Pulmonary:      Effort: Pulmonary effort is normal.      Breath sounds: Normal breath sounds. Abdominal:      General: Abdomen is flat. Bowel sounds are normal. There is no distension. Palpations: Abdomen is soft. Tenderness: There is abdominal tenderness. There is no guarding or rebound. Comments: Large diastasis recti   Musculoskeletal:         General: Normal range of motion. Cervical back: Normal range of motion. Rigidity (mild resistance to flexion) present. No tenderness. Right lower leg: Edema present. Left lower leg: Edema present. Skin:     General: Skin is warm and dry. Capillary Refill: Capillary refill takes less than 2 seconds. Findings: No rash. Neurological:      General: No focal deficit present. Mental Status: She is alert. She is disoriented. Motor: No weakness. Psychiatric:         Mood and Affect: Mood normal.         Behavior: Behavior normal.          MDM  Number of Diagnoses or Management Options  Confusion and disorientation  Fever, unspecified fever cause  Leukocytosis, unspecified type  Sepsis, due to unspecified organism, unspecified whether acute organ dysfunction present [facility])  Diagnosis management comments: 63-year-old female who presents to facility today with significant fever and some confusion.   Initial evaluation of the patient demonstrates significant confusion and inability to relay clear history. Patient is febrile and tachycardic on arrival.  Physical examination is noted for some resistance to flexion of the neck, mild abdominal discomfort with palpation, and bilateral peripheral edema which is new onset per family. Laboratory results reveal a leukocytosis of 12.5, and elevated lactate of 2.8 initially (repeat post fluids 1.3), pro-Zeke of 26.76, elevated BNP of 4400, H&H of 10 and 29 respectively, as well as hyponatremia. Chest x-ray in the department was noted for evidence of possible pulmonary edema or infiltrate. CT scan of the head was obtained due to the confusion and headache which showed no acute process. Urine microscopy was significant for greater than 100,000 wbcu and trace bacteria. Urine culture pending. Blood culture pending. Patient has received 1 g of Rocephin and 4.5 g of Zosyn in department. There has improved to 100.5F. Patient's pressures are holding stable following a 1500ml of fluid. Fluids were held at that time due to concern for possible fluid overload given chest x-ray findings and elevated BNP. Lumbar puncture performed by Dr. Rickie Victor. Patient given cefepime and vancomycin per the direction of the admitting hospitalist. patient will be admitted to the floor for further management. Voice dictation software was used during the making of this note. This software is not perfect and grammatical and other typographical errors may be present. This note has been proofread, but may still contain errors.   NAPOLEON Ledbetter; 2/27/2022 @10:58 PM   ===================================================================         Amount and/or Complexity of Data Reviewed  Clinical lab tests: ordered and reviewed  Tests in the radiology section of CPT®: ordered and reviewed  Tests in the medicine section of CPT®: ordered and reviewed  Review and summarize past medical records: yes  Discuss the patient with other providers: yes (Dr. Imer Mohr)  Independent visualization of images, tracings, or specimens: yes    Risk of Complications, Morbidity, and/or Mortality  Presenting problems: high  Diagnostic procedures: high  Management options: high  General comments: RE: Roberto Quiroz  [id number]  Patient MRN: [id number]  Patient Room: [place] [address]  Location: ED  80-year-old female with history of CML that presented today with fever and confusion over the past several days. Patient has leukocytosis of 12.5, pro-Zeke of 26, initial lactate of 2.8 (repeat 1.3), microscopic urine with greater than 100,000 WBCU trace bacteria. CT scan of head normal. Patient has been treated with IV Rocephin and Zosyn at this time. She has received fluids in department and pressures have been holding stable. LP performed and fluid cultures are pending. URGENT  Read 2/27/2022 10:21 PM  2/27/2022 10:22 PM  Okay thanks  2/27/2022 10:22 PM  would you like any other abx started in the ED? Read 2/27/2022 10:26 PM  2/27/2022 10:26 PM  Cefepime and Vanc  2/27/2022 10:30 PM  will do. thanks  Read 2/27/2022 10:32 PM  2/27/2022 10:32 PM  Thank you  2/27/2022 10:38 PM  patient has documented \"rash\" allergy to vanc, want me to proceed with that? she cannot clarify further on reaction  Read 2/27/2022 10:39 PM  2/27/2022 10:39 PM  Should be fine  2/27/2022 10:39 PM  okay  Read 2/27/2022 10:39 PM    ===================================  ED EKG Interpretation  EKG was interpreted in the absence of a cardiologist.    EKG rhythm: normal sinus rhythm  Rate: 88  ST Segments: Nonspecific ST segments - NO STEMI    NAPOLEON Acevedo    The patient was observed in the ED.     Results Reviewed:      Recent Results (from the past 24 hour(s))  -LACTIC ACID:   Collection Time: 02/27/22  6:14 PM       Result                      Value             Ref Range           Lactic acid                 2.8 (H)           0.4 - 2.0 MM*  -CBC WITH AUTOMATED DIFF:   Collection Time: 02/27/22 6:14 PM       Result                      Value             Ref Range           WBC                         12.5 (H)          4.3 - 11.1 K*       RBC                         3.15 (L)          4.05 - 5.2 M*       HGB                         10.0 (L)          11.7 - 15.4 *       HCT                         29.0 (L)          35.8 - 46.3 %       MCV                         92.1              79.6 - 97.8 *       MCH                         31.7              26.1 - 32.9 *       MCHC                        34.5              31.4 - 35.0 *       RDW                         17.8 (H)          11.9 - 14.6 %       PLATELET                    98 (L)            150 - 450 K/*       MPV                         11.0              9.4 - 12.3 FL       ABSOLUTE NRBC               0.00              0.0 - 0.2 K/*       NEUTROPHILS                 93 (H)            43 - 78 %           LYMPHOCYTES                 3 (L)             13 - 44 %           MONOCYTES                   3 (L)             4.0 - 12.0 %        EOSINOPHILS                 0 (L)             0.5 - 7.8 %         BASOPHILS                   0                 0.0 - 2.0 %         IMMATURE GRANULOCYTES       1                 0.0 - 5.0 %         ABS. NEUTROPHILS            11.6 (H)          1.7 - 8.2 K/*       ABS. LYMPHOCYTES            0.3 (L)           0.5 - 4.6 K/*       ABS. MONOCYTES              0.4               0.1 - 1.3 K/*       ABS. EOSINOPHILS            0.0               0.0 - 0.8 K/*       ABS. BASOPHILS              0.0               0.0 - 0.2 K/*       ABS. IMM.  GRANS.            0.2               0.0 - 0.5 K/*       DF                          AUTOMATED                        -METABOLIC PANEL, COMPREHENSIVE:   Collection Time: 02/27/22  6:14 PM       Result                      Value             Ref Range           Sodium                      126 (L)           136 - 145 mm*       Potassium                   3.6               3.5 - 5.1 mm*       Chloride 95 (L)            98 - 107 mmo*       CO2                         26                21 - 32 mmol*       Anion gap                   5 (L)             7 - 16 mmol/L       Glucose                     135 (H)           65 - 100 mg/*       BUN                         22                8 - 23 MG/DL        Creatinine                  0.72              0.6 - 1.0 MG*       GFR est AA                  >60               >60 ml/min/1*       GFR est non-AA              >60               >60 ml/min/1*       Calcium                     8.2 (L)           8.3 - 10.4 M*       Bilirubin, total            1.4 (H)           0.2 - 1.1 MG*       ALT (SGPT)                  22                12 - 65 U/L         AST (SGOT)                  24                15 - 37 U/L         Alk.  phosphatase            149 (H)           50 - 136 U/L        Protein, total              6.2 (L)           6.3 - 8.2 g/*       Albumin                     2.5 (L)           3.2 - 4.6 g/*       Globulin                    3.7 (H)           2.3 - 3.5 g/*       A-G Ratio                   0.7 (L)           1.2 - 3.5      -PROCALCITONIN:   Collection Time: 02/27/22  6:14 PM       Result                      Value             Ref Range           Procalcitonin               26.76 (H)         0.00 - 0.49 *  -NT-PRO BNP:   Collection Time: 02/27/22  6:14 PM       Result                      Value             Ref Range           NT pro-BNP                  4,430 (H)         5 - 125 PG/ML  -COVID-19 RAPID TEST:   Collection Time: 02/27/22  7:07 PM       Result                      Value             Ref Range           Specimen source             NASAL SWAB                            COVID-19 rapid test         Not detected      NOTD           -URINE MICROSCOPIC:   Collection Time: 02/27/22  7:49 PM       Result                      Value             Ref Range           WBC                         >100              0 /hpf              RBC 10-20             0 /hpf              Epithelial cells            0-3               0 /hpf              Bacteria                    TRACE             0 /hpf              Casts                       3-5               0 /lpf              Crystals, urine             0                 0 /LPF              Mucus                       0                 0 /lpf              Other observations                                            RESULTS VERIFIED MANUALLY  -LACTIC ACID:   Collection Time: 02/27/22  8:32 PM       Result                      Value             Ref Range           Lactic acid                 1.3               0.4 - 2.0 MM*  -GLUCOSE, CSF:   Collection Time: 02/27/22 10:00 PM       Result                      Value             Ref Range           Tube No.                    2                                     Glucose,CSF                 35 (LL)           40 - 70 MG/DL  -PROTEIN, CSF:   Collection Time: 02/27/22 10:00 PM       Result                      Value             Ref Range           Tube No.                    2                                     Protein,CSF                 110               MG/DL          CT HEAD WO CONT   Final Result        1. No CT evidence of acute intracranial process. XR CHEST PORT   Final Result    Bilateral perihilar airspace disease, suspicious for pulmonary edema. Patient Progress  Patient progress: stable         Critical Care  Performed by: Mitra Donato PA  Authorized by: NAPOLEON Jonas     Comments:      Critical care time: 45 minutes of critical care time was performed in the emergency department. This was separate from any other procedures listed during the patients emergency department course. The failure to initiate these interventions on an urgent basis would likely have resulted in sudden, clinically significant or life-threatening deterioration in the patients condition.       Lumbar Puncture    Date/Time: 2/27/2022 10:16 PM  Performed by: Gilmar Stone MD  Authorized by: Gilmar Stone MD     Consent:     Consent obtained:  Written    Risks discussed:  Bleeding and headache    Alternatives discussed:  No treatment  Pre-procedure details:     Procedure purpose:  Diagnostic    Preparation: Patient was prepped and draped in usual sterile fashion    Anesthesia (see MAR for exact dosages): Anesthesia method:  Local infiltration    Local anesthetic:  Lidocaine 1% w/o epi  Procedure details:     Lumbar space:  L4-L5 interspace    Patient position:  Sitting    Needle gauge:  22    Needle type:  Spinal needle - Quincke tip    Needle length (in):  3.5    Ultrasound guidance: no      Number of attempts:  1    Fluid appearance:  Clear    Tubes of fluid:  4  Post-procedure:     Puncture site:  Adhesive bandage applied  Comments:      Patient tolerated well, fluid not crystal clear but slightly yellow.

## 2022-02-28 NOTE — PROGRESS NOTES
[facility] Pharmacokinetic Monitoring Service - Vancomycin     Nelli Amaro is a 64 y.o. female starting on vancomycin therapy for bloodstream infection. Pharmacy consulted by hayley fitzgerald for monitoring and adjustment. Target Concentration: Goal AUC/ALEXEY 400-600 mg*hr/L    Additional Antimicrobials: cefepime    Pertinent Laboratory Values: Wt Readings from Last 1 Encounters:   02/27/22 56.2 kg (124 lb)     Temp Readings from Last 1 Encounters:   02/27/22 100 °F (37.8 °C)     No components found for: PROCAL  Recent Labs     02/27/22 2032 02/27/22 1814   BUN  --  22   CREA  --  0.72   WBC  --  12.5*   PCT  --  26.76*   LAC 1.3 2.8*     Estimated Creatinine Clearance: 64.9 mL/min (based on SCr of 0.72 mg/dL). Lab Results   Component Value Date/Time    Vancomycin,trough 19.0 08/10/2021 04:15 AM       MRSA Nasal Swab: N/A. Non-respiratory infection. .    Plan:  Dosing recommendations based on Bayesian software  Start vancomycin 1.5gm x1 then 750mg q12h  Anticipated AUC of 492 and trough concentration of 15.5 at steady state  Renal labs as indicated   Vancomycin concentrations will be ordered as clinically appropriate   Pharmacy will continue to monitor patient and adjust therapy as indicated    Thank you for the consult,  BELLA Waldrop

## 2022-02-28 NOTE — PROGRESS NOTES
Problem: Falls - Risk of  Goal: *Absence of Falls  Description: Document Leonard Varela Fall Risk and appropriate interventions in the flowsheet.   Outcome: Progressing Towards Goal  Note: Fall Risk Interventions:  Mobility Interventions: Bed/chair exit alarm,Communicate number of staff needed for ambulation/transfer,Patient to call before getting OOB,Strengthening exercises (ROM-active/passive)         Medication Interventions: Evaluate medications/consider consulting pharmacy,Patient to call before getting OOB,Teach patient to arise slowly    Elimination Interventions: Call light in reach,Patient to call for help with toileting needs,Bed/chair exit alarm              Problem: Patient Education: Go to Patient Education Activity  Goal: Patient/Family Education  Outcome: Progressing Towards Goal

## 2022-02-28 NOTE — ED NOTES
TRANSFER - OUT REPORT:    Verbal report given to NISHA Ordonez(name) on [address]  being transferred to Med Surg(unit) for routine progression of care       Report consisted of patients Situation, Background, Assessment and   Recommendations(SBAR). Information from the following report(s) SBAR was reviewed with the receiving nurse. Lines:   Peripheral IV 02/27/22 Left Antecubital (Active)        Opportunity for questions and clarification was provided.       Patient transported with:   Registered Nurse

## 2022-02-28 NOTE — H&P
[facility] Initial History and Physical Note    Patient: Arnie Johnson Date: 2/27/2022  female, 64 y.o. Admit Date: 2/27/2022  Attending: Delfino Ellis MD     ASSESSMENT AND PLAN:     Principal Problem:    Fever (2/27/2022)    In the context of CML. Uncertain etiology. UA shows sterile pyuria. CXR shows perihilar infiltrates, likely edema. Will repeat CXR in AM. Treat with IV Cefepime/Vanc. Blood/urine cultures pending. LP performed in ER as well, follow results. Active Problems:    Hyponatremia (3/30/2015)    IV NS, follow BMP. Leukocytosis (2/27/2022)    Follow CBC      AKOSUA (iron deficiency anemia) (7/28/2012)    Stable. Continue home meds. Cirrhosis ([facility] (7/27/2012)    Stable. Chronic myelogenous leukemia ([facility] (3/30/2015)    Stable. Continue home meds. Consult oncology. Primary hypothyroidism (2/16/2009)    Stable. Continue home meds. Esophageal varices (HCC) (2/16/2009)    Stable. Continue home meds. Body mass index (BMI) of 22.0 to 22.9 in adult (8/26/2021)    Stable. DVT Prophylaxis: Lovenox      Code Status: FULL CODE      Disposition: Admit to oncology unit for evaluation and treatment as per above. Anticipated discharge: 2-3 days     CHIEF COMPLAINT:  fever    HISTORY OF PRESENT ILLNESS:      Patient Active Problem List   Diagnosis Code    Polycythemia vera ([facility] D45    Ascites R18.8    Cirrhosis (HCC) K74.60    AKOSUA (iron deficiency anemia) D50.9    Left homonymous hemianopsia H53.462    Hyponatremia E87.1    Chronic myelogenous leukemia (HCC) C92.10    Acquired hypercoagulable state ([facility] D68.59    Analgesic rebound headache G44.40, T39.95XA    Headache, chronic daily R51.9    Migraine with aura and with status migrainosus, not intractable G43. 101    DVT (deep venous thrombosis) (HCC) I82.409    Lung nodule R91.1    Chronic migraine without aura with status migrainosus, not intractable G43.701    Primary hypothyroidism E03.9    Splenomegaly R16.1    Esophageal varices (HCC) I85.00    Portal vein thrombosis I81    Cellulitis of leg, right L03.115    Body mass index (BMI) of 22.0 to 22.9 in adult Z68.22    Opioid use, unspecified with unspecified opioid-induced disorder F11.99    Thrombocytopenia, unspecified D69.6    Sepsis (HCC) A41.9    Fever of unknown origin R50.9    Fever R50.9    Leukocytosis D72.829       Zoe Ellis is a 64 y.o. female, with a history of  has a past medical history of Acute blood loss anemia (4/6/2018), Anemia, C. difficile diarrhea (4/1/2015), Cerebral edema ([address] (4/1/2015), Chronic migraine (9/22/2016), Chronic myelogenous leukemia ([address] Chronic pain, Coagulation disorder ([address] Esophageal spasm, Esophageal varices ([address] GI bleed (8/3/2016), H/O craniotomy, Hematemesis (8/20/2021), Leukocytosis (3/14/2015), Melena (8/20/2021), MRSA colonization (6/25/2012), Polycythemia vera(238.4), Portal hypertension ([address] Portal vein thrombosis (6/20/2012), Primary hypothyroidism, Pulmonary embolism ([address] (2006), S/P exploratory laparotomy, 6/20/12  (6/25/2012), S/P small bowel resection, Seizure ([address] (3/14/2015), Seizure disorder ([address] (3/30/2015), Seizures ([address] Splenomegaly, congestive, chronic, Stroke (cerebrum) ([address] (4/11/2015), Stroke ([address] Thrombocytopenia, 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 She has no past medical history of Adverse effect of anesthesia, Difficult intubation, Malignant hyperthermia due to anesthesia, or Pseudocholinesterase deficiency. ,  has a past surgical history that includes hx gyn; hx gyn; neurological procedure unlisted (2010-12); hx gi; hx gi; pr abdomen surgery proc unlisted; hx cholecystectomy; pr abdomen surgery proc unlisted; hx appendectomy; hx breast biopsy (Bilateral); and hx orthopaedic (Left, 2008).  who presents to the ER with report of fever and headache for the past several days. Admits to fever up to 102. 1 two days ago. Feels malaise and weakness. Headache is global and associated with photophobia. Admits to feeling a bit confused as well. Admits to nausea, denies shortness of breath, cough ,vomiting. Allergy  Allergies   Allergen Reactions    Latex Other (comments)     Testing for latex allergy was positive as a 2 (on a scale of 1 to 3).  Sulfa (Sulfonamide Antibiotics) Anaphylaxis    Tramadol Other (comments)     Top lip swelled    Augmentin [Amoxicillin-Pot Clavulanate] Rash    Divalproex Sodium Hives    Morphine Nausea and Vomiting     Not a true allergy    Potassium Clavulanate Hives    Rizatriptan Rash    Tetanus Immune Globulin Other (comments)     Heat, bruising, and swelling at  Site of injection    Vancomycin Rash    Xanax [Alprazolam] Other (comments)     Hallucinations    Zonegran [Zonisamide] Rash       Medication list  Prior to Admission Medications   Prescriptions Last Dose Informant Patient Reported? Taking? Comp Stocking,Knee,Regular,Sml misc   No No   Si Each by Does Not Apply route daily. Synthroid 112 mcg tablet   No No   Si tablet once a day with an extra 1/2 tablet on Sundays   b complex vitamins tablet   Yes No   Si Tablet daily. calcium carbonate (CALTREX) 600 mg calcium (1,500 mg) tablet   Yes No   Sig: Take 600 mg by mouth nightly. cholecalciferol (VITAMIN D3) 1,000 unit cap   Yes No   Sig: Take 1,000 Units by mouth nightly. dasatinib (SpryceL) 80 mg tab   No No   Sig: Take 1 Tablet by mouth daily. diphenoxylate-atropine (LomotiL) 2.5-0.025 mg per tablet   No No   Sig: Take 1 Tablet by mouth four (4) times daily as needed for Diarrhea. Max Daily Amount: 4 Tablets.   enoxaparin (Lovenox) 60 mg/0.6 mL injection   No No   Si mg by SubCUTAneous route every twelve (12) hours. famotidine (PEPCID) 40 mg tablet   No No   Sig: Take 1 Tablet by mouth daily as needed for Heartburn.  Take at least 4 hours before or after sprycel   lansoprazole (PREVACID) 30 mg capsule   Yes No   Sig: Take 30 mg by mouth daily. prn   Patient not taking: Reported on 2/3/2022   magnesium 250 mg tab   Yes No   Sig: Take 1 Tablet by mouth daily. ondansetron (ZOFRAN ODT) 4 mg disintegrating tablet   No No   Sig: Take 1 Tab by mouth every eight (8) hours as needed for Nausea. ondansetron (ZOFRAN ODT) 8 mg disintegrating tablet   No No   Sig: Take 1 Tab by mouth every eight (8) hours as needed for Nausea or Vomiting. oxyCODONE-acetaminophen (PERCOCET 10)  mg per tablet   No No   Sig: Take 1 Tablet by mouth every eight (8) hours as needed for Pain for up to 30 days. Max Daily Amount: 3 Tablets. ruxolitinib 5 mg tab   No No   Sig: Take 1 Tab by mouth two (2) times a day. Patient taking differently: Take 5 mg by mouth two (2) times a day. Sometimes only takes at night due to N/V/D   zolpidem (AMBIEN) 10 mg tablet   No No   Sig: Take 1 Tablet by mouth nightly as needed for Sleep. Max Daily Amount: 10 mg.       Facility-Administered Medications: None       Past Medical History   Past Medical History:   Diagnosis Date    Acute blood loss anemia 4/6/2018    Anemia     C. difficile diarrhea 4/1/2015    Cerebral edema (HCC) 4/1/2015    Chronic migraine 9/22/2016    Chronic myelogenous leukemia ([facility]     Blakeslee chromosome/ converted from polycythemia in 2009- to 2012 when she was dx w leukemia    Chronic pain     Coagulation disorder ([facility]     \"clotting and Bleeding Problem\" dr Rojas Blakely follows     Esophageal spasm     with banding     Esophageal varices ([facility]     grade 3    GI bleed 8/3/2016    H/O craniotomy     3-29-15.  due to blood clot - which caused a seizure  - then pt fell and hit     Hematemesis 8/20/2021    Leukocytosis 3/14/2015    Melena 8/20/2021    MRSA colonization 6/25/2012    Polycythemia vera(238.4)     JAK2 mutation    Portal hypertension ([facility]     with varices    Portal vein thrombosis 6/20/2012 Ct scan  Apparent occlusion of the portal vein. In addition, the splenic vein, and superior mesenteric vein are not definitely seen and are also likely  occluded. Splenomegaly, and extensive varices are seen as described above consistent with the portal vein occlusion 12 on arixtra HIT negative on repeat 12 re admitted Cirrhotic appearance of the liver. Mild to moderate ascites, as    Primary hypothyroidism     Pulmonary embolism ([address]     not on coumadin anymore     S/P exploratory laparotomy, 12    Bowel resection:  336 cm of small bowel removed, approximately 9 feet and placement of feeding jejunostomy.      S/P small bowel resection     .  9 feet removed due to  gangrene which wa due to blood clot    Seizure ([address] 3/14/2015    Seizure disorder ([address] 3/30/2015    due to clotting factor    Seizures ([address]     last one 3- - followed by aniyah     Splenomegaly, congestive, chronic     Stroke (cerebrum) ([address] 2015    had bled into head- surgery    Stroke [facility])     pt had stroke like symptoms     Thrombocytopenia, HIT negative 2012 platelets lower repeat HIT 12 platelets in 22,949'H    Thrombosis, portal vein     portal , spleenic and recently superior mesenteric    Transfusion history     many blood tranfusions - last 3-29-15 after brain surgery    Traumatic hemorrhage of right cerebrum ([address] 3/30/2015       Past Surgical History:   Procedure Laterality Date    HX APPENDECTOMY      with small bowel resection    HX BREAST BIOPSY Bilateral     Lt - ; Rt -     HX CHOLECYSTECTOMY      HX GI      liver biopsy    HX GI      bowel removed small - 9 feet     HX GYN       x 2    HX GYN      D&C following miscarriage    HX ORTHOPAEDIC Left 2008    torn labrum shoulder (screw in place)    NEUROLOGICAL PROCEDURE UNLISTED  2010    craniotomy to evacuate subdural hematoma following a fall from a seizure    GA ABDOMEN SURGERY PROC UNLISTED      umbilical hernia repair    IN ABDOMEN SURGERY PROC UNLISTED      9ft of small bowel excised then reconnected       Social History   Social History     Socioeconomic History    Marital status: SINGLE   Tobacco Use    Smoking status: Former Smoker     Packs/day: 0.20     Years: 10.00     Pack years: 2.00     Types: Cigarettes     Quit date:      Years since quittin.1    Smokeless tobacco: Never Used   Substance and Sexual Activity    Alcohol use: No    Drug use: No       Family History:   Family History   Problem Relation Age of Onset    Diabetes Mother     Stroke Mother         after surgery    Cancer Father         brain    No Known Problems Sister     Other Sister         diverticulitis    Other Sister         diverticulitis    Cancer Sister         lyjmphoma    Breast Cancer Maternal Aunt 48    Thyroid Disease Paternal Grandmother        REVIEW OF SYSTEMS:   A 14 point review of systems was taken and pertinent positive as per HPI. PHYSICAL EXAMINATION:  Vital 24 Hour Range Most Recent Value  Temperature Temp  Min: 98.4 °F (36.9 °C)  Max: 102.9 °F (39.4 °C) 98.4 °F (36.9 °C)    Pulse Pulse  Min: 87  Max: 98 87  Respiratory Resp  Min: 18  Max: 20 19  Blood Pressure BP  Min: 94/56  Max: 124/58 (!) 101/57  Pulse Oximetry SpO2  Min: 95 %  Max: 98 % 95 %  O2 No data recorded      Vital Most Recent Value First Value  Weight 56.2 kg (124 lb) Weight: 56.2 kg (124 lb)  Height 5' 2\" (157.5 cm) Height: 5' 2\" (157.5 cm)  BMI   N/A    Physical Exam:   General:     No acute distress, speaking in full sentences. Eyes:   No palpebral pallor or scleral icterus. ENT:   External auricular and nasal exam within normal limits. Cardiovascular: No cyanosis or edema of extremities. Respiratory:    No respiratory distress or accessory muscle use. Gastrointestinal:   Not actively vomiting, abdomen non-distended   Skin:      Normal color.  No rashes, lesions, or jaundice. Neurologic:  CN II-XII grossly intact and symmetrical.      Moving all four extremities well with no focal deficits. Psychiatric:  Appropriate affect. Alert       Intake/Output last 3 shifts:  Date 02/26/22 1900 - 02/27/22 0659(Not Admitted) 02/27/22 0700 - 02/28/22 0659   Shift 1349-2735 24 Hour Total 0307-8427 4639-1441 24 Hour Total   INTAKE   I.V.    500 500     Volume (lactated ringers bolus infusion 1,000 mL)    500 500   Shift Total(mL/kg)    500(8.9) 500(8.9)   OUTPUT   Shift Total(mL/kg)        NET    500 500   Weight (kg)    56.2 56.2       Labs:  magnesium:7,phos:7)CMP:   Lab Results   Component Value Date/Time     (L) 02/27/2022 06:14 PM    K 3.6 02/27/2022 06:14 PM    CL 95 (L) 02/27/2022 06:14 PM    CO2 26 02/27/2022 06:14 PM    AGAP 5 (L) 02/27/2022 06:14 PM     (H) 02/27/2022 06:14 PM    BUN 22 02/27/2022 06:14 PM    CREA 0.72 02/27/2022 06:14 PM    GFRAA >60 02/27/2022 06:14 PM    GFRNA >60 02/27/2022 06:14 PM    CA 8.2 (L) 02/27/2022 06:14 PM    ALB 2.5 (L) 02/27/2022 06:14 PM    TBILI 1.4 (H) 02/27/2022 06:14 PM    TP 6.2 (L) 02/27/2022 06:14 PM    GLOB 3.7 (H) 02/27/2022 06:14 PM    AGRAT 0.7 (L) 02/27/2022 06:14 PM    ALT 22 02/27/2022 06:14 PM         CBC:    Lab Results   Component Value Date/Time    WBC 12.5 (H) 02/27/2022 06:14 PM    HGB 10.0 (L) 02/27/2022 06:14 PM    HCT 29.0 (L) 02/27/2022 06:14 PM    PLT 98 (L) 02/27/2022 06:14 PM       Lab Results   Component Value Date/Time    INR 1.8 08/23/2021 07:15 PM    INR 1.2 08/20/2021 10:25 AM    INR 1.2 10/20/2020 10:50 AM    Prothrombin time 21.0 (H) 08/23/2021 07:15 PM    Prothrombin time 15.3 (H) 08/20/2021 10:25 AM    Prothrombin time 15.7 (H) 10/20/2020 10:50 AM       ABG:  No results found for: PH, PHI, PCO2, PCO2I, PO2, PO2I, HCO3, HCO3I, FIO2, FIO2I        Lab Results   Component Value Date/Time    CK 63 08/05/2021 04:58 PM       Imagining & Other Studies    XR Results (maximum last 3):   [place] [facility] [place]    XR CHEST PORT    Narrative  EXAMINATION: XR CHEST PORT 2/27/2022 6:33 PM    ACCESSION NUMBER: [id number]    COMPARISON: 2/2/2022    INDICATION: Back pain and fever    TECHNIQUE: A single view of the chest was obtained. FINDINGS:  Support Devices:  *  None    Cardiac Silhouette: Cardiac silhouette is borderline enlarged, similar to prior  studies. Mediastinum: Normal mediastinal contours. Lungs: Bilateral perihilar and basilar predominant airspace opacities with  septal thickening. No pneumothorax or sizable pleural effusion. Upper Abdomen: Normal    Miscellaneous: No fracture or suspicious osseous lesion. Impression  Bilateral perihilar airspace disease, suspicious for pulmonary edema. Results from [place] encounter on 02/02/22    XR CHEST PORT    Narrative  Chest X-ray    INDICATION: Leukemia patient with fever and chills. COMPARISON: Chest x-ray 8/22/2021. A portable AP view of the chest was obtained. FINDINGS: The lungs are clear. There are no infiltrates or effusions. The heart  size is normal.  The bony thorax is intact. Impression  No acute findings in the chest      Results from Hospital Encounter encounter on 08/20/21    XR CHEST SNGL V    Narrative  Portable chest x-ray    CLINICAL INDICATION: Sepsis    FINDINGS: Single AP view the chest compared to a similar exam dated 8/8/2021  show the lungs to be slightly underexpanded but otherwise clear. No pleural  effusion or pneumothorax. The cardiac silhouette and mediastinum are  unremarkable. Impression  Slightly under expanded lungs, otherwise no acute abnormality. CT Results (maximum last 3): Results from [place] encounter on 02/27/22    CT HEAD WO CONT    Narrative  Noncontrast CT of the brain.     COMPARISON: 2016    INDICATION: Confusion, headache    TECHNIQUE: Contiguous axial images were obtained from the skull base through the  vertex without IV contrast. Radiation dose reduction techniques were used for  this study:  Our CT scanners use one or all of the following: Automated exposure  control, adjustment of the mA and/or kVp according to patient's size, iterative  reconstruction. FINDINGS:    There is no acute intracranial hemorrhage or evidence for acute territorial  infarction. There is no mass effect, midline shift or hydrocephalus. There is no  extra-axial fluid collection. The cerebellum and brainstem are grossly  unremarkable. Encephalomalacia in the right occipital lobe, consistent with old injury. There  is overlying craniotomy defect. Findings similar to 2016 study. Included globes appear intact. The visualized paranasal sinuses and the mastoid  air cells are aerated. There is no acute skull fracture. Impression  1. No CT evidence of acute intracranial process. Results from [place] encounter on 08/20/21    CT ABD PELV W CONT    Narrative  CT of the Abdomen and Pelvis with contrast    CLINICAL INDICATION:  Severe weakness and anemia, recurrent blood loss,  recurrent infection, E. coli bacteremia. History of colitis, splenomegaly,  ascites, portal vein occlusion, CML polycythemia, lung nodules. COMPARISON: 10/20/2020 CT and 11/14/2019 MRI    TECHNIQUE: Dose reduction technique used: Automated exposure Control and/or  adjustment of mA and kV according to patient size. Multiple axial images were  obtained through the abdomen and pelvis after intravenous injection of 125cc of  isovue 370 IV contrast to further evaluate vessels and organs. Coronal  reformatted images were done for further evaluation of bones and organs. Oral  contrast was given for further evaluation of GI tract and adjacent organs. FINDINGS: Partially included lung bases demonstrate small bilateral posterior  layering pleural effusions and partial atelectasis of both lower lungs, stable  cardiomegaly, and a small pericardial effusion.  There is generalized edema  throughout the body wall. Abdomen: There are chronic varices in the paraesophageal mediastinum, upper  abdomen, associated with chronic portal vein thrombus and occlusion (not  definitely changed). Stable chronic splenomegaly. There are no acute  abnormalities in the liver or spleen. The adrenal glands and pancreas appear  unremarkable. Cholecystectomy again noted without interval complication evident. There is normal enhancement of the kidneys, no hydronephrosis. Appendix not definitely seen but no retrocecal inflammation. No interval  lymphadenopathy. Small volume ascites has slightly increased. No free air, focal  inflammatory changes or focal drainable fluid collections in the abdomen. Aorta  normal caliber. Patent major vessels. There is no evidence of bowel obstruction. Small chronic hernia in the anterior  abdominal wall containing fluid and mesenteric vessels but no bowel. There is  nonspecific wall thickening of multiple loops of small and large bowel, with  similar finding on prior imaging, which could be passive congestion (less likely  infection or inflammation). Surgical changes again noted compatible with partial  resection of proximal small bowel. Pelvis:  No acute inflammatory changes or fluid collections in the pelvis. No  lymphadenopathy or mass. Bones: Multilevel compression deformities are noted of the thoracolumbar spine,  not definitely changed from prior imaging. No evidence of a displaced fracture  or dislocation. Impression  1. No evidence of abdominal abscess. 2. Ascites and bilateral pleural effusions, worsened since prior. 3. Chronic findings compatible with known portal vein thrombus including  splenomegaly, varices.       Results from [place] encounter on 08/08/21    CT LOW EXT RT WO CONT    Narrative  CT of the right lower extremity without contrast    INDICATION:  Subacute worsening severe swelling, pain, discomfort and skin color  changes comments cellulitis. Evaluate for necrotizing fasciitis. Patient with  history of DVT, CML, polycythemia, splenomegaly, portal venous thrombus,  colitis. COMPARISON: Ultrasound duplex of the lower extremity 8/5/2021    TECHNIQUE: Dose reduction technique used: Automated exposure Control and/or  adjustment of mA and kV according to patient size. Multiple axial images were  obtained through the right lower extremity without intravenous contrast. Coronal  and sagittal reformatted images were also performed for further evaluation of  osseous structures. FINDINGS: There is no evidence of fracture or subluxation. No focal aggressive  bony lesions are seen. Note that fine detail of bones is limited given the large  field of view on this examination to include the entire lower extremity, and  settings tailored for soft tissue evaluation given the indication. No evidence of a focal fluid collection to suggest drainable abscess. Unchanged  mildly enlarged inguinal lymph nodes, nonspecific but most likely benign  reactive in this setting. Generalized skin thickening and subcutaneous edema are  noted in keeping with cellulitis. No abnormal subcutaneous gas collections are  evident. Note of partially visualized abdominopelvic small volume ascites, and  nonspecific appearance of several bowel loops. Impression  1. No evidence of abnormal subcutaneous gas collection or focal  fluid collection. 2. Cellulitis. 3. No acute bony abnormalities. Note that necrotizing fasciitis is predominantly a clinical diagnosis. MRI Results (maximum last 3): Results from [place] encounter on 11/14/19    MRI ABD W WO CONT    Narrative  MRI abdomen without and with contrast, 11/15/2019:    Indication: Splenomegaly. History of occluded portal vein. History of CML  polycythemia.     Procedure: Multiplanar, multisequence imaging of the abdomen was performed prior  to, and following the uneventful intravenous administration of 11 mL Dotarem. Sequences obtained: Axial T2 with and without fat saturation; coronal T2; axial  T1 with and without fat saturation; axial T1 in and out-of-phase; and axial  fat-saturated T1-weighted images prior to, and following the administration of  contrast. Postcontrast imaging was performed using a dynamic technique. Comparison: MRI abdomen with and without contrast 11/1/2018    Findings:  Evaluation of the lung bases is limited by MRI imaging. No obvious pulmonary  abnormalities are seen. No pleural effusion is seen. No evolving defined enhancing hepatic lesion is seen. Asymmetric enhancement is  once again seen in segment 2 of the left lobe of the liver which may be related  to vascular changes from chronic portal vein occlusion. Once again, no defined  portal vein is seen. Small vascular structures are seen in the aaron hepatis  with stable enhancement of attenuated appearing central portal veins in the  liver. Overall, this appearance is not significantly changed from the prior  study. The hepatic veins are patent. Prior intrahepatic biliary ductal dilation  does appear worsened. Abnormal enhancement and wall thickening of the extra  hepatic bile ducts is once again seen which is not significantly changed  although dilation of the extra hepatic bile ducts is worsened which now measures  13 mm in diameter. No filling defect is seen within the extra hepatic bile ducts  to suggest an obstructing abnormality such as a stone. No evolving pancreatic  ductal dilation is seen. Grossly stable severe splenomegaly is seen which measures 21.3 cm in  craniocaudal dimension. Stable focal hypointensities are seen within the spleen  which may represent Gam[place]-Wilbur bodies. No contour deforming or enhancing  abnormalities are seen of the pancreas or adrenal glands. The gallbladder has  been removed. No evidence of hydronephrosis is seen of the kidneys.  No contour  deforming abnormality is seen of the kidneys. The loops of bowel are normal in caliber. Stable trace perihepatic and  perisplenic ascites is seen. Impression  IMPRESSION:  1.  Stable appearance of the portal venous system without a defined portal vein  with small venous collaterals identified in the aaron hepatis and persistent  enhancement is central portal veins within the liver. In addition, stable severe  splenomegaly and trace upper abdominal ascites. 2. Abnormal wall thickening and enhancement of the common bile duct which is not  significant changed. However, worsening intrahepatic, and extra hepatic biliary  ductal dilation is seen. No filling defect is seen within the common bile duct  on MRCP images to suggest an obstructing abnormality such as a stone and  therefore this could be related to the abnormal wall changes of the common bile  duct. Results from [place] encounter on 11/01/18    MRI ABD W WO CONT    Narrative  MRI abdomen without and with contrast, 11/2/2018: Indication: Splenomegaly and portal vein thrombosis. History of polycythemia  vera, and CML. Procedure: Multiplanar, multisequence imaging of the abdomen was performed prior  to, and following the uneventful intravenous administration of 12 mL Dotarem. Sequences obtained: Axial T2 with and without fat saturation; coronal T2; axial  T1 with and without fat saturation; axial T1 and out-of-phase; and axial  fat-saturated T1-weighted images prior to, and following the administration of  contrast. Postcontrast imaging was performed using a dynamic technique. Comparison: MRI abdomen with and without contrast 6/22/2017    Findings:  Evaluation of the lung bases is limited by MRI imaging. No obvious pulmonary  abnormalities are seen. No pleural effusion is seen. The liver is homogeneous in its appearance. No significant fatty infiltration is  seen. No evolving intrahepatic biliary ductal dilation is seen.  The spleen is  once again severely enlarged although not felt to be significantly changed from  the prior MRI currently measuring 21.5 cm in length and previously measuring 22  cm. No evolving enhancing abnormality is seen of the spleen. Stable branching  hypointense structures are seen in the more inferior spleen which are  incompletely characterized although not felt to be worrisome given that similar  findings were seen on a prior MRI study dated 7/10/2015. No contour deforming  or enhancing abnormalities are seen of the pancreas or adrenal glands. The  gallbladder is been removed. No evidence of hydronephrosis is seen of the  kidneys. No contour deforming abnormality is seen of the kidneys. Previously, there was severe wall thickening of the common bile duct which is  not felt to be significantly changed from the most recent MRI study dated  6/22/2017. There does appear to be extension of these changes into the central  liver with the change best appreciated on T2-weighted images. This intrahepatic  involvement is also not felt to be significantly changed when compared to the  most recent MRI study. Only mild enhancement is seen associated with these  changes with the degree of enhancement felt to be improved when compared to the  prior MRI study dated 7/10/2015. Once again, there appears to be chronic  occlusion of the portal vein without a defined portal vein seen. Multiple small  venous structures are seen in the aaron hepatis likely representing sequela of  cavernous transformation. The loops of bowel are normal in caliber. Wall thickening is seen of the right  colon. Similar wall thickening was suggested on the most recent MRI study dated  6/22/2017. Asymmetric right colon wall thickening can be seen with severe portal  hypertension. Given the stability and lack of acute symptoms, this is felt to  result from portal hypertension. Small scattered ascites is seen most evident  about the liver, and in the right paracolic gutter. This does appear slightly  increased from prior studies. Stable large varices are seen about the spleen. No  evolving adenopathy is seen. Impression  IMPRESSION:  1. Persistent wall thickening of the extra hepatic bile ducts with additional  involvement of the central portal triads which overall is not felt to be  significantly changed when compared to the most recent MRI study dated  6/22/2017. Associated enhancement is seen which is felt to be improved when  compared to the most recent contrasted MRI study dated 7/10/2015. 2.  Chronic occlusion of the portal vein. Likely secondary splenomegaly,  varices, ascites, and right colon wall thickening are seen. The degree of  ascites does appear slightly increased when compared to prior studies. Results from [place] encounter on 06/18/18    MRI [facility] SPINE WO CONT    Narrative  MRI THORACIC SPINE WITHOUT CONTRAST. HISTORY: Leukemia, polycythemia vera and possible compression fractures of  thoracic spine on outside CT scan which is not available for comparison. PET/CT  in February 2018 did not report or demonstrate focal bony lesions. COMPARISON: None. PET/CT is reviewed. TECHNIQUE:  Sagittal T1 and T2 images of the thoracic spine. Sagittal localizer  image obtained with skin marker. FINDINGS: Spinal alignment is anatomic. Bone marrow signal intensity is  abnormal. There are \"bright\" disks compared to the low T1 marrow signal of the  thoracic vertebral bodies. Spinal alignment within normal limits. Spinal canal  is capacious. Thoracic spinal cord size and signal intensity appears normal. No  focal disc herniations or cord impingement. There is focal increased STIR and T2  signal in the superior endplate of E24, small superior endplate fracture or  acute Schmorl's node. MRI LUMBAR SPINE WITHOUT CONTRAST.     HISTORY: Leukemia, polycythemia vera and possible compression fractures of  thoracic spine on outside CT scan which is not available for comparison. PET/CT  in February 2018 did not report or demonstrate bony metastases. COMPARISON: None. Recent PET/CT reviewed. TECHNIQUE:  Axial and sagittal, T1 and T2 and sagittal STIR images. FINDINGS:  The conus is at the L1-2 level. Spinal alignment anatomic. Bone  marrow signal intensity is abnormally decreased with \"bright\" disks on T1. Included portion of the retroperitoneum unremarkable although the spleen may be  enlarged. L1-L2: No focal disc herniation or stenosis. Small superior endplate fracture or  acute Schmorl's node superior endplate of L1. L2-L3: No focal disc herniation or stenosis. Mild facet arthropathy. L3-L4: No focal disc herniation or stenosis. Mild facet arthropathy. L4-L5: Mild central stenosis due to hypertrophic facet arthropathy. Mild  foraminal stenoses. No focal disc herniation. L5-S1:  No focal disc herniation or stenosis. Impression  IMPRESSION: Thoracic:  Diffuse bone marrow edema abnormality, consistent with  myelophthisic disease. Small acute Schmorl's node or superior endplate fracture  the body of T12. IMPRESSION: Lumbar: diffuse bone marrow edema abnormality, consistent with  myelophthisic disease. Small acute Schmorl's node or superior endplate fracture  in the body of L1. Nuclear Medicine Results (maximum last 3): No results found for this or any previous visit. US Results (maximum last 3): Results from Hospital Encounter encounter on 04/09/18    US HEAD NECK SOFT TISSUE    Narrative  Ultrasound of the right supraclavicular fossa 4/9/2018    Multiple hypoechoic nodules were demonstrated within the right supraclavicular  region of which the largest measures 14 mm. Thrombus was also demonstrated  within the right internal jugular, right subclavian and right innominate veins.     Impression  IMPRESSION: Venous thrombosis and adenopathy as above      Results from Hospital Encounter encounter on 02/06/18    US BREAST LT LIMITED=<3 QUAD    Narrative  DIAGNOSTIC DIGITAL left MAMMOGRAPHY AND left BREAST ULTRASOUND    CLINICAL INDICATION: Additional evaluation of asymmetries in the left posterior  upper breast, patient reports remote benign surgical excision, maternal aunt  with breast cancer    COMPARISON: 1/31/2018 and others back to 6/10/2008    FINDINGS:    Mammogram: Digital diagnostic mammography was performed, and is interpreted in  conjunction with a computer assisted detection (CAD) system. Additional left mediolateral and compression magnification views were performed. There is no definite mass or distortion in posterior left upper breast, although  there remains heterogeneously dense glandular tissue throughout the posterior  upper outer quadrant. Therefore ultrasound is recommended to exclude a small  obscured lesion. There are diffuse typically benign calcifications throughout  the left breast with no dominant or suspicious cluster. No evidence of skin  thickening or nipple retraction. Ultrasound: Real time targeted sonography was performed of the left upper outer  quadrant by the radiologist and sonographer. There are extensive benign  fibroglandular elements without evidence of a discrete cyst, solid mass,  distortion, or abnormal shadowing. Impression  IMPRESSION: No suspicious left breast findings. Benign dense fibroglandular  elements. Recommend screening mammography in one year. A reminder letter will be  scheduled. This was discussed in full with the patient at the time of  interpretation. BI-RADS Assessment Category 2: Benign finding      Results from Hospital Encounter encounter on 10/26/16    US HEAD SOFT TISSUE    Narrative  Clinical history: Right    TECHNIQUE: Focused ultrasound evaluation of the right neck, in the area of  swelling. COMPARISON: None. FINDINGS:    Prominent lymph nodes are noted. There is a 1.2 cm short axis lymph node. A 1 cm  short axis lymph node is also identified. These lymph nodes demonstrates  cortical thickening. Impression  IMPRESSION:    Pathologic-appearing lymph nodes nodes in the right neck, in the area of  concern. Dedicated contrast enhanced CT of the neck may be of value. DEXA Results (maximum last 3): Results from [place] encounter on 04/23/18    DEXA BONE DENSITY STUDY AXIAL    Narrative  Bone Mineral Density    Indication: Postmenopausal    Comparison: None. Findings:  Lumbar spine: L1-L4  Bone mineral density (gm/cm2):  0.795  T score:  -3.2  Z score:  -2.1    Hip: Left femoral neck  Bone mineral density (gm/cm2):  0.677  T score:  -2.6  Z score:  -1.4    10 year fracture risk (FRAX):  Major osteoporotic:  18.5%  Hip fracture:  4.2%    Impression  Impression:  Using the World Health Organization criteria, the bone mineral density is  osteoporotic. **    The [address] [place] [address] recommends that medical therapy be  considered in postmenopausal women and men age 48 years and older with a:  a. Hip or vertebral fracture,  b. T-score <= -2.5 in the spine or hip (osteoporotic), or  c.  T-score between -1.0 and -2.5 (low bone mass, osteopenic), and FRAX =>  3% for hip fracture or => 20% for major osteoporotic fracture. [facility] Results (maximum last 3): Results from [place] encounter on 02/06/18    [facility] MAMMO LT DX INCL CAD    Narrative  DIAGNOSTIC DIGITAL left MAMMOGRAPHY AND left BREAST ULTRASOUND    CLINICAL INDICATION: Additional evaluation of asymmetries in the left posterior  upper breast, patient reports remote benign surgical excision, maternal aunt  with breast cancer    COMPARISON: 1/31/2018 and others back to 6/10/2008    FINDINGS:    Mammogram: Digital diagnostic mammography was performed, and is interpreted in  conjunction with a computer assisted detection (CAD) system. Additional left mediolateral and compression magnification views were performed.   There is no definite mass or distortion in posterior left upper breast, although  there remains heterogeneously dense glandular tissue throughout the posterior  upper outer quadrant. Therefore ultrasound is recommended to exclude a small  obscured lesion. There are diffuse typically benign calcifications throughout  the left breast with no dominant or suspicious cluster. No evidence of skin  thickening or nipple retraction. Ultrasound: Real time targeted sonography was performed of the left upper outer  quadrant by the radiologist and sonographer. There are extensive benign  fibroglandular elements without evidence of a discrete cyst, solid mass,  distortion, or abnormal shadowing. Impression  IMPRESSION: No suspicious left breast findings. Benign dense fibroglandular  elements. Recommend screening mammography in one year. A reminder letter will be  scheduled. This was discussed in full with the patient at the time of  interpretation. BI-RADS Assessment Category 2: Benign finding      Results from Hospital Encounter encounter on 01/31/18    [facility] MAMMO BI SCREENING INCL CAD    Narrative  BILATERAL SCREENING MAMMOGRAM, 1/31/2018. CLINICAL HISTORY: Routine screening mammogram.    Comparison studies:  Screening mammograms 6/27/2014, and 6/10/2008. FINDINGS:    Bilateral digital screening mammography, interpreted in conjunction with CAD  overread. The breasts are dense which decreases the sensitivity of mammography. The axilla contain benign appearing lymph nodes. No evidence of evolving skin  thickening, or nipple retraction is seen. Numerous bilateral breast  calcifications are seen. Many of these are felt to be benign being either  stable, demonstrating a benign morphology, being symmetrically distributed in  the bilateral breasts, or being scattered. No definite evolving unique  clustered microcalcifications are seen to suggest a worrisome process. There  appears to be significantly different technique used on the current examination.   There does appear to be a new asymmetry in the upper, posterior left breast on  one of the MLO view submitted. Impression  IMPRESSION:  1. New upper left breast asymmetry which may be due to differences in technique  although a similar asymmetry is not seen any prior comparison study. Therefore,  the patient should return for focal compression diagnostic mammogram, and  ultrasound if indicated. We are mailing breast density information to the patient along with the  mammogram report. A reminder letter will be scheduled at the appropriate time pending additional  views. BI-RADS Assessment Category 0: Incomplete: Needs additional imaging evaluation. BD4      IR Results (maximum last 3): Results from Hospital Encounter encounter on 10/26/16    IR VENOGRAPHY EXT RT    Narrative  Title:  Right upper extremity venogram, power pulse spray tPA thrombolysis,  suction thrombectomy, venous angioplasty. Ultrasound guidance for vascular  access. Indication:  Very pleasant 66-year-old woman with acute/subacute right upper  extremity subclavian vein thrombosis, swelling, and pain. Patient's history is  significant for multiple previous venous thromboses (superior sagittal sinus  thrombosis March, 2015 and portal vein thrombosis June, 2012). Right subclavian  vein thrombus formed while patient was being treated with Lovenox for PICC  associated left upper extremity venous thrombus. Consent:  Informed written and oral consent was obtained from the patient  following explanation of benefits and risks of procedure (including, but not  limited to: hemorrhage, vascular injury). Her questions were answered to their  satisfaction. She stated understanding and requested that we proceed. Procedure:  Maximal sterile barrier technique (including:  cap, mask, sterile  gown, sterile gloves, sterile sheet, hand hygiene, and cutaneous antisepsis) was  used. Local anesthesia with lidocaine was achieved.   Using real-time  ultrasound guidance, with appropriate image recording and visualization of  vascular needle entry, the patent portion of the left basilic vein was accessed  with a micropuncture needle. Using fluoroscopy, the cope wire was advanced but  met resistance. Therefore, right basilic venography was performed demonstrating  segmental occlusion of this vein. Following local anesthesia administration, the larger of the 2 patent right  brachial veins was accessed using micropuncture technique. Using fluoroscopy, a  7 Malay vascular sheath was placed over a Amplatz wire. Right brachial  venography was performed with imaging through to the central chest.    Using a Kumpe catheter and Glidewire, the thrombosed/occluded right subclavian  vein was successfully traversed. Using the AngioJet device, power pulse spray tPA thrombolysis was performed in  the right subclavian vein. Following a 12-13 minute dwell time, AngioJet  mechanical thrombectomy was performed in the same vein. Post thrombectomy right axillary venogram was performed demonstrating  improvement, but persistent narrowing. Therefore, the right subclavian vein was  treated to 12 x 40 mm balloon angioplasty followed by suction thrombectomy with  a 7-Malay guide catheter. Suction thrombectomy returned a combination of acute  and more chronic-appearing thrombus. Post thrombectomy right axillary venography was performed demonstrating  significantly improved flow through the widely patent right subclavian vein. Venography revealed nonocclusive thrombus in a duplicated right axillary vein. Using a perforating vein from the brachial vein, the basilic vein was accessed  and the nonocclusive thrombus was macerated with wire and catheter. A right  basilic venogram demonstrates significant improvement with minimal residual  thrombus. All catheters wires and sheaths were removed. Hemostasis was achieved with  manual compression.     Complications: None.    Medications:  90 minutes of moderate intravenous conscious sedation was  performed  under my direct supervision with Versed, fentanyl, Benadryl. Continuous cardiorespiratory monitoring was employed throughout. Fluoroscopy:  11 minutes 43 seconds. Contrast:  90 cc Isovue-300. Findings:  Normal diameter, patent, right brachial vein. A paired brachial vein  was visible with ultrasound but was not fully evaluated. The brachial vein and  in  Axillary vein are patent. The right subclavian vein is completely occluded by  thrombus. Following thrombolysis and thrombectomy, a moderate narrowing within  the right subclavian vein was uncovered. This was successfully treated with 12  mm balloon angioplasty restoring flow through the right subclavian vein. Patent right brachiocephalic vein. Patent superior vena cava. Right basilic vein Is patent near the elbow, but occluded in the arm. Perforating veins arising from the brachial vein reconstitute the central right  basilic vein. The right basilic vein drains into the right subclavian vein  resulting in a duplicated axillary vein segment. This duplicated axillary vein  segment was successfully treated with thrombus maceration/disruption. Impression  Impression:  Right subclavian vein occlusion secondary to acute and subacute  thrombus. Underlying moderate narrowing in the medial right subclavian vein,  likely related to extrinsic compression. Plan:  2 hours bedrest.   Elevated right upper extremity. Continue Lovenox  therapy. Recommend Lovenox therapy for at least one month. Recommend indefinite  anticoagulation. Follow-up in my office in 2-4 weeks. VAS/US Results (maximum last 3): Results from [place] encounter on 08/05/21    DUPLEX LOWER EXT VENOUS BILAT    Narrative  HISTORY: Bilateral lower extremity swelling, 2 weeks duration. History of DVT.     Exam: Bilateral lower extremity ultrasound    Technique: Realtime grayscale and color Doppler imaging is performed in the  longitudinal and transverse planes. FINDINGS: There is normal flow, augmentation, and compressibility within the  deep venous system from the groin to popliteal fossa. Posterior tibial vein and  peroneal vein are also normal. No Baker's cyst.    Impression  S: No evidence of deep venous thrombosis within the lower extremities. Results from Hospital Encounter encounter on 04/09/18    DUPLEX UPPER EXT VENOUS RIGHT    Narrative  Right upper extremity Doppler venous ultrasound April 9, 2018    INDICATION: Extremity pain x1 day    Findings    Conclusion of the right innominate, right internal jugular, right subclavian,  and partial occlusion of the right cephalic vein is noted. The right axillary,  basilar, brachial, radial, and the ulnar veins are patent. Impression  IMPRESSION: deep venous thrombosis as above      Results from Hospital Encounter encounter on 11/04/16    DUPLEX UPPER EXT VENOUS LEFT    Narrative  Left upper extremity venous duplex exam.    CLINICAL INDICATION: Prior left  DVT    PROCEDURE: Real-time grayscale, color, and spectral Doppler analysis of the left  upper extremity deep venous system from the internal jugular vein through the  radial and ulnar veins. COMPARISON: Left arm venous duplex dated 10/20/2016    FINDINGS: There is normal compressibility where the veins are compressible. No  intraluminal echogenic filling defect identified to indicate deep venous  thrombosis. Impression  IMPRESSION: Negative left upper extremity venous duplex exam. No evidence for  DVT. PET Results (maximum last 3): Results from [place] encounter on 02/23/18    PET/CT TUMOR IMAGE SKULL THIGH (SUB)    Narrative  PET/CT: 2/23/2018    INDICATION: Lung nodule staging.     TECHNIQUE: After oral administration of gastroview and intravenous  administration of 15.71 mCi of F18 FDG, noncontrast CT images were obtained for  attenuation correction and for fusion with emission PET images. A series of  overlapping emission PET images were then obtained beginning 60 minutes after  injection of FDG. The area imaged spanned the region from the skull base to the  mid thighs. All CT scans performed at this facility use one or all of the  following: Automated exposure control, adjustment of the mA and/or kVp according  to patient's size, iterative reconstruction. COMPARISON: PET scan 5/27/2017    FINDINGS:  NECK/CHEST:  Decreased activity is seen in the right parietal cerebrum with an overlying  craniotomy defect. In general, only physiologic activity is seen in the neck. No  enlarging or hypermetabolic lymph node is seen. The patient's irregular nodular density in the right upper lobe is now seen on  image 94. The most prominent nodular component within this lesion does appear  decreased in size now measuring 11 mm (previously measured 15 mm). Significantly  improved activity is seen within this lesion with only minimal activity which is  not significantly higher than mediastinal blood pool activity. No new nodules,  or masses are seen. No evolving adenopathy is seen in the chest. Stable  cardiomegaly and small to moderate pericardial effusion are seen. ABDOMEN/PELVIS:  Only physiologic activity is seen in the abdomen. Diffuse mesenteric edema is  seen which appears worsened when compared to the prior study. Persistent  asymmetric wall thickening is seen of the right colon as well as more distal  small bowel loops. Mixed attenuation is seen within the portal vein which is  incompletely characterized. Only physiologic activity is seen in the pelvis. BONES:  No aggressive osseous lesion is seen. No hypermetabolic osseous lesion is  appreciated. Impression  IMPRESSION:  1.  Decreasing size of components of the irregular right upper lobe nodule and  significant improvement in prior activity associated with the right upper lobe  nodule which is therefore felt to be benign. 2.  Abdominal findings which are incompletely characterized. These include  splenomegaly, diffuse mesenteric edema, and wall thickening of the right colon. These can be seen in the setting of severe portal hypertension. No obvious  cirrhotic appearing changes are seen of the liver. However, mixed attenuation is  seen within the portal vein best appreciated on image 166 which is incompletely  characterized. The possibility of thrombus or obstructing material otherwise is  difficult to exclude. This would be best initially assessed with a limited  abdominal ultrasound. 3. Additional small bowel wall thickening is seen. Mesenteric edema and small  bowel wall thickening can be seen with processes such as lymphangiectasia  although this is unlikely to account for the spleen and colon changes described  above. Results from Hospital Encounter encounter on 05/25/17    PET/CT TUMOR IMAGE SKULL THIGH (SUB)    Narrative  PET/CT    Indication: Restaging chronic myelocytic leukemia status post chemotherapy April 2017    Radiopharmaceutical: 18.5 mCi F18-FDG, intravenously. Technique: Imaging was performed from the skull through the proximal thighs  using routine PET/CT acquisition protocol. Imaging was performed approximately  60 minutes post injection. Oral contrast was administered. Radiation dose  reduction techniques were used for this study:  Our CT scanners use one or all  of the following: Automated exposure control, adjustment of the mA and/or kVp  according to patient's size, iterative reconstruction. Serum glucose: 90 mg/dL prior to injection. Comparison studies: PET/CT 11/15/2016, chest CT 02/15/2017, CT abdomen and  pelvis without contrast 1/11/2017    Findings:    Head and Neck: No lymphadenopathy or abnormal FDG uptake. Chest: Right upper lobe 1.5 cm pulmonary nodule with Max SUV 2.7, unchanged. Cardiac enlargement with pericardial effusion similar to previous study. No  interval adenopathy or new focus of abnormal FDG uptake. Abdomen/Pelvis: No abnormal FDG uptake or lymphadenopathy. Diffuse marrow uptake  similar to prior study. No bowel obstruction. Splenomegaly again noted. Diffuse  soft tissue thickening at the biliary tree stable. Impression  IMPRESSION:  1. Stable splenomegaly. 2. Stable mild FDG uptake of right upper lobe pulmonary nodule of uncertain  significance. 3. No new sites of abnormality. Results from [place] encounter on 11/15/16    PET/CT TUMOR IMAGE SKULL THIGH W (INI)    Narrative  Nuclear Medicine Whole Body CT / PET- FDG Study 11/15/2016    INDICATION: Right lung apex mass. History of chronic leukemia. COMPARISONS: CT chest, abdomen and pelvis done 10/27/2016    TECHNIQUE:   Radiopharmaceutical: 14.59 mCi F18-FDG IV. This is a whole-body  PET- FDG study performed on a dedicated full- ring scanner. Low dose,  nondiagnostic CT axial source images are also acquired for PET attenuation  correction and are utilized for PET-CT fusion with the emission images. The patient not a diabetic and underwent adequate fasting of at least 4 hours. Blood glucose at the time of tracer administration is 90 mg/dl, which is  adequate for imaging. Approximately 60 minutes after administration of the  radiopharmaceutical imaging was initiated covering the skull to the thighs. SUV  max. Calculated from patient body wt. Noncaloric MDGASTROVIEW dilute oral  contrast is given. FINDINGS:  Examination of the 3D tomographic PET images demonstrates no  discrete suspicious hypermetabolic mass or lymphadenopathy at the head and neck. At the right lung apex laterally there is an irregular shaped soft tissue  density nodule a proximally 2 cm in size seen again. On PET imaging this shows  moderately increased FDG tracer activity associated with it with SUV Max of 3.7. I do not have any prior CT examinations at this site that image this position.   Its chronicity is indeterminate. There is no other discrete suspicious  hypermetabolic finding in the chest. There is a small amount of pericardial  fluid. Below the diaphragm in the abdomen and pelvis the spleen is noted again to be  markedly enlarged but with only low intensity associated activity, not greater  than that of the liver. Compared to a prior liver MRI of 7/10/2015 this is not  significantly increased in size. There appears to be mild ascites and mesenteric  edema. There is no suspicious skeletal lesion, nonfocal low intensity increased  activity is seen throughout the central skeleton compatible with reactive marrow  hyperplasia. Impression  IMPRESSION:  1. The roughly 2 cm irregular shaped soft tissue nodule is identified at the  right lung apex laterally and it shows moderate increased FDG activity  associated with it. An indeterminant, atypical for leukemic infiltrate, could be  infectious/inflammatory, cannot exclude bronchogenic lung cancer. No findings of  FDG avid metastatic disease in the chest or elsewhere. 2. Reactive marrow hyperplasia changes in the central skeleton. Marked  splenomegaly again.  There is not a focally prominent FDG focus outside of the  chest.        EKG Results     Procedure 720 Value Units Date/Time    EKG, 12 LEAD, INITIAL [[id number]] Collected: 02/27/22 2249    Order Status: Completed Updated: 02/27/22 2255     Ventricular Rate 88 BPM      Atrial Rate 88 BPM      P-R Interval 140 ms      QRS Duration 82 ms      Q-T Interval 360 ms      QTC Calculation (Bezet) 435 ms      Calculated P Axis 67 degrees      Calculated R Axis 67 degrees      Calculated T Axis 60 degrees      Diagnosis --     Normal sinus rhythm  Cannot rule out Anterior infarct (cited on or before 27-FEB-2022)  Abnormal ECG  When compared with ECG of 27-FEB-2022 19:18,  No significant change was found            Jone Luther MD  2/27/2022 11:17 PM

## 2022-02-28 NOTE — PROGRESS NOTES
Hospitalist Progress Note   Admit Date:  2022  6:00 PM   Name:  Arnie Johnson   Age:  64 y.o. Sex:  female  :  1960   MRN:  [id number]   Room:  [facility]    Presenting Complaint: No chief complaint on file. Reason(s) for Admission: Fever [R50.9]     Hospital Course & Interval History:   61F PMHx chronic anemia, CML, esophageal varices who presents to the ER with report of fever and headache for the past several days. Admits to fever up to 102. 1 two days ago. Feels malaise and weakness. Headache is global and associated with photophobia. Admits to feeling a bit confused as well. Admits to nausea, denies shortness of breath, cough ,vomiting. Subjective/24hr Events (22): Afebrile last 24 hours. Blood pressure is borderline. Thrombocytopenia worse, hyponatremia stable. ROS:  10 systems reviewed and negative except as noted above. Assessment & Plan:   Fever (2022)    In the context of CML. Uncertain etiology. UA shows sterile pyuria. CXR shows perihilar infiltrates, likely edema. Will repeat CXR in AM. Treat with IV Cefepime/Vanc. Blood/urine cultures pending. LP performed in ER as well, follow results. : Cultures positive for E. coli, LP concerning for E. coli: Continue bank cefepime  -Consult infectious disease       Hyponatremia (3/30/2015)    IV NS, follow BMP.  : Stable, recheck in a.m.       Leukocytosis (2022)    Follow CBC       AKOSUA (iron deficiency anemia) (2012)  : Labs notable for iron deficiency, replete       Cirrhosis ([address] (2012)    Stable.       Chronic myelogenous leukemia ([address] (3/30/2015)    Stable. Continue home meds. Consult oncology.       Primary hypothyroidism (2009)    Stable. Continue home meds.       Esophageal varices ([address] (2009)    Stable.  Continue home meds.       Body mass index (BMI) of 22.0 to 22.9 in adult (2021)    Stable.            Dispo/Discharge Planning:      Home in 4-5 days    Diet:  ADULT DIET Regular  ADULT ORAL NUTRITION SUPPLEMENT Lunch, Dinner; Standard High Calorie/High Protein  DVT PPx: enoxaparin  Code status: Full Code    Hospital Problems as of 2/28/2022 Date Reviewed: 2/8/2022          Codes Class Noted - Resolved POA    Body mass index (BMI) of 22.0 to 22.9 in adult ICD-10-CM: Z68.22  ICD-9-CM: V85.1  8/26/2021 - Present Yes        * (Principal) Fever ICD-10-CM: R50.9  ICD-9-CM: 780.60  2/27/2022 - Present Unknown        Leukocytosis ICD-10-CM: D72.829  ICD-9-CM: 288.60  2/27/2022 - Present Yes        Hyponatremia ICD-10-CM: E87.1  ICD-9-CM: 276.1  3/30/2015 - Present Unknown        Chronic myelogenous leukemia ([facility] [address] [facility] ICD-10-CM: C92.10  ICD-9-CM: 205.10  3/30/2015 - Present Yes        AKOSUA (iron deficiency anemia) ICD-10-CM: D50.9  ICD-9-CM: 280.9  7/28/2012 - Present Yes        Cirrhosis (HCC) (Chronic) ICD-10-CM: K74.60  ICD-9-CM: 571.5  7/27/2012 - Present Yes        Primary hypothyroidism ICD-10-CM: E03.9  ICD-9-CM: 244.9  2/16/2009 - Present Yes        Esophageal varices (HCC) (Chronic) ICD-10-CM: I85.00  ICD-9-CM: 456.1  2/16/2009 - Present Yes    Overview Addendum 8/26/2021  2:30 PM by Jud Willoughby MD     grade 3             RESOLVED: Normocytic anemia ICD-10-CM: D64.9  ICD-9-CM: 285.9  7/28/2012 - 2/27/2022 Unknown    Overview Signed 7/28/2012  2:11 PM by Doneta Rinne     Hemoglobin 8.0 gram   7/25/2012 17:03   Iron 27 (L)   TIBC 221 (L)   Transferrin Saturation 12 (L)                      Objective:     Patient Vitals for the past 24 hrs:   Temp Pulse Resp BP SpO2   02/28/22 1524 98.1 °F (36.7 °C) 93 16 (!) 93/56 94 %   02/28/22 1100 98.1 °F (36.7 °C) 98 16 (!) 91/52 92 %   02/28/22 0800 99.9 °F (37.7 °C) (!) 111 16 (!) 101/56 90 %   02/28/22 0654 (!) 102.5 °F (39.2 °C) (!) 120 15 (!) 112/53 92 %   02/28/22 0339 100.1 °F (37.8 °C) (!) 104 16 (!) 93/50 90 %   02/27/22 2348 100 °F (37.8 °C) 90 18 (!) 93/59 94 %   02/27/22 2300 -- 87 19 (!) 101/57 95 %   02/27/22 2200 -- 98 19 (!) 96/57 98 %   02/27/22 2146 -- 95 18 99/67 97 %   02/27/22 2136 98.4 °F (36.9 °C) -- -- -- --   02/27/22 2132 -- 92 19 (!) 94/56 98 %   02/27/22 2058 -- 92 19 -- --   02/27/22 1953 (!) 100.5 °F (38.1 °C) -- -- -- --   02/27/22 1807 (!) 102.9 °F (39.4 °C) -- 20 (!) 124/58 97 %     Oxygen Therapy  O2 Sat (%): 94 % (02/28/22 1524)  Pulse via Oximetry: 87 beats per minute (02/27/22 2300)  O2 Device: None (Room air) (02/28/22 1100)    Estimated body mass index is 23.01 kg/m² as calculated from the following:    Height as of this encounter: 5' 2\" (1.575 m). Weight as of this encounter: 57.1 kg (125 lb 12.8 oz). Intake/Output Summary (Last 24 hours) at 2/28/2022 1655  Last data filed at 2/28/2022 1541  Gross per 24 hour   Intake 618 ml   Output 240 ml   Net 378 ml       Blood pressure (!) 93/56, pulse 93, temperature 98.1 °F (36.7 °C), resp. rate 16, height 5' 2\" (1.575 m), weight 57.1 kg (125 lb 12.8 oz), SpO2 94 %, not currently breastfeeding. Physical Exam  Vitals and nursing note reviewed. Constitutional:       General: She is not in acute distress. Appearance: Normal appearance. She is ill-appearing. HENT:      Head: Normocephalic. Eyes:      Extraocular Movements: Extraocular movements intact. Cardiovascular:      Rate and Rhythm: Normal rate. Pulses:           Radial pulses are 2+ on the left side. Pulmonary:      Effort: Pulmonary effort is normal. No respiratory distress. Musculoskeletal:         General: No deformity. Cervical back: No rigidity. Right lower leg: No edema. Left lower leg: No edema. Skin:     General: Skin is warm and dry. Neurological:      General: No focal deficit present. Mental Status: She is alert and oriented to person, place, and time.    Psychiatric:         Mood and Affect: Mood normal.         Behavior: Behavior normal.         I have reviewed ordered lab tests and independently visualized imaging below:    Recent Labs:  Recent Results (from the past 48 hour(s))   CULTURE, BLOOD    Collection Time: 02/27/22  6:14 PM    Specimen: Blood   Result Value Ref Range    Special Requests: NO SPECIAL REQUESTS  LEFT  Antecubital        GRAM STAIN GRAM NEGATIVE RODS      GRAM STAIN        RESULTS VERIFIED, PHONED TO AND READ BACK BY JESUS RENE RN BY NB AT 2201 ON [id number]    GRAM STAIN AEROBIC AND ANAEROBIC BOTTLES      Culture result: CULTURE IN [address] UPDATES TO FOLLOW      Culture result:        Refer to Blood Culture ID Panel Accession  [id number]     LACTIC ACID    Collection Time: 02/27/22  6:14 PM   Result Value Ref Range    Lactic acid 2.8 (H) 0.4 - 2.0 MMOL/L   CBC WITH AUTOMATED DIFF    Collection Time: 02/27/22  6:14 PM   Result Value Ref Range    WBC 12.5 (H) 4.3 - 11.1 K/uL    RBC 3.15 (L) 4.05 - 5.2 M/uL    HGB 10.0 (L) 11.7 - 15.4 g/dL    HCT 29.0 (L) 35.8 - 46.3 %    MCV 92.1 79.6 - 97.8 FL    MCH 31.7 26.1 - 32.9 PG    MCHC 34.5 31.4 - 35.0 g/dL    RDW 17.8 (H) 11.9 - 14.6 %    PLATELET 98 (L) 519 - 450 K/uL    MPV 11.0 9.4 - 12.3 FL    ABSOLUTE NRBC 0.00 0.0 - 0.2 K/uL    NEUTROPHILS 93 (H) 43 - 78 %    LYMPHOCYTES 3 (L) 13 - 44 %    MONOCYTES 3 (L) 4.0 - 12.0 %    EOSINOPHILS 0 (L) 0.5 - 7.8 %    BASOPHILS 0 0.0 - 2.0 %    IMMATURE GRANULOCYTES 1 0.0 - 5.0 %    ABS. NEUTROPHILS 11.6 (H) 1.7 - 8.2 K/UL    ABS. LYMPHOCYTES 0.3 (L) 0.5 - 4.6 K/UL    ABS. MONOCYTES 0.4 0.1 - 1.3 K/UL    ABS. EOSINOPHILS 0.0 0.0 - 0.8 K/UL    ABS. BASOPHILS 0.0 0.0 - 0.2 K/UL    ABS. IMM.  GRANS. 0.2 0.0 - 0.5 K/UL    DF AUTOMATED     METABOLIC PANEL, COMPREHENSIVE    Collection Time: 02/27/22  6:14 PM   Result Value Ref Range    Sodium 126 (L) 136 - 145 mmol/L    Potassium 3.6 3.5 - 5.1 mmol/L    Chloride 95 (L) 98 - 107 mmol/L    CO2 26 21 - 32 mmol/L    Anion gap 5 (L) 7 - 16 mmol/L    Glucose 135 (H) 65 - 100 mg/dL    BUN 22 8 - 23 MG/DL    Creatinine 0.72 0.6 - 1.0 MG/DL    GFR est AA >60 >60 ml/min/1.73m2    GFR est non-AA >60 >60 ml/min/1.73m2    Calcium 8.2 (L) 8.3 - 10.4 MG/DL    Bilirubin, total 1.4 (H) 0.2 - 1.1 MG/DL    ALT (SGPT) 22 12 - 65 U/L    AST (SGOT) 24 15 - 37 U/L    Alk. phosphatase 149 (H) 50 - 136 U/L    Protein, total 6.2 (L) 6.3 - 8.2 g/dL    Albumin 2.5 (L) 3.2 - 4.6 g/dL    Globulin 3.7 (H) 2.3 - 3.5 g/dL    A-G Ratio 0.7 (L) 1.2 - 3.5     PROCALCITONIN    Collection Time: 02/27/22  6:14 PM   Result Value Ref Range    Procalcitonin 26.76 (H) 0.00 - 0.49 ng/mL   NT-PRO BNP    Collection Time: 02/27/22  6:14 PM   Result Value Ref Range    NT pro-BNP 4,430 (H) 5 - 125 PG/ML   BLOOD CULTURE ID PANEL    Collection Time: 02/27/22  6:14 PM    Specimen: Blood   Result Value Ref Range    Acc. no. from Micro Order [id number]     Escherichia coli Detected (A) NOTDET      KPC (Carbapenem Resistance Gene) NOT DETECTED NOTDET      INTERPRETATION        Gram negative gopi. Identified by realtime PCR as E. coli   CULTURE, BLOOD    Collection Time: 02/27/22  6:17 PM    Specimen: Blood   Result Value Ref Range    Special Requests: NO SPECIAL REQUESTS  RIGHT  Antecubital        GRAM STAIN GRAM NEGATIVE RODS      GRAM STAIN        CRITICAL RESULT NOT CALLED DUE TO PREVIOUS NOTIFICATION OF CRITICAL RESULT WITHIN THE LAST 24 HOURS.     GRAM STAIN AEROBIC AND ANAEROBIC BOTTLES      Culture result: CULTURE IN PROGRESS,FURTHER UPDATES TO FOLLOW     COVID-19 RAPID TEST    Collection Time: 02/27/22  7:07 PM   Result Value Ref Range    Specimen source NASAL SWAB      COVID-19 rapid test Not detected NOTD     URINE MICROSCOPIC    Collection Time: 02/27/22  7:49 PM   Result Value Ref Range    WBC >100 0 /hpf    RBC 10-20 0 /hpf    Epithelial cells 0-3 0 /hpf    Bacteria TRACE 0 /hpf    Casts 3-5 0 /lpf    Crystals, urine 0 0 /LPF    Mucus 0 0 /lpf    Other observations RESULTS VERIFIED MANUALLY     CULTURE, URINE    Collection Time: 02/27/22  7:49 PM    Specimen: Cath Urine   Result Value Ref Range    Special Requests: NO SPECIAL REQUESTS      Culture result:        NO GROWTH AFTER SHORT PERIOD OF INCUBATION. FURTHER RESULTS TO FOLLOW AFTER OVERNIGHT INCUBATION. LACTIC ACID    Collection Time: 02/27/22  8:32 PM   Result Value Ref Range    Lactic acid 1.3 0.4 - 2.0 MMOL/L   CULTURE, CSF W GRAM STAIN    Collection Time: 02/27/22 10:00 PM    Specimen: Cerebrospinal Fluid   Result Value Ref Range    Special Requests: TUBE 3     GRAM STAIN 0 TO 5 WBCS SEEN PER OIF     GRAM STAIN NO DEFINITE ORGANISM SEEN      Culture result:        NO GROWTH AFTER SHORT PERIOD OF INCUBATION. FURTHER RESULTS TO FOLLOW AFTER OVERNIGHT INCUBATION.    GLUCOSE, CSF    Collection Time: 02/27/22 10:00 PM   Result Value Ref Range    Tube No. 2      Glucose,CSF 35 (LL) 40 - 70 MG/DL   PROTEIN, CSF    Collection Time: 02/27/22 10:00 PM   Result Value Ref Range    Tube No. 2      Protein, MG/DL   CELL COUNT, BODY FLUID    Collection Time: 02/27/22 10:00 PM   Result Value Ref Range    BODY FLUID TYPE CEREBROSPINAL FLUID      FLUID COLOR COLORLESS     FLUID APPEARANCE HAZY      FLUID RBC CT. 18 /cu mm    FLUID WBC COUNT 2,630 /cu mm    FLD NEUTROPHILS 88 %    FLD LYMPHS 2 %    FLD MONO/MACROPHAGES 10 %   EKG, 12 LEAD, INITIAL    Collection Time: 02/27/22 10:49 PM   Result Value Ref Range    Ventricular Rate 88 BPM    Atrial Rate 88 BPM    P-R Interval 140 ms    QRS Duration 82 ms    Q-T Interval 360 ms    QTC Calculation (Bezet) 435 ms    Calculated P Axis 67 degrees    Calculated R Axis 67 degrees    Calculated T Axis 60 degrees    Diagnosis       Normal sinus rhythm  Cannot rule out Anterior infarct (cited on or before 27-FEB-2022)  Abnormal ECG  When compared with ECG of 27-FEB-2022 19:18,  No significant change was found  Confirmed by Desirae Malhotra MD (), SHARRON ART ([id number]) on 2/28/2022 7:80:65 PM     METABOLIC PANEL, COMPREHENSIVE    Collection Time: 02/28/22  6:10 AM   Result Value Ref Range    Sodium 133 (L) 136 - 145 mmol/L    Potassium 3.7 3.5 - 5.1 mmol/L    Chloride 103 98 - 107 mmol/L    CO2 23 21 - 32 mmol/L    Anion gap 7 7 - 16 mmol/L    Glucose 109 (H) 65 - 100 mg/dL    BUN 20 8 - 23 MG/DL    Creatinine 0.65 0.6 - 1.0 MG/DL    GFR est AA >60 >60 ml/min/1.73m2    GFR est non-AA >60 >60 ml/min/1.73m2    Calcium 7.7 (L) 8.3 - 10.4 MG/DL    Bilirubin, total 1.2 (H) 0.2 - 1.1 MG/DL    ALT (SGPT) 17 12 - 65 U/L    AST (SGOT) 26 15 - 37 U/L    Alk. phosphatase 114 50 - 130 U/L    Protein, total 5.2 (L) 6.3 - 8.2 g/dL    Albumin 2.1 (L) 3.2 - 4.6 g/dL    Globulin 3.1 2.3 - 3.5 g/dL    A-G Ratio 0.7 (L) 1.2 - 3.5     CBC WITH AUTOMATED DIFF    Collection Time: 02/28/22  6:10 AM   Result Value Ref Range    WBC 11.2 (H) 4.3 - 11.1 K/uL    RBC 2.70 (L) 4.05 - 5.2 M/uL    HGB 8.5 (L) 11.7 - 15.4 g/dL    HCT 24.5 (L) 35.8 - 46.3 %    MCV 90.7 79.6 - 97.8 FL    MCH 31.5 26.1 - 32.9 PG    MCHC 34.7 31.4 - 35.0 g/dL    RDW 17.7 (H) 11.9 - 14.6 %    PLATELET 67 (L) 126 - 450 K/uL    MPV 11.5 9.4 - 12.3 FL    ABSOLUTE NRBC 0.00 0.0 - 0.2 K/uL    DF AUTOMATED      NEUTROPHILS 90 (H) 43 - 78 %    LYMPHOCYTES 3 (L) 13 - 44 %    MONOCYTES 5 4.0 - 12.0 %    EOSINOPHILS 0 (L) 0.5 - 7.8 %    BASOPHILS 0 0.0 - 2.0 %    IMMATURE GRANULOCYTES 2 0.0 - 5.0 %    ABS. NEUTROPHILS 10.1 (H) 1.7 - 8.2 K/UL    ABS. LYMPHOCYTES 0.3 (L) 0.5 - 4.6 K/UL    ABS. MONOCYTES 0.6 0.1 - 1.3 K/UL    ABS. EOSINOPHILS 0.0 0.0 - 0.8 K/UL    ABS. BASOPHILS 0.0 0.0 - 0.2 K/UL    ABS. IMM. GRANS. 0.2 0.0 - 0.5 K/UL   TRANSFERRIN SATURATION    Collection Time: 02/28/22 11:00 AM   Result Value Ref Range    Iron 28 (L) 35 - 150 ug/dL    TIBC 145 (L) 250 - 450 ug/dL    Transferrin Saturation 19 %   FERRITIN    Collection Time: 02/28/22 11:00 AM   Result Value Ref Range    Ferritin 835 (H) 8 - 388 NG/ML   LD    Collection Time: 02/28/22 11:00 AM   Result Value Ref Range     (H) 110 - 210 U/L   RETICULOCYTE COUNT    Collection Time: 02/28/22 11:00 AM   Result Value Ref Range    Reticulocyte count 1.5 0.3 - 2.0 %    Absolute Retic Cnt. 0.0468 0.026 - 0.095 M/ul    Immature Retic Fraction 12.5 3.0 - 15.9 %    Retic Hgb Conc. 31 29 - 35 pg   PROTHROMBIN TIME + INR    Collection Time: 02/28/22 11:00 AM   Result Value Ref Range    Prothrombin time 19.0 (H) 12.6 - 14.5 sec    INR 1.6     PTT    Collection Time: 02/28/22 11:00 AM   Result Value Ref Range    aPTT 44.1 (H) 24.1 - 35.1 SEC   FIBRINOGEN    Collection Time: 02/28/22 11:00 AM   Result Value Ref Range    Fibrinogen 332 190 - 501 mg/dL   D DIMER    Collection Time: 02/28/22 11:00 AM   Result Value Ref Range    D DIMER 6.58 (H) <0.56 ug/ml(FEU)   BILIRUBIN, FRACTIONATED    Collection Time: 02/28/22 11:00 AM   Result Value Ref Range    Bilirubin, total 1.2 (H) 0.2 - 1.1 MG/DL    Bilirubin, direct 0.4 (H) <0.4 MG/DL    Bilirubin, indirect 0.8 0.0 - 1.1 MG/DL       All Micro Results     Procedure Component Value Units Date/Time    BLOOD CULTURE [[id number]] Collected: 02/27/22 1814    Order Status: Completed Specimen: Blood Updated: 02/28/22 1315     Special Requests: --        NO SPECIAL REQUESTS  LEFT  Antecubital       GRAM STAIN GRAM NEGATIVE RODS               RESULTS VERIFIED, PHONED TO AND READ BACK BY JESUS RENE RN BY NB AT [place] [id number] ON [id number]                  AEROBIC AND ANAEROBIC BOTTLES           Culture result:       CULTURE IN [address] UPDATES TO FOLLOW                  Refer to Blood Culture ID Panel Accession  [id number]      BLOOD CULTURE ID PANEL [[id number]]  (Abnormal) Collected: 02/27/22 1814    Order Status: Completed Specimen: Blood Updated: 02/28/22 1314     Acc. no. from Micro Order [id number]     Escherichia coli Detected        Comment: RESULTS VERIFIED, PHONED TO AND READ BACK BY  MISSY Guo RN AT 13:11 02.28.2022          KPC (Carbapenem Resistance Gene) NOT DETECTED        Comment: WARNING:  A Not Detected result for the KPC gene does not indicate susceptibility to carbapenems.   Gram negative bacteria can be resistant to carbapenems by mechanisms other than carrying the KPC gene. INTERPRETATION       Gram negative gopi. Identified by realtime PCR as E. coli          MENINGITIS PATHOGENS PANEL, CSF (BY PCR) [[id number]] Collected: 02/27/22 2200    Order Status: Completed Updated: 02/28/22 1243    MENINGITIS PATHOGENS PANEL, CSF (BY PCR) [[id number]] Collected: 02/28/22 1100    Order Status: Canceled     MENINGITIS PATHOGENS PANEL, CSF (BY PCR) [[id number]] Collected: 02/28/22 1030    Order Status: Canceled Specimen: Cerebrospinal Fluid     CULTURE, CSF Evelin Hacking STAIN [[id number]] Collected: 02/27/22 2200    Order Status: Completed Specimen: Cerebrospinal Fluid Updated: 02/28/22 1001     Special Requests: TUBE 3     GRAM STAIN 0 TO 5 WBCS SEEN PER OIF      NO DEFINITE ORGANISM SEEN        Culture result:       NO GROWTH AFTER SHORT PERIOD OF INCUBATION. FURTHER RESULTS TO FOLLOW AFTER OVERNIGHT INCUBATION. CULTURE, URINE [[id number]] Collected: 02/27/22 1949    Order Status: Completed Specimen: Cath Urine Updated: 02/28/22 0650     Special Requests: NO SPECIAL REQUESTS        Culture result:       NO GROWTH AFTER SHORT PERIOD OF INCUBATION. FURTHER RESULTS TO FOLLOW AFTER OVERNIGHT INCUBATION. BLOOD CULTURE [[id number]] Collected: 02/27/22 1817    Order Status: Completed Specimen: Blood Updated: 02/28/22 0610     Special Requests: --        NO SPECIAL REQUESTS  RIGHT  Antecubital       GRAM STAIN GRAM NEGATIVE RODS               CRITICAL RESULT NOT CALLED DUE TO PREVIOUS NOTIFICATION OF CRITICAL RESULT WITHIN THE LAST 24 HOURS. AEROBIC AND ANAEROBIC BOTTLES           Culture result:       CULTURE IN PROGRESS,FURTHER UPDATES TO FOLLOW          S. Allyne Runner, UR/CSF [[id number]] Collected: 02/27/22 2200    Order Status: Completed Updated: 02/27/22 2214    COVID-19 RAPID TEST [[id number]] Collected: 02/27/22 1907    Order Status: Completed Specimen: Nasopharyngeal Updated: 02/27/22 1938     Specimen source NASAL SWAB        COVID-19 rapid test Not detected Comment:      The specimen is NEGATIVE for SARS-CoV-2, the novel coronavirus associated with COVID-19. A negative result does not rule out COVID-19. This test has been authorized by the FDA under an Emergency Use Authorization (EUA) for use by authorized laboratories. Fact sheet for Healthcare Providers: iTendency.uy  Fact sheet for Patients: iTendency.uy       Methodology: Isothermal Nucleic Acid Amplification               Other Studies:  XR CHEST PA LAT    Result Date: 2/28/2022  EXAMINATION: XR CHEST PA LAT 2/28/2022 7:09 AM ACCESSION NUMBER: [id number] COMPARISON: 02/27/2022 INDICATION: Fever TECHNIQUE: PA and lateral views of the chest were obtained. FINDINGS: Support devices: None Lungs: Again seen are bilateral airspace opacities throughout the lungs. These have a similar appearance to the prior exam. There is no effusion. Cardiac Silhouette: Persistently prominent Mediastinum: The aorta is normal. Upper Abdomen: Normal Miscellaneous: There are no lytic and blastic lesions. 1.  Stable exam with persistent cardiomegaly and bilateral diffuse airspace opacities. CT HEAD WO CONT    Result Date: 2/27/2022  Noncontrast CT of the brain. COMPARISON: 2016 INDICATION: Confusion, headache TECHNIQUE: Contiguous axial images were obtained from the skull base through the vertex without IV contrast. Radiation dose reduction techniques were used for this study:  Our CT scanners use one or all of the following: Automated exposure control, adjustment of the mA and/or kVp according to patient's size, iterative reconstruction. FINDINGS: There is no acute intracranial hemorrhage or evidence for acute territorial infarction. There is no mass effect, midline shift or hydrocephalus. There is no extra-axial fluid collection. The cerebellum and brainstem are grossly unremarkable.  Encephalomalacia in the right occipital lobe, consistent with old injury. There is overlying craniotomy defect. Findings similar to 2016 study. Included globes appear intact. The visualized paranasal sinuses and the mastoid air cells are aerated. There is no acute skull fracture. 1. No CT evidence of acute intracranial process. XR CHEST PORT    Result Date: 2/27/2022  EXAMINATION: XR CHEST PORT 2/27/2022 6:33 PM ACCESSION NUMBER: [id number] COMPARISON: 2/2/2022 INDICATION: Back pain and fever TECHNIQUE: A single view of the chest was obtained. FINDINGS: Support Devices: *  None Cardiac Silhouette: Cardiac silhouette is borderline enlarged, similar to prior studies. Mediastinum: Normal mediastinal contours. Lungs: Bilateral perihilar and basilar predominant airspace opacities with septal thickening. No pneumothorax or sizable pleural effusion. Upper Abdomen: Normal Miscellaneous: No fracture or suspicious osseous lesion. Bilateral perihilar airspace disease, suspicious for pulmonary edema.        Current Meds:  Current Facility-Administered Medications   Medication Dose Route Frequency    enoxaparin (LOVENOX) injection 50 mg  50 mg SubCUTAneous Q12H    famotidine (PEPCID) tablet 40 mg  40 mg Oral DAILY PRN    pantoprazole (PROTONIX) tablet 40 mg  40 mg Oral ACB    magnesium oxide (MAG-OX) tablet 400 mg  400 mg Oral DAILY    oxyCODONE-acetaminophen (PERCOCET 10)  mg per tablet 1 Tablet  1 Tablet Oral Q8H PRN    levothyroxine (SYNTHROID) tablet 112 mcg  112 mcg Oral ACB    zolpidem (AMBIEN) tablet 10 mg  10 mg Oral QHS PRN    sodium chloride (NS) flush 5-40 mL  5-40 mL IntraVENous PRN    acetaminophen (TYLENOL) tablet 650 mg  650 mg Oral Q6H PRN    polyethylene glycol (MIRALAX) packet 17 g  17 g Oral DAILY PRN    ondansetron (ZOFRAN ODT) tablet 4 mg  4 mg Oral Q8H PRN    Or    ondansetron (ZOFRAN) injection 4 mg  4 mg IntraVENous Q6H PRN    0.9% sodium chloride infusion  125 mL/hr IntraVENous CONTINUOUS    cefepime (MAXIPIME) 2 g in 0.9% sodium chloride (MBP/ADV) 100 mL MBP  2 g IntraVENous Q8H    vancomycin (VANCOCIN) 750 mg in 0.9% sodium chloride 250 mL ([id number])  750 mg IntraVENous Q12H    diphenhydrAMINE (BENADRYL) capsule 25 mg  25 mg Oral Q12H PRN    sodium chloride (NS) flush 5-10 mL  5-10 mL IntraVENous Q8H    sodium chloride (NS) flush 5-10 mL  5-10 mL IntraVENous PRN    0.9% sodium chloride infusion  200 mL/hr IntraVENous CONTINUOUS       Signed:  Gonzalez Brooks MD

## 2022-02-28 NOTE — ED NOTES
51-year-old female with some fever and headache. Takes daily p.o. chemotherapy. Some confusion but somewhat better with her fever being treated. Slight photophobia. On my exam her speech is clear and she is conversant and seems oriented. Seems to have some neck pain with attempted flexion. Has occasional headaches which she says nothing like this. 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 Plan is to look a catheterized urine. If not obvious UTI, discussed potential for lumbar puncture with patient. She does take Lovenox. She takes 6omg at night and 40 in the daytime. She has not taken any doses today.

## 2022-02-28 NOTE — PROGRESS NOTES
TRANSFER - IN REPORT:    Verbal report received from Ayah(name) on [facility]  being received from ED(unit) for routine progression of care      Report consisted of patients Situation, Background, Assessment and   Recommendations(SBAR). Information from the following report(s) ED Summary and Recent Results was reviewed with the receiving nurse. Opportunity for questions and clarification was provided. Assessment completed upon patients arrival to unit and care assumed.

## 2022-02-28 NOTE — CONSULTS
HEMATOLOGY/ [facility]      Patient Name: Carlitos Clark    Date of Consult: 2022  : 1960  Age:61 y.o. OON:[id number]          Reason for Consultation:  Ms. Selin Jaquez is a 64 y.o. female admitted on 2022 with a primary diagnosis of possible meningitis. Cele Irene History of Present Illness:  Ms. Selin Jaquez is known to Dr. Supa Quinn in our office. She has a somewhat complicated hematologic history with a formal diagnosis of JAK2 positive polycythemia vera made in . Because of progressive symptoms, she was placed initially on Hydrea and phlebotomies but ultimately required pegylated interferon for management. This tube needed to be discontinued due to poor tolerance. Ultimately, the patient was placed on Jakafi which she has taken intermittently since that time. Her polycythemia has been complicated by thromboembolic events including portal vein thrombosis and small bowel ischemia. She has also suffered a thrombosis of the superior sagittal sinus. 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 She did not tolerate imatinib nor Tasigna and ultimately discontinued anti-CML medications until seeing Dr. Supa Quinn for the first time in . At that time, she was placed on dasatinib and remains on that medication since. A bone marrow in 2018 showed megakaryocytic hyperplasia and grade 2-3 fibrosis with no evidence of CML including negative cytogenetics and negative BCR-ABL quantitation. She was admitted earlier in 2022 with an E. coli bacteremia. She was treated with Merrem and then discharged on Vantin. She was readmitted yesterday with fever headache and photophobia. CT scanning of her head was negative however a lumbar puncture was notable for a white count of 2630 with 88% neutrophils and 18 red cells. CSF protein was 110 and glucose 35. Her blood culture is again positive for gram-negative rods. CSF culture is no growth to date.   She has been placed on cefepime and vancomycin.     Medications:   Current Facility-Administered Medications   Medication Dose Route Frequency Provider Last Rate Last Admin    enoxaparin (LOVENOX) injection 50 mg  50 mg SubCUTAneous Q12H Ashley DARLING MD   50 mg at 02/28/22 0527    famotidine (PEPCID) tablet 40 mg  40 mg Oral DAILY PRN Mattie Clement MD        pantoprazole (PROTONIX) tablet 40 mg  40 mg Oral ACB Ashley DARLING MD   40 mg at 02/28/22 1059    magnesium oxide (MAG-OX) tablet 400 mg  400 mg Oral DAILY Ashley DARLING MD   400 mg at 02/28/22 1057    oxyCODONE-acetaminophen (PERCOCET 10)  mg per tablet 1 Tablet  1 Tablet Oral Q8H PRN Mattie Clement MD   1 Tablet at 02/28/22 1110    levothyroxine (SYNTHROID) tablet 112 mcg  112 mcg Oral LUCIANAB Ashley DARLING MD   112 mcg at 02/28/22 1057    zolpidem (AMBIEN) tablet 10 mg  10 mg Oral QHS PRN Mattie Clement MD        sodium chloride (NS) flush 5-40 mL  5-40 mL IntraVENous PRN Mattie Clement MD        acetaminophen (TYLENOL) tablet 650 mg  650 mg Oral Q6H PRN Mattie Clement MD   650 mg at 02/28/22 0703    polyethylene glycol (MIRALAX) packet 17 g  17 g Oral DAILY PRN Mattie Clement MD        ondansetron (ZOFRAN ODT) tablet 4 mg  4 mg Oral Q8H PRN Mattie Clement MD        Or    ondansetron [facility]) injection 4 mg  4 mg IntraVENous Q6H PRN Mattie Clement MD        0.9% sodium chloride infusion  125 mL/hr IntraVENous CONTINUOUS Ashley DARLING  mL/hr at 02/28/22 0100 125 mL/hr at 02/28/22 0100    cefepime (MAXIPIME) 2 g in 0.9% sodium chloride (MBP/ADV) 100 mL MBP  2 g IntraVENous Q8H Ashley DARILNG  mL/hr at 02/28/22 0526 2 g at 02/28/22 0526    vancomycin (VANCOCIN) 750 mg in 0.9% sodium chloride 250 mL ([id number])  750 mg IntraVENous Q12H Ashley DARLING MD        sodium chloride (NS) flush 5-10 mL  5-10 mL IntraVENous Q8H Geeta Hernandez MD   10 mL at 02/28/22 0531    sodium chloride (NS) flush 5-10 mL 5-10 mL IntraVENous PRN Rodolfo Khan MD        0.9% sodium chloride infusion  200 mL/hr IntraVENous CONTINUOUS Rodolfo Khan  mL/hr at 02/27/22 2156 200 mL/hr at 02/27/22 2156       Allergies: Allergies   Allergen Reactions    Latex Other (comments)     Testing for latex allergy was positive as a 2 (on a scale of 1 to 3).  Sulfa (Sulfonamide Antibiotics) Anaphylaxis    Tramadol Other (comments)     Top lip swelled    Augmentin [Amoxicillin-Pot Clavulanate] Rash    Divalproex Sodium Hives    Morphine Nausea and Vomiting     Not a true allergy    Potassium Clavulanate Hives    Rizatriptan Rash    Tetanus Immune Globulin Other (comments)     Heat, bruising, and swelling at  Site of injection    Vancomycin Rash    Xanax [Alprazolam] Other (comments)     Hallucinations    Zonegran [Zonisamide] Rash       Review of Systems: The Review of Systems is documented in full in the internal medical record. All systems are negative other than for those noted above.      Past Medical History:  Past Medical History:   Diagnosis Date    Acute blood loss anemia 4/6/2018    Anemia     C. difficile diarrhea 4/1/2015    Cerebral edema ([address] 4/1/2015    Chronic migraine 9/22/2016    Chronic myelogenous leukemia ([facility]     [place] chromosome/ converted from polycythemia in 2009- to 2012 when she was dx w leukemia    Chronic pain     Coagulation disorder ([address]     \"clotting and Bleeding Problem\" dr Terri Wick follows     Esophageal spasm     with banding     Esophageal varices ([facility]     grade 3    GI bleed 8/3/2016    H/O craniotomy     3-29-15.  due to blood clot - which caused a seizure  - then pt fell and hit     Hematemesis 8/20/2021    Leukocytosis 3/14/2015    Melena 8/20/2021    MRSA colonization 6/25/2012    Polycythemia vera(238.4)     JAK2 mutation    Portal hypertension ([address]     with varices    Portal vein thrombosis 6/20/2012    Ct scan 6-21-2 Apparent occlusion of the portal vein. In addition, the splenic vein, and superior mesenteric vein are not definitely seen and are also likely  occluded. Splenomegaly, and extensive varices are seen as described above consistent with the portal vein occlusion 12 on arixtra HIT negative on repeat 12 re admitted Cirrhotic appearance of the liver. Mild to moderate ascites, as    Primary hypothyroidism     Pulmonary embolism ([address]     not on coumadin anymore     S/P exploratory laparotomy, 12    Bowel resection:  336 cm of small bowel removed, approximately 9 feet and placement of feeding jejunostomy.      S/P small bowel resection     .  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 2012 platelets lower repeat HIT 12 platelets in 54,840'W    Thrombosis, portal vein 2004    portal , spleenic and recently superior mesenteric    Transfusion history     many blood tranfusions - last 3-29-15 after brain surgery    Traumatic hemorrhage of right cerebrum ([address] 3/30/2015       Past Surgical History:  Past Surgical History:   Procedure Laterality Date    HX APPENDECTOMY      with small bowel resection    HX BREAST BIOPSY Bilateral     Lt - ; Rt -     HX CHOLECYSTECTOMY      HX GI      liver biopsy    HX GI      bowel removed small - 9 feet     HX GYN       x 2    HX GYN      D&C following miscarriage    HX ORTHOPAEDIC Left 2008    torn labrum shoulder (screw in place)    NEUROLOGICAL PROCEDURE UNLISTED  2010    craniotomy to evacuate subdural hematoma following a fall from a seizure    [place] ABDOMEN SURGERY PROC UNLISTED      umbilical hernia repair    [place] ABDOMEN SURGERY PROC UNLISTED      9ft of small bowel excised then reconnected       Social History:  Social History     Tobacco Use    Smoking status: Former Smoker     Packs/day: 0.20     Years: 10.00     Pack years: 2.00     Types: Cigarettes     Quit date:      Years since quittin.1    Smokeless tobacco: Never Used   Substance Use Topics    Alcohol use: No    Drug use: No       Family History:  Family History   Problem Relation Age of Onset    Diabetes Mother     Stroke Mother         after surgery    Cancer Father         brain    No Known Problems Sister     Other Sister         diverticulitis    Other Sister         diverticulitis    Cancer Sister         lyjmphoma    Breast Cancer Maternal Aunt 48    Thyroid Disease Paternal Grandmother          Physical Examination:  General Appearance: Somnolent patient in no acute distress. Vital signs:   Visit Vitals  BP (!) 91/52 (BP 1 Location: Right upper arm, BP Patient Position: Lying left side) Comment: Nurse Tori Cassette notified. Pulse 98   Temp 98.1 °F (36.7 °C)   Resp 16   Ht 5' 2\" (1.575 m)   Wt 125 lb 12.8 oz (57.1 kg)   SpO2 92%   Breastfeeding No   BMI 23.01 kg/m²     HEENT: No oral or pharyngeal masses. There is no ulceration or thrush. There is no sinus tenderness. No meningismus  Neck: Supple. There is no thyromegaly. Lymph nodes: There is no cervical, supraclavicular, axillary or inguinal adenopathy. Breasts: Not examined  Lungs: The lungs are clear to auscultation and percussion. There is no egophony. There is no chest wall tenderness and no  use of accessory respiratory musculature. Heart: There is no jugular venous distention. The rate is normal and rhythm regular. The S1 and S2 are normal and there are no murmurs or rubs. Abdomen: Soft, non-tender, bowel sounds present and normal, no appreciated hepatosplenomegaly. No palpable masses. Skin: No rash, petechiae or ecchymoses. No evidence of malignancy. Musculoskeletal: No bony or muscular tenderness. No joint effusions  Extremities: No cyanosis, clubbing or edema. Neurologic: Slightly lethargic comprehension and speech normal. Cranial nerves intact. No focality in motor, sensory or reflex exams. Labs:    Recent Results (from the past 24 hour(s))   CULTURE, BLOOD    Collection Time: 02/27/22  6:14 PM    Specimen: Blood   Result Value Ref Range    Special Requests: NO SPECIAL REQUESTS  LEFT  Antecubital        GRAM STAIN GRAM NEGATIVE RODS      GRAM STAIN        RESULTS VERIFIED, PHONED TO AND READ BACK BY JESUS RENE RN BY NB AT [id number] ON [id number]    GRAM STAIN AEROBIC AND ANAEROBIC BOTTLES      Culture result: CULTURE IN [address] UPDATES TO FOLLOW     LACTIC ACID    Collection Time: 02/27/22  6:14 PM   Result Value Ref Range    Lactic acid 2.8 (H) 0.4 - 2.0 MMOL/L   CBC WITH AUTOMATED DIFF    Collection Time: 02/27/22  6:14 PM   Result Value Ref Range    WBC 12.5 (H) 4.3 - 11.1 K/uL    RBC 3.15 (L) 4.05 - 5.2 M/uL    HGB 10.0 (L) 11.7 - 15.4 g/dL    HCT 29.0 (L) 35.8 - 46.3 %    MCV 92.1 79.6 - 97.8 FL    MCH 31.7 26.1 - 32.9 PG    MCHC 34.5 31.4 - 35.0 g/dL    RDW 17.8 (H) 11.9 - 14.6 %    PLATELET 98 (L) 374 - 450 K/uL    MPV 11.0 9.4 - 12.3 FL    ABSOLUTE NRBC 0.00 0.0 - 0.2 K/uL    NEUTROPHILS 93 (H) 43 - 78 %    LYMPHOCYTES 3 (L) 13 - 44 %    MONOCYTES 3 (L) 4.0 - 12.0 %    EOSINOPHILS 0 (L) 0.5 - 7.8 %    BASOPHILS 0 0.0 - 2.0 %    IMMATURE GRANULOCYTES 1 0.0 - 5.0 %    ABS. NEUTROPHILS 11.6 (H) 1.7 - 8.2 K/UL    ABS. LYMPHOCYTES 0.3 (L) 0.5 - 4.6 K/UL    ABS. MONOCYTES 0.4 0.1 - 1.3 K/UL    ABS. EOSINOPHILS 0.0 0.0 - 0.8 K/UL    ABS. BASOPHILS 0.0 0.0 - 0.2 K/UL    ABS. IMM.  GRANS. 0.2 0.0 - 0.5 K/UL    DF AUTOMATED     METABOLIC PANEL, COMPREHENSIVE    Collection Time: 02/27/22  6:14 PM   Result Value Ref Range    Sodium 126 (L) 136 - 145 mmol/L    Potassium 3.6 3.5 - 5.1 mmol/L    Chloride 95 (L) 98 - 107 mmol/L CO2 26 21 - 32 mmol/L    Anion gap 5 (L) 7 - 16 mmol/L    Glucose 135 (H) 65 - 100 mg/dL    BUN 22 8 - 23 MG/DL    Creatinine 0.72 0.6 - 1.0 MG/DL    GFR est AA >60 >60 ml/min/1.73m2    GFR est non-AA >60 >60 ml/min/1.73m2    Calcium 8.2 (L) 8.3 - 10.4 MG/DL    Bilirubin, total 1.4 (H) 0.2 - 1.1 MG/DL    ALT (SGPT) 22 12 - 65 U/L    AST (SGOT) 24 15 - 37 U/L    Alk. phosphatase 149 (H) 50 - 136 U/L    Protein, total 6.2 (L) 6.3 - 8.2 g/dL    Albumin 2.5 (L) 3.2 - 4.6 g/dL    Globulin 3.7 (H) 2.3 - 3.5 g/dL    A-G Ratio 0.7 (L) 1.2 - 3.5     PROCALCITONIN    Collection Time: 02/27/22  6:14 PM   Result Value Ref Range    Procalcitonin 26.76 (H) 0.00 - 0.49 ng/mL   NT-PRO BNP    Collection Time: 02/27/22  6:14 PM   Result Value Ref Range    NT pro-BNP 4,430 (H) 5 - 125 PG/ML   CULTURE, BLOOD    Collection Time: 02/27/22  6:17 PM    Specimen: Blood   Result Value Ref Range    Special Requests: NO SPECIAL REQUESTS  RIGHT  Antecubital        GRAM STAIN GRAM NEGATIVE RODS      GRAM STAIN        CRITICAL RESULT NOT CALLED DUE TO PREVIOUS NOTIFICATION OF CRITICAL RESULT WITHIN THE LAST 24 HOURS. GRAM STAIN AEROBIC AND ANAEROBIC BOTTLES      Culture result: CULTURE IN PROGRESS,FURTHER UPDATES TO FOLLOW     COVID-19 RAPID TEST    Collection Time: 02/27/22  7:07 PM   Result Value Ref Range    Specimen source NASAL SWAB      COVID-19 rapid test Not detected NOTD     URINE MICROSCOPIC    Collection Time: 02/27/22  7:49 PM   Result Value Ref Range    WBC >100 0 /hpf    RBC 10-20 0 /hpf    Epithelial cells 0-3 0 /hpf    Bacteria TRACE 0 /hpf    Casts 3-5 0 /lpf    Crystals, urine 0 0 /LPF    Mucus 0 0 /lpf    Other observations RESULTS VERIFIED MANUALLY     CULTURE, URINE    Collection Time: 02/27/22  7:49 PM    Specimen: Cath Urine   Result Value Ref Range    Special Requests: NO SPECIAL REQUESTS      Culture result:        NO GROWTH AFTER SHORT PERIOD OF INCUBATION. FURTHER RESULTS TO FOLLOW AFTER OVERNIGHT INCUBATION. LACTIC ACID    Collection Time: 02/27/22  8:32 PM   Result Value Ref Range    Lactic acid 1.3 0.4 - 2.0 MMOL/L   CULTURE, CSF W GRAM STAIN    Collection Time: 02/27/22 10:00 PM    Specimen: Cerebrospinal Fluid   Result Value Ref Range    Special Requests: TUBE 3     GRAM STAIN 0 TO 5 WBCS SEEN PER OIF     GRAM STAIN NO DEFINITE ORGANISM SEEN      Culture result:        NO GROWTH AFTER SHORT PERIOD OF INCUBATION. FURTHER RESULTS TO FOLLOW AFTER OVERNIGHT INCUBATION.    GLUCOSE, CSF    Collection Time: 02/27/22 10:00 PM   Result Value Ref Range    Tube No. 2      Glucose,CSF 35 (LL) 40 - 70 MG/DL   PROTEIN, CSF    Collection Time: 02/27/22 10:00 PM   Result Value Ref Range    Tube No. 2      Protein, MG/DL   CELL COUNT, BODY FLUID    Collection Time: 02/27/22 10:00 PM   Result Value Ref Range    BODY FLUID TYPE CEREBROSPINAL FLUID      FLUID COLOR COLORLESS     FLUID APPEARANCE HAZY      FLUID RBC CT. 18 /cu mm    FLUID WBC COUNT 2,630 /cu mm    FLD NEUTROPHILS 88 %    FLD LYMPHS 2 %    FLD MONO/MACROPHAGES 10 %   EKG, 12 LEAD, INITIAL    Collection Time: 02/27/22 10:49 PM   Result Value Ref Range    Ventricular Rate 88 BPM    Atrial Rate 88 BPM    P-R Interval 140 ms    QRS Duration 82 ms    Q-T Interval 360 ms    QTC Calculation (Bezet) 435 ms    Calculated P Axis 67 degrees    Calculated R Axis 67 degrees    Calculated T Axis 60 degrees    Diagnosis       Normal sinus rhythm  Cannot rule out Anterior infarct (cited on or before 27-FEB-2022)  Abnormal ECG  When compared with ECG of 27-FEB-2022 19:18,  No significant change was found     METABOLIC PANEL, COMPREHENSIVE    Collection Time: 02/28/22  6:10 AM   Result Value Ref Range    Sodium 133 (L) 136 - 145 mmol/L    Potassium 3.7 3.5 - 5.1 mmol/L    Chloride 103 98 - 107 mmol/L    CO2 23 21 - 32 mmol/L    Anion gap 7 7 - 16 mmol/L    Glucose 109 (H) 65 - 100 mg/dL    BUN 20 8 - 23 MG/DL    Creatinine 0.65 0.6 - 1.0 MG/DL    GFR est AA >60 >60 ml/min/1.73m2 GFR est non-AA >60 >60 ml/min/1.73m2    Calcium 7.7 (L) 8.3 - 10.4 MG/DL    Bilirubin, total 1.2 (H) 0.2 - 1.1 MG/DL    ALT (SGPT) 17 12 - 65 U/L    AST (SGOT) 26 15 - 37 U/L    Alk. phosphatase 114 50 - 130 U/L    Protein, total 5.2 (L) 6.3 - 8.2 g/dL    Albumin 2.1 (L) 3.2 - 4.6 g/dL    Globulin 3.1 2.3 - 3.5 g/dL    A-G Ratio 0.7 (L) 1.2 - 3.5     CBC WITH AUTOMATED DIFF    Collection Time: 02/28/22  6:10 AM   Result Value Ref Range    WBC 11.2 (H) 4.3 - 11.1 K/uL    RBC 2.70 (L) 4.05 - 5.2 M/uL    HGB 8.5 (L) 11.7 - 15.4 g/dL    HCT 24.5 (L) 35.8 - 46.3 %    MCV 90.7 79.6 - 97.8 FL    MCH 31.5 26.1 - 32.9 PG    MCHC 34.7 31.4 - 35.0 g/dL    RDW 17.7 (H) 11.9 - 14.6 %    PLATELET 67 (L) 694 - 450 K/uL    MPV 11.5 9.4 - 12.3 FL    ABSOLUTE NRBC 0.00 0.0 - 0.2 K/uL    DF AUTOMATED      NEUTROPHILS 90 (H) 43 - 78 %    LYMPHOCYTES 3 (L) 13 - 44 %    MONOCYTES 5 4.0 - 12.0 %    EOSINOPHILS 0 (L) 0.5 - 7.8 %    BASOPHILS 0 0.0 - 2.0 %    IMMATURE GRANULOCYTES 2 0.0 - 5.0 %    ABS. NEUTROPHILS 10.1 (H) 1.7 - 8.2 K/UL    ABS. LYMPHOCYTES 0.3 (L) 0.5 - 4.6 K/UL    ABS. MONOCYTES 0.6 0.1 - 1.3 K/UL    ABS. EOSINOPHILS 0.0 0.0 - 0.8 K/UL    ABS. BASOPHILS 0.0 0.0 - 0.2 K/UL    ABS. IMM.  GRANS. 0.2 0.0 - 0.5 K/UL   TRANSFERRIN SATURATION    Collection Time: 02/28/22 11:00 AM   Result Value Ref Range    Iron 28 (L) 35 - 150 ug/dL    TIBC 145 (L) 250 - 450 ug/dL    Transferrin Saturation 19 %   FERRITIN    Collection Time: 02/28/22 11:00 AM   Result Value Ref Range    Ferritin 835 (H) 8 - 388 NG/ML   LD    Collection Time: 02/28/22 11:00 AM   Result Value Ref Range     (H) 110 - 210 U/L   RETICULOCYTE COUNT    Collection Time: 02/28/22 11:00 AM   Result Value Ref Range    Reticulocyte count 1.5 0.3 - 2.0 %    Absolute Retic Cnt. 0.0468 0.026 - 0.095 M/ul    Immature Retic Fraction 12.5 3.0 - 15.9 %    Retic Hgb Conc. 31 29 - 35 pg   PROTHROMBIN TIME + INR    Collection Time: 02/28/22 11:00 AM   Result Value Ref Range Prothrombin time 19.0 (H) 12.6 - 14.5 sec    INR 1.6     PTT    Collection Time: 02/28/22 11:00 AM   Result Value Ref Range    aPTT 44.1 (H) 24.1 - 35.1 SEC   FIBRINOGEN    Collection Time: 02/28/22 11:00 AM   Result Value Ref Range    Fibrinogen 332 190 - 501 mg/dL   D DIMER    Collection Time: 02/28/22 11:00 AM   Result Value Ref Range    D DIMER 6.58 (H) <0.56 ug/ml(FEU)   BILIRUBIN, FRACTIONATED    Collection Time: 02/28/22 11:00 AM   Result Value Ref Range    Bilirubin, total 1.2 (H) 0.2 - 1.1 MG/DL    Bilirubin, direct 0.4 (H) <0.4 MG/DL    Bilirubin, indirect 0.8 0.0 - 1.1 MG/DL       Imaging:        ASSESSMENT:  Ms. Pop Solo CSF is certainly concerning for bacterial meningitis. The most likely culprit is the Escherichia coli present during her prior admission. Her blood cultures are already positive for gram-negative gopi and I would presume that the meningeal findings are likely reflective of a gram-negative gopi meningitis as well. It is unclear why she would be persistently infected presumably by the same organism but should be evaluated for a focal source. PLAN:  Would proceed with cefepime and vancomycin for now. There is some data for the use of dexamethasone and presumptive bacterial meningitis as well. Would consider ID consult. I believe she needs to be evaluated for a focus of persistent bacteria. Specifically I would suggest she undergo a CT scan of chest abdomen and pelvis as well as an echocardiogram.  We will also be checking her for immune globulins and HIV. We will follow with you. Saad Tran MD FACP    Oncology and Hematology   [address] [phone]  F [phone]  [email or web address]      Elements of this note have been dictated using speech recognition software. As a result, errors of speech recognition may have occurred.

## 2022-02-28 NOTE — MANAGEMENT PLAN
[facility] Hematology & Oncology        Inpatient Hematology / Oncology Plan of Care    Reason for Consult:  Fever [R50.9]  Referring Physician:  Nazario Pena MD    History of Present Illness:  Ms. Oma Dan is a 64 y.o. female admitted on 2/27/2022. The primary encounter diagnosis was Sepsis, due to unspecified organism, unspecified whether acute organ dysfunction present ([address] Diagnoses of Leukocytosis, unspecified type, Fever, unspecified fever cause, and Confusion and disorientation were also pertinent to this visit. 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 She is a patient of Dr. Filiberto Riddle with polycythemia vera on Jakafi and CML dx 2013 on Dasatinib. She was recently admitted from 2/2 - 2/6/22 for E Coli bacteremia. She was treated with Merrem and discharged on Vantin. She returned to ED on 2/27 with c/o fever and headache with associated photophobia. She reports she has been sick for 2 weeks. She endorses malaise, weakness, nausea, and confusion. Lactic acid 2.8, PCT 26. pBNP 4430. CT head neg. CXR on 2/27 with b/l perihilar airspace disease, suspicious for pulm edema. Repeat CXR 2/28 with stable findings with persistent cardiomegaly and b/l diffuse airspace opacities. She is s/p LP on 2/27 with low glucose and normal protein, WBC 2630 with 88% neutrophils, RBC 18. BCx +GNR, I/S pending. UCx-NGTD. CSF Cx-NGTD. On Cef/Vanc. We were consulted for recommendations for our patient. Allergies   Allergen Reactions    Latex Other (comments)     Testing for latex allergy was positive as a 2 (on a scale of 1 to 3).     Sulfa (Sulfonamide Antibiotics) Anaphylaxis    Tramadol Other (comments)     Top lip swelled    Augmentin [Amoxicillin-Pot Clavulanate] Rash    Divalproex Sodium Hives    Morphine Nausea and Vomiting     Not a true allergy    Potassium Clavulanate Hives    Rizatriptan Rash    Tetanus Immune Globulin Other (comments)     Heat, bruising, and swelling at  Site of injection    Vancomycin Rash    Xanax [Alprazolam] Other (comments)     Hallucinations    Zonegran [Zonisamide] Rash     Past Medical History:   Diagnosis Date    Acute blood loss anemia 4/6/2018    Anemia     C. difficile diarrhea 4/1/2015    Cerebral edema (HCC) 4/1/2015    Chronic migraine 9/22/2016    Chronic myelogenous leukemia ([facility]     Charles chromosome/ converted from polycythemia in 2009- to 2012 when she was dx w leukemia    Chronic pain     Coagulation disorder ([address]     \"clotting and Bleeding Problem\" dr Terri Wick follows     Esophageal spasm     with banding     Esophageal varices ([facility]     grade 3    GI bleed 8/3/2016    H/O craniotomy     3-29-15.  due to blood clot - which caused a seizure  - then pt fell and hit     Hematemesis 8/20/2021    Leukocytosis 3/14/2015    Melena 8/20/2021    MRSA colonization 6/25/2012    Polycythemia vera(238.4)     JAK2 mutation    Portal hypertension ([address]     with varices    Portal vein thrombosis 6/20/2012    Ct scan 6-21-2 Apparent occlusion of the portal vein. In addition, the splenic vein, and superior mesenteric vein are not definitely seen and are also likely  occluded. Splenomegaly, and extensive varices are seen as described above consistent with the portal vein occlusion 7-05-12 on arixtra HIT negative on repeat 7-27-12 re admitted Cirrhotic appearance of the liver. Mild to moderate ascites, as    Primary hypothyroidism     Pulmonary embolism ([address] 2006    not on coumadin anymore     S/P exploratory laparotomy, 6/20/12 6/25/2012    Bowel resection:  336 cm of small bowel removed, approximately 9 feet and placement of feeding jejunostomy.      S/P small bowel resection     2012.  9 feet removed due to  gangrene which wa due to blood clot    Seizure ([address] 3/14/2015    Seizure disorder ([address] 3/30/2015    due to clotting factor    Seizures ([facility]     last one 3- - followed by aniyah     Splenomegaly, congestive, chronic     Stroke (cerebrum) ([address] 2015    had bled into head- surgery    Stroke [facility])     pt had stroke like symptoms     Thrombocytopenia, HIT negative 2012 platelets lower repeat HIT 12 platelets in 88,917'M    Thrombosis, portal vein 2004    portal , spleenic and recently superior mesenteric    Transfusion history     many blood tranfusions - last 3-29-15 after brain surgery    Traumatic hemorrhage of right cerebrum ([address] 3/30/2015     Past Surgical History:   Procedure Laterality Date    HX APPENDECTOMY      with small bowel resection    HX BREAST BIOPSY Bilateral     Lt - ; Rt -     HX CHOLECYSTECTOMY      HX GI      liver biopsy    HX GI      bowel removed small - 9 feet     HX GYN       x 2    HX GYN      D&C following miscarriage    HX ORTHOPAEDIC Left 2008    torn labrum shoulder (screw in place)    NEUROLOGICAL PROCEDURE UNLISTED  2010    craniotomy to evacuate subdural hematoma following a fall from a seizure    AK ABDOMEN SURGERY PROC UNLISTED      umbilical hernia repair    AK ABDOMEN SURGERY PROC UNLISTED      9ft of small bowel excised then reconnected     Family History   Problem Relation Age of Onset    Diabetes Mother     Stroke Mother         after surgery    Cancer Father         brain    No Known Problems Sister     Other Sister         diverticulitis    Other Sister         diverticulitis    Cancer Sister         lyjmphoma    Breast Cancer Maternal Aunt 48    Thyroid Disease Paternal Grandmother      Social History     Socioeconomic History    Marital status: SINGLE     Spouse name: Not on file    Number of children: Not on file    Years of education: Not on file    Highest education level: Not on file   Occupational History    Not on file   Tobacco Use    Smoking status: Former Smoker Packs/day: 0.20     Years: 10.00     Pack years: 2.00     Types: Cigarettes     Quit date: 200     Years since quittin.1    Smokeless tobacco: Never Used   Substance and Sexual Activity    Alcohol use: No    Drug use: No    Sexual activity: Not on file   Other Topics Concern    Not on file   Social History Narrative    Not on file     Social Determinants of Health     Financial Resource Strain:     Difficulty of Paying Living Expenses: Not on file   Food Insecurity:     Worried About Running Out of Food in the Last Year: Not on file    Ileana of Food in the Last Year: Not on file   Transportation Needs:     Lack of Transportation (Medical): Not on file    Lack of Transportation (Non-Medical): Not on file   Physical Activity:     Days of Exercise per Week: Not on file    Minutes of Exercise per Session: Not on file   Stress:     Feeling of Stress : Not on file   Social Connections:     Frequency of Communication with Friends and Family: Not on file    Frequency of Social Gatherings with Friends and Family: Not on file    Attends Hoahaoism Services: Not on file    Active Member of [address] [place] [address] or Organizations: Not on file    Attends Club or Organization Meetings: Not on file    Marital Status: Not on file   Intimate Partner Violence:     Fear of Current or Ex-Partner: Not on file    Emotionally Abused: Not on file    Physically Abused: Not on file    Sexually Abused: Not on file   Housing Stability:     Unable to Pay for Housing in the Last Year: Not on file    Number of [place] in the Last Year: Not on file    Unstable Housing in the Last Year: Not on file     Current Facility-Administered Medications   Medication Dose Route Frequency Provider Last Rate Last Admin    . PHARMACY TO SUBSTITUTE PER PROTOCOL (Reordered from: dasatinib (SpryceL) 80 mg tab)    Per Protocol Serge Mccain MD        enoxaparin (LOVENOX) injection 50 mg  50 mg SubCUTAneous Q12H Serge Mccain MD   50 mg at 02/28/22 0527    famotidine (PEPCID) tablet 40 mg  40 mg Oral DAILY PRN Kenneth Swain MD        pantoprazole (PROTONIX) tablet 40 mg  40 mg Oral WALI Swain MD        magnesium oxide (MAG-OX) tablet 400 mg  400 mg Oral DAILY Kenneth Swain MD        oxyCODONE-acetaminophen (PERCOCET 10)  mg per tablet 1 Tablet  1 Tablet Oral Q8H PRN Kenneth Swain MD        . PHARMACY TO SUBSTITUTE PER PROTOCOL (Reordered from: ruxolitinib 5 mg tab)    Per Protocol Kenneth Swain MD        levothyroxine (SYNTHROID) tablet 112 mcg  112 mcg Oral WALI Swain MD        zolpidem (AMBIEN) tablet 10 mg  10 mg Oral QHS PRN Kenneth Swain MD        sodium chloride (NS) flush 5-40 mL  5-40 mL IntraVENous PRN Kenneth Swain MD        acetaminophen (TYLENOL) tablet 650 mg  650 mg Oral Q6H PRN Kenneth Swain MD   650 mg at 02/28/22 3032    Or    acetaminophen (TYLENOL) suppository 650 mg  650 mg Rectal Q6H PRN Kenneth Swain MD        polyethylene glycol (MIRALAX) packet 17 g  17 g Oral DAILY PRN Kenneth Swain MD        ondansetron (ZOFRAN ODT) tablet 4 mg  4 mg Oral Q8H PRN Kenneth Swain MD        Or    ondansetron [facility]) injection 4 mg  4 mg IntraVENous Q6H PRN Kenneth Swain MD        0.9% sodium chloride infusion  125 mL/hr IntraVENous CONTINUOUS Kenneth Swain  mL/hr at 02/28/22 0100 125 mL/hr at 02/28/22 0100    cefepime (MAXIPIME) 2 g in 0.9% sodium chloride (MBP/ADV) 100 mL MBP  2 g IntraVENous Q8H Jordy DARLING  mL/hr at 02/28/22 0526 2 g at 02/28/22 0526    vancomycin (VANCOCIN) 750 mg in 0.9% sodium chloride 250 mL (Stmn0Bta)  750 mg IntraVENous Q12H Kenneth Swain MD        sodium chloride (NS) flush 5-10 mL  5-10 mL IntraVENous Q8H Rebekah London MD   10 mL at 02/28/22 0531    sodium chloride (NS) flush 5-10 mL  5-10 mL IntraVENous PRN Rebekah London MD        0.9% sodium chloride infusion  200 mL/hr IntraVENous CONTINUOUS Rebekah London  mL/hr at 22 200 mL/hr at 22       OBJECTIVE:  Patient Vitals for the past 8 hrs:   BP Temp Pulse Resp SpO2   22 0800 (!) 101/56 99.9 °F (37.7 °C) (!) 111 16 90 %   22 0654 (!) 112/53 (!) 102.5 °F (39.2 °C) (!) 120 15 92 %   22 0339 (!) 93/50 100.1 °F (37.8 °C) (!) 104 16 90 %     Temp (24hrs), Av.6 °F (38.1 °C), Min:98.4 °F (36.9 °C), Max:102.9 °F (39.4 °C)    No intake/output data recorded. Physical Exam:  Constitutional: Acutely ill-appearing female in no acute distress, lying comfortably in the hospital bed. HEENT: Normocephalic and atraumatic. Oropharynx is clear, mucous membranes are moist.  Extraocular muscles are intact. Sclerae anicteric. Neck supple without JVD. No thyromegaly present. Skin Warm and dry. No bruising and no rash noted. No erythema. No pallor. Respiratory Lungs are clear to auscultation bilaterally without wheezes, rales or rhonchi, normal air exchange without accessory muscle use. CVS Normal rate, regular rhythm and normal S1 and S2. No murmurs, gallops, or rubs. Abdomen Soft, nontender and nondistended, normoactive bowel sounds. No palpable mass. No hepatosplenomegaly. Neuro Grossly nonfocal with no obvious sensory or motor deficits. Mild nuchal rigidity. MSK Normal range of motion in general.  No edema and no tenderness. Psych Appropriate mood and affect.         Labs:    Recent Results (from the past 24 hour(s))   CULTURE, BLOOD    Collection Time: 22  6:14 PM    Specimen: Blood   Result Value Ref Range    Special Requests: NO SPECIAL REQUESTS  LEFT  Antecubital        GRAM STAIN GRAM NEGATIVE RODS      GRAM STAIN        RESULTS VERIFIED, PHONED TO AND READ BACK BY EJSUS RENE RN BY NB AT [phone] ON [id number]    GRAM STAIN AEROBIC AND ANAEROBIC BOTTLES      Culture result: CULTURE IN [address] UPDATES TO FOLLOW     LACTIC ACID    Collection Time: 22  6:14 PM   Result Value Ref Range    Lactic acid 2.8 (H) 0.4 - 2.0 MMOL/L   CBC WITH AUTOMATED DIFF    Collection Time: 02/27/22  6:14 PM   Result Value Ref Range    WBC 12.5 (H) 4.3 - 11.1 K/uL    RBC 3.15 (L) 4.05 - 5.2 M/uL    HGB 10.0 (L) 11.7 - 15.4 g/dL    HCT 29.0 (L) 35.8 - 46.3 %    MCV 92.1 79.6 - 97.8 FL    MCH 31.7 26.1 - 32.9 PG    MCHC 34.5 31.4 - 35.0 g/dL    RDW 17.8 (H) 11.9 - 14.6 %    PLATELET 98 (L) 368 - 450 K/uL    MPV 11.0 9.4 - 12.3 FL    ABSOLUTE NRBC 0.00 0.0 - 0.2 K/uL    NEUTROPHILS 93 (H) 43 - 78 %    LYMPHOCYTES 3 (L) 13 - 44 %    MONOCYTES 3 (L) 4.0 - 12.0 %    EOSINOPHILS 0 (L) 0.5 - 7.8 %    BASOPHILS 0 0.0 - 2.0 %    IMMATURE GRANULOCYTES 1 0.0 - 5.0 %    ABS. NEUTROPHILS 11.6 (H) 1.7 - 8.2 K/UL    ABS. LYMPHOCYTES 0.3 (L) 0.5 - 4.6 K/UL    ABS. MONOCYTES 0.4 0.1 - 1.3 K/UL    ABS. EOSINOPHILS 0.0 0.0 - 0.8 K/UL    ABS. BASOPHILS 0.0 0.0 - 0.2 K/UL    ABS. IMM. GRANS. 0.2 0.0 - 0.5 K/UL    DF AUTOMATED     METABOLIC PANEL, COMPREHENSIVE    Collection Time: 02/27/22  6:14 PM   Result Value Ref Range    Sodium 126 (L) 136 - 145 mmol/L    Potassium 3.6 3.5 - 5.1 mmol/L    Chloride 95 (L) 98 - 107 mmol/L    CO2 26 21 - 32 mmol/L    Anion gap 5 (L) 7 - 16 mmol/L    Glucose 135 (H) 65 - 100 mg/dL    BUN 22 8 - 23 MG/DL    Creatinine 0.72 0.6 - 1.0 MG/DL    GFR est AA >60 >60 ml/min/1.73m2    GFR est non-AA >60 >60 ml/min/1.73m2    Calcium 8.2 (L) 8.3 - 10.4 MG/DL    Bilirubin, total 1.4 (H) 0.2 - 1.1 MG/DL    ALT (SGPT) 22 12 - 65 U/L    AST (SGOT) 24 15 - 37 U/L    Alk.  phosphatase 149 (H) 50 - 136 U/L    Protein, total 6.2 (L) 6.3 - 8.2 g/dL    Albumin 2.5 (L) 3.2 - 4.6 g/dL    Globulin 3.7 (H) 2.3 - 3.5 g/dL    A-G Ratio 0.7 (L) 1.2 - 3.5     PROCALCITONIN    Collection Time: 02/27/22  6:14 PM   Result Value Ref Range    Procalcitonin 26.76 (H) 0.00 - 0.49 ng/mL   NT-PRO BNP    Collection Time: 02/27/22  6:14 PM   Result Value Ref Range    NT pro-BNP 4,430 (H) 5 - 125 PG/ML   CULTURE, BLOOD    Collection Time: 02/27/22  6:17 PM Specimen: Blood   Result Value Ref Range    Special Requests: NO SPECIAL REQUESTS  RIGHT  Antecubital        GRAM STAIN GRAM NEGATIVE RODS      GRAM STAIN        CRITICAL RESULT NOT CALLED DUE TO PREVIOUS NOTIFICATION OF CRITICAL RESULT WITHIN THE LAST 24 HOURS. GRAM STAIN AEROBIC AND ANAEROBIC BOTTLES      Culture result: CULTURE IN PROGRESS,FURTHER UPDATES TO FOLLOW     COVID-19 RAPID TEST    Collection Time: 02/27/22  7:07 PM   Result Value Ref Range    Specimen source NASAL SWAB      COVID-19 rapid test Not detected NOTD     URINE MICROSCOPIC    Collection Time: 02/27/22  7:49 PM   Result Value Ref Range    WBC >100 0 /hpf    RBC 10-20 0 /hpf    Epithelial cells 0-3 0 /hpf    Bacteria TRACE 0 /hpf    Casts 3-5 0 /lpf    Crystals, urine 0 0 /LPF    Mucus 0 0 /lpf    Other observations RESULTS VERIFIED MANUALLY     CULTURE, URINE    Collection Time: 02/27/22  7:49 PM    Specimen: Cath Urine   Result Value Ref Range    Special Requests: NO SPECIAL REQUESTS      Culture result:        NO GROWTH AFTER SHORT PERIOD OF INCUBATION. FURTHER RESULTS TO FOLLOW AFTER OVERNIGHT INCUBATION. LACTIC ACID    Collection Time: 02/27/22  8:32 PM   Result Value Ref Range    Lactic acid 1.3 0.4 - 2.0 MMOL/L   CULTURE, CSF W GRAM STAIN    Collection Time: 02/27/22 10:00 PM    Specimen: Cerebrospinal Fluid   Result Value Ref Range    Special Requests: TUBE 3     GRAM STAIN 0 TO 5 WBCS SEEN PER OIF     GRAM STAIN NO DEFINITE ORGANISM SEEN      Culture result:        NO GROWTH AFTER SHORT PERIOD OF INCUBATION. FURTHER RESULTS TO FOLLOW AFTER OVERNIGHT INCUBATION.    GLUCOSE, CSF    Collection Time: 02/27/22 10:00 PM   Result Value Ref Range    Tube No. 2      Glucose,CSF 35 (LL) 40 - 70 MG/DL   PROTEIN, CSF    Collection Time: 02/27/22 10:00 PM   Result Value Ref Range    Tube No. 2      Protein, MG/DL   CELL COUNT, BODY FLUID    Collection Time: 02/27/22 10:00 PM   Result Value Ref Range    BODY FLUID TYPE CEREBROSPINAL FLUID      FLUID COLOR COLORLESS     FLUID APPEARANCE HAZY      FLUID RBC CT. 18 /cu mm    FLUID WBC COUNT 2,630 /cu mm    FLD NEUTROPHILS 88 %    FLD LYMPHS 2 %    FLD MONO/MACROPHAGES 10 %   EKG, 12 LEAD, INITIAL    Collection Time: 02/27/22 10:49 PM   Result Value Ref Range    Ventricular Rate 88 BPM    Atrial Rate 88 BPM    P-R Interval 140 ms    QRS Duration 82 ms    Q-T Interval 360 ms    QTC Calculation (Bezet) 435 ms    Calculated P Axis 67 degrees    Calculated R Axis 67 degrees    Calculated T Axis 60 degrees    Diagnosis       Normal sinus rhythm  Cannot rule out Anterior infarct (cited on or before 27-FEB-2022)  Abnormal ECG  When compared with ECG of 27-FEB-2022 19:18,  No significant change was found     METABOLIC PANEL, COMPREHENSIVE    Collection Time: 02/28/22  6:10 AM   Result Value Ref Range    Sodium 133 (L) 136 - 145 mmol/L    Potassium 3.7 3.5 - 5.1 mmol/L    Chloride 103 98 - 107 mmol/L    CO2 23 21 - 32 mmol/L    Anion gap 7 7 - 16 mmol/L    Glucose 109 (H) 65 - 100 mg/dL    BUN 20 8 - 23 MG/DL    Creatinine 0.65 0.6 - 1.0 MG/DL    GFR est AA >60 >60 ml/min/1.73m2    GFR est non-AA >60 >60 ml/min/1.73m2    Calcium 7.7 (L) 8.3 - 10.4 MG/DL    Bilirubin, total 1.2 (H) 0.2 - 1.1 MG/DL    ALT (SGPT) 17 12 - 65 U/L    AST (SGOT) 26 15 - 37 U/L    Alk.  phosphatase 114 50 - 130 U/L    Protein, total 5.2 (L) 6.3 - 8.2 g/dL    Albumin 2.1 (L) 3.2 - 4.6 g/dL    Globulin 3.1 2.3 - 3.5 g/dL    A-G Ratio 0.7 (L) 1.2 - 3.5     CBC WITH AUTOMATED DIFF    Collection Time: 02/28/22  6:10 AM   Result Value Ref Range    WBC 11.2 (H) 4.3 - 11.1 K/uL    RBC 2.70 (L) 4.05 - 5.2 M/uL    HGB 8.5 (L) 11.7 - 15.4 g/dL    HCT 24.5 (L) 35.8 - 46.3 %    MCV 90.7 79.6 - 97.8 FL    MCH 31.5 26.1 - 32.9 PG    MCHC 34.7 31.4 - 35.0 g/dL    RDW 17.7 (H) 11.9 - 14.6 %    PLATELET 67 (L) 690 - 450 K/uL    MPV 11.5 9.4 - 12.3 FL    ABSOLUTE NRBC 0.00 0.0 - 0.2 K/uL    DF AUTOMATED      NEUTROPHILS 90 (H) 43 - 78 %    LYMPHOCYTES 3 (L) 13 - 44 %    MONOCYTES 5 4.0 - 12.0 %    EOSINOPHILS 0 (L) 0.5 - 7.8 %    BASOPHILS 0 0.0 - 2.0 %    IMMATURE GRANULOCYTES 2 0.0 - 5.0 %    ABS. NEUTROPHILS 10.1 (H) 1.7 - 8.2 K/UL    ABS. LYMPHOCYTES 0.3 (L) 0.5 - 4.6 K/UL    ABS. MONOCYTES 0.6 0.1 - 1.3 K/UL    ABS. EOSINOPHILS 0.0 0.0 - 0.8 K/UL    ABS. BASOPHILS 0.0 0.0 - 0.2 K/UL    ABS. IMM. GRANS. 0.2 0.0 - 0.5 K/UL       Imaging:  XR CHEST PA LAT [[id number]] Collected: 02/28/22 0715   Order Status: Completed Updated: 02/28/22 0721   Narrative:     EXAMINATION: XR CHEST PA LAT 2/28/2022 7:09 AM     ACCESSION NUMBER: [id number]     COMPARISON: 02/27/2022     INDICATION: Fever     TECHNIQUE: PA and lateral views of the chest were obtained. FINDINGS:     Support devices: None     Lungs: Again seen are bilateral airspace opacities throughout the lungs. These   have a similar appearance to the prior exam. There is no effusion. Cardiac Silhouette: Persistently prominent     Mediastinum: The aorta is normal.     Upper Abdomen: Normal     Miscellaneous: There are no lytic and blastic lesions. Impression:     1.  Stable exam with persistent cardiomegaly and bilateral diffuse airspace   opacities. CT HEAD WO CONT [[id number]] Collected: 02/27/22 2120   Order Status: Completed Updated: 02/27/22 2125   Narrative:     Noncontrast CT of the brain. COMPARISON: 2016     INDICATION: Confusion, headache     TECHNIQUE: Contiguous axial images were obtained from the skull base through the   vertex without IV contrast. Radiation dose reduction techniques were used for   this study:  Our CT scanners use one or all of the following: Automated exposure   control, adjustment of the mA and/or kVp according to patient's size, iterative   reconstruction. FINDINGS:     There is no acute intracranial hemorrhage or evidence for acute territorial   infarction. There is no mass effect, midline shift or hydrocephalus. There is no   extra-axial fluid collection.  The cerebellum and brainstem are grossly   unremarkable. Encephalomalacia in the right occipital lobe, consistent with old injury. There   is overlying craniotomy defect. Findings similar to 2016 study. Included globes appear intact. The visualized paranasal sinuses and the mastoid   air cells are aerated. There is no acute skull fracture. Impression:       1. No CT evidence of acute intracranial process. XR CHEST PORT [[id number]] Collected: 02/27/22 1843   Order Status: Completed Updated: 02/27/22 1846   Narrative:     EXAMINATION: XR CHEST PORT 2/27/2022 6:33 PM     ACCESSION NUMBER: [id number]     COMPARISON: 2/2/2022     INDICATION: Back pain and fever     TECHNIQUE: A single view of the chest was obtained. FINDINGS:   Support Devices:   *  None     Cardiac Silhouette: Cardiac silhouette is borderline enlarged, similar to prior   studies. Mediastinum: Normal mediastinal contours. Lungs: Bilateral perihilar and basilar predominant airspace opacities with   septal thickening. No pneumothorax or sizable pleural effusion. Upper Abdomen: Normal     Miscellaneous: No fracture or suspicious osseous lesion. Impression:     Bilateral perihilar airspace disease, suspicious for pulmonary edema.           ASSESSMENT:  Problem List  Date Reviewed: 2/8/2022          Codes Class Noted    Body mass index (BMI) of 22.0 to 22.9 in adult ICD-10-CM: Z68.22  ICD-9-CM: V85.1  8/26/2021        * (Principal) Fever ICD-10-CM: R50.9  ICD-9-CM: 780.60  2/27/2022        Leukocytosis ICD-10-CM: I43.019  ICD-9-CM: 288.60  2/27/2022        Sepsis ([facility] ICD-10-CM: A41.9  ICD-9-CM: 038.9, 995.91  2/2/2022        Fever of unknown origin ICD-10-CM: R50.9  ICD-9-CM: 780.60  2/2/2022        Thrombocytopenia, unspecified ICD-10-CM: D69.6  ICD-9-CM: 287.5  10/15/2021        Opioid use, unspecified with unspecified opioid-induced disorder ICD-10-CM: F11.99  ICD-9-CM: 292.9, 305.50  9/17/2021        Cellulitis of leg, right ICD-10-CM: L03.115  ICD-9-CM: 682.6  8/8/2021        Chronic migraine without aura with status migrainosus, not intractable ICD-10-CM: Z24.373  ICD-9-CM: 346.72  11/1/2016        Lung nodule ICD-10-CM: R91.1  ICD-9-CM: 793.11  10/28/2016    Overview Signed 10/28/2016 10:05 AM by Zee García     IMPRESSION:    1. Lobulated 2 cm mass in the right lung apex, malignancy versus an atypical  inflammatory process. 2. Occlusion of the right subclavian vein. 3. Chronic occlusion of the portal vein with associated abnormal tissue  encasing the common bile duct, most likely fibrosis. 4. Splenomegaly. DVT (deep venous thrombosis) ([facility] ICD-10-CM: I82.409  ICD-9-CM: 453.40  10/26/2016    Overview Signed 10/26/2016 10:27 PM by Yovany Renee. Right subclavian               Analgesic rebound headache ICD-10-CM: G44.40, T39.95XA  ICD-9-CM: 339.3, E935.9  9/22/2016        Headache, chronic daily ICD-10-CM: R51.9  ICD-9-CM: 784.0  9/22/2016        Migraine with aura and with status migrainosus, not intractable ICD-10-CM: G43. 101  ICD-9-CM: 346.03  9/22/2016        Left homonymous hemianopsia ICD-10-CM: H53.462  ICD-9-CM: 368.46  3/30/2015        Hyponatremia ICD-10-CM: E87.1  ICD-9-CM: 276.1  3/30/2015        Chronic myelogenous leukemia ([facility] ICD-10-CM: C92.10  ICD-9-CM: 205.10  3/30/2015        Acquired hypercoagulable state ([facility] (Chronic) ICD-10-CM: E08.45  ICD-9-CM: 289.81  3/30/2015        AKOSUA (iron deficiency anemia) ICD-10-CM: D50.9  ICD-9-CM: 280.9  7/28/2012        Cirrhosis (HCC) (Chronic) ICD-10-CM: K74.60  ICD-9-CM: 571.5  7/27/2012        Ascites ICD-10-CM: R18.8  ICD-9-CM: 789.59  7/26/2012        Polycythemia vera ([facility] (Chronic) ICD-10-CM: D45  ICD-9-CM: 238.4  2/16/2009    Overview Addendum 8/26/2021  2:31 PM by Jarad Nichols MD     2/2008 by [email or web address] analysis however portal vein thrombosis  And and splenomegaly since 2004  Hydroxyurea for 6 months poor tolerance spleen did not shrink   Pegasys 90 mcg weekly 4-2009 till    Restarted 12-30-09 45 mcg q 2 weeks  2-2010 reduced to q month   Increased 45 mcg 2/month 4-2010  Held 8-2010 thrombocytopenia  12-29-12 restarted 45 mcg q 2 weeks  4-1-11 45 mcg q 3 weeks  4-22-11 45 mcg q 4 weeks  Uncertain dosing thereafter   Possibly q 3 weeks 10-15-11 and held and restarted on 4-1-12 6-12 ct scan Small bowel wall thickening suggesting an enteritis. In addition, there is well thickening of the right colon which can suggest an additional   colitis although this can also be seen with severe portal hypertension. Likely reactive edematous changes it scattered fluid collections are seen of   the small bowel mesentery. . Apparent occlusion of the portal vein. In addition, the splenic vein, and superior mesenteric vein are not definitely seen and are also likely occluded. Splenomegaly, and extensive varices are seen as described above consistent with the portal vein occlusion. Anthonette Fallon Heterogeneous enhancement of the liver and mild periportal edema. An acute hepatitis cannot be excluded. Primary hypothyroidism ICD-10-CM: E03.9  ICD-9-CM: 244.9  2/16/2009        Splenomegaly ICD-10-CM: R16.1  ICD-9-CM: 789.2  2/16/2009        Esophageal varices (HCC) (Chronic) ICD-10-CM: I85.00  ICD-9-CM: 456.1  2/16/2009    Overview Addendum 8/26/2021  2:30 PM by Jud Willoughby MD     grade 3             Portal vein thrombosis ICD-10-CM: V59  ICD-9-CM: 957  2/16/2009    Overview Addendum 8/26/2021  2:31 PM by Jud Willoughby MD     Ct scan 6-21-2 Apparent occlusion of the portal vein. In addition, the splenic vein, and superior mesenteric vein are not definitely seen and are also likely  occluded. Splenomegaly, and extensive varices are seen as described above consistent with the portal vein occlusion 7-05-12 on arixtra HIT negative on repeat 7-27-12 re admitted Cirrhotic appearance of the liver.  Mild to moderate ascites RECOMMENDATIONS:  Polycythemia Vera  - on Jakafi, currently held    CML  - on Dasatinib, currently held    Sepsis / GNR bacteremia / ? Meningitis  - BCx +GNR, I/S pending  - UCx - NGTD  - s/p LP on 2/27 with low glucose, normal protein, WBC 2630 with 88% neutrophils. CSF Cx-NGTD. Add on meningitis panel if enough CSF remains to send. - On Cef/Vanc  - Given recurrent infections, check immunoglobulins and HIV    Anemia / Thrombocytopenia  - likely r/t current illness  - Check iron studies, B12, folate, Vit D, hemolysis labs, DIC labs, HIT profile, smear  - Transfuse prn to keep Hgb >7 and Plt >10k or >50k with active bleeding    Lab studies and imaging studies were personally reviewed. Thank you for allowing us to participate in the care of Ms. Couch. Formal consult note by Dr. Olena Darling to follow. We will continue to follow along.          Marilin Barr NP   [facility]  [address] [place] [address]  Office : [phone]  Fax : [phone]

## 2022-02-28 NOTE — PROGRESS NOTES
02/27/22 6866   Dual Skin Pressure Injury Assessment   Dual Skin Pressure Injury Assessment WDL   Second Care Provider (Based on Facility Policy) Beni Bello RN   Skin Integumentary   Skin Integumentary (WDL) WDL    Pressure  Injury Documentation No Pressure Injury Noted-Pressure Ulcer Prevention Initiated   Skin Color Pale   Skin Condition/Temp Dry;Flaky   Skin Integrity Incision (comment); Scars (comment)  (LP puncture site, abd)

## 2022-02-28 NOTE — PROGRESS NOTES
SW reviewed chart and attempted to make initial assessment with pt but she was sleeping soundly. Pt was admitted earlier this month for blood infection. Additional work up for bacterial meningitis being completed this admission. SW to meet with pt regarding dc planning at a later time.    Lawyer [place], BSW

## 2022-03-01 NOTE — ROUTINE PROCESS
Bedside and Verbal shift change report given to Yuliya Falk RN (oncoming nurse) by Leela Neil RN (offgoing nurse). Report included the following information SBAR, Kardex, Intake/Output, MAR and Recent Results.

## 2022-03-01 NOTE — PROGRESS NOTES
Hospitalist Progress Note   Admit Date:  2022  6:00 PM   Name:  Nj Purdy   Age:  64 y.o. Sex:  female  :  1960   MRN:  [id number]   Room:  [facility]    Presenting Complaint: No chief complaint on file. Reason(s) for Admission: Fever [R50.9]     Hospital Course & Interval History:   61F PMHx chronic anemia, CML, esophageal varices who presents to the ER with report of fever and headache for the past several days. Admits to fever up to 102. 1 two days ago. Feels malaise and weakness. Headache is global and associated with photophobia. Admits to feeling a bit confused as well. Admits to nausea, denies shortness of breath, cough ,vomiting. Subjective/24hr Events (22): Abdomen nonpainful. Experiencing thirst, some appetite. Has headache, tolerable. Flexion of cervical spine induces pain. Infectious disease to review CSF cultures. ROS:  10 systems reviewed and negative except as noted above. Assessment & Plan:   Bacterial meningitis  Fever in the context of CML. Initially uncertain etiology. UA shows sterile pyuria. CXR showed perihilar infiltrates, likely edema. Cultures positive for E. coli, LP concerning for E. coli: Continue bank cefepime  -Consult infectious disease  -Vanc, cefepime    3/1: Continue antibiotics pending CSF culture, MRI of lumbar spine, CT abdomen pelvis with contrast     Hyponatremia  IV NS, follow BMP.  3/1: Stable, recheck in a.m.     Iron deficiency anemia  -oral iron  3/1: stable      Chronic myelogenous leukemia  -Consult oncology     Primary hypothyroidism   -Continue home meds.       Esophageal varices     Stable.  Continue home meds.       Body mass index (BMI) of 23.0 to 23.9 in adult    Stable.       Dispo/Discharge Planning:      Home in 4-5 days    Diet:  ADULT DIET Regular  ADULT ORAL NUTRITION SUPPLEMENT Lunch, Dinner; Standard High Calorie/High Protein  DVT PPx: enoxaparin  Code status: Full Code    Hospital Problems as of 3/1/2022 Date Reviewed: 2/8/2022          Codes Class Noted - Resolved POA    Body mass index (BMI) of 22.0 to 22.9 in adult ICD-10-CM: Z68.22  ICD-9-CM: V85.1  8/26/2021 - Present Yes        * (Principal) Fever ICD-10-CM: R50.9  ICD-9-CM: 780.60  2/27/2022 - Present Unknown        Leukocytosis ICD-10-CM: D72.829  ICD-9-CM: 288.60  2/27/2022 - Present Yes        Hyponatremia ICD-10-CM: E87.1  ICD-9-CM: 276.1  3/30/2015 - Present Unknown        Chronic myelogenous leukemia ([facility] [address] ICD-10-CM: C92.10  ICD-9-CM: 205.10  3/30/2015 - Present Yes        AKOSUA (iron deficiency anemia) ICD-10-CM: D50.9  ICD-9-CM: 280.9  7/28/2012 - Present Yes        Cirrhosis (HCC) (Chronic) ICD-10-CM: K74.60  ICD-9-CM: 571.5  7/27/2012 - Present Yes        Primary hypothyroidism ICD-10-CM: E03.9  ICD-9-CM: 244.9  2/16/2009 - Present Yes        Esophageal varices (HCC) (Chronic) ICD-10-CM: I85.00  ICD-9-CM: 456.1  2/16/2009 - Present Yes    Overview Addendum 8/26/2021  2:30 PM by Army Waqar MD     grade 3             RESOLVED: Normocytic anemia ICD-10-CM: D64.9  ICD-9-CM: 285.9  7/28/2012 - 2/27/2022 Unknown    Overview Signed 7/28/2012  2:11 PM by Farzana Ferraro     Hemoglobin 8.0 gram   7/25/2012 17:03   Iron 27 (L)   TIBC 221 (L)   Transferrin Saturation 12 (L)                      Objective:     Patient Vitals for the past 24 hrs:   Temp Pulse Resp BP SpO2   03/01/22 0807 98.7 °F (37.1 °C) 97 16 101/65 93 %   03/01/22 0315 99.7 °F (37.6 °C) 100 16 100/60 90 %   02/28/22 2314 99.5 °F (37.5 °C) (!) 115 15 (!) 102/57 91 %   02/28/22 1933 99.2 °F (37.3 °C) (!) 102 16 103/62 93 %   02/28/22 1524 98.1 °F (36.7 °C) 93 16 (!) 93/56 94 %     Oxygen Therapy  O2 Sat (%): 93 % (03/01/22 0807)  Pulse via Oximetry: 87 beats per minute (02/27/22 2300)  O2 Device: None (Room air) (02/28/22 1524)    Estimated body mass index is 23.74 kg/m² as calculated from the following:    Height as of this encounter: 5' 2\" (1.575 m).     Weight as of this encounter: 58.9 kg (129 lb 12.8 oz). Intake/Output Summary (Last 24 hours) at 3/1/2022 1246  Last data filed at 3/1/2022 0857  Gross per 24 hour   Intake 480 ml   Output 790 ml   Net -310 ml       Blood pressure 101/65, pulse 97, temperature 98.7 °F (37.1 °C), resp. rate 16, height 5' 2\" (1.575 m), weight 58.9 kg (129 lb 12.8 oz), SpO2 93 %, not currently breastfeeding. Physical Exam  Vitals and nursing note reviewed. Constitutional:       General: She is not in acute distress. Appearance: Normal appearance. She is ill-appearing. HENT:      Head: Normocephalic. Eyes:      Extraocular Movements: Extraocular movements intact. Cardiovascular:      Rate and Rhythm: Normal rate. Pulses:           Radial pulses are 2+ on the left side. Heart sounds: Murmur heard. No friction rub. No gallop. Pulmonary:      Effort: Pulmonary effort is normal. No respiratory distress. Abdominal:      General: There is distension (mild). Musculoskeletal:         General: No deformity. Cervical back: No rigidity. Right lower leg: No edema. Left lower leg: No edema. Comments: Pain with flexion of C-spine   Skin:     General: Skin is warm and dry. Neurological:      General: No focal deficit present. Mental Status: She is alert and oriented to person, place, and time.    Psychiatric:         Mood and Affect: Mood normal.         Behavior: Behavior normal.         I have reviewed ordered lab tests and independently visualized imaging below:    Recent Labs:  Recent Results (from the past 48 hour(s))   CULTURE, BLOOD    Collection Time: 02/27/22  6:14 PM    Specimen: Blood   Result Value Ref Range    Special Requests: NO SPECIAL REQUESTS  LEFT  Antecubital        GRAM STAIN GRAM NEGATIVE RODS      GRAM STAIN        RESULTS VERIFIED, PHONED TO AND READ BACK BY JESUS RENE RN BY NB AT 0976 ON [id number]    GRAM STAIN AEROBIC AND ANAEROBIC BOTTLES      Culture result: (A)       GRAM NEGATIVE RODS IDENTIFICATION AND SUSCEPTIBILITY TO FOLLOW    Culture result:        Refer to Blood Culture ID Panel Accession  [id number]     LACTIC ACID    Collection Time: 02/27/22  6:14 PM   Result Value Ref Range    Lactic acid 2.8 (H) 0.4 - 2.0 MMOL/L   CBC WITH AUTOMATED DIFF    Collection Time: 02/27/22  6:14 PM   Result Value Ref Range    WBC 12.5 (H) 4.3 - 11.1 K/uL    RBC 3.15 (L) 4.05 - 5.2 M/uL    HGB 10.0 (L) 11.7 - 15.4 g/dL    HCT 29.0 (L) 35.8 - 46.3 %    MCV 92.1 79.6 - 97.8 FL    MCH 31.7 26.1 - 32.9 PG    MCHC 34.5 31.4 - 35.0 g/dL    RDW 17.8 (H) 11.9 - 14.6 %    PLATELET 98 (L) 283 - 450 K/uL    MPV 11.0 9.4 - 12.3 FL    ABSOLUTE NRBC 0.00 0.0 - 0.2 K/uL    NEUTROPHILS 93 (H) 43 - 78 %    LYMPHOCYTES 3 (L) 13 - 44 %    MONOCYTES 3 (L) 4.0 - 12.0 %    EOSINOPHILS 0 (L) 0.5 - 7.8 %    BASOPHILS 0 0.0 - 2.0 %    IMMATURE GRANULOCYTES 1 0.0 - 5.0 %    ABS. NEUTROPHILS 11.6 (H) 1.7 - 8.2 K/UL    ABS. LYMPHOCYTES 0.3 (L) 0.5 - 4.6 K/UL    ABS. MONOCYTES 0.4 0.1 - 1.3 K/UL    ABS. EOSINOPHILS 0.0 0.0 - 0.8 K/UL    ABS. BASOPHILS 0.0 0.0 - 0.2 K/UL    ABS. IMM. GRANS. 0.2 0.0 - 0.5 K/UL    DF AUTOMATED     METABOLIC PANEL, COMPREHENSIVE    Collection Time: 02/27/22  6:14 PM   Result Value Ref Range    Sodium 126 (L) 136 - 145 mmol/L    Potassium 3.6 3.5 - 5.1 mmol/L    Chloride 95 (L) 98 - 107 mmol/L    CO2 26 21 - 32 mmol/L    Anion gap 5 (L) 7 - 16 mmol/L    Glucose 135 (H) 65 - 100 mg/dL    BUN 22 8 - 23 MG/DL    Creatinine 0.72 0.6 - 1.0 MG/DL    GFR est AA >60 >60 ml/min/1.73m2    GFR est non-AA >60 >60 ml/min/1.73m2    Calcium 8.2 (L) 8.3 - 10.4 MG/DL    Bilirubin, total 1.4 (H) 0.2 - 1.1 MG/DL    ALT (SGPT) 22 12 - 65 U/L    AST (SGOT) 24 15 - 37 U/L    Alk.  phosphatase 149 (H) 50 - 136 U/L    Protein, total 6.2 (L) 6.3 - 8.2 g/dL    Albumin 2.5 (L) 3.2 - 4.6 g/dL    Globulin 3.7 (H) 2.3 - 3.5 g/dL    A-G Ratio 0.7 (L) 1.2 - 3.5     PROCALCITONIN    Collection Time: 02/27/22  6:14 PM   Result Value Ref Range    Procalcitonin 26.76 (H) 0.00 - 0.49 ng/mL   NT-PRO BNP    Collection Time: 02/27/22  6:14 PM   Result Value Ref Range    NT pro-BNP 4,430 (H) 5 - 125 PG/ML   BLOOD CULTURE ID PANEL    Collection Time: 02/27/22  6:14 PM    Specimen: Blood   Result Value Ref Range    Acc. no. from Micro Order [id number]     Escherichia coli Detected (A) NOTDET      KPC (Carbapenem Resistance Gene) NOT DETECTED NOTDET      INTERPRETATION        Gram negative gopi. Identified by realtime PCR as E. coli   CULTURE, BLOOD    Collection Time: 02/27/22  6:17 PM    Specimen: Blood   Result Value Ref Range    Special Requests: RIGHT  Antecubital        GRAM STAIN GRAM NEGATIVE RODS      GRAM STAIN        CRITICAL RESULT NOT CALLED DUE TO PREVIOUS NOTIFICATION OF CRITICAL RESULT WITHIN THE LAST 24 HOURS. GRAM STAIN AEROBIC AND ANAEROBIC BOTTLES      Culture result: GRAM NEGATIVE RODS (A)      Culture result:         For identification and susceptibility refer to culture  [facility] WS.[id number]      Culture result: CULTURE IN PROGRESS,FURTHER UPDATES TO FOLLOW     COVID-19 RAPID TEST    Collection Time: 02/27/22  7:07 PM   Result Value Ref Range    Specimen source NASAL SWAB      COVID-19 rapid test Not detected NOTD     URINE MICROSCOPIC    Collection Time: 02/27/22  7:49 PM   Result Value Ref Range    WBC >100 0 /hpf    RBC 10-20 0 /hpf    Epithelial cells 0-3 0 /hpf    Bacteria TRACE 0 /hpf    Casts 3-5 0 /lpf    Crystals, urine 0 0 /LPF    Mucus 0 0 /lpf    Other observations RESULTS VERIFIED MANUALLY     CULTURE, URINE    Collection Time: 02/27/22  7:49 PM    Specimen: Cath Urine   Result Value Ref Range    Special Requests: NO SPECIAL REQUESTS      Culture result: <10,000 COLONIES/mL NORMAL SKIN JOSÉ ISOLATED     LACTIC ACID    Collection Time: 02/27/22  8:32 PM   Result Value Ref Range    Lactic acid 1.3 0.4 - 2.0 MMOL/L   CULTURE, CSF W GRAM STAIN    Collection Time: 02/27/22 10:00 PM    Specimen: Cerebrospinal Fluid   Result Value Ref Range    Special Requests: TUBE 3     GRAM STAIN 0 TO 5 WBCS SEEN PER OIF     GRAM STAIN NO DEFINITE ORGANISM SEEN      Culture result: NO GROWTH 1 DAY     GLUCOSE, CSF    Collection Time: 02/27/22 10:00 PM   Result Value Ref Range    Tube No. 2      Glucose,CSF 35 (LL) 40 - 70 MG/DL   PROTEIN, CSF    Collection Time: 02/27/22 10:00 PM   Result Value Ref Range    Tube No. 2      Protein, MG/DL   S. PNEUMO AG, UR/CSF    Collection Time: 02/27/22 10:00 PM   Result Value Ref Range    Source CEREBROSPINAL FLUID      Specimen Comment      Streptococcus pneumoniae Ag Negative Negative      Fluid culture PENDING     Organism ID PENDING     Please note Comment     CELL COUNT, BODY FLUID    Collection Time: 02/27/22 10:00 PM   Result Value Ref Range    BODY FLUID TYPE CEREBROSPINAL FLUID      FLUID COLOR COLORLESS     FLUID APPEARANCE HAZY      FLUID RBC CT. 18 /cu mm    FLUID WBC COUNT 2,630 /cu mm    FLD NEUTROPHILS 88 %    FLD LYMPHS 2 %    FLD MONO/MACROPHAGES 10 %   MENINGITIS PATHOGENS PANEL, CSF (BY PCR)    Collection Time: 02/27/22 10:00 PM   Result Value Ref Range    Meningitis panel RESULTS SCANNED IN [facility]     COLLECTION COMMENT    Collection Time: 02/27/22 10:00 PM   Result Value Ref Range    Collection Comment RESULTS VERIFIED, PHONED TO AND READ BACK BY     EKG, 12 LEAD, INITIAL    Collection Time: 02/27/22 10:49 PM   Result Value Ref Range    Ventricular Rate 88 BPM    Atrial Rate 88 BPM    P-R Interval 140 ms    QRS Duration 82 ms    Q-T Interval 360 ms    QTC Calculation (Bezet) 435 ms    Calculated P Axis 67 degrees    Calculated R Axis 67 degrees    Calculated T Axis 60 degrees    Diagnosis       Normal sinus rhythm  Cannot rule out Anterior infarct (cited on or before 27-FEB-2022)  Abnormal ECG  When compared with ECG of 27-FEB-2022 19:18,  No significant change was found  Confirmed by Naa Prado MD (), SHARRON ART ([id number]) on 2/28/2022 5:42:95 PM     METABOLIC PANEL, COMPREHENSIVE    Collection Time: 02/28/22 6:10 AM   Result Value Ref Range    Sodium 133 (L) 136 - 145 mmol/L    Potassium 3.7 3.5 - 5.1 mmol/L    Chloride 103 98 - 107 mmol/L    CO2 23 21 - 32 mmol/L    Anion gap 7 7 - 16 mmol/L    Glucose 109 (H) 65 - 100 mg/dL    BUN 20 8 - 23 MG/DL    Creatinine 0.65 0.6 - 1.0 MG/DL    GFR est AA >60 >60 ml/min/1.73m2    GFR est non-AA >60 >60 ml/min/1.73m2    Calcium 7.7 (L) 8.3 - 10.4 MG/DL    Bilirubin, total 1.2 (H) 0.2 - 1.1 MG/DL    ALT (SGPT) 17 12 - 65 U/L    AST (SGOT) 26 15 - 37 U/L    Alk. phosphatase 114 50 - 130 U/L    Protein, total 5.2 (L) 6.3 - 8.2 g/dL    Albumin 2.1 (L) 3.2 - 4.6 g/dL    Globulin 3.1 2.3 - 3.5 g/dL    A-G Ratio 0.7 (L) 1.2 - 3.5     CBC WITH AUTOMATED DIFF    Collection Time: 02/28/22  6:10 AM   Result Value Ref Range    WBC 11.2 (H) 4.3 - 11.1 K/uL    RBC 2.70 (L) 4.05 - 5.2 M/uL    HGB 8.5 (L) 11.7 - 15.4 g/dL    HCT 24.5 (L) 35.8 - 46.3 %    MCV 90.7 79.6 - 97.8 FL    MCH 31.5 26.1 - 32.9 PG    MCHC 34.7 31.4 - 35.0 g/dL    RDW 17.7 (H) 11.9 - 14.6 %    PLATELET 67 (L) 181 - 450 K/uL    MPV 11.5 9.4 - 12.3 FL    ABSOLUTE NRBC 0.00 0.0 - 0.2 K/uL    DF AUTOMATED      NEUTROPHILS 90 (H) 43 - 78 %    LYMPHOCYTES 3 (L) 13 - 44 %    MONOCYTES 5 4.0 - 12.0 %    EOSINOPHILS 0 (L) 0.5 - 7.8 %    BASOPHILS 0 0.0 - 2.0 %    IMMATURE GRANULOCYTES 2 0.0 - 5.0 %    ABS. NEUTROPHILS 10.1 (H) 1.7 - 8.2 K/UL    ABS. LYMPHOCYTES 0.3 (L) 0.5 - 4.6 K/UL    ABS. MONOCYTES 0.6 0.1 - 1.3 K/UL    ABS. EOSINOPHILS 0.0 0.0 - 0.8 K/UL    ABS. BASOPHILS 0.0 0.0 - 0.2 K/UL    ABS. IMM.  GRANS. 0.2 0.0 - 0.5 K/UL   TRANSFERRIN SATURATION    Collection Time: 02/28/22 11:00 AM   Result Value Ref Range    Iron 28 (L) 35 - 150 ug/dL    TIBC 145 (L) 250 - 450 ug/dL    Transferrin Saturation 19 %   FERRITIN    Collection Time: 02/28/22 11:00 AM   Result Value Ref Range    Ferritin 835 (H) 8 - 388 NG/ML   VITAMIN B12    Collection Time: 02/28/22 11:00 AM   Result Value Ref Range    Vitamin B12 938 193 - 986 pg/mL FOLATE    Collection Time: 02/28/22 11:00 AM   Result Value Ref Range    Folate 3.2 3.1 - 17.5 ng/mL   VITAMIN D, 25 HYDROXY    Collection Time: 02/28/22 11:00 AM   Result Value Ref Range    Vitamin D 25-Hydroxy 15.5 (L) 30.0 - 100.0 ng/mL   LD    Collection Time: 02/28/22 11:00 AM   Result Value Ref Range     (H) 110 - 210 U/L   RETICULOCYTE COUNT    Collection Time: 02/28/22 11:00 AM   Result Value Ref Range    Reticulocyte count 1.5 0.3 - 2.0 %    Absolute Retic Cnt. 0.0468 0.026 - 0.095 M/ul    Immature Retic Fraction 12.5 3.0 - 15.9 %    Retic Hgb Conc. 31 29 - 35 pg   HAPTOGLOBIN    Collection Time: 02/28/22 11:00 AM   Result Value Ref Range    Haptoglobin 51 30 - 200 mg/dL   PROTHROMBIN TIME + INR    Collection Time: 02/28/22 11:00 AM   Result Value Ref Range    Prothrombin time 19.0 (H) 12.6 - 14.5 sec    INR 1.6     PTT    Collection Time: 02/28/22 11:00 AM   Result Value Ref Range    aPTT 44.1 (H) 24.1 - 35.1 SEC   FIBRINOGEN    Collection Time: 02/28/22 11:00 AM   Result Value Ref Range    Fibrinogen 332 190 - 501 mg/dL   D DIMER    Collection Time: 02/28/22 11:00 AM   Result Value Ref Range    D DIMER 6.58 (H) <0.56 ug/ml(FEU)   PATHOLOGIST REVIEW SMEARS    Collection Time: 02/28/22 11:00 AM   Result Value Ref Range    PATHOLOGIST REVIEW PENDING    IMMUNOGLOBULINS, G/A/M, QT.     Collection Time: 02/28/22 11:00 AM   Result Value Ref Range    Immunoglobulin G, Qt. 888 586 - 1,602 mg/dL    Immunoglobulin A, Qt. 125 87 - 352 mg/dL    Immunoglobulin M, Qt. 75 26 - 217 mg/dL   BILIRUBIN, FRACTIONATED    Collection Time: 02/28/22 11:00 AM   Result Value Ref Range    Bilirubin, total 1.2 (H) 0.2 - 1.1 MG/DL    Bilirubin, direct 0.4 (H) <0.4 MG/DL    Bilirubin, indirect 0.8 0.0 - 1.1 MG/DL   HIV 1/2 AG/AB, 4TH GENERATION,W RFLX CONFIRM    Collection Time: 02/28/22  1:17 PM   Result Value Ref Range    HIV 1/2 Interpretation NONREACTIVE NR      HIV 1/2 result comment SEE NOTE (A) NR     METABOLIC PANEL, COMPREHENSIVE    Collection Time: 03/01/22  7:11 AM   Result Value Ref Range    Sodium 132 (L) 136 - 145 mmol/L    Potassium 3.7 3.5 - 5.1 mmol/L    Chloride 103 98 - 107 mmol/L    CO2 22 21 - 32 mmol/L    Anion gap 7 7 - 16 mmol/L    Glucose 146 (H) 65 - 100 mg/dL    BUN 18 8 - 23 MG/DL    Creatinine 0.62 0.6 - 1.0 MG/DL    GFR est AA >60 >60 ml/min/1.73m2    GFR est non-AA >60 >60 ml/min/1.73m2    Calcium 7.6 (L) 8.3 - 10.4 MG/DL    Bilirubin, total 0.8 0.2 - 1.1 MG/DL    ALT (SGPT) 17 12 - 65 U/L    AST (SGOT) 19 15 - 37 U/L    Alk. phosphatase 102 50 - 136 U/L    Protein, total 4.9 (L) 6.3 - 8.2 g/dL    Albumin 1.9 (L) 3.2 - 4.6 g/dL    Globulin 3.0 2.3 - 3.5 g/dL    A-G Ratio 0.6 (L) 1.2 - 3.5     CBC WITH AUTOMATED DIFF    Collection Time: 03/01/22  7:11 AM   Result Value Ref Range    WBC 11.0 4.3 - 11.1 K/uL    RBC 2.73 (L) 4.05 - 5.2 M/uL    HGB 8.5 (L) 11.7 - 15.4 g/dL    HCT 25.8 (L) 35.8 - 46.3 %    MCV 94.5 79.6 - 97.8 FL    MCH 31.1 26.1 - 32.9 PG    MCHC 32.9 31.4 - 35.0 g/dL    RDW 18.1 (H) 11.9 - 14.6 %    PLATELET 80 (L) 158 - 450 K/uL    MPV 11.4 9.4 - 12.3 FL    ABSOLUTE NRBC 0.00 0.0 - 0.2 K/uL    DF AUTOMATED      NEUTROPHILS 87 (H) 43 - 78 %    LYMPHOCYTES 4 (L) 13 - 44 %    MONOCYTES 6 4.0 - 12.0 %    EOSINOPHILS 1 0.5 - 7.8 %    BASOPHILS 0 0.0 - 2.0 %    IMMATURE GRANULOCYTES 3 0.0 - 5.0 %    ABS. NEUTROPHILS 9.6 (H) 1.7 - 8.2 K/UL    ABS. LYMPHOCYTES 0.4 (L) 0.5 - 4.6 K/UL    ABS. MONOCYTES 0.7 0.1 - 1.3 K/UL    ABS. EOSINOPHILS 0.1 0.0 - 0.8 K/UL    ABS. BASOPHILS 0.0 0.0 - 0.2 K/UL    ABS. IMM. GRANS. 0.3 0.0 - 0.5 K/UL   VANCOMYCIN, RANDOM    Collection Time: 03/01/22  7:11 AM   Result Value Ref Range    Vancomycin, random 18.2 UG/ML       All Micro Results     Procedure Component Value Units Date/Time    SHEYLA Caceres, UR/CSF [[id number]] Collected: 02/27/22 2200    Order Status: Completed Specimen: Miscellaneous sample Updated: 03/01/22 1236     Source CEREBROSPINAL FLUID        Specimen Comment        Comment: (NOTE)  Cerebrospinal fluid (CSF)          Streptococcus pneumoniae Ag Negative        Fluid culture PENDING     Organism ID PENDING     Please note Comment        Comment: (NOTE)  College [employer] standards require a culture to be  performed on CSF specimens submitted for bacterial antigen testing. (CAP [id number]) Urine specimens will not be cultured. Performed At: [facility] & [address] [id number]  Sonia Lama MD LÁZARO:[id number]         CULTURE, CSF Garry Martinez [[id number]] Collected: 02/27/22 2200    Order Status: Completed Specimen: Cerebrospinal Fluid Updated: 03/01/22 0844     Special Requests: TUBE 3     GRAM STAIN 0 TO 5 WBCS SEEN PER OIF      NO DEFINITE ORGANISM SEEN        Culture result: NO GROWTH 1 DAY       CULTURE, URINE [[id number]] Collected: 02/27/22 1949    Order Status: Completed Specimen: Cath Urine Updated: 03/01/22 0749     Special Requests: NO SPECIAL REQUESTS        Culture result:       <10,000 COLONIES/mL NORMAL SKIN JOSÉ ISOLATED          BLOOD CULTURE [[id number]]  (Abnormal) Collected: 02/27/22 1817    Order Status: Completed Specimen: Blood Updated: 03/01/22 0744     Special Requests: --        RIGHT  Antecubital       GRAM STAIN GRAM NEGATIVE RODS               CRITICAL RESULT NOT CALLED DUE TO PREVIOUS NOTIFICATION OF CRITICAL RESULT WITHIN THE LAST 24 HOURS.                   AEROBIC AND ANAEROBIC BOTTLES           Culture result: GRAM NEGATIVE RODS               For identification and susceptibility refer to culture  [facility].[id number]              CULTURE IN [address] UPDATES TO FOLLOW          BLOOD CULTURE [[id number]]  (Abnormal) Collected: 02/27/22 1814    Order Status: Completed Specimen: Blood Updated: 03/01/22 0742     Special Requests: --        NO SPECIAL REQUESTS  LEFT  Antecubital       GRAM STAIN GRAM NEGATIVE RODS               RESULTS VERIFIED, PHONED TO AND READ BACK BY JESUS RENE RN BY NB AT 2598 ON [id number]                  AEROBIC AND ANAEROBIC BOTTLES           Culture result:       GRAM NEGATIVE RODS IDENTIFICATION AND SUSCEPTIBILITY TO FOLLOW                  Refer to Blood Culture ID Panel Accession  [id number]      CULTURE, BLOOD [[id number]] Collected: 03/01/22 5856    Order Status: Completed Specimen: Blood Updated: 03/01/22 0727    MENINGITIS PATHOGENS PANEL, CSF (BY PCR) [[id number]] Collected: 02/27/22 2200    Order Status: Completed Specimen: Cerebrospinal Fluid Updated: 02/28/22 1930     Meningitis panel       RESULTS SCANNED IN [facility]          BLOOD CULTURE ID PANEL [[id number]]  (Abnormal) Collected: 02/27/22 1814    Order Status: Completed Specimen: Blood Updated: 02/28/22 1314     Acc. no. from Micro Order [id number]     Escherichia coli Detected        Comment: RESULTS VERIFIED, PHONED TO AND READ BACK BY  Chris Perez RN AT 13:11 02.28.2022          KPC (Carbapenem Resistance Gene) NOT DETECTED        Comment: WARNING:  A Not Detected result for the KPC gene does not indicate susceptibility to carbapenems. Gram negative bacteria can be resistant to carbapenems by mechanisms other than carrying the KPC gene. INTERPRETATION       Gram negative gopi. Identified by realtime PCR as E. coli          MENINGITIS PATHOGENS PANEL, CSF (BY PCR) [[id number]] Collected: 02/28/22 1100    Order Status: Canceled     MENINGITIS PATHOGENS PANEL, CSF (BY PCR) [[id number]] Collected: 02/28/22 1030    Order Status: Canceled Specimen: Cerebrospinal Fluid     COVID-19 RAPID TEST [[id number]] Collected: 02/27/22 1907    Order Status: Completed Specimen: Nasopharyngeal Updated: 02/27/22 1938     Specimen source NASAL SWAB        COVID-19 rapid test Not detected        Comment:      The specimen is NEGATIVE for SARS-CoV-2, the novel coronavirus associated with COVID-19. A negative result does not rule out COVID-19.        This test has been authorized by the FDA under an Emergency Use Authorization (EUA) for use by authorized laboratories. Fact sheet for Healthcare Providers: ConventionUpdate.co.nz  Fact sheet for Patients: ConventionUpdate.co.nz       Methodology: Isothermal Nucleic Acid Amplification               Other Studies:  No results found.     Current Meds:  Current Facility-Administered Medications   Medication Dose Route Frequency    ergocalciferol capsule 50,000 Units  50,000 Units Oral DAILY    morphine injection 2 mg  2 mg IntraVENous Q4H PRN    enoxaparin (LOVENOX) injection 50 mg  50 mg SubCUTAneous Q12H    famotidine (PEPCID) tablet 40 mg  40 mg Oral DAILY PRN    pantoprazole (PROTONIX) tablet 40 mg  40 mg Oral ACB    magnesium oxide (MAG-OX) tablet 400 mg  400 mg Oral DAILY    oxyCODONE-acetaminophen (PERCOCET 10)  mg per tablet 1 Tablet  1 Tablet Oral Q8H PRN    levothyroxine (SYNTHROID) tablet 112 mcg  112 mcg Oral ACB    zolpidem (AMBIEN) tablet 10 mg  10 mg Oral QHS PRN    sodium chloride (NS) flush 5-40 mL  5-40 mL IntraVENous PRN    acetaminophen (TYLENOL) tablet 650 mg  650 mg Oral Q6H PRN    polyethylene glycol (MIRALAX) packet 17 g  17 g Oral DAILY PRN    ondansetron (ZOFRAN ODT) tablet 4 mg  4 mg Oral Q8H PRN    Or    ondansetron (ZOFRAN) injection 4 mg  4 mg IntraVENous Q6H PRN    0.9% sodium chloride infusion  125 mL/hr IntraVENous CONTINUOUS    cefepime (MAXIPIME) 2 g in 0.9% sodium chloride (MBP/ADV) 100 mL MBP  2 g IntraVENous Q8H    vancomycin (VANCOCIN) 750 mg in 0.9% sodium chloride 250 mL ([id number])  750 mg IntraVENous Q12H    diphenhydrAMINE (BENADRYL) capsule 25 mg  25 mg Oral Q12H PRN    oxyCODONE IR (ROXICODONE) tablet 5 mg  5 mg Oral Q4H PRN    sodium chloride (NS) flush 5-10 mL  5-10 mL IntraVENous Q8H    sodium chloride (NS) flush 5-10 mL  5-10 mL IntraVENous PRN    0.9% sodium chloride infusion  200 mL/hr IntraVENous CONTINUOUS       Signed:  Kenzie Bose MD

## 2022-03-01 NOTE — PROGRESS NOTES
Comprehensive Nutrition Assessment    Type and Reason for Visit: Initial,Positive nutrition screen  Best Practice Alert for Malnutrition Screening Tool: Recently Lost Weight Without Trying: Unsure, If Yes, How Much Weight Loss: Unsure, Eating Poorly Due to Decreased Appetite: Yes    Nutrition Recommendations/Plan:   Meals and Snacks:  Continue current diet. Regular  Nutrition Supplement Therapy:   Medical food supplement therapy:  Continue Ensure Enlive twice per day (this provides 350 kcal and 20 grams protein per bottle)     Malnutrition Assessment:  Malnutrition Status: At risk for malnutrition (specify) (variable tolerance of oral diet and variable wt)    Nutrition Assessment:   Nutrition History: 7/13/2021: Nutrition history notable for EN and PN in 2012 following significant bowel surgery. Patient confirms this stating that she had both TPN and jtube feeds at one point, but it has been a long time. She does voice several diet restrictions due to her cancer and enlarged spleen. She reports early satiety and difficulty digesting certain foods as primary barrier to PO intake. She reports she usually eats 2 meals per day and several snacks. She states that breakfast is usually eggs, hall, and toast and will sometimes eat over several hours. She states other meal is a light dinner which is variable. She states that she feels like she digests chicken and fish better, but does occasionally eats beef and pork depending on how it is cooked. She states that she tends to eat her portein and vegetable portions first and will hold off on her starches if she starts becoming too full. She states that snacks include fruit, yogurt, ice cream. She states that she occasionally drinks Ensure at home depending on her ablilty to prepare and consume meals, but prefers to eat real food most often. She states that she does not know current body weight but was ~128# at most recent office visit.  She states that her weight fluctuates due to her PO intake and diarrhea when taking chemo. 3/1: Pt reports intake similar to what was reported July 2021 with intake up to 1 bottle of Ensure per day. At baseline has several loose BM's/diarrhea per day that is worse with chemotherapy. Has occasional nausea but denies vomiting, altered tastes, mouth sores, chewing or swallowing difficulties. Pt reports weight continues to be variable    Nutrition Background: H/O: CML, polycythemia vera,  intracranial hemorrage s/p craniotomy, small bowel resection d/t to bowel ischemia in 2012 (noted 9 ft removed, ileum and colon remain), seizures, GI bleed, esophageal varices. vasculitis, hypothyroidism, cirrhosis, splenomegaly, anemia, migraines, hx PE 2006, recurrent DVTs/portal vein thrombosis. Cris Bloodgoshanta She was recently admitted from 2/2 - 2/6/22 for E Coli bacteremia. Presented this admission with c/o fever and headache with associated photophobia, sick  2 weeks with malaise, weakness, nausea, and confusion. CXR 2/27 with b/l perihilar airspace disease, suspicious for pulm edema. Repeat CXR 2/28 with stable findings with persistent cardiomegaly and b/l diffuse airspace opacities, concerning concerning for bacterial meningitis. Nutrition Interval:  Pt sitting in bed with lunch tray at bedside that has not been touched. Pt verbalizes that she is sleepy and \"just doesn't feel well\". She has consumed very little since admission because she has not had \"good days\" and she does not feel well. Pt willing to try to drink Ensure and also receptive to yogurt.      Nutrition Related Findings:   No visible fat or muscle wasting      Current Nutrition Therapies:  ADULT DIET Regular  ADULT ORAL NUTRITION SUPPLEMENT Lunch, Dinner; Standard High Calorie/High Protein    Current Intake:   Average Meal Intake: 1-25% Average Supplement Intake: 0%      Anthropometric Measures:  Height: 5' 2\" (157.5 cm)  Current Body Wt: 58.1 kg (128 lb), Weight source: Bed scale  BMI: 23.4, Normal weight (BMI 18.5-24. 9)  Admission Body Weight: 125 lb 10.6 oz (bedscale)  Ideal Body Weight (lbs) (Calculated): 110 lbs (50 kg),    Usual Body Wt:  (See below),   WT / BMI WEIGHT   2/8/2022 124 lb   2/5/2022 129 lb   1/10/2022 123 lb   12/13/2021 119 lb 6.4 oz   11/12/2021 119 lb 12.8 oz   10/15/2021 119 lb 4.8 oz   10/6/2021 115 lb 8 oz   9/17/2021 118 lb   9/1/2021 137 lb 12.8 oz   8/20/2021 125 lb   8/19/2021 124 lb 6.4 oz   8/9/2021 138 lb 9.6 oz   8/8/2021 130 lb   8/5/2021 130 lb   7/26/2021 130 lb   7/25/2021 130 lb   7/15/2021 131 lb 8 oz   7/13/2021 138 lb   6/17/2021 128 lb   5/19/2021 128 lb   4/15/2021 127 lb   3/18/2021 130 lb   2/18/2021 128 lb   Percent weight change:  Variances as above  Edema as below impacts weight variances          Edema: LLE: 2+; Pitting (3/1/2022  7:45 AM)  RLE: 1+; Pitting (3/1/2022  7:45 AM)     Estimated Daily Nutrient Needs:  Energy (kcal/day): 3966-4933 (Kcal/kg (25-30), Weight Used: Current (58.9 kg-bed scale 2/28))  Protein (g/day): 74-88 ( (20% of kcal))  Fluid (ml/day):   (1 ml/kcal)    Nutrition Diagnosis:   · Inadequate oral intake related to altered GI structure (decreased appetite) as evidenced by GI abnormality (S/P SB resection, pt reported barriers as above, current intake < 25% of estimated needs)    Nutrition Interventions:   Food and/or Nutrient Delivery: Continue current diet,Continue oral nutrition supplement     Coordination of Nutrition Care: Continue to monitor while inpatient,Interdisciplinary rounds      Goals:       Active Goal: Intake >25% of estimated needs by follow up    Nutrition Monitoring and Evaluation:      Food/Nutrient Intake Outcomes: Food and nutrient intake,Supplement intake  Physical Signs/Symptoms Outcomes: Weight    Discharge Planning:    Continue oral nutrition supplement,Continue current diet    Maggie Span, [address], [address] [place] [address], [address] [place] [address]

## 2022-03-01 NOTE — PROGRESS NOTES
Care Management Interventions  PCP Verified by CM: No (Says she is looking for one and has someone in mind. States she does not need this nurse to place a referral.)  Mode of Transport at Discharge: Other (see comment)  Transition of Care Consult (CM Consult): Discharge Planning  Support Systems: Child(lauryn),Friend/Neighbor,[employer]  Confirm Follow Up Transport: Friends  The Patient and/or Patient Representative was Provided with a Choice of Provider and Agrees with the Discharge Plan?: Yes  Name of the Patient Representative Who was Provided with a Choice of Provider and Agrees with the Discharge Plan: Patient  Freedom of Choice List was Provided with Basic Dialogue that Supports the Patient's Individualized Plan of Care/Goals, Treatment Preferences and Shares the Quality Data Associated with the Providers?: Yes  Discharge Location  Patient Expects to be Discharged to[de-identified] Home with family assistance    In to see patient--sitting on side of bed. Patient lives [facility] in a [facility]. States she doesn't have a PCP at the moment, but she has a list and is going to contact one. Refused to be referred. She has a son Luis Walls and daughter Chico Arm [phone]. States they provide some support to her. States she also has friends and her Buddhism family that help her out as well. They will get groceries for her and take her to appointments when needed. Uses no equipment at home. No steps to enter home but her BR is located on 2nd floor and no BR on 1st level. States she usually does ok with the stairs but not when she feels weak like she does now. Uses [facility] on [address] for her prescriptions. CM will continue to follow for discharge needs.

## 2022-03-01 NOTE — PROGRESS NOTES
Received pt from RN Marylen Shim) in stable condition. Pt in bed resting quietly. Resp even & unlabored on room air; no acute signs of distress noted. Bed low & locked; call light in reach; no needs voiced.

## 2022-03-01 NOTE — CONSULTS
Infectious Disease Consult    Today's Date: 3/1/2022   Admit Date: 2/27/2022    Impression:   · Recurrent E coli bacteremia  · Bacterial meningitis  · CML on dasitinib  · PCV on jakafi    The peculiarities of this situation are the recurrence of E coli bacteremia and the accompanying bacterial meningitis. E coli would be a very unusual pathogen to do that. This concerns me for a persistent source and the low back pain gives me concern for the potential of epidural abscess which could be complicated by meningitis. Plan:   · Continue current antibiotics until CSF cultures are resulted  · MRI lumbar spine with contrast  · CT abdomen/pelvis with contrast    Anti-infectives:   · Vancomycin   · Cefepime 2g IV q8 hours    Subjective:   Date of Consultation:  March 1, 2022  Referring Physician: Little Brown    Patient is a 64 y.o. female with a history of , PCV on jakafi and CML on dasitinib who was admitted on 2/27/2022 with a history of fever (102.1) and headache for 5 days accompanied by malaise. She has a history of portal vein thrombosis and splenomegaly. + photophobia that has resolved. + nausea that has resolved. She has had moderate-severe low back pain overlying the lumbar spine x 10 days. She denies a history of prior back pain. She was admitted 2/2/2022 - 2/6/2022 with E coli bacteremia. She was treated with meropenem and discharged on cefpodoxime for an additional 7 days. She completed these and started to feel ill with fever at night about 10 days later. She has chronic loose stool with no change. Blood cultures are again growing E coli. CSF demonstrates neutrophilic pleiocytosis (WBC 2630; protein 110; glucose 35). Thus far, there is no growth from CSF culture. CSF PCR is pending. She has mild leukocytosis, anemia, and thrombocytopenia. UA was unremarkable. She has been started on vancomycin and high dose cefepime.         Patient Active Problem List   Diagnosis Code    [facility]mia [facility]a ([facility] D4  Ascites R18.8    Cirrhosis (HCC) K74.60    AKOSUA (iron deficiency anemia) D50.9    Left homonymous hemianopsia H53.462    Hyponatremia E87.1    Chronic myelogenous leukemia (HCC) C92.10    Acquired hypercoagulable state ([facility] D68.59    Analgesic rebound headache G44.40, T39.95XA    Headache, chronic daily R51.9    Migraine with aura and with status migrainosus, not intractable G43. 101    DVT (deep venous thrombosis) (HCC) I82.409    Lung nodule R91.1    Chronic migraine without aura with status migrainosus, not intractable G43.701    Primary hypothyroidism E03.9    Splenomegaly R16.1    Esophageal varices (HCC) I85.00    Portal vein thrombosis I81    Cellulitis of leg, right L03.115    Body mass index (BMI) of 22.0 to 22.9 in adult Z68.22    Opioid use, unspecified with unspecified opioid-induced disorder F11.99    Thrombocytopenia, unspecified D69.6    Sepsis (HCC) A41.9    Fever of unknown origin R50.9    Fever R50.9    Leukocytosis D72.829     Past Medical History:   Diagnosis Date    Acute blood loss anemia 4/6/2018    Anemia     C. difficile diarrhea 4/1/2015    Cerebral edema (HCC) 4/1/2015    Chronic migraine 9/22/2016    Chronic myelogenous leukemia ([facility]     Rowan chromosome/ converted from polycythemia in 2009- to 2012 when she was dx w leukemia    Chronic pain     Coagulation disorder ([facility]     \"clotting and Bleeding Problem\" dr [place]aur [place] follows     Esophageal spasm     with banding     Esophageal varices (HCC)     grade 3    GI bleed 8/3/2016    H/O craniotomy     3-29-15.  due to blood clot - which caused a seizure  - then pt fell and hit     Hematemesis 8/20/2021    Leukocytosis 3/14/2015    Melena 8/20/2021    MRSA colonization 6/25/2012    Polycythemia vera(238.4)     JAK2 mutation    Portal hypertension (HCC)     with varices    Portal vein thrombosis 6/20/2012    Ct scan 6-21-2 Apparent occlusion of the portal vein.  In addition, the splenic vein, and superior mesenteric vein are not definitely seen and are also likely  occluded. Splenomegaly, and extensive varices are seen as described above consistent with the portal vein occlusion 12 on arixtra HIT negative on repeat 12 re admitted Cirrhotic appearance of the liver. Mild to moderate ascites, as    Primary hypothyroidism     Pulmonary embolism ([address]     not on coumadin anymore     S/P exploratory laparotomy, 12    Bowel resection:  336 cm of small bowel removed, approximately 9 feet and placement of feeding jejunostomy.      S/P small bowel resection     .  9 feet removed due to  gangrene which wa due to blood clot    Seizure ([address] 3/14/2015    Seizure disorder ([address] 3/30/2015    due to clotting factor    Seizures ([address]     last one 3- - followed by aniyah     Splenomegaly, congestive, chronic     Stroke (cerebrum) ([address] 2015    had bled into head- surgery    Stroke [facility])     pt had stroke like symptoms     Thrombocytopenia, HIT negative 2012 platelets lower repeat HIT 12 platelets in 91,118'D    Thrombosis, portal vein 2004    portal , spleenic and recently superior mesenteric    Transfusion history     many blood tranfusions - last 3-29-15 after brain surgery    Traumatic hemorrhage of right cerebrum ([address] 3/30/2015      Family History   Problem Relation Age of Onset    Diabetes Mother     Stroke Mother         after surgery    Cancer Father         brain    No Known Problems Sister     Other Sister         diverticulitis    Other Sister         diverticulitis    Cancer Sister         lyjmphoma    Breast Cancer Maternal Aunt 48    Thyroid Disease Paternal Grandmother       Social History     Tobacco Use    Smoking status: Former Smoker     Packs/day: 0.20     Years: 10.00     Pack years: 2.00     Types: Cigarettes     Quit date:      Years since quittin.1    Smokeless tobacco: Never Used   Substance Use Topics    Alcohol use: No     Past Surgical History:   Procedure Laterality Date    HX APPENDECTOMY      with small bowel resection    HX BREAST BIOPSY Bilateral     Lt - ; Rt -     HX CHOLECYSTECTOMY      HX GI      liver biopsy    HX GI      bowel removed small - 9 feet     HX GYN       x 2    HX GYN      D&C following miscarriage    HX ORTHOPAEDIC Left 2008    torn labrum shoulder (screw in place)    NEUROLOGICAL PROCEDURE UNLISTED  2010    craniotomy to evacuate subdural hematoma following a fall from a seizure    OK ABDOMEN SURGERY PROC UNLISTED      umbilical hernia repair    OK ABDOMEN SURGERY PROC UNLISTED      9ft of small bowel excised then reconnected      Prior to Admission medications    Medication Sig Start Date End Date Taking? Authorizing Provider   oxyCODONE-acetaminophen (PERCOCET 10)  mg per tablet Take 1 Tablet by mouth every eight (8) hours as needed for Pain for up to 30 days. Max Daily Amount: 3 Tablets. 2/8/22 3/10/22 Yes Jen Mcnulty NP   zolpidem (AMBIEN) 10 mg tablet Take 1 Tablet by mouth nightly as needed for Sleep. Max Daily Amount: 10 mg. 22  Yes Madelaine Nash MD   diphenoxylate-atropine (LomotiL) 2.5-0.025 mg per tablet Take 1 Tablet by mouth four (4) times daily as needed for Diarrhea. Max Daily Amount: 4 Tablets. 22  Yes Madelaine Nash MD   famotidine (PEPCID) 40 mg tablet Take 1 Tablet by mouth daily as needed for Heartburn. Take at least 4 hours before or after sprycel 21  Yes Jen Mcnulty NP   dasatinib (SpryceL) 80 mg tab Take 1 Tablet by mouth daily. 21  Yes Madelaine Nash MD   enoxaparin (Lovenox) 60 mg/0.6 mL injection 50 mg by SubCUTAneous route every twelve (12) hours. 21  Yes Madelaine Nash MD   b complex vitamins tablet 1 Tablet daily. Yes Provider, Historical   Comp Stocking,Knee,Regular,Sml misc 1 Each by Does Not Apply route daily.  21  Yes Herb Roy, DO   ondansetron (ZOFRAN ODT) 8 mg disintegrating tablet Take 1 Tab by mouth every eight (8) hours as needed for Nausea or Vomiting. 4/23/21  Yes Aissatou Zambrano NP   magnesium 250 mg tab Take 1 Tablet by mouth daily. Yes Provider, Historical   Synthroid 112 mcg tablet 1 tablet once a day with an extra 1/2 tablet on Sundays 3/29/21  Yes Cynthia Aguirre MD   ruxolitinib 5 mg tab Take 1 Tab by mouth two (2) times a day. Patient taking differently: Take 5 mg by mouth two (2) times a day. Sometimes only takes at night due to N/V/D 3/10/21  Yes Randall William MD   ondansetron (ZOFRAN ODT) 4 mg disintegrating tablet Take 1 Tab by mouth every eight (8) hours as needed for Nausea. 8/3/20  Yes aRndall William MD   calcium carbonate (CALTREX) 600 mg calcium (1,500 mg) tablet Take 600 mg by mouth nightly. Yes Provider, Historical   cholecalciferol (VITAMIN D3) 1,000 unit cap Take 1,000 Units by mouth nightly. Yes Provider, Historical   lansoprazole (PREVACID) 30 mg capsule Take 30 mg by mouth daily. prn   Yes Provider, Historical       Allergies   Allergen Reactions    Latex Other (comments)     Testing for latex allergy was positive as a 2 (on a scale of 1 to 3).  Sulfa (Sulfonamide Antibiotics) Anaphylaxis    Tramadol Other (comments)     Top lip swelled    Augmentin [Amoxicillin-Pot Clavulanate] Rash    Divalproex Sodium Hives    Morphine Nausea and Vomiting     Not a true allergy    Potassium Clavulanate Hives    Rizatriptan Rash    Tetanus Immune Globulin Other (comments)     Heat, bruising, and swelling at  Site of injection    Vancomycin Rash    Xanax [Alprazolam] Other (comments)     Hallucinations    Zonegran [Zonisamide] Rash        Review of Systems:  A comprehensive review of systems was negative except for that written in the History of Present Illness.     Objective:     Visit Vitals  /60 (BP 1 Location: Left upper arm, BP Patient Position: At rest)   Pulse 100   Temp 99.7 °F (37.6 °C)   Resp 16   Ht 5' 2\" (1.575 m)   Wt 58.9 kg (129 lb 12.8 oz)   SpO2 90%   Breastfeeding No   BMI 23.74 kg/m²     Temp (24hrs), Av.9 °F (37.2 °C), Min:98.1 °F (36.7 °C), Max:99.7 °F (37.6 °C)       Lines:  Peripheral IV:       Physical Exam:    General:  Alert, cooperative, well nourished, well developed, appears stated age   Eyes:  Sclera anicteric. Pupils equally round and reactive to light. Mouth/Throat: Mucous membranes normal, oral pharynx clear   Neck: + meningismus   Lungs:   Clear to auscultation bilaterally, good effort   CV:  Regular rate and rhythm,no murmur, click, rub or gallop   Abdomen:   Palpable, tender splenomegaly; + hepatomegaly; perhaps mild ascites (difficult to determine given the predominance of organomegaly)   Extremities: No cyanosis or edema   Skin: Skin color, texture, turgor normal. no acute rash or lesions   Lymph nodes: Cervical and supraclavicular normal   Musculoskeletal: No swelling or deformity   Lines/Devices:  Intact, no erythema, drainage or tenderness   Psych: Alert and oriented, normal mood affect given the setting       Data Review:     CBC:  Recent Labs     22  0711 22  0610 22  1814 22   WBC 11.0 11.2*  --  12.5*   GRANS 87* 90*   < > 93*   MONOS 6 5   < > 3*   EOS 1 0*   < > 0*   ANEU 9.6* 10.1*   < > 11.6*   ABL 0.4* 0.3*   < > 0.3*   HGB 8.5* 8.5*  --  10.0*   HCT 25.8* 24.5*  --  29.0*   PLT 80* 67*  --  98*    < > = values in this interval not displayed. BMP:  Recent Labs     22  0711 22  0610 22  181   CREA 0.62 0.65 0.72   BUN 18 20 22   * 133* 126*   K 3.7 3.7 3.6    103 95*   CO2 22 23 26   AGAP 7 7 5*   * 109* 135*       LFTS:  Recent Labs     22  0711 22  1100 22  0610 22  1814 22   TBILI 0.8 1.2* 1.2*   < > 1.4*   ALT 17  --  17  --  22     --  114  --  149*   TP 4.9*  --  5.2*  --  6.2*   ALB 1.9*  --  2.1*  --  2.5*    < > = values in this interval not displayed. Microbiology:     All Micro Results     Procedure Component Value Units Date/Time    CULTURE, URINE [[id number]] Collected: 02/27/22 1949    Order Status: Completed Specimen: Cath Urine Updated: 03/01/22 0749     Special Requests: NO SPECIAL REQUESTS        Culture result:       <10,000 COLONIES/mL NORMAL SKIN JOSÉ ISOLATED          BLOOD CULTURE [[id number]]  (Abnormal) Collected: 02/27/22 1817    Order Status: Completed Specimen: Blood Updated: 03/01/22 0744     Special Requests: --        RIGHT  Antecubital       GRAM STAIN GRAM NEGATIVE RODS               CRITICAL RESULT NOT CALLED DUE TO PREVIOUS NOTIFICATION OF CRITICAL RESULT WITHIN THE LAST 24 HOURS.                   AEROBIC AND ANAEROBIC BOTTLES           Culture result: GRAM NEGATIVE RODS               For identification and susceptibility refer to culture  [facility] ME.[id number]              CULTURE IN [address] UPDATES TO FOLLOW          BLOOD CULTURE [[id number]]  (Abnormal) Collected: 02/27/22 1814    Order Status: Completed Specimen: Blood Updated: 03/01/22 0742     Special Requests: --        NO SPECIAL REQUESTS  LEFT  Antecubital       GRAM STAIN GRAM NEGATIVE RODS               RESULTS VERIFIED, PHONED TO AND READ BACK BY JESUS RENE RN BY NB AT [place] [address] ON [id number]                  AEROBIC AND ANAEROBIC BOTTLES           Culture result:       GRAM NEGATIVE RODS IDENTIFICATION AND SUSCEPTIBILITY TO FOLLOW                  Refer to Blood Culture ID Panel Accession  [id number]      CULTURE, BLOOD [[id number]] Collected: 03/01/22 3396    Order Status: Completed Specimen: Blood Updated: 03/01/22 0727    MENINGITIS PATHOGENS PANEL, CSF (BY PCR) [[id number]] Collected: 02/27/22 2200    Order Status: Completed Specimen: Cerebrospinal Fluid Updated: 02/28/22 1930     Meningitis panel       RESULTS SCANNED IN [facility]          BLOOD CULTURE ID PANEL [[id number]]  (Abnormal) Collected: 02/27/22 1814    Order Status: Completed Specimen: Blood Updated: 02/28/22 1314     Acc. no. from Jevon Carvalho [id number]     Escherichia coli Detected        Comment: RESULTS VERIFIED, PHONED TO AND READ BACK BY  Henry Kearns RN AT 13:11 02.28.2022          KPC (Carbapenem Resistance Gene) NOT DETECTED        Comment: WARNING:  A Not Detected result for the KPC gene does not indicate susceptibility to carbapenems. Gram negative bacteria can be resistant to carbapenems by mechanisms other than carrying the KPC gene. INTERPRETATION       Gram negative gopi. Identified by realtime PCR as E. coli          MENINGITIS PATHOGENS PANEL, CSF (BY PCR) [[id number]] Collected: 02/28/22 1100    Order Status: Canceled     MENINGITIS PATHOGENS PANEL, CSF (BY PCR) [[id number]] Collected: 02/28/22 1030    Order Status: Canceled Specimen: Cerebrospinal Fluid     CULTURE, CSF Marshal Carte STAIN [[id number]] Collected: 02/27/22 2200    Order Status: Completed Specimen: Cerebrospinal Fluid Updated: 02/28/22 1001     Special Requests: TUBE 3     GRAM STAIN 0 TO 5 WBCS SEEN PER OIF      NO DEFINITE ORGANISM SEEN        Culture result:       NO GROWTH AFTER SHORT PERIOD OF INCUBATION. FURTHER RESULTS TO FOLLOW AFTER OVERNIGHT INCUBATION. Edwige Ast, UR/CSF [[id number]] Collected: 02/27/22 2200    Order Status: Completed Updated: 02/27/22 2214    COVID-19 RAPID TEST [[id number]] Collected: 02/27/22 1907    Order Status: Completed Specimen: Nasopharyngeal Updated: 02/27/22 1938     Specimen source NASAL SWAB        COVID-19 rapid test Not detected        Comment:      The specimen is NEGATIVE for SARS-CoV-2, the novel coronavirus associated with COVID-19. A negative result does not rule out COVID-19. This test has been authorized by the FDA under an Emergency Use Authorization (EUA) for use by authorized laboratories.         Fact sheet for Healthcare Providers: ConventionUpdate.co.nz  Fact sheet for Patients: ConventionUpdate.co.nz       Methodology: Isothermal Nucleic Acid Amplification               Imaging:   CXR (2/28/2022)  IMPRESSION  1. Stable exam with persistent cardiomegaly and bilateral diffuse airspace opacities. CT head (2/27/2022)  1. No CT evidence of acute intracranial process.     Signed By: Xu Moore MD     March 1, 2022

## 2022-03-01 NOTE — PROGRESS NOTES
Problem: Falls - Risk of  Goal: *Absence of Falls  Description: Document Beatrice Don Fall Risk and appropriate interventions in the flowsheet. Outcome: Progressing Towards Goal  Note: Fall Risk Interventions:  Mobility Interventions: Bed/chair exit alarm,Patient to call before getting OOB,PT Consult for mobility concerns         Medication Interventions: Bed/chair exit alarm,Patient to call before getting OOB,Teach patient to arise slowly    Elimination Interventions: Bed/chair exit alarm,Call light in reach,Patient to call for help with toileting needs,Toileting schedule/hourly rounds              Problem: Pressure Injury - Risk of  Goal: *Prevention of pressure injury  Description: Document Eric Scale and appropriate interventions in the flowsheet.   Outcome: Progressing Towards Goal  Note: Pressure Injury Interventions:  Sensory Interventions: Assess changes in LOC,Check visual cues for pain,Keep linens dry and wrinkle-free,Maintain/enhance activity level,Minimize linen layers,Pressure redistribution bed/mattress (bed type)    Moisture Interventions: Absorbent underpads,Check for incontinence Q2 hours and as needed,Maintain skin hydration (lotion/cream)    Activity Interventions: Increase time out of bed,Pressure redistribution bed/mattress(bed type),PT/OT evaluation    Mobility Interventions: HOB 30 degrees or less,Pressure redistribution bed/mattress (bed type),PT/OT evaluation    Nutrition Interventions: Document food/fluid/supplement intake,Offer support with meals,snacks and hydration

## 2022-03-01 NOTE — PROGRESS NOTES
Physician Progress Note      PATIENT:               Lisa Pierreelor  CSN #:                  [id number]  :                       1960  ADMIT DATE:       2022 6:00 PM  [address] [place] DATE:  RESPONDING  PROVIDER #:        Daisha HATHAWAY MD          QUERY TEXT:    Pt admitted with fever. Pt noted to have signs of sepsis. . 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     Query created by: Purcell Meigs on 2022 9:24 AM      Electronically signed by:  Daisha HATHAWAY MD 2022 7:00 PM

## 2022-03-01 NOTE — PROGRESS NOTES
VANCO DAILY FOLLOW UP NOTE  [address] [facility] Pharmacokinetic Monitoring Service - Vancomycin    Consulting Provider:  Sybil Trevino  Indication: BSI  Target Concentration: Goal AUC/ALEXEY 400-600 mg*hr/L  Day of Therapy: 3  Additional Antimicrobials: cefepime    Pertinent Laboratory Values: Wt Readings from Last 1 Encounters:   02/28/22 58.9 kg (129 lb 12.8 oz)     Temp Readings from Last 1 Encounters:   03/01/22 98.7 °F (37.1 °C)     No components found for: PROCAL  Recent Labs     03/01/22  0711 02/28/22  0610 02/27/22 2032 02/27/22  1814   BUN 18 20  --  22   CREA 0.62 0.65  --  0.72   WBC 11.0 11.2*  --  12.5*   PCT  --   --   --  26.76*   LAC  --   --  1.3 2.8*     Estimated Creatinine Clearance: 66.8 mL/min (by C-G formula based on SCr of 0.62 mg/dL). Lab Results   Component Value Date/Time    Vancomycin,trough 19.0 08/10/2021 04:15 AM    Vancomycin, random 18.2 03/01/2022 07:11 AM       MRSA Nasal Swab: N/A. Non-respiratory infection. .      Assessment:  Date/Time Dose Concentration AUC   3/1 0711 750 mg q12h 18.2 437   Note: Serum concentrations collected for AUC dosing may appear elevated if collected in close proximity to the dose administered, this is not necessarily an indication of toxicity    Plan:  Current dosing regimen is therapeutic.   Continue 750 mg q12h  Repeat vancomycin concentrations will be ordered as clinically appropriate   Pharmacy will continue to monitor patient and adjust therapy as indicated    Thank you for the consult,  Linda Urbina, PharmD, BCOP  Clinical Pharmacist  Contact via [employer]

## 2022-03-01 NOTE — PROGRESS NOTES
[facility] Hematology & Oncology        Inpatient Hematology / Oncology Progress Note    Reason for Consult:  Fever [R50.9]  Referring Physician:  Terrell Bethea MD    24 Hour Events:  Afebrile, VSS   BCx +EColi  Meningitis panel +EColi  C/o ongoing headache      ROS:  Constitutional: +fatigue, weakness. Negative for fever. CV: Negative for chest pain, palpitations, edema. Respiratory: Negative for dyspnea, cough, wheezing. GI: Negative for nausea, abdominal pain, diarrhea. Neuro: +HA    10 point review of systems is otherwise negative with the exception of the elements mentioned above in the HPI. Allergies   Allergen Reactions    Latex Other (comments)     Testing for latex allergy was positive as a 2 (on a scale of 1 to 3).     Sulfa (Sulfonamide Antibiotics) Anaphylaxis    Tramadol Other (comments)     Top lip swelled    Augmentin [Amoxicillin-Pot Clavulanate] Rash    Divalproex Sodium Hives    Morphine Nausea and Vomiting     Not a true allergy    Potassium Clavulanate Hives    Rizatriptan Rash    Tetanus Immune Globulin Other (comments)     Heat, bruising, and swelling at  Site of injection    Vancomycin Rash    Xanax [Alprazolam] Other (comments)     Hallucinations    Zonegran [Zonisamide] Rash     Past Medical History:   Diagnosis Date    Acute blood loss anemia 4/6/2018    Anemia     C. difficile diarrhea 4/1/2015    Cerebral edema (HCC) 4/1/2015    Chronic migraine 9/22/2016    Chronic myelogenous leukemia ([facility]     Comptche chromosome/ converted from polycythemia in 2009- to 2012 when she was dx w leukemia    Chronic pain     Coagulation disorder ([facility] [address]     \"clotting and Bleeding Problem\" dr Jose Banuelos follows     Esophageal spasm     with banding     Esophageal varices (HCC)     grade 3    GI bleed 8/3/2016    H/O craniotomy     3-29-15.  due to blood clot - which caused a seizure  - then pt fell and hit     Hematemesis 8/20/2021    Leukocytosis 3/14/2015    Melena 2021    MRSA colonization 2012    Polycythemia vera(238.4)     JAK2 mutation    Portal hypertension (HCC)     with varices    Portal vein thrombosis 2012    Ct scan  Apparent occlusion of the portal vein. In addition, the splenic vein, and superior mesenteric vein are not definitely seen and are also likely  occluded. Splenomegaly, and extensive varices are seen as described above consistent with the portal vein occlusion 12 on arixtra HIT negative on repeat 12 re admitted Cirrhotic appearance of the liver. Mild to moderate ascites, as    Primary hypothyroidism     Pulmonary embolism ([address]     not on coumadin anymore     S/P exploratory laparotomy, 12    Bowel resection:  336 cm of small bowel removed, approximately 9 feet and placement of feeding jejunostomy.      S/P small bowel resection     .  9 feet removed due to  gangrene which wa due to blood clot    Seizure ([address] 3/14/2015    Seizure disorder ([address] 3/30/2015    due to clotting factor    Seizures ([address]     last one 3- - followed by aniyah     Splenomegaly, congestive, chronic     Stroke (cerebrum) ([address] 2015    had bled into head- surgery    Stroke [facility])     pt had stroke like symptoms     Thrombocytopenia, HIT negative 2012 platelets lower repeat HIT 12 platelets in 94,480'M    Thrombosis, portal vein 2004    portal , spleenic and recently superior mesenteric    Transfusion history     many blood tranfusions - last 3-29-15 after brain surgery    Traumatic hemorrhage of right cerebrum ([address] 3/30/2015     Past Surgical History:   Procedure Laterality Date    HX APPENDECTOMY      with small bowel resection    HX BREAST BIOPSY Bilateral     Lt - ; Rt -     HX CHOLECYSTECTOMY      HX GI      liver biopsy    HX GI      bowel removed small - 9 feet     HX GYN       x 2    HX GYN      D&C following miscarriage    HX ORTHOPAEDIC Left 2008    torn labrum shoulder (screw in place)    NEUROLOGICAL PROCEDURE UNLISTED  2010    craniotomy to evacuate subdural hematoma following a fall from a seizure    HI ABDOMEN SURGERY PROC UNLISTED      umbilical hernia repair    HI ABDOMEN SURGERY PROC UNLISTED      9ft of small bowel excised then reconnected     Family History   Problem Relation Age of Onset    Diabetes Mother     Stroke Mother         after surgery    Cancer Father         brain    No Known Problems Sister     Other Sister         diverticulitis    Other Sister         diverticulitis    Cancer Sister         lyjmphoma    Breast Cancer Maternal Aunt 48    Thyroid Disease Paternal Grandmother      Social History     Socioeconomic History    Marital status: SINGLE     Spouse name: Not on file    Number of children: Not on file    Years of education: Not on file    Highest education level: Not on file   Occupational History    Not on file   Tobacco Use    Smoking status: Former Smoker     Packs/day: 0.20     Years: 10.00     Pack years: 2.00     Types: Cigarettes     Quit date:      Years since quittin.1    Smokeless tobacco: Never Used   Substance and Sexual Activity    Alcohol use: No    Drug use: No    Sexual activity: Not on file   Other Topics Concern    Not on file   Social History Narrative    Not on file     Social Determinants of Health     Financial Resource Strain:     Difficulty of Paying Living Expenses: Not on file   Food Insecurity:     Worried About [address] [employer] in the Last Year: Not on file    [address] in the Last Year: Not on file   Transportation Needs:     Lack of Transportation (Medical): Not on file    Lack of Transportation (Non-Medical):  Not on file   Physical Activity:     Days of Exercise per Week: Not on file    Minutes of Exercise per Session: Not on file   Stress:     Feeling of Stress : Not on file   Social Connections:     Frequency of Communication with Friends and Family: Not on file    Frequency of Social Gatherings with Friends and Family: Not on file    Attends [employer] Services: Not on file    Active Member of Clubs or Organizations: Not on file    Attends Club or Organization Meetings: Not on file    Marital Status: Not on file   Intimate Partner Violence:     Fear of Current or Ex-Partner: Not on file    Emotionally Abused: Not on file    Physically Abused: Not on file    Sexually Abused: Not on file   Housing Stability:     Unable to Pay for Housing in the Last Year: Not on file    Number of [place] in the Last Year: Not on file    Unstable Housing in the Last Year: Not on file     Current Facility-Administered Medications   Medication Dose Route Frequency Provider Last Rate Last Admin    ergocalciferol capsule 50,000 Units  50,000 Units Oral DAILY Cinderella Maia, NP        enoxaparin (LOVENOX) injection 50 mg  50 mg SubCUTAneous Q12H Werner DARLING MD   50 mg at 02/28/22 0527    famotidine (PEPCID) tablet 40 mg  40 mg Oral DAILY PRN Corry Mcallister MD        pantoprazole (PROTONIX) tablet 40 mg  40 mg Oral ACB Werner DARLING MD   40 mg at 03/01/22 0751    magnesium oxide (MAG-OX) tablet 400 mg  400 mg Oral DAILY Werner DARLING MD   400 mg at 03/01/22 0751    oxyCODONE-acetaminophen (PERCOCET 10)  mg per tablet 1 Tablet  1 Tablet Oral Q8H PRN Corry Mcallister MD   1 Tablet at 03/01/22 0612    levothyroxine (SYNTHROID) tablet 112 mcg  112 mcg Oral WALI DARLING MD   112 mcg at 03/01/22 0751    zolpidem (AMBIEN) tablet 10 mg  10 mg Oral QHS PRN Corry Mcallister MD        sodium chloride (NS) flush 5-40 mL  5-40 mL IntraVENous PRN Corry Mcallister MD        acetaminophen (TYLENOL) tablet 650 mg  650 mg Oral Q6H PRN Corry Mcallister MD   650 mg at 02/28/22 4036    polyethylene glycol (MIRALAX) packet 17 g  17 g Oral DAILY PRN Corry Mcallister MD        ondansetron (ZOFRAN ODT) tablet 4 mg  4 mg Oral Q8H PRN Nila Powell Marcelo Parson MD        Or    ondansetron [facility]) injection 4 mg  4 mg IntraVENous Q6H PRN Zak Hahn MD        0.9% sodium chloride infusion  125 mL/hr IntraVENous CONTINUOUS Zak Hahn  mL/hr at 22 0100 125 mL/hr at 22 0100    cefepime (MAXIPIME) 2 g in 0.9% sodium chloride (MBP/ADV) 100 mL MBP  2 g IntraVENous Q8H Burgess Vishnu DARLING  mL/hr at 22 0612 2 g at 22 0612    vancomycin (VANCOCIN) 750 mg in 0.9% sodium chloride 250 mL ([id number])  750 mg IntraVENous Q12H Zak Hahn  mL/hr at 22 0227 750 mg at 22 0227    diphenhydrAMINE (BENADRYL) capsule 25 mg  25 mg Oral Q12H PRN Kimberli Dorsey MD   25 mg at 22 022    oxyCODONE IR (ROXICODONE) tablet 5 mg  5 mg Oral Q4H PRN Kimberli Dorsey MD        sodium chloride (NS) flush 5-10 mL  5-10 mL IntraVENous Q8H Kenia oGmes MD   10 mL at 22 0624    sodium chloride (NS) flush 5-10 mL  5-10 mL IntraVENous PRN Kenia Gomes MD        0.9% sodium chloride infusion  200 mL/hr IntraVENous CONTINUOUS Kenia Gomes  mL/hr at 22 2156 200 mL/hr at 22 215       OBJECTIVE:  Patient Vitals for the past 8 hrs:   BP Temp Pulse Resp SpO2   22 0315 100/60 99.7 °F (37.6 °C) 100 16 90 %     Temp (24hrs), Av.9 °F (37.2 °C), Min:98.1 °F (36.7 °C), Max:99.7 °F (37.6 °C)    No intake/output data recorded. Physical Exam:  Constitutional: Well developed female in no acute distress, sitting comfortably in the hospital bed. HEENT: Normocephalic and atraumatic. Oropharynx is clear, mucous membranes are moist.  Extraocular muscles are intact. Sclerae anicteric. Neck supple without JVD. No thyromegaly present. Skin Warm and dry. No bruising and no rash noted. No erythema. No pallor. Respiratory Lungs are clear to auscultation bilaterally without wheezes, rales or rhonchi, normal air exchange without accessory muscle use.     CVS Normal rate, regular rhythm and normal S1 and S2. No murmurs, gallops, or rubs. Abdomen Soft, nontender and nondistended, normoactive bowel sounds. No palpable mass. No hepatosplenomegaly. Neuro Grossly nonfocal with no obvious sensory or motor deficits. Mild nuchal rigidity. MSK Normal range of motion in general.  No edema and no tenderness. Psych Appropriate mood and affect.         Labs:    Recent Results (from the past 24 hour(s))   TRANSFERRIN SATURATION    Collection Time: 02/28/22 11:00 AM   Result Value Ref Range    Iron 28 (L) 35 - 150 ug/dL    TIBC 145 (L) 250 - 450 ug/dL    Transferrin Saturation 19 %   FERRITIN    Collection Time: 02/28/22 11:00 AM   Result Value Ref Range    Ferritin 835 (H) 8 - 388 NG/ML   VITAMIN B12    Collection Time: 02/28/22 11:00 AM   Result Value Ref Range    Vitamin B12 938 193 - 986 pg/mL   FOLATE    Collection Time: 02/28/22 11:00 AM   Result Value Ref Range    Folate 3.2 3.1 - 17.5 ng/mL   VITAMIN D, 25 HYDROXY    Collection Time: 02/28/22 11:00 AM   Result Value Ref Range    Vitamin D 25-Hydroxy 15.5 (L) 30.0 - 100.0 ng/mL   LD    Collection Time: 02/28/22 11:00 AM   Result Value Ref Range     (H) 110 - 210 U/L   RETICULOCYTE COUNT    Collection Time: 02/28/22 11:00 AM   Result Value Ref Range    Reticulocyte count 1.5 0.3 - 2.0 %    Absolute Retic Cnt. 0.0468 0.026 - 0.095 M/ul    Immature Retic Fraction 12.5 3.0 - 15.9 %    Retic Hgb Conc. 31 29 - 35 pg   HAPTOGLOBIN    Collection Time: 02/28/22 11:00 AM   Result Value Ref Range    Haptoglobin 51 30 - 200 mg/dL   PROTHROMBIN TIME + INR    Collection Time: 02/28/22 11:00 AM   Result Value Ref Range    Prothrombin time 19.0 (H) 12.6 - 14.5 sec    INR 1.6     PTT    Collection Time: 02/28/22 11:00 AM   Result Value Ref Range    aPTT 44.1 (H) 24.1 - 35.1 SEC   FIBRINOGEN    Collection Time: 02/28/22 11:00 AM   Result Value Ref Range    Fibrinogen 332 190 - 501 mg/dL   D DIMER    Collection Time: 02/28/22 11:00 AM   Result Value Ref Range    D DIMER 6.58 (H) <0.56 ug/ml(FEU)   PATHOLOGIST REVIEW SMEARS    Collection Time: 02/28/22 11:00 AM   Result Value Ref Range    PATHOLOGIST REVIEW PENDING    IMMUNOGLOBULINS, G/A/M, QT. Collection Time: 02/28/22 11:00 AM   Result Value Ref Range    Immunoglobulin G, Qt. 888 586 - 1,602 mg/dL    Immunoglobulin A, Qt. 125 87 - 352 mg/dL    Immunoglobulin M, Qt. 75 26 - 217 mg/dL   BILIRUBIN, FRACTIONATED    Collection Time: 02/28/22 11:00 AM   Result Value Ref Range    Bilirubin, total 1.2 (H) 0.2 - 1.1 MG/DL    Bilirubin, direct 0.4 (H) <0.4 MG/DL    Bilirubin, indirect 0.8 0.0 - 1.1 MG/DL   HIV 1/2 AG/AB, 4TH GENERATION,W RFLX CONFIRM    Collection Time: 02/28/22  1:17 PM   Result Value Ref Range    HIV 1/2 Interpretation NONREACTIVE NR      HIV 1/2 result comment SEE NOTE (A) NR     METABOLIC PANEL, COMPREHENSIVE    Collection Time: 03/01/22  7:11 AM   Result Value Ref Range    Sodium 132 (L) 136 - 145 mmol/L    Potassium 3.7 3.5 - 5.1 mmol/L    Chloride 103 98 - 107 mmol/L    CO2 22 21 - 32 mmol/L    Anion gap 7 7 - 16 mmol/L    Glucose 146 (H) 65 - 100 mg/dL    BUN 18 8 - 23 MG/DL    Creatinine 0.62 0.6 - 1.0 MG/DL    GFR est AA >60 >60 ml/min/1.73m2    GFR est non-AA >60 >60 ml/min/1.73m2    Calcium 7.6 (L) 8.3 - 10.4 MG/DL    Bilirubin, total 0.8 0.2 - 1.1 MG/DL    ALT (SGPT) 17 12 - 65 U/L    AST (SGOT) 19 15 - 37 U/L    Alk.  phosphatase 102 50 - 136 U/L    Protein, total 4.9 (L) 6.3 - 8.2 g/dL    Albumin 1.9 (L) 3.2 - 4.6 g/dL    Globulin 3.0 2.3 - 3.5 g/dL    A-G Ratio 0.6 (L) 1.2 - 3.5     CBC WITH AUTOMATED DIFF    Collection Time: 03/01/22  7:11 AM   Result Value Ref Range    WBC 11.0 4.3 - 11.1 K/uL    RBC 2.73 (L) 4.05 - 5.2 M/uL    HGB 8.5 (L) 11.7 - 15.4 g/dL    HCT 25.8 (L) 35.8 - 46.3 %    MCV 94.5 79.6 - 97.8 FL    MCH 31.1 26.1 - 32.9 PG    MCHC 32.9 31.4 - 35.0 g/dL    RDW 18.1 (H) 11.9 - 14.6 %    PLATELET 80 (L) 741 - 450 K/uL    MPV 11.4 9.4 - 12.3 FL    ABSOLUTE NRBC 0.00 0.0 - 0.2 K/uL    DF AUTOMATED      NEUTROPHILS 87 (H) 43 - 78 %    LYMPHOCYTES 4 (L) 13 - 44 %    MONOCYTES 6 4.0 - 12.0 %    EOSINOPHILS 1 0.5 - 7.8 %    BASOPHILS 0 0.0 - 2.0 %    IMMATURE GRANULOCYTES 3 0.0 - 5.0 %    ABS. NEUTROPHILS 9.6 (H) 1.7 - 8.2 K/UL    ABS. LYMPHOCYTES 0.4 (L) 0.5 - 4.6 K/UL    ABS. MONOCYTES 0.7 0.1 - 1.3 K/UL    ABS. EOSINOPHILS 0.1 0.0 - 0.8 K/UL    ABS. BASOPHILS 0.0 0.0 - 0.2 K/UL    ABS. IMM. GRANS. 0.3 0.0 - 0.5 K/UL   VANCOMYCIN, RANDOM    Collection Time: 03/01/22  7:11 AM   Result Value Ref Range    Vancomycin, random 18.2 UG/ML       Imaging:  XR CHEST PA LAT [[id number]] Collected: 02/28/22 0715   Order Status: Completed Updated: 02/28/22 0721   Narrative:     EXAMINATION: XR CHEST PA LAT 2/28/2022 7:09 AM     ACCESSION NUMBER: [id number]     COMPARISON: 02/27/2022     INDICATION: Fever     TECHNIQUE: PA and lateral views of the chest were obtained. FINDINGS:     Support devices: None     Lungs: Again seen are bilateral airspace opacities throughout the lungs. These   have a similar appearance to the prior exam. There is no effusion. Cardiac Silhouette: Persistently prominent     Mediastinum: The aorta is normal.     Upper Abdomen: Normal     Miscellaneous: There are no lytic and blastic lesions. Impression:     1.  Stable exam with persistent cardiomegaly and bilateral diffuse airspace   opacities. CT HEAD WO CONT [[id number]] Collected: 02/27/22 2120   Order Status: Completed Updated: 02/27/22 2125   Narrative:     Noncontrast CT of the brain. COMPARISON: 2016     INDICATION: Confusion, headache     TECHNIQUE: Contiguous axial images were obtained from the skull base through the   vertex without IV contrast. Radiation dose reduction techniques were used for   this study:  Our CT scanners use one or all of the following: Automated exposure   control, adjustment of the mA and/or kVp according to patient's size, iterative   reconstruction.      FINDINGS:     There is no acute intracranial hemorrhage or evidence for acute territorial   infarction. There is no mass effect, midline shift or hydrocephalus. There is no   extra-axial fluid collection. The cerebellum and brainstem are grossly   unremarkable. Encephalomalacia in the right occipital lobe, consistent with old injury. There   is overlying craniotomy defect. Findings similar to 2016 study. Included globes appear intact. The visualized paranasal sinuses and the mastoid   air cells are aerated. There is no acute skull fracture. Impression:       1. No CT evidence of acute intracranial process. XR CHEST PORT [[id number]] Collected: 02/27/22 1843   Order Status: Completed Updated: 02/27/22 1846   Narrative:     EXAMINATION: XR CHEST PORT 2/27/2022 6:33 PM     ACCESSION NUMBER: [id number]     COMPARISON: 2/2/2022     INDICATION: Back pain and fever     TECHNIQUE: A single view of the chest was obtained. FINDINGS:   Support Devices:   *  None     Cardiac Silhouette: Cardiac silhouette is borderline enlarged, similar to prior   studies. Mediastinum: Normal mediastinal contours. Lungs: Bilateral perihilar and basilar predominant airspace opacities with   septal thickening. No pneumothorax or sizable pleural effusion. Upper Abdomen: Normal     Miscellaneous: No fracture or suspicious osseous lesion. Impression:     Bilateral perihilar airspace disease, suspicious for pulmonary edema.           ASSESSMENT:  Problem List  Date Reviewed: 2/8/2022          Codes Class Noted    Body mass index (BMI) of 22.0 to 22.9 in adult ICD-10-CM: Z68.22  ICD-9-CM: V85.1  8/26/2021        * (Principal) Fever ICD-10-CM: R50.9  ICD-9-CM: 780.60  2/27/2022        Leukocytosis ICD-10-CM: P42.702  ICD-9-CM: 288.60  2/27/2022        Sepsis ([facility] [address] [address] ICD-10-CM: A41.9  ICD-9-CM: 038.9, 995.91  2/2/2022        Fever of unknown origin ICD-10-CM: R50.9  ICD-9-CM: 780.60  2/2/2022        Thrombocytopenia, unspecified ICD-10-CM: D69.6  ICD-9-CM: 287.5  10/15/2021        Opioid use, unspecified with unspecified opioid-induced disorder ICD-10-CM: F11.99  ICD-9-CM: 292.9, 305.50  9/17/2021        Cellulitis of leg, right ICD-10-CM: L03.115  ICD-9-CM: 682.6  8/8/2021        Chronic migraine without aura with status migrainosus, not intractable ICD-10-CM: J98.046  ICD-9-CM: 346.72  11/1/2016        Lung nodule ICD-10-CM: R91.1  ICD-9-CM: 793.11  10/28/2016    Overview Signed 10/28/2016 10:05 AM by Miguel Moctezuma     IMPRESSION:    1. Lobulated 2 cm mass in the right lung apex, malignancy versus an atypical  inflammatory process. 2. Occlusion of the right subclavian vein. 3. Chronic occlusion of the portal vein with associated abnormal tissue  encasing the common bile duct, most likely fibrosis. 4. Splenomegaly. DVT (deep venous thrombosis) ([facility] ICD-10-CM: I82.409  ICD-9-CM: 453.40  10/26/2016    Overview Signed 10/26/2016 10:27 PM by Kerri Nash. Right subclavian               Analgesic rebound headache ICD-10-CM: G44.40, T39.95XA  ICD-9-CM: 339.3, E935.9  9/22/2016        Headache, chronic daily ICD-10-CM: R51.9  ICD-9-CM: 784.0  9/22/2016        Migraine with aura and with status migrainosus, not intractable ICD-10-CM: G43. 101  ICD-9-CM: 346.03  9/22/2016        Left homonymous hemianopsia ICD-10-CM: H53.462  ICD-9-CM: 368.46  3/30/2015        Hyponatremia ICD-10-CM: E87.1  ICD-9-CM: 276.1  3/30/2015        Chronic myelogenous leukemia ([facility] ICD-10-CM: C92.10  ICD-9-CM: 205.10  3/30/2015        Acquired hypercoagulable state ([facility] (Chronic) ICD-10-CM: V40.85  ICD-9-CM: 289.81  3/30/2015        AKOSUA (iron deficiency anemia) ICD-10-CM: D50.9  ICD-9-CM: 280.9  7/28/2012        Cirrhosis (HCC) (Chronic) ICD-10-CM: K74.60  ICD-9-CM: 571.5  7/27/2012        Ascites ICD-10-CM: R18.8  ICD-9-CM: 789.59  7/26/2012        Polycythemia vera ([facility] (Chronic) ICD-10-CM: D45  ICD-9-CM: 238.4  2/16/2009    Overview Addendum 8/26/2021  2:31 PM by Terrell Bethea MD     2/2008 by [email or web address] analysis however portal vein thrombosis  And and splenomegaly since 2004  Hydroxyurea for 6 months poor tolerance spleen did not shrink   Pegasys 90 mcg weekly 4-2009 till    Restarted 12-30-09 45 mcg q 2 weeks  2-2010 reduced to q month   Increased 45 mcg 2/month 4-2010  Held 8-2010 thrombocytopenia  12-29-12 restarted 45 mcg q 2 weeks  4-1-11 45 mcg q 3 weeks  4-22-11 45 mcg q 4 weeks  Uncertain dosing thereafter   Possibly q 3 weeks 10-15-11 and held and restarted on 4-1-12 6-12 ct scan Small bowel wall thickening suggesting an enteritis. In addition, there is well thickening of the right colon which can suggest an additional   colitis although this can also be seen with severe portal hypertension. Likely reactive edematous changes it scattered fluid collections are seen of   the small bowel mesentery. . Apparent occlusion of the portal vein. In addition, the splenic vein, and superior mesenteric vein are not definitely seen and are also likely occluded. Splenomegaly, and extensive varices are seen as described above consistent with the portal vein occlusion. Michelle Karst Heterogeneous enhancement of the liver and mild periportal edema. An acute hepatitis cannot be excluded. Primary hypothyroidism ICD-10-CM: E03.9  ICD-9-CM: 244.9  2/16/2009        Splenomegaly ICD-10-CM: R16.1  ICD-9-CM: 789.2  2/16/2009        Esophageal varices (HCC) (Chronic) ICD-10-CM: I85.00  ICD-9-CM: 456.1  2/16/2009    Overview Addendum 8/26/2021  2:30 PM by Terrell Bethea MD     grade 3             Portal vein thrombosis ICD-10-CM: F16  ICD-9-CM: 219  2/16/2009    Overview Addendum 8/26/2021  2:31 PM by Terrell Bethea MD     Ct scan 6-21-2 Apparent occlusion of the portal vein. In addition, the splenic vein, and superior mesenteric vein are not definitely seen and are also likely  occluded.  Splenomegaly, and extensive varices are seen as described above consistent with the portal vein occlusion 7-05-12 on arixtra HIT negative on repeat 7-27-12 re admitted Cirrhotic appearance of the liver. Mild to moderate ascites                 Ms. David House is a 64 y.o. female admitted on 2/27/2022. The primary encounter diagnosis was Sepsis, due to unspecified organism, unspecified whether acute organ dysfunction present ([facility] [address] Diagnoses of Leukocytosis, unspecified type, Fever, unspecified fever cause, Confusion and disorientation, and Meningitis due to gram-negative bacteria were also pertinent to this visit. 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 She is a patient of Dr. 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 She was recently admitted from 2/2 - 2/6/22 for E Coli bacteremia. She was treated with Merrem and discharged on Vantin. She returned to ED on 2/27 with c/o fever and headache with associated photophobia. She reports she has been sick for 2 weeks. She endorses malaise, weakness, nausea, and confusion. Lactic acid 2.8, PCT 26. pBNP 4430. CT head neg. CXR on 2/27 with b/l perihilar airspace disease, suspicious for pulm edema. Repeat CXR 2/28 with stable findings with persistent cardiomegaly and b/l diffuse airspace opacities. She is s/p LP on 2/27 with low glucose and normal protein, WBC 2630 with 88% neutrophils, RBC 18. BCx +GNR, I/S pending. UCx-NGTD. CSF Cx-NGTD. On Cef/Vanc. We were consulted for recommendations for our patient. RECOMMENDATIONS:  Polycythemia Vera  - on Jakafi, currently held    CML  - on Dasatinib, currently held    Sepsis / Deette Pal bacteremia / E Coli Meningitis  - BCx +GNR, I/S pending  - UCx - NGTD  - s/p LP on 2/27 with low glucose, normal protein, WBC 2630 with 88% neutrophils. CSF Cx-NGTD. Add on meningitis panel if enough CSF remains to send.   - On Cef/Vanc  - Given recurrent infections, check immunoglobulins and HIV  3/1 BCx +EColi. Meningitis panel +EColi. CSF Cx-NGTD. UCx-NGTD. HIV neg. IgG level 888. On Cef/Vanc. ID consulted. Will defer need for steroids to ID. CT AP pending. TTE neg for vegetation on 2/3. Would pt benefit from ROSELINE? Anemia / Thrombocytopenia  - likely r/t current illness  - Check iron studies, B12, folate, Vit D, hemolysis labs, DIC labs, HIT profile, smear  - Transfuse prn to keep Hgb >7 and Plt >10k or >50k with active bleeding  3/1 Hgb stable 8.5. Plt up to 80k. No hemolysis noted. Tsat 19% with ferritin 835. No B12, or folate deficiency noted. Vit D low at 15.5, replete. Smear pending. Goals and plan of care reviewed with the patient. All questions answered to the best of our ability. Thank you for allowing us to participate in the care of Ms. Couch. She will need to f/u with Dr. Vincent Parker within one week of discharge.          Marilin Barr NP   [facility]  [address] [address]  Office : [phone]  Fax : [phone]

## 2022-03-02 NOTE — PROGRESS NOTES
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 E coli is a very unusual pathogen to do that. This concerns me for a persistent source and the low back pain gives me concern for the potential of epidural abscess which could be complicated by meningitis. Plan:   · Discontinue vancomycin and ceftriaxone  · Start ceftriaxone 2g IV q12 hours  · The MRI was aborted because of ascites. I have reached out to radiology, and the study is going to be attempted again. Anti-infectives:   · Vancomycin   · Cefepime 2g IV q8 hours    Subjective:   Ms. Selma Mcneill is having a difficult time getting comfortable because of low back pain. She has discomfort in the LUQ but it is mild in intensity. Allergies   Allergen Reactions    Latex Other (comments)     Testing for latex allergy was positive as a 2 (on a scale of 1 to 3).  Sulfa (Sulfonamide Antibiotics) Anaphylaxis    Tramadol Other (comments)     Top lip swelled    Augmentin [Amoxicillin-Pot Clavulanate] Rash    Divalproex Sodium Hives    Morphine Nausea and Vomiting     Not a true allergy    Potassium Clavulanate Hives    Rizatriptan Rash    Tetanus Immune Globulin Other (comments)     Heat, bruising, and swelling at  Site of injection    Vancomycin Rash    Xanax [Alprazolam] Other (comments)     Hallucinations    Zonegran [Zonisamide] Rash        Review of Systems:  A comprehensive review of systems was negative except for that written in the History of Present Illness.     Objective:     Visit Vitals  /65 (BP 1 Location: Left upper arm, BP Patient Position: Lying right side)   Pulse (!) 103   Temp 99 °F (37.2 °C)   Resp 20   Ht 5' 2\" (1.575 m)   Wt 58.9 kg (129 lb 12.8 oz)   SpO2 92%   Breastfeeding No   BMI 23.74 kg/m²     Temp (24hrs), Av.6 °F (37 °C), Min:98.2 °F (36.8 °C), Max:99 °F (37.2 °C)       Lines:  Peripheral IV:       Physical Exam:    General:  Alert, cooperative, well nourished, well developed, appears stated age   Eyes:  Sclera anicteric. Pupils equally round and reactive to light   Mouth/Throat: Mucous membranes normal, oral pharynx clear   Neck: + meningismus is mild   Lungs:   Clear to auscultation bilaterally, good effort   CV:  Regular rate and rhythm,no murmur, click, rub or gallop   Abdomen:   Palpable, tender splenomegaly; + hepatomegaly; mild-moderate ascites   Extremities: No cyanosis or edema   Skin: Skin color, texture, turgor normal. no acute rash or lesions   Lymph nodes: Cervical and supraclavicular normal   Musculoskeletal: No swelling or deformity   Lines/Devices:  Intact, no erythema, drainage or tenderness   Psych:  Neuro:  Alert and oriented, normal mood affect given the setting  No lower extremity weakness or UMN signs       Data Review:     CBC:  Recent Labs     22  0603 22  0711 22  0610 22  0610   WBC 10.3 11.0  --  11.2*   GRANS 85* 87*   < > 90*   MONOS 5 6   < > 5   EOS 1 1   < > 0*   ANEU 8.7* 9.6*   < > 10.1*   ABL 0.5 0.4*   < > 0.3*   HGB 8.4* 8.5*  --  8.5*   HCT 25.5* 25.8*  --  24.5*   PLT 99* 80*  --  67*    < > = values in this interval not displayed. BMP:  Recent Labs     22  0603 22  0711 22  0610   CREA 0.54* 0.62 0.65   BUN 15 18 20   * 132* 133*   K 3.5 3.7 3.7    103 103   CO2 22 22 23   AGAP 7 7 7   * 146* 109*       LFTS:  Recent Labs     22  0603 22  0711 22  1100 22  0610 22  0610   TBILI 0.8 0.8 1.2*   < > 1.2*   ALT 18 17  --   --  17   AP 99 102  --   --  114   TP 4.9* 4.9*  --   --  5.2*   ALB 1.8* 1.9*  --   --  2.1*    < > = values in this interval not displayed. Microbiology:     All Micro Results     Procedure Component Value Units Date/Time    CULTURE, CSF Buzz Patch STAIN [[id number]]  (Abnormal) Collected: 02/27/22 2200    Order Status: Completed Specimen: Cerebrospinal Fluid Updated: 03/02/22 0823     Special Requests: TUBE 3     GRAM STAIN 0 TO 5 WBCS SEEN PER OIF      NO DEFINITE ORGANISM SEEN        Culture result:       GRAM NEGATIVE RODS ISOLATED FROM THIO BROTH ONLY IDENTIFICATION AND SUSCEPTIBILITY TO FOLLOW                  RESULTS VERIFIED, PHONED TO AND READ BACK BY  Boom Patel RN @750 3.2.22 SC      CULTURE, BLOOD [[id number]] Collected: 03/01/22 4859    Order Status: Completed Specimen: Blood Updated: 03/02/22 0733     Special Requests: --        LEFT  ARM       Culture result: NO GROWTH 1 DAY       CULTURE, URINE [[id number]] Collected: 02/27/22 1949    Order Status: Completed Specimen: Cath Urine Updated: 03/02/22 0655     Special Requests: NO SPECIAL REQUESTS        Culture result:       <10,000 COLONIES/mL NORMAL SKIN JOSÉ ISOLATED          BLOOD CULTURE [[id number]]  (Abnormal) Collected: 02/27/22 1817    Order Status: Completed Specimen: Blood Updated: 03/02/22 0644     Special Requests: --        RIGHT  Antecubital       GRAM STAIN GRAM NEGATIVE RODS               CRITICAL RESULT NOT CALLED DUE TO PREVIOUS NOTIFICATION OF CRITICAL RESULT WITHIN THE LAST 24 HOURS.                   AEROBIC AND ANAEROBIC BOTTLES           Culture result: GRAM NEGATIVE RODS               For identification and susceptibility refer to culture  [address] [id number]      BLOOD CULTURE [[id number]]  (Abnormal)  (Susceptibility) Collected: 02/27/22 1814    Order Status: Completed Specimen: Blood Updated: 03/02/22 0643     Special Requests: --        NO SPECIAL REQUESTS  LEFT  Antecubital       GRAM STAIN GRAM NEGATIVE RODS               RESULTS VERIFIED, PHONED TO AND READ BACK BY JESUS RENE RN BY NB AT 8122 ON [id number]                  AEROBIC AND ANAEROBIC BOTTLES           Culture result: ESCHERICHIA COLI               Refer to Blood Culture ID Panel Accession  [id number]      SHEYLA Torres, UR/CSF [[id number]] Collected: 02/27/22 2200    Order Status: Completed Specimen: Miscellaneous sample Updated: 03/01/22 1236     Source CEREBROSPINAL FLUID        Specimen Comment        Comment: (NOTE)  Cerebrospinal fluid (CSF)          Streptococcus pneumoniae Ag Negative        Fluid culture PENDING     Organism ID PENDING     Please note Comment        Comment: (NOTE)  [employer] standards require a culture to be  performed on CSF specimens submitted for bacterial antigen testing. (CAP [id number]) Urine specimens will not be cultured. Performed At: [facility] & [address] [id number]  Darrius Garcia MD IK:[id number]         MENINGITIS PATHOGENS PANEL, CSF (BY PCR) [[id number]] Collected: 02/27/22 2200    Order Status: Completed Specimen: Cerebrospinal Fluid Updated: 02/28/22 1930     Meningitis panel       RESULTS SCANNED IN [facility]          BLOOD CULTURE ID PANEL [[id number]]  (Abnormal) Collected: 02/27/22 1814    Order Status: Completed Specimen: Blood Updated: 02/28/22 1314     Acc. no. from Micro Order [id number]     Escherichia coli Detected        Comment: RESULTS VERIFIED, PHONED TO AND READ BACK BY  Justice Peres RN AT 13:11 02.28.2022          KPC (Carbapenem Resistance Gene) NOT DETECTED        Comment: WARNING:  A Not Detected result for the KPC gene does not indicate susceptibility to carbapenems. Gram negative bacteria can be resistant to carbapenems by mechanisms other than carrying the KPC gene. INTERPRETATION       Gram negative gopi.  Identified by realtime PCR as E. coli          MENINGITIS PATHOGENS PANEL, CSF (BY PCR) [[id number]] Collected: 02/28/22 1100    Order Status: Canceled     MENINGITIS PATHOGENS PANEL, CSF (BY PCR) [[id number]] Collected: 02/28/22 1030    Order Status: Canceled Specimen: Cerebrospinal Fluid COVID-19 RAPID TEST [[id number]] Collected: 02/27/22 1907    Order Status: Completed Specimen: Nasopharyngeal Updated: 02/27/22 1938     Specimen source NASAL SWAB        COVID-19 rapid test Not detected        Comment:      The specimen is NEGATIVE for SARS-CoV-2, the novel coronavirus associated with COVID-19. A negative result does not rule out COVID-19. This test has been authorized by the FDA under an Emergency Use Authorization (EUA) for use by authorized laboratories. Fact sheet for Healthcare Providers: ConventionUpdate.co.nz  Fact sheet for Patients: Top Prospectdate.co.nz       Methodology: Isothermal Nucleic Acid Amplification               Imaging:   CXR (2/28/2022)  IMPRESSION  1. Stable exam with persistent cardiomegaly and bilateral diffuse airspace opacities. CT head (2/27/2022)  1. No CT evidence of acute intracranial process.     Signed By: Boston Jackman MD     March 2, 2022

## 2022-03-02 NOTE — PROGRESS NOTES
Received pt from [facility]) in stable condition. Pt in bed resting quietly. Resp even & unlabored on room air; no acute signs of distress noted. Bed low & locked; call light in reach; no needs voiced.

## 2022-03-02 NOTE — PROGRESS NOTES
[facility] Hematology & Oncology        Inpatient Hematology / Oncology Progress Note    Reason for Consult:  Fever [R50.9]  Referring Physician:  Jaclyn Medina MD    24 Hour Events:  Afebrile, VSS   BCx +EColi  Repeat BCx-NGTD  Meningitis panel +EColi  CSF Cx-NGTD  CT AP with increased splenomegaly with multiple splenic infarcts and portal vein thrombosis  C/o back pain    ROS:  Constitutional: +fatigue, weakness. Negative for fever. CV: Negative for chest pain, palpitations, edema. Respiratory: Negative for dyspnea, cough, wheezing. GI: Negative for nausea, abdominal pain, diarrhea. Neuro: +HA  MSK: +back pain    10 point review of systems is otherwise negative with the exception of the elements mentioned above in the HPI. Allergies   Allergen Reactions    Latex Other (comments)     Testing for latex allergy was positive as a 2 (on a scale of 1 to 3).  Sulfa (Sulfonamide Antibiotics) Anaphylaxis    Tramadol Other (comments)     Top lip swelled    Augmentin [Amoxicillin-Pot Clavulanate] Rash    Divalproex Sodium Hives    Morphine Nausea and Vomiting     Not a true allergy    Potassium Clavulanate Hives    Rizatriptan Rash    Tetanus Immune Globulin Other (comments)     Heat, bruising, and swelling at  Site of injection    Vancomycin Rash    Xanax [Alprazolam] Other (comments)     Hallucinations    Zonegran [Zonisamide] Rash     Past Medical History:   Diagnosis Date    Acute blood loss anemia 4/6/2018    Anemia     C. difficile diarrhea 4/1/2015    Cerebral edema (HCC) 4/1/2015    Chronic migraine 9/22/2016    Chronic myelogenous leukemia ([facility]     Sullivan chromosome/ converted from polycythemia in 2009- to 2012 when she was dx w leukemia    Chronic pain     Coagulation disorder ([facility]     \"clotting and Bleeding Problem\" dr Marty Vasquez follows     Esophageal spasm     with banding     Esophageal varices (HCC)     grade 3    GI bleed 8/3/2016    H/O craniotomy     3-29-15. due to blood clot - which caused a seizure  - then pt fell and hit     Hematemesis 8/20/2021    Leukocytosis 3/14/2015    Melena 8/20/2021    MRSA colonization 6/25/2012    Polycythemia vera(238.4)     JAK2 mutation    Portal hypertension (HCC)     with varices    Portal vein thrombosis 6/20/2012    Ct scan 6-21-2 Apparent occlusion of the portal vein. In addition, the splenic vein, and superior mesenteric vein are not definitely seen and are also likely  occluded. Splenomegaly, and extensive varices are seen as described above consistent with the portal vein occlusion 7-05-12 on arixtra HIT negative on repeat 7-27-12 re admitted Cirrhotic appearance of the liver. Mild to moderate ascites, as    Primary hypothyroidism     Pulmonary embolism ([address] 2006    not on coumadin anymore     S/P exploratory laparotomy, 6/20/12 6/25/2012    Bowel resection:  336 cm of small bowel removed, approximately 9 feet and placement of feeding jejunostomy.      S/P small bowel resection     2012.  9 feet removed due to  gangrene which wa due to blood clot    Seizure ([address] 3/14/2015    Seizure disorder ([address] 3/30/2015    due to clotting factor    Seizures ([address]     last one 3- - followed by aniyah     Splenomegaly, congestive, chronic     Stroke (cerebrum) ([address] 4/11/2015    had bled into head- surgery    Stroke [facility])     pt had stroke like symptoms     Thrombocytopenia, HIT negative 6/25/2012 7-01-12 platelets lower repeat HIT 7-02-12 platelets in 58,621'S    Thrombosis, portal vein 2004    portal , spleenic and recently superior mesenteric    Transfusion history     many blood tranfusions - last 3-29-15 after brain surgery    Traumatic hemorrhage of right cerebrum ([address] 3/30/2015     Past Surgical History:   Procedure Laterality Date    HX APPENDECTOMY      with small bowel resection    HX BREAST BIOPSY Bilateral     Lt - 1998; Rt - 2001    HX CHOLECYSTECTOMY      HX GI      liver biopsy    HX GI bowel removed small - 9 feet     HX GYN       x 2    HX GYN      D&C following miscarriage    HX ORTHOPAEDIC Left 2008    torn labrum shoulder (screw in place)    NEUROLOGICAL PROCEDURE UNLISTED  2010    craniotomy to evacuate subdural hematoma following a fall from a seizure    AK ABDOMEN SURGERY PROC UNLISTED      umbilical hernia repair    AK ABDOMEN SURGERY PROC UNLISTED      9ft of small bowel excised then reconnected     Family History   Problem Relation Age of Onset    Diabetes Mother     Stroke Mother         after surgery    Cancer Father         brain    No Known Problems Sister     Other Sister         diverticulitis    Other Sister         diverticulitis    Cancer Sister         lyjmphoma    Breast Cancer Maternal Aunt 48    Thyroid Disease Paternal Grandmother      Social History     Socioeconomic History    Marital status: SINGLE     Spouse name: Not on file    Number of children: Not on file    Years of education: Not on file    Highest education level: Not on file   Occupational History    Not on file   Tobacco Use    Smoking status: Former Smoker     Packs/day: 0.20     Years: 10.00     Pack years: 2.00     Types: Cigarettes     Quit date:      Years since quittin.1    Smokeless tobacco: Never Used   Substance and Sexual Activity    Alcohol use: No    Drug use: No    Sexual activity: Not on file   Other Topics Concern    Not on file   Social History Narrative    Not on file     Social Determinants of Health     Financial Resource Strain:     Difficulty of Paying Living Expenses: Not on file   Food Insecurity:     Worried About [address] in the Last Year: Not on file    [address] [facility] [address] in the Last Year: Not on file   Transportation Needs:     Lack of Transportation (Medical): Not on file    Lack of Transportation (Non-Medical):  Not on file   Physical Activity:     Days of Exercise per Week: Not on file    Minutes of Exercise per Session: Not on file   Stress:     Feeling of Stress : Not on file   Social Connections:     Frequency of Communication with Friends and Family: Not on file    Frequency of Social Gatherings with Friends and Family: Not on file    Attends Yazidi Services: Not on file    Active Member of [address] [place] [address] or Organizations: Not on file    Attends Club or Organization Meetings: Not on file    Marital Status: Not on file   Intimate Partner Violence:     Fear of Current or Ex-Partner: Not on file    Emotionally Abused: Not on file    Physically Abused: Not on file    Sexually Abused: Not on file   Housing Stability:     Unable to Pay for Housing in the Last Year: Not on file    Number of Mary Kaymokristen in the Last Year: Not on file    Unstable Housing in the Last Year: Not on file     Current Facility-Administered Medications   Medication Dose Route Frequency Provider Last Rate Last Admin    lip protectant (BLISTEX) ointment 1 Each  1 Each Topical PRN Jaclyn Medina MD        ergocalciferol capsule 50,000 Units  50,000 Units Oral DAILY Venessa Fess, NP   50,000 Units at 03/02/22 1568    morphine injection 2 mg  2 mg IntraVENous Q4H PRN Venessa Du NP   2 mg at 03/01/22 2043    enoxaparin (LOVENOX) injection 50 mg  50 mg SubCUTAneous Q12H Geri DARLING MD   50 mg at 03/01/22 1756    famotidine (PEPCID) tablet 40 mg  40 mg Oral DAILY PRN Missy Gutierres MD        pantoprazole (PROTONIX) tablet 40 mg  40 mg Oral ACB Geri DARLING MD   40 mg at 03/02/22 4436    magnesium oxide (MAG-OX) tablet 400 mg  400 mg Oral DAILY Geri DARLING MD   400 mg at 03/02/22 0807    oxyCODONE-acetaminophen (PERCOCET 10)  mg per tablet 1 Tablet  1 Tablet Oral Q8H PRN Missy Gutierres MD   1 Tablet at 03/01/22 1412    levothyroxine (SYNTHROID) tablet 112 mcg  112 mcg Oral ACB Geri DARLING MD   112 mcg at 03/02/22 4897    zolpidem (AMBIEN) tablet 10 mg  10 mg Oral QHS PRN Missy Gutierres MD   10 mg at 03/01/22     sodium chloride (NS) flush 5-40 mL  5-40 mL IntraVENous PRN Shefali Zuluaga MD        acetaminophen (TYLENOL) tablet 650 mg  650 mg Oral Q6H PRN Shefali Zuluaga MD   650 mg at 22 8370    polyethylene glycol (MIRALAX) packet 17 g  17 g Oral DAILY PRN Shefali Zuluaga MD        ondansetron (ZOFRAN ODT) tablet 4 mg  4 mg Oral Q8H PRN Shefali Zuluaga MD        Or    [facility] injection 4 mg  4 mg IntraVENous Q6H PRN Shefali Zuluaga MD        0.9% sodium chloride infusion  125 mL/hr IntraVENous CONTINUOUS Shefali Zuluaga  mL/hr at 22 0813 125 mL/hr at 22 0813    cefepime (MAXIPIME) 2 g in 0.9% sodium chloride (MBP/ADV) 100 mL MBP  2 g IntraVENous Q8H Monet DARLING  mL/hr at 22 0525 2 g at 22 0525    vancomycin (VANCOCIN) 750 mg in 0.9% sodium chloride 250 mL (Tham5Yfd)  750 mg IntraVENous Q12H Monet DARLING  mL/hr at 22 0204 750 mg at 22 0204    diphenhydrAMINE (BENADRYL) capsule 25 mg  25 mg Oral Q12H PRN Jarad Nichols MD   25 mg at 22 0227    oxyCODONE IR (ROXICODONE) tablet 5 mg  5 mg Oral Q4H PRN Jarad Nichols MD   5 mg at 22 1859    sodium chloride (NS) flush 5-10 mL  5-10 mL IntraVENous Q8H Edilberto Sher MD   10 mL at 22 0526    sodium chloride (NS) flush 5-10 mL  5-10 mL IntraVENous PRN Edilberto Sher MD        0.9% sodium chloride infusion  200 mL/hr IntraVENous CONTINUOUS Edilberto Sher  mL/hr at 22 2156 200 mL/hr at 22 215       OBJECTIVE:  Patient Vitals for the past 8 hrs:   BP Temp Pulse Resp SpO2   22 0254 105/61 98.6 °F (37 °C) 98 16 93 %     Temp (24hrs), Av.5 °F (36.9 °C), Min:98.2 °F (36.8 °C), Max:98.6 °F (37 °C)    No intake/output data recorded. Physical Exam:  Constitutional: Well developed female in no acute distress, sitting comfortably in the hospital bed. HEENT: Normocephalic and atraumatic.  Oropharynx is clear, mucous membranes are moist.  Extraocular muscles are intact. Sclerae anicteric. Neck supple without JVD. No thyromegaly present. Skin Warm and dry. No bruising and no rash noted. No erythema. No pallor. Respiratory Lungs are clear to auscultation bilaterally without wheezes, rales or rhonchi, normal air exchange without accessory muscle use. CVS Normal rate, regular rhythm and normal S1 and S2. No murmurs, gallops, or rubs. Abdomen Soft, nontender and distended, normoactive bowel sounds. No palpable mass. Neuro Grossly nonfocal with no obvious sensory or motor deficits. Mild nuchal rigidity. MSK Normal range of motion in general.  No edema and no tenderness. Psych Appropriate mood and affect. Labs:    Recent Results (from the past 24 hour(s))   METABOLIC PANEL, COMPREHENSIVE    Collection Time: 03/02/22  6:03 AM   Result Value Ref Range    Sodium 134 (L) 136 - 145 mmol/L    Potassium 3.5 3.5 - 5.1 mmol/L    Chloride 105 98 - 107 mmol/L    CO2 22 21 - 32 mmol/L    Anion gap 7 7 - 16 mmol/L    Glucose 126 (H) 65 - 100 mg/dL    BUN 15 8 - 23 MG/DL    Creatinine 0.54 (L) 0.6 - 1.0 MG/DL    GFR est AA >60 >60 ml/min/1.73m2    GFR est non-AA >60 >60 ml/min/1.73m2    Calcium 7.5 (L) 8.3 - 10.4 MG/DL    Bilirubin, total 0.8 0.2 - 1.1 MG/DL    ALT (SGPT) 18 12 - 65 U/L    AST (SGOT) 23 15 - 37 U/L    Alk.  phosphatase 99 50 - 130 U/L    Protein, total 4.9 (L) 6.3 - 8.2 g/dL    Albumin 1.8 (L) 3.2 - 4.6 g/dL    Globulin 3.1 2.3 - 3.5 g/dL    A-G Ratio 0.6 (L) 1.2 - 3.5     CBC WITH AUTOMATED DIFF    Collection Time: 03/02/22  6:03 AM   Result Value Ref Range    WBC 10.3 4.3 - 11.1 K/uL    RBC 2.69 (L) 4.05 - 5.2 M/uL    HGB 8.4 (L) 11.7 - 15.4 g/dL    HCT 25.5 (L) 35.8 - 46.3 %    MCV 94.8 79.6 - 97.8 FL    MCH 31.2 26.1 - 32.9 PG    MCHC 32.9 31.4 - 35.0 g/dL    RDW 17.9 (H) 11.9 - 14.6 %    PLATELET 99 (L) 642 - 450 K/uL    MPV 11.6 9.4 - 12.3 FL    ABSOLUTE NRBC 0.02 0.0 - 0.2 K/uL    DF AUTOMATED NEUTROPHILS 85 (H) 43 - 78 %    LYMPHOCYTES 5 (L) 13 - 44 %    MONOCYTES 5 4.0 - 12.0 %    EOSINOPHILS 1 0.5 - 7.8 %    BASOPHILS 0 0.0 - 2.0 %    IMMATURE GRANULOCYTES 4 0.0 - 5.0 %    ABS. NEUTROPHILS 8.7 (H) 1.7 - 8.2 K/UL    ABS. LYMPHOCYTES 0.5 0.5 - 4.6 K/UL    ABS. MONOCYTES 0.5 0.1 - 1.3 K/UL    ABS. EOSINOPHILS 0.1 0.0 - 0.8 K/UL    ABS. BASOPHILS 0.0 0.0 - 0.2 K/UL    ABS. IMM. GRANS. 0.4 0.0 - 0.5 K/UL       Imaging:  hydronephrosis. -Urinary Bladder: Unremarkable. -Reproductive Organs: Unremarkable.     -Lymph Nodes: No grossly enlarged retroperitoneal, mesenteric, or pelvic   adenopathy.   -Vasculature: Aorta is normal caliber.   -Bones: No gross bony lesions.     -Other: No ascites. Impression:     1) Increasing splenomegaly with multiple splenic infarcts. 2) Nonocclusive portal vein thrombosis. 3) Diffuse mesenteric edema. MRI LUMB SPINE W CONT [[id number]]    Order Status: Canceled    XR CHEST PA LAT [[id number]] Collected: 02/28/22 0715   Order Status: Completed Updated: 02/28/22 0721   Narrative:     EXAMINATION: XR CHEST PA LAT 2/28/2022 7:09 AM     ACCESSION NUMBER: [id number]     COMPARISON: 02/27/2022     INDICATION: Fever     TECHNIQUE: PA and lateral views of the chest were obtained. FINDINGS:     Support devices: None     Lungs: Again seen are bilateral airspace opacities throughout the lungs. These   have a similar appearance to the prior exam. There is no effusion. Cardiac Silhouette: Persistently prominent     Mediastinum: The aorta is normal.     Upper Abdomen: Normal     Miscellaneous: There are no lytic and blastic lesions. Impression:     1.  Stable exam with persistent cardiomegaly and bilateral diffuse airspace   opacities. CT HEAD WO CONT [[id number]] Collected: 02/27/22 2120   Order Status: Completed Updated: 02/27/22 2125   Narrative:     Noncontrast CT of the brain.      COMPARISON: 2016     INDICATION: Confusion, headache     TECHNIQUE: Contiguous axial images were obtained from the skull base through the   vertex without IV contrast. Radiation dose reduction techniques were used for   this study:  Our CT scanners use one or all of the following: Automated exposure   control, adjustment of the mA and/or kVp according to patient's size, iterative   reconstruction. FINDINGS:     There is no acute intracranial hemorrhage or evidence for acute territorial   infarction. There is no mass effect, midline shift or hydrocephalus. There is no   extra-axial fluid collection. The cerebellum and brainstem are grossly   unremarkable. Encephalomalacia in the right occipital lobe, consistent with old injury. There   is overlying craniotomy defect. Findings similar to 2016 study. Included globes appear intact. The visualized paranasal sinuses and the mastoid   air cells are aerated. There is no acute skull fracture. Impression:       1. No CT evidence of acute intracranial process. XR CHEST PORT [[id number]] Collected: 02/27/22 1843   Order Status: Completed Updated: 02/27/22 1846   Narrative:     EXAMINATION: XR CHEST PORT 2/27/2022 6:33 PM     ACCESSION NUMBER: [id number]     COMPARISON: 2/2/2022     INDICATION: Back pain and fever     TECHNIQUE: A single view of the chest was obtained. FINDINGS:   Support Devices:   *  None     Cardiac Silhouette: Cardiac silhouette is borderline enlarged, similar to prior   studies. Mediastinum: Normal mediastinal contours. Lungs: Bilateral perihilar and basilar predominant airspace opacities with   septal thickening. No pneumothorax or sizable pleural effusion. Upper Abdomen: Normal     Miscellaneous: No fracture or suspicious osseous lesion. Impression:     Bilateral perihilar airspace disease, suspicious for pulmonary edema.           ASSESSMENT:  Problem List  Date Reviewed: 2/8/2022          Codes Class Noted    Body mass index (BMI) of 22.0 to 22.9 in adult ICD-10-CM: Z68.22  ICD-9-CM: V85.1 8/26/2021        * (Principal) Fever ICD-10-CM: R50.9  ICD-9-CM: 780.60  2/27/2022        Leukocytosis ICD-10-CM: D72.829  ICD-9-CM: 288.60  2/27/2022        Sepsis ([facility] ICD-10-CM: A41.9  ICD-9-CM: 038.9, 995.91  2/2/2022        Fever of unknown origin ICD-10-CM: R50.9  ICD-9-CM: 780.60  2/2/2022        Thrombocytopenia, unspecified ICD-10-CM: D69.6  ICD-9-CM: 287.5  10/15/2021        Opioid use, unspecified with unspecified opioid-induced disorder ICD-10-CM: F11.99  ICD-9-CM: 292.9, 305.50  9/17/2021        Cellulitis of leg, right ICD-10-CM: L03.115  ICD-9-CM: 682.6  8/8/2021        Chronic migraine without aura with status migrainosus, not intractable ICD-10-CM: L50.481  ICD-9-CM: 346.72  11/1/2016        Lung nodule ICD-10-CM: R91.1  ICD-9-CM: 793.11  10/28/2016    Overview Signed 10/28/2016 10:05 AM by Leah Mckeon     IMPRESSION:    1. Lobulated 2 cm mass in the right lung apex, malignancy versus an atypical  inflammatory process. 2. Occlusion of the right subclavian vein. 3. Chronic occlusion of the portal vein with associated abnormal tissue  encasing the common bile duct, most likely fibrosis. 4. Splenomegaly. DVT (deep venous thrombosis) ([facility] ICD-10-CM: I82.409  ICD-9-CM: 453.40  10/26/2016    Overview Signed 10/26/2016 10:27 PM by Saad Harding. Right subclavian               Analgesic rebound headache ICD-10-CM: G44.40, T39.95XA  ICD-9-CM: 339.3, E935.9  9/22/2016        Headache, chronic daily ICD-10-CM: R51.9  ICD-9-CM: 784.0  9/22/2016        Migraine with aura and with status migrainosus, not intractable ICD-10-CM: G43. 101  ICD-9-CM: 346.03  9/22/2016        Left homonymous hemianopsia ICD-10-CM: H53.462  ICD-9-CM: 368.46  3/30/2015        Hyponatremia ICD-10-CM: E87.1  ICD-9-CM: 276.1  3/30/2015        Chronic myelogenous leukemia (HCC) ICD-10-CM: C92.10  ICD-9-CM: 205.10  3/30/2015        Acquired hypercoagulable state ([facility] (Chronic) ICD-10-CM: H56.92  ICD-9-CM: 289.81  3/30/2015        AKOSUA (iron deficiency anemia) ICD-10-CM: D50.9  ICD-9-CM: 280.9  7/28/2012        Cirrhosis (HCC) (Chronic) ICD-10-CM: K74.60  ICD-9-CM: 571.5  7/27/2012        Ascites ICD-10-CM: R18.8  ICD-9-CM: 789.59  7/26/2012        Polycythemia vera ([address] (Chronic) ICD-10-CM: D45  ICD-9-CM: 238.4  2/16/2009    Overview Addendum 8/26/2021  2:31 PM by Mercedes Gresham MD     2/2008 by [email or web address] analysis however portal vein thrombosis  And and splenomegaly since 2004  Hydroxyurea for 6 months poor tolerance spleen did not shrink   Pegasys 90 mcg weekly 4-2009 till    Restarted 12-30-09 45 mcg q 2 weeks  2-2010 reduced to q month   Increased 45 mcg 2/month 4-2010  Held 8-2010 thrombocytopenia  12-29-12 restarted 45 mcg q 2 weeks  4-1-11 45 mcg q 3 weeks  4-22-11 45 mcg q 4 weeks  Uncertain dosing thereafter   Possibly q 3 weeks 10-15-11 and held and restarted on 4-1-12 6-12 ct scan Small bowel wall thickening suggesting an enteritis. In addition, there is well thickening of the right colon which can suggest an additional   colitis although this can also be seen with severe portal hypertension. Likely reactive edematous changes it scattered fluid collections are seen of   the small bowel mesentery. . Apparent occlusion of the portal vein. In addition, the splenic vein, and superior mesenteric vein are not definitely seen and are also likely occluded. Splenomegaly, and extensive varices are seen as described above consistent with the portal vein occlusion. Zack Dale Heterogeneous enhancement of the liver and mild periportal edema. An acute hepatitis cannot be excluded.               Primary hypothyroidism ICD-10-CM: E03.9  ICD-9-CM: 244.9  2/16/2009        Splenomegaly ICD-10-CM: R16.1  ICD-9-CM: 789.2  2/16/2009        Esophageal varices (HCC) (Chronic) ICD-10-CM: I85.00  ICD-9-CM: 456.1  2/16/2009    Overview Addendum 8/26/2021  2:30 PM by Mercedes Gresham MD     grade 3             Portal vein thrombosis ICD-10-CM: D95  ICD-9-CM: 166  2/16/2009    Overview Addendum 8/26/2021  2:31 PM by Anabel Wilson MD     Ct scan 6-21-2 Apparent occlusion of the portal vein. In addition, the splenic vein, and superior mesenteric vein are not definitely seen and are also likely  occluded. Splenomegaly, and extensive varices are seen as described above consistent with the portal vein occlusion 7-05-12 on arixtra HIT negative on repeat 7-27-12 re admitted Cirrhotic appearance of the liver. Mild to moderate ascites                 Ms. Jessica Pillai is a 64 y.o. female admitted on 2/27/2022. The primary encounter diagnosis was Sepsis, due to unspecified organism, unspecified whether acute organ dysfunction present ([address] Diagnoses of Leukocytosis, unspecified type, Fever, unspecified fever cause, Confusion and disorientation, and Meningitis due to gram-negative bacteria were also pertinent to this visit. [place] Ayala Her PMH includes vasculitis, hypothyroidism, cirrhosis, splenomegaly, esophageal varices, anemia, migraines, hx PE 2006, recurrent DVTs/portal vein thrombosis on Lovenox, and seizures. She is a patient of Dr. Zacarias Mei with polycythemia vera on Jakafi and CML dx 2013 on Dasatinib. She was recently admitted from 2/2 - 2/6/22 for E Coli bacteremia. She was treated with Merrem and discharged on Vantin. She returned to ED on 2/27 with c/o fever and headache with associated photophobia. She reports she has been sick for 2 weeks. She endorses malaise, weakness, nausea, and confusion. Lactic acid 2.8, PCT 26. pBNP 4430. CT head neg. CXR on 2/27 with b/l perihilar airspace disease, suspicious for pulm edema. Repeat CXR 2/28 with stable findings with persistent cardiomegaly and b/l diffuse airspace opacities. She is s/p LP on 2/27 with low glucose and normal protein, WBC 2630 with 88% neutrophils, RBC 18. BCx +GNR, I/S pending. UCx-NGTD. CSF Cx-NGTD. On Cef/Vanc.   We were consulted for recommendations for our patient. RECOMMENDATIONS:  Polycythemia Vera  - on Jakafi, currently held    CML  - on Dasatinib, currently held    Sepsis / Narciso Fetch bacteremia / E Coli Meningitis  - BCx +GNR, I/S pending  - UCx - NGTD  - s/p LP on 2/27 with low glucose, normal protein, WBC 2630 with 88% neutrophils. CSF Cx-NGTD. Add on meningitis panel if enough CSF remains to send. - On Cef/Vanc  - Given recurrent infections, check immunoglobulins and HIV  3/1 BCx +EColi. Meningitis panel +EColi. CSF Cx-NGTD. UCx-NGTD. HIV neg. IgG level 888. On Cef/Vanc. ID consulted. Will defer need for steroids to ID. CT AP pending. TTE neg for vegetation on 2/3. Would pt benefit from ROSELINE?      3/2 Remains afebrile. CSF-NGTD. UCx with normal skin sarath. CT AP with increased splenomegaly with multiple splenic infarcts, nonocclusive portal vein thrombosis, and diffuse mesenteric edema. Unable to complete MRI L-spine d/t ascites and splenomegaly. On Cef. ID DC'd Vanc. ID has reached out to radiology to re-attempt MRI. ? Does LP need to be repeated? Anemia / Thrombocytopenia  - likely r/t current illness  - Check iron studies, B12, folate, Vit D, hemolysis labs, DIC labs, HIT profile, smear  - Transfuse prn to keep Hgb >7 and Plt >10k or >50k with active bleeding  3/1 Hgb stable 8.5. Plt up to 80k. No hemolysis noted. Tsat 19% with ferritin 835. No B12, or folate deficiency noted. Vit D low at 15.5, replete. Smear pending. 3/2 Hgb stable 8.4. Plt 99k. Smear with no schistocytes and absolute neutrophilia with toxic changes. Goals and plan of care reviewed with the patient. All questions answered to the best of our ability. Thank you for allowing us to participate in the care of Ms. Couch. She will need to f/u with Dr. Loly Bartholomew within one week of discharge.          Dania Goncalves NP   [facility]emat[facility] On[facility]  [address] [facility] [address]  Office : [phone]  Fax : [phone]

## 2022-03-02 NOTE — PROGRESS NOTES
END OF SHIFT NOTE:    Patient Vitals for the past 12 hrs:   Temp Pulse Resp BP SpO2   03/02/22 0254 98.6 °F (37 °C) 98 16 105/61 93 %   03/01/22 2253 98.4 °F (36.9 °C) 96 17 106/66 96 %   03/01/22 1906 98.6 °F (37 °C) 97 17 105/66 97 %     -rad tech unable to complete MRI  -see rad tech note  -MD Selina Marcum notified  -talked to pt's daughter and sister at length  -pt given morphine 1x  -pt given ambien for sleep  -pt's abdomen became increasingly distended throughout the shift  -MD Selina Marcum notified and aware  -possible paracentesis today to get fluid off abd  -pt resting in bed, vss

## 2022-03-02 NOTE — PROGRESS NOTES
MRI was attempted. 3 sagittal scans were obtained and the weighting of the images is incorrect. Contacted Dr. Aaliyah Dumont, radiologist to continue exam. Due to patient condition of acities and splenomegaly the MRI images are not as they should appear and we discontinued exam. PM RN is aware of the situation and a different test might be warranted to rule out pathology.

## 2022-03-02 NOTE — PROGRESS NOTES
Talked to pt's daughter and oldest sister at length. Pt's daughter expressed how she wants to speak to the hospitalist tomorrow morning. She will be here around 8am to speak to the hospitalist about her mom's condition.

## 2022-03-02 NOTE — PROGRESS NOTES
Problem: Falls - Risk of  Goal: *Absence of Falls  Description: Document [place] Fall Risk and appropriate interventions in the flowsheet. Outcome: Progressing Towards Goal  Note: Fall Risk Interventions:  Mobility Interventions: Bed/chair exit alarm,Patient to call before getting OOB,PT Consult for mobility concerns    Mentation Interventions: Adequate sleep, hydration, pain control,Bed/chair exit alarm,Toileting rounds,Update white board    Medication Interventions: Bed/chair exit alarm,Patient to call before getting OOB,Teach patient to arise slowly    Elimination Interventions: Bed/chair exit alarm,Call light in reach,Patient to call for help with toileting needs,Toileting schedule/hourly rounds              Problem: Pressure Injury - Risk of  Goal: *Prevention of pressure injury  Description: Document Eric Scale and appropriate interventions in the flowsheet.   Outcome: Progressing Towards Goal  Note: Pressure Injury Interventions:  Sensory Interventions: Assess changes in LOC,Check visual cues for pain,Keep linens dry and wrinkle-free,Maintain/enhance activity level,Minimize linen layers,Pressure redistribution bed/mattress (bed type)    Moisture Interventions: Absorbent underpads,Check for incontinence Q2 hours and as needed,Maintain skin hydration (lotion/cream)    Activity Interventions: Increase time out of bed,Pressure redistribution bed/mattress(bed type),PT/OT evaluation    Mobility Interventions: Pressure redistribution bed/mattress (bed type),PT/OT evaluation    Nutrition Interventions: Document food/fluid/supplement intake,Offer support with meals,snacks and hydration

## 2022-03-02 NOTE — PROGRESS NOTES
Hospitalist Progress Note   Admit Date:  2022  6:00 PM   Name:  Edilberto Elizabeth   Age:  64 y.o. Sex:  female  :  1960   MRN:  [id number]   Room:  [facility]/    Presenting Complaint: No chief complaint on file. Reason(s) for Admission: Fever [R50.9]     Hospital Course & Interval History:   61F PMHx chronic anemia, CML, esophageal varices who presents to the ER with report of fever and headache for the past several days. Admits to fever up to 102. 1 two days ago. Feels malaise and weakness. Headache is global and associated with photophobia. Admits to feeling a bit confused as well. Admits to nausea, denies shortness of breath, cough ,vomiting. Subjective/24hr Events (22): Abdomen nonpainful. Has headache, tolerable. Infectious disease to review CSF cultures. Attempted LP failed due to edema in belly. Will need to reattempt. Attempted to discuss CODE STATUS with patient. She reported that this made her very uncomfortable and that she did not want to discuss it. ROS:  10 systems reviewed and negative except as noted above. Assessment & Plan:   Bacterial meningitis  Fever in the context of CML. Initially uncertain etiology. UA shows sterile pyuria. CXR showed perihilar infiltrates, likely edema. Cultures positive for E. coli, LP concerning for E. coli: Continue bank cefepime  -Consult infectious disease  -Vanc, cefepime    3/2: Continue antibiotics pending CSF culture, MRI of lumbar spine, CT abdomen pelvis with contrast     Hyponatremia  IV NS, follow BMP.  3/2: Stable, recheck in a.m.     Iron deficiency anemia  -oral iron  3/2: stable      Chronic myelogenous leukemia  -Consult oncology     Primary hypothyroidism   -Continue home meds.       Esophageal varices     Stable.  Continue home meds.       Body mass index (BMI) of 23.0 to 23.9 in adult    Stable.       Dispo/Discharge Planning:      Home in 4-5 days    Diet:  ADULT DIET Regular  ADULT ORAL NUTRITION SUPPLEMENT Lunch, Dinner; Standard High Calorie/High Protein  DVT PPx: enoxaparin  Code status: Full Code    Hospital Problems as of 3/2/2022 Date Reviewed: 2/8/2022          Codes Class Noted - Resolved POA    Body mass index (BMI) of 22.0 to 22.9 in adult ICD-10-CM: Z68.22  ICD-9-CM: V85.1  8/26/2021 - Present Yes        * (Principal) Fever ICD-10-CM: R50.9  ICD-9-CM: 780.60  2/27/2022 - Present Yes        Leukocytosis ICD-10-CM: D72.829  ICD-9-CM: 288.60  2/27/2022 - Present Yes        Hyponatremia ICD-10-CM: E87.1  ICD-9-CM: 276.1  3/30/2015 - Present Yes        Chronic myelogenous leukemia ([facility] [address] ICD-10-CM: C92.10  ICD-9-CM: 205.10  3/30/2015 - Present Yes        AKOSUA (iron deficiency anemia) ICD-10-CM: D50.9  ICD-9-CM: 280.9  7/28/2012 - Present Yes        Cirrhosis (HCC) (Chronic) ICD-10-CM: K74.60  ICD-9-CM: 571.5  7/27/2012 - Present Yes        Primary hypothyroidism ICD-10-CM: E03.9  ICD-9-CM: 244.9  2/16/2009 - Present Yes        Esophageal varices (HCC) (Chronic) ICD-10-CM: I85.00  ICD-9-CM: 456.1  2/16/2009 - Present Yes    Overview Addendum 8/26/2021  2:30 PM by Terrell Bethea MD     grade 3             RESOLVED: Normocytic anemia ICD-10-CM: D64.9  ICD-9-CM: 285.9  7/28/2012 - 2/27/2022 Unknown    Overview Signed 7/28/2012  2:11 PM by Harlan Gamboa     Hemoglobin 8.0 gram   7/25/2012 17:03   Iron 27 (L)   TIBC 221 (L)   Transferrin Saturation 12 (L)                      Objective:     Patient Vitals for the past 24 hrs:   Temp Pulse Resp BP SpO2   03/02/22 1452 97.7 °F (36.5 °C) 89 20 95/62 99 %   03/02/22 1144 98 °F (36.7 °C) 94 20 (!) 103/57 92 %   03/02/22 0721 99 °F (37.2 °C) (!) 103 20 102/65 92 %   03/02/22 0254 98.6 °F (37 °C) 98 16 105/61 93 %   03/01/22 2253 98.4 °F (36.9 °C) 96 17 106/66 96 %   03/01/22 1906 98.6 °F (37 °C) 97 17 105/66 97 %     Oxygen Therapy  O2 Sat (%): 99 % (03/02/22 1452)  Pulse via Oximetry: 87 beats per minute (02/27/22 2300)  O2 Device: None (Room air) (03/02/22 0254)    Estimated body mass index is 23.74 kg/m² as calculated from the following:    Height as of this encounter: 5' 2\" (1.575 m). Weight as of this encounter: 58.9 kg (129 lb 12.8 oz). Intake/Output Summary (Last 24 hours) at 3/2/2022 1632  Last data filed at 3/2/2022 1426  Gross per 24 hour   Intake 600 ml   Output 550 ml   Net 50 ml       Blood pressure 95/62, pulse 89, temperature 97.7 °F (36.5 °C), resp. rate 20, height 5' 2\" (1.575 m), weight 58.9 kg (129 lb 12.8 oz), SpO2 99 %, not currently breastfeeding. Physical Exam  Vitals and nursing note reviewed. Constitutional:       General: She is not in acute distress. Appearance: Normal appearance. She is ill-appearing. HENT:      Head: Normocephalic. Eyes:      Extraocular Movements: Extraocular movements intact. Cardiovascular:      Rate and Rhythm: Normal rate. Pulses:           Radial pulses are 2+ on the left side. Heart sounds: Murmur heard. No friction rub. No gallop. Pulmonary:      Effort: Pulmonary effort is normal. No respiratory distress. Abdominal:      General: There is distension (mild). Tenderness: There is no abdominal tenderness. Musculoskeletal:         General: No deformity. Cervical back: No rigidity. Right lower leg: No edema. Left lower leg: No edema. Skin:     General: Skin is warm and dry. Neurological:      General: No focal deficit present. Mental Status: She is alert and oriented to person, place, and time.    Psychiatric:         Mood and Affect: Mood normal.         Behavior: Behavior normal.         I have reviewed ordered lab tests and independently visualized imaging below:    Recent Labs:  Recent Results (from the past 48 hour(s))   METABOLIC PANEL, COMPREHENSIVE    Collection Time: 03/01/22  7:11 AM   Result Value Ref Range    Sodium 132 (L) 136 - 145 mmol/L    Potassium 3.7 3.5 - 5.1 mmol/L    Chloride 103 98 - 107 mmol/L    CO2 22 21 - 32 mmol/L    Anion gap 7 7 - 16 mmol/L    Glucose 146 (H) 65 - 100 mg/dL    BUN 18 8 - 23 MG/DL    Creatinine 0.62 0.6 - 1.0 MG/DL    GFR est AA >60 >60 ml/min/1.73m2    GFR est non-AA >60 >60 ml/min/1.73m2    Calcium 7.6 (L) 8.3 - 10.4 MG/DL    Bilirubin, total 0.8 0.2 - 1.1 MG/DL    ALT (SGPT) 17 12 - 65 U/L    AST (SGOT) 19 15 - 37 U/L    Alk. phosphatase 102 50 - 136 U/L    Protein, total 4.9 (L) 6.3 - 8.2 g/dL    Albumin 1.9 (L) 3.2 - 4.6 g/dL    Globulin 3.0 2.3 - 3.5 g/dL    A-G Ratio 0.6 (L) 1.2 - 3.5     CBC WITH AUTOMATED DIFF    Collection Time: 03/01/22  7:11 AM   Result Value Ref Range    WBC 11.0 4.3 - 11.1 K/uL    RBC 2.73 (L) 4.05 - 5.2 M/uL    HGB 8.5 (L) 11.7 - 15.4 g/dL    HCT 25.8 (L) 35.8 - 46.3 %    MCV 94.5 79.6 - 97.8 FL    MCH 31.1 26.1 - 32.9 PG    MCHC 32.9 31.4 - 35.0 g/dL    RDW 18.1 (H) 11.9 - 14.6 %    PLATELET 80 (L) 758 - 450 K/uL    MPV 11.4 9.4 - 12.3 FL    ABSOLUTE NRBC 0.00 0.0 - 0.2 K/uL    DF AUTOMATED      NEUTROPHILS 87 (H) 43 - 78 %    LYMPHOCYTES 4 (L) 13 - 44 %    MONOCYTES 6 4.0 - 12.0 %    EOSINOPHILS 1 0.5 - 7.8 %    BASOPHILS 0 0.0 - 2.0 %    IMMATURE GRANULOCYTES 3 0.0 - 5.0 %    ABS. NEUTROPHILS 9.6 (H) 1.7 - 8.2 K/UL    ABS. LYMPHOCYTES 0.4 (L) 0.5 - 4.6 K/UL    ABS. MONOCYTES 0.7 0.1 - 1.3 K/UL    ABS. EOSINOPHILS 0.1 0.0 - 0.8 K/UL    ABS. BASOPHILS 0.0 0.0 - 0.2 K/UL    ABS. IMM.  GRANS. 0.3 0.0 - 0.5 K/UL   VANCOMYCIN, RANDOM    Collection Time: 03/01/22  7:11 AM   Result Value Ref Range    Vancomycin, random 18.2 UG/ML   CULTURE, BLOOD    Collection Time: 03/01/22  7:12 AM    Specimen: Blood   Result Value Ref Range    Special Requests: LEFT  ARM        Culture result: NO GROWTH 1 DAY     METABOLIC PANEL, COMPREHENSIVE    Collection Time: 03/02/22  6:03 AM   Result Value Ref Range    Sodium 134 (L) 136 - 145 mmol/L    Potassium 3.5 3.5 - 5.1 mmol/L    Chloride 105 98 - 107 mmol/L    CO2 22 21 - 32 mmol/L    Anion gap 7 7 - 16 mmol/L    Glucose 126 (H) 65 - 100 mg/dL    BUN 15 8 - 23 MG/DL    Creatinine 0.54 (L) 0.6 - 1.0 MG/DL    GFR est AA >60 >60 ml/min/1.73m2    GFR est non-AA >60 >60 ml/min/1.73m2    Calcium 7.5 (L) 8.3 - 10.4 MG/DL    Bilirubin, total 0.8 0.2 - 1.1 MG/DL    ALT (SGPT) 18 12 - 65 U/L    AST (SGOT) 23 15 - 37 U/L    Alk. phosphatase 99 50 - 130 U/L    Protein, total 4.9 (L) 6.3 - 8.2 g/dL    Albumin 1.8 (L) 3.2 - 4.6 g/dL    Globulin 3.1 2.3 - 3.5 g/dL    A-G Ratio 0.6 (L) 1.2 - 3.5     CBC WITH AUTOMATED DIFF    Collection Time: 03/02/22  6:03 AM   Result Value Ref Range    WBC 10.3 4.3 - 11.1 K/uL    RBC 2.69 (L) 4.05 - 5.2 M/uL    HGB 8.4 (L) 11.7 - 15.4 g/dL    HCT 25.5 (L) 35.8 - 46.3 %    MCV 94.8 79.6 - 97.8 FL    MCH 31.2 26.1 - 32.9 PG    MCHC 32.9 31.4 - 35.0 g/dL    RDW 17.9 (H) 11.9 - 14.6 %    PLATELET 99 (L) 658 - 450 K/uL    MPV 11.6 9.4 - 12.3 FL    ABSOLUTE NRBC 0.02 0.0 - 0.2 K/uL    DF AUTOMATED      NEUTROPHILS 85 (H) 43 - 78 %    LYMPHOCYTES 5 (L) 13 - 44 %    MONOCYTES 5 4.0 - 12.0 %    EOSINOPHILS 1 0.5 - 7.8 %    BASOPHILS 0 0.0 - 2.0 %    IMMATURE GRANULOCYTES 4 0.0 - 5.0 %    ABS. NEUTROPHILS 8.7 (H) 1.7 - 8.2 K/UL    ABS. LYMPHOCYTES 0.5 0.5 - 4.6 K/UL    ABS. MONOCYTES 0.5 0.1 - 1.3 K/UL    ABS. EOSINOPHILS 0.1 0.0 - 0.8 K/UL    ABS. BASOPHILS 0.0 0.0 - 0.2 K/UL    ABS. IMM. GRANS.  0.4 0.0 - 0.5 K/UL       All Micro Results     Procedure Component Value Units Date/Time    CULTURE, CSF Elihue David STAIN [[id number]]  (Abnormal) Collected: 02/27/22 2200    Order Status: Completed Specimen: Cerebrospinal Fluid Updated: 03/02/22 0823     Special Requests: TUBE 3     GRAM STAIN 0 TO 5 WBCS SEEN PER OIF      NO DEFINITE ORGANISM SEEN        Culture result:       GRAM NEGATIVE RODS ISOLATED FROM THIO BROTH ONLY IDENTIFICATION AND SUSCEPTIBILITY TO FOLLOW                  RESULTS VERIFIED, PHONED TO AND READ BACK BY  Ligia Rosales RN @9313 3.2.22 SC      CULTURE, BLOOD [[id number]] Collected: 03/01/22 3561    Order Status: Completed Specimen: Blood Updated: 03/02/22 0635     Special Requests: --        LEFT  ARM       Culture result: NO GROWTH 1 DAY       CULTURE, URINE [[id number]] Collected: 02/27/22 1949    Order Status: Completed Specimen: Cath Urine Updated: 03/02/22 0655     Special Requests: NO SPECIAL REQUESTS        Culture result:       <10,000 COLONIES/mL NORMAL SKIN JOSÉ ISOLATED          BLOOD CULTURE [[id number]]  (Abnormal) Collected: 02/27/22 1817    Order Status: Completed Specimen: Blood Updated: 03/02/22 0644     Special Requests: --        RIGHT  Antecubital       GRAM STAIN GRAM NEGATIVE RODS               CRITICAL RESULT NOT CALLED DUE TO PREVIOUS NOTIFICATION OF CRITICAL RESULT WITHIN THE LAST 24 HOURS. AEROBIC AND ANAEROBIC BOTTLES           Culture result: GRAM NEGATIVE RODS               For identification and susceptibility refer to culture  [facility] QO.[id number]      BLOOD CULTURE [[id number]]  (Abnormal)  (Susceptibility) Collected: 02/27/22 1814    Order Status: Completed Specimen: Blood Updated: 03/02/22 0643     Special Requests: --        NO SPECIAL REQUESTS  LEFT  Antecubital       GRAM STAIN GRAM NEGATIVE RODS               RESULTS VERIFIED, PHONED TO AND READ BACK BY JESUS RENE RN BY NB AT [place] [address] [place] [id number]                  AEROBIC AND ANAEROBIC BOTTLES           Culture result: ESCHERICHIA COLI               Refer to Blood Culture ID Panel Accession  [id number]      SHerlinda Munguia Ehsan, UR/CSF [[id number]] Collected: 02/27/22 2200    Order Status: Completed Specimen: Miscellaneous sample Updated: 03/01/22 1236     Source CEREBROSPINAL FLUID        Specimen Comment        Comment: (NOTE)  Cerebrospinal fluid (CSF)          Streptococcus pneumoniae Ag Negative        Fluid culture PENDING     Organism ID PENDING     Please note Comment        Comment: (NOTE)  College [employer]ologists standards require a culture to be  performed on CSF specimens submitted for bacterial antigen testing.   (CAP [id number]) Urine specimens will not be cultured. Performed At: [facility] [address], [place] [id number]  Linus Quiñones MD KK:[phone]         MENINGITIS PATHOGENS PANEL, CSF (BY PCR) [[id number]] Collected: 02/27/22 2200    Order Status: Completed Specimen: Cerebrospinal Fluid Updated: 02/28/22 1930     Meningitis panel       RESULTS SCANNED IN [facility]          BLOOD CULTURE ID PANEL [[id number]]  (Abnormal) Collected: 02/27/22 1814    Order Status: Completed Specimen: Blood Updated: 02/28/22 1314     Acc. no. from Micro Order [id number]     Escherichia coli Detected        Comment: RESULTS VERIFIED, PHONED TO AND READ BACK BY  Chris Perez RN AT 13:11 02.28.2022          KPC (Carbapenem Resistance Gene) NOT DETECTED        Comment: WARNING:  A Not Detected result for the KPC gene does not indicate susceptibility to carbapenems. Gram negative bacteria can be resistant to carbapenems by mechanisms other than carrying the KPC gene. INTERPRETATION       Gram negative gopi. Identified by realtime PCR as E. coli          MENINGITIS PATHOGENS PANEL, CSF (BY PCR) [[id number]] Collected: 02/28/22 1100    Order Status: Canceled     MENINGITIS PATHOGENS PANEL, CSF (BY PCR) [[id number]] Collected: 02/28/22 1030    Order Status: Canceled Specimen: Cerebrospinal Fluid     COVID-19 RAPID TEST [[id number]] Collected: 02/27/22 1907    Order Status: Completed Specimen: Nasopharyngeal Updated: 02/27/22 1938     Specimen source NASAL SWAB        COVID-19 rapid test Not detected        Comment:      The specimen is NEGATIVE for SARS-CoV-2, the novel coronavirus associated with COVID-19. A negative result does not rule out COVID-19. This test has been authorized by the FDA under an Emergency Use Authorization (EUA) for use by authorized laboratories.         Fact sheet for Healthcare Providers: ConventionUpdate.co.nz  Fact sheet for Patients: ConventionUpdate.co.nz       Methodology: Isothermal Nucleic Acid Amplification               Other Studies:  No results found.     Current Meds:  Current Facility-Administered Medications   Medication Dose Route Frequency    lip protectant (BLISTEX) ointment 1 Each  1 Each Topical PRN    cefTRIAXone (ROCEPHIN) 2 g in 0.9% sodium chloride (MBP/ADV) 50 mL MBP  2 g IntraVENous Q12H    lactated Ringers infusion  75 mL/hr IntraVENous CONTINUOUS    ergocalciferol capsule 50,000 Units  50,000 Units Oral DAILY    morphine injection 2 mg  2 mg IntraVENous Q4H PRN    enoxaparin (LOVENOX) injection 50 mg  50 mg SubCUTAneous Q12H    famotidine (PEPCID) tablet 40 mg  40 mg Oral DAILY PRN    pantoprazole (PROTONIX) tablet 40 mg  40 mg Oral ACB    magnesium oxide (MAG-OX) tablet 400 mg  400 mg Oral DAILY    oxyCODONE-acetaminophen (PERCOCET 10)  mg per tablet 1 Tablet  1 Tablet Oral Q8H PRN    levothyroxine (SYNTHROID) tablet 112 mcg  112 mcg Oral ACB    zolpidem (AMBIEN) tablet 10 mg  10 mg Oral QHS PRN    sodium chloride (NS) flush 5-40 mL  5-40 mL IntraVENous PRN    acetaminophen (TYLENOL) tablet 650 mg  650 mg Oral Q6H PRN    polyethylene glycol (MIRALAX) packet 17 g  17 g Oral DAILY PRN    ondansetron (ZOFRAN ODT) tablet 4 mg  4 mg Oral Q8H PRN    Or    ondansetron (ZOFRAN) injection 4 mg  4 mg IntraVENous Q6H PRN    diphenhydrAMINE (BENADRYL) capsule 25 mg  25 mg Oral Q12H PRN    oxyCODONE IR (ROXICODONE) tablet 5 mg  5 mg Oral Q4H PRN    sodium chloride (NS) flush 5-10 mL  5-10 mL IntraVENous Q8H    sodium chloride (NS) flush 5-10 mL  5-10 mL IntraVENous PRN       Signed:  Mikaela Villagomez MD

## 2022-03-02 NOTE — PROGRESS NOTES
Patient will be discharging in within the next 48 hours. This CM explained IM letter to patient/family. IM letter was signed by patient and verbalized understanding of its contents.

## 2022-03-03 NOTE — PROGRESS NOTES
Problem: Mobility Impaired (Adult and Pediatric)  Goal: *Acute Goals and Plan of Care (Insert Text)  Outcome: Progressing Towards Goal  Note: STG:  (1.)Ms. Irma Valentine will move from supine to sit and sit to supine  with CONTACT GUARD ASSIST within 1-3 treatment day(s). (2.)Ms. Irma Valentine will transfer from bed to chair and chair to bed with CONTACT GUARD ASSIST using the least restrictive device within 1-3 treatment day(s). (3.)Ms. Irma Valentine will ambulate with CONTACT GUARD ASSIST for 50 feet with the least restrictive device within 1-3 treatment day(s). LTG:  (1.)Ms. Irma Valentine will move from supine to sit and sit to supine  in bed with MODIFIED INDEPENDENCE within 4-6 treatment day(s). (2.)Ms. Irma Valentine will transfer from bed to chair and chair to bed with STAND BY ASSIST using the least restrictive device within 4-6 treatment day(s). (3.)Ms. Couch will ambulate with STAND BY ASSIST for 150 feet with the least restrictive device within 4-6 treatment day(s). (4.)Ms. Irma Valentine will ascend/descend 13 steps with R railing with CGA in 4-6 treatment days.    ________________________________________________________________________________________________      PHYSICAL THERAPY: Initial Assessment 3/3/2022  INPATIENT: PT Visit Days : 1  Payor: Ligia Espinoza / Plan: [address] / Product Type: [employer] /       NAME/AGE/GENDER: Birder Cooks is a 64 y.o. female   PRIMARY DIAGNOSIS: Fever [R50.9] Fever Fever        ICD-10: Treatment Diagnosis:    Generalized Muscle Weakness (M62.81)  Difficulty in walking, Not elsewhere classified (R26.2)  Other abnormalities of gait and mobility (R26.89)   Precaution/Allergies:  Latex, Sulfa (sulfonamide antibiotics), Tramadol, Augmentin [amoxicillin-pot clavulanate], Divalproex sodium, Morphine, Potassium clavulanate, Rizatriptan, Tetanus immune globulin, Vancomycin, Xanax [alprazolam], and Zonegran [zonisamide]      ASSESSMENT:     Ms. Irma Valentine was admitted with sepsis. Has leukemia. She lives alone in a 2 story town home. Independent with gait and ADLs prior to admission. Does not drive. [employer] friends take her to the grocery store. Has 2 children locally. She was supine upon contact. Agreeable to PT. Had LBP earlier and received pain meds; currently no pain. She did report lightheadedness which limited gait distance. Performed all functional mobility with CGA to min assist. She presents with generalized weakness and is functioning below baseline. Would benefit from RW at home. Based on today's assessment, she would be a good candidate for STR but may also be appropriate, based on progress, to discharge home with family support and HHPT. Discussed with case management. This section established at most recent assessment   PROBLEM LIST (Impairments causing functional limitations):  Decreased Strength  Decreased ADL/Functional Activities  Decreased Transfer Abilities  Decreased Ambulation Ability/Technique  Decreased Activity Tolerance  Increased Fatigue   INTERVENTIONS PLANNED: (Benefits and precautions of physical therapy have been discussed with the patient.)  Bed Mobility  Gait Training  Therapeutic Activites  Therapeutic Exercise/Strengthening  Transfer Training     TREATMENT PLAN: Frequency/Duration: daily for duration of hospital stay  Rehabilitation Potential For Stated Goals: Good     REHAB RECOMMENDATIONS (at time of discharge pending progress):    Placement: It is my opinion, based on this patient's performance to date, that Ms. Kate Danielson may benefit from intensive therapy at a [address] [place] [address] after discharge due to the functional deficits listed above that are likely to improve with skilled rehabilitation and concerns that he/she may be unsafe to be unsupervised at home due to weakness, fall risk, lives alone .   Equipment:   Walkers, Type: Rolling Walker              HISTORY:   History of Present Injury/Illness (Reason for Referral):  61F PMHx chronic anemia, CML, esophageal varices who presents to the ER with report of fever and headache for the past several days. Admits to fever up to 102. 1 two days ago. Feels malaise and weakness. Headache is global and associated with photophobia. Admits to feeling a bit confused as well. Admits to nausea, denies shortness of breath, cough ,vomiting. Past Medical History/Comorbidities:   Ms. Elaine Yuan  has a past medical history of Acute blood loss anemia (4/6/2018), Anemia, C. difficile diarrhea (4/1/2015), Cerebral edema ([address] (4/1/2015), Chronic migraine (9/22/2016), Chronic myelogenous leukemia ([address] Chronic pain, Coagulation disorder ([address] Esophageal spasm, Esophageal varices ([address] GI bleed (8/3/2016), H/O craniotomy, Hematemesis (8/20/2021), Leukocytosis (3/14/2015), Melena (8/20/2021), MRSA colonization (6/25/2012), Polycythemia vera(238.4), Portal hypertension ([address] Portal vein thrombosis (6/20/2012), Primary hypothyroidism, Pulmonary embolism ([address] (2006), S/P exploratory laparotomy, 6/20/12  (6/25/2012), S/P small bowel resection, Seizure ([address] (3/14/2015), Seizure disorder ([address] (3/30/2015), Seizures ([address] Splenomegaly, congestive, chronic, Stroke (cerebrum) ([address] (4/11/2015), Stroke ([address] Thrombocytopenia, 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 She has no past medical history of Adverse effect of anesthesia, Difficult intubation, Malignant hyperthermia due to anesthesia, or Pseudocholinesterase deficiency. Ms. Elaine Yuan  has a past surgical history that includes hx gyn; hx gyn; neurological procedure unlisted (2010-12); hx gi; hx gi; pr abdomen surgery proc unlisted; hx cholecystectomy; pr abdomen surgery proc unlisted; hx appendectomy; hx breast biopsy (Bilateral); and hx orthopaedic (Left, 2008).   Social History/Living Environment:   Home Environment: Apartment (town home)  # Steps to Enter: 0  One/Two Story Residence: Two story  # of Interior Steps: 15  Interior Rails: Right  Living Alone: Yes  Support Systems: Child(lauryn),Friend/Neighbor  Patient Expects to be Discharged to[de-identified] Home with home health  Current DME Used/Available at Home: None  Tub or Shower Type: Shower  Prior Level of Function/Work/Activity:  independent     Number of Personal Factors/Comorbidities that affect the Plan of Care: 1-2: MODERATE COMPLEXITY   EXAMINATION:   Most Recent Physical Functioning:   Gross Assessment:  AROM: Within functional limits  Strength: Generally decreased, functional               Posture:     Balance:  Sitting: Intact; High guard  Standing: Pull to stand; With support Bed Mobility:  Supine to Sit: Contact guard assistance;Minimum assistance  Scooting: Contact guard assistance;Minimum assistance  Wheelchair Mobility:     Transfers:  Sit to Stand: Contact guard assistance;Minimum assistance  Stand to Sit: Contact guard assistance  Bed to Chair: Contact guard assistance;Minimum assistance  Gait:     Speed/Romina: Slow  Gait Abnormalities: Decreased step clearance  Distance (ft): 10 Feet (ft)  Assistive Device: Walker, rolling  Ambulation - Level of Assistance: Contact guard assistance;Minimal assistance  Interventions: Manual cues; Safety awareness training;Verbal cues         Body Structures Involved:  Muscles Body Functions Affected:  Hematological  Neuromusculoskeletal  Movement Related Activities and Participation Affected:  General Tasks and Demands  Mobility   Number of elements that affect the Plan of Care: 4+: HIGH COMPLEXITY   CLINICAL PRESENTATION:   Presentation: Stable and uncomplicated: LOW COMPLEXITY   CLINICAL DECISION MAKIN [facility] Inpatient Short Form  How much difficulty does the patient currently have. .. Unable A Lot A Little None   1. Turning over in bed (including adjusting bedclothes, sheets and blankets)? [] 1   [] 2   [x] 3   [] 4   2. Sitting down on and standing up from a chair with arms ( e.g., wheelchair, bedside commode, etc.)   [] 1   [] 2   [x] 3   [] 4   3. Moving from lying on back to sitting on the side of the bed? [] 1   [] 2   [x] 3   [] 4   How much help from another person does the patient currently need. .. Total A Lot A Little None   4. Moving to and from a bed to a chair (including a wheelchair)? [] 1   [] 2   [x] 3   [] 4   5. Need to walk in hospital room? [] 1   [] 2   [x] 3   [] 4   6. Climbing 3-5 steps with a railing? [] 1   [x] 2   [] 3   [] 4   © 2007, Trustees of [address] [place] [address] [address] [address], under license to [employer]. All rights reserved      Score:  Initial: 17 Most Recent: X (Date: -- )    Interpretation of Tool:  Represents activities that are increasingly more difficult (i.e. Bed mobility, Transfers, Gait). Medical Necessity:     Patient demonstrates   good   rehab potential due to higher previous functional level. Reason for Services/Other Comments:  Patient   continues to require present interventions due to patient's inability to perform functional mobility safely and independently  . Use of outcome tool(s) and clinical judgement create a POC that gives a: Clear prediction of patient's progress: LOW COMPLEXITY            TREATMENT:   (In addition to Assessment/Re-Assessment sessions the following treatments were rendered)   Pre-treatment Symptoms/Complaints:  no pain; weak, lightheaded  Pain: Initial:   Pain Intensity 1: 0  Post Session:  0     Therapeutic Activity: (    10 minutes): Therapeutic activities including Bed transfers, Chair transfers, and Ambulation on level ground to improve mobility, strength, balance, and coordination. Required minimal Manual cues; Safety awareness training;Verbal cues         Braces/Orthotics/Lines/Etc:   IV  O2 nasal cannula  Treatment/Session Assessment:    Response to Treatment:  tolerated fairly well; lightheadedness limited gait distance  Interdisciplinary Collaboration:   Physical Therapist  Occupational Therapist  Registered Nurse    After treatment position/precautions:   Up in chair  Bed/Chair-wheels locked  Call light within reach  Nurse at bedside   Compliance with Program/Exercises: Compliant most of the time, Will assess as treatment progresses  Recommendations/Intent for next treatment session: \"Next visit will focus on advancements to more challenging activities\".   Total Treatment Duration:  PT Patient Time In/Time Out  Time In: 1115  Time Out: [facility]

## 2022-03-03 NOTE — PROGRESS NOTES
Progress Note    Patient: Brielle Garcia MRN: [id number]  SSN: [id number]    YOB: 1960  Age: 64 y.o. Sex: female      Admit Date: 2/27/2022    LOS: 4 days     Assessment and Plan:   63 y/o F PMHx chronic anemia, CML, esophageal varices who presents to the ER with report of fever and headache for the past several days    1.  E. coli bacteremia in the setting of bacterial meningitis  -Continue ceftriaxone  -MRI of the lumbar spine to assess for possible epidural abscess  -Symptomatic management  -Infectious disease recommendations appreciated    2. CML on dasitinib  -Oncology recommendations appreciated    3. PVC on Jakafi  -Oncology recommendations appreciated    4. Hypothyroidism  -Continue levothyroxine    5. GERD  -Continue Protonix    6. Portal vein thrombosis  -Continue Lovenox  -Oncology recommendations appreciated    DVT prophylaxis on Lovenox    Subjective:   63 y/o F PMHx chronic anemia, CML, esophageal varices who presents to the ER with report of fever and headache for the past several days. Patient seen and examined at bedside. This morning the patient felt a little tired and having some headache but otherwise denies any chest pain, no shortness of breath, no abdominal pain, no nausea vomiting or diarrhea. Objective:     Vitals:    03/03/22 1156 03/03/22 1254 03/03/22 1301 03/03/22 1444   BP: 97/65 (!) 94/58 101/64 103/66   Pulse: 95   91   Resp: 20   21   Temp: 98.3 °F (36.8 °C)   97.9 °F (36.6 °C)   SpO2: 92%   98%   Weight:       Height:            Intake and Output:  Current Shift: 03/03 0701 - 03/03 1900  In: -   Out: 900 [Urine:900]  Last three shifts: 03/01 1901 - 03/03 0700  In: 5356 [P.O.:920;  I.V.:5713]  Out: 1150 [Urine:1150]    ROS  10 ROS negative except from stated on subjective    Physical Exam:   General: Alert, oriented, NAD  HEENT: NC/AT, EOM are intact  Neck: supple, no JVD  Cardiovascular: RRR, S1, S2, no murmurs  Respiratory: Lungs are clear, no wheezes or rales  Abdomen: Soft, NT, ND  Back: No CVA tenderness, no paraspinal tenderness  Extremities: LE without pedal edema, no erythema  Neuro: A&O, CN are intact, no focal deficits  Skin: no rash or ulcers  Psych: good mood and affect    Lab/Data Review:  I have personally reviewed patients laboratory data showing  No results found for this or any previous visit (from the past 24 hour(s)). All Micro Results     Procedure Component Value Units Date/Time    CULTURE, BLOOD [[id number]] Collected: 03/01/22 1589    Order Status: Completed Specimen: Blood Updated: 03/03/22 0741     Special Requests: --        LEFT  ARM       Culture result: NO GROWTH 2 DAYS       CULTURE, CSF [place] STAIN [[id number]]  (Abnormal)  (Susceptibility) Collected: 02/27/22 2200    Order Status: Completed Specimen: Cerebrospinal Fluid Updated: 03/03/22 8710     Special Requests: TUBE 3     GRAM STAIN 0 TO 5 WBCS SEEN PER OIF      NO DEFINITE ORGANISM SEEN        Culture result:       ESCHERICHIA COLI ISOLATED FROM THIO BROTH ONLY                  RESULTS VERIFIED, PHONED TO AND READ BACK BY  Aretha Julio RN @[facility] 3.2.22 SC      CULTURE, URINE [[id number]] Collected: 02/27/22 1949    Order Status: Completed Specimen: Cath Urine Updated: 03/02/22 0655     Special Requests: NO SPECIAL REQUESTS        Culture result:       <10,000 COLONIES/mL NORMAL SKIN JOSÉ ISOLATED          BLOOD CULTURE [[id number]]  (Abnormal) Collected: 02/27/22 1817    Order Status: Completed Specimen: Blood Updated: 03/02/22 0644     Special Requests: --        RIGHT  Antecubital       GRAM STAIN GRAM NEGATIVE RODS               CRITICAL RESULT NOT CALLED DUE TO PREVIOUS NOTIFICATION OF CRITICAL RESULT WITHIN THE LAST 24 HOURS.                   AEROBIC AND ANAEROBIC BOTTLES           Culture result: GRAM NEGATIVE RODS               For identification and susceptibility refer to culture  [facility] NP.[id number]      BLOOD CULTURE [[id number]]  (Abnormal)  (Susceptibility) Collected: 02/27/22 1814 Order Status: Completed Specimen: Blood Updated: 03/02/22 0643     Special Requests: --        NO SPECIAL REQUESTS  LEFT  Antecubital       GRAM STAIN GRAM NEGATIVE RODS               RESULTS VERIFIED, PHONED TO AND READ BACK BY JESUS RENE RN BY NB AT [place] [address] ON [id number]                  AEROBIC AND ANAEROBIC BOTTLES           Culture result: ESCHERICHIA COLI               Refer to Blood Culture ID Panel Accession  [id number]      S. Creighton Fothergill, UR/CSF [[id number]] Collected: 02/27/22 2200    Order Status: Completed Specimen: Miscellaneous sample Updated: 03/01/22 1236     Source CEREBROSPINAL FLUID        Specimen Comment        Comment: (NOTE)  Cerebrospinal fluid (CSF)          Streptococcus pneumoniae Ag Negative        Fluid culture PENDING     Organism ID PENDING     Please note Comment        Comment: (NOTE)  College of American Pathologists standards require a culture to be  performed on CSF specimens submitted for bacterial antigen testing. (CAP [id number]) Urine specimens will not be cultured. Performed At: [facility] & [address] [id number]  Sonia Lama MD GQ:[id number]         MENINGITIS PATHOGENS PANEL, CSF (BY PCR) [[id number]] Collected: 02/27/22 2200    Order Status: Completed Specimen: Cerebrospinal Fluid Updated: 02/28/22 1930     Meningitis panel       RESULTS SCANNED IN [facility]          BLOOD CULTURE ID PANEL [[id number]]  (Abnormal) Collected: 02/27/22 1814    Order Status: Completed Specimen: Blood Updated: 02/28/22 1314     Acc. no. from Micro Order [id number]     Escherichia coli Detected        Comment: RESULTS VERIFIED, PHONED TO AND READ BACK BY  Sofie Girard RN AT 13:11 02.28.2022          KPC (Carbapenem Resistance Gene) NOT DETECTED        Comment: WARNING:  A Not Detected result for the KPC gene does not indicate susceptibility to carbapenems. Gram negative bacteria can be resistant to carbapenems by mechanisms other than carrying the KPC gene. INTERPRETATION       Gram negative gopi. Identified by realtime PCR as E. coli          MENINGITIS PATHOGENS PANEL, CSF (BY PCR) [[id number]] Collected: 02/28/22 1100    Order Status: Canceled     MENINGITIS PATHOGENS PANEL, CSF (BY PCR) [[id number]] Collected: 02/28/22 1030    Order Status: Canceled Specimen: Cerebrospinal Fluid     COVID-19 RAPID TEST [[id number]] Collected: 02/27/22 1907    Order Status: Completed Specimen: Nasopharyngeal Updated: 02/27/22 1938     Specimen source NASAL SWAB        COVID-19 rapid test Not detected        Comment:      The specimen is NEGATIVE for SARS-CoV-2, the novel coronavirus associated with COVID-19. A negative result does not rule out COVID-19. This test has been authorized by the FDA under an Emergency Use Authorization (EUA) for use by authorized laboratories. Fact sheet for Healthcare Providers: [employer]co[employer]nz  Fact sheet for Patients: [employer]nz       Methodology: Isothermal Nucleic Acid Amplification                Image:  I have personally reviewed patients imaging showing  XR CHEST SNGL V   Final Result   Suspected interstitial edema/volume overload. CT ABD PELV W CONT   Final Result   1) Increasing splenomegaly with multiple splenic infarcts. 2) Nonocclusive portal vein thrombosis. 3) Diffuse mesenteric edema. XR CHEST PA LAT   Final Result   1. Stable exam with persistent cardiomegaly and bilateral diffuse airspace   opacities. CT HEAD WO CONT   Final Result      1. No CT evidence of acute intracranial process. XR CHEST PORT   Final Result   Bilateral perihilar airspace disease, suspicious for pulmonary edema.             MRI LUMB SPINE W WO CONT    (Results Pending)   MRI UNLISTED PROCEDURE    (Results Pending)        Hospital problems     Patient Active Problem List   Diagnosis Code    Polycythemia vera ([address] D45    Ascites R18.8    Cirrhosis ([facility] K74.60    AKOSUA (iron deficiency anemia) D50.9    Left homonymous hemianopsia H53.462    Hyponatremia E87.1    Chronic myelogenous leukemia (HCC) C92.10    Acquired hypercoagulable state ([facility] D68.59    Analgesic rebound headache G44.40, T39.95XA    Headache, chronic daily R51.9    Migraine with aura and with status migrainosus, not intractable G43. 101    DVT (deep venous thrombosis) (HCC) I82.409    Lung nodule R91.1    Chronic migraine without aura with status migrainosus, not intractable G43.701    Primary hypothyroidism E03.9    Splenomegaly R16.1    Esophageal varices (HCC) I85.00    Portal vein thrombosis I81    Cellulitis of leg, right L03.115    Body mass index (BMI) of 22.0 to 22.9 in adult Z68.22    Opioid use, unspecified with unspecified opioid-induced disorder F11.99    Thrombocytopenia, unspecified D69.6    Sepsis (HCC) A41.9    Fever of unknown origin R50.9    Fever R50.9    Leukocytosis D72.829        I have reviewed, updated, and verified this note's content and spent 38 minutes of my 42 minutes visit performing counseling and coordination of care regarding medical management.        Signed By: Tomasa Francis MD     March 3, 2022

## 2022-03-03 NOTE — PROGRESS NOTES
Infectious Disease Consult    Today's Date: 3/3/2022   Admit Date: 2/27/2022    Impression:   · Recurrent E coli bacteremia  · Bacterial meningitis, E coli  · CML on dasitinib  · PCV on jakafi  · Portal vein thrombosis/splenomegaly/multiple splenic infarcts    The peculiarities of this situation are the recurrence of E coli bacteremia and the accompanying bacterial meningitis. E coli is a very unusual pathogen to do that. This concerns me for a persistent source and the low back pain gives me concern for the potential of epidural abscess which could be complicated by meningitis. Plan:   · Continue ceftriaxone 2g IV q12 hours  · Unfortunately, she refused MRI this morning for unclear reasons. She is now very willing to get this but will have to wait until tomorrow. She understands the reasons why this exam is needed. Anti-infectives:   · Vancomycin   · Cefepime 2g IV q8 hours    Subjective:   She is still having low back pain. Headache and neck pain are better. Allergies   Allergen Reactions    Latex Other (comments)     Testing for latex allergy was positive as a 2 (on a scale of 1 to 3).  Sulfa (Sulfonamide Antibiotics) Anaphylaxis    Tramadol Other (comments)     Top lip swelled    Augmentin [Amoxicillin-Pot Clavulanate] Rash    Divalproex Sodium Hives    Morphine Nausea and Vomiting     Not a true allergy    Potassium Clavulanate Hives    Rizatriptan Rash    Tetanus Immune Globulin Other (comments)     Heat, bruising, and swelling at  Site of injection    Vancomycin Rash    Xanax [Alprazolam] Other (comments)     Hallucinations    Zonegran [Zonisamide] Rash        Review of Systems:  A comprehensive review of systems was negative except for that written in the History of Present Illness.     Objective:     Visit Vitals  /65   Pulse (!) 108   Temp 98.7 °F (37.1 °C)   Resp 19   Ht 5' 2\" (1.575 m)   Wt 58.9 kg (129 lb 12.8 oz)   SpO2 91%   Breastfeeding No   BMI 23.74 kg/m² Temp (24hrs), Av.1 °F (36.7 °C), Min:97.7 °F (36.5 °C), Max:98.7 °F (37.1 °C)       Lines:  Peripheral IV:       Physical Exam:    General:  Alert, cooperative, well nourished, well developed, appears stated age   Eyes:  Sclera anicteric.  Pupils equally round and reactive to light   Mouth/Throat: Mucous membranes normal, oral pharynx clear   Neck: Mild meningismus   Lungs:   Clear to auscultation bilaterally, good effort   CV:  Regular rate and rhythm, no murmur, click, rub or gallop   Abdomen:   Palpable, tender splenomegaly; + hepatomegaly; mild-moderate ascites   Extremities: No cyanosis or edema   Skin: Skin color, texture, turgor normal. no acute rash or lesions   Lymph nodes: Cervical and supraclavicular normal   Musculoskeletal: No swelling or deformity   Lines/Devices:  Intact, no erythema, drainage or tenderness   Psych:  Neuro:  Alert and oriented, normal mood affect given the setting  No lower extremity weakness or UMN signs       Data Review:     CBC:  Recent Labs     22  0603 22  0711   WBC 10.3 11.0   GRANS 85* 87*   MONOS 5 6   EOS 1 1   ANEU 8.7* 9.6*   ABL 0.5 0.4*   HGB 8.4* 8.5*   HCT 25.5* 25.8*   PLT 99* 80*       BMP:  Recent Labs     22  0603 22  0711   CREA 0.54* 0.62   BUN 15 18   * 132*   K 3.5 3.7    103   CO2  22   AGAP 7 7   * 146*       LFTS:  Recent Labs     22  0603 22  0711 22  1100   TBILI 0.8 0.8 1.2*   ALT 18 17  --    AP 99 102  --    TP 4.9* 4.9*  --    ALB 1.8* 1.9*  --        Microbiology:     All Micro Results     Procedure Component Value Units Date/Time    CULTURE, BLOOD [[id number]] Collected: 22 9403    Order Status: Completed Specimen: Blood Updated: 22     Special Requests: --        LEFT  ARM       Culture result: NO GROWTH 2 DAYS       CULTURE, CSF Adra Mall STAIN [[id number]]  (Abnormal)  (Susceptibility) Collected: 22 2200    Order Status: Completed Specimen: Cerebrospinal Fluid Updated: 03/03/22 0722     Special Requests: TUBE 3     GRAM STAIN 0 TO 5 WBCS SEEN PER OIF      NO DEFINITE ORGANISM SEEN        Culture result:       ESCHERICHIA COLI ISOLATED FROM THIO BROTH ONLY                  RESULTS VERIFIED, PHONED TO AND READ BACK BY  Luis A Banerjee RN @3617 3.2.22 SC      CULTURE, URINE [[id number]] Collected: 02/27/22 1949    Order Status: Completed Specimen: Cath Urine Updated: 03/02/22 0655     Special Requests: NO SPECIAL REQUESTS        Culture result:       <10,000 COLONIES/mL NORMAL SKIN JOSÉ ISOLATED          BLOOD CULTURE [[id number]]  (Abnormal) Collected: 02/27/22 1817    Order Status: Completed Specimen: Blood Updated: 03/02/22 0644     Special Requests: --        RIGHT  Antecubital       GRAM STAIN GRAM NEGATIVE RODS               CRITICAL RESULT NOT CALLED DUE TO PREVIOUS NOTIFICATION OF CRITICAL RESULT WITHIN THE LAST 24 HOURS. AEROBIC AND ANAEROBIC BOTTLES           Culture result: GRAM NEGATIVE RODS               For identification and susceptibility refer to culture  [facility] [place] [id number]      BLOOD CULTURE [[id number]]  (Abnormal)  (Susceptibility) Collected: 02/27/22 1814    Order Status: Completed Specimen: Blood Updated: 03/02/22 0643     Special Requests: --        NO SPECIAL REQUESTS  LEFT  Antecubital       GRAM STAIN GRAM NEGATIVE RODS               RESULTS VERIFIED, PHONED TO AND READ BACK BY JESUS RENE RN BY NB AT [place] [address] ON [id number]                  AEROBIC AND ANAEROBIC BOTTLES           Culture result: ESCHERICHIA COLI               Refer to Blood Culture ID Panel Accession  [id number]      SHEYLA Greeneshana Kojo, UR/CSF [[id number]] Collected: 02/27/22 2200    Order Status: Completed Specimen: Miscellaneous sample Updated: 03/01/22 1236     Source CEREBROSPINAL FLUID        Specimen Comment        Comment: (NOTE)  Cerebrospinal fluid (CSF)          Streptococcus pneumoniae Ag Negative        Fluid culture PENDING     Organism ID PENDING     Please note Comment Comment: (NOTE)  College of [employer]ologists standards require a culture to be  performed on CSF specimens submitted for bacterial antigen testing. (CAP [id number]) Urine specimens will not be cultured. Performed At: [address] [id number]  Quita Pete MD BC:[id number]         MENINGITIS PATHOGENS PANEL, CSF (BY PCR) [[id number]] Collected: 02/27/22 2200    Order Status: Completed Specimen: Cerebrospinal Fluid Updated: 02/28/22 1930     Meningitis panel       RESULTS SCANNED IN [facility]          BLOOD CULTURE ID PANEL [[id number]]  (Abnormal) Collected: 02/27/22 1814    Order Status: Completed Specimen: Blood Updated: 02/28/22 1314     Acc. no. from Micro Order [id number]     Escherichia coli Detected        Comment: RESULTS VERIFIED, PHONED TO AND READ BACK BY  Brian Celaya RN AT 13:11 02.28.2022          KPC (Carbapenem Resistance Gene) NOT DETECTED        Comment: WARNING:  A Not Detected result for the KPC gene does not indicate susceptibility to carbapenems. Gram negative bacteria can be resistant to carbapenems by mechanisms other than carrying the KPC gene. INTERPRETATION       Gram negative gopi. Identified by realtime PCR as E. coli          MENINGITIS PATHOGENS PANEL, CSF (BY PCR) [[id number]] Collected: 02/28/22 1100    Order Status: Canceled     MENINGITIS PATHOGENS PANEL, CSF (BY PCR) [[id number]] Collected: 02/28/22 1030    Order Status: Canceled Specimen: Cerebrospinal Fluid     COVID-19 RAPID TEST [[id number]] Collected: 02/27/22 1907    Order Status: Completed Specimen: Nasopharyngeal Updated: 02/27/22 1938     Specimen source NASAL SWAB        COVID-19 rapid test Not detected        Comment:      The specimen is NEGATIVE for SARS-CoV-2, the novel coronavirus associated with COVID-19. A negative result does not rule out COVID-19.        This test has been authorized by the FDA under an Emergency Use Authorization (EUA) for use by authorized laboratories. Fact sheet for Healthcare Providers: ConventionUpdate.co.nz  Fact sheet for Patients: ConventionUpdate.co.nz       Methodology: Isothermal Nucleic Acid Amplification               Imaging:   CXR (2/28/2022)  IMPRESSION  1. Stable exam with persistent cardiomegaly and bilateral diffuse airspace opacities. CT head (2/27/2022)  1. No CT evidence of acute intracranial process. CT abd (3/1/2022)  IMPRESSION  1) Increasing splenomegaly with multiple splenic infarcts. 2) Nonocclusive portal vein thrombosis. 3) Diffuse mesenteric edema.     Signed By: Jn Evans MD     March 3, 2022

## 2022-03-03 NOTE — PROGRESS NOTES
Problem: Self Care Deficits Care Plan (Adult)  Goal: *Acute Goals and Plan of Care (Insert Text)  Outcome: Progressing Towards Goal  Note: 1. Patient will perform grooming with supervision. 2. Patient will perform Upper body dressing with supervision  3. Patient will perform lower body dressing with SBA. 4. Patient will perform upper and lower body bathing with SBA. 5. Patient will perform toilet transfers with SBA. 6. Patient will perform shower transfer with CGA. 7. Patient will participate in 30 + minutes of ADL/ therapeutic exercise/therapeutic activity with min rest breaks to increase activity tolerance for self care. 8. Patient will perform ADL functional mobility in room with SBA. Goals to be achieved in 7 days. OCCUPATIONAL THERAPY: Initial Assessment and AM 3/3/2022  INPATIENT: OT Visit Days: 1  Payor: [employer] / Plan: [address] / Product Type: Managed Care Medicare /      NAME/AGE/GENDER: Virginia Dhaliwal is a 64 y.o. female   PRIMARY DIAGNOSIS:  Fever [R50.9] Fever Fever        ICD-10: Treatment Diagnosis:    Generalized Muscle Weakness (M62.81)  Other lack of cordination (R27.8)  Difficulty in walking, Not elsewhere classified (R26.2)  Other abnormalities of gait and mobility (R26.89)   Precautions/Allergies:     Latex, Sulfa (sulfonamide antibiotics), Tramadol, Augmentin [amoxicillin-pot clavulanate], Divalproex sodium, Morphine, Potassium clavulanate, Rizatriptan, Tetanus immune globulin, Vancomycin, Xanax [alprazolam], and Zonegran [zonisamide]      ASSESSMENT:     Ms. Maryanne Seymour presents supine in bed. She has and IV and o2 in place. She transferred oob with min /CGA . She stood and took steps back and for and to the recliner with min to CGA for assistance with the walker. She sat in recliner and changed her gown. She is grossly min assist with ADLS due to weakness and difficulty reaching her feet.  She does lives alone and was independent prior. She does not drive and she lives in a two story apartment with her bedroom upstairs. She would benefit from STR but if going home with recommend [place] and PT as well as assistance from family or friends. She is working with the nurse. Will follow. This section established at most recent assessment   PROBLEM LIST (Impairments causing functional limitations):  Decreased Strength  Decreased ADL/Functional Activities  Decreased Transfer Abilities  Decreased Ambulation Ability/Technique  Decreased Balance  Decreased Activity Tolerance  Decreased Cognition   INTERVENTIONS PLANNED: (Benefits and precautions of occupational therapy have been discussed with the patient.)  Activities of daily living training  Adaptive equipment training  Balance training  Clothing management  Cognitive training  Donning&doffing training  Hygiene training  Neuromuscular re-eduation  Therapeutic activity  Therapeutic exercise     TREATMENT PLAN: Frequency/Duration: Follow patient 3 times to address above goals. Rehabilitation Potential For Stated Goals: Good     REHAB RECOMMENDATIONS (at time of discharge pending progress):    Placement: It is my opinion, based on this patient's performance to date, that Ms. Selma Mcneill may benefit from intensive therapy at a [address] [place] [address] after discharge due to the functional deficits listed above that are likely to improve with skilled rehabilitation and concerns that he/she may be unsafe to be unsupervised at home due to current level of assistance .   Equipment:   None at this time              OCCUPATIONAL PROFILE AND HISTORY:   History of Present Injury/Illness (Reason for Referral):  See H and P  Past Medical History/Comorbidities:   Ms. Selma Mcneill  has a past medical history of Acute blood loss anemia (4/6/2018), Anemia, C. difficile diarrhea (4/1/2015), Cerebral edema ([facility] [address] (4/1/2015), Chronic migraine (9/22/2016), Chronic myelogenous leukemia ([address] Chronic pain, Coagulation disorder ([facility] [address] Esophageal spasm, Esophageal varices ([address] GI bleed (8/3/2016), H/O craniotomy, Hematemesis (8/20/2021), Leukocytosis (3/14/2015), Melena (8/20/2021), MRSA colonization (6/25/2012), Polycythemia vera(238.4), Portal hypertension ([address] Portal vein thrombosis (6/20/2012), Primary hypothyroidism, Pulmonary embolism ([address] (2006), S/P exploratory laparotomy, 6/20/12  (6/25/2012), S/P small bowel resection, Seizure ([address] (3/14/2015), Seizure disorder ([address] (3/30/2015), Seizures ([address] Splenomegaly, congestive, chronic, Stroke (cerebrum) ([address] (4/11/2015), Stroke ([address] Thrombocytopenia, 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 She has no past medical history of Adverse effect of anesthesia, Difficult intubation, Malignant hyperthermia due to anesthesia, or Pseudocholinesterase deficiency. Ms. Femi Khan  has a past surgical history that includes hx gyn; hx gyn; neurological procedure unlisted (2010-12); hx gi; hx gi; pr abdomen surgery proc unlisted; hx cholecystectomy; pr abdomen surgery proc unlisted; hx appendectomy; hx breast biopsy (Bilateral); and hx orthopaedic (Left, 2008).   Social History/Living Environment:   Home Environment: Apartment (town home)  # Steps to Enter: 0  One/Two Story Residence: Two story  # of Interior Steps: 15  Interior Rails: Right  Living Alone: Yes  Support Systems: Child(lauryn),Friend/Neighbor  Patient Expects to be Discharged to[de-identified] Home with home health  Current DME Used/Available at Home: None  Tub or Shower Type: Shower  Prior Level of Function/Work/Activity:  Mod I lives alone     Number of Personal Factors/Comorbidities that affect the Plan of Care: Brief history (0):  LOW COMPLEXITY   ASSESSMENT OF OCCUPATIONAL PERFORMANCE[de-identified]   Activities of Daily Living:   Basic ADLs (From Assessment) Complex ADLs (From Assessment)   Feeding: Supervision  Oral Facial Hygiene/Grooming: Supervision  Bathing: Minimum assistance  Upper Body Dressing: Minimum assistance  Lower Body Dressing: Minimum assistance  Toileting: Minimum assistance     Grooming/Bathing/Dressing Activities of Daily Living     Cognitive Retraining  Safety/Judgement: Awareness of environment; Fall prevention                       Bed/Mat Mobility  Supine to Sit: Contact guard assistance;Minimum assistance  Sit to Stand: Contact guard assistance;Minimum assistance  Stand to Sit: Contact guard assistance  Bed to Chair: Contact guard assistance;Minimum assistance  Scooting: Contact guard assistance     Most Recent Physical Functioning:   Gross Assessment:                  Posture:     Balance:  Sitting: Intact; High guard  Standing: Pull to stand; With support Bed Mobility:  Supine to Sit: Contact guard assistance;Minimum assistance  Scooting: Contact guard assistance  Wheelchair Mobility:     Transfers:  Sit to Stand: Contact guard assistance;Minimum assistance  Stand to Sit: Contact guard assistance  Bed to Chair: Contact guard assistance;Minimum assistance            Patient Vitals for the past 6 hrs:   BP BP Patient Position SpO2 Pulse   22 0750 110/65 -- 91 % (!) 108   22 1030 117/68 At rest 92 % (!) 108   22 1035 108/65 -- -- --   22 1156 97/65 -- 92 % 95       Mental Status  Neurologic State: Alert,Appropriate for age  Orientation Level: Appropriate for age  Cognition: Follows commands  Perception: Appears intact  Perseveration: No perseveration noted  Safety/Judgement: Awareness of environment,Fall prevention                          Physical Skills Involved:  Balance  Strength  Activity Tolerance Cognitive Skills Affected (resulting in the inability to perform in a timely and safe manner):  Perception Psychosocial Skills Affected:  Habits/Routines  Environmental Adaptation  Self-Awareness   Number of elements that affect the Plan of Care: 5+:  HIGH COMPLEXITY   CLINICAL DECISION MAKIN [facility] [address] [address] AM-PAC 6 Clicks   Daily Activity Inpatient Short Form  How much help from another person does the patient currently need. .. Total A Lot A Little None   1. Putting on and taking off regular lower body clothing? [] 1   [] 2   [x] 3   [] 4   2. Bathing (including washing, rinsing, drying)? [] 1   [] 2   [x] 3   [] 4   3. Toileting, which includes using toilet, bedpan or urinal?   [] 1   [] 2   [x] 3   [] 4   4. Putting on and taking off regular upper body clothing? [] 1   [] 2   [x] 3   [] 4   5. Taking care of personal grooming such as brushing teeth? [] 1   [] 2   [] 3   [x] 4   6. Eating meals? [] 1   [] 2   [] 3   [x] 4   © 2007, Trustees of [facility], under license to [employer]. All rights reserved      Score:  Initial: 20 Most Recent: X (Date: -- )    Interpretation of Tool:  Represents activities that are increasingly more difficult (i.e. Bed mobility, Transfers, Gait). Medical Necessity:     · Patient is expected to demonstrate progress in   · Self care skills and functional mobility  ·     Reason for Services/Other Comments:  · Patient continues to require skilled intervention due to   · Above listed deficits     Use of outcome tool(s) and clinical judgement create a POC that gives a: LOW COMPLEXITY         TREATMENT:   (In addition to Assessment/Re-Assessment sessions the following treatments were rendered)     Pre-treatment Symptoms/Complaints:    Pain: Initial:   Pain Intensity 1: 0  Post Session:  0/10     Self Care: (15): Procedure(s) (per grid) utilized to improve and/or restore self-care/home management as related to dressing and functional mobility . Required minimal visual, verbal, manual, and tactile cueing to facilitate activities of daily living skills and compensatory activities.   Assessment/Reassessment only, no treatment provided today    Braces/Orthotics/Lines/Etc:   IV  O2 NC  Treatment/Session Assessment:    Response to Treatment:  tolerated fair  Interdisciplinary Collaboration:   Physical Therapist  Occupational Therapist  Registered Nurse    After treatment position/precautions:   Up in chair  Bed/Chair-wheels locked  Bed in low position  Call light within reach  Nurse at bedside   Compliance with Program/Exercises: Compliant all of the time. Recommendations/Intent for next treatment session: \"Next visit will focus on advancements to more challenging activities and reduction in assistance provided\".   Total Treatment Duration:15  OT Patient Time In/Time Out  Time In: 1115  Time Out: 106 Ilda Maguire OT

## 2022-03-03 NOTE — PROGRESS NOTES
[facility] Hematology & Oncology        Inpatient Hematology / Oncology Progress Note    Reason for Consult:  Fever [R50.9]  Referring Physician:  Altagracia Rosenberg MD    24 Hour Events:  Afebrile, VSS   Repeat BCx-NGTD  CSF Cx +EColi  Pt declined MRI earlier this morning  States she wants to be transferred to [facility]    ROS:  Constitutional: +fatigue, weakness. Negative for fever. CV: Negative for chest pain, palpitations, edema. Respiratory: Negative for dyspnea, cough, wheezing. GI: Negative for nausea, abdominal pain, diarrhea. Neuro: +HA  MSK: +back pain    10 point review of systems is otherwise negative with the exception of the elements mentioned above in the HPI. Allergies   Allergen Reactions    Latex Other (comments)     Testing for latex allergy was positive as a 2 (on a scale of 1 to 3).     Sulfa (Sulfonamide Antibiotics) Anaphylaxis    Tramadol Other (comments)     Top lip swelled    Augmentin [Amoxicillin-Pot Clavulanate] Rash    Divalproex Sodium Hives    Morphine Nausea and Vomiting     Not a true allergy    Potassium Clavulanate Hives    Rizatriptan Rash    Tetanus Immune Globulin Other (comments)     Heat, bruising, and swelling at  Site of injection    Vancomycin Rash    Xanax [Alprazolam] Other (comments)     Hallucinations    Zonegran [Zonisamide] Rash     Past Medical History:   Diagnosis Date    Acute blood loss anemia 4/6/2018    Anemia     C. difficile diarrhea 4/1/2015    Cerebral edema (HCC) 4/1/2015    Chronic migraine 9/22/2016    Chronic myelogenous leukemia ([facility]     [place] chromosome/ converted from polycythemia in 2009- to 2012 when she was dx w leukemia    Chronic pain     Coagulation disorder ([facility]     \"clotting and Bleeding Problem\" dr Kody Andrew follows     Esophageal spasm     with banding     Esophageal varices (HCC)     grade 3    GI bleed 8/3/2016    H/O craniotomy     3-29-15.  due to blood clot - which caused a seizure  - then pt fell and hit     Hematemesis 8/20/2021    Leukocytosis 3/14/2015    Melena 8/20/2021    MRSA colonization 6/25/2012    Polycythemia vera(238.4)     JAK2 mutation    Portal hypertension (HCC)     with varices    Portal vein thrombosis 6/20/2012    Ct scan 6-21-2 Apparent occlusion of the portal vein. In addition, the splenic vein, and superior mesenteric vein are not definitely seen and are also likely  occluded. Splenomegaly, and extensive varices are seen as described above consistent with the portal vein occlusion 7-05-12 on arixtra HIT negative on repeat 7-27-12 re admitted Cirrhotic appearance of the liver. Mild to moderate ascites, as    Primary hypothyroidism     Pulmonary embolism ([address] 2006    not on coumadin anymore     S/P exploratory laparotomy, 6/20/12 6/25/2012    Bowel resection:  336 cm of small bowel removed, approximately 9 feet and placement of feeding jejunostomy.      S/P small bowel resection     2012.  9 feet removed due to  gangrene which wa due to blood clot    Seizure ([address] 3/14/2015    Seizure disorder ([address] 3/30/2015    due to clotting factor    Seizures ([address]     last one 3- - followed by aniyah     Splenomegaly, congestive, chronic     Stroke (cerebrum) ([address] 4/11/2015    had bled into head- surgery    Stroke [facility])     pt had stroke like symptoms     Thrombocytopenia, HIT negative 6/25/2012 7-01-12 platelets lower repeat HIT 7-02-12 platelets in 06,006'N    Thrombosis, portal vein 2004    portal , spleenic and recently superior mesenteric    Transfusion history     many blood tranfusions - last 3-29-15 after brain surgery    Traumatic hemorrhage of right cerebrum ([address] 3/30/2015     Past Surgical History:   Procedure Laterality Date    HX APPENDECTOMY      with small bowel resection    HX BREAST BIOPSY Bilateral     Lt - 1998; Rt - 2001    HX CHOLECYSTECTOMY      HX GI      liver biopsy    HX GI      bowel removed small - 9 feet     HX GYN  x 2    HX GYN      D&C following miscarriage    HX ORTHOPAEDIC Left 2008    torn labrum shoulder (screw in place)    NEUROLOGICAL PROCEDURE UNLISTED  2010    craniotomy to evacuate subdural hematoma following a fall from a seizure    AL ABDOMEN SURGERY PROC UNLISTED      umbilical hernia repair    AL ABDOMEN SURGERY PROC UNLISTED      9ft of small bowel excised then reconnected     Family History   Problem Relation Age of Onset    Diabetes Mother     Stroke Mother         after surgery    Cancer Father         brain    No Known Problems Sister     Other Sister         diverticulitis    Other Sister         diverticulitis    Cancer Sister         lyjmphoma    Breast Cancer Maternal Aunt 48    Thyroid Disease Paternal Grandmother      Social History     Socioeconomic History    Marital status: SINGLE     Spouse name: Not on file    Number of children: Not on file    Years of education: Not on file    Highest education level: Not on file   Occupational History    Not on file   Tobacco Use    Smoking status: Former Smoker     Packs/day: 0.20     Years: 10.00     Pack years: 2.00     Types: Cigarettes     Quit date:      Years since quittin.1    Smokeless tobacco: Never Used   Substance and Sexual Activity    Alcohol use: No    Drug use: No    Sexual activity: Not on file   Other Topics Concern    Not on file   Social History Narrative    Not on file     Social Determinants of Health     Financial Resource Strain:     Difficulty of Paying Living Expenses: Not on file   Food Insecurity:     Worried About [address] [employer] in the Last Year: Not on file    [address] in the Last Year: Not on file   Transportation Needs:     Lack of Transportation (Medical): Not on file    Lack of Transportation (Non-Medical):  Not on file   Physical Activity:     Days of Exercise per Week: Not on file    Minutes of Exercise per Session: Not on file   Stress:     Feeling of Stress : Not on file   Social Connections:     Frequency of Communication with Friends and Family: Not on file    Frequency of Social Gatherings with Friends and Family: Not on file    Attends Roman [employer] Services: Not on file    Active Member of Clubs or Organizations: Not on file    Attends Club or Organization Meetings: Not on file    Marital Status: Not on file   Intimate Partner Violence:     Fear of Current or Ex-Partner: Not on file    Emotionally Abused: Not on file    Physically Abused: Not on file    Sexually Abused: Not on file   Housing Stability:     Unable to Pay for Housing in the Last Year: Not on file    Number of [place] in the Last Year: Not on file    Unstable Housing in the Last Year: Not on file     Current Facility-Administered Medications   Medication Dose Route Frequency Provider Last Rate Last Admin    lip protectant (BLISTEX) ointment 1 Each  1 Each Topical PRN Mike Crain MD        cefTRIAXone (ROCEPHIN) 2 g in 0.9% sodium chloride (MBP/ADV) 50 mL MBP  2 g IntraVENous Q12H Arina Vargas  mL/hr at 03/02/22 2111 2 g at 03/02/22 2111    lactated Ringers infusion  75 mL/hr IntraVENous CONTINUOUS Alex Ortiz MD 75 mL/hr at 03/03/22 0519 75 mL/hr at 03/03/22 0519    ergocalciferol capsule 50,000 Units  50,000 Units Oral DAILY Hermes Bradley NP   50,000 Units at 03/02/22 7992    morphine injection 2 mg  2 mg IntraVENous Q4H PRN Hermes Bradley NP   2 mg at 03/03/22 0648    enoxaparin (LOVENOX) injection 50 mg  50 mg SubCUTAneous Q12H Kody DARLING MD   50 mg at 03/02/22 1742    famotidine (PEPCID) tablet 40 mg  40 mg Oral DAILY PRN Gio Lucero MD        pantoprazole (PROTONIX) tablet 40 mg  40 mg Oral ACB Kody DARLING MD   40 mg at 03/03/22 0810    magnesium oxide (MAG-OX) tablet 400 mg  400 mg Oral DAILY Gio Lucero MD   400 mg at 03/02/22 0807    oxyCODONE-acetaminophen (PERCOCET 10)  mg per tablet 1 Tablet  1 Tablet Oral Q8H PRN Corry Mcallister MD   1 Tablet at 22 1742    levothyroxine (SYNTHROID) tablet 112 mcg  112 mcg Oral ACB Corry Mcallister MD   112 mcg at 22 0810    zolpidem (AMBIEN) tablet 10 mg  10 mg Oral QHS PRN Corry Mcallister MD   10 mg at 22 2043    sodium chloride (NS) flush 5-40 mL  5-40 mL IntraVENous PRN Corry Mcallister MD        acetaminophen (TYLENOL) tablet 650 mg  650 mg Oral Q6H PRN Corry Mcallister MD   650 mg at 22 8112    polyethylene glycol (MIRALAX) packet 17 g  17 g Oral DAILY PRN Corry Mcallister MD        ondansetron (ZOFRAN ODT) tablet 4 mg  4 mg Oral Q8H PRN Corry Mcallister MD        Or    ondansetron [facility]) injection 4 mg  4 mg IntraVENous Q6H PRN Corry Mcallister MD        diphenhydrAMINE (BENADRYL) capsule 25 mg  25 mg Oral Q12H PRN Joseph Aladna MD   25 mg at 22 0227    oxyCODONE IR (ROXICODONE) tablet 5 mg  5 mg Oral Q4H PRN Joseph Aldana MD   5 mg at 22 1859    sodium chloride (NS) flush 5-10 mL  5-10 mL IntraVENous Q8H Marley Smyth MD   10 mL at 22 0519    sodium chloride (NS) flush 5-10 mL  5-10 mL IntraVENous PRN Marley Smyth MD           OBJECTIVE:  Patient Vitals for the past 8 hrs:   BP Temp Pulse Resp SpO2   22 0750 110/65 98.7 °F (37.1 °C) (!) 108 19 91 %   22 0306 (!) 102/59 98.1 °F (36.7 °C) 95 15 94 %     Temp (24hrs), Av.1 °F (36.7 °C), Min:97.7 °F (36.5 °C), Max:98.7 °F (37.1 °C)    No intake/output data recorded. Physical Exam:  Constitutional: Well developed female in no acute distress, sitting comfortably in the hospital bed. HEENT: Normocephalic and atraumatic. Oropharynx is clear, mucous membranes are moist.  Extraocular muscles are intact. Sclerae anicteric. Neck supple without JVD. No thyromegaly present. Skin Warm and dry. No bruising and no rash noted. No erythema. No pallor.     Respiratory Lungs are clear to auscultation bilaterally without wheezes, rales or rhonchi, normal air exchange without accessory muscle use. CVS Normal rate, regular rhythm and normal S1 and S2. No murmurs, gallops, or rubs. Abdomen Soft, nontender and distended, normoactive bowel sounds. No palpable mass. Neuro Grossly nonfocal with no obvious sensory or motor deficits. Mild nuchal rigidity. MSK Normal range of motion in general.  No edema and no tenderness. Psych Appropriate mood and affect. Labs:    No results found for this or any previous visit (from the past 24 hour(s)). Imaging:  hydronephrosis. -Urinary Bladder: Unremarkable. -Reproductive Organs: Unremarkable.     -Lymph Nodes: No grossly enlarged retroperitoneal, mesenteric, or pelvic   adenopathy.   -Vasculature: Aorta is normal caliber.   -Bones: No gross bony lesions.     -Other: No ascites. Impression:     1) Increasing splenomegaly with multiple splenic infarcts. 2) Nonocclusive portal vein thrombosis. 3) Diffuse mesenteric edema. MRI LUMB SPINE W CONT [[id number]]    Order Status: Canceled    XR CHEST PA LAT [[id number]] Collected: 02/28/22 0715   Order Status: Completed Updated: 02/28/22 0721   Narrative:     EXAMINATION: XR CHEST PA LAT 2/28/2022 7:09 AM     ACCESSION NUMBER: [id number]     COMPARISON: 02/27/2022     INDICATION: Fever     TECHNIQUE: PA and lateral views of the chest were obtained. FINDINGS:     Support devices: None     Lungs: Again seen are bilateral airspace opacities throughout the lungs. These   have a similar appearance to the prior exam. There is no effusion. Cardiac Silhouette: Persistently prominent     Mediastinum: The aorta is normal.     Upper Abdomen: Normal     Miscellaneous: There are no lytic and blastic lesions. Impression:     1.  Stable exam with persistent cardiomegaly and bilateral diffuse airspace   opacities. CT HEAD WO CONT [[id number]] Collected: 02/27/22 2120   Order Status: Completed Updated: 02/27/22 2125   Narrative:     Noncontrast CT of the brain. COMPARISON: 2016     INDICATION: Confusion, headache     TECHNIQUE: Contiguous axial images were obtained from the skull base through the   vertex without IV contrast. Radiation dose reduction techniques were used for   this study:  Our CT scanners use one or all of the following: Automated exposure   control, adjustment of the mA and/or kVp according to patient's size, iterative   reconstruction. FINDINGS:     There is no acute intracranial hemorrhage or evidence for acute territorial   infarction. There is no mass effect, midline shift or hydrocephalus. There is no   extra-axial fluid collection. The cerebellum and brainstem are grossly   unremarkable. Encephalomalacia in the right occipital lobe, consistent with old injury. There   is overlying craniotomy defect. Findings similar to 2016 study. Included globes appear intact. The visualized paranasal sinuses and the mastoid   air cells are aerated. There is no acute skull fracture. Impression:       1. No CT evidence of acute intracranial process. XR CHEST PORT [[id number]] Collected: 02/27/22 1843   Order Status: Completed Updated: 02/27/22 1846   Narrative:     EXAMINATION: XR CHEST PORT 2/27/2022 6:33 PM     ACCESSION NUMBER: [id number]     COMPARISON: 2/2/2022     INDICATION: Back pain and fever     TECHNIQUE: A single view of the chest was obtained. FINDINGS:   Support Devices:   *  None     Cardiac Silhouette: Cardiac silhouette is borderline enlarged, similar to prior   studies. Mediastinum: Normal mediastinal contours. Lungs: Bilateral perihilar and basilar predominant airspace opacities with   septal thickening. No pneumothorax or sizable pleural effusion. Upper Abdomen: Normal     Miscellaneous: No fracture or suspicious osseous lesion. Impression:     Bilateral perihilar airspace disease, suspicious for pulmonary edema.           ASSESSMENT:  Problem List  Date Reviewed: 2/8/2022          Codes Class Noted Body mass index (BMI) of 22.0 to 22.9 in adult ICD-10-CM: Z68.22  ICD-9-CM: V85.1  8/26/2021        * (Principal) Fever ICD-10-CM: R50.9  ICD-9-CM: 780.60  2/27/2022        Leukocytosis ICD-10-CM: D72.829  ICD-9-CM: 288.60  2/27/2022        Sepsis ([facility] ICD-10-CM: A41.9  ICD-9-CM: 038.9, 995.91  2/2/2022        Fever of unknown origin ICD-10-CM: R50.9  ICD-9-CM: 780.60  2/2/2022        Thrombocytopenia, unspecified ICD-10-CM: D69.6  ICD-9-CM: 287.5  10/15/2021        Opioid use, unspecified with unspecified opioid-induced disorder ICD-10-CM: F11.99  ICD-9-CM: 292.9, 305.50  9/17/2021        Cellulitis of leg, right ICD-10-CM: L03.115  ICD-9-CM: 682.6  8/8/2021        Chronic migraine without aura with status migrainosus, not intractable ICD-10-CM: D24.245  ICD-9-CM: 346.72  11/1/2016        Lung nodule ICD-10-CM: R91.1  ICD-9-CM: 793.11  10/28/2016    Overview Signed 10/28/2016 10:05 AM by Enedina Stephenson     IMPRESSION:    1. Lobulated 2 cm mass in the right lung apex, malignancy versus an atypical  inflammatory process. 2. Occlusion of the right subclavian vein. 3. Chronic occlusion of the portal vein with associated abnormal tissue  encasing the common bile duct, most likely fibrosis. 4. Splenomegaly. DVT (deep venous thrombosis) ([facility] ICD-10-CM: I82.409  ICD-9-CM: 453.40  10/26/2016    Overview Signed 10/26/2016 10:27 PM by Ricardo Roberts. Right subclavian               Analgesic rebound headache ICD-10-CM: G44.40, T39.95XA  ICD-9-CM: 339.3, E935.9  9/22/2016        Headache, chronic daily ICD-10-CM: R51.9  ICD-9-CM: 784.0  9/22/2016        Migraine with aura and with status migrainosus, not intractable ICD-10-CM: G43. 101  ICD-9-CM: 346.03  9/22/2016        Left homonymous hemianopsia ICD-10-CM: H53.462  ICD-9-CM: 368.46  3/30/2015        Hyponatremia ICD-10-CM: E87.1  ICD-9-CM: 276.1  3/30/2015        Chronic myelogenous leukemia ([facility] ICD-10-CM: C92.10  ICD-9-CM: 205.10  3/30/2015 Acquired hypercoagulable state ([facility] (Chronic) ICD-10-CM: T33.55  ICD-9-CM: 289.81  3/30/2015        AKOSUA (iron deficiency anemia) ICD-10-CM: D50.9  ICD-9-CM: 280.9  7/28/2012        Cirrhosis (HCC) (Chronic) ICD-10-CM: K74.60  ICD-9-CM: 571.5  7/27/2012        Ascites ICD-10-CM: R18.8  ICD-9-CM: 789.59  7/26/2012        Polycythemia vera ([facility] (Chronic) ICD-10-CM: D45  ICD-9-CM: 238.4  2/16/2009    Overview Addendum 8/26/2021  2:31 PM by Kelli Cuellar MD     2/2008 by [email or web address] analysis however portal vein thrombosis  And and splenomegaly since 2004  Hydroxyurea for 6 months poor tolerance spleen did not shrink   Pegasys 90 mcg weekly 4-2009 till    Restarted 12-30-09 45 mcg q 2 weeks  2-2010 reduced to q month   Increased 45 mcg 2/month 4-2010  Held 8-2010 thrombocytopenia  12-29-12 restarted 45 mcg q 2 weeks  4-1-11 45 mcg q 3 weeks  4-22-11 45 mcg q 4 weeks  Uncertain dosing thereafter   Possibly q 3 weeks 10-15-11 and held and restarted on 4-1-12 6-12 ct scan Small bowel wall thickening suggesting an enteritis. In addition, there is well thickening of the right colon which can suggest an additional   colitis although this can also be seen with severe portal hypertension. Likely reactive edematous changes it scattered fluid collections are seen of   the small bowel mesentery. . Apparent occlusion of the portal vein. In addition, the splenic vein, and superior mesenteric vein are not definitely seen and are also likely occluded. Splenomegaly, and extensive varices are seen as described above consistent with the portal vein occlusion. Sherryle Moder Heterogeneous enhancement of the liver and mild periportal edema. An acute hepatitis cannot be excluded.               Primary hypothyroidism ICD-10-CM: E03.9  ICD-9-CM: 244.9  2/16/2009        Splenomegaly ICD-10-CM: R16.1  ICD-9-CM: 789.2  2/16/2009        Esophageal varices (HCC) (Chronic) ICD-10-CM: I85.00  ICD-9-CM: 456.1  2/16/2009    Overview Addendum 8/26/2021 2:30 PM by Army Waqar MD     grade 3             Portal vein thrombosis ICD-10-CM: J82  ICD-9-CM: 075  2/16/2009    Overview Addendum 8/26/2021  2:31 PM by Army Waqar MD     Ct scan 6-21-2 Apparent occlusion of the portal vein. In addition, the splenic vein, and superior mesenteric vein are not definitely seen and are also likely  occluded. Splenomegaly, and extensive varices are seen as described above consistent with the portal vein occlusion 7-05-12 on arixtra HIT negative on repeat 7-27-12 re admitted Cirrhotic appearance of the liver. Mild to moderate ascites                 Ms. Ravi Pickering is a 64 y.o. female admitted on 2/27/2022. The primary encounter diagnosis was Sepsis, due to unspecified organism, unspecified whether acute organ dysfunction present ([facility] [address] [facility] Diagnoses of Leukocytosis, unspecified type, Fever, unspecified fever cause, Confusion and disorientation, and Meningitis due to gram-negative bacteria were also pertinent to this visit. Kunia Copping Her PMH includes vasculitis, hypothyroidism, cirrhosis, splenomegaly, esophageal varices, anemia, migraines, hx PE 2006, recurrent DVTs/portal vein thrombosis on Lovenox, and seizures. She is a patient of Dr. 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 She was recently admitted from 2/2 - 2/6/22 for E Coli bacteremia. She was treated with Merrem and discharged on Vantin. She returned to ED on 2/27 with c/o fever and headache with associated photophobia. She reports she has been sick for 2 weeks. She endorses malaise, weakness, nausea, and confusion. Lactic acid 2.8, PCT 26. pBNP 4430. CT head neg. CXR on 2/27 with b/l perihilar airspace disease, suspicious for pulm edema. Repeat CXR 2/28 with stable findings with persistent cardiomegaly and b/l diffuse airspace opacities. She is s/p LP on 2/27 with low glucose and normal protein, WBC 2630 with 88% neutrophils, RBC 18. BCx +GNR, I/S pending. UCx-NGTD. CSF Cx-NGTD. On Cef/Vanc. We were consulted for recommendations for our patient. RECOMMENDATIONS:  Polycythemia Vera  - on Jakafi, currently held    CML  - on Dasatinib, currently held    Sepsis / Derrel Art bacteremia / E Coli Meningitis  - BCx +GNR, I/S pending  - UCx - NGTD  - s/p LP on 2/27 with low glucose, normal protein, WBC 2630 with 88% neutrophils. CSF Cx-NGTD. Add on meningitis panel if enough CSF remains to send. - On Cef/Vanc  - Given recurrent infections, check immunoglobulins and HIV  3/1 BCx +EColi. Meningitis panel +EColi. CSF Cx-NGTD. UCx-NGTD. HIV neg. IgG level 888. On Cef/Vanc. ID consulted. Will defer need for steroids to ID. CT AP pending. TTE neg for vegetation on 2/3. Would pt benefit from ROSELINE?      3/2 Remains afebrile. CSF-NGTD. UCx with normal skin sarath. CT AP with increased splenomegaly with multiple splenic infarcts, nonocclusive portal vein thrombosis, and diffuse mesenteric edema. Unable to complete MRI L-spine d/t ascites and splenomegaly. On CTX. ID DC'd Cef/Vanc. ID has reached out to radiology to re-attempt MRI. ? Does LP need to be repeated? 3/3 Remains afebrile. CSF Cx +EColi. On CTX. ID following. Pt declined MRI this AM.  Recommended eval for paracentesis so MRI could be adequately performed. She states she does not want to undergo any procedures that would risk any further infection. She states she will not trust findings from the MRI. She is requesting transfer to [facility]. Anemia / Thrombocytopenia  - likely r/t current illness  - Check iron studies, B12, folate, Vit D, hemolysis labs, DIC labs, HIT profile, smear  - Transfuse prn to keep Hgb >7 and Plt >10k or >50k with active bleeding  3/1 Hgb stable 8.5. Plt up to 80k. No hemolysis noted. Tsat 19% with ferritin 835. No B12, or folate deficiency noted. Vit D low at 15.5, replete. Smear pending. 3/2 Hgb stable 8.4. Plt 99k.   Smear with no schistocytes and absolute neutrophilia with toxic changes. Goals and plan of care reviewed with the patient. All questions answered to the best of our ability. Thank you for allowing us to participate in the care of Ms. Couch. She will need to f/u with Dr. Kely Lea within one week of discharge.          Davonte Fall NP   [facility] Hematology & Oncology  [address] [place] [address]  Office : [phone]  Fax : [phone]

## 2022-03-03 NOTE — PROGRESS NOTES
END OF SHIFT NOTE:    Patient Vitals for the past 12 hrs:   Temp Pulse Resp BP SpO2   03/03/22 0306 98.1 °F (36.7 °C) 95 15 (!) 102/59 94 %   03/02/22 2310 98.2 °F (36.8 °C) 92 16 (!) 95/57 95 %   03/02/22 2006 98 °F (36.7 °C) 88 19 100/63 95 %     -uneventful shift  -pt given abx per [facility]  -pt resting in bed,vss

## 2022-03-03 NOTE — PROGRESS NOTES
Pt refused MRI this morning when given bedside report.  Wants to speak to MD. [place] to message MD.

## 2022-03-03 NOTE — PROGRESS NOTES
END OF SHIFT NOTE:    Intake/Output  03/03 0701 - 03/03 1900  In: 183 [P.O.:340; I.V.:382]  Out: 900 [Urine:900]   Voiding: YES  Catheter: NO  Drain:              Stool:  0 occurrences. Stool Assessment  Stool Color: Brown (03/03/22 1330)  Stool Appearance: Soft (03/03/22 1330)  Stool Amount: Small (03/03/22 1330)  Stool Source/Status: Rectum (03/03/22 1330)    Emesis:  0 occurrences. VITAL SIGNS  Patient Vitals for the past 12 hrs:   Temp Pulse Resp BP SpO2   03/03/22 1444 97.9 °F (36.6 °C) 91 21 103/66 98 %   03/03/22 1301 -- -- -- 101/64 --   03/03/22 1254 -- -- -- (!) 94/58 --   03/03/22 1156 98.3 °F (36.8 °C) 95 20 97/65 92 %   03/03/22 1035 -- -- -- 108/65 --   03/03/22 1030 98.8 °F (37.1 °C) (!) 108 22 117/68 92 %   03/03/22 0750 98.7 °F (37.1 °C) (!) 108 19 110/65 91 %       Pain Assessment  Pain 1  Pain Scale 1: Numeric (0 - 10) (03/03/22 1444)  Pain Intensity 1: 7 (03/03/22 1444)  Patient Stated Pain Goal: 0 (03/03/22 1444)  Pain Reassessment 1: Yes (03/03/22 1035)  Pain Onset 1: pta (03/03/22 1002)  Pain Location 1: Back;Hip (03/03/22 1444)  Pain Orientation 1: Lower (03/03/22 1002)  Pain Description 1: Aching;Constant (03/03/22 1002)  Pain Intervention(s) 1: Medication (see MAR) (03/03/22 1002)    Ambulating  Yes    Additional Information:     - afebrile  - very poor appetite eating 1-25% meals  - adamantly refused MRI this morning and requested to speak to doctor. Pt now willing to get MRI and will have to be done tomorrow because there were no openings today  - percocet given 2x for pain  - stat chest xray today after pt complaining of SOB.  Lasix given and IVF stopped per orders  - pt on 2L O2    Blayne Ly RN

## 2022-03-04 NOTE — PROGRESS NOTES
Infectious Disease Consult    Today's Date: 3/4/2022   Admit Date: 2022    Impression:   · Recurrent E coli bacteremia  · Bacterial meningitis, E coli  · CML on dasitinib  · PCV on jakafi  · Portal vein thrombosis/splenomegaly/multiple splenic infarcts    Plan:   · Continue ceftriaxone 2g IV q12 hours through 3/13/2022    Anti-infectives:   · Ceftriaxone 2g IV q12 hours    Subjective:   She is still having low back pain. Headache and neck pain are better. She is still having low back pain that has not changed much since admission. Allergies   Allergen Reactions    Latex Other (comments)     Testing for latex allergy was positive as a 2 (on a scale of 1 to 3).  Sulfa (Sulfonamide Antibiotics) Anaphylaxis    Tramadol Other (comments)     Top lip swelled    Augmentin [Amoxicillin-Pot Clavulanate] Rash    Divalproex Sodium Hives    Morphine Nausea and Vomiting     Not a true allergy    Potassium Clavulanate Hives    Rizatriptan Rash    Tetanus Immune Globulin Other (comments)     Heat, bruising, and swelling at  Site of injection    Vancomycin Rash    Xanax [Alprazolam] Other (comments)     Hallucinations    Zonegran [Zonisamide] Rash        Review of Systems:  A comprehensive review of systems was negative except for that written in the History of Present Illness. Objective:     Visit Vitals  /62 (BP 1 Location: Left upper arm, BP Patient Position: Lying right side)   Pulse (!) 107   Temp 99.2 °F (37.3 °C)   Resp 18   Ht 5' 2\" (1.575 m)   Wt 58.9 kg (129 lb 12.8 oz)   SpO2 94%   Breastfeeding No   BMI 23.74 kg/m²     Temp (24hrs), Av.5 °F (36.9 °C), Min:97.8 °F (36.6 °C), Max:99.4 °F (37.4 °C)     Physical exam unchanged from yesterday    Lines:  Peripheral IV:       Physical Exam:    General:  Alert, cooperative, well nourished, well developed, appears stated age   Eyes:  Sclera anicteric.  Pupils equally round and reactive to light   Mouth/Throat: Mucous membranes normal, oral pharynx clear   Neck: Mild meningismus   Lungs:   Clear to auscultation bilaterally, good effort   CV:  Regular rate and rhythm, no murmur, click, rub or gallop   Abdomen:   Palpable, tender splenomegaly; + hepatomegaly; mild-moderate ascites   Extremities: No cyanosis or edema   Skin: Skin color, texture, turgor normal. no acute rash or lesions   Lymph nodes: Cervical and supraclavicular normal   Musculoskeletal: No swelling or deformity   Lines/Devices:  Intact, no erythema, drainage or tenderness   Psych:  Neuro:  Alert and oriented, normal mood affect given the setting  No lower extremity weakness or UMN signs       Data Review:     CBC:  Recent Labs     03/04/22  0535 03/02/22  0603   WBC 12.3* 10.3   GRANS 87* 85*   MONOS 4 5   EOS 1 1   ANEU 10.6* 8.7*   ABL 0.6 0.5   HGB 8.7* 8.4*   HCT 25.6* 25.5*   * 99*       BMP:  Recent Labs     03/04/22  0535 03/02/22  0603   CREA 0.43* 0.54*   BUN 9 15   * 134*   K 3.3* 3.5    105   CO2 24 22   AGAP 5* 7   * 126*       LFTS:  Recent Labs     03/02/22  0603   TBILI 0.8   ALT 18   AP 99   TP 4.9*   ALB 1.8*       Microbiology:     All Micro Results     Procedure Component Value Units Date/Time    CULTURE, BLOOD [[id number]] Collected: 03/01/22 3774    Order Status: Completed Specimen: Blood Updated: 03/04/22 1320     Special Requests: --        LEFT  ARM       Culture result: NO GROWTH 3 DAYS       CULTURE, CSF W GRAM STAIN [[id number]]  (Abnormal)  (Susceptibility) Collected: 02/27/22 2200    Order Status: Completed Specimen: Cerebrospinal Fluid Updated: 03/03/22 7687     Special Requests: TUBE 3     GRAM STAIN 0 TO 5 WBCS SEEN PER OIF      NO DEFINITE ORGANISM SEEN        Culture result:       ESCHERICHIA COLI ISOLATED FROM THIO BROTH ONLY                  RESULTS VERIFIED, PHONED TO AND READ BACK BY  Angy Garber RN @3961 3.2.22 SC      CULTURE, URINE [[id number]] Collected: 02/27/22 1949    Order Status: Completed Specimen: Cath Urine Updated: 03/02/22 0655     Special Requests: NO SPECIAL REQUESTS        Culture result:       <10,000 COLONIES/mL NORMAL SKIN JOSÉ ISOLATED          BLOOD CULTURE [[id number]]  (Abnormal) Collected: 02/27/22 1817    Order Status: Completed Specimen: Blood Updated: 03/02/22 0644     Special Requests: --        RIGHT  Antecubital       GRAM STAIN GRAM NEGATIVE RODS               CRITICAL RESULT NOT CALLED DUE TO PREVIOUS NOTIFICATION OF CRITICAL RESULT WITHIN THE LAST 24 HOURS. AEROBIC AND ANAEROBIC BOTTLES           Culture result: GRAM NEGATIVE RODS               For identification and susceptibility refer to culture  [facility].[id number]      BLOOD CULTURE [[id number]]  (Abnormal)  (Susceptibility) Collected: 02/27/22 1814    Order Status: Completed Specimen: Blood Updated: 03/02/22 0643     Special Requests: --        NO SPECIAL REQUESTS  LEFT  Antecubital       GRAM STAIN GRAM NEGATIVE RODS               RESULTS VERIFIED, PHONED TO AND READ BACK BY JESUS RENE RN BY NB AT [place] [address] ON [id number]                  AEROBIC AND ANAEROBIC BOTTLES           Culture result: ESCHERICHIA COLI               Refer to Blood Culture ID Panel Accession  [id number]      S. Arelyfrances Garcia, UR/CSF [[id number]] Collected: 02/27/22 2200    Order Status: Completed Specimen: Miscellaneous sample Updated: 03/01/22 1236     Source CEREBROSPINAL FLUID        Specimen Comment        Comment: (NOTE)  Cerebrospinal fluid (CSF)          Streptococcus pneumoniae Ag Negative        Fluid culture PENDING     Organism ID PENDING     Please note Comment        Comment: (NOTE)  College of American Pathologists standards require a culture to be  performed on CSF specimens submitted for bacterial antigen testing. (CAP [id number]) Urine specimens will not be cultured.   Performed At: [address] [id number]  Neil Mohs MD FM:[id number]         MENINGITIS PATHOGENS PANEL, CSF (BY PCR) [[id number]] Collected: 02/27/22 2200 Order Status: Completed Specimen: Cerebrospinal Fluid Updated: 02/28/22 1930     Meningitis panel       RESULTS SCANNED IN [facility]          BLOOD CULTURE ID PANEL [[id number]]  (Abnormal) Collected: 02/27/22 1814    Order Status: Completed Specimen: Blood Updated: 02/28/22 1314     Acc. no. from Micro Order [id number]     Escherichia coli Detected        Comment: RESULTS VERIFIED, PHONED TO AND READ BACK BY  Ankit Paris RN AT 13:11 02.28.2022          KPC (Carbapenem Resistance Gene) NOT DETECTED        Comment: WARNING:  A Not Detected result for the KPC gene does not indicate susceptibility to carbapenems. Gram negative bacteria can be resistant to carbapenems by mechanisms other than carrying the KPC gene. INTERPRETATION       Gram negative gopi. Identified by realtime PCR as E. coli          MENINGITIS PATHOGENS PANEL, CSF (BY PCR) [[id number]] Collected: 02/28/22 1100    Order Status: Canceled     MENINGITIS PATHOGENS PANEL, CSF (BY PCR) [[id number]] Collected: 02/28/22 1030    Order Status: Canceled Specimen: Cerebrospinal Fluid     COVID-19 RAPID TEST [[id number]] Collected: 02/27/22 1907    Order Status: Completed Specimen: Nasopharyngeal Updated: 02/27/22 1938     Specimen source NASAL SWAB        COVID-19 rapid test Not detected        Comment:      The specimen is NEGATIVE for SARS-CoV-2, the novel coronavirus associated with COVID-19. A negative result does not rule out COVID-19. This test has been authorized by the FDA under an Emergency Use Authorization (EUA) for use by authorized laboratories. Fact sheet for Healthcare Providers: ConventionUpdate.co.nz  Fact sheet for Patients: ConventionUpdate.co.nz       Methodology: Isothermal Nucleic Acid Amplification               Imaging:   CXR (2/28/2022)  IMPRESSION  1. Stable exam with persistent cardiomegaly and bilateral diffuse airspace opacities. CT head (2/27/2022)  1.  No CT evidence of acute intracranial process. CT abd (3/1/2022)  IMPRESSION  1) Increasing splenomegaly with multiple splenic infarcts. 2) Nonocclusive portal vein thrombosis. 3) Diffuse mesenteric edema.     MRI L spine reviewed: no abscess or discitis but limited by the lack of IV contrast.      Signed By: Wyatt Velasco MD     March 4, 2022

## 2022-03-04 NOTE — PROGRESS NOTES
In to see patient. MRI done this AM. Resting in bed. States her back hurts and she thinks the MRI caused her hernia to become hard. States that doesn't hurt, she just felt it earlier. Wants to go to [facility], but states she will talk with her surgeon and possibly try to set this up after she is discharged. Discussed discharge planning and that PT/OT recommending SN or STR for patient. Patient states she doesn't think she needs that right now but would definitely allow [place] PT/OT come to her home. Patient choice for [place] given and patient wants to go with [facility]. Notified Marylee Horner, RN with [facility] of referral on discharge and referral placed. No other needs identified at this time. CM following.

## 2022-03-04 NOTE — PROGRESS NOTES
This CM tried x 2 to see and talk with patient today. She was being assisted to [facility] 1st time then was asleep the 2nd time. This CM attempted to awaken patient, but she was very groggy and asked that I come back another time. CM continuing to follow.

## 2022-03-04 NOTE — PROGRESS NOTES
END OF SHIFT NOTE:    Patient Vitals for the past 12 hrs:   Temp Pulse Resp BP SpO2   03/04/22 0332 97.9 °F (36.6 °C) 90 19 100/66 98 %   03/03/22 2228 98.3 °F (36.8 °C) 89 19 (!) 97/59 98 %   03/03/22 1928 97.8 °F (36.6 °C) 94 20 101/61 96 %       -afebrile  -pt given percocet 1x for pain  -pt given morphine 1x for breakthrough pain  -pt resting in bed, vss  -uneventful shift

## 2022-03-04 NOTE — PROGRESS NOTES
Progress Note    Patient: Triston Goodwin MRN: [id number]  SSN: [id number]    YOB: 1960  Age: 64 y.o. Sex: female      Admit Date: 2/27/2022    LOS: 5 days     Assessment and Plan:   63 y/o F PMHx chronic anemia, CML, esophageal varices who presents to the ER with report of fever and headache for the past several days    1.  E. coli bacteremia in the setting of bacterial meningitis  -Continue ceftriaxone  -MRI of the lumbar spine to assess for possible epidural abscess  -Symptomatic management  -Infectious disease recommendations appreciated    2. CML on dasitinib  -Oncology recommendations appreciated    3. PVC on Jakafi  -Oncology recommendations appreciated    4. Hypothyroidism  -Continue levothyroxine    5. GERD  -Continue Protonix    6. Portal vein thrombosis  -Continue Lovenox  -Oncology recommendations appreciated    DVT prophylaxis on Lovenox    Subjective:   63 y/o F PMHx chronic anemia, CML, esophageal varices who presents to the ER with report of fever and headache for the past several days. Patient seen and examined at bedside. This morning having some back pain. Otherwise denies any chest pain, no shortness of breath, no abdominal pain. Objective:     Vitals:    03/03/22 2228 03/04/22 0332 03/04/22 0757 03/04/22 1120   BP: (!) 97/59 100/66 103/65 98/61   Pulse: 89 90 86 (!) 101   Resp: 19 19 20 18   Temp: 98.3 °F (36.8 °C) 97.9 °F (36.6 °C) 98.1 °F (36.7 °C) 99.4 °F (37.4 °C)   SpO2: 98% 98% 96% 94%   Weight:       Height:            Intake and Output:  Current Shift: No intake/output data recorded.   Last three shifts: 03/02 1901 - 03/04 0700  In: 5640 [P.O.:680; I.V.:4960]  Out: 1900 [Urine:1900]    ROS  10 ROS negative except from stated on subjective    Physical Exam:   General: Alert, oriented, NAD  HEENT: NC/AT, EOM are intact  Neck: supple, no JVD  Cardiovascular: RRR, S1, S2, no murmurs  Respiratory: Lungs are clear, no wheezes or rales  Abdomen: Soft, NT, ND  Back: No CVA tenderness, no paraspinal tenderness  Extremities: LE without pedal edema, no erythema  Neuro: A&O, CN are intact, no focal deficits  Skin: no rash or ulcers  Psych: good mood and affect    Lab/Data Review:  I have personally reviewed patients laboratory data showing  Recent Results (from the past 24 hour(s))   CBC WITH AUTOMATED DIFF    Collection Time: 03/04/22  5:35 AM   Result Value Ref Range    WBC 12.3 (H) 4.3 - 11.1 K/uL    RBC 2.73 (L) 4.05 - 5.2 M/uL    HGB 8.7 (L) 11.7 - 15.4 g/dL    HCT 25.6 (L) 35.8 - 46.3 %    MCV 93.8 79.6 - 97.8 FL    MCH 31.9 26.1 - 32.9 PG    MCHC 34.0 31.4 - 35.0 g/dL    RDW 18.1 (H) 11.9 - 14.6 %    PLATELET 496 (L) 138 - 450 K/uL    MPV 10.5 9.4 - 12.3 FL    ABSOLUTE NRBC 0.00 0.0 - 0.2 K/uL    DF AUTOMATED      NEUTROPHILS 87 (H) 43 - 78 %    LYMPHOCYTES 5 (L) 13 - 44 %    MONOCYTES 4 4.0 - 12.0 %    EOSINOPHILS 1 0.5 - 7.8 %    BASOPHILS 0 0.0 - 2.0 %    IMMATURE GRANULOCYTES 3 0.0 - 5.0 %    ABS. NEUTROPHILS 10.6 (H) 1.7 - 8.2 K/UL    ABS. LYMPHOCYTES 0.6 0.5 - 4.6 K/UL    ABS. MONOCYTES 0.4 0.1 - 1.3 K/UL    ABS. EOSINOPHILS 0.1 0.0 - 0.8 K/UL    ABS. BASOPHILS 0.0 0.0 - 0.2 K/UL    ABS. IMM. GRANS.  0.4 0.0 - 0.5 K/UL   METABOLIC PANEL, BASIC    Collection Time: 03/04/22  5:35 AM   Result Value Ref Range    Sodium 135 (L) 136 - 145 mmol/L    Potassium 3.3 (L) 3.5 - 5.1 mmol/L    Chloride 106 98 - 107 mmol/L    CO2 24 21 - 32 mmol/L    Anion gap 5 (L) 7 - 16 mmol/L    Glucose 104 (H) 65 - 100 mg/dL    BUN 9 8 - 23 MG/DL    Creatinine 0.43 (L) 0.6 - 1.0 MG/DL    GFR est AA >60 >60 ml/min/1.73m2    GFR est non-AA >60 >60 ml/min/1.73m2    Calcium 8.0 (L) 8.3 - 10.4 MG/DL      All Micro Results     Procedure Component Value Units Date/Time    CULTURE, BLOOD [[id number]] Collected: 03/01/22 6116    Order Status: Completed Specimen: Blood Updated: 03/04/22 1320     Special Requests: --        LEFT  ARM       Culture result: NO GROWTH 3 DAYS       CULTURE, CSF W GRAM STAIN [[id number]]  (Abnormal)  (Susceptibility) Collected: 02/27/22 2200    Order Status: Completed Specimen: Cerebrospinal Fluid Updated: 03/03/22 1988     Special Requests: TUBE 3     GRAM STAIN 0 TO 5 WBCS SEEN PER OIF      NO DEFINITE ORGANISM SEEN        Culture result:       ESCHERICHIA COLI ISOLATED FROM THIO BROTH ONLY                  RESULTS VERIFIED, PHONED TO AND READ BACK BY  Oni Story RN @331 3.2.22 SC      CULTURE, URINE [[id number]] Collected: 02/27/22 1949    Order Status: Completed Specimen: Cath Urine Updated: 03/02/22 0655     Special Requests: NO SPECIAL REQUESTS        Culture result:       <10,000 COLONIES/mL NORMAL SKIN JOSÉ ISOLATED          BLOOD CULTURE [[id number]]  (Abnormal) Collected: 02/27/22 1817    Order Status: Completed Specimen: Blood Updated: 03/02/22 0644     Special Requests: --        RIGHT  Antecubital       GRAM STAIN GRAM NEGATIVE RODS               CRITICAL RESULT NOT CALLED DUE TO PREVIOUS NOTIFICATION OF CRITICAL RESULT WITHIN THE LAST 24 HOURS. AEROBIC AND ANAEROBIC BOTTLES           Culture result: GRAM NEGATIVE RODS               For identification and susceptibility refer to culture  [facility] WN.[id number]      BLOOD CULTURE [[id number]]  (Abnormal)  (Susceptibility) Collected: 02/27/22 1814    Order Status: Completed Specimen: Blood Updated: 03/02/22 0643     Special Requests: --        NO SPECIAL REQUESTS  LEFT  Antecubital       GRAM STAIN GRAM NEGATIVE RODS               RESULTS VERIFIED, PHONED TO AND READ BACK BY JESUS RENE RN BY NB AT [place] [address] ON [id number]                  AEROBIC AND ANAEROBIC BOTTLES           Culture result: ESCHERICHIA COLI               Refer to Blood Culture ID Panel Accession  [id number]      SHEYLA Melgar, UR/CSF [[id number]] Collected: 02/27/22 2200    Order Status: Completed Specimen: Miscellaneous sample Updated: 03/01/22 1236     Source CEREBROSPINAL FLUID        Specimen Comment        Comment: (NOTE)  Cerebrospinal fluid (CSF) Streptococcus pneumoniae Ag Negative        Fluid culture PENDING     Organism ID PENDING     Please note Comment        Comment: (NOTE)  College of American Pathologists standards require a culture to be  performed on CSF specimens submitted for bacterial antigen testing. (CAP [id number]) Urine specimens will not be cultured. Performed At: [facility] & [address] [id number]  Darrius Garcia MD BC:[id number]         MENINGITIS PATHOGENS PANEL, CSF (BY PCR) [[id number]] Collected: 02/27/22 2200    Order Status: Completed Specimen: Cerebrospinal Fluid Updated: 02/28/22 1930     Meningitis panel       RESULTS SCANNED IN [facility]          BLOOD CULTURE ID PANEL [[id number]]  (Abnormal) Collected: 02/27/22 1814    Order Status: Completed Specimen: Blood Updated: 02/28/22 1314     Acc. no. from Micro Order [id number]     Escherichia coli Detected        Comment: RESULTS VERIFIED, PHONED TO AND READ BACK BY  Justice Peres RN AT 13:11 02.28.2022          KPC (Carbapenem Resistance Gene) NOT DETECTED        Comment: WARNING:  A Not Detected result for the KPC gene does not indicate susceptibility to carbapenems. Gram negative bacteria can be resistant to carbapenems by mechanisms other than carrying the KPC gene. INTERPRETATION       Gram negative gopi. Identified by realtime PCR as E. coli          MENINGITIS PATHOGENS PANEL, CSF (BY PCR) [[id number]] Collected: 02/28/22 1100    Order Status: Canceled     MENINGITIS PATHOGENS PANEL, CSF (BY PCR) [[id number]] Collected: 02/28/22 1030    Order Status: Canceled Specimen: Cerebrospinal Fluid     COVID-19 RAPID TEST [[id number]] Collected: 02/27/22 1907    Order Status: Completed Specimen: Nasopharyngeal Updated: 02/27/22 1938     Specimen source NASAL SWAB        COVID-19 rapid test Not detected        Comment:      The specimen is NEGATIVE for SARS-CoV-2, the novel coronavirus associated with COVID-19.   A negative result does not rule out COVID-19. This test has been authorized by the FDA under an Emergency Use Authorization (EUA) for use by authorized laboratories. Fact sheet for Healthcare Providers: ConventionUpdate.co.nz  Fact sheet for Patients: ConventionUpdate.co.nz       Methodology: Isothermal Nucleic Acid Amplification                Image:  I have personally reviewed patients imaging showing  CT abdomen and pelvis  1) Increasing splenomegaly with multiple splenic infarcts. 2) Nonocclusive portal vein thrombosis. 3) Diffuse mesenteric edema. Hospital problems     Patient Active Problem List   Diagnosis Code    Polycythemia vera ([facility] [address] D45    Ascites R18.8    Cirrhosis (HCC) K74.60    AKOSUA (iron deficiency anemia) D50.9    Left homonymous hemianopsia H53.462    Hyponatremia E87.1    Chronic myelogenous leukemia (HCC) C92.10    Acquired hypercoagulable state ([facility] [address] D68.59    Analgesic rebound headache G44.40, T39.95XA    Headache, chronic daily R51.9    Migraine with aura and with status migrainosus, not intractable G43. 101    DVT (deep venous thrombosis) (HCC) I82.409    Lung nodule R91.1    Chronic migraine without aura with status migrainosus, not intractable G43.701    Primary hypothyroidism E03.9    Splenomegaly R16.1    Esophageal varices (HCC) I85.00    Portal vein thrombosis I81    Cellulitis of leg, right L03.115    Body mass index (BMI) of 22.0 to 22.9 in adult Z68.22    Opioid use, unspecified with unspecified opioid-induced disorder F11.99    Thrombocytopenia, unspecified D69.6    Sepsis (HCC) A41.9    Fever of unknown origin R50.9    Fever R50.9    Leukocytosis D72.829        I have reviewed, updated, and verified this note's content and spent 38 minutes of my 42 minutes visit performing counseling and coordination of care regarding medical management.        Signed By: Guanakito Dove MD     March 4, 2022

## 2022-03-05 NOTE — PROGRESS NOTES
Progress Note    Patient: Houston Das MRN: [id number]  SSN: [id number]    YOB: 1960  Age: 64 y.o. Sex: female      Admit Date: 2/27/2022    LOS: 6 days     Assessment and Plan:   63 y/o F PMHx chronic anemia, CML, esophageal varices who presents to the ER with report of fever and headache for the past several days    1.  E. coli bacteremia in the setting of bacterial meningitis  -Continue ceftriaxone  -MRI of the lumbar spine without signs of epidural abscess  -Symptomatic management  -Infectious disease recommendations appreciated    2. CML on dasitinib  -Oncology recommendations appreciated    3. PVC on Jakafi  -Oncology recommendations appreciated    4. Hypothyroidism  -Continue levothyroxine    5. GERD  -Continue Protonix    6. Portal vein thrombosis  -Continue Lovenox  -Oncology recommendations appreciated    DVT prophylaxis on Lovenox    Subjective:   63 y/o F PMHx chronic anemia, CML, esophageal varices who presents to the ER with report of fever and headache for the past several days. Patient seen and examined at bedside. This morning resting comfortably. Otherwise denies any chest pain, no shortness of breath, no abdominal pain. Objective:     Vitals:    03/04/22 2330 03/05/22 0229 03/05/22 0717 03/05/22 1126   BP: 114/67 124/83 101/61 108/70   Pulse: 100 (!) 112 (!) 101 92   Resp: 18 19 20 18   Temp: 97.5 °F (36.4 °C) 99.4 °F (37.4 °C) 98.3 °F (36.8 °C) 98.2 °F (36.8 °C)   SpO2: 92% 94% 91% 96%   Weight:       Height:            Intake and Output:  Current Shift: No intake/output data recorded.   Last three shifts: 03/03 1901 - 03/05 0700  In: 525 [P.O.:460; I.V.:65]  Out: 800 [Urine:800]    ROS  10 ROS negative except from stated on subjective    Physical Exam:   General: Alert, oriented, NAD  HEENT: NC/AT, EOM are intact  Neck: supple, no JVD  Cardiovascular: RRR, S1, S2, no murmurs  Respiratory: Lungs are clear, no wheezes or rales  Abdomen: Soft, NT, ND  Back: No CVA tenderness, no paraspinal tenderness  Extremities: LE without pedal edema, no erythema  Neuro: A&O, CN are intact, no focal deficits  Skin: no rash or ulcers  Psych: good mood and affect    Lab/Data Review:  I have personally reviewed patients laboratory data showing  Recent Results (from the past 24 hour(s))   CBC WITH AUTOMATED DIFF    Collection Time: 03/05/22  6:05 AM   Result Value Ref Range    WBC 12.8 (H) 4.3 - 11.1 K/uL    RBC 2.71 (L) 4.05 - 5.2 M/uL    HGB 8.5 (L) 11.7 - 15.4 g/dL    HCT 25.3 (L) 35.8 - 46.3 %    MCV 93.4 79.6 - 97.8 FL    MCH 31.4 26.1 - 32.9 PG    MCHC 33.6 31.4 - 35.0 g/dL    RDW 18.4 (H) 11.9 - 14.6 %    PLATELET 959 (L) 458 - 450 K/uL    MPV 10.3 9.4 - 12.3 FL    ABSOLUTE NRBC 0.00 0.0 - 0.2 K/uL    DF AUTOMATED      NEUTROPHILS 87 (H) 43 - 78 %    LYMPHOCYTES 6 (L) 13 - 44 %    MONOCYTES 4 4.0 - 12.0 %    EOSINOPHILS 1 0.5 - 7.8 %    BASOPHILS 0 0.0 - 2.0 %    IMMATURE GRANULOCYTES 3 0.0 - 5.0 %    ABS. NEUTROPHILS 11.1 (H) 1.7 - 8.2 K/UL    ABS. LYMPHOCYTES 0.7 0.5 - 4.6 K/UL    ABS. MONOCYTES 0.5 0.1 - 1.3 K/UL    ABS. EOSINOPHILS 0.1 0.0 - 0.8 K/UL    ABS. BASOPHILS 0.1 0.0 - 0.2 K/UL    ABS. IMM. GRANS. 0.4 0.0 - 0.5 K/UL   METABOLIC PANEL, BASIC    Collection Time: 03/05/22  6:05 AM   Result Value Ref Range    Sodium 136 136 - 145 mmol/L    Potassium 3.4 (L) 3.5 - 5.1 mmol/L    Chloride 106 98 - 107 mmol/L    CO2 24 21 - 32 mmol/L    Anion gap 6 (L) 7 - 16 mmol/L    Glucose 112 (H) 65 - 100 mg/dL    BUN 8 8 - 23 MG/DL    Creatinine 0.42 (L) 0.6 - 1.0 MG/DL    GFR est AA >60 >60 ml/min/1.73m2    GFR est non-AA >60 >60 ml/min/1.73m2    Calcium 7.5 (L) 8.3 - 10.4 MG/DL      All Micro Results     Procedure Component Value Units Date/Time    CULTURE, BLOOD [[id number]] Collected: 03/01/22 0456    Order Status: Completed Specimen: Blood Updated: 03/05/22 0740     Special Requests: --        LEFT  ARM       Culture result: NO GROWTH 4 DAYS       SHEYLA Smallwood, UR/CSF [[id number]] Collected: 02/27/22 2200    Order Status: Completed Specimen: Miscellaneous sample Updated: 03/04/22 1735     Source CEREBROSPINAL FLUID        Specimen Comment        Comment: (NOTE)  Cerebrospinal fluid (CSF)          Streptococcus pneumoniae Ag Negative        Fluid culture Comment        Comment: (NOTE)  No growth in 56 - 72 hours. Organism ID Comment        Comment: (NOTE)  No growth in 56 - 72 hours. Performed At: [facility] & [address] [id number]  Curtis Puri MD JI:[id number]          Please note Comment        Comment: (NOTE)  College of American Pathologists standards require a culture to be  performed on CSF specimens submitted for bacterial antigen testing. (CAP [id number]) Urine specimens will not be cultured. Performed At: [facility] & [facility]  [place] [address] [id number]  Curtis Puri MD CE:[id number]         CULTURE, CSF [address] [[id number]]  (Abnormal)  (Susceptibility) Collected: 02/27/22 2200    Order Status: Completed Specimen: Cerebrospinal Fluid Updated: 03/03/22 0722     Special Requests: TUBE 3     GRAM STAIN 0 TO 5 WBCS SEEN PER OIF      NO DEFINITE ORGANISM SEEN        Culture result:       ESCHERICHIA COLI ISOLATED FROM THIO BROTH ONLY                  RESULTS VERIFIED, PHONED TO AND READ BACK BY  Patricia Solano RN @[facility] 3.2.22 SC      CULTURE, URINE [[id number]] Collected: 02/27/22 1949    Order Status: Completed Specimen: Cath Urine Updated: 03/02/22 0655     Special Requests: NO SPECIAL REQUESTS        Culture result:       <10,000 COLONIES/mL NORMAL SKIN JOSÉ ISOLATED          BLOOD CULTURE [[id number]]  (Abnormal) Collected: 02/27/22 1817    Order Status: Completed Specimen: Blood Updated: 03/02/22 0644     Special Requests: --        RIGHT  Antecubital       GRAM STAIN GRAM NEGATIVE RODS               CRITICAL RESULT NOT CALLED DUE TO PREVIOUS NOTIFICATION OF CRITICAL RESULT WITHIN THE LAST 24 HOURS.                   AEROBIC AND ANAEROBIC BOTTLES           Culture result: GRAM NEGATIVE RODS               For identification and susceptibility refer to culture  [facility] DJ.[id number]      BLOOD CULTURE [[id number]]  (Abnormal)  (Susceptibility) Collected: 02/27/22 1814    Order Status: Completed Specimen: Blood Updated: 03/02/22 0643     Special Requests: --        NO SPECIAL REQUESTS  LEFT  Antecubital       GRAM STAIN GRAM NEGATIVE RODS               RESULTS VERIFIED, PHONED TO AND READ BACK BY JESUS RENE RN BY NB AT [place] [id number] [place] [id number]                  AEROBIC AND ANAEROBIC BOTTLES           Culture result: ESCHERICHIA COLI               Refer to Blood Culture ID Panel Accession  [id number]      MENINGITIS PATHOGENS PANEL, CSF (BY PCR) [[id number]] Collected: 02/27/22 2200    Order Status: Completed Specimen: Cerebrospinal Fluid Updated: 02/28/22 1930     Meningitis panel       RESULTS SCANNED IN [facility]          BLOOD CULTURE ID PANEL [[id number]]  (Abnormal) Collected: 02/27/22 1814    Order Status: Completed Specimen: Blood Updated: 02/28/22 1314     Acc. no. from Micro Order [id number]     Escherichia coli Detected        Comment: RESULTS VERIFIED, PHONED TO AND READ BACK BY  Brian Celaya RN AT 13:11 02.28.2022          KPC (Carbapenem Resistance Gene) NOT DETECTED        Comment: WARNING:  A Not Detected result for the KPC gene does not indicate susceptibility to carbapenems. Gram negative bacteria can be resistant to carbapenems by mechanisms other than carrying the KPC gene. INTERPRETATION       Gram negative gopi.  Identified by realtime PCR as E. coli          MENINGITIS PATHOGENS PANEL, CSF (BY PCR) [[id number]] Collected: 02/28/22 1100    Order Status: Canceled     MENINGITIS PATHOGENS PANEL, CSF (BY PCR) [[id number]] Collected: 02/28/22 1030    Order Status: Canceled Specimen: Cerebrospinal Fluid     COVID-19 RAPID TEST [[id number]] Collected: 02/27/22 1907    Order Status: Completed Specimen: Nasopharyngeal Updated: 02/27/22 1938 Specimen source NASAL SWAB        COVID-19 rapid test Not detected        Comment:      The specimen is NEGATIVE for SARS-CoV-2, the novel coronavirus associated with COVID-19. A negative result does not rule out COVID-19. This test has been authorized by the FDA under an Emergency Use Authorization (EUA) for use by authorized laboratories. Fact sheet for Healthcare Providers: ConventionUpdate.co.nz  Fact sheet for Patients: ConventionUpdate.co.nz       Methodology: Isothermal Nucleic Acid Amplification                Image:  I have personally reviewed patients imaging showing  CT abdomen and pelvis  1) Increasing splenomegaly with multiple splenic infarcts. 2) Nonocclusive portal vein thrombosis. 3) Diffuse mesenteric edema. Hospital problems     Patient Active Problem List   Diagnosis Code    Polycythemia vera ([facility] [address] D45    Ascites R18.8    Cirrhosis (HCC) K74.60    AKOSUA (iron deficiency anemia) D50.9    Left homonymous hemianopsia H53.462    Hyponatremia E87.1    Chronic myelogenous leukemia (HCC) C92.10    Acquired hypercoagulable state ([facility] [address] D68.59    Analgesic rebound headache G44.40, T39.95XA    Headache, chronic daily R51.9    Migraine with aura and with status migrainosus, not intractable G43. 101    DVT (deep venous thrombosis) (HCC) I82.409    Lung nodule R91.1    Chronic migraine without aura with status migrainosus, not intractable G43.701    Primary hypothyroidism E03.9    Splenomegaly R16.1    Esophageal varices (HCC) I85.00    Portal vein thrombosis I81    Cellulitis of leg, right L03.115    Body mass index (BMI) of 22.0 to 22.9 in adult Z68.22    Opioid use, unspecified with unspecified opioid-induced disorder F11.99    Thrombocytopenia, unspecified D69.6    Sepsis (HCC) A41.9    Fever of unknown origin R50.9    Fever R50.9    Leukocytosis D72.829        Signed By: Donna Viera MD     March 5, 2022

## 2022-03-05 NOTE — PROGRESS NOTES
Problem: Falls - Risk of  Goal: *Absence of Falls  Description: Document [place] Fall Risk and appropriate interventions in the flowsheet. Outcome: Progressing Towards Goal  Note: Fall Risk Interventions:  Mobility Interventions: Bed/chair exit alarm    Mentation Interventions: Door open when patient unattended    Medication Interventions: Patient to call before getting OOB    Elimination Interventions: Call light in reach,Patient to call for help with toileting needs              Problem: Pressure Injury - Risk of  Goal: *Prevention of pressure injury  Description: Document Eric Scale and appropriate interventions in the flowsheet.   Outcome: Progressing Towards Goal  Note: Pressure Injury Interventions:  Sensory Interventions: Assess changes in LOC    Moisture Interventions: Absorbent underpads    Activity Interventions: Increase time out of bed,Pressure redistribution bed/mattress(bed type)    Mobility Interventions: Pressure redistribution bed/mattress (bed type)    Nutrition Interventions: Document food/fluid/supplement intake

## 2022-03-05 NOTE — PROGRESS NOTES
END OF SHIFT NOTE:    Pain meds 2x this shift, pt ambulated twice in hallway this shift. No c/o N/V/D, VSS, pt resting in bed, family at bedside, no needs at this time. Intake/Output  03/05 0701 - 03/05 1900  In: 600 [P.O.:600]  Out: 200 [Urine:200]   Voiding: YES  Catheter: NO  Drain:              Stool:  0 occurrences. Stool Assessment  Stool Color: Brown;Black (03/05/22 0920)  Stool Appearance: Soft (03/05/22 0920)  Stool Amount: Medium (03/05/22 0920)  Stool Source/Status: Rectum (03/05/22 0920)    Emesis:  0 occurrences. VITAL SIGNS  Patient Vitals for the past 12 hrs:   Temp Pulse Resp BP SpO2   03/05/22 1530 99.3 °F (37.4 °C) 96 18 (!) 102/57 93 %   03/05/22 1126 98.2 °F (36.8 °C) 92 18 108/70 96 %   03/05/22 0717 98.3 °F (36.8 °C) (!) 101 20 101/61 91 %       Pain Assessment  Pain 1  Pain Scale 1: Numeric (0 - 10) (03/05/22 1740)  Pain Intensity 1: 3 (03/05/22 1740)  Patient Stated Pain Goal: 0 (03/05/22 1129)  Pain Reassessment 1: Yes (03/05/22 1740)  Pain Onset 1: pta (03/03/22 1741)  Pain Location 1: Back (03/05/22 1658)  Pain Orientation 1: Lower (03/05/22 1658)  Pain Description 1: Aching (03/05/22 1658)  Pain Intervention(s) 1: Medication (see MAR) (03/05/22 1658)    Ambulating  Yes    Additional Information:     Shift report given to oncoming nurse at the bedside.     Jhonatan Chavez

## 2022-03-05 NOTE — PROGRESS NOTES
Problem: Mobility Impaired (Adult and Pediatric)  Goal: *Acute Goals and Plan of Care (Insert Text)  Outcome: Progressing Towards Goal  Note: STG:  (1.)Ms. Mabel Handy will move from supine to sit and sit to supine  with CONTACT GUARD ASSIST within 1-3 treatment day(s). MET 3/5.  (2.)Ms. Mabel Handy will transfer from bed to chair and chair to bed with CONTACT GUARD ASSIST using the least restrictive device within 1-3 treatment day(s). (3.)Ms. Mabel Handy will ambulate with CONTACT GUARD ASSIST for 50 feet with the least restrictive device within 1-3 treatment day(s). MET 3/5/22. LTG:  (1.)Ms. Mabel Handy will move from supine to sit and sit to supine  in bed with MODIFIED INDEPENDENCE within 4-6 treatment day(s). (2.)Ms. Mabel Handy will transfer from bed to chair and chair to bed with STAND BY ASSIST using the least restrictive device within 4-6 treatment day(s). (3.)Ms. Couch will ambulate with STAND BY ASSIST for 150 feet with the least restrictive device within 4-6 treatment day(s). (4.)Ms. Mabel Handy will ascend/descend 13 steps with R railing with CGA in 4-6 treatment days.    ________________________________________________________________________________________________      PHYSICAL THERAPY: Daily Note and PM 3/5/2022  INPATIENT: PT Visit Days : 1  Payor: Shauna Ochoa / Plan: [address] / Product Type: [employer] /       NAME/AGE/GENDER: Sumanth Grossman is a 64 y.o. female   PRIMARY DIAGNOSIS: Fever [R50.9] Fever Fever       ICD-10: Treatment Diagnosis:    Generalized Muscle Weakness (M62.81)  Difficulty in walking, Not elsewhere classified (R26.2)  Other abnormalities of gait and mobility (R26.89)   Precaution/Allergies:  Latex, Sulfa (sulfonamide antibiotics), Tramadol, Augmentin [amoxicillin-pot clavulanate], Divalproex sodium, Morphine, Potassium clavulanate, Rizatriptan, Tetanus immune globulin, Vancomycin, Xanax [alprazolam], and Zonegran [zonisamide]      ASSESSMENT: Ms. Bubba Kehr was admitted with sepsis. Has leukemia. She lives alone in a 2 story town home. Independent with gait and ADLs prior to admission. Does not drive. Christian friends take her to the grocery store. Has 2 children locally. She was supine upon contact. Agreeable to PT. LBP improved. SBA for bed mobility and transfers. Patient ambulated 80' with RW and CGA with 2 standing rest breaks needed due to fatigue. Performed LE exercises as below. Returned to bed in L sidelying. Excellent improvement in gait distance. Progressing toward goals. Patient is planning to D/C home with support of family/friends and HHPT. This section established at most recent assessment   PROBLEM LIST (Impairments causing functional limitations):  Decreased Strength  Decreased ADL/Functional Activities  Decreased Transfer Abilities  Decreased Ambulation Ability/Technique  Decreased Activity Tolerance  Increased Fatigue   INTERVENTIONS PLANNED: (Benefits and precautions of physical therapy have been discussed with the patient.)  Bed Mobility  Gait Training  Therapeutic Activites  Therapeutic Exercise/Strengthening  Transfer Training     TREATMENT PLAN: Frequency/Duration: daily for duration of hospital stay  Rehabilitation Potential For Stated Goals: Good     REHAB RECOMMENDATIONS (at time of discharge pending progress):    Placement: It is my opinion, based on this patient's performance to date, that Ms. Bubba Kehr may benefit from [address] after discharge due to the functional deficits listed above that are likely to improve with skilled rehabilitation because he/she has multiple medical issues that affect his/her functional mobility in the community. Does not have a car. Equipment:   Walkers, Type: Rolling Walker              HISTORY:   History of Present Injury/Illness (Reason for Referral):  61F PMHx chronic anemia, CML, esophageal varices who presents to the ER with report of fever and headache for the past several days. Admits to fever up to 102. 1 two days ago. Feels malaise and weakness. Headache is global and associated with photophobia. Admits to feeling a bit confused as well. Admits to nausea, denies shortness of breath, cough ,vomiting. Past Medical History/Comorbidities:   Ms. Bianca Sidhu  has a past medical history of Acute blood loss anemia (4/6/2018), Anemia, C. difficile diarrhea (4/1/2015), Cerebral edema ([address] (4/1/2015), Chronic migraine (9/22/2016), Chronic myelogenous leukemia ([address] Chronic pain, Coagulation disorder ([address] Esophageal spasm, Esophageal varices ([address] GI bleed (8/3/2016), H/O craniotomy, Hematemesis (8/20/2021), Leukocytosis (3/14/2015), Melena (8/20/2021), MRSA colonization (6/25/2012), Polycythemia vera(238.4), Portal hypertension ([address] Portal vein thrombosis (6/20/2012), Primary hypothyroidism, Pulmonary embolism ([address] (2006), S/P exploratory laparotomy, 6/20/12  (6/25/2012), S/P small bowel resection, Seizure ([address] (3/14/2015), Seizure disorder ([address] (3/30/2015), Seizures ([address] Splenomegaly, congestive, chronic, Stroke (cerebrum) ([address] (4/11/2015), Stroke ([address] Thrombocytopenia, 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 She has no past medical history of Adverse effect of anesthesia, Difficult intubation, Malignant hyperthermia due to anesthesia, or Pseudocholinesterase deficiency. Ms. Bianca Sidhu  has a past surgical history that includes hx gyn; hx gyn; neurological procedure unlisted (2010-12); hx gi; hx gi; pr abdomen surgery proc unlisted; hx cholecystectomy; pr abdomen surgery proc unlisted; hx appendectomy; hx breast biopsy (Bilateral); and hx orthopaedic (Left, 2008).   Social History/Living Environment:   Home Environment: Apartment (town home)  # Steps to Enter: 0  One/Two Story Residence: Two story  # of Interior Steps: 13  Interior Rails: Right  Living Alone: Yes  Support Systems: Child(lauryn),Friend/Neighbor  Patient Expects to be Discharged to[de-identified] Home with home health  Current DME Used/Available at Home: None  Tub or Shower Type: Shower  Prior Level of Function/Work/Activity:  independent     Number of Personal Factors/Comorbidities that affect the Plan of Care: 1-2: MODERATE COMPLEXITY   EXAMINATION:   Most Recent Physical Functioning:   Gross Assessment:  AROM: Within functional limits  Strength: Generally decreased, functional               Posture:     Balance:  Sitting: Intact  Standing: With support Bed Mobility:  Sit to Supine: Stand-by assistance  Scooting: Stand-by assistance  Wheelchair Mobility:     Transfers:  Sit to Stand: Stand-by assistance  Stand to Sit: Stand-by assistance  Duration: 25 Minutes  Gait:     Speed/Romina: Slow  Gait Abnormalities: Decreased step clearance  Distance (ft): 175 Feet (ft)  Assistive Device: Walker, rolling  Ambulation - Level of Assistance: Contact guard assistance  Interventions: Manual cues; Safety awareness training;Verbal cues         Body Structures Involved:  Muscles Body Functions Affected:  Hematological  Neuromusculoskeletal  Movement Related Activities and Participation Affected:  General Tasks and Demands  Mobility   Number of elements that affect the Plan of Care: 4+: HIGH COMPLEXITY   CLINICAL PRESENTATION:   Presentation: Stable and uncomplicated: LOW COMPLEXITY   CLINICAL DECISION MAKIN [facility] Mobility Inpatient Short Form  How much difficulty does the patient currently have. .. Unable A Lot A Little None   1. Turning over in bed (including adjusting bedclothes, sheets and blankets)? [] 1   [] 2   [x] 3   [] 4   2. Sitting down on and standing up from a chair with arms ( e.g., wheelchair, bedside commode, etc.)   [] 1   [] 2   [x] 3   [] 4   3. Moving from lying on back to sitting on the side of the bed?    [] 1   [] 2   [x] 3   [] 4   How much help from another person does the patient currently need. .. Total A Lot A Little None   4. Moving to and from a bed to a chair (including a wheelchair)? [] 1   [] 2   [x] 3   [] 4   5. Need to walk in hospital room? [] 1   [] 2   [x] 3   [] 4   6. Climbing 3-5 steps with a railing? [] 1   [x] 2   [] 3   [] 4   © 2007, Trustees of [address] [place] [address] [address] [address], under license to [employer]. All rights reserved      Score:  Initial: 17 Most Recent: X (Date: -- )    Interpretation of Tool:  Represents activities that are increasingly more difficult (i.e. Bed mobility, Transfers, Gait). Medical Necessity:     Patient demonstrates   good   rehab potential due to higher previous functional level. Reason for Services/Other Comments:  Patient   continues to require present interventions due to patient's inability to perform functional mobility safely and independently  . Use of outcome tool(s) and clinical judgement create a POC that gives a: Clear prediction of patient's progress: LOW COMPLEXITY            TREATMENT:   (In addition to Assessment/Re-Assessment sessions the following treatments were rendered)   Pre-treatment Symptoms/Complaints:  no pain; weak, lightheaded  Pain: Initial:   Pain Intensity 1: 0  Post Session:  0     Therapeutic Activity: (  25 Minutes ):  Therapeutic activities including Bed mobility, sit to stand transfers, LE exercises and Ambulation on level ground to improve mobility, strength, balance, and coordination. Required minimal Manual cues; Safety awareness training;Verbal cues    Date:  3/5/22 Date:   Date:     Activity/Exercise Parameters Parameters Parameters   Standing marching 12     Ankle pumps 12     LAQ 12                                   Braces/Orthotics/Lines/Etc:   IV  O2 Device: None (Room air)  Treatment/Session Assessment:    Response to Treatment:  Excellent improvement in gait distance; progressing toward goals  Interdisciplinary Collaboration:   Physical Therapist  Occupational Therapist  Registered Nurse  After treatment position/precautions:   Supine in bed  Bed/Chair-wheels locked  Bed in low position  Call light within reach  Side rails x 2   Compliance with Program/Exercises: Compliant most of the time, Will assess as treatment progresses  Recommendations/Intent for next treatment session: \"Next visit will focus on advancements to more challenging activities\".   Total Treatment Duration:  PT Patient Time In/Time Out  Time In: 1545  Time Out: [address], PT

## 2022-03-05 NOTE — PROGRESS NOTES
Problem: Falls - Risk of  Goal: *Absence of Falls  Outcome: Progressing Towards Goal  Note: Fall Risk Interventions:  Mobility Interventions: Bed/chair exit alarm,Patient to call before getting OOB    Mentation Interventions: Door open when patient unattended,Adequate sleep, hydration, pain control    Medication Interventions: Patient to call before getting OOB,Evaluate medications/consider consulting pharmacy,Teach patient to arise slowly    Elimination Interventions: Call light in reach,Bed/chair exit alarm

## 2022-03-05 NOTE — PROGRESS NOTES
[employer] [facility]        Inpatient Hematology / Oncology Progress Note    Reason for Consult:  Fever [R50.9]  Referring Physician:  Lakeshia Ann MD    24 Hour Events:  Afebrile, VSS   Repeat BCx - NGTD  CSF Cx +EColi  MRI without signs of infection/abscess   Headaches improved/back pain stable    ROS:  Constitutional: +fatigue, weakness. Negative for fever. CV: Negative for chest pain, palpitations, edema. Respiratory: Negative for dyspnea, cough, wheezing. GI: Negative for nausea, diarrhea. + chronic abd pain/swelling from splenomegaly. Neuro: +HA but improved. MSK: +back pain stable. 10 point review of systems is otherwise negative with the exception of the elements mentioned above in the HPI. Allergies   Allergen Reactions    Latex Other (comments)     Testing for latex allergy was positive as a 2 (on a scale of 1 to 3).     Sulfa (Sulfonamide Antibiotics) Anaphylaxis    Tramadol Other (comments)     Top lip swelled    Augmentin [Amoxicillin-Pot Clavulanate] Rash    Divalproex Sodium Hives    Morphine Nausea and Vomiting     Not a true allergy    Potassium Clavulanate Hives    Rizatriptan Rash    Tetanus Immune Globulin Other (comments)     Heat, bruising, and swelling at  Site of injection    Vancomycin Rash    Xanax [Alprazolam] Other (comments)     Hallucinations    Zonegran [Zonisamide] Rash     Past Medical History:   Diagnosis Date    Acute blood loss anemia 4/6/2018    Anemia     C. difficile diarrhea 4/1/2015    Cerebral edema (HCC) 4/1/2015    Chronic migraine 9/22/2016    Chronic myelogenous leukemia ([facility]     Ralls chromosome/ converted from polycythemia in 2009- to 2012 when she was dx w leukemia    Chronic pain     Coagulation disorder ([address]     \"clotting and Bleeding Problem\" dr Gale Enamorado follows     Esophageal spasm     with banding     Esophageal varices (HCC)     grade 3    GI bleed 8/3/2016    H/O craniotomy     3-29-15.  due to blood clot - which caused a seizure  - then pt fell and hit     Hematemesis 8/20/2021    Leukocytosis 3/14/2015    Melena 8/20/2021    MRSA colonization 6/25/2012    Polycythemia vera(238.4)     JAK2 mutation    Portal hypertension (HCC)     with varices    Portal vein thrombosis 6/20/2012    Ct scan 6-21-2 Apparent occlusion of the portal vein. In addition, the splenic vein, and superior mesenteric vein are not definitely seen and are also likely  occluded. Splenomegaly, and extensive varices are seen as described above consistent with the portal vein occlusion 7-05-12 on arixtra HIT negative on repeat 7-27-12 re admitted Cirrhotic appearance of the liver. Mild to moderate ascites, as    Primary hypothyroidism     Pulmonary embolism ([address] 2006    not on coumadin anymore     S/P exploratory laparotomy, 6/20/12 6/25/2012    Bowel resection:  336 cm of small bowel removed, approximately 9 feet and placement of feeding jejunostomy.      S/P small bowel resection     2012.  9 feet removed due to  gangrene which wa due to blood clot    Seizure ([address] 3/14/2015    Seizure disorder ([address] 3/30/2015    due to clotting factor    Seizures ([address]     last one 3- - followed by aniyah     Splenomegaly, congestive, chronic     Stroke (cerebrum) ([address] 4/11/2015    had bled into head- surgery    Stroke [facility])     pt had stroke like symptoms     Thrombocytopenia, HIT negative 6/25/2012 7-01-12 platelets lower repeat HIT 7-02-12 platelets in 48,234'J    Thrombosis, portal vein 2004    portal , spleenic and recently superior mesenteric    Transfusion history     many blood tranfusions - last 3-29-15 after brain surgery    Traumatic hemorrhage of right cerebrum ([address] 3/30/2015     Past Surgical History:   Procedure Laterality Date    HX APPENDECTOMY      with small bowel resection    HX BREAST BIOPSY Bilateral     Lt - 1998; Rt - 2001    HX CHOLECYSTECTOMY      HX GI      liver biopsy    HX GI bowel removed small - 9 feet     HX GYN       x 2    HX GYN      D&C following miscarriage    HX ORTHOPAEDIC Left 2008    torn labrum shoulder (screw in place)    NEUROLOGICAL PROCEDURE UNLISTED  2010    craniotomy to evacuate subdural hematoma following a fall from a seizure    AK ABDOMEN SURGERY PROC UNLISTED      umbilical hernia repair    AK ABDOMEN SURGERY PROC UNLISTED      9ft of small bowel excised then reconnected     Family History   Problem Relation Age of Onset    Diabetes Mother     Stroke Mother         after surgery    Cancer Father         brain    No Known Problems Sister     Other Sister         diverticulitis    Other Sister         diverticulitis    Cancer Sister         lyjmphoma    Breast Cancer Maternal Aunt 48    Thyroid Disease Paternal Grandmother      Social History     Socioeconomic History    Marital status: SINGLE     Spouse name: Not on file    Number of children: Not on file    Years of education: Not on file    Highest education level: Not on file   Occupational History    Not on file   Tobacco Use    Smoking status: Former Smoker     Packs/day: 0.20     Years: 10.00     Pack years: 2.00     Types: Cigarettes     Quit date:      Years since quittin.1    Smokeless tobacco: Never Used   Substance and Sexual Activity    Alcohol use: No    Drug use: No    Sexual activity: Not on file   Other Topics Concern    Not on file   Social History Narrative    Not on file     Social Determinants of Health     Financial Resource Strain:     Difficulty of Paying Living Expenses: Not on file   Food Insecurity:     Worried About [address] in the Last Year: Not on file    [address] [facility] [address] in the Last Year: Not on file   Transportation Needs:     Lack of Transportation (Medical): Not on file    Lack of Transportation (Non-Medical):  Not on file   Physical Activity:     Days of Exercise per Week: Not on file    Minutes of Exercise per Session: Not on file   Stress:     Feeling of Stress : Not on file   Social Connections:     Frequency of Communication with Friends and Family: Not on file    Frequency of Social Gatherings with Friends and Family: Not on file    Attends Episcopal Services: Not on file    Active Member of Clubs or Organizations: Not on file    Attends Club or Organization Meetings: Not on file    Marital Status: Not on file   Intimate Partner Violence:     Fear of Current or Ex-Partner: Not on file    Emotionally Abused: Not on file    Physically Abused: Not on file    Sexually Abused: Not on file   Housing Stability:     Unable to Pay for Housing in the Last Year: Not on file    Number of [place] in the Last Year: Not on file    Unstable Housing in the Last Year: Not on file     Current Facility-Administered Medications   Medication Dose Route Frequency Provider Last Rate Last Admin    clotrimazole (MYCELEX) 1 % cream 1 Applicator  1 Applicator Vaginal QHS Angelique Sams MD   1 Applicator at 07/17/75 3278    lip protectant (BLISTEX) ointment 1 Each  1 Each Topical PRN Tete Chavez MD        cefTRIAXone (ROCEPHIN) 2 g in 0.9% sodium chloride (MBP/ADV) 50 mL MBP  2 g IntraVENous Q12H Judy Tran  mL/hr at 03/05/22 0959 2 g at 03/05/22 0959    morphine injection 2 mg  2 mg IntraVENous Q4H PRN Thompson Alonzo NP   2 mg at 03/04/22 1738    enoxaparin (LOVENOX) injection 50 mg  50 mg SubCUTAneous Q12H Agustín DARLING MD   50 mg at 03/05/22 0545    famotidine (PEPCID) tablet 40 mg  40 mg Oral DAILY PRN Júnior Ott MD        pantoprazole (PROTONIX) tablet 40 mg  40 mg Oral ACB Agustín DARLING MD   40 mg at 03/05/22 0545    magnesium oxide (MAG-OX) tablet 400 mg  400 mg Oral DAILY Júnior Ott MD   400 mg at 03/05/22 0959    oxyCODONE-acetaminophen (PERCOCET 10)  mg per tablet 1 Tablet  1 Tablet Oral Q8H PRN Júnior Ott MD   1 Tablet at 03/05/22 0959    levothyroxine (SYNTHROID) tablet 112 mcg  112 mcg Oral ACB Sulaiman Costa MD   112 mcg at 22 0545    zolpidem (AMBIEN) tablet 10 mg  10 mg Oral QHS PRN Sulaiman Costa MD   10 mg at 22 2043    sodium chloride (NS) flush 5-40 mL  5-40 mL IntraVENous PRN Sulaiman Costa MD        acetaminophen (TYLENOL) tablet 650 mg  650 mg Oral Q6H PRN Sulaiman Costa MD   650 mg at 22 8489    polyethylene glycol (MIRALAX) packet 17 g  17 g Oral DAILY PRN Sulaiman Costa MD        ondansetron (ZOFRAN ODT) tablet 4 mg  4 mg Oral Q8H PRN Sulaiman Costa MD        Or    ondansetron [facility]) injection 4 mg  4 mg IntraVENous Q6H PRN Sulaiman Costa MD        diphenhydrAMINE (BENADRYL) capsule 25 mg  25 mg Oral Q12H PRN Alejandra Villegas MD   25 mg at 22 0227    oxyCODONE IR (ROXICODONE) tablet 5 mg  5 mg Oral Q4H PRN Alejandra Villegas MD   5 mg at 22 0147    sodium chloride (NS) flush 5-10 mL  5-10 mL IntraVENous Q8H Brayden Rose MD   5 mL at 22 0545    sodium chloride (NS) flush 5-10 mL  5-10 mL IntraVENous PRN Brayden Rose MD           OBJECTIVE:  Patient Vitals for the past 8 hrs:   BP Temp Pulse Resp SpO2   22 0717 101/61 98.3 °F (36.8 °C) (!) 101 20 91 %   22 0229 124/83 99.4 °F (37.4 °C) (!) 112 19 94 %     Temp (24hrs), Av.7 °F (37.1 °C), Min:97.5 °F (36.4 °C), Max:99.4 °F (37.4 °C)    No intake/output data recorded. Physical Exam:  Constitutional: Well developed female in no acute distress, lying comfortably in the hospital bed. HEENT: Normocephalic and atraumatic. Oropharynx is clear, mucous membranes are moist.  Extraocular muscles are intact. Sclerae anicteric. Neck supple. Skin Warm and dry. No bruising and no rash noted. No erythema. No pallor. Respiratory Lungs are clear to auscultation bilaterally without wheezes, rales or rhonchi, normal air exchange without accessory muscle use. CVS Normal rate, regular rhythm and normal S1 and S2.   No murmurs, gallops, or rubs. Abdomen Soft, nontender and distended, normoactive bowel sounds. + distention with large spleen at baseline. Neuro Grossly nonfocal with no obvious sensory or motor deficits. MSK Normal range of motion in general.  No edema and no tenderness. Psych Appropriate mood and affect. Labs:    Recent Results (from the past 24 hour(s))   CBC WITH AUTOMATED DIFF    Collection Time: 03/05/22  6:05 AM   Result Value Ref Range    WBC 12.8 (H) 4.3 - 11.1 K/uL    RBC 2.71 (L) 4.05 - 5.2 M/uL    HGB 8.5 (L) 11.7 - 15.4 g/dL    HCT 25.3 (L) 35.8 - 46.3 %    MCV 93.4 79.6 - 97.8 FL    MCH 31.4 26.1 - 32.9 PG    MCHC 33.6 31.4 - 35.0 g/dL    RDW 18.4 (H) 11.9 - 14.6 %    PLATELET 525 (L) 039 - 450 K/uL    MPV 10.3 9.4 - 12.3 FL    ABSOLUTE NRBC 0.00 0.0 - 0.2 K/uL    DF AUTOMATED      NEUTROPHILS 87 (H) 43 - 78 %    LYMPHOCYTES 6 (L) 13 - 44 %    MONOCYTES 4 4.0 - 12.0 %    EOSINOPHILS 1 0.5 - 7.8 %    BASOPHILS 0 0.0 - 2.0 %    IMMATURE GRANULOCYTES 3 0.0 - 5.0 %    ABS. NEUTROPHILS 11.1 (H) 1.7 - 8.2 K/UL    ABS. LYMPHOCYTES 0.7 0.5 - 4.6 K/UL    ABS. MONOCYTES 0.5 0.1 - 1.3 K/UL    ABS. EOSINOPHILS 0.1 0.0 - 0.8 K/UL    ABS. BASOPHILS 0.1 0.0 - 0.2 K/UL    ABS. IMM. GRANS. 0.4 0.0 - 0.5 K/UL   METABOLIC PANEL, BASIC    Collection Time: 03/05/22  6:05 AM   Result Value Ref Range    Sodium 136 136 - 145 mmol/L    Potassium 3.4 (L) 3.5 - 5.1 mmol/L    Chloride 106 98 - 107 mmol/L    CO2 24 21 - 32 mmol/L    Anion gap 6 (L) 7 - 16 mmol/L    Glucose 112 (H) 65 - 100 mg/dL    BUN 8 8 - 23 MG/DL    Creatinine 0.42 (L) 0.6 - 1.0 MG/DL    GFR est AA >60 >60 ml/min/1.73m2    GFR est non-AA >60 >60 ml/min/1.73m2    Calcium 7.5 (L) 8.3 - 10.4 MG/DL       Imaging:  hydronephrosis. -Urinary Bladder: Unremarkable. -Reproductive Organs: Unremarkable.     -Lymph Nodes: No grossly enlarged retroperitoneal, mesenteric, or pelvic   adenopathy.   -Vasculature:  Aorta is normal caliber.   -Bones: No gross bony lesions.     -Other: No ascites. Impression:     1) Increasing splenomegaly with multiple splenic infarcts. 2) Nonocclusive portal vein thrombosis. 3) Diffuse mesenteric edema. MRI LUMB SPINE W CONT [[id number]]    Order Status: Canceled    XR CHEST PA LAT [[id number]] Collected: 02/28/22 0715   Order Status: Completed Updated: 02/28/22 0721   Narrative:     EXAMINATION: XR CHEST PA LAT 2/28/2022 7:09 AM     ACCESSION NUMBER: [id number]     COMPARISON: 02/27/2022     INDICATION: Fever     TECHNIQUE: PA and lateral views of the chest were obtained. FINDINGS:     Support devices: None     Lungs: Again seen are bilateral airspace opacities throughout the lungs. These   have a similar appearance to the prior exam. There is no effusion. Cardiac Silhouette: Persistently prominent     Mediastinum: The aorta is normal.     Upper Abdomen: Normal     Miscellaneous: There are no lytic and blastic lesions. Impression:     1.  Stable exam with persistent cardiomegaly and bilateral diffuse airspace   opacities. CT HEAD WO CONT [[id number]] Collected: 02/27/22 2120   Order Status: Completed Updated: 02/27/22 2125   Narrative:     Noncontrast CT of the brain. COMPARISON: 2016     INDICATION: Confusion, headache     TECHNIQUE: Contiguous axial images were obtained from the skull base through the   vertex without IV contrast. Radiation dose reduction techniques were used for   this study:  Our CT scanners use one or all of the following: Automated exposure   control, adjustment of the mA and/or kVp according to patient's size, iterative   reconstruction. FINDINGS:     There is no acute intracranial hemorrhage or evidence for acute territorial   infarction. There is no mass effect, midline shift or hydrocephalus. There is no   extra-axial fluid collection. The cerebellum and brainstem are grossly   unremarkable. Encephalomalacia in the right occipital lobe, consistent with old injury.  There is overlying craniotomy defect. Findings similar to 2016 study. Included globes appear intact. The visualized paranasal sinuses and the mastoid   air cells are aerated. There is no acute skull fracture. Impression:       1. No CT evidence of acute intracranial process. XR CHEST PORT [[id number]] Collected: 02/27/22 1843   Order Status: Completed Updated: 02/27/22 1846   Narrative:     EXAMINATION: XR CHEST PORT 2/27/2022 6:33 PM     ACCESSION NUMBER: [id number]     COMPARISON: 2/2/2022     INDICATION: Back pain and fever     TECHNIQUE: A single view of the chest was obtained. FINDINGS:   Support Devices:   *  None     Cardiac Silhouette: Cardiac silhouette is borderline enlarged, similar to prior   studies. Mediastinum: Normal mediastinal contours. Lungs: Bilateral perihilar and basilar predominant airspace opacities with   septal thickening. No pneumothorax or sizable pleural effusion. Upper Abdomen: Normal     Miscellaneous: No fracture or suspicious osseous lesion. Impression:     Bilateral perihilar airspace disease, suspicious for pulmonary edema.           ASSESSMENT:  Problem List  Date Reviewed: 2/8/2022          Codes Class Noted    Body mass index (BMI) of 22.0 to 22.9 in adult ICD-10-CM: Z68.22  ICD-9-CM: V85.1  8/26/2021        * (Principal) Fever ICD-10-CM: R50.9  ICD-9-CM: 780.60  2/27/2022        Leukocytosis ICD-10-CM: J83.745  ICD-9-CM: 288.60  2/27/2022        Sepsis ([facility] ICD-10-CM: A41.9  ICD-9-CM: 038.9, 995.91  2/2/2022        Fever of unknown origin ICD-10-CM: R50.9  ICD-9-CM: 780.60  2/2/2022        Thrombocytopenia, unspecified ICD-10-CM: D69.6  ICD-9-CM: 287.5  10/15/2021        Opioid use, unspecified with unspecified opioid-induced disorder ICD-10-CM: F11.99  ICD-9-CM: 292.9, 305.50  9/17/2021        Cellulitis of leg, right ICD-10-CM: L03.115  ICD-9-CM: 682.6  8/8/2021        Chronic migraine without aura with status migrainosus, not intractable ICD-10-CM: T85.033  ICD-9-CM: 346.72  11/1/2016        Lung nodule ICD-10-CM: R91.1  ICD-9-CM: 793.11  10/28/2016    Overview Signed 10/28/2016 10:05 AM by Susana Soto     IMPRESSION:    1. Lobulated 2 cm mass in the right lung apex, malignancy versus an atypical  inflammatory process. 2. Occlusion of the right subclavian vein. 3. Chronic occlusion of the portal vein with associated abnormal tissue  encasing the common bile duct, most likely fibrosis. 4. Splenomegaly. DVT (deep venous thrombosis) ([facility] ICD-10-CM: I82.409  ICD-9-CM: 453.40  10/26/2016    Overview Signed 10/26/2016 10:27 PM by Leonila Majano. Right subclavian               Analgesic rebound headache ICD-10-CM: G44.40, T39.95XA  ICD-9-CM: 339.3, E935.9  9/22/2016        Headache, chronic daily ICD-10-CM: R51.9  ICD-9-CM: 784.0  9/22/2016        Migraine with aura and with status migrainosus, not intractable ICD-10-CM: G43. 101  ICD-9-CM: 346.03  9/22/2016        Left homonymous hemianopsia ICD-10-CM: H53.462  ICD-9-CM: 368.46  3/30/2015        Hyponatremia ICD-10-CM: E87.1  ICD-9-CM: 276.1  3/30/2015        Chronic myelogenous leukemia ([facility] ICD-10-CM: C92.10  ICD-9-CM: 205.10  3/30/2015        Acquired hypercoagulable state ([facility] (Chronic) ICD-10-CM: E08.42  ICD-9-CM: 289.81  3/30/2015        AKOSUA (iron deficiency anemia) ICD-10-CM: D50.9  ICD-9-CM: 280.9  7/28/2012        Cirrhosis (HCC) (Chronic) ICD-10-CM: K74.60  ICD-9-CM: 571.5  7/27/2012        Ascites ICD-10-CM: R18.8  ICD-9-CM: 789.59  7/26/2012        Polycythemia vera ([address] (Chronic) ICD-10-CM: D45  ICD-9-CM: 238.4  2/16/2009    Overview Addendum 8/26/2021  2:31 PM by Reny Miller MD     2/2008 by [email or web address] analysis however portal vein thrombosis  And and splenomegaly since 2004  Hydroxyurea for 6 months poor tolerance spleen did not shrink   Pegasys 90 mcg weekly 4-2009 till    Restarted 12-30-09 45 mcg q 2 weeks  2-2010 reduced to q month   Increased 45 mcg 2/month 4-2010  Held 8-2010 thrombocytopenia  12-29-12 restarted 45 mcg q 2 weeks  4-1-11 45 mcg q 3 weeks  4-22-11 45 mcg q 4 weeks  Uncertain dosing thereafter   Possibly q 3 weeks 10-15-11 and held and restarted on 4-1-12 6-12 ct scan Small bowel wall thickening suggesting an enteritis. In addition, there is well thickening of the right colon which can suggest an additional   colitis although this can also be seen with severe portal hypertension. Likely reactive edematous changes it scattered fluid collections are seen of   the small bowel mesentery. . Apparent occlusion of the portal vein. In addition, the splenic vein, and superior mesenteric vein are not definitely seen and are also likely occluded. Splenomegaly, and extensive varices are seen as described above consistent with the portal vein occlusion. Erleen Amin Heterogeneous enhancement of the liver and mild periportal edema. An acute hepatitis cannot be excluded. Primary hypothyroidism ICD-10-CM: E03.9  ICD-9-CM: 244.9  2/16/2009        Splenomegaly ICD-10-CM: R16.1  ICD-9-CM: 789.2  2/16/2009        Esophageal varices (HCC) (Chronic) ICD-10-CM: I85.00  ICD-9-CM: 456.1  2/16/2009    Overview Addendum 8/26/2021  2:30 PM by Duane Calle MD     grade 3             Portal vein thrombosis ICD-10-CM: E23  ICD-9-CM: 883  2/16/2009    Overview Addendum 8/26/2021  2:31 PM by Duane Calle MD     Ct scan 6-21-2 Apparent occlusion of the portal vein. In addition, the splenic vein, and superior mesenteric vein are not definitely seen and are also likely  occluded. Splenomegaly, and extensive varices are seen as described above consistent with the portal vein occlusion 7-05-12 on arixtra HIT negative on repeat 7-27-12 re admitted Cirrhotic appearance of the liver. Mild to moderate ascites                 Ms. Elaine Yuan is a 64 y.o. female admitted on 2/27/2022.  The primary encounter diagnosis was Sepsis, due to unspecified organism, unspecified whether acute organ dysfunction present ([facility] Diagnoses of Leukocytosis, unspecified type, Fever, unspecified fever cause, Confusion and disorientation, and Meningitis due to gram-negative bacteria were also pertinent to this visit. Treva Huddleston Her PMH includes vasculitis, hypothyroidism, cirrhosis, splenomegaly, esophageal varices, anemia, migraines, hx PE 2006, recurrent DVTs/portal vein thrombosis on Lovenox, and seizures. She is a patient of Dr. 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 She was recently admitted from 2/2 - 2/6/22 for E Coli bacteremia. She was treated with Merrem and discharged on Vantin. She returned to ED on 2/27 with c/o fever and headache with associated photophobia. She reports she has been sick for 2 weeks. She endorses malaise, weakness, nausea, and confusion. Lactic acid 2.8, PCT 26. pBNP 4430. CT head neg. CXR on 2/27 with b/l perihilar airspace disease, suspicious for pulm edema. Repeat CXR 2/28 with stable findings with persistent cardiomegaly and b/l diffuse airspace opacities. She is s/p LP on 2/27 with low glucose and normal protein, WBC 2630 with 88% neutrophils, RBC 18. BCx +GNR, I/S pending. UCx-NGTD. CSF Cx-NGTD. On Cef/Vanc. We were consulted for recommendations for our patient. RECOMMENDATIONS:  Polycythemia Vera  - on Jakafi, currently held    CML  - on Dasatinib, currently held    Sepsis / [facility] bacteremia / E Coli Meningitis  - BCx +GNR, I/S pending  - UCx - NGTD  - s/p LP on 2/27 with low glucose, normal protein, WBC 2630 with 88% neutrophils. CSF Cx-NGTD. Add on meningitis panel if enough CSF remains to send. - On Cef/Vanc  - Given recurrent infections, check immunoglobulins and HIV  3/1 BCx +EColi. Meningitis panel +EColi. CSF Cx-NGTD. UCx-NGTD. HIV neg. IgG level 888. On Cef/Vanc. ID consulted. Will defer need for steroids to ID. CT AP pending. TTE neg for vegetation on 2/3.   Would pt benefit from ROSELINE?      3/2 Remains afebrile. CSF-NGTD. UCx with normal skin sarath. CT AP with increased splenomegaly with multiple splenic infarcts, nonocclusive portal vein thrombosis, and diffuse mesenteric edema. Unable to complete MRI L-spine d/t ascites and splenomegaly. On CTX. ID DC'd Cef/Vanc. ID has reached out to radiology to re-attempt MRI. ? Does LP need to be repeated? 3/3 Remains afebrile. CSF Cx +EColi. On CTX. ID following. Pt declined MRI this AM.  Recommended eval for paracentesis so MRI could be adequately performed. She states she does not want to undergo any procedures that would risk any further infection. She states she will not trust findings from the MRI. She is requesting transfer to [facility]. 3/5/22 MRI without abscess or discitis but limited due to lack of IV contrast and movement. Afebrile. Repeat blood cultures negative. She is expecting long term IV antibiotics - will await recommendations from ID. Anemia / Thrombocytopenia  - likely r/t current illness  - Check iron studies, B12, folate, Vit D, hemolysis labs, DIC labs, HIT profile, smear  - Transfuse prn to keep Hgb >7 and Plt >10k or >50k with active bleeding  3/1 Hgb stable 8.5. Plt up to 80k. No hemolysis noted. Tsat 19% with ferritin 835. No B12, or folate deficiency noted. Vit D low at 15.5, replete. Smear pending. 3/2 Hgb stable 8.4. Plt 99k. Smear with no schistocytes and absolute neutrophilia with toxic changes. 3/5 Hgb 8.5, plt 115. Goals and plan of care reviewed with the patient. All questions answered to the best of our ability. Thank you for allowing us to participate in the care of Ms. Couch. She will need to f/u with Dr. Radha Platt within one week of discharge.          Kourtney Mondragon NP   [facility] On[facility]  [address] [address]  Office : [phone]  Fax : [phone]

## 2022-03-05 NOTE — PROGRESS NOTES
Problem: Self Care Deficits Care Plan (Adult)  Goal: *Acute Goals and Plan of Care (Insert Text)  Outcome: Progressing Towards Goal  Note: 1. Patient will perform grooming with supervision. GOAL MET 3/5/2022    2. Patient will perform Upper body dressing with supervision  3. Patient will perform lower body dressing with SBA. 4. Patient will perform upper and lower body bathing with SBA. 5. Patient will perform toilet transfers with SBA. GOAL MET 3/5/2022    6. Patient will perform shower transfer with CGA. 7. Patient will participate in 30 + minutes of ADL/ therapeutic exercise/therapeutic activity with min rest breaks to increase activity tolerance for self care. PROGRESSING  8. Patient will perform ADL functional mobility in room with SBA. GOAL MET 3/5/2022      Goals to be achieved in 7 days. OCCUPATIONAL THERAPY: Daily Note and AM 3/5/2022  INPATIENT: OT Visit Days: 1  Payor: Carol Dailey / Plan: [id number] / Product Type: [employer] acquired by [employer] Medicare /      NAME/AGE/GENDER: Katina Flores is a 64 y.o. female   PRIMARY DIAGNOSIS:  Fever [R50.9] Fever Fever       ICD-10: Treatment Diagnosis:    Generalized Muscle Weakness (M62.81)  Other lack of cordination (R27.8)  Difficulty in walking, Not elsewhere classified (R26.2)  Other abnormalities of gait and mobility (R26.89)   Precautions/Allergies:     Latex, Sulfa (sulfonamide antibiotics), Tramadol, Augmentin [amoxicillin-pot clavulanate], Divalproex sodium, Morphine, Potassium clavulanate, Rizatriptan, Tetanus immune globulin, Vancomycin, Xanax [alprazolam], and Zonegran [zonisamide]      ASSESSMENT:     Ms. Leeann Leventhal presents supine in bed. She has and IV and o2 in place. She transferred oob with min /CGA . She stood and took steps back and for and to the recliner with min to CGA for assistance with the walker. She sat in recliner and changed her gown.  She is grossly min assist with ADLS due to weakness and difficulty reaching her feet. She does lives alone and was independent prior. She does not drive and she lives in a two story apartment with her bedroom upstairs. She would benefit from STR but if going home with recommend [place] OT and PT as well as assistance from family or friends. She is working with the nurse. Will follow. 3/5/1525 Patient supine in bed. She transferred supine to sit with SBA. OT assisted her with shoe donning and lace tying. She ambulated to the bathroom with SBA with RW. She toileted and washed her hands. She wanted to ambulate down the hallway with SBA. Physical therapy began working with patient. She plans to discharge home with assist from family. Recommend  OT and PT. BSC and RW. Will continue with OT poc. This section established at most recent assessment   PROBLEM LIST (Impairments causing functional limitations):  Decreased Strength  Decreased ADL/Functional Activities  Decreased Transfer Abilities  Decreased Ambulation Ability/Technique  Decreased Balance  Decreased Activity Tolerance  Decreased Cognition   INTERVENTIONS PLANNED: (Benefits and precautions of occupational therapy have been discussed with the patient.)  Activities of daily living training  Adaptive equipment training  Balance training  Clothing management  Cognitive training  Donning&doffing training  Hygiene training  Neuromuscular re-eduation  Therapeutic activity  Therapeutic exercise     TREATMENT PLAN: Frequency/Duration: Follow patient 3 times to address above goals. Rehabilitation Potential For Stated Goals: Good     REHAB RECOMMENDATIONS (at time of discharge pending progress):    Placement: It is my opinion, based on this patient's performance to date, that Ms. Luis Fernando Lopez may benefit from intensive therapy at a [address] [place] [address] after discharge due to the functional deficits listed above that are likely to improve with skilled rehabilitation and concerns that he/she may be unsafe to be unsupervised at home due to current level of assistance . Patient wants to go home with [facility] OT and PT  Equipment:   BSC and RW              OCCUPATIONAL PROFILE AND HISTORY:   History of Present Injury/Illness (Reason for Referral):  See H and P  Past Medical History/Comorbidities:   Ms. Ravi Pickering  has a past medical history of Acute blood loss anemia (4/6/2018), Anemia, C. difficile diarrhea (4/1/2015), Cerebral edema ([address] (4/1/2015), Chronic migraine (9/22/2016), Chronic myelogenous leukemia ([address] Chronic pain, Coagulation disorder ([address] Esophageal spasm, Esophageal varices ([address] GI bleed (8/3/2016), H/O craniotomy, Hematemesis (8/20/2021), Leukocytosis (3/14/2015), Melena (8/20/2021), MRSA colonization (6/25/2012), Polycythemia vera(238.4), Portal hypertension ([address] Portal vein thrombosis (6/20/2012), Primary hypothyroidism, Pulmonary embolism ([address] (2006), S/P exploratory laparotomy, 6/20/12  (6/25/2012), S/P small bowel resection, Seizure ([address] (3/14/2015), Seizure disorder ([address] (3/30/2015), Seizures ([address] Splenomegaly, congestive, chronic, Stroke (cerebrum) ([address] (4/11/2015), Stroke ([address] Thrombocytopenia, 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 She has no past medical history of Adverse effect of anesthesia, Difficult intubation, Malignant hyperthermia due to anesthesia, or Pseudocholinesterase deficiency. Ms. Ravi Pickering  has a past surgical history that includes hx gyn; hx gyn; neurological procedure unlisted (2010-12); hx gi; hx gi; pr abdomen surgery proc unlisted; hx cholecystectomy; pr abdomen surgery proc unlisted; hx appendectomy; hx breast biopsy (Bilateral); and hx orthopaedic (Left, 2008).   Social History/Living Environment:   Home Environment: Apartment (town home)  # Steps to Enter: 0  One/Two Story Residence: Two story  # of Interior Steps: 13  Interior Rails: Right  Living Alone: Yes  Support Systems: Child(lauryn),Friend/Neighbor  Patient Expects to be Discharged to[de-identified] Home with home health  Current DME Used/Available at Home: None  Tub or Shower Type: Shower  Prior Level of Function/Work/Activity:  Mod I lives alone     Number of Personal Factors/Comorbidities that affect the Plan of Care: Brief history (0):  LOW COMPLEXITY   ASSESSMENT OF OCCUPATIONAL PERFORMANCE[de-identified]   Activities of Daily Living:   Basic ADLs (From Assessment) Complex ADLs (From Assessment)   Feeding: Supervision  Oral Facial Hygiene/Grooming: Supervision  Bathing: Minimum assistance  Upper Body Dressing: Minimum assistance  Lower Body Dressing: Minimum assistance  Toileting: Minimum assistance     Grooming/Bathing/Dressing Activities of Daily Living     Cognitive Retraining  Safety/Judgement: Awareness of environment; Fall prevention           Toileting  Toileting Assistance: Stand-by assistance  Bladder Hygiene: Stand-by assistance  Clothing Management: Stand-by assistance  Adaptive Equipment: Grab bars; Walker     Functional Transfers  Bathroom Mobility: Stand-by assistance  Toilet Transfer : Stand-by assistance     Bed/Mat Mobility  Supine to Sit: Stand-by assistance  Sit to Stand: Stand-by assistance  Stand to Sit: Stand-by assistance  Scooting: Stand-by assistance     Most Recent Physical Functioning:   Gross Assessment:                  Posture:     Balance:  Sitting: Intact  Standing: With support Bed Mobility:  Supine to Sit: Stand-by assistance  Scooting: Stand-by assistance  Wheelchair Mobility:     Transfers:  Sit to Stand: Stand-by assistance  Stand to Sit: Stand-by assistance            Patient Vitals for the past 6 hrs:   BP BP Patient Position SpO2 Pulse   03/05/22 1126 108/70 Supine 96 % 92   03/05/22 1530 (!) 102/57 Lying right side 93 % 96       Mental Status  Neurologic State: Alert,Appropriate for age  Orientation Level: Appropriate for age  Cognition: Appropriate decision making,Appropriate for age attention/concentration,Appropriate safety awareness,Follows commands  Perception: Appears intact  Perseveration: No perseveration noted  Safety/Judgement: Awareness of environment,Fall prevention                          Physical Skills Involved:  Balance  Strength  Activity Tolerance Cognitive Skills Affected (resulting in the inability to perform in a timely and safe manner):  Perception Psychosocial Skills Affected:  Habits/Routines  Environmental Adaptation  Self-Awareness   Number of elements that affect the Plan of Care: 5+:  HIGH COMPLEXITY   CLINICAL DECISION MAKIN[address] [place] [address] AM-PAC 6 Clicks   Daily Activity Inpatient Short Form  How much help from another person does the patient currently need. .. Total A Lot A Little None   1. Putting on and taking off regular lower body clothing? [] 1   [] 2   [x] 3   [] 4   2. Bathing (including washing, rinsing, drying)? [] 1   [] 2   [x] 3   [] 4   3. Toileting, which includes using toilet, bedpan or urinal?   [] 1   [] 2   [x] 3   [] 4   4. Putting on and taking off regular upper body clothing? [] 1   [] 2   [x] 3   [] 4   5. Taking care of personal grooming such as brushing teeth? [] 1   [] 2   [] 3   [x] 4   6. Eating meals? [] 1   [] 2   [] 3   [x] 4   © , Trustees of [address] [place] [address], under license to [employer]. All rights reserved      Score:  Initial: 20 Most Recent: X (Date: -- )    Interpretation of Tool:  Represents activities that are increasingly more difficult (i.e. Bed mobility, Transfers, Gait).      Medical Necessity:     · Patient is expected to demonstrate progress in   · Self care skills and functional mobility  ·     Reason for Services/Other Comments:  · Patient continues to require skilled intervention due to   · Above listed deficits     Use of outcome tool(s) and clinical judgement create a POC that gives a: LOW COMPLEXITY         TREATMENT:   (In addition to Assessment/Re-Assessment sessions the following treatments were rendered)     Pre-treatment Symptoms/Complaints:    Pain: Initial:   Pain Intensity 1: 0  Post Session:  0/10     Self Care: (25): Procedure(s) (per grid) utilized to improve and/or restore self-care/home management as related to dressing, bathing, toileting, grooming and functional mobility. Required minimal visual, verbal, manual, and tactile cueing to facilitate activities of daily living skills and compensatory activities. Braces/Orthotics/Lines/Etc:   IV  Treatment/Session Assessment:    Response to Treatment:  tolerated well  Interdisciplinary Collaboration:   Physical Therapist  Occupational Therapist  Registered Nurse    After treatment position/precautions:   walking in hallway with PT   Compliance with Program/Exercises: Compliant all of the time. Recommendations/Intent for next treatment session: \"Next visit will focus on advancements to more challenging activities and reduction in assistance provided\".   Total Treatment Duration:25  OT Patient Time In/Time Out  Time In: 3430  Time Out: [address], OT

## 2022-03-06 NOTE — PROGRESS NOTES
Problem: Falls - Risk of  Goal: *Absence of Falls  Description: Document Vilma Cotto Fall Risk and appropriate interventions in the flowsheet.   Outcome: Progressing Towards Goal  Note: Fall Risk Interventions:  Mobility Interventions: Bed/chair exit alarm    Mentation Interventions: Door open when patient unattended    Medication Interventions: Patient to call before getting OOB    Elimination Interventions: Call light in reach,Patient to call for help with toileting needs

## 2022-03-06 NOTE — PROGRESS NOTES
END OF SHIFT NOTE:    Intake/Output  03/06 0701 - 03/06 1900  In: 120 [P.O.:120]  Out: 700 [Urine:700]   Voiding: YES  Catheter: NO  Drain:              Stool:  0 occurrences. Stool Assessment  Stool Color: Brown;Black (03/05/22 0920)  Stool Appearance: Soft (03/05/22 0920)  Stool Amount: Medium (03/05/22 0920)  Stool Source/Status: Rectum (03/05/22 0920)    Emesis:  0 occurrences. VITAL SIGNS  Patient Vitals for the past 12 hrs:   Temp Pulse Resp BP SpO2   03/06/22 1452 98.1 °F (36.7 °C) 86 18 99/62 95 %   03/06/22 1051 98.1 °F (36.7 °C) 85 18 100/63 90 %   03/06/22 0737 97.7 °F (36.5 °C) 89 16 98/63 96 %       Pain Assessment  Pain 1  Pain Scale 1: Numeric (0 - 10) (03/06/22 1715)  Pain Intensity 1: 7 (03/06/22 1715)  Patient Stated Pain Goal: 0 (03/06/22 0835)  Pain Reassessment 1: Patient resting w/respiratory rate greater than 10 (03/06/22 1422)  Pain Onset 1: pta (03/06/22 0152)  Pain Location 1: Back (03/06/22 1715)  Pain Orientation 1: Lower;Mid (03/06/22 0152)  Pain Description 1: Aching (03/06/22 1715)  Pain Intervention(s) 1: Medication (see MAR) (03/06/22 1715)    Ambulating  Yes    Additional Information:     Shift report given to oncoming nurse at the bedside.     Marcela Monzon RN

## 2022-03-06 NOTE — PROGRESS NOTES
Patient's sister called several times concerning patient going home to her apartment with home health. Assured sister that they were no active discharge orders today. [place] and is just concerned with her being by herself. I advised that the doctor would only contact the first of kind since they can not contact everyone if needed.

## 2022-03-06 NOTE — PROGRESS NOTES
Progress Note    Patient: Reny Santiago MRN: [id number]  SSN: [id number]    YOB: 1960  Age: 64 y.o. Sex: female      Admit Date: 2/27/2022    LOS: 7 days     Assessment and Plan:   63 y/o F PMHx chronic anemia, CML, esophageal varices who presents to the ER with report of fever and headache for the past several days    1.  E. coli bacteremia in the setting of bacterial meningitis  -Continue ceftriaxone  -MRI of the lumbar spine without signs of epidural abscess  -Symptomatic management  -Infectious disease recommendations appreciated    2. CML on dasitinib  -Oncology recommendations appreciated    3. PVC on Jakafi  -Oncology recommendations appreciated    4. Hypothyroidism  -Continue levothyroxine    5. GERD  -Continue Protonix    6. Portal vein thrombosis  -Continue Lovenox  -Oncology recommendations appreciated    DVT prophylaxis on Lovenox    Subjective:   63 y/o F PMHx chronic anemia, CML, esophageal varices who presents to the ER with report of fever and headache for the past several days. Patient seen and examined at bedside. This morning having some back pain. Otherwise denies any chest pain, no shortness of breath, no abdominal pain. Objective:     Vitals:    03/05/22 2317 03/06/22 0311 03/06/22 0530 03/06/22 0737   BP: 124/75 (!) 105/59  98/63   Pulse: (!) 105 100  89   Resp:  17  16   Temp: 98 °F (36.7 °C) (!) 100.8 °F (38.2 °C) 98.3 °F (36.8 °C) 97.7 °F (36.5 °C)   SpO2: 93% 90%  96%   Weight:       Height:            Intake and Output:  Current Shift: 03/06 0701 - 03/06 1900  In: 0   Out: 700 [Urine:700]  Last three shifts: 03/04 1901 - 03/06 0700  In: 978 [P. O.:600;  I.V.:378]  Out: 600 [Urine:600]    ROS  10 ROS negative except from stated on subjective    Physical Exam:   General: Alert, oriented, NAD  HEENT: NC/AT, EOM are intact  Neck: supple, no JVD  Cardiovascular: RRR, S1, S2, no murmurs  Respiratory: Lungs are clear, no wheezes or rales  Abdomen: Soft, NT, ND  Back: No CVA tenderness, no paraspinal tenderness  Extremities: LE without pedal edema, no erythema  Neuro: A&O, CN are intact, no focal deficits  Skin: no rash or ulcers  Psych: good mood and affect    Lab/Data Review:  I have personally reviewed patients laboratory data showing  Recent Results (from the past 24 hour(s))   CBC WITH AUTOMATED DIFF    Collection Time: 03/06/22  4:27 AM   Result Value Ref Range    WBC 13.4 (H) 4.3 - 11.1 K/uL    RBC 2.72 (L) 4.05 - 5.2 M/uL    HGB 8.6 (L) 11.7 - 15.4 g/dL    HCT 25.5 (L) 35.8 - 46.3 %    MCV 93.8 79.6 - 97.8 FL    MCH 31.6 26.1 - 32.9 PG    MCHC 33.7 31.4 - 35.0 g/dL    RDW 18.4 (H) 11.9 - 14.6 %    PLATELET 699 (L) 318 - 450 K/uL    MPV 10.3 9.4 - 12.3 FL    ABSOLUTE NRBC 0.00 0.0 - 0.2 K/uL    DF AUTOMATED      NEUTROPHILS 88 (H) 43 - 78 %    LYMPHOCYTES 5 (L) 13 - 44 %    MONOCYTES 4 4.0 - 12.0 %    EOSINOPHILS 0 (L) 0.5 - 7.8 %    BASOPHILS 0 0.0 - 2.0 %    IMMATURE GRANULOCYTES 2 0.0 - 5.0 %    ABS. NEUTROPHILS 11.8 (H) 1.7 - 8.2 K/UL    ABS. LYMPHOCYTES 0.7 0.5 - 4.6 K/UL    ABS. MONOCYTES 0.5 0.1 - 1.3 K/UL    ABS. EOSINOPHILS 0.1 0.0 - 0.8 K/UL    ABS. BASOPHILS 0.0 0.0 - 0.2 K/UL    ABS. IMM. GRANS. 0.3 0.0 - 0.5 K/UL   METABOLIC PANEL, BASIC    Collection Time: 03/06/22  4:27 AM   Result Value Ref Range    Sodium 133 (L) 136 - 145 mmol/L    Potassium 3.4 (L) 3.5 - 5.1 mmol/L    Chloride 103 98 - 107 mmol/L    CO2 24 21 - 32 mmol/L    Anion gap 6 (L) 7 - 16 mmol/L    Glucose 121 (H) 65 - 100 mg/dL    BUN 8 8 - 23 MG/DL    Creatinine 0.42 (L) 0.6 - 1.0 MG/DL    GFR est AA >60 >60 ml/min/1.73m2    GFR est non-AA >60 >60 ml/min/1.73m2    Calcium 7.8 (L) 8.3 - 10.4 MG/DL      All Micro Results     Procedure Component Value Units Date/Time    CULTURE, BLOOD [[id number]] Collected: 03/01/22 0073    Order Status: Completed Specimen: Blood Updated: 03/06/22 0729     Special Requests: --        LEFT  ARM       Culture result: NO GROWTH 5 DAYS       S. PNEUMO AG, UR/CSF [[id number]] Collected: 02/27/22 2200    Order Status: Completed Specimen: Miscellaneous sample Updated: 03/04/22 1735     Source CEREBROSPINAL FLUID        Specimen Comment        Comment: (NOTE)  Cerebrospinal fluid (CSF)          Streptococcus pneumoniae Ag Negative        Fluid culture Comment        Comment: (NOTE)  No growth in 56 - 72 hours. Organism ID Comment        Comment: (NOTE)  No growth in 56 - 72 hours. Performed At: [facility] & [address] [id number]  Quita Pete MD KA:[id number]          Please note Comment        Comment: (NOTE)  College of American Pathologists standards require a culture to be  performed on CSF specimens submitted for bacterial antigen testing. (CAP [id number]) Urine specimens will not be cultured. Performed At: [facility]  [facility] [address] [id number]  Quita Pete MD NE:[id number]         CULTURE, CSF [address] [[id number]]  (Abnormal)  (Susceptibility) Collected: 02/27/22 2200    Order Status: Completed Specimen: Cerebrospinal Fluid Updated: 03/03/22 0722     Special Requests: TUBE 3     GRAM STAIN 0 TO 5 WBCS SEEN PER OIF      NO DEFINITE ORGANISM SEEN        Culture result:       ESCHERICHIA COLI ISOLATED FROM THIO BROTH ONLY                  RESULTS VERIFIED, PHONED TO AND READ BACK BY  Afsaneh Gonzalez RN @1313 3.2.22 SC      CULTURE, URINE [[id number]] Collected: 02/27/22 1949    Order Status: Completed Specimen: Cath Urine Updated: 03/02/22 0655     Special Requests: NO SPECIAL REQUESTS        Culture result:       <10,000 COLONIES/mL NORMAL SKIN JOSÉ ISOLATED          BLOOD CULTURE [[id number]]  (Abnormal) Collected: 02/27/22 1817    Order Status: Completed Specimen: Blood Updated: 03/02/22 0644     Special Requests: --        RIGHT  Antecubital       GRAM STAIN GRAM NEGATIVE RODS               CRITICAL RESULT NOT CALLED DUE TO PREVIOUS NOTIFICATION OF CRITICAL RESULT WITHIN THE LAST 24 HOURS. AEROBIC AND ANAEROBIC BOTTLES           Culture result: GRAM NEGATIVE RODS               For identification and susceptibility refer to culture  [facility] [id number]      BLOOD CULTURE [[id number]]  (Abnormal)  (Susceptibility) Collected: 02/27/22 1814    Order Status: Completed Specimen: Blood Updated: 03/02/22 0643     Special Requests: --        NO SPECIAL REQUESTS  LEFT  Antecubital       GRAM STAIN GRAM NEGATIVE RODS               RESULTS VERIFIED, PHONED TO AND READ BACK BY JESUS RENE RN BY NB AT [place] [address] [place] [id number]                  AEROBIC AND ANAEROBIC BOTTLES           Culture result: ESCHERICHIA COLI               Refer to Blood Culture ID Panel Accession  [id number]      MENINGITIS PATHOGENS PANEL, CSF (BY PCR) [[id number]] Collected: 02/27/22 2200    Order Status: Completed Specimen: Cerebrospinal Fluid Updated: 02/28/22 1930     Meningitis panel       RESULTS SCANNED IN [facility]          BLOOD CULTURE ID PANEL [[id number]]  (Abnormal) Collected: 02/27/22 1814    Order Status: Completed Specimen: Blood Updated: 02/28/22 1314     Acc. no. from Micro Order [id number]     Escherichia coli Detected        Comment: RESULTS VERIFIED, PHONED TO AND READ BACK BY  Joaquim Chen RN AT 13:11 02.28.2022          KPC (Carbapenem Resistance Gene) NOT DETECTED        Comment: WARNING:  A Not Detected result for the KPC gene does not indicate susceptibility to carbapenems. Gram negative bacteria can be resistant to carbapenems by mechanisms other than carrying the KPC gene. INTERPRETATION       Gram negative gopi.  Identified by realtime PCR as E. coli          MENINGITIS PATHOGENS PANEL, CSF (BY PCR) [[id number]] Collected: 02/28/22 1100    Order Status: Canceled     MENINGITIS PATHOGENS PANEL, CSF (BY PCR) [[id number]] Collected: 02/28/22 1030    Order Status: Canceled Specimen: Cerebrospinal Fluid     COVID-19 RAPID TEST [[id number]] Collected: 02/27/22 1907    Order Status: Completed Specimen: Nasopharyngeal Updated: 02/27/22 1938     Specimen source NASAL SWAB        COVID-19 rapid test Not detected        Comment:      The specimen is NEGATIVE for SARS-CoV-2, the novel coronavirus associated with COVID-19. A negative result does not rule out COVID-19. This test has been authorized by the FDA under an Emergency Use Authorization (EUA) for use by authorized laboratories. Fact sheet for Healthcare Providers: ConventionUpdate.co.nz  Fact sheet for Patients: ConventionUpdate.co.nz       Methodology: Isothermal Nucleic Acid Amplification                Image:  I have personally reviewed patients imaging showing  CT abdomen and pelvis  1) Increasing splenomegaly with multiple splenic infarcts. 2) Nonocclusive portal vein thrombosis. 3) Diffuse mesenteric edema. Hospital problems     Patient Active Problem List   Diagnosis Code    Polycythemia vera ([facility] D45    Ascites R18.8    Cirrhosis (HCC) K74.60    AKOSUA (iron deficiency anemia) D50.9    Left homonymous hemianopsia H53.462    Hyponatremia E87.1    Chronic myelogenous leukemia (HCC) C92.10    Acquired hypercoagulable state ([facility] D68.59    Analgesic rebound headache G44.40, T39.95XA    Headache, chronic daily R51.9    Migraine with aura and with status migrainosus, not intractable G43. 101    DVT (deep venous thrombosis) (HCC) I82.409    Lung nodule R91.1    Chronic migraine without aura with status migrainosus, not intractable G43.701    Primary hypothyroidism E03.9    Splenomegaly R16.1    Esophageal varices (HCC) I85.00    Portal vein thrombosis I81    Cellulitis of leg, right L03.115    Body mass index (BMI) of 22.0 to 22.9 in adult Z68.22    Opioid use, unspecified with unspecified opioid-induced disorder F11.99    Thrombocytopenia, unspecified D69.6    Sepsis (HCC) A41.9    Fever of unknown origin R50.9    Fever R50.9    Leukocytosis D72.829        Signed By: Antonina Cohen MD     March 6, 2022

## 2022-03-07 NOTE — PROGRESS NOTES
ID recommendations remain unchanged:    Impression:   · Recurrent E coli bacteremia  · Bacterial meningitis, E coli  · CML on dasitinib  · PCV on jakafi  · Portal vein thrombosis/splenomegaly/multiple splenic infarcts     Plan:   · Continue ceftriaxone 2g IV q12 hours through 3/13/2022  · Weekly CBCd, AST, Alt, Cr while on therapy - she has labs from today so would not necessarily need any further from the standpoint of abx monitoring  · She is ok for line placement today, HHN to remove this at EOT    Antibiotics can be given through line currently in place. I will send orders to case management in case she discharges home prior to completion of therapy. No ID follow up required. ID will sign off.

## 2022-03-07 NOTE — PROGRESS NOTES
[facility] Hematology & Oncology        Inpatient Hematology / Oncology Progress Note    Reason for Consult:  Fever [R50.9]  Referring Physician:  Dwight Beavers MD    24 Hour Events:  Afebrile, VSS   CTX for E coli meningitis/bactermia  ID following - EOT 3/13  Overall, feeling much improved    ROS:  Constitutional: +fatigue, weakness. Negative for fever. CV: Negative for chest pain, palpitations, edema. Respiratory: Negative for dyspnea, cough, wheezing. GI: Negative for nausea, diarrhea. + chronic abd pain/swelling from splenomegaly. Neuro: +HA but improved. MSK: +back pain stable. 10 point review of systems is otherwise negative with the exception of the elements mentioned above in the HPI. Allergies   Allergen Reactions    Latex Other (comments)     Testing for latex allergy was positive as a 2 (on a scale of 1 to 3).     Sulfa (Sulfonamide Antibiotics) Anaphylaxis    Tramadol Other (comments)     Top lip swelled    Augmentin [Amoxicillin-Pot Clavulanate] Rash    Divalproex Sodium Hives    Morphine Nausea and Vomiting     Not a true allergy    Potassium Clavulanate Hives    Rizatriptan Rash    Tetanus Immune Globulin Other (comments)     Heat, bruising, and swelling at  Site of injection    Vancomycin Rash    Xanax [Alprazolam] Other (comments)     Hallucinations    Zonegran [Zonisamide] Rash     Past Medical History:   Diagnosis Date    Acute blood loss anemia 4/6/2018    Anemia     C. difficile diarrhea 4/1/2015    Cerebral edema (HCC) 4/1/2015    Chronic migraine 9/22/2016    Chronic myelogenous leukemia ([facility]     Nye chromosome/ converted from polycythemia in 2009- to 2012 when she was dx w leukemia    Chronic pain     Coagulation disorder ([facility]     \"clotting and Bleeding Problem\" dr Arianna Davidson follows     Esophageal spasm     with banding     Esophageal varices (HCC)     grade 3    GI bleed 8/3/2016    H/O craniotomy     3-29-15.  due to blood clot - which caused a seizure  - then pt fell and hit     Hematemesis 8/20/2021    Leukocytosis 3/14/2015    Melena 8/20/2021    MRSA colonization 6/25/2012    Polycythemia vera(238.4)     JAK2 mutation    Portal hypertension (HCC)     with varices    Portal vein thrombosis 6/20/2012    Ct scan 6-21-2 Apparent occlusion of the portal vein. In addition, the splenic vein, and superior mesenteric vein are not definitely seen and are also likely  occluded. Splenomegaly, and extensive varices are seen as described above consistent with the portal vein occlusion 7-05-12 on arixtra HIT negative on repeat 7-27-12 re admitted Cirrhotic appearance of the liver. Mild to moderate ascites, as    Primary hypothyroidism     Pulmonary embolism ([address] 2006    not on coumadin anymore     S/P exploratory laparotomy, 6/20/12 6/25/2012    Bowel resection:  336 cm of small bowel removed, approximately 9 feet and placement of feeding jejunostomy.      S/P small bowel resection     2012.  9 feet removed due to  gangrene which wa due to blood clot    Seizure ([address] 3/14/2015    Seizure disorder ([address] 3/30/2015    due to clotting factor    Seizures ([address]     last one 3- - followed by aniyah     Splenomegaly, congestive, chronic     Stroke (cerebrum) ([address] 4/11/2015    had bled into head- surgery    Stroke [facility])     pt had stroke like symptoms     Thrombocytopenia, HIT negative 6/25/2012 7-01-12 platelets lower repeat HIT 7-02-12 platelets in 70,635'N    Thrombosis, portal vein 2004    portal , spleenic and recently superior mesenteric    Transfusion history     many blood tranfusions - last 3-29-15 after brain surgery    Traumatic hemorrhage of right cerebrum ([address] 3/30/2015     Past Surgical History:   Procedure Laterality Date    HX APPENDECTOMY      with small bowel resection    HX BREAST BIOPSY Bilateral     Lt - 1998; Rt - 2001    HX CHOLECYSTECTOMY      HX GI      liver biopsy    HX GI      bowel removed small - 9 feet     HX GYN       x 2    HX GYN      D&C following miscarriage    HX ORTHOPAEDIC Left 2008    torn labrum shoulder (screw in place)    NEUROLOGICAL PROCEDURE UNLISTED  2010    craniotomy to evacuate subdural hematoma following a fall from a seizure    SD ABDOMEN SURGERY PROC UNLISTED      umbilical hernia repair    SD ABDOMEN SURGERY PROC UNLISTED      9ft of small bowel excised then reconnected     Family History   Problem Relation Age of Onset    Diabetes Mother     Stroke Mother         after surgery    Cancer Father         brain    No Known Problems Sister     Other Sister         diverticulitis    Other Sister         diverticulitis    Cancer Sister         lyjmphoma    Breast Cancer Maternal Aunt 48    Thyroid Disease Paternal Grandmother      Social History     Socioeconomic History    Marital status: SINGLE     Spouse name: Not on file    Number of children: Not on file    Years of education: Not on file    Highest education level: Not on file   Occupational History    Not on file   Tobacco Use    Smoking status: Former Smoker     Packs/day: 0.20     Years: 10.00     Pack years: 2.00     Types: Cigarettes     Quit date:      Years since quittin.2    Smokeless tobacco: Never Used   Substance and Sexual Activity    Alcohol use: No    Drug use: No    Sexual activity: Not on file   Other Topics Concern    Not on file   Social History Narrative    Not on file     Social Determinants of Health     Financial Resource Strain:     Difficulty of Paying Living Expenses: Not on file   Food Insecurity:     Worried About [address] in the Last Year: Not on file    [address] in the Last Year: Not on file   Transportation Needs:     Lack of Transportation (Medical): Not on file    Lack of Transportation (Non-Medical):  Not on file   Physical Activity:     Days of Exercise per Week: Not on file    Minutes of Exercise per Session: Not on file Stress:     Feeling of Stress : Not on file   Social Connections:     Frequency of Communication with Friends and Family: Not on file    Frequency of Social Gatherings with Friends and Family: Not on file    Attends Jain Services: Not on file    Active Member of Clubs or Organizations: Not on file    Attends Club or Organization Meetings: Not on file    Marital Status: Not on file   Intimate Partner Violence:     Fear of Current or Ex-Partner: Not on file    Emotionally Abused: Not on file    Physically Abused: Not on file    Sexually Abused: Not on file   Housing Stability:     Unable to Pay for Housing in the Last Year: Not on file    Number of [place] in the Last Year: Not on file    Unstable Housing in the Last Year: Not on file     Current Facility-Administered Medications   Medication Dose Route Frequency Provider Last Rate Last Admin    albuterol-ipratropium (DUO-NEB) 2.5 MG-0.5 MG/3 ML  3 mL Nebulization Q6H PRN Lewis Starr MD   3 mL at 03/06/22 2024    clotrimazole (MYCELEX) 1 % cream 1 Applicator  1 Applicator Vaginal QHS Algis Ahumada, Patsie Lites, MD   1 Applicator at 01/50/26 9160    lip protectant (BLISTEX) ointment 1 Each  1 Each Topical PRN Liane Wagner MD        cefTRIAXone (ROCEPHIN) 2 g in 0.9% sodium chloride (MBP/ADV) 50 mL MBP  2 g IntraVENous Q12H Ravi Akins  mL/hr at 03/06/22 2151 2 g at 03/06/22 2151    morphine injection 2 mg  2 mg IntraVENous Q4H PRN Mohave Pouch, NP   2 mg at 03/07/22 0734    enoxaparin (LOVENOX) injection 50 mg  50 mg SubCUTAneous Q12H Gracie DARLING MD   50 mg at 03/07/22 0846    famotidine (PEPCID) tablet 40 mg  40 mg Oral DAILY PRN Valla Babinski, MD        pantoprazole (PROTONIX) tablet 40 mg  40 mg Oral ACB Gracie DARLING MD   40 mg at 03/07/22 0844    magnesium oxide (MAG-OX) tablet 400 mg  400 mg Oral DAILY Valla Babinski, MD   400 mg at 03/07/22 0844    oxyCODONE-acetaminophen (PERCOCET 10)  mg per tablet 1 Tablet  1 Tablet Oral Q8H PRN Arnav Draper MD   1 Tablet at 22 0555    levothyroxine (SYNTHROID) tablet 112 mcg  112 mcg Oral ACB Arnav Draper MD   112 mcg at 22 0844    zolpidem (AMBIEN) tablet 10 mg  10 mg Oral QHS PRN Arnav Draper MD   10 mg at 22 2043    sodium chloride (NS) flush 5-40 mL  5-40 mL IntraVENous PRN Arnav Draper MD        acetaminophen (TYLENOL) tablet 650 mg  650 mg Oral Q6H PRN Arnav Draper MD   650 mg at 22 0326    polyethylene glycol (MIRALAX) packet 17 g  17 g Oral DAILY PRN Arnav Draper MD        ondansetron (ZOFRAN ODT) tablet 4 mg  4 mg Oral Q8H PRN Arnav Draper MD        Or    ondansetron [facility]) injection 4 mg  4 mg IntraVENous Q6H PRN Toshia DARLING MD   4 mg at 22 0152    diphenhydrAMINE (BENADRYL) capsule 25 mg  25 mg Oral Q12H PRN Terry Randall MD   25 mg at 22 0227    oxyCODONE IR (ROXICODONE) tablet 5 mg  5 mg Oral Q4H PRN Terry Randall MD   5 mg at 22 1714    sodium chloride (NS) flush 5-10 mL  5-10 mL IntraVENous Q8H Quan Nam MD   10 mL at 22 0542    sodium chloride (NS) flush 5-10 mL  5-10 mL IntraVENous PRN Quan Nam MD   10 mL at 22 0354       OBJECTIVE:  Patient Vitals for the past 8 hrs:   BP Temp Pulse Resp SpO2   22 0750 106/63 98.7 °F (37.1 °C) 99 16 92 %   22 0530 -- 98.2 °F (36.8 °C) -- -- --   22 0325 114/62 (!) 101.6 °F (38.7 °C) (!) 120 15 91 %     Temp (24hrs), Av.9 °F (37.2 °C), Min:98.1 °F (36.7 °C), Max:101.6 °F (38.7 °C)    701 -  1900  In: 480 [P.O.:480]  Out: 200 [Urine:200]    Physical Exam:  Constitutional: Well developed female in no acute distress, lying comfortably in the hospital bed. HEENT: Normocephalic and atraumatic. Oropharynx is clear, mucous membranes are moist.  Extraocular muscles are intact. Sclerae anicteric. Neck supple. Skin Warm and dry. No bruising and no rash noted. No erythema. No pallor. Respiratory Lungs are clear to auscultation bilaterally without wheezes, rales or rhonchi, normal air exchange without accessory muscle use. CVS Normal rate, regular rhythm and normal S1 and S2. No murmurs, gallops, or rubs. Abdomen Soft, nontender and distended, normoactive bowel sounds. + distention with large spleen at baseline. Neuro Grossly nonfocal with no obvious sensory or motor deficits. MSK Normal range of motion in general.  No edema and no tenderness. Psych Appropriate mood and affect. Labs:    No results found for this or any previous visit (from the past 24 hour(s)). Imaging:  hydronephrosis. -Urinary Bladder: Unremarkable. -Reproductive Organs: Unremarkable.     -Lymph Nodes: No grossly enlarged retroperitoneal, mesenteric, or pelvic   adenopathy.   -Vasculature: Aorta is normal caliber.   -Bones: No gross bony lesions.     -Other: No ascites. Impression:     1) Increasing splenomegaly with multiple splenic infarcts. 2) Nonocclusive portal vein thrombosis. 3) Diffuse mesenteric edema. MRI LUMB SPINE W CONT [[id number]]    Order Status: Canceled    XR CHEST PA LAT [[id number]] Collected: 02/28/22 0715   Order Status: Completed Updated: 02/28/22 0721   Narrative:     EXAMINATION: XR CHEST PA LAT 2/28/2022 7:09 AM     ACCESSION NUMBER: [id number]     COMPARISON: 02/27/2022     INDICATION: Fever     TECHNIQUE: PA and lateral views of the chest were obtained. FINDINGS:     Support devices: None     Lungs: Again seen are bilateral airspace opacities throughout the lungs. These   have a similar appearance to the prior exam. There is no effusion. Cardiac Silhouette: Persistently prominent     Mediastinum: The aorta is normal.     Upper Abdomen: Normal     Miscellaneous: There are no lytic and blastic lesions. Impression:     1.  Stable exam with persistent cardiomegaly and bilateral diffuse airspace   opacities.     CT HEAD WO CONT [[id number]] Collected: 02/27/22 2120   Order Status: Completed Updated: 02/27/22 2125   Narrative:     Noncontrast CT of the brain. COMPARISON: 2016     INDICATION: Confusion, headache     TECHNIQUE: Contiguous axial images were obtained from the skull base through the   vertex without IV contrast. Radiation dose reduction techniques were used for   this study:  Our CT scanners use one or all of the following: Automated exposure   control, adjustment of the mA and/or kVp according to patient's size, iterative   reconstruction. FINDINGS:     There is no acute intracranial hemorrhage or evidence for acute territorial   infarction. There is no mass effect, midline shift or hydrocephalus. There is no   extra-axial fluid collection. The cerebellum and brainstem are grossly   unremarkable. Encephalomalacia in the right occipital lobe, consistent with old injury. There   is overlying craniotomy defect. Findings similar to 2016 study. Included globes appear intact. The visualized paranasal sinuses and the mastoid   air cells are aerated. There is no acute skull fracture. Impression:       1. No CT evidence of acute intracranial process. XR CHEST PORT [[id number]] Collected: 02/27/22 1843   Order Status: Completed Updated: 02/27/22 1846   Narrative:     EXAMINATION: XR CHEST PORT 2/27/2022 6:33 PM     ACCESSION NUMBER: [id number]     COMPARISON: 2/2/2022     INDICATION: Back pain and fever     TECHNIQUE: A single view of the chest was obtained. FINDINGS:   Support Devices:   *  None     Cardiac Silhouette: Cardiac silhouette is borderline enlarged, similar to prior   studies. Mediastinum: Normal mediastinal contours. Lungs: Bilateral perihilar and basilar predominant airspace opacities with   septal thickening. No pneumothorax or sizable pleural effusion. Upper Abdomen: Normal     Miscellaneous: No fracture or suspicious osseous lesion.       Impression:     Bilateral perihilar airspace disease, suspicious for pulmonary edema. ASSESSMENT:  Problem List  Date Reviewed: 2/8/2022          Codes Class Noted    Body mass index (BMI) of 22.0 to 22.9 in adult ICD-10-CM: Z68.22  ICD-9-CM: V85.1  8/26/2021        * (Principal) Fever ICD-10-CM: R50.9  ICD-9-CM: 780.60  2/27/2022        Leukocytosis ICD-10-CM: D72.829  ICD-9-CM: 288.60  2/27/2022        Sepsis ([facility] ICD-10-CM: A41.9  ICD-9-CM: 038.9, 995.91  2/2/2022        Fever of unknown origin ICD-10-CM: R50.9  ICD-9-CM: 780.60  2/2/2022        Thrombocytopenia, unspecified ICD-10-CM: D69.6  ICD-9-CM: 287.5  10/15/2021        Opioid use, unspecified with unspecified opioid-induced disorder ICD-10-CM: F11.99  ICD-9-CM: 292.9, 305.50  9/17/2021        Cellulitis of leg, right ICD-10-CM: L03.115  ICD-9-CM: 682.6  8/8/2021        Chronic migraine without aura with status migrainosus, not intractable ICD-10-CM: U57.644  ICD-9-CM: 346.72  11/1/2016        Lung nodule ICD-10-CM: R91.1  ICD-9-CM: 793.11  10/28/2016    Overview Signed 10/28/2016 10:05 AM by Ger Bonilla     IMPRESSION:    1. Lobulated 2 cm mass in the right lung apex, malignancy versus an atypical  inflammatory process. 2. Occlusion of the right subclavian vein. 3. Chronic occlusion of the portal vein with associated abnormal tissue  encasing the common bile duct, most likely fibrosis. 4. Splenomegaly. DVT (deep venous thrombosis) ([facility] ICD-10-CM: I82.409  ICD-9-CM: 453.40  10/26/2016    Overview Signed 10/26/2016 10:27 PM by Shelbi Borden. Right subclavian               Analgesic rebound headache ICD-10-CM: G44.40, T39.95XA  ICD-9-CM: 339.3, E935.9  9/22/2016        Headache, chronic daily ICD-10-CM: R51.9  ICD-9-CM: 784.0  9/22/2016        Migraine with aura and with status migrainosus, not intractable ICD-10-CM: G43. 101  ICD-9-CM: 346.03  9/22/2016        Left homonymous hemianopsia ICD-10-CM: H53.462  ICD-9-CM: 368.46  3/30/2015        Hyponatremia ICD-10-CM: E87.1  ICD-9-CM: 276.1  3/30/2015        Chronic myelogenous leukemia ([facility] ICD-10-CM: C92.10  ICD-9-CM: 205.10  3/30/2015        Acquired hypercoagulable state ([facility] (Chronic) ICD-10-CM: D06.39  ICD-9-CM: 289.81  3/30/2015        AKOSUA (iron deficiency anemia) ICD-10-CM: D50.9  ICD-9-CM: 280.9  7/28/2012        Cirrhosis (HCC) (Chronic) ICD-10-CM: K74.60  ICD-9-CM: 571.5  7/27/2012        Ascites ICD-10-CM: R18.8  ICD-9-CM: 789.59  7/26/2012        Polycythemia vera ([facility] (Chronic) ICD-10-CM: D45  ICD-9-CM: 238.4  2/16/2009    Overview Addendum 8/26/2021  2:31 PM by Montse Cowart MD     2/2008 by [email or web address] analysis however portal vein thrombosis  And and splenomegaly since 2004  Hydroxyurea for 6 months poor tolerance spleen did not shrink   Pegasys 90 mcg weekly 4-2009 till    Restarted 12-30-09 45 mcg q 2 weeks  2-2010 reduced to q month   Increased 45 mcg 2/month 4-2010  Held 8-2010 thrombocytopenia  12-29-12 restarted 45 mcg q 2 weeks  4-1-11 45 mcg q 3 weeks  4-22-11 45 mcg q 4 weeks  Uncertain dosing thereafter   Possibly q 3 weeks 10-15-11 and held and restarted on 4-1-12 6-12 ct scan Small bowel wall thickening suggesting an enteritis. In addition, there is well thickening of the right colon which can suggest an additional   colitis although this can also be seen with severe portal hypertension. Likely reactive edematous changes it scattered fluid collections are seen of   the small bowel mesentery. . Apparent occlusion of the portal vein. In addition, the splenic vein, and superior mesenteric vein are not definitely seen and are also likely occluded. Splenomegaly, and extensive varices are seen as described above consistent with the portal vein occlusion. [place] Heterogeneous enhancement of the liver and mild periportal edema. An acute hepatitis cannot be excluded.               Primary hypothyroidism ICD-10-CM: E03.9  ICD-9-CM: 244.9  2/16/2009        Splenomegaly ICD-10-CM: R16.1  ICD-9-CM: 789.2  2/16/2009        Esophageal varices (HCC) (Chronic) ICD-10-CM: I85.00  ICD-9-CM: 456.1  2/16/2009    Overview Addendum 8/26/2021  2:30 PM by Tina Miguel MD     grade 3             Portal vein thrombosis ICD-10-CM: X59  ICD-9-CM: 175  2/16/2009    Overview Addendum 8/26/2021  2:31 PM by Tina Miguel MD     Ct scan 6-21-2 Apparent occlusion of the portal vein. In addition, the splenic vein, and superior mesenteric vein are not definitely seen and are also likely  occluded. Splenomegaly, and extensive varices are seen as described above consistent with the portal vein occlusion 7-05-12 on arixtra HIT negative on repeat 7-27-12 re admitted Cirrhotic appearance of the liver. Mild to moderate ascites                 Ms. Nitza Cooper is a 64 y.o. female admitted on 2/27/2022. The primary encounter diagnosis was Sepsis, due to unspecified organism, unspecified whether acute organ dysfunction present ([facility] Diagnoses of Leukocytosis, unspecified type, Fever, unspecified fever cause, Confusion and disorientation, and Meningitis due to gram-negative bacteria were also pertinent to this visit. 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 She is a patient of Dr. 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 She was recently admitted from 2/2 - 2/6/22 for E Coli bacteremia. She was treated with Merrem and discharged on Vantin. She returned to ED on 2/27 with c/o fever and headache with associated photophobia. She reports she has been sick for 2 weeks. She endorses malaise, weakness, nausea, and confusion. Lactic acid 2.8, PCT 26. pBNP 4430. CT head neg. CXR on 2/27 with b/l perihilar airspace disease, suspicious for pulm edema. Repeat CXR 2/28 with stable findings with persistent cardiomegaly and b/l diffuse airspace opacities.   She is s/p LP on 2/27 with low glucose and normal protein, WBC 2630 with 88% neutrophils, RBC 18. BCx +GNR, I/S pending. UCx-NGTD. CSF Cx-NGTD. On Cef/Vanc. We were consulted for recommendations for our patient. RECOMMENDATIONS:  Polycythemia Vera  - on [address], currently held  3/7 Can continue upon DC (does not have available in hospital)    CML  - on Dasatinib, currently held  3/7 Can continue upon DC (does not have available in hospital)    Sepsis / Lian Maynard bacteremia / E Coli Meningitis  - BCx +GNR, I/S pending  - UCx - NGTD  - s/p LP on 2/27 with low glucose, normal protein, WBC 2630 with 88% neutrophils. CSF Cx-NGTD. Add on meningitis panel if enough CSF remains to send. - On Cef/Vanc  - Given recurrent infections, check immunoglobulins and HIV  3/1 BCx +EColi. Meningitis panel +EColi. CSF Cx-NGTD. UCx-NGTD. HIV neg. IgG level 888. On Cef/Vanc. ID consulted. Will defer need for steroids to ID. CT AP pending. TTE neg for vegetation on 2/3. Would pt benefit from ROSELINE?      3/2 Remains afebrile. CSF-NGTD. UCx with normal skin sarath. CT AP with increased splenomegaly with multiple splenic infarcts, nonocclusive portal vein thrombosis, and diffuse mesenteric edema. Unable to complete MRI L-spine d/t ascites and splenomegaly. On CTX. ID DC'd Cef/Vanc. ID has reached out to radiology to re-attempt MRI. ? Does LP need to be repeated? 3/3 Remains afebrile. CSF Cx +EColi. On CTX. ID following. Pt declined MRI this AM.  Recommended eval for paracentesis so MRI could be adequately performed. She states she does not want to undergo any procedures that would risk any further infection. She states she will not trust findings from the MRI. She is requesting transfer to [facility]. 3/5/22 MRI without abscess or discitis but limited due to lack of IV contrast and movement. Afebrile. Repeat blood cultures negative.  She is expecting long term IV antibiotics - will await recommendations from ID.   3/7 ID recommends CTX EOT 3/13. Patient prefers to do this at home (especially since dosing is BID). Anemia / Thrombocytopenia  - likely r/t current illness  - Check iron studies, B12, folate, Vit D, hemolysis labs, DIC labs, HIT profile, smear  - Transfuse prn to keep Hgb >7 and Plt >10k or >50k with active bleeding  3/1 Hgb stable 8.5. Plt up to 80k. No hemolysis noted. Tsat 19% with ferritin 835. No B12, or folate deficiency noted. Vit D low at 15.5, replete. Smear pending. 3/2 Hgb stable 8.4. Plt 99k. Smear with no schistocytes and absolute neutrophilia with toxic changes. 3/5 Hgb 8.5, plt 115. Goals and plan of care reviewed with the patient. All questions answered to the best of our ability. Thank you for allowing us to participate in the care of Ms. Couch. We will now sign off. She will need to f/u with Dr. Briant Galeazzi within one week of discharge. Please call with hematology/oncology questions.          Jayson Leiva PGUERA. [address] Hematology & Oncology  [address] [address]  Office : [phone]  Fax : [phone]

## 2022-03-07 NOTE — PROGRESS NOTES
Problem: Falls - Risk of  Goal: *Absence of Falls  Description: Document Trung Montgomery Fall Risk and appropriate interventions in the flowsheet.   Outcome: Progressing Towards Goal  Note: Fall Risk Interventions:  Mobility Interventions: Communicate number of staff needed for ambulation/transfer,Patient to call before getting OOB    Mentation Interventions: Adequate sleep, hydration, pain control,Door open when patient unattended,Evaluate medications/consider consulting pharmacy    Medication Interventions: Evaluate medications/consider consulting pharmacy,Patient to call before getting OOB,Teach patient to arise slowly    Elimination Interventions: Call light in reach,Patient to call for help with toileting needs

## 2022-03-07 NOTE — PROGRESS NOTES
PT NOTE: Attempted X 4. Patient unavailable due to showering,having blood drawn, MD in and then SW in with patient. Will try back tomorrow if she is still here. Possible D/C home today per SW.

## 2022-03-07 NOTE — DISCHARGE INSTRUCTIONS
Patient Education        Learning About E. Coli Infections  What is an E. coli infection? E. coli (Escherichia coli) is the name of a germ, or bacterium, that lives in the digestive tracts of humans and animals. There are many types of E. coli, and most of them are harmless. But some can cause bloody diarrhea. Some strains of E. coli may also cause severe anemia or kidney failure, which can lead to death. Other strains can cause other infections. How can you get an infection from E. coli? A common type of E. coli infection causes bloody diarrhea and cramps. People in the [place] most often get this infection by eating meat or other foods that have been infected with the bacteria. What are the symptoms? If you get an E. coli infection from food, you may have symptoms like bloody diarrhea, stomach cramps, nausea, and vomiting. Symptoms usually start 1 to 4 days after coming in contact with E. coli. But some people don't notice any symptoms. How is it treated? An E. coli infection usually goes away on its own. Your main treatment is to make yourself comfortable and drink sips of water. Diarrhea causes the body to lose more water than usual. This can lead to dehydration, which is especially dangerous for babies and older adults. Taking frequent, small sips of water will help prevent dehydration. If you are not getting better, ask your doctor if you need treatment for E. coli. Some types of E. coli can be treated with antibiotics. If you have bloody diarrhea that may be from an E. coli infection, don't take diarrhea medicine. These medicines can slow down the digestion process. This can allow more time for your body to absorb the poisons made by the E. coli. In some people, the infection causes serious problems with the blood and kidneys. These people may need blood transfusions or dialysis. How can you prevent an E. coli infection?   To prevent intestinal tract infections from E. coli:  · Never eat raw or undercooked ground beef or pork. Cook ground meat to a temperature of at least 160°F. Always use a meat thermometer. Ground beef should be cooked until all pink color is gone. · Cut open restaurant and home-cooked hamburgers to make sure that they have been completely cooked. The juices should be clear or yellowish, with no trace of pink. · Always wash cooking tools, cutting boards, dishes, countertops, and utensils with hot, soapy water right after they have come into contact with raw meat. Do not put cooked meat back onto a plate that has held raw meat unless you have thoroughly washed the plate. · Use separate cutting boards for raw meat and other food items. · Wash your hands often with hot, soapy water, especially after handling raw meat. Always wash your hands after bowel movements or changing diapers. · Dispose of soiled diapers and stools carefully. Where can you learn more? Go to http://www.gray.com/  Enter Y224 in the search box to learn more about \"Learning About E. Coli Infections. \"  Current as of: July 1, 2021               Content Version: 13.0  © 1635-6647 [employer]. Care instructions adapted under license by [employer] (which disclaims liability or warranty for this information). If you have questions about a medical condition or this instruction, always ask your healthcare professional. [place] [address] any warranty or liability for your use of this information. Patient Education        Bacterial Meningitis: Care Instructions  Overview     Bacterial meningitis is an infection of the tissues that surround the brain and spinal cord. This serious infection can injure the brain. It can cause death. Symptoms usually develop suddenly. They start with a fever and a stiff neck but quickly get worse. The infection may involve many different parts of the body and last for 2 to 3 weeks.  You will be in the hospital for testing and treatment. How long it takes you to get better depends on how bad the illness is. It can take from just a couple of weeks to many months. You may have changes in how you think or concentrate. Most people with these symptoms get better over time. Be patient. Follow your doctor's instructions. Follow-up care is a key part of your treatment and safety. Be sure to make and go to all appointments, and call your doctor if you are having problems. It's also a good idea to know your test results and keep a list of the medicines you take. How can you care for yourself at home? · If your doctor prescribed antibiotics, take them as directed. Do not stop taking them just because you feel better. You need to take the full course of antibiotics. · Take an over-the-counter pain medicine, such as acetaminophen (Tylenol), ibuprofen (Advil, Motrin), or naproxen (Aleve) for pain or fever. Be safe with medicines. Read and follow all instructions on the label. · Do not take two or more pain medicines at the same time unless the doctor told you to. Many pain medicines have acetaminophen, which is Tylenol. Too much acetaminophen (Tylenol) can be harmful. · Get plenty of rest.  · Be sure that anyone who has come into close contact with you during this illness calls a doctor if they feel sick. When should you call for help? Call 911 anytime you think you may need emergency care. For example, call if:    · You have a seizure. Call your doctor now or seek immediate medical care if:    · Your symptoms come back or get worse. These may include:  ? A fever. ? A severe headache. ? A stiff neck. ? Nausea and vomiting.     · You have trouble thinking or concentrating. Watch closely for changes in your health, and be sure to contact your doctor if:    · You do not get better as expected. Where can you learn more?   Go to http://www.gray.com/  Enter M535 in the search box to learn more about \"Bacterial Meningitis: Care Instructions. \"  Current as of: July 1, 2021               Content Version: 13.0  © 9610-2029 [employer], [facility]. Care instructions adapted under license by [employer] (which disclaims liability or warranty for this information). If you have questions about a medical condition or this instruction, always ask your healthcare professional. [place] [address] any warranty or liability for your use of this information.

## 2022-03-07 NOTE — PROGRESS NOTES
END OF SHIFT NOTE:    Intake/Output  03/06 1901 - 03/07 0700  In: 240 [P.O.:240]  Out: 300 [Urine:300]   Voiding: YES  Catheter: NO  Drain:              Stool:  0 occurrences. Stool Assessment  Stool Color: Brown;Black (03/05/22 0920)  Stool Appearance: Soft (03/05/22 0920)  Stool Amount: Medium (03/05/22 0920)  Stool Source/Status: Rectum (03/05/22 0920)    Emesis:  0 occurrences. VITAL SIGNS  Patient Vitals for the past 12 hrs:   Temp Pulse Resp BP SpO2   03/07/22 0530 98.2 °F (36.8 °C) -- -- -- --   03/07/22 0325 (!) 101.6 °F (38.7 °C) (!) 120 15 114/62 91 %   03/06/22 2253 99.3 °F (37.4 °C) (!) 109 15 (!) 104/59 92 %   03/06/22 2028 -- -- -- -- 96 %   03/06/22 1940 98.4 °F (36.9 °C) 95 16 114/67 98 %       Pain Assessment  Pain 1  Pain Scale 1: Numeric (0 - 10) (03/07/22 0555)  Pain Intensity 1: 9 (03/07/22 0555)  Patient Stated Pain Goal: 0 (03/07/22 0555)  Pain Reassessment 1: Patient resting w/respiratory rate greater than 10 (03/07/22 0351)  Pain Onset 1: pta (03/06/22 0152)  Pain Location 1: Abdomen;Back (03/07/22 0555)  Pain Orientation 1: Lower;Mid (03/06/22 0152)  Pain Description 1: Aching (03/07/22 0555)  Pain Intervention(s) 1: Medication (see MAR) (03/07/22 0555)    Ambulating  Yes    Additional Information: Pt complaining of pain in abdomen and back. Pt states that the fluid is causing her pain and reports shortness of breath when standing. Shift report given to oncoming nurse at the bedside.     Paulina Candelario RN

## 2022-03-07 NOTE — PROGRESS NOTES
Care Management Interventions  PCP Verified by CM: No (Says she is looking for one and has someone in mind. States she does not need this nurse to place a referral.)  Mode of Transport at Discharge: Other (see comment) (family)  Transition of Care Consult (CM Consult): [address]: No  Reason Outside [place]: Unable to staff case ([facility])  Discharge Durable Medical Equipment: Yes (Walker rolling, BSC from [facility])  Physical Therapy Consult: Yes  Support Systems: Other Family Member(s)  Confirm Follow Up Transport: Family  The Plan for Transition of Care is Related to the Following Treatment Goals : Home with [address] [place] [address] RN/PT/OT/Aide  The Patient and/or Patient Representative was Provided with a Choice of Provider and Agrees with the Discharge Plan?: Yes  Name of the Patient Representative Who was Provided with a Choice of Provider and Agrees with the Discharge Plan: Patient  Freedom of Choice List was Provided with Basic Dialogue that Supports the Patient's Individualized Plan of Care/Goals, Treatment Preferences and Shares the Quality Data Associated with the Providers?: Yes  Discharge Location  Patient Expects to be Discharged to[de-identified] Home with home health  Interdisciplinary team meeting with Dr. Didi Bond, , nursing, and PT for plan of care. Patient medically stable for d/c. CM met with patient for d/c plans. Patient is alert and oriented x 3, independent of ADL's and lives alone in 2 story home. Her sister and daughter spoke with nursing and are concerned with her going home alone because bathroom is upstairs and bedroom is upstairs and feel she doesn't need to go upstairs and needs rehab. Explained that patient is able to make her own decisions and CM will speak with patient. CM met with patient and discussed family concerns with upstairs and being alone.  Patient states that she has a bathroom on 1st floor with a tub and she is going to us her sofa for her bed. Asked if she thought she would be comfortable on the couch and she is find and she will not go upstairs unless she has someone with her. Discussed option of going to daughter home and she said she would be glad to do that if they want her to but that they have not offered to do that and she wants to return home and \"I have no concerns about going home. \" [facility] will be coming at 230-300 pm to provide IV antibiotic education and instruct patient. 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 \" She does not want a PICC line [facility] so CM informed patient that [address] stated they could do a peripheral IV as long as patient is able to do IV at home as they are not able to be at home every 12 hours. Patient in agreement with d/c today and voices no concerns for d/c.  Patient to d/c home with [facility] and she requested walker and BSC [facility]

## 2022-03-07 NOTE — DISCHARGE SUMMARY
Date of Admission: 2/27/2022  Date of Discharge: 3/7/2022    Discharge Diagnoses: Active Hospital Problems    Diagnosis Date Noted    Body mass index (BMI) of 22.0 to 22.9 in adult 08/26/2021     Priority: 6 - Six    Fever 02/27/2022    Leukocytosis 02/27/2022    Hyponatremia 03/30/2015    Chronic myelogenous leukemia (HCC) 03/30/2015    AKOSUA (iron deficiency anemia) 07/28/2012    Cirrhosis ([facility] 07/27/2012    Esophageal varices ([facility] 02/16/2009     grade 3      Primary hypothyroidism 02/16/2009        Discharge Medications:  Current Discharge Medication List      START taking these medications    Details   cefTRIAXone 2 gram 2 g IVPB 2 g by IntraVENous route every twelve (12) hours for 6 days. Qty: 12 Dose, Refills: 0  Start date: 3/7/2022, End date: 3/13/2022         CONTINUE these medications which have CHANGED    Details   oxyCODONE-acetaminophen (PERCOCET 10)  mg per tablet Take 1 Tablet by mouth every eight (8) hours as needed for Pain for up to 3 days. Max Daily Amount: 3 Tablets. Qty: 9 Tablet, Refills: 0  Start date: 3/7/2022, End date: 3/10/2022    Associated Diagnoses: CML (chronic myelocytic leukemia) ([facility] Chronic pain due to neoplasm         CONTINUE these medications which have NOT CHANGED    Details   zolpidem (AMBIEN) 10 mg tablet Take 1 Tablet by mouth nightly as needed for Sleep. Max Daily Amount: 10 mg.  Qty: 20 Tablet, Refills: 2    Comments: Okay to fill early per Dr. Lyndon Vaca Diagnoses: Insomnia due to medical condition      diphenoxylate-atropine (LomotiL) 2.5-0.025 mg per tablet Take 1 Tablet by mouth four (4) times daily as needed for Diarrhea. Max Daily Amount: 4 Tablets. Qty: 90 Tablet, Refills: 1    Associated Diagnoses: Myelofibrosis ([facility] Chronic pain due to neoplasm      famotidine (PEPCID) 40 mg tablet Take 1 Tablet by mouth daily as needed for Heartburn.  Take at least 4 hours before or after sprycel  Qty: 30 Tablet, Refills: 0      dasatinib (SpryceL) 80 mg tab Take 1 Tablet by mouth daily. Qty: 30 Tablet, Refills: 11    Associated Diagnoses: CML (chronic myelocytic leukemia) ([facility] Polycythemia vera (HCC)      enoxaparin (Lovenox) 60 mg/0.6 mL injection 50 mg by SubCUTAneous route every twelve (12) hours. Qty: 60 Each, Refills: 5      b complex vitamins tablet 1 Tablet daily. Comp Stocking,Knee,Regular,Sml misc 1 Each by Does Not Apply route daily. Qty: 1 Box, Refills: 0    Comments: Please dispense 2 pairs of compression stockings      !! ondansetron (ZOFRAN ODT) 8 mg disintegrating tablet Take 1 Tab by mouth every eight (8) hours as needed for Nausea or Vomiting. Qty: 60 Tab, Refills: 0      magnesium 250 mg tab Take 1 Tablet by mouth daily. Synthroid 112 mcg tablet 1 tablet once a day with an extra 1/2 tablet on Sundays  Qty: 100 Tab, Refills: 3    Associated Diagnoses: Primary hypothyroidism      ruxolitinib 5 mg tab Take 1 Tab by mouth two (2) times a day. Qty: 60 Tab, Refills: 11    Comments: Called in to pharmacy      !! ondansetron (ZOFRAN ODT) 4 mg disintegrating tablet Take 1 Tab by mouth every eight (8) hours as needed for Nausea. Qty: 90 Tab, Refills: 0      calcium carbonate (CALTREX) 600 mg calcium (1,500 mg) tablet Take 600 mg by mouth nightly. cholecalciferol (VITAMIN D3) 1,000 unit cap Take 1,000 Units by mouth nightly. lansoprazole (PREVACID) 30 mg capsule Take 30 mg by mouth daily. prn       !! - Potential duplicate medications found. Please discuss with provider. Pending Labs:  None    Follow-up (including scheduled tests): Follow-up Information     Follow up With Specialties Details Why Mesha Mcfadden MD Hematology and Oncology, Internal Medicine, Hematology, Oncology On 3/8/2022 PER OFFICE PATIENT HAS APPT.  LABS @ 8:30 APPTTIME AFTER LABS [id number] [facility] [address]      [address] [place] [address] [address] [place] [address]  [phone]           History of Present Illness:  Per Dr Yajaira Johnston  \"54 y.o. female, with a history of  has a past medical history of Acute blood loss anemia (4/6/2018), Anemia, C. difficile diarrhea (4/1/2015), Cerebral edema ([address] (4/1/2015), Chronic migraine (9/22/2016), Chronic myelogenous leukemia ([address] Chronic pain, Coagulation disorder ([address] Esophageal spasm, Esophageal varices ([address] GI bleed (8/3/2016), H/O craniotomy, Hematemesis (8/20/2021), Leukocytosis (3/14/2015), Melena (8/20/2021), MRSA colonization (6/25/2012), Polycythemia vera(238.4), Portal hypertension ([address] Portal vein thrombosis (6/20/2012), Primary hypothyroidism, Pulmonary embolism ([address] (2006), S/P exploratory laparotomy, 6/20/12  (6/25/2012), S/P small bowel resection, Seizure ([address] (3/14/2015), Seizure disorder ([address] (3/30/2015), Seizures ([address] Splenomegaly, congestive, chronic, Stroke (cerebrum) ([address] (4/11/2015), Stroke ([address] Thrombocytopenia, 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 She has no past medical history of Adverse effect of anesthesia, Difficult intubation, Malignant hyperthermia due to anesthesia, or Pseudocholinesterase deficiency. ,  has a past surgical history that includes hx gyn; hx gyn; neurological procedure unlisted (2010-12); hx gi; hx gi; pr abdomen surgery proc unlisted; hx cholecystectomy; pr abdomen surgery proc unlisted; hx appendectomy; hx breast biopsy (Bilateral); and hx orthopaedic (Left, 2008). who presents to the ER with report of fever and headache for the past several days. Admits to fever up to 102. 1 two days ago. Feels malaise and weakness. Headache is global and associated with photophobia. Admits to feeling a bit confused as well. Admits to nausea, denies shortness of breath, cough ,vomiting. \"    Past Medical History:  Past Medical History:   Diagnosis Date    Acute blood loss anemia 4/6/2018    Anemia     C. difficile diarrhea 4/1/2015    Cerebral edema (HCC) 4/1/2015    Chronic migraine 9/22/2016    Chronic myelogenous leukemia ([address]     Zelienople chromosome/ converted from polycythemia in 2009- to 2012 when she was dx w leukemia    Chronic pain     Coagulation disorder ([address]     \"clotting and Bleeding Problem\" dr Geetha Linares follows     Esophageal spasm     with banding     Esophageal varices ([address]     grade 3    GI bleed 8/3/2016    H/O craniotomy     3-29-15.  due to blood clot - which caused a seizure  - then pt fell and hit     Hematemesis 8/20/2021    Leukocytosis 3/14/2015    Melena 8/20/2021    MRSA colonization 6/25/2012    Polycythemia vera(238.4)     JAK2 mutation    Portal hypertension ([address]     with varices    Portal vein thrombosis 6/20/2012    Ct scan 6-21-2 Apparent occlusion of the portal vein. In addition, the splenic vein, and superior mesenteric vein are not definitely seen and are also likely  occluded. Splenomegaly, and extensive varices are seen as described above consistent with the portal vein occlusion 7-05-12 on arixtra HIT negative on repeat 7-27-12 re admitted Cirrhotic appearance of the liver. Mild to moderate ascites, as    Primary hypothyroidism     Pulmonary embolism ([address] 2006    not on coumadin anymore     S/P exploratory laparotomy, 6/20/12 6/25/2012    Bowel resection:  336 cm of small bowel removed, approximately 9 feet and placement of feeding jejunostomy.      S/P small bowel resection     2012.  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     Sulfa (Sulfonamide Antibiotics) Anaphylaxis    Tramadol Other (comments)     Top lip swelled    Augmentin [Amoxicillin-Pot Clavulanate] Rash    Divalproex Sodium Hives    Morphine Nausea and Vomiting     Not a true allergy    Potassium Clavulanate Hives    Rizatriptan Rash    Tetanus Immune Globulin Other (comments)     Heat, bruising, and swelling at  Site of injection    Vancomycin Rash    Xanax [Alprazolam] Other (comments)     Hallucinations    Zonegran [Zonisamide] Rash       Hospital Course:  65 y/o F PMHx chronic anemia, CML, esophageal varices who presents to the ER with report of fever and headache for the past several days     1.  E. coli bacteremia in the setting of bacterial meningitis  -Started on Zosyn and vancomycin initially  -Infectious disease consulted  -Blood cultures grew E. coli  -CSF grew E. coli as well  -Transitioned to ceftriaxone  -MRI of the lumbar spine without signs of epidural abscess  -Improved symptomatology  -Hemodynamically stable on discharge  -Discharged on ceftriaxone until 3/13/2022 as per ID recommendations       Procedures:  None    Discharge Day Information:  Follow with PMD    Discharge Physical Exam:  General: Alert, oriented, NAD  HEENT: NC/AT, EOM are intact  Neck: supple, no JVD  Cardiovascular: RRR, S1, S2, no murmurs  Respiratory: Lungs are clear, no wheezes or rales  Abdomen: Soft, NT, ND  Back: No CVA tenderness, no paraspinal tenderness  Extremities: LE without pedal edema, no erythema  Neuro: A&O, CN are intact, no focal deficits  Skin: no rash or ulcers  Psych: good mood and affect    Recent Results (from the past 24 hour(s))   CBC WITH AUTOMATED DIFF    Collection Time: 03/07/22  9:37 AM   Result Value Ref Range    WBC 15.4 (H) 4.3 - 11.1 K/uL    RBC 2.98 (L) 4.05 - 5.2 M/uL    HGB 9.3 (L) 11.7 - 15.4 g/dL    HCT 28.5 (L) 35.8 - 46.3 %    MCV 95.6 79.6 - 97.8 FL    MCH 31.2 26.1 - 32.9 PG    MCHC 32.6 31.4 - 35.0 g/dL    RDW 18.8 (H) 11.9 - 14.6 %    PLATELET 467 (L) 675 - 450 K/uL    MPV 11.0 9.4 - 12.3 FL    ABSOLUTE NRBC 0.00 0.0 - 0.2 K/uL    DF AUTOMATED      NEUTROPHILS 90 (H) 43 - 78 %    LYMPHOCYTES 4 (L) 13 - 44 %    MONOCYTES 4 4.0 - 12.0 %    EOSINOPHILS 0 (L) 0.5 - 7.8 %    BASOPHILS 1 0.0 - 2.0 %    IMMATURE GRANULOCYTES 1 0.0 - 5.0 %    ABS. NEUTROPHILS 13.8 (H) 1.7 - 8.2 K/UL    ABS. LYMPHOCYTES 0.7 0.5 - 4.6 K/UL    ABS. MONOCYTES 0.6 0.1 - 1.3 K/UL    ABS. EOSINOPHILS 0.1 0.0 - 0.8 K/UL    ABS. BASOPHILS 0.1 0.0 - 0.2 K/UL    ABS. IMM. GRANS. 0.2 0.0 - 0.5 K/UL   METABOLIC PANEL, BASIC    Collection Time: 03/07/22  9:37 AM   Result Value Ref Range    Sodium 135 (L) 136 - 145 mmol/L    Potassium 3.8 3.5 - 5.1 mmol/L    Chloride 105 98 - 107 mmol/L    CO2 24 21 - 32 mmol/L    Anion gap 6 (L) 7 - 16 mmol/L    Glucose 143 (H) 65 - 100 mg/dL    BUN 9 8 - 23 MG/DL    Creatinine 0.50 (L) 0.6 - 1.0 MG/DL    GFR est AA >60 >60 ml/min/1.73m2    GFR est non-AA >60 >60 ml/min/1.73m2    Calcium 7.7 (L) 8.3 - 10.4 MG/DL        MRI LUMB SPINE WO CONT   Final Result   1. Study severely limited by patient motion which could easily obscure a subtle   abnormality. It can only be said that no gross asymmetry of the spinal canal is   seen to suggest a space-occupying epidural process. No clear defined fluid   collection is seen within significantly edematous paraspinal or retroperitoneal   soft tissues. Evaluation for these changes as furthermore limited by the   noncontrast technique.  No clear abnormal STIR signal changes or endplate signal   changes are seen on the current examination to strongly suggest   discitis/osteomyelitis. If the patient's symptoms do not improve or if further   evaluation is currently felt to be indicated, then adequate pain control and a   repeat study preferably with contrast would be strongly recommended. 2. Diffuse abnormal marrow signal throughout the thoracolumbar spine and severe   splenomegaly consistent with the patient's leukemia. Marrow signal replacement   from leukemia does appear worsened from the prior comparison study. 3. Worsening disc herniations and hypertrophic degenerative changes as described   above. No new central canal stenosis, or neural femoral stenosis is seen at any   level, however. XR CHEST SNGL V   Final Result   Suspected interstitial edema/volume overload. CT ABD PELV W CONT   Final Result   1) Increasing splenomegaly with multiple splenic infarcts. 2) Nonocclusive portal vein thrombosis. 3) Diffuse mesenteric edema. XR CHEST PA LAT   Final Result   1. Stable exam with persistent cardiomegaly and bilateral diffuse airspace   opacities. CT HEAD WO CONT   Final Result      1. No CT evidence of acute intracranial process. XR CHEST PORT   Final Result   Bilateral perihilar airspace disease, suspicious for pulmonary edema.             MRI UNLISTED PROCEDURE    (Results Pending)        Condition: Improved    Disposition: Home health    Consultants During This Hospitalization: Infectious disease, oncology            Spent 35 minutes on discharge services

## 2022-03-07 NOTE — PROGRESS NOTES
Called cancer center to reschedule time to later tomorrow afternoon was told that the lady whom makes appointments would contact her Explained that she was being discharge and they will contact patient.

## 2022-03-09 PROBLEM — B96.20 E COLI BACTEREMIA: Status: ACTIVE | Noted: 2022-01-01

## 2022-03-09 PROBLEM — R78.81 E COLI BACTEREMIA: Status: ACTIVE | Noted: 2022-01-01

## 2022-03-09 PROBLEM — J18.9 HCAP (HEALTHCARE-ASSOCIATED PNEUMONIA): Status: ACTIVE | Noted: 2022-01-01

## 2022-03-10 NOTE — PROGRESS NOTES
VANCO DAILY FOLLOW UP NOTE  [address] [facility] Pharmacokinetic Monitoring Service - Vancomycin    Consulting Provider: Dr. Anjelica Manley   Indication: HAP  Target Concentration: Goal AUC/ALEXEY 400-600 mg*hr/L  Day of Therapy: 2  Additional Antimicrobials: cefepime    Pertinent Laboratory Values: Wt Readings from Last 1 Encounters:   03/09/22 59 kg (130 lb)     Temp Readings from Last 1 Encounters:   03/10/22 98.5 °F (36.9 °C)     No components found for: PROCAL  Recent Labs     03/10/22  0809 03/10/22  0255 03/09/22  2316 03/09/22  2111   BUN  --  12  --  10   CREA  --  0.82  --  0.74   WBC  --  23.5*  --  25.8*   PCT  --   --   --  62.38*   LAC 1.6 2.5* 2.3*  --      Estimated Creatinine Clearance: 59.6 mL/min (based on SCr of 0.82 mg/dL). Lab Results   Component Value Date/Time    Vancomycin,trough 19.0 08/10/2021 04:15 AM    Vancomycin, random 19.9 03/10/2022 03:01 PM       MRSA Nasal Swab: N/A. Non-respiratory infection. .      Assessment:  Date/Time Dose Concentration AUC   3/10 @ 1501 750mg q12h 19.9 514   Note: Serum concentrations collected for AUC dosing may appear elevated if collected in close proximity to the dose administered, this is not necessarily an indication of toxicity    Plan:  Current dosing regimen is therapeutic  Continue current dose  Repeat vancomycin concentrations will be ordered as clinically appropriate   Pharmacy will continue to monitor patient and adjust therapy as indicated    Thank you for the consult,  BELLA Elmore

## 2022-03-10 NOTE — ED PROVIDER NOTES
60-year-old female patient presents to the ER with concerns over diarrhea, right-sided abdominal pain and intermittent shortness of breath    Patient recently admitted for E. coli bacteremia secondary to bacterial meningitis  She is currently receiving IV Rocephin doses at home. Patient has had issues with diarrhea in the past and has been taking Lomotil without significant improvement    Patient reports temperature between 101 101 with chills. Although the patient does seem somewhat uncertain about the actual temperature readings  She denies dysuria    Patient states that her right lower quadrant pain is crampy in nature and something that she has not dealt with in the past    The history is provided by the patient and the EMS personnel. Back Pain   Associated symptoms include abdominal pain. Pertinent negatives include no chest pain, no fever, no headaches and no dysuria. Multiple abdominal surgeries including appendectomy, cholecystectomy small bowel resection  Abdominal Pain   This is a recurrent problem. The problem occurs constantly. The problem has been gradually worsening. The pain is associated with an unknown factor. The pain is located in the RLQ. The quality of the pain is aching and cramping. The pain is moderate. Associated symptoms include diarrhea, nausea, vomiting, myalgias and back pain. Pertinent negatives include no fever, no constipation, no dysuria, no hematuria, no headaches, no arthralgias and no chest pain. The pain is worsened by activity and eating. The pain is relieved by nothing. Past workup includes CT scan, surgery. Her past medical history is significant for GERD, cancer and UTI. Past medical history comments: Splenomegaly.  Multiple abdominal surgeries including appendectomy, cholecystectomy small bowel resection       Past Medical History:   Diagnosis Date    Acute blood loss anemia 4/6/2018    Anemia     C. difficile diarrhea 4/1/2015    Cerebral edema (HCC) 4/1/2015    Chronic migraine 9/22/2016    Chronic myelogenous leukemia ([address]     Traverse chromosome/ converted from polycythemia in 2009- to 2012 when she was dx w leukemia    Chronic pain     Coagulation disorder ([address]     \"clotting and Bleeding Problem\" dr Ankit Martinez follows     Esophageal spasm     with banding     Esophageal varices ([address]     grade 3    GI bleed 8/3/2016    H/O craniotomy     3-29-15.  due to blood clot - which caused a seizure  - then pt fell and hit     Hematemesis 8/20/2021    Leukocytosis 3/14/2015    Melena 8/20/2021    MRSA colonization 6/25/2012    Polycythemia vera(238.4)     JAK2 mutation    Portal hypertension ([address]     with varices    Portal vein thrombosis 6/20/2012    Ct scan 6-21-2 Apparent occlusion of the portal vein. In addition, the splenic vein, and superior mesenteric vein are not definitely seen and are also likely  occluded. Splenomegaly, and extensive varices are seen as described above consistent with the portal vein occlusion 7-05-12 on arixtra HIT negative on repeat 7-27-12 re admitted Cirrhotic appearance of the liver. Mild to moderate ascites, as    Primary hypothyroidism     Pulmonary embolism ([address] 2006    not on coumadin anymore     S/P exploratory laparotomy, 6/20/12 6/25/2012    Bowel resection:  336 cm of small bowel removed, approximately 9 feet and placement of feeding jejunostomy.      S/P small bowel resection     2012.  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 6/25/2012 7-01-12 platelets lower repeat HIT 7-02-12 platelets in 54,627'R    Thrombosis, portal vein 2004    portal , spleenic and recently superior mesenteric    Transfusion history     many blood tranfusions - last 3-29-15 after brain surgery    Traumatic hemorrhage of right cerebrum ([facility] 3/30/2015       Past Surgical History:   Procedure Laterality Date    HX APPENDECTOMY      with small bowel resection    HX BREAST BIOPSY Bilateral     Lt - ; Rt -     HX CHOLECYSTECTOMY      HX GI      liver biopsy    HX GI      bowel removed small - 9 feet     HX GYN       x 2    HX GYN      D&C following miscarriage    HX ORTHOPAEDIC Left 2008    torn labrum shoulder (screw in place)    NEUROLOGICAL PROCEDURE UNLISTED  2010    craniotomy to evacuate subdural hematoma following a fall from a seizure    VT ABDOMEN SURGERY PROC UNLISTED      umbilical hernia repair    VT ABDOMEN SURGERY PROC UNLISTED      9ft of small bowel excised then reconnected         Family History:   Problem Relation Age of Onset    Diabetes Mother     Stroke Mother         after surgery    Cancer Father         brain    No Known Problems Sister     Other Sister         diverticulitis    Other Sister         diverticulitis    Cancer Sister         lyjmphoma    Breast Cancer Maternal Aunt 48    Thyroid Disease Paternal Grandmother        Social History     Socioeconomic History    Marital status: SINGLE     Spouse name: Not on file    Number of children: Not on file    Years of education: Not on file    Highest education level: Not on file   Occupational History    Not on file   Tobacco Use    Smoking status: Former Smoker     Packs/day: 0.20     Years: 10.00     Pack years: 2.00     Types: Cigarettes     Quit date:      Years since quittin.2    Smokeless tobacco: Never Used   Substance and Sexual Activity    Alcohol use: No    Drug use: No    Sexual activity: Not on file   Other Topics Concern    Not on file   Social History Narrative    Not on file     Social Determinants of Health     Financial Resource Strain:    [facility] Difficulty of Paying Living Expenses: Not on file   Food Insecurity:     Worried About Running Out of Food in the Last Year: Not on file    Ran Out of Food in the Last Year: Not on file   Transportation Needs:     Lack of Transportation (Medical): Not on file    Lack of Transportation (Non-Medical): Not on file   Physical Activity:     Days of Exercise per Week: Not on file    Minutes of Exercise per Session: Not on file   Stress:     Feeling of Stress : Not on file   Social Connections:     Frequency of Communication with Friends and Family: Not on file    Frequency of Social Gatherings with Friends and Family: Not on file    Attends Congregational Services: Not on file    Active Member of [address] [place] [address] or Organizations: Not on file    Attends Club or Organization Meetings: Not on file    Marital Status: Not on file   Intimate Partner Violence:     Fear of Current or Ex-Partner: Not on file    Emotionally Abused: Not on file    Physically Abused: Not on file    Sexually Abused: Not on file   Housing Stability:     Unable to Pay for Housing in the Last Year: Not on file    Number of [place] in the Last Year: Not on file    Unstable Housing in the Last Year: Not on file         ALLERGIES: Latex, Sulfa (sulfonamide antibiotics), Tramadol, Augmentin [amoxicillin-pot clavulanate], Divalproex sodium, Morphine, Potassium clavulanate, Rizatriptan, Tetanus immune globulin, Vancomycin, Xanax [alprazolam], and Zonegran [zonisamide]    Review of Systems   Constitutional: Negative for chills and fever. HENT: Negative for congestion, ear pain and rhinorrhea. Eyes: Negative for photophobia and discharge. Respiratory: Negative for cough and shortness of breath. Cardiovascular: Negative for chest pain and palpitations. Gastrointestinal: Positive for abdominal pain, diarrhea, nausea and vomiting. Negative for constipation. Endocrine: Negative for cold intolerance and heat intolerance.    Genitourinary: Negative for dysuria, flank pain and hematuria. Musculoskeletal: Positive for back pain and myalgias. Negative for arthralgias and neck pain. Skin: Negative for rash and wound. Allergic/Immunologic: Negative for environmental allergies and food allergies. Neurological: Negative for syncope and headaches. Hematological: Negative for adenopathy. Does not bruise/bleed easily. Psychiatric/Behavioral: Positive for dysphoric mood. The patient is nervous/anxious. All other systems reviewed and are negative. Vitals:    03/09/22 2049 03/09/22 2107 03/09/22 2108   BP: 120/78     Pulse: (!) 105 (!) 108    Resp: 16     Temp: 98.6 °F (37 °C)     SpO2: 96% (!) 89%    Weight:   59 kg (130 lb)   Height:   5' 3\" (1.6 m)            Physical Exam  Vitals and nursing note reviewed. Constitutional:       General: She is in acute distress. Appearance: Normal appearance. She is well-developed and normal weight. She is ill-appearing. HENT:      Head: Normocephalic and atraumatic. Right Ear: External ear normal.      Left Ear: External ear normal.      Nose: Nose normal.      Mouth/Throat:      Mouth: Mucous membranes are moist.      Pharynx: Oropharynx is clear. No oropharyngeal exudate or posterior oropharyngeal erythema. Eyes:      Extraocular Movements: Extraocular movements intact. Conjunctiva/sclera: Conjunctivae normal.      Pupils: Pupils are equal, round, and reactive to light. Neck:      Vascular: No JVD. Cardiovascular:      Rate and Rhythm: Regular rhythm. Tachycardia present. Pulses: Normal pulses. Heart sounds: Normal heart sounds. No murmur heard. No friction rub. No gallop. Pulmonary:      Effort: Pulmonary effort is normal.      Breath sounds: Normal breath sounds. Abdominal:      General: Bowel sounds are normal. There is no distension. Palpations: Abdomen is soft. There is no mass. Tenderness:  There is abdominal tenderness in the right upper quadrant, right lower quadrant and suprapubic area. Musculoskeletal:         General: No deformity. Normal range of motion. Cervical back: Normal range of motion and neck supple. Skin:     General: Skin is warm and dry. Capillary Refill: Capillary refill takes less than 2 seconds. Findings: No rash. Neurological:      General: No focal deficit present. Mental Status: She is alert and oriented to person, place, and time. Cranial Nerves: No cranial nerve deficit. Sensory: No sensory deficit. Gait: Gait normal.   Psychiatric:         Mood and Affect: Mood is anxious. Affect is tearful. Speech: Speech normal.         Behavior: Behavior normal. Behavior is cooperative. Thought Content: Thought content normal.         Cognition and Memory: She exhibits impaired recent memory. MDM  Number of Diagnoses or Management Options  HCAP (healthcare-associated pneumonia): new and requires workup  Leukocytosis, unspecified type: new and requires workup  Diagnosis management comments: 61-year-old female patient with new onset hypoxia and new infiltrate on her chest x-ray  Patient's white blood cell count has spiked back up over 25,000  Will obtain blood cultures lactic acid and pro-Zeke  Start IV antibiotics, patient already on IV Rocephin at home for E. coli bacteremia    CT PE protocol negative for PE  Question of fluid overload and pleural effusions along with progressive anasarca which may explain the patient's abdominal discomfort.     Reviewed findings with hospitalist service and they will admit       Amount and/or Complexity of Data Reviewed  Clinical lab tests: ordered and reviewed  Tests in the radiology section of CPT®: reviewed and ordered  Tests in the medicine section of CPT®: ordered and reviewed  Decide to obtain previous medical records or to obtain history from someone other than the patient: yes  Review and summarize past medical records: yes  Discuss the patient with other providers: yes  Independent visualization of images, tracings, or specimens: yes    Risk of Complications, Morbidity, and/or Mortality  Presenting problems: high  Diagnostic procedures: high  Management options: high  General comments: Elements of this note have been dictated via voice recognition software. Text and phrases may be limited by the accuracy of the software. The chart has been reviewed, but errors may still be present. Patient Progress  Patient progress: improved         EKG    Date/Time: 3/9/2022 10:57 PM  Performed by: Byron Mo MD  Authorized by: Byron Mo MD     ECG reviewed by ED Physician in the absence of a cardiologist: yes    Previous ECG:     Previous ECG:  Compared to current    Comparison ECG info: Increased heart rate    Similarity:  Changes noted  Interpretation:     Interpretation: abnormal    Rate:     ECG rate assessment: tachycardic    Rhythm:     Rhythm: sinus tachycardia    Ectopy:     Ectopy: none    QRS:     QRS axis:  Normal    QRS intervals:  Normal  Conduction:     Conduction: normal    ST segments:     ST segments:  Normal  T waves:     T waves: normal    Comments:      Sinus tachycardia.   No acute ischemic changes  No significant interval change from prior tracing

## 2022-03-10 NOTE — ASSESSMENT & PLAN NOTE
Noted on admission CT and exam  -albumin, bumetanide    3/13: renal function stable, continue bumetanide

## 2022-03-10 NOTE — ASSESSMENT & PLAN NOTE
Recent hospitalization from 2/27-3/7/2022 for e.coli bacteremia and meningitis. Patient has been on rocephin daily since discharge with EOT date of 3/13.  Based on culture susceptibilities, e.coli is also sensitive to zosyn and cefepime  -vancomycin, cefepime   -repeat BCx    3/13: infectious disease: continue antibiotic therapy through today

## 2022-03-10 NOTE — PROGRESS NOTES
Comprehensive Nutrition Assessment    Type and Reason for Visit: Initial,Positive nutrition screen  Best Practice Alert for Malnutrition Screening Tool: Recently Lost Weight Without Trying: Unsure, If Yes, How Much Weight Loss: 2-13 lbs, Eating Poorly Due to Decreased Appetite: No    Nutrition Recommendations/Plan:   Meals and Snacks:  Continue current diet. Regular. Yogurt at breakfast.  Nutrition Supplement Therapy:   Medical food supplement therapy:  Initiate Ensure Enlive twice per day (this provides 350 kcal and 20 grams protein per bottle)     Malnutrition Assessment:  Malnutrition Status: At risk for malnutrition (specify) (variable tolerance of oral diet and variable wt)  Nutrition Assessment:   Nutrition History: 7/13/2021: Nutrition history notable for EN and PN in 2012 following significant bowel surgery. Patient confirms this stating that she had both TPN and jtube feeds at one point, but it has been a long time. She does voice several diet restrictions due to her cancer and enlarged spleen. She reports early satiety and difficulty digesting certain foods as primary barrier to PO intake. She reports she usually eats 2 meals per day and several snacks. She states that breakfast is usually eggs, hall, and toast and will sometimes eat over several hours. She states other meal is a light dinner which is variable. She states that she feels like she digests chicken and fish better, but does occasionally eats beef and pork depending on how it is cooked. She states that she tends to eat her portein and vegetable portions first and will hold off on her starches if she starts becoming too full. She states that snacks include fruit, yogurt, ice cream. She states that she occasionally drinks Ensure at home depending on her ablilty to prepare and consume meals, but prefers to eat real food most often. She states that she does not know current body weight but was ~128# at most recent office visit.  She states that her weight fluctuates due to her PO intake and diarrhea when taking chemo. 3/1: Pt reports intake similar to what was reported July 2021 with intake up to 1 bottle of Ensure per day. At baseline has several loose BM's/diarrhea per day that is worse with chemotherapy. Has occasional nausea but denies vomiting, altered tastes, mouth sores, chewing or swallowing difficulties. Pt reports weight continues to be variable 3/10: Deferred      Nutrition Background: H/O: CML, polycythemia vera, intracranial hemorrage s/p craniotomy, small bowel resection d/t to bowel ischemia in 2012 (noted 9 ft removed, ileum and colon remain), seizures, GI bleed, esophageal varices. vasculitis, hypothyroidism, cirrhosis/malignant ascites, splenomegaly, anemia, migraines, hx PE 2006, recurrent DVTs/portal vein thrombosis. Cloteal Arias She was recently admitted from 2/2 - 2/6/22 for E Coli bacteremia. Recent admission 2/27 to 3/7 for bactermia/bacterial meningitis. Presented this admission with shortness of breath, back/neck discomfort, and grogginess. CT findnings of new splenic infarcts, pleural effusion. Nutrition Interval:  Pt resting, eyes closed at time of RD visit. Supper tray at bedside with 0% consumed  Pt receptive to Ensure and yogurt last admission. Nutrition Related Findings:   No visible fat or muscle wasting 3/1/ NFPE deferred 3/10 as pt resting soundly.       Current Nutrition Therapies:  ADULT DIET Regular    Current Intake:   Average Meal Intake: 1-25% (1 recorded meal since admission)        Anthropometric Measures:  Height: 5' 3\" (160 cm)  Current Body Wt: 59 kg (130 lb 1.1 oz) (3/9), Weight source: Stated  BMI: 23, Normal weight (BMI 22.0-24.9) age over 72  Admission Body Weight: 130 lb 1.1 oz (stated)  Ideal Body Weight (lbs) (Calculated): 115 lbs (52 kg),    Usual Body Wt:  (See below),   WT / BMI WEIGHT   3/6/2022 139 lb 12.8 oz   2/8/2022 124 lb   2/5/2022 129 lb   1/10/2022 123 lb   12/13/2021 119 lb 6.4 oz   11/12/2021 119 lb 12.8 oz   10/15/2021 119 lb 4.8 oz   10/6/2021 115 lb 8 oz   9/17/2021 118 lb   9/1/2021 137 lb 12.8 oz   8/20/2021 125 lb   8/19/2021 124 lb 6.4 oz   8/9/2021 138 lb 9.6 oz   8/8/2021 130 lb   8/5/2021 130 lb   7/26/2021 130 lb   7/25/2021 130 lb   7/15/2021 131 lb 8 oz   7/13/2021 138 lb   6/17/2021 128 lb   5/19/2021 128 lb   4/15/2021 127 lb   3/18/2021 130 lb   Percent weight change:  Variances as above          Edema: No data recorded   Estimated Daily Nutrient Needs:  Energy (kcal/day): 2026-6599 (Kcal/kg (25-30), Weight Used: Other (specify) (58.9 kg bed scale 2/28))  Protein (g/day): 74-88 Weight Used: ( (20% of kcal))  Fluid (ml/day):   (1 ml/kcal)    Nutrition Diagnosis:   · Inadequate oral intake related to altered GI structure (decreased appetite) as evidenced by GI abnormality ((S/P SB resection, pt reported barriers as above, current intake < 25% of estimated needs)    Nutrition Interventions:   Food and/or Nutrient Delivery: Continue current diet,Start oral nutrition supplement     Coordination of Nutrition Care: Continue to monitor while inpatient,Interdisciplinary rounds      Goals:       Active Goal: Intake >25% of estimated needs by follow up    Nutrition Monitoring and Evaluation:      Food/Nutrient Intake Outcomes: Food and nutrient intake,Supplement intake  Physical Signs/Symptoms Outcomes: Weight    Discharge Planning:    Continue oral nutrition supplement,Continue current diet    Giovanny Galindo, [address], [address] [place] [address]

## 2022-03-10 NOTE — ASSESSMENT & PLAN NOTE
Admission CT with multiple splenic infarcts, portal vein thrombosis.   Admission platelet 345>201.  -Consult hematology  -On enoxaparin    3/13: Hematology agrees, maintain current therapy

## 2022-03-10 NOTE — PROGRESS NOTES
Physician Progress Note      PATIENT:               Yara Veliz  CSN #:                  [id number]  :                       1960  ADMIT DATE:       3/9/2022 8:50 PM  [address] [place] DATE:  RESPONDING  PROVIDER #:        Danny Dave MD          QUERY TEXT:    Pt admitted with increased shortness of breath, back/neck discomfort, and grogginess. Pt noted to have PNA. If possible, please document in the progress notes and discharge summary if you are evaluating and /or treating any of the following: The medical record reflects the following:  Risk Factors: Recent hospitalization from -3/7/2022 for e.coli bacteremia and meningitis (has been on daily rocephin based on susceptibilities for continued treatment of e.coli bacteremia with EOT date of 3/13.)  Clinical Indicators: - B/P 92/48. WBC 23.5, Procalcitonin - 62.38, LA 2.3. Treatment: IV N/S Bolus infusion, IV L/R infusion, Zithromax IV,  Vancomycin IN, Cefefime IV  Options provided:  -- Sepsis, present on admission  -- Pneumonia, without Sepsis  -- Sepsis was ruled out  -- Other - I will add my own diagnosis  -- Disagree - Not applicable / Not valid  -- Disagree - Clinically unable to determine / Unknown  -- Refer to Clinical Documentation Reviewer    PROVIDER RESPONSE TEXT:    This patient has sepsis which was present on admission.     Query created by: mani faye on 3/10/2022 12:48 PM      Electronically signed by:  Danny Dave MD 3/10/2022 3:29 PM

## 2022-03-10 NOTE — PROGRESS NOTES
Admission assessment complete. Patient arrived from ED alert and oriented. Respirations are even and unlabored. Lung sounds are diminished, audible expiratory wheeze observed. Patient is on 3 liters via nasal cannula and states she uses supplemental oxygen at home as well. Patient has scattered bruising on arms. Bowel sounds are present, last movement was today. Patient aware that stool sample is needed. Abdomen is distended. Patient complained of pain, in right upper abdomen and lower back. Medicated with Percocet per PRN order. Patient declined putting on hospital  socks, wanted to wear her own. Bedside commode at bedside as patient requested. No needs expressed at this time. Bed low and locked, call light within reach.   ____________________________________________     03/10/22 0000   Dual Skin Pressure Injury Assessment   Dual Skin Pressure Injury Assessment WDL   Second Care Provider (Based on [address] [place] [address]) Olga Marc RN   Skin Integumentary   Skin Integumentary (WDL) X    Pressure  Injury Documentation No Pressure Injury Noted-Pressure Ulcer Prevention Initiated   Skin Color Ecchymosis (comment)  (scattered)   ______________________________________________    Problem: Risk for Spread of Infection  Goal: Prevent transmission of infectious organism to others  Description: Prevent the transmission of infectious organisms to other patients, staff members, and visitors. Outcome: Progressing Towards Goal     Problem: Falls - Risk of  Goal: *Absence of Falls  Description: Document Saunders Blades Fall Risk and appropriate interventions in the flowsheet.   Outcome: Progressing Towards Goal  Note: Fall Risk Interventions:  Mobility Interventions: Communicate number of staff needed for ambulation/transfer    Medication Interventions: Teach patient to arise slowly    Elimination Interventions: Call light in reach

## 2022-03-10 NOTE — PROGRESS NOTES
Hospitalist Progress Note   Admit Date:  3/9/2022  8:50 PM   Name:  Mary Lane   Age:  64 y.o. Sex:  female  :  1960   MRN:  [id number]   Room:  [facility]/    Presenting Complaint: Back Pain    Reason(s) for Admission: HCAP (healthcare-associated pneumonia) [J18.9]     Hospital Course & Interval History:   64 y.o. female with medical history of CML and recent hospitalization (-3/7/22) for e.coli bacteremia and meningitis who presented to ED with shortness of breath, back/neck discomfort, and grogginess. Patient reports increased SOB over the past 2 days. Family reported that the patient seems more \"groggy\" than typical.  Upon ER evaluation, patient was found to be hypoxic with O2 sat of 85%. WBC is ~26,000. CXR shows some vascular congestion and ER is concerned for a R sided infiltrate. Patient has been on daily rocephin based on susceptibilities for continued treatment of e.coli bacteremia with EOT date of 3/13. She has also been on BID lovenox. CT with new splenic infarcts, pleural effusion. Consulted hematology. Consulted pulmonology. Subjective/24hr Events (03/10/22):  Pain insufficiently controlled. Is having difficulty with oral medications due to ascites. Abdominal fullness worse. Breathing stable but still feels very short of breath. Definite fluid wave on exam, rales. ROS:  10 systems reviewed and negative except as noted above. Assessment & Plan:   * HCAP (healthcare-associated pneumonia)  ER concerned about R sided infiltrate; Radiology read as vascular congestion. Admission WBC 25.8, up from 15.4 two days prior at discharge  - With hypoxia and leukocytosis, will treat for HCAP.   IV vancomycin and cefepime ordered  - Wean O2 as tolerated  - Follow repeat blood cultures as obtained in ER    3/10: Off O2, HF less likely given recent echo    Chronic myelogenous leukemia ([facility]  -consult oncology    3/10: pain uncontrolled, starting fentanyl    E coli bacteremia  Recent hospitalization from 2/27-3/7/2022 for e.coli bacteremia and meningitis. Patient has been on rocephin daily since discharge with EOT date of 3/13. Based on culture susceptibilities, e.coli is also sensitive to zosyn and cefepime  -vancomycin, cefepime   -repeat BCx    Thrombocytopenia ([facility]  Admission CT with multiple splenic infarcts, portal vein thrombosis.   Admission platelet 835>794.  -Consult hematology  -On enoxaparin    Malignant ascites  Noted on admission CT and exam  -albumin, bumex    Acquired hypercoagulable state ([facility]  H/O multiple thromboses per chart review, now with splenic infarct  -lovenox BID    Esophageal varices (HCC)  - Of note  - No current symptoms of this  - Continue home meds    Primary hypothyroidism  -levothyroxine 112    Cirrhosis ([facility]  - Of note        Dispo/Discharge Planning:    Remain inpatient    Diet:  ADULT DIET Regular  DVT PPx: On enoxaparin  Code status: Full Code    Hospital Problems as of 3/10/2022 Date Reviewed: 2/8/2022          Codes Class Noted - Resolved POA    * (Principal) HCAP (healthcare-associated pneumonia) ICD-10-CM: J18.9  ICD-9-CM: 102  3/9/2022 - Present Yes        Chronic myelogenous leukemia ([facility] (Chronic) ICD-10-CM: C92.10  ICD-9-CM: 205.10  3/30/2015 - Present Yes        E coli bacteremia ICD-10-CM: R78.81, B96.20  ICD-9-CM: 790.7, 041.49  3/9/2022 - Present Yes        Thrombocytopenia ([facility] ICD-10-CM: D69.6  ICD-9-CM: 287.5  10/15/2021 - Present Yes        Acquired hypercoagulable state ([facility] (Chronic) ICD-10-CM: S14.18  ICD-9-CM: 289.81  3/30/2015 - Present Yes        Cirrhosis ([facility] (Chronic) ICD-10-CM: K74.60  ICD-9-CM: 571.5  7/27/2012 - Present Yes        Primary hypothyroidism (Chronic) ICD-10-CM: E03.9  ICD-9-CM: 244.9  2/16/2009 - Present Yes        Esophageal varices (HCC) (Chronic) ICD-10-CM: I85.00  ICD-9-CM: 456.1  2/16/2009 - Present Yes    Overview Addendum 8/26/2021  2:30 PM by Alex Ortiz MD     grade 3 Portal vein thrombosis ICD-10-CM: S14  ICD-9-CM: 282  2/16/2009 - Present Unknown    Overview Addendum 8/26/2021  2:31 PM by Terrell Bethea MD     Ct scan 6-21-2 Apparent occlusion of the portal vein. In addition, the splenic vein, and superior mesenteric vein are not definitely seen and are also likely  occluded. Splenomegaly, and extensive varices are seen as described above consistent with the portal vein occlusion 7-05-12 on arixtra HIT negative on repeat 7-27-12 re admitted Cirrhotic appearance of the liver. Mild to moderate ascites                   Objective:     Patient Vitals for the past 24 hrs:   Temp Pulse Resp BP SpO2   03/10/22 1125 98.5 °F (36.9 °C) (!) 102 20 109/81 94 %   03/10/22 0847 98.8 °F (37.1 °C) 99 16 105/81 97 %   03/10/22 0712 98.7 °F (37.1 °C) (!) 101 16 (!) 92/48 90 %   03/10/22 0310 97.7 °F (36.5 °C) 78 20 109/65 99 %   03/09/22 2345 98.2 °F (36.8 °C) (!) 109 22 107/79 92 %   03/09/22 2333 -- (!) 120 21 113/77 92 %   03/09/22 2221 -- (!) 107 -- 121/76 (!) 85 %   03/09/22 2137 -- (!) 107 -- 114/74 94 %   03/09/22 2107 -- (!) 108 -- -- (!) 89 %   03/09/22 2049 98.6 °F (37 °C) (!) 105 16 120/78 96 %     Oxygen Therapy  O2 Sat (%): 94 % (03/10/22 1125)  Pulse via Oximetry: 103 beats per minute (03/09/22 2333)  O2 Device: None (Room air) (03/09/22 2049)    Estimated body mass index is 23.03 kg/m² as calculated from the following:    Height as of this encounter: 5' 3\" (1.6 m). Weight as of this encounter: 59 kg (130 lb). Intake/Output Summary (Last 24 hours) at 3/10/2022 1221  Last data filed at 3/10/2022 0903  Gross per 24 hour   Intake 360 ml   Output --   Net 360 ml       Blood pressure 109/81, pulse (!) 102, temperature 98.5 °F (36.9 °C), resp. rate 20, height 5' 3\" (1.6 m), weight 59 kg (130 lb), SpO2 94 %. Physical Exam  Vitals and nursing note reviewed. Constitutional:       General: She is not in acute distress. Appearance: Normal appearance. She is ill-appearing. HENT:      Head: Normocephalic. Eyes:      Extraocular Movements: Extraocular movements intact. Cardiovascular:      Rate and Rhythm: Normal rate and regular rhythm. Pulses:           Radial pulses are 2+ on the left side. Pulmonary:      Effort: Pulmonary effort is normal. No respiratory distress. Abdominal:      General: Bowel sounds are normal. There is distension. Palpations: There is fluid wave and splenomegaly. Tenderness: There is generalized abdominal tenderness. Musculoskeletal:         General: No deformity. Cervical back: No rigidity. Right lower leg: No edema. Left lower leg: No edema. Skin:     General: Skin is warm and dry. Coloration: Skin is not jaundiced. Neurological:      General: No focal deficit present. Mental Status: She is alert and oriented to person, place, and time. Psychiatric:         Mood and Affect: Mood normal.         Behavior: Behavior normal.       I have reviewed ordered lab tests and independently visualized imaging below:    Recent Labs:  Recent Results (from the past 48 hour(s))   CBC WITH AUTOMATED DIFF    Collection Time: 03/09/22  9:11 PM   Result Value Ref Range    WBC 25.8 (H) 4.3 - 11.1 K/uL    RBC 3.17 (L) 4.05 - 5.2 M/uL    HGB 10.0 (L) 11.7 - 15.4 g/dL    HCT 30.2 (L) 35.8 - 46.3 %    MCV 95.3 79.6 - 97.8 FL    MCH 31.5 26.1 - 32.9 PG    MCHC 33.1 31.4 - 35.0 g/dL    RDW 19.2 (H) 11.9 - 14.6 %    PLATELET 068 (L) 949 - 450 K/uL    MPV 10.4 9.4 - 12.3 FL    ABSOLUTE NRBC 0.02 0.0 - 0.2 K/uL    DF AUTOMATED      NEUTROPHILS 90 (H) 43 - 78 %    LYMPHOCYTES 4 (L) 13 - 44 %    MONOCYTES 4 4.0 - 12.0 %    EOSINOPHILS 0 (L) 0.5 - 7.8 %    BASOPHILS 0 0.0 - 2.0 %    IMMATURE GRANULOCYTES 2 0.0 - 5.0 %    ABS. NEUTROPHILS 23.3 (H) 1.7 - 8.2 K/UL    ABS. LYMPHOCYTES 1.1 0.5 - 4.6 K/UL    ABS. MONOCYTES 0.9 0.1 - 1.3 K/UL    ABS. EOSINOPHILS 0.0 0.0 - 0.8 K/UL    ABS. BASOPHILS 0.1 0.0 - 0.2 K/UL    ABS. IMM.  GRANS. 0.4 0.0 - 0.5 K/UL   METABOLIC PANEL, COMPREHENSIVE    Collection Time: 03/09/22  9:11 PM   Result Value Ref Range    Sodium 135 (L) 136 - 145 mmol/L    Potassium 3.6 3.5 - 5.1 mmol/L    Chloride 104 98 - 107 mmol/L    CO2 24 21 - 32 mmol/L    Anion gap 7 7 - 16 mmol/L    Glucose 128 (H) 65 - 100 mg/dL    BUN 10 8 - 23 MG/DL    Creatinine 0.74 0.6 - 1.0 MG/DL    GFR est AA >60 >60 ml/min/1.73m2    GFR est non-AA >60 >60 ml/min/1.73m2    Calcium 8.3 8.3 - 10.4 MG/DL    Bilirubin, total 0.6 0.2 - 1.1 MG/DL    ALT (SGPT) 31 12 - 65 U/L    AST (SGOT) 51 (H) 15 - 37 U/L    Alk. phosphatase 117 50 - 136 U/L    Protein, total 6.6 6.3 - 8.2 g/dL    Albumin 2.2 (L) 3.2 - 4.6 g/dL    Globulin 4.4 (H) 2.3 - 3.5 g/dL    A-G Ratio 0.5 (L) 1.2 - 3.5     PROCALCITONIN    Collection Time: 03/09/22  9:11 PM   Result Value Ref Range    Procalcitonin 62.38 (H) 0.00 - 0.49 ng/mL   MAGNESIUM    Collection Time: 03/09/22  9:11 PM   Result Value Ref Range    Magnesium 2.4 1.8 - 2.4 mg/dL   PHOSPHORUS    Collection Time: 03/09/22  9:11 PM   Result Value Ref Range    Phosphorus 2.9 2.3 - 3.7 MG/DL   LIPASE    Collection Time: 03/09/22  9:11 PM   Result Value Ref Range    Lipase 61 (L) 73 - 393 U/L   COVID-19 RAPID TEST    Collection Time: 03/09/22 10:04 PM   Result Value Ref Range    Specimen source NASAL SWAB      COVID-19 rapid test Not detected NOTD     CULTURE, URINE    Collection Time: 03/09/22 10:04 PM    Specimen: Clean catch; Urine    URINE   Result Value Ref Range    Special Requests: NO SPECIAL REQUESTS      Culture result:        NO GROWTH AFTER SHORT PERIOD OF INCUBATION. FURTHER RESULTS TO FOLLOW AFTER OVERNIGHT INCUBATION.    URINALYSIS W/ RFLX MICROSCOPIC    Collection Time: 03/09/22 10:04 PM   Result Value Ref Range    Color ISAEL      Appearance TURBID      Specific gravity 1.025 (H) 1.001 - 1.023      pH (UA) 6.0 5.0 - 9.0      Protein 100 (A) NEG mg/dL    Glucose Negative mg/dL    Ketone TRACE (A) NEG mg/dL    Bilirubin Negative NEG      Blood SMALL (A) NEG      Urobilinogen 0.2 0.2 - 1.0 EU/dL    Nitrites Negative NEG      Leukocyte Esterase SMALL (A) NEG      WBC 5-10 0 /hpf    RBC 0-3 0 /hpf    Epithelial cells 0-3 0 /hpf    Bacteria TRACE 0 /hpf    Casts 0-3 0 /lpf    Crystals, urine AMORPHOUS 0 /LPF   EKG, 12 LEAD, INITIAL    Collection Time: 03/09/22 10:24 PM   Result Value Ref Range    Ventricular Rate 109 BPM    Atrial Rate 109 BPM    P-R Interval 132 ms    QRS Duration 80 ms    Q-T Interval 300 ms    QTC Calculation (Bezet) 404 ms    Calculated P Axis 73 degrees    Calculated R Axis 87 degrees    Calculated T Axis 48 degrees    Diagnosis       !! AGE AND GENDER SPECIFIC ECG ANALYSIS !!   Sinus tachycardia  Low voltage QRS  Borderline ECG  When compared with ECG of 09-MAR-2022 21:58,  Minimal criteria for Anterior infarct are no longer Present  Nonspecific T wave abnormality now evident in Inferior leads  Confirmed by [facility]  MD (), Augustus Nissen ([id number]) on 3/10/2022 7:06:26 AM     CULTURE, BLOOD    Collection Time: 03/09/22 10:37 PM    Specimen: Blood   Result Value Ref Range    Special Requests: RIGHT  Antecubital        Culture result: NO GROWTH AFTER 9 HOURS     CULTURE, BLOOD    Collection Time: 03/09/22 11:16 PM    Specimen: Blood   Result Value Ref Range    Special Requests: LEFT  Antecubital        Culture result: NO GROWTH AFTER 8 HOURS     LACTIC ACID    Collection Time: 03/09/22 11:16 PM   Result Value Ref Range    Lactic acid 2.3 (H) 0.4 - 2.0 MMOL/L   METABOLIC PANEL, BASIC    Collection Time: 03/10/22  2:55 AM   Result Value Ref Range    Sodium 135 (L) 136 - 145 mmol/L    Potassium 3.7 3.5 - 5.1 mmol/L    Chloride 105 98 - 107 mmol/L    CO2 22 21 - 32 mmol/L    Anion gap 8 7 - 16 mmol/L    Glucose 195 (H) 65 - 100 mg/dL    BUN 12 8 - 23 MG/DL    Creatinine 0.82 0.6 - 1.0 MG/DL    GFR est AA >60 >60 ml/min/1.73m2    GFR est non-AA >60 >60 ml/min/1.73m2    Calcium 7.7 (L) 8.3 - 10.4 MG/DL   CBC WITH AUTOMATED DIFF Collection Time: 03/10/22  2:55 AM   Result Value Ref Range    WBC 23.5 (H) 4.3 - 11.1 K/uL    RBC 2.85 (L) 4.05 - 5.2 M/uL    HGB 9.1 (L) 11.7 - 15.4 g/dL    HCT 27.8 (L) 35.8 - 46.3 %    MCV 97.5 79.6 - 97.8 FL    MCH 31.9 26.1 - 32.9 PG    MCHC 32.7 31.4 - 35.0 g/dL    RDW 19.6 (H) 11.9 - 14.6 %    PLATELET 621 (L) 331 - 450 K/uL    MPV 10.6 9.4 - 12.3 FL    ABSOLUTE NRBC 0.00 0.0 - 0.2 K/uL    DF AUTOMATED      NEUTROPHILS 91 (H) 43 - 78 %    LYMPHOCYTES 4 (L) 13 - 44 %    MONOCYTES 3 (L) 4.0 - 12.0 %    EOSINOPHILS 0 (L) 0.5 - 7.8 %    BASOPHILS 0 0.0 - 2.0 %    IMMATURE GRANULOCYTES 2 0.0 - 5.0 %    ABS. NEUTROPHILS 21.4 (H) 1.7 - 8.2 K/UL    ABS. LYMPHOCYTES 0.9 0.5 - 4.6 K/UL    ABS. MONOCYTES 0.7 0.1 - 1.3 K/UL    ABS. EOSINOPHILS 0.0 0.0 - 0.8 K/UL    ABS. BASOPHILS 0.1 0.0 - 0.2 K/UL    ABS. IMM. GRANS. 0.4 0.0 - 0.5 K/UL   LACTIC ACID    Collection Time: 03/10/22  2:55 AM   Result Value Ref Range    Lactic acid 2.5 (H) 0.4 - 2.0 MMOL/L   LACTIC ACID    Collection Time: 03/10/22  8:09 AM   Result Value Ref Range    Lactic acid 1.6 0.4 - 2.0 MMOL/L       All Micro Results     Procedure Component Value Units Date/Time    CULTURE, URINE [[id number]] Collected: 03/09/22 2203    Order Status: Completed Specimen: Urine from Clean catch Updated: 03/10/22 0234     Special Requests: NO SPECIAL REQUESTS        Culture result:       NO GROWTH AFTER SHORT PERIOD OF INCUBATION. FURTHER RESULTS TO FOLLOW AFTER OVERNIGHT INCUBATION.           CULTURE, BLOOD [[id number]] Collected: 03/09/22 2237    Order Status: Completed Specimen: Blood Updated: 03/10/22 0743     Special Requests: --        RIGHT  Antecubital       Culture result: NO GROWTH AFTER 9 HOURS       CULTURE, BLOOD [[id number]] Collected: 03/09/22 2316    Order Status: Completed Specimen: Blood Updated: 03/10/22 0743     Special Requests: --        LEFT  Antecubital       Culture result: NO GROWTH AFTER 8 HOURS       MSSA/MRSA SC BY PCR, NASAL SWAB [[id number]] Order Status: Sent Specimen: Nasal swab     COVID-19 RAPID TEST [[id number]] Collected: 03/09/22 2204    Order Status: Completed Specimen: Nasopharyngeal Updated: 03/09/22 2257     Specimen source NASAL SWAB        COVID-19 rapid test Not detected        Comment:      The specimen is NEGATIVE for SARS-CoV-2, the novel coronavirus associated with COVID-19. A negative result does not rule out COVID-19. This test has been authorized by the FDA under an Emergency Use Authorization (EUA) for use by authorized laboratories. Fact sheet for Healthcare Providers: [employer]  Fact sheet for Patients: [employer]       Methodology: Isothermal Nucleic Acid Amplification         C. DIFFICILE/EPI PCR [[id number]]     Order Status: Sent Specimen: Stool     Ynes Gao [[id number]]     Order Status: Sent Specimen: Stool     OVA & PARASITES, STOOL [[id number]]     Order Status: Sent Specimen: Stool     CULTURE, STOOL [[id number]]     Order Status: Sent Specimen: Stool           Other Studies:  CT ABD PELV WO CONT    Result Date: 3/10/2022  EXAM: CT ABD PELV WO CONT HISTORY: Abdominal discomfort, concern for infection. TECHNIQUE: Axial images of the abdomen and pelvis were performed without intravenous contrast. Images were obtained in the axial plane and coronal reformatted images were created and reviewed. Dose reduction technique used: Automated exposure control/Adjustment of the mA and/or kV according to patient size/Use of iterative reconstruction technique. COMPARISON: 3/1/2022 FINDINGS: There is residual contrast in the renal parenchyma, collecting systems, and bladder from a earlier examination. Visualized lung bases show moderate to large bilateral pleural effusions. The visualized liver, pancreas, adrenal glands, are within normal limits. The gallbladder has been removed. The portal vein thrombus seen on the previous examination is partially visualized. There is significant splenomegaly. Multiple peripherally located hypodensities are seen suggesting infarctions. Both kidneys contain contrast. There is parenchymal contrast defect in the posterior mid and lower renal pole on the right. There is a striated nephrogram bilaterally, best appreciated on the left. The bladder appears grossly normal. The uterus is visualized. Ovaries are not defined. The wall of the distal esophagus appears thickened. The gastric wall appears somewhat thickened. This may be due to lack of distention. Small and large bowel are without evidence of obstruction. Lack of contrast limits assessment of the colon wall. There is a moderate volume of ascites in the abdomen and pelvis. No evidence of free air. No pathologic adenopathy. No abdominal aortic aneurysm. There are 2 anterior wall hernias. One contains a small nonobstructed segment of transverse colon. The second is located just above the umbilicus and contains fat. Osseous structures are without evidence of acute fracture or suspicious lesion. There is chronic fracture or Schmorl's node in the superior endplate of L1. Residual contrast from previous examination is seen in the kidney bilaterally. There is contrast defect in the mid and lower pole of the right kidney suggesting infarction. There is a striated nephrogram bilaterally. This appearance may be indicative of pyelonephritis or could also be associated with infarction. Significant splenomegaly with evidence of splenic infarctions. The known portal vein thrombus is visualized. Mild to moderate ascites. No obvious bowel obstruction. Other findings as above. XR CHEST PORT    Result Date: 3/9/2022  EXAM: XR CHEST PORT HISTORY: Hypoxia. TECHNIQUE: Frontal chest. COMPARISON: 3/3/2022 FINDINGS: There is mild cardiomegaly. Mild central pulmonary vascular congestion with mild perihilar edema. There is no pleural effusion, or pneumothorax.  No significant osseous abnormalities are observed. Findings described above suggest mild pulmonary edema. CT CHEST PULMONARY EMBOLISM    Result Date: 3/9/2022  EXAM: CT angiogram chest. HISTORY: Shortness of breath, hypoxia. TECHNIQUE: CT angiographic images of the chest were obtained following intravenous administration of contrast. Imaging was performed utilizing PE protocol. Examination was performed in the axial plane and coronal reconstructions were performed. 3-D MIPS reconstructions of the chest were obtained. Dose reduction technique used: Automated exposure control/Adjustment of the mA and/or kV according to patient size/Use of iterative reconstruction technique. 100 mL of Isovue-370 were utilized. COMPARISON: 10/27/2016 FINDINGS: There is adequate opacification of the pulmonary arterial tree. No significant filling defects are identified to suggest pulmonary embolism. Main pulmonary artery is normal in caliber. The heart is not enlarged. There is is a moderate pericardial effusion. The great vessels appear normal. There are no pathologically enlarged mediastinal hilar or axillary lymph nodes. Trachea and mainstem bronchi are patent. There is no pneumothorax. Interval decrease in the size of the previously described ill-defined nodularity in the right apex. There is septal thickening and diffuse hyperdensity consistent with pulmonary edema. Bilateral moderate-sized pleural effusions, larger on the right. Atelectasis or scarring in the inferior lingula. There is splenomegaly. The visualized spleen measures up to 17.5 cm. Wedge shaped hypodensities seen anteriorly suggest areas of infarction. There is anasarca. Changes in the superior endplate of 2 lower thoracic vertebral bodies suggest chronic fractures. No evidence of pulmonary embolism. Findings compatible with CHF. Pulmonary vascular edema with pericardial effusion and bilateral pleural effusions. Anasarca. Interval decrease in the size of a ill-defined nodule in the right apex. Splenomegaly with apparent infarction noted anteriorly.       Current Meds:  Current Facility-Administered Medications   Medication Dose Route Frequency    vancomycin (VANCOCIN) 750 mg in 0.9% sodium chloride 250 mL ([id number])  750 mg IntraVENous Q12H    [START ON 3/13/2022] levothyroxine (SYNTHROID) tablet 56 mcg  56 mcg Oral Once per day on Sun    ruxolitinib tab 5 mg (Patient Supplied)  5 mg Oral BID    levothyroxine (SYNTHROID) tablet 112 mcg  112 mcg Oral ACB    enoxaparin (LOVENOX) injection 60 mg  60 mg SubCUTAneous Q12H    dasatinib tab 80 mg (Patient Supplied)  80 mg Oral DAILY    zolpidem (AMBIEN) tablet 10 mg  10 mg Oral QHS PRN    diphenoxylate-atropine (LOMOTIL) tablet 1 Tablet  1 Tablet Oral QID PRN    oxyCODONE-acetaminophen (PERCOCET 10)  mg per tablet 1 Tablet  1 Tablet Oral Q8H PRN    sodium chloride (NS) flush 5-40 mL  5-40 mL IntraVENous Q8H    sodium chloride (NS) flush 5-40 mL  5-40 mL IntraVENous PRN    acetaminophen (TYLENOL) tablet 650 mg  650 mg Oral Q6H PRN    Or    acetaminophen (TYLENOL) suppository 650 mg  650 mg Rectal Q6H PRN    polyethylene glycol (MIRALAX) packet 17 g  17 g Oral DAILY PRN    ondansetron (ZOFRAN ODT) tablet 4 mg  4 mg Oral Q8H PRN    Or    ondansetron (ZOFRAN) injection 4 mg  4 mg IntraVENous Q6H PRN    cefepime (MAXIPIME) 2 g in 0.9% sodium chloride (MBP/ADV) 100 mL MBP  2 g IntraVENous Q8H       Signed:  Elsworth Scheuermann, MD

## 2022-03-10 NOTE — MANAGEMENT PLAN
[facility]        Inpatient Hematology / Oncology Plan of Care    Reason for Consult:  HCAP (healthcare-associated pneumonia) [J18.9]  Referring Physician:  Romelia Bess MD    History of Present Illness:  Ms. Lionel Noble is a 64 y.o. female with PMH vasculitis, hypothyroidism, cirrhosis, splenomegaly, esophageal varices, anemia, migraines, hx PE 2006, recurrent DVTs/portal vein thrombosis on Lovenox, and seizures. She is a patient of Dr. Maria De Jesus Dickerson with polycythemia vera on Jakafi and CML dx 2013 on Dasatinib. She has recent hospitalization (2/27-3/7/22) for ecoli bacteremia and meningitis who presented to ED with SOB, back pain, right lower abdominal pain and reports fever at home. She reported increased SOB for several days. Family reported that patient was more groggy than usual. In ER patient was found hypoxic with O2 sat of 85%. WBC 23.5. LA 2.3, procal 62.38. BC/UC obtained. UA clear CXR showed mild pulmonary edema. CT chest findings compatible with CHF. CT AP notable for BL effusions likely HAP started on cefe and vanc, now on Rocephin. Ongoing splenomegaly with splenic infarcts and portal vein thrombus which were also seen on CT AP with contrast  from 3/1. Patient's history of thrombosis copied from prior:  PICC line related DVT: line taken out and her a/c dropped to enoxaparin 40 mg daily however subsequently developed RT subclavian vein DVT s/p thrombectomy w tPA through IR, now on enoxaparin, now 50 mg q12hrs (discussed in detail her h/o intracranial bleeding: notes she was initially diagnosed w sagittal sinus thrombosis, and put on a/c in past. While on a/c she had a seizure and fell causing head trauma and bleeding: given bleeding was not spontaneous but rather result of trauma, and given recurrent DVT's, best to continue w current a/c: patient understands risks, and willing to proceed). We are asked to see patient guidance on anticoagulation.      Allergies Allergen Reactions    Latex Other (comments)     Testing for latex allergy was positive as a 2 (on a scale of 1 to 3).  Sulfa (Sulfonamide Antibiotics) Anaphylaxis    Tramadol Other (comments)     Top lip swelled    Augmentin [Amoxicillin-Pot Clavulanate] Rash    Divalproex Sodium Hives    Morphine Nausea and Vomiting     Not a true allergy    Potassium Clavulanate Hives    Rizatriptan Rash    Tetanus Immune Globulin Other (comments)     Heat, bruising, and swelling at  Site of injection    Vancomycin Rash    Xanax [Alprazolam] Other (comments)     Hallucinations    Zonegran [Zonisamide] Rash     Past Medical History:   Diagnosis Date    Acute blood loss anemia 4/6/2018    Anemia     C. difficile diarrhea 4/1/2015    Cerebral edema (HCC) 4/1/2015    Chronic migraine 9/22/2016    Chronic myelogenous leukemia ([facility]     [place] chromosome/ converted from polycythemia in 2009- to 2012 when she was dx w leukemia    Chronic pain     Coagulation disorder ([facility] [address]     \"clotting and Bleeding Problem\" dr Sean Raza follows     Esophageal spasm     with banding     Esophageal varices (HCC)     grade 3    GI bleed 8/3/2016    H/O craniotomy     3-29-15.  due to blood clot - which caused a seizure  - then pt fell and hit     Hematemesis 8/20/2021    Leukocytosis 3/14/2015    Melena 8/20/2021    MRSA colonization 6/25/2012    Polycythemia vera(238.4)     JAK2 mutation    Portal hypertension (HCC)     with varices    Portal vein thrombosis 6/20/2012    Ct scan 6-21-2 Apparent occlusion of the portal vein. In addition, the splenic vein, and superior mesenteric vein are not definitely seen and are also likely  occluded. Splenomegaly, and extensive varices are seen as described above consistent with the portal vein occlusion 7-05-12 on arixtra HIT negative on repeat 7-27-12 re admitted Cirrhotic appearance of the liver.  Mild to moderate ascites, as    Primary hypothyroidism     Pulmonary embolism ([facility] [address] 2006    not on coumadin anymore     S/P exploratory laparotomy, 12    Bowel resection:  336 cm of small bowel removed, approximately 9 feet and placement of feeding jejunostomy.      S/P small bowel resection     .  9 feet removed due to  gangrene which wa due to blood clot    Seizure ([address] 3/14/2015    Seizure disorder ([address] 3/30/2015    due to clotting factor    Seizures ([address]     last one 3- - followed by aniyah     Splenomegaly, congestive, chronic     Stroke (cerebrum) ([address] 2015    had bled into head- surgery    Stroke [facility])     pt had stroke like symptoms     Thrombocytopenia, HIT negative 2012 platelets lower repeat HIT 12 platelets in 92,474'Z    Thrombosis, portal vein 2004    portal , spleenic and recently superior mesenteric    Transfusion history     many blood tranfusions - last 3-29-15 after brain surgery    Traumatic hemorrhage of right cerebrum ([address] 3/30/2015     Past Surgical History:   Procedure Laterality Date    HX APPENDECTOMY      with small bowel resection    HX BREAST BIOPSY Bilateral     Lt - ; Rt -     HX CHOLECYSTECTOMY      HX GI      liver biopsy    HX GI      bowel removed small - 9 feet     HX GYN       x 2    HX GYN      D&C following miscarriage    HX ORTHOPAEDIC Left 2008    torn labrum shoulder (screw in place)    NEUROLOGICAL PROCEDURE UNLISTED  2010    craniotomy to evacuate subdural hematoma following a fall from a seizure    AK ABDOMEN SURGERY PROC UNLISTED      umbilical hernia repair    AK ABDOMEN SURGERY PROC UNLISTED      9ft of small bowel excised then reconnected     Family History   Problem Relation Age of Onset    Diabetes Mother     Stroke Mother         after surgery    Cancer Father         brain    No Known Problems Sister     Other Sister         diverticulitis    Other Sister         diverticulitis    Cancer Sister         lyjmphoma    Breast Cancer Maternal Aunt 48    Thyroid Disease Paternal Grandmother      Social History     Socioeconomic History    Marital status: SINGLE     Spouse name: Not on file    Number of children: Not on file    Years of education: Not on file    Highest education level: Not on file   Occupational History    Not on file   Tobacco Use    Smoking status: Former Smoker     Packs/day: 0.20     Years: 10.00     Pack years: 2.00     Types: Cigarettes     Quit date:      Years since quittin.2    Smokeless tobacco: Never Used   Substance and Sexual Activity    Alcohol use: No    Drug use: No    Sexual activity: Not on file   Other Topics Concern    Not on file   Social History Narrative    Not on file     Social Determinants of Health     Financial Resource Strain:     Difficulty of Paying Living Expenses: Not on file   Food Insecurity:     Worried About [address] in the Last Year: Not on file    [address] [employer] in the Last Year: Not on file   Transportation Needs:     Lack of Transportation (Medical): Not on file    Lack of Transportation (Non-Medical):  Not on file   Physical Activity:     Days of Exercise per Week: Not on file    Minutes of Exercise per Session: Not on file   Stress:     Feeling of Stress : Not on file   Social Connections:     Frequency of Communication with Friends and Family: Not on file    Frequency of Social Gatherings with Friends and Family: Not on file    Attends Rastafari Services: Not on file    Active Member of Clubs or Organizations: Not on file    Attends Club or Organization Meetings: Not on file    Marital Status: Not on file   Intimate Partner Violence:     Fear of Current or Ex-Partner: Not on file    Emotionally Abused: Not on file    Physically Abused: Not on file    Sexually Abused: Not on file   Housing Stability:     Unable to Pay for Housing in the Last Year: Not on file    Number of [place] in the Last Year: Not on file    Unstable Housing in the Last Year: Not on file     Current Facility-Administered Medications   Medication Dose Route Frequency Provider Last Rate Last Admin    vancomycin (VANCOCIN) 750 mg in 0.9% sodium chloride 250 mL (Mbcm5Atu)  750 mg IntraVENous Q12H Scarlett Montaño  mL/hr at 03/11/22 0155 750 mg at 03/11/22 0155    [START ON 3/13/2022] levothyroxine (SYNTHROID) tablet 56 mcg  56 mcg Oral Once per day on Jesica Juan MD        fentaNYL ([address]) 12 mcg/hr patch 1 Patch  1 Patch TransDERmal Q72H Lashay Yao MD   1 Patch at 03/10/22 1620    bumetanide (BUMEX) injection 1 mg  1 mg IntraVENous BID Lashay Yao MD   1 mg at 03/10/22 1832    morphine injection 1 mg  1 mg IntraVENous Q4H PRN Scarlett Montaño MD   1 mg at 03/11/22 5716    clotrimazole (MYCELEX) 1 % cream 1 Applicator  1 Applicator Vaginal QHS Scarlett Montaño MD   1 Applicator at 35/46/06 6495    ruxolitinib tab 5 mg (Patient Supplied)  5 mg Oral BID Scarlett Montaño MD        levothyroxine (SYNTHROID) tablet 112 mcg  112 mcg Oral ACB Scarlett Montaño MD   112 mcg at 03/11/22 0600    enoxaparin (LOVENOX) injection 60 mg  60 mg SubCUTAneous Q12H Scarlett Montaño MD   60 mg at 03/10/22 2003    dasatinib tab 80 mg (Patient Supplied)  80 mg Oral DAILY Scarlett Montaño MD        zolpidem [facility] tablet 10 mg  10 mg Oral QHS PRN Scarlett Montaño MD   10 mg at 03/10/22 2300    diphenoxylate-atropine (LOMOTIL) tablet 1 Tablet  1 Tablet Oral QID PRN Scarlett Montaño MD        oxyCODONE-acetaminophen (PERCOCET 10)  mg per tablet 1 Tablet  1 Tablet Oral Q8H PRN Scarlett Montaño MD   1 Tablet at 03/11/22 0600    sodium chloride (NS) flush 5-40 mL  5-40 mL IntraVENous Q8H Scarlett Montaño MD   10 mL at 03/11/22 0556    sodium chloride (NS) flush 5-40 mL  5-40 mL IntraVENous PRN Scarlett Montaño MD        acetaminophen (TYLENOL) tablet 650 mg  650 mg Oral Q6H PRN Scarlett Montaño MD        Or    acetaminophen (TYLENOL) suppository 650 mg  650 mg Rectal Q6H PRN Lakesha Dawkins MD        polyethylene glycol (MIRALAX) packet 17 g  17 g Oral DAILY PRN Lakesha Dawkins MD        ondansetron (ZOFRAN ODT) tablet 4 mg  4 mg Oral Q8H PRN Lakesha Dawkins MD        Or    ondansetron [facility]) injection 4 mg  4 mg IntraVENous Q6H PRN Lakesha Dawkins MD        cefepime (MAXIPIME) 2 g in 0.9% sodium chloride (MBP/ADV) 100 mL MBP  2 g IntraVENous Q8H Lakesha Dawkins  mL/hr at 22 0000 2 g at 22 0000       OBJECTIVE:  Patient Vitals for the past 8 hrs:   BP Temp Pulse Resp SpO2   22 0705 109/85 98 °F (36.7 °C) (!) 109 20 93 %   22 0306 91/74 99.8 °F (37.7 °C) (!) 109 17 95 %     Temp (24hrs), Av.7 °F (37.1 °C), Min:98 °F (36.7 °C), Max:99.8 °F (37.7 °C)    No intake/output data recorded. Physical Exam:  Constitutional: Chronically ill appearing female in no acute distress, sitting comfortably in the hospital bed. HEENT: Normocephalic and atraumatic. Oropharynx is clear, mucous membranes are moist.  Pupils are equal, round, and reactive to light. Extraocular muscles are intact. Sclerae anicteric. Skin Warm and dry. No bruising and no rash noted. No erythema. No pallor. Respiratory Decreased BS, normal air exchange without accessory muscle use. CVS Normal rate, regular rhythm and normal S1 and S2. No murmurs, gallops, or rubs. Abdomen Soft, nontender, mildly distended, normoactive bowel sounds. Neuro Grossly nonfocal with no obvious sensory or motor deficits. MSK Normal range of motion in general.  No edema and no tenderness. Psych Appropriate mood and affect.         Labs:    Recent Results (from the past 24 hour(s))   LACTIC ACID    Collection Time: 03/10/22  8:09 AM   Result Value Ref Range    Lactic acid 1.6 0.4 - 2.0 MMOL/L   VANCOMYCIN, RANDOM    Collection Time: 03/10/22  3:01 PM   Result Value Ref Range    Vancomycin, random 13.6 UG/ML   METABOLIC PANEL, BASIC Collection Time: 03/11/22  3:26 AM   Result Value Ref Range    Sodium 134 (L) 136 - 145 mmol/L    Potassium 4.1 3.5 - 5.1 mmol/L    Chloride 104 98 - 107 mmol/L    CO2 22 21 - 32 mmol/L    Anion gap 8 7 - 16 mmol/L    Glucose 99 65 - 100 mg/dL    BUN 13 8 - 23 MG/DL    Creatinine 0.69 0.6 - 1.0 MG/DL    GFR est AA >60 >60 ml/min/1.73m2    GFR est non-AA >60 >60 ml/min/1.73m2    Calcium 8.2 (L) 8.3 - 10.4 MG/DL   CBC WITH AUTOMATED DIFF    Collection Time: 03/11/22  3:26 AM   Result Value Ref Range    WBC 16.6 (H) 4.3 - 11.1 K/uL    RBC 2.57 (L) 4.05 - 5.2 M/uL    HGB 8.1 (L) 11.7 - 15.4 g/dL    HCT 25.0 (L) 35.8 - 46.3 %    MCV 97.3 79.6 - 97.8 FL    MCH 31.5 26.1 - 32.9 PG    MCHC 32.4 31.4 - 35.0 g/dL    RDW 20.0 (H) 11.9 - 14.6 %    PLATELET 725 (L) 926 - 450 K/uL    MPV 11.2 9.4 - 12.3 FL    ABSOLUTE NRBC 0.06 0.0 - 0.2 K/uL    DF AUTOMATED      NEUTROPHILS 88 (H) 43 - 78 %    LYMPHOCYTES 5 (L) 13 - 44 %    MONOCYTES 4 4.0 - 12.0 %    EOSINOPHILS 1 0.5 - 7.8 %    BASOPHILS 0 0.0 - 2.0 %    IMMATURE GRANULOCYTES 2 0.0 - 5.0 %    ABS. NEUTROPHILS 14.5 (H) 1.7 - 8.2 K/UL    ABS. LYMPHOCYTES 0.9 0.5 - 4.6 K/UL    ABS. MONOCYTES 0.7 0.1 - 1.3 K/UL    ABS. EOSINOPHILS 0.1 0.0 - 0.8 K/UL    ABS. BASOPHILS 0.1 0.0 - 0.2 K/UL    ABS. IMM.  GRANS. 0.3 0.0 - 0.5 K/UL       Imaging:  [unfilled]    ASSESSMENT:  Problem List  Date Reviewed: 2/8/2022          Codes Class Noted    Body mass index (BMI) of 22.0 to 22.9 in adult ICD-10-CM: Z68.22  ICD-9-CM: V85.1  8/26/2021        * (Principal) HCAP (healthcare-associated pneumonia) ICD-10-CM: J18.9  ICD-9-CM: 486  3/9/2022        E coli bacteremia ICD-10-CM: R78.81, B96.20  ICD-9-CM: 790.7, 041.49  3/9/2022        Fever ICD-10-CM: R50.9  ICD-9-CM: 780.60  2/27/2022        Leukocytosis ICD-10-CM: D72.829  ICD-9-CM: 288.60  2/27/2022        Sepsis ([facility] [address] ICD-10-CM: A41.9  ICD-9-CM: 038.9, 995.91  2/2/2022        Fever of unknown origin ICD-10-CM: R50.9  ICD-9-CM: 780.60  2/2/2022 Thrombocytopenia ([facility] ICD-10-CM: D69.6  ICD-9-CM: 287.5  10/15/2021        Opioid use, unspecified with unspecified opioid-induced disorder ICD-10-CM: F11.99  ICD-9-CM: 292.9, 305.50  9/17/2021        Cellulitis of leg, right ICD-10-CM: L03.115  ICD-9-CM: 682.6  8/8/2021        Chronic migraine without aura with status migrainosus, not intractable ICD-10-CM: W63.885  ICD-9-CM: 346.72  11/1/2016        Lung nodule ICD-10-CM: R91.1  ICD-9-CM: 793.11  10/28/2016    Overview Signed 10/28/2016 10:05 AM by Selma Alex     IMPRESSION:    1. Lobulated 2 cm mass in the right lung apex, malignancy versus an atypical  inflammatory process. 2. Occlusion of the right subclavian vein. 3. Chronic occlusion of the portal vein with associated abnormal tissue  encasing the common bile duct, most likely fibrosis. 4. Splenomegaly. DVT (deep venous thrombosis) ([facility] ICD-10-CM: I82.409  ICD-9-CM: 453.40  10/26/2016    Overview Signed 10/26/2016 10:27 PM by Yojana Turner. Right subclavian               Analgesic rebound headache ICD-10-CM: G44.40, T39.95XA  ICD-9-CM: 339.3, E935.9  9/22/2016        Headache, chronic daily ICD-10-CM: R51.9  ICD-9-CM: 784.0  9/22/2016        Migraine with aura and with status migrainosus, not intractable ICD-10-CM: G43. 101  ICD-9-CM: 346.03  9/22/2016        Left homonymous hemianopsia ICD-10-CM: H53.462  ICD-9-CM: 368.46  3/30/2015        Hyponatremia ICD-10-CM: E87.1  ICD-9-CM: 276.1  3/30/2015        Chronic myelogenous leukemia (HCC) (Chronic) ICD-10-CM: C92.10  ICD-9-CM: 205.10  3/30/2015        Acquired hypercoagulable state ([facility] [address] (Chronic) ICD-10-CM: Q89.51  ICD-9-CM: 289.81  3/30/2015        AKOSUA (iron deficiency anemia) ICD-10-CM: D50.9  ICD-9-CM: 280.9  7/28/2012        Cirrhosis (HCC) (Chronic) ICD-10-CM: K74.60  ICD-9-CM: 571.5  7/27/2012        Malignant ascites ICD-10-CM: R18.0  ICD-9-CM: 789.51  7/26/2012        Polycythemia vera (HCC) (Chronic) ICD-10-CM: D45  ICD-9-CM: 238.4  2/16/2009    Overview Addendum 8/26/2021  2:31 PM by Liane Wagner MD     2/2008 by [email or web address] analysis however portal vein thrombosis  And and splenomegaly since 2004  Hydroxyurea for 6 months poor tolerance spleen did not shrink   Pegasys 90 mcg weekly 4-2009 till    Restarted 12-30-09 45 mcg q 2 weeks  2-2010 reduced to q month   Increased 45 mcg 2/month 4-2010  Held 8-2010 thrombocytopenia  12-29-12 restarted 45 mcg q 2 weeks  4-1-11 45 mcg q 3 weeks  4-22-11 45 mcg q 4 weeks  Uncertain dosing thereafter   Possibly q 3 weeks 10-15-11 and held and restarted on 4-1-12 6-12 ct scan Small bowel wall thickening suggesting an enteritis. In addition, there is well thickening of the right colon which can suggest an additional   colitis although this can also be seen with severe portal hypertension. Likely reactive edematous changes it scattered fluid collections are seen of   the small bowel mesentery. . Apparent occlusion of the portal vein. In addition, the splenic vein, and superior mesenteric vein are not definitely seen and are also likely occluded. Splenomegaly, and extensive varices are seen as described above consistent with the portal vein occlusion. Shilpa Fanning Heterogeneous enhancement of the liver and mild periportal edema. An acute hepatitis cannot be excluded. Primary hypothyroidism (Chronic) ICD-10-CM: E03.9  ICD-9-CM: 244.9  2/16/2009        Splenomegaly ICD-10-CM: R16.1  ICD-9-CM: 789.2  2/16/2009        Esophageal varices (HCC) (Chronic) ICD-10-CM: I85.00  ICD-9-CM: 456.1  2/16/2009    Overview Addendum 8/26/2021  2:30 PM by Liane Wagner MD     grade 3             Portal vein thrombosis ICD-10-CM: C27  ICD-9-CM: 753  2/16/2009    Overview Addendum 8/26/2021  2:31 PM by Liane Wagner MD     Ct scan 6-21-2 Apparent occlusion of the portal vein.  In addition, the splenic vein, and superior mesenteric vein are not definitely seen and are also likely occluded. Splenomegaly, and extensive varices are seen as described above consistent with the portal vein occlusion 7-05-12 on arixtra HIT negative on repeat 7-27-12 re admitted Cirrhotic appearance of the liver. Mild to moderate ascites                     RECOMMENDATIONS:  Polycythemia Vera  - on Jakafi-bringing in from home-start immediately     CML  - on Dasatinib-bringing in from home-start immediately    HO multiple thrombosis   -Splenomegaly with splenic infarcts and portal vein thrombus also seen on CT AP with contrast  from 3/1.   -continue with home dose of Lovenox    Lab studies and imaging studies  were personally reviewed. Pertinent old records were reviewed. Thank you for allowing us to participate in the care of Ms. Couch. Formal consult note by Dr. Zuleima Emanuel to follow. She will need to follow up with Dr. Kiki Oppenheim at discharge.          Maine Huynh NP   [facility]  [id number] [place] [address]  Office : [phone]  Fax : [phone]

## 2022-03-10 NOTE — H&P
Hospitalist History and Physical   Admit Date:  3/9/2022  8:50 PM   Name:  Hoang Andujar   Age:  64 y.o. Sex:  female  :  1960   MRN:  [id number]     Presenting Complaint: SOB, \"groggy\"  Reason(s) for Admission: HCAP (healthcare-associated pneumonia) [J18.9]     History of Present Illness:   Hoang Andujar is a 64 y.o. female with medical history of CML and recent hospitalization (-3/7/22) for e.coli bacteremia and meningitis who presented to ED with shortness of breath, back/neck discomfort, and grogginess. Patient reports increased SOB over the past 2 days. Family reported that the patient seems more \"groggy\" than typical.  Upon ER evaluation, patient was found to be hypoxic with O2 sat of 85%. WBC is ~26,000. CXR shows some vascular congestion and ER is concerned for a R sided infiltrate. Patient has been on daily rocephin based on susceptibilities for continued treatment of e.coli bacteremia with EOT date of 3/13. She has also been on BID lovenox. CT chest is pending. Additional labs pending. Hospitalist to admit for concern of HAP. Review of Systems:  10 systems reviewed and negative except as noted in HPI. Assessment & Plan:   * HCAP (healthcare-associated pneumonia)  - ER concerned about R sided infiltrate; Radiology read as vascular congestion  - Patient does have elevation of WBC to 25.8, which is up from 15.4 two days ago at discharge  - With hypoxia and leukocytosis, will treat for HCAP.   IV vancomycin and cefepime ordered  - Wean O2 as tolerated  - Follow repeat blood cultures as obtained in ER    E coli bacteremia  - Recent hospitalization from -3/7/2022 for e.coli bacteremia and meningitis  - Patient has been on rocephin daily since discharge with EOT date of 3/13  - Based on culture susceptibilities, e.coli is also sensitive to zosyn and cefepime  - Have started IV vanc and cefepime to cover for potential HCAP  - Follow up repeat blood cultures obtained in ER    Esophageal varices (HCC)  - Of note  - No current symptoms of this  - Continue home meds    Primary hypothyroidism  - Home synthroid    Acquired hypercoagulable state ([facility]  - H/O multiple thromboses per chart review  - On lovenox BID, will continue  - Checking CT chest    Chronic myelogenous leukemia ([facility]  - Of note    Cirrhosis ([facility]  - Of note    **Addendum:  CT chest has returned and is negative for PE. \"Findings compatible with CHF\" and anasarca noted. Procalcitonin has also returned significantly elevated at 62.38 and lactic acid has resulted at 2.3. Picture of bacterial infection. On exam, abdomen was moderately tender throughout on palpation, will check CT A/P as well. Disposition: inpatient    Diet: ADULT DIET Regular  VTE ppx: lovenox  Code status: Full Code      Past medical history reviewed. Past Medical History:   Diagnosis Date    Acute blood loss anemia 4/6/2018    Anemia     C. difficile diarrhea 4/1/2015    Cerebral edema (HCC) 4/1/2015    Chronic migraine 9/22/2016    Chronic myelogenous leukemia ([facility]     [place] chromosome/ converted from polycythemia in 2009- to 2012 when she was dx w leukemia    Chronic pain     Coagulation disorder ([facility]     \"clotting and Bleeding Problem\" dr Ronel Hernandez follows     Esophageal spasm     with banding     Esophageal varices ([facility]     grade 3    GI bleed 8/3/2016    H/O craniotomy     3-29-15.  due to blood clot - which caused a seizure  - then pt fell and hit     Hematemesis 8/20/2021    Leukocytosis 3/14/2015    Melena 8/20/2021    MRSA colonization 6/25/2012    Polycythemia vera(238.4)     JAK2 mutation    Portal hypertension ([address]     with varices    Portal vein thrombosis 6/20/2012    Ct scan 6-21-2 Apparent occlusion of the portal vein. In addition, the splenic vein, and superior mesenteric vein are not definitely seen and are also likely  occluded.  Splenomegaly, and extensive varices are seen as described above consistent with the portal vein occlusion 12 on arixtra HIT negative on repeat 12 re admitted Cirrhotic appearance of the liver. Mild to moderate ascites, as    Primary hypothyroidism     Pulmonary embolism ([address]     not on coumadin anymore     S/P exploratory laparotomy, 12    Bowel resection:  336 cm of small bowel removed, approximately 9 feet and placement of feeding jejunostomy.  S/P small bowel resection     .  9 feet removed due to  gangrene which wa due to blood clot    Seizure ([address] 3/14/2015    Seizure disorder ([address] 3/30/2015    due to clotting factor    Seizures ([address]     last one 3- - followed by aniyah     Splenomegaly, congestive, chronic     Stroke (cerebrum) ([address] 2015    had bled into head- surgery    Stroke [facility])     pt had stroke like symptoms     Thrombocytopenia, HIT negative 2012 platelets lower repeat HIT 12 platelets in 97,379'I    Thrombosis, portal vein 2004    portal , spleenic and recently superior mesenteric    Transfusion history     many blood tranfusions - last 3-29-15 after brain surgery    Traumatic hemorrhage of right cerebrum ([address] 3/30/2015     Past surgical history reviewed.     Past Surgical History:   Procedure Laterality Date    HX APPENDECTOMY      with small bowel resection    HX BREAST BIOPSY Bilateral     Lt - ; Rt -     HX CHOLECYSTECTOMY      HX GI      liver biopsy    HX GI      bowel removed small - 9 feet     HX GYN       x 2    HX GYN      D&C following miscarriage    HX ORTHOPAEDIC Left 2008    torn labrum shoulder (screw in place)    NEUROLOGICAL PROCEDURE UNLISTED  2010    craniotomy to evacuate subdural hematoma following a fall from a seizure    MI ABDOMEN SURGERY PROC UNLISTED      umbilical hernia repair    MI ABDOMEN SURGERY PROC UNLISTED      9ft of small bowel excised then reconnected      Allergies   Allergen Reactions    Latex Other (comments)     Testing for latex allergy was positive as a 2 (on a scale of 1 to 3).  Sulfa (Sulfonamide Antibiotics) Anaphylaxis    Tramadol Other (comments)     Top lip swelled    Augmentin [Amoxicillin-Pot Clavulanate] Rash    Divalproex Sodium Hives    Morphine Nausea and Vomiting     Not a true allergy    Potassium Clavulanate Hives    Rizatriptan Rash    Tetanus Immune Globulin Other (comments)     Heat, bruising, and swelling at  Site of injection    Vancomycin Rash    Xanax [Alprazolam] Other (comments)     Hallucinations    Zonegran [Zonisamide] Rash      Social History     Tobacco Use    Smoking status: Former Smoker     Packs/day: 0.20     Years: 10.00     Pack years: 2.00     Types: Cigarettes     Quit date:      Years since quittin.2    Smokeless tobacco: Never Used   Substance Use Topics    Alcohol use: No      Family History   Problem Relation Age of Onset    Diabetes Mother     Stroke Mother         after surgery    Cancer Father         brain    No Known Problems Sister     Other Sister         diverticulitis    Other Sister         diverticulitis    Cancer Sister         lyjmphoma    Breast Cancer Maternal Aunt 48    Thyroid Disease Paternal Grandmother       Family history reviewed and noncontributory to patient's acute condition; no relevant family history unless otherwise noted above.   Immunization History   Administered Date(s) Administered    (RETIRED) Pneumococcal Vaccine (Unspecified Type) 2008    COVID-19, Moderna, Primary or Immunocompromised Series, MRNA, PF, 100mcg/0.5mL 2021, 2021    Hib (PRP-T) 2019    Influenza Vaccine 10/01/2014, 10/01/2016    Influenza Vaccine (Quad) Mdck Pf (>2 Yrs Flucelvax QUAD 04996) 2020    Influenza Vaccine [employer]) PF (>6 Mo Flulaval, Fluarix, and >3 Yrs Afluria, Fluzone 36417) 10/25/2018, 10/08/2019    Pneumococcal Conjugate (PCV-13) 2019    Pneumococcal Polysaccharide (PPSV-23) 01/01/2004    TB Skin Test (PPD) Intradermal 07/21/2014    Zoster Recombinant 03/13/2020, 08/09/2020     PTA Medications:  Current Outpatient Medications   Medication Instructions    b complex vitamins tablet 1 Tablet, DAILY    calcium carbonate (CALTREX) 600 mg, Oral, EVERY BEDTIME    cefTRIAXone 2 gram 2 g IVPB 2 g, IntraVENous, EVERY 12 HOURS    cholecalciferol (VITAMIN D3) 1,000 Units, Oral, EVERY BEDTIME    Comp Stocking,Knee,Regular,Sml misc 1 Each, Does Not Apply, DAILY    diphenoxylate-atropine (LomotiL) 2.5-0.025 mg per tablet 1 Tablet, Oral, 4 TIMES DAILY AS NEEDED    enoxaparin (LOVENOX) 50 mg, SubCUTAneous, EVERY 12 HOURS    famotidine (PEPCID) 40 mg, Oral, DAILY AS NEEDED, Take at least 4 hours before or after sprycel    lansoprazole (PREVACID) 30 mg, Oral, DAILY, prn    magnesium 250 mg tab 1 Tablet, Oral, DAILY    ondansetron (ZOFRAN ODT) 4 mg, Oral, EVERY 8 HOURS AS NEEDED    ondansetron (ZOFRAN ODT) 8 mg, Oral, EVERY 8 HOURS AS NEEDED    oxyCODONE-acetaminophen (PERCOCET 10)  mg per tablet 1 Tablet, Oral, EVERY 8 HOURS AS NEEDED    ruxolitinib 5 mg, Oral, 2 TIMES DAILY    SpryceL 80 mg, Oral, DAILY    Synthroid 112 mcg tablet 1 tablet once a day with an extra 1/2 tablet on Sundays    zolpidem (AMBIEN) 10 mg, Oral, BEDTIME PRN       Objective:     Patient Vitals for the past 24 hrs:   Temp Pulse Resp BP SpO2   03/09/22 2333 -- (!) 120 21 113/77 92 %   03/09/22 2221 -- (!) 107 -- 121/76 (!) 85 %   03/09/22 2137 -- (!) 107 -- 114/74 94 %   03/09/22 2107 -- (!) 108 -- -- (!) 89 %   03/09/22 2049 98.6 °F (37 °C) (!) 105 16 120/78 96 %     Oxygen Therapy  O2 Sat (%): 92 % (03/09/22 2333)  Pulse via Oximetry: 103 beats per minute (03/09/22 2333)  O2 Device: None (Room air) (03/09/22 2049)    Estimated body mass index is 23.03 kg/m² as calculated from the following:    Height as of this encounter: 5' 3\" (1.6 m). Weight as of this encounter: 59 kg (130 lb).   No intake or output data in the 24 hours ending 03/09/22 1970      Physical Exam:  General:    Well nourished. Ill-appearing  Head:  Normocephalic, atraumatic  Eyes:  Sclerae appear normal.  Pupils equally round. HENT:  Nares appear normal, no drainage. Moist mucous membranes  Neck:  No restricted ROM. Trachea midline  CV:   Tachycardic. S1/S2 auscultated  Lungs:   CTAB. No wheezing, rhonchi, or rales. Appears even, unlabored  Abdomen: Bowel sounds present. Soft, nondistended. TTP throughout. Extremities: Warm and dry. No cyanosis or clubbing. No edema. Skin:     No rashes. Normal turgor. Normal coloration  Neuro:  Cranial nerves II-XII grossly intact. Sensation intact  Psych:  Anxious/worried appearing. .  Alert and oriented to person and place. Data Ordered and Personally Reviewed:    Last 24hr Labs:  Recent Results (from the past 24 hour(s))   CBC WITH AUTOMATED DIFF    Collection Time: 03/09/22  9:11 PM   Result Value Ref Range    WBC 25.8 (H) 4.3 - 11.1 K/uL    RBC 3.17 (L) 4.05 - 5.2 M/uL    HGB 10.0 (L) 11.7 - 15.4 g/dL    HCT 30.2 (L) 35.8 - 46.3 %    MCV 95.3 79.6 - 97.8 FL    MCH 31.5 26.1 - 32.9 PG    MCHC 33.1 31.4 - 35.0 g/dL    RDW 19.2 (H) 11.9 - 14.6 %    PLATELET 969 (L) 782 - 450 K/uL    MPV 10.4 9.4 - 12.3 FL    ABSOLUTE NRBC 0.02 0.0 - 0.2 K/uL    DF AUTOMATED      NEUTROPHILS 90 (H) 43 - 78 %    LYMPHOCYTES 4 (L) 13 - 44 %    MONOCYTES 4 4.0 - 12.0 %    EOSINOPHILS 0 (L) 0.5 - 7.8 %    BASOPHILS 0 0.0 - 2.0 %    IMMATURE GRANULOCYTES 2 0.0 - 5.0 %    ABS. NEUTROPHILS 23.3 (H) 1.7 - 8.2 K/UL    ABS. LYMPHOCYTES 1.1 0.5 - 4.6 K/UL    ABS. MONOCYTES 0.9 0.1 - 1.3 K/UL    ABS. EOSINOPHILS 0.0 0.0 - 0.8 K/UL    ABS. BASOPHILS 0.1 0.0 - 0.2 K/UL    ABS. IMM. GRANS.  0.4 0.0 - 0.5 K/UL   METABOLIC PANEL, COMPREHENSIVE    Collection Time: 03/09/22  9:11 PM   Result Value Ref Range    Sodium 135 (L) 136 - 145 mmol/L    Potassium 3.6 3.5 - 5.1 mmol/L    Chloride 104 98 - 107 mmol/L    CO2 24 21 - 32 mmol/L    Anion gap 7 7 - 16 mmol/L    Glucose 128 (H) 65 - 100 mg/dL    BUN 10 8 - 23 MG/DL    Creatinine 0.74 0.6 - 1.0 MG/DL    GFR est AA >60 >60 ml/min/1.73m2    GFR est non-AA >60 >60 ml/min/1.73m2    Calcium 8.3 8.3 - 10.4 MG/DL    Bilirubin, total 0.6 0.2 - 1.1 MG/DL    ALT (SGPT) 31 12 - 65 U/L    AST (SGOT) 51 (H) 15 - 37 U/L    Alk.  phosphatase 117 50 - 136 U/L    Protein, total 6.6 6.3 - 8.2 g/dL    Albumin 2.2 (L) 3.2 - 4.6 g/dL    Globulin 4.4 (H) 2.3 - 3.5 g/dL    A-G Ratio 0.5 (L) 1.2 - 3.5     MAGNESIUM    Collection Time: 03/09/22  9:11 PM   Result Value Ref Range    Magnesium 2.4 1.8 - 2.4 mg/dL   PHOSPHORUS    Collection Time: 03/09/22  9:11 PM   Result Value Ref Range    Phosphorus 2.9 2.3 - 3.7 MG/DL   LIPASE    Collection Time: 03/09/22  9:11 PM   Result Value Ref Range    Lipase 61 (L) 73 - 393 U/L   COVID-19 RAPID TEST    Collection Time: 03/09/22 10:04 PM   Result Value Ref Range    Specimen source NASAL SWAB      COVID-19 rapid test Not detected NOTD     URINALYSIS W/ RFLX MICROSCOPIC    Collection Time: 03/09/22 10:04 PM   Result Value Ref Range    Color ISAEL      Appearance TURBID      Specific gravity 1.025 (H) 1.001 - 1.023      pH (UA) 6.0 5.0 - 9.0      Protein 100 (A) NEG mg/dL    Glucose Negative mg/dL    Ketone TRACE (A) NEG mg/dL    Bilirubin Negative NEG      Blood SMALL (A) NEG      Urobilinogen 0.2 0.2 - 1.0 EU/dL    Nitrites Negative NEG      Leukocyte Esterase SMALL (A) NEG      WBC 5-10 0 /hpf    RBC 0-3 0 /hpf    Epithelial cells 0-3 0 /hpf    Bacteria TRACE 0 /hpf    Casts 0-3 0 /lpf    Crystals, urine AMORPHOUS 0 /LPF       All Micro Results     Procedure Component Value Units Date/Time    CULTURE, BLOOD [[id number]] Collected: 03/09/22 2316    Order Status: Completed Specimen: Blood Updated: 03/09/22 2330    COVID-19 RAPID TEST [[id number]] Collected: 03/09/22 2204    Order Status: Completed Specimen: Nasopharyngeal Updated: 03/09/22 2257     Specimen source NASAL SWAB COVID-19 rapid test Not detected        Comment:      The specimen is NEGATIVE for SARS-CoV-2, the novel coronavirus associated with COVID-19. A negative result does not rule out COVID-19. This test has been authorized by the FDA under an Emergency Use Authorization (EUA) for use by authorized laboratories. Fact sheet for Healthcare Providers: ParQnowdate.co.nz  Fact sheet for Patients: ParQnowdate.co.nz       Methodology: Isothermal Nucleic Acid Amplification         CULTURE, BLOOD [[id number]] Collected: 03/09/22 2237    Order Status: Completed Specimen: Blood Updated: 03/09/22 2243    CULTURE, URINE [[id number]] Collected: 03/09/22 2204    Order Status: Completed Specimen: Urine from Clean catch Updated: 03/09/22 2229    C. DIFFICILE/EPI PCR [[id number]]     Order Status: Sent Specimen: Stool     Jovi Monge [[id number]]     Order Status: Sent Specimen: Stool     OVA & Tonya Kateyjulee [[id number]]     Order Status: Sent Specimen: Stool     CULTURE, STOOL [[id number]]     Order Status: Sent Specimen: Stool           Other Studies:  XR CHEST PORT    Result Date: 3/9/2022  EXAM: XR CHEST PORT HISTORY: Hypoxia. TECHNIQUE: Frontal chest. COMPARISON: 3/3/2022 FINDINGS: There is mild cardiomegaly. Mild central pulmonary vascular congestion with mild perihilar edema. There is no pleural effusion, or pneumothorax. No significant osseous abnormalities are observed. Findings described above suggest mild pulmonary edema.       Medications Administered     cefepime (MAXIPIME) 2 g in 0.9% sodium chloride (MBP/ADV) 100 mL MBP     Admin Date  03/09/2022 Action  New Bag Dose  2 g Rate  200 mL/hr Route  IntraVENous Administered By  Mercy Noonan RN          HYDROmorphone (DILAUDID) injection 0.5 mg     Admin Date  03/09/2022 Action  Given Dose  0.5 mg Route  IntraVENous Administered By  Mercy Noonan RN          iopamidoL (ISOVUE-370) 76 % injection 100 mL Admin Date  03/09/2022 Action  Given Dose  100 mL Route  IntraVENous Administered By  RT Tegan, R          lactated Ringers infusion 1,000 mL     Admin Date  03/09/2022 Action  New Bag Dose  1,000 mL Route  IntraVENous Administered By  Erik Sol RN          ondansetron [facility]) injection 4 mg     Admin Date  03/09/2022 Action  Given Dose  4 mg Route  IntraVENous Administered By  Erik Sol RN          saline peripheral flush soln 10 mL     Admin Date  03/09/2022 Action  Given Dose  10 mL Route  InterCATHeter Administered By  RT Tegan, R          sodium chloride 0.9 % bolus infusion 100 mL     Admin Date  03/09/2022 Action  New Bag Dose  100 mL Route  IntraVENous Administered By  Christoph Delgado RT, R                  Signed:  Isabel Zepeda MD

## 2022-03-10 NOTE — ASSESSMENT & PLAN NOTE
ER concerned about R sided infiltrate; Radiology read as vascular congestion. Admission WBC 25.8, up from 15.4 two days prior at discharge  - With hypoxia and leukocytosis, will treat for HCAP.   IV vancomycin and cefepime ordered  - Wean O2 as tolerated  - Follow repeat blood cultures as obtained in ER    3/13: hypoxia likely secondary to pleural effusions, continue diuresis

## 2022-03-10 NOTE — ED NOTES
TRANSFER - OUT REPORT:    Verbal report given to Demetri gomez RN on Zoe Ellis  being transferred to Room 312 for routine progression of care       Report consisted of patients Situation, Background, Assessment and   Recommendations(SBAR). Information from the following report(s) SBAR, Kardex, ED Summary, Intake/Output, MAR and Recent Results was reviewed with the receiving nurse. Lines:   Peripheral IV 03/09/22 Right Forearm (Active)   Site Assessment Clean, dry, & intact 03/09/22 2110   Phlebitis Assessment 0 03/09/22 2110   Infiltration Assessment 0 03/09/22 2110   Dressing Status Clean, dry, & intact 03/09/22 2110       Peripheral IV 03/09/22 Right Antecubital (Active)        Opportunity for questions and clarification was provided.       Patient transported with:   Registered Nurse

## 2022-03-10 NOTE — ED NOTES
Pt states she is unable to have a bowel movement at this time to obtain a stool specimen. Will attempt at a later time.

## 2022-03-10 NOTE — PROGRESS NOTES
[facility] Pharmacokinetic Monitoring Service - Vancomycin     Reny Santiago is a 64 y.o. female starting on vancomycin therapy for HAP. Pharmacy consulted by adrian fitzgerald for monitoring and adjustment. Target Concentration: Goal AUC/ALEXEY 400-600 mg*hr/L    Additional Antimicrobials: cefepime    Pertinent Laboratory Values: Wt Readings from Last 1 Encounters:   03/09/22 59 kg (130 lb)     Temp Readings from Last 1 Encounters:   03/09/22 98.2 °F (36.8 °C)     No components found for: PROCAL  Recent Labs     03/09/22  2316 03/09/22  2111 03/07/22  0937   BUN  --  10 9   CREA  --  0.74 0.50*   WBC  --  25.8* 15.4*   PCT  --  62.38*  --    LAC 2.3*  --   --      Estimated Creatinine Clearance: 66 mL/min (based on SCr of 0.74 mg/dL). Lab Results   Component Value Date/Time    Vancomycin,trough 19.0 08/10/2021 04:15 AM    Vancomycin, random 18.2 03/01/2022 07:11 AM       MRSA Nasal Swab: not ordered. Order placed by pharmacy. .    Plan:  Dosing recommendations based on Bayesian software  Start vancomycin 1.5gm then 750mg q12  Anticipated AUC of 484 and trough concentration of 15.3 at steady state  Renal labs as indicated   Vancomycin concentrations will be ordered as clinically appropriate   Pharmacy will continue to monitor patient and adjust therapy as indicated    Thank you for the consult,  BELLA Camejo

## 2022-03-10 NOTE — PROGRESS NOTES
OUTREACH NOTE:      Patient lactic acid level 2.3. Repeat level in 4 hours per protocol. Dr Tahmina Wong made aware. No further orders received.

## 2022-03-10 NOTE — ASSESSMENT & PLAN NOTE
-consult oncology  -fentanyl for cancer pain    3/13: fentanyl effective, oncology to continue ruxolinitnib if family is able to bring, which I have not so far been able to do

## 2022-03-10 NOTE — ED TRIAGE NOTES
Pt presented to the ED via EMS Medic 20 for c/o meningitis dx with worsening symptoms. C/o back pain, neck and right upper quad pain. VS /80  02 sat 96% RA Temp 98.6 oral 20 g IV. .

## 2022-03-10 NOTE — PROGRESS NOTES
CT complete. Patient appears drowsy, unsteady, and difficult to direct. Ambulated from wheel chair to bed with assistance x 2.   3 side rails up, call light within reach and bed alarm in place.

## 2022-03-10 NOTE — ED NOTES
Pt sister updated, Shaye Pineda. Requests to be called with updates, Pt gives permission.     Cell Phone: [phone]  Home Phone: [phone]

## 2022-03-11 PROBLEM — E77.8 HYPOPROTEINEMIA (HCC): Status: ACTIVE | Noted: 2022-01-01

## 2022-03-11 PROBLEM — R65.20 SEPSIS WITH ACUTE ORGAN DYSFUNCTION WITHOUT SEPTIC SHOCK (HCC): Status: ACTIVE | Noted: 2022-01-01

## 2022-03-11 PROBLEM — J90 PLEURAL EFFUSION, BILATERAL: Status: ACTIVE | Noted: 2022-01-01

## 2022-03-11 NOTE — PROGRESS NOTES
Shift assessment completer. Patient is alert and oriented, in bed. Respirations are even and unlabored. Patient is on 3 liters of oxygen via nasal cannula. Expiratory wheeze present. Bowel sounds are present. Patient has not had a bowel movement for stool collection yet. Abdomen is distended. Patient reported pain, medicated with Percocet. Pain was unrelieved and increasing, received order for 1 mg IV Morphine. Administered and tolerated well. Patient states that she feels some vaginal discomfort, received order for Mycelex cream.   No needs expressed at this time. Bed low and locked, call light within reach.   ________________________________________________    Problem: Risk for Spread of Infection  Goal: Prevent transmission of infectious organism to others  Description: Prevent the transmission of infectious organisms to other patients, staff members, and visitors. Outcome: Progressing Towards Goal     Problem: Falls - Risk of  Goal: *Absence of Falls  Description: Document Keyanna Domínguez Fall Risk and appropriate interventions in the flowsheet.   Outcome: Progressing Towards Goal  Note: Fall Risk Interventions:  Mobility Interventions: Communicate number of staff needed for ambulation/transfer    Medication Interventions: Patient to call before getting OOB,Teach patient to arise slowly    Elimination Interventions: Call light in reach

## 2022-03-11 NOTE — CONSULTS
Infectious Disease Consult    Today's Date: 3/11/2022   Admit Date: 3/9/2022    Impression:   · Volume overload with bilateral pleural effusions  · Recurrent E coli bacteremia 2/2 and 2/27, source presumed bowel; recent bcx 3/9 negative  · Bacterial meningitis, E coli; discharged 3/9/22 ceftriaxone 2g IV q12 hours through 3/13/2022  · CML on dasitinib  · PCV on jakafi  · Portal vein thrombosis/splenomegaly/multiple splenic infarcts    Her representation is most consistent with volume overload. It would be strange to develop a PNA through BID dosing of rocephin unless she had exposure to heavily resistant organisms. There is no reason to think this and her imaging does not look like PNA. Leukocytosis was likely stress response in setting of volume overload but there was no other indication of \"sepsis\" on presentation. Would recommend continuing prior plan for treating E coli bacteremia and meningitis. Plan:   ·  Restart IV Rocephin 2 gm IV Q 12 hrs with same EOT    Thank you for allowing us to participate in the care of this patient, we will not continue to follow. Please don't hesitate to contact us with further questions or concerns. Anti-infectives:   · Ceftriaxone 2g IV q12 hours  · IV Cefepime  · IV Vancomycin    Subjective:   Date of Consultation:  March 11, 2022  Referring Physician:     Patient is a 64 y.o. female  Patient is a 64 y.o. female with a history of , PCV on jakafi and CML on dasitinib who was admitted on 2/27/2022 with a history of fever (102.1) and headache for 5 days accompanied by malaise. She has a history of portal vein thrombosis and splenomegaly. + photophobia that has resolved. + nausea that has resolved. She has had moderate-severe low back pain overlying the lumbar spine x 10 days. She denies a history of prior back pain. She was admitted 2/2/2022 - 2/6/2022 with E coli bacteremia. She was treated with meropenem and discharged on cefpodoxime for an additional 7 days.   She completed these and started to feel ill with fever at night about 10 days later. She has chronic loose stool with no change. Blood cultures are again growing E coli. CSF demonstrates neutrophilic pleiocytosis (WBC 2630; protein 110; glucose 35). Thus far, there is no growth from CSF culture. CSF PCR is pending. She has mild leukocytosis, anemia, and thrombocytopenia. UA was unremarkable. Discharged 3/7/22 on Rocephin 2 gm IV Q 12 hrs with EOT 3/13/22. Presented to ED with shortness of breath, back/neck discomfort, and grogginess 3/9/22. C diff is pending. ID is asked to evaluate patient for treatment recommendations. Chest XR when she presented showed likely volume overload which was confirmed on CTPE. She had notable pleural effusions on that imaging and since has been getting diuresed with some improvement. Pulm was consulted for thora but this cannot be done in setting of AC. CTAP also done showing similar findings to prior. She had leukocytosis on admission which has improved. No fevers, vital signs stable other than mild tachycardia. She is feeling better today after diuresis, states she still is tired but headache much improved and feels more comfortable. Patient Active Problem List   Diagnosis Code    Polycythemia vera ([facility] [address] D45    Malignant ascites R18.0    Cirrhosis (HCC) K74.60    AKOSUA (iron deficiency anemia) D50.9    Left homonymous hemianopsia H53.462    Hyponatremia E87.1    Chronic myelogenous leukemia (HCC) C92.10    Acquired hypercoagulable state ([facility] [address] D68.59    Analgesic rebound headache G44.40, T39.95XA    Headache, chronic daily R51.9    Migraine with aura and with status migrainosus, not intractable G43. 101    DVT (deep venous thrombosis) (HCC) I82.409    Lung nodule R91.1    Chronic migraine without aura with status migrainosus, not intractable G43.701    Primary hypothyroidism E03.9    Splenomegaly R16.1    Esophageal varices (HCC) I85.00    Portal vein thrombosis I81    Cellulitis of leg, right L03.115    Body mass index (BMI) of 22.0 to 22.9 in adult Z68.22    Opioid use, unspecified with unspecified opioid-induced disorder F11.99    Thrombocytopenia (HCC) D69.6    Sepsis with acute organ dysfunction without septic shock (HCC) A41.9, R65.20    Fever of unknown origin R50.9    Fever R50.9    Leukocytosis D72.829    HCAP (healthcare-associated pneumonia) J18.9    E coli bacteremia R78.81, B96.20     Past Medical History:   Diagnosis Date    Acute blood loss anemia 4/6/2018    Anemia     C. difficile diarrhea 4/1/2015    Cerebral edema (HCC) 4/1/2015    Chronic migraine 9/22/2016    Chronic myelogenous leukemia ([facility] [address]     Ravalli chromosome/ converted from polycythemia in 2009- to 2012 when she was dx w leukemia    Chronic pain     Coagulation disorder ([facility] [address]     \"clotting and Bleeding Problem\" dr Peter Rockwell follows     Esophageal spasm     with banding     Esophageal varices (HCC)     grade 3    GI bleed 8/3/2016    H/O craniotomy     3-29-15.  due to blood clot - which caused a seizure  - then pt fell and hit     Hematemesis 8/20/2021    Leukocytosis 3/14/2015    Melena 8/20/2021    MRSA colonization 6/25/2012    Polycythemia vera(238.4)     JAK2 mutation    Portal hypertension (HCC)     with varices    Portal vein thrombosis 6/20/2012    Ct scan 6-21-2 Apparent occlusion of the portal vein. In addition, the splenic vein, and superior mesenteric vein are not definitely seen and are also likely  occluded. Splenomegaly, and extensive varices are seen as described above consistent with the portal vein occlusion 7-05-12 on arixtra HIT negative on repeat 7-27-12 re admitted Cirrhotic appearance of the liver.  Mild to moderate ascites, as    Primary hypothyroidism     Pulmonary embolism ([facility] [address] 2006    not on coumadin anymore     S/P exploratory laparotomy, 6/20/12 6/25/2012    Bowel resection:  336 cm of small bowel removed, approximately 9 feet and placement of feeding jejunostomy.      S/P small bowel resection     .  9 feet removed due to  gangrene which wa due to blood clot    Seizure ([facility] [address] 3/14/2015    Seizure disorder ([address] 3/30/2015    due to clotting factor    Seizures ([address]     last one 3- - followed by aniyah     Splenomegaly, congestive, chronic     Stroke (cerebrum) ([facility] [address] 2015    had bled into head- surgery    Stroke [facility])     pt had stroke like symptoms     Thrombocytopenia, HIT negative 2012 platelets lower repeat HIT 12 platelets in 59,903'B    Thrombosis, portal vein 2004    portal , spleenic and recently superior mesenteric    Transfusion history     many blood tranfusions - last 3-29-15 after brain surgery    Traumatic hemorrhage of right cerebrum ([facility] [address] 3/30/2015      Family History   Problem Relation Age of Onset    Diabetes Mother     Stroke Mother         after surgery    Cancer Father         brain    No Known Problems Sister     Other Sister         diverticulitis    Other Sister         diverticulitis    Cancer Sister         lyjmphoma    Breast Cancer Maternal Aunt 48    Thyroid Disease Paternal Grandmother       Social History     Tobacco Use    Smoking status: Former Smoker     Packs/day: 0.20     Years: 10.00     Pack years: 2.00     Types: Cigarettes     Quit date:      Years since quittin.2    Smokeless tobacco: Never Used   Substance Use Topics    Alcohol use: No     Past Surgical History:   Procedure Laterality Date    HX APPENDECTOMY      with small bowel resection    HX BREAST BIOPSY Bilateral     Lt - ; Rt -     HX CHOLECYSTECTOMY      HX GI      liver biopsy    HX GI      bowel removed small - 9 feet     HX GYN       x 2    HX GYN      D&C following miscarriage    HX ORTHOPAEDIC Left 2008    torn labrum shoulder (screw in place)    NEUROLOGICAL PROCEDURE UNLISTED  2010    craniotomy to evacuate subdural hematoma following a fall from a seizure    NH ABDOMEN SURGERY PROC UNLISTED      umbilical hernia repair    NH ABDOMEN SURGERY PROC UNLISTED      9ft of small bowel excised then reconnected      Prior to Admission medications    Medication Sig Start Date End Date Taking? Authorizing Provider   cefTRIAXone 2 gram 2 g IVPB 2 g by IntraVENous route every twelve (12) hours for 6 days. 3/7/22 3/13/22 Yes Lakeshia Ann MD   zolpidem (AMBIEN) 10 mg tablet Take 1 Tablet by mouth nightly as needed for Sleep. Max Daily Amount: 10 mg. 1/17/22  Yes Mari Dai MD   diphenoxylate-atropine (LomotiL) 2.5-0.025 mg per tablet Take 1 Tablet by mouth four (4) times daily as needed for Diarrhea. Max Daily Amount: 4 Tablets. 1/11/22  Yes Mari Dai MD   famotidine (PEPCID) 40 mg tablet Take 1 Tablet by mouth daily as needed for Heartburn. Take at least 4 hours before or after sprycel 12/13/21  Yes Jen Mcnulty NP   ondansetron (ZOFRAN ODT) 8 mg disintegrating tablet Take 1 Tab by mouth every eight (8) hours as needed for Nausea or Vomiting. 4/23/21  Yes Earlene Bamberger, NP   magnesium 250 mg tab Take 1 Tablet by mouth daily. Yes Provider, Historical   Synthroid 112 mcg tablet 1 tablet once a day with an extra 1/2 tablet on Sundays 3/29/21  Yes Danielle Barfield MD   oxyCODONE-acetaminophen (PERCOCET 10)  mg per tablet Take 1 Tablet by mouth every eight (8) hours as needed for Pain for up to 3 days. Max Daily Amount: 3 Tablets. 3/7/22 3/10/22  Lakeshia Ann MD   dasatinib (SpryceL) 80 mg tab Take 1 Tablet by mouth daily. 11/12/21   Mari Dai MD   enoxaparin (Lovenox) 60 mg/0.6 mL injection 50 mg by SubCUTAneous route every twelve (12) hours. 9/17/21   Mari Dai MD   b complex vitamins tablet 1 Tablet daily. Provider, Historical   Comp Stocking,Knee,Regular,Sml misc 1 Each by Does Not Apply route daily. 8/5/21   Herb Roy DO   ruxolitinib 5 mg tab Take 1 Tab by mouth two (2) times a day.   Patient taking differently: Take 5 mg by mouth two (2) times a day. Sometimes only takes at night due to N/V/D 3/10/21   Whit Cary MD   ondansetron (ZOFRAN ODT) 4 mg disintegrating tablet Take 1 Tab by mouth every eight (8) hours as needed for Nausea. 8/3/20   Whit Cary MD   calcium carbonate (CALTREX) 600 mg calcium (1,500 mg) tablet Take 600 mg by mouth nightly. Provider, Historical   cholecalciferol (VITAMIN D3) 1,000 unit cap Take 1,000 Units by mouth nightly. Provider, Historical   lansoprazole (PREVACID) 30 mg capsule Take 30 mg by mouth daily. prn    Provider, Historical       Allergies   Allergen Reactions    Latex Other (comments)     Testing for latex allergy was positive as a 2 (on a scale of 1 to 3).  Sulfa (Sulfonamide Antibiotics) Anaphylaxis    Tramadol Other (comments)     Top lip swelled    Augmentin [Amoxicillin-Pot Clavulanate] Rash    Divalproex Sodium Hives    Morphine Nausea and Vomiting     Not a true allergy    Potassium Clavulanate Hives    Rizatriptan Rash    Tetanus Immune Globulin Other (comments)     Heat, bruising, and swelling at  Site of injection    Vancomycin Rash    Xanax [Alprazolam] Other (comments)     Hallucinations    Zonegran [Zonisamide] Rash        Review of Systems:  A comprehensive review of systems was negative except for that written in the History of Present Illness. Objective:     Visit Vitals  /85   Pulse (!) 109   Temp 98 °F (36.7 °C)   Resp 20   Ht 5' 3\" (1.6 m)   Wt 59 kg (130 lb)   SpO2 93%   BMI 23.03 kg/m²     Temp (24hrs), Av.7 °F (37.1 °C), Min:98 °F (36.7 °C), Max:99.8 °F (37.7 °C)       Lines:  PIV    Physical Exam:    General:  Alert, cooperative, well noursished, well developed, appears stated age   Eyes:  Sclera anicteric. Pupils equally round and reactive to light.    Mouth/Throat: Mucous membranes normal, oral pharynx clear   Neck: Supple   Lungs:   Clear, decreased air movement, comfortable on RA   CV:  Regular rate and rhythm,no murmur, click, rub or gallop   Abdomen:   Soft, non-tender. bowel sounds normal. non-distended   Extremities: No cyanosis or edema   Skin: Skin color, texture, turgor normal. no acute rash or lesions   Lymph nodes: Cervical and supraclavicular normal   Musculoskeletal: No swelling or deformity   Lines/Devices:  Intact, no erythema, drainage or tenderness   Psych: Alert and oriented, normal mood affect given the setting       Data Review:     CBC:  Recent Labs     03/11/22  0326 03/10/22  0255 03/09/22  2111 03/09/22  2111   WBC 16.6* 23.5*  --  25.8*   GRANS 88* 91*   < > 90*   MONOS 4 3*   < > 4   EOS 1 0*   < > 0*   ANEU 14.5* 21.4*   < > 23.3*   ABL 0.9 0.9   < > 1.1   HGB 8.1* 9.1*  --  10.0*   HCT 25.0* 27.8*  --  30.2*   * 121*  --  132*    < > = values in this interval not displayed.        BMP:  Recent Labs     03/11/22  0326 03/10/22  0255 03/09/22  2111   CREA 0.69 0.82 0.74   BUN 13 12 10   * 135* 135*   K 4.1 3.7 3.6    105 104   CO2 22 22 24   AGAP 8 8 7   GLU 99 195* 128*       LFTS:  Recent Labs     03/09/22 2111   TBILI 0.6   ALT 31      TP 6.6   ALB 2.2*       Microbiology:     All Micro Results     Procedure Component Value Units Date/Time    C. DIFFICILE/EPI PCR [[id number]]     Order Status: Sent Specimen: Stool     CULTURE, URINE [[id number]] Collected: 03/09/22 2204    Order Status: Completed Specimen: Urine from Clean catch Updated: 03/11/22 0758     Special Requests: NO SPECIAL REQUESTS        Culture result: NO GROWTH 1 DAY       CULTURE, BLOOD [[id number]] Collected: 03/09/22 2237    Order Status: Completed Specimen: Blood Updated: 03/11/22 0744     Special Requests: --        RIGHT  Antecubital       Culture result: NO GROWTH 2 DAYS       CULTURE, BLOOD [[id number]] Collected: 03/09/22 2316    Order Status: Completed Specimen: Blood Updated: 03/11/22 0744     Special Requests: --        LEFT  Antecubital       Culture result: NO GROWTH 2 DAYS       MSSA/MRSA SC BY PCR, NASAL SWAB [[id number]]     Order Status: Sent Specimen: Nasal swab     COVID-19 RAPID TEST [[id number]] Collected: 03/09/22 2204    Order Status: Completed Specimen: Nasopharyngeal Updated: 03/09/22 2257     Specimen source NASAL SWAB        COVID-19 rapid test Not detected        Comment:      The specimen is NEGATIVE for SARS-CoV-2, the novel coronavirus associated with COVID-19. A negative result does not rule out COVID-19. This test has been authorized by the FDA under an Emergency Use Authorization (EUA) for use by authorized laboratories. Fact sheet for Healthcare Providers: [employer]  Fact sheet for Patients: [employer]IS       Methodology: Isothermal Nucleic Acid Amplification         C. DIFFICILE/EPI PCR [[id number]]     Order Status: Canceled Specimen: Stool     Aleksandar Wu [[id number]]     Order Status: Sent Specimen: Stool     [facility] & Stefan Savin [[id number]]     Order Status: Sent Specimen: Stool     CULTURE, STOOL [[id number]]     Order Status: Sent Specimen: Stool           Imaging:    As per HPI    Signed By: Donell Omalley NP     March 11, 2022

## 2022-03-11 NOTE — PROGRESS NOTES
Hospitalist Progress Note   Admit Date:  3/9/2022  8:50 PM   Name:  Sumanth Grossman   Age:  64 y.o. Sex:  female  :  1960   MRN:  [id number]   Room:  [facility]/01    Presenting Complaint: Back Pain    Reason(s) for Admission: HCAP (healthcare-associated pneumonia) [J18.9]     Hospital Course & Interval History:   64 y.o. female with medical history of CML and recent hospitalization (-3/7/22) for e.coli bacteremia and meningitis who presented to ED with shortness of breath, back/neck discomfort, and grogginess. Patient reports increased SOB over the past 2 days. Family reported that the patient seems more \"groggy\" than typical.  Upon ER evaluation, patient was found to be hypoxic with O2 sat of 85%. WBC is ~26,000. CXR shows some vascular congestion and ER is concerned for a R sided infiltrate. Patient has been on daily rocephin based on susceptibilities for continued treatment of e.coli bacteremia with EOT date of 3/13. She has also been on BID lovenox. CT with new splenic infarcts, pleural effusion. Consulted hematology. Consulted pulmonology. Subjective/24hr Events (22):  Pain controlled today. Abdominal fullness improved. Breathing stable no longer short of breath. ROS:  10 systems reviewed and negative except as noted above. Assessment & Plan:   * HCAP (healthcare-associated pneumonia)  ER concerned about R sided infiltrate; Radiology read as vascular congestion. Admission WBC 25.8, up from 15.4 two days prior at discharge  - With hypoxia and leukocytosis, will treat for HCAP.   IV vancomycin and cefepime ordered  - Wean O2 as tolerated  - Follow repeat blood cultures as obtained in ER    3/11: likely secondary to pleural effusions, Pulmo consulted, agree diuresis for now    Chronic myelogenous leukemia ([facility] [address]  -consult oncology  -fentanyl for cancer pain    3/11: fentanyl effective, oncology to continue ruxolinitnib    E coli bacteremia  Recent hospitalization from 2/27-3/7/2022 for e.coli bacteremia and meningitis. Patient has been on rocephin daily since discharge with EOT date of 3/13. Based on culture susceptibilities, e.coli is also sensitive to zosyn and cefepime  -vancomycin, cefepime   -repeat BCx    3/11: infectious disease consulted for guidance on duration of therapy, relapse    Thrombocytopenia ([facility] [address]  Admission CT with multiple splenic infarcts, portal vein thrombosis.   Admission platelet [id number].  -Consult hematology  -On enoxaparin    3/11: Hematology agrees, maintain current therapy    Malignant ascites  Noted on admission CT and exam  -albumin, bumetanide    3/11: Poor output, renal function stable, increase bumetanide dose    Acquired hypercoagulable state ([address]  H/O multiple thromboses per chart review, now with splenic infarct  -lovenox BID    Esophageal varices ([facility] [address]  - Of note  - No current symptoms of this  - Continue home meds    Primary hypothyroidism  -levothyroxine 112    Cirrhosis ([facility] [address]  - Of note        Dispo/Discharge Planning:    Remain inpatient    Diet:  ADULT DIET Regular  ADULT ORAL NUTRITION SUPPLEMENT Lunch, Dinner; Standard High Calorie/High Protein  ADULT ORAL NUTRITION SUPPLEMENT Breakfast; Other Supplement; yogurt  DVT PPx: On enoxaparin  Code status: Full Code    Hospital Problems as of 3/11/2022 Date Reviewed: 2/8/2022          Codes Class Noted - Resolved POA    * (Principal) HCAP (healthcare-associated pneumonia) ICD-10-CM: J18.9  ICD-9-CM: 600  3/9/2022 - Present Yes        Sepsis with acute organ dysfunction without septic shock ([facility] ICD-10-CM: A41.9, R65.20  ICD-9-CM: 038.9, 995.92  2/2/2022 - Present Yes        Chronic myelogenous leukemia ([facility] [address] (Chronic) ICD-10-CM: C92.10  ICD-9-CM: 205.10  3/30/2015 - Present Yes        E coli bacteremia ICD-10-CM: R78.81, B96.20  ICD-9-CM: 790.7, 041.49  3/9/2022 - Present Yes        Thrombocytopenia ([facility] [address] ICD-10-CM: D69.6  ICD-9-CM: 287.5  10/15/2021 - Present Yes        Malignant ascites ICD-10-CM: R18.0  ICD-9-CM: 789.51  7/26/2012 - Present Yes        Acquired hypercoagulable state ([facility] (Chronic) ICD-10-CM: N88.03  ICD-9-CM: 289.81  3/30/2015 - Present Yes        Pleural effusion, bilateral ICD-10-CM: J90  ICD-9-CM: 511.9  3/11/2022 - Present Unknown        Hypoproteinemia ([facility] ICD-10-CM: E77.8  ICD-9-CM: 273.8  3/11/2022 - Present Unknown        Cirrhosis (HCC) (Chronic) ICD-10-CM: K74.60  ICD-9-CM: 571.5  7/27/2012 - Present Yes        Primary hypothyroidism (Chronic) ICD-10-CM: E03.9  ICD-9-CM: 244.9  2/16/2009 - Present Yes        Esophageal varices (HCC) (Chronic) ICD-10-CM: I85.00  ICD-9-CM: 456.1  2/16/2009 - Present Yes    Overview Addendum 8/26/2021  2:30 PM by Kev Trujillo MD     grade 3             Portal vein thrombosis ICD-10-CM: O29  ICD-9-CM: 426  2/16/2009 - Present Unknown    Overview Addendum 8/26/2021  2:31 PM by Kev Trujillo MD     Ct scan 6-21-2 Apparent occlusion of the portal vein. In addition, the splenic vein, and superior mesenteric vein are not definitely seen and are also likely  occluded. Splenomegaly, and extensive varices are seen as described above consistent with the portal vein occlusion 7-05-12 on arixtra HIT negative on repeat 7-27-12 re admitted Cirrhotic appearance of the liver.  Mild to moderate ascites                   Objective:     Patient Vitals for the past 24 hrs:   Temp Pulse Resp BP SpO2   03/11/22 1112 98 °F (36.7 °C) (!) 108 20 101/71 94 %   03/11/22 0705 98 °F (36.7 °C) (!) 109 20 109/85 93 %   03/11/22 0306 99.8 °F (37.7 °C) (!) 109 17 91/74 95 %   03/10/22 2258 98.4 °F (36.9 °C) (!) 103 18 97/64 97 %   03/10/22 1908 99.6 °F (37.6 °C) (!) 101 20 98/74 100 %   03/10/22 1500 98 °F (36.7 °C) (!) 102 20 105/85 95 %     Oxygen Therapy  O2 Sat (%): 94 % (03/11/22 1112)  Pulse via Oximetry: 103 beats per minute (03/09/22 2333)  O2 Device: None (Room air) (03/09/22 2049)    Estimated body mass index is 23.03 kg/m² as calculated from the following:    Height as of this encounter: 5' 3\" (1.6 m). Weight as of this encounter: 59 kg (130 lb). Intake/Output Summary (Last 24 hours) at 3/11/2022 1316  Last data filed at 3/11/2022 0826  Gross per 24 hour   Intake 360 ml   Output 100 ml   Net 260 ml       Blood pressure 101/71, pulse (!) 108, temperature 98 °F (36.7 °C), resp. rate 20, height 5' 3\" (1.6 m), weight 59 kg (130 lb), SpO2 94 %. Physical Exam  Vitals and nursing note reviewed. Constitutional:       General: She is not in acute distress. Appearance: Normal appearance. She is ill-appearing. HENT:      Head: Normocephalic. Eyes:      Extraocular Movements: Extraocular movements intact. Cardiovascular:      Rate and Rhythm: Normal rate and regular rhythm. Pulses:           Radial pulses are 2+ on the left side. Pulmonary:      Effort: Pulmonary effort is normal. No respiratory distress. Abdominal:      General: Bowel sounds are normal. There is distension. Palpations: There is fluid wave. Tenderness: There is generalized abdominal tenderness. Musculoskeletal:         General: No deformity. Cervical back: No rigidity. Right lower leg: No edema. Left lower leg: No edema. Skin:     General: Skin is warm and dry. Coloration: Skin is not jaundiced. Neurological:      General: No focal deficit present. Mental Status: She is alert and oriented to person, place, and time.    Psychiatric:         Mood and Affect: Mood normal.         Behavior: Behavior normal.      Comments: pleasant       I have reviewed ordered lab tests and independently visualized imaging below:    Recent Labs:  Recent Results (from the past 48 hour(s))   CBC WITH AUTOMATED DIFF    Collection Time: 03/09/22  9:11 PM   Result Value Ref Range    WBC 25.8 (H) 4.3 - 11.1 K/uL    RBC 3.17 (L) 4.05 - 5.2 M/uL    HGB 10.0 (L) 11.7 - 15.4 g/dL    HCT 30.2 (L) 35.8 - 46.3 %    MCV 95.3 79.6 - 97.8 FL    MCH 31.5 26.1 - 32.9 PG MCHC 33.1 31.4 - 35.0 g/dL    RDW 19.2 (H) 11.9 - 14.6 %    PLATELET 407 (L) 972 - 450 K/uL    MPV 10.4 9.4 - 12.3 FL    ABSOLUTE NRBC 0.02 0.0 - 0.2 K/uL    DF AUTOMATED      NEUTROPHILS 90 (H) 43 - 78 %    LYMPHOCYTES 4 (L) 13 - 44 %    MONOCYTES 4 4.0 - 12.0 %    EOSINOPHILS 0 (L) 0.5 - 7.8 %    BASOPHILS 0 0.0 - 2.0 %    IMMATURE GRANULOCYTES 2 0.0 - 5.0 %    ABS. NEUTROPHILS 23.3 (H) 1.7 - 8.2 K/UL    ABS. LYMPHOCYTES 1.1 0.5 - 4.6 K/UL    ABS. MONOCYTES 0.9 0.1 - 1.3 K/UL    ABS. EOSINOPHILS 0.0 0.0 - 0.8 K/UL    ABS. BASOPHILS 0.1 0.0 - 0.2 K/UL    ABS. IMM. GRANS. 0.4 0.0 - 0.5 K/UL   METABOLIC PANEL, COMPREHENSIVE    Collection Time: 03/09/22  9:11 PM   Result Value Ref Range    Sodium 135 (L) 136 - 145 mmol/L    Potassium 3.6 3.5 - 5.1 mmol/L    Chloride 104 98 - 107 mmol/L    CO2 24 21 - 32 mmol/L    Anion gap 7 7 - 16 mmol/L    Glucose 128 (H) 65 - 100 mg/dL    BUN 10 8 - 23 MG/DL    Creatinine 0.74 0.6 - 1.0 MG/DL    GFR est AA >60 >60 ml/min/1.73m2    GFR est non-AA >60 >60 ml/min/1.73m2    Calcium 8.3 8.3 - 10.4 MG/DL    Bilirubin, total 0.6 0.2 - 1.1 MG/DL    ALT (SGPT) 31 12 - 65 U/L    AST (SGOT) 51 (H) 15 - 37 U/L    Alk.  phosphatase 117 50 - 136 U/L    Protein, total 6.6 6.3 - 8.2 g/dL    Albumin 2.2 (L) 3.2 - 4.6 g/dL    Globulin 4.4 (H) 2.3 - 3.5 g/dL    A-G Ratio 0.5 (L) 1.2 - 3.5     PROCALCITONIN    Collection Time: 03/09/22  9:11 PM   Result Value Ref Range    Procalcitonin 62.38 (H) 0.00 - 0.49 ng/mL   MAGNESIUM    Collection Time: 03/09/22  9:11 PM   Result Value Ref Range    Magnesium 2.4 1.8 - 2.4 mg/dL   PHOSPHORUS    Collection Time: 03/09/22  9:11 PM   Result Value Ref Range    Phosphorus 2.9 2.3 - 3.7 MG/DL   LIPASE    Collection Time: 03/09/22  9:11 PM   Result Value Ref Range    Lipase 61 (L) 73 - 393 U/L   COVID-19 RAPID TEST    Collection Time: 03/09/22 10:04 PM   Result Value Ref Range    Specimen source NASAL SWAB      COVID-19 rapid test Not detected NOTD     CULTURE, URINE Collection Time: 03/09/22 10:04 PM    Specimen: Clean catch; Urine    URINE   Result Value Ref Range    Special Requests: NO SPECIAL REQUESTS      Culture result: NO GROWTH 1 DAY     URINALYSIS W/ RFLX MICROSCOPIC    Collection Time: 03/09/22 10:04 PM   Result Value Ref Range    Color ISAEL      Appearance TURBID      Specific gravity 1.025 (H) 1.001 - 1.023      pH (UA) 6.0 5.0 - 9.0      Protein 100 (A) NEG mg/dL    Glucose Negative mg/dL    Ketone TRACE (A) NEG mg/dL    Bilirubin Negative NEG      Blood SMALL (A) NEG      Urobilinogen 0.2 0.2 - 1.0 EU/dL    Nitrites Negative NEG      Leukocyte Esterase SMALL (A) NEG      WBC 5-10 0 /hpf    RBC 0-3 0 /hpf    Epithelial cells 0-3 0 /hpf    Bacteria TRACE 0 /hpf    Casts 0-3 0 /lpf    Crystals, urine AMORPHOUS 0 /LPF   EKG, 12 LEAD, INITIAL    Collection Time: 03/09/22 10:24 PM   Result Value Ref Range    Ventricular Rate 109 BPM    Atrial Rate 109 BPM    P-R Interval 132 ms    QRS Duration 80 ms    Q-T Interval 300 ms    QTC Calculation (Bezet) 404 ms    Calculated P Axis 73 degrees    Calculated R Axis 87 degrees    Calculated T Axis 48 degrees    Diagnosis       !! AGE AND GENDER SPECIFIC ECG ANALYSIS !!   Sinus tachycardia  Low voltage QRS  Borderline ECG  When compared with ECG of 09-MAR-2022 21:58,  Minimal criteria for Anterior infarct are no longer Present  Nonspecific T wave abnormality now evident in Inferior leads  Confirmed by Jerry Montiel MD (), Edouard Diaz ([id number]) on 3/10/2022 7:06:26 AM     CULTURE, BLOOD    Collection Time: 03/09/22 10:37 PM    Specimen: Blood   Result Value Ref Range    Special Requests: RIGHT  Antecubital        Culture result: NO GROWTH 2 DAYS     CULTURE, BLOOD    Collection Time: 03/09/22 11:16 PM    Specimen: Blood   Result Value Ref Range    Special Requests: LEFT  Antecubital        Culture result: NO GROWTH 2 DAYS     LACTIC ACID    Collection Time: 03/09/22 11:16 PM   Result Value Ref Range    Lactic acid 2.3 (H) 0.4 - 2.0 MMOL/L   METABOLIC PANEL, BASIC    Collection Time: 03/10/22  2:55 AM   Result Value Ref Range    Sodium 135 (L) 136 - 145 mmol/L    Potassium 3.7 3.5 - 5.1 mmol/L    Chloride 105 98 - 107 mmol/L    CO2 22 21 - 32 mmol/L    Anion gap 8 7 - 16 mmol/L    Glucose 195 (H) 65 - 100 mg/dL    BUN 12 8 - 23 MG/DL    Creatinine 0.82 0.6 - 1.0 MG/DL    GFR est AA >60 >60 ml/min/1.73m2    GFR est non-AA >60 >60 ml/min/1.73m2    Calcium 7.7 (L) 8.3 - 10.4 MG/DL   CBC WITH AUTOMATED DIFF    Collection Time: 03/10/22  2:55 AM   Result Value Ref Range    WBC 23.5 (H) 4.3 - 11.1 K/uL    RBC 2.85 (L) 4.05 - 5.2 M/uL    HGB 9.1 (L) 11.7 - 15.4 g/dL    HCT 27.8 (L) 35.8 - 46.3 %    MCV 97.5 79.6 - 97.8 FL    MCH 31.9 26.1 - 32.9 PG    MCHC 32.7 31.4 - 35.0 g/dL    RDW 19.6 (H) 11.9 - 14.6 %    PLATELET 897 (L) 322 - 450 K/uL    MPV 10.6 9.4 - 12.3 FL    ABSOLUTE NRBC 0.00 0.0 - 0.2 K/uL    DF AUTOMATED      NEUTROPHILS 91 (H) 43 - 78 %    LYMPHOCYTES 4 (L) 13 - 44 %    MONOCYTES 3 (L) 4.0 - 12.0 %    EOSINOPHILS 0 (L) 0.5 - 7.8 %    BASOPHILS 0 0.0 - 2.0 %    IMMATURE GRANULOCYTES 2 0.0 - 5.0 %    ABS. NEUTROPHILS 21.4 (H) 1.7 - 8.2 K/UL    ABS. LYMPHOCYTES 0.9 0.5 - 4.6 K/UL    ABS. MONOCYTES 0.7 0.1 - 1.3 K/UL    ABS. EOSINOPHILS 0.0 0.0 - 0.8 K/UL    ABS. BASOPHILS 0.1 0.0 - 0.2 K/UL    ABS. IMM. GRANS.  0.4 0.0 - 0.5 K/UL   LACTIC ACID    Collection Time: 03/10/22  2:55 AM   Result Value Ref Range    Lactic acid 2.5 (H) 0.4 - 2.0 MMOL/L   LACTIC ACID    Collection Time: 03/10/22  8:09 AM   Result Value Ref Range    Lactic acid 1.6 0.4 - 2.0 MMOL/L   VANCOMYCIN, RANDOM    Collection Time: 03/10/22  3:01 PM   Result Value Ref Range    Vancomycin, random 33.7 UG/ML   METABOLIC PANEL, BASIC    Collection Time: 03/11/22  3:26 AM   Result Value Ref Range    Sodium 134 (L) 136 - 145 mmol/L    Potassium 4.1 3.5 - 5.1 mmol/L    Chloride 104 98 - 107 mmol/L    CO2 22 21 - 32 mmol/L    Anion gap 8 7 - 16 mmol/L    Glucose 99 65 - 100 mg/dL BUN 13 8 - 23 MG/DL    Creatinine 0.69 0.6 - 1.0 MG/DL    GFR est AA >60 >60 ml/min/1.73m2    GFR est non-AA >60 >60 ml/min/1.73m2    Calcium 8.2 (L) 8.3 - 10.4 MG/DL   CBC WITH AUTOMATED DIFF    Collection Time: 03/11/22  3:26 AM   Result Value Ref Range    WBC 16.6 (H) 4.3 - 11.1 K/uL    RBC 2.57 (L) 4.05 - 5.2 M/uL    HGB 8.1 (L) 11.7 - 15.4 g/dL    HCT 25.0 (L) 35.8 - 46.3 %    MCV 97.3 79.6 - 97.8 FL    MCH 31.5 26.1 - 32.9 PG    MCHC 32.4 31.4 - 35.0 g/dL    RDW 20.0 (H) 11.9 - 14.6 %    PLATELET 217 (L) 404 - 450 K/uL    MPV 11.2 9.4 - 12.3 FL    ABSOLUTE NRBC 0.06 0.0 - 0.2 K/uL    DF AUTOMATED      NEUTROPHILS 88 (H) 43 - 78 %    LYMPHOCYTES 5 (L) 13 - 44 %    MONOCYTES 4 4.0 - 12.0 %    EOSINOPHILS 1 0.5 - 7.8 %    BASOPHILS 0 0.0 - 2.0 %    IMMATURE GRANULOCYTES 2 0.0 - 5.0 %    ABS. NEUTROPHILS 14.5 (H) 1.7 - 8.2 K/UL    ABS. LYMPHOCYTES 0.9 0.5 - 4.6 K/UL    ABS. MONOCYTES 0.7 0.1 - 1.3 K/UL    ABS. EOSINOPHILS 0.1 0.0 - 0.8 K/UL    ABS. BASOPHILS 0.1 0.0 - 0.2 K/UL    ABS. IMM.  GRANS. 0.3 0.0 - 0.5 K/UL       All Micro Results     Procedure Component Value Units Date/Time    C. DIFFICILE/EPI PCR [[id number]]     Order Status: Canceled Specimen: Stool     CULTURE, URINE [[id number]] Collected: 03/09/22 6594    Order Status: Completed Specimen: Urine from Clean catch Updated: 03/11/22 1925     Special Requests: NO SPECIAL REQUESTS        Culture result: NO GROWTH 1 DAY       CULTURE, BLOOD [[id number]] Collected: 03/09/22 2237    Order Status: Completed Specimen: Blood Updated: 03/11/22 0744     Special Requests: --        RIGHT  Antecubital       Culture result: NO GROWTH 2 DAYS       CULTURE, BLOOD [[id number]] Collected: 03/09/22 2316    Order Status: Completed Specimen: Blood Updated: 03/11/22 0744     Special Requests: --        LEFT  Antecubital       Culture result: NO GROWTH 2 DAYS       MSSA/MRSA SC BY PCR, NASAL SWAB [[id number]]     Order Status: Sent Specimen: Nasal swab     COVID-19 RAPID TEST [[id number]] Collected: 03/09/22 2207    Order Status: Completed Specimen: Nasopharyngeal Updated: 03/09/22 2257     Specimen source NASAL SWAB        COVID-19 rapid test Not detected        Comment:      The specimen is NEGATIVE for SARS-CoV-2, the novel coronavirus associated with COVID-19. A negative result does not rule out COVID-19. This test has been authorized by the FDA under an Emergency Use Authorization (EUA) for use by authorized laboratories. Fact sheet for Healthcare Providers: [employer]  Fact sheet for Patients: [employer]       Methodology: Isothermal Nucleic Acid Amplification         C. DIFFICILE/EPI PCR [[id number]]     Order Status: Canceled Specimen: Stool     Paulo Santizo [[id number]]     Order Status: Canceled Specimen: Stool     OVA & PARASITES, STOOL [[id number]]     Order Status: Canceled Specimen: Stool     CULTURE, STOOL [[id number]]     Order Status: Canceled Specimen: Stool           Other Studies:  No results found.     Current Meds:  Current Facility-Administered Medications   Medication Dose Route Frequency    cefTRIAXone (ROCEPHIN) 2 g in 0.9% sodium chloride (MBP/ADV) 50 mL MBP  2 g IntraVENous Q12H    albumin human 25% (BUMINATE) solution 25 g  25 g IntraVENous Q6H    bumetanide (BUMEX) injection 2 mg  2 mg IntraVENous BID    [START ON 3/13/2022] levothyroxine (SYNTHROID) tablet 56 mcg  56 mcg Oral Once per day on Sun    fentaNYL (DURAGESIC) 12 mcg/hr patch 1 Patch  1 Patch TransDERmal Q72H    morphine injection 1 mg  1 mg IntraVENous Q4H PRN    clotrimazole (MYCELEX) 1 % cream 1 Applicator  1 Applicator Vaginal QHS    ruxolitinib tab 5 mg (Patient Supplied)  5 mg Oral BID    levothyroxine (SYNTHROID) tablet 112 mcg  112 mcg Oral ACB    enoxaparin (LOVENOX) injection 60 mg  60 mg SubCUTAneous Q12H    dasatinib tab 80 mg (Patient Supplied)  80 mg Oral DAILY    zolpidem (AMBIEN) tablet 10 mg  10 mg Oral QHS PRN    diphenoxylate-atropine (LOMOTIL) tablet 1 Tablet  1 Tablet Oral QID PRN    oxyCODONE-acetaminophen (PERCOCET 10)  mg per tablet 1 Tablet  1 Tablet Oral Q8H PRN    sodium chloride (NS) flush 5-40 mL  5-40 mL IntraVENous Q8H    sodium chloride (NS) flush 5-40 mL  5-40 mL IntraVENous PRN    acetaminophen (TYLENOL) tablet 650 mg  650 mg Oral Q6H PRN    Or    acetaminophen (TYLENOL) suppository 650 mg  650 mg Rectal Q6H PRN    polyethylene glycol (MIRALAX) packet 17 g  17 g Oral DAILY PRN    ondansetron (ZOFRAN ODT) tablet 4 mg  4 mg Oral Q8H PRN    Or    ondansetron (ZOFRAN) injection 4 mg  4 mg IntraVENous Q6H PRN       Signed:  Leobardo Bradley MD

## 2022-03-11 NOTE — CONSULTS
History and Physical Initial Visit NOTE           3/11/2022    Will Ordoñez                        Date of Admission:  3/9/2022    The patient's chart is reviewed and the patient is discussed with the staff. Subjective: The patient is a 64 y.o.  female seen and evaluated at the request of Dr. Nickie Hays due to pleural effusions. She has a hx of CML, thrombotic state on chronic anticoagulation, cirrhosis, esophageal varices, e. Coli bacteremia, meningitis who was just discharged and is now re-admittted with AMS and dyspnea. CXR was felt to reveal some pulmonary congestion and a R infiltrate. She had been on IV rocephin with EOT scheduled for 3/13. She was admitted for likely HAP and is now on Vanc and Cefepime. Following admission, a CT of the abdomen was notable for bilateral effusions, ongoing portal vein thrombosis, splenomegaly, moderate ascites, distal esophageal thickening, and 2 anterior wall hernias. She is fully anticoagulated. Blood cx here are NG and VS have been stable except for a mild tachycardia. She is on 3L O2 with a sat of 93%. Her breathing has improved since admission and her LA has normalized. He admission albumin was 2.2 and that is being supplemented.          Review of Systems    Denies: fevers, chills, sweats, fatigue, malaise, anorexia, weight loss   Denies: blurry vision, loss of vision, eye pain, photophobia  Denies: hearing loss, ringing in the ears, earache, epistaxis  Denies: chest pain, palpitations, syncope, orthopnea, paroxysmal nocturnal dyspnea, claudication  Denies: dysphagia, odynophagia, nausea, vomiting, diarrhea, constipation, jaundice, melena   Denies: frequency, dysuria, nocturia, urinary incontinence, stones, hematuria  Denies: polydipsia/polyuria, skin changes, temperature intolerance, unexpected weight gain  Denies: back pain, joint pain, joint swelling, muscle pain, muscle weakness  Denies: bleeding problems, blood transfusions, bruising, pallor, swollen lymph nodes  Denies: headache, dysarthria, blurred vision, diplopia, seizure, focal deficits. Admits to: improved dyspnea, less abdominal discomfort            Prior to Admission Medications   Prescriptions Last Dose Informant Patient Reported? Taking? Comp Stocking,Knee,Regular,Sml misc   No No   Si Each by Does Not Apply route daily. Synthroid 112 mcg tablet 3/10/2022 at Unknown time  No Yes   Si tablet once a day with an extra 1/2 tablet on Sundays   b complex vitamins tablet 3/7/2022  Yes No   Si Tablet daily. calcium carbonate (CALTREX) 600 mg calcium (1,500 mg) tablet 3/7/2022  Yes No   Sig: Take 600 mg by mouth nightly. cefTRIAXone 2 gram 2 g IVPB 3/10/2022 at Unknown time  No Yes   Si g by IntraVENous route every twelve (12) hours for 6 days. cholecalciferol (VITAMIN D3) 1,000 unit cap 3/7/2022  Yes No   Sig: Take 1,000 Units by mouth nightly. dasatinib (SpryceL) 80 mg tab   No No   Sig: Take 1 Tablet by mouth daily. diphenoxylate-atropine (LomotiL) 2.5-0.025 mg per tablet 3/10/2022 at Unknown time  No Yes   Sig: Take 1 Tablet by mouth four (4) times daily as needed for Diarrhea. Max Daily Amount: 4 Tablets.   enoxaparin (Lovenox) 60 mg/0.6 mL injection   No No   Si mg by SubCUTAneous route every twelve (12) hours. famotidine (PEPCID) 40 mg tablet 3/6/2022 at Unknown time  No Yes   Sig: Take 1 Tablet by mouth daily as needed for Heartburn. Take at least 4 hours before or after sprycel   lansoprazole (PREVACID) 30 mg capsule Unknown at Unknown time  Yes No   Sig: Take 30 mg by mouth daily. prn   magnesium 250 mg tab 3/8/2022 at Unknown time  Yes Yes   Sig: Take 1 Tablet by mouth daily. ondansetron (ZOFRAN ODT) 4 mg disintegrating tablet   No No   Sig: Take 1 Tab by mouth every eight (8) hours as needed for Nausea.    ondansetron (ZOFRAN ODT) 8 mg disintegrating tablet 3/10/2022 at Unknown time  No Yes   Sig: Take 1 Tab by mouth every eight (8) hours as needed for Nausea or Vomiting. oxyCODONE-acetaminophen (PERCOCET 10)  mg per tablet 3/3/2022  No No   Sig: Take 1 Tablet by mouth every eight (8) hours as needed for Pain for up to 3 days. Max Daily Amount: 3 Tablets. ruxolitinib 5 mg tab   No No   Sig: Take 1 Tab by mouth two (2) times a day. Patient taking differently: Take 5 mg by mouth two (2) times a day. Sometimes only takes at night due to N/V/D   zolpidem (AMBIEN) 10 mg tablet 3/9/2022 at Unknown time  No Yes   Sig: Take 1 Tablet by mouth nightly as needed for Sleep. Max Daily Amount: 10 mg. Facility-Administered Medications: None     Past Medical History:   Diagnosis Date    Acute blood loss anemia 4/6/2018    Anemia     C. difficile diarrhea 4/1/2015    Cerebral edema (HCC) 4/1/2015    Chronic migraine 9/22/2016    Chronic myelogenous leukemia ([facility]     Grimes chromosome/ converted from polycythemia in 2009- to 2012 when she was dx w leukemia    Chronic pain     Coagulation disorder ([facility]     \"clotting and Bleeding Problem\" dr Kaur [place] follows     Esophageal spasm     with banding     Esophageal varices ([facility]     grade 3    GI bleed 8/3/2016    H/O craniotomy     3-29-15.  due to blood clot - which caused a seizure  - then pt fell and hit     Hematemesis 8/20/2021    Leukocytosis 3/14/2015    Melena 8/20/2021    MRSA colonization 6/25/2012    Polycythemia vera(238.4)     JAK2 mutation    Portal hypertension ([address]     with varices    Portal vein thrombosis 6/20/2012    Ct scan 6-21-2 Apparent occlusion of the portal vein. In addition, the splenic vein, and superior mesenteric vein are not definitely seen and are also likely  occluded. Splenomegaly, and extensive varices are seen as described above consistent with the portal vein occlusion 7-05-12 on arixtra HIT negative on repeat 7-27-12 re admitted Cirrhotic appearance of the liver.  Mild to moderate ascites, as    Primary hypothyroidism     Pulmonary embolism (HCC)     not on coumadin anymore     S/P exploratory laparotomy, 12    Bowel resection:  336 cm of small bowel removed, approximately 9 feet and placement of feeding jejunostomy.      S/P small bowel resection     .  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 2012 platelets lower repeat HIT 12 platelets in 36,143'W    Thrombosis, portal vein     portal , spleenic and recently superior mesenteric    Transfusion history     many blood tranfusions - last 3-29-15 after brain surgery    Traumatic hemorrhage of right cerebrum ([address] 3/30/2015     Past Surgical History:   Procedure Laterality Date    HX APPENDECTOMY      with small bowel resection    HX BREAST BIOPSY Bilateral     Lt - ; Rt -     HX CHOLECYSTECTOMY      HX GI      liver biopsy    HX GI      bowel removed small - 9 feet     HX GYN       x 2    HX GYN      D&C following miscarriage    HX ORTHOPAEDIC Left 2008    torn labrum shoulder (screw in place)    NEUROLOGICAL PROCEDURE UNLISTED  2010    craniotomy to evacuate subdural hematoma following a fall from a seizure    NE ABDOMEN SURGERY PROC UNLISTED      umbilical hernia repair    NE ABDOMEN SURGERY PROC UNLISTED      9ft of small bowel excised then reconnected     Social History     Socioeconomic History    Marital status: SINGLE     Spouse name: Not on file    Number of children: Not on file    Years of education: Not on file    Highest education level: Not on file   Occupational History    Not on file   Tobacco Use    Smoking status: Former Smoker     Packs/day: 0.20     Years: 10.00     Pack years: 2.00     Types: Cigarettes     Quit date: 200     Years since quittin.2    Smokeless tobacco: Never Used   Substance and Sexual Activity    Alcohol use: No    Drug use: No    Sexual activity: Not on file   Other Topics Concern    Not on file   Social History Narrative    Not on file     Social Determinants of Health     Financial Resource Strain:     Difficulty of Paying Living Expenses: Not on file   Food Insecurity:     Worried About Running Out of Food in the Last Year: Not on file    Ileana of Food in the Last Year: Not on file   Transportation Needs:     Lack of Transportation (Medical): Not on file    Lack of Transportation (Non-Medical):  Not on file   Physical Activity:     Days of Exercise per Week: Not on file    Minutes of Exercise per Session: Not on file   Stress:     Feeling of Stress : Not on file   Social Connections:     Frequency of Communication with Friends and Family: Not on file    Frequency of Social Gatherings with Friends and Family: Not on file    Attends [employer] Services: Not on file    Active Member of [address] [place] [address] or Organizations: Not on file    Attends Club or Organization Meetings: Not on file    Marital Status: Not on file   Intimate Partner Violence:     Fear of Current or Ex-Partner: Not on file    Emotionally Abused: Not on file    Physically Abused: Not on file    Sexually Abused: Not on file   Housing Stability:     Unable to Pay for Housing in the Last Year: Not on file    Number of [place] in the Last Year: Not on file    Unstable Housing in the Last Year: Not on file     Family History   Problem Relation Age of Onset    Diabetes Mother     Stroke Mother         after surgery    Cancer Father         brain    No Known Problems Sister     Other Sister         diverticulitis    Other Sister         diverticulitis    Cancer Sister         lyjmphoma    Breast Cancer Maternal Aunt 48    Thyroid Disease Paternal Grandmother      Allergies   Allergen Reactions    Latex Other (comments)     Testing for latex allergy was positive as a 2 (on a scale of 1 to 3).     Sulfa (Sulfonamide Antibiotics) Anaphylaxis    Tramadol Other (comments)     Top lip swelled    Augmentin [Amoxicillin-Pot Clavulanate] Rash    Divalproex Sodium Hives    Morphine Nausea and Vomiting     Not a true allergy    Potassium Clavulanate Hives    Rizatriptan Rash    Tetanus Immune Globulin Other (comments)     Heat, bruising, and swelling at  Site of injection    Vancomycin Rash    Xanax [Alprazolam] Other (comments)     Hallucinations    Zonegran [Zonisamide] Rash     Current Facility-Administered Medications   Medication Dose Route Frequency    vancomycin (VANCOCIN) 750 mg in 0.9% sodium chloride 250 mL ([id number])  750 mg IntraVENous Q12H    [START ON 3/13/2022] levothyroxine (SYNTHROID) tablet 56 mcg  56 mcg Oral Once per day on Sun    fentaNYL (DURAGESIC) 12 mcg/hr patch 1 Patch  1 Patch TransDERmal Q72H    bumetanide (BUMEX) injection 1 mg  1 mg IntraVENous BID    morphine injection 1 mg  1 mg IntraVENous Q4H PRN    clotrimazole (MYCELEX) 1 % cream 1 Applicator  1 Applicator Vaginal QHS    ruxolitinib tab 5 mg (Patient Supplied)  5 mg Oral BID    levothyroxine (SYNTHROID) tablet 112 mcg  112 mcg Oral ACB    enoxaparin (LOVENOX) injection 60 mg  60 mg SubCUTAneous Q12H    dasatinib tab 80 mg (Patient Supplied)  80 mg Oral DAILY    zolpidem (AMBIEN) tablet 10 mg  10 mg Oral QHS PRN    diphenoxylate-atropine (LOMOTIL) tablet 1 Tablet  1 Tablet Oral QID PRN    oxyCODONE-acetaminophen (PERCOCET 10)  mg per tablet 1 Tablet  1 Tablet Oral Q8H PRN    sodium chloride (NS) flush 5-40 mL  5-40 mL IntraVENous Q8H    sodium chloride (NS) flush 5-40 mL  5-40 mL IntraVENous PRN    acetaminophen (TYLENOL) tablet 650 mg  650 mg Oral Q6H PRN    Or    acetaminophen (TYLENOL) suppository 650 mg  650 mg Rectal Q6H PRN    polyethylene glycol (MIRALAX) packet 17 g  17 g Oral DAILY PRN    ondansetron (ZOFRAN ODT) tablet 4 mg  4 mg Oral Q8H PRN    Or    ondansetron (ZOFRAN) injection 4 mg  4 mg IntraVENous Q6H PRN    cefepime (MAXIPIME) 2 g in 0.9% sodium chloride (MBP/ADV) 100 mL MBP  2 g IntraVENous Q8H     Objective:   Blood pressure 109/85, pulse (!) 109, temperature 98 °F (36.7 °C), resp. rate 20, height 5' 3\" (1.6 m), weight 130 lb (59 kg), SpO2 93 %. Intake/Output Summary (Last 24 hours) at 3/11/2022 0829  Last data filed at 3/10/2022 1908  Gross per 24 hour   Intake 480 ml   Output 100 ml   Net 380 ml     PHYSICAL EXAM     Constitutional:  the patient is well developed and in no acute distress  EENMT:  Sclera clear, pupils equal, oral mucosa moist  Respiratory: decreased BS at bases  Cardiovascular:  RRR without M,G,R  Gastrointestinal: soft and non-tender; mildly distended with positive bowel sounds. Musculoskeletal: warm without cyanosis. There is no lower extremity edema. Skin:  no jaundice or rashes  Neurologic: no gross neuro deficits     Psychiatric:  alert and oriented x 3    CXR:          FINDINGS:   There is mild cardiomegaly.     Mild central pulmonary vascular congestion with mild perihilar edema.     There is no pleural effusion, or pneumothorax.     No significant osseous abnormalities are observed.     IMPRESSION  Findings described above suggest mild pulmonary edema. Abd/Pelv Chest:  3/10:        COMPARISON: 3/1/2022     FINDINGS:   There is residual contrast in the renal parenchyma, collecting systems, and  bladder from a earlier examination.     Visualized lung bases show moderate to large bilateral pleural effusions.     The visualized liver, pancreas, adrenal glands, are within normal limits. The  gallbladder has been removed.     The portal vein thrombus seen on the previous examination is partially  visualized.     There is significant splenomegaly.  Multiple peripherally located hypodensities  are seen suggesting infarctions.     Both kidneys contain contrast. There is parenchymal contrast defect in the  posterior mid and lower renal pole on the right.     There is a striated nephrogram bilaterally, best appreciated on the left.     The bladder appears grossly normal. The uterus is visualized. Ovaries are not  defined.     The wall of the distal esophagus appears thickened. The gastric wall appears somewhat thickened. This may be due to lack of  distention. Small and large bowel are without evidence of obstruction. Lack of  contrast limits assessment of the colon wall.     There is a moderate volume of ascites in the abdomen and pelvis. No evidence of  free air. No pathologic adenopathy. No abdominal aortic aneurysm.     There are 2 anterior wall hernias. One contains a small nonobstructed segment of  transverse colon. The second is located just above the umbilicus and contains  fat.     Osseous structures are without evidence of acute fracture or suspicious lesion. There is chronic fracture or Schmorl's node in the superior endplate of L1.     IMPRESSION  Residual contrast from previous examination is seen in the kidney bilaterally. There is contrast defect in the mid and lower pole of the right kidney  suggesting infarction.     There is a striated nephrogram bilaterally. This appearance may be indicative of  pyelonephritis or could also be associated with infarction.     Significant splenomegaly with evidence of splenic infarctions.     The known portal vein thrombus is visualized.     Mild to moderate ascites.     No obvious bowel obstruction.     Other findings as above.     Recent Labs     03/11/22  0326 03/10/22  0809 03/10/22  0255 03/09/22  2316 03/09/22  2111   WBC 16.6*  --  23.5*  --  25.8*   HGB 8.1*  --  9.1*  --  10.0*   HCT 25.0*  --  27.8*  --  30.2*   *  --  121*  --  132*   PCT  --   --   --   --  62.38*   LAC  --  1.6 2.5* 2.3*  --    *  --  135*  -- 135*   K 4.1  --  3.7  --  3.6     --  105  --  104   CO2 22  --  22  --  24   GLU 99  --  195*  --  128*   BUN 13  --  12  --  10   CREA 0.69  --  0.82  --  0.74   MG  --   --   --   --  2.4   CA 8.2*  --  7.7*  --  8.3   PHOS  --   --   --   --  2.9   ALB  --   --   --   --  2.2*   AST  --   --   --   --  51*   ALT  --   --   --   --  31   AP  --   --   --   --  117     ECHO: Results from Hospital Encounter encounter on 02/02/22    ECHO ADULT COMPLETE    Interpretation Summary    Left Ventricle: Left ventricle size is normal. Normal wall thickness. Normal wall motion. Normal left ventricular systolic function. Normal diastolic function.   Left Atrium: Left atrium is severely dilated.   Aortic Valve: Mild sclerosis of the aortic valve cusps.   Mitral Valve: Mild mitral annular calcification. Mild transvalvular regurgitation.   Pericardium: Trivial pericardial effusion present. No indication of cardiac tamponade. Results     Procedure Component Value Units Date/Time    C. DIFFICILE/EPI PCR [[id number]]     Order Status: Sent Specimen: Stool     MSSA/MRSA SC BY PCR, NASAL SWAB [[id number]]     Order Status: Sent Specimen: Nasal swab     CULTURE, BLOOD [[id number]] Collected: 03/09/22 2316    Order Status: Completed Specimen: Blood Updated: 03/11/22 0744     Special Requests: --        LEFT  Antecubital       Culture result: NO GROWTH 2 DAYS       CULTURE, BLOOD [[id number]] Collected: 03/09/22 2237    Order Status: Completed Specimen: Blood Updated: 03/11/22 0744     Special Requests: --        RIGHT  Antecubital       Culture result: NO GROWTH 2 DAYS       COVID-19 RAPID TEST [[id number]] Collected: 03/09/22 2204    Order Status: Completed Specimen: Nasopharyngeal Updated: 03/09/22 2257     Specimen source NASAL SWAB        COVID-19 rapid test Not detected        Comment:      The specimen is NEGATIVE for SARS-CoV-2, the novel coronavirus associated with COVID-19.   A negative result does not rule out COVID-19. This test has been authorized by the FDA under an Emergency Use Authorization (EUA) for use by authorized laboratories.         Fact sheet for Healthcare Providers: ConventionUpdate.co.nz  Fact sheet for Patients: ConventionUpdate.co.nz       Methodology: Isothermal Nucleic Acid Amplification         CULTURE, URINE [[id number]] Collected: 03/09/22 2204    Order Status: Completed Specimen: Urine from Clean catch Updated: 03/11/22 0752     Special Requests: NO SPECIAL REQUESTS        Culture result: NO GROWTH 1 DAY       C. DIFFICILE/EPI PCR [[id number]]     Order Status: Canceled Specimen: Stool     Garlon Simpler STAIN [[id number]]     Order Status: Sent Specimen: Stool     OVA & PARASITES, STOOL [[id number]]     Order Status: Sent Specimen: Stool     CULTURE, STOOL [[id number]]     Order Status: Sent Specimen: Stool     CULTURE, BLOOD [[id number]] Collected: 03/01/22 0712    Order Status: Completed Specimen: Blood Updated: 03/06/22 0729     Special Requests: --        LEFT  ARM       Culture result: NO GROWTH 5 DAYS       MENINGITIS PATHOGENS PANEL, CSF (BY PCR) [[id number]] Collected: 02/28/22 1100    Order Status: Canceled     MENINGITIS PATHOGENS PANEL, CSF (BY PCR) [[id number]] Collected: 02/28/22 1030    Order Status: Canceled Specimen: Cerebrospinal Fluid     CULTURE, CSF Laurian Goodwin STAIN [[id number]]  (Abnormal)  (Susceptibility) Collected: 02/27/22 2200    Order Status: Completed Specimen: Cerebrospinal Fluid Updated: 03/03/22 0157     Special Requests: TUBE 3     GRAM STAIN 0 TO 5 WBCS SEEN PER OIF      NO DEFINITE ORGANISM SEEN        Culture result:       ESCHERICHIA COLI ISOLATED FROM THIO BROTH ONLY                  RESULTS VERIFIED, PHONED TO AND READ BACK BY  NISHA PALOMARES @4206 3.2.22 SC      Susceptibility      Escherichia coli     ALEXEY     Ampicillin ($) Resistant     Aztreonam ($$$$) Susceptible     Cefepime ($$) Susceptible     Ceftriaxone ($) Susceptible     Gentamicin ($) Susceptible     Meropenem ($$) Susceptible     Piperacillin/Tazobac ($) Susceptible     Tobramycin ($) Susceptible     Trimeth-Sulfamethoxa Resistant                  Linear View                   S.PNEUMO AG, UR/CSF [[id number]] Collected: 02/27/22 2200    Order Status: Completed Specimen: Miscellaneous sample Updated: 03/04/22 1735     Source CEREBROSPINAL FLUID        Specimen Comment        Comment: (NOTE)  Cerebrospinal fluid (CSF)          Streptococcus pneumoniae Ag Negative        Fluid culture Comment        Comment: (NOTE)  No growth in 56 - 72 hours. Organism ID Comment        Comment: (NOTE)  No growth in 56 - 72 hours. Performed At: [facility] & [address] [id number]  Isa Bernal MD CR:[id number]          Please note Comment        Comment: (NOTE)  College of [employer]ologists standards require a culture to be  performed on CSF specimens submitted for bacterial antigen testing. (CAP [id number]) Urine specimens will not be cultured. Performed At: [facility] & [address] [id number]  Isa Bernal MD IX:[id number]         MENINGITIS PATHOGENS PANEL, CSF (BY PCR) [[id number]] Collected: 02/27/22 2200    Order Status: Completed Specimen: Cerebrospinal Fluid Updated: 02/28/22 1930     Meningitis panel       RESULTS SCANNED IN [facility]          CULTURE, URINE [[id number]] Collected: 02/27/22 1949    Order Status: Completed Specimen: Cath Urine Updated: 03/02/22 0655     Special Requests: NO SPECIAL REQUESTS        Culture result:       <10,000 COLONIES/mL NORMAL SKIN JOSÉ ISOLATED          COVID-19 RAPID TEST [[id number]] Collected: 02/27/22 1907    Order Status: Completed Specimen: Nasopharyngeal Updated: 02/27/22 1938     Specimen source NASAL SWAB        COVID-19 rapid test Not detected        Comment:      The specimen is NEGATIVE for SARS-CoV-2, the novel coronavirus associated with COVID-19.   A negative result does not rule out COVID-19. This test has been authorized by the FDA under an Emergency Use Authorization (EUA) for use by authorized laboratories. Fact sheet for Healthcare Providers: ConventionUpdate.co.nz  Fact sheet for Patients: ConventionUpdate.co.nz       Methodology: Isothermal Nucleic Acid Amplification         BLOOD CULTURE [[id number]]  (Abnormal) Collected: 02/27/22 1817    Order Status: Completed Specimen: Blood Updated: 03/02/22 0644     Special Requests: --        RIGHT  Antecubital       GRAM STAIN GRAM NEGATIVE RODS               CRITICAL RESULT NOT CALLED DUE TO PREVIOUS NOTIFICATION OF CRITICAL RESULT WITHIN THE LAST 24 HOURS.                   AEROBIC AND ANAEROBIC BOTTLES           Culture result: GRAM NEGATIVE RODS               For identification and susceptibility refer to culture  [facility] [id number]      BLOOD CULTURE [[id number]]  (Abnormal)  (Susceptibility) Collected: 02/27/22 1814    Order Status: Completed Specimen: Blood Updated: 03/02/22 0643     Special Requests: --        NO SPECIAL REQUESTS  LEFT  Antecubital       GRAM STAIN GRAM NEGATIVE RODS               RESULTS VERIFIED, PHONED TO AND READ BACK BY JESUS RENE RN BY NB AT 0607 ON [id number]                  AEROBIC AND ANAEROBIC BOTTLES           Culture result: ESCHERICHIA COLI               Refer to Blood Culture ID Panel Accession  [id number]      Susceptibility      Escherichia coli     AELXEY     Ampicillin ($) Resistant     Aztreonam ($$$$) Susceptible     Cefazolin ($) Susceptible     Cefepime ($$) Susceptible     Ceftriaxone ($) Susceptible     Ciprofloxacin ($) Resistant     Gentamicin ($) Susceptible     Levofloxacin ($) Resistant     Meropenem ($$) Susceptible     Piperacillin/Tazobac ($) Susceptible     Tobramycin ($) Susceptible     Trimeth-Sulfamethoxa Resistant                  Linear View                   BLOOD CULTURE ID PANEL [[id number]]  (Abnormal) Collected: 02/27/22 1814    Order Status: Completed Specimen: Blood Updated: 02/28/22 1314     Acc. no. from Micro Order [id number]     Escherichia coli Detected        Comment: RESULTS VERIFIED, PHONED TO AND READ BACK BY  Nicolette An RN AT 13:11 02.28.2022          KPC (Carbapenem Resistance Gene) NOT DETECTED        Comment: WARNING:  A Not Detected result for the KPC gene does not indicate susceptibility to carbapenems. Gram negative bacteria can be resistant to carbapenems by mechanisms other than carrying the KPC gene. INTERPRETATION       Gram negative gopi. Identified by realtime PCR as E. coli              Inpat Anti-Infectives (From admission, onward)     Start     Ordered Stop    03/10/22 2200  clotrimazole (MYCELEX) 1 % cream 1 Applicator  1 Applicator,   Vaginal,   EVERY BEDTIME         03/10/22 2117 03/17/22 2159    03/10/22 1300  vancomycin (VANCOCIN) 750 mg in 0.9% sodium chloride 250 mL ([id number])  750 mg,   IntraVENous,   EVERY 12 HOURS         03/10/22 0031 03/17/22 1259    03/10/22 0800  cefepime (MAXIPIME) 2 g in 0.9% sodium chloride (MBP/ADV) 100 mL MBP  2 g,   IntraVENous,   EVERY 8 HOURS         03/09/22 2337 03/17/22 0759              Assessment and Plan:  (Medical Decision Making)     Principal Problem:    HCAP (healthcare-associated pneumonia) (3/9/2022)    On Vanc and Cefepime    Active Problems:    Malignant ascites (7/26/2012)    Cytology was negative for malignant cells in 2012      Cirrhosis ([address] (7/27/2012)    Etiology for ascites and likely for effusions      Chronic myelogenous leukemia ([address] (3/30/2015)    Oncology consulted. Acquired hypercoagulable state ([address] (3/30/2015)    On full dose lovenox. Not candidate for thoracentesis today. Would need to be off full dose lovenox for 24 hrs.        Primary hypothyroidism (2/16/2009)    On synthroids      Esophageal varices ([address] (2/16/2009)    Per IM      Thrombocytopenia ([address] (10/15/2021)    Platelets 378Z      Sepsis with acute organ dysfunction without septic shock ([facility] (2/2/2022)    Tachycardia mild. E coli bacteremia (3/9/2022)    On Cefepime      Pleural effusion, bilateral (3/11/2022)    Moderate. Anticoagulated at present so cannot drain. If they expand and sats or dyspnea worsen, would need to stop lovenox before we can tap. Need to check strict I/Os and CXRs. Hypoproteinemia ([facility] (3/11/2022)    Getting albumin and bumex which may reduce effusions and ascites. **Plan to followup on Monday unless called. Full Code    More than 50% of the time documented was spent in face-to-face contact with the patient and in the care of the patient on the floor/unit where the patient is located. Thank you very much for this referral.  We appreciate the opportunity to participate in this patient's care. Will follow along with above stated plan.     Gage Joyner MD

## 2022-03-11 NOTE — PROGRESS NOTES
Care Management Interventions  PCP Verified by CM: Yes (Request [facility] referral/ sent)  Transition of Care Consult (CM Consult): Discharge Planning  Support Systems: Child(lauryn) (darwin/Kristy)  Confirm Follow Up Transport: Family  The Patient and/or Patient Representative was Provided with a Choice of Provider and Agrees with the Discharge Plan?: Yes  Freedom of Choice List was Provided with Basic Dialogue that Supports the Patient's Individualized Plan of Care/Goals, Treatment Preferences and Shares the Quality Data Associated with the Providers?: Yes  Discharge Location  Patient Expects to be Discharged to[de-identified] Home  Visited with pt to establish prior to admission baseline and discuss their anticipated goals at time of d/c. Pt with recent admission x1 week ago. She d/c home with [facility]. Pt came back with c/o neck and back pain. Pt lives at home alone, in her condo, she has a dtr/Kristy who is local. Pt is inde with ADL's, needs a PCP and a referral was sent to [facility] and Rx are filled at [facility] off [address] [place] [address] currently on 3L NC, but not at home normally. Pt being followed by Onc and Hospitalist's.

## 2022-03-11 NOTE — CONSULTS
[facility] Hematology and Oncology: Inpatient Hematology / Oncology Consult Note    Reason for Consult:   MPN, splenomegaly, PNA  Referring Physician:  Antonia Norman MD    History of Present Illness:  Ms. Bianca Sidhu is a 64 y.o. female admitted on 3/9/2022 with a primary diagnosis of   Encounter Diagnoses   Name Primary?  HCAP (healthcare-associated pneumonia) Yes    Leukocytosis, unspecified type     Acquired hypercoagulable state ([facility]     Chronic myelogenous leukemia ([facility]     Cirrhosis of liver with ascites, unspecified hepatic cirrhosis type ([facility]     E coli bacteremia     Hypoproteinemia (HCC)     Pleural effusion, bilateral     Portal vein thrombosis     Splenomegaly     Thrombocytopenia ([facility]    . She has a history of myeloproliferative neoplasm on Jakafi and Sprycel as she also had a BCR/abl mutation. She was hospitalized for E coli bacteremia and meningitis from 2/27 to 3/7, she presented back to the ED with worsening SOB and back pain as well as fever at home. She was more lethargic as well. Her WBC was 23k, up from baseline, LA/PCT both elevated and CT suggested PNA. She was started on abx and admitted for further evaluation. Of note, she is now on Lovenox for a history of PICC line related DVT. We are consulted for recommendations, specific regarding anticoagulation management. She feels better this morning, less subjectively short of breath although still fatigued. Review of Systems:  ROS:  Constitutional: Positive for weakness, malaise, fatigue. CV: Negative for chest pain, palpitations, edema. Respiratory: Positive for dyspnea, negative for cough, wheezing. GI: Positive for abdominal fullness. Allergies   Allergen Reactions    Latex Other (comments)     Testing for latex allergy was positive as a 2 (on a scale of 1 to 3).     Sulfa (Sulfonamide Antibiotics) Anaphylaxis    Tramadol Other (comments)     Top lip swelled    Augmentin [Amoxicillin-Pot Clavulanate] Rash    Divalproex Sodium Hives    Morphine Nausea and Vomiting     Not a true allergy    Potassium Clavulanate Hives    Rizatriptan Rash    Tetanus Immune Globulin Other (comments)     Heat, bruising, and swelling at  Site of injection    Vancomycin Rash    Xanax [Alprazolam] Other (comments)     Hallucinations    Zonegran [Zonisamide] Rash     Past Medical History:   Diagnosis Date    Acute blood loss anemia 4/6/2018    Anemia     C. difficile diarrhea 4/1/2015    Cerebral edema (HCC) 4/1/2015    Chronic migraine 9/22/2016    Chronic myelogenous leukemia ([facility]     Fannin chromosome/ converted from polycythemia in 2009- to 2012 when she was dx w leukemia    Chronic pain     Coagulation disorder ([facility]     \"clotting and Bleeding Problem\" dr Rojas Blakely follows     Esophageal spasm     with banding     Esophageal varices ([facility]     grade 3    GI bleed 8/3/2016    H/O craniotomy     3-29-15.  due to blood clot - which caused a seizure  - then pt fell and hit     Hematemesis 8/20/2021    Leukocytosis 3/14/2015    Melena 8/20/2021    MRSA colonization 6/25/2012    Polycythemia vera(238.4)     JAK2 mutation    Portal hypertension ([facility]     with varices    Portal vein thrombosis 6/20/2012    Ct scan 6-21-2 Apparent occlusion of the portal vein. In addition, the splenic vein, and superior mesenteric vein are not definitely seen and are also likely  occluded. Splenomegaly, and extensive varices are seen as described above consistent with the portal vein occlusion 7-05-12 on arixtra HIT negative on repeat 7-27-12 re admitted Cirrhotic appearance of the liver. Mild to moderate ascites, as    Primary hypothyroidism     Pulmonary embolism ([address] 2006    not on coumadin anymore     S/P exploratory laparotomy, 6/20/12 6/25/2012    Bowel resection:  336 cm of small bowel removed, approximately 9 feet and placement of feeding jejunostomy.      S/P small bowel resection     2012.  9 feet removed due to gangrene which wa due to blood clot    Seizure ([facility] 3/14/2015    Seizure disorder ([facility] 3/30/2015    due to clotting factor    Seizures ([facility]     last one 3- - followed by aniyah     Splenomegaly, congestive, chronic     Stroke (cerebrum) ([facility] 2015    had bled into head- surgery    Stroke [facility])     pt had stroke like symptoms     Thrombocytopenia, HIT negative 2012 platelets lower repeat HIT 12 platelets in 04,997'N    Thrombosis, portal vein     portal , spleenic and recently superior mesenteric    Transfusion history     many blood tranfusions - last 3-29-15 after brain surgery    Traumatic hemorrhage of right cerebrum ([facility] 3/30/2015     Past Surgical History:   Procedure Laterality Date    HX APPENDECTOMY      with small bowel resection    HX BREAST BIOPSY Bilateral     Lt - ; Rt -     HX CHOLECYSTECTOMY      HX GI      liver biopsy    HX GI      bowel removed small - 9 feet     HX GYN       x 2    HX GYN      D&C following miscarriage    HX ORTHOPAEDIC Left 2008    torn labrum shoulder (screw in place)    NEUROLOGICAL PROCEDURE UNLISTED  2010    craniotomy to evacuate subdural hematoma following a fall from a seizure    [place] ABDOMEN SURGERY PROC UNLISTED      umbilical hernia repair    [place] ABDOMEN SURGERY PROC UNLISTED      9ft of small bowel excised then reconnected     Family History   Problem Relation Age of Onset    Diabetes Mother     Stroke Mother         after surgery    Cancer Father         brain    No Known Problems Sister     Other Sister         diverticulitis    Other Sister         diverticulitis    Cancer Sister         lyjmphoma    Breast Cancer Maternal Aunt 48    Thyroid Disease Paternal Grandmother      Social History     Socioeconomic History    Marital status: SINGLE     Spouse name: Not on file    Number of children: Not on file    Years of education: Not on file    Highest education level: Not on file   Occupational History    Not on file   Tobacco Use    Smoking status: Former Smoker     Packs/day: 0.20     Years: 10.00     Pack years: 2.00     Types: Cigarettes     Quit date:      Years since quittin.2    Smokeless tobacco: Never Used   Substance and Sexual Activity    Alcohol use: No    Drug use: No    Sexual activity: Not on file   Other Topics Concern    Not on file   Social History Narrative    Not on file     Social Determinants of Health     Financial Resource Strain:     Difficulty of Paying Living Expenses: Not on file   Food Insecurity:     Worried About Running Out of Food in the Last Year: Not on file    Ileana of Food in the Last Year: Not on file   Transportation Needs:     Lack of Transportation (Medical): Not on file    Lack of Transportation (Non-Medical):  Not on file   Physical Activity:     Days of Exercise per Week: Not on file    Minutes of Exercise per Session: Not on file   Stress:     Feeling of Stress : Not on file   Social Connections:     Frequency of Communication with Friends and Family: Not on file    Frequency of Social Gatherings with Friends and Family: Not on file    Attends Hoahaoism Services: Not on file    Active Member of Clubs or Organizations: Not on file    Attends Club or Organization Meetings: Not on file    Marital Status: Not on file   Intimate Partner Violence:     Fear of Current or Ex-Partner: Not on file    Emotionally Abused: Not on file    Physically Abused: Not on file    Sexually Abused: Not on file   Housing Stability:     Unable to Pay for Housing in the Last Year: Not on file    Number of [place] in the Last Year: Not on file    Unstable Housing in the Last Year: Not on file     Current Facility-Administered Medications   Medication Dose Route Frequency Provider Last Rate Last Admin    cefTRIAXone (ROCEPHIN) 2 g in 0.9% sodium chloride (MBP/ADV) 50 mL MBP  2 g IntraVENous Q12H Madai ART  mL/hr at 03/11/22 0915 2 g at 03/11/22 0915    [START ON 3/13/2022] levothyroxine (SYNTHROID) tablet 56 mcg  56 mcg Oral Once per day on Jesica Juan MD        fentaNYL ([address]) 12 mcg/hr patch 1 Patch  1 Patch TransDERmal Q72H Kelli Cuellar MD   1 Patch at 03/10/22 1620    bumetanide (BUMEX) injection 1 mg  1 mg IntraVENous BID Kelli Cuellar MD   1 mg at 03/11/22 0841    morphine injection 1 mg  1 mg IntraVENous Q4H PRN Iliana Bland MD   1 mg at 03/11/22 1870    clotrimazole (MYCELEX) 1 % cream 1 Applicator  1 Applicator Vaginal QHS Iliana Bland MD   1 Applicator at 67/21/98 6903    ruxolitinib tab 5 mg (Patient Supplied)  5 mg Oral BID Iliana Bland MD        levothyroxine (SYNTHROID) tablet 112 mcg  112 mcg Oral ACB Iliana Bland MD   112 mcg at 03/11/22 0600    enoxaparin (LOVENOX) injection 60 mg  60 mg SubCUTAneous Q12H Iliana Bland MD   60 mg at 03/11/22 9113    dasatinib tab 80 mg (Patient Supplied)  80 mg Oral DAILY Iliana Bland MD        zolpidem [facility]) tablet 10 mg  10 mg Oral QHS PRN Iliana Bland MD   10 mg at 03/10/22 2300    diphenoxylate-atropine (LOMOTIL) tablet 1 Tablet  1 Tablet Oral QID PRN Iliana Bland MD        oxyCODONE-acetaminophen (PERCOCET 10)  mg per tablet 1 Tablet  1 Tablet Oral Q8H PRN Iliana Bland MD   1 Tablet at 03/11/22 0600    sodium chloride (NS) flush 5-40 mL  5-40 mL IntraVENous Q8H Iliana Bland MD   10 mL at 03/11/22 0556    sodium chloride (NS) flush 5-40 mL  5-40 mL IntraVENous PRN Iliana Bland MD        acetaminophen (TYLENOL) tablet 650 mg  650 mg Oral Q6H PRN Iliana Bland MD   650 mg at 03/11/22 5201    Or    acetaminophen (TYLENOL) suppository 650 mg  650 mg Rectal Q6H PRN Iliana Bland MD        polyethylene glycol (MIRALAX) packet 17 g  17 g Oral DAILY PRN [place] MD Edwina        ondansetron (ZOFRAN ODT) tablet 4 mg  4 mg Oral Q8H PRN Honor MD Edwina        Or  ondansetron (ZOFRAN) injection 4 mg  4 mg IntraVENous Q6H PRN Corrina Orourke MD           OBJECTIVE:  Patient Vitals for the past 8 hrs:   BP Temp Pulse Resp SpO2   22 0705 109/85 98 °F (36.7 °C) (!) 109 20 93 %   22 0306 91/74 99.8 °F (37.7 °C) (!) 109 17 95 %     Temp (24hrs), Av.7 °F (37.1 °C), Min:98 °F (36.7 °C), Max:99.8 °F (37.7 °C)     07 -  1900  In: 240 [P.O.:240]  Out: -     Physical Exam:  Constitutional: Well developed, well nourished female in no acute distress, sitting comfortably in the hospital bed. HEENT: Normocephalic and atraumatic. Sclerae anicteric. Neck supple without JVD. No thyromegaly present. Lymph node   No palpable submandibular, cervical, supraclavicular lymph nodes. Skin Warm and dry. No bruising and no rash noted. No erythema. No pallor. Respiratory Lungs are clear to auscultation bilaterally without wheezes, rales or rhonchi, normal air exchange without accessory muscle use. CVS Normal rate, regular rhythm and normal S1 and S2. No murmurs, gallops, or rubs. Abdomen Soft, nontender and nondistended, normoactive bowel sounds. No palpable mass. No hepatosplenomegaly. Neuro Grossly nonfocal with no obvious sensory or motor deficits. MSK Normal range of motion in general.  No edema and no tenderness. Psych Appropriate mood and affect.       Labs:    Recent Results (from the past 24 hour(s))   VANCOMYCIN, RANDOM    Collection Time: 03/10/22  3:01 PM   Result Value Ref Range    Vancomycin, random 37.7 UG/ML   METABOLIC PANEL, BASIC    Collection Time: 22  3:26 AM   Result Value Ref Range    Sodium 134 (L) 136 - 145 mmol/L    Potassium 4.1 3.5 - 5.1 mmol/L    Chloride 104 98 - 107 mmol/L    CO2 22 21 - 32 mmol/L    Anion gap 8 7 - 16 mmol/L    Glucose 99 65 - 100 mg/dL    BUN 13 8 - 23 MG/DL    Creatinine 0.69 0.6 - 1.0 MG/DL    GFR est AA >60 >60 ml/min/1.73m2    GFR est non-AA >60 >60 ml/min/1.73m2    Calcium 8.2 (L) 8.3 - 10.4 MG/DL   CBC WITH AUTOMATED DIFF    Collection Time: 03/11/22  3:26 AM   Result Value Ref Range    WBC 16.6 (H) 4.3 - 11.1 K/uL    RBC 2.57 (L) 4.05 - 5.2 M/uL    HGB 8.1 (L) 11.7 - 15.4 g/dL    HCT 25.0 (L) 35.8 - 46.3 %    MCV 97.3 79.6 - 97.8 FL    MCH 31.5 26.1 - 32.9 PG    MCHC 32.4 31.4 - 35.0 g/dL    RDW 20.0 (H) 11.9 - 14.6 %    PLATELET 444 (L) 762 - 450 K/uL    MPV 11.2 9.4 - 12.3 FL    ABSOLUTE NRBC 0.06 0.0 - 0.2 K/uL    DF AUTOMATED      NEUTROPHILS 88 (H) 43 - 78 %    LYMPHOCYTES 5 (L) 13 - 44 %    MONOCYTES 4 4.0 - 12.0 %    EOSINOPHILS 1 0.5 - 7.8 %    BASOPHILS 0 0.0 - 2.0 %    IMMATURE GRANULOCYTES 2 0.0 - 5.0 %    ABS. NEUTROPHILS 14.5 (H) 1.7 - 8.2 K/UL    ABS. LYMPHOCYTES 0.9 0.5 - 4.6 K/UL    ABS. MONOCYTES 0.7 0.1 - 1.3 K/UL    ABS. EOSINOPHILS 0.1 0.0 - 0.8 K/UL    ABS. BASOPHILS 0.1 0.0 - 0.2 K/UL    ABS. IMM. GRANS. 0.3 0.0 - 0.5 K/UL       Imaging:      ASSESSMENT:    Principal Problem:    HCAP (healthcare-associated pneumonia) (3/9/2022)    Active Problems:    Malignant ascites (7/26/2012)      Cirrhosis ([address] (7/27/2012)      Chronic myelogenous leukemia ([address] (3/30/2015)      Acquired hypercoagulable state ([address] (3/30/2015)      Primary hypothyroidism (2/16/2009)      Esophageal varices ([address] (2/16/2009)      Overview: grade 3      Thrombocytopenia ([address] (10/15/2021)      Sepsis with acute organ dysfunction without septic shock ([address] (2/2/2022)      E coli bacteremia (3/9/2022)      Pleural effusion, bilateral (3/11/2022)      Hypoproteinemia ([facility] (3/11/2022)        PLAN / RECOMMENDATIONS:  MPN  Currently on Jakafi and Sprycel, resume and continue while inpatient    Thrombosis  Multiply recurrent, currently on LMWH - continue  Her splenic infarcts are subacute and are more related to her MPN, she needs more consistent JAK2/BCR/abl inhibition, continue current therapy    PNA  Continue abx    Lab studies were personally reviewed. Pertinent old records were reviewed.   Thank you for allowing me to participate in the care of Ms. Couch. We will follow along with you.           Madison Perry MD  [facility] Hematology and Oncology  [address] [place] [address]  Office : [phone]  Fax : [phone]

## 2022-03-12 PROBLEM — A41.9 SEPSIS WITH ACUTE ORGAN DYSFUNCTION WITHOUT SEPTIC SHOCK (HCC): Status: RESOLVED | Noted: 2022-01-01 | Resolved: 2022-01-01

## 2022-03-12 PROBLEM — I26.99 PULMONARY EMBOLUS (HCC): Status: ACTIVE | Noted: 2018-04-05

## 2022-03-12 PROBLEM — J96.01 ACUTE RESPIRATORY FAILURE WITH HYPOXEMIA (HCC): Status: ACTIVE | Noted: 2022-01-01

## 2022-03-12 PROBLEM — D72.829 LEUKOCYTOSIS: Status: ACTIVE | Noted: 2020-10-16

## 2022-03-12 PROBLEM — R65.20 SEPSIS WITH ACUTE ORGAN DYSFUNCTION WITHOUT SEPTIC SHOCK (HCC): Status: RESOLVED | Noted: 2022-01-01 | Resolved: 2022-01-01

## 2022-03-12 NOTE — PROGRESS NOTES
[address] [facility] Hematology & Oncology        Inpatient Hematology / Oncology Progress Note      Admission Date: 3/9/2022  8:50 PM  Reason for Admission/Hospital Course: HCAP (healthcare-associated pneumonia) [J18.9]      24 Hour Events:  Labs stable  Continues on Rocephin for HCAP  VSS  No new complaints     ROS:  Constitutional: negative for fever, chills. +weakness, malaise, fatigue. CV: negative for chest pain, palpitations, edema. Respiratory: positive for dyspnea. Negative cough, wheezing. GI: Positive for abdominal fullness    10 point review of systems is otherwise negative with the exception of the elements mentioned above in the HPI. Allergies   Allergen Reactions    Latex Other (comments)     Testing for latex allergy was positive as a 2 (on a scale of 1 to 3).  Sulfa (Sulfonamide Antibiotics) Anaphylaxis    Tramadol Other (comments)     Top lip swelled    Augmentin [Amoxicillin-Pot Clavulanate] Rash    Codeine Other (comments)    Divalproex Sodium Hives    Morphine Nausea and Vomiting     Not a true allergy    Potassium Clavulanate Hives    Rizatriptan Rash    Tetanus Immune Globulin Other (comments)     Heat, bruising, and swelling at  Site of injection    Vancomycin Rash    Xanax [Alprazolam] Other (comments)     Hallucinations    Zonegran [Zonisamide] Rash       OBJECTIVE:  Patient Vitals for the past 8 hrs:   BP Temp Pulse Resp SpO2   22 0808 94/67 98.8 °F (37.1 °C) (!) 104 18 90 %   22 0725 -- -- -- -- 91 %   22 0339 93/60 98.6 °F (37 °C) (!) 107 18 97 %     Temp (24hrs), Av.4 °F (36.9 °C), Min:98 °F (36.7 °C), Max:98.8 °F (37.1 °C)    No intake/output data recorded. Physical Exam:  Constitutional: Chronically ill appearing female in no acute distress, sitting comfortably in the hospital bed. HEENT: Normocephalic and atraumatic. Oropharynx is clear, mucous membranes are moist.  Pupils are equal, round, and reactive to light.  Extraocular muscles are intact. Sclerae anicteric. Skin Warm and dry. No bruising and no rash noted. No erythema. No pallor. Respiratory Decreased BS, normal air exchange without accessory muscle use. CVS Normal rate, regular rhythm and normal S1 and S2. No murmurs, gallops, or rubs. Abdomen Soft, nontender, mildly distended, normoactive bowel sounds. Neuro Grossly nonfocal with no obvious sensory or motor deficits. MSK Normal range of motion in general.  No edema and no tenderness. Psych Appropriate mood and affect.           Labs:      Recent Labs     03/12/22  0337 03/11/22  0326 03/10/22  0255   WBC 15.8* 16.6* 23.5*   RBC 2.46* 2.57* 2.85*   HGB 8.0* 8.1* 9.1*   HCT 23.9* 25.0* 27.8*   MCV 97.2 97.3 97.5   MCH 32.5 31.5 31.9   MCHC 33.5 32.4 32.7   RDW 20.5* 20.0* 19.6*   * 109* 121*   GRANS 91* 88* 91*   LYMPH 4* 5* 4*   MONOS 3* 4 3*   EOS 0* 1 0*   BASOS 0 0 0   IG 1 2 2   DF AUTOMATED AUTOMATED AUTOMATED   ANEU 14.3* 14.5* 21.4*   ABL 0.7 0.9 0.9   ABM 0.5 0.7 0.7   ORESTES 0.1 0.1 0.0   ABB 0.1 0.1 0.1   AIG 0.2 0.3 0.4        Recent Labs     03/12/22  0337 03/11/22 0326 03/10/22  0255 03/09/22 2111 03/09/22 2111   * 134* 135*   < > 135*   K 3.1* 4.1 3.7   < > 3.6    104 105   < > 104   CO2 23 22 22   < > 24   AGAP 9 8 8   < > 7   * 99 195*   < > 128*   BUN 15 13 12   < > 10   CREA 0.85 0.69 0.82   < > 0.74   GFRAA >60 >60 >60   < > >60   GFRNA >60 >60 >60   < > >60   CA 8.0* 8.2* 7.7*   < > 8.3   AP 96  --   --   --  117   TP 6.4  --   --   --  6.6   ALB 2.9*  --   --   --  2.2*   GLOB 3.5  --   --   --  4.4*   AGRAT 0.8*  --   --   --  0.5*   MG 2.0  --   --   --  2.4   PHOS 3.6  --   --   --  2.9    < > = values in this interval not displayed.          Imaging:    Medications:  Current Facility-Administered Medications   Medication Dose Route Frequency    potassium chloride (K-DUR, KLOR-CON M20) SR tablet 40 mEq  40 mEq Oral BID    cefTRIAXone (ROCEPHIN) 2 g in 0.9% sodium chloride (MBP/ADV) 50 mL MBP  2 g IntraVENous Q12H    albumin human 25% (BUMINATE) solution 25 g  25 g IntraVENous Q6H    bumetanide (BUMEX) injection 2 mg  2 mg IntraVENous BID    [START ON 3/13/2022] levothyroxine (SYNTHROID) tablet 56 mcg  56 mcg Oral Once per day on Sun    fentaNYL (DURAGESIC) 12 mcg/hr patch 1 Patch  1 Patch TransDERmal Q72H    morphine injection 1 mg  1 mg IntraVENous Q4H PRN    clotrimazole (MYCELEX) 1 % cream 1 Applicator  1 Applicator Vaginal QHS    ruxolitinib tab 5 mg (Patient Supplied)  5 mg Oral BID    levothyroxine (SYNTHROID) tablet 112 mcg  112 mcg Oral ACB    enoxaparin (LOVENOX) injection 60 mg  60 mg SubCUTAneous Q12H    dasatinib tab 80 mg (Patient Supplied)  80 mg Oral DAILY    zolpidem (AMBIEN) tablet 10 mg  10 mg Oral QHS PRN    diphenoxylate-atropine (LOMOTIL) tablet 1 Tablet  1 Tablet Oral QID PRN    oxyCODONE-acetaminophen (PERCOCET 10)  mg per tablet 1 Tablet  1 Tablet Oral Q8H PRN    sodium chloride (NS) flush 5-40 mL  5-40 mL IntraVENous Q8H    sodium chloride (NS) flush 5-40 mL  5-40 mL IntraVENous PRN    acetaminophen (TYLENOL) tablet 650 mg  650 mg Oral Q6H PRN    Or    acetaminophen (TYLENOL) suppository 650 mg  650 mg Rectal Q6H PRN    polyethylene glycol (MIRALAX) packet 17 g  17 g Oral DAILY PRN    ondansetron (ZOFRAN ODT) tablet 4 mg  4 mg Oral Q8H PRN    Or    ondansetron (ZOFRAN) injection 4 mg  4 mg IntraVENous Q6H PRN         ASSESSMENT:    Problem List  Date Reviewed: 2/8/2022          Codes Class Noted    Body mass index (BMI) of 22.0 to 22.9 in adult ICD-10-CM: Z68.22  ICD-9-CM: V85.1  8/26/2021        Pleural effusion, bilateral ICD-10-CM: J90  ICD-9-CM: 511.9  3/11/2022        Hypoproteinemia (HCC) ICD-10-CM: E77.8  ICD-9-CM: 273.8  3/11/2022        * (Principal) HCAP (healthcare-associated pneumonia) ICD-10-CM: J18.9  ICD-9-CM: 635  3/9/2022        E coli bacteremia ICD-10-CM: R78.81, B96.20  ICD-9-CM: 790.7, 041.49  3/9/2022 Fever ICD-10-CM: R50.9  ICD-9-CM: 780.60  2/27/2022        Sepsis with acute organ dysfunction without septic shock [facility]) ICD-10-CM: A41.9, R65.20  ICD-9-CM: 038.9, 995.92  2/2/2022        Fever of unknown origin ICD-10-CM: R50.9  ICD-9-CM: 780.60  2/2/2022        Thrombocytopenia ([facility] ICD-10-CM: D69.6  ICD-9-CM: 287.5  10/15/2021        Opioid use, unspecified with unspecified opioid-induced disorder ICD-10-CM: F11.99  ICD-9-CM: 292.9, 305.50  9/17/2021        Cellulitis of leg, right ICD-10-CM: L03.115  ICD-9-CM: 682.6  8/8/2021        Leukocytosis ICD-10-CM: G61.287  ICD-9-CM: 288.60  10/16/2020        Pulmonary embolus ([facility] ICD-10-CM: I26.99  ICD-9-CM: 415.19  4/5/2018    Overview Signed 3/12/2022  7:15 AM by Duane Calle MD     Last Assessment & Plan:   Formatting of this note might be different from the original.  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 Lobulated 2 cm mass in the right lung apex, malignancy versus an atypical  inflammatory process. 2. Occlusion of the right subclavian vein. 3. Chronic occlusion of the portal vein with associated abnormal tissue  encasing the common bile duct, most likely fibrosis. 4. Splenomegaly. DVT (deep venous thrombosis) ([facility] ICD-10-CM: I82.409  ICD-9-CM: 453.40  10/26/2016    Overview Signed 10/26/2016 10:27 PM by Omar Arnold.      Right subclavian               Analgesic rebound headache ICD-10-CM: G44.40, T39.95XA  ICD-9-CM: 339.3, E935.9  9/22/2016        Headache, chronic daily ICD-10-CM: R51.9  ICD-9-CM: 784.0  9/22/2016        Migraine with aura and with status migrainosus, not intractable ICD-10-CM: G43. 101  ICD-9-CM: 346.03  9/22/2016        Visuospatial deficit ICD-10-CM: R41.842  ICD-9-CM: 799.53  6/20/2016        Cerebral hemorrhage with cognitive deficits [facility]) ICD-10-CM: I61.9, R41.89  ICD-9-CM: 431, 799.59  5/23/2016        Subdural hemorrhage following injury, no loss of consciousness ([facility] ICD-10-CM: S06.5X0A  ICD-9-CM: 852.21  5/23/2016        Left homonymous hemianopsia ICD-10-CM: H53.462  ICD-9-CM: 368.46  3/30/2015        Hyponatremia ICD-10-CM: E87.1  ICD-9-CM: 276.1  3/30/2015        Chronic myelogenous leukemia (HCC) (Chronic) ICD-10-CM: C92.10  ICD-9-CM: 205.10  3/30/2015        Acquired hypercoagulable state ([facility] (Chronic) ICD-10-CM: Q56.93  ICD-9-CM: 289.81  3/30/2015        AKOSUA (iron deficiency anemia) ICD-10-CM: D50.9  ICD-9-CM: 280.9  7/28/2012        Cirrhosis (HCC) (Chronic) ICD-10-CM: K74.60  ICD-9-CM: 571.5  7/27/2012        Hypokalemia ICD-10-CM: E87.6  ICD-9-CM: 276.8  7/27/2012        Malignant ascites ICD-10-CM: R18.0  ICD-9-CM: 789.51  7/26/2012        Polycythemia vera ([facility] (Chronic) ICD-10-CM: D45  ICD-9-CM: 238.4  2/16/2009    Overview Addendum 8/26/2021  2:31 PM by Jennifer Waite MD     2/2008 by [email or web address] analysis however portal vein thrombosis  And and splenomegaly since 2004  Hydroxyurea for 6 months poor tolerance spleen did not shrink   Pegasys 90 mcg weekly 4-2009 till    Restarted 12-30-09 45 mcg q 2 weeks  2-2010 reduced to q month   Increased 45 mcg 2/month 4-2010  Held 8-2010 thrombocytopenia  12-29-12 restarted 45 mcg q 2 weeks  4-1-11 45 mcg q 3 weeks  4-22-11 45 mcg q 4 weeks  Uncertain dosing thereafter   Possibly q 3 weeks 10-15-11 and held and restarted on 4-1-12 6-12 ct scan Small bowel wall thickening suggesting an enteritis. In addition, there is well thickening of the right colon which can suggest an additional   colitis although this can also be seen with severe portal hypertension.  Likely reactive edematous changes it scattered fluid collections are seen of   the small bowel mesentery. . Apparent occlusion of the portal vein. In addition, the splenic vein, and superior mesenteric vein are not definitely seen and are also likely occluded. Splenomegaly, and extensive varices are seen as described above consistent with the portal vein occlusion. Paullette Rakes Heterogeneous enhancement of the liver and mild periportal edema. An acute hepatitis cannot be excluded. Primary hypothyroidism (Chronic) ICD-10-CM: E03.9  ICD-9-CM: 244.9  2/16/2009        Splenomegaly ICD-10-CM: R16.1  ICD-9-CM: 789.2  2/16/2009        Esophageal varices (HCC) (Chronic) ICD-10-CM: I85.00  ICD-9-CM: 456.1  2/16/2009    Overview Addendum 8/26/2021  2:30 PM by Anabel Wilson MD     grade 3             Portal vein thrombosis ICD-10-CM: P34  ICD-9-CM: 206  2/16/2009    Overview Addendum 8/26/2021  2:31 PM by Anabel Wilson MD     Ct scan 6-21-2 Apparent occlusion of the portal vein. In addition, the splenic vein, and superior mesenteric vein are not definitely seen and are also likely  occluded. Splenomegaly, and extensive varices are seen as described above consistent with the portal vein occlusion 7-05-12 on arixtra HIT negative on repeat 7-27-12 re admitted Cirrhotic appearance of the liver. Mild to moderate ascites             Ischemic colitis [facility]) ICD-10-CM: K55.9  ICD-9-CM: 557.9  2/16/2009          She has a history of myeloproliferative neoplasm on Jakafi and Sprycel as she also had a BCR/abl mutation. She was hospitalized for E coli bacteremia and meningitis from 2/27 to 3/7, she presented back to the ED with worsening SOB and back pain as well as fever at home. She was more lethargic as well. Her WBC was 23k, up from baseline, LA/PCT both elevated and CT suggested PNA. She was started on abx and admitted for further evaluation. Of note, she is now on Lovenox for a history of PICC line related DVT.   We are consulted for recommendations, specific regarding anticoagulation management. She feels better this morning, less subjectively short of breath although still fatigued. PLAN:    MPN  Currently on Jakafi and Sprycel, resume and continue while inpatient     Thrombosis  Multiply recurrent, currently on LMWH - continue  Her splenic infarcts are subacute and are more related to her MPN, she needs more consistent JAK2/BCR/abl inhibition, continue current therapy     PNA  Continue abx    Goals and plan of care reviewed with the patient. All questions answered to the best of our ability. We will ask for follow up appointment with Dr. Briant Galeazzi to be arranged at discharge. Please call with any questions.              Sami Shipley NP   [facility] Hematology & Oncology  [address] [address]  Office : [phone]  Fax : [phone]

## 2022-03-12 NOTE — PROGRESS NOTES
Care Management Interventions  PCP Verified by CM: Yes  Transition of Care Consult (CM Consult): Home Health  Support Systems: Child(lauryn)  Confirm Follow Up Transport: Family  The Plan for Transition of Care is Related to the Following Treatment Goals : increase endurance  The Patient and/or Patient Representative was Provided with a Choice of Provider and Agrees with the Discharge Plan?: Yes  Freedom of Choice List was Provided with Basic Dialogue that Supports the Patient's Individualized Plan of Care/Goals, Treatment Preferences and Shares the Quality Data Associated with the Providers?: Yes  Discharge Location  Patient Expects to be Discharged to[de-identified] Home with home health  64 F with dx CAP. Hx CML. Recently admitted (2/27-3/7/22) for e.coli bacteremia and meningitis. Was d/c with [facility] and [facility] services and a RW and BSC from [facility].       Pt. lives alone in [place] [place]. Pt was to be discharged to home today; oxygen was ordered from [facility] and order for [facility] RN was faxed to [facility]. However no family  is available for assistance - Daughter [facility] is refusing to pick patient up, says she cant provide assistance, and sister Ysabel Quick [place] and says no one else available. Staff says patient should not be at home alone. I spoke with patient at length today and discussed options of rehab or home with home health and sitter/homecare. Patient is adamantly refusing rehab and says she will make arrangements for someone to stay with her. No PCP; Ronita Ahumada made referral to Replaced by [facility] on 3/11. At discharge will re-order oxygen if still needed and will contact [facility] for [facility] RN.

## 2022-03-12 NOTE — PROGRESS NOTES
Received patient awake, alert and oriented in bed. Patient has spilled water all over bed, linens all changed and patient assisted to bedside commode and back to bed. Denies needs at this time. Nursing assessment completed. Bed in low and locked position. Call bell is within patient's reach.

## 2022-03-12 NOTE — DISCHARGE SUMMARY
Hospitalist Discharge Summary   Admit Date:  3/9/2022  8:50 PM   DC Note date: 3/12/2022  Name:  Aidee Davidson   Age:  64 y.o.   Sex:  female  :  1960   MRN:  [id number]   Room:  [facility]  PCP:  Pepe Caballero MD    Presenting Complaint: Back Pain    Initial Admission Diagnosis: HCAP (healthcare-associated pneumonia) [J18.9]     Problem List for this Hospitalization:  Hospital Problems as of 3/12/2022 Date Reviewed: 3/12/2022          Codes Class Noted - Resolved POA    * (Principal) HCAP (healthcare-associated pneumonia) ICD-10-CM: J18.9  ICD-9-CM: 486  3/9/2022 - Present Yes        RESOLVED: Sepsis with acute organ dysfunction without septic shock ([facility] ICD-10-CM: A41.9, R65.20  ICD-9-CM: 038.9, 995.92  2022 - 3/12/2022 Yes        Chronic myelogenous leukemia ([facility] (Chronic) ICD-10-CM: C92.10  ICD-9-CM: 205.10  3/30/2015 - Present Yes        E coli bacteremia ICD-10-CM: R78.81, B96.20  ICD-9-CM: 790.7, 041.49  3/9/2022 - Present Yes        Thrombocytopenia ([facility] ICD-10-CM: D69.6  ICD-9-CM: 287.5  10/15/2021 - Present Yes        Malignant ascites ICD-10-CM: R18.0  ICD-9-CM: 789.51  2012 - Present Yes        Acquired hypercoagulable state ([facility] (Chronic) ICD-10-CM: T89.80  ICD-9-CM: 289.81  3/30/2015 - Present Yes        Pleural effusion, bilateral ICD-10-CM: J90  ICD-9-CM: 511.9  3/11/2022 - Present Yes        Hypoproteinemia ([facility] ICD-10-CM: E77.8  ICD-9-CM: 273.8  3/11/2022 - Present Yes        Cirrhosis ([facility] (Chronic) ICD-10-CM: K74.60  ICD-9-CM: 571.5  2012 - Present Yes        Primary hypothyroidism (Chronic) ICD-10-CM: E03.9  ICD-9-CM: 244.9  2009 - Present Yes        Esophageal varices (HCC) (Chronic) ICD-10-CM: I85.00  ICD-9-CM: 456.1  2009 - Present Yes    Overview Addendum 2021  2:30 PM by Liane Wagner MD     grade 3             Portal vein thrombosis ICD-10-CM: A39  ICD-9-CM: 858  2009 - Present Unknown    Overview Addendum 2021  2:31 PM by Ck La, Roverto Hopkins MD     Ct scan 6-21-2 Apparent occlusion of the portal vein. In addition, the splenic vein, and superior mesenteric vein are not definitely seen and are also likely  occluded. Splenomegaly, and extensive varices are seen as described above consistent with the portal vein occlusion 7-05-12 on arixtra HIT negative on repeat 7-27-12 re admitted Cirrhotic appearance of the liver. Mild to moderate ascites                 Did Patient have Sepsis (YES OR NO): Yes    Hospital Course:  61F PMHx CML and recent hospitalization (2/27-3/7/22) for e.coli bacteremia and meningitis who presented to ED with shortness of breath, back/neck discomfort, and grogginess.  Patient reported increased SOB over the prior 2 days.  Family reported that the patient seemed more \"groggy\" than typical.  Upon ER evaluation, patient was found to be hypoxic with O2 sat of 85%.  WBC is ~26,000.  CXR shows some vascular congestion and ER is concerned for a R sided infiltrate.  Patient had been on daily rocephin based on susceptibilities for continued treatment of e.coli bacteremia with EOT date of 3/13.  She has also been on BID lovenox.  CT with new splenic infarcts, pleural effusion. Consulted hematology. Consulted pulmonology. Consult infectious disease. Continue on antibiotics while inpatient. Found to have volume overload and placed on bumetanide iv and transitioned to oral lasix. Symptoms improved. Was found to be safe for discharge on 3/14. Conversation with patient for 40 minutes. Patient is AAOx3 and states that she is independent. She does her household chores and drives. Patient is refusing STR. She is requesting to go home. She has neighbors and also friends who can take care of her and bring food if needed. Her sister and daughter are not taking care of her and did not visit i to see her in the hospital.  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   She says she is compliant with medications and appointments. She should follow-up as previously scheduled from her prior discharge. She should continue with outpatient therapy as previously described in her prior discharge. She should take the max daily and follow-up with oncology within 2 weeks. Disposition: Home Health Care [facility] [place]  Diet: ADULT DIET Regular  ADULT ORAL NUTRITION SUPPLEMENT Lunch, Dinner; Standard High Calorie/High Protein  ADULT ORAL NUTRITION SUPPLEMENT Breakfast; Other Supplement; yogurt  Code Status: Full Code    Follow Up Orders: Follow-up Appointments   Procedures    FOLLOW UP VISIT Appointment in: Ten Days Please make follow up with Dr. Vira Joshi once discharged     Please make follow up with Dr. Vira Joshi once discharged     Standing Status:   Standing     Number of Occurrences:   1     Order Specific Question:   Appointment in     Answer:   Ten Days    FOLLOW UP VISIT Appointment in: One Month Follow up with infectious disease as previously scheduled. Follow up with infectious disease as previously scheduled. Standing Status:   Standing     Number of Occurrences:   1     Standing Expiration Date:   3/13/2022     Order Specific Question:   Appointment in     Answer:   One Month       Follow-up Information     Follow up With Specialties Details Why Contact Info    Shalom Mesa MD Hematology and Oncology, Internal Medicine, Hematology, Oncology   [address]  [phone]            Time spent in patient discharge and coordination 37 minutes. Plan was discussed with patient, nurse, . All questions answered. Patient was stable at time of discharge. Instructions given to call a physician or return if any concerns. Discharge Info:   Current Discharge Medication List      START taking these medications    Details   bumetanide (BUMEX) 0.5 mg tablet Take 4 Tablets by mouth daily.   Qty: 30 Tablet, Refills: 0  Start date: 3/12/2022         CONTINUE these medications which have NOT CHANGED    Details   cefTRIAXone 2 gram 2 g IVPB 2 g by IntraVENous route every twelve (12) hours for 6 days. Qty: 12 Dose, Refills: 0      zolpidem (AMBIEN) 10 mg tablet Take 1 Tablet by mouth nightly as needed for Sleep. Max Daily Amount: 10 mg.  Qty: 20 Tablet, Refills: 2    Comments: Okay to fill early per Dr. Aniyah Lopez Diagnoses: Insomnia due to medical condition      diphenoxylate-atropine (LomotiL) 2.5-0.025 mg per tablet Take 1 Tablet by mouth four (4) times daily as needed for Diarrhea. Max Daily Amount: 4 Tablets. Qty: 90 Tablet, Refills: 1    Associated Diagnoses: Myelofibrosis ([facility] Chronic pain due to neoplasm      famotidine (PEPCID) 40 mg tablet Take 1 Tablet by mouth daily as needed for Heartburn. Take at least 4 hours before or after sprycel  Qty: 30 Tablet, Refills: 0      !! ondansetron (ZOFRAN ODT) 8 mg disintegrating tablet Take 1 Tab by mouth every eight (8) hours as needed for Nausea or Vomiting. Qty: 60 Tab, Refills: 0      magnesium 250 mg tab Take 1 Tablet by mouth daily. Synthroid 112 mcg tablet 1 tablet once a day with an extra 1/2 tablet on Sundays  Qty: 100 Tab, Refills: 3    Associated Diagnoses: Primary hypothyroidism      oxyCODONE-acetaminophen (PERCOCET 10)  mg per tablet Start date: 3/11/2022      dasatinib (SpryceL) 80 mg tab Take 1 Tablet by mouth daily. Qty: 30 Tablet, Refills: 11    Associated Diagnoses: CML (chronic myelocytic leukemia) ([facility] Polycythemia vera (HCC)      enoxaparin (Lovenox) 60 mg/0.6 mL injection 50 mg by SubCUTAneous route every twelve (12) hours. Qty: 60 Each, Refills: 5      b complex vitamins tablet 1 Tablet daily. Comp Stocking,Knee,Regular,Sml misc 1 Each by Does Not Apply route daily. Qty: 1 Box, Refills: 0    Comments: Please dispense 2 pairs of compression stockings      ruxolitinib 5 mg tab Take 1 Tab by mouth two (2) times a day.   Qty: 60 Tab, Refills: 11    Comments: Called in to pharmacy      !! ondansetron (ZOFRAN ODT) 4 mg disintegrating tablet Take 1 Tab by mouth every eight (8) hours as needed for Nausea. Qty: 90 Tab, Refills: 0      calcium carbonate (CALTREX) 600 mg calcium (1,500 mg) tablet Take 600 mg by mouth nightly. cholecalciferol (VITAMIN D3) 1,000 unit cap Take 1,000 Units by mouth nightly. lansoprazole (PREVACID) 30 mg capsule Take 30 mg by mouth daily. prn       !! - Potential duplicate medications found. Please discuss with provider. Procedures done this admission:  * No surgery found *    Consults this admission:  IP CONSULT TO ONCOLOGY  IP CONSULT TO INFECTIOUS DISEASES  IP CONSULT TO PULMONOLOGY    Echocardiogram/EKG results:  Results from Hospital Encounter encounter on 02/02/22    ECHO ADULT COMPLETE    Interpretation Summary    Left Ventricle: Left ventricle size is normal. Normal wall thickness. Normal wall motion. Normal left ventricular systolic function. Normal diastolic function.   Left Atrium: Left atrium is severely dilated.   Aortic Valve: Mild sclerosis of the aortic valve cusps.   Mitral Valve: Mild mitral annular calcification. Mild transvalvular regurgitation.   Pericardium: Trivial pericardial effusion present. No indication of cardiac tamponade. EKG Results     Procedure 720 Value Units Date/Time    EKG, 12 LEAD, INITIAL [[id number]] Collected: 03/09/22 2224    Order Status: Completed Updated: 03/10/22 0706     Ventricular Rate 109 BPM      Atrial Rate 109 BPM      P-R Interval 132 ms      QRS Duration 80 ms      Q-T Interval 300 ms      QTC Calculation (Bezet) 404 ms      Calculated P Axis 73 degrees      Calculated R Axis 87 degrees      Calculated T Axis 48 degrees      Diagnosis --     !! AGE AND GENDER SPECIFIC ECG ANALYSIS !!   Sinus tachycardia  Low voltage QRS  Borderline ECG  When compared with ECG of 09-MAR-2022 21:58,  Minimal criteria for Anterior infarct are no longer Present  Nonspecific T wave abnormality now evident in Inferior leads  Confirmed by Aba Brar MD (), Rashi Delaney ([id number]) on 3/10/2022 7:06:26 AM            Diagnostic Imaging/Tests:   CT ABD PELV WO CONT    Result Date: 3/10/2022  EXAM: CT ABD PELV WO CONT HISTORY: Abdominal discomfort, concern for infection. TECHNIQUE: Axial images of the abdomen and pelvis were performed without intravenous contrast. Images were obtained in the axial plane and coronal reformatted images were created and reviewed. Dose reduction technique used: Automated exposure control/Adjustment of the mA and/or kV according to patient size/Use of iterative reconstruction technique. COMPARISON: 3/1/2022 FINDINGS: There is residual contrast in the renal parenchyma, collecting systems, and bladder from a earlier examination. Visualized lung bases show moderate to large bilateral pleural effusions. The visualized liver, pancreas, adrenal glands, are within normal limits. The gallbladder has been removed. The portal vein thrombus seen on the previous examination is partially visualized. There is significant splenomegaly. Multiple peripherally located hypodensities are seen suggesting infarctions. Both kidneys contain contrast. There is parenchymal contrast defect in the posterior mid and lower renal pole on the right. There is a striated nephrogram bilaterally, best appreciated on the left. The bladder appears grossly normal. The uterus is visualized. Ovaries are not defined. The wall of the distal esophagus appears thickened. The gastric wall appears somewhat thickened. This may be due to lack of distention. Small and large bowel are without evidence of obstruction. Lack of contrast limits assessment of the colon wall. There is a moderate volume of ascites in the abdomen and pelvis. No evidence of free air. No pathologic adenopathy. No abdominal aortic aneurysm. There are 2 anterior wall hernias. One contains a small nonobstructed segment of transverse colon.  The second is located just above the umbilicus and contains fat. Osseous structures are without evidence of acute fracture or suspicious lesion. There is chronic fracture or Schmorl's node in the superior endplate of L1. Residual contrast from previous examination is seen in the kidney bilaterally. There is contrast defect in the mid and lower pole of the right kidney suggesting infarction. There is a striated nephrogram bilaterally. This appearance may be indicative of pyelonephritis or could also be associated with infarction. Significant splenomegaly with evidence of splenic infarctions. The known portal vein thrombus is visualized. Mild to moderate ascites. No obvious bowel obstruction. Other findings as above. XR CHEST PORT    Result Date: 3/9/2022  EXAM: XR CHEST PORT HISTORY: Hypoxia. TECHNIQUE: Frontal chest. COMPARISON: 3/3/2022 FINDINGS: There is mild cardiomegaly. Mild central pulmonary vascular congestion with mild perihilar edema. There is no pleural effusion, or pneumothorax. No significant osseous abnormalities are observed. Findings described above suggest mild pulmonary edema. CT CHEST PULMONARY EMBOLISM    Result Date: 3/9/2022  EXAM: CT angiogram chest. HISTORY: Shortness of breath, hypoxia. TECHNIQUE: CT angiographic images of the chest were obtained following intravenous administration of contrast. Imaging was performed utilizing PE protocol. Examination was performed in the axial plane and coronal reconstructions were performed. 3-D MIPS reconstructions of the chest were obtained. Dose reduction technique used: Automated exposure control/Adjustment of the mA and/or kV according to patient size/Use of iterative reconstruction technique. 100 mL of Isovue-370 were utilized. COMPARISON: 10/27/2016 FINDINGS: There is adequate opacification of the pulmonary arterial tree. No significant filling defects are identified to suggest pulmonary embolism. Main pulmonary artery is normal in caliber. The heart is not enlarged.  There is is a moderate pericardial effusion. The great vessels appear normal. There are no pathologically enlarged mediastinal hilar or axillary lymph nodes. Trachea and mainstem bronchi are patent. There is no pneumothorax. Interval decrease in the size of the previously described ill-defined nodularity in the right apex. There is septal thickening and diffuse hyperdensity consistent with pulmonary edema. Bilateral moderate-sized pleural effusions, larger on the right. Atelectasis or scarring in the inferior lingula. There is splenomegaly. The visualized spleen measures up to 17.5 cm. Wedge shaped hypodensities seen anteriorly suggest areas of infarction. There is anasarca. Changes in the superior endplate of 2 lower thoracic vertebral bodies suggest chronic fractures. No evidence of pulmonary embolism. Findings compatible with CHF. Pulmonary vascular edema with pericardial effusion and bilateral pleural effusions. Anasarca. Interval decrease in the size of a ill-defined nodule in the right apex. Splenomegaly with apparent infarction noted anteriorly.       All Micro Results     Procedure Component Value Units Date/Time    CULTURE, BLOOD [[id number]] Collected: 03/09/22 2237    Order Status: Completed Specimen: Blood Updated: 03/12/22 0818     Special Requests: --        RIGHT  Antecubital       Culture result: NO GROWTH 3 DAYS       CULTURE, BLOOD [[id number]] Collected: 03/09/22 2316    Order Status: Completed Specimen: Blood Updated: 03/12/22 0818     Special Requests: --        LEFT  Antecubital       Culture result: NO GROWTH 3 DAYS       CULTURE, URINE [[id number]] Collected: 03/09/22 2204    Order Status: Completed Specimen: Urine from Clean catch Updated: 03/12/22 0732     Special Requests: NO SPECIAL REQUESTS        Culture result:       <10,000 COLONIES/mL MIXED SKIN JOSÉ ISOLATED          C. DIFFICILE/EPI PCR [[id number]]     Order Status: Canceled Specimen: Stool     MSSA/MRSA SC BY PCR, NASAL SWAB [[id number]] Order Status: Sent Specimen: Nasal swab     COVID-19 RAPID TEST [[id number]] Collected: 03/09/22 2204    Order Status: Completed Specimen: Nasopharyngeal Updated: 03/09/22 2257     Specimen source NASAL SWAB        COVID-19 rapid test Not detected        Comment:      The specimen is NEGATIVE for SARS-CoV-2, the novel coronavirus associated with COVID-19. A negative result does not rule out COVID-19. This test has been authorized by the FDA under an Emergency Use Authorization (EUA) for use by authorized laboratories. Fact sheet for Healthcare Providers: [employer]nz  Fact sheet for Patients: retsClouddate.co.nz       Methodology: Isothermal Nucleic Acid Amplification         C. DIFFICILE/EPI PCR [[id number]]     Order Status: Canceled Specimen: Stool     GRAM STAIN [[id number]]     Order Status: Canceled Specimen: Stool     OVA & PARASITES, STOOL [[id number]]     Order Status: Canceled Specimen: Stool     CULTURE, STOOL [[id number]]     Order Status: Canceled Specimen: Stool           Labs: Results:       BMP, Mg, Phos Recent Labs     03/12/22  0337 03/11/22  0326 03/10/22  0255 03/09/22  2111 03/09/22  2111   * 134* 135*   < > 135*   K 3.1* 4.1 3.7   < > 3.6    104 105   < > 104   CO2 23 22 22   < > 24   AGAP 9 8 8   < > 7   BUN 15 13 12   < > 10   CREA 0.85 0.69 0.82   < > 0.74   CA 8.0* 8.2* 7.7*   < > 8.3   * 99 195*   < > 128*   MG 2.0  --   --   --  2.4   PHOS 3.6  --   --   --  2.9    < > = values in this interval not displayed.       CBC Recent Labs     03/12/22  0337 03/11/22  0326 03/10/22  0255   WBC 15.8* 16.6* 23.5*   RBC 2.46* 2.57* 2.85*   HGB 8.0* 8.1* 9.1*   HCT 23.9* 25.0* 27.8*   * 109* 121*   GRANS 91* 88* 91*   LYMPH 4* 5* 4*   EOS 0* 1 0*   MONOS 3* 4 3*   BASOS 0 0 0   IG 1 2 2   ANEU 14.3* 14.5* 21.4*   ABL 0.7 0.9 0.9   ORESTES 0.1 0.1 0.0   ABM 0.5 0.7 0.7   ABB 0.1 0.1 0.1   AIG 0.2 0.3 0.4      LFT Recent Labs     03/12/22  0337 03/09/22 2111   * 31   AP 96 117   TP 6.4 6.6   ALB 2.9* 2.2*   GLOB 3.5 4.4*   AGRAT 0.8* 0.5*      Cardiac Testing Lab Results   Component Value Date/Time    CK 63 08/05/2021 04:58 PM     07/26/2021 05:04 PM      Coagulation Tests Lab Results   Component Value Date/Time    Prothrombin time 19.0 (H) 02/28/2022 11:00 AM    Prothrombin time 21.0 (H) 08/23/2021 07:15 PM    Prothrombin time 15.3 (H) 08/20/2021 10:25 AM    INR 1.6 02/28/2022 11:00 AM    INR 1.8 08/23/2021 07:15 PM    INR 1.2 08/20/2021 10:25 AM    aPTT 44.1 (H) 02/28/2022 11:00 AM    aPTT 55.9 (H) 08/23/2021 07:15 PM    aPTT 23.5 (L) 10/20/2020 10:50 AM      A1c Lab Results   Component Value Date/Time    Hemoglobin A1c 4.9 08/09/2021 04:41 AM      Lipid Panel Lab Results   Component Value Date/Time    Cholesterol, total 105 03/16/2015 06:30 AM    HDL Cholesterol 37 (L) 03/16/2015 06:30 AM    LDL, calculated 54.8 03/16/2015 06:30 AM    VLDL, calculated 13.2 03/16/2015 06:30 AM    Triglyceride 66 03/16/2015 06:30 AM    CHOL/HDL Ratio 2.8 03/16/2015 06:30 AM      Thyroid Panel Lab Results   Component Value Date/Time    TSH 12.100 (H) 10/06/2021 10:56 AM    TSH 10.700 08/09/2021 04:41 AM    T4, Total 9.7 03/16/2015 10:15 AM    T4, Free 1.1 05/19/2021 12:14 PM    T4, Free 1.4 03/13/2020 09:39 AM        Most Recent UA Lab Results   Component Value Date/Time    Color ISAEL 03/09/2022 10:04 PM    Appearance TURBID 03/09/2022 10:04 PM    Specific gravity 1.025 (H) 03/09/2022 10:04 PM    pH (UA) 6.0 03/09/2022 10:04 PM    Protein 100 (A) 03/09/2022 10:04 PM    Glucose Negative 03/09/2022 10:04 PM    Ketone TRACE (A) 03/09/2022 10:04 PM    Bilirubin Negative 03/09/2022 10:04 PM    Blood SMALL (A) 03/09/2022 10:04 PM    Urobilinogen 0.2 03/09/2022 10:04 PM    Nitrites Negative 03/09/2022 10:04 PM    Leukocyte Esterase SMALL (A) 03/09/2022 10:04 PM    WBC 5-10 03/09/2022 10:04 PM    RBC 0-3 03/09/2022 10:04 PM    Epithelial cells 0-3 03/09/2022 10:04 PM    Bacteria TRACE 03/09/2022 10:04 PM    Casts 0-3 03/09/2022 10:04 PM    Crystals, urine AMORPHOUS 03/09/2022 10:04 PM    Mucus 0 02/27/2022 07:49 PM    Other observations RESULTS VERIFIED MANUALLY 02/27/2022 07:49 PM          All Labs from Last 24 Hrs:  Recent Results (from the past 24 hour(s))   METABOLIC PANEL, COMPREHENSIVE    Collection Time: 03/12/22  3:37 AM   Result Value Ref Range    Sodium 134 (L) 136 - 145 mmol/L    Potassium 3.1 (L) 3.5 - 5.1 mmol/L    Chloride 102 98 - 107 mmol/L    CO2 23 21 - 32 mmol/L    Anion gap 9 7 - 16 mmol/L    Glucose 121 (H) 65 - 100 mg/dL    BUN 15 8 - 23 MG/DL    Creatinine 0.85 0.6 - 1.0 MG/DL    GFR est AA >60 >60 ml/min/1.73m2    GFR est non-AA >60 >60 ml/min/1.73m2    Calcium 8.0 (L) 8.3 - 10.4 MG/DL    Bilirubin, total 0.8 0.2 - 1.1 MG/DL    ALT (SGPT) 150 (H) 12 - 65 U/L    AST (SGOT) 244 (H) 15 - 37 U/L    Alk. phosphatase 96 50 - 136 U/L    Protein, total 6.4 6.3 - 8.2 g/dL    Albumin 2.9 (L) 3.2 - 4.6 g/dL    Globulin 3.5 2.3 - 3.5 g/dL    A-G Ratio 0.8 (L) 1.2 - 3.5     CBC WITH AUTOMATED DIFF    Collection Time: 03/12/22  3:37 AM   Result Value Ref Range    WBC 15.8 (H) 4.3 - 11.1 K/uL    RBC 2.46 (L) 4.05 - 5.2 M/uL    HGB 8.0 (L) 11.7 - 15.4 g/dL    HCT 23.9 (L) 35.8 - 46.3 %    MCV 97.2 79.6 - 97.8 FL    MCH 32.5 26.1 - 32.9 PG    MCHC 33.5 31.4 - 35.0 g/dL    RDW 20.5 (H) 11.9 - 14.6 %    PLATELET 603 (L) 884 - 450 K/uL    MPV 10.6 9.4 - 12.3 FL    ABSOLUTE NRBC 0.03 0.0 - 0.2 K/uL    DF AUTOMATED      NEUTROPHILS 91 (H) 43 - 78 %    LYMPHOCYTES 4 (L) 13 - 44 %    MONOCYTES 3 (L) 4.0 - 12.0 %    EOSINOPHILS 0 (L) 0.5 - 7.8 %    BASOPHILS 0 0.0 - 2.0 %    IMMATURE GRANULOCYTES 1 0.0 - 5.0 %    ABS. NEUTROPHILS 14.3 (H) 1.7 - 8.2 K/UL    ABS. LYMPHOCYTES 0.7 0.5 - 4.6 K/UL    ABS. MONOCYTES 0.5 0.1 - 1.3 K/UL    ABS. EOSINOPHILS 0.1 0.0 - 0.8 K/UL    ABS. BASOPHILS 0.1 0.0 - 0.2 K/UL    ABS. IMM.  GRANS. 0.2 0.0 - 0.5 K/UL MAGNESIUM    Collection Time: 03/12/22  3:37 AM   Result Value Ref Range    Magnesium 2.0 1.8 - 2.4 mg/dL   PHOSPHORUS    Collection Time: 03/12/22  3:37 AM   Result Value Ref Range    Phosphorus 3.6 2.3 - 3.7 MG/DL       Current Med List in Hospital:   Current Facility-Administered Medications   Medication Dose Route Frequency    potassium chloride (K-DUR, KLOR-CON M20) SR tablet 40 mEq  40 mEq Oral BID    cefTRIAXone (ROCEPHIN) 2 g in 0.9% sodium chloride (MBP/ADV) 50 mL MBP  2 g IntraVENous Q12H    albumin human 25% (BUMINATE) solution 25 g  25 g IntraVENous Q6H    bumetanide (BUMEX) injection 2 mg  2 mg IntraVENous BID    [START ON 3/13/2022] levothyroxine (SYNTHROID) tablet 56 mcg  56 mcg Oral Once per day on Sun    fentaNYL (DURAGESIC) 12 mcg/hr patch 1 Patch  1 Patch TransDERmal Q72H    morphine injection 1 mg  1 mg IntraVENous Q4H PRN    clotrimazole (MYCELEX) 1 % cream 1 Applicator  1 Applicator Vaginal QHS    ruxolitinib tab 5 mg (Patient Supplied)  5 mg Oral BID    levothyroxine (SYNTHROID) tablet 112 mcg  112 mcg Oral ACB    enoxaparin (LOVENOX) injection 60 mg  60 mg SubCUTAneous Q12H    dasatinib tab 80 mg (Patient Supplied)  80 mg Oral DAILY    zolpidem (AMBIEN) tablet 10 mg  10 mg Oral QHS PRN    diphenoxylate-atropine (LOMOTIL) tablet 1 Tablet  1 Tablet Oral QID PRN    oxyCODONE-acetaminophen (PERCOCET 10)  mg per tablet 1 Tablet  1 Tablet Oral Q8H PRN    sodium chloride (NS) flush 5-40 mL  5-40 mL IntraVENous Q8H    sodium chloride (NS) flush 5-40 mL  5-40 mL IntraVENous PRN    acetaminophen (TYLENOL) tablet 650 mg  650 mg Oral Q6H PRN    Or    acetaminophen (TYLENOL) suppository 650 mg  650 mg Rectal Q6H PRN    polyethylene glycol (MIRALAX) packet 17 g  17 g Oral DAILY PRN    ondansetron (ZOFRAN ODT) tablet 4 mg  4 mg Oral Q8H PRN    Or    ondansetron (ZOFRAN) injection 4 mg  4 mg IntraVENous Q6H PRN       Allergies   Allergen Reactions    Latex Other (comments) Testing for latex allergy was positive as a 2 (on a scale of 1 to 3).     Sulfa (Sulfonamide Antibiotics) Anaphylaxis    Tramadol Other (comments)     Top lip swelled    Augmentin [Amoxicillin-Pot Clavulanate] Rash    Codeine Other (comments)    Divalproex Sodium Hives    Morphine Nausea and Vomiting     Not a true allergy    Potassium Clavulanate Hives    Rizatriptan Rash    Tetanus Immune Globulin Other (comments)     Heat, bruising, and swelling at  Site of injection    Vancomycin Rash    Xanax [Alprazolam] Other (comments)     Hallucinations    Zonegran [Zonisamide] Rash     Immunization History   Administered Date(s) Administered    (RETIRED) Pneumococcal Vaccine (Unspecified Type) 09/27/2008    COVID-19, Moderna, Primary or Immunocompromised Series, MRNA, PF, 100mcg/0.5mL 03/27/2021, 04/27/2021    Hib (PRP-T) 04/03/2019    Influenza Vaccine 10/01/2014, 10/01/2016    Influenza Vaccine (Quad) Mdck Pf (>2 Yrs Flucelvax QUAD 41778) 09/28/2020    Influenza Vaccine (Quad) PF (>6 Mo Flulaval, Fluarix, and >3 Yrs Afluria, Fluzone 50478) 10/25/2018, 10/08/2019    Pneumococcal Conjugate (PCV-13) 04/03/2019    Pneumococcal Polysaccharide (PPSV-23) 01/01/2004    TB Skin Test (PPD) Intradermal 07/21/2014    Zoster Recombinant 03/13/2020, 08/09/2020       Recent Vital Data:  Patient Vitals for the past 24 hrs:   Temp Pulse Resp BP SpO2   03/12/22 1149 99 °F (37.2 °C) 91 16 100/70 92 %   03/12/22 0808 98.8 °F (37.1 °C) (!) 104 18 94/67 90 %   03/12/22 0725 -- -- -- -- 91 %   03/12/22 0339 98.6 °F (37 °C) (!) 107 18 93/60 97 %   03/11/22 2357 98.2 °F (36.8 °C) (!) 109 20 (!) 92/52 97 %   03/11/22 1905 98.2 °F (36.8 °C) (!) 110 18 99/64 95 %   03/11/22 1551 98.3 °F (36.8 °C) (!) 104 22 104/78 97 %     Oxygen Therapy  O2 Sat (%): 92 % (03/12/22 1149)  Pulse via Oximetry: 103 beats per minute (03/09/22 2333)  O2 Device: Nasal cannula (03/12/22 0725)  Skin Assessment: Clean, dry, & intact (03/12/22 5063)  Skin Protection for O2 Device: N/A (03/12/22 0725)  O2 Flow Rate (L/min): 3 l/min (03/12/22 0725)    Estimated body mass index is 23.03 kg/m² as calculated from the following:    Height as of this encounter: 5' 3\" (1.6 m). Weight as of this encounter: 59 kg (130 lb). Intake/Output Summary (Last 24 hours) at 3/12/2022 1238  Last data filed at 3/11/2022 2022  Gross per 24 hour   Intake 120 ml   Output 1875 ml   Net -1755 ml       Physical Exam  Vitals and nursing note reviewed. Constitutional:       General: She is not in acute distress. Appearance: Normal appearance. HENT:      Head: Normocephalic. Eyes:      Extraocular Movements: Extraocular movements intact. Cardiovascular:      Rate and Rhythm: Normal rate and regular rhythm. Pulses:           Radial pulses are 2+ on the left side. Pulmonary:      Effort: Pulmonary effort is normal. No respiratory distress. Abdominal:      General: Bowel sounds are normal.   Musculoskeletal:         General: No deformity. Cervical back: No rigidity. Right lower leg: No edema. Left lower leg: No edema. Skin:     General: Skin is warm and dry. Coloration: Skin is not jaundiced. Neurological:      General: No focal deficit present. Mental Status: She is alert and oriented to person, place, and time.    Psychiatric:         Mood and Affect: Mood normal.         Behavior: Behavior normal.      Comments: pleasant    Signed:  Kenzie Bose MD

## 2022-03-12 NOTE — PROGRESS NOTES
Hospitalist Progress Note   Admit Date:  3/9/2022  8:50 PM   Name:  Nirav Carbajal   Age:  64 y.o. Sex:  female  :  1960   MRN:  [id number]   Room:  [facility]/    Presenting Complaint: Back Pain    Reason(s) for Admission: HCAP (healthcare-associated pneumonia) [J18.9]     Hospital Course & Interval History:   64 y.o. female with medical history of CML and recent hospitalization (-3/7/22) for e.coli bacteremia and meningitis who presented to ED with shortness of breath, back/neck discomfort, and grogginess. Patient reports increased SOB over the past 2 days. Family reported that the patient seems more \"groggy\" than typical.  Upon ER evaluation, patient was found to be hypoxic with O2 sat of 85%. WBC is ~26,000. CXR shows some vascular congestion and ER is concerned for a R sided infiltrate. Patient has been on daily rocephin based on susceptibilities for continued treatment of e.coli bacteremia with EOT date of 3/13. She has also been on BID lovenox. CT with new splenic infarcts, pleural effusion. Consulted hematology. Consulted pulmonology. Subjective/24hr Events (22):  Pain controlled today. Abdominal fullness improved. Breathing stable no longer short of breath. Would like to be discharged, however no safe discharge plan can be identified. Patient declining rehab and insisting that she is able to go home. Told me that her sister was coming to stay with her. I spoke with her sister her sister cannot stay with her due to other illness in the family. Her other 2 sisters are also unavailable to stay with her. Her daughter cannot stay with her and she told our staff that her daughter was coming to pick her up. Her daughter is refusing to pick her up. Had a detailed discussion with her sister about this behavior and I am not sure how this is going to be resolved. ROS:  10 systems reviewed and negative except as noted above.      Assessment & Plan:   * Acute respiratory failure with hypoxemia [facility])  ER concerned about R sided infiltrate; Radiology read as vascular congestion. Admission WBC 25.8, up from 15.4 two days prior at discharge  - With hypoxia and leukocytosis, will treat for HCAP. IV vancomycin and cefepime ordered  - Wean O2 as tolerated  - Follow repeat blood cultures as obtained in ER    3/12: hypoxia likely secondary to pleural effusions, continue diuresis    Chronic myelogenous leukemia (HCC)  -consult oncology  -fentanyl for cancer pain    3/12: fentanyl effective, oncology to continue ruxolinitnib    E coli bacteremia  Recent hospitalization from 2/27-3/7/2022 for e.coli bacteremia and meningitis. Patient has been on rocephin daily since discharge with EOT date of 3/13. Based on culture susceptibilities, e.coli is also sensitive to zosyn and cefepime  -vancomycin, cefepime   -repeat BCx    3/12: infectious disease: continue antibiotic therapy    Thrombocytopenia ([address]  Admission CT with multiple splenic infarcts, portal vein thrombosis.   Admission platelet [id number].  -Consult hematology  -On enoxaparin    3/12: Hematology agrees, maintain current therapy    Malignant ascites  Noted on admission CT and exam  -albumin, bumetanide    3/12: Poor output, renal function stable, continue bumetanide dose    Acquired hypercoagulable state ([address]  H/O multiple thromboses per chart review, now with splenic infarct  -lovenox BID    Esophageal varices ([address]  - Of note  - No current symptoms of this  - Continue home meds    Primary hypothyroidism  -levothyroxine 112    Cirrhosis ([address]  - Of note        Dispo/Discharge Planning:    Remain inpatient    Diet:  ADULT DIET Regular  ADULT ORAL NUTRITION SUPPLEMENT Lunch, Dinner; Standard High Calorie/High Protein  ADULT ORAL NUTRITION SUPPLEMENT Breakfast; Other Supplement; yogurt  DVT PPx: On enoxaparin  Code status: Full Code    Hospital Problems as of 3/12/2022 Date Reviewed: 3/12/2022          Codes Class Noted - Resolved POA    * (Principal) HCAP (healthcare-associated pneumonia) ICD-10-CM: J18.9  ICD-9-CM: 334  3/9/2022 - Present Yes        RESOLVED: Sepsis with acute organ dysfunction without septic shock ([facility] ICD-10-CM: A41.9, R65.20  ICD-9-CM: 038.9, 995.92  2/2/2022 - 3/12/2022 Yes        Chronic myelogenous leukemia (HCC) (Chronic) ICD-10-CM: C92.10  ICD-9-CM: 205.10  3/30/2015 - Present Yes        E coli bacteremia ICD-10-CM: R78.81, B96.20  ICD-9-CM: 790.7, 041.49  3/9/2022 - Present Yes        Thrombocytopenia ([facility] ICD-10-CM: D69.6  ICD-9-CM: 287.5  10/15/2021 - Present Yes        Malignant ascites ICD-10-CM: R18.0  ICD-9-CM: 789.51  7/26/2012 - Present Yes        Acquired hypercoagulable state ([facility] (Chronic) ICD-10-CM: Q43.77  ICD-9-CM: 289.81  3/30/2015 - Present Yes        Pleural effusion, bilateral ICD-10-CM: J90  ICD-9-CM: 511.9  3/11/2022 - Present Yes        Hypoproteinemia ([facility] ICD-10-CM: E77.8  ICD-9-CM: 273.8  3/11/2022 - Present Yes        Cirrhosis ([facility] (Chronic) ICD-10-CM: K74.60  ICD-9-CM: 571.5  7/27/2012 - Present Yes        Primary hypothyroidism (Chronic) ICD-10-CM: E03.9  ICD-9-CM: 244.9  2/16/2009 - Present Yes        Esophageal varices (HCC) (Chronic) ICD-10-CM: I85.00  ICD-9-CM: 456.1  2/16/2009 - Present Yes    Overview Addendum 8/26/2021  2:30 PM by Duane Calle MD     grade 3             Portal vein thrombosis ICD-10-CM: R52  ICD-9-CM: 163  2/16/2009 - Present Unknown    Overview Addendum 8/26/2021  2:31 PM by Duane Calle MD     Ct scan 6-21-2 Apparent occlusion of the portal vein. In addition, the splenic vein, and superior mesenteric vein are not definitely seen and are also likely  occluded. Splenomegaly, and extensive varices are seen as described above consistent with the portal vein occlusion 7-05-12 on arixtra HIT negative on repeat 7-27-12 re admitted Cirrhotic appearance of the liver.  Mild to moderate ascites                   Objective:     Patient Vitals for the past 24 hrs: Temp Pulse Resp BP SpO2   03/12/22 1149 99 °F (37.2 °C) 91 16 100/70 92 %   03/12/22 0808 98.8 °F (37.1 °C) (!) 104 18 94/67 90 %   03/12/22 0725 -- -- -- -- 91 %   03/12/22 0339 98.6 °F (37 °C) (!) 107 18 93/60 97 %   03/11/22 2357 98.2 °F (36.8 °C) (!) 109 20 (!) 92/52 97 %   03/11/22 1905 98.2 °F (36.8 °C) (!) 110 18 99/64 95 %   03/11/22 1551 98.3 °F (36.8 °C) (!) 104 22 104/78 97 %     Oxygen Therapy  O2 Sat (%): 92 % (03/12/22 1149)  Pulse via Oximetry: 103 beats per minute (03/09/22 2333)  O2 Device: Nasal cannula (03/12/22 0725)  Skin Assessment: Clean, dry, & intact (03/12/22 0725)  Skin Protection for O2 Device: N/A (03/12/22 0725)  O2 Flow Rate (L/min): 3 l/min (03/12/22 0725)    Estimated body mass index is 23.03 kg/m² as calculated from the following:    Height as of this encounter: 5' 3\" (1.6 m). Weight as of this encounter: 59 kg (130 lb). Intake/Output Summary (Last 24 hours) at 3/12/2022 1518  Last data filed at 3/11/2022 2022  Gross per 24 hour   Intake 120 ml   Output 1875 ml   Net -1755 ml       Blood pressure 100/70, pulse 91, temperature 99 °F (37.2 °C), resp. rate 16, height 5' 3\" (1.6 m), weight 59 kg (130 lb), SpO2 92 %. Physical Exam  Vitals and nursing note reviewed. Constitutional:       General: She is not in acute distress. Appearance: Normal appearance. She is ill-appearing. HENT:      Head: Normocephalic. Eyes:      Extraocular Movements: Extraocular movements intact. Cardiovascular:      Rate and Rhythm: Normal rate and regular rhythm. Pulses:           Radial pulses are 2+ on the left side. Pulmonary:      Effort: Pulmonary effort is normal. No respiratory distress. Abdominal:      General: Bowel sounds are normal. There is distension. Palpations: There is fluid wave. Tenderness: There is generalized abdominal tenderness. Musculoskeletal:         General: No deformity. Cervical back: No rigidity. Right lower leg: No edema.       Left lower leg: No edema. Skin:     General: Skin is warm and dry. Coloration: Skin is not jaundiced. Neurological:      General: No focal deficit present. Mental Status: She is alert and oriented to person, place, and time. Psychiatric:         Mood and Affect: Mood normal.         Behavior: Behavior normal.      Comments: pleasant       I have reviewed ordered lab tests and independently visualized imaging below:    Recent Labs:  Recent Results (from the past 48 hour(s))   METABOLIC PANEL, BASIC    Collection Time: 03/11/22  3:26 AM   Result Value Ref Range    Sodium 134 (L) 136 - 145 mmol/L    Potassium 4.1 3.5 - 5.1 mmol/L    Chloride 104 98 - 107 mmol/L    CO2 22 21 - 32 mmol/L    Anion gap 8 7 - 16 mmol/L    Glucose 99 65 - 100 mg/dL    BUN 13 8 - 23 MG/DL    Creatinine 0.69 0.6 - 1.0 MG/DL    GFR est AA >60 >60 ml/min/1.73m2    GFR est non-AA >60 >60 ml/min/1.73m2    Calcium 8.2 (L) 8.3 - 10.4 MG/DL   CBC WITH AUTOMATED DIFF    Collection Time: 03/11/22  3:26 AM   Result Value Ref Range    WBC 16.6 (H) 4.3 - 11.1 K/uL    RBC 2.57 (L) 4.05 - 5.2 M/uL    HGB 8.1 (L) 11.7 - 15.4 g/dL    HCT 25.0 (L) 35.8 - 46.3 %    MCV 97.3 79.6 - 97.8 FL    MCH 31.5 26.1 - 32.9 PG    MCHC 32.4 31.4 - 35.0 g/dL    RDW 20.0 (H) 11.9 - 14.6 %    PLATELET 597 (L) 564 - 450 K/uL    MPV 11.2 9.4 - 12.3 FL    ABSOLUTE NRBC 0.06 0.0 - 0.2 K/uL    DF AUTOMATED      NEUTROPHILS 88 (H) 43 - 78 %    LYMPHOCYTES 5 (L) 13 - 44 %    MONOCYTES 4 4.0 - 12.0 %    EOSINOPHILS 1 0.5 - 7.8 %    BASOPHILS 0 0.0 - 2.0 %    IMMATURE GRANULOCYTES 2 0.0 - 5.0 %    ABS. NEUTROPHILS 14.5 (H) 1.7 - 8.2 K/UL    ABS. LYMPHOCYTES 0.9 0.5 - 4.6 K/UL    ABS. MONOCYTES 0.7 0.1 - 1.3 K/UL    ABS. EOSINOPHILS 0.1 0.0 - 0.8 K/UL    ABS. BASOPHILS 0.1 0.0 - 0.2 K/UL    ABS. IMM.  GRANS. 0.3 0.0 - 0.5 K/UL   METABOLIC PANEL, COMPREHENSIVE    Collection Time: 03/12/22  3:37 AM   Result Value Ref Range    Sodium 134 (L) 136 - 145 mmol/L    Potassium 3.1 (L) 3.5 - 5.1 mmol/L    Chloride 102 98 - 107 mmol/L    CO2 23 21 - 32 mmol/L    Anion gap 9 7 - 16 mmol/L    Glucose 121 (H) 65 - 100 mg/dL    BUN 15 8 - 23 MG/DL    Creatinine 0.85 0.6 - 1.0 MG/DL    GFR est AA >60 >60 ml/min/1.73m2    GFR est non-AA >60 >60 ml/min/1.73m2    Calcium 8.0 (L) 8.3 - 10.4 MG/DL    Bilirubin, total 0.8 0.2 - 1.1 MG/DL    ALT (SGPT) 150 (H) 12 - 65 U/L    AST (SGOT) 244 (H) 15 - 37 U/L    Alk. phosphatase 96 50 - 136 U/L    Protein, total 6.4 6.3 - 8.2 g/dL    Albumin 2.9 (L) 3.2 - 4.6 g/dL    Globulin 3.5 2.3 - 3.5 g/dL    A-G Ratio 0.8 (L) 1.2 - 3.5     CBC WITH AUTOMATED DIFF    Collection Time: 03/12/22  3:37 AM   Result Value Ref Range    WBC 15.8 (H) 4.3 - 11.1 K/uL    RBC 2.46 (L) 4.05 - 5.2 M/uL    HGB 8.0 (L) 11.7 - 15.4 g/dL    HCT 23.9 (L) 35.8 - 46.3 %    MCV 97.2 79.6 - 97.8 FL    MCH 32.5 26.1 - 32.9 PG    MCHC 33.5 31.4 - 35.0 g/dL    RDW 20.5 (H) 11.9 - 14.6 %    PLATELET 640 (L) 865 - 450 K/uL    MPV 10.6 9.4 - 12.3 FL    ABSOLUTE NRBC 0.03 0.0 - 0.2 K/uL    DF AUTOMATED      NEUTROPHILS 91 (H) 43 - 78 %    LYMPHOCYTES 4 (L) 13 - 44 %    MONOCYTES 3 (L) 4.0 - 12.0 %    EOSINOPHILS 0 (L) 0.5 - 7.8 %    BASOPHILS 0 0.0 - 2.0 %    IMMATURE GRANULOCYTES 1 0.0 - 5.0 %    ABS. NEUTROPHILS 14.3 (H) 1.7 - 8.2 K/UL    ABS. LYMPHOCYTES 0.7 0.5 - 4.6 K/UL    ABS. MONOCYTES 0.5 0.1 - 1.3 K/UL    ABS. EOSINOPHILS 0.1 0.0 - 0.8 K/UL    ABS. BASOPHILS 0.1 0.0 - 0.2 K/UL    ABS. IMM.  GRANS. 0.2 0.0 - 0.5 K/UL   MAGNESIUM    Collection Time: 03/12/22  3:37 AM   Result Value Ref Range    Magnesium 2.0 1.8 - 2.4 mg/dL   PHOSPHORUS    Collection Time: 03/12/22  3:37 AM   Result Value Ref Range    Phosphorus 3.6 2.3 - 3.7 MG/DL       All Micro Results     Procedure Component Value Units Date/Time    CULTURE, BLOOD [[id number]] Collected: 03/09/22 2237    Order Status: Completed Specimen: Blood Updated: 03/12/22 0818     Special Requests: --        RIGHT  Antecubital       Culture result: NO GROWTH 3 DAYS       CULTURE, BLOOD [[id number]] Collected: 03/09/22 2316    Order Status: Completed Specimen: Blood Updated: 03/12/22 0818     Special Requests: --        LEFT  Antecubital       Culture result: NO GROWTH 3 DAYS       CULTURE, URINE [[id number]] Collected: 03/09/22 2204    Order Status: Completed Specimen: Urine from Clean catch Updated: 03/12/22 0732     Special Requests: NO SPECIAL REQUESTS        Culture result:       <10,000 COLONIES/mL MIXED SKIN JOSÉ ISOLATED          C. DIFFICILE/EPI PCR [[id number]]     Order Status: Canceled Specimen: Stool     MSSA/MRSA SC BY PCR, NASAL SWAB [[id number]]     Order Status: Sent Specimen: Nasal swab     COVID-19 RAPID TEST [[id number]] Collected: 03/09/22 2204    Order Status: Completed Specimen: Nasopharyngeal Updated: 03/09/22 2257     Specimen source NASAL SWAB        COVID-19 rapid test Not detected        Comment:      The specimen is NEGATIVE for SARS-CoV-2, the novel coronavirus associated with COVID-19. A negative result does not rule out COVID-19. This test has been authorized by the FDA under an Emergency Use Authorization (EUA) for use by authorized laboratories. Fact sheet for Healthcare Providers: ConventionUpdate.co.nz  Fact sheet for Patients: ConventionUpdate.co.nz       Methodology: Isothermal Nucleic Acid Amplification         C. DIFFICILE/EPI PCR [[id number]]     Order Status: Canceled Specimen: Stool     Ashkan Acevedo [[id number]]     Order Status: Canceled Specimen: Stool     OVA & PARASITES, STOOL [[id number]]     Order Status: Canceled Specimen: Stool     CULTURE, STOOL [[id number]]     Order Status: Canceled Specimen: Stool           Other Studies:  XR CHEST PORT    Result Date: 3/12/2022  Portable chest: History: Respiratory failure Comparison: 03/09/2022 Findings: A single view of the chest was obtained at 7:43 AM. The cardiac silhouette is mildly enlarged.  There is a small right pleural effusion and adjacent atelectasis/infiltrate. Hazy right perihilar opacities have resolved. Mild streaky opacities at the left midlung zone are unchanged. The pulmonary vascularity is within normal limits. Shifting right lung opacities now with small right pleural effusion and adjacent atelectasis/infiltrate.        Current Meds:  Current Facility-Administered Medications   Medication Dose Route Frequency    potassium chloride (K-DUR, KLOR-CON M20) SR tablet 40 mEq  40 mEq Oral BID    albumin human 25% (BUMINATE) solution 25 g  25 g IntraVENous Q6H    cefTRIAXone (ROCEPHIN) 2 g in 0.9% sodium chloride (MBP/ADV) 50 mL MBP  2 g IntraVENous Q12H    albumin human 25% (BUMINATE) solution 25 g  25 g IntraVENous Q6H    bumetanide (BUMEX) injection 2 mg  2 mg IntraVENous BID    [START ON 3/13/2022] levothyroxine (SYNTHROID) tablet 56 mcg  56 mcg Oral Once per day on Sun    fentaNYL (DURAGESIC) 12 mcg/hr patch 1 Patch  1 Patch TransDERmal Q72H    morphine injection 1 mg  1 mg IntraVENous Q4H PRN    clotrimazole (MYCELEX) 1 % cream 1 Applicator  1 Applicator Vaginal QHS    ruxolitinib tab 5 mg (Patient Supplied)  5 mg Oral BID    levothyroxine (SYNTHROID) tablet 112 mcg  112 mcg Oral ACB    enoxaparin (LOVENOX) injection 60 mg  60 mg SubCUTAneous Q12H    dasatinib tab 80 mg (Patient Supplied)  80 mg Oral DAILY    zolpidem (AMBIEN) tablet 10 mg  10 mg Oral QHS PRN    diphenoxylate-atropine (LOMOTIL) tablet 1 Tablet  1 Tablet Oral QID PRN    oxyCODONE-acetaminophen (PERCOCET 10)  mg per tablet 1 Tablet  1 Tablet Oral Q8H PRN    sodium chloride (NS) flush 5-40 mL  5-40 mL IntraVENous Q8H    sodium chloride (NS) flush 5-40 mL  5-40 mL IntraVENous PRN    acetaminophen (TYLENOL) tablet 650 mg  650 mg Oral Q6H PRN    Or    acetaminophen (TYLENOL) suppository 650 mg  650 mg Rectal Q6H PRN    polyethylene glycol (MIRALAX) packet 17 g  17 g Oral DAILY PRN    ondansetron (ZOFRAN ODT) tablet 4 mg  4 mg Oral Q8H PRN    Or    ondansetron (ZOFRAN) injection 4 mg  4 mg IntraVENous Q6H PRN       Signed:  Mounika Lux MD

## 2022-03-12 NOTE — PROGRESS NOTES
According to Glenn (patient's sister that lives in [place]), there is nobody available to  patient and care for her. The daughter is not able to stay with her because she is caring for her brother with Asperger's. Doctor and SW notified. Currently refusing IV stick.

## 2022-03-12 NOTE — PROGRESS NOTES
03/12/22 0725   Oxygen Therapy   O2 Sat (%) 91 %   O2 Device Nasal cannula   Skin Assessment Clean, dry, & intact   Skin Protection for O2 Device N/A   O2 Flow Rate (L/min) 3 l/min   Patient had  02 off, her sp02 was 85, placed NC @ 3lpm back on pt.

## 2022-03-12 NOTE — PROGRESS NOTES
Patient prefers to wait for her daughter befor doing 18 evaluation. She wants to be dressed and ready to go.

## 2022-03-13 NOTE — PROGRESS NOTES
Received patient awake, alert and oriented in bed. IV to left wrist patent. Patient assisted to bedside commode and back to bed. Denies needs currently. Nursing assessment completed. Call bell placed within reach, bed in low and locked position.

## 2022-03-13 NOTE — PROGRESS NOTES
Irritable and tearful this morning regarding discharge . Spoke with social work, MD, and nurse. There is currently no safe discharge plan as patient is refusing rehab and cannot return home alone. Bumex held per MD order for low BP. Requesting to see a new doctor. Spoke with attending and she will see a new hospitalist tomorrow. Patient informed and understands. She verbalizes frustration and mistrust of healthcare providers this morning.

## 2022-03-13 NOTE — PROGRESS NOTES
Care Management Interventions  PCP Verified by CM: Yes  Transition of Care Consult (CM Consult): Home Health  Support Systems: No Support Systems  Confirm Follow Up Transport: Family  The Plan for Transition of Care is Related to the Following Treatment Goals : increase endurance  The Patient and/or Patient Representative was Provided with a Choice of Provider and Agrees with the Discharge Plan?: Yes  Freedom of Choice List was Provided with Basic Dialogue that Supports the Patient's Individualized Plan of Care/Goals, Treatment Preferences and Shares the Quality Data Associated with the Providers?: Yes  Discharge Location  Patient Expects to be Discharged to[de-identified] Unable to determine at this time  64 yr-old F with multiple dx including CML and recent hospitalization (-3/7) for e.coli bacteremia and meningitis. Went home with a RW & BSC and HH with [facility]. Lives alone in [facility]. Returned 3/9 with worsening SOB, back pain, fever and lethargic. Family unable to provide  help. Pt is refusing rehab. [facility] has said they will not resume services in the home due to patient inability to care for herself. Spoke with dtr Liza, who is only able to provide very limited support (stop by once a day, run errands). Spoke with sister Marilou Aggarwal, who [place] and cannot offer assistance. Also spoke with oJsh Subramanian, a brother-in-law (spouse ), who is unable to provide assistance. Discussed options with Liza and referred her to [facility]. Have also discussed home caregiver agencies. Home O2 order on hold with [facility]. will follow for continued assistance with plan.

## 2022-03-13 NOTE — PROGRESS NOTES
Hospitalist Progress Note   Admit Date:  3/9/2022  8:50 PM   Name:  Annamarie Kline   Age:  64 y.o. Sex:  female  :  1960   MRN:  [id number]   Room:  [facility]/    Presenting Complaint: Back Pain    Reason(s) for Admission: HCAP (healthcare-associated pneumonia) [J18.9]     Hospital Course & Interval History:   64 y.o. female with medical history of CML and recent hospitalization (-3/7/22) for e.coli bacteremia and meningitis who presented to ED with shortness of breath, back/neck discomfort, and grogginess. Patient reports increased SOB over the past 2 days. Family reported that the patient seems more \"groggy\" than typical.  Upon ER evaluation, patient was found to be hypoxic with O2 sat of 85%. WBC is ~26,000. CXR shows some vascular congestion and ER is concerned for a R sided infiltrate. Patient has been on daily rocephin based on susceptibilities for continued treatment of e.coli bacteremia with EOT date of 3/13. She has also been on BID lovenox. CT with new splenic infarcts, pleural effusion. Consulted hematology. Consulted pulmonology. Subjective/24hr Events (22): Attempted to discuss the inconsistencies with the history provided by Ms. Ryan Minor. She denied having told me that her sister was coming to stay with her. She denied having told the  that her daughter was going to pick her up. She does not have any apparent individual to provide support for her at home. She is refusing to consider rehab. She is currently safe for discharge to rehab facility. She is not safe for discharge home as her home is too small for physical therapy and there is no one to supervise her for her ADLs which she is unlikely to be able to perform with consistency. She has requested to see a different provider going forward. ROS:  10 systems reviewed and negative except as noted above.      Assessment & Plan:   * Acute respiratory failure with hypoxemia [facility])  ER concerned about R sided infiltrate; Radiology read as vascular congestion. Admission WBC 25.8, up from 15.4 two days prior at discharge  - With hypoxia and leukocytosis, will treat for HCAP. IV vancomycin and cefepime ordered  - Wean O2 as tolerated  - Follow repeat blood cultures as obtained in ER    3/13: hypoxia likely secondary to pleural effusions, continue diuresis    Chronic myelogenous leukemia (HCC)  -consult oncology  -fentanyl for cancer pain    3/13: fentanyl effective, oncology to continue ruxolinitnib if family is able to bring, which I have not so far been able to do    E coli bacteremia  Recent hospitalization from 2/27-3/7/2022 for e.coli bacteremia and meningitis. Patient has been on rocephin daily since discharge with EOT date of 3/13. Based on culture susceptibilities, e.coli is also sensitive to zosyn and cefepime  -vancomycin, cefepime   -repeat BCx    3/13: infectious disease: continue antibiotic therapy through today    Thrombocytopenia [facility])  Admission CT with multiple splenic infarcts, portal vein thrombosis.   Admission platelet [phone].  -Consult hematology  -On enoxaparin    3/13: Hematology agrees, maintain current therapy    Malignant ascites  Noted on admission CT and exam  -albumin, bumetanide    3/13: renal function stable, continue bumetanide     Acquired hypercoagulable state ([address]  H/O multiple thromboses per chart review, now with splenic infarct  -lovenox BID    Esophageal varices ([address]  - Of note  - No current symptoms of this  - Continue home meds    Primary hypothyroidism  -levothyroxine 112    Cirrhosis ([facility]  - Of note        Dispo/Discharge Planning:    Remain inpatient    Diet:  ADULT DIET Regular  ADULT ORAL NUTRITION SUPPLEMENT Lunch, Dinner; Standard High Calorie/High Protein  ADULT ORAL NUTRITION SUPPLEMENT Breakfast; Other Supplement; yogurt  DVT PPx: On enoxaparin  Code status: Full Code    Hospital Problems as of 3/13/2022 Date Reviewed: 3/12/2022          Codes Class Noted - Resolved POA    * (Principal) Acute respiratory failure with hypoxemia (HCC) ICD-10-CM: J96.01  ICD-9-CM: 518.81  3/9/2022 - Present Yes        RESOLVED: Sepsis with acute organ dysfunction without septic shock (HCC) ICD-10-CM: A41.9, R65.20  ICD-9-CM: 038.9, 995.92  2/2/2022 - 3/12/2022 Yes        Chronic myelogenous leukemia (HCC) (Chronic) ICD-10-CM: C92.10  ICD-9-CM: 205.10  3/30/2015 - Present Yes        E coli bacteremia ICD-10-CM: R78.81, B96.20  ICD-9-CM: 790.7, 041.49  3/9/2022 - Present Yes        Thrombocytopenia ([facility] ICD-10-CM: D69.6  ICD-9-CM: 287.5  10/15/2021 - Present Yes        Malignant ascites ICD-10-CM: R18.0  ICD-9-CM: 789.51  7/26/2012 - Present Yes        Acquired hypercoagulable state ([facility] (Chronic) ICD-10-CM: M46.29  ICD-9-CM: 289.81  3/30/2015 - Present Yes        Pleural effusion, bilateral ICD-10-CM: J90  ICD-9-CM: 511.9  3/11/2022 - Present Yes        Hypoproteinemia ([facility] ICD-10-CM: E77.8  ICD-9-CM: 273.8  3/11/2022 - Present Yes        Cirrhosis ([facility] (Chronic) ICD-10-CM: K74.60  ICD-9-CM: 571.5  7/27/2012 - Present Yes        Primary hypothyroidism (Chronic) ICD-10-CM: E03.9  ICD-9-CM: 244.9  2/16/2009 - Present Yes        Esophageal varices (HCC) (Chronic) ICD-10-CM: I85.00  ICD-9-CM: 456.1  2/16/2009 - Present Yes    Overview Addendum 8/26/2021  2:30 PM by Anabel Wilson MD     grade 3             Portal vein thrombosis ICD-10-CM: P02  ICD-9-CM: 092  2/16/2009 - Present Unknown    Overview Addendum 8/26/2021  2:31 PM by Anabel Wilson MD     Ct scan 6-21-2 Apparent occlusion of the portal vein. In addition, the splenic vein, and superior mesenteric vein are not definitely seen and are also likely  occluded. Splenomegaly, and extensive varices are seen as described above consistent with the portal vein occlusion 7-05-12 on arixtra HIT negative on repeat 7-27-12 re admitted Cirrhotic appearance of the liver.  Mild to moderate ascites                   Objective:     Patient Vitals for the past 24 hrs:   Temp Pulse Resp BP SpO2   03/13/22 0616 98.1 °F (36.7 °C) (!) 102 16 (!) 87/68 92 %   03/13/22 0040 99 °F (37.2 °C) (!) 101 16 97/75 92 %   03/12/22 2105 -- -- -- -- 98 %   03/12/22 1954 99.4 °F (37.4 °C) (!) 105 16 98/72 99 %   03/12/22 1621 98.8 °F (37.1 °C) 89 16 99/74 94 %   03/12/22 1149 99 °F (37.2 °C) 91 16 100/70 92 %   03/12/22 0808 98.8 °F (37.1 °C) (!) 104 18 94/67 90 %     Oxygen Therapy  O2 Sat (%): 92 % (03/13/22 0616)  Pulse via Oximetry: 112 beats per minute (03/12/22 2105)  O2 Device: Nasal cannula (03/12/22 2105)  Skin Assessment: Clean, dry, & intact (03/12/22 0725)  Skin Protection for O2 Device: N/A (03/12/22 0725)  O2 Flow Rate (L/min): 2 l/min (03/12/22 2105)    Estimated body mass index is 23.03 kg/m² as calculated from the following:    Height as of this encounter: 5' 3\" (1.6 m). Weight as of this encounter: 59 kg (130 lb). No intake or output data in the 24 hours ending 03/13/22 0730    Blood pressure (!) 87/68, pulse (!) 102, temperature 98.1 °F (36.7 °C), resp. rate 16, height 5' 3\" (1.6 m), weight 59 kg (130 lb), SpO2 92 %. Physical Exam  Vitals and nursing note reviewed. Constitutional:       General: She is not in acute distress. Appearance: Normal appearance. She is ill-appearing. HENT:      Head: Normocephalic. Eyes:      Extraocular Movements: Extraocular movements intact. Cardiovascular:      Rate and Rhythm: Normal rate and regular rhythm. Pulses:           Radial pulses are 2+ on the left side. Pulmonary:      Effort: Pulmonary effort is normal. No respiratory distress. Abdominal:      General: Bowel sounds are normal. There is distension. Palpations: There is fluid wave. Tenderness: There is generalized abdominal tenderness. Musculoskeletal:         General: No deformity. Cervical back: No rigidity. Right lower leg: No edema. Left lower leg: No edema. Skin:     General: Skin is warm and dry. Coloration: Skin is not jaundiced. Neurological:      General: No focal deficit present. Mental Status: She is alert and oriented to person, place, and time. Psychiatric:         Mood and Affect: Mood normal.         Behavior: Behavior normal.      Comments: pleasant       I have reviewed ordered lab tests and independently visualized imaging below:    Recent Labs:  Recent Results (from the past 48 hour(s))   METABOLIC PANEL, COMPREHENSIVE    Collection Time: 03/12/22  3:37 AM   Result Value Ref Range    Sodium 134 (L) 136 - 145 mmol/L    Potassium 3.1 (L) 3.5 - 5.1 mmol/L    Chloride 102 98 - 107 mmol/L    CO2 23 21 - 32 mmol/L    Anion gap 9 7 - 16 mmol/L    Glucose 121 (H) 65 - 100 mg/dL    BUN 15 8 - 23 MG/DL    Creatinine 0.85 0.6 - 1.0 MG/DL    GFR est AA >60 >60 ml/min/1.73m2    GFR est non-AA >60 >60 ml/min/1.73m2    Calcium 8.0 (L) 8.3 - 10.4 MG/DL    Bilirubin, total 0.8 0.2 - 1.1 MG/DL    ALT (SGPT) 150 (H) 12 - 65 U/L    AST (SGOT) 244 (H) 15 - 37 U/L    Alk. phosphatase 96 50 - 136 U/L    Protein, total 6.4 6.3 - 8.2 g/dL    Albumin 2.9 (L) 3.2 - 4.6 g/dL    Globulin 3.5 2.3 - 3.5 g/dL    A-G Ratio 0.8 (L) 1.2 - 3.5     CBC WITH AUTOMATED DIFF    Collection Time: 03/12/22  3:37 AM   Result Value Ref Range    WBC 15.8 (H) 4.3 - 11.1 K/uL    RBC 2.46 (L) 4.05 - 5.2 M/uL    HGB 8.0 (L) 11.7 - 15.4 g/dL    HCT 23.9 (L) 35.8 - 46.3 %    MCV 97.2 79.6 - 97.8 FL    MCH 32.5 26.1 - 32.9 PG    MCHC 33.5 31.4 - 35.0 g/dL    RDW 20.5 (H) 11.9 - 14.6 %    PLATELET 449 (L) 335 - 450 K/uL    MPV 10.6 9.4 - 12.3 FL    ABSOLUTE NRBC 0.03 0.0 - 0.2 K/uL    DF AUTOMATED      NEUTROPHILS 91 (H) 43 - 78 %    LYMPHOCYTES 4 (L) 13 - 44 %    MONOCYTES 3 (L) 4.0 - 12.0 %    EOSINOPHILS 0 (L) 0.5 - 7.8 %    BASOPHILS 0 0.0 - 2.0 %    IMMATURE GRANULOCYTES 1 0.0 - 5.0 %    ABS. NEUTROPHILS 14.3 (H) 1.7 - 8.2 K/UL    ABS. LYMPHOCYTES 0.7 0.5 - 4.6 K/UL    ABS. MONOCYTES 0.5 0.1 - 1.3 K/UL    ABS.  EOSINOPHILS 0.1 0.0 - 0.8 K/UL    ABS. BASOPHILS 0.1 0.0 - 0.2 K/UL    ABS. IMM. GRANS. 0.2 0.0 - 0.5 K/UL   MAGNESIUM    Collection Time: 03/12/22  3:37 AM   Result Value Ref Range    Magnesium 2.0 1.8 - 2.4 mg/dL   PHOSPHORUS    Collection Time: 03/12/22  3:37 AM   Result Value Ref Range    Phosphorus 3.6 2.3 - 3.7 MG/DL   CBC WITH AUTOMATED DIFF    Collection Time: 03/13/22  3:44 AM   Result Value Ref Range    WBC 13.6 (H) 4.3 - 11.1 K/uL    RBC 2.51 (L) 4.05 - 5.2 M/uL    HGB 8.0 (L) 11.7 - 15.4 g/dL    HCT 24.7 (L) 35.8 - 46.3 %    MCV 98.4 (H) 79.6 - 97.8 FL    MCH 31.9 26.1 - 32.9 PG    MCHC 32.4 31.4 - 35.0 g/dL    RDW 21.5 (H) 11.9 - 14.6 %    PLATELET 99 (L) 690 - 450 K/uL    MPV 11.1 9.4 - 12.3 FL    ABSOLUTE NRBC 0.06 0.0 - 0.2 K/uL    DF AUTOMATED      NEUTROPHILS 87 (H) 43 - 78 %    LYMPHOCYTES 7 (L) 13 - 44 %    MONOCYTES 4 4.0 - 12.0 %    EOSINOPHILS 1 0.5 - 7.8 %    BASOPHILS 0 0.0 - 2.0 %    IMMATURE GRANULOCYTES 1 0.0 - 5.0 %    ABS. NEUTROPHILS 11.8 (H) 1.7 - 8.2 K/UL    ABS. LYMPHOCYTES 0.9 0.5 - 4.6 K/UL    ABS. MONOCYTES 0.6 0.1 - 1.3 K/UL    ABS. EOSINOPHILS 0.1 0.0 - 0.8 K/UL    ABS. BASOPHILS 0.1 0.0 - 0.2 K/UL    ABS. IMM.  GRANS. 0.1 0.0 - 0.5 K/UL   METABOLIC PANEL, BASIC    Collection Time: 03/13/22  3:44 AM   Result Value Ref Range    Sodium 136 136 - 145 mmol/L    Potassium 3.6 3.5 - 5.1 mmol/L    Chloride 103 98 - 107 mmol/L    CO2 26 21 - 32 mmol/L    Anion gap 7 7 - 16 mmol/L    Glucose 119 (H) 65 - 100 mg/dL    BUN 15 8 - 23 MG/DL    Creatinine 0.72 0.6 - 1.0 MG/DL    GFR est AA >60 >60 ml/min/1.73m2    GFR est non-AA >60 >60 ml/min/1.73m2    Calcium 8.2 (L) 8.3 - 10.4 MG/DL   MAGNESIUM    Collection Time: 03/13/22  3:44 AM   Result Value Ref Range    Magnesium 1.9 1.8 - 2.4 mg/dL   PHOSPHORUS    Collection Time: 03/13/22  3:44 AM   Result Value Ref Range    Phosphorus 2.5 2.3 - 3.7 MG/DL       All Micro Results     Procedure Component Value Units Date/Time    CULTURE, BLOOD [[id number]] Collected: 03/09/22 2237    Order Status: Completed Specimen: Blood Updated: 03/12/22 0818     Special Requests: --        RIGHT  Antecubital       Culture result: NO GROWTH 3 DAYS       CULTURE, BLOOD [[id number]] Collected: 03/09/22 2316    Order Status: Completed Specimen: Blood Updated: 03/12/22 0818     Special Requests: --        LEFT  Antecubital       Culture result: NO GROWTH 3 DAYS       CULTURE, URINE [[id number]] Collected: 03/09/22 2204    Order Status: Completed Specimen: Urine from Clean catch Updated: 03/12/22 0732     Special Requests: NO SPECIAL REQUESTS        Culture result:       <10,000 COLONIES/mL MIXED SKIN JOSÉ ISOLATED          C. DIFFICILE/EPI PCR [[id number]]     Order Status: Canceled Specimen: Stool     MSSA/MRSA SC BY PCR, NASAL SWAB [[id number]] Collected: 03/10/22 0145    Order Status: Canceled Specimen: Nasal swab     COVID-19 RAPID TEST [[id number]] Collected: 03/09/22 2204    Order Status: Completed Specimen: Nasopharyngeal Updated: 03/09/22 2257     Specimen source NASAL SWAB        COVID-19 rapid test Not detected        Comment:      The specimen is NEGATIVE for SARS-CoV-2, the novel coronavirus associated with COVID-19. A negative result does not rule out COVID-19. This test has been authorized by the FDA under an Emergency Use Authorization (EUA) for use by authorized laboratories.         Fact sheet for Healthcare Providers: ConventionUpdate.co.nz  Fact sheet for Patients: ConventionUpdate.co.nz       Methodology: Isothermal Nucleic Acid Amplification         C. DIFFICILE/EPI PCR [[id number]]     Order Status: Canceled Specimen: Stool     Gillermina Hams [[id number]]     Order Status: Canceled Specimen: Stool     OVA & PARASITES, STOOL [[id number]]     Order Status: Canceled Specimen: Stool     CULTURE, STOOL [[id number]]     Order Status: Canceled Specimen: Stool           Other Studies:  XR CHEST PORT    Result Date: 3/12/2022  Portable chest: History: Respiratory failure Comparison: 03/09/2022 Findings: A single view of the chest was obtained at 7:43 AM. The cardiac silhouette is mildly enlarged. There is a small right pleural effusion and adjacent atelectasis/infiltrate. Hazy right perihilar opacities have resolved. Mild streaky opacities at the left midlung zone are unchanged. The pulmonary vascularity is within normal limits. Shifting right lung opacities now with small right pleural effusion and adjacent atelectasis/infiltrate.        Current Meds:  Current Facility-Administered Medications   Medication Dose Route Frequency    albumin human 25% (BUMINATE) solution 25 g  25 g IntraVENous Q6H    cefTRIAXone (ROCEPHIN) 2 g in 0.9% sodium chloride (MBP/ADV) 50 mL MBP  2 g IntraVENous Q12H    bumetanide (BUMEX) injection 2 mg  2 mg IntraVENous BID    levothyroxine (SYNTHROID) tablet 56 mcg  56 mcg Oral Once per day on Sun    fentaNYL (DURAGESIC) 12 mcg/hr patch 1 Patch  1 Patch TransDERmal Q72H    morphine injection 1 mg  1 mg IntraVENous Q4H PRN    clotrimazole (MYCELEX) 1 % cream 1 Applicator  1 Applicator Vaginal QHS    ruxolitinib tab 5 mg (Patient Supplied)  5 mg Oral BID    levothyroxine (SYNTHROID) tablet 112 mcg  112 mcg Oral ACB    enoxaparin (LOVENOX) injection 60 mg  60 mg SubCUTAneous Q12H    dasatinib tab 80 mg (Patient Supplied)  80 mg Oral DAILY    zolpidem (AMBIEN) tablet 10 mg  10 mg Oral QHS PRN    diphenoxylate-atropine (LOMOTIL) tablet 1 Tablet  1 Tablet Oral QID PRN    oxyCODONE-acetaminophen (PERCOCET 10)  mg per tablet 1 Tablet  1 Tablet Oral Q8H PRN    sodium chloride (NS) flush 5-40 mL  5-40 mL IntraVENous Q8H    sodium chloride (NS) flush 5-40 mL  5-40 mL IntraVENous PRN    acetaminophen (TYLENOL) tablet 650 mg  650 mg Oral Q6H PRN    Or    acetaminophen (TYLENOL) suppository 650 mg  650 mg Rectal Q6H PRN    polyethylene glycol (MIRALAX) packet 17 g  17 g Oral DAILY PRN    ondansetron (ZOFRAN ODT) tablet 4 mg 4 mg Oral Q8H PRN    Or    ondansetron (ZOFRAN) injection 4 mg  4 mg IntraVENous Q6H PRN       Signed:  Gonzalez Brooks MD

## 2022-03-14 NOTE — PROCEDURES
PROCEDURE:    Chest US       PRE-OP DIAGNOSIS:  luis eduardo PLEURAL EFFUSION    POST-OP DIAGNOSIS:    PLEURAL EFFUSION        ANESTHESIA:    LOCAL ANESTHESIA WITH 1% LIDOCAINE 10 CC TOTAL. CHEST ULTRASOUND FINDINGS:    A Turbo-M, Sonosite ultrasound with a 5-16 mHz probe was used to image the chest and localize the pleural effusion on the Left and Right chest.    small anechoic space was seen on the Right consistent with an uncomplicated pleural effusion. small anechoic space was seen on the Left  consistent with an uncomplicated pleural effusion not warranted thoracentesis. There was effusion on R - maybe 600 cc which could not be tapped because she is on full dose Lovenox. She feels better, had diuresis, on RA and she will follow up in our office in 7-10  with repeat CXR for assessment. IF worsen she will call earlier   Patient agrees  With this plan.         Zuhair Babb MD

## 2022-03-14 NOTE — PROGRESS NOTES
Linwood Couch  Admission Date: 3/9/2022             Daily Progress Note: 3/14/2022    The patient's chart is reviewed and the patient is discussed with the staff.       The patient is a 64 y.o.  female seen and evaluated at the request of Dr. Kye Garcia due to pleural effusions.     She has a hx of CML, thrombotic state on chronic anticoagulation, cirrhosis, esophageal varices, e. Coli bacteremia, meningitis who was just discharged and is now re-admittted with AMS and dyspnea. CXR was felt to reveal some pulmonary congestion and a R infiltrate. She had been on IV rocephin with EOT scheduled for 3/13. She was admitted for likely HAP and is now on Vanc and Cefepime.     Following admission, a CT of the abdomen was notable for bilateral effusions, ongoing portal vein thrombosis, splenomegaly, moderate ascites, distal esophageal thickening, and 2 anterior wall hernias. She is fully anticoagulated. Blood cx here are NG and VS have been stable except for a mild tachycardia. She is on 3L O2 with a sat of 93%. Her breathing has improved since admission and her LA has normalized. He admission albumin was 2.2 and that is being supplemented. Subjective:      On 3L NC  Feels better    Current Facility-Administered Medications   Medication Dose Route Frequency    potassium, sodium phosphates (NEUTRA-PHOS) packet 1 Packet  1 Packet Oral QID    potassium chloride (K-DUR, KLOR-CON M20) SR tablet 40 mEq  40 mEq Oral NOW    cefTRIAXone (ROCEPHIN) 2 g in 0.9% sodium chloride (MBP/ADV) 50 mL MBP  2 g IntraVENous Q12H    bumetanide (BUMEX) injection 2 mg  2 mg IntraVENous BID    levothyroxine (SYNTHROID) tablet 56 mcg  56 mcg Oral Once per day on Sun    fentaNYL (DURAGESIC) 12 mcg/hr patch 1 Patch  1 Patch TransDERmal Q72H    morphine injection 1 mg  1 mg IntraVENous Q4H PRN    clotrimazole (MYCELEX) 1 % cream 1 Applicator  1 Applicator Vaginal QHS    ruxolitinib tab 5 mg (Patient Supplied)  5 mg Oral BID    levothyroxine (SYNTHROID) tablet 112 mcg  112 mcg Oral ACB    enoxaparin (LOVENOX) injection 60 mg  60 mg SubCUTAneous Q12H    dasatinib tab 80 mg (Patient Supplied)  80 mg Oral DAILY    zolpidem (AMBIEN) tablet 10 mg  10 mg Oral QHS PRN    diphenoxylate-atropine (LOMOTIL) tablet 1 Tablet  1 Tablet Oral QID PRN    oxyCODONE-acetaminophen (PERCOCET 10)  mg per tablet 1 Tablet  1 Tablet Oral Q8H PRN    sodium chloride (NS) flush 5-40 mL  5-40 mL IntraVENous Q8H    sodium chloride (NS) flush 5-40 mL  5-40 mL IntraVENous PRN    acetaminophen (TYLENOL) tablet 650 mg  650 mg Oral Q6H PRN    Or    acetaminophen (TYLENOL) suppository 650 mg  650 mg Rectal Q6H PRN    polyethylene glycol (MIRALAX) packet 17 g  17 g Oral DAILY PRN    ondansetron (ZOFRAN ODT) tablet 4 mg  4 mg Oral Q8H PRN    Or    ondansetron (ZOFRAN) injection 4 mg  4 mg IntraVENous Q6H PRN       Review of Systems    Constitutional: negative for fever, chills, sweats  Cardiovascular: negative for chest pain, palpitations, syncope, edema  Gastrointestinal:  negative for dysphagia, reflux, vomiting, diarrhea, abdominal pain, or melena  Neurologic:  negative for focal weakness, numbness, headache    Objective:     Vitals:    03/13/22 2103 03/13/22 2255 03/14/22 0342 03/14/22 0722   BP:  96/68 97/63 118/70   Pulse:  100 99 97   Resp:  19 18 18   Temp:  98.7 °F (37.1 °C) 99.5 °F (37.5 °C) 98.9 °F (37.2 °C)   SpO2: 99% 98% 97% 97%   Weight:       Height:             Intake/Output Summary (Last 24 hours) at 3/14/2022 0844  Last data filed at 3/13/2022 1705  Gross per 24 hour   Intake --   Output 300 ml   Net -300 ml       Physical Exam:   Constitution:  the patient is well developed and in no acute distress  EENMT:  Sclera clear, pupils equal, oral mucosa moist  Respiratory: diminished bi l  Cardiovascular:  RRR without M,G,R  Gastrointestinal: soft and non-tender; with positive bowel sounds. Musculoskeletal: warm without cyanosis.  There is no lower extremity edema. Skin:  no jaundice or rashes, no wounds   Neurologic: no gross neuro deficits     Psychiatric:  alert and oriented x 3    CXR:     None today   LAB  No results for input(s): GLUCPOC in the last 72 hours. No lab exists for component: [place]   Recent Labs     03/14/22 0346 03/13/22 0344 03/12/22  0337   WBC 13.7* 13.6* 15.8*   HGB 8.6* 8.0* 8.0*   HCT 27.5* 24.7* 23.9*   * 99* 102*     Recent Labs     03/14/22 0346 03/13/22 0344 03/12/22  0337   * 136 134*   K 3.4* 3.6 3.1*    103 102   CO2 28 26 23   * 119* 121*   BUN 13 15 15   CREA 0.70 0.72 0.85   MG 2.1 1.9 2.0   CA 8.0* 8.2* 8.0*   PHOS 2.1* 2.5 3.6   ALB  --   --  2.9*   TBILI  --   --  0.8   ALT  --   --  150*     No results for input(s): PH, PCO2, PO2, HCO3, PHI, PCO2I, PO2I, HCO3I in the last 72 hours. No results for input(s): LCAD, LAC in the last 72 hours.       Assessment:  (Medical Decision Making)     Hospital Problems  Date Reviewed: 3/12/2022          Codes Class Noted POA    Pleural effusion, bilateral ICD-10-CM: J90  ICD-9-CM: 511.9  3/11/2022 Yes        Hypoproteinemia ([facility] ICD-10-CM: E77.8  ICD-9-CM: 273.8  3/11/2022 Yes        * (Principal) Acute respiratory failure with hypoxemia ([facility] ICD-10-CM: J96.01  ICD-9-CM: 518.81  3/9/2022 Yes        E coli bacteremia ICD-10-CM: R78.81, B96.20  ICD-9-CM: 790.7, 041.49  3/9/2022 Yes        Thrombocytopenia ([facility] ICD-10-CM: D69.6  ICD-9-CM: 287.5  10/15/2021 Yes        Chronic myelogenous leukemia (HCC) (Chronic) ICD-10-CM: C92.10  ICD-9-CM: 205.10  3/30/2015 Yes        Acquired hypercoagulable state ([facility] (Chronic) ICD-10-CM: J90.45  ICD-9-CM: 289.81  3/30/2015 Yes        Cirrhosis (HCC) (Chronic) ICD-10-CM: K74.60  ICD-9-CM: 571.5  7/27/2012 Yes        Malignant ascites ICD-10-CM: R18.0  ICD-9-CM: 789.51  7/26/2012 Yes        Primary hypothyroidism (Chronic) ICD-10-CM: E03.9  ICD-9-CM: 244.9  2/16/2009 Yes        Esophageal varices ([facility] (Chronic) ICD-10-CM: I85.00  ICD-9-CM: 456.1  2/16/2009 Yes    Overview Addendum 8/26/2021  2:30 PM by Valeria Billingsley MD     grade 3                   Plan:  (Medical Decision Making)     --will check US if there is enough fluid, she is on lovenox so that would have to be held for 24 hours    More than 50% of the time documented was spent in face-to-face contact with the patient and in the care of the patient on the floor/unit where the patient is located.     Clarence Saavedra MD

## 2022-03-14 NOTE — PROGRESS NOTES
I met with patient prior to her d/c to make sure she had a key to her home. Ride Share was rescheduled to give her time to dress. Patient still refusing rehab and wanting to go home. She is agreeing to home health. Referral changed from [facility] to [facility] per Tristin Villagomez. . Patient states she has a walker at home and denies any d/c planning needs.

## 2022-03-14 NOTE — PROGRESS NOTES
Patient is anticipated to discharge today. Referral sent to [facility] for PT/OT/RN/CNA and SW. Ride arranged through roundtrip as patient states she has no transportation home. Plan for discharge is as follows:    Care Management Interventions  PCP Verified by CM: Yes  Mode of Transport at Discharge: Self  Transition of Care Consult (CM Consult): Discharge Planning,Home Health  Physical Therapy Consult: Yes  Occupational Therapy Consult: Yes  Support Systems: No Support Systems  Confirm Follow Up Transport: Family  The Plan for Transition of Care is Related to the Following Treatment Goals : Interim  PT/OT/RN/CNA and SW  The Patient and/or Patient Representative was Provided with a Choice of Provider and Agrees with the Discharge Plan?: Yes  Freedom of Choice List was Provided with Basic Dialogue that Supports the Patient's Individualized Plan of Care/Goals, Treatment Preferences and Shares the Quality Data Associated with the Providers?: Yes  Discharge Location  Patient Expects to be Discharged to[de-identified] Home with home health    Milestones and interventions have been entered.      Derek Mesa LMSW    [facility]    * [email or web address]

## 2022-03-14 NOTE — H&P
Date of Surgery Update:  Aidee Davidson was seen and examined. History and physical has been reviewed. The patient has been examined.  There have been no significant clinical changes since the completion of the originally dated History and Physical.    Signed By: Zuhair Babb MD     March 14, 2022 9:36 AM

## 2022-03-14 NOTE — PROGRESS NOTES
Problem: Self Care Deficits Care Plan (Adult)  Goal: *Acute Goals and Plan of Care (Insert Text)  Outcome: Progressing Towards Goal  Note:   Patient will complete toileting with independence to increase self care independence. Patient will complete lower body dressing with supervision to increase self care independence. Patient will improve static standing and dynamic standing at sink for 10 minutes to improve independence with transfers and self cares. Patient will complete all functional transfers with supervision using adaptive equipment as needed. Timeframe: 7 visits       OCCUPATIONAL THERAPY: Initial Assessment, Daily Note and AM 3/14/2022  INPATIENT: OT Visit Days: 1  Payor: Ligia Espinoza / Plan: [employer] / Product Type: [employer] /      NAME/AGE/GENDER: Birder Cooks is a 64 y.o. female   PRIMARY DIAGNOSIS:  HCAP (healthcare-associated pneumonia) [J18.9] Acute respiratory failure with hypoxemia ([facility] Acute respiratory failure with hypoxemia ([facility]        ICD-10: Treatment Diagnosis:    Generalized Muscle Weakness (M62.81)  Other lack of cordination (R27.8)  Difficulty in walking, Not elsewhere classified (R26.2)   Precautions/Allergies:    Latex, Sulfa (sulfonamide antibiotics), Tramadol, Augmentin [amoxicillin-pot clavulanate], Codeine, Divalproex sodium, Morphine, Potassium clavulanate, Rizatriptan, Tetanus immune globulin, Vancomycin, Xanax [alprazolam], and Zonegran [zonisamide]      ASSESSMENT:     Ms. Irma Valentine presents with the above diagnosis and overall deficits in strength, functional mobility, and ADL performance. At baseline, she lives alone in 2 story apartment but reports she can stay on the first floor as long as needed. She reports independence prior. RW and BSC at home.  Today, she is able to perform bed mobility with SPV, sit<>stand to [facility] with SBA, toilet with SPV, don underwear with CGA and mobilize to bathroom with SBA and RW. Standing at sink, pt was able to maintain balance while washing hands and performing oral hygiene. She mobilized back to bedside recliner and was left sitting up with all needs met and in reach. She endorses pain to R hip, but is able to perform figure four when LB dressing and bear weight during mobility with RW. She will benefit from skilled OT while in house to maintain current level of function and should progress well with continued therapy once home. This section established at most recent assessment   PROBLEM LIST (Impairments causing functional limitations):  Decreased Strength  Decreased ADL/Functional Activities  Decreased Transfer Abilities  Decreased Ambulation Ability/Technique  Decreased Balance  Decreased Activity Tolerance   INTERVENTIONS PLANNED: (Benefits and precautions of occupational therapy have been discussed with the patient.)  Activities of daily living training  Adaptive equipment training  Balance training  Neuromuscular re-eduation  Therapeutic activity  Therapeutic exercise     TREATMENT PLAN: Frequency/Duration: Follow patient 1-2 sessions to address above goals. Rehabilitation Potential For Stated Goals: Good     REHAB RECOMMENDATIONS (at time of discharge pending progress):    Placement: It is my opinion, based on this patient's performance to date, that Ms. Tenisha Chinchilla may benefit from [address] after discharge due to the functional deficits listed above that are likely to improve with skilled rehabilitation because he/she has multiple medical issues that affect his/her functional mobility in the community.   Equipment:   None at this time (has RW and BSC at home)              OCCUPATIONAL PROFILE AND HISTORY:   History of Present Injury/Illness (Reason for Referral):  See H&P  Past Medical History/Comorbidities:   Ms. Tenisha Chinchilla  has a past medical history of Acute blood loss anemia (4/6/2018), Anemia, C. difficile diarrhea (4/1/2015), Cerebral edema ([facility] (4/1/2015), Chronic migraine (9/22/2016), Chronic myelogenous leukemia ([address] Chronic pain, Coagulation disorder ([address] Esophageal spasm, Esophageal varices ([address] GI bleed (8/3/2016), H/O craniotomy, Hematemesis (8/20/2021), Leukocytosis (3/14/2015), Melena (8/20/2021), MRSA colonization (6/25/2012), Polycythemia vera(238.4), Portal hypertension ([address] Portal vein thrombosis (6/20/2012), Primary hypothyroidism, Pulmonary embolism ([address] (2006), S/P exploratory laparotomy, 6/20/12  (6/25/2012), S/P small bowel resection, Seizure ([address] (3/14/2015), Seizure disorder ([address] (3/30/2015), Seizures ([address] Sepsis with acute organ dysfunction without septic shock ([address] (2/2/2022), Splenomegaly, congestive, chronic, Stroke (cerebrum) ([address] (4/11/2015), Stroke ([address] Thrombocytopenia, 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 She has no past medical history of Adverse effect of anesthesia, Difficult intubation, Malignant hyperthermia due to anesthesia, or Pseudocholinesterase deficiency. Ms. Joceline Zaragoza  has a past surgical history that includes hx gyn; hx gyn; neurological procedure unlisted (2010-12); hx gi; hx gi; pr abdomen surgery proc unlisted; hx cholecystectomy; pr abdomen surgery proc unlisted; hx appendectomy; hx breast biopsy (Bilateral); and hx orthopaedic (Left, 2008).   Social History/Living Environment:   Home Environment: Apartment  # Steps to Enter: 1  One/Two Story Residence: Two story, live on 1st floor  Living Alone: Yes  Support Systems: No Support Systems  Patient Expects to be Discharged to[de-identified] Unable to determine at this time  Current DME Used/Available at Home: Commode, bedside,Walker, rolling  Prior Level of Function/Work/Activity:  Independent prior to recent hospitalizations     Number of Personal Factors/Comorbidities that affect the Plan of Care: Brief history (0):  LOW COMPLEXITY   ASSESSMENT OF OCCUPATIONAL PERFORMANCE[de-identified] Activities of Daily Living:   Basic ADLs (From Assessment) Complex ADLs (From Assessment)   Feeding: Independent  Oral Facial Hygiene/Grooming: Supervision  Bathing: Contact guard assistance  Upper Body Dressing: Independent  Lower Body Dressing: Contact guard assistance  Toileting: Supervision     Grooming/Bathing/Dressing Activities of Daily Living     Cognitive Retraining  Safety/Judgement: Fall prevention; Awareness of environment                 Functional Transfers  Bathroom Mobility: Stand-by assistance  Toilet Transfer : Stand-by assistance (BSC)     Bed/Mat Mobility  Supine to Sit: Stand-by assistance  Sit to Stand: Stand-by assistance  Stand to Sit: Stand-by assistance  Bed to Chair: Contact guard assistance  Scooting: Stand-by assistance     Most Recent Physical Functioning:   Gross Assessment:                  Posture:     Balance:  Sitting: Intact  Standing: With support;Pull to stand Bed Mobility:  Supine to Sit: Stand-by assistance  Scooting: Stand-by assistance  Wheelchair Mobility:     Transfers:  Sit to Stand: Stand-by assistance  Stand to Sit: Stand-by assistance  Bed to Chair: Contact guard assistance            Patient Vitals for the past 6 hrs:   BP SpO2 O2 Flow Rate (L/min) Pulse   22 0850 -- 96 % 2 l/min --   22 0905 -- 94 % 0 l/min --   22 1151 117/78 93 % -- (!) 103       Mental Status  Neurologic State: Alert  Orientation Level: Oriented X4  Cognition: Follows commands  Perception: Appears intact  Perseveration: No perseveration noted  Safety/Judgement: Fall prevention,Awareness of environment                          Physical Skills Involved:  Balance  Strength  Activity Tolerance Cognitive Skills Affected (resulting in the inability to perform in a timely and safe manner):  [facility] Psychosocial Skills Affected:  Environmental Adaptation   Number of elements that affect the Plan of Care: 3-5:  MODERATE COMPLEXITY   CLINICAL DECISION MAKIN [facility] [address] [id number] AM-PAC 5 Clicks   Daily Activity Inpatient Short Form  How much help from another person does the patient currently need. .. Total A Lot A Little None   1. Putting on and taking off regular lower body clothing? [] 1   [] 2   [x] 3   [] 4   2. Bathing (including washing, rinsing, drying)? [] 1   [] 2   [x] 3   [] 4   3. Toileting, which includes using toilet, bedpan or urinal?   [] 1   [] 2   [x] 3   [] 4   4. Putting on and taking off regular upper body clothing? [] 1   [] 2   [] 3   [x] 4   5. Taking care of personal grooming such as brushing teeth? [] 1   [] 2   [] 3   [x] 4   6. Eating meals? [] 1   [] 2   [] 3   [x] 4   © 2007, Trustees of [address] [place] [address], under license to [email or web address]. All rights reserved      Score:  Initial: 21 Most Recent: X (Date: -- )    Interpretation of Tool:  Represents activities that are increasingly more difficult (i.e. Bed mobility, Transfers, Gait). Medical Necessity:     Skilled intervention continues to be required due to the above deficits. Reason for Services/Other Comments:  See above deficits. Use of outcome tool(s) and clinical judgement create a POC that gives a: LOW COMPLEXITY         TREATMENT:   (In addition to Assessment/Re-Assessment sessions the following treatments were rendered)     Pre-treatment Symptoms/Complaints:    Pain: Initial:   Pain Intensity 1: 0  Post Session:  0     Self Care: (42): Procedure(s) (per grid) utilized to improve and/or restore self-care/home management as related to dressing, toileting, grooming and functional household mobility and transfers. Required minimal verbal and tactile cueing to facilitate activities of daily living skills and compensatory activities.   Evaluation complete    Braces/Orthotics/Lines/Etc:   IV  O2 Device: None (Room air)  Treatment/Session Assessment:    Response to Treatment:  Good, up in chair  Interdisciplinary Collaboration:   Physical Therapist  Occupational Therapist  Registered Nurse  After treatment position/precautions:   Up in chair  Bed/Chair-wheels locked  Call light within reach  RN notified   Compliance with Program/Exercises: Will assess as treatment progresses. Recommendations/Intent for next treatment session: \"Next visit will focus on reduction in assistance provided\".   Total Treatment Duration:  OT Patient Time In/Time Out  Time In: 1100  Time Out: 6[address]

## 2022-03-19 PROBLEM — R53.81 PHYSICAL DECONDITIONING: Status: ACTIVE | Noted: 2022-01-01

## 2022-03-19 PROBLEM — C92.10 CML (CHRONIC MYELOCYTIC LEUKEMIA) (HCC): Status: ACTIVE | Noted: 2022-01-01

## 2022-03-19 PROBLEM — Z86.718 HISTORY OF DVT (DEEP VEIN THROMBOSIS): Status: ACTIVE | Noted: 2022-01-01

## 2022-03-19 PROBLEM — I50.31 ACUTE DIASTOLIC (CONGESTIVE) HEART FAILURE (HCC): Status: ACTIVE | Noted: 2022-01-01

## 2022-03-19 NOTE — PROGRESS NOTES
SW reviewed patient's chart and conducted a baseline assessment. Discharge plan at this time is as follows:     Care Management Interventions  PCP Verified by CM: Yes  Mode of Transport at Discharge: BLS  Transition of Care Consult (CM Consult): SNF  Physical Therapy Consult: Yes  Occupational Therapy Consult: Yes  Support Systems: No Support Systems  Confirm Follow Up Transport: Self  The Plan for Transition of Care is Related to the Following Treatment Goals : STR  The Patient and/or Patient Representative was Provided with a Choice of Provider and Agrees with the Discharge Plan?: Yes  Name of the Patient Representative Who was Provided with a Choice of Provider and Agrees with the Discharge Plan: Christopher Mercado  Freedom of Choice List was Provided with Basic Dialogue that Supports the Patient's Individualized Plan of Care/Goals, Treatment Preferences and Shares the Quality Data Associated with the Providers?: Yes  Discharge Location  Patient Expects to be Discharged to[de-identified] Skilled nursing facility      *Please note that discharge plans can change throughout an inpatient admission.  Ensure that you are referring to the most recent social work/nurse case management note for current discharge plan*     Eloy Ledesma, [address] [place] [address] Work   [facility]    * [email or web address]

## 2022-03-19 NOTE — ED TRIAGE NOTES
[facility]ia [facility] pt. C/o lower back pain and weakness. PT. Was discharged on Sunday has been unable to taker care of herself due to weakness. EMS Vitals.    BP: 115/75  HR:97  O2: 92%   [id number]   Temp:98.4

## 2022-03-19 NOTE — ED PROVIDER NOTES
Patient is a 35-year-old female with CML currently under the care of Dr. Ericka Joyner on chemo, chronic back pain, portal and mesenteric vein thrombosis with subsequent splenomegaly and prior DVT/PE currently on anticoagulation who was admitted to the hospital recently for pneumonia/CHF. She went home 4 days ago. She states she lives by herself with no help at home. She does not have any home health nursing available to her. She states that she has been getting progressively weak and unable to get out of bed. She has been having diarrhea with an occasional subjective fever. She denies any cough or shortness of breath. She denies any urinary symptoms. She states she has been unable to eat or drink due to her weakness and inability to ambulate. Past Medical History:   Diagnosis Date    Acute blood loss anemia 4/6/2018    Anemia     C. difficile diarrhea 4/1/2015    Cerebral edema (HCC) 4/1/2015    Chronic migraine 9/22/2016    Chronic myelogenous leukemia ([address]     [place] chromosome/ converted from polycythemia in 2009- to 2012 when she was dx w leukemia    Chronic pain     Coagulation disorder ([address]     \"clotting and Bleeding Problem\" dr Pamela Zamora follows     Esophageal spasm     with banding     Esophageal varices ([address]     grade 3    GI bleed 8/3/2016    H/O craniotomy     3-29-15.  due to blood clot - which caused a seizure  - then pt fell and hit     Hematemesis 8/20/2021    Leukocytosis 3/14/2015    Melena 8/20/2021    MRSA colonization 6/25/2012    Polycythemia vera(238.4)     JAK2 mutation    Portal hypertension ([address]     with varices    Portal vein thrombosis 6/20/2012    Ct scan 6-21-2 Apparent occlusion of the portal vein. In addition, the splenic vein, and superior mesenteric vein are not definitely seen and are also likely  occluded.  Splenomegaly, and extensive varices are seen as described above consistent with the portal vein occlusion 7-05-12 on arixtra HIT negative on repeat 12 re admitted Cirrhotic appearance of the liver. Mild to moderate ascites, as    Primary hypothyroidism     Pulmonary embolism ([address]     not on coumadin anymore     S/P exploratory laparotomy, 12    Bowel resection:  336 cm of small bowel removed, approximately 9 feet and placement of feeding jejunostomy.      S/P small bowel resection     .  9 feet removed due to  gangrene which wa due to blood clot    Seizure ([address] 3/14/2015    Seizure disorder ([address] 3/30/2015    due to clotting factor    Seizures ([address]     last one 3- - followed by aniyah     Sepsis with acute organ dysfunction without septic shock ([address] 2022    Splenomegaly, congestive, chronic     Stroke (cerebrum) ([address] 2015    had bled into head- surgery    Stroke [facility])     pt had stroke like symptoms     Thrombocytopenia, HIT negative 2012 platelets lower repeat HIT 12 platelets in 49,653'N    Thrombosis, portal vein     portal , spleenic and recently superior mesenteric    Transfusion history     many blood tranfusions - last 3-29-15 after brain surgery    Traumatic hemorrhage of right cerebrum ([address] 3/30/2015       Past Surgical History:   Procedure Laterality Date    HX APPENDECTOMY      with small bowel resection    HX BREAST BIOPSY Bilateral     Lt - ; Rt -     HX CHOLECYSTECTOMY      HX GI      liver biopsy    HX GI      bowel removed small - 9 feet     HX GYN       x 2    HX GYN      D&C following miscarriage    HX ORTHOPAEDIC Left     torn labrum shoulder (screw in place)    NEUROLOGICAL PROCEDURE UNLISTED  2010    craniotomy to evacuate subdural hematoma following a fall from a seizure    NM ABDOMEN SURGERY PROC UNLISTED      umbilical hernia repair    NM ABDOMEN SURGERY PROC UNLISTED      9ft of small bowel excised then reconnected         Family History:   Problem Relation Age of Onset    Diabetes Mother     Stroke Mother         after surgery    Cancer Father         brain    No Known Problems Sister     Other Sister         diverticulitis    Other Sister         diverticulitis    Cancer Sister         lyjmphoma    Breast Cancer Maternal Aunt 48    Thyroid Disease Paternal Grandmother        Social History     Socioeconomic History    Marital status: SINGLE     Spouse name: Not on file    Number of children: Not on file    Years of education: Not on file    Highest education level: Not on file   Occupational History    Not on file   Tobacco Use    Smoking status: Former Smoker     Packs/day: 0.20     Years: 10.00     Pack years: 2.00     Types: Cigarettes     Quit date:      Years since quittin.2    Smokeless tobacco: Never Used   Substance and Sexual Activity    Alcohol use: No    Drug use: No    Sexual activity: Not on file   Other Topics Concern    Not on file   Social History Narrative    Not on file     Social Determinants of Health     Financial Resource Strain:     Difficulty of Paying Living Expenses: Not on file   Food Insecurity:     Worried About [address] in the Last Year: Not on file    [address] in the Last Year: Not on file   Transportation Needs:     Lack of Transportation (Medical): Not on file    Lack of Transportation (Non-Medical):  Not on file   Physical Activity:     Days of Exercise per Week: Not on file    Minutes of Exercise per Session: Not on file   Stress:     Feeling of Stress : Not on file   Social Connections:     Frequency of Communication with Friends and Family: Not on file    Frequency of Social Gatherings with Friends and Family: Not on file    Attends Sabianism Services: Not on file    Active Member of Clubs or Organizations: Not on file    Attends Club or Organization Meetings: Not on file    Marital Status: Not on file   Intimate Partner Violence:     Fear of Current or Ex-Partner: Not on file    Emotionally Abused: Not on file  Physically Abused: Not on file    Sexually Abused: Not on file   Housing Stability:     Unable to Pay for Housing in the Last Year: Not on file    Number of Places Lived in the Last Year: Not on file    Unstable Housing in the Last Year: Not on file         ALLERGIES: Latex, Sulfa (sulfonamide antibiotics), Tramadol, Augmentin [amoxicillin-pot clavulanate], Codeine, Divalproex sodium, Morphine, Potassium clavulanate, Rizatriptan, Tetanus immune globulin, Vancomycin, Xanax [alprazolam], and Zonegran [zonisamide]    Review of Systems   Constitutional: Positive for fatigue and fever. Negative for chills. Respiratory: Negative for cough and shortness of breath. Gastrointestinal: Positive for diarrhea. Negative for nausea and vomiting. All other systems reviewed and are negative. Vitals:    03/18/22 2047 03/18/22 2049   BP: 115/75 103/69   Pulse: 97 98   Resp: 16 16   Temp: 98.4 °F (36.9 °C) 99.7 °F (37.6 °C)   SpO2: 92% 94%   Weight:  59 kg (130 lb)   Height:  5' 3\" (1.6 m)            Physical Exam  Vitals and nursing note reviewed. Constitutional:       Appearance: Normal appearance. She is well-developed. HENT:      Head: Normocephalic and atraumatic. Eyes:      Conjunctiva/sclera: Conjunctivae normal.      Pupils: Pupils are equal, round, and reactive to light. Cardiovascular:      Rate and Rhythm: Normal rate and regular rhythm. Pulmonary:      Effort: Pulmonary effort is normal. No respiratory distress. Breath sounds: No wheezing or rales. Abdominal:      General: There is no distension. Palpations: Abdomen is soft. There is mass. Tenderness: There is no abdominal tenderness. Comments: Palpable hepatomegaly   Musculoskeletal:         General: No tenderness. Cervical back: Normal range of motion and neck supple. Right lower leg: No edema. Left lower leg: No edema. Skin:     General: Skin is warm and dry.    Neurological:      General: No focal deficit present. Mental Status: She is alert and oriented to person, place, and time. Psychiatric:         Mood and Affect: Mood normal.         Behavior: Behavior normal.          MDM  Number of Diagnoses or Management Options  Acute on chronic congestive heart failure, unspecified heart failure type [facility]): new and requires workup  CML (chronic myelocytic leukemia) (HCC)  Debility: new and requires workup  Diagnosis management comments: 1:04 AM Discussed results with pt, need for admission. Hospitalist has been paged.        Amount and/or Complexity of Data Reviewed  Clinical lab tests: ordered and reviewed  Tests in the radiology section of CPT®: ordered and reviewed  Discuss the patient with other providers: yes    Risk of Complications, Morbidity, and/or Mortality  Presenting problems: moderate  Diagnostic procedures: moderate  Management options: moderate    Patient Progress  Patient progress: stable         Procedures

## 2022-03-19 NOTE — ED NOTES
Pt daughter called to inform us that pt has been in and out of the hospital for the past few weeks-months and pt continuously lies on discharge and tells  And staff that she has home care but she does not have home care. Pts daughter wants to make sure that we are aware of this. She is also concerned that pt takes more pain medicine than prescribed with each use. Pts daughter states that pt is immobile at home, not taking pertinent meds as prescribed and does not eat/drink much if anything daily.

## 2022-03-19 NOTE — PROGRESS NOTES
03/19/22 0510   Dual Skin Pressure Injury Assessment   Dual Skin Pressure Injury Assessment WDL   Second Care Provider (Based on [address] [place] [address]) KEEGAN Tanner RN   Skin Integumentary   Skin Integumentary (WDL) WDL    Pressure  Injury Documentation No Pressure Injury Noted-Pressure Ulcer Prevention Initiated   abdominal midline scar

## 2022-03-19 NOTE — ED NOTES
TRANSFER - OUT REPORT:    Verbal report given to amirinne, rn  on Renetta Bourgeois  being transferred to [address] [place] [address] for routine progression of care       Report consisted of patients Situation, Background, Assessment and   Recommendations(SBAR). Information from the following report(s) ED Summary was reviewed with the receiving nurse. Lines:   Peripheral IV 03/18/22 Proximal;Right Arm (Active)        Opportunity for questions and clarification was provided.       Patient transported with:   Registered Nurse

## 2022-03-19 NOTE — PROGRESS NOTES
Hospitalist Progress Note   Admit Date:  3/18/2022  8:52 PM   Name:  Ramez Eugene   Age:  64 y.o. 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 Patient was recently admitted and discharged from our facility on 3/14. Patient was treated for acute congestive heart failure at that time. Patient was recently diagnosed with E. coli bacterial meningitis, completed course of antibiotics. Patient reports bilateral lower extremity edema, orthopnea. Patient reports intermittent diarrhea. Evaluated in the emergency room, patient was given diuretics, chest x-ray was done shows evidence of pulmonary vascular congestion. ER physician requested admission of this patient for further evaluation and management. Pt admitted for diastolic CHFe and started on IV lasix. Subjective/24hr Events (22): Pt reporting eye itching worse on left. Has had some white/crusting discharge. Concerned about pink eye. ROS:  10 systems reviewed and negative except as noted above.      Assessment & Plan:     # Acute on chronic diastolic CHF  - continue IV lasix  - holding Toprol for borderline BP  - monitor I/O's     # CML  - OP oncology follow-up  - continue home medications - sprycel    # Debility  - PT/OT  - recommending SNF    # hx of DVT  - continue therapeutic lovenox    # hypothyroidism  - TSH 22, increase dose and repeat TSH in 4-6 weeks    # eye irritation  - ? Bacterial conjuctivitis left eye vs Alergic conjuctivitis  - add polytrim gtts and KIKE meds     # HTN  - BP stable, cont toprol       Dispo/Discharge Planning:         Likely to STR in 2-3 days. PT/OT, PPD placed and CM following  Diet:  ADULT DIET Regular;  Low Sodium (2 gm)  DVT PPx: lovenox sq  Code status: Full Code    Hospital Problems as of 3/19/2022 Date Reviewed: 3/12/2022          Codes Class Noted - Resolved POA    CML (chronic myelocytic leukemia) ([facility] [address] ICD-10-CM: C92.10  ICD-9-CM: 205.10  3/19/2022 - Present Unknown        Physical deconditioning ICD-10-CM: R53.81  ICD-9-CM: 799.3  3/19/2022 - Present Unknown        * (Principal) Acute diastolic (congestive) heart failure (HCC) ICD-10-CM: I50.31  ICD-9-CM: 428.31, 428.0  3/19/2022 - Present Unknown        History of DVT (deep vein thrombosis) ICD-10-CM: Z86.718  ICD-9-CM: V12.51  3/19/2022 - Present Unknown        Chronic myelogenous leukemia (HCC) (Chronic) ICD-10-CM: C92.10  ICD-9-CM: 205.10  3/30/2015 - Present Yes              Objective:     Patient Vitals for the past 24 hrs:   Temp Pulse Resp BP SpO2   03/19/22 1112 97.8 °F (36.6 °C) 92 18 96/75 92 %   03/19/22 0733 97.3 °F (36.3 °C) 90 16 103/71 94 %   03/19/22 0510 97.9 °F (36.6 °C) 96 16 112/78 95 %   03/19/22 0439 -- 96 -- 110/70 95 %   03/19/22 0257 -- 97 -- 113/71 --   03/19/22 0200 -- 96 17 115/72 95 %   03/19/22 0000 -- (!) 102 -- 128/84 92 %   03/18/22 2213 -- 98 22 -- (!) 89 %   03/18/22 2212 -- 99 25 -- (!) 84 %   03/18/22 2211 -- 100 23 -- (!) 84 %   03/18/22 2210 -- 100 24 -- (!) 84 %   03/18/22 2209 -- 99 26 -- (!) 85 %   03/18/22 2049 99.7 °F (37.6 °C) 98 16 103/69 94 %   03/18/22 2047 98.4 °F (36.9 °C) 97 16 115/75 92 %     Oxygen Therapy  O2 Sat (%): 92 % (03/19/22 1112)  Pulse via Oximetry: 96 beats per minute (03/19/22 0200)  O2 Device: Nasal cannula (03/19/22 0733)  O2 Flow Rate (L/min): 2 l/min (03/19/22 0733)    Estimated body mass index is 23.03 kg/m² as calculated from the following:    Height as of this encounter: 5' 3\" (1.6 m). Weight as of this encounter: 59 kg (130 lb). No intake or output data in the 24 hours ending 03/19/22 1548      Physical Exam:     Blood pressure 96/75, pulse 92, temperature 97.8 °F (36.6 °C), resp. rate 18, height 5' 3\" (1.6 m), weight 59 kg (130 lb), SpO2 92 %. General:    Well nourished. No overt distress  Head:  Normocephalic, atraumatic  Eyes:  Sclerae appear normal.  Pupils equally round. Left eye erythematous lower lid, white crusting discharge. ENT:  Nares appear normal, no drainage. Moist oral mucosa  Neck:  No restricted ROM. Trachea midline   CV:   RRR. No m/r/g. No jugular venous distension. Lungs:   Diminished bilaterally No wheezing, rhonchi, or rales. Respirations even, unlabored  Abdomen: Bowel sounds present. Soft, nontender, nondistended. Extremities: No cyanosis or clubbing. No edema  Skin:     No rashes and normal coloration. Warm and dry. Neuro:  CN II-XII grossly intact. Sensation intact. A&Ox3  Psych:  Normal mood and affect.       I have reviewed ordered lab tests and independently visualized imaging below:    Recent Labs:  Recent Results (from the past 48 hour(s))   EKG, 12 LEAD, INITIAL    Collection Time: 03/18/22  8:53 PM   Result Value Ref Range    Ventricular Rate 97 BPM    Atrial Rate 97 BPM    P-R Interval 138 ms    QRS Duration 80 ms    Q-T Interval 396 ms    QTC Calculation (Bezet) 502 ms    Calculated P Axis 82 degrees    Calculated R Axis 122 degrees    Calculated T Axis -2 degrees    Diagnosis       Poor data quality  Normal sinus rhythm  Prolonged QT  Abnormal ECG  When compared with ECG of 09-MAR-2022 22:24,  Poor data quality in current ECG precludes serial comparison  Confirmed by Laxmi Gold MD, Doe Gates ([id number]) on 3/19/2022 2:39:12 PM     METABOLIC PANEL, COMPREHENSIVE    Collection Time: 03/18/22  9:23 PM   Result Value Ref Range    Sodium 134 (L) 136 - 145 mmol/L    Potassium 3.5 3.5 - 5.1 mmol/L    Chloride 100 98 - 107 mmol/L    CO2 25 21 - 32 mmol/L    Anion gap 9 7 - 16 mmol/L    Glucose 176 (H) 65 - 100 mg/dL    BUN 19 8 - 23 MG/DL    Creatinine 1.04 (H) 0.6 - 1.0 MG/DL    GFR est AA >60 >60 ml/min/1.73m2    GFR est non-AA 57 (L) >60 ml/min/1.73m2    Calcium 8.2 (L) 8.3 - 10.4 MG/DL    Bilirubin, total 1.4 (H) 0.2 - 1.1 MG/DL    ALT (SGPT) 58 12 - 65 U/L    AST (SGOT) 39 (H) 15 - 37 U/L    Alk. phosphatase 115 50 - 130 U/L    Protein, total 7.2 6.3 - 8.2 g/dL    Albumin 3.3 3.2 - 4.6 g/dL    Globulin 3.9 (H) 2.3 - 3.5 g/dL    A-G Ratio 0.8 (L) 1.2 - 3.5     C REACTIVE PROTEIN, QT    Collection Time: 03/18/22  9:23 PM   Result Value Ref Range    C-Reactive protein 5.3 (H) 0.0 - 0.9 mg/dL   PROCALCITONIN    Collection Time: 03/18/22  9:23 PM   Result Value Ref Range    Procalcitonin 16.46 (H) 0.00 - 0.49 ng/mL   TSH 3RD GENERATION    Collection Time: 03/18/22  9:23 PM   Result Value Ref Range    TSH 22.100 uIU/mL   NT-PRO BNP    Collection Time: 03/18/22  9:23 PM   Result Value Ref Range    NT pro-BNP 13,297 (H) 5 - 125 PG/ML   CBC WITH AUTOMATED DIFF    Collection Time: 03/18/22  9:48 PM   Result Value Ref Range    WBC 18.2 (H) 4.3 - 11.1 K/uL    RBC 3.74 (L) 4.05 - 5.2 M/uL    HGB 12.0 11.7 - 15.4 g/dL    HCT 37.6 35.8 - 46.3 %    .5 (H) 79.6 - 97.8 FL    MCH 32.1 26.1 - 32.9 PG    MCHC 31.9 31.4 - 35.0 g/dL    RDW 23.1 (H) 11.9 - 14.6 %    PLATELET 356 (L) 479 - 450 K/uL    MPV 10.6 9.4 - 12.3 FL    ABSOLUTE NRBC 0.02 0.0 - 0.2 K/uL    DF AUTOMATED      NEUTROPHILS 88 (H) 43 - 78 %    LYMPHOCYTES 7 (L) 13 - 44 %    MONOCYTES 3 (L) 4.0 - 12.0 %    EOSINOPHILS 1 0.5 - 7.8 %    BASOPHILS 1 0.0 - 2.0 %    IMMATURE GRANULOCYTES 1 0.0 - 5.0 %    ABS. NEUTROPHILS 16.0 (H) 1.7 - 8.2 K/UL    ABS. LYMPHOCYTES 1.3 0.5 - 4.6 K/UL    ABS. MONOCYTES 0.6 0.1 - 1.3 K/UL    ABS. EOSINOPHILS 0.1 0.0 - 0.8 K/UL    ABS. BASOPHILS 0.1 0.0 - 0.2 K/UL    ABS. IMM. GRANS. 0.1 0.0 - 0.5 K/UL   COVID-19 RAPID TEST    Collection Time: 03/18/22 11:00 PM   Result Value Ref Range    Specimen source Nasopharyngeal      COVID-19 rapid test Not detected NOTD         All Micro Results     Procedure Component Value Units Date/Time    COVID-19 RAPID TEST [[id number]] Collected: 03/18/22 2300    Order Status: Completed Specimen: Nasopharyngeal Updated: 03/18/22 2323     Specimen source Nasopharyngeal        COVID-19 rapid test Not detected        Comment:      The specimen is NEGATIVE for SARS-CoV-2, the novel coronavirus associated with COVID-19. A negative result does not rule out COVID-19. This test has been authorized by the FDA under an Emergency Use Authorization (EUA) for use by authorized laboratories. Fact sheet for Healthcare Providers: iTendency.uy  Fact sheet for Patients: iTendency.uy       Methodology: Isothermal Nucleic Acid Amplification               Other Studies:  XR CHEST PORT    Result Date: 3/18/2022  Portable chest xray  COMPARISON: March 13, 2022 INDICATION: Low back pain. Weakness. FINDINGS: There are bilateral perihilar interstitial densities, suggesting pulmonary edema. There is small right pleural effusion. No pneumothorax. Heart is enlarged. Mediastinal contour is within normal limits. Surrounding bones are intact. 1. CHF-type findings (pulmonary edema, right pleural effusion and cardiomegaly).        Current Meds:  Current Facility-Administered Medications   Medication Dose Route Frequency    metoprolol succinate (TOPROL-XL) XL tablet 25 mg  25 mg Oral DAILY    furosemide (LASIX) injection 40 mg  40 mg IntraVENous BID    ruxolitinib tab 5 mg Duaine Croissant) - patient supplied (Patient Supplied)  5 mg Oral Q12H    enoxaparin (LOVENOX) injection 50 mg  50 mg SubCUTAneous Q12H    oxyCODONE-acetaminophen (PERCOCET 10)  mg per tablet 1 Tablet  1 Tablet Oral Q4H PRN    levothyroxine (SYNTHROID) tablet 112 mcg  112 mcg Oral 6am    tuberculin injection 5 Units  5 Units IntraDERMal ONCE    fluticasone propionate (FLONASE) 50 mcg/actuation nasal spray 2 Spray  2 Spray Both Nostrils DAILY    cetirizine (ZYRTEC) tablet 10 mg  10 mg Oral DAILY    trimethoprim-polymyxin b (POLYTRIM) 10,000 unit- 1 mg/mL ophthalmic solution 1 Drop  1 Drop Both Eyes BID       Signed:  Ailyn Madrigal DO    Part of this note may have been written by using a voice dictation software. The note has been proof read but may still contain some grammatical/other typographical errors.

## 2022-03-19 NOTE — ED TRIAGE NOTES
Pt was in pt and after discharge hs become weak and \"failure to thrive\" per EMS  Pt does not meet SARS at this time per EMS

## 2022-03-19 NOTE — PROGRESS NOTES
SW familiar with patient from previous admissions. Patient has historically refused STR and reluctantly accepted HH. See previous SW/CM notes for additional background information. SW informed by staff that patient is receptive to STR on this admission and feels that she needs to get stronger prior to going home where she lives alone with limited support. SW met with patient who confirmed that she wants to go to STR. Choice list provided and patient agreed to review and advise SW of three choices. Referral process explained and all questions answered. SW provided support and encouragement.      Cristela Bauer LMSW    [facility]    * [email or web address]

## 2022-03-19 NOTE — PROGRESS NOTES
Problem: Falls - Risk of  Goal: *Absence of Falls  Description: Document Sherrill Thompson Fall Risk and appropriate interventions in the flowsheet.   Outcome: Progressing Towards Goal  Note: Fall Risk Interventions:            Medication Interventions: Bed/chair exit alarm    Elimination Interventions: Patient to call for help with toileting needs              Problem: Patient Education: Go to Patient Education Activity  Goal: Patient/Family Education  Outcome: Progressing Towards Goal

## 2022-03-19 NOTE — PROGRESS NOTES
Problem: Mobility Impaired (Adult and Pediatric)  Goal: *Acute Goals and Plan of Care (Insert Text)  Outcome: Progressing Towards Goal  Note:   LTG:  (1.)Ms. Francy Lane will move from supine to sit and sit to supine  in bed with CONTACT GUARD ASSIST within 7 treatment day(s). (2.)Ms. Francy Lane will transfer from bed to chair and chair to bed with CONTACT GUARD ASSIST using the least restrictive device within 7 treatment day(s). (3.)Ms. Francy Lane will ambulate with CONTACT GUARD ASSIST for 75 feet with the least restrictive device within 7 treatment day(s). (4)Ms. Francy Lane will perform HEP with cues and assistance to increase safety on her feet in 7 days. ________________________________________________________________________________________________       PHYSICAL THERAPY: Initial Assessment and PM 3/19/2022  INPATIENT: PT Visit Days : 1  Payor: Micky Galarza / Plan: [address] / Product Type: Managed Care Medicare /       NAME/AGE/GENDER: Alexis Grullon is a 64 y.o. female   PRIMARY DIAGNOSIS: Acute diastolic (congestive) heart failure (HCC) [I50.31]  CML (chronic myelocytic leukemia) (HCC) [C92.10]  Physical deconditioning [P66.02] Acute diastolic (congestive) heart failure (HCC) Acute diastolic (congestive) heart failure (HCC)        ICD-10: Treatment Diagnosis:    Generalized Muscle Weakness (M62.81)  Other abnormalities of gait and mobility (R26.89)   Precaution/Allergies:  Latex, Sulfa (sulfonamide antibiotics), Tramadol, Augmentin [amoxicillin-pot clavulanate], Codeine, Divalproex sodium, Morphine, Potassium clavulanate, Rizatriptan, Tetanus immune globulin, Vancomycin, Xanax [alprazolam], and Zonegran [zonisamide]      ASSESSMENT:     Ms. Francy Lane presents with decreased functional mobility and gait and general weakness. She lives alone and reports being independent using a RW short distances to the bathroom and back and with adl's doing a sponge bath.   She sleeps on the couch on the main level because she has been unable to climb her steps up to her bedroom and shower. RN note after speaking with daughter states that patient is immobile at home. Pt. Agreed to get up this am. She is very talkative. She thought it was march 9th but did know it was 2022 and that she was at [address] Cna present. She got up and ambulated to the restroom with RW and assistance, CNA assisted with clean up, stood at sink to wash hands and ambulated back to the chair with RW and assistance. All mobility takes extra time. She needs some guidance with RW and is not safe without assistance. Pt. Would benefit from SNF. This section established at most recent assessment   PROBLEM LIST (Impairments causing functional limitations):  Decreased Strength  Decreased ADL/Functional Activities  Decreased Transfer Abilities  Decreased Ambulation Ability/Technique  Decreased Balance  Increased Pain  Decreased Activity Tolerance  Increased Fatigue  Decreased Flexibility/Joint Mobility  Edema/Girth  Decreased [place] with Home Exercise Program  Decreased Cognition   INTERVENTIONS PLANNED: (Benefits and precautions of physical therapy have been discussed with the patient.)  Balance Exercise  Bed Mobility  Family Education  Gait Training  Home Exercise Program (HEP)  Range of Motion (ROM)  Therapeutic Activites  Therapeutic Exercise/Strengthening  Transfer Training     TREATMENT PLAN: Frequency/Duration: daily for duration of hospital stay  Rehabilitation Potential For Stated Goals: Good     REHAB RECOMMENDATIONS (at time of discharge pending progress):    Placement: It is my opinion, based on this patient's performance to date, that Ms. Rhonda Ridley may benefit from intensive therapy at a [address] [place] [address] after discharge due to the functional deficits listed above that are likely to improve with skilled rehabilitation and concerns that he/she may be unsafe to be unsupervised at home due to weakness and decreased stability on her feet . 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 Patient was recently admitted and discharged from our facility on 3/14. Patient was treated for acute congestive heart failure at that time. Patient was recently diagnosed with E. coli bacterial meningitis, completed course of antibiotics. Patient reports bilateral lower extremity edema, orthopnea. Patient reports intermittent diarrhea. Evaluated in the emergency room, patient was given diuretics, chest x-ray was done shows evidence of pulmonary vascular congestion. ER physician requested admission of this patient for further evaluation and management.   Past Medical History/Comorbidities:   Ms. Carly Olivares  has a past medical history of Acute blood loss anemia (4/6/2018), Anemia, C. difficile diarrhea (4/1/2015), Cerebral edema ([address] (4/1/2015), Chronic migraine (9/22/2016), Chronic myelogenous leukemia ([address] Chronic pain, Coagulation disorder ([address] Esophageal spasm, Esophageal varices ([address] GI bleed (8/3/2016), H/O craniotomy, Hematemesis (8/20/2021), Leukocytosis (3/14/2015), Melena (8/20/2021), MRSA colonization (6/25/2012), Polycythemia vera(238.4), Portal hypertension ([address] Portal vein thrombosis (6/20/2012), Primary hypothyroidism, Pulmonary embolism ([address] (2006), S/P exploratory laparotomy, 6/20/12  (6/25/2012), S/P small bowel resection, Seizure ([facility] [address] (3/14/2015), Seizure disorder ([facility] [address] (3/30/2015), Seizures ([facility] [address] Sepsis with acute organ dysfunction without septic shock ([facility] [address] (2/2/2022), Splenomegaly, congestive, chronic, Stroke (cerebrum) ([facility] [address] (4/11/2015), Stroke ([facility] [address] Thrombocytopenia, 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 She has no past medical history of Adverse effect of anesthesia, Difficult intubation, Malignant hyperthermia due to anesthesia, or Pseudocholinesterase deficiency. Ms. Ginna Hinojosa  has a past surgical history that includes hx gyn; hx gyn; neurological procedure unlisted (2010-12); hx gi; hx gi; pr abdomen surgery proc unlisted; hx cholecystectomy; pr abdomen surgery proc unlisted; hx appendectomy; hx breast biopsy (Bilateral); and hx orthopaedic (Left, 2008). Social History/Living Environment:   Support Systems: No Support Systems  Patient Expects to be Discharged to[de-identified] Skilled nursing facility  Prior Level of Function/Work/Activity:  Using RW short distances, limited mobility     Number of Personal Factors/Comorbidities that affect the Plan of Care: 3+: HIGH COMPLEXITY   EXAMINATION:   Most Recent Physical Functioning:   Gross Assessment:  AROM: Generally decreased, functional  Strength: Generally decreased, functional  Sensation: Intact (feet swelling)               Posture:     Balance:  Sitting: Intact  Standing: Pull to stand; With support Bed Mobility:  Supine to Sit: Minimum assistance; Moderate assistance  Wheelchair Mobility:     Transfers:  Sit to Stand: Minimum assistance  Stand to Sit: Minimum assistance  Bed to Chair: Minimum assistance  Duration: 30 Minutes  Gait:     Speed/Romina: Delayed; Shuffled  Step Length: Left shortened;Right shortened  Gait Abnormalities: Decreased step clearance; Path deviations  Distance (ft): 10 Feet (ft) (x 2 to restroom and back )  Assistive Device: Kimi Magana rolling  Ambulation - Level of Assistance: Minimal assistance  Interventions: Manual cues; Safety awareness training; Tactile cues; Verbal cues         Body Structures Involved:  Bones  Joints  Muscles  Ligaments Body Functions Affected: Movement Related Activities and Participation Affected: Mobility   Number of elements that affect the Plan of Care: 3: MODERATE COMPLEXITY   CLINICAL PRESENTATION:   Presentation: Stable and uncomplicated: LOW COMPLEXITY   CLINICAL DECISION MAKIN[address] [place] [address] AM-PAC 6 Clicks   Basic Mobility Inpatient Short Form  How much difficulty does the patient currently have. .. Unable A Lot A Little None   1. Turning over in bed (including adjusting bedclothes, sheets and blankets)? [] 1   [] 2   [x] 3   [] 4   2. Sitting down on and standing up from a chair with arms ( e.g., wheelchair, bedside commode, etc.)   [] 1   [] 2   [x] 3   [] 4   3. Moving from lying on back to sitting on the side of the bed? [] 1   [] 2   [x] 3   [] 4   How much help from another person does the patient currently need. .. Total A Lot A Little None   4. Moving to and from a bed to a chair (including a wheelchair)? [] 1   [] 2   [x] 3   [] 4   5. Need to walk in hospital room? [] 1   [] 2   [x] 3   [] 4   6. Climbing 3-5 steps with a railing? [] 1   [x] 2   [] 3   [] 4   © , Trustees of [address] [place] [address], under license to [employer]. All rights reserved      Score:  Initial: 17 Most Recent: X (Date: -- )    Interpretation of Tool:  Represents activities that are increasingly more difficult (i.e. Bed mobility, Transfers, Gait). Medical Necessity:     Patient is expected to demonstrate progress in   strength, range of motion, and balance   to   decrease assistance required with exercises and functional mobility independently  .   Reason for Services/Other Comments:  Patient   continues to require present interventions due to patient's inability to perform exercises and  functional mobility independently  . Use of outcome tool(s) and clinical judgement create a POC that gives a: Questionable prediction of patient's progress: MODERATE COMPLEXITY            TREATMENT:   (In addition to Assessment/Re-Assessment sessions the following treatments were rendered)   Pre-treatment Symptoms/Complaints:  fatigue, some back pain  Pain: Initial:      Post Session:  did not rate   assessment  Therapeutic Activity: (  30 Minutes ):  Therapeutic activities including Bed transfers, Chair transfers, Toilet transfers, Ambulation on level ground, and standing to improve mobility, strength, and balance. Required minimal Manual cues; Safety awareness training; Tactile cues; Verbal cues to promote static and dynamic balance in standing. Braces/Orthotics/Lines/Etc:   O2 Device: Nasal cannula  Treatment/Session Assessment:    Response to Treatment:  on 2L O2, did ok  Interdisciplinary Collaboration:   Certified Nursing Assistant/Patient Care Technician  After treatment position/precautions:   Up in chair  Bed alarm/tab alert on  Bed/Chair-wheels locked  Bed in low position  Call light within reach   Compliance with Program/Exercises: Compliant most of the time, Will assess as treatment progresses  Recommendations/Intent for next treatment session: \"Next visit will focus on advancements to more challenging activities and reduction in assistance provided\".   Total Treatment Duration:  PT Patient Time In/Time Out  Time In: 1205  Time Out: [address], PT

## 2022-03-19 NOTE — H&P
Hospitalist History and Physical   Admit Date:  3/18/2022  8:52 PM   Name:  Trupti Mittal   Age:  64 y.o. 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 Patient was recently admitted and discharged from our facility on 3/14. Patient was treated for acute congestive heart failure at that time. Patient was recently diagnosed with E. coli bacterial meningitis, completed course of antibiotics. Patient reports bilateral lower extremity edema, orthopnea. Patient reports intermittent diarrhea. Evaluated in the emergency room, patient was given diuretics, chest x-ray was done shows evidence of pulmonary vascular congestion. ER physician requested admission of this patient for further evaluation and management. Review of Systems:  10 systems reviewed and negative except as noted in HPI. Assessment & Plan:   Principal problem:    Acute exacerbation of diastolic congestive heart failure: Class II, initiated on IV Lasix, patient was on p.o. Lasix, reports no improvement of symptoms. If blood pressure allows we will continue beta-blockers, ACE inhibitors. Active Problems:    History of E. coli bacteremia: Completed antibiotics. Physical deconditioning: We will request PT and OT evaluation, patient might need rehabilitation upon discharge. CML (chronic myelocytic leukemia) ([facility] [address] (3/19/2022):  Outpatient follow-up with oncology. History of DVT: Continue on anticoagulation, patient is on Lovenox at home. Thrombocytopenia: Chronic, follows outpatient with oncology. Disposition/Discharge Planning: Home with family. Diet: DIET NPO  VTE ppx: On Lovenox, continue. Code status: Full Code      Past Medical History:   Diagnosis Date    Acute blood loss anemia 4/6/2018    Anemia     C. difficile diarrhea 4/1/2015    Cerebral edema ([address] 4/1/2015    Chronic migraine 9/22/2016    Chronic myelogenous leukemia ([facility] [address]     [place] chromosome/ converted from polycythemia in 2009- to 2012 when she was dx w leukemia    Chronic pain     Coagulation disorder ([address]     \"clotting and Bleeding Problem\" dr [facility] follows     Esophageal spasm     with banding     Esophageal varices ([facility] [address]     grade 3    GI bleed 8/3/2016    H/O craniotomy     3-29-15.  due to blood clot - which caused a seizure  - then pt fell and hit     Hematemesis 8/20/2021    Leukocytosis 3/14/2015    Melena 8/20/2021    MRSA colonization 6/25/2012    Polycythemia vera(238.4)     JAK2 mutation    Portal hypertension ([address]     with varices    Portal vein thrombosis 6/20/2012    Ct scan 6-21-2 Apparent occlusion of the portal vein. In addition, the splenic vein, and superior mesenteric vein are not definitely seen and are also likely  occluded. Splenomegaly, and extensive varices are seen as described above consistent with the portal vein occlusion 7-05-12 on arixtra HIT negative on repeat 7-27-12 re admitted Cirrhotic appearance of the liver. Mild to moderate ascites, as    Primary hypothyroidism     Pulmonary embolism ([address] 2006    not on coumadin anymore     S/P exploratory laparotomy, 6/20/12 6/25/2012    Bowel resection:  336 cm of small bowel removed, approximately 9 feet and placement of feeding jejunostomy.      S/P small bowel resection     2012.  9 feet removed due to  gangrene which wa due to blood clot    Seizure ([address] 3/14/2015  Seizure disorder ([facility] [address] 3/30/2015    due to clotting factor    Seizures ([facility] [address]     last one 3- - followed by aniyah     Sepsis with acute organ dysfunction without septic shock ([facility] [address] 2022    Splenomegaly, congestive, chronic     Stroke (cerebrum) ([facility] [address] 2015    had bled into head- surgery    Stroke [facility])     pt had stroke like symptoms     Thrombocytopenia, HIT negative 2012 platelets lower repeat HIT 12 platelets in 73,166'V    Thrombosis, portal vein 2004    portal , spleenic and recently superior mesenteric    Transfusion history     many blood tranfusions - last 3-29-15 after brain surgery    Traumatic hemorrhage of right cerebrum ([facility] [address] 3/30/2015     Past Surgical History:   Procedure Laterality Date    HX APPENDECTOMY      with small bowel resection    HX BREAST BIOPSY Bilateral     Lt - ; Rt -     HX CHOLECYSTECTOMY      HX GI      liver biopsy    HX GI      bowel removed small - 9 feet     HX GYN       x 2    HX GYN      D&C following miscarriage    HX ORTHOPAEDIC Left 2008    torn labrum shoulder (screw in place)    NEUROLOGICAL PROCEDURE UNLISTED  2010    craniotomy to evacuate subdural hematoma following a fall from a seizure    TN ABDOMEN SURGERY PROC UNLISTED      umbilical hernia repair    TN ABDOMEN SURGERY PROC UNLISTED      9ft of small bowel excised then reconnected      Allergies   Allergen Reactions    Latex Other (comments)     Testing for latex allergy was positive as a 2 (on a scale of 1 to 3).     Sulfa (Sulfonamide Antibiotics) Anaphylaxis    Tramadol Other (comments)     Top lip swelled    Augmentin [Amoxicillin-Pot Clavulanate] Rash    Codeine Other (comments)    Divalproex Sodium Hives    Morphine Nausea and Vomiting     Not a true allergy    Potassium Clavulanate Hives    Rizatriptan Rash    Tetanus Immune Globulin Other (comments)     Heat, bruising, and swelling at  Site of injection    Vancomycin Rash    Xanax [Alprazolam] Other (comments)     Hallucinations    Zonegran [Zonisamide] Rash      Social History     Tobacco Use    Smoking status: Former Smoker     Packs/day: 0.20     Years: 10.00     Pack years: 2.00     Types: Cigarettes     Quit date:      Years since quittin.2    Smokeless tobacco: Never Used   Substance Use Topics    Alcohol use: No      Family History   Problem Relation Age of Onset    Diabetes Mother     Stroke Mother         after surgery    Cancer Father         brain    No Known Problems Sister     Other Sister         diverticulitis    Other Sister         diverticulitis    Cancer Sister         lyjmphoma    Breast Cancer Maternal Aunt 48    Thyroid Disease Paternal Grandmother       Family history reviewed and noncontributory to patient's acute condition; no relevant family history unless otherwise noted above.   Immunization History   Administered Date(s) Administered    (RETIRED) Pneumococcal Vaccine (Unspecified Type) 2008    COVID-19, Moderna, Primary or Immunocompromised Series, MRNA, PF, 100mcg/0.5mL 2021, 2021    Hib (PRP-T) 2019    Influenza Vaccine 10/01/2014, 10/01/2016    Influenza Vaccine (Quad) Mdck Pf (>2 Yrs Flucelvax QUAD [id number]) 2020    Influenza Vaccine (Quad) PF (>6 Mo Flulaval, Fluarix, and >3 Yrs Afluria, Fluzone [id number]) 10/25/2018, 10/08/2019    Pneumococcal Conjugate (PCV-13) 2019    Pneumococcal Polysaccharide (PPSV-23) 2004    TB Skin Test (PPD) Intradermal 2014    Zoster Recombinant 2020, 2020     PTA Medications:  Current Outpatient Medications   Medication Instructions    b complex vitamins tablet 1 Tablet, DAILY    calcium carbonate (CALTREX) 600 mg, Oral, EVERY BEDTIME    cholecalciferol (VITAMIN D3) 1,000 Units, Oral, EVERY BEDTIME    Comp Stocking,Knee,Regular,Sml misc 1 Each, Does Not Apply, DAILY    diphenoxylate-atropine (LomotiL) 2.5-0.025 mg per tablet 1 Tablet, Oral, 4 TIMES DAILY AS NEEDED    enoxaparin (LOVENOX) 50 mg, SubCUTAneous, EVERY 12 HOURS    famotidine (PEPCID) 40 mg, Oral, DAILY AS NEEDED, Take at least 4 hours before or after sprycel    furosemide (LASIX) 20 mg, Oral, DAILY    lansoprazole (PREVACID) 30 mg, Oral, DAILY, prn    magnesium 250 mg tab 1 Tablet, Oral, DAILY    ondansetron (ZOFRAN ODT) 4 mg, Oral, EVERY 8 HOURS AS NEEDED    ondansetron (ZOFRAN ODT) 8 mg, Oral, EVERY 8 HOURS AS NEEDED    oxyCODONE-acetaminophen (PERCOCET 10)  mg per tablet No dose, route, or frequency recorded.  ruxolitinib 5 mg, Oral, 2 TIMES DAILY    SpryceL 80 mg, Oral, DAILY    Synthroid 112 mcg tablet 1 tablet once a day with an extra 1/2 tablet on Sundays    zolpidem (AMBIEN) 10 mg, Oral, BEDTIME PRN       Objective:     Patient Vitals for the past 24 hrs:   Temp Pulse Resp BP SpO2   03/19/22 0200 -- 96 17 115/72 95 %   03/19/22 0000 -- (!) 102 -- 128/84 92 %   03/18/22 2213 -- 98 22 -- (!) 89 %   03/18/22 2212 -- 99 25 -- (!) 84 %   03/18/22 2211 -- 100 23 -- (!) 84 %   03/18/22 2210 -- 100 24 -- (!) 84 %   03/18/22 2209 -- 99 26 -- (!) 85 %   03/18/22 2049 99.7 °F (37.6 °C) 98 16 103/69 94 %   03/18/22 2047 98.4 °F (36.9 °C) 97 16 115/75 92 %     Oxygen Therapy  O2 Sat (%): 95 % (03/19/22 0200)  Pulse via Oximetry: 96 beats per minute (03/19/22 0200)  O2 Device: None (Room air) (03/18/22 2049)    Estimated body mass index is 23.03 kg/m² as calculated from the following:    Height as of this encounter: 5' 3\" (1.6 m). Weight as of this encounter: 59 kg (130 lb). No intake or output data in the 24 hours ending 03/19/22 0236      Physical Exam:    General:    Well nourished. No overt d looks chronically ill. Head:  Normocephalic, atraumatic  Eyes:  Sclerae appear normal.  Pupils equally round. HENT:  Nares appear normal, no drainage. Moist mucous membranes  Neck:  No restricted ROM. Trachea midline  CV:   RRR. No m/r/g.   No JVD  Lungs: Bibasilar Rales noted. Abdomen: Bowel sounds present. Soft, nontender, nondistended. Extremities: Bilateral lower extremity edema noted. Skin:     No rashes. Normal turgor. Normal coloration  Neuro:   Alert and oriented x3    Data Ordered and Personally Reviewed:    Last 24hr Labs:  Recent Results (from the past 24 hour(s))   EKG, 12 LEAD, INITIAL    Collection Time: 03/18/22  8:53 PM   Result Value Ref Range    Ventricular Rate 97 BPM    Atrial Rate 97 BPM    P-R Interval 138 ms    QRS Duration 80 ms    Q-T Interval 396 ms    QTC Calculation (Bezet) 502 ms    Calculated P Axis 82 degrees    Calculated R Axis 122 degrees    Calculated T Axis -2 degrees    Diagnosis       Normal sinus rhythm  Left posterior fascicular block  Cannot rule out Anterior infarct , age undetermined  T wave abnormality, consider inferior ischemia  Prolonged QT  Abnormal ECG  When compared with ECG of 09-MAR-2022 22:24,  Left posterior fascicular block is now Present  Inverted T waves have replaced nonspecific T wave abnormality in Inferior   leads  Nonspecific T wave abnormality, worse in Anterior leads  QT has lengthened     METABOLIC PANEL, COMPREHENSIVE    Collection Time: 03/18/22  9:23 PM   Result Value Ref Range    Sodium 134 (L) 136 - 145 mmol/L    Potassium 3.5 3.5 - 5.1 mmol/L    Chloride 100 98 - 107 mmol/L    CO2 25 21 - 32 mmol/L    Anion gap 9 7 - 16 mmol/L    Glucose 176 (H) 65 - 100 mg/dL    BUN 19 8 - 23 MG/DL    Creatinine 1.04 (H) 0.6 - 1.0 MG/DL    GFR est AA >60 >60 ml/min/1.73m2    GFR est non-AA 57 (L) >60 ml/min/1.73m2    Calcium 8.2 (L) 8.3 - 10.4 MG/DL    Bilirubin, total 1.4 (H) 0.2 - 1.1 MG/DL    ALT (SGPT) 58 12 - 65 U/L    AST (SGOT) 39 (H) 15 - 37 U/L    Alk.  phosphatase 115 50 - 130 U/L    Protein, total 7.2 6.3 - 8.2 g/dL    Albumin 3.3 3.2 - 4.6 g/dL    Globulin 3.9 (H) 2.3 - 3.5 g/dL    A-G Ratio 0.8 (L) 1.2 - 3.5     C REACTIVE PROTEIN, QT    Collection Time: 03/18/22  9:23 PM   Result Value Ref Range    C-Reactive protein 5.3 (H) 0.0 - 0.9 mg/dL   PROCALCITONIN    Collection Time: 03/18/22  9:23 PM   Result Value Ref Range    Procalcitonin 16.46 (H) 0.00 - 0.49 ng/mL   TSH 3RD GENERATION    Collection Time: 03/18/22  9:23 PM   Result Value Ref Range    TSH 22.100 uIU/mL   NT-PRO BNP    Collection Time: 03/18/22  9:23 PM   Result Value Ref Range    NT pro-BNP 13,297 (H) 5 - 125 PG/ML   CBC WITH AUTOMATED DIFF    Collection Time: 03/18/22  9:48 PM   Result Value Ref Range    WBC 18.2 (H) 4.3 - 11.1 K/uL    RBC 3.74 (L) 4.05 - 5.2 M/uL    HGB 12.0 11.7 - 15.4 g/dL    HCT 37.6 35.8 - 46.3 %    .5 (H) 79.6 - 97.8 FL    MCH 32.1 26.1 - 32.9 PG    MCHC 31.9 31.4 - 35.0 g/dL    RDW 23.1 (H) 11.9 - 14.6 %    PLATELET 945 (L) 338 - 450 K/uL    MPV 10.6 9.4 - 12.3 FL    ABSOLUTE NRBC 0.02 0.0 - 0.2 K/uL    DF AUTOMATED      NEUTROPHILS 88 (H) 43 - 78 %    LYMPHOCYTES 7 (L) 13 - 44 %    MONOCYTES 3 (L) 4.0 - 12.0 %    EOSINOPHILS 1 0.5 - 7.8 %    BASOPHILS 1 0.0 - 2.0 %    IMMATURE GRANULOCYTES 1 0.0 - 5.0 %    ABS. NEUTROPHILS 16.0 (H) 1.7 - 8.2 K/UL    ABS. LYMPHOCYTES 1.3 0.5 - 4.6 K/UL    ABS. MONOCYTES 0.6 0.1 - 1.3 K/UL    ABS. EOSINOPHILS 0.1 0.0 - 0.8 K/UL    ABS. BASOPHILS 0.1 0.0 - 0.2 K/UL    ABS. IMM. GRANS. 0.1 0.0 - 0.5 K/UL   COVID-19 RAPID TEST    Collection Time: 03/18/22 11:00 PM   Result Value Ref Range    Specimen source Nasopharyngeal      COVID-19 rapid test Not detected NOTD         All Micro Results     Procedure Component Value Units Date/Time    COVID-19 RAPID TEST [[id number]] Collected: 03/18/22 2300    Order Status: Completed Specimen: Nasopharyngeal Updated: 03/18/22 2323     Specimen source Nasopharyngeal        COVID-19 rapid test Not detected        Comment:      The specimen is NEGATIVE for SARS-CoV-2, the novel coronavirus associated with COVID-19. A negative result does not rule out COVID-19.        This test has been authorized by the FDA under an Emergency Use Authorization (EUA) for use by authorized laboratories. Fact sheet for Healthcare Providers: ConventionUpdate.co.nz  Fact sheet for Patients: ConventionUpdate.co.nz       Methodology: Isothermal Nucleic Acid Amplification               Other Studies:  XR CHEST PORT    Result Date: 3/18/2022  Portable chest xray  COMPARISON: March 13, 2022 INDICATION: Low back pain. Weakness. FINDINGS: There are bilateral perihilar interstitial densities, suggesting pulmonary edema. There is small right pleural effusion. No pneumothorax. Heart is enlarged. Mediastinal contour is within normal limits. Surrounding bones are intact. 1. CHF-type findings (pulmonary edema, right pleural effusion and cardiomegaly). Medications Administered     HYDROcodone-acetaminophen (NORCO) 5-325 mg per tablet 1 Tablet     Admin Date  03/18/2022 Action  Given Dose  1 Tablet Route  Oral Administered By  Dave Tovar RN          morphine injection 4 mg     Admin Date  03/18/2022 Action  Given Dose  4 mg Route  IntraVENous Administered By  GIA Baldwin          ondansetron [facility]) injection 4 mg     Admin Date  03/18/2022 Action  Given Dose  4 mg Route  IntraVENous Administered By  GIA Baldwin                  Signed:  Chelsie Chopra MD    Part of this note may have been written by using a voice dictation software. The note has been proof read but may still contain some grammatical/other typographical errors.

## 2022-03-19 NOTE — PROGRESS NOTES
Patient's sister Hugo Tyrone called, ask to speak with SW regarding STR referrals. SW met with patient, advised that her sister wished to speak with SW about dispo. Patient gave SW permission to discuss this with her sister Hugo Tyrone. SW called Hugo Hansen, explained STR process. Patient requested SW email Hugo Hansen a copy of the SNF choice list which SW has done per her preference.      Radha Jacob LMSW    [facility]    * [email or web address]

## 2022-03-20 NOTE — PROGRESS NOTES
Aroused easily at intervals during the night and this morning. No c/o at this time, no distress. Call light in reach, bed alarm set. Report given to Jesse Kumar RN.

## 2022-03-20 NOTE — PROGRESS NOTES
SW met with patient who states she has not reviewed the list yet and does not know of a preference at this time. Patient would like to review the list with her sister and asked SW to touch base with Samuel Valenzuela to see if she had any recommendations. [facility] called Samuel Valenzuela who states that she will call the patient shortly to review the list over the phone. SW will return to meet with the patient after noon to discuss selections. Update: Spoke with patient and her sister, preference is for [facility] to send referrals to all facilities with a 4 or 5 CMS rating. Referrals to [facility], [facility], and [facility].      Evelin Mcclure LMSW    [facility]    * [email or web address]

## 2022-03-20 NOTE — PROGRESS NOTES
Continues to rest quietly, resp even, unlab at rest with O2, skin warm, dry. AP reg, lungs sounds clear, diminished in bases. Call light in reach. Pericare given, ext female catheter changed. Urine clear, shonna. No c/o, no distress.  Aware to call for all needs, not to be out of bed without asst.

## 2022-03-20 NOTE — PROGRESS NOTES
Problem: Mobility Impaired (Adult and Pediatric)  Goal: *Acute Goals and Plan of Care (Insert Text)  Outcome: Progressing Towards Goal  Note:   LTG:  (1.)Ms. Rhonda Ridley will move from supine to sit and sit to supine  in bed with CONTACT GUARD ASSIST within 7 treatment day(s). (2.)Ms. Rhonda Ridley will transfer from bed to chair and chair to bed with CONTACT GUARD ASSIST using the least restrictive device within 7 treatment day(s). (3.)Ms. Rhonda Ridley will ambulate with CONTACT GUARD ASSIST for 75 feet with the least restrictive device within 7 treatment day(s). Goal met  (4)Ms. Rhonda Ridley will perform HEP with cues and assistance to increase safety on her feet in 7 days. New goal 3/20 - pt. Will ambulate 200 feet with RW SBA in 7 days. ________________________________________________________________________________________________       PHYSICAL THERAPY: Daily Note and AM 3/20/2022  INPATIENT: PT Visit Days : 2  Payor: [address] [place] [address],9D / Plan: [address] / Product Type: Managed Care Medicare /       NAME/AGE/GENDER: Ko Fontaine is a 64 y.o. female   PRIMARY DIAGNOSIS: Acute diastolic (congestive) heart failure (HCC) [I50.31]  CML (chronic myelocytic leukemia) (HCC) [C92.10]  Physical deconditioning [G72.42] Acute diastolic (congestive) heart failure (HCC) Acute diastolic (congestive) heart failure (HCC)       ICD-10: Treatment Diagnosis:    Generalized Muscle Weakness (M62.81)  Other abnormalities of gait and mobility (R26.89)   Precaution/Allergies:  Latex, Sulfa (sulfonamide antibiotics), Tramadol, Augmentin [amoxicillin-pot clavulanate], Codeine, Divalproex sodium, Morphine, Potassium clavulanate, Rizatriptan, Tetanus immune globulin, Vancomycin, Xanax [alprazolam], and Zonegran [zonisamide]      ASSESSMENT:     Ms. Rhonda Ridley presents with decreased functional mobility and gait and general weakness.   She lives alone and reports being independent using a RW short distances to the bathroom and back and with adl's doing a sponge bath. She sleeps on the couch on the main level because she has been unable to climb her steps up to her bedroom and shower. RN note after speaking with daughter states that patient is immobile at home. Pt. Very talkative this am and wanting to get up. She ambulated an increased distance this am with RW on 2L O2. O2 sats after gait 95% on 2LO2. Mild fatigue after gait. All mobility takes extra time. She reports less back pain after ambulating and said it felt good to move. Plans are for SNF. This section established at most recent assessment   PROBLEM LIST (Impairments causing functional limitations):  Decreased Strength  Decreased ADL/Functional Activities  Decreased Transfer Abilities  Decreased Ambulation Ability/Technique  Decreased Balance  Increased Pain  Decreased Activity Tolerance  Increased Fatigue  Decreased Flexibility/Joint Mobility  Edema/Girth  Decreased Dane with Home Exercise Program  Decreased Cognition   INTERVENTIONS PLANNED: (Benefits and precautions of physical therapy have been discussed with the patient.)  Balance Exercise  Bed Mobility  Family Education  Gait Training  Home Exercise Program (HEP)  Range of Motion (ROM)  Therapeutic Activites  Therapeutic Exercise/Strengthening  Transfer Training     TREATMENT PLAN: Frequency/Duration: daily for duration of hospital stay  Rehabilitation Potential For Stated Goals: Good     REHAB RECOMMENDATIONS (at time of discharge pending progress):    Placement: It is my opinion, based on this patient's performance to date, that Ms. Teresa Arias may benefit from intensive therapy at a [address] [place] [address] after discharge due to the functional deficits listed above that are likely to improve with skilled rehabilitation and concerns that he/she may be unsafe to be unsupervised at home due to weakness and decreased stability on her feet .   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 Patient was recently admitted and discharged from our facility on 3/14. Patient was treated for acute congestive heart failure at that time. Patient was recently diagnosed with E. coli bacterial meningitis, completed course of antibiotics. Patient reports bilateral lower extremity edema, orthopnea. Patient reports intermittent diarrhea. Evaluated in the emergency room, patient was given diuretics, chest x-ray was done shows evidence of pulmonary vascular congestion. ER physician requested admission of this patient for further evaluation and management.   Past Medical History/Comorbidities:   Ms. Clint Cobos  has a past medical history of Acute blood loss anemia (4/6/2018), Anemia, C. difficile diarrhea (4/1/2015), Cerebral edema ([address] (4/1/2015), Chronic migraine (9/22/2016), Chronic myelogenous leukemia ([address] Chronic pain, Coagulation disorder ([address] Esophageal spasm, Esophageal varices ([address] GI bleed (8/3/2016), H/O craniotomy, Hematemesis (8/20/2021), Leukocytosis (3/14/2015), Melena (8/20/2021), MRSA colonization (6/25/2012), Polycythemia vera(238.4), Portal hypertension ([address] Portal vein thrombosis (6/20/2012), Primary hypothyroidism, Pulmonary embolism ([address] (2006), S/P exploratory laparotomy, 6/20/12  (6/25/2012), S/P small bowel resection, Seizure ([address] (3/14/2015), Seizure disorder ([address] (3/30/2015), Seizures ([address] Sepsis with acute organ dysfunction without septic shock ([facility] (2/2/2022), Splenomegaly, congestive, chronic, Stroke (cerebrum) ([facility] (4/11/2015), Stroke ([facility] Thrombocytopenia, 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 She has no past medical history of Adverse effect of anesthesia, Difficult intubation, Malignant hyperthermia due to anesthesia, or Pseudocholinesterase deficiency. Ms. Richard Pulido  has a past surgical history that includes hx gyn; hx gyn; neurological procedure unlisted (2010-12); hx gi; hx gi; pr abdomen surgery proc unlisted; hx cholecystectomy; pr abdomen surgery proc unlisted; hx appendectomy; hx breast biopsy (Bilateral); and hx orthopaedic (Left, 2008).   Social History/Living Environment:   Support Systems: No Support Systems  Patient Expects to be Discharged to[de-identified] Skilled nursing facility  Prior Level of Function/Work/Activity:  Using RW short distances, limited mobility     Number of Personal Factors/Comorbidities that affect the Plan of Care: 3+: HIGH COMPLEXITY   EXAMINATION:   Most Recent Physical Functioning:   Gross Assessment:  AROM: Generally decreased, functional  Strength: Generally decreased, functional  Sensation: Intact (feet swelling)               Posture:     Balance:  Sitting: Intact  Standing: With support Bed Mobility:  Supine to Sit: Minimum assistance  Wheelchair Mobility:     Transfers:  Sit to Stand: Contact guard assistance  Stand to Sit: Contact guard assistance  Bed to Chair: Contact guard assistance  Duration: 25 Minutes  Gait:     Speed/Romina: Delayed  Step Length: Left shortened;Right shortened  Gait Abnormalities: Decreased step clearance  Distance (ft): 100 Feet (ft) (x 2)  Assistive Device: Walker, rolling  Ambulation - Level of Assistance: Contact guard assistance;Stand-by assistance  Interventions: Safety awareness training;Verbal cues         Body Structures Involved:  Bones  Joints  Muscles  Ligaments Body Functions Affected: Movement Related Activities and Participation Affected: Mobility   Number of elements that affect the Plan of Care: 3: MODERATE COMPLEXITY   CLINICAL PRESENTATION:   Presentation: Stable and uncomplicated: LOW COMPLEXITY   CLINICAL DECISION MAKING:   [employer] AM-PAC 6 Clicks   Basic Mobility Inpatient Short Form  How much difficulty does the patient currently have. .. Unable A Lot A Little None   1. Turning over in bed (including adjusting bedclothes, sheets and blankets)? [] 1   [] 2   [x] 3   [] 4   2. Sitting down on and standing up from a chair with arms ( e.g., wheelchair, bedside commode, etc.)   [] 1   [] 2   [x] 3   [] 4   3. Moving from lying on back to sitting on the side of the bed? [] 1   [] 2   [x] 3   [] 4   How much help from another person does the patient currently need. .. Total A Lot A Little None   4. Moving to and from a bed to a chair (including a wheelchair)? [] 1   [] 2   [x] 3   [] 4   5. Need to walk in hospital room? [] 1   [] 2   [x] 3   [] 4   6. Climbing 3-5 steps with a railing? [] 1   [x] 2   [] 3   [] 4   © 2007, Trustees of [facility] [employer], under license to [employer]. All rights reserved      Score:  Initial: 17 Most Recent: X (Date: -- )    Interpretation of Tool:  Represents activities that are increasingly more difficult (i.e. Bed mobility, Transfers, Gait). Medical Necessity:     Patient is expected to demonstrate progress in   strength, range of motion, and balance   to   decrease assistance required with exercises and functional mobility independently  . Reason for Services/Other Comments:  Patient   continues to require present interventions due to patient's inability to perform exercises and  functional mobility independently  .    Use of outcome tool(s) and clinical judgement create a POC that gives a: Questionable prediction of patient's progress: MODERATE COMPLEXITY TREATMENT:   (In addition to Assessment/Re-Assessment sessions the following treatments were rendered)   Pre-treatment Symptoms/Complaints:  fatigue, some back pain  Pain: Initial:      Post Session:  5      Therapeutic Activity: (  25 Minutes ):  Therapeutic activities including Bed transfers, Chair transfers, , Ambulation on level ground with RW, and standing to improve mobility, strength, and balance. Required minimal Safety awareness training;Verbal cues to promote static and dynamic balance in standing. Braces/Orthotics/Lines/Etc:   O2 Device: Nasal cannula  Treatment/Session Assessment:    Response to Treatment:  Improved gait  Interdisciplinary Collaboration:   Occupational Therapist  Certified Nursing Assistant/Patient Care Technician  After treatment position/precautions:   Up in chair  Bed alarm/tab alert on  Bed/Chair-wheels locked  Bed in low position  Call light within reach   Compliance with Program/Exercises: Compliant most of the time, Will assess as treatment progresses  Recommendations/Intent for next treatment session: \"Next visit will focus on advancements to more challenging activities and reduction in assistance provided\".   Total Treatment Duration:  PT Patient Time In/Time Out  Time In: 0930  Time Out: Lopez Rivera 17, PT

## 2022-03-20 NOTE — PROGRESS NOTES
Problem: Falls - Risk of  Goal: *Absence of Falls  Description: Document Una Willis Fall Risk and appropriate interventions in the flowsheet. Outcome: Progressing Towards Goal  Note: Fall Risk Interventions:  Mobility Interventions: Patient to call before getting OOB         Medication Interventions: Patient to call before getting OOB    Elimination Interventions: Call light in reach,Bed/chair exit alarm,Patient to call for help with toileting needs    History of Falls Interventions: Bed/chair exit alarm,Investigate reason for fall         Problem: Patient Education: Go to Patient Education Activity  Goal: Patient/Family Education  Outcome: Progressing Towards Goal     Problem: Patient Education: Go to Patient Education Activity  Goal: Patient/Family Education  Outcome: Progressing Towards Goal     Problem: Pressure Injury - Risk of  Goal: *Prevention of pressure injury  Description: Document Eric Scale and appropriate interventions in the flowsheet.   Outcome: Progressing Towards Goal  Note: Pressure Injury Interventions:  Sensory Interventions: Assess changes in LOC    Moisture Interventions: Absorbent underpads,Apply protective barrier, creams and emollients    Activity Interventions: PT/OT evaluation,Increase time out of bed    Mobility Interventions: PT/OT evaluation,Pressure redistribution bed/mattress (bed type)    Nutrition Interventions: Document food/fluid/supplement intake                     Problem: Patient Education: Go to Patient Education Activity  Goal: Patient/Family Education  Outcome: Progressing Towards Goal

## 2022-03-20 NOTE — PROGRESS NOTES
Hospitalist Progress Note   Admit Date:  3/18/2022  8:52 PM   Name:  Damian Hopkins   Age:  64 y.o. 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 Patient was recently admitted and discharged from our facility on 3/14. Patient was treated for acute congestive heart failure at that time. Also noted to have splenomegaly and splenic ifarct. Patient was recently diagnosed with E. coli bacterial meningitis, completed course of antibiotics. Patient reports bilateral lower extremity edema, orthopnea. Patient reports intermittent diarrhea. Evaluated in the emergency room, patient was given diuretics, chest x-ray was done shows evidence of pulmonary vascular congestion. ER physician requested admission of this patient for further evaluation and management. Pt admitted for diastolic CHFe and started on IV lasix. Subjective/24hr Events (22): Pt reports muscle spasms, worse in back and right side. Unchanged over last few weeks per patient report. ROS:  10 systems reviewed and negative except as noted above.      Assessment & Plan:     # Acute on chronic diastolic CHF  3/16 -   - clinically improved,   - continue IV lasix  - holding Toprol for borderline BP  - monitor I/O's     # CML  - OP oncology follow-up  - continue home medications - sprycel and Jakafi  - Continue therapeutic lovenox    # Splenic infarcts  - continue therapeutic lovenox 50mg subcut q12    # back and flank pain  - suspect secondary to above  - likely secondary to splenic infarct - already anticoagulated  - trial of flexeril, cont prn percocet    # Debility  - PT/OT  - recommending SNF    # hx of DVT  - continue therapeutic lovenox   # hypothyroidism  - TSH 22, increase dose and repeat TSH in 4-6 weeks    # eye irritation  - ? Bacterial conjuctivitis left eye vs Alergic conjuctivitis  - add polytrim gtts and KIKE meds   3/20 - improved. Cont current    # HTN  - BP stable, cont toprol     # Elevated TSH  3/20 - increase synthroid to 125mcg  - repeat TFT in 4-6 weeks      Dispo/Discharge Planning:         Likely to STR in 1-2. Medically stable. PT/OT, PPD placed and CM following    Diet:  ADULT DIET Regular;  Low Sodium (2 gm)  DVT PPx: lovenox sq  Code status: Full Code    Hospital Problems as of 3/20/2022 Date Reviewed: 3/12/2022          Codes Class Noted - Resolved POA    CML (chronic myelocytic leukemia) ([facility] ICD-10-CM: C92.10  ICD-9-CM: 205.10  3/19/2022 - Present Unknown        Physical deconditioning ICD-10-CM: R53.81  ICD-9-CM: 799.3  3/19/2022 - Present Unknown        * (Principal) Acute diastolic (congestive) heart failure (HCC) ICD-10-CM: I50.31  ICD-9-CM: 428.31, 428.0  3/19/2022 - Present Unknown        History of DVT (deep vein thrombosis) ICD-10-CM: Z86.718  ICD-9-CM: V12.51  3/19/2022 - Present Unknown        Chronic myelogenous leukemia (HCC) (Chronic) ICD-10-CM: C92.10  ICD-9-CM: 205.10  3/30/2015 - Present Yes              Objective:     Patient Vitals for the past 24 hrs:   Temp Pulse Resp BP SpO2   03/20/22 1225 98.1 °F (36.7 °C) (!) 102 -- 108/75 92 %   03/20/22 1200 -- 96 -- -- --   03/20/22 0800 -- 96 -- -- --   03/20/22 0729 98 °F (36.7 °C) 92 18 101/68 95 %   03/20/22 0330 97.8 °F (36.6 °C) 90 14 108/77 93 %   03/19/22 2242 98.5 °F (36.9 °C) 94 16 109/83 93 %   03/19/22 1956 -- -- -- -- 96 %   03/19/22 1924 97.8 °F (36.6 °C) 91 16 103/78 96 %   03/19/22 1718 -- -- -- -- 94 %   03/19/22 1604 98.1 °F (36.7 °C) 95 18 102/77 92 %     Oxygen Therapy  O2 Sat (%): 92 % (03/20/22 1225)  Pulse via Oximetry: 90 beats per minute (03/19/22 1956)  O2 Device: Nasal cannula (03/20/22 0740)  O2 Flow Rate (L/min): 2 l/min (03/20/22 0740)    Estimated body mass index is 24.62 kg/m² as calculated from the following:    Height as of this encounter: 5' 3\" (1.6 m). Weight as of this encounter: 63 kg (139 lb). Intake/Output Summary (Last 24 hours) at 3/20/2022 1300  Last data filed at 3/20/2022 0729  Gross per 24 hour   Intake --   Output 350 ml   Net -350 ml         Physical Exam:     Blood pressure 108/75, pulse (!) 102, temperature 98.1 °F (36.7 °C), resp. rate 18, height 5' 3\" (1.6 m), weight 63 kg (139 lb), SpO2 92 %. General:    Well nourished. No overt distress  Head:  Normocephalic, atraumatic  Eyes:  Sclerae appear normal.  Pupils equally round. ENT:  Nares appear normal, no drainage. Moist oral mucosa  Neck:  No restricted ROM. Trachea midline   CV:   RRR. No m/r/g. No jugular venous distension. Lungs:   Diminished bilaterally No wheezing, rhonchi, or rales. Respirations even, unlabored  Abdomen: Bowel sounds present. Soft, nontender, nondistended. Extremities: No cyanosis or clubbing. No edema  Skin:     No rashes and normal coloration. Warm and dry. Neuro:  CN II-XII grossly intact. Sensation intact. A&Ox3  Psych:  Normal mood and affect.       I have reviewed ordered lab tests and independently visualized imaging below:    Recent Labs:  Recent Results (from the past 48 hour(s))   EKG, 12 LEAD, INITIAL    Collection Time: 03/18/22  8:53 PM   Result Value Ref Range    Ventricular Rate 97 BPM    Atrial Rate 97 BPM    P-R Interval 138 ms    QRS Duration 80 ms    Q-T Interval 396 ms    QTC Calculation (Bezet) 502 ms    Calculated P Axis 82 degrees    Calculated R Axis 122 degrees    Calculated T Axis -2 degrees Diagnosis       Poor data quality  Normal sinus rhythm  Prolonged QT  Abnormal ECG  When compared with ECG of 09-MAR-2022 22:24,  Poor data quality in current ECG precludes serial comparison  Confirmed by Muriel Wilson MD, Jose Alejandro Putnam ([id number]) on 3/19/2022 1:84:66 PM     METABOLIC PANEL, COMPREHENSIVE    Collection Time: 03/18/22  9:23 PM   Result Value Ref Range    Sodium 134 (L) 136 - 145 mmol/L    Potassium 3.5 3.5 - 5.1 mmol/L    Chloride 100 98 - 107 mmol/L    CO2 25 21 - 32 mmol/L    Anion gap 9 7 - 16 mmol/L    Glucose 176 (H) 65 - 100 mg/dL    BUN 19 8 - 23 MG/DL    Creatinine 1.04 (H) 0.6 - 1.0 MG/DL    GFR est AA >60 >60 ml/min/1.73m2    GFR est non-AA 57 (L) >60 ml/min/1.73m2    Calcium 8.2 (L) 8.3 - 10.4 MG/DL    Bilirubin, total 1.4 (H) 0.2 - 1.1 MG/DL    ALT (SGPT) 58 12 - 65 U/L    AST (SGOT) 39 (H) 15 - 37 U/L    Alk.  phosphatase 115 50 - 130 U/L    Protein, total 7.2 6.3 - 8.2 g/dL    Albumin 3.3 3.2 - 4.6 g/dL    Globulin 3.9 (H) 2.3 - 3.5 g/dL    A-G Ratio 0.8 (L) 1.2 - 3.5     C REACTIVE PROTEIN, QT    Collection Time: 03/18/22  9:23 PM   Result Value Ref Range    C-Reactive protein 5.3 (H) 0.0 - 0.9 mg/dL   PROCALCITONIN    Collection Time: 03/18/22  9:23 PM   Result Value Ref Range    Procalcitonin 16.46 (H) 0.00 - 0.49 ng/mL   TSH 3RD GENERATION    Collection Time: 03/18/22  9:23 PM   Result Value Ref Range    TSH 22.100 uIU/mL   NT-PRO BNP    Collection Time: 03/18/22  9:23 PM   Result Value Ref Range    NT pro-BNP 13,297 (H) 5 - 125 PG/ML   CBC WITH AUTOMATED DIFF    Collection Time: 03/18/22  9:48 PM   Result Value Ref Range    WBC 18.2 (H) 4.3 - 11.1 K/uL    RBC 3.74 (L) 4.05 - 5.2 M/uL    HGB 12.0 11.7 - 15.4 g/dL    HCT 37.6 35.8 - 46.3 %    .5 (H) 79.6 - 97.8 FL    MCH 32.1 26.1 - 32.9 PG    MCHC 31.9 31.4 - 35.0 g/dL    RDW 23.1 (H) 11.9 - 14.6 %    PLATELET 554 (L) 904 - 450 K/uL    MPV 10.6 9.4 - 12.3 FL    ABSOLUTE NRBC 0.02 0.0 - 0.2 K/uL    DF AUTOMATED      NEUTROPHILS 88 (H) 43 - 78 % LYMPHOCYTES 7 (L) 13 - 44 %    MONOCYTES 3 (L) 4.0 - 12.0 %    EOSINOPHILS 1 0.5 - 7.8 %    BASOPHILS 1 0.0 - 2.0 %    IMMATURE GRANULOCYTES 1 0.0 - 5.0 %    ABS. NEUTROPHILS 16.0 (H) 1.7 - 8.2 K/UL    ABS. LYMPHOCYTES 1.3 0.5 - 4.6 K/UL    ABS. MONOCYTES 0.6 0.1 - 1.3 K/UL    ABS. EOSINOPHILS 0.1 0.0 - 0.8 K/UL    ABS. BASOPHILS 0.1 0.0 - 0.2 K/UL    ABS. IMM. GRANS. 0.1 0.0 - 0.5 K/UL   COVID-19 RAPID TEST    Collection Time: 03/18/22 11:00 PM   Result Value Ref Range    Specimen source Nasopharyngeal      COVID-19 rapid test Not detected NOTD     CBC WITH AUTOMATED DIFF    Collection Time: 03/20/22  4:28 AM   Result Value Ref Range    WBC 16.2 (H) 4.3 - 11.1 K/uL    RBC 3.54 (L) 4.05 - 5.2 M/uL    HGB 11.4 (L) 11.7 - 15.4 g/dL    HCT 34.9 (L) 35.8 - 46.3 %    MCV 98.6 (H) 79.6 - 97.8 FL    MCH 32.2 26.1 - 32.9 PG    MCHC 32.7 31.4 - 35.0 g/dL    RDW 22.8 (H) 11.9 - 14.6 %    PLATELET 205 (L) 437 - 450 K/uL    MPV 11.1 9.4 - 12.3 FL    ABSOLUTE NRBC 0.02 0.0 - 0.2 K/uL    DF AUTOMATED      NEUTROPHILS 84 (H) 43 - 78 %    LYMPHOCYTES 9 (L) 13 - 44 %    MONOCYTES 4 4.0 - 12.0 %    EOSINOPHILS 2 0.5 - 7.8 %    BASOPHILS 1 0.0 - 2.0 %    IMMATURE GRANULOCYTES 1 0.0 - 5.0 %    ABS. NEUTROPHILS 13.7 (H) 1.7 - 8.2 K/UL    ABS. LYMPHOCYTES 1.4 0.5 - 4.6 K/UL    ABS. MONOCYTES 0.7 0.1 - 1.3 K/UL    ABS. EOSINOPHILS 0.3 0.0 - 0.8 K/UL    ABS. BASOPHILS 0.1 0.0 - 0.2 K/UL    ABS. IMM.  GRANS. 0.2 0.0 - 0.5 K/UL   METABOLIC PANEL, BASIC    Collection Time: 03/20/22  4:28 AM   Result Value Ref Range    Sodium 134 (L) 136 - 145 mmol/L    Potassium 3.4 (L) 3.5 - 5.1 mmol/L    Chloride 100 98 - 107 mmol/L    CO2 27 21 - 32 mmol/L    Anion gap 7 7 - 16 mmol/L    Glucose 105 (H) 65 - 100 mg/dL    BUN 18 8 - 23 MG/DL    Creatinine 0.85 0.6 - 1.0 MG/DL    GFR est AA >60 >60 ml/min/1.73m2    GFR est non-AA >60 >60 ml/min/1.73m2    Calcium 8.2 (L) 8.3 - 10.4 MG/DL   PLEASE READ & DOCUMENT PPD TEST IN 24 HRS    Collection Time: 03/20/22 11:51 AM   Result Value Ref Range    PPD Negative Negative    mm Induration 0 0 - 5 mm       All Micro Results     Procedure Component Value Units Date/Time    COVID-19 RAPID TEST [[id number]] Collected: 03/18/22 2300    Order Status: Completed Specimen: Nasopharyngeal Updated: 03/18/22 2323     Specimen source Nasopharyngeal        COVID-19 rapid test Not detected        Comment:      The specimen is NEGATIVE for SARS-CoV-2, the novel coronavirus associated with COVID-19. A negative result does not rule out COVID-19. This test has been authorized by the FDA under an Emergency Use Authorization (EUA) for use by authorized laboratories. Fact sheet for Healthcare Providers: [employer]  Fact sheet for Patients: [employer]       Methodology: Isothermal Nucleic Acid Amplification               Other Studies:  No results found. Current Meds:  Current Facility-Administered Medications   Medication Dose Route Frequency    cyclobenzaprine (FLEXERIL) tablet 5 mg  5 mg Oral TID PRN    metoprolol succinate (TOPROL-XL) XL tablet 25 mg  25 mg Oral DAILY    furosemide (LASIX) injection 40 mg  40 mg IntraVENous BID    ruxolitinib tab 5 mg Vincent Crystal) - patient supplied (Patient Supplied)  5 mg Oral Q12H    enoxaparin (LOVENOX) injection 50 mg  50 mg SubCUTAneous Q12H    oxyCODONE-acetaminophen (PERCOCET 10)  mg per tablet 1 Tablet  1 Tablet Oral Q4H PRN    fluticasone propionate (FLONASE) 50 mcg/actuation nasal spray 2 Spray  2 Spray Both Nostrils DAILY    cetirizine (ZYRTEC) tablet 10 mg  10 mg Oral DAILY    trimethoprim-polymyxin b (POLYTRIM) 10,000 unit- 1 mg/mL ophthalmic solution 1 Drop  1 Drop Both Eyes BID    levothyroxine (SYNTHROID) tablet 125 mcg  125 mcg Oral 6am       Signed:  Rober Cowart DO    Part of this note may have been written by using a voice dictation software.   The note has been proof read but may still contain some grammatical/other typographical errors.

## 2022-03-20 NOTE — PROGRESS NOTES
Problem: Self Care Deficits Care Plan (Adult)  Goal: *Acute Goals and Plan of Care (Insert Text)  Outcome: Progressing Towards Goal  Note: 1. Patient will complete lower body bathing and dressing with SBA and adaptive equipment as needed. 2. Patient will complete toileting with SBA. 3. Patient will tolerate 30 minutes of OT treatment with 1-2 rest breaks to increase activity tolerance for ADLs. 4. Patient will complete functional transfers with SPV and adaptive equipment as needed. Timeframe: 7 visits        OCCUPATIONAL THERAPY: Initial Assessment, Daily Note, and AM 3/20/2022  INPATIENT: OT Visit Days: 1  Payor: [address] [place],9D / Plan: [address] / Product Type: Managed Care Medicare /      NAME/AGE/GENDER: Ina Ganser is a 64 y.o. female   PRIMARY DIAGNOSIS:  Acute diastolic (congestive) heart failure (HCC) [I50.31]  CML (chronic myelocytic leukemia) (HCC) [C92.10]  Physical deconditioning [G75.43] Acute diastolic (congestive) heart failure (HCC) Acute diastolic (congestive) heart failure (HCC)        ICD-10: Treatment Diagnosis:    Generalized Muscle Weakness (M62.81)  Other lack of cordination (R27.8)  Difficulty in walking, Not elsewhere classified (R26.2)   Precautions/Allergies:   Latex, Sulfa (sulfonamide antibiotics), Tramadol, Augmentin [amoxicillin-pot clavulanate], Codeine, Divalproex sodium, Morphine, Potassium clavulanate, Rizatriptan, Tetanus immune globulin, Vancomycin, Xanax [alprazolam], and Zonegran [zonisamide]      ASSESSMENT:     Ms. Leticia Balbuena presents with the above diagnoses and overall deficits in strength, functional mobility and ADL performance. At baseline, she lives alone without much family support and has been unable to care for herself. She requires assistance with LB dressing, bed mobility, functional household mobility and basic ADL tasks as charted below.  She was found in bed with O2 off satting 87%, O2 NC placed back on at 2L with sats at 95% with activity. She will benefit from continued skilled OT during hospital stay and further rehab at SNF prior to returning home. This section established at most recent assessment   PROBLEM LIST (Impairments causing functional limitations):  Decreased Strength  Decreased ADL/Functional Activities  Decreased Transfer Abilities  Decreased Ambulation Ability/Technique  Decreased Activity Tolerance   INTERVENTIONS PLANNED: (Benefits and precautions of occupational therapy have been discussed with the patient.)  Activities of daily living training  Adaptive equipment training  Balance training  Therapeutic activity  Therapeutic exercise     TREATMENT PLAN: Frequency/Duration: Follow patient 3x/week to address above goals. Rehabilitation Potential For Stated Goals: Good     REHAB RECOMMENDATIONS (at time of discharge pending progress):    Placement: It is my opinion, based on this patient's performance to date, that Ms. Vanessa Acevedo may benefit from intensive therapy at a [address] [place] [address] after discharge due to the functional deficits listed above that are likely to improve with skilled rehabilitation and concerns that he/she may be unsafe to be unsupervised at home due to inability to care for herself .   Equipment:   None at this time              OCCUPATIONAL PROFILE AND HISTORY:   History of Present Injury/Illness (Reason for Referral):  See H&P    Past Medical History/Comorbidities:   Ms. Vanessa Acevedo  has a past medical history of Acute blood loss anemia (4/6/2018), Anemia, C. difficile diarrhea (4/1/2015), Cerebral edema ([address] (4/1/2015), Chronic migraine (9/22/2016), Chronic myelogenous leukemia ([address] Chronic pain, Coagulation disorder ([address] Esophageal spasm, Esophageal varices ([address] GI bleed (8/3/2016), H/O craniotomy, Hematemesis (8/20/2021), Leukocytosis (3/14/2015), Melena (8/20/2021), MRSA colonization (6/25/2012), Polycythemia vera(238.4), Portal hypertension ([address] Portal vein thrombosis (6/20/2012), Primary hypothyroidism, Pulmonary embolism ([address] (2006), S/P exploratory laparotomy, 6/20/12  (6/25/2012), S/P small bowel resection, Seizure ([address] (3/14/2015), Seizure disorder ([address] (3/30/2015), Seizures ([address] Sepsis with acute organ dysfunction without septic shock ([address] (2/2/2022), Splenomegaly, congestive, chronic, Stroke (cerebrum) ([address] (4/11/2015), Stroke ([address] Thrombocytopenia, 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 She has no past medical history of Adverse effect of anesthesia, Difficult intubation, Malignant hyperthermia due to anesthesia, or Pseudocholinesterase deficiency. Ms. Fito Cook  has a past surgical history that includes hx gyn; hx gyn; neurological procedure unlisted (2010-12); hx gi; hx gi; pr abdomen surgery proc unlisted; hx cholecystectomy; pr abdomen surgery proc unlisted; hx appendectomy; hx breast biopsy (Bilateral); and hx orthopaedic (Left, 2008). Social History/Living Environment:   Support Systems: No Support Systems  Patient Expects to be Discharged to[de-identified] Skilled nursing facility  Prior Level of Function/Work/Activity:  Most recently needing assistance with ADLs, lives alone, little family support     Number of Personal Factors/Comorbidities that affect the Plan of Care: Brief history (0):  LOW COMPLEXITY   ASSESSMENT OF OCCUPATIONAL PERFORMANCE[de-identified]   Activities of Daily Living:   Basic ADLs (From Assessment) Complex ADLs (From Assessment)   Feeding: Independent  Oral Facial Hygiene/Grooming: Stand-by assistance  Bathing: Minimum assistance,Moderate assistance  Upper Body Dressing: Setup  Lower Body Dressing: Moderate assistance  Toileting: Minimum assistance     Grooming/Bathing/Dressing Activities of Daily Living     Cognitive Retraining  Safety/Judgement: Fall prevention; Awareness of environment                 Functional Transfers  Bathroom Mobility: Contact guard assistance  Toilet Transfer : Contact guard assistance     Bed/Mat Mobility  Supine to Sit: Minimum assistance  Sit to Stand: Contact guard assistance  Stand to Sit: Contact guard assistance  Bed to Chair: Contact guard assistance     Most Recent Physical Functioning:   Gross Assessment:                  Posture:     Balance:  Sitting: Intact  Standing: With support Bed Mobility:  Supine to Sit: Minimum assistance  Wheelchair Mobility:     Transfers:  Sit to Stand: Contact guard assistance  Stand to Sit: Contact guard assistance  Bed to Chair: Contact guard assistance  Duration: 25 Minutes            Patient Vitals for the past 6 hrs:   BP BP Patient Position SpO2 O2 Flow Rate (L/min) Pulse   03/20/22 0729 101/68 Supine 95 % -- 92   03/20/22 0740 -- -- -- 2 l/min --   03/20/22 0800 -- -- -- -- 96       Mental Status  Neurologic State: Alert  Orientation Level: Oriented X4  Cognition: Follows commands  Perception: Appears intact  Perseveration: No perseveration noted  Safety/Judgement: Fall prevention,Awareness of environment                          Physical Skills Involved:  Balance  Strength  Activity Tolerance Cognitive Skills Affected (resulting in the inability to perform in a timely and safe manner):  WFL Psychosocial Skills Affected:  Environmental Adaptation   Number of elements that affect the Plan of Care: 3-5:  MODERATE COMPLEXITY   CLINICAL DECISION MAKING:   [facility] AM-PAC 6 Clicks   Daily Activity Inpatient Short Form  How much help from another person does the patient currently need. .. Total A Lot A Little None   1. Putting on and taking off regular lower body clothing? [] 1   [x] 2   [] 3   [] 4   2. Bathing (including washing, rinsing, drying)? [] 1   [x] 2   [] 3   [] 4   3. Toileting, which includes using toilet, bedpan or urinal?   [] 1   [x] 2   [] 3   [] 4   4. Putting on and taking off regular upper body clothing? [] 1   [] 2   [x] 3   [] 4   5.   Taking care of personal grooming such as brushing teeth? [] 1   [] 2   [] 3   [x] 4   6. Eating meals? [] 1   [] 2   [] 3   [x] 4   © 2007, Trustees of [facility], under license to [employer]. All rights reserved      Score:  Initial: 17 Most Recent: X (Date: -- )    Interpretation of Tool:  Represents activities that are increasingly more difficult (i.e. Bed mobility, Transfers, Gait). Medical Necessity:     Skilled intervention continues to be required due to the above deficits. Reason for Services/Other Comments:  Patient continues to require skilled intervention due to   Ongoing need for assistance  . Use of outcome tool(s) and clinical judgement create a POC that gives a: LOW COMPLEXITY         TREATMENT:   (In addition to Assessment/Re-Assessment sessions the following treatments were rendered)     Pre-treatment Symptoms/Complaints:    Pain: Initial:   Pain Intensity 1: 0  Post Session:  0      Co-treatment was necessary to improve patient's ability to increase activity demands and ability to return to normal functional activity. Self Care: (20): Procedure(s) (per grid) utilized to improve and/or restore self-care/home management as related to dressing and functional household mobility and transfers . Required minimal verbal, manual, and tactile cueing to facilitate activities of daily living skills and compensatory activities. Evaluation complete    Braces/Orthotics/Lines/Etc:   O2 Device: Nasal cannula  Treatment/Session Assessment:    Response to Treatment:  Good, up in chair  Interdisciplinary Collaboration:   Physical Therapist  Occupational Therapist  Registered Nurse  After treatment position/precautions:   Up in chair  Bed alarm/tab alert on  Bed/Chair-wheels locked  Call light within reach  RN notified   Compliance with Program/Exercises: Will assess as treatment progresses. Recommendations/Intent for next treatment session:   \"Next visit will focus on advancements to more challenging activities and reduction in assistance provided\".   Total Treatment Duration:  OT Patient Time In/Time Out  Time In: 0930  Time Out: 3491 Ashwin Kellogg, OT

## 2022-03-21 NOTE — PROGRESS NOTES
Purewick ext female catheter placed, per pt request with immediate output of 250 ml clear, shonna urine.

## 2022-03-21 NOTE — PROGRESS NOTES
Problem: Falls - Risk of  Goal: *Absence of Falls  Description: Document Luz Leal Fall Risk and appropriate interventions in the flowsheet. Outcome: Progressing Towards Goal  Note: Fall Risk Interventions:  Mobility Interventions: Bed/chair exit alarm,Patient to call before getting OOB         Medication Interventions: Patient to call before getting OOB    Elimination Interventions: Bed/chair exit alarm,Call light in reach,Patient to call for help with toileting needs    History of Falls Interventions: Bed/chair exit alarm         Problem: Patient Education: Go to Patient Education Activity  Goal: Patient/Family Education  Outcome: Progressing Towards Goal     Problem: Patient Education: Go to Patient Education Activity  Goal: Patient/Family Education  Outcome: Progressing Towards Goal     Problem: Pressure Injury - Risk of  Goal: *Prevention of pressure injury  Description: Document Eric Scale and appropriate interventions in the flowsheet.   Outcome: Progressing Towards Goal  Note: Pressure Injury Interventions:  Sensory Interventions: Assess changes in LOC    Moisture Interventions: Absorbent underpads,Apply protective barrier, creams and emollients    Activity Interventions: Increase time out of bed,PT/OT evaluation    Mobility Interventions: Pressure redistribution bed/mattress (bed type),PT/OT evaluation    Nutrition Interventions: Document food/fluid/supplement intake                     Problem: Patient Education: Go to Patient Education Activity  Goal: Patient/Family Education  Outcome: Progressing Towards Goal     Problem: Patient Education: Go to Patient Education Activity  Goal: Patient/Family Education  Outcome: Progressing Towards Goal

## 2022-03-21 NOTE — PROGRESS NOTES
Hospitalist Progress Note   Admit Date:  3/18/2022  8:52 PM   Name:  Marzette Leventhal   Age:  64 y.o. 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 Patient was recently admitted and discharged from our facility on 3/14. Patient was treated for acute congestive heart failure at that time. Also noted to have splenomegaly and splenic ifarct. Patient was recently diagnosed with E. coli bacterial meningitis, completed course of antibiotics. Patient reports bilateral lower extremity edema, orthopnea. Patient reports intermittent diarrhea. Evaluated in the emergency room, patient was given diuretics, chest x-ray was done shows evidence of pulmonary vascular congestion. ER physician requested admission of this patient for further evaluation and management. Pt admitted for diastolic CHFe and started on IV lasix. Subjective/24hr Events (22): Reports improvement in muscle spasms. Denies other acute concerns. Reports breathing has improved. Was informed that she has a bed offer at a facility. Patient says she needs more time to \"research it\". She has reviewed their rating (5 star). I have encouraged her to make a decision today but she says its only been 3 days and she \"needs time'. ROS:  10 systems reviewed and negative except as noted above.      Assessment & Plan:     # Acute on chronic diastolic CHF  3/21 -    - clinically improved,   -switch IV to oral lasix  - add potassium  - holding Toprol for borderline BP  - monitor I/O's     # CML  - OP oncology follow-up  - continue home medications - sprycel and Jakafi  - Continue therapeutic lovenox    # Splenic infarcts  - continue therapeutic lovenox 50mg subcut q12    # back and flank pain  - suspect secondary to above  - likely secondary to splenic infarct - already anticoagulated  - trial of flexeril, cont prn percocet    # Debility  - PT/OT  - recommending SNF    # hx of DVT  - continue therapeutic lovenox     # eye irritation  - ? Bacterial conjuctivitis left eye vs Alergic conjuctivitis  - add polytrim gtts and KIKE meds   3/20 - improved. Cont current    # HTN  - BP stable, cont toprol     # Elevated TSH  3/20 - increase synthroid to 125mcg  - repeat TFT in 4-6 weeks      Dispo/Discharge Planning:        MEDICALLY STABLE. Pt researching STR options    Diet:  ADULT DIET Regular;  Low Sodium (2 gm)  DVT PPx: lovenox sq  Code status: Full Code    Hospital Problems as of 3/21/2022 Date Reviewed: 3/12/2022          Codes Class Noted - Resolved POA    CML (chronic myelocytic leukemia) ([facility] ICD-10-CM: C92.10  ICD-9-CM: 205.10  3/19/2022 - Present Unknown        Physical deconditioning ICD-10-CM: R53.81  ICD-9-CM: 799.3  3/19/2022 - Present Unknown        * (Principal) Acute diastolic (congestive) heart failure (HCC) ICD-10-CM: I50.31  ICD-9-CM: 428.31, 428.0  3/19/2022 - Present Unknown        History of DVT (deep vein thrombosis) ICD-10-CM: Z86.718  ICD-9-CM: V12.51  3/19/2022 - Present Unknown        Chronic myelogenous leukemia (HCC) (Chronic) ICD-10-CM: C92.10  ICD-9-CM: 205.10  3/30/2015 - Present Yes              Objective:     Patient Vitals for the past 24 hrs:   Temp Pulse Resp BP SpO2   03/21/22 1053 99.2 °F (37.3 °C) 95 17 101/70 93 %   03/21/22 0824 -- -- -- -- 96 %   03/21/22 0800 -- 90 -- -- --   03/21/22 0758 98.3 °F (36.8 °C) 91 17 104/68 98 % 03/21/22 0337 98.2 °F (36.8 °C) 92 14 97/64 94 %   03/21/22 0240 -- -- 11 -- --   03/21/22 0123 -- -- 11 -- --   03/21/22 0013 -- -- 11 -- --   03/20/22 2325 97.5 °F (36.4 °C) 89 10 95/63 96 %   03/20/22 1950 -- -- -- -- 93 %   03/20/22 1923 98.2 °F (36.8 °C) 95 12 118/85 92 %   03/20/22 1728 98.8 °F (37.1 °C) 93 16 96/69 90 %   03/20/22 1600 -- 92 -- -- --     Oxygen Therapy  O2 Sat (%): 93 % (03/21/22 1053)  Pulse via Oximetry: 91 beats per minute (03/20/22 1950)  O2 Device: Nasal cannula (03/21/22 0824)  O2 Flow Rate (L/min): 2 l/min (03/21/22 0824)    Estimated body mass index is 24.62 kg/m² as calculated from the following:    Height as of this encounter: 5' 3\" (1.6 m). Weight as of this encounter: 63 kg (139 lb). Intake/Output Summary (Last 24 hours) at 3/21/2022 1309  Last data filed at 3/21/2022 0420  Gross per 24 hour   Intake 444 ml   Output 950 ml   Net -506 ml         Physical Exam:     Blood pressure 101/70, pulse 95, temperature 99.2 °F (37.3 °C), resp. rate 17, height 5' 3\" (1.6 m), weight 63 kg (139 lb), SpO2 93 %. General:    Well nourished. No overt distress  Head:  Normocephalic, atraumatic  Eyes:  Sclerae appear normal.  Pupils equally round. ENT:  Nares appear normal, no drainage. Moist oral mucosa  Neck:  No restricted ROM. Trachea midline   CV:   RRR. No m/r/g. No jugular venous distension. Lungs:   Diminished bilaterally No wheezing, rhonchi, or rales. Respirations even, unlabored  Abdomen: Bowel sounds present. Soft, nontender, nondistended. Extremities: No cyanosis or clubbing. No edema  Skin:     No rashes and normal coloration. Warm and dry. Neuro:  CN II-XII grossly intact. Sensation intact. A&Ox3  Psych:  Normal mood and affect.       I have reviewed ordered lab tests and independently visualized imaging below:    Recent Labs:  Recent Results (from the past 48 hour(s))   CBC WITH AUTOMATED DIFF    Collection Time: 03/20/22  4:28 AM   Result Value Ref Range WBC 16.2 (H) 4.3 - 11.1 K/uL    RBC 3.54 (L) 4.05 - 5.2 M/uL    HGB 11.4 (L) 11.7 - 15.4 g/dL    HCT 34.9 (L) 35.8 - 46.3 %    MCV 98.6 (H) 79.6 - 97.8 FL    MCH 32.2 26.1 - 32.9 PG    MCHC 32.7 31.4 - 35.0 g/dL    RDW 22.8 (H) 11.9 - 14.6 %    PLATELET 258 (L) 603 - 450 K/uL    MPV 11.1 9.4 - 12.3 FL    ABSOLUTE NRBC 0.02 0.0 - 0.2 K/uL    DF AUTOMATED      NEUTROPHILS 84 (H) 43 - 78 %    LYMPHOCYTES 9 (L) 13 - 44 %    MONOCYTES 4 4.0 - 12.0 %    EOSINOPHILS 2 0.5 - 7.8 %    BASOPHILS 1 0.0 - 2.0 %    IMMATURE GRANULOCYTES 1 0.0 - 5.0 %    ABS. NEUTROPHILS 13.7 (H) 1.7 - 8.2 K/UL    ABS. LYMPHOCYTES 1.4 0.5 - 4.6 K/UL    ABS. MONOCYTES 0.7 0.1 - 1.3 K/UL    ABS. EOSINOPHILS 0.3 0.0 - 0.8 K/UL    ABS. BASOPHILS 0.1 0.0 - 0.2 K/UL    ABS. IMM.  GRANS. 0.2 0.0 - 0.5 K/UL   METABOLIC PANEL, BASIC    Collection Time: 03/20/22  4:28 AM   Result Value Ref Range    Sodium 134 (L) 136 - 145 mmol/L    Potassium 3.4 (L) 3.5 - 5.1 mmol/L    Chloride 100 98 - 107 mmol/L    CO2 27 21 - 32 mmol/L    Anion gap 7 7 - 16 mmol/L    Glucose 105 (H) 65 - 100 mg/dL    BUN 18 8 - 23 MG/DL    Creatinine 0.85 0.6 - 1.0 MG/DL    GFR est AA >60 >60 ml/min/1.73m2    GFR est non-AA >60 >60 ml/min/1.73m2    Calcium 8.2 (L) 8.3 - 10.4 MG/DL   PLEASE READ & DOCUMENT PPD TEST IN 24 HRS    Collection Time: 03/20/22 11:51 AM   Result Value Ref Range    PPD Negative Negative    mm Induration 0 0 - 5 mm   CBC WITH AUTOMATED DIFF    Collection Time: 03/21/22  9:53 AM   Result Value Ref Range    WBC 15.7 (H) 4.3 - 11.1 K/uL    RBC 3.39 (L) 4.05 - 5.2 M/uL    HGB 11.0 (L) 11.7 - 15.4 g/dL    HCT 33.7 (L) 35.8 - 46.3 %    MCV 99.4 (H) 79.6 - 97.8 FL    MCH 32.4 26.1 - 32.9 PG    MCHC 32.6 31.4 - 35.0 g/dL    RDW 22.5 (H) 11.9 - 14.6 %    PLATELET 85 (L) 789 - 450 K/uL    MPV 11.7 9.4 - 12.3 FL    ABSOLUTE NRBC 0.00 0.0 - 0.2 K/uL    DF AUTOMATED      NEUTROPHILS 92 (H) 43 - 78 %    LYMPHOCYTES 3 (L) 13 - 44 %    MONOCYTES 4 4.0 - 12.0 %    EOSINOPHILS 1 0.5 - 7.8 %    BASOPHILS 0 0.0 - 2.0 %    IMMATURE GRANULOCYTES 1 0.0 - 5.0 %    ABS. NEUTROPHILS 14.4 (H) 1.7 - 8.2 K/UL    ABS. LYMPHOCYTES 0.5 0.5 - 4.6 K/UL    ABS. MONOCYTES 0.6 0.1 - 1.3 K/UL    ABS. EOSINOPHILS 0.1 0.0 - 0.8 K/UL    ABS. BASOPHILS 0.1 0.0 - 0.2 K/UL    ABS. IMM. GRANS. 0.1 0.0 - 0.5 K/UL   METABOLIC PANEL, BASIC    Collection Time: 03/21/22  9:53 AM   Result Value Ref Range    Sodium 133 (L) 136 - 145 mmol/L    Potassium 3.0 (L) 3.5 - 5.1 mmol/L    Chloride 98 98 - 107 mmol/L    CO2 26 21 - 32 mmol/L    Anion gap 9 7 - 16 mmol/L    Glucose 92 65 - 100 mg/dL    BUN 15 8 - 23 MG/DL    Creatinine 0.84 0.6 - 1.0 MG/DL    GFR est AA >60 >60 ml/min/1.73m2    GFR est non-AA >60 >60 ml/min/1.73m2    Calcium 8.4 8.3 - 10.4 MG/DL   PLEASE READ & DOCUMENT PPD TEST IN 48 HRS    Collection Time: 03/21/22 11:30 AM   Result Value Ref Range    PPD Negative Negative    mm Induration 0 0 - 5 mm       All Micro Results     Procedure Component Value Units Date/Time    COVID-19 RAPID TEST [[id number]] Collected: 03/18/22 2300    Order Status: Completed Specimen: Nasopharyngeal Updated: 03/18/22 2323     Specimen source Nasopharyngeal        COVID-19 rapid test Not detected        Comment:      The specimen is NEGATIVE for SARS-CoV-2, the novel coronavirus associated with COVID-19. A negative result does not rule out COVID-19. This test has been authorized by the FDA under an Emergency Use Authorization (EUA) for use by authorized laboratories. Fact sheet for Healthcare Providers: ConventionUpdate.co.nz  Fact sheet for Patients: ConventionUpdate.co.nz       Methodology: Isothermal Nucleic Acid Amplification               Other Studies:  No results found.     Current Meds:  Current Facility-Administered Medications   Medication Dose Route Frequency    cyclobenzaprine (FLEXERIL) tablet 5 mg  5 mg Oral TID PRN    metoprolol succinate (TOPROL-XL) XL tablet 25 mg  25 mg Oral DAILY    furosemide (LASIX) injection 40 mg  40 mg IntraVENous BID    ruxolitinib tab 5 mg Meredith Chavez) - patient supplied (Patient Supplied)  5 mg Oral Q12H    enoxaparin (LOVENOX) injection 50 mg  50 mg SubCUTAneous Q12H    oxyCODONE-acetaminophen (PERCOCET 10)  mg per tablet 1 Tablet  1 Tablet Oral Q4H PRN    fluticasone propionate (FLONASE) 50 mcg/actuation nasal spray 2 Spray  2 Spray Both Nostrils DAILY    cetirizine (ZYRTEC) tablet 10 mg  10 mg Oral DAILY    trimethoprim-polymyxin b (POLYTRIM) 10,000 unit- 1 mg/mL ophthalmic solution 1 Drop  1 Drop Both Eyes BID    levothyroxine (SYNTHROID) tablet 125 mcg  125 mcg Oral 6am       Signed:  Tio Oneill DO    Part of this note may have been written by using a voice dictation software. The note has been proof read but may still contain some grammatical/other typographical errors.

## 2022-03-21 NOTE — PROGRESS NOTES
Resting quietly, resp even, unlab with O2 intact. No distress noted. Report given to Ladi Stafford RN.

## 2022-03-21 NOTE — PROGRESS NOTES
RN CM attempted to meet with the patient to discuss her bed offer at [address] [place] [address] and Rehabilitation[facility]. The primary nurse was at the bedside providing patient care. Case Management will follow up with the patient at a later time.

## 2022-03-21 NOTE — PROGRESS NOTES
Problem: Mobility Impaired (Adult and Pediatric)  Goal: *Acute Goals and Plan of Care (Insert Text)  Outcome: Progressing Towards Goal  Note:   LTG:  (1.)Ms. Ángel Kinsey will move from supine to sit and sit to supine  in bed with CONTACT GUARD ASSIST within 7 treatment day(s). (2.)Ms. Ángel Kinsey will transfer from bed to chair and chair to bed with CONTACT GUARD ASSIST using the least restrictive device within 7 treatment day(s). (3.)Ms. Ángel Kinsey will ambulate with CONTACT GUARD ASSIST for 75 feet with the least restrictive device within 7 treatment day(s). Goal met  (4)Ms. Ángel Kinsey will perform HEP with cues and assistance to increase safety on her feet in 7 days. New goal 3/20 - pt. Will ambulate 200 feet with RW SBA in 7 days. ________________________________________________________________________________________________       PHYSICAL THERAPY: Daily Note and PM 3/21/2022  INPATIENT: PT Visit Days : 3  Payor: [address] [place],9D / Plan: [address] / Product Type: [employer] /       NAME/AGE/GENDER: Jodi Higgins is a 64 y.o. female   PRIMARY DIAGNOSIS: Acute diastolic (congestive) heart failure (HCC) [I50.31]  CML (chronic myelocytic leukemia) (HCC) [C92.10]  Physical deconditioning [Q97.28] Acute diastolic (congestive) heart failure (HCC) Acute diastolic (congestive) heart failure (HCC)       ICD-10: Treatment Diagnosis:    Generalized Muscle Weakness (M62.81)  Other abnormalities of gait and mobility (R26.89)   Precaution/Allergies:  Latex, Sulfa (sulfonamide antibiotics), Tramadol, Augmentin [amoxicillin-pot clavulanate], Codeine, Divalproex sodium, Morphine, Potassium clavulanate, Rizatriptan, Tetanus immune globulin, Vancomycin, Xanax [alprazolam], and Zonegran [zonisamide]      ASSESSMENT:     Ms. Ángel Kinsey presents with decreased functional mobility and gait and general weakness.   She lives alone and reports being independent using a RW short distances to the bathroom and back and with adl's doing a sponge bath. She sleeps on the couch on the main level because she has been unable to climb her steps up to her bedroom and shower. RN note after speaking with daughter states that patient is immobile at home. Pt. Very talkative this am and wanting to get up. She ambulated an increased distance this am with RW on 2L O2. O2 sats after gait 95% on 2LO2. Mild fatigue after gait. All mobility takes extra time. She reports less back pain after ambulating and said it felt good to move. Plans are for SNF.   3/21 sitting in the chair upon arrival.  Stated I need to go to the restroom. Sit>stand with CGA. Ambulated 30 ft to the restroom using RW with CGA. Therapist help pt with brief. Sit>stand and ambulated another 30 ft back to the bed. Return to left side lying with pillow behind back and between the legs. Therapist put needs in reach, alarm on and instructed to call for assist, before getting up. She was on 2L 02 throughout tx. This section established at most recent assessment   PROBLEM LIST (Impairments causing functional limitations):  Decreased Strength  Decreased ADL/Functional Activities  Decreased Transfer Abilities  Decreased Ambulation Ability/Technique  Decreased Balance  Increased Pain  Decreased Activity Tolerance  Increased Fatigue  Decreased Flexibility/Joint Mobility  Edema/Girth  Decreased [place] with Home Exercise Program  Decreased Cognition   INTERVENTIONS PLANNED: (Benefits and precautions of physical therapy have been discussed with the patient.)  Balance Exercise  Bed Mobility  Family Education  Gait Training  Home Exercise Program (HEP)  Range of Motion (ROM)  Therapeutic Activites  Therapeutic Exercise/Strengthening  Transfer Training     TREATMENT PLAN: Frequency/Duration: daily for duration of hospital stay  Rehabilitation Potential For Stated Goals: Good     REHAB RECOMMENDATIONS (at time of discharge pending progress):    Placement:   It is my opinion, based on this patient's performance to date, that Ms. David Eid may benefit from intensive therapy at a [address] [place] [address] after discharge due to the functional deficits listed above that are likely to improve with skilled rehabilitation and concerns that he/she may be unsafe to be unsupervised at home due to weakness and decreased stability on her feet . 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 Patient was recently admitted and discharged from our facility on 3/14. Patient was treated for acute congestive heart failure at that time. Patient was recently diagnosed with E. coli bacterial meningitis, completed course of antibiotics. Patient reports bilateral lower extremity edema, orthopnea. Patient reports intermittent diarrhea. Evaluated in the emergency room, patient was given diuretics, chest x-ray was done shows evidence of pulmonary vascular congestion. ER physician requested admission of this patient for further evaluation and management.   Past Medical History/Comorbidities:   Ms. David Eid  has a past medical history of Acute blood loss anemia (4/6/2018), Anemia, C. difficile diarrhea (4/1/2015), Cerebral edema ([address] (4/1/2015), Chronic migraine (9/22/2016), Chronic myelogenous leukemia ([address] Chronic pain, Coagulation disorder ([address] Esophageal spasm, Esophageal varices ([address] GI bleed (8/3/2016), H/O craniotomy, Hematemesis (8/20/2021), Leukocytosis (3/14/2015), Melena (8/20/2021), MRSA colonization (6/25/2012), Polycythemia vera(238.4), Portal hypertension ([address] Portal vein thrombosis (6/20/2012), Primary hypothyroidism, Pulmonary embolism ([address] (2006), S/P exploratory laparotomy, 6/20/12  (6/25/2012), S/P small bowel resection, Seizure ([address] (3/14/2015), Seizure disorder ([address] (3/30/2015), Seizures ([address] Sepsis with acute organ dysfunction without septic shock ([address] (2/2/2022), Splenomegaly, congestive, chronic, Stroke (cerebrum) ([address] (4/11/2015), Stroke ([address] Thrombocytopenia, 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 She has no past medical history of Adverse effect of anesthesia, Difficult intubation, Malignant hyperthermia due to anesthesia, or Pseudocholinesterase deficiency. Ms. Messer  has a past surgical history that includes hx gyn; hx gyn; neurological procedure unlisted (2010-12); hx gi; hx gi; pr abdomen surgery proc unlisted; hx cholecystectomy; pr abdomen surgery proc unlisted; hx appendectomy; hx breast biopsy (Bilateral); and hx orthopaedic (Left, 2008).   Social History/Living Environment:   Support Systems: No Support Systems  Patient Expects to be Discharged to[de-identified] Skilled nursing facility  Prior Level of Function/Work/Activity:  Using RW short distances, limited mobility     Number of Personal Factors/Comorbidities that affect the Plan of Care: 3+: HIGH COMPLEXITY   EXAMINATION:   Most Recent Physical Functioning:   Gross Assessment:                  Posture:     Balance:  Sitting: Intact  Standing: With support Bed Mobility:  Sit to Supine: Minimum assistance  Wheelchair Mobility:     Transfers:  Sit to Stand: Contact guard assistance  Stand to Sit: Contact guard assistance  Bed to Chair: Contact guard assistance  Duration: 23 Minutes  Gait:     Speed/Romina: Delayed  Step Length: Left shortened;Right shortened  Gait Abnormalities: Decreased step clearance  Distance (ft): 30 Feet (ft) (+ 30)  Assistive Device: Walker, rolling  Ambulation - Level of Assistance: Contact guard assistance  Interventions: Safety awareness training;Verbal cues         Body Structures Involved:  Bones  Joints  Muscles  Ligaments Body Functions Affected: Movement Related Activities and Participation Affected: Mobility   Number of elements that affect the Plan of Care: 3: MODERATE COMPLEXITY   CLINICAL PRESENTATION:   Presentation: Stable and uncomplicated: LOW COMPLEXITY   CLINICAL DECISION MAKIN[address] [place] [address] AM-PAC 6 Clicks   Basic Mobility Inpatient Short Form  How much difficulty does the patient currently have. .. Unable A Lot A Little None   1. Turning over in bed (including adjusting bedclothes, sheets and blankets)? [] 1   [] 2   [x] 3   [] 4   2. Sitting down on and standing up from a chair with arms ( e.g., wheelchair, bedside commode, etc.)   [] 1   [] 2   [x] 3   [] 4   3. Moving from lying on back to sitting on the side of the bed? [] 1   [] 2   [x] 3   [] 4   How much help from another person does the patient currently need. .. Total A Lot A Little None   4. Moving to and from a bed to a chair (including a wheelchair)? [] 1   [] 2   [x] 3   [] 4   5. Need to walk in hospital room? [] 1   [] 2   [x] 3   [] 4   6. Climbing 3-5 steps with a railing? [] 1   [x] 2   [] 3   [] 4   © , Trustees of [address] [place] [address], under license to [employer]. All rights reserved      Score:  Initial: 17 Most Recent: X (Date: -- )    Interpretation of Tool:  Represents activities that are increasingly more difficult (i.e. Bed mobility, Transfers, Gait). Medical Necessity:     Patient is expected to demonstrate progress in   strength, range of motion, and balance   to   decrease assistance required with exercises and functional mobility independently  .   Reason for Services/Other Comments:  Patient   continues to require present interventions due to patient's inability to perform exercises and  functional mobility independently  . Use of outcome tool(s) and clinical judgement create a POC that gives a: Questionable prediction of patient's progress: MODERATE COMPLEXITY            TREATMENT:   (In addition to Assessment/Re-Assessment sessions the following treatments were rendered)   Pre-treatment Symptoms/Complaints:  agreeable  Pain: Initial:      Post Session:        Therapeutic Activity: (  23 Minutes ):  Therapeutic activities including Bed transfers, Chair transfers, , Ambulation on level ground with RW, and standing to improve mobility, strength, and balance. Required minimal Safety awareness training;Verbal cues to promote static and dynamic balance in standing. Braces/Orthotics/Lines/Etc:   O2 Device: Nasal cannula  Treatment/Session Assessment:    Response to Treatment: pt was fatigue after therapy  Interdisciplinary Collaboration:   Physical Therapy Assistant  Certified Nursing Assistant/Patient Care Technician  After treatment position/precautions:   Up in chair  Bed alarm/tab alert on  Bed/Chair-wheels locked  Bed in low position  Call light within reach   Compliance with Program/Exercises: Compliant most of the time, Will assess as treatment progresses  Recommendations/Intent for next treatment session: \"Next visit will focus on advancements to more challenging activities and reduction in assistance provided\".   Total Treatment Duration:  PT Patient Time In/Time Out  Time In: 1330  Time Out: One Mir Devine, PTA

## 2022-03-21 NOTE — PROGRESS NOTES
Resting quietly, awake, resp labored on exertion, improved at rest with O2 intact. Skin warm, dry. Asst to sitting up for bathroom asst with pt c/o back pain, unable to get out of bed. Voided on bedpan, 500 ml clear, yellow urine. AP reg, lungs sounds clear, diminished in bases. 2110  Percocet 10 mg given PO for discomfort.

## 2022-03-21 NOTE — PROGRESS NOTES
RN CM met with the patient at the bedside and informed her of her bed offer at [address] [place] [address]. She reports she has questions about the facility. RN CM provided her with the phone number for the facility and encouraged her to call them today. RN CM informed her that she will require insurance authorization and encouraged her to make a decision.

## 2022-03-21 NOTE — PROGRESS NOTES
RN CM met with the patient and discussed discharge planning and reviewed the patient's bed offer. She reports she has not been able to discuss the bed offer with her family today. RN CM encouraged her to call the facility so that she can get her questions answered.

## 2022-03-22 NOTE — PROGRESS NOTES
Problem: Self Care Deficits Care Plan (Adult)  Goal: *Acute Goals and Plan of Care (Insert Text)  Outcome: Progressing Towards Goal  Note: 1. Patient will complete lower body bathing and dressing with SBA and adaptive equipment as needed. 2. Patient will complete toileting with SBA. 3. Patient will tolerate 30 minutes of OT treatment with 1-2 rest breaks to increase activity tolerance for ADLs. 4. Patient will complete functional transfers with SPV and adaptive equipment as needed. Timeframe: 7 visits        OCCUPATIONAL THERAPY: Daily Note and AM 3/22/2022  INPATIENT: OT Visit Days: 1  Payor: Mercedes Judd / Plan: [address] / Product Type: [employer] /      NAME/AGE/GENDER: Starlett Sacks is a 64 y.o. female   PRIMARY DIAGNOSIS:  Acute diastolic (congestive) heart failure (HCC) [I50.31]  CML (chronic myelocytic leukemia) (HCC) [C92.10]  Physical deconditioning [L98.18] Acute diastolic (congestive) heart failure (HCC) Acute diastolic (congestive) heart failure (HCC)       ICD-10: Treatment Diagnosis:    Generalized Muscle Weakness (M62.81)  Other lack of cordination (R27.8)  Difficulty in walking, Not elsewhere classified (R26.2)   Precautions/Allergies:   Latex, Sulfa (sulfonamide antibiotics), Tramadol, Augmentin [amoxicillin-pot clavulanate], Codeine, Divalproex sodium, Morphine, Potassium clavulanate, Rizatriptan, Tetanus immune globulin, Vancomycin, Xanax [alprazolam], and Zonegran [zonisamide]      ASSESSMENT:     Ms. Jun Barrios presents with the above diagnoses and overall deficits in strength, functional mobility and ADL performance. At baseline, she lives alone without much family support and has been unable to care for herself. She requires assistance with LB dressing, bed mobility, functional household mobility and basic ADL tasks as charted below.  She was found in bed with O2 off satting 87%, O2 NC placed back on at 2L with sats at 95% with activity. She will benefit from continued skilled OT during hospital stay and further rehab at SNF prior to returning home. 3-22-22 Pt sitting in chair on contact. Pt setup for eating. Pt takes extra time for activities and moving. She accepted a bed at [facility]. She is on 1LO2 with sats 94% after moving. [facility] her teeth, donning gown and washing her hands. She rested a few minutes and ambulated in the room RW CGA. Pt fatigues quickly. She returned to sidelying in bed and left on 1LO2 as found. Bed alarm set. Continue with OT POC  This section established at most recent assessment   PROBLEM LIST (Impairments causing functional limitations):  Decreased Strength  Decreased ADL/Functional Activities  Decreased Transfer Abilities  Decreased Ambulation Ability/Technique  Decreased Activity Tolerance   INTERVENTIONS PLANNED: (Benefits and precautions of occupational therapy have been discussed with the patient.)  Activities of daily living training  Adaptive equipment training  Balance training  Therapeutic activity  Therapeutic exercise     TREATMENT PLAN: Frequency/Duration: Follow patient 3x/week to address above goals. Rehabilitation Potential For Stated Goals: Good     REHAB RECOMMENDATIONS (at time of discharge pending progress):    Placement: It is my opinion, based on this patient's performance to date, that Ms. Leni Sandoval may benefit from intensive therapy at a [facility] after discharge due to the functional deficits listed above that are likely to improve with skilled rehabilitation and concerns that he/she may be unsafe to be unsupervised at home due to inability to care for herself .   Equipment:   None at this time              OCCUPATIONAL PROFILE AND HISTORY:   History of Present Injury/Illness (Reason for Referral):  See H&P    Past Medical History/Comorbidities:   Ms. Leni Sandoval  has a past medical history of Acute blood loss anemia (4/6/2018), Anemia, C. difficile diarrhea (4/1/2015), Cerebral edema (HCC) (4/1/2015), Chronic migraine (9/22/2016), Chronic myelogenous leukemia ([address] Chronic pain, Coagulation disorder ([address] Esophageal spasm, Esophageal varices ([address] GI bleed (8/3/2016), H/O craniotomy, Hematemesis (8/20/2021), Leukocytosis (3/14/2015), Melena (8/20/2021), MRSA colonization (6/25/2012), Polycythemia vera(238.4), Portal hypertension ([address] Portal vein thrombosis (6/20/2012), Primary hypothyroidism, Pulmonary embolism ([address] (2006), S/P exploratory laparotomy, 6/20/12  (6/25/2012), S/P small bowel resection, Seizure ([address] (3/14/2015), Seizure disorder ([address] (3/30/2015), Seizures ([address] Sepsis with acute organ dysfunction without septic shock ([address] (2/2/2022), Splenomegaly, congestive, chronic, Stroke (cerebrum) ([address] (4/11/2015), Stroke ([address] Thrombocytopenia, 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 She has no past medical history of Adverse effect of anesthesia, Difficult intubation, Malignant hyperthermia due to anesthesia, or Pseudocholinesterase deficiency. Ms. Jennifer Todd  has a past surgical history that includes hx gyn; hx gyn; neurological procedure unlisted (2010-12); hx gi; hx gi; pr abdomen surgery proc unlisted; hx cholecystectomy; pr abdomen surgery proc unlisted; hx appendectomy; hx breast biopsy (Bilateral); and hx orthopaedic (Left, 2008).   Social History/Living Environment:   Support Systems: No Support Systems  Patient Expects to be Discharged to[de-identified] Skilled nursing facility  Prior Level of Function/Work/Activity:  Most recently needing assistance with ADLs, lives alone, little family support     Number of Personal Factors/Comorbidities that affect the Plan of Care: Brief history (0):  LOW COMPLEXITY   ASSESSMENT OF OCCUPATIONAL PERFORMANCE[de-identified]   Activities of Daily Living:   Basic ADLs (From Assessment) Complex ADLs (From Assessment)   Feeding: Independent  Oral Facial Hygiene/Grooming: Stand-by assistance  Bathing: Minimum assistance,Moderate assistance  Upper Body Dressing: Setup  Lower Body Dressing:  Moderate assistance  Toileting: Minimum assistance     Grooming/Bathing/Dressing Activities of Daily Living   Grooming  Grooming Assistance: Set-up  Position Performed: Standing  Washing Hands: Set-up  Brushing Teeth: Set-up  Brushing/Combing Hair: Set-up Cognitive Retraining  Safety/Judgement: Fall prevention     Feeding  Feeding Assistance: Set-up     Toileting  Toileting Assistance: Set-up  Bladder Hygiene: Set-up   Upper [address]: Set-up Functional Transfers  Bathroom Mobility: Contact guard assistance  Toilet Transfer : Contact guard assistance  Adaptive Equipment: Walker (comment)   Lower Body Dressing Assistance  Adaptive Equipment Used: Walker Bed/Mat Mobility  Sit to Supine: Contact guard assistance  Sit to Stand: Contact guard assistance  Stand to Sit: Contact guard assistance  Bed to Chair: Contact guard assistance     Most Recent Physical Functioning:   Gross Assessment:                  Posture:     Balance:  Sitting: Intact  Standing: With support Bed Mobility:  Sit to Supine: Contact guard assistance  Wheelchair Mobility:     Transfers:  Sit to Stand: Contact guard assistance  Stand to Sit: Contact guard assistance  Bed to Chair: Contact guard assistance  Duration: 25 Minutes            Patient Vitals for the past 6 hrs:   BP BP Patient Position SpO2 O2 Flow Rate (L/min) Pulse   03/22/22 0718 106/76 Supine 97 % -- 88   03/22/22 0757 -- -- 99 % 2 l/min --   03/22/22 1052 96/67 -- 97 % -- 95       Mental Status  Neurologic State: Alert  Orientation Level: Oriented X4  Cognition: Follows commands  Perception: Appears intact  Perseveration: No perseveration noted  Safety/Judgement: Fall prevention                          Physical Skills Involved:  Balance  Strength  Activity Tolerance Cognitive Skills Affected (resulting in the inability to perform in a timely and safe manner):  [facility] Psychosocial Skills Affected:  Environmental Adaptation   Number of elements that affect the Plan of Care: 3-5:  MODERATE COMPLEXITY   CLINICAL DECISION MAKING:   [employer] AM-PAC 6 Clicks   Daily Activity Inpatient Short Form  How much help from another person does the patient currently need. .. Total A Lot A Little None   1. Putting on and taking off regular lower body clothing? [] 1   [x] 2   [] 3   [] 4   2. Bathing (including washing, rinsing, drying)? [] 1   [x] 2   [] 3   [] 4   3. Toileting, which includes using toilet, bedpan or urinal?   [] 1   [x] 2   [] 3   [] 4   4. Putting on and taking off regular upper body clothing? [] 1   [] 2   [x] 3   [] 4   5. Taking care of personal grooming such as brushing teeth? [] 1   [] 2   [] 3   [x] 4   6. Eating meals? [] 1   [] 2   [] 3   [x] 4   © 2007, Trustees of [facility] [employer], under license to [email or web address]. All rights reserved      Score:  Initial: 17 Most Recent: X (Date: -- )    Interpretation of Tool:  Represents activities that are increasingly more difficult (i.e. Bed mobility, Transfers, Gait). Medical Necessity:     Skilled intervention continues to be required due to the above deficits. Reason for Services/Other Comments:  Patient continues to require skilled intervention due to   Ongoing need for assistance  . Use of outcome tool(s) and clinical judgement create a POC that gives a: LOW COMPLEXITY         TREATMENT:   (In addition to Assessment/Re-Assessment sessions the following treatments were rendered)     Pre-treatment Symptoms/Complaints:   Tolerated standing at sink for grooming tasks  Pain: Initial:   Pain Intensity 1: 0  Post Session:  0       Self Care: (25): Procedure(s) (per grid) utilized to improve and/or restore self-care/home management as related to dressing, toileting, grooming and functional household mobility and transfers. Required minimal verbal, manual, and tactile cueing to facilitate activities of daily living skills and compensatory activities. Braces/Orthotics/Lines/Etc:   O2 Device: Nasal cannula  Treatment/Session Assessment:    Response to Treatment:  Good, up in chair  Interdisciplinary Collaboration:   Physical Therapist  Occupational Therapist  Registered Nurse  After treatment position/precautions:   Supine in bed  Bed alarm/tab alert on  Bed/Chair-wheels locked  Bed in low position  Call light within reach  RN notified  Side rails x 3   Compliance with Program/Exercises: Will assess as treatment progresses. Recommendations/Intent for next treatment session: \"Next visit will focus on advancements to more challenging activities and reduction in assistance provided\".   Total Treatment Duration:  OT Patient Time In/Time Out  Time In: 0900  Time Out: [address], OT

## 2022-03-22 NOTE — PROGRESS NOTES
RN CM attempted to meet with the patient. The primary nurse was at the bedside providing patient care.

## 2022-03-22 NOTE — PROGRESS NOTES
Problem: Falls - Risk of  Goal: *Absence of Falls  Description: Document [place] Fall Risk and appropriate interventions in the flowsheet.   Outcome: Progressing Towards Goal  Note: Fall Risk Interventions:  Mobility Interventions: Patient to call before getting OOB         Medication Interventions: Patient to call before getting OOB    Elimination Interventions: Call light in reach,Patient to call for help with toileting needs,Toileting schedule/hourly rounds    History of Falls Interventions: Door open when patient unattended         Problem: Heart Failure: Day 4  Goal: Activity/Safety  Outcome: Progressing Towards Goal  Goal: Diagnostic Test/Procedures  Outcome: Progressing Towards Goal  Goal: Nutrition/Diet  Outcome: Progressing Towards Goal  Goal: Discharge Planning  Outcome: Progressing Towards Goal  Goal: Medications  Outcome: Progressing Towards Goal

## 2022-03-22 NOTE — PROGRESS NOTES
Hospitalist Progress Note   Admit Date:  3/18/2022  8:52 PM   Name:  Samir Cedillo   Age:  64 y.o. Sex:  female  :  1960   MRN:  [id number]   Room:  [facility]    Presenting Complaint: Lethargy    Reason(s) for Admission: Acute diastolic (congestive) heart failure (HCC) [I50.31]  CML (chronic myelocytic leukemia) (HCC) [C92.10]  Physical deconditioning [R53.81]     Hospital Course & Interval History:     64years old female with past medical history of CML follows with oncology on chemotherapy, history of chronic back pain, history of portal and mesenteric vein thrombosis with subsequent splenectomy, history of prior DVT and PE on anticoagulation presented to emergency room with worsening of shortness of breath, generalized weakness. Patient was recently admitted and discharged from our facility on 3/14. Patient was treated for acute congestive heart failure at that time. Also noted to have splenomegaly and splenic ifarct. Patient was recently diagnosed with E. coli bacterial meningitis, completed course of antibiotics. Patient reports bilateral lower extremity edema, orthopnea. Patient reports intermittent diarrhea. Evaluated in the emergency room, patient was given diuretics, chest x-ray was done shows evidence of pulmonary vascular congestion. ER physician requested admission of this patient for further evaluation and management. Pt admitted for diastolic CHFe and started on IV lasix. Subjective/24hr Events (22): Continued muscle spasms presumed related to her pain related to her splenic infarctions. Chronic and unchanged since recent admission. No other acute concerns. ROS:  10 systems reviewed and negative except as noted above.      Assessment & Plan:     # Acute on chronic diastolic CHF   -    - clinically improved,   -cont oral lasix 40mg bid- with potassium  - holding Toprol for borderline BP and in acute setting  - monitor I/O's     # Hypokalemia  3/22  - replete, repeat in AM  - Mg 1.9 today    # CML  - OP oncology follow-up  - continue home medications - sprycel and Jakafi  - Continue therapeutic lovenox    # Splenic infarcts  - continue therapeutic lovenox 50mg subcut q12    # back and flank pain  - suspect secondary to above  - likely secondary to splenic infarct - already anticoagulated  - trial of flexeril, cont prn percocet    # Debility  - PT/OT  - recommending SNF    # hx of DVT  - continue therapeutic lovenox     # eye irritation  - ? Bacterial conjuctivitis left eye vs Alergic conjuctivitis  - add polytrim gtts and KIKE meds   3/20 - improved. Cont current    # HTN  - BP stable, cont toprol     # Elevated TSH  3/20 - increase synthroid to 125mcg  - repeat TFT in 4-6 weeks      Dispo/Discharge Planning:        MEDICALLY STABLE for discharge. Pending STR. Diet:  ADULT DIET Regular;  Low Sodium (2 gm)  DVT PPx: lovenox sq  Code status: Full Code    Hospital Problems as of 3/22/2022 Date Reviewed: 3/12/2022          Codes Class Noted - Resolved POA    CML (chronic myelocytic leukemia) ([address] [facility] ICD-10-CM: C92.10  ICD-9-CM: 205.10  3/19/2022 - Present Unknown        Physical deconditioning ICD-10-CM: R53.81  ICD-9-CM: 799.3  3/19/2022 - Present Unknown        * (Principal) Acute diastolic (congestive) heart failure (HCC) ICD-10-CM: I50.31  ICD-9-CM: 428.31, 428.0  3/19/2022 - Present Unknown        History of DVT (deep vein thrombosis) ICD-10-CM: C43.746  ICD-9-CM: V12.51  3/19/2022 - Present Unknown        Chronic myelogenous leukemia (HCC) (Chronic) ICD-10-CM: C92.10  ICD-9-CM: 205.10  3/30/2015 - Present Yes              Objective:     Patient Vitals for the past 24 hrs:   Temp Pulse Resp BP SpO2   03/22/22 1533 97.8 °F (36.6 °C) 91 18 94/67 90 %   03/22/22 1052 98.5 °F (36.9 °C) 95 16 96/67 97 %   03/22/22 0757 -- -- -- -- 99 %   03/22/22 0718 98.2 °F (36.8 °C) 88 -- 106/76 97 %   03/22/22 0323 98 °F (36.7 °C) 88 12 101/81 92 %   03/21/22 2329 97.3 °F (36.3 °C) 96 12 101/69 96 %   03/21/22 2123 -- -- -- -- 95 %   03/21/22 1935 98.4 °F (36.9 °C) 99 16 97/70 95 %     Oxygen Therapy  O2 Sat (%): 90 % (03/22/22 1533)  Pulse via Oximetry: 87 beats per minute (03/22/22 0757)  O2 Device: Nasal cannula (03/22/22 1533)  O2 Flow Rate (L/min): 1 l/min (03/22/22 0819)    Estimated body mass index is 23.56 kg/m² as calculated from the following:    Height as of this encounter: 5' 3\" (1.6 m). Weight as of this encounter: 60.3 kg (133 lb). Intake/Output Summary (Last 24 hours) at 3/22/2022 1700  Last data filed at 3/22/2022 0929  Gross per 24 hour   Intake 300 ml   Output 400 ml   Net -100 ml         Physical Exam:     Blood pressure 94/67, pulse 91, temperature 97.8 °F (36.6 °C), resp. rate 18, height 5' 3\" (1.6 m), weight 60.3 kg (133 lb), SpO2 90 %. General:    Well nourished. No overt distress  Head:  Normocephalic, atraumatic  Eyes:  Sclerae appear normal.  Pupils equally round. ENT:  Nares appear normal, no drainage. Moist oral mucosa  Neck:  No restricted ROM. Trachea midline   CV:   RRR holosystolic murmur Lungs:   Diminished bilaterally No wheezing, rhonchi, or rales. Respirations even, unlabored  Abdomen: Bowel sounds present. Soft, nontender, nondistended. Extremities: No cyanosis or clubbing. No edema  Skin:     No rashes and normal coloration. Warm and dry. Neuro:  CN II-XII grossly intact. Sensation intact. A&Ox3  Psych:  Normal mood and affect.       I have reviewed ordered lab tests and independently visualized imaging below:    Recent Labs:  Recent Results (from the past 48 hour(s))   CBC WITH AUTOMATED DIFF    Collection Time: 03/21/22  9:53 AM   Result Value Ref Range    WBC 15.7 (H) 4.3 - 11.1 K/uL    RBC 3.39 (L) 4.05 - 5.2 M/uL    HGB 11.0 (L) 11.7 - 15.4 g/dL    HCT 33.7 (L) 35.8 - 46.3 %    MCV 99.4 (H) 79.6 - 97.8 FL    MCH 32.4 26.1 - 32.9 PG    MCHC 32.6 31.4 - 35.0 g/dL    RDW 22.5 (H) 11.9 - 14.6 %    PLATELET 85 (L) 720 - 450 K/uL    MPV 11.7 9.4 - 12.3 FL    ABSOLUTE NRBC 0.00 0.0 - 0.2 K/uL    DF AUTOMATED      NEUTROPHILS 92 (H) 43 - 78 %    LYMPHOCYTES 3 (L) 13 - 44 %    MONOCYTES 4 4.0 - 12.0 %    EOSINOPHILS 1 0.5 - 7.8 %    BASOPHILS 0 0.0 - 2.0 %    IMMATURE GRANULOCYTES 1 0.0 - 5.0 %    ABS. NEUTROPHILS 14.4 (H) 1.7 - 8.2 K/UL    ABS. LYMPHOCYTES 0.5 0.5 - 4.6 K/UL    ABS. MONOCYTES 0.6 0.1 - 1.3 K/UL    ABS. EOSINOPHILS 0.1 0.0 - 0.8 K/UL    ABS. BASOPHILS 0.1 0.0 - 0.2 K/UL    ABS. IMM.  GRANS. 0.1 0.0 - 0.5 K/UL   METABOLIC PANEL, BASIC    Collection Time: 03/21/22  9:53 AM   Result Value Ref Range    Sodium 133 (L) 136 - 145 mmol/L    Potassium 3.0 (L) 3.5 - 5.1 mmol/L    Chloride 98 98 - 107 mmol/L    CO2 26 21 - 32 mmol/L    Anion gap 9 7 - 16 mmol/L    Glucose 92 65 - 100 mg/dL    BUN 15 8 - 23 MG/DL    Creatinine 0.84 0.6 - 1.0 MG/DL    GFR est AA >60 >60 ml/min/1.73m2    GFR est non-AA >60 >60 ml/min/1.73m2    Calcium 8.4 8.3 - 10.4 MG/DL   PLEASE READ & DOCUMENT PPD TEST IN 48 HRS    Collection Time: 03/21/22 11:30 AM   Result Value Ref Range    PPD Negative Negative    mm Induration 0 0 - 5 mm   METABOLIC PANEL, BASIC    Collection Time: 03/22/22  6:17 AM   Result Value Ref Range    Sodium 135 (L) 136 - 145 mmol/L    Potassium 2.9 (L) 3.5 - 5.1 mmol/L    Chloride 99 98 - 107 mmol/L    CO2 29 21 - 32 mmol/L    Anion gap 7 7 - 16 mmol/L    Glucose 115 (H) 65 - 100 mg/dL    BUN 13 8 - 23 MG/DL    Creatinine 0.80 0.6 - 1.0 MG/DL    GFR est AA >60 >60 ml/min/1.73m2    GFR est non-AA >60 >60 ml/min/1.73m2    Calcium 8.4 8.3 - 10.4 MG/DL   CBC WITH AUTOMATED DIFF    Collection Time: 03/22/22  6:17 AM   Result Value Ref Range    WBC 11.3 (H) 4.3 - 11.1 K/uL    RBC 3.24 (L) 4.05 - 5.2 M/uL    HGB 10.6 (L) 11.7 - 15.4 g/dL    HCT 32.2 (L) 35.8 - 46.3 %    MCV 99.4 (H) 79.6 - 97.8 FL    MCH 32.7 26.1 - 32.9 PG    MCHC 32.9 31.4 - 35.0 g/dL    RDW 22.6 (H) 11.9 - 14.6 %    PLATELET 76 (L) 953 - 450 K/uL    MPV 11.5 9.4 - 12.3 FL    ABSOLUTE NRBC 0.00 0.0 - 0.2 K/uL    DF AUTOMATED      NEUTROPHILS 88 (H) 43 - 78 %    LYMPHOCYTES 6 (L) 13 - 44 %    MONOCYTES 5 4.0 - 12.0 %    EOSINOPHILS 1 0.5 - 7.8 %    BASOPHILS 0 0.0 - 2.0 %    IMMATURE GRANULOCYTES 1 0.0 - 5.0 %    ABS. NEUTROPHILS 9.8 (H) 1.7 - 8.2 K/UL    ABS. LYMPHOCYTES 0.7 0.5 - 4.6 K/UL    ABS. MONOCYTES 0.5 0.1 - 1.3 K/UL    ABS. EOSINOPHILS 0.1 0.0 - 0.8 K/UL    ABS. BASOPHILS 0.1 0.0 - 0.2 K/UL    ABS. IMM. GRANS. 0.1 0.0 - 0.5 K/UL   MAGNESIUM    Collection Time: 03/22/22  6:17 AM   Result Value Ref Range    Magnesium 1.9 1.8 - 2.4 mg/dL       All Micro Results     Procedure Component Value Units Date/Time    COVID-19 RAPID TEST [[id number]] Collected: 03/18/22 2300    Order Status: Completed Specimen: Nasopharyngeal Updated: 03/18/22 2323     Specimen source Nasopharyngeal        COVID-19 rapid test Not detected        Comment:      The specimen is NEGATIVE for SARS-CoV-2, the novel coronavirus associated with COVID-19. A negative result does not rule out COVID-19. This test has been authorized by the FDA under an Emergency Use Authorization (EUA) for use by authorized laboratories. Fact sheet for Healthcare Providers: ConventionUpdate.co.nz  Fact sheet for Patients: ConventionUpdate.co.nz       Methodology: Isothermal Nucleic Acid Amplification               Other Studies:  No results found.     Current Meds:  Current Facility-Administered Medications   Medication Dose Route Frequency    furosemide (LASIX) tablet 40 mg  40 mg Oral BID    potassium chloride (K-DUR, KLOR-CON M20) SR tablet 40 mEq  40 mEq Oral DAILY    cyclobenzaprine (FLEXERIL) tablet 5 mg  5 mg Oral TID PRN    metoprolol succinate (TOPROL-XL) XL tablet 25 mg  25 mg Oral DAILY    ruxolitinib tab 5 mg Maritza Caron) - patient supplied (Patient Supplied)  5 mg Oral Q12H    enoxaparin (LOVENOX) injection 50 mg  50 mg SubCUTAneous Q12H    oxyCODONE-acetaminophen (PERCOCET 10)  mg per tablet 1 Tablet  1 Tablet Oral Q4H PRN    fluticasone propionate (FLONASE) 50 mcg/actuation nasal spray 2 Spray  2 Spray Both Nostrils DAILY    cetirizine (ZYRTEC) tablet 10 mg  10 mg Oral DAILY    trimethoprim-polymyxin b (POLYTRIM) 10,000 unit- 1 mg/mL ophthalmic solution 1 Drop  1 Drop Both Eyes BID    levothyroxine (SYNTHROID) tablet 125 mcg  125 mcg Oral 6am       Signed:  Rosemarie Bartholomew DO    Part of this note may have been written by using a voice dictation software. The note has been proof read but may still contain some grammatical/other typographical errors.

## 2022-03-22 NOTE — PROGRESS NOTES
RN CM met with the patient and discussed discharge planning. Physical therapy was at the bedside. The patient called a family member and discussed her bed offer at [address] (formerly known as [facility] She accepts the bed offer. RN CM notified the facility of the bed offer and requested for them to begin precert.

## 2022-03-22 NOTE — PROGRESS NOTES
Problem: Mobility Impaired (Adult and Pediatric)  Goal: *Acute Goals and Plan of Care (Insert Text)  Outcome: Progressing Towards Goal  Note:   LTG:  (1.)Ms. Ginna Hinojosa will move from supine to sit and sit to supine  in bed with CONTACT GUARD ASSIST within 7 treatment day(s). (2.)Ms. Ginna Hinojosa will transfer from bed to chair and chair to bed with CONTACT GUARD ASSIST using the least restrictive device within 7 treatment day(s). (3.)Ms. Ginna Hinojosa will ambulate with CONTACT GUARD ASSIST for 75 feet with the least restrictive device within 7 treatment day(s). Goal met  (4)Ms. Ginna Hinojosa will perform HEP with cues and assistance to increase safety on her feet in 7 days. New goal 3/20 - pt. Will ambulate 200 feet with RW SBA in 7 days. ________________________________________________________________________________________________       PHYSICAL THERAPY: Daily Note and AM 3/22/2022  INPATIENT: PT Visit Days : 4  Payor: Hung Lea / Plan: [address] / Product Type: [employer] Medicare /       NAME/AGE/GENDER: Shira Hayward is a 64 y.o. female   PRIMARY DIAGNOSIS: Acute diastolic (congestive) heart failure (HCC) [I50.31]  CML (chronic myelocytic leukemia) (HCC) [C92.10]  Physical deconditioning [E34.81] Acute diastolic (congestive) heart failure (HCC) Acute diastolic (congestive) heart failure (HCC)       ICD-10: Treatment Diagnosis:    Generalized Muscle Weakness (M62.81)  Other abnormalities of gait and mobility (R26.89)   Precaution/Allergies:  Latex, Sulfa (sulfonamide antibiotics), Tramadol, Augmentin [amoxicillin-pot clavulanate], Codeine, Divalproex sodium, Morphine, Potassium clavulanate, Rizatriptan, Tetanus immune globulin, Vancomycin, Xanax [alprazolam], and Zonegran [zonisamide]      ASSESSMENT:     Ms. Ginna Hinojosa presents with decreased functional mobility and gait and general weakness.   She lives alone and reports being independent using a RW short distances to the bathroom and back and with adl's doing a sponge bath. She sleeps on the couch on the main level because she has been unable to climb her steps up to her bedroom and shower. RN note after speaking with daughter states that patient is immobile at home. Pt. Up in chair this am.  She agrees to mobilized. All movement takes extra time. Pt. Not as talkative today. She accepted a bed at [facility]. She is on 1LO2 with sats 94% after moving. She ambulated to restroom CGA and stood and brushed teeth. She rested a few minutes and ambulated in the torres with RW CGA. She reports fatigue limiting gait, gait is slow with RW. She returned to sidelying in bed and left on 1LO2 as found. Bed alarm set. This section established at most recent assessment   PROBLEM LIST (Impairments causing functional limitations):  Decreased Strength  Decreased ADL/Functional Activities  Decreased Transfer Abilities  Decreased Ambulation Ability/Technique  Decreased Balance  Increased Pain  Decreased Activity Tolerance  Increased Fatigue  Decreased Flexibility/Joint Mobility  Edema/Girth  Decreased [place] with Home Exercise Program  Decreased Cognition   INTERVENTIONS PLANNED: (Benefits and precautions of physical therapy have been discussed with the patient.)  Balance Exercise  Bed Mobility  Family Education  Gait Training  Home Exercise Program (HEP)  Range of Motion (ROM)  Therapeutic Activites  Therapeutic Exercise/Strengthening  Transfer Training     TREATMENT PLAN: Frequency/Duration: daily for duration of hospital stay  Rehabilitation Potential For Stated Goals: Good     REHAB RECOMMENDATIONS (at time of discharge pending progress):    Placement: It is my opinion, based on this patient's performance to date, that Ms. Leticia Balbuena may benefit from intensive therapy at a [address] [place] [address] after discharge due to the functional deficits listed above that are likely to improve with skilled rehabilitation and concerns that he/she may be unsafe to be unsupervised at home due to weakness and decreased stability on her feet . 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 Patient was recently admitted and discharged from our facility on 3/14. Patient was treated for acute congestive heart failure at that time. Patient was recently diagnosed with E. coli bacterial meningitis, completed course of antibiotics. Patient reports bilateral lower extremity edema, orthopnea. Patient reports intermittent diarrhea. Evaluated in the emergency room, patient was given diuretics, chest x-ray was done shows evidence of pulmonary vascular congestion. ER physician requested admission of this patient for further evaluation and management.   Past Medical History/Comorbidities:   Ms. Jen Gill  has a past medical history of Acute blood loss anemia (4/6/2018), Anemia, C. difficile diarrhea (4/1/2015), Cerebral edema ([address] (4/1/2015), Chronic migraine (9/22/2016), Chronic myelogenous leukemia ([address] Chronic pain, Coagulation disorder ([address] Esophageal spasm, Esophageal varices ([address] GI bleed (8/3/2016), H/O craniotomy, Hematemesis (8/20/2021), Leukocytosis (3/14/2015), Melena (8/20/2021), MRSA colonization (6/25/2012), Polycythemia vera(238.4), Portal hypertension ([address] Portal vein thrombosis (6/20/2012), Primary hypothyroidism, Pulmonary embolism ([address] (2006), S/P exploratory laparotomy, 6/20/12  (6/25/2012), S/P small bowel resection, Seizure ([facility] (3/14/2015), Seizure disorder ([facility] (3/30/2015), Seizures ([facility] Sepsis with acute organ dysfunction without septic shock ([facility] (2/2/2022), Splenomegaly, congestive, chronic, Stroke (cerebrum) ([facility] (4/11/2015), Stroke ([facility] Thrombocytopenia, 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 She has no past medical history of Adverse effect of anesthesia, Difficult intubation, Malignant hyperthermia due to anesthesia, or Pseudocholinesterase deficiency. Ms. Jen Gill  has a past surgical history that includes hx gyn; hx gyn; neurological procedure unlisted (2010-12); hx gi; hx gi; pr abdomen surgery proc unlisted; hx cholecystectomy; pr abdomen surgery proc unlisted; hx appendectomy; hx breast biopsy (Bilateral); and hx orthopaedic (Left, 2008).   Social History/Living Environment:   Support Systems: No Support Systems  Patient Expects to be Discharged to[de-identified] Skilled nursing facility  Prior Level of Function/Work/Activity:  Using RW short distances, limited mobility     Number of Personal Factors/Comorbidities that affect the Plan of Care: 3+: HIGH COMPLEXITY   EXAMINATION:   Most Recent Physical Functioning:   Gross Assessment:  AROM: Generally decreased, functional  Strength: Generally decreased, functional  Sensation: Intact (feet swelling)               Posture:     Balance:  Sitting: Intact  Standing: With support Bed Mobility:  Sit to Supine: Contact guard assistance  Wheelchair Mobility:     Transfers:  Sit to Stand: Contact guard assistance  Stand to Sit: Contact guard assistance  Duration: 25 Minutes  Gait:     Speed/Romina: Delayed  Step Length: Left shortened;Right shortened  Gait Abnormalities: Decreased step clearance  Distance (ft): 80 Feet (ft)  Assistive Device: Walker, rolling  Ambulation - Level of Assistance: Contact guard assistance  Interventions: Safety awareness training;Verbal cues         Body Structures Involved:  Bones  Joints  Muscles  Ligaments Body Functions Affected: Movement Related Activities and Participation Affected: Mobility   Number of elements that affect the Plan of Care: 3: MODERATE COMPLEXITY   CLINICAL PRESENTATION:   Presentation: Stable and uncomplicated: LOW COMPLEXITY   CLINICAL DECISION MAKIN[address] [place] [id number] AM-PAC 6 Clicks   Basic Mobility Inpatient Short Form  How much difficulty does the patient currently have. .. Unable A Lot A Little None   1. Turning over in bed (including adjusting bedclothes, sheets and blankets)? [] 1   [] 2   [x] 3   [] 4   2. Sitting down on and standing up from a chair with arms ( e.g., wheelchair, bedside commode, etc.)   [] 1   [] 2   [x] 3   [] 4   3. Moving from lying on back to sitting on the side of the bed? [] 1   [] 2   [x] 3   [] 4   How much help from another person does the patient currently need. .. Total A Lot A Little None   4. Moving to and from a bed to a chair (including a wheelchair)? [] 1   [] 2   [x] 3   [] 4   5. Need to walk in hospital room? [] 1   [] 2   [x] 3   [] 4   6. Climbing 3-5 steps with a railing? [] 1   [x] 2   [] 3   [] 4   © , Trustees of [address] [place] [address] [address] [id number], under license to [employer]. All rights reserved      Score:  Initial: 17 Most Recent: X (Date: -- )    Interpretation of Tool:  Represents activities that are increasingly more difficult (i.e. Bed mobility, Transfers, Gait). Medical Necessity:     Patient is expected to demonstrate progress in   strength, range of motion, and balance   to   decrease assistance required with exercises and functional mobility independently  . Reason for Services/Other Comments:  Patient   continues to require present interventions due to patient's inability to perform exercises and  functional mobility independently  .    Use of outcome tool(s) and clinical judgement create a POC that gives a: Questionable prediction of patient's progress: MODERATE COMPLEXITY            TREATMENT:   (In addition to Assessment/Re-Assessment sessions the following treatments were rendered)   Pre-treatment Symptoms/Complaints:  agreeable  Pain: Initial:      Post Session:        Therapeutic Activity: (  25 Minutes ):  Therapeutic activities including Bed transfers, Chair transfers, , Ambulation on level ground with RW, and standing to improve mobility, strength, and balance. Required minimal Safety awareness training;Verbal cues to promote static and dynamic balance in standing. Braces/Orthotics/Lines/Etc:   O2 Device: Nasal [id number]  Treatment/Session Assessment:    Response to Treatment: more fatigue today limiting mobility. Interdisciplinary Collaboration:   Occupational Therapist  Registered Nurse  After treatment position/precautions:   Supine in bed  Bed alarm/tab alert on  Bed/Chair-wheels locked  Bed in low position  Call light within reach   Compliance with Program/Exercises: Compliant most of the time, Will assess as treatment progresses  Recommendations/Intent for next treatment session: \"Next visit will focus on advancements to more challenging activities and reduction in assistance provided\".   Total Treatment Duration:  PT Patient Time In/Time Out  Time In: 0930  Time Out: Lopez Rivera 17, PT

## 2022-03-22 NOTE — PROGRESS NOTES
RN STEVIE spoke with Admissions at [facility].  They do not need a repeat COVID test.

## 2022-03-22 NOTE — PROGRESS NOTES
Problem: Falls - Risk of  Goal: *Absence of Falls  Description: Document Shari Multani Fall Risk and appropriate interventions in the flowsheet. Outcome: Progressing Towards Goal  Note: Fall Risk Interventions:  Mobility Interventions: Bed/chair exit alarm         Medication Interventions: Patient to call before getting OOB    Elimination Interventions: Bed/chair exit alarm,Call light in reach    History of Falls Interventions: Bed/chair exit alarm         Problem: Patient Education: Go to Patient Education Activity  Goal: Patient/Family Education  Outcome: Progressing Towards Goal     Problem: Patient Education: Go to Patient Education Activity  Goal: Patient/Family Education  Outcome: Progressing Towards Goal     Problem: Pressure Injury - Risk of  Goal: *Prevention of pressure injury  Description: Document Eric Scale and appropriate interventions in the flowsheet.   Outcome: Progressing Towards Goal  Note: Pressure Injury Interventions:  Sensory Interventions: Assess changes in LOC    Moisture Interventions: Absorbent underpads    Activity Interventions: Pressure redistribution bed/mattress(bed type)    Mobility Interventions: PT/OT evaluation    Nutrition Interventions: Document food/fluid/supplement intake                     Problem: Patient Education: Go to Patient Education Activity  Goal: Patient/Family Education  Outcome: Progressing Towards Goal     Problem: Patient Education: Go to Patient Education Activity  Goal: Patient/Family Education  Outcome: Progressing Towards Goal

## 2022-03-22 NOTE — PROGRESS NOTES
03/22/22 0757   Oxygen Therapy   O2 Sat (%) 99 %   Pulse via Oximetry 87 beats per minute   O2 Device Nasal cannula   O2 Flow Rate (L/min) 2 l/min  (DECREASED TO 1 LPM)

## 2022-03-23 PROBLEM — B96.20 E COLI BACTEREMIA: Status: RESOLVED | Noted: 2022-01-01 | Resolved: 2022-01-01

## 2022-03-23 PROBLEM — R78.81 E COLI BACTEREMIA: Status: RESOLVED | Noted: 2022-01-01 | Resolved: 2022-01-01

## 2022-03-23 PROBLEM — R53.81 PHYSICAL DECONDITIONING: Chronic | Status: ACTIVE | Noted: 2022-01-01

## 2022-03-23 NOTE — PROGRESS NOTES
RN CM received a message from [address]. The insurance authorization was denied. A peer to peer is being offered until 36 today. The attending MD can call [phone], option 5. RN CM provide the attending MD with the information.

## 2022-03-23 NOTE — PROGRESS NOTES
RN CM sent a message at [address] requesting an update on the insurance authorization. RN CM met with the patient and provided her with resources for [employer] on Aging, [employer], and [employer]. RN CM discussed [facility] with the patient. She declined the referral to [facility] at this time.

## 2022-03-23 NOTE — PROGRESS NOTES
RN CM discussed discharge planning with the attending MD. The peer to peer was completed and was denied. RN CM discussed home health services with the attending MD. He is in agreement for the patient to discharge with home health PT/OT/RN/aide/. RN CM spoke with patient and informed her of the insurance denial. RN CM discussed discharge planning options including home health services. She confirmed she has used [facility] in the past and would like to use them again. The patient was provided with a list of home health agencies and their quality metrics. RN CM sent a referral to [facility]. RN CM discussed [employer] with the patient. RN CM faxed a referral to [facility]. She is in agreement for the RN CM to send them a referral. RN CM also discussed the option of hiring private caregivers. The patient gave the RN CM permission to speak with her family. RN CM called the patient's daughter Brown Blanco at  and discussed discharge planning. She requested for the NISHA HUBBARD to call her aunt Mk Harkins at [phone] to discuss discharge planning. She states Mk Harkins is the patient's sister. RN CM called the patient's sister Jaspreet Slater but was unable to reach her by telephone. The voicemail did not state the voicemail owner's name.

## 2022-03-23 NOTE — PROGRESS NOTES
RN CM received a return phone call from the patient's sister Mk Harkins and discussed discharge planning. RN CM received a response from [address] [place] [address] and was informed that the estimated monthly cost is $8000 for room and board only. RN CM provided Glenn with this information. RN CM encouraged her to speak with the patient to discuss potential discharge planning options, such as assisted living facilities.

## 2022-03-23 NOTE — PROGRESS NOTES
Hospitalist Progress Note   Admit Date:  3/18/2022  8:52 PM   Name:  Glenn Richards   Age:  64 y.o. 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  Patient was recently admitted and discharged from our facility on 3/14. Amado Haddad was treated for acute congestive heart failure at that time. Also noted to have splenomegaly and splenic ifarct.   Patient was recently diagnosed with E. coli bacterial meningitis, completed course of antibiotics.  Patient reports bilateral lower extremity edema, orthopnea.  Patient reports intermittent diarrhea.  Evaluated in the emergency room, patient was given diuretics, chest x-ray was done shows evidence of pulmonary vascular congestion.  ER physician requested admission of this patient for further evaluation and management.      Pt admitted for diastolic CHFe and started on IV lasix. Subjective/24hr Events (22): Denied rehab. Hyponatremia stable. Elevated bilirubin mildly worse. Thrombocytopenia slightly worse. ROS:  10 systems reviewed and negative except as noted above.      Assessment & Plan:   Acute respiratory failure with hypoxemia (HCC)  -Supportive care, maintain O2 sat > 92  -pulmonary hygiene    * Acute diastolic (congestive) heart failure (HCC)  -echocardiogram  -furosemide    Chronic myelogenous leukemia (HCC)  -continue sprycel and Jakafi    Thrombocytopenia (HCC)  -monitor platelets daily  -consider HIT    Acquired hypercoagulable state ([facility]  -enoxaparin bid    Primary hypothyroidism  -levothyroxine 125        Dispo/Discharge Planning:      Denied SNF, home in 1-3 days    Diet:  ADULT DIET Regular; Low Sodium (2 gm)  DVT PPx: on enoxaparin  Code status: Full Code    Hospital Problems as of 3/23/2022 Date Reviewed: 3/12/2022          Codes Class Noted - Resolved POA    Chronic myelogenous leukemia (HCC) (Chronic) ICD-10-CM: C92.10  ICD-9-CM: 205.10  3/30/2015 - Present Yes        CML (chronic myelocytic leukemia) ([facility] ICD-10-CM: C92.10  ICD-9-CM: 205.10  3/19/2022 - Present Unknown        Physical deconditioning ICD-10-CM: R53.81  ICD-9-CM: 799.3  3/19/2022 - Present Unknown        * (Principal) Acute diastolic (congestive) heart failure (HCC) ICD-10-CM: I50.31  ICD-9-CM: 428.31, 428.0  3/19/2022 - Present Unknown        History of DVT (deep vein thrombosis) ICD-10-CM: G89.301  ICD-9-CM: V12.51  3/19/2022 - Present Unknown              Objective:     Patient Vitals for the past 24 hrs:   Temp Pulse Resp BP SpO2   03/23/22 0800 -- -- -- -- 96 %   03/23/22 0718 98.2 °F (36.8 °C) 95 16 112/85 96 %   03/23/22 0442 97.9 °F (36.6 °C) 93 18 104/76 92 %   03/22/22 2344 97.5 °F (36.4 °C) 90 18 98/79 92 %   03/22/22 1932 97.6 °F (36.4 °C) 91 18 97/71 92 %   03/22/22 1533 97.8 °F (36.6 °C) 91 18 94/67 90 %   03/22/22 1052 98.5 °F (36.9 °C) 95 16 96/67 97 %     Oxygen Therapy  O2 Sat (%): 96 % (03/23/22 0800)  Pulse via Oximetry: 84 beats per minute (03/23/22 0800)  O2 Device: Nasal cannula (03/23/22 0800)  Skin Assessment: Clean, dry, & intact (03/23/22 0800)  Skin Protection for O2 Device: N/A (03/23/22 0800)  O2 Flow Rate (L/min): 2 l/min (03/23/22 0800)    Estimated body mass index is 23.56 kg/m² as calculated from the following:    Height as of this encounter: 5' 3\" (1.6 m). Weight as of this encounter: 60.3 kg (133 lb).     Intake/Output Summary (Last 24 hours) at 3/23/2022 1020  Last data filed at 3/22/2022 1825  Gross per 24 hour   Intake 300 ml   Output --   Net 300 ml         Blood pressure 112/85, pulse 95, temperature 98.2 °F (36.8 °C), resp. rate 16, height 5' 3\" (1.6 m), weight 60.3 kg (133 lb), SpO2 96 %. Physical Exam  Vitals and nursing note reviewed. Constitutional:       General: She is not in acute distress. Appearance: Normal appearance. She is ill-appearing. HENT:      Head: Normocephalic. Eyes:      Extraocular Movements: Extraocular movements intact. Cardiovascular:      Rate and Rhythm: Normal rate and regular rhythm. Pulses:           Radial pulses are 2+ on the left side. Pulmonary:      Effort: Pulmonary effort is normal. No respiratory distress. Abdominal:      General: Bowel sounds are normal. There is distension. Palpations: There is fluid wave. Tenderness: There is generalized abdominal tenderness. Musculoskeletal:         General: No deformity. Cervical back: No rigidity. Right lower leg: No edema. Left lower leg: No edema. Skin:     General: Skin is warm and dry. Coloration: Skin is not jaundiced. Neurological:      General: No focal deficit present. Mental Status: She is alert and oriented to person, place, and time. Psychiatric:         Mood and Affect: Mood normal.         Behavior: Behavior normal.         Cognition and Memory: Memory is impaired.       Comments: Provides inconsistent responses to querries         I have reviewed ordered lab tests and independently visualized imaging below:    Recent Labs:  Recent Results (from the past 48 hour(s))   PLEASE READ & DOCUMENT PPD TEST IN 48 HRS    Collection Time: 03/21/22 11:30 AM   Result Value Ref Range    PPD Negative Negative    mm Induration 0 0 - 5 mm   METABOLIC PANEL, BASIC    Collection Time: 03/22/22  6:17 AM   Result Value Ref Range    Sodium 135 (L) 136 - 145 mmol/L    Potassium 2.9 (L) 3.5 - 5.1 mmol/L    Chloride 99 98 - 107 mmol/L    CO2 29 21 - 32 mmol/L Anion gap 7 7 - 16 mmol/L    Glucose 115 (H) 65 - 100 mg/dL    BUN 13 8 - 23 MG/DL    Creatinine 0.80 0.6 - 1.0 MG/DL    GFR est AA >60 >60 ml/min/1.73m2    GFR est non-AA >60 >60 ml/min/1.73m2    Calcium 8.4 8.3 - 10.4 MG/DL   CBC WITH AUTOMATED DIFF    Collection Time: 03/22/22  6:17 AM   Result Value Ref Range    WBC 11.3 (H) 4.3 - 11.1 K/uL    RBC 3.24 (L) 4.05 - 5.2 M/uL    HGB 10.6 (L) 11.7 - 15.4 g/dL    HCT 32.2 (L) 35.8 - 46.3 %    MCV 99.4 (H) 79.6 - 97.8 FL    MCH 32.7 26.1 - 32.9 PG    MCHC 32.9 31.4 - 35.0 g/dL    RDW 22.6 (H) 11.9 - 14.6 %    PLATELET 76 (L) 792 - 450 K/uL    MPV 11.5 9.4 - 12.3 FL    ABSOLUTE NRBC 0.00 0.0 - 0.2 K/uL    DF AUTOMATED      NEUTROPHILS 88 (H) 43 - 78 %    LYMPHOCYTES 6 (L) 13 - 44 %    MONOCYTES 5 4.0 - 12.0 %    EOSINOPHILS 1 0.5 - 7.8 %    BASOPHILS 0 0.0 - 2.0 %    IMMATURE GRANULOCYTES 1 0.0 - 5.0 %    ABS. NEUTROPHILS 9.8 (H) 1.7 - 8.2 K/UL    ABS. LYMPHOCYTES 0.7 0.5 - 4.6 K/UL    ABS. MONOCYTES 0.5 0.1 - 1.3 K/UL    ABS. EOSINOPHILS 0.1 0.0 - 0.8 K/UL    ABS. BASOPHILS 0.1 0.0 - 0.2 K/UL    ABS. IMM. GRANS. 0.1 0.0 - 0.5 K/UL   MAGNESIUM    Collection Time: 03/22/22  6:17 AM   Result Value Ref Range    Magnesium 1.9 1.8 - 2.4 mg/dL   METABOLIC PANEL, COMPREHENSIVE    Collection Time: 03/23/22  8:11 AM   Result Value Ref Range    Sodium 133 (L) 136 - 145 mmol/L    Potassium 3.9 3.5 - 5.1 mmol/L    Chloride 100 98 - 107 mmol/L    CO2 27 21 - 32 mmol/L    Anion gap 6 (L) 7 - 16 mmol/L    Glucose 99 65 - 100 mg/dL    BUN 13 8 - 23 MG/DL    Creatinine 0.88 0.6 - 1.0 MG/DL    GFR est AA >60 >60 ml/min/1.73m2    GFR est non-AA >60 >60 ml/min/1.73m2    Calcium 8.6 8.3 - 10.4 MG/DL    Bilirubin, total 1.5 (H) 0.2 - 1.1 MG/DL    ALT (SGPT) 32 12 - 65 U/L    AST (SGOT) 25 15 - 37 U/L    Alk.  phosphatase 103 50 - 130 U/L    Protein, total 7.1 6.3 - 8.2 g/dL    Albumin 3.1 (L) 3.2 - 4.6 g/dL    Globulin 4.0 (H) 2.3 - 3.5 g/dL    A-G Ratio 0.8 (L) 1.2 - 3.5     MAGNESIUM Collection Time: 03/23/22  8:11 AM   Result Value Ref Range    Magnesium 2.1 1.8 - 2.4 mg/dL   PHOSPHORUS    Collection Time: 03/23/22  8:11 AM   Result Value Ref Range    Phosphorus 2.8 2.3 - 3.7 MG/DL       All Micro Results     Procedure Component Value Units Date/Time    COVID-19 RAPID TEST [[id number]] Collected: 03/18/22 2300    Order Status: Completed Specimen: Nasopharyngeal Updated: 03/18/22 2323     Specimen source Nasopharyngeal        COVID-19 rapid test Not detected        Comment:      The specimen is NEGATIVE for SARS-CoV-2, the novel coronavirus associated with COVID-19. A negative result does not rule out COVID-19. This test has been authorized by the FDA under an Emergency Use Authorization (EUA) for use by authorized laboratories. Fact sheet for Healthcare Providers: ConventionUpdate.co.nz  Fact sheet for Patients: ConventionTDXdate.co.nz       Methodology: Isothermal Nucleic Acid Amplification               Other Studies:  No results found.     Current Meds:  Current Facility-Administered Medications   Medication Dose Route Frequency    potassium chloride (K-DUR, KLOR-CON M20) SR tablet 40 mEq  40 mEq Oral BID    furosemide (LASIX) tablet 40 mg  40 mg Oral BID    cyclobenzaprine (FLEXERIL) tablet 5 mg  5 mg Oral TID PRN    [Held by provider] metoprolol succinate (TOPROL-XL) XL tablet 25 mg  25 mg Oral DAILY    ruxolitinib tab 5 mg Lisa Pesa) - patient supplied (Patient Supplied)  5 mg Oral Q12H    enoxaparin (LOVENOX) injection 50 mg  50 mg SubCUTAneous Q12H    oxyCODONE-acetaminophen (PERCOCET 10)  mg per tablet 1 Tablet  1 Tablet Oral Q4H PRN    fluticasone propionate (FLONASE) 50 mcg/actuation nasal spray 2 Spray  2 Spray Both Nostrils DAILY    cetirizine (ZYRTEC) tablet 10 mg  10 mg Oral DAILY    trimethoprim-polymyxin b (POLYTRIM) 10,000 unit- 1 mg/mL ophthalmic solution 1 Drop  1 Drop Both Eyes BID    levothyroxine (SYNTHROID) tablet 125 mcg  125 mcg Oral 6am       Signed:  Thiago Parra MD

## 2022-03-23 NOTE — PROGRESS NOTES
RN CM received a phone call from the patient's sister Alberto Alicea. She reports she spoke with the patient's daughter and was notified that the patient's insurance denied short term rehabilitation. RN CM discussed discharge planning options including home health, [address], hiring private caregivers, and considering assisted living facilities. [facility] has accepted the patient for services. NISHA HUBBARD informed Dariana Clemens that the patient was provided with resources to assist the pt with finding an assisted living facility. Dariana Clemens requested for the RN CM to contact [facility] and request pricing for the patient to privately pay. RN CM sent a message to the facility and is waiting on a reply. Dariana Clemens reports she believes the patient has a Medicaid plan but this is not on file.

## 2022-03-24 PROBLEM — I33.0 VEGETATIVE ENDOCARDITIS OF MITRAL VALVE: Status: ACTIVE | Noted: 2022-01-01

## 2022-03-24 NOTE — PROGRESS NOTES
RN CM met with the patient and discussed discharge planning. She is in agreement to discharge home with [facility] and [facility]. RN CM informed her that a referral has been sent to ambulatory case management and to be expecting a phone call from the . RN CM added the phone numbers for her discharge planning services to her discharge paperwork.  If DME services are needed, she is in agreement to use [facility].     RN CM discussed discharge planning with the primary nurse and the attending MD.

## 2022-03-24 NOTE — ASSESSMENT & PLAN NOTE
Seen on TTE 3/23, recent E coli meningitis  -vancomycin  -blood cultures  -cardiology consult  -transfer downtown  -consider ID consult

## 2022-03-24 NOTE — PROGRESS NOTES
Problem: Mobility Impaired (Adult and Pediatric)  Goal: *Acute Goals and Plan of Care (Insert Text)  Outcome: Progressing Towards Goal  Note:   LTG:  (1.)Ms. Jihan Huddleston will move from supine to sit and sit to supine  in bed with CONTACT GUARD ASSIST within 7 treatment day(s). (2.)Ms. Jihan Huddleston will transfer from bed to chair and chair to bed with CONTACT GUARD ASSIST using the least restrictive device within 7 treatment day(s). (3.)Ms. Jihan Huddleston will ambulate with CONTACT GUARD ASSIST for 75 feet with the least restrictive device within 7 treatment day(s). Goal met  (4)Ms. Jihan Huddleston will perform HEP with cues and assistance to increase safety on her feet in 7 days. New goal 3/20 - pt. Will ambulate 200 feet with RW SBA in 7 days. ________________________________________________________________________________________________       PHYSICAL THERAPY: Daily Note and AM 3/24/2022  INPATIENT: PT Visit Days : 5  Payor: Lien Monroy / Plan: [address] / Product Type: [employer] /       NAME/AGE/GENDER: Samir Cedillo is a 64 y.o. female   PRIMARY DIAGNOSIS: Acute diastolic (congestive) heart failure (HCC) [I50.31]  CML (chronic myelocytic leukemia) (HCC) [C92.10]  Physical deconditioning [R83.48] Acute diastolic (congestive) heart failure (HCC) Acute diastolic (congestive) heart failure (HCC)       ICD-10: Treatment Diagnosis:    Generalized Muscle Weakness (M62.81)  Other abnormalities of gait and mobility (R26.89)   Precaution/Allergies:  Latex, Sulfa (sulfonamide antibiotics), Tramadol, Augmentin [amoxicillin-pot clavulanate], Codeine, Divalproex sodium, Morphine, Potassium clavulanate, Rizatriptan, Tetanus immune globulin, Vancomycin, Xanax [alprazolam], and Zonegran [zonisamide]      ASSESSMENT:     Ms. Jiahn Huddleston presents with decreased functional mobility and gait and general weakness.   She lives alone and reports being independent using a RW short distances to the bathroom and back and with adl's doing a sponge bath. She sleeps on the couch on the main level because she has been unable to climb her steps up to her bedroom and shower. RN note after speaking with daughter states that patient is immobile at home. Pt. Up in chair this am.  She agrees to mobilized. All movement takes extra time. Pt. Not as talkative today. She accepted a bed at [facility]. She is on 1LO2 with sats 94% after moving. She ambulated to restroom CGA and stood and brushed teeth. She rested a few minutes and ambulated in the torres with RW CGA. She reports fatigue limiting gait, gait is slow with RW. She returned to sidelying in bed and left on 1LO2 as found. Bed alarm set. 3/24- pt up in chair on contact, reporting being so tired and still having some pain. RN stated that pt had pain meds an hour ago. Pt was willing to try some exercises in the chair. She kept dozing off during exercises but was able to get through some. She did stay in the chair with needs in reach, will continue therapy efforts.      This section established at most recent assessment   PROBLEM LIST (Impairments causing functional limitations):  Decreased Strength  Decreased ADL/Functional Activities  Decreased Transfer Abilities  Decreased Ambulation Ability/Technique  Decreased Balance  Increased Pain  Decreased Activity Tolerance  Increased Fatigue  Decreased Flexibility/Joint Mobility  Edema/Girth  Decreased [place] with Home Exercise Program  Decreased Cognition   INTERVENTIONS PLANNED: (Benefits and precautions of physical therapy have been discussed with the patient.)  Balance Exercise  Bed Mobility  Family Education  Gait Training  Home Exercise Program (HEP)  Range of Motion (ROM)  Therapeutic Activites  Therapeutic Exercise/Strengthening  Transfer Training     TREATMENT PLAN: Frequency/Duration: daily for duration of hospital stay  Rehabilitation Potential For Stated Goals: Good     REHAB RECOMMENDATIONS (at time of discharge pending progress):    Placement: It is my opinion, based on this patient's performance to date, that Ms. Vanessa Acevedo may benefit from intensive therapy at a [facility] after discharge due to the functional deficits listed above that are likely to improve with skilled rehabilitation and concerns that he/she may be unsafe to be unsupervised at home due to weakness and decreased stability on her feet . 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 Patient was recently admitted and discharged from our facility on 3/14. Patient was treated for acute congestive heart failure at that time. Patient was recently diagnosed with E. coli bacterial meningitis, completed course of antibiotics. Patient reports bilateral lower extremity edema, orthopnea. Patient reports intermittent diarrhea. Evaluated in the emergency room, patient was given diuretics, chest x-ray was done shows evidence of pulmonary vascular congestion. ER physician requested admission of this patient for further evaluation and management.   Past Medical History/Comorbidities:   Ms. Vanessa Acevedo  has a past medical history of Acute blood loss anemia (4/6/2018), Anemia, C. difficile diarrhea (4/1/2015), Cerebral edema ([address] (4/1/2015), Chronic migraine (9/22/2016), Chronic myelogenous leukemia ([facility] Chronic pain, Coagulation disorder ([facility] E coli bacteremia (3/9/2022), Esophageal spasm, Esophageal varices ([address] GI bleed (8/3/2016), H/O craniotomy, Hematemesis (8/20/2021), Leukocytosis (3/14/2015), Melena (8/20/2021), MRSA colonization (6/25/2012), Polycythemia vera(238.4), Portal hypertension ([address] Portal vein thrombosis (6/20/2012), Primary hypothyroidism, Pulmonary embolism ([address] (2006), S/P exploratory laparotomy, 6/20/12  (6/25/2012), S/P small bowel resection, Seizure ([address] (3/14/2015), Seizure disorder ([address] (3/30/2015), Seizures ([address] Sepsis with acute organ dysfunction without septic shock ([address] (2/2/2022), Splenomegaly, congestive, chronic, Stroke (cerebrum) ([address] (4/11/2015), Stroke ([address] Thrombocytopenia, 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 She has no past medical history of Adverse effect of anesthesia, Difficult intubation, Malignant hyperthermia due to anesthesia, or Pseudocholinesterase deficiency. Ms. Carl Larry  has a past surgical history that includes hx gyn; hx gyn; neurological procedure unlisted (2010-12); hx gi; hx gi; pr abdomen surgery proc unlisted; hx cholecystectomy; pr abdomen surgery proc unlisted; hx appendectomy; hx breast biopsy (Bilateral); and hx orthopaedic (Left, 2008). Social History/Living Environment:   Support Systems: No Support Systems  Patient Expects to be Discharged to[de-identified] Skilled nursing facility  Prior Level of Function/Work/Activity:  Using RW short distances, limited mobility     Number of Personal Factors/Comorbidities that affect the Plan of Care: 3+: HIGH COMPLEXITY   EXAMINATION:   Most Recent Physical Functioning:   Gross Assessment:                  Posture:     Balance:    Bed Mobility:     Wheelchair Mobility:     Transfers:     Gait:               Body Structures Involved:  Bones  Joints  Muscles  Ligaments Body Functions Affected:   Movement Related Activities and Participation Affected: Mobility   Number of elements that affect the Plan of Care: 3: MODERATE COMPLEXITY   CLINICAL PRESENTATION:   Presentation: Stable and uncomplicated: LOW COMPLEXITY   CLINICAL DECISION MAKIN [facility] [address] [address] AM-PAC 6 Clicks   Basic Mobility Inpatient Short Form  How much difficulty does the patient currently have. .. Unable A Lot A Little None   1. Turning over in bed (including adjusting bedclothes, sheets and blankets)? [] 1   [] 2   [x] 3   [] 4   2. Sitting down on and standing up from a chair with arms ( e.g., wheelchair, bedside commode, etc.)   [] 1   [] 2   [x] 3   [] 4   3. Moving from lying on back to sitting on the side of the bed? [] 1   [] 2   [x] 3   [] 4   How much help from another person does the patient currently need. .. Total A Lot A Little None   4. Moving to and from a bed to a chair (including a wheelchair)? [] 1   [] 2   [x] 3   [] 4   5. Need to walk in hospital room? [] 1   [] 2   [x] 3   [] 4   6. Climbing 3-5 steps with a railing? [] 1   [x] 2   [] 3   [] 4   © , Trustees of [address] [facility] [address] [address], under license to [employer]. All rights reserved      Score:  Initial: 17 Most Recent: X (Date: -- )    Interpretation of Tool:  Represents activities that are increasingly more difficult (i.e. Bed mobility, Transfers, Gait). Medical Necessity:     Patient is expected to demonstrate progress in   strength, range of motion, and balance   to   decrease assistance required with exercises and functional mobility independently  . Reason for Services/Other Comments:  Patient   continues to require present interventions due to patient's inability to perform exercises and  functional mobility independently  .    Use of outcome tool(s) and clinical judgement create a POC that gives a: Questionable prediction of patient's progress: MODERATE COMPLEXITY            TREATMENT:   (In addition to Assessment/Re-Assessment sessions the following treatments were rendered) Pre-treatment Symptoms/Complaints:  agreeable  Pain: Initial:   Pain Intensity 1:  (had pain meds an hour before therapy, still reporting pain)  Post Session:   Generalized pain     Therapeutic Exercise: (10 Minutes):  Exercises per grid below to improve mobility and strength. Required minimal verbal and manual cues to promote proper body alignment and promote proper body posture. Progressed repetitions as indicated. Date:  3/24 Date:   Date:     Activity/Exercise Parameters Parameters Parameters   Long arc quads 5     Hip flexion 5     Quad sets 10     Gluteal sets 10     Ankle pumps 10                       Braces/Orthotics/Lines/Etc:   O2 Device: Nasal [id number]   Treatment/Session Assessment:    Response to Treatment: remains fatigued, drowsy through minimal exercises. Interdisciplinary Collaboration:   Registered Nurse  After treatment position/precautions:   Up in chair  Call light within reach   Compliance with Program/Exercises: Compliant most of the time, Will assess as treatment progresses  Recommendations/Intent for next treatment session: \"Next visit will focus on advancements to more challenging activities and reduction in assistance provided\".   Total Treatment Duration:  PT Patient Time In/Time Out  Time In: 1000  Time Out: [address] PT

## 2022-03-24 NOTE — CONSULTS
[facility] CARDIOLOGY History &Physical                   Subjective:     Date of  Admission: 3/18/2022  8:52 PM     Primary Care Physician: Dr. Madai De Jesus   Primary Cardiologist: FRANCISCO  Referring Physician: Hospitalist   Supervising Cardiologist: Dr. Cassie Laird     Reason for cardiac evaluation:consider ROSELINE for possible vegetation     Sihra Hayward is a 64 y.o. 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 Patient was recently admitted and discharged from our facility on 3/14. Patient was treated for acute congestive heart failure at that time. Patient was recently diagnosed with E. coli bacterial meningitis, completed course of antibiotics. She was admitted on 3/19/2022 after she presented to the emergency room with fevers, chills, worsening shortness of breath and cough, lower extremity edema. No obvious aggravating factors for her presentation. Symptoms have been getting worse since she was discharged approximately 1 week earlier. Initial evaluation in the emergency room demonstrated pulmonary vascular congestion, she was admitted to the internal medicine service and started on IV Lasix. Echocardiogram performed today demonstrated 16 x 34 mm echodense mass located on posterior mitral leaflet consistent with vegetation (not seen on previous echo 2/3/2022). Cardiology was consulted for consideration for ROSELINE for possible vegetation. Blood cultures have been sent off, but have not resulted yet. At this time patient reports she continues to have shortness of breath, cough, lower extremity swelling. Symptoms are worse with lying down. Reports some fevers and chills as well. In addition, having abdominal pain and intermittent diarrhea. No other complaints at this time.   She is remained hemodynamically electrically stable since admission. Lab Results   Component Value Date     (L) 03/24/2022    K 4.0 03/24/2022    MG 2.0 03/24/2022    BUN 14 03/24/2022    CREA 0.79 03/24/2022    WBC 13.3 (H) 03/24/2022    HGB 11.4 (L) 03/24/2022     (L) 03/24/2022    INR 1.6 02/28/2022    TSH 22.100 03/18/2022    T4 9.7 03/16/2015        Past Medical History:   Diagnosis Date    Acute blood loss anemia 4/6/2018    Anemia     C. difficile diarrhea 4/1/2015    Cerebral edema (HCC) 4/1/2015    Chronic migraine 9/22/2016    Chronic myelogenous leukemia ([facility] [address]     [place] chromosome/ converted from polycythemia in 2009- to 2012 when she was dx w leukemia    Chronic pain     Coagulation disorder ([facility] [address]     \"clotting and Bleeding Problem\" dr Christina Martinez follows     E coli bacteremia 3/9/2022    Esophageal spasm     with banding     Esophageal varices ([address]     grade 3    GI bleed 8/3/2016    H/O craniotomy     3-29-15.  due to blood clot - which caused a seizure  - then pt fell and hit     Hematemesis 8/20/2021    Leukocytosis 3/14/2015    Melena 8/20/2021    MRSA colonization 6/25/2012    Polycythemia vera(238.4)     JAK2 mutation    Portal hypertension ([address]     with varices    Portal vein thrombosis 6/20/2012    Ct scan 6-21-2 Apparent occlusion of the portal vein. In addition, the splenic vein, and superior mesenteric vein are not definitely seen and are also likely  occluded. Splenomegaly, and extensive varices are seen as described above consistent with the portal vein occlusion 7-05-12 on arixtra HIT negative on repeat 7-27-12 re admitted Cirrhotic appearance of the liver. Mild to moderate ascites, as    Primary hypothyroidism     Pulmonary embolism ([address] 2006    not on coumadin anymore     S/P exploratory laparotomy, 6/20/12 6/25/2012    Bowel resection:  336 cm of small bowel removed, approximately 9 feet and placement of feeding jejunostomy.      S/P small bowel resection     2012.  9 feet removed due to  gangrene which wa due to blood clot    Seizure ([address] 3/14/2015    Seizure disorder ([facility] [address] 3/30/2015    due to clotting factor    Seizures ([address]     last one 3- - followed by aniyah     Sepsis with acute organ dysfunction without septic shock ([address] 2022    Splenomegaly, congestive, chronic     Stroke (cerebrum) ([address] 2015    had bled into head- surgery    Stroke [facility])     pt had stroke like symptoms     Thrombocytopenia, HIT negative 2012 platelets lower repeat HIT 12 platelets in 51,230'P    Thrombosis, portal vein 2004    portal , spleenic and recently superior mesenteric    Transfusion history     many blood tranfusions - last 3-29-15 after brain surgery    Traumatic hemorrhage of right cerebrum ([address] 3/30/2015      Past Surgical History:   Procedure Laterality Date    HX APPENDECTOMY      with small bowel resection    HX BREAST BIOPSY Bilateral     Lt - ; Rt -     HX CHOLECYSTECTOMY      HX GI      liver biopsy    HX GI      bowel removed small - 9 feet     HX GYN       x 2    HX GYN      D&C following miscarriage    HX ORTHOPAEDIC Left 2008    torn labrum shoulder (screw in place)    NEUROLOGICAL PROCEDURE UNLISTED  2010    craniotomy to evacuate subdural hematoma following a fall from a seizure    NV ABDOMEN SURGERY PROC UNLISTED      umbilical hernia repair    NV ABDOMEN SURGERY PROC UNLISTED      9ft of small bowel excised then reconnected      Family History   Problem Relation Age of Onset    Diabetes Mother     Stroke Mother         after surgery    Cancer Father         brain    No Known Problems Sister     Other Sister         diverticulitis    Other Sister         diverticulitis    Cancer Sister         lyjmphoma    Breast Cancer Maternal Aunt 48    Thyroid Disease Paternal Grandmother       Social History     Tobacco Use    Smoking status: Former Smoker     Packs/day: 0.20     Years: 10.00     Pack years: 2.00     Types: Cigarettes     Quit date: 200     Years since quittin.2    Smokeless tobacco: Never Used   Substance Use Topics    Alcohol use: No      Allergies   Allergen Reactions    Latex Other (comments)     Testing for latex allergy was positive as a 2 (on a scale of 1 to 3).  Sulfa (Sulfonamide Antibiotics) Anaphylaxis    Tramadol Other (comments)     Top lip swelled    Augmentin [Amoxicillin-Pot Clavulanate] Rash    Codeine Other (comments)    Divalproex Sodium Hives    Morphine Nausea and Vomiting     Not a true allergy    Potassium Clavulanate Hives    Rizatriptan Rash    Tetanus Immune Globulin Other (comments)     Heat, bruising, and swelling at  Site of injection    Vancomycin Rash    Xanax [Alprazolam] Other (comments)     Hallucinations    Zonegran [Zonisamide] Rash         Review of Systems   Constitutional: Positive for chills and fever. Eyes: Negative. Cardiovascular: Positive for dyspnea on exertion, leg swelling, near-syncope and orthopnea. Negative for chest pain and irregular heartbeat. Respiratory: Positive for cough, shortness of breath and wheezing. Skin: Negative. Gastrointestinal: Positive for abdominal pain, nausea and vomiting. Genitourinary: Negative. Neurological: Positive for dizziness and light-headedness. Psychiatric/Behavioral: Positive for altered mental status. All other systems reviewed and are negative. Objective:     Visit Vitals  /86 (BP 1 Location: Left upper arm, BP Patient Position: Sitting)   Pulse (!) 113   Temp 99.5 °F (37.5 °C)   Resp 20   Ht 5' 3\" (1.6 m)   Wt 132 lb 8.8 oz (60.1 kg)   SpO2 (!) 85%   BMI 23.48 kg/m²        07 -  1900  In: 222 [P.O.:222]  Out: -   No intake/output data recorded. Physical Exam  Vitals reviewed. Constitutional:       General: She is not in acute distress. Appearance: She is underweight. She is ill-appearing. She is not toxic-appearing.    HENT:      Head: Normocephalic and atraumatic. Nose: Nose normal.   Eyes:      General: No scleral icterus. Right eye: No discharge. Left eye: No discharge. Conjunctiva/sclera: Conjunctivae normal.   Neck:      Vascular: JVD present. Trachea: No tracheal deviation. Cardiovascular:      Rate and Rhythm: Normal rate and regular rhythm. Pulses:           Radial pulses are 2+ on the right side and 2+ on the left side. Heart sounds: S1 normal and S2 normal. Murmur (2/6 holosystolic murmur LLSB/apical) heard. No gallop. Pulmonary:      Effort: Pulmonary effort is normal.      Breath sounds: Normal breath sounds. No wheezing or rales. Abdominal:      General: There is no distension. Palpations: Abdomen is soft. Musculoskeletal:         General: Swelling present. Normal range of motion. Cervical back: Normal range of motion. Right lower leg: Edema present. Left lower leg: Edema present. Skin:     General: Skin is warm and dry. Neurological:      Mental Status: She is alert and oriented to person, place, and time. Psychiatric:         Behavior: Behavior normal.         Thought Content: Thought content normal.         Judgment: Judgment normal.       Data Review:   Recent Labs     03/24/22  0629 03/23/22  0811 03/22/22  0617 03/22/22  0617   * 133*   < > 135*   K 4.0 3.9   < > 2.9*   MG 2.0 2.1   < > 1.9   BUN 14 13   < > 13   CREA 0.79 0.88   < > 0.80   GLU 98 99   < > 115*   WBC 13.3*  --   --  11.3*   HGB 11.4*  --   --  10.6*   HCT 35.0*  --   --  32.2*   *  --   --  76*    < > = values in this interval not displayed. Echocardiogram 3/24/2022:    Mitral Valve: There is an approximately 16 x 34 mm echodense mass located on the posterior mitral valve leaflet consistent with vegetation. (Mitral valve vegetation was not present on study dated 2/3/2022). Possible flail leaflet of the mitral valve. Severe transvalvular regurgitation.  No stenosis noted.    Left Ventricle: Left ventricle size is normal. Normal wall thickness. Normal wall motion. Normal left ventricular systolic function. EF by 2D Simpsons Biplane is 60%. Abnormal diastolic function. Average E/e' ratio is 22.60.   Right Ventricle: Right ventricle is mildly dilated. Low normal systolic function.   Tricuspid Valve: Moderate transvalvular regurgitation. RVSP is 56 mmHg.   Left Atrium: Left atrial volume index is severely increased (>48 mL/m2).   Right Atrium: Right atrium is mildly dilated.   Pericardium: Small (<1 cm) pericardial effusion present. No indication of cardiac tamponade. Left pleural effusion. Ascites present.   IVC/SVC: IVC diameter is greater than 21 mm and decreases less than 50% during inspiration; therefore the estimated right atrial pressure is elevated (~15 mmHg).   Contrast used: Definity. 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 -Continue diuresis as tolerated by hemodynamics, closely monitor I's and O's.  If patient does not have negative fluid balance consider lasix gtt   - poor candidate for valve replacement surgery  -We will make n.p.o. for possible ROSELINE tomorrow, would be ideal if volume status improves prior to ROSELINE.  - Please transfer patient to [facility] as possible , supplemental oxygen as needed   - consider pulmonary evaluation for thoracentesis     Active Problems:      History of DVT (deep vein thrombosis) (3/19/2022)    Portal vein thrombosis (2/16/2009)  - on systemic anticoagulation per primary team       Malignant ascites (7/26/2012)  -Per primary      Hypokalemia (7/27/2012)  -Replace as needed to normalize potassium levels      Chronic myelogenous leukemia ([facility] [address] (3/30/2015)    Acquired hypercoagulable state ([address] (3/30/2015)  -Per hematology oncology       Thrombocytopenia ([address] (10/15/2021)  - closely monitor blood counts       Acute respiratory failure with hypoxemia ([address] (3/9/2022)  - likely related to pleural effusions, continued diuresis as above       CML (chronic myelocytic leukemia) ([facility] [address] (3/19/2022)  - per hematology/oncology     Светлана Srinivasan DO  3/24/2022  5:15 PM

## 2022-03-24 NOTE — PROGRESS NOTES
Problem: Falls - Risk of  Goal: *Absence of Falls  Description: Document Estefania Thomaszita Fall Risk and appropriate interventions in the flowsheet. Outcome: Progressing Towards Goal  Note: Fall Risk Interventions:  Mobility Interventions: Patient to call before getting OOB    Mentation Interventions: Door open when patient unattended    Medication Interventions: Patient to call before getting OOB,Teach patient to arise slowly    Elimination Interventions: Call light in reach,Patient to call for help with toileting needs    History of Falls Interventions: Bed/chair exit alarm,Door open when patient unattended         Problem: Pressure Injury - Risk of  Goal: *Prevention of pressure injury  Description: Document Eric Scale and appropriate interventions in the flowsheet.   Outcome: Progressing Towards Goal  Note: Pressure Injury Interventions:  Sensory Interventions: Assess changes in LOC    Moisture Interventions: Absorbent underpads    Activity Interventions: PT/OT evaluation    Mobility Interventions: HOB 30 degrees or less,Pressure redistribution bed/mattress (bed type),PT/OT evaluation    Nutrition Interventions: Offer support with meals,snacks and hydration    Friction and Shear Interventions: Apply protective barrier, creams and emollients                Problem: Heart Failure: Day 4  Goal: Activity/Safety  Outcome: Progressing Towards Goal  Goal: Diagnostic Test/Procedures  Outcome: Progressing Towards Goal  Goal: Discharge Planning  Outcome: Progressing Towards Goal  Goal: Medications  Outcome: Progressing Towards Goal  Goal: Respiratory  Outcome: Progressing Towards Goal

## 2022-03-24 NOTE — PROGRESS NOTES
Problem: Falls - Risk of  Goal: *Absence of Falls  Description: Document Luz Leal Fall Risk and appropriate interventions in the flowsheet. Outcome: Progressing Towards Goal  Note: Fall Risk Interventions:  Mobility Interventions: Patient to call before getting OOB,Bed/chair exit alarm         Medication Interventions: Bed/chair exit alarm,Patient to call before getting OOB,Teach patient to arise slowly    Elimination Interventions: Bed/chair exit alarm,Call light in reach,Patient to call for help with toileting needs    History of Falls Interventions: Bed/chair exit alarm,Door open when patient unattended         Problem: Pressure Injury - Risk of  Goal: *Prevention of pressure injury  Description: Document Eric Scale and appropriate interventions in the flowsheet.   Outcome: Progressing Towards Goal  Note: Pressure Injury Interventions:  Sensory Interventions: Keep linens dry and wrinkle-free,Maintain/enhance activity level    Moisture Interventions: Absorbent underpads    Activity Interventions: Increase time out of bed,PT/OT evaluation    Mobility Interventions: Pressure redistribution bed/mattress (bed type)    Nutrition Interventions: Offer support with meals,snacks and hydration    Friction and Shear Interventions: Lift team/patient mobility team,HOB 30 degrees or less

## 2022-03-24 NOTE — DISCHARGE SUMMARY
Patient being transferred downtown for routine progression of care in stable condition.     Patient Vitals for the past 4 hrs:   Temp Pulse Resp BP SpO2   03/24/22 1226 99.5 °F (37.5 °C) (!) 113 20 127/86 (!) 85 %

## 2022-03-24 NOTE — PROGRESS NOTES
Care Management Interventions  PCP Verified by CM: Yes  Mode of Transport at Discharge: BLS [facility])  Transition of Care Consult (CM Consult): Other (transport to [facility])  Physical Therapy Consult: Yes  Occupational Therapy Consult: Yes  Support Systems: No Support Systems  Confirm Follow Up Transport: Self  The Plan for Transition of Care is Related to the Following Treatment Goals : STR  The Patient and/or Patient Representative was Provided with a Choice of Provider and Agrees with the Discharge Plan?: Yes  Name of the Patient Representative Who was Provided with a Choice of Provider and Agrees with the Discharge Plan: Rishabh Yee  Freedom of Choice List was Provided with Basic Dialogue that Supports the Patient's Individualized Plan of Care/Goals, Treatment Preferences and Shares the Quality Data Associated with the Providers?: Yes  Discharge Location  Patient Expects to be Discharged to[de-identified] Transferred to higher level of care  Interdisciplinary team meeting with Dr. Sunil Stephens, CM, nursing, PT and dietary for plan of care. Patient was anticipated to d/c today however echo showed mitral vegetation and patient d/c cancelled and she is to be transferred to [facility] for continued care. CM notified Dossie Alpers from ambulatory case management that patient would not be d/c today as they were going to follow up with patient at d/c. Patient to d/c by [address] ambulance to [facility]. Addendum: Patient currently on Oxygen and CM has not been able to confirm she is on oxygen at home. Patient denies on oxygen at home. If needs oxygen at d/c will need to arrange at d/c.

## 2022-03-24 NOTE — PROGRESS NOTES
Hospitalist Progress Note   Admit Date:  3/18/2022  8:52 PM   Name:  Maureen Boudreaux   Age:  64 y.o. 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  Patient was recently admitted and discharged from our facility on 3/14. Erin Brown was treated for acute congestive heart failure at that time. Also noted to have splenomegaly and splenic ifarct.   Patient was recently diagnosed with E. coli bacterial meningitis, completed course of antibiotics.  Patient reported bilateral lower extremity edema, orthopnea.  Patient reported intermittent diarrhea.  Evaluated in the emergency room, patient was given diuretics, chest x-ray was done shows evidence of pulmonary vascular congestion.  ER physician requested admission of this patient for further evaluation and management. Pt admitted for diastolic CHFe and started on furosemide. Her O2 requirement was not increased from baseline of 2L. On 3/23 echocardiogram was performed showing mitral vegetations. She was transferred to the [facility] 3/24. Cardiology was consulted. Subjective/24hr Events (22): We were discussing discharge, however, echo resulted with mitral vegetations. Blood cultures drawn, Abx started. Transfer DT. Respiratory function stable. Thrombocytopenia improved. ROS:  10 systems reviewed and negative except as noted above. Assessment & Plan:    * Vegetative endocarditis of mitral valve  Seen on TTE 3/23, recent E coli meningitis  -vancomycin  -blood cultures  -cardiology consult  -transfer downtown  -consider ID consult    Acute diastolic (congestive) heart failure (HCC)  Echocardiogram 3/23 with mitral mass, flail leaflet, severe regurgitation, pEF, +DD  -furosemide    Acute respiratory failure with hypoxemia (HCC)  -Supportive care, maintain O2 sat > 92  -pulmonary hygiene    Chronic myelogenous leukemia ([facility]  -continue sprycel and Jakafi    Thrombocytopenia ([facility]  -monitor platelets daily  -consider HIT    3/24: 121<76    Acquired hypercoagulable state (HCC)  -enoxaparin bid    Physical deconditioning  -PT, OT    Primary hypothyroidism  -levothyroxine 125        Dispo/Discharge Planning:      Denied SNF, home in 1-3 days    Diet:  ADULT DIET Regular;  Low Sodium (2 gm)  DVT PPx: on enoxaparin  Code status: Full Code    Hospital Problems as of 3/24/2022 Date Reviewed: 3/12/2022          Codes Class Noted - Resolved POA    * (Principal) Vegetative endocarditis of mitral valve ICD-10-CM: I33.0  ICD-9-CM: 421.0  3/24/2022 - Present Yes        Acute diastolic (congestive) heart failure (HCC) ICD-10-CM: I50.31  ICD-9-CM: 428.31, 428.0  3/19/2022 - Present Yes        Acute respiratory failure with hypoxemia (HCC) ICD-10-CM: J96.01  ICD-9-CM: 518.81  3/9/2022 - Present Yes        Chronic myelogenous leukemia (HCC) (Chronic) ICD-10-CM: C92.10  ICD-9-CM: 205.10  3/30/2015 - Present Yes        Thrombocytopenia ([facility] ICD-10-CM: D69.6  ICD-9-CM: 287.5  10/15/2021 - Present Yes        Malignant ascites ICD-10-CM: R18.0  ICD-9-CM: 789.51  7/26/2012 - Present Yes        Acquired hypercoagulable state ([facility] (Chronic) ICD-10-CM: R56.94  ICD-9-CM: 289.81  3/30/2015 - Present Yes        Body mass index (BMI) of 23.0 to 23.9 in adult (Chronic) ICD-10-CM: H26.95  ICD-9-CM: V85.1  8/26/2021 - Present Yes        CML (chronic myelocytic leukemia) ([facility] ICD-10-CM: C92.10  ICD-9-CM: 205.10  3/19/2022 - Present Yes Physical deconditioning (Chronic) ICD-10-CM: R53.81  ICD-9-CM: 799.3  3/19/2022 - Present Yes        History of DVT (deep vein thrombosis) ICD-10-CM: Z86.718  ICD-9-CM: V12.51  3/19/2022 - Present Yes        Hypoproteinemia (HCC) ICD-10-CM: E77.8  ICD-9-CM: 273.8  3/11/2022 - Present Yes        Hyponatremia ICD-10-CM: E87.1  ICD-9-CM: 276.1  3/30/2015 - Present Yes        Hypokalemia ICD-10-CM: E87.6  ICD-9-CM: 276.8  7/27/2012 - Present Yes        Primary hypothyroidism (Chronic) ICD-10-CM: E03.9  ICD-9-CM: 244.9  2/16/2009 - Present Yes        Portal vein thrombosis ICD-10-CM: X23  ICD-9-CM: 369  2/16/2009 - Present Yes    Overview Addendum 8/26/2021  2:31 PM by Alexia Wilson MD     Ct scan 6-21-2 Apparent occlusion of the portal vein. In addition, the splenic vein, and superior mesenteric vein are not definitely seen and are also likely  occluded. Splenomegaly, and extensive varices are seen as described above consistent with the portal vein occlusion 7-05-12 on arixtra HIT negative on repeat 7-27-12 re admitted Cirrhotic appearance of the liver.  Mild to moderate ascites                   Objective:     Patient Vitals for the past 24 hrs:   Temp Pulse Resp BP SpO2   03/24/22 1226 99.5 °F (37.5 °C) (!) 113 20 127/86 (!) 85 %   03/24/22 0723 99.3 °F (37.4 °C) 98 16 113/83 93 %   03/24/22 0323 98.3 °F (36.8 °C) 97 16 98/74 96 %   03/23/22 2334 98.3 °F (36.8 °C) 96 16 98/73 94 %   03/23/22 2123 -- -- -- -- 94 %   03/23/22 1926 98.3 °F (36.8 °C) 76 16 104/77 98 %   03/23/22 1543 98.4 °F (36.9 °C) (!) 103 18 106/81 92 %     Oxygen Therapy  O2 Sat (%): (!) 85 % (03/24/22 1226)  Pulse via Oximetry: 95 beats per minute (03/23/22 2123)  O2 Device: Nasal cannula (03/24/22 1226)  Skin Assessment: Clean, dry, & intact (03/24/22 0315)  Skin Protection for O2 Device: N/A (03/24/22 0315)  O2 Flow Rate (L/min): 1 l/min (03/24/22 0737)    Estimated body mass index is 23.48 kg/m² as calculated from the following: Height as of this encounter: 5' 3\" (1.6 m). Weight as of this encounter: 60.1 kg (132 lb 8.8 oz). Intake/Output Summary (Last 24 hours) at 3/24/2022 1238  Last data filed at 3/24/2022 0830  Gross per 24 hour   Intake 222 ml   Output --   Net 222 ml         Blood pressure 127/86, pulse (!) 113, temperature 99.5 °F (37.5 °C), resp. rate 20, height 5' 3\" (1.6 m), weight 60.1 kg (132 lb 8.8 oz), SpO2 (!) 85 %. Physical Exam  Vitals and nursing note reviewed. Constitutional:       General: She is not in acute distress. Appearance: Normal appearance. She is ill-appearing. HENT:      Head: Normocephalic. Eyes:      Extraocular Movements: Extraocular movements intact. Cardiovascular:      Rate and Rhythm: Normal rate and regular rhythm. Pulses:           Radial pulses are 2+ on the left side. Pulmonary:      Effort: Pulmonary effort is normal. No respiratory distress. Abdominal:      General: Bowel sounds are normal. There is distension. Palpations: There is fluid wave. Tenderness: There is generalized abdominal tenderness. Musculoskeletal:         General: No deformity. Cervical back: No rigidity. Right lower leg: No edema. Left lower leg: No edema. Skin:     General: Skin is warm and dry. Coloration: Skin is not jaundiced. Neurological:      General: No focal deficit present. Mental Status: She is alert and oriented to person, place, and time. Psychiatric:         Mood and Affect: Mood is depressed. Behavior: Behavior is slowed and withdrawn. Cognition and Memory: Memory is impaired.       Comments: Provides inconsistent responses to querries         I have reviewed ordered lab tests and independently visualized imaging below:    Recent Labs:  Recent Results (from the past 48 hour(s))   METABOLIC PANEL, COMPREHENSIVE    Collection Time: 03/23/22  8:11 AM   Result Value Ref Range    Sodium 133 (L) 136 - 145 mmol/L    Potassium 3.9 3.5 - 5.1 mmol/L    Chloride 100 98 - 107 mmol/L    CO2 27 21 - 32 mmol/L    Anion gap 6 (L) 7 - 16 mmol/L    Glucose 99 65 - 100 mg/dL    BUN 13 8 - 23 MG/DL    Creatinine 0.88 0.6 - 1.0 MG/DL    GFR est AA >60 >60 ml/min/1.73m2    GFR est non-AA >60 >60 ml/min/1.73m2    Calcium 8.6 8.3 - 10.4 MG/DL    Bilirubin, total 1.5 (H) 0.2 - 1.1 MG/DL    ALT (SGPT) 32 12 - 65 U/L    AST (SGOT) 25 15 - 37 U/L    Alk.  phosphatase 103 50 - 130 U/L    Protein, total 7.1 6.3 - 8.2 g/dL    Albumin 3.1 (L) 3.2 - 4.6 g/dL    Globulin 4.0 (H) 2.3 - 3.5 g/dL    A-G Ratio 0.8 (L) 1.2 - 3.5     MAGNESIUM    Collection Time: 03/23/22  8:11 AM   Result Value Ref Range    Magnesium 2.1 1.8 - 2.4 mg/dL   PHOSPHORUS    Collection Time: 03/23/22  8:11 AM   Result Value Ref Range    Phosphorus 2.8 2.3 - 3.7 MG/DL   CBC W/O DIFF    Collection Time: 03/24/22  6:29 AM   Result Value Ref Range    WBC 13.3 (H) 4.3 - 11.1 K/uL    RBC 3.52 (L) 4.05 - 5.2 M/uL    HGB 11.4 (L) 11.7 - 15.4 g/dL    HCT 35.0 (L) 35.8 - 46.3 %    MCV 99.4 (H) 79.6 - 97.8 FL    MCH 32.4 26.1 - 32.9 PG    MCHC 32.6 31.4 - 35.0 g/dL    RDW 22.3 (H) 11.9 - 14.6 %    PLATELET 586 (L) 935 - 450 K/uL    MPV 11.1 9.4 - 12.3 FL    ABSOLUTE NRBC 0.00 0.0 - 0.2 K/uL   METABOLIC PANEL, BASIC    Collection Time: 03/24/22  6:29 AM   Result Value Ref Range    Sodium 134 (L) 136 - 145 mmol/L    Potassium 4.0 3.5 - 5.1 mmol/L    Chloride 102 98 - 107 mmol/L    CO2 27 21 - 32 mmol/L    Anion gap 5 (L) 7 - 16 mmol/L    Glucose 98 65 - 100 mg/dL    BUN 14 8 - 23 MG/DL    Creatinine 0.79 0.6 - 1.0 MG/DL    GFR est AA >60 >60 ml/min/1.73m2    GFR est non-AA >60 >60 ml/min/1.73m2    Calcium 8.8 8.3 - 10.4 MG/DL   MAGNESIUM    Collection Time: 03/24/22  6:29 AM   Result Value Ref Range    Magnesium 2.0 1.8 - 2.4 mg/dL   PHOSPHORUS    Collection Time: 03/24/22  6:29 AM   Result Value Ref Range    Phosphorus 2.5 2.3 - 3.7 MG/DL   ECHO ADULT COMPLETE    Collection Time: 03/24/22  9:35 AM   Result Value Ref Range    LV EDV A2C 131 mL    LV EDV A4C 105 mL    LV ESV A2C 49 mL    LV ESV A4C 41 mL    IVSd 1.0 (A) 0.6 - 0.9 cm    LVIDd 4.5 3.9 - 5.3 cm    LVIDs 3.1 cm    LVOT Diameter 1.8 cm    LVOT Mean Gradient 3 mmHg    LVOT VTI 16.9 cm    LVOT Peak Velocity 1.3 m/s    LVOT Peak Gradient 7 mmHg    LVPWd 1.0 (A) 0.6 - 0.9 cm    LV E' Lateral Velocity 8 cm/s    LV E' Septal Velocity 6 cm/s    LV Ejection Fraction A2C 62 %    LV Ejection Fraction A4C 61 %    EF BP 60 55 - 100 %    LVOT Area 2.5 cm2    LVOT SV 43.0 ml    LA Minor Axis 5.7 cm    LA Major Axis 6.1 cm    LA Area 2C 23.6 cm2    LA Area 4C 24.3 cm2    LA Volume BP 79 (A) 22 - 52 mL    LA Diameter 4.9 cm    AV Mean Gradient 4 mmHg    AV VTI 20.1 cm    AV Mean Velocity 1.0 m/s    AV Peak Velocity 1.3 m/s    AV Peak Gradient 7 mmHg    AV Area by VTI 2.1 cm2    AV Area by Peak Velocity 2.5 cm2    Aortic Root 2.7 cm    Ascending Aorta 3.1 cm    IVC Expiration 2.5 cm    MR .0 cm    MR Peak Velocity 4.4 m/s    MR Peak Gradient 77 mmHg    MV E Wave Deceleration Time 150.0 ms    MV A Velocity 1.23 m/s    MV E Velocity 1.55 m/s    PV .0 ms    PV Max Velocity 1.1 m/s    PV Peak Gradient 5 mmHg    RVIDd 3.6 cm    RV Basal Dimension 4.4 cm    RV Longitudinal Dimension 5.9 cm    RV Mid Dimension 3.0 cm    TAPSE 1.7 1.5 - 2.0 cm    TR Max Velocity 3.21 m/s    TR Peak Gradient 41 mmHg    Fractional Shortening 2D 31 28 - 44 %    LV ESV Index A4C 25 mL/m2    LV EDV Index A4C 65 mL/m2    LV ESV Index A2C 30 mL/m2    LV EDV Index A2C 81 mL/m2    LVIDd Index 2.78 cm/m2    LVIDs Index 1.91 cm/m2    LV RWT Ratio 0.44     LV Mass 2D 153.3 67 - 162 g    LV Mass 2D Index 94.6 43 - 95 g/m2    MV E/A 1.26     E/E' Ratio (Averaged) 22.60     E/E' Lateral 19.38     E/E' Septal 25.83     LA Volume Index BP 49 (A) 16 - 34 ml/m2    LVOT Stroke Volume Index 26.5 mL/m2    LA Size Index 3.02 cm/m2    LA/AO Root Ratio 1.81     Ao Root Index 1.67 cm/m2    Ascending Aorta Index 1.91 cm/m2    AV Velocity Ratio 1.00     LVOT:AV VTI Index 0.84     JOSSY/BSA VTI 1.3 cm2/m2    JOSSY/BSA Peak Velocity 1.5 cm2/m2    Est. RA Pressure 15 mmHg    RVSP 56 mmHg       All Micro Results     Procedure Component Value Units Date/Time    CULTURE, BLOOD [[id number]] Collected: 03/24/22 1204    Order Status: Completed Specimen: Blood Updated: 03/24/22 1211    CULTURE, BLOOD [[id number]] Collected: 03/24/22 1148    Order Status: Completed Specimen: Blood Updated: 03/24/22 1159    COVID-19 RAPID TEST [[id number]] Collected: 03/18/22 2300    Order Status: Completed Specimen: Nasopharyngeal Updated: 03/18/22 2323     Specimen source Nasopharyngeal        COVID-19 rapid test Not detected        Comment:      The specimen is NEGATIVE for SARS-CoV-2, the novel coronavirus associated with COVID-19. A negative result does not rule out COVID-19. This test has been authorized by the FDA under an Emergency Use Authorization (EUA) for use by authorized laboratories. Fact sheet for Healthcare Providers: ConventionUpdate.co.nz  Fact sheet for Patients: ConventionUpdate.co.nz       Methodology: Isothermal Nucleic Acid Amplification               Other Studies:  ECHO ADULT COMPLETE    Result Date: 3/24/2022    Mitral Valve: There is an approximately 16 x 34 mm echodense mass located on the posterior mitral valve leaflet consistent with vegetation. (Mitral valve vegetation was not present on study dated 2/3/2022). Possible flail leaflet of the mitral valve. Severe transvalvular regurgitation. No stenosis noted.   Left Ventricle: Left ventricle size is normal. Normal wall thickness. Normal wall motion. Normal left ventricular systolic function. EF by 2D Simpsons Biplane is 60%. Abnormal diastolic function. Average E/e' ratio is 22.60.   Right Ventricle: Right ventricle is mildly dilated. Low normal systolic function.   Tricuspid Valve: Moderate transvalvular regurgitation.  RVSP is 56 mmHg.   Left Atrium: Left atrial volume index is severely increased (>48 mL/m2).   Right Atrium: Right atrium is mildly dilated.   Pericardium: Small (<1 cm) pericardial effusion present. No indication of cardiac tamponade. Left pleural effusion. Ascites present.   IVC/SVC: IVC diameter is greater than 21 mm and decreases less than 50% during inspiration; therefore the estimated right atrial pressure is elevated (~15 mmHg).   Contrast used: Definity. Current Meds:  Current Facility-Administered Medications   Medication Dose Route Frequency    calcium carbonate (CALTREX) tablet 600 mg [elemental]  600 mg Oral QHS    . PHARMACY TO SUBSTITUTE PER PROTOCOL (Reordered from: cholecalciferol (VITAMIN D3) 1,000 unit cap)    Per Protocol    . PHARMACY TO SUBSTITUTE PER PROTOCOL (Reordered from: dasatinib (SpryceL) 80 mg tab)    Per Protocol    famotidine (PEPCID) tablet 40 mg  40 mg Oral DAILY PRN    [START ON 3/25/2022] pantoprazole (PROTONIX) tablet 40 mg  40 mg Oral ACB    [START ON 3/25/2022] magnesium tab 250 mg  1 Tablet Oral DAILY    traZODone (DESYREL) tablet 50 mg  50 mg Oral QHS    oxyCODONE-acetaminophen (PERCOCET 7.5) 7.5-325 mg per tablet 1 Tablet  1 Tablet Oral Q4H PRN    ondansetron (ZOFRAN ODT) tablet 4 mg  4 mg Oral Q8H PRN    Or    ondansetron (ZOFRAN) injection 4 mg  4 mg IntraVENous Q8H PRN    potassium chloride (K-DUR, KLOR-CON M20) SR tablet 40 mEq  40 mEq Oral BID    furosemide (LASIX) tablet 40 mg  40 mg Oral BID    cyclobenzaprine (FLEXERIL) tablet 5 mg  5 mg Oral TID PRN    [Held by provider] metoprolol succinate (TOPROL-XL) XL tablet 25 mg  25 mg Oral DAILY    ruxolitinib tab 5 mg Obi Arnaldo) - patient supplied (Patient Supplied)  5 mg Oral Q12H    enoxaparin (LOVENOX) injection 50 mg  50 mg SubCUTAneous Q12H    fluticasone propionate (FLONASE) 50 mcg/actuation nasal spray 2 Spray  2 Spray Both Nostrils DAILY    cetirizine (ZYRTEC) tablet 10 mg  10 mg Oral DAILY    trimethoprim-polymyxin b (POLYTRIM) 10,000 unit- 1 mg/mL ophthalmic solution 1 Drop  1 Drop Both Eyes BID    levothyroxine (SYNTHROID) tablet 125 mcg  125 mcg Oral 6am       Signed:  Dalton Verduzco MD

## 2022-03-25 PROBLEM — I05.9 ENDOCARDITIS OF MITRAL VALVE: Status: ACTIVE | Noted: 2022-01-01

## 2022-03-25 NOTE — PROGRESS NOTES
TRANSFER - OUT REPORT:    Verbal report given to [address] [place] [address] (name) on Ramez Eugene  being transferred to [facility](unit) for routine progression of care       Report consisted of patients Situation, Background, Assessment and   Recommendations(SBAR). Information from the following report(s) SBAR, Kardex and MAR was reviewed with the receiving nurse. Lines:   Peripheral IV 03/21/22 Left;Posterior Forearm (Active)   Site Assessment Clean, dry, & intact 03/25/22 0238   Phlebitis Assessment 0 03/25/22 0238   Infiltration Assessment 0 03/25/22 0238   Dressing Status Clean, dry, & intact 03/25/22 0238   Dressing Type Tape;Transparent 03/25/22 0238   Hub Color/Line Status Flushed;Capped 03/25/22 0238   Alcohol Cap Used No 03/23/22 1945        Opportunity for questions and clarification was provided.       Patient transported with:   O2 @ 2 liters   [employer]

## 2022-03-25 NOTE — PROGRESS NOTES
ACUTE PHYSICAL THERAPY GOALS:  (Developed with and agreed upon by patient and/or caregiver. )  LTG:  (1.)Ms. Leticia Balbuena will move from supine to sit and sit to supine , scoot up and down and roll side to side in bed with INDEPENDENT within 7 treatment day(s). (2.)Ms. Leticia Balbuena will transfer from bed to chair and chair to bed with INDEPENDENT using the least restrictive device within 7 treatment day(s). (3.)Ms. Leticia Balbuena will ambulate with SUPERVISION for 200+ feet with the least restrictive device within 7 treatment day(s). (4.)Ms. Leticia Balbuena will ambulate up/down 12 steps SUPERVISION  with the least restrictive device within 7 treatment day(s). ________________________________________________________________________________________________        PHYSICAL THERAPY ASSESSMENT: Initial Assessment and PM PT Treatment Day # 1      Ina Ganser is a 64 y.o. female   PRIMARY DIAGNOSIS: Vegetative endocarditis of mitral valve  Endocarditis of mitral valve [I05.9]       Reason for Referral:    ICD-10: Treatment Diagnosis: Generalized Muscle Weakness (M62.81)  Difficulty in walking, Not elsewhere classified (R26.2)  INPATIENT: Payor: [facility] MEDICARE / Plan: Gerard Mai / Product Type: Managed Care Medicare /     ASSESSMENT:     REHAB RECOMMENDATIONS:   Recommendation to date pending progress:  Setting:   Short-term Rehab  Equipment:    To Be Determined     PRIOR LEVEL OF FUNCTION:  (Prior to Hospitalization) INITIAL/CURRENT LEVEL OF FUNCTION:  (Most Recently Demonstrated)   Bed Mobility:   Independent  Sit to Stand:   Independent  Transfers:   Independent  Gait/Mobility:   Independent Bed Mobility:   Contact Guard Assistance  Sit to Stand:   Contact Guard Assistance  Transfers:   Contact Guard Assistance  Gait/Mobility:   Contact Guard Assistance     ASSESSMENT:  Ms. Leticia Balbuena wa supine at arrival, agreed to treat.  She was able to get to EOB, attempted to stand x3, but discovered too much abdominal pain needing about 3min to sit before trying again. She was able to stand with RW and ambulated 30ft CGA. Returned to room  To sit in chair. Left in chair, comfortable. Pt stated she thinks she would be unsafe alone at home at this time and onboard about going to rehab. Pt will benefit from skilled and sophisticated PT to help improve impairments. SUBJECTIVE:   Ms. Greg Acevedo states, \"I like [employer] chocolate\"    SOCIAL HISTORY/LIVING ENVIRONMENT: lives alone in [place], [place] with all ADLs, no AD used until very recently has RW. Does not drive, transport from [employer] friends. Walks store when going.      OBJECTIVE:     PAIN: VITAL SIGNS: LINES/DRAINS:   Pre Treatment: Pain Screen  Pain Scale 1: Numeric (0 - 10)  Pain Intensity 1: 0  Post Treatment: 0   None        GROSS EVALUATION:   Within Functional Limits Abnormal/ Functional Abnormal/ Non-Functional (see comments) Not Tested Comments:   AROM [x] [] [] []    PROM [] [] [] []    Strength [x] [] [] []    Balance [x] [] [] []    Posture [x] [] [] []    Sensation [x] [] [] []    Coordination [x] [] [] []    Tone [] [] [] []    Edema [] [] [] []    Activity Tolerance [x] [] [] []     [] [] [] []      COGNITION/  PERCEPTION: Intact Impaired   (see comments) Comments:   Orientation [x] []    Vision [x] []    Hearing [x] []    Command Following [x] []    Safety Awareness [x] []     [] []      MOBILITY: I Mod I S SBA CGA Min Mod Max Total  NT x2 Comments:   Bed Mobility    Rolling [] [] [] [] [x] [] [] [] [] [] []    Supine to Sit [] [] [] [] [x] [] [] [] [] [] []    Scooting [] [] [] [] [x] [] [] [] [] [] []    Sit to Supine [] [] [] [] [x] [] [] [] [] [] []    Transfers    Sit to Stand [] [] [] [] [x] [] [] [] [] [] []    Bed to Chair [] [] [] [] [x] [] [] [] [] [] []    Stand to Sit [] [] [] [] [x] [] [] [] [] [] []    I=Independent, Mod I=Modified Independent, S=Supervision, SBA=Standby Assistance, CGA=Contact Guard Assistance,   Min=Minimal Assistance, Mod=Moderate Assistance, Max=Maximal Assistance, Total=Total Assistance, NT=Not Tested  GAIT: I Mod I S SBA CGA Min Mod Max Total  NT x2 Comments:   Level of Assistance [] [] [] [] [x] [] [] [] [] [] []    Distance 30    DME Rolling Walker    Gait Quality Slow, cautious    Weightbearing Status N/A     I=Independent, Mod I=Modified Independent, S=Supervision, SBA=Standby Assistance, CGA=Contact Guard Assistance,   Min=Minimal Assistance, Mod=Moderate Assistance, Max=Maximal Assistance, Total=Total Assistance, NT=Not Tested    [address] [address] AM-PAC [facility] Form       How much difficulty does the patient currently have. .. Unable A Lot A Little None   1. Turning over in bed (including adjusting bedclothes, sheets and blankets)? [] 1   [] 2   [] 3   [x] 4   2. Sitting down on and standing up from a chair with arms ( e.g., wheelchair, bedside commode, etc.)   [] 1   [] 2   [x] 3   [] 4   3. Moving from lying on back to sitting on the side of the bed? [] 1   [] 2   [] 3   [x] 4   How much help from another person does the patient currently need. .. Total A Lot A Little None   4. Moving to and from a bed to a chair (including a wheelchair)? [] 1   [] 2   [x] 3   [] 4   5. Need to walk in hospital room? [] 1   [] 2   [x] 3   [] 4   6. Climbing 3-5 steps with a railing? [] 1   [] 2   [x] 3   [] 4   © 2007, Trustees of [address] [place] [id number], under license to [employer]. All rights reserved     Score:  Initial: 20 Most Recent: X (Date: -- )    Interpretation of Tool:  Represents activities that are increasingly more difficult (i.e. Bed mobility, Transfers, Gait). PLAN:   FREQUENCY/DURATION: PT Plan of Care: 3 times/week for duration of hospital stay or until stated goals are met, whichever comes first.    PROBLEM LIST:   (Skilled intervention is medically necessary to address:)  1. Decreased ADL/Functional Activities  2. Decreased Activity Tolerance  3.  Decreased AROM/PROM  4. Decreased Balance  5. Decreased Cognition  6. Decreased Coordination  7. Decreased Gait Ability  8. Decreased Strength  9. Decreased Transfer Abilities  10. Increased Pain   INTERVENTIONS PLANNED:   (Benefits and precautions of physical therapy have been discussed with the patient.)  1. Therapeutic Activity  2. Therapeutic Exercise/HEP  3. Neuromuscular Re-education  4. Gait Training  5. Education     TREATMENT:     EVALUATION: Moderate Complexity : (Untimed Charge)    TREATMENT:   ($$ Therapeutic Activity: 23-37 mins    )  Therapeutic Activity (14 Minutes): Therapeutic activity included Rolling, Supine to Sit, Scooting, Lateral Scooting, Transfer Training, Ambulation on level ground, Sitting balance  and Standing balance to improve functional Mobility, Strength and Activity tolerance.     TREATMENT GRID:  N/A    AFTER TREATMENT POSITION/PRECAUTIONS:  Chair, Needs within reach and RN notified    INTERDISCIPLINARY COLLABORATION:  RN/PCT and PT/PTA    TOTAL TREATMENT DURATION:  PT Patient Time In/Time Out  Time In: 1330  Time Out: [address], DPT

## 2022-03-25 NOTE — PROGRESS NOTES
03/25/22 0415   Dual Skin Pressure Injury Assessment   Dual Skin Pressure Injury Assessment WDL   Second Care Provider (Based on [address] [place] [address]) Ayah CAMERON   Skin Integumentary   Skin Integumentary (WDL) X    Pressure  Injury Documentation No Pressure Injury Noted-Pressure Ulcer Prevention Initiated   Skin Color Appropriate for ethnicity   Skin Condition/Temp Warm;Dry   Skin Integrity Scars (comment); Excoriation  (Abd scar, vaginal redness/excoriation, L labial swelling)   Turgor Shiney/hard  (lower extremity swelling)   Hair Growth Sparce   Nails WDL   Varicosities Absent   Wound Prevention and Protection Methods   Orientation of Wound Prevention Posterior   Location of Wound Prevention Sacrum/Coccyx   Dressing Present  Intact, not due to be changed   Wound Offloading (Prevention Methods) Foam silicone;Bed, pressure reduction mattress;Pillows;Repositioning;Turning

## 2022-03-25 NOTE — PROGRESS NOTES
[facility] CARDIOLOGY PROGRESS NOTE           3/25/2022 11:16 AM    Admit Date: 3/25/2022    Subjective:   Patient continues to have shortness of breath, hemodynamically electrically stable overnight aside from some low blood pressures this morning. Denies dizziness, lightheadedness. Lower extremity edema continues. Reports good urine output this morning. No other complaints at this time. ROS:  Cardiovascular:  As noted above    Objective:      Vitals:    03/25/22 0431 03/25/22 0707 03/25/22 0727 03/25/22 0900   BP: (!) 87/61 (!) 88/69 (!) 88/70 106/70   Pulse: 88  88    Resp: 16  17    Temp: 97.4 °F (36.3 °C)  97.4 °F (36.3 °C)    SpO2: 91%  95%        Physical Exam:  General-No Acute Distress, awake, alert  Neck- supple, no JVD  CV- regular rate and rhythm , 2/6 holosystolic murmur apical  Lung-decreased at mid to lower fields on the right, left base   Abd- soft, nontender, nondistended  Ext-pitting edema of the legs bilaterally  Skin- warm and dry    Data Review:   Recent Labs     03/24/22  0629 03/23/22  0811   * 133*   K 4.0 3.9   MG 2.0 2.1   BUN 14 13   CREA 0.79 0.88   GLU 98 99   WBC 13.3*  --    HGB 11.4*  --    HCT 35.0*  --    *  --        Assessment/Plan:     Principal Problem:    Severe mitral regurgitation    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 Blood culture negative at this time.  -Patient with evidence of severe mitral regurgitation on echocardiogram ; on examination, lower extremity edema, loud murmur, evidence of volume overload. -Continue diuresis as tolerated by hemodynamics, closely monitor I's and O's. Changed to IV Lasix today. - poor candidate for valve replacement surgery given co morbidities  - hold off today on ROSELINE given hypotension and pleural effusion, OK to feed from cardiology perspective.      Active Problems:  Pleural effusion  - pulmonary evaluation for thoracentesis given right sided effusion       History of DVT (deep vein thrombosis) (3/19/2022)    Portal vein thrombosis (2/16/2009)  - on systemic anticoagulation per primary team        Malignant ascites (7/26/2012)  -Per primary       Hypokalemia (7/27/2012)  -Replace as needed to normalize potassium levels       Chronic myelogenous leukemia ([address] (3/30/2015)    Acquired hypercoagulable state ([address] (3/30/2015)  -Per hematology oncology        Thrombocytopenia ([address] (10/15/2021)  - closely monitor blood counts        Acute respiratory failure with hypoxemia ([address] (3/9/2022)  - likely related to pleural effusions, continued diuresis as above / pulm consult       CML (chronic myelocytic leukemia) ([address] (3/19/2022)  - per hematology/oncology       Nlie Monte DO  3/25/2022 11:16 AM

## 2022-03-25 NOTE — ROUTINE PROCESS
IP consult to Cardiac Rehab. Pt EF=60^ on Echo completed 3/24/22. EF must be 35% or less to qualify for Cardiac Rehab with diagnosis of chronic heart failure. We will contact the patient if a qualifying referral is received.      Thank you,  JAVIER Palomino, RN  Cardiac Rehab Nurse Liaison

## 2022-03-25 NOTE — CONSULTS
History and Physical Initial Visit NOTE           3/25/2022    Dwight Cobb                        Date of Admission:  3/25/2022    The patient's chart is reviewed and the patient is discussed with the staff. Subjective:     Patient is a 64 y.o.  female PMH CML on chemotherapy, chronic back pain, DVT and  PE on home Lovenox, seen and evaluated at the request of Dr. Roro Branch pleural effusions and evaluation for thoracentesis. The patient was recently discharged from here on 3/14/22 after being treated for acute CHF. The patient was also recently diagnosed with E coli bacterial meningitis and completed antibiotics. The patient states that she does not necessarily feel short of breath however she is having what feels like back spasms in her LL back area. She also c/o feeling a \"good pain\" when inhaling deeply. The patient does have a history of DVT/PE and portal vein thrombosis and is on Lovenox at home and was given a dose this morning here in the hospital.  She is on chemotherapy for CML. Recent echocardiogram reveals mitral valve vegetation that was not present on echocardiogram done in February this year. Review of Systems  A comprehensive review of systems was negative except for that written in the HPI. Prior to Admission Medications   Prescriptions Last Dose Informant Patient Reported? Taking? Comp Stocking,Knee,Regular,Sml misc 3/25/2022 at Unknown time  No Yes   Si Each by Does Not Apply route daily. Synthroid 112 mcg tablet 3/24/2022 at Unknown time  No Yes   Si tablet once a day with an extra 1/2 tablet on Sundays   b complex vitamins tablet 3/24/2022 at Unknown time  Yes Yes   Si Tablet daily. calcium carbonate (CALTREX) 600 mg calcium (1,500 mg) tablet 3/24/2022 at Unknown time  Yes Yes   Sig: Take 600 mg by mouth nightly.    cholecalciferol (VITAMIN D3) 1,000 unit cap 3/25/2022 at Unknown time  Yes Yes   Sig: Take 1,000 Units by mouth nightly. dasatinib (SpryceL) 80 mg tab 3/24/2022 at Unknown time  No Yes   Sig: Take 1 Tablet by mouth daily. diphenoxylate-atropine (LomotiL) 2.5-0.025 mg per tablet 3/18/2022 at Unknown time  No Yes   Sig: Take 1 Tablet by mouth four (4) times daily as needed for Diarrhea. Max Daily Amount: 4 Tablets.   enoxaparin (Lovenox) 60 mg/0.6 mL injection 3/25/2022 at Unknown time  No Yes   Si mg by SubCUTAneous route every twelve (12) hours. famotidine (PEPCID) 40 mg tablet 3/18/2022 at Unknown time  No Yes   Sig: Take 1 Tablet by mouth daily as needed for Heartburn. Take at least 4 hours before or after sprycel   furosemide (LASIX) 20 mg tablet 3/24/2022 at Unknown time  No Yes   Sig: Take 1 Tablet by mouth daily for 30 days. lansoprazole (PREVACID) 30 mg capsule 3/18/2022 at Unknown time  Yes Yes   Sig: Take 30 mg by mouth daily. prn   magnesium 250 mg tab 3/25/2022 at Unknown time  Yes Yes   Sig: Take 1 Tablet by mouth daily. ondansetron (ZOFRAN ODT) 4 mg disintegrating tablet 3/24/2022 at Unknown time  No Yes   Sig: Take 1 Tab by mouth every eight (8) hours as needed for Nausea. ondansetron (ZOFRAN ODT) 8 mg disintegrating tablet 3/24/2022 at Unknown time  No Yes   Sig: Take 1 Tab by mouth every eight (8) hours as needed for Nausea or Vomiting. oxyCODONE-acetaminophen (PERCOCET 10)  mg per tablet 3/25/2022 at Unknown time  Yes Yes   ruxolitinib 5 mg tab 3/24/2022 at Unknown time  No Yes   Sig: Take 1 Tab by mouth two (2) times a day. Patient taking differently: Take 5 mg by mouth two (2) times a day. Sometimes only takes at night due to N/V/D   zolpidem (AMBIEN) 10 mg tablet 3/24/2022 at Unknown time  No Yes   Sig: Take 1 Tablet by mouth nightly as needed for Sleep. Max Daily Amount: 10 mg.       Facility-Administered Medications: None     Past Medical History:   Diagnosis Date    Acute blood loss anemia 2018    Anemia     C. difficile diarrhea 2015    Cerebral edema (HCC) 4/1/2015    Chronic migraine 9/22/2016    Chronic myelogenous leukemia ([address]     [place] chromosome/ converted from polycythemia in 2009- to 2012 when she was dx w leukemia    Chronic pain     Coagulation disorder ([address]     \"clotting and Bleeding Problem\" dr Lonni Hatchet follows     E coli bacteremia 3/9/2022    Esophageal spasm     with banding     Esophageal varices (HCC)     grade 3    GI bleed 8/3/2016    H/O craniotomy     3-29-15.  due to blood clot - which caused a seizure  - then pt fell and hit     Hematemesis 8/20/2021    Leukocytosis 3/14/2015    Melena 8/20/2021    MRSA colonization 6/25/2012    Polycythemia vera(238.4)     JAK2 mutation    Portal hypertension ([address]     with varices    Portal vein thrombosis 6/20/2012    Ct scan 6-21-2 Apparent occlusion of the portal vein. In addition, the splenic vein, and superior mesenteric vein are not definitely seen and are also likely  occluded. Splenomegaly, and extensive varices are seen as described above consistent with the portal vein occlusion 7-05-12 on arixtra HIT negative on repeat 7-27-12 re admitted Cirrhotic appearance of the liver. Mild to moderate ascites, as    Primary hypothyroidism     Pulmonary embolism ([address] 2006    not on coumadin anymore     S/P exploratory laparotomy, 6/20/12 6/25/2012    Bowel resection:  336 cm of small bowel removed, approximately 9 feet and placement of feeding jejunostomy.      S/P small bowel resection     2012.  9 feet removed due to  gangrene which wa due to blood clot    Seizure ([address] 3/14/2015    Seizure disorder ([address] 3/30/2015    due to clotting factor    Seizures ([address]     last one 3- - followed by aniyah     Sepsis with acute organ dysfunction without septic shock ([address] 2/2/2022    Splenomegaly, congestive, chronic     Stroke (cerebrum) ([address] 4/11/2015    had bled into head- surgery    Stroke [facility])     pt had stroke like symptoms     Thrombocytopenia, HIT negative 6/25/2012 12 platelets lower repeat HIT 12 platelets in 12,848'S    Thrombosis, portal vein 2004    portal , spleenic and recently superior mesenteric    Transfusion history     many blood tranfusions - last 3-29-15 after brain surgery    Traumatic hemorrhage of right cerebrum ([facility] 3/30/2015     Past Surgical History:   Procedure Laterality Date    HX APPENDECTOMY      with small bowel resection    HX BREAST BIOPSY Bilateral     Lt - ; Rt -     HX CHOLECYSTECTOMY      HX GI      liver biopsy    HX GI      bowel removed small - 9 feet     HX GYN       x 2    HX GYN      D&C following miscarriage    HX ORTHOPAEDIC Left 2008    torn labrum shoulder (screw in place)    NEUROLOGICAL PROCEDURE UNLISTED  2010    craniotomy to evacuate subdural hematoma following a fall from a seizure    HI ABDOMEN SURGERY PROC UNLISTED      umbilical hernia repair    HI ABDOMEN SURGERY PROC UNLISTED      9ft of small bowel excised then reconnected     Social History     Socioeconomic History    Marital status:      Spouse name: Not on file    Number of children: Not on file    Years of education: Not on file    Highest education level: Not on file   Occupational History    Not on file   Tobacco Use    Smoking status: Former Smoker     Packs/day: 0.20     Years: 10.00     Pack years: 2.00     Types: Cigarettes     Quit date:      Years since quittin.2    Smokeless tobacco: Never Used   Substance and Sexual Activity    Alcohol use: No    Drug use: No    Sexual activity: Not on file   Other Topics Concern    Not on file   Social History Narrative    Not on file     Social Determinants of Health     Financial Resource Strain:     Difficulty of Paying Living Expenses: Not on file   Food Insecurity:     Worried About Running Out of Food in the Last Year: Not on file    Ileana of Food in the Last Year: Not on file   Transportation Needs:     Lack of Transportation (Medical):  Not on file    Lack of Transportation (Non-Medical): Not on file   Physical Activity:     Days of Exercise per Week: Not on file    Minutes of Exercise per Session: Not on file   Stress:     Feeling of Stress : Not on file   Social Connections:     Frequency of Communication with Friends and Family: Not on file    Frequency of Social Gatherings with Friends and Family: Not on file    Attends Mandaeism Services: Not on file    Active Member of [address] [place] [address] [email or web address] or Organizations: Not on file    Attends Club or Organization Meetings: Not on file    Marital Status: Not on file   Intimate Partner Violence:     Fear of Current or Ex-Partner: Not on file    Emotionally Abused: Not on file    Physically Abused: Not on file    Sexually Abused: Not on file   Housing Stability:     Unable to Pay for Housing in the Last Year: Not on file    Number of [place] in the Last Year: Not on file    Unstable Housing in the Last Year: Not on file     Family History   Problem Relation Age of Onset    Diabetes Mother     Stroke Mother         after surgery    Cancer Father         brain    No Known Problems Sister     Other Sister         diverticulitis    Other Sister         diverticulitis    Cancer Sister         lyjmphoma    Breast Cancer Maternal Aunt 48    Thyroid Disease Paternal Grandmother      Allergies   Allergen Reactions    Latex Other (comments)     Testing for latex allergy was positive as a 2 (on a scale of 1 to 3).     Sulfa (Sulfonamide Antibiotics) Anaphylaxis    Tramadol Other (comments)     Top lip swelled    Augmentin [Amoxicillin-Pot Clavulanate] Rash    Codeine Other (comments)    Divalproex Sodium Hives    Morphine Nausea and Vomiting     Not a true allergy    Potassium Clavulanate Hives    Rizatriptan Rash    Tetanus Immune Globulin Other (comments)     Heat, bruising, and swelling at  Site of injection    Vancomycin Rash    Xanax [Alprazolam] Other (comments)     Hallucinations    Zonegran [Zonisamide] Rash     Current Facility-Administered Medications   Medication Dose Route Frequency    diphenhydrAMINE (BENADRYL) injection 25 mg  25 mg IntraVENous Q4H PRN    [Held by provider] metoprolol succinate (TOPROL-XL) XL tablet 25 mg  25 mg Oral DAILY    calcium carbonate (OS-JOE) tablet 500 mg [elemental]  500 mg Oral QHS    cetirizine (ZYRTEC) tablet 10 mg  10 mg Oral DAILY    cyclobenzaprine (FLEXERIL) tablet 5 mg  5 mg Oral TID PRN    [Held by provider] enoxaparin (LOVENOX) injection 50 mg  50 mg SubCUTAneous Q12H    famotidine (PEPCID) tablet 40 mg  40 mg Oral DAILY PRN    fluticasone propionate (FLONASE) 50 mcg/actuation nasal spray 2 Spray  2 Spray Both Nostrils DAILY    levothyroxine (SYNTHROID) tablet 125 mcg  125 mcg Oral 6am    magnesium oxide (MAG-OX) tablet 200 mg  200 mg Oral DAILY    ondansetron (ZOFRAN ODT) tablet 4 mg  4 mg Oral Q8H PRN    Or    ondansetron (ZOFRAN) injection 4 mg  4 mg IntraVENous Q8H PRN    oxyCODONE-acetaminophen (PERCOCET 7.5) 7.5-325 mg per tablet 1 Tablet  1 Tablet Oral Q4H PRN    pantoprazole (PROTONIX) tablet 40 mg  40 mg Oral ACB    potassium chloride (K-DUR, KLOR-CON M20) SR tablet 40 mEq  40 mEq Oral BID    ruxolitinib (Jakavi) tab 5 mg (Patient Supplied)  5 mg Oral Q12H    traZODone (DESYREL) tablet 50 mg  50 mg Oral QHS    trimethoprim-polymyxin b (POLYTRIM) 10,000 unit- 1 mg/mL ophthalmic solution 1 Drop  1 Drop Both Eyes BID    vancomycin (VANCOCIN) 750 mg in 0.9% sodium chloride 250 mL ([id number])  750 mg IntraVENous Q12H    zinc oxide-cod liver oil (DESITIN) 40 % paste   Topical PRN    [START ON 3/26/2022] Vancomycin random level reminder   Other ONCE    furosemide (LASIX) injection 40 mg  40 mg IntraVENous Q12H    dasatinib tab 80 mg (Patient Supplied)  80 mg Oral DAILY     Objective:   Blood pressure 106/70, pulse 88, temperature 97.4 °F (36.3 °C), resp. rate 17, weight 132 lb 15 oz (60.3 kg), SpO2 95 %.      Intake/Output Summary (Last 24 hours) at 3/25/2022 1220  Last data filed at 3/25/2022 1144  Gross per 24 hour   Intake 90 ml   Output --   Net 90 ml     PHYSICAL EXAM   Constitutional:  the patient is well developed and in no acute distress  EENMT:  Sclera clear, pupils equal, oral mucosa moist  Respiratory: Decreased LLL, on RA  Cardiovascular:  RRR without M,G,R  Gastrointestinal: soft and non-tender; with positive bowel sounds. Musculoskeletal: warm without cyanosis. There is no lower extremity edema. Skin:  no jaundice or rashes, no wounds   Neurologic: no gross neuro deficits     Psychiatric:  alert and oriented x 3    CXR: 3/25/2022        CT Chest: 3/9/2022        Recent Labs     03/25/22  1109 03/24/22  1148 03/24/22  0629 03/23/22  0811   WBC  --   --  13.3*  --    HGB  --   --  11.4*  --    HCT  --   --  35.0*  --    PLT  --   --  121*  --    LAC 2.2* 3.2*  --   --    NA  --   --  134* 133*   K  --   --  4.0 3.9   CL  --   --  102 100   CO2  --   --  27 27   GLU  --   --  98 99   BUN  --   --  14 13   CREA  --   --  0.79 0.88   MG  --   --  2.0 2.1   CA  --   --  8.8 8.6   PHOS  --   --  2.5 2.8   ALB  --   --   --  3.1*   AST  --   --   --  25   ALT  --   --   --  32   AP  --   --   --  103     ECHO: Results from Hospital Encounter encounter on 03/18/22    ECHO ADULT COMPLETE    Interpretation Summary    Mitral Valve: There is an approximately 16 x 34 mm echodense mass located on the posterior mitral valve leaflet consistent with vegetation. (Mitral valve vegetation was not present on study dated 2/3/2022). Possible flail leaflet of the mitral valve. Severe transvalvular regurgitation. No stenosis noted.   Left Ventricle: Left ventricle size is normal. Normal wall thickness. Normal wall motion. Normal left ventricular systolic function. EF by 2D Simpsons Biplane is 60%. Abnormal diastolic function. Average E/e' ratio is 22.60.   Right Ventricle: Right ventricle is mildly dilated.  Low normal systolic function.   Tricuspid Valve: Moderate transvalvular regurgitation. RVSP is 56 mmHg.   Left Atrium: Left atrial volume index is severely increased (>48 mL/m2).   Right Atrium: Right atrium is mildly dilated.   Pericardium: Small (<1 cm) pericardial effusion present. No indication of cardiac tamponade. Left pleural effusion. Ascites present.   IVC/SVC: IVC diameter is greater than 21 mm and decreases less than 50% during inspiration; therefore the estimated right atrial pressure is elevated (~15 mmHg).   Contrast used: Definity. Results     Procedure Component Value Units Date/Time    CULTURE, BLOOD [[id number]] Collected: 03/24/22 1204    Order Status: Completed Specimen: Blood Updated: 03/25/22 0840     Special Requests: --        LEFT  FOREARM       Culture result: NO GROWTH AFTER 17 HOURS       CULTURE, BLOOD [[id number]] Collected: 03/24/22 1148    Order Status: Completed Specimen: Blood Updated: 03/25/22 0840     Special Requests: --        RIGHT  FOREARM       Culture result: NO GROWTH AFTER 17 HOURS       COVID-19 RAPID TEST [[id number]] Collected: 03/18/22 2300    Order Status: Completed Specimen: Nasopharyngeal Updated: 03/18/22 2323     Specimen source Nasopharyngeal        COVID-19 rapid test Not detected        Comment:      The specimen is NEGATIVE for SARS-CoV-2, the novel coronavirus associated with COVID-19. A negative result does not rule out COVID-19. This test has been authorized by the FDA under an Emergency Use Authorization (EUA) for use by authorized laboratories.         Fact sheet for Healthcare Providers: ConventionUpdate.co.nz  Fact sheet for Patients: ConventionUpdate.co.nz       Methodology: Isothermal Nucleic Acid Amplification             Inpat Anti-Infectives (From admission, onward)     Start     Ordered Stop    03/26/22 0400  Vancomycin random level reminder  Other,   ONCE         03/25/22 0929 03/26/22 1559    03/25/22 1700 vancomycin (VANCOCIN) 750 mg in 0.9% sodium chloride 250 mL ([id number])  750 mg,   IntraVENous,   EVERY 12 HOURS         03/25/22 0457 --    03/25/22 0900  trimethoprim-polymyxin b (POLYTRIM) 10,000 unit- 1 mg/mL ophthalmic solution 1 Drop  1 Drop,   Both Eyes,   2 TIMES DAILY            03/25/22 0424 --              Assessment and Plan:  (Medical Decision Making)   Principal Problem:    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 Current blood cx NG. Active Problems:    Body mass index (BMI) of 23.0 to 23.9 in adult (8/26/2021)      Polycythemia vera ([address] (2/16/2009)  --On home Lovenox, will need to hold for thoracentesis tomorrow 3/26/22      Malignant ascites (7/26/2012)  --On Lasix 40 mg Q 12H, will continue to monitor renal function, electrolytes, I/O's      Cirrhosis ([address] (7/27/2012)  --Will need to monitor coags, potential need for FFP      Hypokalemia (7/27/2012)  --K+ 4.0 yesterday, resolved but will continue monitoring      AKOSUA (iron deficiency anemia) (7/28/2012)  --Hgb 11.4 today, monitor      Chronic myelogenous leukemia ([address] (3/30/2015)  --On chemotherapy      Acquired hypercoagulable state ([address] (3/30/2015)  --On home Lovenox      Thrombocytopenia ([address] (10/15/2021)  --Platelet count 459 yesterday, will recheck and monitor      Acute respiratory failure with hypoxemia ([address] (3/9/2022)  --Currently on RA      Hypoproteinemia ([address] (3/11/2022)        CML (chronic myelocytic leukemia) ([address] (3/19/2022)  --On Blayne Cashing and dasatinib      Acute diastolic (congestive) heart failure ([address] (3/19/2022)  --Likely culprit of fluid overload, continue diuretic      Endocarditis of mitral valve (3/25/2022)  --noted on echocardiogram, on antibiotics     Full Code    More than 50% of the time documented was spent in face-to-face contact with the patient and in the care of the patient on the floor/unit where the patient is located.     Thank you very much for this referral.  We appreciate the opportunity to participate in this patient's care. Will follow along with above stated plan. Ran Rodriguez NP     I have spoken with and examined the patient. I agree with the above assessment and plan as documented. Gen: Thin WF in mild distress; more SOB than when I saw her at [facility] recently  Lungs:  Decreased BS at based R > L   Heart:  RRR with no Murmur/Rubs/Gallops  Abd: soft  Ext: slight edema    65 yo F with hx of CML, DVT/PE/portal vein thrombosis, DCHF, severe MR with new vegetation, cirrhosis, and pleural effusion admitted with worsening dyspnea. Has significant R > L effusion and R basilar atx vs infiltrate. Currently afebrile but reports recent fever. Would benefit from thoracentesis but received full dose lovenox this AM. Will plan for thoracentesis tomorrow.  With hx of liver disease, will need to check coags and correct if abnormal.     Radha Vargas MD

## 2022-03-25 NOTE — PROGRESS NOTES
Hospitalist Progress Note   Admit Date:  3/25/2022  4:10 AM   Name:  Jennifer Chowdhury   Age:  64 y.o. Sex:  female  :  1960   MRN:  [id number]   Room:  [facility]/    Presenting Complaint: No chief complaint on file. 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 Patient was recently discharged from the hospital on 3/14 after treatment for CHF. She was also noted to have splenomegaly with splenic infarct. And  recently diagnosed with E. coli bacterial meningitis, completed course of antibiotics. In the ER chest x-ray was done shows evidence of pulmonary vascular congestion. On 3/23 echocardiogram was performed showing mitral vegetations. She was transferred to the [facility] 3/24. Consulted Cardiology    Subjective/24hr Events (22): Complains of not being able to get pain meds for chronic pain. \"they don't believe me\". Denies CP, SOB at rest    ROS:  10 systems reviewed and negative except as noted above. Assessment & Plan:     Principal Problem:  Vegetative endocarditis of mitral valve (3/24/2022)  3/25/22  -Continue Vanc; BCx NGTD  -Cardiology with plans for ROSELINE today  -Consult ID    Active Problems:  Acute respiratory failure with hypoxemia ([address] (3/9/2022)   3/25/22  -Likely 2/2 fluid overload, pleural effusions  -Continue supplemental O2; Wean as able   -Pulmonology consulted;  thoracentesis in a.m. Malignant ascites (2012)   Cirrhosis ([address] (2012)  3/25/22  -Continue IV lasix;  Na restrictions  -Paracentesis prn       Hypokalemia (7/27/2012)(Resolved)  3/25/22  -K+ 4.0;  Monitor with diuresing       AKOSUA (iron deficiency anemia) (7/28/2012)  3/25/22  -Hgb 10.6    Chronic myelogenous leukemia (HCC) (3/30/2015)  -Continue oral medications sprycel and Jakafi    Acquired hypercoagulable state ([facility] (3/30/2015)  -continue Lovenox BID     Thrombocytopenia ([facility] (10/15/2021)   3/25/22  -Improving; Monitor    Hypothyroid  -Continue Synthroid      Diastolic (congestive) heart failure ([facility] (3/19/2022)  3/25/22  -Holding  Lasix and BB due to hypotension  -Fluid restrictions             Dispo/Discharge Planning:      TBD    Diet:  ADULT DIET Regular  DVT PPx: SCDs  Code status: Full Code    Hospital Problems as of 3/25/2022 Date Reviewed: 3/12/2022          Codes Class Noted - Resolved POA    * (Principal) Vegetative endocarditis of mitral valve ICD-10-CM: I33.0  ICD-9-CM: 421.0  3/24/2022 - Present Yes        Body mass index (BMI) of 23.0 to 23.9 in adult (Chronic) ICD-10-CM: B62.10  ICD-9-CM: V85.1  8/26/2021 - Present Yes        Endocarditis of mitral valve ICD-10-CM: I05.9  ICD-9-CM: 394.9  3/25/2022 - Present Unknown        CML (chronic myelocytic leukemia) ([facility] ICD-10-CM: C92.10  ICD-9-CM: 205.10  3/19/2022 - Present Yes        Acute diastolic (congestive) heart failure (HCC) ICD-10-CM: I50.31  ICD-9-CM: 428.31, 428.0  3/19/2022 - Present Yes        Hypoproteinemia ([facility] ICD-10-CM: E77.8  ICD-9-CM: 273.8  3/11/2022 - Present Yes        Acute respiratory failure with hypoxemia (HCC) ICD-10-CM: J96.01  ICD-9-CM: 518.81  3/9/2022 - Present Yes        Thrombocytopenia ([facility] ICD-10-CM: D69.6  ICD-9-CM: 287.5  10/15/2021 - Present Yes        Chronic myelogenous leukemia (HCC) (Chronic) ICD-10-CM: C92.10  ICD-9-CM: 205.10  3/30/2015 - Present Yes        Acquired hypercoagulable state ([facility] (Chronic) ICD-10-CM: V25.96  ICD-9-CM: 289.81  3/30/2015 - Present Yes        AKOSUA (iron deficiency anemia) ICD-10-CM: D50.9  ICD-9-CM: 280.9  7/28/2012 - Present Yes Cirrhosis ([facility]) (Chronic) ICD-10-CM: K74.60  ICD-9-CM: 571.5  7/27/2012 - Present Yes        Hypokalemia ICD-10-CM: E87.6  ICD-9-CM: 276.8  7/27/2012 - Present Yes        Malignant ascites ICD-10-CM: R18.0  ICD-9-CM: 789.51  7/26/2012 - Present Yes        Polycythemia vera ([facility]) (Chronic) ICD-10-CM: D45  ICD-9-CM: 238.4  2/16/2009 - Present Yes    Overview Addendum 8/26/2021  2:31 PM by Maribel Brown MD     2/2008 by [email or web address] analysis however portal vein thrombosis  And and splenomegaly since 2004  Hydroxyurea for 6 months poor tolerance spleen did not shrink   Pegasys 90 mcg weekly 4-2009 till    Restarted 12-30-09 45 mcg q 2 weeks  2-2010 reduced to q month   Increased 45 mcg 2/month 4-2010  Held 8-2010 thrombocytopenia  12-29-12 restarted 45 mcg q 2 weeks  4-1-11 45 mcg q 3 weeks  4-22-11 45 mcg q 4 weeks  Uncertain dosing thereafter   Possibly q 3 weeks 10-15-11 and held and restarted on 4-1-12 6-12 ct scan Small bowel wall thickening suggesting an enteritis. In addition, there is well thickening of the right colon which can suggest an additional   colitis although this can also be seen with severe portal hypertension. Likely reactive edematous changes it scattered fluid collections are seen of   the small bowel mesentery. . Apparent occlusion of the portal vein. In addition, the splenic vein, and superior mesenteric vein are not definitely seen and are also likely occluded. Splenomegaly, and extensive varices are seen as described above consistent with the portal vein occlusion. Savanah Kid Heterogeneous enhancement of the liver and mild periportal edema. An acute hepatitis cannot be excluded.                     Objective:     Patient Vitals for the past 24 hrs:   Temp Pulse Resp BP SpO2   03/25/22 0900 -- -- -- 106/70 --   03/25/22 0727 97.4 °F (36.3 °C) 88 17 (!) 88/70 95 %   03/25/22 0707 -- -- -- (!) 88/69 --   03/25/22 0431 97.4 °F (36.3 °C) 88 16 (!) 87/61 91 %     Oxygen Therapy  O2 Sat (%): 95 % (03/25/22 0727)    Estimated body mass index is 23.48 kg/m² as calculated from the following:    Height as of 3/18/22: 5' 3\" (1.6 m). Weight as of 3/23/22: 60.1 kg (132 lb 8.8 oz). Intake/Output Summary (Last 24 hours) at 3/25/2022 1153  Last data filed at 3/25/2022 1144  Gross per 24 hour   Intake 90 ml   Output --   Net 90 ml         Physical Exam:     Blood pressure 106/70, pulse 88, temperature 97.4 °F (36.3 °C), resp. rate 17, SpO2 95 %. General:    Well nourished. No overt distress  Head:  Normocephalic, atraumatic  Eyes:  Sclerae appear normal.  Pupils equally round. ENT:  Nares appear normal, no drainage. Moist oral mucosa  Neck:  No restricted ROM. Trachea midline   CV:   RRR. No m/r/g. No jugular venous distension. Lungs:   CTAB. No wheezing, rhonchi, or rales. Respirations even, unlabored  Abdomen: Bowel sounds present. Soft, nontender, nondistended. Extremities: No cyanosis or clubbing. No edema  Skin:     No rashes and normal coloration. Warm and dry. Neuro:  CN II-XII grossly intact. A&Ox3  Psych:  Normal mood and affect.       I have reviewed ordered lab tests and independently visualized imaging below:    Recent Labs:  Recent Results (from the past 48 hour(s))   CBC W/O DIFF    Collection Time: 03/24/22  6:29 AM   Result Value Ref Range    WBC 13.3 (H) 4.3 - 11.1 K/uL    RBC 3.52 (L) 4.05 - 5.2 M/uL    HGB 11.4 (L) 11.7 - 15.4 g/dL    HCT 35.0 (L) 35.8 - 46.3 %    MCV 99.4 (H) 79.6 - 97.8 FL    MCH 32.4 26.1 - 32.9 PG    MCHC 32.6 31.4 - 35.0 g/dL    RDW 22.3 (H) 11.9 - 14.6 %    PLATELET 969 (L) 795 - 450 K/uL    MPV 11.1 9.4 - 12.3 FL    ABSOLUTE NRBC 0.00 0.0 - 0.2 K/uL   METABOLIC PANEL, BASIC    Collection Time: 03/24/22  6:29 AM   Result Value Ref Range    Sodium 134 (L) 136 - 145 mmol/L    Potassium 4.0 3.5 - 5.1 mmol/L    Chloride 102 98 - 107 mmol/L    CO2 27 21 - 32 mmol/L    Anion gap 5 (L) 7 - 16 mmol/L    Glucose 98 65 - 100 mg/dL    BUN 14 8 - 23 MG/DL Creatinine 0.79 0.6 - 1.0 MG/DL    GFR est AA >60 >60 ml/min/1.73m2    GFR est non-AA >60 >60 ml/min/1.73m2    Calcium 8.8 8.3 - 10.4 MG/DL   MAGNESIUM    Collection Time: 03/24/22  6:29 AM   Result Value Ref Range    Magnesium 2.0 1.8 - 2.4 mg/dL   PHOSPHORUS    Collection Time: 03/24/22  6:29 AM   Result Value Ref Range    Phosphorus 2.5 2.3 - 3.7 MG/DL   ECHO ADULT COMPLETE    Collection Time: 03/24/22  9:35 AM   Result Value Ref Range    LV EDV A2C 131 mL    LV EDV A4C 105 mL    LV ESV A2C 49 mL    LV ESV A4C 41 mL    IVSd 1.0 (A) 0.6 - 0.9 cm    LVIDd 4.5 3.9 - 5.3 cm    LVIDs 3.1 cm    LVOT Diameter 1.8 cm    LVOT Mean Gradient 3 mmHg    LVOT VTI 16.9 cm    LVOT Peak Velocity 1.3 m/s    LVOT Peak Gradient 7 mmHg    LVPWd 1.0 (A) 0.6 - 0.9 cm    LV E' Lateral Velocity 8 cm/s    LV E' Septal Velocity 6 cm/s    LV Ejection Fraction A2C 62 %    LV Ejection Fraction A4C 61 %    EF BP 60 55 - 100 %    LVOT Area 2.5 cm2    LVOT SV 43.0 ml    LA Minor Axis 5.7 cm    LA Major Axis 6.1 cm    LA Area 2C 23.6 cm2    LA Area 4C 24.3 cm2    LA Volume BP 79 (A) 22 - 52 mL    LA Diameter 4.9 cm    AV Mean Gradient 4 mmHg    AV VTI 20.1 cm    AV Mean Velocity 1.0 m/s    AV Peak Velocity 1.3 m/s    AV Peak Gradient 7 mmHg    AV Area by VTI 2.1 cm2    AV Area by Peak Velocity 2.5 cm2    Aortic Root 2.7 cm    Ascending Aorta 3.1 cm    IVC Expiration 2.5 cm    MR .0 cm    MR Peak Velocity 4.4 m/s    MR Peak Gradient 77 mmHg    MV E Wave Deceleration Time 150.0 ms    MV A Velocity 1.23 m/s    MV E Velocity 1.55 m/s    PV .0 ms    PV Max Velocity 1.1 m/s    PV Peak Gradient 5 mmHg    RVIDd 3.6 cm    RV Basal Dimension 4.4 cm    RV Longitudinal Dimension 5.9 cm    RV Mid Dimension 3.0 cm    TAPSE 1.7 1.5 - 2.0 cm    TR Max Velocity 3.21 m/s    TR Peak Gradient 41 mmHg    Fractional Shortening 2D 31 28 - 44 %    LV ESV Index A4C 25 mL/m2    LV EDV Index A4C 65 mL/m2    LV ESV Index A2C 30 mL/m2    LV EDV Index A2C 81 mL/m2    LVIDd Index 2.78 cm/m2    LVIDs Index 1.91 cm/m2    LV RWT Ratio 0.44     LV Mass 2D 153.3 67 - 162 g    LV Mass 2D Index 94.6 43 - 95 g/m2    MV E/A 1.26     E/E' Ratio (Averaged) 22.60     E/E' Lateral 19.38     E/E' Septal 25.83     LA Volume Index BP 49 (A) 16 - 34 ml/m2    LVOT Stroke Volume Index 26.5 mL/m2    LA Size Index 3.02 cm/m2    LA/AO Root Ratio 1.81     Ao Root Index 1.67 cm/m2    Ascending Aorta Index 1.91 cm/m2    AV Velocity Ratio 1.00     LVOT:AV VTI Index 0.84     JOSSY/BSA VTI 1.3 cm2/m2    JOSSY/BSA Peak Velocity 1.5 cm2/m2    Est. RA Pressure 15 mmHg    RVSP 56 mmHg   CULTURE, BLOOD    Collection Time: 03/24/22 11:48 AM    Specimen: Blood   Result Value Ref Range    Special Requests: RIGHT  FOREARM        Culture result: NO GROWTH AFTER 17 HOURS     LACTIC ACID    Collection Time: 03/24/22 11:48 AM   Result Value Ref Range    Lactic acid 3.2 (H) 0.4 - 2.0 MMOL/L   CULTURE, BLOOD    Collection Time: 03/24/22 12:04 PM    Specimen: Blood   Result Value Ref Range    Special Requests: LEFT  FOREARM        Culture result: NO GROWTH AFTER 17 HOURS         All Micro Results     None          Other Studies:  XR CHEST PA LAT    Result Date: 3/25/2022  Chest 2 view CLINICAL INDICATION: Subacute persisting moderate shortness of breath, follow-up of CHF and right pleural effusion; elevated lactic acid, leukocytosis, anemia, elevated BNP COMPARISON: 3/18/2022, 3/13/2022 radiograph and 3/9/2022 CT TECHNIQUE: Sitting upright AP and lateral views of the chest FINDINGS: Lung volumes are well inflated. Cardiomediastinal silhouette and hilar contours are stable given technique and positioning. The previous pulmonary edema has decreased and is now mild. The right pleural effusion has not definitely changed. There is a small posterior left basilar pleural effusion as well. No pneumothorax.  Hazy opacity at the right lower lung is most likely atelectasis although the possibility of superimposed infection can have similar appearance and therefore cannot be excluded. No acute osseous abnormalities are seen. Bone density appears low. 1. Decreasing pulmonary edema. 2. Persisting right greater than left pleural effusions. 3. Right lower lung airspace opacity (atelectasis versus infiltrate).        Current Meds:  Current Facility-Administered Medications   Medication Dose Route Frequency    diphenhydrAMINE (BENADRYL) injection 25 mg  25 mg IntraVENous Q4H PRN    [Held by provider] metoprolol succinate (TOPROL-XL) XL tablet 25 mg  25 mg Oral DAILY    calcium carbonate (OS-JOE) tablet 500 mg [elemental]  500 mg Oral QHS    cetirizine (ZYRTEC) tablet 10 mg  10 mg Oral DAILY    cyclobenzaprine (FLEXERIL) tablet 5 mg  5 mg Oral TID PRN    enoxaparin (LOVENOX) injection 50 mg  50 mg SubCUTAneous Q12H    famotidine (PEPCID) tablet 40 mg  40 mg Oral DAILY PRN    fluticasone propionate (FLONASE) 50 mcg/actuation nasal spray 2 Spray  2 Spray Both Nostrils DAILY    levothyroxine (SYNTHROID) tablet 125 mcg  125 mcg Oral 6am    magnesium oxide (MAG-OX) tablet 200 mg  200 mg Oral DAILY    ondansetron (ZOFRAN ODT) tablet 4 mg  4 mg Oral Q8H PRN    Or    ondansetron (ZOFRAN) injection 4 mg  4 mg IntraVENous Q8H PRN    oxyCODONE-acetaminophen (PERCOCET 7.5) 7.5-325 mg per tablet 1 Tablet  1 Tablet Oral Q4H PRN    pantoprazole (PROTONIX) tablet 40 mg  40 mg Oral ACB    potassium chloride (K-DUR, KLOR-CON M20) SR tablet 40 mEq  40 mEq Oral BID    ruxolitinib (Jakavi) tab 5 mg (Patient Supplied)  5 mg Oral Q12H    traZODone (DESYREL) tablet 50 mg  50 mg Oral QHS    trimethoprim-polymyxin b (POLYTRIM) 10,000 unit- 1 mg/mL ophthalmic solution 1 Drop  1 Drop Both Eyes BID    vancomycin (VANCOCIN) 750 mg in 0.9% sodium chloride 250 mL ([id number])  750 mg IntraVENous Q12H    zinc oxide-cod liver oil (DESITIN) 40 % paste   Topical PRN    [START ON 3/26/2022] Vancomycin random level reminder   Other ONCE    furosemide (LASIX) injection 40 mg  40 mg IntraVENous Q12H    dasatinib tab 80 mg (Patient Supplied)  80 mg Oral DAILY       Signed:  Zev Camarena NP    Part of this note may have been written by using a voice dictation software. The note has been proof read but may still contain some grammatical/other typographical errors.

## 2022-03-25 NOTE — PROGRESS NOTES
Problem: Falls - Risk of  Goal: *Absence of Falls  Description: Document Ingris Garza Fall Risk and appropriate interventions in the flowsheet. Outcome: Progressing Towards Goal  Note: Fall Risk Interventions:  Mobility Interventions: Bed/chair exit alarm,Patient to call before getting OOB    Mentation Interventions: Door open when patient unattended,Evaluate medications/consider consulting pharmacy    Medication Interventions: Evaluate medications/consider consulting pharmacy    Elimination Interventions: Call light in reach,Patient to call for help with toileting needs    History of Falls Interventions: Bed/chair exit alarm         Problem: Pressure Injury - Risk of  Goal: *Prevention of pressure injury  Description: Document Eric Scale and appropriate interventions in the flowsheet.   Outcome: Progressing Towards Goal  Note: Pressure Injury Interventions:  Sensory Interventions: Assess changes in LOC,Avoid rigorous massage over bony prominences    Moisture Interventions: Absorbent underpads,Apply protective barrier, creams and emollients    Activity Interventions: PT/OT evaluation    Mobility Interventions: Pressure redistribution bed/mattress (bed type)    Nutrition Interventions: Document food/fluid/supplement intake    Friction and Shear Interventions: Apply protective barrier, creams and emollients                Problem: Heart Failure: Discharge Outcomes  Goal: *Verbalizes understanding and describes prescribed diet  Outcome: Progressing Towards Goal

## 2022-03-25 NOTE — PROGRESS NOTES
INR today 2.2. Will give vitamin K now and transfuse 2 U FFP in AM in preparation for thoracentesis tomorrow.      Radha Vargas MD

## 2022-03-25 NOTE — PROGRESS NOTES
Problem: Falls - Risk of  Goal: *Absence of Falls  Description: Document Bakari Mckeon Fall Risk and appropriate interventions in the flowsheet.   Outcome: Progressing Towards Goal  Note: Fall Risk Interventions:  Mobility Interventions: Bed/chair exit alarm    Mentation Interventions: Bed/chair exit alarm    Medication Interventions: Bed/chair exit alarm    Elimination Interventions: Call light in reach    History of Falls Interventions: Bed/chair exit alarm         Problem: Patient Education: Go to Patient Education Activity  Goal: Patient/Family Education  Outcome: Progressing Towards Goal     Problem: General Medical Care Plan  Goal: *Vital signs within specified parameters  Outcome: Progressing Towards Goal  Goal: *Labs within defined limits  Outcome: Progressing Towards Goal  Goal: *Absence of infection signs and symptoms  Outcome: Progressing Towards Goal  Goal: *Optimal pain control at patient's stated goal  Outcome: Progressing Towards Goal  Goal: *Skin integrity maintained  Outcome: Progressing Towards Goal  Goal: *Fluid volume balance  Outcome: Progressing Towards Goal  Goal: *Optimize nutritional status  Outcome: Progressing Towards Goal  Goal: *Anxiety reduced or absent  Outcome: Progressing Towards Goal  Goal: *Progressive mobility and function (eg: ADL's)  Outcome: Progressing Towards Goal     Problem: Patient Education: Go to Patient Education Activity  Goal: Patient/Family Education  Outcome: Progressing Towards Goal

## 2022-03-25 NOTE — PROGRESS NOTES
ACUTE OT GOALS:  (Developed with and agreed upon by patient and/or caregiver.)  1. Marzette Leventhal will be modified independent with functional mobility for ADL in room within 4 - 7 visits. 2. Marzette Leventhal will be modified independent with total body bathing and dressing within 4 - 7 visits. 3. Marzette Leventhal will state and demonstrate at least 5 energy conservation techniques during ADL/therapeutic activities within 4 - 7 visits. 4. Marzette Leventhal will voice a plan for appropriate home modifications for home safety and fall prevention within 7 visits. [address] will participate at least 30 minutes of ADL with 3 or less rest breaks within 7 visits. 6. Marzette Leventhal will complete a toilet transfer with modified independence within 7 visits. OCCUPATIONAL THERAPY ASSESSMENT: Initial Assessment and Daily Note OT Treatment Day # 1    Marzette Leventhal is a 64 y.o. female   PRIMARY DIAGNOSIS: Vegetative endocarditis of mitral valve  Endocarditis of mitral valve [I05.9]        Reason for Referral:    ICD-10: Treatment Diagnosis: Generalized Muscle Weakness (M62.81)  Low Back Pain (M54.5)  INPATIENT: Payor: [facility] MEDICARE / Plan: [address] / Product Type: [employer] Medicare /   ASSESSMENT:     REHAB RECOMMENDATIONS:   Recommendation to date pending progress:  Setting:  Short-term Rehab  Equipment:   To Be Determined     PRIOR LEVEL OF FUNCTION:  (Prior to Hospitalization)  INITIAL/CURRENT LEVEL OF FUNCTION:  (Based on today's evaluation)   Bathing:  Modified Independent  Dressing:  Modified Independent  Feeding/Grooming:  Independent  Toileting:  Modified Independent  Functional Mobility:  Modified Independent Bathing: Moderate Assistance  Dressing:   Moderate Assistance  Feeding/Grooming:  Standby Assistance  Toileting:  Minimal Assistance  Functional Mobility:  Minimal Assistance     ASSESSMENT:  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 She has lower back pain history and asked to sit/complete functional mobility for ADL. She completed L sidelying to sitting with minimal assistance then functional mobility for ADL with rolling walker with CGA (shortened steps). Maria R Funes presents with the following problems and would benefit from occupational therapy to maximize independence with self-care,instrumental activities of daily living, and functional transfers/mobility for activities of daily living/instrumental activities of daily living via the stated goals. SUBJECTIVE:   Ms. [place] states, \"I worked with the babies. \"    SOCIAL HISTORY/LIVING ENVIRONMENT: Retired OB RN. Uses rolling walker at home, but was apparently sleeping on the couch at home per ES PT note due to not being able to ascend her stairs at home. OBJECTIVE:     PAIN: VITAL SIGNS: LINES/DRAINS:   Pre Treatment: Pain Screen  Pain Scale 1: Numeric (0 - 10)  Pain Intensity 1:  (back - no rating given)  Pain Onset 1: chronic  Pain Location 1: Back  Pain Orientation 1: Lower  Post Treatment: not rated Vital Signs  BP: 106/70  MAP (Calculated): 82 IV        GROSS EVALUATION:   Within Functional Limits Abnormal/ Functional Abnormal/ Non-Functional (see comments) Not Tested Comments:   AROM [x] [x] [] []    PROM [] [x] [] []    Strength [x] [x] [] []    Balance [x] [x] [] []    Posture [x] [x] [] []    Sensation [] [x] [] []    Coordination [] [x] [] []    Tone [] [] [] [x]    Edema [] [] [] [x]    Activity Tolerance [] [x] [] []     [] [] [] []      COGNITION/  PERCEPTION: Intact Impaired   (see comments) Comments:   Orientation [x] [] To person and situation.    Vision [] []    Hearing [] []    Judgment/ Insight [] []    Attention [] []    Memory [] []    Command Following [x] []    Emotional Regulation [] []     [] []      ACTIVITIES OF DAILY LIVING: I Mod I S SBA CGA Min Mod Max Total NT Comments   BASIC ADLs:              Bathing/ Showering [] [] [] [] [] [] [] [] [] [x]    Toileting [] [] [] [] [] [] [] [] [] [x]    Dressing [] [] [] [] [] [] [] [] [] [x]    Feeding [] [] [] [] [] [] [] [] [] [x]    Grooming [] [] [] [] [] [] [] [] [] [x]    Personal Device Care [] [] [] [] [] [] [] [] [] [x]    Functional Mobility [] [] [] [] [] [x] [] [] [] []    I=Independent, Mod I=Modified Independent, S=Supervision, SBA=Standby Assistance, CGA=Contact Guard Assistance,   Min=Minimal Assistance, Mod=Moderate Assistance, Max=Maximal Assistance, Total=Total Assistance, NT=Not Tested    MOBILITY: I Mod I S SBA CGA Min Mod Max Total  NT x2 Comments:   Supine to sit [] [] [] [] [] [x] [] [] [] [] []    Sit to supine [] [] [] [] [] [] [] [] [] [x] []    Sit to stand [] [] [] [] [x] [] [] [] [] [] []    Bed to chair [] [] [] [] [x] [] [] [] [] [] []    I=Independent, Mod I=Modified Independent, S=Supervision, SBA=Standby Assistance, CGA=Contact Guard Assistance,   Min=Minimal Assistance, Mod=Moderate Assistance, Max=Maximal Assistance, Total=Total Assistance, NT=Not Tested    [facility] [address] [address] AM-PAC 6 Clicks   Daily Activity Inpatient Short Form        How much help from another person does the patient currently need. .. Total A Lot A Little None   1. Putting on and taking off regular lower body clothing? [] 1   [x] 2   [] 3   [] 4   2. Bathing (including washing, rinsing, drying)? [] 1   [x] 2   [] 3   [] 4   3. Toileting, which includes using toilet, bedpan or urinal?   [] 1   [x] 2   [] 3   [] 4   4. Putting on and taking off regular upper body clothing? [] 1   [] 2   [x] 3   [] 4   5. Taking care of personal grooming such as brushing teeth? [] 1   [] 2   [x] 3   [] 4   6. Eating meals? [] 1   [] 2   [x] 3   [] 4   © 2007, Trustees of [address] [place] [address] [address] [id number], under license to [employer].  All rights reserved     Score:  Initial: 15 Most Recent: X (Date: -- )   Interpretation of Tool:  Represents activities that are increasingly more difficult (i.e. Bed mobility, Transfers, Gait). PLAN:   FREQUENCY/DURATION: OT Plan of Care: 3 times/week for duration of hospital stay or until stated goals are met, whichever comes first.    PROBLEM LIST:   (Skilled intervention is medically necessary to address:)  Decreased ADL/Functional Activities  Decreased Activity Tolerance  Decreased AROM/PROM  Decreased Balance  Decreased Coordination  Decreased Strength  Decreased Transfer Abilities  Increased Pain   INTERVENTIONS PLANNED:   (Benefits and precautions of occupational therapy have been discussed with the patient.)  Self Care Training  Therapeutic Activity  Therapeutic Exercise/HEP  Neuromuscular Re-education  Education     TREATMENT:     EVALUATION: Low Complexity : (Untimed Charge)    TREATMENT:   ( $$ Therapeutic Activity: 23-37 mins   )  Therapeutic Activity (23 Minutes): Therapeutic activity included Supine to Sit, Scooting, Sitting balance , Standing balance, and functional mobility for ADL to improve functional Mobility, Strength, ROM, and Activity tolerance.     TREATMENT GRID:  N/A    AFTER TREATMENT POSITION/PRECAUTIONS:  Chair, Needs within reach, RN notified, and RN at bedside    INTERDISCIPLINARY COLLABORATION:  RN/PCT and OT/OCONNELL    TOTAL TREATMENT DURATION:  OT Patient Time In/Time Out  Time In: 0836  Time Out: [facility] LINDA Flores, OTR/L

## 2022-03-25 NOTE — PROGRESS NOTES
[address] [facility] Pharmacokinetic Monitoring Service - Vancomycin     Almas Scott is a 64 y.o. female starting on vancomycin therapy for endocarditis. Pharmacy consulted by Dr Nilda Merrill for monitoring and adjustment. Target Concentration: Goal AUC/ALEXEY 400-600 mg*hr/L    Additional Antimicrobials: NA    Pertinent Laboratory Values: Wt Readings from Last 1 Encounters:   03/23/22 60.1 kg (132 lb 8.8 oz)     Temp Readings from Last 1 Encounters:   03/25/22 97.4 °F (36.3 °C)     No components found for: PROCAL  Recent Labs     03/24/22  1148 03/24/22  0629 03/23/22  0811 03/22/22  0617   BUN  --  14 13 13   CREA  --  0.79 0.88 0.80   WBC  --  13.3*  --  11.3*   LAC 3.2*  --   --   --      Estimated Creatinine Clearance: 61.9 mL/min (based on SCr of 0.79 mg/dL). MRSA Nasal Swab: N/A. Non-respiratory infection. .    Plan:  Dosing recommendations based on Bayesian software  Start vancomycin 1500 mg x1, then 750 mg q 12 hours.   Premedicate with benadryl due to h/o rash with vanco.  Anticipated AUC of 501 and trough concentration of 16.1 at steady state  Renal labs as indicated   Vancomycin concentrations will be ordered as clinically appropriate   Pharmacy will continue to monitor patient and adjust therapy as indicated    Thank you for the consult,  Varsha Berger, PharmD, BCPS  Clinical Pharmacist

## 2022-03-25 NOTE — PROGRESS NOTES
[address] [facility] Pharmacokinetic Monitoring Service - Vancomycin     Shira Hayward is a 64 y.o. female starting on vancomycin therapy for endocarditis. Pharmacy consulted by Dr Chari Zaragoza for monitoring and adjustment. Target Concentration: Goal AUC/ALEXEY 400-600 mg*hr/L  Day of Therapy: 1  Additional Antimicrobials: NA    Pertinent Laboratory Values: Wt Readings from Last 1 Encounters:   03/23/22 60.1 kg (132 lb 8.8 oz)     Temp Readings from Last 1 Encounters:   03/25/22 97.4 °F (36.3 °C)     No components found for: PROCAL  Recent Labs     03/24/22  1148 03/24/22  0629 03/23/22  0811   BUN  --  14 13   CREA  --  0.79 0.88   WBC  --  13.3*  --    LAC 3.2*  --   --      Estimated Creatinine Clearance: 61.9 mL/min (based on SCr of 0.79 mg/dL). MRSA Nasal Swab: N/A. Non-respiratory infection. .    Plan:  Dosing recommendations based on Bayesian software  Received vancomycin 1500 mg x1 3/25 @0512, then 750 mg q 12 hours. Premedicate with benadryl due to h/o rash with vanco.  Anticipated AUC of 501 and trough concentration of 16.1 at steady state  Renal labs as indicated   Will order vancomycin random 3/26/22 @0400.     Pharmacy will continue to monitor patient and adjust therapy as indicated    Thank you for the consult,  [address] [facility] Student

## 2022-03-25 NOTE — PROGRESS NOTES
Chart screened by  for potential discharge needs or concerns. Pt transferred to [facility] from [facility] where she was scheduled to dc to home with [place] but required transfer to [facility] for a ROSELINE. Pt is scheduled for a thoracentesis tomorrow. PT/OT evals complete with the recommendation for STR at dc. SW met with pt and confirmed she is agreeable to STR and requested referrals to the same facilities ([facility], [facility]). Referrals submitted. Of note, pt's insurance denied a request for SNF rehab while she was at [facility]. Pt's rehab and medical needs have changed (will require 4 weeks of IV ABX) so it's reasonable to attempt approval again. Please notify/consult  if other discharge needs arise.     Care Management Interventions  Mode of Transport at Discharge: BLS  Transition of Care Consult (CM Consult): SNF (no consult received)  Discharge Durable Medical Equipment: No  Physical Therapy Consult: Yes  Occupational Therapy Consult: Yes  Speech Therapy Consult: No  Support Systems: Child(lauryn),Other Family Member(s)  Confirm Follow Up Transport: Family  The Plan for Transition of Care is Related to the Following Treatment Goals : STR to improve pt's strength and functional abilities for a safe transition to home; IV ABX x 4 weeks  The Patient and/or Patient Representative was Provided with a Choice of Provider and Agrees with the Discharge Plan?: Yes  Freedom of Choice List was Provided with Basic Dialogue that Supports the Patient's Individualized Plan of Care/Goals, Treatment Preferences and Shares the Quality Data Associated with the Providers?: Yes  Discharge Location  Patient Expects to be Discharged to[de-identified] Rehab Unit Subacute

## 2022-03-25 NOTE — CONSULTS
Infectious Disease Consult    Today's Date: 3/25/2022   Admit Date: 3/25/2022    Impression:   · Mitral valve infective endocarditis: blood cultures (3/24) pending NG  · R lung effusion- pulmonary following  · Hx of recurrent E coli bacteremia 2/2 and 2/27, source presumed bowel; recent bcx 3/1, 3/9 negative  · Bacterial meningitis, E coli; discharged 3/9/22 s/p ceftriaxone x 2 weeks through 3/13/22  · CML on dasitinib  · PCV on jakafi  · Portal vein thrombosis/splenomegaly/multiple splenic infarcts  · Low back pain- MRI lumbar spine (3/4) without evidence of infection  · Chronic diarrhea    Plan:   · Would discontinue Vancomycin as there is no history or ongoing concern for gram positive organisms. · Will start IV Ceftriaxone 2 g q24hr today. Minimum treatment for endocarditis is 4 weeks. · Order GI stool PCR today. · Dr. Vangie Enciso has ordered a strongyloides serum antibody serology today- follow. · ID will see again on Monday, please call with any questions or concerns. Anti-infectives:   · Vanc 3/24-3/25  · Ceftriaxone 3/25-     Subjective:   Date of Consultation:  March 25, 2022  Referring Physician: Dr. Luis Fountain    Patient is a 64 y.o. female who presented to ED with worsening shortness of breath and weakness. She was admitted recently 3/9-3/14. She is known to ID from admissions for E. Coli bacteremia and meningitis. She was seen for e. Coli bacteremia in 8/2021 thought to be secondary to bleeding varices and treated with a week of therapy. She then presented on 3/1/22 with recurrent e. Coli bacteremia and meningitis. She was treated with 2 weeks of Ceftriaxone which completed on 3/13. She was most recently seen by ID on 3/11 after she was admitted on 3/9 and her presentation seem to be compatible with volume overload, so original EOT remained at 3/13. This admission she had no fever and WBC normal, so she was not started on any antibiotics up front.  During this admission she has required diuresis for her CHF exacerbation. She eventually had echocardiogram completed yesterday revealing vegetation on mitral valve (16x34 mm) which is new since TTE 2/3/22. She had blood cultures drawn on 3/24 which are so far NG. She was started on IV Vanc. She was also found to have R effusion which is planning to be tapped tomorrow. She has a history of CML on dasitinib and PCV on jakafi as well as protal vein thrombosis with splenomegaly and multiple splenic infarcts. ID has been consulted for assistance. Today patient is seen sitting up in recliner and reports still feeling tired. She does report ongoing back pain. She does report ongoing diarrhea that is normal for her. Patient Active Problem List   Diagnosis Code    Polycythemia vera ([facility] D45    Malignant ascites R18.0    Cirrhosis ([facility] [facility] K74.60    Hypokalemia E87.6    AKOSUA (iron deficiency anemia) D50.9    Left homonymous hemianopsia H53.462    Hyponatremia E87.1    Chronic myelogenous leukemia (HCC) C92.10    Acquired hypercoagulable state ([facility] [address] [facility] D68.59    Analgesic rebound headache G44.40, T39.95XA    Headache, chronic daily R51.9    Migraine with aura and with status migrainosus, not intractable G43. 101    DVT (deep venous thrombosis) ([facility] I82.409    Lung nodule R91.1    Chronic migraine without aura with status migrainosus, not intractable G43.701    Primary hypothyroidism E03.9    Splenomegaly R16.1    Esophageal varices ([facility]) I85.00    Portal vein thrombosis I81    Cellulitis of leg, right L03.115    Body mass index (BMI) of 23.0 to 23.9 in adult Z68.23    Opioid use, unspecified with unspecified opioid-induced disorder F11.99    Thrombocytopenia (HCC) D69.6    Fever of unknown origin R50.9    Fever R50.9    Leukocytosis D72.829    Acute respiratory failure with hypoxemia ([facility]) J96.01    Pleural effusion, bilateral J90    Hypoproteinemia ([facility]) E77.8    Cerebral hemorrhage with cognitive deficits ([facility]) I61.9, R41.89    Visuospatial deficit O87.311    Ischemic colitis ([facility] [address] K55.9    Pulmonary embolus (HCC) I26.99    Subdural hemorrhage following injury, no loss of consciousness ([address] S06.5X0A    CML (chronic myelocytic leukemia) (HCC) C92.10    Physical deconditioning R53.81    Acute diastolic (congestive) heart failure (HCC) I50.31    History of DVT (deep vein thrombosis) Z86.718    Vegetative endocarditis of mitral valve I33.0    Endocarditis of mitral valve I05.9     Past Medical History:   Diagnosis Date    Acute blood loss anemia 4/6/2018    Anemia     C. difficile diarrhea 4/1/2015    Cerebral edema (HCC) 4/1/2015    Chronic migraine 9/22/2016    Chronic myelogenous leukemia ([facility] [address]     [place] chromosome/ converted from polycythemia in 2009- to 2012 when she was dx w leukemia    Chronic pain     Coagulation disorder ([facility] [address]     \"clotting and Bleeding Problem\" dr Prachi Herzog follows     E coli bacteremia 3/9/2022    Esophageal spasm     with banding     Esophageal varices (HCC)     grade 3    GI bleed 8/3/2016    H/O craniotomy     3-29-15.  due to blood clot - which caused a seizure  - then pt fell and hit     Hematemesis 8/20/2021    Leukocytosis 3/14/2015    Melena 8/20/2021    MRSA colonization 6/25/2012    Polycythemia vera(238.4)     JAK2 mutation    Portal hypertension (HCC)     with varices    Portal vein thrombosis 6/20/2012    Ct scan 6-21-2 Apparent occlusion of the portal vein. In addition, the splenic vein, and superior mesenteric vein are not definitely seen and are also likely  occluded. Splenomegaly, and extensive varices are seen as described above consistent with the portal vein occlusion 7-05-12 on arixtra HIT negative on repeat 7-27-12 re admitted Cirrhotic appearance of the liver.  Mild to moderate ascites, as    Primary hypothyroidism     Pulmonary embolism ([address] 2006    not on coumadin anymore     S/P exploratory laparotomy, 6/20/12 6/25/2012    Bowel resection:  336 cm of small bowel removed, approximately 9 feet and placement of feeding jejunostomy.      S/P small bowel resection     .  9 feet removed due to  gangrene which wa due to blood clot    Seizure ([facility] [address] 3/14/2015    Seizure disorder ([address] 3/30/2015    due to clotting factor    Seizures ([address]     last one 3- - followed by aniyah     Sepsis with acute organ dysfunction without septic shock ([address] 2022    Splenomegaly, congestive, chronic     Stroke (cerebrum) ([address] 2015    had bled into head- surgery    Stroke [facility])     pt had stroke like symptoms     Thrombocytopenia, HIT negative 2012 platelets lower repeat HIT 12 platelets in 32,359'U    Thrombosis, portal vein 2004    portal , spleenic and recently superior mesenteric    Transfusion history     many blood tranfusions - last 3-29-15 after brain surgery    Traumatic hemorrhage of right cerebrum ([facility] [address] 3/30/2015      Family History   Problem Relation Age of Onset    Diabetes Mother     Stroke Mother         after surgery    Cancer Father         brain    No Known Problems Sister     Other Sister         diverticulitis    Other Sister         diverticulitis    Cancer Sister         lyjmphoma    Breast Cancer Maternal Aunt 48    Thyroid Disease Paternal Grandmother       Social History     Tobacco Use    Smoking status: Former Smoker     Packs/day: 0.20     Years: 10.00     Pack years: 2.00     Types: Cigarettes     Quit date:      Years since quittin.2    Smokeless tobacco: Never Used   Substance Use Topics    Alcohol use: No     Past Surgical History:   Procedure Laterality Date    HX APPENDECTOMY      with small bowel resection    HX BREAST BIOPSY Bilateral     Lt - ; Rt -     HX CHOLECYSTECTOMY      HX GI      liver biopsy    HX GI      bowel removed small - 9 feet     HX GYN       x 2    HX GYN      D&C following miscarriage    HX ORTHOPAEDIC Left 2008    torn labrum shoulder (screw in place)    NEUROLOGICAL PROCEDURE UNLISTED  2010-12    craniotomy to evacuate subdural hematoma following a fall from a seizure    GA ABDOMEN SURGERY PROC UNLISTED      umbilical hernia repair    GA ABDOMEN SURGERY PROC UNLISTED      9ft of small bowel excised then reconnected      Prior to Admission medications    Medication Sig Start Date End Date Taking? Authorizing Provider   furosemide (LASIX) 20 mg tablet Take 1 Tablet by mouth daily for 30 days. 3/14/22 4/13/22 Yes Kervin Hyman MD   oxyCODONE-acetaminophen (PERCOCET 10)  mg per tablet  3/11/22  Yes Provider, Historical   zolpidem (AMBIEN) 10 mg tablet Take 1 Tablet by mouth nightly as needed for Sleep. Max Daily Amount: 10 mg. 1/17/22  Yes Joana Dance, MD   diphenoxylate-atropine (LomotiL) 2.5-0.025 mg per tablet Take 1 Tablet by mouth four (4) times daily as needed for Diarrhea. Max Daily Amount: 4 Tablets. 1/11/22  Yes Joana Dance, MD   famotidine (PEPCID) 40 mg tablet Take 1 Tablet by mouth daily as needed for Heartburn. Take at least 4 hours before or after sprycel 12/13/21  Yes Jen Mcnulty NP   dasatinib (SpryceL) 80 mg tab Take 1 Tablet by mouth daily. 11/12/21  Yes Joana Dance, MD   enoxaparin (Lovenox) 60 mg/0.6 mL injection 50 mg by SubCUTAneous route every twelve (12) hours. 9/17/21  Yes Joana Dance, MD   b complex vitamins tablet 1 Tablet daily. Yes Provider, Historical   Comp Stocking,Knee,Regular,Sml misc 1 Each by Does Not Apply route daily. 8/5/21  Yes Herb Roy DO   ondansetron (ZOFRAN ODT) 8 mg disintegrating tablet Take 1 Tab by mouth every eight (8) hours as needed for Nausea or Vomiting. 4/23/21  Yes Laurent Davidson NP   magnesium 250 mg tab Take 1 Tablet by mouth daily. Yes Provider, Historical   Synthroid 112 mcg tablet 1 tablet once a day with an extra 1/2 tablet on Sundays 3/29/21  Yes Marylene Mariner, MD   ruxolitinib 5 mg tab Take 1 Tab by mouth two (2) times a day.   Patient taking differently: Take 5 mg by mouth two (2) times a day. Sometimes only takes at night due to N/V/D 3/10/21  Yes Malina Heller MD   ondansetron (ZOFRAN ODT) 4 mg disintegrating tablet Take 1 Tab by mouth every eight (8) hours as needed for Nausea. 8/3/20  Yes Malina Heller MD   calcium carbonate (CALTREX) 600 mg calcium (1,500 mg) tablet Take 600 mg by mouth nightly. Yes Provider, Historical   cholecalciferol (VITAMIN D3) 1,000 unit cap Take 1,000 Units by mouth nightly. Yes Provider, Historical   lansoprazole (PREVACID) 30 mg capsule Take 30 mg by mouth daily. prn   Yes Provider, Historical       Allergies   Allergen Reactions    Latex Other (comments)     Testing for latex allergy was positive as a 2 (on a scale of 1 to 3).  Sulfa (Sulfonamide Antibiotics) Anaphylaxis    Tramadol Other (comments)     Top lip swelled    Augmentin [Amoxicillin-Pot Clavulanate] Rash    Codeine Other (comments)    Divalproex Sodium Hives    Morphine Nausea and Vomiting     Not a true allergy    Potassium Clavulanate Hives    Rizatriptan Rash    Tetanus Immune Globulin Other (comments)     Heat, bruising, and swelling at  Site of injection    Vancomycin Rash    Xanax [Alprazolam] Other (comments)     Hallucinations    Zonegran [Zonisamide] Rash        Review of Systems:  A comprehensive review of systems was negative except for that written in the History of Present Illness. Objective:     Visit Vitals  BP (!) 87/64 (BP 1 Location: Left upper arm, BP Patient Position: Sitting)   Pulse 87   Temp 97.4 °F (36.3 °C)   Resp 17   Wt 60.3 kg (132 lb 15 oz)   SpO2 93%   BMI 23.55 kg/m²     Temp (24hrs), Av.5 °F (36.4 °C), Min:97.3 °F (36.3 °C), Max:98.3 °F (36.8 °C)       Lines:  Peripheral IV:       Physical Exam:    General:  Alert, cooperative, well noursished, well developed, appears stated age   Eyes:  Sclera anicteric. Pupils equally round and reactive to light.    Mouth/Throat: Mucous membranes normal, oral pharynx clear   Neck: Supple   Lungs: Clear to auscultation bilaterally, good effort; on supplemental O2 via NC   CV:  Regular rate and rhythm; + holosystolic murmur    Abdomen:   Soft, non-tender.  bowel sounds normal. non-distended   Extremities: No cyanosis or edema   Skin: Skin color, texture, turgor normal. no acute rash or lesions   Lymph nodes: Cervical and supraclavicular normal   Musculoskeletal: No swelling or deformity   Lines/Devices:  Intact, no erythema, drainage or tenderness   Psych: Alert and oriented, normal mood affect given the setting       Data Review:     CBC:  Recent Labs     03/24/22  0629   WBC 13.3*   HGB 11.4*   HCT 35.0*   *       BMP:  Recent Labs     03/24/22  0629 03/23/22  0811   CREA 0.79 0.88   BUN 14 13   * 133*   K 4.0 3.9    100   CO2 27 27   AGAP 5* 6*   GLU 98 99       LFTS:  Recent Labs     03/23/22  0811   TBILI 1.5*   ALT 32      TP 7.1   ALB 3.1*       Microbiology:     All Micro Results     None          Imaging:   Reviewed    Signed By: BRIAN Barksdale     March 25, 2022

## 2022-03-26 NOTE — PROGRESS NOTES
Ultrasound and Thoracentesis not performed per Dr. Lizbeth Saini. Patient unable to sit or roll in bed due to pain.

## 2022-03-26 NOTE — PROGRESS NOTES
Problem: Falls - Risk of  Goal: *Absence of Falls  Description: Document Raul Sites Fall Risk and appropriate interventions in the flowsheet.   Outcome: Progressing Towards Goal  Note: Fall Risk Interventions:  Mobility Interventions: Bed/chair exit alarm    Mentation Interventions: Adequate sleep, hydration, pain control,Bed/chair exit alarm,Door open when patient unattended    Medication Interventions: Bed/chair exit alarm,Patient to call before getting OOB    Elimination Interventions: Call light in reach,Patient to call for help with toileting needs    History of Falls Interventions: Bed/chair exit alarm         Problem: General Medical Care Plan  Goal: *Anxiety reduced or absent  Outcome: Progressing Towards Goal

## 2022-03-26 NOTE — PROGRESS NOTES
[facility] PROGRESS NOTE           3/26/2022 4:00 PM    Admit Date: 3/25/2022      Subjective:     Improved dyspnea. Complains of feeling weak/abdominal pain. ROS:  Cardiovascular:  As noted above    Objective:      Vitals:    03/26/22 0451 03/26/22 0722 03/26/22 0730 03/26/22 1033   BP: 107/85 107/81  (!) 121/94   Pulse: 92 91  97   Resp: 18 18  18   Temp: 97.6 °F (36.4 °C) 97.5 °F (36.4 °C)  98.7 °F (37.1 °C)   SpO2: 90% 92% 92% 90%   Weight:           Physical Exam:  General-No Acute Distress  Neck- supple, + JVD  CV- regular rate and rhythm; harsh apical holosystolic 3/6 murmur  Lung-decreased at bases  Abd- soft, nontender, nondistended  Ext- no edema bilaterally. Skin- warm and dry    Data Review:   Recent Labs     03/26/22  0858 03/26/22  0511 03/25/22  1230 03/24/22  0629   NA  --  134*  --  134*   K  --  4.9  --  4.0   MG  --  2.2  --  2.0   BUN  --  19  --  14   CREA  --  1.00  --  0.79   GLU  --  102*  --  98   WBC  --  15.8*  --  13.3*   HGB  --  11.4*  --  11.4*   HCT  --  36.1  --  35.0*   PLT  --  146*  --  121*   INR 1.3  --  2.2  --        Assessment/Plan:     Principal Problem:    Vegetative endocarditis of mitral valve (3/24/2022)  -Images independently reviewed. Noted likely flail posterior mitral valve with severe eccentric mitral regurgitation; posterior leaflet appears thickened in the apical views; uncertain regarding if possible vegetation versus torn papillary muscle (does not appear thickened in parasternal views). -Also consideration for marantic endocarditis with current blood cultures no growth to date (images from 2/3/2022 with normal-appearing mitral valve; also prior imaging with splenic infarcts/renal infarcts). With recent admission for pulmonary edema, suggestive of acute increase in left atrial pressures. -Ideally ROSELINE and assess during hospital course but higher risk for complications with history of esophageal varices.   Prior grade II/III esophageal varices from EGD from 8/20/2021.    -previously with E. coli bacteremia which is less likely to be causative  -Given multiple comorbidities, less likely candidate if structural issue and poor prognosis.     Active Problems:      History of DVT (deep vein thrombosis) (3/19/2022)    Portal vein thrombosis (2/16/2009)  - on systemic anticoagulation per primary team       Polycythemia vera ([address] (2/16/2009)      Malignant ascites (7/26/2012)      Cirrhosis ([address] (7/27/2012)      Hypokalemia (7/27/2012)      AKOSUA (iron deficiency anemia) (7/28/2012)      Chronic myelogenous leukemia ([address] (3/30/2015)    Acquired hypercoagulable state ([address] (3/30/2015)    Thrombocytopenia ([address] (10/15/2021)  -Per hematology oncology      Acute respiratory failure with hypoxemia ([address] (3/9/2022)  -Likely secondary to severe mitral regurgitation/pleural effusion      Pleural effusion, bilateral (3/11/2022)      Hypoproteinemia ([address] (3/11/2022)      CML (chronic myelocytic leukemia) ([address] (3/19/2022)  -Per hematology oncology      Alexandru Turk MD  3/26/2022 4:00 PM

## 2022-03-26 NOTE — PROGRESS NOTES
Tiki Couch  Admission Date: 3/25/2022         Daily Progress Note: 3/26/2022    The patient's chart is reviewed and the patient is discussed with the staff. Background: Patient is a 64 y.o.  female PMH CML on chemotherapy, chronic back pain, DVT and  PE on home Lovenox, seen and evaluated at the request of Dr. Jadiel Schofield pleural effusions and evaluation for thoracentesis. The patient was recently discharged from here on 3/14/22 after being treated for acute CHF. The patient was also recently diagnosed with E coli bacterial meningitis and completed antibiotics. The patient states that she does not necessarily feel short of breath however she is having what feels like back spasms in her LL back area. She also c/o feeling a \"good pain\" when inhaling deeply. The patient does have a history of DVT/PE and portal vein thrombosis and is on Lovenox at home and was given a dose this morning here in the hospital.  She is on chemotherapy for CML. Recent echocardiogram reveals mitral valve vegetation that was not present on echocardiogram done in February this year. Subjective:     Having some back pain. She feels that her breathing feels okay, having dry cough. Recently received pain medication and lasix- feels \"woozy\" now.      Current Facility-Administered Medications   Medication Dose Route Frequency    diphenhydrAMINE (BENADRYL) injection 25 mg  25 mg IntraVENous Q4H PRN    [Held by provider] metoprolol succinate (TOPROL-XL) XL tablet 25 mg  25 mg Oral DAILY    calcium carbonate (OS-JOE) tablet 500 mg [elemental]  500 mg Oral QHS    cetirizine (ZYRTEC) tablet 10 mg  10 mg Oral DAILY    cyclobenzaprine (FLEXERIL) tablet 5 mg  5 mg Oral TID PRN    [Held by provider] enoxaparin (LOVENOX) injection 50 mg  50 mg SubCUTAneous Q12H    famotidine (PEPCID) tablet 40 mg  40 mg Oral DAILY PRN    fluticasone propionate (FLONASE) 50 mcg/actuation nasal spray 2 Spray  2 Spray Both Nostrils DAILY    levothyroxine (SYNTHROID) tablet 125 mcg  125 mcg Oral 6am    magnesium oxide (MAG-OX) tablet 200 mg  200 mg Oral DAILY    ondansetron (ZOFRAN ODT) tablet 4 mg  4 mg Oral Q8H PRN    Or    ondansetron (ZOFRAN) injection 4 mg  4 mg IntraVENous Q8H PRN    oxyCODONE-acetaminophen (PERCOCET 7.5) 7.5-325 mg per tablet 1 Tablet  1 Tablet Oral Q4H PRN    pantoprazole (PROTONIX) tablet 40 mg  40 mg Oral ACB    potassium chloride (K-DUR, KLOR-CON M20) SR tablet 40 mEq  40 mEq Oral BID    ruxolitinib (Jakavi) tab 5 mg (Patient Supplied)  5 mg Oral Q12H    traZODone (DESYREL) tablet 50 mg  50 mg Oral QHS    trimethoprim-polymyxin b (POLYTRIM) 10,000 unit- 1 mg/mL ophthalmic solution 1 Drop  1 Drop Both Eyes BID    zinc oxide-cod liver oil (DESITIN) 40 % paste   Topical PRN    furosemide (LASIX) injection 40 mg  40 mg IntraVENous Q12H    dasatinib tab 80 mg (Patient Supplied)  80 mg Oral DAILY    0.9% sodium chloride infusion 250 mL  250 mL IntraVENous PRN    cefTRIAXone (ROCEPHIN) 2 g in 0.9% sodium chloride (MBP/ADV) 50 mL MBP  2 g IntraVENous Q24H     Review of Systems  +back pain, dizziness  Constitutional: negative for fever, chills, sweats  Cardiovascular: negative for chest pain, palpitations, syncope, edema  Gastrointestinal:  negative for dysphagia, reflux, vomiting, diarrhea, abdominal pain, or melena  Neurologic:  negative for focal weakness, numbness, headache  Objective:     Vitals:    03/26/22 0022 03/26/22 0451 03/26/22 0722 03/26/22 0730   BP: 100/82 107/85 107/81    Pulse: 89 92 91    Resp: 17 18 18    Temp: 97.5 °F (36.4 °C) 97.6 °F (36.4 °C) 97.5 °F (36.4 °C)    SpO2: 94% 90% 92% 92%   Weight:           Intake/Output Summary (Last 24 hours) at 3/26/2022 0801  Last data filed at 3/25/2022 2320  Gross per 24 hour   Intake 977.3 ml   Output 900 ml   Net 77.3 ml     Physical Exam:   Constitution:  the patient is well developed and in no acute distress  HEENT:  Sclera clear, pupils equal, oral mucosa moist  Respiratory: diminished in bases, on 2L NC  Cardiovascular:  RRR without M,G,R  Gastrointestinal: soft and non-tender; with positive bowel sounds. Musculoskeletal: warm without cyanosis. There is no lower extremity edema. Skin:  no jaundice or rashes, no wounds   Neurologic: no gross neuro deficits     Psychiatric:  alert and oriented x 3    CXR: 3/25      CT Chest: 3/9/2022       LAB:  Recent Labs     03/26/22  0511 03/25/22  1230 03/25/22  1109 03/24/22  1148 03/24/22  0629   WBC 15.8*  --   --   --  13.3*   HGB 11.4*  --   --   --  11.4*   HCT 36.1  --   --   --  35.0*   *  --   --   --  121*   INR  --  2.2  --   --   --    LAC 1.3  --  2.2* 3.2*  --      Recent Labs     03/26/22  0511 03/24/22  0629 03/23/22  0811   * 134* 133*   K 4.9 4.0 3.9    102 100   CO2 25 27 27   * 98 99   BUN 19 14 13   CREA 1.00 0.79 0.88   MG 2.2 2.0 2.1   CA 8.9 8.8 8.6   PHOS 3.5 2.5 2.8   ALB  --   --  3.1*   AST  --   --  25   ALT  --   --  32   AP  --   --  103     Recent Labs     03/26/22  0511 03/25/22  1109 03/24/22  1148   LAC 1.3 2.2* 3.2*     No results for input(s): PH, PCO2, PO2, HCO3, PHI, PCO2I, PO2I, HCO3I in the last 72 hours.   Recent Labs     03/24/22  1204 03/24/22  1148   CULT NO GROWTH AFTER 17 HOURS NO GROWTH AFTER 17 HOURS     Assessment and Plan:  (Medical Decision Making)   Principal Problem:    Vegetative endocarditis of mitral valve (3/24/2022)   noted on echo 3/18, previously on vanc- ID following and changed to ceftriaxone; BC NGTD    Active Problems:    Body mass index (BMI) of 23.0 to 23.9 in adult (8/26/2021)    noted      Polycythemia vera ([facility] (2/16/2009)    On home lovenox, currently on hold for thoracentesis      Malignant ascites (7/26/2012)    On lasix 40mg Q12      Cirrhosis ([facility] (7/27/2012)    Received FFP and Vit K yesterday for INR 2.2- will recheck this AM.       Hypokalemia (7/27/2012)    resolved      AKOSUA (iron deficiency anemia) (7/28/2012)    Hgb 11.4 today, stable      Chronic myelogenous leukemia ([address] (3/30/2015)    On chemo      Acquired hypercoagulable state ([address] (3/30/2015)    On home lovenox, currently on hold for thoracentesis      Thrombocytopenia ([address] (10/15/2021)    plt 146       Acute respiratory failure with hypoxemia ([address] (3/9/2022)    On room air      Hypoproteinemia ([address] (3/11/2022)          CML (chronic myelocytic leukemia) ([address] (3/19/2022)    On Mainor Jason and dasatinib      Acute diastolic (congestive) heart failure ([address] (3/19/2022)    Diuresing       Endocarditis of mitral valve (3/25/2022)    noetd on echocardiogram, on ABX      Pleural Effusion    Will plan for thoracentesis today. .        More than 50% of the time documented was spent in face-to-face contact with the patient and in the care of the patient on the floor/unit where the patient is located. Ashley Milner NP    The patient has been seen and examined by me personally, the chart,labs, and radiographic studies have been reviewed. Chest: CTA  Extremities: 2-3+ edema    I agree with the above assessment and plan. Attempted thoracentesis but patient with severe back pain - cannot sit up or stay still- pleural effusions small to moderate- thoracentesis canceled. Will just monitor.         Liban Noble MD.

## 2022-03-26 NOTE — PROGRESS NOTES
Hospitalist Progress Note   Admit Date:  3/25/2022  4:10 AM   Name:  Ko Fontaine   Age:  64 y.o. Sex:  female  :  1960   MRN:  [id number]   Room:      Presenting Complaint: No chief complaint on file. 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 Patient was recently discharged from the hospital on 3/14 after treatment for CHF. She was also noted to have splenomegaly with splenic infarct. And  recently diagnosed with E. coli bacterial meningitis, completed course of antibiotics. In the ER chest x-ray was done shows evidence of pulmonary vascular congestion. On 3/23 echocardiogram was performed showing mitral vegetations. She was transferred to the [facility] 3/24. Consulted Cardiology    Subjective/24hr Events (22): Denies CP, SOB. Endorsed better pain/spasm control    ROS:  10 systems reviewed and negative except as noted above. Assessment & Plan:     Principal Problem:  Vegetative endocarditis of mitral valve (3/24/2022)  3/25/22  -Continue Vanc; BCx NGTD  -Cardiology with plans for ROSELINE today  -Consult ID  3/26/22  -Per ID patient Ceftriaxone 2g daily for a minimum of 4 weeks;  Hx of EColi bacteremia   -PT with recs for rehab  -Per CARDS patient is high risk for complications with ROSELINE d/t hx of esophageal varices and multiple co morbidities     Active Problems:  Acute respiratory failure with hypoxemia ([address] (3/9/2022)   3/25/22  -Likely 2/2 fluid overload, pleural effusions  -Continue supplemental O2; Wean as able   -Pulmonology consulted;  thoracentesis in a.m.  3/26/22  -Thoracentesis today      Malignant ascites (7/26/2012)   Cirrhosis ([facility] (7/27/2012)  3/25/22  -Continue IV lasix; Na restrictions  -Paracentesis prn       Hypokalemia (7/27/2012)(Resolved)  3/25/22  -K+ 4.0; Monitor with diuresing       AKOSUA (iron deficiency anemia) (7/28/2012)  3/25/22  -Hgb 10.6    Chronic myelogenous leukemia ([facility] (3/30/2015)  -Continue oral medications sprycel and Jakafi    Acquired hypercoagulable state ([facility] (3/30/2015)  -continue Lovenox BID     Thrombocytopenia ([facility] (10/15/2021)   3/25/22  -Improving; Monitor    Hypothyroid  -Continue Synthroid      Diastolic (congestive) heart failure ([facility] (3/19/2022)  3/25/22  -Holding  BB due to hypotension  -Fluid restrictions    Diarrhea  3/26/22  -Chronic per patient  -Strongyloides antibodies pending; stool for PCR per ID             Dispo/Discharge Planning:      TBD    Diet:  ADULT DIET Regular;  Low Sodium (2 gm)  DVT PPx: SCDs  Code status: Full Code    Hospital Problems as of 3/26/2022 Date Reviewed: 3/25/2022          Codes Class Noted - Resolved POA    * (Principal) Vegetative endocarditis of mitral valve ICD-10-CM: I33.0  ICD-9-CM: 421.0  3/24/2022 - Present Yes        Body mass index (BMI) of 23.0 to 23.9 in adult (Chronic) ICD-10-CM: W22.77  ICD-9-CM: V85.1  8/26/2021 - Present Yes        Endocarditis of mitral valve ICD-10-CM: I05.9  ICD-9-CM: 394.9  3/25/2022 - Present Unknown        CML (chronic myelocytic leukemia) ([facility] ICD-10-CM: C92.10  ICD-9-CM: 205.10  3/19/2022 - Present Yes        Acute diastolic (congestive) heart failure (HCC) ICD-10-CM: I50.31  ICD-9-CM: 428.31, 428.0  3/19/2022 - Present Yes        Pleural effusion, bilateral ICD-10-CM: J90  ICD-9-CM: 511.9  3/11/2022 - Present Yes        Hypoproteinemia ([facility] ICD-10-CM: E77.8  ICD-9-CM: 273.8  3/11/2022 - Present Yes        Acute respiratory failure with hypoxemia ([facility] ICD-10-CM: J96.01  ICD-9-CM: 518.81  3/9/2022 - Present Yes Thrombocytopenia ([facility] ICD-10-CM: D69.6  ICD-9-CM: 287.5  10/15/2021 - Present Yes        Chronic myelogenous leukemia (HCC) (Chronic) ICD-10-CM: C92.10  ICD-9-CM: 205.10  3/30/2015 - Present Yes        Acquired hypercoagulable state ([facility] (Chronic) ICD-10-CM: T57.95  ICD-9-CM: 289.81  3/30/2015 - Present Yes        AKOSUA (iron deficiency anemia) ICD-10-CM: D50.9  ICD-9-CM: 280.9  7/28/2012 - Present Yes        Cirrhosis ([facility] (Chronic) ICD-10-CM: K74.60  ICD-9-CM: 571.5  7/27/2012 - Present Yes        Hypokalemia ICD-10-CM: E87.6  ICD-9-CM: 276.8  7/27/2012 - Present Yes        Malignant ascites ICD-10-CM: R18.0  ICD-9-CM: 789.51  7/26/2012 - Present Yes        Polycythemia vera ([facility] (Chronic) ICD-10-CM: D45  ICD-9-CM: 238.4  2/16/2009 - Present Yes    Overview Addendum 8/26/2021  2:31 PM by Kun Culver MD     2/2008 by [email or web address] analysis however portal vein thrombosis  And and splenomegaly since 2004  Hydroxyurea for 6 months poor tolerance spleen did not shrink   Pegasys 90 mcg weekly 4-2009 till    Restarted 12-30-09 45 mcg q 2 weeks  2-2010 reduced to q month   Increased 45 mcg 2/month 4-2010  Held 8-2010 thrombocytopenia  12-29-12 restarted 45 mcg q 2 weeks  4-1-11 45 mcg q 3 weeks  4-22-11 45 mcg q 4 weeks  Uncertain dosing thereafter   Possibly q 3 weeks 10-15-11 and held and restarted on 4-1-12 6-12 ct scan Small bowel wall thickening suggesting an enteritis. In addition, there is well thickening of the right colon which can suggest an additional   colitis although this can also be seen with severe portal hypertension. Likely reactive edematous changes it scattered fluid collections are seen of   the small bowel mesentery. . Apparent occlusion of the portal vein. In addition, the splenic vein, and superior mesenteric vein are not definitely seen and are also likely occluded. Splenomegaly, and extensive varices are seen as described above consistent with the portal vein occlusion. Gregory Izquiredo Heterogeneous enhancement of the liver and mild periportal edema. An acute hepatitis cannot be excluded. Objective:     Patient Vitals for the past 24 hrs:   Temp Pulse Resp BP SpO2   03/26/22 1033 98.7 °F (37.1 °C) 97 18 (!) 121/94 90 %   03/26/22 0730 -- -- -- -- 92 %   03/26/22 0722 97.5 °F (36.4 °C) 91 18 107/81 92 %   03/26/22 0451 97.6 °F (36.4 °C) 92 18 107/85 90 %   03/26/22 0022 97.5 °F (36.4 °C) 89 17 100/82 94 %   03/25/22 2320 97.6 °F (36.4 °C) 87 14 90/74 94 %   03/25/22 2123 97.6 °F (36.4 °C) 86 14 98/78 95 %   03/25/22 2112 97.6 °F (36.4 °C) 98 14 99/74 94 %   03/25/22 2059 97.6 °F (36.4 °C) 88 14 95/83 95 %   03/25/22 2047 97.5 °F (36.4 °C) 88 14 105/79 96 %   03/25/22 1832 97.5 °F (36.4 °C) 87 17 103/81 97 %   03/25/22 1732 98 °F (36.7 °C) 91 17 100/81 95 %   03/25/22 1717 97.4 °F (36.3 °C) 87 17 (!) 83/59 100 %   03/25/22 1257 97.4 °F (36.3 °C) 87 17 (!) 87/64 93 %     Oxygen Therapy  O2 Sat (%): 90 % (03/26/22 1033)  Pulse via Oximetry: 85 beats per minute (03/26/22 0730)  O2 Device: Nasal cannula (03/26/22 0730)  Skin Assessment: Clean, dry, & intact (03/25/22 1935)  Skin Protection for O2 Device: N/A (03/25/22 1935)  O2 Flow Rate (L/min): 2 l/min (03/26/22 0730)    Estimated body mass index is 23.55 kg/m² as calculated from the following:    Height as of 3/18/22: 5' 3\" (1.6 m). Weight as of this encounter: 60.3 kg (132 lb 15 oz). Intake/Output Summary (Last 24 hours) at 3/26/2022 1033  Last data filed at 3/25/2022 2320  Gross per 24 hour   Intake 977.3 ml   Output 900 ml   Net 77.3 ml         Physical Exam:     Blood pressure (!) 121/94, pulse 97, temperature 98.7 °F (37.1 °C), resp. rate 18, weight 60.3 kg (132 lb 15 oz), SpO2 90 %. General:    Well nourished. No overt distress  Head:  Normocephalic, atraumatic  Eyes:  Sclerae appear normal.  Pupils equally round. ENT:  Nares appear normal, no drainage. Moist oral mucosa  Neck:  No restricted ROM.   Trachea midline   CV:   RRR. No m/r/g. No jugular venous distension. Lungs:   CTAB. No wheezing, rhonchi, or rales. Respirations even, unlabored  Abdomen: Bowel sounds present. Soft, nontender, mildly distended. Extremities: No cyanosis or clubbing. No edema  Skin:     No rashes and normal coloration. Warm and dry. Neuro:  CN II-XII grossly intact. A&Ox3  Psych:  Normal mood and affect.       I have reviewed ordered lab tests and independently visualized imaging below:    Recent Labs:  Recent Results (from the past 48 hour(s))   CULTURE, BLOOD    Collection Time: 03/24/22 11:48 AM    Specimen: Blood   Result Value Ref Range    Special Requests: RIGHT  FOREARM        Culture result: NO GROWTH AFTER 17 HOURS     LACTIC ACID    Collection Time: 03/24/22 11:48 AM   Result Value Ref Range    Lactic acid 3.2 (H) 0.4 - 2.0 MMOL/L   CULTURE, BLOOD    Collection Time: 03/24/22 12:04 PM    Specimen: Blood   Result Value Ref Range    Special Requests: LEFT  FOREARM        Culture result: NO GROWTH AFTER 17 HOURS     LACTIC ACID    Collection Time: 03/25/22 11:09 AM   Result Value Ref Range    Lactic acid 2.2 (H) 0.4 - 2.0 MMOL/L   PROTHROMBIN TIME + INR    Collection Time: 03/25/22 12:30 PM   Result Value Ref Range    Prothrombin time 25.1 (H) 12.6 - 14.5 sec    INR 2.2     CBC W/O DIFF    Collection Time: 03/26/22  5:11 AM   Result Value Ref Range    WBC 15.8 (H) 4.3 - 11.1 K/uL    RBC 3.56 (L) 4.05 - 5.2 M/uL    HGB 11.4 (L) 11.7 - 15.4 g/dL    HCT 36.1 35.8 - 46.3 %    .4 (H) 79.6 - 97.8 FL    MCH 32.0 26.1 - 32.9 PG    MCHC 31.6 31.4 - 35.0 g/dL    RDW 22.4 (H) 11.9 - 14.6 %    PLATELET 454 (L) 775 - 450 K/uL    MPV 10.8 9.4 - 12.3 FL    ABSOLUTE NRBC 0.02 0.0 - 0.2 K/uL   MAGNESIUM    Collection Time: 03/26/22  5:11 AM   Result Value Ref Range    Magnesium 2.2 1.8 - 2.4 mg/dL   METABOLIC PANEL, BASIC    Collection Time: 03/26/22  5:11 AM   Result Value Ref Range    Sodium 134 (L) 136 - 145 mmol/L Potassium 4.9 3.5 - 5.1 mmol/L    Chloride 103 98 - 107 mmol/L    CO2 25 21 - 32 mmol/L    Anion gap 6 (L) 7 - 16 mmol/L    Glucose 102 (H) 65 - 100 mg/dL    BUN 19 8 - 23 MG/DL    Creatinine 1.00 0.6 - 1.0 MG/DL    GFR est AA >60 >60 ml/min/1.73m2    GFR est non-AA 60 (L) >60 ml/min/1.73m2    Calcium 8.9 8.3 - 10.4 MG/DL   PHOSPHORUS    Collection Time: 03/26/22  5:11 AM   Result Value Ref Range    Phosphorus 3.5 2.3 - 3.7 MG/DL   LACTIC ACID    Collection Time: 03/26/22  5:11 AM   Result Value Ref Range    Lactic acid 1.3 0.4 - 2.0 MMOL/L   PLASMA, ALLOCATE    Collection Time: 03/26/22  6:00 AM   Result Value Ref Range    Unit number [id number]     Blood component type FP 24h, Thaw     Unit division 00     Status of unit TRANSFUSED     Unit number [id number]     Blood component type FP 24h, Thaw     Unit division 00     Status of unit TRANSFUSED    PROTHROMBIN TIME + INR    Collection Time: 03/26/22  8:58 AM   Result Value Ref Range    Prothrombin time 16.7 (H) 12.6 - 14.5 sec    INR 1.3         All Micro Results     None          Other Studies:  No results found.     Current Meds:  Current Facility-Administered Medications   Medication Dose Route Frequency    diphenhydrAMINE (BENADRYL) injection 25 mg  25 mg IntraVENous Q4H PRN    [Held by provider] metoprolol succinate (TOPROL-XL) XL tablet 25 mg  25 mg Oral DAILY    calcium carbonate (OS-JOE) tablet 500 mg [elemental]  500 mg Oral QHS    cetirizine (ZYRTEC) tablet 10 mg  10 mg Oral DAILY    cyclobenzaprine (FLEXERIL) tablet 5 mg  5 mg Oral TID PRN    [Held by provider] enoxaparin (LOVENOX) injection 50 mg  50 mg SubCUTAneous Q12H    famotidine (PEPCID) tablet 40 mg  40 mg Oral DAILY PRN    fluticasone propionate (FLONASE) 50 mcg/actuation nasal spray 2 Spray  2 Spray Both Nostrils DAILY    levothyroxine (SYNTHROID) tablet 125 mcg  125 mcg Oral 6am    magnesium oxide (MAG-OX) tablet 200 mg  200 mg Oral DAILY    ondansetron (ZOFRAN ODT) tablet 4 mg  4 mg Oral Q8H PRN    Or    ondansetron (ZOFRAN) injection 4 mg  4 mg IntraVENous Q8H PRN    oxyCODONE-acetaminophen (PERCOCET 7.5) 7.5-325 mg per tablet 1 Tablet  1 Tablet Oral Q4H PRN    pantoprazole (PROTONIX) tablet 40 mg  40 mg Oral ACB    potassium chloride (K-DUR, KLOR-CON M20) SR tablet 40 mEq  40 mEq Oral BID    ruxolitinib (Jakavi) tab 5 mg (Patient Supplied)  5 mg Oral Q12H    traZODone (DESYREL) tablet 50 mg  50 mg Oral QHS    trimethoprim-polymyxin b (POLYTRIM) 10,000 unit- 1 mg/mL ophthalmic solution 1 Drop  1 Drop Both Eyes BID    zinc oxide-cod liver oil (DESITIN) 40 % paste   Topical PRN    furosemide (LASIX) injection 40 mg  40 mg IntraVENous Q12H    dasatinib tab 80 mg (Patient Supplied)  80 mg Oral DAILY    0.9% sodium chloride infusion 250 mL  250 mL IntraVENous PRN    cefTRIAXone (ROCEPHIN) 2 g in 0.9% sodium chloride (MBP/ADV) 50 mL MBP  2 g IntraVENous Q24H       Signed:  Mayelin Gomez NP    Part of this note may have been written by using a voice dictation software. The note has been proof read but may still contain some grammatical/other typographical errors.

## 2022-03-26 NOTE — PROGRESS NOTES
Problem: Falls - Risk of  Goal: *Absence of Falls  Description: Document Candi Olivo Fall Risk and appropriate interventions in the flowsheet.   Outcome: Progressing Towards Goal  Note: Fall Risk Interventions:  Mobility Interventions: Bed/chair exit alarm    Mentation Interventions: Adequate sleep, hydration, pain control    Medication Interventions: Bed/chair exit alarm    Elimination Interventions: Call light in reach    History of Falls Interventions: Door open when patient unattended         Problem: Patient Education: Go to Patient Education Activity  Goal: Patient/Family Education  Outcome: Progressing Towards Goal     Problem: General Medical Care Plan  Goal: *Vital signs within specified parameters  Outcome: Progressing Towards Goal  Goal: *Labs within defined limits  Outcome: Progressing Towards Goal  Goal: *Absence of infection signs and symptoms  Outcome: Progressing Towards Goal  Goal: *Optimal pain control at patient's stated goal  Outcome: Progressing Towards Goal  Goal: *Skin integrity maintained  Outcome: Progressing Towards Goal  Goal: *Fluid volume balance  Outcome: Progressing Towards Goal  Goal: *Optimize nutritional status  Outcome: Progressing Towards Goal  Goal: *Anxiety reduced or absent  Outcome: Progressing Towards Goal  Goal: *Progressive mobility and function (eg: ADL's)  Outcome: Progressing Towards Goal     Problem: Patient Education: Go to Patient Education Activity  Goal: Patient/Family Education  Outcome: Progressing Towards Goal     Problem: Patient Education: Go to Patient Education Activity  Goal: Patient/Family Education  Outcome: Progressing Towards Goal

## 2022-03-27 NOTE — PROGRESS NOTES
Malini Couch  Admission Date: 3/25/2022         Daily Progress Note: 3/27/2022    The patient's chart is reviewed and the patient is discussed with the staff. Background: Patient is a 64 y.o.  female PMH CML on chemotherapy, chronic back pain, DVT and  PE on home Lovenox, seen and evaluated at the request of Dr. Haydee Rajan pleural effusions and evaluation for thoracentesis. The patient was recently discharged from here on 3/14/22 after being treated for acute CHF. The patient was also recently diagnosed with E coli bacterial meningitis and completed antibiotics. The patient states that she does not necessarily feel short of breath however she is having what feels like back spasms in her LL back area. She also c/o feeling a \"good pain\" when inhaling deeply. The patient does have a history of DVT/PE and portal vein thrombosis and is on Lovenox at home and was given a dose this morning here in the hospital.  She is on chemotherapy for CML. Recent echocardiogram reveals mitral valve vegetation that was not present on echocardiogram done in February this year. Subjective:     Patient still having back pain. She states her breathing feels okay. Currently on 3L NC.  She states she \"just doesn't feel good\"    Current Facility-Administered Medications   Medication Dose Route Frequency    oxyCODONE-acetaminophen (PERCOCET 10)  mg per tablet 1 Tablet  1 Tablet Oral Q4H PRN    furosemide (LASIX) injection 40 mg  40 mg IntraVENous DAILY    diphenhydrAMINE (BENADRYL) injection 25 mg  25 mg IntraVENous Q4H PRN    metoprolol succinate (TOPROL-XL) XL tablet 25 mg  25 mg Oral DAILY    calcium carbonate (OS-JOE) tablet 500 mg [elemental]  500 mg Oral QHS    cetirizine (ZYRTEC) tablet 10 mg  10 mg Oral DAILY    cyclobenzaprine (FLEXERIL) tablet 5 mg  5 mg Oral TID PRN    [Held by provider] enoxaparin (LOVENOX) injection 50 mg  50 mg SubCUTAneous Q12H    famotidine (PEPCID) tablet 40 mg  40 mg Oral DAILY PRN    fluticasone propionate (FLONASE) 50 mcg/actuation nasal spray 2 Spray  2 Spray Both Nostrils DAILY    levothyroxine (SYNTHROID) tablet 125 mcg  125 mcg Oral 6am    magnesium oxide (MAG-OX) tablet 200 mg  200 mg Oral DAILY    ondansetron (ZOFRAN ODT) tablet 4 mg  4 mg Oral Q8H PRN    Or    ondansetron (ZOFRAN) injection 4 mg  4 mg IntraVENous Q8H PRN    pantoprazole (PROTONIX) tablet 40 mg  40 mg Oral ACB    potassium chloride (K-DUR, KLOR-CON M20) SR tablet 40 mEq  40 mEq Oral BID    ruxolitinib (Jakavi) tab 5 mg (Patient Supplied)  5 mg Oral Q12H    traZODone (DESYREL) tablet 50 mg  50 mg Oral QHS    trimethoprim-polymyxin b (POLYTRIM) 10,000 unit- 1 mg/mL ophthalmic solution 1 Drop  1 Drop Both Eyes BID    zinc oxide-cod liver oil (DESITIN) 40 % paste   Topical PRN    dasatinib tab 80 mg (Patient Supplied)  80 mg Oral DAILY    0.9% sodium chloride infusion 250 mL  250 mL IntraVENous PRN    cefTRIAXone (ROCEPHIN) 2 g in 0.9% sodium chloride (MBP/ADV) 50 mL MBP  2 g IntraVENous Q24H     Review of Systems    Constitutional: negative for fever, chills, sweats  Cardiovascular: negative for chest pain, palpitations, syncope, edema  Gastrointestinal:  negative for dysphagia, reflux, vomiting, diarrhea, abdominal pain, or melena  Neurologic:  negative for focal weakness, numbness, headache  Objective:     Vitals:    03/26/22 1636 03/26/22 2111 03/27/22 0101 03/27/22 0503   BP: 135/88 113/74 113/77 110/79   Pulse: (!) 102 100 93 93   Resp: 19 19 18 18   Temp: 97.6 °F (36.4 °C) 98.3 °F (36.8 °C) 98.9 °F (37.2 °C) 98.3 °F (36.8 °C)   SpO2: (!) 84% 90% 92% 90%   Weight:           Intake/Output Summary (Last 24 hours) at 3/27/2022 8195  Last data filed at 3/26/2022 1656  Gross per 24 hour   Intake 80 ml   Output 450 ml   Net -370 ml     Physical Exam:   Constitution:  the patient is well developed and in no acute distress  HEENT:  Sclera clear, pupils equal, oral mucosa moist  Respiratory: clear on 3L NC  Cardiovascular:  RRR without M,G,R  Gastrointestinal: soft and non-tender; with positive bowel sounds. Musculoskeletal: warm without cyanosis. There is  No lower extremity edema. Skin:  no jaundice or rashes, no wounds   Neurologic: no gross neuro deficits     Psychiatric:  alert and oriented x 3    CXR: 3/25      CT Chest: 3/9/2022       LAB:  Recent Labs     03/27/22  0526 03/26/22  0858 03/26/22  0511 03/25/22  1230 03/25/22  1109 03/24/22  1148   WBC 22.6*  --  15.8*  --   --   --    HGB 12.8  --  11.4*  --   --   --    HCT 39.8  --  36.1  --   --   --    PLT 97*  --  146*  --   --   --    INR  --  1.3  --  2.2  --   --    LAC  --   --  1.3  --  2.2* 3.2*     Recent Labs     03/27/22  0526 03/26/22  0511    134*   K 5.6* 4.9    103   CO2 20* 25   GLU 74 102*   BUN 24* 19   CREA 1.20* 1.00   MG 2.3 2.2   CA 9.0 8.9   PHOS 4.2* 3.5     Recent Labs     03/26/22  0511 03/25/22  1109 03/24/22  1148   LAC 1.3 2.2* 3.2*     No results for input(s): PH, PCO2, PO2, HCO3, PHI, PCO2I, PO2I, HCO3I in the last 72 hours. Recent Labs     03/24/22  1204 03/24/22  1148   CULT NO GROWTH 2 DAYS NO GROWTH 2 DAYS     Assessment and Plan:  (Medical Decision Making)   Principal Problem:    Vegetative endocarditis of mitral valve (3/24/2022)   noted on echo 3/18, previously on vanc- ID following and changed to ceftriaxone; BC NGTD.      Active Problems:    Body mass index (BMI) of 23.0 to 23.9 in adult (8/26/2021)    noted      Polycythemia vera ([address] (2/16/2009)    On home lovenox, currently on hold for thoracentesis      Malignant ascites (7/26/2012)    On lasix 40mg daily      Cirrhosis ([address] (7/27/2012)    INR 1.3 yesterday      Hypokalemia (7/27/2012)    Resolved- slightly elevated this AM      AKOSUA (iron deficiency anemia) (7/28/2012)    stable      Chronic myelogenous leukemia ([facility] (3/30/2015)    On chemo      Acquired hypercoagulable state ([facility] (3/30/2015)    On home lovenox, can likely resume lovenox today      Thrombocytopenia ([address] (10/15/2021)    plt 97      Acute respiratory failure with hypoxemia ([address] (3/9/2022)    On 3L NC, continue to wean as tolerated. Hypoproteinemia ([address] (3/11/2022)          CML (chronic myelocytic leukemia) ([address] (3/19/2022)    On [place] and dasatinib      Acute diastolic (congestive) heart failure ([address] (3/19/2022)    Diuresing       Endocarditis of mitral valve (3/25/2022)    noted on echocardiogram, on ABX      Pleural Effusion    Unable to proceed with thoracentesis due to patient's pain/inability to sit on side of bed. Continue diuresis and will monitor. Can likely resume home lovenox. Will order CXR for the morning. More than 50% of the time documented was spent in face-to-face contact with the patient and in the care of the patient on the floor/unit where the patient is located. Trip Arango NP    The patient has been seen and examined by me personally, the chart,labs, and radiographic studies have been reviewed. Chest: CTA  Extremities: 1+ edema    I agree with the above assessment and plan.     Heriberto Ramirez MD.

## 2022-03-27 NOTE — PROGRESS NOTES
Reviewed notes for new spiritual concerns      Per notes:    Good visit with patient    Very pleasant

## 2022-03-27 NOTE — PROGRESS NOTES
Hospitalist Progress Note   Admit Date:  3/25/2022  4:10 AM   Name:  Mukul Fuller   Age:  64 y.o. Sex:  female  :  1960   MRN:  [id number]   Room:  [facility]/    Presenting Complaint: No chief complaint on file. 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 Patient was recently discharged from the hospital on 3/14 after treatment for CHF. She was also noted to have splenomegaly with splenic infarct. And  recently diagnosed with E. coli bacterial meningitis, completed course of antibiotics. In the ER chest x-ray was done shows evidence of pulmonary vascular congestion. On 3/23 echocardiogram was performed showing mitral vegetations. She was transferred to the [facility] 3/24. Consulted Cardiology    Subjective/24hr Events (22): Denies CP, SOB. Endorsed better pain/spasm control    ROS:  10 systems reviewed and negative except as noted above. Assessment & Plan:     Principal Problem:  Vegetative endocarditis of mitral valve (3/24/2022)  3/25/22  -Continue Vanc; BCx NGTD  -Cardiology with plans for ROSELINE today  -Consult ID  3/26/22  -Per ID patient Ceftriaxone 2g daily for a minimum of 4 weeks;  Hx of EColi bacteremia   -PT with recs for rehab  -Per CARDS patient is high risk for complications with ROSELINE d/t hx of esophageal varices and multiple co morbidities     Active Problems:  Acute respiratory failure with hypoxemia ([address] (3/9/2022)   3/25/22  -Likely 2/2 fluid overload, pleural effusions  -Continue supplemental O2; Wean as able   -Pulmonology consulted;  thoracentesis in a.m.  3/26/22  -Thoracentesis today   -Unable to do thoracentesis due to patient's inability to posture for procedure  -Will continue diuresing  -Supplemental O2; wean as able  -CXR in a.m. Malignant ascites (7/26/2012)   Cirrhosis ([facility] (7/27/2012)  3/25/22  -Continue IV lasix; Na restrictions  -Paracentesis prn       Hypokalemia (7/27/2012)(Resolved)  3/25/22  -K+ 4.0; Monitor with diuresing   3/27/22  -Patient mildly hyperkalemic 5.6; K+ stopped  -Recheck in a.m.  Patient is on lasix  -BMP in a.m.; if remains elevated consider [facility]        AKOSUA (iron deficiency anemia) (7/28/2012)  3/25/22  -Hgb 10.6    Chronic myelogenous leukemia (HCC) (3/30/2015)  -Continue oral medications sprycel and Jakafi    Acquired hypercoagulable state ([facility] (3/30/2015)  -continue Lovenox BID     Thrombocytopenia ([facility] (10/15/2021)   3/25/22  -Improving; Monitor    Hypothyroid  -Continue Synthroid      Diastolic (congestive) heart failure (HCC) (3/19/2022)  3/25/22  -Holding BB d/t hypotension  -Fluid restrictions  3/27/22  -BB resumed    Diarrhea  3/26/22  -Chronic per patient  -Strongyloides antibodies pending; stool for PCR per ID             Dispo/Discharge Planning:      TBD    Diet:  DIET NPO  DVT PPx: SCDs  Code status: Full Code    Hospital Problems as of 3/27/2022 Date Reviewed: 3/25/2022          Codes Class Noted - Resolved POA    * (Principal) Vegetative endocarditis of mitral valve ICD-10-CM: I33.0  ICD-9-CM: 421.0  3/24/2022 - Present Yes        Body mass index (BMI) of 23.0 to 23.9 in adult (Chronic) ICD-10-CM: M12.58  ICD-9-CM: V85.1  8/26/2021 - Present Yes        Endocarditis of mitral valve ICD-10-CM: I05.9  ICD-9-CM: 394.9  3/25/2022 - Present Unknown        CML (chronic myelocytic leukemia) ([facility] ICD-10-CM: C92.10  ICD-9-CM: 205.10  3/19/2022 - Present Yes        Acute diastolic (congestive) heart failure ([facility] ICD-10-CM: I50.31  ICD-9-CM: 428.31, 428.0  3/19/2022 - Present Yes Pleural effusion, bilateral ICD-10-CM: J90  ICD-9-CM: 511.9  3/11/2022 - Present Yes        Hypoproteinemia ([facility] ICD-10-CM: E77.8  ICD-9-CM: 273.8  3/11/2022 - Present Yes        Acute respiratory failure with hypoxemia (HCC) ICD-10-CM: J96.01  ICD-9-CM: 518.81  3/9/2022 - Present Yes        Thrombocytopenia ([facility] ICD-10-CM: D69.6  ICD-9-CM: 287.5  10/15/2021 - Present Yes        Chronic myelogenous leukemia (HCC) (Chronic) ICD-10-CM: C92.10  ICD-9-CM: 205.10  3/30/2015 - Present Yes        Acquired hypercoagulable state ([facility] (Chronic) ICD-10-CM: Z86.13  ICD-9-CM: 289.81  3/30/2015 - Present Yes        AKOSUA (iron deficiency anemia) ICD-10-CM: D50.9  ICD-9-CM: 280.9  7/28/2012 - Present Yes        Cirrhosis ([facility] (Chronic) ICD-10-CM: K74.60  ICD-9-CM: 571.5  7/27/2012 - Present Yes        Hypokalemia ICD-10-CM: E87.6  ICD-9-CM: 276.8  7/27/2012 - Present Yes        Malignant ascites ICD-10-CM: R18.0  ICD-9-CM: 789.51  7/26/2012 - Present Yes        Polycythemia vera ([facility] (Chronic) ICD-10-CM: D45  ICD-9-CM: 238.4  2/16/2009 - Present Yes    Overview Addendum 8/26/2021  2:31 PM by Luis Carlos Topete MD     2/2008 by [email or web address] analysis however portal vein thrombosis  And and splenomegaly since 2004  Hydroxyurea for 6 months poor tolerance spleen did not shrink   Pegasys 90 mcg weekly 4-2009 till    Restarted 12-30-09 45 mcg q 2 weeks  2-2010 reduced to q month   Increased 45 mcg 2/month 4-2010  Held 8-2010 thrombocytopenia  12-29-12 restarted 45 mcg q 2 weeks  4-1-11 45 mcg q 3 weeks  4-22-11 45 mcg q 4 weeks  Uncertain dosing thereafter   Possibly q 3 weeks 10-15-11 and held and restarted on 4-1-12 6-12 ct scan Small bowel wall thickening suggesting an enteritis. In addition, there is well thickening of the right colon which can suggest an additional   colitis although this can also be seen with severe portal hypertension.  Likely reactive edematous changes it scattered fluid collections are seen of   the small bowel mesentery. . Apparent occlusion of the portal vein. In addition, the splenic vein, and superior mesenteric vein are not definitely seen and are also likely occluded. Splenomegaly, and extensive varices are seen as described above consistent with the portal vein occlusion. Eileen Sida Heterogeneous enhancement of the liver and mild periportal edema. An acute hepatitis cannot be excluded. Objective:     Patient Vitals for the past 24 hrs:   Temp Pulse Resp BP SpO2   03/27/22 0808 98.2 °F (36.8 °C) 92 16 107/77 96 %   03/27/22 0503 98.3 °F (36.8 °C) 93 18 110/79 90 %   03/27/22 0101 98.9 °F (37.2 °C) 93 18 113/77 92 %   03/26/22 2111 98.3 °F (36.8 °C) 100 19 113/74 90 %   03/26/22 1636 97.6 °F (36.4 °C) (!) 102 19 135/88 (!) 84 %     Oxygen Therapy  O2 Sat (%): 96 % (03/27/22 0808)  Pulse via Oximetry: 85 beats per minute (03/26/22 0730)  O2 Device: Nasal cannula (03/26/22 0730)  Skin Assessment: Clean, dry, & intact (03/25/22 1935)  Skin Protection for O2 Device: N/A (03/25/22 1935)  O2 Flow Rate (L/min): 2 l/min (03/26/22 0730)    Estimated body mass index is 23.55 kg/m² as calculated from the following:    Height as of 3/18/22: 5' 3\" (1.6 m). Weight as of this encounter: 60.3 kg (132 lb 15 oz). Intake/Output Summary (Last 24 hours) at 3/27/2022 1041  Last data filed at 3/26/2022 1656  Gross per 24 hour   Intake 80 ml   Output 450 ml   Net -370 ml         Physical Exam:     Blood pressure 107/77, pulse 92, temperature 98.2 °F (36.8 °C), resp. rate 16, weight 60.3 kg (132 lb 15 oz), SpO2 96 %. General:    Well nourished. No overt distress  Head:  Normocephalic, atraumatic  Eyes:  Sclerae appear normal.  Pupils equally round. ENT:  Nares appear normal, no drainage. Moist oral mucosa  Neck:  No restricted ROM. Trachea midline   CV:   RRR. No m/r/g. No jugular venous distension. Lungs:   CTAB. No wheezing, rhonchi, or rales. Respirations even, unlabored  Abdomen: Bowel sounds present. Soft, nontender, mildly distended. Extremities: No cyanosis or clubbing. No edema  Skin:     No rashes and normal coloration. Warm and dry. Neuro:  CN II-XII grossly intact. A&Ox3  Psych:  Normal mood and affect.       I have reviewed ordered lab tests and independently visualized imaging below:    Recent Labs:  Recent Results (from the past 48 hour(s))   LACTIC ACID    Collection Time: 03/25/22 11:09 AM   Result Value Ref Range    Lactic acid 2.2 (H) 0.4 - 2.0 MMOL/L   PROTHROMBIN TIME + INR    Collection Time: 03/25/22 12:30 PM   Result Value Ref Range    Prothrombin time 25.1 (H) 12.6 - 14.5 sec    INR 2.2     CBC W/O DIFF    Collection Time: 03/26/22  5:11 AM   Result Value Ref Range    WBC 15.8 (H) 4.3 - 11.1 K/uL    RBC 3.56 (L) 4.05 - 5.2 M/uL    HGB 11.4 (L) 11.7 - 15.4 g/dL    HCT 36.1 35.8 - 46.3 %    .4 (H) 79.6 - 97.8 FL    MCH 32.0 26.1 - 32.9 PG    MCHC 31.6 31.4 - 35.0 g/dL    RDW 22.4 (H) 11.9 - 14.6 %    PLATELET 207 (L) 962 - 450 K/uL    MPV 10.8 9.4 - 12.3 FL    ABSOLUTE NRBC 0.02 0.0 - 0.2 K/uL   MAGNESIUM    Collection Time: 03/26/22  5:11 AM   Result Value Ref Range    Magnesium 2.2 1.8 - 2.4 mg/dL   METABOLIC PANEL, BASIC    Collection Time: 03/26/22  5:11 AM   Result Value Ref Range    Sodium 134 (L) 136 - 145 mmol/L    Potassium 4.9 3.5 - 5.1 mmol/L    Chloride 103 98 - 107 mmol/L    CO2 25 21 - 32 mmol/L    Anion gap 6 (L) 7 - 16 mmol/L    Glucose 102 (H) 65 - 100 mg/dL    BUN 19 8 - 23 MG/DL    Creatinine 1.00 0.6 - 1.0 MG/DL    GFR est AA >60 >60 ml/min/1.73m2    GFR est non-AA 60 (L) >60 ml/min/1.73m2    Calcium 8.9 8.3 - 10.4 MG/DL   PHOSPHORUS    Collection Time: 03/26/22  5:11 AM   Result Value Ref Range    Phosphorus 3.5 2.3 - 3.7 MG/DL   LACTIC ACID    Collection Time: 03/26/22  5:11 AM   Result Value Ref Range    Lactic acid 1.3 0.4 - 2.0 MMOL/L   PLASMA, ALLOCATE    Collection Time: 03/26/22  6:00 AM   Result Value Ref Range    Unit number [id number]     Blood component type FP 24h, Thaw     Unit division 00     Status of unit TRANSFUSED     Unit number [id number]     Blood component type FP 24h, Thaw     Unit division 00     Status of unit TRANSFUSED    PROTHROMBIN TIME + INR    Collection Time: 03/26/22  8:58 AM   Result Value Ref Range    Prothrombin time 16.7 (H) 12.6 - 14.5 sec    INR 1.3     CBC W/O DIFF    Collection Time: 03/27/22  5:26 AM   Result Value Ref Range    WBC 22.6 (H) 4.3 - 11.1 K/uL    RBC 3.93 (L) 4.05 - 5.2 M/uL    HGB 12.8 11.7 - 15.4 g/dL    HCT 39.8 35.8 - 46.3 %    .3 (H) 79.6 - 97.8 FL    MCH 32.6 26.1 - 32.9 PG    MCHC 32.2 31.4 - 35.0 g/dL    RDW 22.5 (H) 11.9 - 14.6 %    PLATELET 97 (L) 889 - 450 K/uL    MPV 11.3 9.4 - 12.3 FL    ABSOLUTE NRBC 0.02 0.0 - 0.2 K/uL   MAGNESIUM    Collection Time: 03/27/22  5:26 AM   Result Value Ref Range    Magnesium 2.3 1.8 - 2.4 mg/dL   METABOLIC PANEL, BASIC    Collection Time: 03/27/22  5:26 AM   Result Value Ref Range    Sodium 138 136 - 145 mmol/L    Potassium 5.6 (H) 3.5 - 5.1 mmol/L    Chloride 104 98 - 107 mmol/L    CO2 20 (L) 21 - 32 mmol/L    Anion gap 14 7 - 16 mmol/L    Glucose 74 65 - 100 mg/dL    BUN 24 (H) 8 - 23 MG/DL    Creatinine 1.20 (H) 0.6 - 1.0 MG/DL    GFR est AA 59 (L) >60 ml/min/1.73m2    GFR est non-AA 49 (L) >60 ml/min/1.73m2    Calcium 9.0 8.3 - 10.4 MG/DL   PHOSPHORUS    Collection Time: 03/27/22  5:26 AM   Result Value Ref Range    Phosphorus 4.2 (H) 2.3 - 3.7 MG/DL       All Micro Results     None          Other Studies:  No results found.     Current Meds:  Current Facility-Administered Medications   Medication Dose Route Frequency    oxyCODONE-acetaminophen (PERCOCET 10)  mg per tablet 1 Tablet  1 Tablet Oral Q4H PRN    furosemide (LASIX) injection 40 mg  40 mg IntraVENous DAILY    diphenhydrAMINE (BENADRYL) injection 25 mg  25 mg IntraVENous Q4H PRN    metoprolol succinate (TOPROL-XL) XL tablet 25 mg  25 mg Oral DAILY    calcium carbonate (OS-JOE) tablet 500 mg [elemental]  500 mg Oral QHS    cetirizine (ZYRTEC) tablet 10 mg  10 mg Oral DAILY    cyclobenzaprine (FLEXERIL) tablet 5 mg  5 mg Oral TID PRN    [Held by provider] enoxaparin (LOVENOX) injection 50 mg  50 mg SubCUTAneous Q12H    famotidine (PEPCID) tablet 40 mg  40 mg Oral DAILY PRN    fluticasone propionate (FLONASE) 50 mcg/actuation nasal spray 2 Spray  2 Spray Both Nostrils DAILY    levothyroxine (SYNTHROID) tablet 125 mcg  125 mcg Oral 6am    magnesium oxide (MAG-OX) tablet 200 mg  200 mg Oral DAILY    ondansetron (ZOFRAN ODT) tablet 4 mg  4 mg Oral Q8H PRN    Or    ondansetron (ZOFRAN) injection 4 mg  4 mg IntraVENous Q8H PRN    pantoprazole (PROTONIX) tablet 40 mg  40 mg Oral ACB    [Held by provider] potassium chloride (K-DUR, KLOR-CON M20) SR tablet 40 mEq  40 mEq Oral BID    ruxolitinib (Jakavi) tab 5 mg (Patient Supplied)  5 mg Oral Q12H    traZODone (DESYREL) tablet 50 mg  50 mg Oral QHS    trimethoprim-polymyxin b (POLYTRIM) 10,000 unit- 1 mg/mL ophthalmic solution 1 Drop  1 Drop Both Eyes BID    zinc oxide-cod liver oil (DESITIN) 40 % paste   Topical PRN    dasatinib tab 80 mg (Patient Supplied)  80 mg Oral DAILY    0.9% sodium chloride infusion 250 mL  250 mL IntraVENous PRN    cefTRIAXone (ROCEPHIN) 2 g in 0.9% sodium chloride (MBP/ADV) 50 mL MBP  2 g IntraVENous Q24H       Signed:  Mayelin Gomez NP    Part of this note may have been written by using a voice dictation software. The note has been proof read but may still contain some grammatical/other typographical errors.

## 2022-03-27 NOTE — PROGRESS NOTES
[facility] CARDIOLOGY PROGRESS NOTE           3/27/2022 12:00 PM    Admit Date: 3/25/2022      Subjective:     Improved dyspnea. More alert today. Complains of right flank pain. ROS:  Cardiovascular:  As noted above    Objective:      Vitals:    03/27/22 0101 03/27/22 0503 03/27/22 0808 03/27/22 1145   BP: 113/77 110/79 107/77 113/84   Pulse: 93 93 92 91   Resp: 18 18 16 16   Temp: 98.9 °F (37.2 °C) 98.3 °F (36.8 °C) 98.2 °F (36.8 °C) 98.4 °F (36.9 °C)   SpO2: 92% 90% 96% 92%   Weight:           Physical Exam:  General-No Acute Distress  Neck- supple, + JVD  CV- regular rate and rhythm; harsh apical holosystolic 3/6 murmur  Lung-decreased at bases  Abd- soft, nontender, nondistended  Ext- no edema bilaterally. Skin- warm and dry    Data Review:   Recent Labs     03/27/22  0526 03/26/22  0858 03/26/22  0511 03/25/22  1230     --  134*  --    K 5.6*  --  4.9  --    MG 2.3  --  2.2  --    BUN 24*  --  19  --    CREA 1.20*  --  1.00  --    GLU 74  --  102*  --    WBC 22.6*  --  15.8*  --    HGB 12.8  --  11.4*  --    HCT 39.8  --  36.1  --    PLT 97*  --  146*  --    INR  --  1.3  --  2.2       Assessment/Plan:     Principal Problem:    Vegetative endocarditis of mitral valve (3/24/2022)  -Images independently reviewed. Noted likely flail posterior mitral valve with severe eccentric mitral regurgitation; posterior leaflet appears thickened in the apical views; uncertain regarding if possible vegetation versus torn papillary muscle (does not appear thickened in parasternal views). -Also consideration for marantic endocarditis with current blood cultures no growth to date (images from 2/3/2022 with normal-appearing mitral valve; also prior imaging with splenic infarcts/renal infarcts). With recent admission for pulmonary edema, suggestive of acute increase in left atrial pressures.    -Ideally ROSELINE and assess during hospital course but higher risk for complications with history of esophageal varices and discussed with patient. Prior grade II/III esophageal varices from EGD from 8/20/2021. Likely plan ROSELINE sometime later this week.  -previously with E. coli bacteremia which is less likely to be causative  -Given multiple comorbidities, less likely candidate for intervention if structural issue and poor prognosis. Mitral regurgitation  -Appears severe on echocardiogram.  New onset. See above. -Exam currently with no significant volume overload. Difficult situation to manage with multiple comorbidities. Ideally consideration for ROSELINE to elucidate mechanism and consideration for intervention but uncertain if candidate for intervention. See above  -Continue Lasix. Add on low-dose nitrates and assess if blood pressures tolerate    Active Problems:      History of DVT (deep vein thrombosis) (3/19/2022)    Portal vein thrombosis (2/16/2009)  - on systemic anticoagulation per primary team       Polycythemia vera ([address] (2/16/2009)      Malignant ascites (7/26/2012)      Cirrhosis ([address] (7/27/2012)      Hypokalemia (7/27/2012)      AKOSUA (iron deficiency anemia) (7/28/2012)      Chronic myelogenous leukemia ([address] (3/30/2015)    Acquired hypercoagulable state ([address] (3/30/2015)    Thrombocytopenia ([address] (10/15/2021)  -Per hematology oncology      Acute respiratory failure with hypoxemia ([address] (3/9/2022)  -Likely secondary to severe mitral regurgitation/pleural effusion      Pleural effusion, bilateral (3/11/2022)      Hypoproteinemia ([address] (3/11/2022)      Hyperkalemia  -Secondary to oversupplementation; appears was on 40 mg twice daily with presenting hypokalemia which has been held.   Defer to primary team      CML (chronic myelocytic leukemia) ([address] (3/19/2022)  -Per hematology oncology      Luc Schmidt MD  3/27/2022 12:00 PM

## 2022-03-28 PROBLEM — E87.5 HYPERKALEMIA: Status: ACTIVE | Noted: 2022-01-01

## 2022-03-28 NOTE — PROGRESS NOTES
Infectious Disease Progress Note    Today's Date: 3/28/2022   Admit Date: 3/25/2022    Impression:   · Mitral valve infective endocarditis (TTE 3/24, not seen on study 2/3/22), negative blood cultures (3/24)   · R lung effusion, pulmonary following  · Hx of recurrent E coli bacteremia 2/2, 2/27, source presumed bowel; BC negative 3/1, 3/9  · E coli bacterial meningitis; s/p ceftriaxone x 2 weeks through 3/13/22  · CML on dasitinib  · PCV on jakafi  · Portal vein thrombosis/splenomegaly/multiple splenic infarcts  · Low back pain, with MRI lumbar spine (3/4) without evidence of infection  · Chronic diarrhea    Plan:   · Continue ceftriaxone. Will continue to follow. · Follow CBC. Patient was unable to tell me if she is having diarrhea or other symptoms. · Discussed with pulmonology; she has been getting diuresed, and has refused cxr today. · Will check CT Head given altered mental status and recent meningitis. Anti-infectives:   · Vanc 3/24-3/25  · Ceftriaxone 3/25-     Subjective: Interval History: Strongyloides serum ab test does not appear to have been collected yet; WBCs 22.6k, rising, creatinine elevated 1.5 above baseline normal and creatinine clearance now 32.6. Potassium rising, now 5.8. Patient was too weak to answer questions today; just reports that she is thirsty. Allergies   Allergen Reactions    Latex Other (comments)     Testing for latex allergy was positive as a 2 (on a scale of 1 to 3).     Sulfa (Sulfonamide Antibiotics) Anaphylaxis    Tramadol Other (comments)     Top lip swelled    Augmentin [Amoxicillin-Pot Clavulanate] Rash    Codeine Other (comments)    Divalproex Sodium Hives    Morphine Nausea and Vomiting     Not a true allergy    Potassium Clavulanate Hives    Rizatriptan Rash    Tetanus Immune Globulin Other (comments)     Heat, bruising, and swelling at  Site of injection    Vancomycin Rash    Xanax [Alprazolam] Other (comments)     Hallucinations    Zonegran [Zonisamide] Rash        Review of Systems:  Review of systems not obtained due to patient factors. Objective:     Visit Vitals  /74 (BP 1 Location: Right upper arm, BP Patient Position: Sitting)   Pulse 93   Temp 97.4 °F (36.3 °C)   Resp 20   Wt 57.2 kg (126 lb 1.7 oz)   SpO2 97%   BMI 22.34 kg/m²     Temp (24hrs), Av.6 °F (36.4 °C), Min:96.4 °F (35.8 °C), Max:98.3 °F (36.8 °C)       Lines:  Peripheral IV:       General: Drowsy, no acute distress but appears ill and uncomfortable, appears older than stated age  Head:    Normocephalic, atraumatic  Eyes:   Anicteric sclerae, no drainage, not injected, EOMI  Mouth:  Very dry  Neck:   Supple, symmetrical, trachea midline  Lungs:   Clear anterior lung fields, poor effort  CV:   Regular rate and rhythm with murmur  Abdomen:  Soft, non tender  Extremities:  No cyanosis or edema  Musculoskeletal: Generally weak, sitting in chair  Skin:   No acute rash  Psych:  Unable to evaluate today due to patient fatigue  Lines:    benign      Data Review:     CBC:  Recent Labs     22  0526 22  0511   WBC 22.6* 15.8*   HGB 12.8 11.4*   HCT 39.8 36.1   PLT 97* 146*       BMP:  Recent Labs     22  0542 22  0526 22  0511   CREA 1.50* 1.20* 1.00   BUN 39* 24* 19   * 138 134*   K 5.8* 5.6* 4.9    104 103   CO2 19* 20* 25   AGAP 15 14 6*   GLU 73 74 102*       LFTS:  No results for input(s): TBILI, ALT, AP, TP, ALB in the last 72 hours.     No lab exists for component: SGOT    Microbiology:     All Micro Results     None          Imaging:   Reviewed    Signed By: Lazara Oneill NP     2022

## 2022-03-28 NOTE — PROGRESS NOTES
[facility] CARDIOLOGY PROGRESS NOTE           3/28/2022 9:19 AM    Admit Date: 3/25/2022    Subjective:   Patient reports she has some abdominal discomfort which is mild as well as nausea this morning. Denies worsening shortness of breath, chest pain. Reports leg swelling improved reports good urine output overnight. No other complaints at this time. ROS:  Cardiovascular:  As noted above    Objective:      Vitals:    03/27/22 2152 03/28/22 0022 03/28/22 0513 03/28/22 0732   BP: 110/79 113/73 122/86 113/76   Pulse: 80 77 76 75   Resp:  16 18 19   Temp:  97.6 °F (36.4 °C) (!) 96.4 °F (35.8 °C) 97.5 °F (36.4 °C)   SpO2:  97% 98% 96%   Weight:         Physical Exam:  General-No Acute Distress, awake, alert  Neck- supple, +JVD  CV- regular rate and rhythm, holosystolic murmur apex 2/6  Lung-decreased at the bases bilaterally  Abd- soft, nontender, mildly distended  Ext- no edema bilaterally in the legs, SCDs in place  Skin- warm and dry    Data Review:   Recent Labs     03/28/22  0542 03/27/22  0526 03/26/22  0858 03/26/22  0511 03/26/22  0511 03/25/22  1230   * 138  --    < > 134*  --    K 5.8* 5.6*  --    < > 4.9  --    MG  --  2.3  --   --  2.2  --    BUN 39* 24*  --    < > 19  --    CREA 1.50* 1.20*  --    < > 1.00  --    GLU 73 74  --    < > 102*  --    WBC  --  22.6*  --   --  15.8*  --    HGB  --  12.8  --   --  11.4*  --    HCT  --  39.8  --   --  36.1  --    PLT  --  97*  --   --  146*  --    INR  --   --  1.3  --   --  2.2    < > = values in this interval not displayed. 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 papillary muscle rupture)    - Hemodynamics improved     - repeat blood cultures no growth to date    - Cr elevated today, volume status improved, lying supine. May need to hold Lasix if renal function worsens.     - Due to underlying medical conditions, cirrhosis, CML, poor candidate for cardiothoracic surgery    - Ideally performed ROSELINE during hospital course but high risk for complications with history of grade II/III esophageal varices requiring banding x 6 (8/20/21) discussed with patient again today. Prior from EGD from 8/20/2021. Likely plan ROSELINE sometime later this week. - would recommend EGD prior to ROSELINE given history of prior varices requiring banding. Active Problems:      Acquired hypercoagulable state ([address] (3/30/2015)    History of DVT (deep vein thrombosis) (3/19/2022)    Portal vein thrombosis (2/16/2009)  - on systemic anticoagulation per primary team        Malignant ascites (7/26/2012)    Cirrhosis ([address] (7/27/2012)    - per medicine / GI , prior paracentesis        Chronic myelogenous leukemia ([address] (3/30/2015)    - per oncology        Thrombocytopenia ([address] (10/15/2021)    - possible cirrhosis related      Acute respiratory failure with hypoxemia ([address] (3/9/2022)    Pleural effusion, bilateral (3/11/2022)    - post thoracentesis with improvement in dyspnea        CML (chronic myelocytic leukemia) ([address] (3/19/2022)    - per oncology       Hyperkalemia   -  Monitor, possibly due to supplementation. As patient is poor candidate for intervention on mitral valve, has multiple comorbidities including history of decompensated cirrhosis, CML would continue to address goals of care consider palliative care as an option.     Alton Flores DO  3/28/2022 9:19 AM

## 2022-03-28 NOTE — PROGRESS NOTES
Resting quietly, resp even, unlab with O2 intact. Eyes closed with relaxed facial expression. No distress noted. Door open. Report received from Romel Courtney RN.

## 2022-03-28 NOTE — PROGRESS NOTES
ACUTE PHYSICAL THERAPY GOALS:  (Developed with and agreed upon by patient and/or caregiver. )  LTG:  (1.)Ms. Teresa Arias will move from supine to sit and sit to supine , scoot up and down and roll side to side in bed with INDEPENDENT within 7 treatment day(s). (2.)Ms. Teresa Arias will transfer from bed to chair and chair to bed with INDEPENDENT using the least restrictive device within 7 treatment day(s). (3.)Ms. Teresa Arias will ambulate with SUPERVISION for 200+ feet with the least restrictive device within 7 treatment day(s). (4.)Ms. Teresa Arias will ambulate up/down 12 steps SUPERVISION  with the least restrictive device within 7 treatment day(s). PHYSICAL THERAPY: Daily Note Treatment Day # 2    Reyna De La Cruz is a 64 y.o. female   PRIMARY DIAGNOSIS: Vegetative endocarditis of mitral valve  Endocarditis of mitral valve [I05.9]  Procedure(s) (LRB):  PROCEDURE CANCELLED - NOT PERFORMED (N/A)  2 Days Post-Op    ASSESSMENT:     REHAB RECOMMENDATIONS: CURRENT LEVEL OF FUNCTION:  (Most Recently Demonstrated)   Recommendation to date pending progress:  Setting:   Short-term Rehab  Equipment:    To Be Determined Bed Mobility:   Moderate Assistance  Sit to Stand:   Minimal Assistance to CGA  Transfers:   Minimal Assistance  Gait/Mobility:   Minimal Assistance     ASSESSMENT:  Ms. Teresa Arias is making slow progress toward goals. She presents with continued confusion and disorientation requiring additional cues for participation and command following. Bed mobility with mod A and additional time. STS with min A and two attempts. SPT with min A and HHA. Once seated, discovered she was soiled and performed multiple reps of STS with min A and static standing with CGA to change brief. SUBJECTIVE:   Ms. Teresa Arias states, \"I know who you are, Efe Conde. \"    SOCIAL HISTORY/ LIVING ENVIRONMENT: lives alone in [place] with all ADLs, no AD used until very recently has RW. Does not drive, transport from [employer].   Walks store when going.  Support Systems: Child(lauryn),Other Family Member(s)  OBJECTIVE:     PAIN: VITAL SIGNS: LINES/DRAINS:   Pre Treatment: Pain Screen  Pain Scale 1: Numeric (0 - 10)  Pain Intensity 1: 0  Post Treatment: 0   None  O2 Device: None (Room air)     MOBILITY: I Mod I S SBA CGA Min Mod Max Total  NT x2 Comments:   Bed Mobility    Rolling [] [] [] [] [] [] [x] [] [] [] []    Supine to Sit [] [] [] [] [] [] [x] [] [] [] []    Scooting [] [] [] [] [] [] [x] [] [] [] []    Sit to Supine [] [] [] [] [] [] [] [] [] [x] []    Transfers    Sit to Stand [] [] [] [] [] [x] [] [] [] [] []    Bed to Chair [] [] [] [] [] [x] [] [] [] [] []    Stand to Sit [] [] [] [] [] [x] [] [] [] [] []    I=Independent, Mod I=Modified Independent, S=Supervision, SBA=Standby Assistance, CGA=Contact Guard Assistance,   Min=Minimal Assistance, Mod=Moderate Assistance, Max=Maximal Assistance, Total=Total Assistance, NT=Not Tested    BALANCE: Good Fair+ Fair Fair- Poor NT Comments   Sitting Static [] [x] [] [] [] []    Sitting Dynamic [x] [x] [] [] [] []              Standing Static [] [] [x] [] [] []    Standing Dynamic [] [] [] [x] [] []      GAIT: I Mod I S SBA CGA Min Mod Max Total  NT x2 Comments:   Level of Assistance [] [] [] [] [] [x] [] [] [] [] []    Distance 3'    DME HHA    Gait Quality Shuffled, unsteady    Weightbearing  Status N/A     I=Independent, Mod I=Modified Independent, S=Supervision, SBA=Standby Assistance, CGA=Contact Guard Assistance,   Min=Minimal Assistance, Mod=Moderate Assistance, Max=Maximal Assistance, Total=Total Assistance, NT=Not Tested    PLAN:   FREQUENCY/DURATION: PT Plan of Care: 3 times/week for duration of hospital stay or until stated goals are met, whichever comes first.  TREATMENT:     TREATMENT:   ($$ Therapeutic Activity: 38-52 mins    )  Therapeutic Activity (40 Minutes):  Therapeutic activity included Rolling, Supine to Sit, Scooting, Transfer Training, Ambulation on level ground, Sitting balance  and Standing balance to improve functional Mobility, Strength and Activity tolerance.     TREATMENT GRID:  N/A    AFTER TREATMENT POSITION/PRECAUTIONS:  Chair, Needs within reach and RN notified    INTERDISCIPLINARY COLLABORATION:  RN/PCT and PT/PTA    TOTAL TREATMENT DURATION:  PT Patient Time In/Time Out  Time In: 1012  Time Out: [address], PTA

## 2022-03-28 NOTE — PROGRESS NOTES
Occupational Therapy    Re-attempt made this afternoon, upon entry NSG x2 present, pt soiled in recliner, pt unaware. Assisted Max A x 2 HHA recliner -> EOB with continuous verbal/phsyical cues for directions and D x 2+ EOB -> supine. NSG x2 and PCT present for pt care and cath palcement. Pt presenting confused, shouting incoherently to \"You're killing me, don't kill me\". Pt not appropriate for therapy at present time. Will re-attempt at later time as pt status and schedule allow.

## 2022-03-28 NOTE — PROGRESS NOTES
[facility] has accepted pt for admission pending insurance approval.  All other facilities have declined (3 due to bed availability and 1 due to cost of medication).  has requested that [place] initiate the insurance auth request tomorrow. [place] following.

## 2022-03-28 NOTE — PROGRESS NOTES
Continues to rest quietly, resp even, unlab with O2 intact. Facial expression relaxed with no distress noted. Report given to Reji Messer RN, including no IV access after 2 unsuccessful attempts, and pt's desire for new ext female cath to be placed.

## 2022-03-28 NOTE — PROGRESS NOTES
Brief wet with urine, leaked from ext female cath. Incontinent of medium, soft stool. Eugenia care given, ext cath dc'd, desitin cream applied to excoriated eugenia area.

## 2022-03-28 NOTE — PROGRESS NOTES
Awoke, upon Phlebotomist arriving to room to draw a.m labs, reports pt attempting to get out of bed. Pt reports she needs to get up to go to the bathroom. Reminded pt she is too weak, unable to stand or get out of bed, pt stated preference to reapply ext female catheter. Explained to pt, nurse will gather supplies unavailable on this unit, from another area and will apply a new ext catheter; voiced understanding.

## 2022-03-28 NOTE — PROGRESS NOTES
Continues to rest quietly, resp even, unlab at rest with O2 at 3.5L N/C. Skin pale, warm, dry. AP reg, lungs sounds diminished. Oriented to person, place, forgetful with time and situation. Follows commands. Abdomen soft, distended with active bowel sounds. Ext female cath intact to suction, urine clear, yellow. No c/o, call light in reach, bed alarm set, door open. No distress noted.

## 2022-03-28 NOTE — PROGRESS NOTES
Hospitalist Progress Note   Admit Date:  3/25/2022  4:10 AM   Name:  Raúl Spencer   Age:  64 y.o. Sex:  female  :  1960   MRN:  [id number]   Room:  7/    Presenting Complaint: No chief complaint on file. 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 Patient was recently discharged from the hospital on 3/14 after treatment for HF. She was also noted to have splenomegaly with splenic infarct. She was recently diagnosed with E. coli bacterial meningitis, completed course of antibiotics.     In the ER, CXR was done shows evidence of pulmonary vascular congestion. On 3/23 echocardiogram was performed showing mitral vegetations. She was transferred to the [facility] 3/24. Cardiology, ID, and Pulmonology are on board. Subjective/24hr Events (22): Patient seen and examined. CXR unchanged. Possible thora tomorrow if patient can tolerate. Hemolyzed sample this morning; repeat and additional labs ordered. Consulting specialist recommendations reviewed. Discussed case with patient's sister Mk Harkins over the telephone. She will discuss code status with the patient's daughter tonight. We will consult Palliative medicine in the morning. ROS:  10 systems reviewed and negative except as noted above.      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 coli bacteremia   - Per Cardiology, patient is high risk for complications with ROSELINE d/t hx of esophageal varices and multiple co-morbidities   - Consult GI tomorrow morning for EGD prior to ROSELINE due to Cardiology recommendation     Active Problems:  Acute encephalopathy  Mar/28: Patient refusing several medications inappropriately   - Blood gas, ammonia ordered   - No central focal concerns during exam    CARLITO  Mar/28: sCr 0.70 on Mar/14; sCr 1.50 today   - IV furosemide stopped    - Pulm gave a 500mL bolus   - CMP in the a.m. Acute respiratory failure with hypoxemia (HCC) (3/9/2022)  - R > L pleural effusions  - Thoracentesis cancelled on Mar/27 (patient had been given Vit K and FFP in preparation; patient was unable to tolerate)  - Will attempt again on Mar/29   - SpO2 94% on room air at time of VB.341 / 27.3 / 41.2  - Pulmonology aware     Malignant ascites (2012)  Cirrhosis ([facility] (2012)  - INR 2.2 on admission  - Low sodium diet  - Paracentesis PRN        Hyperkalemia (2012)  Mar/28: Sample hemolyzed; re-check level ordered    Thrombocytopenia ([facility] [address] (10/15/2021)  - Plt 146 --> 97  - Monitor; hx of cirrhosis     Hypothyroidism  - Continue Synthroid   - TSH 22.10 on Mar/18  - Check a free T4      Diastolic heart failure ([facility] [address] (3/19/2022)  - Cardiology on board  - On furosemide 20 mg at home  - Received furosemide 40 mg IVP on Mar/28, patient refused dose on Mar/29  - Toprol-XL 25 mg daily     Diarrhea  -Chronic per patient  -Strongyloides antibodies, stool PCR ordered per ID    AKOSUA (iron deficiency anemia) (2012)  - Hgb 12.8     CML  PCV  - Continue home medications dasatinib and ruxolitinib     Acquired hypercoagulable state ([address] (3/30/2015)  Hx DVT, portal vein thrombosis  - Continue enoxaparin q12hr      Discharge Planning: > 3 midnights    Diet:  ADULT DIET Regular;  Low Sodium (2 gm)  DVT PPx: enoxaparin  Code status: Full Code    Hospital Problems as of 3/28/2022 Date Reviewed: 3/25/2022          Codes Class Noted - Resolved POA    * (Principal) Vegetative endocarditis of mitral valve ICD-10-CM: I33.0  ICD-9-CM: 421.0  3/24/2022 - Present Yes        Body mass index (BMI) of 23.0 to 23.9 in adult (Chronic) ICD-10-CM: Y46.01  ICD-9-CM: V85.1  8/26/2021 - Present Yes        Hyperkalemia ICD-10-CM: E87.5  ICD-9-CM: 276.7  3/28/2022 - Present Unknown        Endocarditis of mitral valve ICD-10-CM: I05.9  ICD-9-CM: 394.9  3/25/2022 - Present Unknown        CML (chronic myelocytic leukemia) (HCC) ICD-10-CM: C92.10  ICD-9-CM: 205.10  3/19/2022 - Present Yes        Acute diastolic (congestive) heart failure (HCC) ICD-10-CM: I50.31  ICD-9-CM: 428.31, 428.0  3/19/2022 - Present Yes        Pleural effusion, bilateral ICD-10-CM: J90  ICD-9-CM: 511.9  3/11/2022 - Present Yes        Hypoproteinemia ([email or web address] ICD-10-CM: E77.8  ICD-9-CM: 273.8  3/11/2022 - Present Yes        Acute respiratory failure with hypoxemia (HCC) ICD-10-CM: J96.01  ICD-9-CM: 518.81  3/9/2022 - Present Yes        Thrombocytopenia ([facility] [[email or web address] ICD-10-CM: D69.6  ICD-9-CM: 287.5  10/15/2021 - Present Yes        Chronic myelogenous leukemia (HCC) (Chronic) ICD-10-CM: C92.10  ICD-9-CM: 205.10  3/30/2015 - Present Yes        Acquired hypercoagulable state ([facility] [[email or web address] (Chronic) ICD-10-CM: W69.69  ICD-9-CM: 289.81  3/30/2015 - Present Yes        AKOSUA (iron deficiency anemia) ICD-10-CM: D50.9  ICD-9-CM: 280.9  7/28/2012 - Present Yes        Cirrhosis ([facility] [[email or web address] (Chronic) ICD-10-CM: K74.60  ICD-9-CM: 571.5  7/27/2012 - Present Yes        Hypokalemia ICD-10-CM: E87.6  ICD-9-CM: 276.8  7/27/2012 - Present Yes        Malignant ascites ICD-10-CM: R18.0  ICD-9-CM: 789.51  7/26/2012 - Present Yes        Polycythemia vera ([facility] [[email or web address] (Chronic) ICD-10-CM: D45  ICD-9-CM: 238.4  2/16/2009 - Present Yes    Overview Addendum 8/26/2021  2:31 PM by Adrienne Voss MD     2/2008 by [email or web address] analysis however portal vein thrombosis  And and splenomegaly since 2004  Hydroxyurea for 6 months poor tolerance spleen did not shrink   Pegasys 90 mcg weekly 4-2009 till    Restarted 12-30-09 45 mcg q 2 weeks  2-2010 reduced to q month   Increased 45 mcg 2/month 4-2010  Held 8-2010 thrombocytopenia  12-29-12 restarted 45 mcg q 2 weeks  4-1-11 45 mcg q 3 weeks  4-22-11 45 mcg q 4 weeks  Uncertain dosing thereafter   Possibly q 3 weeks 10-15-11 and held and restarted on 4-1-12 6-12 ct scan Small bowel wall thickening suggesting an enteritis. In addition, there is well thickening of the right colon which can suggest an additional   colitis although this can also be seen with severe portal hypertension. Likely reactive edematous changes it scattered fluid collections are seen of   the small bowel mesentery. . Apparent occlusion of the portal vein. In addition, the splenic vein, and superior mesenteric vein are not definitely seen and are also likely occluded. Splenomegaly, and extensive varices are seen as described above consistent with the portal vein occlusion. Treasure Rued Heterogeneous enhancement of the liver and mild periportal edema. An acute hepatitis cannot be excluded.                     Objective:     Patient Vitals for the past 24 hrs:   Temp Pulse Resp BP SpO2   03/28/22 1127 97.4 °F (36.3 °C) 93 20 111/74 97 %   03/28/22 0732 97.5 °F (36.4 °C) 75 19 113/76 96 %   03/28/22 0513 (!) 96.4 °F (35.8 °C) 76 18 122/86 98 %   03/28/22 0022 97.6 °F (36.4 °C) 77 16 113/73 97 %   03/27/22 2152 -- 80 -- 110/79 --   03/27/22 2030 97.6 °F (36.4 °C) (!) 58 16 100/77 96 %   03/27/22 1546 98.3 °F (36.8 °C) -- -- -- --   03/27/22 1544 98.3 °F (36.8 °C) 88 16 104/72 91 %     Oxygen Therapy  O2 Sat (%): 97 % (03/28/22 1127)  Pulse via Oximetry: 85 beats per minute (03/26/22 0730)  O2 Device: None (Room air) (03/28/22 0211)  Skin Assessment: Clean, dry, & intact (03/25/22 1935)  Skin Protection for O2 Device: N/A (03/25/22 1935)  O2 Flow Rate (L/min): 2 l/min (03/26/22 0730)    Estimated body mass index is 22.34 kg/m² as calculated from the following:    Height as of 3/18/22: 5' 3\" (1.6 m). Weight as of this encounter: 57.2 kg (126 lb 1.7 oz). Intake/Output Summary (Last 24 hours) at 3/28/2022 1411  Last data filed at 3/28/2022 0844  Gross per 24 hour   Intake 60 ml   Output 700 ml   Net -640 ml         Physical Exam:   Blood pressure 111/74, pulse 93, temperature 97.4 °F (36.3 °C), resp. rate 20, weight 57.2 kg (126 lb 1.7 oz), SpO2 97 %. General:    Ill appearing  Head:  Normocephalic, atraumatic  Eyes:  Sclerae appear normal.  Pupils equally round. ENT:  Nares appear normal, no drainage. Dry oral mucosa  Neck:  No restricted ROM. Trachea midline   CV:   RRR. No m/r/g. Lungs:   Respirations even, unlabored on room air  Abdomen:   Soft, nondistended. Extremities: No clubbing. Skin:     No rashes, pallor. Neuro:  CN II-XII grossly intact. Lethargic. Psych:   Withdrawn      I have reviewed ordered lab tests and independently visualized imaging below:    Recent Labs:  Recent Results (from the past 48 hour(s))   CBC W/O DIFF    Collection Time: 03/27/22  5:26 AM   Result Value Ref Range    WBC 22.6 (H) 4.3 - 11.1 K/uL    RBC 3.93 (L) 4.05 - 5.2 M/uL    HGB 12.8 11.7 - 15.4 g/dL    HCT 39.8 35.8 - 46.3 %    .3 (H) 79.6 - 97.8 FL    MCH 32.6 26.1 - 32.9 PG    MCHC 32.2 31.4 - 35.0 g/dL    RDW 22.5 (H) 11.9 - 14.6 %    PLATELET 97 (L) 056 - 450 K/uL    MPV 11.3 9.4 - 12.3 FL    ABSOLUTE NRBC 0.02 0.0 - 0.2 K/uL   MAGNESIUM    Collection Time: 03/27/22  5:26 AM   Result Value Ref Range    Magnesium 2.3 1.8 - 2.4 mg/dL   METABOLIC PANEL, BASIC    Collection Time: 03/27/22  5:26 AM   Result Value Ref Range    Sodium 138 136 - 145 mmol/L    Potassium 5.6 (H) 3.5 - 5.1 mmol/L    Chloride 104 98 - 107 mmol/L    CO2 20 (L) 21 - 32 mmol/L    Anion gap 14 7 - 16 mmol/L    Glucose 74 65 - 100 mg/dL    BUN 24 (H) 8 - 23 MG/DL    Creatinine 1.20 (H) 0.6 - 1.0 MG/DL    GFR est AA 59 (L) >60 ml/min/1.73m2    GFR est non-AA 49 (L) >60 ml/min/1.73m2    Calcium 9.0 8.3 - 10.4 MG/DL   PHOSPHORUS    Collection Time: 03/27/22  5:26 AM   Result Value Ref Range    Phosphorus 4.2 (H) 2.3 - 3.7 MG/DL   METABOLIC PANEL, BASIC    Collection Time: 03/28/22  5:42 AM   Result Value Ref Range    Sodium 134 (L) 136 - 145 mmol/L    Potassium 5.8 (H) 3.5 - 5.1 mmol/L    Chloride 100 98 - 107 mmol/L    CO2 19 (L) 21 - 32 mmol/L    Anion gap 15 7 - 16 mmol/L    Glucose 73 65 - 100 mg/dL    BUN 39 (H) 8 - 23 MG/DL    Creatinine 1.50 (H) 0.6 - 1.0 MG/DL    GFR est AA 45 (L) >60 ml/min/1.73m2    GFR est non-AA 38 (L) >60 ml/min/1.73m2    Calcium 9.2 8.3 - 10.4 MG/DL       All Micro Results     None          Other Studies:  XR CHEST SNGL V    Result Date: 3/28/2022  CHEST RADIOGRAPH, 1 views, 3/28/2022 History: Follow-up pleural effusion. Technique: Portable frontal view of the chest. Comparison: Chest radiograph 3/25/2022 Findings: Improving now mild cardiomegaly is seen. There is no pneumothorax. Grossly stable moderate right and stable trace left basilar effusion are seen. Worsening airspace changes are seen at the left lung base which may represent worsening atelectasis or pulmonary edema. 1.  Mixed findings. Cardiomegaly appears improved. Trace left and moderate right effusion are not significantly changed. Worsening airspace changes are seen at the left lung base likely representing worsening atelectasis or pulmonary edema. This report was made using voice transcription. Despite my best efforts to avoid any, transcription errors may persist. If there is any question about the accuracy of the report or need for clarification, then please call [phone], or text me through [email or web address] for clarification or correction.        Current Meds:  Current Facility-Administered Medications   Medication Dose Route Frequency    [Held by provider] oxyCODONE-acetaminophen (PERCOCET 10)  mg per tablet 1 Tablet  1 Tablet Oral Q6H PRN    sodium chloride 0.9 % bolus infusion 500 mL  500 mL IntraVENous ONCE    isosorbide dinitrate (ISORDIL) tablet 10 mg  10 mg Oral TID    diphenhydrAMINE (BENADRYL) injection 25 mg  25 mg IntraVENous Q4H PRN    metoprolol succinate (TOPROL-XL) XL tablet 25 mg  25 mg Oral DAILY    calcium carbonate (OS-JOE) tablet 500 mg [elemental]  500 mg Oral QHS    cetirizine (ZYRTEC) tablet 10 mg  10 mg Oral DAILY    cyclobenzaprine (FLEXERIL) tablet 5 mg  5 mg Oral TID PRN    enoxaparin (LOVENOX) injection 50 mg  50 mg SubCUTAneous Q12H    fluticasone propionate (FLONASE) 50 mcg/actuation nasal spray 2 Spray  2 Spray Both Nostrils DAILY    levothyroxine (SYNTHROID) tablet 125 mcg  125 mcg Oral 6am    magnesium oxide (MAG-OX) tablet 200 mg  200 mg Oral DAILY    ondansetron (ZOFRAN ODT) tablet 4 mg  4 mg Oral Q8H PRN    Or    ondansetron (ZOFRAN) injection 4 mg  4 mg IntraVENous Q8H PRN    pantoprazole (PROTONIX) tablet 40 mg  40 mg Oral ACB    [Held by provider] potassium chloride (K-DUR, KLOR-CON M20) SR tablet 40 mEq  40 mEq Oral BID    ruxolitinib (Jakavi) tab 5 mg (Patient Supplied)  5 mg Oral Q12H    [Held by provider] traZODone (DESYREL) tablet 50 mg  50 mg Oral QHS    trimethoprim-polymyxin b (POLYTRIM) 10,000 unit- 1 mg/mL ophthalmic solution 1 Drop  1 Drop Both Eyes BID    zinc oxide-cod liver oil (DESITIN) 40 % paste   Topical PRN    dasatinib tab 80 mg (Patient Supplied)  80 mg Oral DAILY    0.9% sodium chloride infusion 250 mL  250 mL IntraVENous PRN    cefTRIAXone (ROCEPHIN) 2 g in 0.9% sodium chloride (MBP/ADV) 50 mL MBP  2 g IntraVENous Q24H       Signed:  Evelyne Alamo NP    Part of this note may have been written by using a voice dictation software. The note has been proof read but may still contain some grammatical/other typographical errors.

## 2022-03-28 NOTE — PROGRESS NOTES
Resting quietly, resp even, unlab with O2. Eyes closed with relaxed facial expression. No distress noted. Awoke easily for scheduled meds and offered to apply new ext female catheter. Brief clean, dry, intact. Pt refusing ext female cath to be placed at this time; supplies at bedside. Call light in reach, bed alarm set, door open.

## 2022-03-28 NOTE — PROGRESS NOTES
[facility] CRITICAL CARE OUTREACH NURSE PROGRESS REPORT      SUBJECTIVE: Called to assess patient secondary to nurse concern. MEWS Score: 1 (03/28/22 1127)  Vitals:    03/28/22 0022 03/28/22 0513 03/28/22 0732 03/28/22 1127   BP: 113/73 122/86 113/76 111/74   Pulse: 77 76 75 93   Resp: 16 18 19 20   Temp: 97.6 °F (36.4 °C) (!) 96.4 °F (35.8 °C) 97.5 °F (36.4 °C) 97.4 °F (36.3 °C)   SpO2: 97% 98% 96% 97%   Weight:            LAB DATA:    Recent Labs     03/28/22  0542 03/27/22  0526 03/26/22  0511   * 138 134*   K 5.8* 5.6* 4.9    104 103   CO2 19* 20* 25   AGAP 15 14 6*   GLU 73 74 102*   BUN 39* 24* 19   CREA 1.50* 1.20* 1.00   GFRAA 45* 59* >60   GFRNA 38* 49* 60*   CA 9.2 9.0 8.9   MG  --  2.3 2.2   PHOS  --  4.2* 3.5        Recent Labs     03/27/22  0526 03/26/22  0511   WBC 22.6* 15.8*   HGB 12.8 11.4*   HCT 39.8 36.1   PLT 97* 146*          OBJECTIVE: On arrival to room, I found patient to be resting quietly in recliner. ASSESSMENT:  Patient is drowsy and easily awakens to verbal command. Patient is currently oriented to self and place. PT is less alert now compared to this morning per primary RN. PT holding O2 sat on room air with Sat of 93%. PT denies pain or SOB. Bilateral lung sounds diminished. PLAN:  VS, labs, and progress notes reviewed. Concerns addressed with primary RN. Will continue to follow per outreach protocol.

## 2022-03-28 NOTE — PROGRESS NOTES
IV found pulled out by pt, cath tip intact. PCXR deferred, pt refusing at this hour. Attempted to place new IV with 2 unsuccessful attempts; pt needing a break before additional attempts by another nurse. Moist mouth swabs offered for dry lips; denies need at this time. Call light in reach, door open.

## 2022-03-28 NOTE — PROGRESS NOTES
Umair Couch  Admission Date: 3/25/2022         Daily Progress Note: 3/28/2022    The patient's chart is reviewed and the patient is discussed with the staff. Background: Patient is a 64 y.o.  female PMH CML on chemotherapy, chronic back pain, DVT and  PE on home Lovenox, seen and evaluated at the request of Dr. Noel Ruiz pleural effusions and evaluation for thoracentesis. The patient was recently discharged from here on 3/14/22 after being treated for acute CHF. The patient was also recently diagnosed with E coli bacterial meningitis and completed antibiotics. The patient states that she does not necessarily feel short of breath however she is having what feels like back spasms in her LL back area. She also c/o feeling a \"good pain\" when inhaling deeply. The patient does have a history of DVT/PE and portal vein thrombosis and is on Lovenox at home and was given a dose this morning here in the hospital.  She is on chemotherapy for CML. Recent echocardiogram reveals mitral valve vegetation that was not present on echocardiogram done in February this year. Subjective:     Pt has her O2 off. Sats 93%. She just feels very tired. She does have some ongoing back pain. Currently seems overly medicated, but last dose of oxy yesterday am, getting trazadone at night. She agrees to do the cxr now.     Current Facility-Administered Medications   Medication Dose Route Frequency    isosorbide dinitrate (ISORDIL) tablet 10 mg  10 mg Oral TID    oxyCODONE-acetaminophen (PERCOCET 10)  mg per tablet 1 Tablet  1 Tablet Oral Q4H PRN    diphenhydrAMINE (BENADRYL) injection 25 mg  25 mg IntraVENous Q4H PRN    metoprolol succinate (TOPROL-XL) XL tablet 25 mg  25 mg Oral DAILY    calcium carbonate (OS-JOE) tablet 500 mg [elemental]  500 mg Oral QHS    cetirizine (ZYRTEC) tablet 10 mg  10 mg Oral DAILY    cyclobenzaprine (FLEXERIL) tablet 5 mg  5 mg Oral TID PRN    enoxaparin (LOVENOX) injection 50 mg  50 mg SubCUTAneous Q12H    fluticasone propionate (FLONASE) 50 mcg/actuation nasal spray 2 Spray  2 Spray Both Nostrils DAILY    levothyroxine (SYNTHROID) tablet 125 mcg  125 mcg Oral 6am    magnesium oxide (MAG-OX) tablet 200 mg  200 mg Oral DAILY    ondansetron (ZOFRAN ODT) tablet 4 mg  4 mg Oral Q8H PRN    Or    ondansetron (ZOFRAN) injection 4 mg  4 mg IntraVENous Q8H PRN    pantoprazole (PROTONIX) tablet 40 mg  40 mg Oral ACB    [Held by provider] potassium chloride (K-DUR, KLOR-CON M20) SR tablet 40 mEq  40 mEq Oral BID    ruxolitinib (Jakavi) tab 5 mg (Patient Supplied)  5 mg Oral Q12H    traZODone (DESYREL) tablet 50 mg  50 mg Oral QHS    trimethoprim-polymyxin b (POLYTRIM) 10,000 unit- 1 mg/mL ophthalmic solution 1 Drop  1 Drop Both Eyes BID    zinc oxide-cod liver oil (DESITIN) 40 % paste   Topical PRN    dasatinib tab 80 mg (Patient Supplied)  80 mg Oral DAILY    0.9% sodium chloride infusion 250 mL  250 mL IntraVENous PRN    cefTRIAXone (ROCEPHIN) 2 g in 0.9% sodium chloride (MBP/ADV) 50 mL MBP  2 g IntraVENous Q24H     Review of Systems  Constitutional: negative for fever, chills, sweats  Cardiovascular: negative for chest pain, palpitations, syncope, edema  Gastrointestinal:  negative for dysphagia, reflux, vomiting, diarrhea, abdominal pain, or melena  Neurologic:  negative for focal weakness, numbness, headache  Objective:     Vitals:    03/28/22 0022 03/28/22 0513 03/28/22 0732 03/28/22 1127   BP: 113/73 122/86 113/76 111/74   Pulse: 77 76 75 93   Resp: 16 18 19 20   Temp: 97.6 °F (36.4 °C) (!) 96.4 °F (35.8 °C) 97.5 °F (36.4 °C) 97.4 °F (36.3 °C)   SpO2: 97% 98% 96% 97%   Weight:           Intake/Output Summary (Last 24 hours) at 3/28/2022 1156  Last data filed at 3/28/2022 0844  Gross per 24 hour   Intake 60 ml   Output 700 ml   Net -640 ml     Physical Exam:   Constitution:  the patient is well developed and in no acute distress  HEENT:  Sclera clear, pupils equal, oral mucosa moist  Respiratory: clear on on RA  Cardiovascular:  RRR without M,G,R  Gastrointestinal: soft and non-tender; with positive bowel sounds. Musculoskeletal: warm without cyanosis. There is  No lower extremity edema. Skin:  no jaundice or rashes, no wounds   Neurologic: drowsy, speech is slurred  Psychiatric:  Drowsy, oriented x 2    CXR:  3/28-pending   3/25      CT Chest: 3/9/2022       LAB:  Recent Labs     03/27/22  0526 03/26/22  0858 03/26/22  0511 03/25/22  1230   WBC 22.6*  --  15.8*  --    HGB 12.8  --  11.4*  --    HCT 39.8  --  36.1  --    PLT 97*  --  146*  --    INR  --  1.3  --  2.2   LAC  --   --  1.3  --      Recent Labs     03/28/22  0542 03/27/22  0526 03/26/22  0511   * 138 134*   K 5.8* 5.6* 4.9    104 103   CO2 19* 20* 25   GLU 73 74 102*   BUN 39* 24* 19   CREA 1.50* 1.20* 1.00   MG  --  2.3 2.2   CA 9.2 9.0 8.9   PHOS  --  4.2* 3.5     Recent Labs     03/26/22  0511   LAC 1.3     No results for input(s): PH, PCO2, PO2, HCO3, PHI, PCO2I, PO2I, HCO3I in the last 72 hours. No results for input(s): SDES, CULT in the last 72 hours. 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 Cardiology following    Active Problems:    Polycythemia vera ([facility] (2/16/2009)  --history of; lower hemoglobins noted over last several years. ?due to varices and possible bleeding. Malignant ascites (7/26/2012)  --diuretics on hold due to worsening renal function. Cirrhosis ([facility] [address] (7/27/2012)    INR 1.3 yesterday    Hyperkalemia (3/28/22)  --now hyperkalemic with potassium supplementation and worsening renal function.   --pt with very dry skin and mucous membranes  --give NS 500ml over several hours       Chronic myelogenous leukemia ([facility] [address] (3/30/2015)    On chemo      Acquired hypercoagulable state ([facility] [address] (3/30/2015)    On home lovenox; currently in use because thoracentesis aborted over the weekend      Thrombocytopenia ([address] (10/15/2021)    plt 97      Acute respiratory failure with hypoxemia ([address] (3/9/2022)  Currently on RA      CML (chronic myelocytic leukemia) ([address] (3/19/2022)    On [place] and dasatinib      Acute diastolic (congestive) heart failure ([address] (3/19/2022)    Diuresing       Endocarditis of mitral valve (3/25/2022)    noted on echocardiogram, on ABX      Pleural Effusion  --Unable to do thoracentesis over the weekend due to patient's pain/inability to sit on side of bed.   --refused xray this am due to back pain. --on RA currently with normal saturations. Noted worsening leukocytosis, minimal use of sedating meds, and worsening mentation. Check ammonia, ABG, repeat CBC and do CXR now. Hold all sedating medications. More than 50% of the time documented was spent in face-to-face contact with the patient and in the care of the patient on the floor/unit where the patient is located. Franklin De Jesus, LAZARO    ** update--pt refusing ABG and chest xray after I spoke with her. With vegetation and mentation, start neuro check and if changing, needs CT head  I have spoken with and examined the patient. I agree with the above assessment and plan as documented.     Gen: lethargic  Lungs:  Decreased breath sounds  Heart:  RRR with no Murmur/Rubs/Gallops  Abd:soft  Ext: no edema    Ultrasound/ thoracentesis tomorrow if pt agrees    Brenden Russell MD

## 2022-03-28 NOTE — PROGRESS NOTES
Occupational Therapy    Attempted treatment this afternoon, NSG reports pt refusing tx prior. Upon entry pt side lying in recliner, attempted initiation of tx session x 2 using verbal/physical cues, pt verbalized \"I'm sorry I took medication, or something like that\" and ignored further conversation. Will re-attempt at later time as pt status and schedule allow.    Thank you  Nikhile Lacey Brock, OTR/L

## 2022-03-29 PROBLEM — R50.9 FEVER: Status: RESOLVED | Noted: 2022-01-01 | Resolved: 2022-01-01

## 2022-03-29 PROBLEM — J96.01 ACUTE RESPIRATORY FAILURE WITH HYPOXEMIA (HCC): Status: RESOLVED | Noted: 2022-01-01 | Resolved: 2022-01-01

## 2022-03-29 PROBLEM — C92.10 CML (CHRONIC MYELOCYTIC LEUKEMIA) (HCC): Chronic | Status: ACTIVE | Noted: 2022-01-01

## 2022-03-29 PROBLEM — Z86.718 HISTORY OF DVT (DEEP VEIN THROMBOSIS): Chronic | Status: ACTIVE | Noted: 2022-01-01

## 2022-03-29 PROBLEM — L03.115 CELLULITIS OF LEG, RIGHT: Status: RESOLVED | Noted: 2021-01-01 | Resolved: 2022-01-01

## 2022-03-29 PROBLEM — I05.9 ENDOCARDITIS OF MITRAL VALVE: Status: RESOLVED | Noted: 2022-01-01 | Resolved: 2022-01-01

## 2022-03-29 PROBLEM — N17.9 ACUTE KIDNEY FAILURE (HCC): Status: ACTIVE | Noted: 2022-01-01

## 2022-03-29 PROBLEM — R50.9 FEVER OF UNKNOWN ORIGIN: Status: RESOLVED | Noted: 2022-01-01 | Resolved: 2022-01-01

## 2022-03-29 PROBLEM — A41.9 SEPSIS (HCC): Status: ACTIVE | Noted: 2022-01-01

## 2022-03-29 PROBLEM — I50.31 ACUTE DIASTOLIC (CONGESTIVE) HEART FAILURE (HCC): Status: RESOLVED | Noted: 2022-01-01 | Resolved: 2022-01-01

## 2022-03-29 PROBLEM — G93.40 ENCEPHALOPATHY ACUTE: Status: ACTIVE | Noted: 2022-01-01

## 2022-03-29 NOTE — PROGRESS NOTES
[phone] CRITICAL CARE OUTREACH NURSE PROGRESS REPORT      SUBJECTIVE: Called to assess patient secondary to nurse concern. MEWS Score: 2 (03/29/22 0756)  Vitals:    03/29/22 0554 03/29/22 0630 03/29/22 0654 03/29/22 0756   BP: 112/67 105/84  (!) 97/50   Pulse: 82 98  67   Resp: 18 18  20   Temp: 97.6 °F (36.4 °C) 97.5 °F (36.4 °C)  97.3 °F (36.3 °C)   SpO2: 94% 96%     Weight:   57.4 kg (126 lb 8.7 oz)         LAB DATA:    Recent Labs     03/28/22  1525 03/28/22  0542 03/27/22  0526   NA  --  134* 138   K 5.7* 5.8* 5.6*   CL  --  100 104   CO2  --  19* 20*   AGAP  --  15 14   GLU  --  73 74   BUN  --  39* 24*   CREA  --  1.50* 1.20*   GFRAA  --  45* 59*   GFRNA  --  38* 49*   CA  --  9.2 9.0   MG 2.7*  --  2.3   PHOS  --   --  4.2*        Recent Labs     03/28/22  1525 03/27/22  0526   WBC 13.7* 22.6*   HGB 13.1 12.8   HCT 42.8 39.8   PLT 2* 97*          OBJECTIVE: On arrival to room, I found patient to be in bed. ASSESSMENT:  Patient is very drowsy. Eyes open to voice. Unable to answer orientation questions but did follow simple commands. MRI ordered for today. PLAN:  VS, labs, and progress notes reviewed. No concerns per primary RN. Concerns addressed with primary RN. Primary RN to call with concerns. Will continue to follow per outreach protocol.

## 2022-03-29 NOTE — PROGRESS NOTES
Physician Progress Note      PATIENT:               Stiven Cornejo  CSN #:                  [id number]  :                       1960  ADMIT DATE:       3/25/2022 4:10 AM  DISCH DATE:  RESPONDING  PROVIDER #:        Ana Paula Gamez NP          QUERY TEXT:    Pt admitted with endocarditis and has encephalopathy documented. If possible, please document in progress notes and discharge summary further specificity regarding the type of encephalopathy:    The medical record reflects the following:  Risk Factors: Sepsis, Endocarditis  Clinical Indicators: \" Patient refusing several medications inappropriately, No central focal concerns during exam\"  Treatment: serial labs, ABG's, monitor patient  Options provided:  -- Acute hepatic encephalopathy with coma  -- Acute hepatic encephalopathy without coma  -- Metabolic encephalopathy  -- Septic encephalopathy  -- Other - I will add my own diagnosis  -- Disagree - Not applicable / Not valid  -- Disagree - Clinically unable to determine / Unknown  -- Refer to Clinical Documentation Reviewer    PROVIDER RESPONSE TEXT:    This patient has acute hepatic encephalopathy without coma.     Query created by: Jessica Leggett on 3/29/2022 12:56 PM      Electronically signed by:  Ana Paula Gamez NP 3/29/2022 2:08 PM

## 2022-03-29 NOTE — ROUTINE PROCESS
Critical lab results received this morning approx. 1040; K 6.7, CO2 8, Glucose 20. Per , sample was partially hemolyzed. MD Delgado notified and orders to redraw BMP placed.

## 2022-03-29 NOTE — PROGRESS NOTES
Asked to start a PIV. Using US assistance, placed an 18g 8cm Endurance catheter in upper left basilic vein on first attempt.   Shila Queen RN, VAT

## 2022-03-29 NOTE — PROGRESS NOTES
[facility] CARDIOLOGY PROGRESS NOTE           3/29/2022 8:15 AM    Admit Date: 3/25/2022    Subjective:   Patient encephalopathic today unable to give subjective history. ROS:  -Unable to obtain as above. Objective:      Vitals:    03/29/22 0454 03/29/22 0554 03/29/22 0630 03/29/22 0654   BP: 108/79 112/67 105/84    Pulse: 75 82 98    Resp: 19 18 18    Temp: 97.8 °F (36.6 °C) 97.6 °F (36.4 °C) 97.5 °F (36.4 °C)    SpO2: 92% 94% 96%    Weight:    126 lb 8.7 oz (57.4 kg)       Physical Exam:  General-No Acute Distress, not interactive  Mouth - dry oropharynx   Neck- supple, + JVD   CV- regular rate and rhythm , holosystolic 2/6 systolic murmur apex   Lung- non labored bilaterally, decreased lower fields   Abd- soft, nontender, nondistended  Ext- no edema bilaterally. Skin- warm and dry    Data Review:   Recent Labs     03/28/22  1525 03/28/22  0542 03/27/22  0526 03/27/22  0526 03/26/22  0858   NA  --  134*  --  138  --    K 5.7* 5.8*   < > 5.6*  --    MG 2.7*  --   --  2.3  --    BUN  --  39*  --  24*  --    CREA  --  1.50*  --  1.20*  --    GLU  --  73  --  74  --    WBC 13.7*  --   --  22.6*  --    HGB 13.1  --   --  12.8  --    HCT 42.8  --   --  39.8  --    PLT 2*  --   --  97*  --    INR  --   --   --   --  1.3    < > = values in this interval not displayed.        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 papillary muscle rupture)    - Hemodynamics stable     - repeat blood cultures no growth to date    - appears dry on exam , hold diuretics     - Due to underlying medical conditions, cirrhosis, CML, poor candidate for cardiothoracic surgery intervention     - Ideally performed ROSELINE during hospital course but high risk for complications with history of grade II/III esophageal varices requiring banding x 6 (8/20/21). Likely plan ROSELINE sometime later this week. - would recommend EGD prior to ROESLINE given history of prior varices requiring banding. Hold off on ROSELINE until MRI obtained and mental status improves.      Active Problems:       Acquired hypercoagulable state ([address] (3/30/2015)    History of DVT (deep vein thrombosis) (3/19/2022)    Portal vein thrombosis (2/16/2009)  - systemic anticoagulation held due to abnormal CT head obtained yesterday , plan for MRI today. Altered mental status    - CT abnormal, anticoagulation held    - MRI brain ordered and pending.        Malignant ascites (7/26/2012)    Cirrhosis ([address] (7/27/2012)    - per medicine / GI , prior paracentesis        Chronic myelogenous leukemia ([address] (3/30/2015)    - per oncology        Thrombocytopenia ([address] (10/15/2021)    - possible cirrhosis related       Acute respiratory failure with hypoxemia ([address] (3/9/2022)    Pleural effusion, bilateral (3/11/2022)    - post thoracentesis with improvement in dyspnea        CML (chronic myelocytic leukemia) ([address] (3/19/2022)    - per oncology        Hyperkalemia   -  Monitor, possibly due to supplementation. Per primary. Continue to address goals of care as possible.      Gianna Paz, DO  3/29/2022 8:15 AM

## 2022-03-29 NOTE — PROGRESS NOTES
Date of Outreach Update:  Bobby Lopez was seen and assessed. Remains alert but confused. Has received 1unit platelets but then lost IV access. Multiple attempts made but only able to get a 24g peripheral IV to patients right arm at this time. MEWS Score: 1 (03/29/22 0354)  Vitals:    03/29/22 0147 03/29/22 0230 03/29/22 0339 03/29/22 0354   BP: 110/75 108/77 109/76 119/85   Pulse: 80 75 80 81   Resp: 18 18 17 18   Temp: 97.4 °F (36.3 °C) 97.5 °F (36.4 °C) 97.6 °F (36.4 °C) 97.5 °F (36.4 °C)   SpO2: 95% 94% 93% 94%   Weight:            Previous Outreach assessment has been reviewed. There have been no significant clinical changes since the completion of the last dated Outreach assessment. Will continue to follow up per outreach protocol.     Signed By:   Debby Rodrigues RN    March 29, 2022 3:16 AM

## 2022-03-29 NOTE — CONSULTS
Requested by stroke coordinator at request of nursing to provide input with reference to last night CT findings.   Obtaining MRI today is unfortunately not feasible as the MRI machine is down throughout the day today for repair    I have reviewed the imaging and given the presence of vegetative endocarditis I think the possibility of localized brain abscess is probably the highest on the list.    Given the comorbidities specifically chemotherapy for CML, presence of esophageal varices, presence of splenic and renal infarcts, profound thrombocytopenia hyperammonemia presence of pulmonary complications, recent E. coli bacterial meningitis the overall picture here for quality level survival is I would concur with Dr. Philomena Fitzpatrick essentially end-stage palliative care    While further neurologic investigations may define the exact underlying process it will not give rise to a potential diagnosis that is retrievable

## 2022-03-29 NOTE — PROGRESS NOTES
CHIQUIS notified Maria Buchanan [facility]) that pt is not medically stable at present so do not initiate the insurance auth request today. SW following.

## 2022-03-29 NOTE — PROGRESS NOTES
Dr. Serene Boeck notified about patient's critical lactic and procal lab values, MD coming to bedside.

## 2022-03-29 NOTE — ROUTINE PROCESS
TRANSFER - IN REPORT:    Verbal report received from (name) on Trupti Mittal  being received from &th floor (unit) for change in patient condition(required increased level of care)      Report consisted of patients Situation, Background, Assessment and   Recommendations(SBAR). Information from the following report(s) SBAR, Kardex, Intake/Output, MAR, Recent Results, Med Rec Status and Cardiac Rhythm NSR was reviewed with the receiving nurse. Opportunity for questions and clarification was provided. Assessment completed upon patients arrival to unit and care assumed.

## 2022-03-29 NOTE — PROGRESS NOTES
PT note: Attempted to see patient for treatment x3 attempts today. Initially patient was with other providers, then patient found vomiting green bile. Assisted with turning patient to avoid aspiration, RN and MD then present. HOLD patient for remainder of the day and possible NGT placement. Will check back on patient at a later date/time as schedule allows and is appropriate.       Niki Edwards, PTA

## 2022-03-29 NOTE — PROGRESS NOTES
Infectious Disease Progress Note    Today's Date: 3/29/2022   Admit Date: 3/25/2022    Impression:   · Mitral valve infective endocarditis (TTE 3/24, not seen on study 2/3/22), negative blood cultures (3/24)   · R lung effusion, pulmonary following  · Hx of recurrent E coli bacteremia 2/2, 2/27, source presumed bowel; BC negative 3/1, 3/9  · E coli bacterial meningitis; s/p ceftriaxone x 2 weeks through 3/13/22  · CML on dasitinib  · PCV on jakafi  · Portal vein thrombosis/splenomegaly/multiple splenic infarcts  · Low back pain, with MRI lumbar spine (3/4) without evidence of infection  · Chronic diarrhea    Plan:   · Continue ceftriaxone. · Await MRI result. Mental status worse today. · Recommend clarifying goals of care. Anti-infectives:   · Vanc 3/24-3/25  · Ceftriaxone 3/25-     Subjective: Interval History: Afebrile. Head CT concerning for acute subarachnoid hemorrhage vs sequela of meningoencephalitis; MRI brain was ordered. Anticoagulation on hold. She is unresponsive this morning. Snoring, does not open eyes or follow any commands. Lab and RN having difficulty attempting to draw ordered labs this morning. Ammonia 84, pCO2 27.3, HCO3 14.7. Platelets 2 yesterday; received 1 unit and then lost access. Allergies   Allergen Reactions    Latex Other (comments)     Testing for latex allergy was positive as a 2 (on a scale of 1 to 3).     Sulfa (Sulfonamide Antibiotics) Anaphylaxis    Tramadol Other (comments)     Top lip swelled    Augmentin [Amoxicillin-Pot Clavulanate] Rash    Codeine Other (comments)    Divalproex Sodium Hives    Morphine Nausea and Vomiting     Not a true allergy    Potassium Clavulanate Hives    Rizatriptan Rash    Tetanus Immune Globulin Other (comments)     Heat, bruising, and swelling at  Site of injection    Vancomycin Rash    Xanax [Alprazolam] Other (comments)     Hallucinations    Zonegran [Zonisamide] Rash        Review of Systems:  Review of systems not obtained due to patient factors. Objective:     Visit Vitals  BP (!) (P) 97/50 (BP 1 Location: Left upper arm, BP Patient Position: At rest)   Pulse (P) 67   Temp (P) 97.3 °F (36.3 °C)   Resp (P) 20   Wt 57.4 kg (126 lb 8.7 oz)   SpO2 96%   BMI 22.42 kg/m²     Temp (24hrs), Av.5 °F (36.4 °C), Min:97.2 °F (36.2 °C), Max:97.8 °F (36.6 °C)     General: Unresponsive today  Head:    Normocephalic, atraumatic  Eyes:   closed  Mouth:  Very dry  Neck:   Supple, symmetrical, trachea midline, no JVD  Lungs:   Mildly coarse anterior lungs  CV:   Regular rate and rhythm with soft   Abdomen:  Soft, non tender, not distended, active bowel sounds  Extremities:  No cyanosis or edema  Musculoskeletal: No deformity  Skin:   No acute rash  Psych:  Unable to assess  Lines:    benign      Data Review:     CBC:  Recent Labs     22  1525 22  0526   WBC 13.7* 22.6*   GRANS 91*  --    MONOS 3*  --    EOS 0*  --    ANEU 12.6*  --    ABL 0.6  --    HGB 13.1 12.8   HCT 42.8 39.8   PLT 2* 97*       BMP:  Recent Labs     22  1525 22  0542 22  0526   CREA  --  1.50* 1.20*   BUN  --  39* 24*   NA  --  134* 138   K 5.7* 5.8* 5.6*   CL  --  100 104   CO2  --  19* 20*   AGAP  --  15 14   GLU  --  73 74       LFTS:  No results for input(s): TBILI, ALT, AP, TP, ALB in the last 72 hours.     No lab exists for component: SGOT    Microbiology:     All Micro Results     None          Imaging:   Reviewed    Signed By: Sebas Mann NP     2022

## 2022-03-29 NOTE — PROGRESS NOTES
[facility] CRITICAL CARE OUTREACH NURSE PROGRESS REPORT      SUBJECTIVE: Called to assess patient secondary to nurse concern. MEWS Score: 1 (03/28/22 1958)  Vitals:    03/28/22 0732 03/28/22 1127 03/28/22 1958 03/28/22 2322   BP: 113/76 111/74 108/81 119/88   Pulse: 75 93 79 77   Resp: 19 20 18 18   Temp: 97.5 °F (36.4 °C) 97.4 °F (36.3 °C) 97.3 °F (36.3 °C) 97.2 °F (36.2 °C)   SpO2: 96% 97% 93% 99%   Weight:          EKG: normal EKG, normal sinus rhythm. LAB DATA:    Recent Labs     03/28/22  1525 03/28/22  0542 03/27/22  0526 03/26/22  0511 03/26/22  0511   NA  --  134* 138  --  134*   K 5.7* 5.8* 5.6*   < > 4.9   CL  --  100 104  --  103   CO2  --  19* 20*  --  25   AGAP  --  15 14  --  6*   GLU  --  73 74  --  102*   BUN  --  39* 24*  --  19   CREA  --  1.50* 1.20*  --  1.00   GFRAA  --  45* 59*  --  >60   GFRNA  --  38* 49*  --  60*   CA  --  9.2 9.0  --  8.9   MG 2.7*  --  2.3  --  2.2   PHOS  --   --  4.2*  --  3.5    < > = values in this interval not displayed. Recent Labs     03/28/22  1525 03/27/22 0526 03/26/22  0511   WBC 13.7* 22.6* 15.8*   HGB 13.1 12.8 11.4*   HCT 42.8 39.8 36.1   PLT 2* 97* 146*          OBJECTIVE: On arrival to room, I found patient to be resting in bed, awake. ASSESSMENT:  Patient resting in bed, awake but only oriented to self. Mumbles words at times, follows commands with all extremities. Lung sounds clear bilaterally, O2 sat 97% on room air. 2 units platelets ordered but not yet ready from lab at this time. No distress noted at this time. MRI in AM.     PLAN:  Will continue to follow per outreach protocol.

## 2022-03-29 NOTE — PROGRESS NOTES
Chart reviewed s/p tx to ICU from 7th floor. RA. Consult for Pulmonary. Palliative care following. Pt accepted at [facility] with pending precert. CM will continue to follow for d/c needs when stable per MD. LOS 4 days.

## 2022-03-29 NOTE — PROGRESS NOTES
Pt has become more confused and lethargic throughout the day. Pt is A/O x1. Speech has become more garbled and slurred this evening as patient appears to be getting weaker. Pt refused multiple attempts for lab work, ABG and xray today. All were ultimately obtained. 500ml fluid bolus was completed. IV Abx completed. MD notified of platelet count of 2. ICU Bryanna Oates RN contacted and checked on patient. Pt is to be checked q6 hours by ICU roving RN. Patient daughter was updated on current status of patient this evening. Type and Screen draw completed this evening. Blood consent in chart. Sister of patient called to verify that after speaking with family patient wishes are to continue to be Full Code.

## 2022-03-29 NOTE — PROGRESS NOTES
Received call from Dr. Cecilia Sanders with Radiology for read of CT head that was obtained this afternoon. There's evidence of possible SAH vs herpes encephalitis due to the appearance and distribution of hyperdensities on CT. Patient's platelets are 2, so SAH could be a possibility despite not suffering a fall. However, given a h/o meningitis, herpes encephalitis is also on the differential.  Dr. Cecilia Sanders recommends MRI with and without contrast for further evaluation. This does not need to be completed STAT, an AM MRI will suffice. Patient is due for platelet transfusion as already ordered by day shift. Additionally, all anticoagulation and antiplatelet are also being held.

## 2022-03-29 NOTE — CONSULTS
[facility] Hematology & Oncology        Inpatient Hematology / Oncology Consult Note    Reason for Consult:  Endocarditis of mitral valve [I05.9]  Referring Physician:  Kate Streeter MD    History of Present Illness:  Ms. Jun Barrios is a 64 y.o. female admitted on 3/25/2022 with a primary diagnosis of Diagnoses of Vegetative endocarditis of mitral valve, Body mass index (BMI) of 23.0 to 23.9 in adult, Acute respiratory failure with hypoxemia ([address] Chronic myelogenous leukemia ([address] Cirrhosis of liver with ascites, unspecified hepatic cirrhosis type ([address] CML (chronic myelocytic leukemia) ([address] Endocarditis of mitral valve, Acute diastolic (congestive) heart failure ([address] Acute deep vein thrombosis (DVT) of right upper extremity, unspecified vein ([address] Pleural effusion, bilateral, Splenomegaly, Severe mitral regurgitation, Polycythemia vera ([address] and Encephalopathy acute were pertinent to this visit. .      64 y.o. 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 She also had CML diagnosed in 2013 on dasatinib, polycythemia vera on Jakafi. She was recently admitted from 2/2-2/6 for E. coli bacteremia and then readmitted 2/27/2022 for sepsis and E. coli meningitis and completed ceftriaxone. She returned to emergency room again on 3/18/2022 and admitted for pulmonary congestion. However on 3/24 she underwent echocardiogram that also showed vegetative endocarditis likely related to recent bacteremia. Vegetation was not a present previous evaluation on 2/3/2022. ID consulted and started ceftriaxone. Blood culture 3/24/2022 no growth to date. She was scheduled to undergo ROSELINE but cardiology recommended EGD first given history of varices.   Her INR was noted to be 2.2 on 3/25/2022 and she received FFP with improvement to 1.3, which would be consistent to low synthetic function of cirrhotic liver. However overnight she became encephalopathic and worsening mentation was noted. CT head showed a possible SAH versus possible sequela I from recent meningeal encephalopathy. MRI is pending. However platelets noted to be 2K yesterday afternoon after sudden plummeting from 97K the day prior. Her baseline thrombocytopenia was from  K only mild thrombocytopenia due to cirrhosis. She was transfused with 2 units of platelet yesterday with a very good response and this morning platelets were 39T. CMP showed deranged labs and multisystem failure including CARLITO creatinine up to 1.9, potassium 6.2, lactate 16, bilirubin 7.6. Hematology was consulted. Review of Systems: Patient arousable but not cohesive for conversation. Allergies   Allergen Reactions    Latex Other (comments)     Testing for latex allergy was positive as a 2 (on a scale of 1 to 3).     Sulfa (Sulfonamide Antibiotics) Anaphylaxis    Tramadol Other (comments)     Top lip swelled    Augmentin [Amoxicillin-Pot Clavulanate] Rash    Codeine Other (comments)    Divalproex Sodium Hives    Morphine Nausea and Vomiting     Not a true allergy    Potassium Clavulanate Hives    Rizatriptan Rash    Tetanus Immune Globulin Other (comments)     Heat, bruising, and swelling at  Site of injection    Vancomycin Rash    Xanax [Alprazolam] Other (comments)     Hallucinations    Zonegran [Zonisamide] Rash     Past Medical History:   Diagnosis Date    Acute blood loss anemia 4/6/2018    Anemia     C. difficile diarrhea 4/1/2015    Cerebral edema (HCC) 4/1/2015    Cerebral hemorrhage with cognitive deficits (HCC) 5/23/2016    Chronic migraine 9/22/2016    Chronic myelogenous leukemia ([facility]     Clarinda chromosome/ converted from polycythemia in 2009- to 2012 when she was dx w leukemia    Chronic pain     Coagulation disorder (HCC)     \"clotting and Bleeding Problem\" dr Thelma Dash follows     DVT (deep venous thrombosis) ([address] 10/26/2016    Right subclavian     E coli bacteremia 3/9/2022    Esophageal spasm     with banding     Esophageal varices (HCC)     grade 3    GI bleed 8/3/2016    H/O craniotomy     3-29-15.  due to blood clot - which caused a seizure  - then pt fell and hit     Hematemesis 8/20/2021    Ischemic colitis ([address] 2/16/2009    Left homonymous hemianopsia 3/30/2015    Leukocytosis 3/14/2015    Melena 8/20/2021    Migraine with aura and with status migrainosus, not intractable 9/22/2016    MRSA colonization 6/25/2012    Polycythemia vera(238.4)     JAK2 mutation    Portal hypertension ([address]     with varices    Portal vein thrombosis 6/20/2012    Ct scan 6-21-2 Apparent occlusion of the portal vein. In addition, the splenic vein, and superior mesenteric vein are not definitely seen and are also likely  occluded. Splenomegaly, and extensive varices are seen as described above consistent with the portal vein occlusion 7-05-12 on arixtra HIT negative on repeat 7-27-12 re admitted Cirrhotic appearance of the liver. Mild to moderate ascites, as    Portal vein thrombosis 2/16/2009    Ct scan 6-21-2 Apparent occlusion of the portal vein. In addition, the splenic vein, and superior mesenteric vein are not definitely seen and are also likely  occluded. Splenomegaly, and extensive varices are seen as described above consistent with the portal vein occlusion 7-05-12 on arixtra HIT negative on repeat 7-27-12 re admitted Cirrhotic appearance of the liver.  Mild to moderate ascites    Primary hypothyroidism     Pulmonary embolism ([address] 2006    not on coumadin anymore     Pulmonary embolus ([address] 4/5/2018    Last Assessment & Plan:  Formatting of this note might be different from the original. 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      S/P small bowel resection     .  9 feet removed due to  gangrene which wa due to blood clot    Seizure ([address] 3/14/2015    Seizure disorder ([address] 3/30/2015    due to clotting factor    Seizures ([address]     last one 3- - followed by aniyah     Sepsis with acute organ dysfunction without septic shock ([address] 2022    Splenomegaly, congestive, chronic     Stroke (cerebrum) ([address] 2015    had bled into head- surgery    Stroke [facility])     pt had stroke like symptoms     Subdural hemorrhage following injury, no loss of consciousness ([address] 2016    Thrombocytopenia, HIT negative 2012 platelets lower repeat HIT 12 platelets in 28,124'K    Thrombosis, portal vein 2004    portal , spleenic and recently superior mesenteric    Transfusion history     many blood tranfusions - last 3-29-15 after brain surgery    Traumatic hemorrhage of right cerebrum ([address] 3/30/2015     Past Surgical History:   Procedure Laterality Date    HX APPENDECTOMY      with small bowel resection    HX BREAST BIOPSY Bilateral     Lt - ; Rt -     HX CHOLECYSTECTOMY      HX GI      liver biopsy    HX GI      bowel removed small - 9 feet     HX GYN       x 2    HX GYN      D&C following miscarriage    HX ORTHOPAEDIC Left     torn labrum shoulder (screw in place)    NEUROLOGICAL PROCEDURE UNLISTED  2010    craniotomy to evacuate subdural hematoma following a fall from a seizure    CT ABDOMEN SURGERY PROC UNLISTED      umbilical hernia repair    CT ABDOMEN SURGERY PROC UNLISTED      9ft of small bowel excised then reconnected     Family History   Problem Relation Age of Onset    Diabetes Mother     Stroke Mother         after surgery    Cancer Father         brain    No Known Problems Sister     Other Sister         diverticulitis    Other Sister diverticulitis    Cancer Sister         [email or web address]    Breast Cancer Maternal Aunt 48    Thyroid Disease Paternal Grandmother      Social History     Socioeconomic History    Marital status:      Spouse name: Not on file    Number of children: Not on file    Years of education: Not on file    Highest education level: Not on file   Occupational History    Not on file   Tobacco Use    Smoking status: Former Smoker     Packs/day: 0.20     Years: 10.00     Pack years: 2.00     Types: Cigarettes     Quit date:      Years since quittin.2    Smokeless tobacco: Never Used   Substance and Sexual Activity    Alcohol use: No    Drug use: No    Sexual activity: Not on file   Other Topics Concern    Not on file   Social History Narrative    Not on file     Social Determinants of Health     Financial Resource Strain:     Difficulty of Paying Living Expenses: Not on file   Food Insecurity:     Worried About [address] in the Last Year: Not on file    [address] in the Last Year: Not on file   Transportation Needs:     Lack of Transportation (Medical): Not on file    Lack of Transportation (Non-Medical):  Not on file   Physical Activity:     Days of Exercise per Week: Not on file    Minutes of Exercise per Session: Not on file   Stress:     Feeling of Stress : Not on file   Social Connections:     Frequency of Communication with Friends and Family: Not on file    Frequency of Social Gatherings with Friends and Family: Not on file    Attends Hinduism Services: Not on file    Active Member of Clubs or Organizations: Not on file    Attends Club or Organization Meetings: Not on file    Marital Status: Not on file   Intimate Partner Violence:     Fear of Current or Ex-Partner: Not on file    Emotionally Abused: Not on file    Physically Abused: Not on file    Sexually Abused: Not on file   Housing Stability:     Unable to Pay for Housing in the Last Year: Not on file    Number of Places Lived in the Last Year: Not on file    Unstable Housing in the Last Year: Not on file     Current Facility-Administered Medications   Medication Dose Route Frequency Provider Last Rate Last Admin    dextrose 10% infusion  100 mL/hr IntraVENous CONTINUOUS Mily Delgado  mL/hr at 03/29/22 1200 100 mL/hr at 03/29/22 1200    [START ON 3/30/2022] pantoprazole (PROTONIX) 40 mg in 0.9% sodium chloride 10 mL injection  40 mg IntraVENous DAILY Venessa Olivo NP        lactated Ringers infusion  150 mL/hr IntraVENous CONTINUOUS Venessa Olivo NP        cefepime (MAXIPIME) 2 g in 0.9% sodium chloride (MBP/ADV) 100 mL MBP  2 g IntraVENous Q24H Keyur Carvajal  mL/hr at 03/29/22 1432 2 g at 03/29/22 1432    metroNIDAZOLE (FLAGYL) IVPB premix 500 mg  500 mg IntraVENous Q12H Keyur Carvajal  mL/hr at 03/29/22 1432 500 mg at 03/29/22 1432    0.9% sodium chloride infusion 250 mL  250 mL IntraVENous PRN Venessa Olivo NP        0.9% sodium chloride infusion 250 mL  250 mL IntraVENous PRN GABBY SolanoP        0.9% sodium chloride infusion 250 mL  250 mL IntraVENous PRN Roby Nair, FNP        0.9% sodium chloride infusion 250 mL  250 mL IntraVENous PRN Venessa Olivo NP        [Held by provider] isosorbide dinitrate (ISORDIL) tablet 10 mg  10 mg Oral TID Venessa Olivo NP   10 mg at 03/27/22 2153    [Held by provider] metoprolol succinate (TOPROL-XL) XL tablet 25 mg  25 mg Oral DAILY Venessa Olivo NP   25 mg at 03/27/22 8403    [Held by provider] calcium carbonate (OS-JOE) tablet 500 mg [elemental]  500 mg Oral QHS Venessa Olivo NP   500 mg at 03/27/22 2150    [Held by provider] cetirizine (ZYRTEC) tablet 10 mg  10 mg Oral DAILY Venessa Olivo NP   10 mg at 03/27/22 0833    [Held by provider] enoxaparin (LOVENOX) injection 50 mg  50 mg SubCUTAneous Q12H Venessa Olivo NP   50 mg at 03/25/22 1004    [Held by provider] fluticasone propionate (FLONASE) 50 mcg/actuation nasal spray 2 Spray  2 Spray Both Nostrils DAILY Muse Fend, NP   2 [place] at 22 0900    levothyroxine (SYNTHROID) tablet 125 mcg  125 mcg Oral Bevelyn Bake, NP   125 mcg at 22 9860    magnesium oxide (MAG-OX) tablet 200 mg  200 mg Oral DAILY Muse Fend, NP   200 mg at 22 3650    ondansetron (ZOFRAN) injection 4 mg  4 mg IntraVENous Q8H PRN Muse Fend, NP        ruxolitinib Damion Limes) tab 5 mg (Patient Supplied)  5 mg Oral Q12H Muse Fend, NP   5 mg at 22 2154    [Held by provider] traZODone (DESYREL) tablet 50 mg  50 mg Oral QHS Muse Fend, NP   50 mg at 22 2150    trimethoprim-polymyxin b (POLYTRIM) 10,000 unit- 1 mg/mL ophthalmic solution 1 Drop  1 Drop Both Eyes BID Muse Fend, NP   1 Drop at 22 1059    dasatinib tab 80 mg (Patient Supplied)  80 mg Oral DAILY Muse Fend, NP   80 mg at 22 0836       OBJECTIVE:  Patient Vitals for the past 8 hrs:   BP Temp Pulse Resp SpO2   22 1820 -- (!) 94.6 °F (34.8 °C) -- -- --   22 1803 (!) 114/58 -- 79 18 98 %   22 1759 (!) 110/55 -- 80 17 98 %   22 1725 (!) 110/55 (!) 96.2 °F (35.7 °C) 80 17 96 %   22 1718 (!) 104/56 -- 80 17 96 %   22 1710 (!) 104/56 (!) 96.2 °F (35.7 °C) 80 17 96 %   22 1703 109/60 -- 80 18 98 %   22 1648 120/64 -- 80 17 97 %   22 1634 109/60 -- 80 17 96 %   22 1618 108/63 -- 80 18 99 %   22 1604 (!) 111/57 -- 80 16 97 %   22 1548 112/60 -- 81 19 97 %   22 1540 -- -- -- -- 95 %   22 1533 (!) 110/59 (!) 96 °F (35.6 °C) 81 19 93 %   22 1336 -- -- -- -- 96 %   22 1126 (!) 97/50 97.4 °F (36.3 °C) 72 20 --     Temp (24hrs), Av.1 °F (36.2 °C), Min:94.6 °F (34.8 °C), Max:97.8 °F (36.6 °C)    No intake/output data recorded. Physical Exam:  Constitutional:  Very distressed and struggling in bed   HEENT: Normocephalic and atraumatic. Pupils are equal, round, and reactive to light. Extraocular muscles are intact. Lymph node   No palpable submandibular, cervical, supraclavicular, axillary or inguinal lymph nodes. Skin Warm and dry. No bruising and no rash noted. No erythema. No pallor. Respiratory Lungs are clear to auscultation bilaterally without wheezes, rales or rhonchi, normal air exchange without accessory muscle use. CVS Normal rate, regular rhythm and normal S1 and S2. No murmurs, gallops, or rubs. Abdomen  appear very sensitive and tender to touch   Neuro  altered, not cohesive   MSK No edema and no tenderness. Psych  not accessible       Labs:    Recent Results (from the past 24 hour(s))   METABOLIC PANEL, COMPREHENSIVE    Collection Time: 03/29/22  9:38 AM   Result Value Ref Range    Sodium 135 (L) 136 - 145 mmol/L    Potassium 6.2 (HH) 3.5 - 5.1 mmol/L    Chloride 98 98 - 107 mmol/L    CO2 8 (LL) 21 - 32 mmol/L    Anion gap 29 (H) 7 - 16 mmol/L    Glucose 20 (LL) 65 - 100 mg/dL    BUN 50 (H) 8 - 23 MG/DL    Creatinine 1.90 (H) 0.6 - 1.0 MG/DL    GFR est AA 35 (L) >60 ml/min/1.73m2    GFR est non-AA 29 (L) >60 ml/min/1.73m2    Calcium 9.6 8.3 - 10.4 MG/DL    Bilirubin, total 7.6 (H) 0.2 - 1.1 MG/DL    ALT (SGPT) QUANTITY NOT SUFFICIENT. SUGGEST RECOLLECTION 12 - 65 U/L    AST (SGOT) QUANTITY NOT SUFFICIENT. SUGGEST RECOLLECTION 15 - 37 U/L    Alk.  phosphatase 193 (H) 50 - 136 U/L    Protein, total 7.4 6.3 - 8.2 g/dL    Albumin 3.7 3.2 - 4.6 g/dL    Globulin 3.7 (H) 2.3 - 3.5 g/dL    A-G Ratio 1.0 (L) 1.2 - 3.5     LACTIC ACID    Collection Time: 03/29/22  9:38 AM   Result Value Ref Range    Lactic acid 16.2 (HH) 0.4 - 2.0 MMOL/L   CBC WITH AUTOMATED DIFF    Collection Time: 03/29/22  9:38 AM   Result Value Ref Range    WBC 29.4 (H) 4.3 - 11.1 K/uL    RBC 4.17 4.05 - 5.2 M/uL    HGB 13.9 11.7 - 15.4 g/dL    HCT 46.4 (H) 35.8 - 46.3 %    .3 (H) 79.6 - 97.8 FL    MCH 33.3 (H) 26.1 - 32.9 PG    MCHC 30.0 (L) 31.4 - 35.0 g/dL    RDW 23.5 (H) 11.9 - 14.6 %    PLATELET 93 (L) 011 - 450 K/uL    MPV 11.4 9.4 - 12.3 FL    ABSOLUTE NRBC 0.15 0.0 - 0.2 K/uL    DF AUTOMATED      NEUTROPHILS 90 (H) 43 - 78 %    LYMPHOCYTES 3 (L) 13 - 44 %    MONOCYTES 5 4.0 - 12.0 %    EOSINOPHILS 0 (L) 0.5 - 7.8 %    BASOPHILS 0 0.0 - 2.0 %    IMMATURE GRANULOCYTES 1 0.0 - 5.0 %    ABS. NEUTROPHILS 26.5 (H) 1.7 - 8.2 K/UL    ABS. LYMPHOCYTES 0.9 0.5 - 4.6 K/UL    ABS. MONOCYTES 1.6 (H) 0.1 - 1.3 K/UL    ABS. EOSINOPHILS 0.1 0.0 - 0.8 K/UL    ABS. BASOPHILS 0.1 0.0 - 0.2 K/UL    ABS. IMM.  GRANS. 0.3 0.0 - 0.5 K/UL   GLUCOSE, POC    Collection Time: 03/29/22 10:59 AM   Result Value Ref Range    Glucose (POC) 29 (LL) 65 - 100 mg/dL    Performed by Bia Yap 8    Collection Time: 03/29/22 11:51 AM   Result Value Ref Range    Reticulocyte count 4.8 (H) 0.3 - 2.0 %    Absolute Retic Cnt. 0.1699 (H) 0.026 - 0.095 M/ul    Immature Retic Fraction 35.8 (H) 3.0 - 15.9 %    Retic Hgb Conc. 41 (H) 29 - 35 pg   CBC W/O DIFF    Collection Time: 03/29/22 11:51 AM   Result Value Ref Range    WBC 25.5 (H) 4.3 - 11.1 K/uL    RBC 3.54 (L) 4.05 - 5.2 M/uL    HGB 11.8 11.7 - 15.4 g/dL    HCT 42.4 35.8 - 46.3 %    .8 (H) 79.6 - 97.8 FL    MCH 33.3 (H) 26.1 - 32.9 PG    MCHC 27.8 (L) 31.4 - 35.0 g/dL    RDW 22.7 (H) 11.9 - 14.6 %    PLATELET 77 (L) 579 - 450 K/uL    MPV 12.2 9.4 - 12.3 FL    ABSOLUTE NRBC 0.12 0.0 - 0.2 K/uL   PATHOLOGIST REVIEW SMEARS    Collection Time: 03/29/22 11:51 AM   Result Value Ref Range    PATHOLOGIST REVIEW PENDING    GLUCOSE, POC    Collection Time: 03/29/22 12:26 PM   Result Value Ref Range    Glucose (POC) 30 (LL) 65 - 100 mg/dL    Performed by Sabino    GLUCOSE, POC    Collection Time: 03/29/22 12:37 PM   Result Value Ref Range    Glucose (POC) 52 (L) 65 - 100 mg/dL    Performed by Zoran    LACTIC ACID    Collection Time: 03/29/22  1:24 PM   Result Value Ref Range    Lactic acid 16.6 (HH) 0.4 - 2.0 MMOL/L   METABOLIC PANEL, BASIC    Collection Time: 03/29/22  1:24 PM   Result Value Ref Range    Sodium 133 (L) 136 - 145 mmol/L    Potassium 5.8 (H) 3.5 - 5.1 mmol/L    Chloride 100 98 - 107 mmol/L    CO2 8 (LL) 21 - 32 mmol/L    Anion gap 25 (H) 7 - 16 mmol/L    Glucose 102 (H) 65 - 100 mg/dL    BUN 54 (H) 8 - 23 MG/DL    Creatinine 2.10 (H) 0.6 - 1.0 MG/DL    GFR est AA 31 (L) >60 ml/min/1.73m2    GFR est non-AA 25 (L) >60 ml/min/1.73m2    Calcium 8.9 8.3 - 10.4 MG/DL   HAPTOGLOBIN    Collection Time: 03/29/22  1:24 PM   Result Value Ref Range    Haptoglobin <8 (L) 30 - 200 mg/dL   LD    Collection Time: 03/29/22  1:24 PM   Result Value Ref Range    LD 2,062 (H) 110 - 210 U/L   BILIRUBIN, FRACTIONATED    Collection Time: 03/29/22  1:24 PM   Result Value Ref Range    Bilirubin, total 6.6 (H) 0.2 - 1.1 MG/DL    Bilirubin, direct 4.2 (H) <0.4 MG/DL    Bilirubin, indirect 2.4 (H) 0.0 - 1.1 MG/DL   PROTHROMBIN TIME + INR    Collection Time: 03/29/22  1:24 PM   Result Value Ref Range    Prothrombin time 57.5 (H) 12.6 - 14.5 sec    INR 6.4 (HH)     PTT    Collection Time: 03/29/22  1:24 PM   Result Value Ref Range    aPTT 54.2 (H) 24.1 - 35.1 SEC   FIBRINOGEN    Collection Time: 03/29/22  1:24 PM   Result Value Ref Range    Fibrinogen <60 (LL) 190 - 501 mg/dL   D DIMER    Collection Time: 03/29/22  1:24 PM   Result Value Ref Range    D DIMER >20.00 (H) <0.56 ug/ml(FEU)   LIPASE    Collection Time: 03/29/22  1:24 PM   Result Value Ref Range    Lipase 1,578 (H) 73 - 393 U/L   GLUCOSE, POC    Collection Time: 03/29/22  2:14 PM   Result Value Ref Range    Glucose (POC) 81 65 - 100 mg/dL    Performed by Kae PRESTON, ALLOCATE    Collection Time: 03/29/22  3:45 PM   Result Value Ref Range    Unit number [id number]     Blood component type FP 24h, Thaw     Unit division 00     Status of unit ISSUED    CRYOPRECIPITATE, ALLOCATE    Collection Time: 03/29/22  4:00 PM   Result Value Ref Range Unit number [id number]     Blood component type CRYO,5U Thaw     Unit division 00     Status of unit ALLOCATED     Unit number [id number]     Blood component type CRYO,5U Thaw     Unit division 00     Status of unit ALLOCATED    PROCALCITONIN    Collection Time: 03/29/22  5:14 PM   Result Value Ref Range    Procalcitonin 4.21 (H) 0.00 - 0.49 ng/mL   LACTIC ACID    Collection Time: 03/29/22  5:14 PM   Result Value Ref Range    Lactic acid 17.2 (HH) 0.4 - 2.0 MMOL/L       Imaging:                        ASSESSMENT/RECOMMENDATIONS:  Problem List  Date Reviewed: 3/25/2022          Codes Class Noted    * (Principal) Vegetative endocarditis of mitral valve ICD-10-CM: I33.0  ICD-9-CM: 421.0  3/24/2022        Body mass index (BMI) of 23.0 to 23.9 in adult (Chronic) ICD-10-CM: D99.52  ICD-9-CM: V85.1  8/26/2021        Sepsis ([facility] ICD-10-CM: A41.9  ICD-9-CM: 038.9, 995.91  3/29/2022        Encephalopathy acute ICD-10-CM: G93.40  ICD-9-CM: 348.30  3/29/2022        Acute kidney failure ([facility] ICD-10-CM: N17.9  ICD-9-CM: 584.9  3/29/2022        Hyperkalemia ICD-10-CM: E87.5  ICD-9-CM: 276.7  3/28/2022        CML (chronic myelocytic leukemia) (HCC) (Chronic) ICD-10-CM: C92.10  ICD-9-CM: 205.10  3/19/2022        Physical deconditioning (Chronic) ICD-10-CM: R53.81  ICD-9-CM: 799.3  3/19/2022        History of DVT (deep vein thrombosis) (Chronic) ICD-10-CM: H20.296  ICD-9-CM: V12.51  3/19/2022        Pleural effusion, bilateral ICD-10-CM: J90  ICD-9-CM: 511.9  3/11/2022        Hypoproteinemia ([facility] ICD-10-CM: E77.8  ICD-9-CM: 273.8  3/11/2022        Thrombocytopenia (HCC) ICD-10-CM: D69.6  ICD-9-CM: 287.5  10/15/2021        Opioid use, unspecified with unspecified opioid-induced disorder ICD-10-CM: F11.99  ICD-9-CM: 292.9, 305.50  9/17/2021        Leukocytosis ICD-10-CM: Z45.613  ICD-9-CM: 288.60  10/16/2020        Chronic migraine without aura with status migrainosus, not intractable (Chronic) ICD-10-CM: G43.701  ICD-9-CM: 346.72  11/1/2016        Lung nodule ICD-10-CM: R91.1  ICD-9-CM: 793.11  10/28/2016    Overview Signed 10/28/2016 10:05 AM by Ambrocio Velasco     IMPRESSION:    1. Lobulated 2 cm mass in the right lung apex, malignancy versus an atypical  inflammatory process. 2. Occlusion of the right subclavian vein. 3. Chronic occlusion of the portal vein with associated abnormal tissue  encasing the common bile duct, most likely fibrosis. 4. Splenomegaly. Headache, chronic daily (Chronic) ICD-10-CM: R51.9  ICD-9-CM: 784.0  9/22/2016        Visuospatial deficit (Chronic) ICD-10-CM: A85.553  ICD-9-CM: 799.53  6/20/2016        Acquired hypercoagulable state ([facility] (Chronic) ICD-10-CM: D28.88  ICD-9-CM: 289.81  3/30/2015        AKOSUA (iron deficiency anemia) ICD-10-CM: D50.9  ICD-9-CM: 280.9  7/28/2012        Cirrhosis (HCC) (Chronic) ICD-10-CM: K74.60  ICD-9-CM: 571.5  7/27/2012        Polycythemia vera (HCC) (Chronic) ICD-10-CM: D45  ICD-9-CM: 238.4  2/16/2009    Overview Addendum 8/26/2021  2:31 PM by Kun Culver MD     2/2008 by [email or web address] analysis however portal vein thrombosis  And and splenomegaly since 2004  Hydroxyurea for 6 months poor tolerance spleen did not shrink   Pegasys 90 mcg weekly 4-2009 till    Restarted 12-30-09 45 mcg q 2 weeks  2-2010 reduced to q month   Increased 45 mcg 2/month 4-2010  Held 8-2010 thrombocytopenia  12-29-12 restarted 45 mcg q 2 weeks  4-1-11 45 mcg q 3 weeks  4-22-11 45 mcg q 4 weeks  Uncertain dosing thereafter   Possibly q 3 weeks 10-15-11 and held and restarted on 4-1-12 6-12 ct scan Small bowel wall thickening suggesting an enteritis. In addition, there is well thickening of the right colon which can suggest an additional   colitis although this can also be seen with severe portal hypertension. Likely reactive edematous changes it scattered fluid collections are seen of   the small bowel mesentery. . Apparent occlusion of the portal vein.  In addition, the splenic vein, and superior mesenteric vein are not definitely seen and are also likely occluded. Splenomegaly, and extensive varices are seen as described above consistent with the portal vein occlusion. Thurl Mutton Heterogeneous enhancement of the liver and mild periportal edema. An acute hepatitis cannot be excluded. Primary hypothyroidism (Chronic) ICD-10-CM: E03.9  ICD-9-CM: 244.9  2/16/2009        Splenomegaly ICD-10-CM: R16.1  ICD-9-CM: 789.2  2/16/2009        Esophageal varices (HCC) (Chronic) ICD-10-CM: I85.00  ICD-9-CM: 456.1  2/16/2009    Overview Addendum 8/26/2021  2:30 PM by Castillo Ralph MD     grade 3                 77-year-old female with CLL/PV on chronic dasatinib/Jakafi, recurrent recent E. coli infection/sepsis/meningitis, admitted again for endocarditis pending EGD/ROSELINE work-up but developed sudden decline with multiorgan failure in 24 hours including CARLITO, acute liver damage, high lactate, altered mental status, severe thrombocytosis, had extensive discussion with son and daughter and intensive care team, very poor prognosis and a multicomorbidity next further work-up and management risky, concern of ischemic bowel but CT angiogram may worsen the CARLITO and high risk for anticoagulation given this suspicion of possible intracranial bleed, TTP is a consideration although no obvious source identified, discussed goal of care and children made very clear for DNR and DO NOT INTUBATE, accept ICU care if needed and pressor, will have to soon make a decision on whether hemodialysis/plasma exchange is acceptable, will follow closely. Lab studies and imaging studies were personally reviewed. Pertinent old records were reviewed. Case discussed with primary team.    Thank you for allowing us to participate in the care of Ms. Couch. Kristina Garay M.D.   [address]  Office : [phone]  Fax : [phone]

## 2022-03-29 NOTE — PROGRESS NOTES
PT was in bed and unable to communicate with [facility] d/t current medical regime. DIANA at bedside. [facility] introduced self and offered support. DIANA stated  that PT's  from [place] had been here earlier in the day and offered prayer and read Scripture. PT is Anglican. DIANA stated additional Family will be visiting PT. [facility] checked for any unmet needs. [facility] offered additional support if needed and told PT and DIANA they would be in [facility]. Rev. Alexandro Galloway M.Div.

## 2022-03-29 NOTE — ROUTINE PROCESS
Critical lab result called for patient,lactic acid 16. MD Delgado notified and at bedside. Accucheck done to verify glucose, result 29. New orders for IV D10 to infuse continuously.

## 2022-03-29 NOTE — PROGRESS NOTES
Some labs back - Fibrinogen low, d dimer > 20 and INR 6.4. Informed Oncology - order placed for FFP. Hematology to order Cryo and more labs for DIC workup.      Melissa Tobar NP

## 2022-03-29 NOTE — PROGRESS NOTES
Toshia Couch  Admission Date: 3/25/2022         Daily Progress Note: 3/29/2022    The patient's chart is reviewed and the patient is discussed with the staff. Background:  Patient is a 61 y.o.  female PMH CML on chemotherapy, chronic back pain, DVT and  PE on home Lovenox, seen and evaluated at the request of Aissatou May pleural effusions and evaluation for thoracentesis.  The patient was recently discharged from here on 3/14/22 after being treated for acute CHF.  The patient was also recently diagnosed with E coli bacterial meningitis and had completed antibiotics (3/13).  The patient has a history of DVT/PE and portal vein thrombosis and was on Lovenox at home. Gabo Holman is on chemotherapy for CML.  Recent echocardiogram reveals mitral valve vegetation that was not present on echocardiogram done in February this year. ID following for MV infective endocarditis (TTE 3/24) with negative blood cultures. ? GI source. Repeat blood cultures negative. On Rocephin. Change in mental status on 3/28. CT head with ? SAH versus herpes encephalitis. Neurology requested CTA but patient more unstable on 3/29 so postponed. Now with CARLITO/hyperkalemia, sepsis, episode vomiting/abdominal pain. Met with son, daughter and spoke with patient's sister over the phone and they all desire DNR status and will consider comfort care over next 24 to 48 hours if needed.        Subjective:     Patient tries to open eyes but not able to interact. She appears to have significant abdominal pain. Vomited earlier - green bile. Decreased urine output over past 24 hours.      Current Facility-Administered Medications   Medication Dose Route Frequency    lip protectant (BLISTEX) ointment 1 Each  1 Each Topical PRN    dextrose 10% infusion 125-250 mL  125-250 mL IntraVENous PRN    dextrose 10% infusion  100 mL/hr IntraVENous CONTINUOUS    glucose chewable tablet 16 g  16 g Oral PRN    glucagon (GLUCAGEN) injection 1 mg  1 mg IntraMUSCular PRN    dextrose 10% infusion 125-250 mL  125-250 mL IntraVENous PRN    calcium gluconate 2 g/100 mL sodium chloride (ISO-OSM)  2 g IntraVENous ONCE    albuterol (PROVENTIL VENTOLIN) nebulizer solution 20 mg  20 mg Nebulization NOW    lactated Ringers infusion  150 mL/hr IntraVENous CONTINUOUS    [Held by provider] oxyCODONE-acetaminophen (PERCOCET 10)  mg per tablet 1 Tablet  1 Tablet Oral Q6H PRN    0.9% sodium chloride infusion 250 mL  250 mL IntraVENous PRN    isosorbide dinitrate (ISORDIL) tablet 10 mg  10 mg Oral TID    metoprolol succinate (TOPROL-XL) XL tablet 25 mg  25 mg Oral DAILY    calcium carbonate (OS-JOE) tablet 500 mg [elemental]  500 mg Oral QHS    cetirizine (ZYRTEC) tablet 10 mg  10 mg Oral DAILY    cyclobenzaprine (FLEXERIL) tablet 5 mg  5 mg Oral TID PRN    [Held by provider] enoxaparin (LOVENOX) injection 50 mg  50 mg SubCUTAneous Q12H    fluticasone propionate (FLONASE) 50 mcg/actuation nasal spray 2 Spray  2 Spray Both Nostrils DAILY    levothyroxine (SYNTHROID) tablet 125 mcg  125 mcg Oral 6am    magnesium oxide (MAG-OX) tablet 200 mg  200 mg Oral DAILY    ondansetron (ZOFRAN ODT) tablet 4 mg  4 mg Oral Q8H PRN    Or    ondansetron (ZOFRAN) injection 4 mg  4 mg IntraVENous Q8H PRN    [Held by provider] pantoprazole (PROTONIX) tablet 40 mg  40 mg Oral ACB    [Held by provider] potassium chloride (K-DUR, KLOR-CON M20) SR tablet 40 mEq  40 mEq Oral BID    ruxolitinib (Jakavi) tab 5 mg (Patient Supplied)  5 mg Oral Q12H    [Held by provider] traZODone (DESYREL) tablet 50 mg  50 mg Oral QHS    trimethoprim-polymyxin b (POLYTRIM) 10,000 unit- 1 mg/mL ophthalmic solution 1 Drop  1 Drop Both Eyes BID    zinc oxide-cod liver oil (DESITIN) 40 % paste   Topical PRN    dasatinib tab 80 mg (Patient Supplied)  80 mg Oral DAILY    cefTRIAXone (ROCEPHIN) 2 g in 0.9% sodium chloride (MBP/ADV) 50 mL MBP  2 g IntraVENous Q24H     Review of Systems  Unable to obtain  Objective:     Vitals:    03/29/22 0630 03/29/22 0654 03/29/22 0756 03/29/22 1126   BP: 105/84  (!) 97/50 (!) 97/50   Pulse: 98  67 72   Resp: 18  20 20   Temp: 97.5 °F (36.4 °C)  97.3 °F (36.3 °C) 97.4 °F (36.3 °C)   SpO2: 96%      Weight:  126 lb 8.7 oz (57.4 kg)         Intake/Output Summary (Last 24 hours) at 3/29/2022 1246  Last data filed at 3/29/2022 0630  Gross per 24 hour   Intake 594.5 ml   Output 200 ml   Net 394.5 ml     Physical Exam:   Constitution:  the patient is well developed and appears acutely ill  HEENT:  Sclera clear, pupils equal, oral mucosa dry  Respiratory: clear from anterior. Respirations slightly labored. On room air  Cardiovascular:  RRR without M,G,R  Gastrointestinal: soft and appears very tender. Patient grimacing with even light palpation; with positive bowel sounds. Musculoskeletal: warm without cyanosis. There is no lower extremity edema. Skin:  no jaundice or rashes, no wounds   Neurologic: tries to open eyes but not able to communicate or follow any commands. Becomes restless with stimulation   Psychiatric: cannot assess    CXR:   3/28                                                                   3/22      LAB:  Recent Labs     03/29/22  0938 03/28/22  1525 03/27/22  0526   WBC 29.4* 13.7* 22.6*   HGB 13.9 13.1 12.8   HCT 46.4* 42.8 39.8   PLT 93* 2* 97*   LAC 16.2*  --   --      Recent Labs     03/29/22  0938 03/28/22  1525 03/28/22  0542 03/27/22  0526 03/27/22  0526   *  --  134*  --  138   K 6.2* 5.7* 5.8*   < > 5.6*   CL 98  --  100  --  104   CO2 8*  --  19*  --  20*   GLU 20*  --  73  --  74   BUN 50*  --  39*  --  24*   CREA 1.90*  --  1.50*  --  1.20*   MG  --  2.7*  --   --  2.3   CA 9.6  --  9.2  --  9.0   PHOS  --   --   --   --  4.2*   ALB 3.7  --   --   --   --    AST QUANTITY NOT SUFFICIENT. SUGGEST RECOLLECTION  --   --   --   --    ALT QUANTITY NOT SUFFICIENT.  SUGGEST RECOLLECTION  --   --   --   --    *  --   -- --   --     < > = values in this interval not displayed. Recent Labs     03/29/22  0938   LAC 16.2*     No results for input(s): PH, PCO2, PO2, HCO3, PHI, PCO2I, PO2I, HCO3I in the last 72 hours. No results for input(s): SDES, CULT in the last 72 hours. 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 Most recent blood cultures negative. Rocephin per ID. Active Problems:    Body mass index (BMI) of 23.0 to 23.9 in adult (8/26/2021)  Currently unable to take anything po. IV fluids for now. Consider nutrition if she improves over next 24 to 48 hours      Polycythemia vera ([address] (2/16/2009)  noted      Cirrhosis ([address] (7/27/2012)  plts currently 93,000. AKOSUA (iron deficiency anemia) (7/28/2012)  stable      Chronic myelogenous leukemia ([address] (3/30/2015)  Follow by oncology. Acquired hypercoagulable state ([address] (3/30/2015)  Was on Lovenox at home. Anticoagulation on hold here with recent drop in plts and ? SAH      Thrombocytopenia ([address] (10/15/2021)  plts 93,000 today      Leukocytosis (10/16/2020)  Worse. Associated with lactic acidosis, hypotension. + abdominal pain - have ordered KUB. Too unstable to do CT right now. ? Mesenteric ischemia. Discussed with ID -they do not think abx need to be adjusted. CXR yesterday what appears to be a larger right effusion. On room air. Acute respiratory failure with hypoxemia ([address] (3/9/2022)  On room air. CXR as above      Pleural effusion, bilateral (3/11/2022)  Monitoring. No tap to date      Acute diastolic (congestive) heart failure ([address] (3/19/2022)  Currently not a problem. Off diuretics       Hyperkalemia (3/28/2022)  New. Associated with CARLITO. Calcium, albuterol now - repeat labs. Sepsis ([address] (3/29/2022)  As above - ? Abdominal source      Encephalopathy acute (3/29/2022)  ? SAH vs encephalitis per CT.  Too unstable for CTA right now - requested by neurology. Acute kidney failure ([facility] (3/29/2022)  Gilbert in place. Calcium, albuterol for hyperkalemia. IV fluids ordered - monitor blood pressure/labs    Have talked with patient's sister on the phone and her son and daughter are now here. They all agree on DNR status and may even consider comfort measures if patient does not improve over the next 24 hours. Getting IV fluids now - will recheck labs. KUB pending - nurse trying to place NG. Transfer to ICU - trend lactate. Midline placed       More than 50% of the time documented was spent in face-to-face contact with the patient and in the care of the patient on the floor/unit where the patient is located. Romel Ryan, NP     I have spoken with and examined the patient. I agree with the above assessment and plan as documented. Mrs. Jaelyn Gloria has severe lactic acidosis with either bowel ischemia or severe sepsis compounded by CARLITO. She has had vomiting with possible aspiration. She is minimally responsive and her family has arrived, confirming DNR code status. Gen: pale, minimally responsive  Lungs:  Coarse bilaterally  Heart:  RRR with no Murmur/Rubs/Gallops  Abd: tender on left side of abdomen, midline scar  Ext: no edema    --transfer to ICU acutely. --volume resuscitate  --will obtain KUB  --labs pending  --medically managing hyperkalemia which is likely due to extracellular shift from acidosis.   --DNR    Navid Cowart MD

## 2022-03-29 NOTE — PROGRESS NOTES
Problem: Falls - Risk of  Goal: *Absence of Falls  Description: Document Kala Morales Fall Risk and appropriate interventions in the flowsheet.   Outcome: Progressing Towards Goal  Note: Fall Risk Interventions:  Mobility Interventions: Bed/chair exit alarm    Mentation Interventions: Adequate sleep, hydration, pain control    Medication Interventions: Bed/chair exit alarm    Elimination Interventions: Bed/chair exit alarm    History of Falls Interventions: Bed/chair exit alarm         Problem: Patient Education: Go to Patient Education Activity  Goal: Patient/Family Education  Outcome: Progressing Towards Goal     Problem: General Medical Care Plan  Goal: *Vital signs within specified parameters  Outcome: Progressing Towards Goal  Goal: *Labs within defined limits  Outcome: Progressing Towards Goal  Goal: *Absence of infection signs and symptoms  Outcome: Progressing Towards Goal  Goal: *Optimal pain control at patient's stated goal  Outcome: Progressing Towards Goal  Goal: *Skin integrity maintained  Outcome: Progressing Towards Goal  Goal: *Fluid volume balance  Outcome: Progressing Towards Goal  Goal: *Optimize nutritional status  Outcome: Progressing Towards Goal  Goal: *Anxiety reduced or absent  Outcome: Progressing Towards Goal  Goal: *Progressive mobility and function (eg: ADL's)  Outcome: Progressing Towards Goal

## 2022-03-29 NOTE — PROGRESS NOTES
Hospitalist Progress Note   Admit Date:  3/25/2022  4:10 AM   Name:  Marzette Leventhal   Age:  64 y.o. Sex:  female  :  1960   MRN:  [id number]   Room:  [facility]/    Presenting Complaint: No chief complaint on file. 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 Patient was recently discharged from the hospital on 3/14 after treatment for HF. She was also noted to have splenomegaly with splenic infarct. She was recently diagnosed with E. coli bacterial meningitis, completed course of antibiotics.     In the ER, CXR was done shows evidence of pulmonary vascular congestion. On 3/23 echocardiogram was performed showing mitral vegetations. She was transferred to the [facility] 3/24. Cardiology, ID, and Pulmonology are on board. Subjective/24hr Events (22): Patient seen and examined. Frail, ill  Drowsy  Hypoglycemia and hyperkalemia,lactic acidosis  co2 of 8  Spoke to patient's sister over phone and explained condition critical and poor prognosis  Palliative care consulted    ROS:  10 systems reviewed and negative except as noted above. 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 coli bacteremia   - Per Cardiology, patient is high risk for complications with ROSELINE d/t hx of esophageal varices and multiple co-morbidities   - Consult GI tomorrow morning for EGD prior to ROSELINE due to Cardiology recommendation     Active Problems:  Acute encephalopathy  Mar/28: Patient refusing several medications inappropriately   - Blood gas, ammonia ordered   - No central focal concerns during exam    CARLITO  Mar/28: sCr 0.70 on Mar/14; sCr 1.50 today   - IV furosemide stopped    - Pulm gave a 500mL bolus   - CMP in the a.m.     Acute respiratory failure with hypoxemia (HCC) (3/9/2022)  - R > L pleural effusions  - Thoracentesis cancelled on Mar/27 (patient had been given Vit K and FFP in preparation; patient was unable to tolerate)  - Will attempt again on Mar/29   - SpO2 94% on room air at time of VB.341 / 27.3 / 41.2  - Pulmonology aware     Malignant ascites (2012)  Cirrhosis ([facility] (2012)  - INR 2.2 on admission  - Low sodium diet  - Paracentesis PRN        Hyperkalemia (2012)  Mar/28: Sample hemolyzed; re-check level ordered    Thrombocytopenia ([facility] [address] (10/15/2021)  - Plt 146 --> 97  - Monitor; hx of cirrhosis     Hypothyroidism  - Continue Synthroid   - TSH 22.10 on Mar/18  - Check a free T4      Diastolic heart failure ([facility] [address] (3/19/2022)  - Cardiology on board  - On furosemide 20 mg at home  - Received furosemide 40 mg IVP on Mar/28, patient refused dose on Mar/29  - Toprol-XL 25 mg daily     Diarrhea  -Chronic per patient  -Strongyloides antibodies, stool PCR ordered per ID    AKOSUA (iron deficiency anemia) (2012)  - Hgb 12.8     CML  PCV  - Continue home medications dasatinib and ruxolitinib     Acquired hypercoagulable state ([facility] [address] (3/30/2015)  Hx DVT, portal vein thrombosis  - Continue enoxaparin q12hr    D/w pulmonary ,  Transfer to icu  cct 35 minutes    Discharge Planning:  Pending improvement    Diet:  No diet orders on file  DVT PPx: enoxaparin  Code status: DNR    Hospital Problems as of 3/29/2022 Date Reviewed: 3/25/2022          Codes Class Noted - Resolved POA    * (Principal) Vegetative endocarditis of mitral valve ICD-10-CM: I33.0  ICD-9-CM: 421.0  3/24/2022 - Present Yes        Body mass index (BMI) of 23.0 to 23.9 in adult (Chronic) ICD-10-CM: W81.21  ICD-9-CM: V85.1  8/26/2021 - Present Yes        Sepsis ([facility] ICD-10-CM: A41.9  ICD-9-CM: 038.9, 995.91  3/29/2022 - Present No        Encephalopathy acute ICD-10-CM: G93.40  ICD-9-CM: 348.30  3/29/2022 - Present No        Acute kidney failure ([facility] ICD-10-CM: N17.9  ICD-9-CM: 584.9  3/29/2022 - Present No        Hyperkalemia ICD-10-CM: E87.5  ICD-9-CM: 276.7  3/28/2022 - Present No        CML (chronic myelocytic leukemia) (HCC) (Chronic) ICD-10-CM: C92.10  ICD-9-CM: 205.10  3/19/2022 - Present Yes        Pleural effusion, bilateral ICD-10-CM: J90  ICD-9-CM: 511.9  3/11/2022 - Present Yes        Hypoproteinemia ([facility] ICD-10-CM: E77.8  ICD-9-CM: 273.8  3/11/2022 - Present Yes        Thrombocytopenia ([facility] ICD-10-CM: D69.6  ICD-9-CM: 287.5  10/15/2021 - Present Yes        Leukocytosis ICD-10-CM: E68.371  ICD-9-CM: 288.60  10/16/2020 - Present Yes        Acquired hypercoagulable state ([facility] (Chronic) ICD-10-CM: I55.70  ICD-9-CM: 289.81  3/30/2015 - Present Yes        AKOSUA (iron deficiency anemia) ICD-10-CM: D50.9  ICD-9-CM: 280.9  7/28/2012 - Present Unknown        Cirrhosis ([facility] (Chronic) ICD-10-CM: K74.60  ICD-9-CM: 571.5  7/27/2012 - Present Yes        Polycythemia vera ([facility] (Chronic) ICD-10-CM: D45  ICD-9-CM: 238.4  2/16/2009 - Present Unknown    Overview Addendum 8/26/2021  2:31 PM by Leopoldo Ferry, MD     2/2008 by [email or web address] analysis however portal vein thrombosis  And and splenomegaly since 2004  Hydroxyurea for 6 months poor tolerance spleen did not shrink   Pegasys 90 mcg weekly 4-2009 till    Restarted 12-30-09 45 mcg q 2 weeks  2-2010 reduced to q month   Increased 45 mcg 2/month 4-2010  Held 8-2010 thrombocytopenia  12-29-12 restarted 45 mcg q 2 weeks  4-1-11 45 mcg q 3 weeks  4-22-11 45 mcg q 4 weeks  Uncertain dosing thereafter   Possibly q 3 weeks 10-15-11 and held and restarted on 4-1-12 6-12 ct scan Small bowel wall thickening suggesting an enteritis. In addition, there is well thickening of the right colon which can suggest an additional   colitis although this can also be seen with severe portal hypertension. Likely reactive edematous changes it scattered fluid collections are seen of   the small bowel mesentery. . Apparent occlusion of the portal vein. In addition, the splenic vein, and superior mesenteric vein are not definitely seen and are also likely occluded. Splenomegaly, and extensive varices are seen as described above consistent with the portal vein occlusion. Kelvin Addison Heterogeneous enhancement of the liver and mild periportal edema. An acute hepatitis cannot be excluded.               Splenomegaly ICD-10-CM: R16.1  ICD-9-CM: 789.2  2/16/2009 - Present Yes        RESOLVED: Endocarditis of mitral valve ICD-10-CM: I05.9  ICD-9-CM: 394.9  3/25/2022 - 3/29/2022 Yes        RESOLVED: Acute diastolic (congestive) heart failure (HCC) ICD-10-CM: I50.31  ICD-9-CM: 428.31, 428.0  3/19/2022 - 3/29/2022 Yes        RESOLVED: Acute respiratory failure with hypoxemia [facility]) ICD-10-CM: J96.01  ICD-9-CM: 518.81  3/9/2022 - 3/29/2022 Yes        RESOLVED: Chronic myelogenous leukemia (HCC) (Chronic) ICD-10-CM: C92.10  ICD-9-CM: 205.10  3/30/2015 - 3/29/2022 Yes        RESOLVED: Hypokalemia ICD-10-CM: E87.6  ICD-9-CM: 276.8  7/27/2012 - 3/29/2022 Yes        RESOLVED: Malignant ascites ICD-10-CM: R18.0  ICD-9-CM: 789.51  7/26/2012 - 3/29/2022 Yes              Objective:     Patient Vitals for the past 24 hrs:   Temp Pulse Resp BP SpO2   03/29/22 1533 -- 81 19 (!) 110/59 93 %   03/29/22 1336 -- -- -- -- 96 %   03/29/22 1126 97.4 °F (36.3 °C) 72 20 (!) 97/50 --   03/29/22 0756 97.3 °F (36.3 °C) 67 20 (!) 97/50 --   03/29/22 0630 97.5 °F (36.4 °C) 98 18 105/84 96 %   03/29/22 0554 97.6 °F (36.4 °C) 82 18 112/67 94 %   03/29/22 0454 97.8 °F (36.6 °C) 75 19 108/79 92 %   03/29/22 0354 97.5 °F (36.4 °C) 81 18 119/85 94 %   03/29/22 0339 97.6 °F (36.4 °C) 80 17 109/76 93 %   03/29/22 0230 97.5 °F (36.4 °C) 75 18 108/77 94 %   03/29/22 0147 97.4 °F (36.3 °C) 80 18 110/75 95 %   03/29/22 0047 97.5 °F (36.4 °C) 78 18 105/73 94 %   03/29/22 0032 97.4 °F (36.3 °C) 77 19 109/74 91 %   03/28/22 2322 97.2 °F (36.2 °C) 77 18 119/88 99 %   03/28/22 1958 97.3 °F (36.3 °C) 79 18 108/81 93 %     Oxygen Therapy  O2 Sat (%): 93 % (03/29/22 1533)  Pulse via Oximetry: 80 beats per minute (03/29/22 1533)  O2 Device: None (Room air) (03/29/22 1336)  Skin Assessment: Clean, dry, & intact (03/25/22 1935)  Skin Protection for O2 Device: N/A (03/25/22 1935)  O2 Flow Rate (L/min): 2 l/min (03/26/22 0730)    Estimated body mass index is 22.42 kg/m² as calculated from the following:    Height as of 3/18/22: 5' 3\" (1.6 m). Weight as of this encounter: 57.4 kg (126 lb 8.7 oz). Intake/Output Summary (Last 24 hours) at 3/29/2022 1616  Last data filed at 3/29/2022 0630  Gross per 24 hour   Intake 594.5 ml   Output 200 ml   Net 394.5 ml         Physical Exam:   Blood pressure (!) 110/59, pulse 81, temperature 97.4 °F (36.3 °C), resp. rate 19, weight 57.4 kg (126 lb 8.7 oz), SpO2 93 %. General:    Ill appearing  Head:  Normocephalic, atraumatic  Eyes:  Sclerae appear normal.  Pupils equally round. ENT:  Nares appear normal, no drainage. Dry oral mucosa  Neck:  No restricted ROM. Trachea midline   CV:   RRR. No m/r/g. Lungs:   Respirations even, unlabored on room air  Abdomen:   Soft, nondistended. Extremities: No clubbing. Skin:     No rashes, pallor. Neuro:  CN II-XII grossly intact. Lethargic. Psych:   Withdrawn      I have reviewed ordered lab tests and independently visualized imaging below:    Recent Labs:  Recent Results (from the past 48 hour(s))   METABOLIC PANEL, BASIC    Collection Time: 03/28/22  5:42 AM   Result Value Ref Range    Sodium 134 (L) 136 - 145 mmol/L Potassium 5.8 (H) 3.5 - 5.1 mmol/L    Chloride 100 98 - 107 mmol/L    CO2 19 (L) 21 - 32 mmol/L    Anion gap 15 7 - 16 mmol/L    Glucose 73 65 - 100 mg/dL    BUN 39 (H) 8 - 23 MG/DL    Creatinine 1.50 (H) 0.6 - 1.0 MG/DL    GFR est AA 45 (L) >60 ml/min/1.73m2    GFR est non-AA 38 (L) >60 ml/min/1.73m2    Calcium 9.2 8.3 - 10.4 MG/DL   POC VENOUS BLOOD GAS    Collection Time: 03/28/22  2:07 PM   Result Value Ref Range    Device: ROOM AIR      pH, venous (POC) 7.34 7.32 - 7.42      pCO2, venous (POC) 27.3 (L) 41 - 51 MMHG    pO2, venous (POC) 41 mmHg    HCO3, venous (POC) 14.7 (L) 23 - 28 MMOL/L    sO2, venous (POC) 74.7 65 - 88 %    Base deficit, venous (POC) 9.4 mmol/L    Allens test (POC) Positive      Site RIGHT RADIAL      Specimen type (POC) VENOUS BLOOD      Performed by Olvin     CO2, POC 16 13 - 23 MMOL/L    Critical value read back DR. NASH    CBC WITH AUTOMATED DIFF    Collection Time: 03/28/22  3:25 PM   Result Value Ref Range    WBC 13.7 (H) 4.3 - 11.1 K/uL    RBC 3.98 (L) 4.05 - 5.2 M/uL    HGB 13.1 11.7 - 15.4 g/dL    HCT 42.8 35.8 - 46.3 %    .5 (H) 79.6 - 97.8 FL    MCH 32.9 26.1 - 32.9 PG    MCHC 30.6 (L) 31.4 - 35.0 g/dL    RDW 22.5 (H) 11.9 - 14.6 %    PLATELET 2 (LL) 967 - 450 K/uL    MPV Unable to calculate. Recommend adding IPF. 9.4 - 12.3 FL    ABSOLUTE NRBC 0.02 0.0 - 0.2 K/uL    DF AUTOMATED      NEUTROPHILS 91 (H) 43 - 78 %    LYMPHOCYTES 5 (L) 13 - 44 %    MONOCYTES 3 (L) 4.0 - 12.0 %    EOSINOPHILS 0 (L) 0.5 - 7.8 %    BASOPHILS 0 0.0 - 2.0 %    IMMATURE GRANULOCYTES 1 0.0 - 5.0 %    ABS. NEUTROPHILS 12.6 (H) 1.7 - 8.2 K/UL    ABS. LYMPHOCYTES 0.6 0.5 - 4.6 K/UL    ABS. MONOCYTES 0.4 0.1 - 1.3 K/UL    ABS. EOSINOPHILS 0.0 0.0 - 0.8 K/UL    ABS. BASOPHILS 0.0 0.0 - 0.2 K/UL    ABS. IMM.  GRANS. 0.1 0.0 - 0.5 K/UL   MAGNESIUM    Collection Time: 03/28/22  3:25 PM   Result Value Ref Range    Magnesium 2.7 (H) 1.8 - 2.4 mg/dL   AMMONIA    Collection Time: 03/28/22  3:25 PM Result Value Ref Range    Ammonia, plasma 84 (H) 11 - 32 UMOL/L   T4, FREE    Collection Time: 03/28/22  3:25 PM   Result Value Ref Range    T4, Free 1.2 0.9 - 1.8 NG/DL   POTASSIUM    Collection Time: 03/28/22  3:25 PM   Result Value Ref Range    Potassium 5.7 (H) 3.5 - 5.1 mmol/L   PLATELETS, ALLOCATE    Collection Time: 03/28/22  4:15 PM   Result Value Ref Range    Unit number [id number]     Blood component type PLPH,LRIR2     Unit division 00     Status of unit ISSUED     Unit number [id number]     Blood component type PLPH,LRIR     Unit division 00     Status of unit ISSUED    TYPE & SCREEN    Collection Time: 03/28/22  5:55 PM   Result Value Ref Range    Crossmatch Expiration 03/31/2022,2359     ABO/Rh(D) B NEGATIVE     Antibody screen NEG    METABOLIC PANEL, COMPREHENSIVE    Collection Time: 03/29/22  9:38 AM   Result Value Ref Range    Sodium 135 (L) 136 - 145 mmol/L    Potassium 6.2 (HH) 3.5 - 5.1 mmol/L    Chloride 98 98 - 107 mmol/L    CO2 8 (LL) 21 - 32 mmol/L    Anion gap 29 (H) 7 - 16 mmol/L    Glucose 20 (LL) 65 - 100 mg/dL    BUN 50 (H) 8 - 23 MG/DL    Creatinine 1.90 (H) 0.6 - 1.0 MG/DL    GFR est AA 35 (L) >60 ml/min/1.73m2    GFR est non-AA 29 (L) >60 ml/min/1.73m2    Calcium 9.6 8.3 - 10.4 MG/DL    Bilirubin, total 7.6 (H) 0.2 - 1.1 MG/DL    ALT (SGPT) QUANTITY NOT SUFFICIENT. SUGGEST RECOLLECTION 12 - 65 U/L    AST (SGOT) QUANTITY NOT SUFFICIENT. SUGGEST RECOLLECTION 15 - 37 U/L    Alk.  phosphatase 193 (H) 50 - 136 U/L    Protein, total 7.4 6.3 - 8.2 g/dL    Albumin 3.7 3.2 - 4.6 g/dL    Globulin 3.7 (H) 2.3 - 3.5 g/dL    A-G Ratio 1.0 (L) 1.2 - 3.5     LACTIC ACID    Collection Time: 03/29/22  9:38 AM   Result Value Ref Range    Lactic acid 16.2 (HH) 0.4 - 2.0 MMOL/L   CBC WITH AUTOMATED DIFF    Collection Time: 03/29/22  9:38 AM   Result Value Ref Range    WBC 29.4 (H) 4.3 - 11.1 K/uL    RBC 4.17 4.05 - 5.2 M/uL    HGB 13.9 11.7 - 15.4 g/dL    HCT 46.4 (H) 35.8 - 46.3 %    .3 (H) 79.6 - 97.8 FL    MCH 33.3 (H) 26.1 - 32.9 PG    MCHC 30.0 (L) 31.4 - 35.0 g/dL    RDW 23.5 (H) 11.9 - 14.6 %    PLATELET 93 (L) 077 - 450 K/uL    MPV 11.4 9.4 - 12.3 FL    ABSOLUTE NRBC 0.15 0.0 - 0.2 K/uL    DF AUTOMATED      NEUTROPHILS 90 (H) 43 - 78 %    LYMPHOCYTES 3 (L) 13 - 44 %    MONOCYTES 5 4.0 - 12.0 %    EOSINOPHILS 0 (L) 0.5 - 7.8 %    BASOPHILS 0 0.0 - 2.0 %    IMMATURE GRANULOCYTES 1 0.0 - 5.0 %    ABS. NEUTROPHILS 26.5 (H) 1.7 - 8.2 K/UL    ABS. LYMPHOCYTES 0.9 0.5 - 4.6 K/UL    ABS. MONOCYTES 1.6 (H) 0.1 - 1.3 K/UL    ABS. EOSINOPHILS 0.1 0.0 - 0.8 K/UL    ABS. BASOPHILS 0.1 0.0 - 0.2 K/UL    ABS. IMM.  GRANS. 0.3 0.0 - 0.5 K/UL   GLUCOSE, POC    Collection Time: 03/29/22 10:59 AM   Result Value Ref Range    Glucose (POC) 29 (LL) 65 - 100 mg/dL    Performed by Bia Yap 8    Collection Time: 03/29/22 11:51 AM   Result Value Ref Range    Reticulocyte count 4.8 (H) 0.3 - 2.0 %    Absolute Retic Cnt. 0.1699 (H) 0.026 - 0.095 M/ul    Immature Retic Fraction 35.8 (H) 3.0 - 15.9 %    Retic Hgb Conc. 41 (H) 29 - 35 pg   CBC W/O DIFF    Collection Time: 03/29/22 11:51 AM   Result Value Ref Range    WBC 25.5 (H) 4.3 - 11.1 K/uL    RBC 3.54 (L) 4.05 - 5.2 M/uL    HGB 11.8 11.7 - 15.4 g/dL    HCT 42.4 35.8 - 46.3 %    .8 (H) 79.6 - 97.8 FL    MCH 33.3 (H) 26.1 - 32.9 PG    MCHC 27.8 (L) 31.4 - 35.0 g/dL    RDW 22.7 (H) 11.9 - 14.6 %    PLATELET 77 (L) 137 - 450 K/uL    MPV 12.2 9.4 - 12.3 FL    ABSOLUTE NRBC 0.12 0.0 - 0.2 K/uL   PATHOLOGIST REVIEW SMEARS    Collection Time: 03/29/22 11:51 AM   Result Value Ref Range    PATHOLOGIST REVIEW PENDING    GLUCOSE, POC    Collection Time: 03/29/22 12:26 PM   Result Value Ref Range    Glucose (POC) 30 (LL) 65 - 100 mg/dL    Performed by Sabino    GLUCOSE, POC    Collection Time: 03/29/22 12:37 PM   Result Value Ref Range    Glucose (POC) 52 (L) 65 - 100 mg/dL    Performed by Zoran    LACTIC ACID Collection Time: 03/29/22  1:24 PM   Result Value Ref Range    Lactic acid 16.6 (HH) 0.4 - 2.0 MMOL/L   METABOLIC PANEL, BASIC    Collection Time: 03/29/22  1:24 PM   Result Value Ref Range    Sodium 133 (L) 136 - 145 mmol/L    Potassium 5.8 (H) 3.5 - 5.1 mmol/L    Chloride 100 98 - 107 mmol/L    CO2 8 (LL) 21 - 32 mmol/L    Anion gap 25 (H) 7 - 16 mmol/L    Glucose 102 (H) 65 - 100 mg/dL    BUN 54 (H) 8 - 23 MG/DL    Creatinine 2.10 (H) 0.6 - 1.0 MG/DL    GFR est AA 31 (L) >60 ml/min/1.73m2    GFR est non-AA 25 (L) >60 ml/min/1.73m2    Calcium 8.9 8.3 - 10.4 MG/DL   LD    Collection Time: 03/29/22  1:24 PM   Result Value Ref Range    LD 2,062 (H) 110 - 210 U/L   BILIRUBIN, FRACTIONATED    Collection Time: 03/29/22  1:24 PM   Result Value Ref Range    Bilirubin, total 6.6 (H) 0.2 - 1.1 MG/DL    Bilirubin, direct 4.2 (H) <0.4 MG/DL    Bilirubin, indirect 2.4 (H) 0.0 - 1.1 MG/DL   PROTHROMBIN TIME + INR    Collection Time: 03/29/22  1:24 PM   Result Value Ref Range    Prothrombin time 57.5 (H) 12.6 - 14.5 sec    INR 6.4 (HH)     PTT    Collection Time: 03/29/22  1:24 PM   Result Value Ref Range    aPTT 54.2 (H) 24.1 - 35.1 SEC   FIBRINOGEN    Collection Time: 03/29/22  1:24 PM   Result Value Ref Range    Fibrinogen <60 (LL) 190 - 501 mg/dL   D DIMER    Collection Time: 03/29/22  1:24 PM   Result Value Ref Range    D DIMER >20.00 (H) <0.56 ug/ml(FEU)   LIPASE    Collection Time: 03/29/22  1:24 PM   Result Value Ref Range    Lipase 1,578 (H) 73 - 393 U/L   GLUCOSE, POC    Collection Time: 03/29/22  2:14 PM   Result Value Ref Range    Glucose (POC) 81 65 - 100 mg/dL    Performed by Kae        All Micro Results     Procedure Component Value Units Date/Time    CULTURE, BLOOD [[id number]]     Order Status: Sent Specimen: Blood     CULTURE, BLOOD [[id number]]     Order Status: Sent Specimen: Blood           Other Studies:  XR ABD (KUB)    Result Date: 3/29/2022  Exam: Single view abdomen.  Indication: Abdominal pain, NG tube placement Comparison: CT abdomen and pelvis, 3/10/2022 chest radiograph, 3/28/2022 FINDINGS: The enteric tube terminates in the gastric body. Bowel gas pattern is nonobstructive. Right greater than left pleural effusions with bibasilar airspace opacities. No acute osseous abnormality. 1. Enteric tube terminates in the gastric body. CT HEAD WO CONT    Result Date: 3/28/2022  CT of the head without contrast. CLINICAL INDICATION: Altered mental status, recent meningitis, lethargy, thrombocytopenia PROCEDURE: Serial thin section axial images are obtained from the cranial vertex through the skull base without the administration of intravenous contrast. Radiation dose reduction techniques were used for this study. Our CT scanners use one or all of the following: Automated exposure control, adjusted of the mA and/or kV according to patient size, iterative reconstruction COMPARISON: Head CT dated 2/27/2022. FINDINGS: There is new hyperdensity layering in the subarachnoid spaces along the anterior and medial right temporal lobe. Acute subarachnoid hemorrhage should be considered however this may be sequela of prior meningoencephalitis possibly related to herpes. There is an old right occipital lobe infarct. No abnormality noted in the left cerebral hemisphere. There is no hydrocephalus. The included paranasal sinuses and mastoid air cells are clear. New hyperdensity layering in the subarachnoid spaces of the anterior and medial right temporal lobe. Acute subarachnoid hemorrhage should be considered. However, sequela of prior meningoencephalitis possibly related to herpes should also be considered.  Strongly recommend further evaluation with brain MRI with and without contrast. DC5 The critical results contained in this report were communicated to Dr. Yoselyn Zavaleta by Dr. Kathi Haskins on 3/28/2022 at 8:15 PM. Critical results were communicated as outlined in Section II.C.2.a.i of the ACR Practice Guideline for Communication of Diagnostic Imaging Findings. Current Meds:  Current Facility-Administered Medications   Medication Dose Route Frequency    dextrose 10% infusion  100 mL/hr IntraVENous CONTINUOUS    [START ON 3/30/2022] pantoprazole (PROTONIX) 40 mg in 0.9% sodium chloride 10 mL injection  40 mg IntraVENous DAILY    lactated Ringers infusion  150 mL/hr IntraVENous CONTINUOUS    cefepime (MAXIPIME) 2 g in 0.9% sodium chloride (MBP/ADV) 100 mL MBP  2 g IntraVENous Q24H    metroNIDAZOLE (FLAGYL) IVPB premix 500 mg  500 mg IntraVENous Q12H    0.9% sodium chloride infusion 250 mL  250 mL IntraVENous PRN    0.9% sodium chloride infusion 250 mL  250 mL IntraVENous PRN    0.9% sodium chloride infusion 250 mL  250 mL IntraVENous PRN    [Held by provider] isosorbide dinitrate (ISORDIL) tablet 10 mg  10 mg Oral TID    [Held by provider] metoprolol succinate (TOPROL-XL) XL tablet 25 mg  25 mg Oral DAILY    [Held by provider] calcium carbonate (OS-JOE) tablet 500 mg [elemental]  500 mg Oral QHS    [Held by provider] cetirizine (ZYRTEC) tablet 10 mg  10 mg Oral DAILY    [Held by provider] enoxaparin (LOVENOX) injection 50 mg  50 mg SubCUTAneous Q12H    [Held by provider] fluticasone propionate (FLONASE) 50 mcg/actuation nasal spray 2 Spray  2 Spray Both Nostrils DAILY    levothyroxine (SYNTHROID) tablet 125 mcg  125 mcg Oral 6am    magnesium oxide (MAG-OX) tablet 200 mg  200 mg Oral DAILY    ondansetron (ZOFRAN) injection 4 mg  4 mg IntraVENous Q8H PRN    ruxolitinib (Jakavi) tab 5 mg (Patient Supplied)  5 mg Oral Q12H    [Held by provider] traZODone (DESYREL) tablet 50 mg  50 mg Oral QHS    trimethoprim-polymyxin b (POLYTRIM) 10,000 unit- 1 mg/mL ophthalmic solution 1 Drop  1 Drop Both Eyes BID    dasatinib tab 80 mg (Patient Supplied)  80 mg Oral DAILY       Signed:  Angela Sharma MD    Part of this note may have been written by using a voice dictation software.   The note has been proof read but may still contain some grammatical/other typographical errors.

## 2022-03-29 NOTE — MANAGEMENT PLAN
[employer] [facility]        Inpatient Hematology / Oncology Plan of Care    Reason for Consult:  Endocarditis of mitral valve [I05.9]  Referring Physician:  Supa Kessler MD    History of Present Illness:  Ms. Francy Lane is a 64 y.o. female admitted on 3/25/2022. Diagnoses of Vegetative endocarditis of mitral valve, Body mass index (BMI) of 23.0 to 23.9 in adult, Acute respiratory failure with hypoxemia (HCC), Chronic myelogenous leukemia ([address] Cirrhosis of liver with ascites, unspecified hepatic cirrhosis type ([address] CML (chronic myelocytic leukemia) ([address] Endocarditis of mitral valve, Acute diastolic (congestive) heart failure (HCC), Acute deep vein thrombosis (DVT) of right upper extremity, unspecified vein ([address] Pleural effusion, bilateral, Splenomegaly, Severe mitral regurgitation, and Polycythemia vera ([address] were pertinent to this visit. 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 She is a patient of Dr. Pam Oneil with polycythemia vera on Jakafi and CML dx 2013 on Dasatinib. She was recently admitted from 2/2 - 2/6/22 for E Coli bacteremia and then readmitted 2/27/22 for sepsis and E coli meningitis and completed course of CTX. She presented again to ED 3/18/22 and admitted for pulmonary vascular congestion. However, on 3/24/22 she underwent echo that showed vegetative endocarditis given recent bacteremia. Vegetation was not present on initial evaluation 2/3/22. ID consulted and she is on CTX per ID. [facility] 3/24/22 NGTD. She was scheduled to undergo ROSELINE but cardiology recommended EGD first given hx of varices. Her INR was noted to be 2.2 on 3/25/22 and she received FFP with improved to 1.3. However, overnight she became encephalopathic and worsening mentation noted. CT head with ? SAH versus possible sequelae from recent meningoencephalopathy. MRI pending. Plts noted to be 2k yesterday afternoon after plummeting from 97k the morning prior. She has baseline thrombocytopenia from 70-100k given recent illness but prior to that, only mild thrombocytopenia due to cirrhosis. She was transfused 2 U plts yesterday and this morning plts 93k. CMP shows deranged lab/multisystem organ failure including Cr up to 1.9, K 6.2, LA 16, Tbili up to 7.6. Hematology consulted for recommendations regarding thrombocytopenia. Review of Systems:    Unable to obtain due to patient condition. Allergies   Allergen Reactions    Latex Other (comments)     Testing for latex allergy was positive as a 2 (on a scale of 1 to 3).     Sulfa (Sulfonamide Antibiotics) Anaphylaxis    Tramadol Other (comments)     Top lip swelled    Augmentin [Amoxicillin-Pot Clavulanate] Rash    Codeine Other (comments)    Divalproex Sodium Hives    Morphine Nausea and Vomiting     Not a true allergy    Potassium Clavulanate Hives    Rizatriptan Rash    Tetanus Immune Globulin Other (comments)     Heat, bruising, and swelling at  Site of injection    Vancomycin Rash    Xanax [Alprazolam] Other (comments)     Hallucinations    Zonegran [Zonisamide] Rash     Past Medical History:   Diagnosis Date    Acute blood loss anemia 4/6/2018    Anemia     C. difficile diarrhea 4/1/2015    Cerebral edema (HCC) 4/1/2015    Cerebral hemorrhage with cognitive deficits (HCC) 5/23/2016    Chronic migraine 9/22/2016    Chronic myelogenous leukemia ([facility]     [place] chromosome/ converted from polycythemia in 2009- to 2012 when she was dx w leukemia    Chronic pain     Coagulation disorder ([facility]     \"clotting and Bleeding Problem\" dr Barraza Coil follows     DVT (deep venous thrombosis) ([facility] 10/26/2016    Right subclavian     E coli bacteremia 3/9/2022    Esophageal spasm     with banding     Esophageal varices (HCC)     grade 3    GI bleed 8/3/2016    H/O craniotomy 3-29-15.  due to blood clot - which caused a seizure  - then pt fell and hit     Hematemesis 8/20/2021    Ischemic colitis ([address] 2/16/2009    Left homonymous hemianopsia 3/30/2015    Leukocytosis 3/14/2015    Melena 8/20/2021    Migraine with aura and with status migrainosus, not intractable 9/22/2016    MRSA colonization 6/25/2012    Polycythemia vera(238.4)     JAK2 mutation    Portal hypertension ([address]     with varices    Portal vein thrombosis 6/20/2012    Ct scan 6-21-2 Apparent occlusion of the portal vein. In addition, the splenic vein, and superior mesenteric vein are not definitely seen and are also likely  occluded. Splenomegaly, and extensive varices are seen as described above consistent with the portal vein occlusion 7-05-12 on arixtra HIT negative on repeat 7-27-12 re admitted Cirrhotic appearance of the liver. Mild to moderate ascites, as    Portal vein thrombosis 2/16/2009    Ct scan 6-21-2 Apparent occlusion of the portal vein. In addition, the splenic vein, and superior mesenteric vein are not definitely seen and are also likely  occluded. Splenomegaly, and extensive varices are seen as described above consistent with the portal vein occlusion 7-05-12 on arixtra HIT negative on repeat 7-27-12 re admitted Cirrhotic appearance of the liver. Mild to moderate ascites    Primary hypothyroidism     Pulmonary embolism ([address] 2006    not on coumadin anymore     Pulmonary embolus ([address] 4/5/2018    Last Assessment & Plan:  Formatting of this note might be different from the original. 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      S/P small bowel resection     2012.  9 feet removed due to  gangrene which wa due to blood clot    Seizure ([address] 3/14/2015    Seizure disorder ([facility] 3/30/2015    due to clotting factor    Seizures ([address]     last one 3- - followed by aniyah     Sepsis with acute organ dysfunction without septic shock ([address] 2022    Splenomegaly, congestive, chronic     Stroke (cerebrum) ([address] 2015    had bled into head- surgery    Stroke [facility])     pt had stroke like symptoms     Subdural hemorrhage following injury, no loss of consciousness ([address] 2016    Thrombocytopenia, HIT negative 2012 platelets lower repeat HIT 12 platelets in 07,619'D    Thrombosis, portal vein 2004    portal , spleenic and recently superior mesenteric    Transfusion history     many blood tranfusions - last 3-29-15 after brain surgery    Traumatic hemorrhage of right cerebrum ([address] 3/30/2015     Past Surgical History:   Procedure Laterality Date    HX APPENDECTOMY      with small bowel resection    HX BREAST BIOPSY Bilateral     Lt - ; Rt -     HX CHOLECYSTECTOMY      HX GI      liver biopsy    HX GI      bowel removed small - 9 feet     HX GYN       x 2    HX GYN      D&C following miscarriage    HX ORTHOPAEDIC Left     torn labrum shoulder (screw in place)    NEUROLOGICAL PROCEDURE UNLISTED  2010    craniotomy to evacuate subdural hematoma following a fall from a seizure    IN ABDOMEN SURGERY PROC UNLISTED      umbilical hernia repair    IN ABDOMEN SURGERY PROC UNLISTED      9ft of small bowel excised then reconnected     Family History   Problem Relation Age of Onset    Diabetes Mother     Stroke Mother         after surgery    Cancer Father         brain    No Known Problems Sister     Other Sister         diverticulitis    Other Sister         diverticulitis    Cancer Sister         lyjmphoma    Breast Cancer Maternal Aunt 48    Thyroid Disease Paternal Grandmother      Social History     Socioeconomic History    Marital status:      Spouse name: Not on file    Number of children: Not on file    Years of education: Not on file    Highest education level: Not on file   Occupational History    Not on file   Tobacco Use    Smoking status: Former Smoker     Packs/day: 0.20     Years: 10.00     Pack years: 2.00     Types: Cigarettes     Quit date: 200     Years since quittin.2    Smokeless tobacco: Never Used   Substance and Sexual Activity    Alcohol use: No    Drug use: No    Sexual activity: Not on file   Other Topics Concern    Not on file   Social History Narrative    Not on file     Social Determinants of Health     Financial Resource Strain:     Difficulty of Paying Living Expenses: Not on file   Food Insecurity:     Worried About [address] in the Last Year: Not on file    [address] [employer] in the Last Year: Not on file   Transportation Needs:     Lack of Transportation (Medical): Not on file    Lack of Transportation (Non-Medical):  Not on file   Physical Activity:     Days of Exercise per Week: Not on file    Minutes of Exercise per Session: Not on file   Stress:     Feeling of Stress : Not on file   Social Connections:     Frequency of Communication with Friends and Family: Not on file    Frequency of Social Gatherings with Friends and Family: Not on file    Attends [employer] Services: Not on file    Active Member of [address] [place] [address] or Organizations: Not on file    Attends Club or Organization Meetings: Not on file    Marital Status: Not on file   Intimate Partner Violence:     Fear of Current or Ex-Partner: Not on file    Emotionally Abused: Not on file    Physically Abused: Not on file    Sexually Abused: Not on file   Housing Stability:     Unable to Pay for Housing in the Last Year: Not on file    Number of [place] in the Last Year: Not on file    Unstable Housing in the Last Year: Not on file     Current Facility-Administered Medications   Medication Dose Route Frequency Provider Last Rate Last Admin    lip protectant (BLISTEX) ointment 1 Each  1 Each Topical PRN Annie Thompson NP        dextrose 10% infusion 125-250 mL  125-250 mL IntraVENous PRN Becky Pierson MD   Stopped at 03/29/22 1111    dextrose 10% infusion  100 mL/hr IntraVENous CONTINUOUS Mily Delgado  mL/hr at 03/29/22 1200 100 mL/hr at 03/29/22 1200    glucose chewable tablet 16 g  16 g Oral PRN Becky Pierson MD        glucagon (GLUCAGEN) injection 1 mg  1 mg IntraMUSCular PRN Becky Pierson MD        dextrose 10% infusion 125-250 mL  125-250 mL IntraVENous PRN Becky Pierson MD        [Held by provider] oxyCODONE-acetaminophen (PERCOCET 10)  mg per tablet 1 Tablet  1 Tablet Oral Q6H PRN Rickey Sheffield NP        0.9% sodium chloride infusion 250 mL  250 mL IntraVENous PRN Annie Thompson NP        isosorbide dinitrate (ISORDIL) tablet 10 mg  10 mg Oral TID Florentino Salazar MD   10 mg at 03/27/22 2153    metoprolol succinate (TOPROL-XL) XL tablet 25 mg  25 mg Oral DAILY Delon Bernheim, MD   25 mg at 03/27/22 6519    calcium carbonate (OS-JOE) tablet 500 mg [elemental]  500 mg Oral QHS Delon Bernheim, MD   500 mg at 03/27/22 2150    cetirizine (ZYRTEC) tablet 10 mg  10 mg Oral DAILY Delon Bernheim, MD   10 mg at 03/27/22 7504    cyclobenzaprine (FLEXERIL) tablet 5 mg  5 mg Oral TID PRN Delon Bernheim, MD   5 mg at 03/26/22 0802    [Held by provider] enoxaparin (LOVENOX) injection 50 mg  50 mg SubCUTAneous Q12H Hector Crain MD   50 mg at 03/25/22 1004    fluticasone propionate (FLONASE) 50 mcg/actuation nasal spray 2 Spray  2 Spray Both Nostrils DAILY Delon Bernheim, MD   2 [place] at 03/26/22 0900    levothyroxine (SYNTHROID) tablet 125 mcg  125 mcg Oral Kay Chowdhury MD   125 mcg at 03/28/22 4565    magnesium oxide (MAG-OX) tablet 200 mg  200 mg Oral DAILY Delon Bernheim, MD   200 mg at 03/27/22 0833    ondansetron (ZOFRAN ODT) tablet 4 mg  4 mg Oral Q8H PRN Delon Bernheim, MD 4 mg at 22 7693    Or    ondansetron (ZOFRAN) injection 4 mg  4 mg IntraVENous Q8H PRN Feliberto Crain MD        [Held by provider] pantoprazole (PROTONIX) tablet 40 mg  40 mg Oral ACB Cate Botlelo MD   40 mg at 22 8162    [Held by provider] potassium chloride (K-DUR, KLOR-CON M20) SR tablet 40 mEq  40 mEq Oral BID Cate Botello MD   40 mEq at 22 1656    ruxolitinib [facility]) tab 5 mg (Patient Supplied)  5 mg Oral Q12H Cate Botello MD   5 mg at 22 215    [Held by provider] traZODone (DESYREL) tablet 50 mg  50 mg Oral QHS Cate Botello MD   50 mg at 22 215    trimethoprim-polymyxin b (POLYTRIM) 10,000 unit- 1 mg/mL ophthalmic solution 1 Drop  1 Drop Both Eyes BID Cate Botello MD   1 Drop at 22 1059    zinc oxide-cod liver oil (DESITIN) 40 % paste   Topical PRN Gracy Bunch NP   Given at 22 1005    dasatinib tab 80 mg (Patient Supplied)  80 mg Oral DAILY Gracy Bunch NP   80 mg at 22 0836    cefTRIAXone (ROCEPHIN) 2 g in 0.9% sodium chloride (MBP/ADV) 50 mL MBP  2 g IntraVENous Q24H [place] A[place],  mL/hr at 22 1700 2 g at 22 1700       OBJECTIVE:  Patient Vitals for the past 8 hrs:   BP Temp Pulse Resp SpO2 Weight   22 1126 (!) 97/50 97.4 °F (36.3 °C) 72 20 -- --   22 0756 (!) 97/50 97.3 °F (36.3 °C) 67 20 -- --   22 0654 -- -- -- -- -- 126 lb 8.7 oz (57.4 kg)   22 0630 105/84 97.5 °F (36.4 °C) 98 18 96 % --   22 0554 112/67 97.6 °F (36.4 °C) 82 18 94 % --   22 0454 108/79 97.8 °F (36.6 °C) 75 19 92 % --   22 0354 119/85 97.5 °F (36.4 °C) 81 18 94 % --     Temp (24hrs), Av.5 °F (36.4 °C), Min:97.2 °F (36.2 °C), Max:97.8 °F (36.6 °C)    No intake/output data recorded. Physical Exam:  Constitutional: Critically ill appearing female in no acute distress, sitting comfortably in the hospital bed. HEENT: Normocephalic and atraumatic.  Oropharynx is clear, mucous membranes are moist. Extraocular muscles are intact. Sclerae anicteric. Neck supple without JVD. No thyromegaly present. Skin Warm and dry. No bruising and no rash noted. No erythema. No pallor. Respiratory Normal air exchange without accessory muscle use. CVS Deferred. Abdomen +abdominal tenderness. Neuro +Confused, does not respond to questions appropriately. MSK Normal range of motion in general.  No edema and no tenderness. Psych Unable to assess. Labs:    Recent Results (from the past 24 hour(s))   POC VENOUS BLOOD GAS    Collection Time: 03/28/22  2:07 PM   Result Value Ref Range    Device: ROOM AIR      pH, venous (POC) 7.34 7.32 - 7.42      pCO2, venous (POC) 27.3 (L) 41 - 51 MMHG    pO2, venous (POC) 41 mmHg    HCO3, venous (POC) 14.7 (L) 23 - 28 MMOL/L    sO2, venous (POC) 74.7 65 - 88 %    Base deficit, venous (POC) 9.4 mmol/L    Allens test (POC) Positive      Site RIGHT RADIAL      Specimen type (POC) VENOUS BLOOD      Performed by Olvin     CO2, POC 16 13 - 23 MMOL/L    Critical value read back DR. NASH    CBC WITH AUTOMATED DIFF    Collection Time: 03/28/22  3:25 PM   Result Value Ref Range    WBC 13.7 (H) 4.3 - 11.1 K/uL    RBC 3.98 (L) 4.05 - 5.2 M/uL    HGB 13.1 11.7 - 15.4 g/dL    HCT 42.8 35.8 - 46.3 %    .5 (H) 79.6 - 97.8 FL    MCH 32.9 26.1 - 32.9 PG    MCHC 30.6 (L) 31.4 - 35.0 g/dL    RDW 22.5 (H) 11.9 - 14.6 %    PLATELET 2 (LL) 732 - 450 K/uL    MPV Unable to calculate. Recommend adding IPF. 9.4 - 12.3 FL    ABSOLUTE NRBC 0.02 0.0 - 0.2 K/uL    DF AUTOMATED      NEUTROPHILS 91 (H) 43 - 78 %    LYMPHOCYTES 5 (L) 13 - 44 %    MONOCYTES 3 (L) 4.0 - 12.0 %    EOSINOPHILS 0 (L) 0.5 - 7.8 %    BASOPHILS 0 0.0 - 2.0 %    IMMATURE GRANULOCYTES 1 0.0 - 5.0 %    ABS. NEUTROPHILS 12.6 (H) 1.7 - 8.2 K/UL    ABS. LYMPHOCYTES 0.6 0.5 - 4.6 K/UL    ABS. MONOCYTES 0.4 0.1 - 1.3 K/UL    ABS. EOSINOPHILS 0.0 0.0 - 0.8 K/UL    ABS.  BASOPHILS 0.0 0.0 - 0.2 K/UL    ABS. IMM. GRANS. 0.1 0.0 - 0.5 K/UL   MAGNESIUM    Collection Time: 03/28/22  3:25 PM   Result Value Ref Range    Magnesium 2.7 (H) 1.8 - 2.4 mg/dL   AMMONIA    Collection Time: 03/28/22  3:25 PM   Result Value Ref Range    Ammonia, plasma 84 (H) 11 - 32 UMOL/L   T4, FREE    Collection Time: 03/28/22  3:25 PM   Result Value Ref Range    T4, Free 1.2 0.9 - 1.8 NG/DL   POTASSIUM    Collection Time: 03/28/22  3:25 PM   Result Value Ref Range    Potassium 5.7 (H) 3.5 - 5.1 mmol/L   PLATELETS, ALLOCATE    Collection Time: 03/28/22  4:15 PM   Result Value Ref Range    Unit number [id number]     Blood component type [facility],LRIR2     Unit division 00     Status of unit ISSUED     Unit number [id number]     Blood component type [facility]     Unit division 00     Status of unit ISSUED    TYPE & SCREEN    Collection Time: 03/28/22  5:55 PM   Result Value Ref Range    Crossmatch Expiration 03/31/2022,2359     ABO/Rh(D) B NEGATIVE     Antibody screen NEG    METABOLIC PANEL, COMPREHENSIVE    Collection Time: 03/29/22  9:38 AM   Result Value Ref Range    Sodium 135 (L) 136 - 145 mmol/L    Potassium 6.2 (HH) 3.5 - 5.1 mmol/L    Chloride 98 98 - 107 mmol/L    CO2 8 (LL) 21 - 32 mmol/L    Anion gap 29 (H) 7 - 16 mmol/L    Glucose 20 (LL) 65 - 100 mg/dL    BUN 50 (H) 8 - 23 MG/DL    Creatinine 1.90 (H) 0.6 - 1.0 MG/DL    GFR est AA 35 (L) >60 ml/min/1.73m2    GFR est non-AA 29 (L) >60 ml/min/1.73m2    Calcium 9.6 8.3 - 10.4 MG/DL    Bilirubin, total 7.6 (H) 0.2 - 1.1 MG/DL    ALT (SGPT) QUANTITY NOT SUFFICIENT. SUGGEST RECOLLECTION 12 - 65 U/L    AST (SGOT) QUANTITY NOT SUFFICIENT. SUGGEST RECOLLECTION 15 - 37 U/L    Alk.  phosphatase 193 (H) 50 - 136 U/L    Protein, total 7.4 6.3 - 8.2 g/dL    Albumin 3.7 3.2 - 4.6 g/dL    Globulin 3.7 (H) 2.3 - 3.5 g/dL    A-G Ratio 1.0 (L) 1.2 - 3.5     LACTIC ACID    Collection Time: 03/29/22  9:38 AM   Result Value Ref Range    Lactic acid 16.2 (HH) 0.4 - 2.0 MMOL/L   CBC WITH AUTOMATED DIFF    Collection Time: 03/29/22  9:38 AM   Result Value Ref Range    WBC 29.4 (H) 4.3 - 11.1 K/uL    RBC 4.17 4.05 - 5.2 M/uL    HGB 13.9 11.7 - 15.4 g/dL    HCT 46.4 (H) 35.8 - 46.3 %    .3 (H) 79.6 - 97.8 FL    MCH 33.3 (H) 26.1 - 32.9 PG    MCHC 30.0 (L) 31.4 - 35.0 g/dL    RDW 23.5 (H) 11.9 - 14.6 %    PLATELET 93 (L) 525 - 450 K/uL    MPV 11.4 9.4 - 12.3 FL    ABSOLUTE NRBC 0.15 0.0 - 0.2 K/uL    DF AUTOMATED      NEUTROPHILS 90 (H) 43 - 78 %    LYMPHOCYTES 3 (L) 13 - 44 %    MONOCYTES 5 4.0 - 12.0 %    EOSINOPHILS 0 (L) 0.5 - 7.8 %    BASOPHILS 0 0.0 - 2.0 %    IMMATURE GRANULOCYTES 1 0.0 - 5.0 %    ABS. NEUTROPHILS 26.5 (H) 1.7 - 8.2 K/UL    ABS. LYMPHOCYTES 0.9 0.5 - 4.6 K/UL    ABS. MONOCYTES 1.6 (H) 0.1 - 1.3 K/UL    ABS. EOSINOPHILS 0.1 0.0 - 0.8 K/UL    ABS. BASOPHILS 0.1 0.0 - 0.2 K/UL    ABS. IMM.  GRANS. 0.3 0.0 - 0.5 K/UL   GLUCOSE, POC    Collection Time: 03/29/22 10:59 AM   Result Value Ref Range    Glucose (POC) 29 (LL) 65 - 100 mg/dL    Performed by Kae        Imaging:  [unfilled]    ASSESSMENT:  Problem List  Date Reviewed: 3/25/2022          Codes Class Noted    * (Principal) Vegetative endocarditis of mitral valve ICD-10-CM: I33.0  ICD-9-CM: 421.0  3/24/2022        Body mass index (BMI) of 23.0 to 23.9 in adult (Chronic) ICD-10-CM: I21.56  ICD-9-CM: V85.1  8/26/2021        Sepsis ([facility] ICD-10-CM: A41.9  ICD-9-CM: 038.9, 995.91  3/29/2022        Encephalopathy acute ICD-10-CM: G93.40  ICD-9-CM: 348.30  3/29/2022        Acute kidney failure ([facility] ICD-10-CM: N17.9  ICD-9-CM: 584.9  3/29/2022        Hyperkalemia ICD-10-CM: E87.5  ICD-9-CM: 276.7  3/28/2022        Endocarditis of mitral valve ICD-10-CM: I05.9  ICD-9-CM: 394.9  3/25/2022        CML (chronic myelocytic leukemia) (HCC) (Chronic) ICD-10-CM: C92.10  ICD-9-CM: 205.10  3/19/2022        Physical deconditioning (Chronic) ICD-10-CM: R53.81  ICD-9-CM: 799.3  3/19/2022        Acute diastolic (congestive) heart failure [facility]) ICD-10-CM: I50.31  ICD-9-CM: 428.31, 428.0  3/19/2022        History of DVT (deep vein thrombosis) (Chronic) ICD-10-CM: G02.899  ICD-9-CM: V12.51  3/19/2022        Pleural effusion, bilateral ICD-10-CM: J90  ICD-9-CM: 511.9  3/11/2022        Hypoproteinemia ([address] ICD-10-CM: E77.8  ICD-9-CM: 273.8  3/11/2022        Acute respiratory failure with hypoxemia (HCC) ICD-10-CM: J96.01  ICD-9-CM: 518.81  3/9/2022        Thrombocytopenia (HCC) ICD-10-CM: D69.6  ICD-9-CM: 287.5  10/15/2021        Opioid use, unspecified with unspecified opioid-induced disorder ICD-10-CM: F11.99  ICD-9-CM: 292.9, 305.50  9/17/2021        Leukocytosis ICD-10-CM: S38.634  ICD-9-CM: 288.60  10/16/2020        Chronic migraine without aura with status migrainosus, not intractable (Chronic) ICD-10-CM: T96.527  ICD-9-CM: 346.72  11/1/2016        Lung nodule ICD-10-CM: R91.1  ICD-9-CM: 793.11  10/28/2016    Overview Signed 10/28/2016 10:05 AM by Raegan Ewing     IMPRESSION:    1. Lobulated 2 cm mass in the right lung apex, malignancy versus an atypical  inflammatory process. 2. Occlusion of the right subclavian vein. 3. Chronic occlusion of the portal vein with associated abnormal tissue  encasing the common bile duct, most likely fibrosis. 4. Splenomegaly.              Headache, chronic daily (Chronic) ICD-10-CM: R51.9  ICD-9-CM: 784.0  9/22/2016        Visuospatial deficit (Chronic) ICD-10-CM: Z65.003  ICD-9-CM: 799.53  6/20/2016        Chronic myelogenous leukemia (HCC) (Chronic) ICD-10-CM: C92.10  ICD-9-CM: 205.10  3/30/2015        Acquired hypercoagulable state ([facility] (Chronic) ICD-10-CM: K43.21  ICD-9-CM: 289.81  3/30/2015        AKOSUA (iron deficiency anemia) ICD-10-CM: D50.9  ICD-9-CM: 280.9  7/28/2012        Cirrhosis (HCC) (Chronic) ICD-10-CM: K74.60  ICD-9-CM: 571.5  7/27/2012        Malignant ascites ICD-10-CM: R18.0  ICD-9-CM: 789.51  7/26/2012        Polycythemia vera (HCC) (Chronic) ICD-10-CM: D45  ICD-9-CM: 238.4 2/16/2009    Overview Addendum 8/26/2021  2:31 PM by Leopoldo Ferry, MD     2/2008 by [email or web address] analysis however portal vein thrombosis  And and splenomegaly since 2004  Hydroxyurea for 6 months poor tolerance spleen did not shrink   Pegasys 90 mcg weekly 4-2009 till    Restarted 12-30-09 45 mcg q 2 weeks  2-2010 reduced to q month   Increased 45 mcg 2/month 4-2010  Held 8-2010 thrombocytopenia  12-29-12 restarted 45 mcg q 2 weeks  4-1-11 45 mcg q 3 weeks  4-22-11 45 mcg q 4 weeks  Uncertain dosing thereafter   Possibly q 3 weeks 10-15-11 and held and restarted on 4-1-12 6-12 ct scan Small bowel wall thickening suggesting an enteritis. In addition, there is well thickening of the right colon which can suggest an additional   colitis although this can also be seen with severe portal hypertension. Likely reactive edematous changes it scattered fluid collections are seen of   the small bowel mesentery. . Apparent occlusion of the portal vein. In addition, the splenic vein, and superior mesenteric vein are not definitely seen and are also likely occluded. Splenomegaly, and extensive varices are seen as described above consistent with the portal vein occlusion. Sheng Miyamoto Heterogeneous enhancement of the liver and mild periportal edema. An acute hepatitis cannot be excluded. Primary hypothyroidism (Chronic) ICD-10-CM: E03.9  ICD-9-CM: 244.9  2/16/2009        Splenomegaly ICD-10-CM: R16.1  ICD-9-CM: 789.2  2/16/2009        Esophageal varices (HCC) (Chronic) ICD-10-CM: I85.00  ICD-9-CM: 456.1  2/16/2009    Overview Addendum 8/26/2021  2:30 PM by Leopoldo Ferry, MD     grade 3                     RECOMMENDATIONS:    Very poor prognosis given evidence of multisystem organ failure - encephalopathy/? SAH vs meningoencephalitis, worsening kidney function, elevated bilirubin, lactic acidosis, and possible ischemic bowel given abdominal tenderness.  Current clinical condition limits work-up (CT AP, MRI/CTA head) and she is likely being transferred to ICU. Children speaking to pulmonology/critical care Rylan Frank NP during rounds and are agreeable to DNR given her recent decline since initial hospitalization several months ago due to bacteremia/meningitis and now vegetative endocarditis, which seems very reasonable. Agree to proceed with otherwise full support pending clinical progress over the next 24 hours    Thrombocytopenia  - Responded to plt transfusion, continue to monitor  - Lovenox on hold given thrombocytopenia  - Check coags, hemolysis labs, peripheral smear  - Possibly secondary to CTX, which appears to be only new medication. Per pulmonology, ID not inclined to change at this time. CML   - On Dasatanib - currently on hold due to clinical condition/mental status    Polycythemia Vera  - On Jakafi - currently on hold due to clinical condition/mental status    Lab studies and imaging studies were personally reviewed. Pertinent old records were reviewed from [address] [place] [address] Rd. Case discussed with pulmonology/critical care Andrew Vega NP. Thank you for allowing us to participate in the care of Ms. Couch. Formal consult note by Dr. Richar Eden to follow.          Jacque Zepeda  Hematology & Oncology  [address] [address]  Office : [phone]  Fax : [phone]

## 2022-03-30 NOTE — PROGRESS NOTES
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 10 point review of systems is otherwise negative with the exception of the elements mentioned above in the HPI. Allergies   Allergen Reactions    Latex Other (comments)     Testing for latex allergy was positive as a 2 (on a scale of 1 to 3).     Sulfa (Sulfonamide Antibiotics) Anaphylaxis    Tramadol Other (comments)     Top lip swelled    Augmentin [Amoxicillin-Pot Clavulanate] Rash    Codeine Other (comments)    Divalproex Sodium Hives    Morphine Nausea and Vomiting     Not a true allergy    Potassium Clavulanate Hives    Rizatriptan Rash    Tetanus Immune Globulin Other (comments)     Heat, bruising, and swelling at  Site of injection    Vancomycin Rash    Xanax [Alprazolam] Other (comments)     Hallucinations    Zonegran [Zonisamide] Rash     Past Medical History:   Diagnosis Date    Acute blood loss anemia 4/6/2018    Anemia     C. difficile diarrhea 4/1/2015    Cerebral edema (HCC) 4/1/2015    Cerebral hemorrhage with cognitive deficits (HCC) 5/23/2016    Chronic migraine 9/22/2016    Chronic myelogenous leukemia ([facility]     [place] chromosome/ converted from polycythemia in 2009- to 2012 when she was dx w leukemia    Chronic pain     Coagulation disorder ([facility]     \"clotting and Bleeding Problem\" dr David Fitzpatrick follows     DVT (deep venous thrombosis) ([facility] 10/26/2016    Right subclavian     E coli bacteremia 3/9/2022    Esophageal spasm     with banding     Esophageal varices (HCC)     grade 3    GI bleed 8/3/2016    H/O craniotomy     3-29-15.  due to blood clot - which caused a seizure  - then pt fell and hit     Hematemesis 8/20/2021    Ischemic colitis ([facility] 2/16/2009    Left homonymous hemianopsia 3/30/2015    Leukocytosis 3/14/2015    Melena 8/20/2021    Migraine with aura and with status migrainosus, not intractable 9/22/2016    MRSA colonization 6/25/2012    Polycythemia vera(238.4)     JAK2 mutation    Portal hypertension ([address]     with varices    Portal vein thrombosis 6/20/2012    Ct scan 6-21-2 Apparent occlusion of the portal vein. In addition, the splenic vein, and superior mesenteric vein are not definitely seen and are also likely  occluded. Splenomegaly, and extensive varices are seen as described above consistent with the portal vein occlusion 7-05-12 on arixtra HIT negative on repeat 7-27-12 re admitted Cirrhotic appearance of the liver. Mild to moderate ascites, as    Portal vein thrombosis 2/16/2009    Ct scan 6-21-2 Apparent occlusion of the portal vein. In addition, the splenic vein, and superior mesenteric vein are not definitely seen and are also likely  occluded. Splenomegaly, and extensive varices are seen as described above consistent with the portal vein occlusion 7-05-12 on arixtra HIT negative on repeat 7-27-12 re admitted Cirrhotic appearance of the liver. Mild to moderate ascites    Primary hypothyroidism     Pulmonary embolism ([address] 2006    not on coumadin anymore     Pulmonary embolus ([facility] 4/5/2018    Last Assessment & Plan:  Formatting of this note might be different from the original. 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      S/P small bowel resection     2012.  9 feet removed due to  gangrene which wa due to blood clot    Seizure ([address] 3/14/2015    Seizure disorder ([address] 3/30/2015    due to clotting factor    Seizures ([address]     last one 3- - followed by aniyah     Sepsis with acute organ dysfunction without septic shock ([address] 2022    Splenomegaly, congestive, chronic     Stroke (cerebrum) ([address] 2015    had bled into head- surgery    Stroke [facility])     pt had stroke like symptoms     Subdural hemorrhage following injury, no loss of consciousness ([address] 2016    Thrombocytopenia, HIT negative 2012 platelets lower repeat HIT 12 platelets in 31,354'V    Thrombosis, portal vein 2004    portal , spleenic and recently superior mesenteric    Transfusion history     many blood tranfusions - last 3-29-15 after brain surgery    Traumatic hemorrhage of right cerebrum ([address] 3/30/2015     Past Surgical History:   Procedure Laterality Date    HX APPENDECTOMY      with small bowel resection    HX BREAST BIOPSY Bilateral     Lt - ; Rt -     HX CHOLECYSTECTOMY      HX GI      liver biopsy    HX GI      bowel removed small - 9 feet     HX GYN       x 2    HX GYN      D&C following miscarriage    HX ORTHOPAEDIC Left 2008    torn labrum shoulder (screw in place)    NEUROLOGICAL PROCEDURE UNLISTED  2010    craniotomy to evacuate subdural hematoma following a fall from a seizure    [place] ABDOMEN SURGERY PROC UNLISTED      umbilical hernia repair    [place] ABDOMEN SURGERY PROC UNLISTED      9ft of small bowel excised then reconnected     Family History   Problem Relation Age of Onset    Diabetes Mother     Stroke Mother         after surgery    Cancer Father         brain    No Known Problems Sister     Other Sister         diverticulitis    Other Sister         diverticulitis    Cancer Sister         lyjmphoma    Breast Cancer Maternal Aunt 48    Thyroid Disease Paternal Grandmother      Social History     Socioeconomic History    Marital status:      Spouse name: Not on file    Number of children: Not on file    Years of education: Not on file    Highest education level: Not on file   Occupational History    Not on file   Tobacco Use    Smoking status: Former Smoker     Packs/day: 0.20     Years: 10.00     Pack years: 2.00     Types: Cigarettes     Quit date: [address] [facility]     Years since quittin.2    Smokeless tobacco: Never Used   Substance and Sexual Activity    Alcohol use: No    Drug use: No    Sexual activity: Not on file   Other Topics Concern    Not on file   Social History Narrative    Not on file     Social Determinants of Health     Financial Resource Strain:     Difficulty of Paying Living Expenses: Not on file   Food Insecurity:     Worried About [address] in the Last Year: Not on file    [address] Caring in Place in the Last Year: Not on file   Transportation Needs:     Lack of Transportation (Medical): Not on file    Lack of Transportation (Non-Medical):  Not on file   Physical Activity:     Days of Exercise per Week: Not on file    Minutes of Exercise per Session: Not on file   Stress:     Feeling of Stress : Not on file   Social Connections:     Frequency of Communication with Friends and Family: Not on file    Frequency of Social Gatherings with Friends and Family: Not on file    Attends [employer] Services: Not on file    Active Member of [address] [place] [address] or Organizations: Not on file    Attends Club or Organization Meetings: Not on file    Marital Status: Not on file   Intimate Partner Violence:     Fear of Current or Ex-Partner: Not on file    Emotionally Abused: Not on file    Physically Abused: Not on file    Sexually Abused: Not on file   Housing Stability:     Unable to Pay for Housing in the Last Year: Not on file    Number of [place] in the Last Year: Not on file    Unstable Housing in the Last Year: Not on file     Current Facility-Administered Medications   Medication Dose Route Frequency Provider Last Rate Last Admin    0.9% sodium chloride infusion 250 mL  250 mL IntraVENous PRN Ambrose Fernandez, DO        pantoprazole (PROTONIX) 40 mg in 0.9% sodium chloride 10 mL injection  40 mg IntraVENous DAILY Liana Alvares NP   40 mg at 03/30/22 0813    cefepime (MAXIPIME) 2 g in 0.9% sodium chloride (MBP/ADV) 100 mL MBP  2 g IntraVENous Q24H Arik Brito  mL/hr at 03/30/22 1347 2 g at 03/30/22 1347    metroNIDAZOLE (FLAGYL) IVPB premix 500 mg  500 mg IntraVENous Q12H Arik Brito  mL/hr at 03/30/22 1347 500 mg at 03/30/22 1347    0.9% sodium chloride infusion 250 mL  250 mL IntraVENous PRN Liana Alvares NP        0.9% sodium chloride infusion 250 mL  250 mL IntraVENous PRN Susie Rosales, FNP        0.9% sodium chloride infusion 250 mL  250 mL IntraVENous PRN Susie Rosales, FNP        0.9% sodium chloride infusion 250 mL  250 mL IntraVENous PRN Ambrose Fernandez, DO        0.9% sodium chloride infusion 250 mL  250 mL IntraVENous PRN Liana Alvares NP        [Held by provider] isosorbide dinitrate (ISORDIL) tablet 10 mg  10 mg Oral TID Liana Alvares NP   10 mg at 03/27/22 2153    [Held by provider] metoprolol succinate (TOPROL-XL) XL tablet 25 mg  25 mg Oral DAILY Liana Alvares NP   25 mg at 03/27/22 2635    [Held by provider] calcium carbonate (OS-JOE) tablet 500 mg [elemental]  500 mg Oral QHS Liana Alvares NP   500 mg at 03/27/22 2150    [Held by provider] cetirizine (ZYRTEC) tablet 10 mg  10 mg Oral DAILY Liana Alvares NP   10 mg at 03/27/22 1557    [Held by provider] enoxaparin (LOVENOX) injection 50 mg  50 mg SubCUTAneous Q12H Liana Alvares NP   50 mg at 03/25/22 1004    [Held by provider] fluticasone propionate (FLONASE) 50 mcg/actuation nasal spray 2 Spray  2 Spray Both Nostrils DAILY Liana Alvares NP   2 Spray at 03/26/22 0900    levothyroxine (SYNTHROID) tablet 125 mcg  125 mcg Oral Constance Quinteros NP   125 mcg at 03/28/22 3219    magnesium oxide (MAG-OX) tablet 200 mg  200 mg Oral DAILY Lovella Maynard, NP   200 mg at 22 8163    ondansetron (ZOFRAN) injection 4 mg  4 mg IntraVENous Q8H PRN Lovella Maynard, NP        ruxolitinib [facility]) tab 5 mg (Patient Supplied)  5 mg Oral Q12H Lovella Maynard, NP   5 mg at 224    [Held by provider] traZODone (DESYREL) tablet 50 mg  50 mg Oral QHS Lovella Maynard, NP   50 mg at 22    trimethoprim-polymyxin b (POLYTRIM) 10,000 unit- 1 mg/mL ophthalmic solution 1 Drop  1 Drop Both Eyes BID Lovella Maynard, NP   1 Drop at 22 4705    dasatinib tab 80 mg (Patient Supplied)  80 mg Oral DAILY Lovella Maynard, NP   80 mg at 22 0836       OBJECTIVE:  Patient Vitals for the past 8 hrs:   BP Temp Pulse Resp SpO2   22 1345 123/67 -- 96 22 93 %   22 1330 123/65 -- 96 21 92 %   22 1315 124/65 -- 96 24 92 %   22 1300 125/67 -- 96 19 93 %   22 1245 121/65 -- 96 22 92 %   22 1230 124/66 -- 95 16 93 %   22 1215 126/68 -- 96 25 92 %   22 1203 126/67 -- 96 25 92 %   22 1200 -- -- 96 22 92 %   22 1145 125/67 -- 96 25 93 %   22 1100 128/65 98.9 °F (37.2 °C) 96 26 92 %   22 1048 128/65 -- 96 20 92 %   22 1038 127/64 -- 96 18 92 %   22 1023 124/65 -- 95 20 92 %   22 1008 128/68 -- 95 24 92 %   22 0953 126/68 -- 93 20 92 %   22 0938 123/66 -- 93 23 93 %   22 0923 124/68 -- 94 21 93 %   22 0910 124/71 99 °F (37.2 °C) 95 28 92 %   22 0853 130/70 98.9 °F (37.2 °C) 95 28 92 %   22 0818 126/68 -- 93 28 93 %   22 0803 129/73 -- 95 16 93 %   22 0748 127/73 -- 96 18 92 %   22 0733 122/71 -- 94 22 93 %   22 0718 127/70 -- 93 20 92 %   22 0703 127/71 98.9 °F (37.2 °C) 93 19 92 %   22 0648 128/69 -- 93 18 93 %   22 0643 -- 98.9 °F (37.2 °C) 94 18 93 %   22 0633 129/70 -- 93 19 92 %     Temp (24hrs), Av.7 °F (36.5 °C), Min:94.6 °F (34.8 °C), Max:99 °F (37.2 °C)    03/30 0701 - 03/30 1900  In: 345.8   Out: -     Physical Exam:  Constitutional:  Calm, no acute distress. HEENT: Normocephalic and atraumatic. Pupils are equal, round, and reactive to light. Extraocular muscles are intact. Lymph node   No palpable submandibular, cervical, supraclavicular, axillary or inguinal lymph nodes. Skin Warm and dry. No bruising and no rash noted. No erythema. No pallor. Respiratory Lungs are clear to auscultation bilaterally without wheezes, rales or rhonchi, normal air exchange without accessory muscle use. CVS Normal rate, regular rhythm and normal S1 and S2. No murmurs, gallops, or rubs. Abdomen No abdominal tenderness. Neuro Altered, not coherent   MSK No edema and no tenderness. Psych Unable to assess.        Labs:    Recent Results (from the past 24 hour(s))   PLASMA, ALLOCATE    Collection Time: 03/29/22  3:45 PM   Result Value Ref Range    Unit number [id number]     Blood component type FP 24h, Thaw     Unit division 00     Status of unit TRANSFUSED     Unit number [id number]     Blood component type FP 24h, Thaw     Unit division 00     Status of unit TRANSFUSED    CRYOPRECIPITATE, ALLOCATE    Collection Time: 03/29/22  4:00 PM   Result Value Ref Range    Unit number [id number]     Blood component type CRYO,5U Thaw     Unit division 00     Status of unit TRANSFUSED     Unit number [id number]     Blood component type CRYO,5U Thaw     Unit division 00     Status of unit TRANSFUSED    PROTHROMBIN TIME + INR    Collection Time: 03/29/22  5:14 PM   Result Value Ref Range    Prothrombin time 61.2 (H) 12.6 - 14.5 sec    INR 7.0 (HH)     PROCALCITONIN    Collection Time: 03/29/22  5:14 PM   Result Value Ref Range    Procalcitonin 4.21 (H) 0.00 - 0.49 ng/mL   LACTIC ACID    Collection Time: 03/29/22  5:14 PM   Result Value Ref Range    Lactic acid 17.2 (HH) 0.4 - 2.0 MMOL/L   PLASMA, ALLOCATE    Collection Time: 03/29/22  9:15 PM   Result Value Ref Range    Unit number [id number]     Blood component type FP 24h, Thaw     Unit division 00     Status of unit ISSUED     Unit number [id number]     Blood component type FP 24h, Thaw     Unit division 00     Status of unit ISSUED    PROTHROMBIN TIME + INR    Collection Time: 03/29/22  9:25 PM   Result Value Ref Range    Prothrombin time 41.7 (H) 12.6 - 14.5 sec    INR 4.2     PTT    Collection Time: 03/29/22  9:25 PM   Result Value Ref Range    aPTT 46.0 (H) 24.1 - 35.1 SEC   FIBRINOGEN    Collection Time: 03/29/22  9:25 PM   Result Value Ref Range    Fibrinogen 96 (L) 190 - 501 mg/dL   LACTIC ACID    Collection Time: 03/29/22  9:25 PM   Result Value Ref Range    Lactic acid 14.4 (HH) 0.4 - 2.0 MMOL/L   PROTHROMBIN TIME + INR    Collection Time: 03/30/22  1:38 AM   Result Value Ref Range    Prothrombin time 36.6 (H) 12.6 - 14.5 sec    INR 3.6     PTT    Collection Time: 03/30/22  1:38 AM   Result Value Ref Range    aPTT 41.5 (H) 24.1 - 35.1 SEC   FIBRINOGEN    Collection Time: 03/30/22  1:38 AM   Result Value Ref Range    Fibrinogen 100 (L) 190 - 501 mg/dL   LACTIC ACID    Collection Time: 03/30/22  1:38 AM   Result Value Ref Range    Lactic acid 12.6 (HH) 0.4 - 2.0 MMOL/L   CBC W/O DIFF    Collection Time: 03/30/22  1:38 AM   Result Value Ref Range    WBC 19.7 (H) 4.3 - 11.1 K/uL    RBC 3.20 (L) 4.05 - 5.2 M/uL    HGB 10.5 (L) 11.7 - 15.4 g/dL    HCT 34.5 (L) 35.8 - 46.3 %    .8 (H) 79.6 - 97.8 FL    MCH 32.8 26.1 - 32.9 PG    MCHC 30.4 (L) 31.4 - 35.0 g/dL    RDW 22.5 (H) 11.9 - 14.6 %    PLATELET 44 (L) 496 - 450 K/uL    MPV 12.6 (H) 9.4 - 12.3 FL    ABSOLUTE NRBC 0.05 0.0 - 0.2 K/uL   BILIRUBIN, DIRECT    Collection Time: 03/30/22  1:38 AM   Result Value Ref Range    Bilirubin, direct 3.0 (H) <0.4 MG/DL   CORTISOL, AM    Collection Time: 03/30/22  1:38 AM   Result Value Ref Range    Cortisol, a.m. 133.4 (H) 7 - 25 ug/dL   D DIMER    Collection Time: 03/30/22  1:38 AM   Result Value Ref Range    D DIMER >20.00 (H) <0.56 ug/ml(FEU)   METABOLIC PANEL, COMPREHENSIVE    Collection Time: 03/30/22  1:38 AM   Result Value Ref Range    Sodium 134 (L) 136 - 145 mmol/L    Potassium 5.0 3.5 - 5.1 mmol/L    Chloride 99 98 - 107 mmol/L    CO2 13 (L) 21 - 32 mmol/L    Anion gap 22 (H) 7 - 16 mmol/L    Glucose 250 (H) 65 - 100 mg/dL    BUN 58 (H) 8 - 23 MG/DL    Creatinine 2.20 (H) 0.6 - 1.0 MG/DL    GFR est AA 29 (L) >60 ml/min/1.73m2    GFR est non-AA 24 (L) >60 ml/min/1.73m2    Calcium 8.8 8.3 - 10.4 MG/DL    Bilirubin, total 4.9 (H) 0.2 - 1.1 MG/DL    ALT (SGPT) 1,729 (H) 12 - 65 U/L    AST (SGOT) 2,971 (H) 15 - 37 U/L    Alk.  phosphatase 158 (H) 50 - 136 U/L    Protein, total 6.3 6.3 - 8.2 g/dL    Albumin 3.2 3.2 - 4.6 g/dL    Globulin 3.1 2.3 - 3.5 g/dL    A-G Ratio 1.0 (L) 1.2 - 3.5     PROCALCITONIN    Collection Time: 03/30/22  1:38 AM   Result Value Ref Range    Procalcitonin 4.79 (H) 0.00 - 0.49 ng/mL   PLATELETS, ALLOCATE    Collection Time: 03/30/22  3:45 AM   Result Value Ref Range    Unit number [id number]     Blood component type PLPH,LRIR     Unit division 00     Status of unit ISSUED    GLUCOSE, POC    Collection Time: 03/30/22  7:48 AM   Result Value Ref Range    Glucose (POC) 246 (H) 65 - 100 mg/dL    Performed by Trupti Diaz    PROTHROMBIN TIME + INR    Collection Time: 03/30/22  7:50 AM   Result Value Ref Range    Prothrombin time 31.2 (H) 12.6 - 14.5 sec    INR 2.9     PTT    Collection Time: 03/30/22  7:50 AM   Result Value Ref Range    aPTT 37.1 (H) 24.1 - 35.1 SEC   FIBRINOGEN    Collection Time: 03/30/22  7:50 AM   Result Value Ref Range    Fibrinogen 127 (L) 190 - 501 mg/dL   LACTIC ACID    Collection Time: 03/30/22  7:50 AM   Result Value Ref Range    Lactic acid 7.6 (HH) 0.4 - 2.0 MMOL/L   GLUCOSE, POC    Collection Time: 03/30/22 11:32 AM   Result Value Ref Range    Glucose (POC) 196 (H) 65 - 100 mg/dL    Performed by Trupti Diaz [facility]:                        ASSESSMENT/RECOMMENDATIONS:  Problem List  Date Reviewed: 3/25/2022          Codes Class Noted    * (Principal) Vegetative endocarditis of mitral valve ICD-10-CM: I33.0  ICD-9-CM: 421.0  3/24/2022        Body mass index (BMI) of 23.0 to 23.9 in adult (Chronic) ICD-10-CM: X20.88  ICD-9-CM: V85.1  8/26/2021        Sepsis ([facility] ICD-10-CM: A41.9  ICD-9-CM: 038.9, 995.91  3/29/2022        Encephalopathy acute ICD-10-CM: G93.40  ICD-9-CM: 348.30  3/29/2022        Acute kidney failure ([facility] ICD-10-CM: N17.9  ICD-9-CM: 584.9  3/29/2022        Hyperkalemia ICD-10-CM: E87.5  ICD-9-CM: 276.7  3/28/2022        CML (chronic myelocytic leukemia) (HCC) (Chronic) ICD-10-CM: C92.10  ICD-9-CM: 205.10  3/19/2022        Physical deconditioning (Chronic) ICD-10-CM: R53.81  ICD-9-CM: 799.3  3/19/2022        History of DVT (deep vein thrombosis) (Chronic) ICD-10-CM: F51.207  ICD-9-CM: V12.51  3/19/2022        Pleural effusion, bilateral ICD-10-CM: J90  ICD-9-CM: 511.9  3/11/2022        Hypoproteinemia ([facility] ICD-10-CM: E77.8  ICD-9-CM: 273.8  3/11/2022        Thrombocytopenia ([facility] ICD-10-CM: D69.6  ICD-9-CM: 287.5  10/15/2021        Opioid use, unspecified with unspecified opioid-induced disorder ICD-10-CM: F11.99  ICD-9-CM: 292.9, 305.50  9/17/2021        Leukocytosis ICD-10-CM: W45.083  ICD-9-CM: 288.60  10/16/2020        Chronic migraine without aura with status migrainosus, not intractable (Chronic) ICD-10-CM: S88.934  ICD-9-CM: 346.72  11/1/2016        Lung nodule ICD-10-CM: R91.1  ICD-9-CM: 793.11  10/28/2016    Overview Signed 10/28/2016 10:05 AM by Rudean Leyden     IMPRESSION:    1. Lobulated 2 cm mass in the right lung apex, malignancy versus an atypical  inflammatory process. 2. Occlusion of the right subclavian vein. 3. Chronic occlusion of the portal vein with associated abnormal tissue  encasing the common bile duct, most likely fibrosis. 4. Splenomegaly.              Headache, chronic daily (Chronic) ICD-10-CM: R51.9  ICD-9-CM: 784.0  9/22/2016        Visuospatial deficit (Chronic) ICD-10-CM: U63.290  ICD-9-CM: 799.53  6/20/2016        Acquired hypercoagulable state ([facility] (Chronic) ICD-10-CM: X33.58  ICD-9-CM: 289.81  3/30/2015        AKOSUA (iron deficiency anemia) ICD-10-CM: D50.9  ICD-9-CM: 280.9  7/28/2012        Cirrhosis (HCC) (Chronic) ICD-10-CM: K74.60  ICD-9-CM: 571.5  7/27/2012        Polycythemia vera (HCC) (Chronic) ICD-10-CM: D45  ICD-9-CM: 238.4  2/16/2009    Overview Addendum 8/26/2021  2:31 PM by Anya Bedoya MD     2/2008 by [email or web address] analysis however portal vein thrombosis  And and splenomegaly since 2004  Hydroxyurea for 6 months poor tolerance spleen did not shrink   Pegasys 90 mcg weekly 4-2009 till    Restarted 12-30-09 45 mcg q 2 weeks  2-2010 reduced to q month   Increased 45 mcg 2/month 4-2010  Held 8-2010 thrombocytopenia  12-29-12 restarted 45 mcg q 2 weeks  4-1-11 45 mcg q 3 weeks  4-22-11 45 mcg q 4 weeks  Uncertain dosing thereafter   Possibly q 3 weeks 10-15-11 and held and restarted on 4-1-12 6-12 ct scan Small bowel wall thickening suggesting an enteritis. In addition, there is well thickening of the right colon which can suggest an additional   colitis although this can also be seen with severe portal hypertension. Likely reactive edematous changes it scattered fluid collections are seen of   the small bowel mesentery. . Apparent occlusion of the portal vein. In addition, the splenic vein, and superior mesenteric vein are not definitely seen and are also likely occluded. Splenomegaly, and extensive varices are seen as described above consistent with the portal vein occlusion. Gweneth Latch Heterogeneous enhancement of the liver and mild periportal edema. An acute hepatitis cannot be excluded.               Primary hypothyroidism (Chronic) ICD-10-CM: E03.9  ICD-9-CM: 244.9  2/16/2009        Splenomegaly ICD-10-CM: R16.1  ICD-9-CM: 789.2  2/16/2009 Esophageal varices (HCC) (Chronic) ICD-10-CM: I85.00  ICD-9-CM: 456.1  2/16/2009    Overview Addendum 8/26/2021  2:30 PM by Triston Rodriguez MD     grade 3                 57-year-old female with CLL/PV on chronic dasatinib/Jakafi, recurrent recent E. coli infection/sepsis/meningitis, admitted again for endocarditis pending EGD/ROSELINE work-up but developed sudden decline with multiorgan failure in 24 hours including CARLITO, acute liver damage, high lactate, altered mental status, severe thrombocytosis, had extensive discussion with son and daughter and intensive care team, very poor prognosis and a multicomorbidity next further work-up and management risky, concern of ischemic bowel but CT angiogram may worsen the CARLITO and high risk for anticoagulation given this suspicion of possible intracranial bleed, TTP is a consideration although no obvious source identified, discussed goal of care and children made very clear for DNR and DO NOT INTUBATE, accept ICU care if needed and pressor, will have to soon make a decision on whether hemodialysis/plasma exchange is acceptable, will follow closely. 3/30 Evidence of DIC/coagulaopathy - received FFP and cryo for INR>7 and fibrinogen <60. However, primary team holding on further transfusions given concern for volume overload. Coagulation indices improved. Cr up to 2.2, LFTs very elevated. LA improving. Hgb down to 10.5, plts down to 44k. Smear reviewed - no schistocytes noted. Will start empiric steroids and send [id number] but if TTP suspected would need pheresis line and pheresis procedure which at this point she doesn't seem like a great candidate given her current clinical status. Children considering comfort care which seems very reasonable at this time. Lab studies and imaging studies were personally reviewed. Pertinent old records were reviewed. Case discussed with primary team.    Thank you for allowing us to participate in the care of Ms. Couch.          Luciana Haddad JESSICA Wagoner. [address] Hematology & Oncology  [address] [facility]  Office : [phone]  Fax : [phone]     71-year-old female with CLL/PV on chronic dasatinib/Jakafi, recurrent recent E. coli infection/sepsis/meningitis, admitted again for endocarditis pending EGD/ROSELINE work-up but developed sudden decline with multiorgan failure in 24 hours including CARLITO, acute liver damage, high lactate, altered mental status, severe thrombocytosis, had extensive discussion with son and daughter and intensive care team, very poor prognosis and a multicomorbidity next further work-up and management risky, concern of ischemic bowel but CT angiogram may worsen the CARLITO and high risk for anticoagulation given this suspicion of possible intracranial bleed, TTP is a consideration although no obvious source identified, discussed goal of care and children made very clear for DNR and DO NOT INTUBATE, accept ICU care if needed and pressor, labs showed signs of DIC/coagulopathy, received FFP and cryo but patient not tolerating more volume. Empirically send ADAMTS 13 but smear has been reviewed and no schistocytes was noted. Children are considering comfort care.     Lab studies and imaging studies were personally reviewed. Pertinent old records were reviewed. Case discussed with primary team.     Thank you for allowing us to participate in the care of Ms. Alanna Bond M.D.   [address]  Office : [phone]  Fax : [phone]

## 2022-03-30 NOTE — PROGRESS NOTES
Bedside shift change report given to Destini Casas RN (oncoming nurse) by Naina Luther RN (offgoing nurse). Report included the following information SBAR, Kardex, Intake/Output, Recent Results, Med Rec Status and Cardiac Rhythm NSR.

## 2022-03-30 NOTE — INTERDISCIPLINARY ROUNDS
Interdisciplinary team rounds were held 3/30/2022 with the following team members:Nursing and Physician and the patient. Plan of care discussed. See clinical pathway and/or care plan for interventions and desired outcomes.

## 2022-03-30 NOTE — PROGRESS NOTES
Problem: Falls - Risk of  Goal: *Absence of Falls  Description: Document Bhargavi Fall Risk and appropriate interventions in the flowsheet.   Outcome: Progressing Towards Goal  Note: Fall Risk Interventions:  Mobility Interventions: Bed/chair exit alarm,Communicate number of staff needed for ambulation/transfer,Strengthening exercises (ROM-active/passive)    Mentation Interventions: Adequate sleep, hydration, pain control,Bed/chair exit alarm,Evaluate medications/consider consulting pharmacy,Increase mobility,More frequent rounding,Reorient patient,Toileting rounds,Update white board    Medication Interventions: Bed/chair exit alarm,Evaluate medications/consider consulting pharmacy    Elimination Interventions: Call light in reach,Bed/chair exit alarm,Toileting schedule/hourly rounds    History of Falls Interventions: Bed/chair exit alarm,Consult care management for discharge planning,Door open when patient unattended,Evaluate medications/consider consulting pharmacy,Investigate reason for fall,Assess for delayed presentation/identification of injury for 48 hrs (comment for end date),Vital signs minimum Q4HRs X 24 hrs (comment for end date)         Problem: General Medical Care Plan  Goal: *Vital signs within specified parameters  Outcome: Progressing Towards Goal  Goal: *Labs within defined limits  Outcome: Not Progressing Towards Goal  Goal: *Absence of infection signs and symptoms  Outcome: Progressing Towards Goal  Goal: *Optimal pain control at patient's stated goal  Outcome: Progressing Towards Goal  Goal: *Skin integrity maintained  Outcome: Progressing Towards Goal  Goal: *Fluid volume balance  Outcome: Progressing Towards Goal  Goal: *Optimize nutritional status  Outcome: Progressing Towards Goal  Goal: *Anxiety reduced or absent  Outcome: Progressing Towards Goal  Goal: *Progressive mobility and function (eg: ADL's)  Outcome: Progressing Towards Goal     Problem: Delirium Treatment  Goal: *Level of consciousness restored to baseline  Outcome: Not Progressing Towards Goal  Goal: *Level of environmental perceptions restored to baseline  Outcome: Not Progressing Towards Goal  Goal: *Sensory perception restored to baseline  Outcome: Not Progressing Towards Goal  Goal: *Emotional stability restored to baseline  Outcome: Not Progressing Towards Goal  Goal: *Functional assessment restored to baseline  Outcome: Not Progressing Towards Goal  Goal: *Absence of falls  Outcome: Progressing Towards Goal  Goal: *Will remain free of delirium, CAM Score negative  Outcome: Not Progressing Towards Goal  Goal: *Cognitive status will be restored to baseline  Outcome: Not Progressing Towards Goal  Goal: Interventions  Outcome: Progressing Towards Goal     Problem: General Medical Care Plan  Goal: *Vital signs within specified parameters  Outcome: Progressing Towards Goal  Goal: *Labs within defined limits  Outcome: Not Progressing Towards Goal  Goal: *Absence of infection signs and symptoms  Outcome: Progressing Towards Goal  Goal: *Optimal pain control at patient's stated goal  Outcome: Progressing Towards Goal  Goal: *Skin integrity maintained  Outcome: Progressing Towards Goal  Goal: *Fluid volume balance  Outcome: Progressing Towards Goal  Goal: *Optimize nutritional status  Outcome: Progressing Towards Goal  Goal: *Anxiety reduced or absent  Outcome: Progressing Towards Goal  Goal: *Progressive mobility and function (eg: ADL's)  Outcome: Progressing Towards Goal

## 2022-03-30 NOTE — PROGRESS NOTES
provided follow up visit. Patient did not respond at time, but appeared to be comfortable.  provided prayer and pastoral presence.   Signed by  Ryne Cervantes M.Div.

## 2022-03-30 NOTE — PROGRESS NOTES
Ritesh Couch  Admission Date: 3/25/2022         Daily Progress Note: 3/30/2022    The patient's chart is reviewed and the patient is discussed with the staff. Background:  Patient is a 61 y.o.  female PMH CML on chemotherapy, chronic back pain, DVT and  PE on home Lovenox, seen and evaluated at the request of Kena Mcfarland pleural effusions and evaluation for thoracentesis.  The patient was recently discharged from here on 3/14/22 after being treated for acute CHF.  The patient was also recently diagnosed with E coli bacterial meningitis and had completed antibiotics (3/13).  The patient has a history of DVT/PE and portal vein thrombosis and was on Lovenox at home. Adrianna Hitchcock is on chemotherapy for CML.  Recent echocardiogram reveals mitral valve vegetation that was not present on echocardiogram done in February this year. ID following for MV infective endocarditis (TTE 3/24) with negative blood cultures. ? GI source. Repeat blood cultures negative. On Rocephin. Change in mental status on 3/28. CT head with ? SAH versus herpes encephalitis. Neurology requested CTA but patient more unstable on 3/29 so postponed. Now with CARLITO/hyperkalemia, sepsis, episode vomiting/abdominal pain.  Met with son, daughter and spoke with patient's sister over the phone and they all desire DNR status and will consider comfort care over next 24 to 48 hours if needed.        Subjective:     Chart reviewed, patient was moved to ICU yesterday, on 2L NC ,she opens eyes but not interactive        Current Facility-Administered Medications   Medication Dose Route Frequency    0.9% sodium chloride infusion 250 mL  250 mL IntraVENous PRN    dextrose 10% infusion  50 mL/hr IntraVENous CONTINUOUS    pantoprazole (PROTONIX) 40 mg in 0.9% sodium chloride 10 mL injection  40 mg IntraVENous DAILY    cefepime (MAXIPIME) 2 g in 0.9% sodium chloride (MBP/ADV) 100 mL MBP  2 g IntraVENous Q24H    metroNIDAZOLE (FLAGYL) IVPB premix 500 mg  500 mg IntraVENous Q12H    0.9% sodium chloride infusion 250 mL  250 mL IntraVENous PRN    0.9% sodium chloride infusion 250 mL  250 mL IntraVENous PRN    0.9% sodium chloride infusion 250 mL  250 mL IntraVENous PRN    0.9% sodium chloride infusion 250 mL  250 mL IntraVENous PRN    0.9% sodium chloride infusion 250 mL  250 mL IntraVENous PRN    [Held by provider] isosorbide dinitrate (ISORDIL) tablet 10 mg  10 mg Oral TID    [Held by provider] metoprolol succinate (TOPROL-XL) XL tablet 25 mg  25 mg Oral DAILY    [Held by provider] calcium carbonate (OS-JOE) tablet 500 mg [elemental]  500 mg Oral QHS    [Held by provider] cetirizine (ZYRTEC) tablet 10 mg  10 mg Oral DAILY    [Held by provider] enoxaparin (LOVENOX) injection 50 mg  50 mg SubCUTAneous Q12H    [Held by provider] fluticasone propionate (FLONASE) 50 mcg/actuation nasal spray 2 Spray  2 Spray Both Nostrils DAILY    levothyroxine (SYNTHROID) tablet 125 mcg  125 mcg Oral 6am    magnesium oxide (MAG-OX) tablet 200 mg  200 mg Oral DAILY    ondansetron (ZOFRAN) injection 4 mg  4 mg IntraVENous Q8H PRN    ruxolitinib (Jakavi) tab 5 mg (Patient Supplied)  5 mg Oral Q12H    [Held by provider] traZODone (DESYREL) tablet 50 mg  50 mg Oral QHS    trimethoprim-polymyxin b (POLYTRIM) 10,000 unit- 1 mg/mL ophthalmic solution 1 Drop  1 Drop Both Eyes BID    dasatinib tab 80 mg (Patient Supplied)  80 mg Oral DAILY     Review of Systems  Unable to obtain  Objective:     Vitals:    03/30/22 0733 03/30/22 0748 03/30/22 0803 03/30/22 0818   BP: 122/71 127/73 129/73 126/68   Pulse: 94 96 95 93   Resp: 22 18 16 28   Temp:       SpO2: 93% 92% 93% 93%   Weight:           Intake/Output Summary (Last 24 hours) at 3/30/2022 0828  Last data filed at 3/30/2022 9365  Gross per 24 hour   Intake 6202.6 ml   Output 850 ml   Net 5352.6 ml     Physical Exam:   Constitution:  the patient is ill appearing in no acute respiratory distress  HEENT: Sclera clear, pupils equal, oral mucosa dry  Respiratory: clear from anterior. Cardiovascular:  RRR without M,G,R  Gastrointestinal: soft and appears very tender. Patient grimacing with even light palpation; with positive bowel sounds. Musculoskeletal: warm without cyanosis. There is no lower extremity edema. Skin:  no jaundice or rashes, no wounds   Neurologic: tries to open eyes but not able to communicate or follow any commands. Becomes restless with stimulation   Psychiatric: cannot assess    CXR:   3/28                                                                   3/22      LAB:  Recent Labs     03/30/22 0138 03/29/22  2125 03/29/22  1714 03/29/22  1324 03/29/22  1151 03/29/22  0938   WBC 19.7*  --   --   --  25.5* 29.4*   HGB 10.5*  --   --   --  11.8 13.9   HCT 34.5*  --   --   --  42.4 46.4*   PLT 44*  --   --   --  77* 93*   INR 3.6 4.2 7.0*   < >  --   --    PCT 4.79*  --  4.21*  --   --   --    LAC 12.6* 14.4* 17.2*   < >  --  16.2*    < > = values in this interval not displayed. Recent Labs     03/30/22 0138 03/29/22  1324 03/29/22  0938 03/28/22  1525 03/28/22  0542   * 133* 135*  --    < >   K 5.0 5.8* 6.2* 5.7*   < >   CL 99 100 98  --    < >   CO2 13* 8* 8*  --    < >   * 102* 20*  --    < >   BUN 58* 54* 50*  --    < >   CREA 2.20* 2.10* 1.90*  --    < >   MG  --   --   --  2.7*  --    CA 8.8 8.9 9.6  --    < >   ALB 3.2  --  3.7  --   --    AST 2,971*  --  QUANTITY NOT SUFFICIENT. SUGGEST RECOLLECTION  --   --    ALT 1,729*  --  QUANTITY NOT SUFFICIENT. SUGGEST RECOLLECTION  --   --    *  --  193*  --   --     < > = values in this interval not displayed. Recent Labs     03/30/22  0138 03/29/22  2125 03/29/22  1714   LAC 12.6* 14.4* 17.2*     No results for input(s): PH, PCO2, PO2, HCO3, PHI, PCO2I, PO2I, HCO3I in the last 72 hours. No results for input(s): SDES, CULT in the last 72 hours.   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 Most recent blood cultures negative. Rocephin per ID. Active Problems:    Body mass index (BMI) of 23.0 to 23.9 in adult (8/26/2021)  Currently unable to take anything po. IV fluids for now. Consider nutrition if she improves over next 24 to 48 hours      Polycythemia vera ([address] (2/16/2009)  noted      Cirrhosis ([address] (7/27/2012)  plts currently 93,000. AKOSUA (iron deficiency anemia) (7/28/2012)  stable      Chronic myelogenous leukemia ([address] (3/30/2015)  Follow by oncology. Acquired hypercoagulable state ([address] (3/30/2015)  Was on Lovenox at home. Anticoagulation on hold here with recent drop in plts and ? SAH      Thrombocytopenia ([address] (10/15/2021)  plts 93,000 today      Leukocytosis (10/16/2020)  Worse. Associated with lactic acidosis, hypotension. + abdominal pain - have ordered KUB. Too unstable to do CT right now. ? Mesenteric ischemia. Discussed with ID -they do not think abx need to be adjusted. CXR yesterday what appears to be a larger right effusion. On room air. Acute respiratory failure with hypoxemia ([address] (3/9/2022)  On room air. CXR as above      Pleural effusion, bilateral (3/11/2022)  Monitoring. No tap to date      Acute diastolic (congestive) heart failure ([address] (3/19/2022)  Currently not a problem. Off diuretics       Hyperkalemia (3/28/2022)  New. Associated with CARLITO. Calcium, albuterol now - repeat labs. Sepsis ([address] (3/29/2022)  As above - ? Abdominal source      Encephalopathy acute (3/29/2022)  ? SAH vs encephalitis per CT. Too unstable for CTA right now - requested by neurology. Acute kidney failure ([address] (3/29/2022)  Gilbert in place. Calcium, albuterol for hyperkalemia.  IV fluids ordered - monitor blood pressure/labs      Patient moved to ICU, seems not much changed since yesterday, still not very responsive but respiratory status stable  Multiple consultants on board including RAD ,family considering comfort care,      More than 50% of the time documented was spent in face-to-face contact with the patient and in the care of the patient on the floor/unit where the patient is located.     Keaton Vera MD

## 2022-03-30 NOTE — PROGRESS NOTES
[facility] CARDIOLOGY PROGRESS NOTE           3/30/2022 7:47 PM    Admit Date: 3/25/2022      Subjective:   -Worsening renal function noted, continues to be encephalopathic and poorly responsive today. ROS:  Cardiovascular:  As noted above    Objective:      Vitals:    03/30/22 1848 03/30/22 1903 03/30/22 1910 03/30/22 1919   BP: 137/70 132/69 128/74 128/74   Pulse: 93 93 93 92   Resp: 18 16 17 20   Temp:   98.2 °F (36.8 °C)    SpO2: 93% 93% 93% 92%   Weight:           Physical Exam:  General-No Acute Distress, encephalopathic, poorly responsive  Neck- supple, mild  JVD  CV- regular rate and rhythm ,  3/6 holosystolic murmur heard at the apex  Lung- clear bilaterally anteriorly with no obvious adventitious sounds  Abd- soft, nontender, nondistended  Ext- no edema bilaterally. Skin- warm and dry    Data Review:   Recent Labs     03/30/22  1453 03/30/22  1344 03/30/22  0750 03/30/22  0138 03/30/22  0138 03/29/22  1714 03/29/22  1324 03/29/22  1151 03/29/22  0938 03/28/22  1525   NA  --   --   --   --  134*  --  133*  --    < >  --    K  --   --   --   --  5.0  --  5.8*  --    < > 5.7*   MG  --   --   --   --   --   --   --   --   --  2.7*   BUN  --   --   --   --  58*  --  54*  --    < >  --    CREA  --   --   --   --  2.20*  --  2.10*  --    < >  --    GLU  --   --   --   --  250*  --  102*  --    < >  --    WBC  --   --   --   --  19.7*  --   --  25.5*   < > 13.7*   HGB  --   --   --   --  10.5*  --   --  11.8   < > 13.1   HCT  --   --   --   --  34.5*  --   --  42.4   < > 42.8   PLT 47*  --   --   --  44*  --   --  77*   < > 2*   INR  --  3.0 2.9   < > 3.6   < > 6.4*  --   --   --     < > = values in this interval not displayed.        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 papillary muscle rupture)    - Hemodynamics stable     - repeat blood cultures no growth to date    - appears dry on exam , hold diuretics     - Due to underlying medical conditions with possible encephalitis, cirrhosis, CML, poor candidate for cardiothoracic surgery intervention     - Ideally performed ROSELINE during hospital course but high risk for complications with history of grade II/III esophageal varices requiring banding x 6 (8/20/21). Likely plan ROSELINE if mental status improves and prognosis is better.    - would recommend EGD prior to ROSELINE given history of prior varices requiring banding. Hold off on ROSELINE until MRI obtained and mental status improves.      Active Problems:       Acquired hypercoagulable state ([address] (3/30/2015)    History of DVT (deep vein thrombosis) (3/19/2022)    Portal vein thrombosis (2/16/2009)  - systemic anticoagulation held due to abnormal CT head obtained yesterday , plan for MRI today.        Altered mental status    - CT abnormal, anticoagulation held    - MRI brain ordered and pending-not in a position to be able to cooperate and complete MRI of brain       Malignant ascites (7/26/2012)    Cirrhosis ([address] (7/27/2012)    - per medicine / GI , prior paracentesis        Chronic myelogenous leukemia ([address] (3/30/2015)    - per oncology        Thrombocytopenia ([address] (10/15/2021)    - possible cirrhosis related       Acute respiratory failure with hypoxemia ([address] (3/9/2022)    Pleural effusion, bilateral (3/11/2022)    - post thoracentesis with improvement in dyspnea        CML (chronic myelocytic leukemia) ([address] (3/19/2022)    - per oncology        Hyperkalemia   -  Monitor,-improved- Per primary.      -Prognosis remains guarded and if anything poor.   Still remains significantly encephalopathic and agree with ICU plans to speak with family members with regards to goals of care-comfort care measures are reasonable with multiorgan failure      Romero Amos MD  3/30/2022 7:47 PM

## 2022-03-30 NOTE — PROGRESS NOTES
Palliative Care Progress Note    Patient: Jennifer Chowdhury MRN: [id number]  SSN: [id number]    YOB: 1960  Age: 64 y.o. Sex: female       Assessment/Plan:     Chief Complaint/Interval History: pt in icu. Remains unresponsive    Principal Diagnosis:    · Altered Mental Status R41.82    Additional Diagnoses:   · Debility, Unspecified  R53.81  · Dyspnea  R06.00  · Frailty  R54  · Counseling, Encounter for Medical Advice  Z71.9  · Encounter for Palliative Care  Z51.5    Palliative Performance Scale (PPS)       Medical Decision Making:   Reviewed and summarized labs and imaging over past 24 hours. Spoke with pt daughter via phone and updated on current condition and concerns given mentation and significant volume given with blood products. Pt appears volume overloaded currently. Daughter expressed her understanding. Family is monitoring pt counts which have improved s/p transfusions. We discussed that if repeat counts show acute drops then we will discuss pursuing comfort measures. Will follow. Will discuss findings with members of the interdisciplinary team.      More than 50% of this 25 minute visit was spent counseling and coordination of care as outlined above. Subjective:     Comprehensive Review of Systems unable to obtain due to mentation. Objective:     Visit Vitals  /67   Pulse 96   Temp 98.9 °F (37.2 °C)   Resp 25   Wt 126 lb 8.7 oz (57.4 kg)   SpO2 92%   BMI 22.42 kg/m²       Physical Exam:    General:  Unresponsive . Eyes:  Conjunctivae/corneas clear    Nose: Nares normal. Septum midline. Neck: Supple, symmetrical, trachea midline, jvd present   Lungs:   Clear to auscultation bilaterally, unlabored   Heart:  Regular rate and rhythm, no murmur    Abdomen:   Soft, non-tender, non-distended   Extremities: Normal, atraumatic, no cyanosis or edema   Skin: Skin color, texture, turgor normal. No rash or lesions.    Neurologic: Not following commands    Psych: obtunded Signed By: Nicky Darnell MD     March 30, 2022

## 2022-03-30 NOTE — PROGRESS NOTES
Morning labs reviewed: INR 3.6, Platelets 44. Dr. Francisco Aragon aware. Per MD do not infuse 2 U FFP at this time and continue Q6 hour checks. Order placed for 1 unit platelets.

## 2022-03-30 NOTE — PROGRESS NOTES
Physical Therapy Note:    Participated in interdisciplinary rounds in ICU/CCU and chart reviewed. Patient is experiencing decrease in function from baseline. Patient would benefit from skilled acute therapy to increase independence with self care/ADLs, strength, endurance, and functional mobility. Recommend PT/OT consult when medically stable and MD agrees.     Thank you for your consideration,  Pari Moise, PT, DPT

## 2022-03-30 NOTE — PROGRESS NOTES
Infectious Disease Progress Note    Today's Date: 3/30/2022   Admit Date: 3/25/2022    Impression:   · Mitral valve infective endocarditis (TTE 3/24, not seen on study 2/3/22), negative blood cultures (3/24)   · R lung effusion, pulmonary following  · Hx of recurrent E coli bacteremia 2/2, 2/27, source presumed bowel; BC negative 3/1, 3/9  · E coli bacterial meningitis; s/p ceftriaxone x 2 weeks through 3/13/22  · CML on dasitinib  · PCV on jakafi  · Portal vein thrombosis/splenomegaly/multiple splenic infarcts  · Low back pain, with MRI lumbar spine (3/4) without evidence of infection  · Chronic diarrhea  · AMS - lethargic - CT head with subarrachnoid bleed vs sequelae of meningitis  · ALT 1729 - large increase     Plan:   · No improvement in mental status today compared to yesterday  · Cont empiric cefepime and flagyl for 24 more hours (yesterday had peritoneal signs on exam - not present today). CT A/P neg for perforation or other process 3/29  · After that, can switch back to rocephin - plan was for another 4 weeks of treatment  · Trend LFTs, procalcitonin will be high in setting of CARLITO - no need to trend  · Given multiple medical problems, if no improvement in mental status in the next few days, comfort care would be my recommendation    Anti-infectives:   · Vanc 3/24-3/25  · Ceftriaxone 3/25-     Subjective: In the ICU. Not waking up still though spontaneously moves head. Not on pressors. On 2L nc    Allergies   Allergen Reactions    Latex Other (comments)     Testing for latex allergy was positive as a 2 (on a scale of 1 to 3).     Sulfa (Sulfonamide Antibiotics) Anaphylaxis    Tramadol Other (comments)     Top lip swelled    Augmentin [Amoxicillin-Pot Clavulanate] Rash    Codeine Other (comments)    Divalproex Sodium Hives    Morphine Nausea and Vomiting     Not a true allergy    Potassium Clavulanate Hives    Rizatriptan Rash    Tetanus Immune Globulin Other (comments)     Heat, bruising, and swelling at  Site of injection    Vancomycin Rash    Xanax [Alprazolam] Other (comments)     Hallucinations    Zonegran [Zonisamide] Rash        Review of Systems:  Review of systems not obtained due to patient factors. Objective:     Visit Vitals  /71   Pulse 95   Temp 99 °F (37.2 °C)   Resp 28   Wt 57.4 kg (126 lb 8.7 oz)   SpO2 92%   BMI 22.42 kg/m²     Temp (24hrs), Av.6 °F (36.4 °C), Min:94.6 °F (34.8 °C), Max:99 °F (37.2 °C)     General: Unresponsive today  Head:    Normocephalic, atraumatic  Eyes:   closed  Mouth:  Very dry    Lungs:   Mildly coarse anterior lungs  CV:   Regular rate and rhythm with soft   Abdomen:  Soft, non tender, not distended, active bowel sounds  Extremities:  No cyanosis or edema  Musculoskeletal: No deformity  Skin:   No acute rash  Psych:  Unable to assess  Lines:    benign      Data Review:     CBC:  Recent Labs     22  1151 22  0938 22  1525 22  1525   WBC 19.7* 25.5* 29.4*   < > 13.7*   GRANS  --   --  90*  --  91*   MONOS  --   --  5  --  3*   EOS  --   --  0*  --  0*   ANEU  --   --  26.5*  --  12.6*   ABL  --   --  0.9  --  0.6   HGB 10.5* 11.8 13.9   < > 13.1   HCT 34.5* 42.4 46.4*   < > 42.8   PLT 44* 77* 93*   < > 2*    < > = values in this interval not displayed. BMP:  Recent Labs     22  1324 22  0938   CREA 2.20* 2.10* 1.90*   BUN 58* 54* 50*   * 133* 135*   K 5.0 5.8* 6.2*   CL 99 100 98   CO2 13* 8* 8*   AGAP 22* 25* 29*   * 102* 20*       LFTS:  Recent Labs     22  1324 22  0938   TBILI 4.9* 6.6* 7.6*   ALT 1,729*  --  QUANTITY NOT SUFFICIENT.  SUGGEST RECOLLECTION   *  --  193*   TP 6.3  --  7.4   ALB 3.2  --  3.7       Microbiology:     All Micro Results     Procedure Component Value Units Date/Time    CULTURE, BLOOD [[id number]] Collected: 22    Order Status: Completed Specimen: Blood Updated: 222    CULTURE, BLOOD [[id number]] Collected: 03/29/22 2352    Order Status: Completed Specimen: Blood Updated: 03/30/22 0022          Imaging:   Reviewed    Signed By: Elo Fernández MD     March 30, 2022

## 2022-03-30 NOTE — PROGRESS NOTES
Chart reviewed as pt remains ICU. Multiple issues with several specialities following. Oncology/hematology, Hospitalist, Intensivist, Neurology, ID and now Palliative care. 2L O2 via [place]. Min responsive per staff. No cont gtts at present. Family discussing comfort care. CM available for any assist. LOS 5 days.

## 2022-03-30 NOTE — ACP (ADVANCE CARE PLANNING)
Advance Care Planning     General Advance Care Planning (ACP) Conversation      Date of Conversation: 3/24/2022      Healthcare Decision Maker:     Primary Decision Maker: Hugo Brennan - Unknown - [phone]    Secondary Decision Maker: Lily Rodriges - Unknown  Click here to complete [address] including selection of the Healthcare Decision Maker Relationship (ie \"Primary\")      Today we documented Decision Maker(s) consistent with ACP documents on file. Content/Action Overview:   Pt has LW/HCPOA on file. Unable to verify with pt if up to date. Dr. Shabbir Merrill aware. DNR per MD orders.      Length of Voluntary ACP Conversation in minutes:  <16 minutes (Non-Billable)    Edda Holbrook RN

## 2022-03-30 NOTE — PROGRESS NOTES
Hospitalist Progress Note   Admit Date:  3/25/2022  4:10 AM   Name:  Ina Ganser   Age:  64 y.o. Sex:  female  :  1960   MRN:  [id number]   Room:  [facility]/    Presenting Complaint: No chief complaint on file. 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 Patient was recently discharged from the hospital on 3/14 after treatment for HF. She was also noted to have splenomegaly with splenic infarct. She was recently diagnosed with E. coli bacterial meningitis, completed course of antibiotics.     In the ER, CXR was done shows evidence of pulmonary vascular congestion. On 3/23 echocardiogram was performed showing mitral vegetations. She was transferred to the [facility] 3/24. Cardiology, ID, and Pulmonology are on board. Patient has since developed acutely worsening confusion with CT head concerning for evidence of SAH vs hepes encephalitis as well as Acute liver fracture, DIC. Subjective/24hr Events (22): Patient has received FFP/ cryo for concerns of DIC. Hematology following. Pt remains altered but withdraws in pain from all touch. RN reports intermittent moaning. Pt's daughter at bedside agrees that hospice would be appropriate. Have spoken with PC who is in d/w patient's daughter. ROS:  10 systems reviewed and negative except as noted above.      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      Active Problems:  Acute encephalopathy  3/28 - CT head concerning for SAH in right temporal lobs vs sequeala of meningioencephalitis.    Neurology has seen. Concern for localized brain abscess. MRI unobtainable to date. Continue IV antibiotics and holding anticoagulation. 3/30 - neuro exam remains unchanged except now moaning in pain from any touch. Will add low dose prn dilaudid at family's request to assist with pain mgmt. CARLITO  3/30 - Cr steady 2.1 ---_2.2 today. Repeat Bmp in AM.       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      Thrombocytopenia ([facility] [address] (10/15/2021)  Anemia  3/30   - oncology following  - concern for DIC  - s/p Cryo, FFP and plts  - platelets now 47 w/o overt bleeding although hgb trending down 10.5 this AM  - Repeat CBC w/ diff in AM  - Smear w/o schisctocytes although still ?if TTP given hx of ecoli in addition to CARLITO/ anemia/ thrombocytopenia  - agree that placement of plasmapharesis line would be with risk given current condition.        Hypothyroidism  - Continue Synthroid   -T4 1.2 on 3/44     Diastolic heart failure (HCC) (3/19/2022)  3/30   - holding lasix in presence of CARLITO  - monitor volume status daily      Diarrhea  -Chronic per patient  -Strongyloides antibodies, stool PCR ordered per ID     AKOSUA (iron deficiency anemia) (2012)  - Hgb 12.8     CML  PCV  - Continue home medications dasatinib and ruxolitinib  - oncology following     Acquired hypercoagulable state ([facility] [address] (3/30/2015)  Hx DVT, portal vein thrombosis  - anticoaguation on hold in presence of ? [facility]      Discharge Planning:    Pending, guarded prognosis.      Diet:  No diet orders on file  DVT PPx: scd  Code status: DNR    Hospital Problems as of 3/30/2022 Date Reviewed: 3/25/2022          Codes Class Noted - Resolved POA    * (Principal) Vegetative endocarditis of mitral valve ICD-10-CM: I33.0  ICD-9-CM: 421.0  3/24/2022 - Present Yes        Body mass index (BMI) of 23.0 to 23.9 in adult (Chronic) ICD-10-CM: P27.61  ICD-9-CM: V85.1  8/26/2021 - Present Yes        Sepsis ([facility] ICD-10-CM: A41.9  ICD-9-CM: 038.9, 995.91  3/29/2022 - Present No        Encephalopathy acute ICD-10-CM: G93.40  ICD-9-CM: 348.30  3/29/2022 - Present No        Acute kidney failure ([facility] ICD-10-CM: N17.9  ICD-9-CM: 584.9  3/29/2022 - Present No        Hyperkalemia ICD-10-CM: E87.5  ICD-9-CM: 276.7  3/28/2022 - Present No        CML (chronic myelocytic leukemia) (HCC) (Chronic) ICD-10-CM: C92.10  ICD-9-CM: 205.10  3/19/2022 - Present Yes        Pleural effusion, bilateral ICD-10-CM: J90  ICD-9-CM: 511.9  3/11/2022 - Present Yes        Hypoproteinemia ([facility] [address] ICD-10-CM: E77.8  ICD-9-CM: 273.8  3/11/2022 - Present Yes        Thrombocytopenia ([facility] ICD-10-CM: D69.6  ICD-9-CM: 287.5  10/15/2021 - Present Yes        Leukocytosis ICD-10-CM: O91.248  ICD-9-CM: 288.60  10/16/2020 - Present Yes        Acquired hypercoagulable state ([facility] (Chronic) ICD-10-CM: F16.63  ICD-9-CM: 289.81  3/30/2015 - Present Yes        AKOSUA (iron deficiency anemia) ICD-10-CM: D50.9  ICD-9-CM: 280.9  7/28/2012 - Present Unknown        Cirrhosis (HCC) (Chronic) ICD-10-CM: K74.60  ICD-9-CM: 571.5  7/27/2012 - Present Yes        Polycythemia vera ([facility] (Chronic) ICD-10-CM: D45  ICD-9-CM: 238.4  2/16/2009 - Present Unknown    Overview Addendum 8/26/2021 2:31 PM by Isidra Childs MD     2/2008 by [email or web address] analysis however portal vein thrombosis  And and splenomegaly since 2004  Hydroxyurea for 6 months poor tolerance spleen did not shrink   Pegasys 90 mcg weekly 4-2009 till    Restarted 12-30-09 45 mcg q 2 weeks  2-2010 reduced to q month   Increased 45 mcg 2/month 4-2010  Held 8-2010 thrombocytopenia  12-29-12 restarted 45 mcg q 2 weeks  4-1-11 45 mcg q 3 weeks  4-22-11 45 mcg q 4 weeks  Uncertain dosing thereafter   Possibly q 3 weeks 10-15-11 and held and restarted on 4-1-12 6-12 ct scan Small bowel wall thickening suggesting an enteritis. In addition, there is well thickening of the right colon which can suggest an additional   colitis although this can also be seen with severe portal hypertension. Likely reactive edematous changes it scattered fluid collections are seen of   the small bowel mesentery. . Apparent occlusion of the portal vein. In addition, the splenic vein, and superior mesenteric vein are not definitely seen and are also likely occluded. Splenomegaly, and extensive varices are seen as described above consistent with the portal vein occlusion. J Carlos Brocks Heterogeneous enhancement of the liver and mild periportal edema. An acute hepatitis cannot be excluded.               Splenomegaly ICD-10-CM: R16.1  ICD-9-CM: 789.2  2/16/2009 - Present Yes        RESOLVED: Endocarditis of mitral valve ICD-10-CM: I05.9  ICD-9-CM: 394.9  3/25/2022 - 3/29/2022 Yes        RESOLVED: Acute diastolic (congestive) heart failure (HCC) ICD-10-CM: I50.31  ICD-9-CM: 428.31, 428.0  3/19/2022 - 3/29/2022 Yes        RESOLVED: Acute respiratory failure with hypoxemia ([facility] ICD-10-CM: J96.01  ICD-9-CM: 518.81  3/9/2022 - 3/29/2022 Yes        RESOLVED: Chronic myelogenous leukemia (HCC) (Chronic) ICD-10-CM: C92.10  ICD-9-CM: 205.10  3/30/2015 - 3/29/2022 Yes        RESOLVED: Hypokalemia ICD-10-CM: E87.6  ICD-9-CM: 276.8  7/27/2012 - 3/29/2022 Yes        RESOLVED: Malignant ascites ICD-10-CM: R18.0  ICD-9-CM: 789.51  7/26/2012 - 3/29/2022 Yes              Objective:     Patient Vitals for the past 24 hrs:   Temp Pulse Resp BP SpO2   03/30/22 1819 -- 92 14 133/67 93 %   03/30/22 1803 -- 93 15 133/67 93 %   03/30/22 1745 -- 93 19 (!) 146/76 93 %   03/30/22 1730 -- 93 15 (!) 143/74 92 %   03/30/22 1715 -- 95 19 (!) 146/68 94 %   03/30/22 1700 -- 95 17 130/62 93 %   03/30/22 1645 -- 95 18 131/63 93 %   03/30/22 1630 -- 96 22 130/63 93 %   03/30/22 1615 -- 96 21 130/65 93 %   03/30/22 1600 -- 95 18 129/65 93 %   03/30/22 1545 -- 95 20 129/66 93 %   03/30/22 1530 -- 96 20 127/68 92 %   03/30/22 1500 99.4 °F (37.4 °C) 96 22 123/62 92 %   03/30/22 1445 -- 96 14 127/65 93 %   03/30/22 1430 -- 96 23 126/63 92 %   03/30/22 1415 -- 96 22 127/64 93 %   03/30/22 1400 -- 96 20 123/66 92 %   03/30/22 1345 -- 96 22 123/67 93 %   03/30/22 1330 -- 96 21 123/65 92 %   03/30/22 1315 -- 96 24 124/65 92 %   03/30/22 1300 -- 96 19 125/67 93 %   03/30/22 1245 -- 96 22 121/65 92 %   03/30/22 1230 -- 95 16 124/66 93 %   03/30/22 1215 -- 96 25 126/68 92 %   03/30/22 1203 -- 96 25 126/67 92 %   03/30/22 1200 -- 96 22 -- 92 %   03/30/22 1145 -- 96 25 125/67 93 %   03/30/22 1100 98.9 °F (37.2 °C) 96 26 128/65 92 %   03/30/22 1048 -- 96 20 128/65 92 %   03/30/22 1038 -- 96 18 127/64 92 %   03/30/22 1023 -- 95 20 124/65 92 %   03/30/22 1008 -- 95 24 128/68 92 %   03/30/22 0953 -- 93 20 126/68 92 %   03/30/22 0938 -- 93 23 123/66 93 %   03/30/22 0923 -- 94 21 124/68 93 %   03/30/22 0910 99 °F (37.2 °C) 95 28 124/71 92 %   03/30/22 0853 98.9 °F (37.2 °C) 95 28 130/70 92 %   03/30/22 0818 -- 93 28 126/68 93 %   03/30/22 0803 -- 95 16 129/73 93 %   03/30/22 0748 -- 96 18 127/73 92 %   03/30/22 0733 -- 94 22 122/71 93 %   03/30/22 0718 -- 93 20 127/70 92 %   03/30/22 0703 98.9 °F (37.2 °C) 93 19 127/71 92 %   03/30/22 0648 -- 93 18 128/69 93 %   03/30/22 0643 98.9 °F (37.2 °C) 94 18 -- 93 %   03/30/22 0633 -- 93 19 129/70 92 %   03/30/22 0618 -- 93 17 127/67 92 %   03/30/22 0603 -- 93 23 129/69 92 %   03/30/22 0548 -- 93 19 127/69 92 %   03/30/22 0533 -- 93 25 121/65 93 %   03/30/22 0518 -- 92 18 122/63 93 %   03/30/22 0503 -- 91 16 121/65 93 %   03/30/22 0448 -- 91 15 119/66 93 %   03/30/22 0433 -- 93 19 123/69 92 %   03/30/22 0419 99 °F (37.2 °C) 92 16 -- 91 %   03/30/22 0346 98.7 °F (37.1 °C) 93 25 -- 94 %   03/30/22 0333 -- 93 21 (!) 142/67 93 %   03/30/22 0329 98.9 °F (37.2 °C) 92 24 (!) 142/67 93 %   03/30/22 0318 -- 93 20 139/69 93 %   03/30/22 0308 99 °F (37.2 °C) 93 23 -- 93 %   03/30/22 0303 -- 92 23 137/65 93 %   03/30/22 0248 -- 92 19 (!) 140/65 93 %   03/30/22 0233 -- 92 25 136/63 93 %   03/30/22 0218 -- 92 23 137/65 93 %   03/30/22 0203 -- 92 22 139/66 93 %   03/30/22 0148 -- 92 21 139/65 93 %   03/30/22 0147 98.4 °F (36.9 °C) 92 22 -- 94 %   03/30/22 0133 -- 92 24 136/65 93 %   03/30/22 0117 -- 90 21 137/66 93 %   03/30/22 0110 98.3 °F (36.8 °C) 91 20 -- 93 %   03/30/22 0103 -- 91 24 (!) 141/66 93 %   03/30/22 0048 -- 91 19 (!) 141/65 93 %   03/30/22 0033 -- 90 19 139/62 92 %   03/30/22 0030 -- 90 19 -- (!) 89 %   03/30/22 0018 -- 90 23 (!) 142/67 91 %   03/30/22 0003 -- 90 21 138/64 90 %   03/29/22 2348 -- 90 21 (!) 143/70 91 %   03/29/22 2333 98 °F (36.7 °C) 90 21 (!) 140/70 91 %   03/29/22 2318 -- 88 19 129/63 92 %   03/29/22 2317 98 °F (36.7 °C) 87 20 -- 92 %   03/29/22 2303 -- 89 24 (!) 119/59 92 %   03/29/22 2248 -- 87 20 127/65 92 %   03/29/22 2233 -- 87 22 125/63 92 %   03/29/22 2218 -- 86 17 123/62 93 %   03/29/22 2216 97.5 °F (36.4 °C) 85 14 -- 93 %   03/29/22 2203 -- 85 17 121/61 92 %   03/29/22 2116 97.1 °F (36.2 °C) 83 15 -- 94 %   03/29/22 2103 -- 82 16 121/64 95 %   03/29/22 2048 -- 83 15 123/64 95 %   03/29/22 2038 (!) 96 °F (35.6 °C) 82 18 -- 94 %   03/29/22 2033 -- 82 15 (!) 118/58 93 %   03/29/22 2017 -- 82 16 (!) 118/57 94 %   03/29/22 2016 (!) 96.6 °F (35.9 °C) 82 18 -- 94 %   03/29/22 2003 -- 81 15 (!) 109/55 95 %   03/29/22 1948 -- 80 14 (!) 108/59 95 %   03/29/22 1933 -- 80 14 (!) 115/59 95 %   03/29/22 1926 (!) 95.2 °F (35.1 °C) 79 14 (!) 115/59 96 %   03/29/22 1903 -- 79 15 (!) 100/56 97 %     Oxygen Therapy  O2 Sat (%): 93 % (03/30/22 1819)  Pulse via Oximetry: 93 beats per minute (03/30/22 1819)  O2 Device: Nasal cannula (03/30/22 0329)  Skin Assessment: Clean, dry, & intact (03/25/22 1935)  Skin Protection for O2 Device: N/A (03/25/22 1935)  O2 Flow Rate (L/min): 2 l/min (03/30/22 0329)    Estimated body mass index is 22.42 kg/m² as calculated from the following:    Height as of 3/18/22: 5' 3\" (1.6 m). Weight as of this encounter: 57.4 kg (126 lb 8.7 oz). Intake/Output Summary (Last 24 hours) at 3/30/2022 1902  Last data filed at 3/30/2022 1503  Gross per 24 hour   Intake 6882.57 ml   Output 750 ml   Net 6132.57 ml         Physical Exam:     Blood pressure 133/67, pulse 92, temperature 99.4 °F (37.4 °C), resp. rate 14, weight 57.4 kg (126 lb 8.7 oz), SpO2 93 %. General:    Chronically ill appearing, lethargic. W/d from pain only  Head:  Normocephalic, atraumatic  Eyes:  Sclerae appear normal.  Pupils equally round. ENT:  Nares appear normal, no drainage. Moist oral mucosa  Neck:  No restricted ROM. Trachea midline   CV:   RRR. No m/r/g. No jugular venous distension. Lungs:   CTAB. No wheezing, rhonchi, or rales. Respirations even, unlabored  Abdomen: Bowel sounds present. Soft, nontender, nondistended. Extremities: No cyanosis or clubbing. Pitting pedal edema bilaterally  Skin:     No rashes and normal coloration. Warm and dry. Neuro:  CN II-XII grossly intact. Sensation intact. A&Ox3  Psych:  Normal mood and affect.       I have reviewed ordered lab tests and independently visualized imaging below:    Recent Labs:  Recent Results (from the past 48 hour(s))   METABOLIC PANEL, COMPREHENSIVE    Collection Time: 03/29/22  9:38 AM   Result Value Ref Range    Sodium 135 (L) 136 - 145 mmol/L    Potassium 6.2 (HH) 3.5 - 5.1 mmol/L    Chloride 98 98 - 107 mmol/L    CO2 8 (LL) 21 - 32 mmol/L    Anion gap 29 (H) 7 - 16 mmol/L    Glucose 20 (LL) 65 - 100 mg/dL    BUN 50 (H) 8 - 23 MG/DL    Creatinine 1.90 (H) 0.6 - 1.0 MG/DL    GFR est AA 35 (L) >60 ml/min/1.73m2    GFR est non-AA 29 (L) >60 ml/min/1.73m2    Calcium 9.6 8.3 - 10.4 MG/DL    Bilirubin, total 7.6 (H) 0.2 - 1.1 MG/DL    ALT (SGPT) QUANTITY NOT SUFFICIENT. SUGGEST RECOLLECTION 12 - 65 U/L    AST (SGOT) QUANTITY NOT SUFFICIENT. SUGGEST RECOLLECTION 15 - 37 U/L    Alk. phosphatase 193 (H) 50 - 136 U/L    Protein, total 7.4 6.3 - 8.2 g/dL    Albumin 3.7 3.2 - 4.6 g/dL    Globulin 3.7 (H) 2.3 - 3.5 g/dL    A-G Ratio 1.0 (L) 1.2 - 3.5     LACTIC ACID    Collection Time: 03/29/22  9:38 AM   Result Value Ref Range    Lactic acid 16.2 (HH) 0.4 - 2.0 MMOL/L   CBC WITH AUTOMATED DIFF    Collection Time: 03/29/22  9:38 AM   Result Value Ref Range    WBC 29.4 (H) 4.3 - 11.1 K/uL    RBC 4.17 4.05 - 5.2 M/uL    HGB 13.9 11.7 - 15.4 g/dL    HCT 46.4 (H) 35.8 - 46.3 %    .3 (H) 79.6 - 97.8 FL    MCH 33.3 (H) 26.1 - 32.9 PG    MCHC 30.0 (L) 31.4 - 35.0 g/dL    RDW 23.5 (H) 11.9 - 14.6 %    PLATELET 93 (L) 003 - 450 K/uL    MPV 11.4 9.4 - 12.3 FL    ABSOLUTE NRBC 0.15 0.0 - 0.2 K/uL    DF AUTOMATED      NEUTROPHILS 90 (H) 43 - 78 %    LYMPHOCYTES 3 (L) 13 - 44 %    MONOCYTES 5 4.0 - 12.0 %    EOSINOPHILS 0 (L) 0.5 - 7.8 %    BASOPHILS 0 0.0 - 2.0 %    IMMATURE GRANULOCYTES 1 0.0 - 5.0 %    ABS. NEUTROPHILS 26.5 (H) 1.7 - 8.2 K/UL    ABS. LYMPHOCYTES 0.9 0.5 - 4.6 K/UL    ABS. MONOCYTES 1.6 (H) 0.1 - 1.3 K/UL    ABS. EOSINOPHILS 0.1 0.0 - 0.8 K/UL    ABS. BASOPHILS 0.1 0.0 - 0.2 K/UL    ABS. IMM.  GRANS. 0.3 0.0 - 0.5 K/UL   GLUCOSE, POC    Collection Time: 03/29/22 10:59 AM   Result Value Ref Range    Glucose (POC) 29 (LL) 65 - 100 mg/dL    Performed by Bia Yap 8    Collection Time: 03/29/22 11:51 AM   Result Value Ref Range    Reticulocyte count 4.8 (H) 0.3 - 2.0 %    Absolute Retic Cnt. 0.1699 (H) 0.026 - 0.095 M/ul    Immature Retic Fraction 35.8 (H) 3.0 - 15.9 %    Retic Hgb Conc. 41 (H) 29 - 35 pg   CBC W/O DIFF    Collection Time: 03/29/22 11:51 AM   Result Value Ref Range    WBC 25.5 (H) 4.3 - 11.1 K/uL    RBC 3.54 (L) 4.05 - 5.2 M/uL    HGB 11.8 11.7 - 15.4 g/dL    HCT 42.4 35.8 - 46.3 %    .8 (H) 79.6 - 97.8 FL    MCH 33.3 (H) 26.1 - 32.9 PG    MCHC 27.8 (L) 31.4 - 35.0 g/dL    RDW 22.7 (H) 11.9 - 14.6 %    PLATELET 77 (L) 020 - 450 K/uL    MPV 12.2 9.4 - 12.3 FL    ABSOLUTE NRBC 0.12 0.0 - 0.2 K/uL   PATHOLOGIST REVIEW SMEARS    Collection Time: 03/29/22 11:51 AM   Result Value Ref Range    PATHOLOGIST REVIEW (NOTE)    GLUCOSE, POC    Collection Time: 03/29/22 12:26 PM   Result Value Ref Range    Glucose (POC) 30 (LL) 65 - 100 mg/dL    Performed by Sabino    GLUCOSE, POC    Collection Time: 03/29/22 12:37 PM   Result Value Ref Range    Glucose (POC) 52 (L) 65 - 100 mg/dL    Performed by Zoran    LACTIC ACID    Collection Time: 03/29/22  1:24 PM   Result Value Ref Range    Lactic acid 16.6 (HH) 0.4 - 2.0 MMOL/L   METABOLIC PANEL, BASIC    Collection Time: 03/29/22  1:24 PM   Result Value Ref Range    Sodium 133 (L) 136 - 145 mmol/L    Potassium 5.8 (H) 3.5 - 5.1 mmol/L    Chloride 100 98 - 107 mmol/L    CO2 8 (LL) 21 - 32 mmol/L    Anion gap 25 (H) 7 - 16 mmol/L    Glucose 102 (H) 65 - 100 mg/dL    BUN 54 (H) 8 - 23 MG/DL    Creatinine 2.10 (H) 0.6 - 1.0 MG/DL    GFR est AA 31 (L) >60 ml/min/1.73m2    GFR est non-AA 25 (L) >60 ml/min/1.73m2    Calcium 8.9 8.3 - 10.4 MG/DL   HAPTOGLOBIN    Collection Time: 03/29/22  1:24 PM   Result Value Ref Range    Haptoglobin <8 (L) 30 - 200 mg/dL   LD    Collection Time: 03/29/22  1:24 PM   Result Value Ref Range    LD 2,062 (H) 110 - 210 U/L   BILIRUBIN, FRACTIONATED    Collection Time: 03/29/22  1:24 PM   Result Value Ref Range    Bilirubin, total 6.6 (H) 0.2 - 1.1 MG/DL    Bilirubin, direct 4.2 (H) <0.4 MG/DL    Bilirubin, indirect 2.4 (H) 0.0 - 1.1 MG/DL   PROTHROMBIN TIME + INR    Collection Time: 03/29/22  1:24 PM   Result Value Ref Range    Prothrombin time 57.5 (H) 12.6 - 14.5 sec    INR 6.4 (HH)     PTT    Collection Time: 03/29/22  1:24 PM   Result Value Ref Range    aPTT 54.2 (H) 24.1 - 35.1 SEC   FIBRINOGEN    Collection Time: 03/29/22  1:24 PM   Result Value Ref Range    Fibrinogen <60 (LL) 190 - 501 mg/dL   D DIMER    Collection Time: 03/29/22  1:24 PM   Result Value Ref Range    D DIMER >20.00 (H) <0.56 ug/ml(FEU)   LIPASE    Collection Time: 03/29/22  1:24 PM   Result Value Ref Range    Lipase 1,578 (H) 73 - 393 U/L   GLUCOSE, POC    Collection Time: 03/29/22  2:14 PM   Result Value Ref Range    Glucose (POC) 81 65 - 100 mg/dL    Performed by Kae PRESTON, ALLOCATE    Collection Time: 03/29/22  3:45 PM   Result Value Ref Range    Unit number [id number]     Blood component type FP 24h, Thaw     Unit division 00     Status of unit TRANSFUSED     Unit number [id number]     Blood component type FP 24h, Thaw     Unit division 00     Status of unit TRANSFUSED    CRYOPRECIPITATE, ALLOCATE    Collection Time: 03/29/22  4:00 PM   Result Value Ref Range    Unit number [id number]     Blood component type CRYO,5U Thaw     Unit division 00     Status of unit TRANSFUSED     Unit number [id number]     Blood component type CRYO,5U Thaw     Unit division 00     Status of unit TRANSFUSED    PROTHROMBIN TIME + INR    Collection Time: 03/29/22  5:14 PM   Result Value Ref Range    Prothrombin time 61.2 (H) 12.6 - 14.5 sec    INR 7.0 (HH)     PROCALCITONIN    Collection Time: 03/29/22  5:14 PM   Result Value Ref Range    Procalcitonin 4.21 (H) 0.00 - 0.49 ng/mL   LACTIC ACID    Collection Time: 03/29/22  5:14 PM   Result Value Ref Range    Lactic acid 17.2 (HH) 0.4 - 2.0 MMOL/L   PLASMA, ALLOCATE    Collection Time: 03/29/22  9:15 PM   Result Value Ref Range    Unit number [id number]     Blood component type FP 24h, Thaw     Unit division 00     Status of unit ISSUED     Unit number [id number]     Blood component type FP 24h, Thaw     Unit division 00     Status of unit ISSUED    PROTHROMBIN TIME + INR    Collection Time: 03/29/22  9:25 PM   Result Value Ref Range    Prothrombin time 41.7 (H) 12.6 - 14.5 sec    INR 4.2     PTT    Collection Time: 03/29/22  9:25 PM   Result Value Ref Range    aPTT 46.0 (H) 24.1 - 35.1 SEC   FIBRINOGEN    Collection Time: 03/29/22  9:25 PM   Result Value Ref Range    Fibrinogen 96 (L) 190 - 501 mg/dL   LACTIC ACID    Collection Time: 03/29/22  9:25 PM   Result Value Ref Range    Lactic acid 14.4 (HH) 0.4 - 2.0 MMOL/L   PROTHROMBIN TIME + INR    Collection Time: 03/30/22  1:38 AM   Result Value Ref Range    Prothrombin time 36.6 (H) 12.6 - 14.5 sec    INR 3.6     PTT    Collection Time: 03/30/22  1:38 AM   Result Value Ref Range    aPTT 41.5 (H) 24.1 - 35.1 SEC   FIBRINOGEN    Collection Time: 03/30/22  1:38 AM   Result Value Ref Range    Fibrinogen 100 (L) 190 - 501 mg/dL   LACTIC ACID    Collection Time: 03/30/22  1:38 AM   Result Value Ref Range    Lactic acid 12.6 (HH) 0.4 - 2.0 MMOL/L   CBC W/O DIFF    Collection Time: 03/30/22  1:38 AM   Result Value Ref Range    WBC 19.7 (H) 4.3 - 11.1 K/uL    RBC 3.20 (L) 4.05 - 5.2 M/uL    HGB 10.5 (L) 11.7 - 15.4 g/dL    HCT 34.5 (L) 35.8 - 46.3 %    .8 (H) 79.6 - 97.8 FL    MCH 32.8 26.1 - 32.9 PG    MCHC 30.4 (L) 31.4 - 35.0 g/dL    RDW 22.5 (H) 11.9 - 14.6 %    PLATELET 44 (L) 481 - 450 K/uL    MPV 12.6 (H) 9.4 - 12.3 FL    ABSOLUTE NRBC 0.05 0.0 - 0.2 K/uL   BILIRUBIN, DIRECT    Collection Time: 03/30/22  1:38 AM   Result Value Ref Range    Bilirubin, direct 3.0 (H) <0.4 MG/DL   CORTISOL, AM    Collection Time: 03/30/22  1:38 AM   Result Value Ref Range    Cortisol, a.m. 133.4 (H) 7 - 25 ug/dL   D DIMER    Collection Time: 03/30/22  1:38 AM   Result Value Ref Range    D DIMER >20.00 (H) <0.56 ug/ml(FEU)   METABOLIC PANEL, COMPREHENSIVE    Collection Time: 03/30/22  1:38 AM   Result Value Ref Range    Sodium 134 (L) 136 - 145 mmol/L    Potassium 5.0 3.5 - 5.1 mmol/L    Chloride 99 98 - 107 mmol/L    CO2 13 (L) 21 - 32 mmol/L    Anion gap 22 (H) 7 - 16 mmol/L    Glucose 250 (H) 65 - 100 mg/dL    BUN 58 (H) 8 - 23 MG/DL    Creatinine 2.20 (H) 0.6 - 1.0 MG/DL    GFR est AA 29 (L) >60 ml/min/1.73m2    GFR est non-AA 24 (L) >60 ml/min/1.73m2    Calcium 8.8 8.3 - 10.4 MG/DL    Bilirubin, total 4.9 (H) 0.2 - 1.1 MG/DL    ALT (SGPT) 1,729 (H) 12 - 65 U/L    AST (SGOT) 2,971 (H) 15 - 37 U/L    Alk.  phosphatase 158 (H) 50 - 136 U/L    Protein, total 6.3 6.3 - 8.2 g/dL    Albumin 3.2 3.2 - 4.6 g/dL    Globulin 3.1 2.3 - 3.5 g/dL    A-G Ratio 1.0 (L) 1.2 - 3.5     PROCALCITONIN    Collection Time: 03/30/22  1:38 AM   Result Value Ref Range    Procalcitonin 4.79 (H) 0.00 - 0.49 ng/mL   PLATELETS, ALLOCATE    Collection Time: 03/30/22  3:45 AM   Result Value Ref Range    Unit number [id number]     Blood component type PLP,LRIR     Unit division 00     Status of unit ISSUED    GLUCOSE, POC    Collection Time: 03/30/22  7:48 AM   Result Value Ref Range    Glucose (POC) 246 (H) 65 - 100 mg/dL    Performed by Barb Smith    PROTHROMBIN TIME + INR    Collection Time: 03/30/22  7:50 AM   Result Value Ref Range    Prothrombin time 31.2 (H) 12.6 - 14.5 sec    INR 2.9     PTT    Collection Time: 03/30/22  7:50 AM   Result Value Ref Range    aPTT 37.1 (H) 24.1 - 35.1 SEC   FIBRINOGEN    Collection Time: 03/30/22  7:50 AM   Result Value Ref Range    Fibrinogen 127 (L) 190 - 501 mg/dL   LACTIC ACID    Collection Time: 03/30/22  7:50 AM   Result Value Ref Range    Lactic acid 7.6 (HH) 0.4 - 2.0 MMOL/L   GLUCOSE, POC    Collection Time: 03/30/22 11:32 AM   Result Value Ref Range    Glucose (POC) 196 (H) 65 - 100 mg/dL    Performed by Barb Smith    FIBRINOGEN    Collection Time: 03/30/22  1:44 PM Result Value Ref Range    Fibrinogen 133 (L) 190 - 501 mg/dL   PTT    Collection Time: 03/30/22  1:44 PM   Result Value Ref Range    aPTT 34.7 24.1 - 35.1 SEC   PROTHROMBIN TIME + INR    Collection Time: 03/30/22  1:44 PM   Result Value Ref Range    Prothrombin time 31.5 (H) 12.6 - 14.5 sec    INR 3.0     LACTIC ACID    Collection Time: 03/30/22  1:44 PM   Result Value Ref Range    Lactic acid 5.8 (HH) 0.4 - 2.0 MMOL/L   PLATELET COUNT    Collection Time: 03/30/22  2:53 PM   Result Value Ref Range    PLATELET 47 (L) 097 - 450 K/uL       All Micro Results     Procedure Component Value Units Date/Time    CULTURE, BLOOD [[id number]] Collected: 03/29/22 2352    Order Status: Completed Specimen: Blood Updated: 03/30/22 0022    CULTURE, BLOOD [[id number]] Collected: 03/29/22 2352    Order Status: Completed Specimen: Blood Updated: 03/30/22 0022          Other Studies:  XR CHEST SNGL V    Result Date: 3/30/2022  EXAMINATION: One view chest HISTORY: Dyspnea TECHNIQUE: Frontal chest. COMPARISON: 3/28/2022 FINDINGS:  A nasogastric tube has been placed. The tip lies below the hemidiaphragm and is not included on this image. Persistent bilateral lung infiltrates, greater on the right. Mild pulmonary vascular congestion. There is no significant pneumothorax. There is a stable, small, right pleural effusion. The heart is unchanged. No other significant interval changes. 1. Findings as described above. US ABD COMP    Result Date: 3/30/2022  ABDOMINAL ULTRASOUND. CLINICAL INDICATION: Renal failure, elevated liver function test PROCEDURE: Realtime grayscale and color Doppler evaluation of the abdomen. COMPARISON: CT the abdomen and pelvis from earlier the same day FINDINGS: The liver is normal in size but increased in echogenicity. The hepatic veins are patent. 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 The gallbladder has been surgically removed. The pancreas is unremarkable.  The extrahepatic common bile duct is normal in caliber at 4.0 mm. The right kidney is normal in size at 11.8 cm. There is no hydronephrosis. There is increased renal cortical echogenicity. . The left kidney is normal in size at 11.1 cm. There is increased renal cortical echogenicity. No hydronephrosis appreciated. The spleen is enlarged at 20.1 cm. The aorta and IVC are unremarkable. There is a right pleural effusion. There is small volume ascites. 1. Diffusely echogenic liver most in keeping with diffuse fatty infiltration with an occluded main portal vein. 2. Marked splenomegaly. 3. Bilateral increased renal cortical echogenicity most in keeping with medical renal disease. 4. Right pleural effusion. 5. Small volume ascites.       Current Meds:  Current Facility-Administered Medications   Medication Dose Route Frequency    0.9% sodium chloride infusion 250 mL  250 mL IntraVENous PRN    methylPREDNISolone (PF) (SOLU-MEDROL) injection 50 mg  50 mg IntraVENous DAILY    pantoprazole (PROTONIX) 40 mg in 0.9% sodium chloride 10 mL injection  40 mg IntraVENous DAILY    cefepime (MAXIPIME) 2 g in 0.9% sodium chloride (MBP/ADV) 100 mL MBP  2 g IntraVENous Q24H    metroNIDAZOLE (FLAGYL) IVPB premix 500 mg  500 mg IntraVENous Q12H    0.9% sodium chloride infusion 250 mL  250 mL IntraVENous PRN    0.9% sodium chloride infusion 250 mL  250 mL IntraVENous PRN    0.9% sodium chloride infusion 250 mL  250 mL IntraVENous PRN    0.9% sodium chloride infusion 250 mL  250 mL IntraVENous PRN    0.9% sodium chloride infusion 250 mL  250 mL IntraVENous PRN    [Held by provider] isosorbide dinitrate (ISORDIL) tablet 10 mg  10 mg Oral TID    [Held by provider] metoprolol succinate (TOPROL-XL) XL tablet 25 mg  25 mg Oral DAILY    [Held by provider] calcium carbonate (OS-JOE) tablet 500 mg [elemental]  500 mg Oral QHS    [Held by provider] cetirizine (ZYRTEC) tablet 10 mg  10 mg Oral DAILY    [Held by provider] enoxaparin (LOVENOX) injection 50 mg  50 mg SubCUTAneous Q12H    [Held by provider] fluticasone propionate (FLONASE) 50 mcg/actuation nasal spray 2 Spray  2 Spray Both Nostrils DAILY    levothyroxine (SYNTHROID) tablet 125 mcg  125 mcg Oral 6am    magnesium oxide (MAG-OX) tablet 200 mg  200 mg Oral DAILY    ondansetron (ZOFRAN) injection 4 mg  4 mg IntraVENous Q8H PRN    ruxolitinib Mozella Pries) tab 5 mg (Patient Supplied)  5 mg Oral Q12H    [Held by provider] traZODone (DESYREL) tablet 50 mg  50 mg Oral QHS    trimethoprim-polymyxin b (POLYTRIM) 10,000 unit- 1 mg/mL ophthalmic solution 1 Drop  1 Drop Both Eyes BID    dasatinib tab 80 mg (Patient Supplied)  80 mg Oral DAILY       Signed:  Juan Daniel Krishnan DO    Part of this note may have been written by using a voice dictation software. The note has been proof read but may still contain some grammatical/other typographical errors.

## 2022-03-31 PROBLEM — I38 ENDOCARDITIS: Status: ACTIVE | Noted: 2022-01-01

## 2022-03-31 NOTE — PROGRESS NOTES
[id number]  64year old female patient admitted to Room 111 with hospice diagnosis of Vegetative Endocarditis. Symptom management includes pain, secretions and dyspnea. Level of care is GIP. Admission presentation with long periods of apnea. Respirations at 5. Heart sounds audible, lungs with crackles and on O2 at 3L. Patient moans with movement and opens her eyes. Patient can move her extremities. Gilbert with shonna urine drainage. NG tube removed. Bilateral pedal pulses palpable. Patient has edema on her upper extremites. IDG team made aware of plan of care and immediate needs. 1755  Roxanol 10 mg sublingual given for patients moaning. FLACC score is 4. Continue to monitor. Safety measures intact. Bed tabs on, bed alarm by patient,  rails up x 2, door open for close monitoring.

## 2022-03-31 NOTE — PROGRESS NOTES
Palliative Care Progress Note    Patient: Betty Henderson MRN: [id number]  SSN: [id number]    YOB: 1960  Age: 64 y.o. Sex: female       Assessment/Plan:     Chief Complaint/Interval History: pt in icu. Unresponsive. Principal Diagnosis:    · Altered Mental Status R41.82    Additional Diagnoses:   · Debility, Unspecified  R53.81  · Dyspnea  R06.00  · Frailty  R54  · Counseling, Encounter for Medical Advice  Z71.9  · Encounter for Palliative Care  Z51.5    Palliative Performance Scale (PPS)       Medical Decision Making:   Reviewed and summarized labs and imaging over past 24 hours. I met with son, daughter, sister, aunt outside of room. Reviewed ongoing care and concerns given mental status complicated by shock liver and dropping counts. Family expressed understanding. We discussed hospice and comfort measures. Family wishes to proceed with comfort measures and hospice referral.  DNR is in place. Pt likely with short days. Dilaudid 1 mg iv q 4 hr prn pain, dyspnea. Has morphine allergy listed  Robinul 0.1 mg iv q 6 hr prn secretions. Consulted hospice. Relayed conversation to ICU nurse, hospitalist, and CM    I spent greater than 25 mins on advanced care planning. Will discuss findings with members of the interdisciplinary team.      More than 50% of this 25 minute visit was spent counseling and coordination of care as outlined above. Subjective:     Comprehensive Review of Systems unable to obtain due to mentation. Objective:     Visit Vitals  BP (!) 146/71   Pulse 96   Temp 99 °F (37.2 °C)   Resp 24   Wt 138 lb 3.7 oz (62.7 kg)   SpO2 92%   BMI 24.49 kg/m²       Physical Exam:    General:  Unresponsive . Eyes:  Conjunctivae/corneas clear    Nose: Nares normal. Septum midline.    Neck: Supple, symmetrical, trachea midline, jvd present   Lungs:   Clear to auscultation bilaterally, unlabored   Heart:  Regular rate and rhythm, no murmur    Abdomen:   Soft, non-tender, non-distended   Extremities: Normal, atraumatic, no cyanosis or edema   Skin: Skin color, texture, turgor normal. No rash or lesions.    Neurologic: Not following commands    Psych: obtunded     Signed By: Lien Méndez MD     March 31, 2022

## 2022-03-31 NOTE — PROGRESS NOTES
Pt to d/c today at 4pm  to [facility]. Family agrees with POC for d/c. Evi Easton, liaison to get DNR. Packet for Gabriel Forward completed. RN and MD aware and to call report.      Care Management Interventions  Mode of Transport at Discharge: BLS  Transition of Care Consult (CM Consult): SNF (no consult received)  Discharge Durable Medical Equipment: No  Physical Therapy Consult: Yes  Occupational Therapy Consult: Yes  Speech Therapy Consult: No  Support Systems: Other (Comment)  Confirm Follow Up Transport: Family  The Plan for Transition of Care is Related to the Following Treatment Goals : STR to improve pt's strength and functional abilities for a safe transition to home; IV ABX x 4 weeks  The Patient and/or Patient Representative was Provided with a Choice of Provider and Agrees with the Discharge Plan?: Yes  Freedom of Choice List was Provided with Basic Dialogue that Supports the Patient's Individualized Plan of Care/Goals, Treatment Preferences and Shares the Quality Data Associated with the Providers?: Yes  Discharge Location  Patient Expects to be Discharged to[de-identified] Other: ([address] [place] [address])

## 2022-03-31 NOTE — PROGRESS NOTES
Bedside shift change report given to NISHA fink (oncoming nurse) by Omar Cabral RN (offgoing nurse). Report included the following information SBAR, Kardex, Intake/Output, Recent Results, Med Rec Status and Cardiac Rhythm NSSR.

## 2022-03-31 NOTE — PROGRESS NOTES
Problem: Falls - Risk of  Goal: *Absence of Falls  Description: Document [place] Fall Risk and appropriate interventions in the flowsheet. Outcome: Progressing Towards Goal  Note: Fall Risk Interventions:  Mobility Interventions: Bed/chair exit alarm,Communicate number of staff needed for ambulation/transfer,Strengthening exercises (ROM-active/passive),PT Consult for assist device competence,PT Consult for mobility concerns    Mentation Interventions: Bed/chair exit alarm,Adequate sleep, hydration, pain control,Door open when patient unattended,More frequent rounding,Room close to nurse's station,Toileting rounds,Update white board,Reorient patient,Evaluate medications/consider consulting pharmacy    Medication Interventions: Bed/chair exit alarm,Evaluate medications/consider consulting pharmacy    Elimination Interventions: Call light in reach,Bed/chair exit alarm,Toileting schedule/hourly rounds    History of Falls Interventions: Bed/chair exit alarm,Consult care management for discharge planning,Door open when patient unattended,Evaluate medications/consider consulting pharmacy,Assess for delayed presentation/identification of injury for 48 hrs (comment for end date),Vital signs minimum Q4HRs X 24 hrs (comment for end date)         Problem: Patient Education: Go to Patient Education Activity  Goal: Patient/Family Education  Outcome: Progressing Towards Goal     Problem: Pressure Injury - Risk of  Goal: *Prevention of pressure injury  Description: Document Eric Scale and appropriate interventions in the flowsheet.   Outcome: Progressing Towards Goal  Note: Pressure Injury Interventions:  Sensory Interventions: Assess changes in LOC,Avoid rigorous massage over bony prominences,Check visual cues for pain,Discuss PT/OT consult with provider,Float heels,Keep linens dry and wrinkle-free,Maintain/enhance activity level,Minimize linen layers,Monitor skin under medical devices,Pad between skin to skin,Pressure redistribution bed/mattress (bed type),Turn and reposition approx. every two hours (pillows and wedges if needed)    Moisture Interventions: Absorbent underpads,Apply protective barrier, creams and emollients,Assess need for specialty bed,Check for incontinence Q2 hours and as needed,Internal/External urinary devices,Limit adult briefs,Minimize layers,Maintain skin hydration (lotion/cream),Moisture barrier    Activity Interventions: Assess need for specialty bed,Increase time out of bed,Pressure redistribution bed/mattress(bed type)    Mobility Interventions: Assess need for specialty bed,Float heels,Pressure redistribution bed/mattress (bed type),Turn and reposition approx.  every two hours(pillow and wedges)    Nutrition Interventions: Document food/fluid/supplement intake,Discuss nutritional consult with provider    Friction and Shear Interventions: Apply protective barrier, creams and emollients,Foam dressings/transparent film/skin sealants,Lift sheet,Minimize layers                Problem: General Medical Care Plan  Goal: *Vital signs within specified parameters  Outcome: Progressing Towards Goal  Goal: *Labs within defined limits  Outcome: Not Progressing Towards Goal  Goal: *Absence of infection signs and symptoms  Outcome: Progressing Towards Goal  Goal: *Optimal pain control at patient's stated goal  Outcome: Progressing Towards Goal  Goal: *Skin integrity maintained  Outcome: Progressing Towards Goal  Goal: *Fluid volume balance  Outcome: Progressing Towards Goal  Goal: *Optimize nutritional status  Outcome: Not Progressing Towards Goal  Goal: *Anxiety reduced or absent  Outcome: Progressing Towards Goal

## 2022-03-31 NOTE — PROGRESS NOTES
Plans noted for comfort care. Agree with decision with overall poor prognosis. Will sign off.   Please call if questions

## 2022-03-31 NOTE — HOSPICE
[facility]    Liaison to bedside for evaluation, presentation with HCPOA sister Debbie Range away from bedside per her request.  Mynor Gutierrez and patient's adult son and daughter are in agreement with hospice philosophy of care and transfer to [facility]. Patient is approved to transfer to [facility] today. DNR and hospice admission consents signed by patient's Mynor Gutierrez. Transport scheduled with [employer] for 4 pm pickup. Provider Dr. Kevin Marcial, and NISHA cole.     Thank you for this referral,  Kenny Sarmiento, RN, BSN  [address] liaison  [phone]

## 2022-03-31 NOTE — PROGRESS NOTES
Patient left via transport on stretcher to [address] [place] [address]. Family collected all belongings. Patient awake and alert. No command following.  /81, HR 97, O2 sat 96%, RR 12 on 5L NC.

## 2022-03-31 NOTE — PROGRESS NOTES
TRANSFER - OUT REPORT:    Verbal report given to Hospice(name) on Maria R Funes  being transferred to [facility]) for routine progression of care       Report consisted of patients Situation, Background, Assessment and   Recommendations(SBAR). Information from the following report(s) SBAR, ED Summary, Intake/Output, Recent Results, Med Rec Status and Cardiac Rhythm ST was reviewed with the receiving nurse. Lines:   Peripheral IV 23/78/74 Left;Upper Basilic (Active)   Site Assessment Clean, dry, & intact 03/31/22 0700   Phlebitis Assessment 0 03/31/22 0700   Infiltration Assessment 0 03/31/22 0700   Dressing Status Clean, dry, & intact 03/31/22 0700   Dressing Type Tape;Transparent 03/31/22 0700   Hub Color/Line Status Patent; Infusing 03/31/22 0700   Action Taken Open ports on tubing capped 03/30/22 0700   Alcohol Cap Used Yes 03/31/22 0700       Peripheral IV 03/29/22 Right Antecubital (Active)   Site Assessment Clean, dry, & intact 03/31/22 0700   Phlebitis Assessment 0 03/31/22 0700   Infiltration Assessment 0 03/31/22 0700   Dressing Status Clean, dry, & intact 03/31/22 0700   Dressing Type Tape;Transparent 03/31/22 0700   Hub Color/Line Status Patent; Flushed 03/31/22 0700   Action Taken Open ports on tubing capped 03/30/22 0700   Alcohol Cap Used Yes 03/31/22 0700        Opportunity for questions and clarification was provided.       Patient transported with: to hospice house

## 2022-03-31 NOTE — PROGRESS NOTES
completed follow up visit. Family including 2 adult children and sister were present at bedside and supportive. Patient expressed understanding with gestures, but was unable to verbalize. Family expressed appropriate grief and were accepting of patient's prognosis. Son stated that patient appreciates prayer and scripture reading when she is unable to talk.  provided pastoral presence, prayer and empathetic listening.   Signed by  Memo Collazo M.Div.

## 2022-03-31 NOTE — PROGRESS NOTES
1830:  Patient report from [facility], RN. Patient ID by name and . Patient admitted under GIP LOC for hospice diagnosis of vegetative endocarditis to manage pain, dyspnea, and secretions. Discharge plan is for patient to remain in [facility] until demise. Discharge plans to be evaluated for potential LOC. RN reviewed POC with CNA. Patient currently in bed with eyes closed. Eyes open to voice. Patient with some garbled speech and does track nurse. No s/sx of pain, dyspnea, or excess secretions at this time. Bed low and locked and all safety measures in place. 2030:  Patient's sister, [employer], in at this time. Oriented family to room and facility and all questions answered. Patient continues to rest peacefully with all symptoms managed. 2300:  Patient resting with no s/sx of pain, dyspnea, agitation, or excess secretions. No family present at this time. 0130:  atient resting with no s/sx of pain, dyspnea, agitation, or excess secretions. 0400:  Patient awake; furrowed brow, grimacing, and calling out for \"Kristy\". Patient appears distressed. Medicated with PRN Morphine 4 mg IV for pain and PRN Haldol 2 mg IV for agitation. Patient repositioned for comfort. Will reassess. 0430:  Reassessment:  Patient resting with no s/sx of pain or agitation observed. Patient received one PRN dose of Morphine and one PRN dose of Haldol this shift. Report to [facility], RN.

## 2022-03-31 NOTE — PROGRESS NOTES
Platelet count 42, Fibrinogen 88.  Spoke with hospitalist, do not replace at this time and continue to monitor for s/sx of bleeding

## 2022-03-31 NOTE — WOUND CARE
Patient seen for reported sacral pressure injury, present on admission. It is purple with open pink stage 2 to center, 1.8x2x0.1 cm. Digna wound skin is moist and minor excoriation. Patient turned off area. Heels intact and currently in heel protector boots. Remains in ICU. Noted discussion of hospice/comfort cares. Will be available if needed further, please call.

## 2022-03-31 NOTE — PROGRESS NOTES
Mir Couch  Admission Date: 3/25/2022         Daily Progress Note: 3/31/2022    The patient's chart is reviewed and the patient is discussed with the staff. Background:  Patient is a 61 y.o.  female PMH CML on chemotherapy, chronic back pain, DVT and  PE on home Lovenox, seen and evaluated at the request of Mariana Bates pleural effusions and evaluation for thoracentesis.  The patient was recently discharged from here on 3/14/22 after being treated for acute CHF.  The patient was also recently diagnosed with E coli bacterial meningitis and had completed antibiotics (3/13).  The patient has a history of DVT/PE and portal vein thrombosis and was on Lovenox at home. Waleska Wheeler is on chemotherapy for CML.  Recent echocardiogram reveals mitral valve vegetation that was not present on echocardiogram done in February this year. ID following for MV infective endocarditis (TTE 3/24) with negative blood cultures. ? GI source. Repeat blood cultures negative. On Rocephin. Change in mental status on 3/28. CT head with ? SAH versus herpes encephalitis. Neurology requested CTA but patient more unstable on 3/29 so postponed. Now with CARLITO/hyperkalemia, sepsis, episode vomiting/abdominal pain.  Met with son, daughter and spoke with patient's sister over the phone and they all desire DNR status and will consider comfort care over next 24 to 48 hours if needed.        Subjective:     Not much change  About the same  Still on 3L NC        Current Facility-Administered Medications   Medication Dose Route Frequency    0.9% sodium chloride infusion 250 mL  250 mL IntraVENous PRN    methylPREDNISolone (PF) (SOLU-MEDROL) injection 50 mg  50 mg IntraVENous DAILY    HYDROmorphone (DILAUDID) injection 0.2 mg  0.2 mg IntraVENous Q6H PRN    pantoprazole (PROTONIX) 40 mg in 0.9% sodium chloride 10 mL injection  40 mg IntraVENous DAILY    cefepime (MAXIPIME) 2 g in 0.9% sodium chloride (MBP/ADV) 100 mL MBP  2 g IntraVENous Q24H    metroNIDAZOLE (FLAGYL) IVPB premix 500 mg  500 mg IntraVENous Q12H    0.9% sodium chloride infusion 250 mL  250 mL IntraVENous PRN    0.9% sodium chloride infusion 250 mL  250 mL IntraVENous PRN    0.9% sodium chloride infusion 250 mL  250 mL IntraVENous PRN    0.9% sodium chloride infusion 250 mL  250 mL IntraVENous PRN    0.9% sodium chloride infusion 250 mL  250 mL IntraVENous PRN    [Held by provider] isosorbide dinitrate (ISORDIL) tablet 10 mg  10 mg Oral TID    [Held by provider] metoprolol succinate (TOPROL-XL) XL tablet 25 mg  25 mg Oral DAILY    [Held by provider] calcium carbonate (OS-JOE) tablet 500 mg [elemental]  500 mg Oral QHS    [Held by provider] cetirizine (ZYRTEC) tablet 10 mg  10 mg Oral DAILY    [Held by provider] enoxaparin (LOVENOX) injection 50 mg  50 mg SubCUTAneous Q12H    [Held by provider] fluticasone propionate (FLONASE) 50 mcg/actuation nasal spray 2 Spray  2 Spray Both Nostrils DAILY    levothyroxine (SYNTHROID) tablet 125 mcg  125 mcg Oral 6am    magnesium oxide (MAG-OX) tablet 200 mg  200 mg Oral DAILY    ondansetron (ZOFRAN) injection 4 mg  4 mg IntraVENous Q8H PRN    ruxolitinib (Jakavi) tab 5 mg (Patient Supplied)  5 mg Oral Q12H    [Held by provider] traZODone (DESYREL) tablet 50 mg  50 mg Oral QHS    trimethoprim-polymyxin b (POLYTRIM) 10,000 unit- 1 mg/mL ophthalmic solution 1 Drop  1 Drop Both Eyes BID    dasatinib tab 80 mg (Patient Supplied)  80 mg Oral DAILY     Review of Systems  Unable to obtain  Objective:     Vitals:    03/31/22 0603 03/31/22 0605 03/31/22 0618 03/31/22 0703   BP: 125/71  124/67 121/74   Pulse: 91 91 91 92   Resp: 9 10 9 8   Temp:    99 °F (37.2 °C)   SpO2: 93% 92% 93% 92%   Weight:           Intake/Output Summary (Last 24 hours) at 3/31/2022 0832  Last data filed at 3/31/2022 0533  Gross per 24 hour   Intake 779.97 ml   Output 650 ml   Net 129.97 ml     Physical Exam:   Constitution:  the patient is ill appearing in no acute respiratory distress  HEENT:  Sclera clear, pupils equal, oral mucosa dry  Respiratory: clear from anterior. Cardiovascular:  RRR without M,G,R  Gastrointestinal: soft and appears very tender. Patient grimacing with even light palpation; with positive bowel sounds. Musculoskeletal: warm without cyanosis. There is no lower extremity edema. Skin:  no jaundice or rashes, no wounds   Neurologic: tries to open eyes but not able to communicate or follow any commands. Becomes restless with stimulation   Psychiatric: cannot assess    CXR:   3/28                                                                   3/22      LAB:  Recent Labs     03/31/22  0235 03/31/22  0234 03/30/22  2105 03/30/22  1453 03/30/22  1344 03/30/22  0750 03/30/22  0138 03/29/22  2125 03/29/22  1714 03/29/22  1324 03/29/22  1151   WBC  --  17.1*  --   --   --   --  19.7*  --   --   --  25.5*   HGB  --  10.5*  --   --   --   --  10.5*  --   --   --  11.8   HCT  --  33.1*  --   --   --   --  34.5*  --   --   --  42.4   PLT  --  42*  --  47*  --   --  44*  --   --    < > 77*   INR  --  4.3 3.7  --  3.0   < > 3.6   < > 7.0*   < >  --    PCT  --   --   --   --   --   --  4.79*  --  4.21*  --   --    LAC 4.3*  --  4.2*  --  5.8*   < > 12.6*   < > 17.2*   < >  --     < > = values in this interval not displayed. Recent Labs     03/31/22  0234 03/30/22  0138 03/29/22  1324 03/29/22  0938 03/29/22  0938 03/28/22  1525 03/28/22  1525    134* 133*   < > 135*  --   --    K 4.6 5.0 5.8*   < > 6.2*   < > 5.7*    99 100   < > 98  --   --    CO2 23 13* 8*   < > 8*  --   --    * 250* 102*   < > 20*  --   --    BUN 72* 58* 54*   < > 50*  --   --    CREA 1.90* 2.20* 2.10*   < > 1.90*  --   --    MG  --   --   --   --   --   --  2.7*   CA 8.9 8.8 8.9   < > 9.6  --   --    ALB 3.5 3.2  --   --  3.7  --   --    AST 1,606* 2,971*  --   --  QUANTITY NOT SUFFICIENT.  SUGGEST RECOLLECTION  --   --    ALT 1,385* 1,729*  --   -- QUANTITY NOT SUFFICIENT. SUGGEST RECOLLECTION  --   --    * 158*  --   --  193*  --   --     < > = values in this interval not displayed. Recent Labs     03/31/22  0235 03/30/22  2105 03/30/22  1344   LAC 4.3* 4.2* 5.8*     No results for input(s): PH, PCO2, PO2, HCO3, PHI, PCO2I, PO2I, HCO3I in the last 72 hours. No results for input(s): SDES, CULT in the last 72 hours. 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 Most recent blood cultures negative. Rocephin per ID. Active Problems:    Body mass index (BMI) of 23.0 to 23.9 in adult (8/26/2021)  Currently unable to take anything po. IV fluids for now. Consider nutrition if she improves over next 24 to 48 hours      Polycythemia vera ([address] (2/16/2009)  noted      Cirrhosis ([address] (7/27/2012)  plts currently 93,000. AKOSUA (iron deficiency anemia) (7/28/2012)  stable      Chronic myelogenous leukemia ([address] (3/30/2015)  Follow by oncology. Acquired hypercoagulable state ([address] (3/30/2015)  Was on Lovenox at home. Anticoagulation on hold here with recent drop in plts and ? SAH      Thrombocytopenia ([address] (10/15/2021)  plts 93,000 today      Leukocytosis (10/16/2020)  Worse. Associated with lactic acidosis, hypotension. + abdominal pain - have ordered KUB. Too unstable to do CT right now. ? Mesenteric ischemia. Discussed with ID -they do not think abx need to be adjusted. CXR yesterday what appears to be a larger right effusion. On room air. Acute respiratory failure with hypoxemia ([address] (3/9/2022)  On room air. CXR as above      Pleural effusion, bilateral (3/11/2022)  Monitoring. No tap to date      Acute diastolic (congestive) heart failure ([address] (3/19/2022)  Currently not a problem. Off diuretics       Hyperkalemia (3/28/2022)  New. Associated with CARLITO. Calcium, albuterol now - repeat labs. Sepsis ([facility] (3/29/2022)  As above - ? Abdominal source      Encephalopathy acute (3/29/2022)  ? SAH vs encephalitis per CT. Too unstable for CTA right now - requested by neurology. Acute kidney failure ([facility] (3/29/2022)  Gilbert in place. Calcium, albuterol for hyperkalemia. IV fluids ordered - monitor blood pressure/labs      Patient moved to ICU, seems not much changed since yesterday, still not very responsive but respiratory status stable  -Ok to move to the floor   Multiple consultants on board including PC ,family considering comfort care,      More than 50% of the time documented was spent in face-to-face contact with the patient and in the care of the patient on the floor/unit where the patient is located.     Renaldo Silva MD

## 2022-03-31 NOTE — PROGRESS NOTES
Pt discussed with Dr. Lilibeth Britt. Referral for [facility] requested. Referral sent to [facility], liaison aware. Family aware they will call to set up appointment.  CM following for any assist.

## 2022-04-01 NOTE — [facility] IDG NURSE NOTES
Patient: Bobby Lopez    Date: 04/01/22  Time: 10:15 AM    [facility] [place] Nurse Notes  1st  IDG: Pt is a 64y.o. 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 Gilbert with UOP of 350ml   IV access: Peripheral IV   PO intake:NPO  Oxygen: Room air  Wounds:   Wound Sacral/coccyx Posterior;Mid 03/30/22 (Active)   Wound Etiology Pressure Stage 2 03/31/22 1909   Dressing Status Intact 03/31/22 1603   Dressing/Treatment Foam 03/31/22 0941   Wound Length (cm) 1 cm 03/31/22 1909   Wound Width (cm) 1.5 cm 03/31/22 1909   Wound Depth (cm) 0.1 cm 03/31/22 1909   Wound Surface Area (cm^2) 1.5 cm^2 03/31/22 1909   Change in Wound Size % 58.33 03/31/22 1909   Wound Volume (cm^3) 0.15 cm^3 03/31/22 1909   Wound Healing % 58 03/31/22 1909   Wound Assessment Purple/maroon;Pink/red 03/31/22 0941   Drainage Amount Scant 03/31/22 0941   Drainage Description Serous 03/31/22 0941   Wound Odor None 03/31/22 0941   Digna-Wound/Incision Assessment Excoriated 03/31/22 0941   Edges Defined edges 03/31/22 0941   Number of days: 2       Wound Sacral/coccyx Lateral deep tissue injury 1.5 cm X 1 cm (Active)   Number of days: 1       Wound Buttocks Left;Lateral deep tissue injury . 75 cm X .75 cm (Active)   Number of days: 1     PRN medications: Haldol 2 mg IV x 1 dose for agitation / Roxanol 10 mg PO x 1 dose for pain / Morphine 4 mg x 1 dose IV for pain. Scheduled meds:    Current Facility-Administered Medications   Medication Dose Route Frequency    senna (SENOKOT) tablet 8.6 mg  1 Tablet Oral BID     Plan: D/C PO medications. Comprehensive plan of care reviewed. IDG and pt./family in agreement with plan of care. The IDG identifies through on-going assessment when a change is needed to the POC; the pt/family will receive care and services necessitated by changes in POC. Medications reviewed by the pharmacist and Medical Director.         Signed by: Guy Danielson RN

## 2022-04-01 NOTE — H&P
History and Physical    Patient: Yarely Whitten MRN: [id number]  SSN: [id number]    YOB: 1960  Age: 64 y.o. Sex: female      Subjective:      Yarely Whitten is a 64 y.o. female who has a hospice diagnosis of bacterial endocarditis. Hospice associated diagnoses are acute CHF, hypoxic respiratory failure, polycythemia vera, chronic myelogenous leukemia, malignant ascites, pleural effusion and altered mental status. She has a history of chronic myelogenous leukemia and has been receiving chemotherapy treatments. She was admitted to the hospital on 3/19/22 with acute congestive heart failure. Chest xray in the ER revealed pulmonary edema. Echocardiogram revealed mitral valve vegetative endocarditis. She has become increasingly confused. CT of the head was suggestive of brain abscess. Her condition continued to worsen with sepsis due to endocarditis, DIC and probable brain abscess. She was treated with IV antibiotics for sepsis with no improvement in her mental status. Due to her continued decline, her family has elected to forgo further medical treatment and pursue comfort measures with hospice care. Without further treatment, her life expectancy is less than 7 days. She is unable to take in food, fluids or medications orally and will require parenteral medications for symptom management. Patient admitted GIP with bacterial endocarditis for management of pain, dyspnea and agitation.        Past Medical History:   Diagnosis Date    Acute blood loss anemia 4/6/2018    Anemia     C. difficile diarrhea 4/1/2015    Cerebral edema ([address] 4/1/2015    Cerebral hemorrhage with cognitive deficits (HCC) 5/23/2016    Chronic migraine 9/22/2016    Chronic myelogenous leukemia ([facility]     York chromosome/ converted from polycythemia in 2009- to 2012 when she was dx w leukemia    Chronic pain     Coagulation disorder ([address]     \"clotting and Bleeding Problem\" dr Roxana Dickson follows     DVT (deep venous thrombosis) ([facility] 10/26/2016    Right subclavian     E coli bacteremia 3/9/2022    Esophageal spasm     with banding     Esophageal varices (HCC)     grade 3    GI bleed 8/3/2016    H/O craniotomy     3-29-15.  due to blood clot - which caused a seizure  - then pt fell and hit     Hematemesis 8/20/2021    Ischemic colitis ([address] 2/16/2009    Left homonymous hemianopsia 3/30/2015    Leukocytosis 3/14/2015    Melena 8/20/2021    Migraine with aura and with status migrainosus, not intractable 9/22/2016    MRSA colonization 6/25/2012    Polycythemia vera(238.4)     JAK2 mutation    Portal hypertension ([address]     with varices    Portal vein thrombosis 6/20/2012    Ct scan 6-21-2 Apparent occlusion of the portal vein. In addition, the splenic vein, and superior mesenteric vein are not definitely seen and are also likely  occluded. Splenomegaly, and extensive varices are seen as described above consistent with the portal vein occlusion 7-05-12 on arixtra HIT negative on repeat 7-27-12 re admitted Cirrhotic appearance of the liver. Mild to moderate ascites, as    Portal vein thrombosis 2/16/2009    Ct scan 6-21-2 Apparent occlusion of the portal vein. In addition, the splenic vein, and superior mesenteric vein are not definitely seen and are also likely  occluded. Splenomegaly, and extensive varices are seen as described above consistent with the portal vein occlusion 7-05-12 on arixtra HIT negative on repeat 7-27-12 re admitted Cirrhotic appearance of the liver.  Mild to moderate ascites    Primary hypothyroidism     Pulmonary embolism ([address] 2006    not on coumadin anymore     Pulmonary embolus ([address] 4/5/2018    Last Assessment & Plan:  Formatting of this note might be different from the original. 4/5: Patient has history of pulmonary embolus as well as factor VIII deficiency on full dose Lovenox at home but is on hold for now    S/P exploratory laparotomy, 6/20/12 6/25/2012    Bowel resection: 336 cm of small bowel removed, approximately 9 feet and placement of feeding jejunostomy.      S/P small bowel resection     .  9 feet removed due to  gangrene which wa due to blood clot    Seizure ([address] 3/14/2015    Seizure disorder ([address] 3/30/2015    due to clotting factor    Seizures ([address]     last one 3- - followed by aniyah     Sepsis with acute organ dysfunction without septic shock ([address] 2022    Splenomegaly, congestive, chronic     Stroke (cerebrum) ([address] 2015    had bled into head- surgery    Stroke [facility])     pt had stroke like symptoms     Subdural hemorrhage following injury, no loss of consciousness ([address] 2016    Thrombocytopenia, HIT negative 2012 platelets lower repeat HIT 12 platelets in 26,630'L    Thrombosis, portal vein 2004    portal , spleenic and recently superior mesenteric    Transfusion history     many blood tranfusions - last 3-29-15 after brain surgery    Traumatic hemorrhage of right cerebrum ([address] 3/30/2015     Past Surgical History:   Procedure Laterality Date    HX APPENDECTOMY      with small bowel resection    HX BREAST BIOPSY Bilateral     Lt - ; Rt -     HX CHOLECYSTECTOMY      HX GI      liver biopsy    HX GI      bowel removed small - 9 feet     HX GYN       x 2    HX GYN      D&C following miscarriage    HX ORTHOPAEDIC Left     torn labrum shoulder (screw in place)    NEUROLOGICAL PROCEDURE UNLISTED  2010    craniotomy to evacuate subdural hematoma following a fall from a seizure    DC ABDOMEN SURGERY PROC UNLISTED      umbilical hernia repair    DC ABDOMEN SURGERY PROC UNLISTED      9ft of small bowel excised then reconnected      Family History   Problem Relation Age of Onset    Diabetes Mother     Stroke Mother         after surgery    Cancer Father         brain    No Known Problems Sister     Other Sister         diverticulitis    Other Sister         diverticulitis    [facility]    Breast Cancer Maternal Aunt 48    Thyroid Disease Paternal Grandmother      Social History     Tobacco Use    Smoking status: Former Smoker     Packs/day: 0.20     Years: 10.00     Pack years: 2.00     Types: Cigarettes     Quit date:      Years since quittin.2    Smokeless tobacco: Never Used   Substance Use Topics    Alcohol use: No      Prior to Admission medications    Medication Sig Start Date End Date Taking? Authorizing Provider   b complex vitamins tablet Take 1 Tablet by mouth daily. Yes Provider, Historical   calcium carbonate (CALTREX) 600 mg calcium (1,500 mg) tablet Take 600 mg by mouth daily. Yes Provider, Historical   cholecalciferol (Vitamin D3) (1000 Units /25 mcg) tablet Take 1,000 Units by mouth daily. Yes Provider, Historical   diphenoxylate-atropine (LomotiL) 2.5-0.025 mg per tablet Take 1 Tablet by mouth four (4) times daily as needed for Diarrhea. Yes Provider, Historical   enoxaparin (Lovenox) 60 mg/0.6 mL injection 50 mg by SubCUTAneous route every twelve (12) hours. Yes Provider, Historical   famotidine (Pepcid) 40 mg tablet Take 40 mg by mouth daily as needed for Heartburn. Yes Provider, Historical   furosemide (Lasix) 20 mg tablet Take 20 mg by mouth daily. Yes Provider, Historical   lansoprazole (Prevacid) 30 mg capsule Take 30 mg by mouth Daily (before breakfast). Yes Provider, Historical   magnesium 250 mg tab Take 250 mg by mouth daily. Yes Provider, Historical   ondansetron (ZOFRAN ODT) 4 mg disintegrating tablet Take 4 mg by mouth every eight (8) hours as needed for Nausea or Vomiting. Yes Provider, Historical   ruxolitinib 5 mg tab Take 5 mg by mouth two (2) times a day. Yes Provider, Historical   dasatinib (SpryceL) 80 mg tab Take 1 Tablet by mouth daily. Yes Provider, Historical   levothyroxine (Synthroid) 112 mcg tablet Take 112 mcg by mouth Daily (before breakfast).    Yes Provider, Historical   zolpidem (Ambien) 10 mg tablet Take 10 mg by mouth nightly as needed for Sleep. Yes Provider, Historical        Allergies   Allergen Reactions    Latex Other (comments)     Testing for latex allergy was positive as a 2 (on a scale of 1 to 3).  Sulfa (Sulfonamide Antibiotics) Anaphylaxis    Tramadol Other (comments)     Top lip swelled    Augmentin [Amoxicillin-Pot Clavulanate] Rash    Codeine Other (comments)    Divalproex Sodium Hives    Morphine Nausea and Vomiting     Not a true allergy    Potassium Clavulanate Hives    Rizatriptan Rash    Tetanus Immune Globulin Other (comments)     Heat, bruising, and swelling at  Site of injection    Vancomycin Rash    Xanax [Alprazolam] Other (comments)     Hallucinations    Zonegran [Zonisamide] Rash       Review of Systems:  Review of systems not obtained due to patient factors. Objective:     Vitals:    03/31/22 1800 04/01/22 0444   BP: 132/80 116/77   Pulse: 96 91   Resp: (!) 5 (!) 6   Temp: (!) 95.5 °F (35.3 °C) (!) 96.3 °F (35.7 °C)        Physical Exam:  GENERAL: moderate distress, appears older than stated age, pale, unresponsive except to noxious stimuli  LUNG: Coarse breath sounds with labored respirations. Irregular with long periods of apnea. Respiratory rate 3-4 per minute. HEART: regular rate and rhythm  ABDOMEN: soft, non-tender. Bowel sounds hypoactive. : Gilbert catheter with cloudy shonna urine. EXTREMITIES:  extremities with no cyanosis. Mild generalized edema  SKIN: Pale. Hot to touch. DTI to sacrum/coccyx and left buttock. Stage 2 wound to sacrum/coccyx with foam dressing in place. NEUROLOGIC: Unresponsive except to noxious stimuli. No lateralizing deficits noted. Bedbound.      Assessment:     Hospital Problems  Date Reviewed: 4/3/2022          Codes Class Noted POA    * (Principal) Vegetative endocarditis of mitral valve ICD-10-CM: I33.0  ICD-9-CM: 421.0  3/24/2022 Yes        Endocarditis ICD-10-CM: I38  ICD-9-CM: 424.90  3/31/2022 Unknown Sepsis ([facility] ICD-10-CM: A41.9  ICD-9-CM: 038.9, 995.91  3/29/2022 Yes        Encephalopathy acute ICD-10-CM: G93.40  ICD-9-CM: 348.30  3/29/2022 Yes        Acute kidney failure ([facility] ICD-10-CM: N17.9  ICD-9-CM: 584.9  3/29/2022 Yes        Hyperkalemia ICD-10-CM: E87.5  ICD-9-CM: 276.7  3/28/2022 Yes        CML (chronic myelocytic leukemia) (HCC) (Chronic) ICD-10-CM: C92.10  ICD-9-CM: 205.10  3/19/2022 Yes        Physical deconditioning (Chronic) ICD-10-CM: R53.81  ICD-9-CM: 799.3  3/19/2022 Yes        History of DVT (deep vein thrombosis) (Chronic) ICD-10-CM: L69.919  ICD-9-CM: V12.51  3/19/2022 Yes        Pleural effusion, bilateral ICD-10-CM: J90  ICD-9-CM: 511.9  3/11/2022 Yes        Hypoproteinemia (HCC) ICD-10-CM: E77.8  ICD-9-CM: 273.8  3/11/2022 Yes              Plan:     Current Facility-Administered Medications   Medication Dose Route Frequency    glycopyrrolate (ROBINUL) injection 0.2 mg  0.2 mg IntraVENous Q4H PRN    sodium chloride (NS) flush 3 mL  3 mL IntraVENous Q12H    bisacodyL (DULCOLAX) suppository 10 mg  10 mg Rectal PRN    sodium chloride (NS) flush 3 mL  3 mL IntraVENous PRN    morphine injection 4 mg  4 mg IntraVENous Q20MIN PRN    Or    morphine injection 4 mg  4 mg SubCUTAneous Q20MIN PRN    acetaminophen (TYLENOL) suppository 650 mg  650 mg Rectal Q3H PRN    LORazepam (ATIVAN) injection 1 mg  1 mg IntraVENous Q2H PRN    haloperidol lactate (HALDOL) injection 2 mg  2 mg IntraVENous Q1H PRN    Or    haloperidol lactate (HALDOL) injection 2 mg  2 mg SubCUTAneous Q1H PRN    LORazepam (ATIVAN) injection 2 mg  2 mg IntraVENous Q10MIN PRN       3/31: (SFD) Admitted GIP with bacterial endocarditis for management of pain, dyspnea and agitation. 1. Pain: Morphine 4mg IV/SQ Q20 minutes as needed. 2. Dyspnea: Morphine 4mg IV/SQ Q20 minutes as needed. Glycopyrrolate 0.2mg q4 prn secretions.  Oxygen at 3 L/min prn.    3. Agitation: Haloperidol 2mg IV/SQ Q1 hour prn and Lorazepam 1mg IV/IM q2 hours prn.    4. Family/Pt Support: Family at bedside during exam. Medications and plan of care discussed with nursing staff and family. Will continue to monitor for symptoms and adjust medications as needed to maintain patient comfort. PPS 10%. Case discussed with Dr. Kelvin Mccabe.       Signed By: Pamela Larsen NP     April 2, 2022

## 2022-04-01 NOTE — [facility] IDG CHAPLAIN NOTES
Patient: Renuka Jameson    Date: 04/01/22  Time: 10:15 AM    [facility]  Notes    Assessments pending for spiritual and bereavement care.         Signed by: Brandie Way

## 2022-04-01 NOTE — PROGRESS NOTES
Background: Juwan QuinonezHerlinda Messer is a 64 Year old lady who comes to us with a diagnosis of Vegetative endocarditis of the mitral valve. She is supported by her daughter Liza and sisters Niharika Martinez and Ja. She has a son who has been to [facility]  To visit but  did not see him and does not know his name. She also has an aunt, Edie Wells who has been to visit today. She is a [employer]. Her Mary Tradition is Zoroastrian.     Assessment: Naif Conner is lying quietly with her eyes partially open. She attempts to talk once in a while but it is hard to understand what she is saying. Her daughter Liza is at bedside. Her Geri Rattler is in the room as well. Liza is watching her mom's facial expressions intensely.  introduced herself and carefully attempted to start a conversation. Liza stayed focused on her mom's eye and facial movement. She said her mom recently had some medication and she is hoping  Mom will rest.  Liza also said there had been quite an influx of company today so she felt mom was tired.  affirmed Kristy's concerns and suggested they speak later. She agreed. Plan:  will follow up and provide spiritual and emotional support. Focus on  spiritual care evaluation.

## 2022-04-01 NOTE — [facility] IDG CHAPLAIN NOTES
Patient: Renetta Bourgeois    Date: 04/01/22  Time: 8:55 AM    [facility]  Notes    / Grief Counselor has reviewed  Initial Comprehensive Assessment and plan of care. Bereavement and Spiritual Care Assessments to be completed and plan of care put in place to meet patient and family needs.          Signed by: Zoë Vega

## 2022-04-01 NOTE — PROGRESS NOTES
Goal:  Melissa Thibodeaux and her children, Jason Aden and her brother will verbalize experiencing peace and calmness about death and the afterlife based on their beliefs/ elias tradition. Calmness evidenced by normal respirations and lack of reported anxiety and an ability to communicate hope during Ann's time with Hospice.     Outcome: Progressing Towards Goal

## 2022-04-01 NOTE — PROGRESS NOTES
Problem: Falls - Risk of  Goal: *Absence of Falls  Description: Patient Jag Burgess will remain free from falls AEB no injuries r/t falls each shift during inpatient hospice stay. Outcome: Progressing Towards Goal  Note: Fall Risk Interventions:  Mobility Interventions: Bed/chair exit alarm    Mentation Interventions: Adequate sleep, hydration, pain control,Bed/chair exit alarm,Door open when patient unattended,Evaluate medications/consider consulting pharmacy,More frequent rounding,Reorient patient    Medication Interventions: Bed/chair exit alarm,Evaluate medications/consider consulting pharmacy    Elimination Interventions: Bed/chair exit alarm,Call light in reach              Problem: Pressure Injury - Risk of  Goal: *Prevention of pressure injury  Description: Patient Jag Burgess will remain free from acquired pressure injuries AEB zero acquired pressure injuries during assessment of skin each shift during inpatient hospice stay. Outcome: Progressing Towards Goal  Note: Pressure Injury Interventions:  Sensory Interventions: Assess changes in LOC,Float heels,Assess need for specialty bed,Check visual cues for pain    Moisture Interventions: Maintain skin hydration (lotion/cream),Apply protective barrier, creams and emollients,Moisture barrier,Assess need for specialty bed,Limit adult briefs    Activity Interventions: Assess need for specialty bed    Mobility Interventions: Assess need for specialty bed,HOB 30 degrees or less,Float heels                          Problem: Pain  Goal: *Control of Pain  Description: Patient Jag Burgess will exhibit decrease in pain AEB rating pain less than 3 on 1-10 scale or 3 FLACC score within each shift of receiving pain medication during inpatient hospice stay.    Outcome: Progressing Towards Goal     Problem: Dyspnea Due to End of Life  Goal: Demonstrate understanding of and ability to manage respiratory symptoms at end of life  Description: Patient Deven Marbella Mitchell Anam will indicate effective breathing pattern AEB absence of respiratory distress each shift during inpatient hospice stay. Outcome: Progressing Towards Goal     Problem: Anxiety/Agitation  Goal: Verbalize or staff assess the ability to manage anxiety  Description: Davidson Hernandez will demonstrate appropriate motor behavior AEB less than 2 episodes of fidgety, picking or pulling at clothes on devices, yelling out, getting out of bed, etc. each shift during inpatient hospice stay. Outcome: Progressing Towards Goal     Problem: Infection - Risk of, Urinary Catheter-Associated Urinary Tract Infection  Goal: *Absence of infection signs and symptoms  Description: Davidson Hernandez will remain free from infection R/T urinary catheter AEB absence of signs and symptoms of infection each shift during inpatient hospice stay. Outcome: Progressing Towards Goal     Problem: General Wound Care  Goal: *Non-infected wound: Improvement of existing wound, absence of infection, and maintenance of skin integrity  Description: Davidson Hernandez wounds will be free from further complications AEB absence of signs and symptoms of infection each shift during inpatient hospice stay.    Outcome: Progressing Towards Goal

## 2022-04-01 NOTE — PROGRESS NOTES
Physician Progress Note      PATIENT:               Will Canela  [facility] #:                  [id number]  :                       1960  ADMIT DATE:       3/25/2022 4:10 AM  DISCH DATE:        3/31/2022 4:33 PM  RESPONDING  PROVIDER #:        Andreina SOUZA DO          QUERY TEXT:    Patient admitted with endocarditis. Noted documentation of acute respiratory failure in progress note on 3-25-22. In order to support the diagnosis of acute respiratory failure, please include additional clinical indicators in your documentation. Or please document if the diagnosis of acute respiratory failure has been ruled out after further study. The medical record reflects the following:  Risk Factors:  Endocarditis, CHF  Clinical Indicators: \"Lungs: CTAB. No wheezing, rhonchi, or rales. Respirations even, unlabored\" respirations 16-17  Treatment: 02 @ 2L N/C    Acute Respiratory Failure Clinical Indicators per 3[facility] MS-DRG Training Guide and Quick Reference Guide:  pO2 < 60 mmHg or SpO2 (pulse oximetry) < 91% breathing room air  pCO2 > 50 and pH < 7.35  P/F ratio (pO2 / FIO2) < 300  pO2 decrease or pCO2 increase by 10 mmHg from baseline (if known)  Supplemental oxygen of 40% or more  Presence of respiratory distress, tachypnea, dyspnea, shortness of breath, wheezing  Unable to speak in complete sentences  Use of accessory muscles to breathe  Extreme anxiety and feeling of impending doom  Tripod position  Confusion/altered mental status/obtunded  Options provided:  -- Acute Respiratory Failure as evidenced by, Please document evidence. -- Acute Respiratory Failure ruled out after study  -- Other - I will add my own diagnosis  -- Disagree - Not applicable / Not valid  -- Disagree - Clinically unable to determine / Unknown  -- Refer to Clinical Documentation Reviewer    PROVIDER RESPONSE TEXT:    Acute Respiratory Failure has been ruled out after study.     Query created by: Jacobo Gonzales on 3/29/2022 12:49 PM      Electronically signed by:  Malathi Joshi DO 3/31/2022 10:58 PM

## 2022-04-01 NOTE — [facility] IDG NURSE NOTES
Patient: Sergio Form    Date: 03/31/22  Time: 9:12 PM    [facility] [place] Nurse [facility]    Patient is a 77-year-old female admitted from [facility] to [facility] for GIP LOC. Hospice diagnosis is vegetative endocarditis to manage pain, dyspnea, and secretions. Discharge plans are for patient to remain in [facility] until demise. Discharge plans to be evaluated for potential LOC change. RN reviewed POC with CNA. Per physician summary report, patient had chronic hematologic malignancy and had been admitted to the hospital earlier this month with CHF. 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 Patient has had many episodes of sepsis with recurring heart failure, hospitalization with profound AMS. Family elected for comfort measures due to the patient has deteriorated despite aggressive therapy. Patient presents with some garbled speech attempting to speak to nurse. Able to track nurse with eyes. NG tube removed by Jaison Palumbo RN. Patient has two peripheral IV's, one to left [facility] and one to right [facility] and has a page catheter in place. Wounds assessed, measured, and documented by Abelino Ahmadi RN and Kevin Kirkland RN. Sacral DTI measuring 1.5 cm x 1 cm, Left buttocks DTI measuring . 75cm x .75 cm, and Stage II to sacrum measuring 1 x 1.5 x 0.1. No family on admission, but patient's sister and HCPOA in @ 2030 and oriented to room and facility and all questions answered. Admission complete and hospice care plan initiated which includes spiritual, psychosocial, and bereavement. No initial needs identified. IDG team, including MD, made aware of care plans. Volunteer services limited due to COVID-19.       Signed by: Valeria Girard RN

## 2022-04-01 NOTE — PROGRESS NOTES
Problem: Falls - Risk of  Goal: *Absence of Falls  Description: Davidson Fuller will remain free from falls AEB no injuries r/t falls each shift during inpatient hospice stay. Outcome: Progressing Towards Goal  Note: Fall Risk Interventions:  Mobility Interventions: Bed/chair exit alarm    Mentation Interventions: Adequate sleep, hydration, pain control,Bed/chair exit alarm,Door open when patient unattended    Medication Interventions: Bed/chair exit alarm    Elimination Interventions: Bed/chair exit alarm,Call light in reach    History of Falls Interventions: Bed/chair exit alarm,Door open when patient unattended         Problem: Patient Education: Go to Patient Education Activity  Goal: Patient/Family Education  Outcome: Progressing Towards Goal     Problem: Pressure Injury - Risk of  Goal: *Prevention of pressure injury  Description: Davidson Fuller will remain free from acquired pressure injuries AEB zero acquired pressure injuries during assessment of skin each shift during inpatient hospice stay. Outcome: Progressing Towards Goal  Note: Pressure Injury Interventions:  Sensory Interventions: Assess changes in LOC,Check visual cues for pain,Float heels    Moisture Interventions: Absorbent underpads,Apply protective barrier, creams and emollients,Moisture barrier    Activity Interventions: Pressure redistribution bed/mattress(bed type)    Mobility Interventions: Assess need for specialty bed,Pressure redistribution bed/mattress (bed type)    Nutrition Interventions:  (pt NPO)    Friction and Shear Interventions: Apply protective barrier, creams and emollients,Lift sheet,Minimize layers                Problem: Pain  Goal: *Control of Pain  Description: Davidson Fuller will exhibit decrease in pain AEB rating pain less than 3 on 1-10 scale or 3 FLACC score within each shift of receiving pain medication during inpatient hospice stay.    Outcome: Progressing Towards Goal  Goal: *PALLIATIVE CARE:  Alleviation of Pain  Outcome: Progressing Towards Goal     Problem: Dyspnea Due to End of Life  Goal: Demonstrate understanding of and ability to manage respiratory symptoms at end of life  Description: Patient Dwight Cobb will indicate effective breathing pattern AEB absence of respiratory distress each shift during inpatient hospice stay. Outcome: Progressing Towards Goal     Problem: Anxiety/Agitation  Goal: Verbalize or staff assess the ability to manage anxiety  Description: Davidson Cobb will demonstrate appropriate motor behavior AEB less than 2 episodes of fidgety, picking or pulling at clothes on devices, yelling out, getting out of bed, etc. each shift during inpatient hospice stay. Outcome: Progressing Towards Goal     Problem: Infection - Risk of, Urinary Catheter-Associated Urinary Tract Infection  Goal: *Absence of infection signs and symptoms  Description: Davidson Cobb will remain free from infection R/T urinary catheter AEB absence of signs and symptoms of infection each shift during inpatient hospice stay. Outcome: Progressing Towards Goal     Problem: Nausea/Vomiting (Adult)  Goal: *Palliation of nausea/vomiting (Palliative Care)  Description: Davidson Cobb will exhibit decreased or eliminated episodes of Nausea/Vomiting AEB less than 2 PRN antiemetic medication administration each shift during inpatient hospice stay.    Outcome: Progressing Towards Goal

## 2022-04-01 NOTE — PROGRESS NOTES
1900 Received bedside report from off-going nurse. Patient admitted on 3/31 for [facility] with diagnosis of vegetative endocarditis of mitral valve. Patent lying in bed with eyes open, respirations unlabored and even. She is on 3 liters 02 per NC. She is incontinent of bowel with page draining. FLACC 0/10. Family at bedside. Plan of care reviewed. Safety measures in place including bed in low and locked position. Tab alarms in place. Door open for continuous monitoring. [address] [place] [address] plan is for patient to remain until passing. 1953 sister at nurses station stating patient is coughing and unable to cough up mucus. Patient noted to have non-productive cough and able to swallow. No signs of distress or discomfort noted. FLACC 0/10. Robinul and Haldol given as ordered. 2018 Sister at nurses station stating patient continues to cough. No signs of distress noted. Morphine given as ordered to manage symptoms. 2040 Patient observed in bed resting quietly with family at bedside. FLACC 0/10.    2210 Patient observed lying in bed with eyes closed. No signs of distress or discomfort noted. Family at beside. FLACC 0/10.    2350 Patient observed lying in bed with eyes closed. Apneic breathing. FLACC 0/10. Sisters at bedside. 0115 Patient observed lying in bed with eyes closed. 30 second periods of apnea noted. FLACC 0/10. Sisters at bedside. 5462 Patient lying in bed with eyes closed. Respirations shallow and apneic. FLACC 0/10. Sisters at bedside. 200cc shonna urine emptied out of page catheter. 4000 Patient observed lying in bed with eyes closed. Increased secretions and apnea noted. HOB elevated and  02 @ 3 liters continuous. FLACC 0/10. Sisters remain at  Bedside. Safety measures remain in place. 0299 Report given to oncoming nurse.

## 2022-04-01 NOTE — [facility] IDG SOCIAL WORKER NOTES
Patient: Bobby Lopez    Date: 04/01/22  Time: 10:12 AM    [facility]  Notes  SW has read the initial comprehensive assessment and plan of care. No immediate needs noted. Initial SW assessment visit will be completed within 5 days of admission.          Signed by: Jamie Alex LMSW

## 2022-04-01 NOTE — [facility] IDG MASTER NOTE
[facility]  Date: 04/01/22  Time: 11:54 AM    ___________________    Patient: Moisés Couch  Coverage Information:     Payor: [facility] MEDICARE     Plan: [facility] MEDICARE PART A AND B     Subscriber ID: [id number]     Phone Number:   MRN: [id number]  Current Benefit Period: Benefit Period 1  Start Date: 3/31/2022  End Date: 6/28/2022        Hospice Attending Provider: Lajuanda Lundborg, [address] [place] [address]  [id number]  Phone: [phone]  Fax: [phone]    Level of Care: General Inpatient Care      ___________________    Diagnoses: There were no encounter diagnoses.     Current Medications:    Current Facility-Administered Medications:     glycopyrrolate (ROBINUL) injection 0.2 mg, 0.2 mg, IntraVENous, Q4H PRN, Manny Pritchard, NP    sodium chloride (NS) flush 3 mL, 3 mL, IntraVENous, Q12H, Ihsan Cummings NP    bisacodyL (DULCOLAX) suppository 10 mg, 10 mg, Rectal, PRN, Manny Pritchard NP    sodium chloride (NS) flush 3 mL, 3 mL, IntraVENous, PRN, Theodora Berger MD, 3 mL at 04/01/22 0400    morphine injection 4 mg, 4 mg, IntraVENous, Q20MIN PRN, 4 mg at 04/01/22 0400 **OR** morphine injection 4 mg, 4 mg, SubCUTAneous, Q20MIN PRN, Theodora Berger MD    acetaminophen (TYLENOL) suppository 650 mg, 650 mg, Rectal, Q3H PRN, Theodora Berger MD    LORazepam (ATIVAN) injection 1 mg, 1 mg, IntraVENous, Q2H PRN, Theodora Berger MD    haloperidol lactate (HALDOL) injection 2 mg, 2 mg, IntraVENous, Q1H PRN, 2 mg at 04/01/22 0400 **OR** haloperidol lactate (HALDOL) injection 2 mg, 2 mg, SubCUTAneous, Q1H PRN, Theodora Berger MD    LORazepam (ATIVAN) injection 2 mg, 2 mg, IntraVENous, Q10MIN PRN, Theodora Berger MD    Orders:  Orders Placed This Encounter    DIET NPO     Standing Status:   Standing     Number of Occurrences:   1    VITAL SIGNS     Standing Status:   Standing     Number of Occurrences:   1    NURSING-MISCELLANEOUS: Comfort Care Measures CONTINUOUS Standing Status:   Standing     Number of Occurrences:   1     Order Specific Question:   Description of Order:     Answer:   Comfort Care Measures    PAGE CATHETER, CARE     1. Page Catheter care every shift and PRN Page Catheter leakage or gross adherent. 2. Notify Physician of Page Catheter leakage, occlusion, gross adherent, sediment or accidental removal  3. Change Page 30 days after insertion. 4. May flush page catheter prn leakage, occlusion, gross adherent, sediment or mucus. Standing Status:   Standing     Number of Occurrences:   [id number]    BLADDER CHECKS     May scan bladder PRN for urinary retention and or patient discomfort     Standing Status:   Standing     Number of Occurrences:   [id number]    NURSING ASSESSMENT:  SPECIFY Assess for GIP, routine, or respite level of care. Q SHIFT Routine     Standing Status:   Standing     Number of Occurrences:   1     Order Specific Question:   Please describe the test or procedure you would like to order. Answer:   Assess for GIP, routine, or respite level of care.  PAIN ASSESSMENT Pain and Symptoms: Assess ever 4 hours and PRN, for GIP level of care. PRN Routine     Standing Status:   Standing     Number of Occurrences:   [id number]     Order Specific Question:   Please describe the test or procedure you would like to order. Answer:   Pain and Symptoms: Assess ever 4 hours and PRN, for GIP level of care.  BEDREST, COMPLETE     Standing Status:   Standing     Number of Occurrences:   1    Turn or assist with turn approximately every 2 hours if patient is unable to turn self. Remind patient to turn if necessary.      Standing Status:   Standing     Number of Occurrences:   1    Assess skin per unit guidelines     Standing Status:   Standing     Number of Occurrences:   1    NURSING-MISCELLANEOUS: Pad/offload medical devices CONTINUOUS     Standing Status:   Standing     Number of Occurrences:   1     Order Specific Question:   Description of Order:     Answer:   Pad/offload medical devices    NURSING-MISCELLANEOUS: Maintain HOB at the lowest elevation consistent with medical plan of care CONTINUOUS     Standing Status:   Standing     Number of Occurrences:   1     Order Specific Question:   Description of Order:     Answer:   Maintain HOB at the lowest elevation consistent with medical plan of care    NURSING-MISCELLANEOUS: Use lift equipment for lifting patient CONTINUOUS     Standing Status:   Standing     Number of Occurrences:   1     Order Specific Question:   Description of Order:     Answer:   Use lift equipment for lifting patient    Maintain heels off of bed at all times     Standing Status:   Standing     Number of Occurrences:   1    NURSING-MISCELLANEOUS: Peripheral IV access: 1. May use peripheral IV if present. If no IV access present, may place peripheral IV for medication administration. 2. Maintain IV access per hospital policy. CONTINUOUS     1. May use peripheral IV if present. If no IV access present, may place peripheral IV for medication administration. 2. Maintain IV access per hospital policy. Standing Status:   Standing     Number of Occurrences:   1     Order Specific Question:   Description of Order:     Answer:   Peripheral IV access:    NURSING-MISCELLANEOUS: DME: Please order and place air mattress for pressure reduction. CONTINUOUS     Please order and place air mattress for pressure reduction. Standing Status:   Standing     Number of Occurrences:   1     Order Specific Question:   Description of Order:     Answer:   DME:    VITAL SIGNS     Standing Status:   Standing     Number of Occurrences:   1    DO NOT RESUSCITATE     Standing Status:   Standing     Number of Occurrences:   1     Order Specific Question:   Comfort Measures Only? Answer: Yes    OXYGEN CANNULA Liters per minute: 3; Indications for O2 therapy: HYPOXIA PRN Routine     Administer oxygen via nasal cannula.      Standing Status: Standing     Number of Occurrences:   [id number]     Order Specific Question:   Liters per minute: Answer:   3     Order Specific Question:   Indications for O2 therapy     Answer:   HYPOXIA    sodium chloride (NS) flush 3 mL    DISCONTD: morphine (ROXANOL) concentrated oral syringe 10 mg    DISCONTD: morphine (ROXANOL) concentrated oral syringe 10 mg    OR Linked Order Group     morphine injection 4 mg     morphine injection 4 mg    DISCONTD: acetaminophen (TYLENOL) tablet 650 mg    acetaminophen (TYLENOL) suppository 650 mg    LORazepam (ATIVAN) injection 1 mg    DISCONTD: senna (SENOKOT) tablet 8.6 mg    OR Linked Order Group     haloperidol lactate (HALDOL) injection 2 mg     haloperidol lactate (HALDOL) injection 2 mg    DISCONTD: hyoscyamine SL (LEVSIN/SL) tablet 0.125 mg    DISCONTD: glycopyrrolate (ROBINUL) injection 0.2 mg    LORazepam (ATIVAN) injection 2 mg    glycopyrrolate (ROBINUL) injection 0.2 mg    sodium chloride (NS) flush 3 mL    bisacodyL (DULCOLAX) suppository 10 mg    INITIAL PHYSICIAN ORDER: HOSPICE Level Of Care: General Inpatient; Reason for Admission: 80-year-old female with SBE/CHF with acute AMS for CNS/CVS and end-of-life care     Standing Status:   Standing     Number of Occurrences:   1     Order Specific Question:   Status     Answer:   Hospice     Order Specific Question:   Level Of Care     Answer:   General Inpatient     Order Specific Question:   Reason for Admission     Answer:   80-year-old female with SBE/CHF with acute AMS for CNS/CVS and end-of-life care     Order Specific Question:   Inpatient Hospitalization Certified Necessary for the Following Reasons     Answer:   3.  Patient receiving treatment that can only be provided in an inpatient setting (further clarification in H&P documentation)     Order Specific Question:   Admitting Diagnosis     Answer:   Endocarditis [202886]     Order Specific Question:   Terminal Prognosis Diagnosis(es)     Answer: Endocarditis [569591]     Order Specific Question:   Admitting Physician     Answer:   Eliot Horner     Order Specific Question:   Attending Physician     Answer:   Eliot Horner       Allergies: Allergies   Allergen Reactions    Latex Other (comments)     Testing for latex allergy was positive as a 2 (on a scale of 1 to 3).  Sulfa (Sulfonamide Antibiotics) Anaphylaxis    Tramadol Other (comments)     Top lip swelled    Augmentin [Amoxicillin-Pot Clavulanate] Rash    Codeine Other (comments)    Divalproex Sodium Hives    Morphine Nausea and Vomiting     Not a true allergy    Potassium Clavulanate Hives    Rizatriptan Rash    Tetanus Immune Globulin Other (comments)     Heat, bruising, and swelling at  Site of injection    Vancomycin Rash    Xanax [Alprazolam] Other (comments)     Hallucinations    Zonegran [Zonisamide] Rash       Care Plan:  Encounter Problems (Active)     Problem: Anxiety/Agitation     Dates: Start: 04/01/22       Disciplines: Interdisciplinary    Goal: Verbalize or staff assess the ability to manage anxiety     Dates: Start: 04/01/22   Expected End: 04/04/22       Description: Patient Mukul Fuller will demonstrate appropriate motor behavior AEB less than 2 episodes of fidgety, picking or pulling at clothes on devices, yelling out, getting out of bed, etc. each shift during inpatient hospice stay. Disciplines: Interdisciplinary    Intervention: Assess for anxiety/agitation     Dates: Start: 04/01/22       Description: Assess for signs and symptoms of anxiety and agitation. Intervention: Instruct/Implement strategies to reduce anxiety/agitation     Dates: Start: 04/01/22       Description: Instruct patient/caregiver on strategies to reduce anxiety/agitation.              Problem: Dyspnea Due to End of Life     Dates: Start: 04/01/22       Disciplines: Interdisciplinary    Goal: Demonstrate understanding of and ability to manage respiratory symptoms at end of life     Dates: Start: 04/01/22   Expected End: 04/04/22       Description: Davidson Newberry will indicate effective breathing pattern AEB absence of respiratory distress each shift during inpatient hospice stay. Disciplines: Interdisciplinary    Intervention: Assess for signs and symptoms of dyspnea     Dates: Start: 04/01/22          Intervention: Instruct on causes/symptoms of dyspnea     Dates: Start: 04/01/22          Intervention: Michelle Horton patient/caregiver/family on strategies to effectively manage dyspnea     Dates: Start: 04/01/22                Problem: Falls - Risk of     Dates: Start: 03/31/22       Disciplines: Interdisciplinary    Goal: *Absence of Falls     Dates: Start: 03/31/22   Expected End: 04/14/22       Description: Patient Bo Newberry will remain free from falls AEB no injuries r/t falls each shift during inpatient hospice stay. Disciplines: Interdisciplinary          Problem: General Wound Care     Dates: Start: 04/01/22       Disciplines: Interdisciplinary    Goal: *Non-infected wound: Improvement of existing wound, absence of infection, and maintenance of skin integrity     Dates: Start: 04/01/22   Expected End: 04/15/22       Description: Patient Bo Newberry wounds will be free from further complications AEB absence of signs and symptoms of infection each shift during inpatient hospice stay.      Disciplines: Interdisciplinary       Goal: Interventions     Dates: Start: 04/01/22       Disciplines: Interdisciplinary    Intervention: Pain management     Dates: Start: 04/01/22          Intervention: Wound assessment, including wound bed description and dimensions     Dates: Start: 04/01/22                Problem: Infection - Risk of, Urinary Catheter-Associated Urinary Tract Infection     Dates: Start: 04/01/22       Disciplines: Interdisciplinary    Goal: *Absence of infection signs and symptoms     Dates: Start: 04/01/22   Expected End: 04/15/22 Description: Patient Marilynn Parker will remain free from infection R/T urinary catheter AEB absence of signs and symptoms of infection each shift during inpatient hospice stay. Disciplines: Interdisciplinary    Intervention: Urinary catheter needs assessment (eg: Impaired neurologic status; post void residual; spinal cord injury; urinary outflow obstruction; urinary retention; urinary incontinence; fluid imbalance)     Dates: Start: 04/01/22          Intervention: Urine assessment (eg: Clarity; color; odor; volume)     Dates: Start: 04/01/22          Intervention: Infection signs and symptoms monitoring - urinary tract (eg: Flank or suprapubic pain; burning; fever; dysuria; frequency; urgency; cloudy/bloody/foul odor urine; confusion/agitation; incontinence)     Dates: Start: 04/01/22          Intervention: Lab monitoring (eg: Urinalysis; culture and sensitivity; complete blood count with differential)     Dates: Start: 04/01/22          Intervention: Urinary catheter management (eg: Closed urinary catheter system maintenance; urinary catheter patency with free downhill flow; perineal care; monitor intake and output)     Dates: Start: 04/01/22          Intervention: Urinary catheter discontinuation     Dates: Start: 04/01/22       Description: REMINDER(s):  Urinary catheter discontinuation promptly as indicated; remind physician to remove at or before day 5. Problem: Pain     Dates: Start: 04/01/22       Disciplines: Nurse, Interdisciplinary, RT    Goal: *Control of Pain     Dates: Start: 04/01/22   Expected End: 04/04/22       Description: Davidson Parker will exhibit decrease in pain AEB rating pain less than 3 on 1-10 scale or 3 FLACC score within each shift of receiving pain medication during inpatient hospice stay.      Disciplines: Nurse, Interdisciplinary, RT    Intervention: Assess pain characteristics (eg: Intensity scale; onset; location; quality; severity; duration; frequency; radiation)     Dates: Start: 04/01/22          Intervention: Assess pain management - barriers (eg: Past pain experiences)     Dates: Start: 04/01/22          Intervention: Identify pain expectations (eg: Patient's pain goal; somatic experiences; behavioral changes; affect)     Dates: Start: 04/01/22          Intervention: Identify pain medication concerns (eg: Cultural considerations; addiction concerns)     Dates: Start: 04/01/22          Intervention: Support system identification (eg: Caregiver; community resource; family; friends; Congregational; support group)     Dates: Start: 04/01/22          Intervention: Monitor for change in patient condition (eg:  Vital signs changes; changes in level of consciousness; nausea; behavioral changes)     Dates: Start: 04/01/22          Intervention: Medication side-effect assessment     Dates: Start: 04/01/22          Intervention: Pain-relief response reassessment (eg: Frequency based on route of administration; effectiveness)     Dates: Start: 04/01/22             Goal: *PALLIATIVE CARE:  Alleviation of Pain     Dates: Start: 04/01/22       Disciplines: Nurse, Interdisciplinary, RT    Intervention: Refer patient for holistic services per facility     Dates: Start: 04/01/22                Problem: Patient Education: Go to Patient Education Activity     Dates: Start: 03/31/22       Disciplines: Interdisciplinary    Goal: Patient/Family Education     Dates: Start: 03/31/22       Disciplines: Interdisciplinary          Problem: Patient Education: Go to Patient Education Activity     Dates: Start: 03/31/22       Disciplines: Interdisciplinary    Goal: Patient/Family Education     Dates: Start: 03/31/22       Disciplines: Interdisciplinary          Problem: Pressure Injury - Risk of     Dates: Start: 03/31/22       Disciplines: Interdisciplinary    Goal: *Prevention of pressure injury     Dates: Start: 03/31/22   Expected End: 04/14/22       Description: Patient Jag snell remain free from acquired pressure injuries AEB zero acquired pressure injuries during assessment of skin each shift during inpatient hospice stay.       Disciplines: Interdisciplinary         Care Plan Problems/Goals  Report    0 of 11 Goals Met 0 of 11 Met     Progressing Towards Goal (7)      *Absence of Falls (Falls - Risk of)    Disciplines:  Interdisciplinary Expected end:  04/14/22        Outcome: Progressing Towards Goal By Ta Tillman RN on 04/01/22 0136            *Prevention of pressure injury (Pressure Injury - Risk of)    Disciplines:  Interdisciplinary Expected end:  04/14/22        Outcome: Progressing Towards Goal By Ta Tillman RN on 04/01/22 0136            *Control of Pain (Pain)    Disciplines:  Nurse, Interdisciplinary, RT Expected end:  04/04/22        Outcome: Progressing Towards Goal By Ta Tillman RN on 04/01/22 0136            Demonstrate understanding of and ability to manage respiratory symptoms at end of life (Dyspnea Due to End of Life)    Disciplines:  Interdisciplinary Expected end:  04/04/22        Outcome: Progressing Towards Goal By Ta Tillman RN on 04/01/22 0136            Verbalize or staff assess the ability to manage anxiety (Anxiety/Agitation)    Disciplines:  Interdisciplinary Expected end:  04/04/22        Outcome: Progressing Towards Goal By Ta Tillman RN on 04/01/22 0136            *Absence of infection signs and symptoms (Infection - Risk of, Urinary Catheter-Associated Urinary Tract Infection)    Disciplines:  Interdisciplinary Expected end:  04/15/22        Outcome: Progressing Towards Goal By Ta Tillman RN on 04/01/22 0136            *Non-infected wound: Improvement of existing wound, absence of infection, and maintenance of skin integrity (General Wound Care)    Disciplines:  Interdisciplinary Expected end:  04/15/22        Outcome: Progressing Towards Goal By Ta Tillman RN on 04/01/22 0136                         No Outcome (4)      Patient/Family Education (Patient Education: Go to Patient Education Activity)    Disciplines:  Interdisciplinary Expected end:  -          Patient/Family Education (Patient Education: Go to Patient Education Activity)    Disciplines:  Interdisciplinary Expected end:  -          *PALLIATIVE CARE:  Alleviation of Pain (Pain)    Disciplines:  Nurse, Interdisciplinary, RT Expected end:  -          Interventions (General Wound Care)    Disciplines:  Interdisciplinary Expected end:  -                            ___________________    Care Team Notes          POC/IDG Notes      [facility] IDG Nurse Notes by Jim Cohen RN at 04/01/22 1015  Version 1 of 1    Author: Jim Cohen RN Service: NURSING Author Type: Registered Nurse    Filed: 04/01/22 1022 Date of Service: 04/01/22 1015 Status: Signed    : Jim Cohen RN (Registered Nurse)         Patient: Yarely Whitten    Date: 04/01/22  Time: 10:15 AM    [facility] Nurse Notes  1st  IDG: Pt is a 64y.o. 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   Gilbert with UOP of 350ml   IV access: Peripheral IV   PO intake:NPO  Oxygen: Room air  Wounds:   Wound Sacral/coccyx Posterior;Mid 03/30/22 (Active)   Wound Etiology Pressure Stage 2 03/31/22 1909   Dressing Status Intact 03/31/22 1603   Dressing/Treatment Foam 03/31/22 0941   Wound Length (cm) 1 cm 03/31/22 1909   Wound Width (cm) 1.5 cm 03/31/22 1909   Wound Depth (cm) 0.1 cm 03/31/22 1909   Wound Surface Area (cm^2) 1.5 cm^2 03/31/22 1909   Change in Wound Size % 58.33 03/31/22 1909   Wound Volume (cm^3) 0.15 cm^3 03/31/22 1909   Wound Healing % 58 03/31/22 1909   Wound Assessment Purple/maroon;Pink/red 03/31/22 0941   Drainage Amount Scant 03/31/22 0941   Drainage Description Serous 03/31/22 0941   Wound Odor None 03/31/22 0941   Digna-Wound/Incision Assessment Excoriated 03/31/22 0941   Edges Defined edges 03/31/22 0941   Number of days: 2       Wound Sacral/coccyx Lateral deep tissue injury 1.5 cm X 1 cm (Active)   Number of days: 1       Wound Buttocks Left;Lateral deep tissue injury . 75 cm X .75 cm (Active)   Number of days: 1     PRN medications: Haldol 2 mg IV x 1 dose for agitation / Roxanol 10 mg PO x 1 dose for pain / Morphine 4 mg x 1 dose IV for pain. Scheduled meds:    Current Facility-Administered Medications   Medication Dose Route Frequency    senna (SENOKOT) tablet 8.6 mg  1 Tablet Oral BID     Plan: D/C PO medications. Comprehensive plan of care reviewed. IDG and pt./family in agreement with plan of care. The IDG identifies through on-going assessment when a change is needed to the POC; the pt/family will receive care and services necessitated by changes in POC. Medications reviewed by the pharmacist and Medical Director. Signed by: Elli Donnelly RN       [address] [place] [address]  Notes by Rebekah Perdue at 04/01/22 1015  Version 1 of 1    Author: Rebekah Perdue Service: Spiritual Care Author Type: Pastoral Care    Filed: 04/01/22 1016 Date of Service: 04/01/22 1015 Status: Signed    : Rebekah Perdue (Pastoral Care)       Patient: Glenn Richards    Date: 04/01/22  Time: 10:15 AM    [facility]  Notes    Assessments pending for spiritual and bereavement care. Signed by: Griffin Cowden       [address] [place] [address]  Notes by Audrey Roman LMSW at 04/01/22 1012  Version 1 of 1    Author: Audrey Roman LMSW Service: -- Author Type: Licensed Masters in Social Work    Filed: 04/01/22 1012 Date of Service: 04/01/22 1012 Status: Signed    : Erasmo Flanagan5 [facility] (Licensed Masters in Social Work)       Patient: Glenn Richards    Date: 04/01/22  Time: 10:12 AM    [facility]  Notes  SW has read the initial comprehensive assessment and plan of care. No immediate needs noted. Initial SW assessment visit will be completed within 5 days of admission.          Signed by: Jose Houser [facility] IDG  Notes by Jovana Mcmillan at 04/01/22 0855  Version 1 of 1    Author: Jovana Mcmillan Service: Spiritual Care Author Type: Pastoral Care    Filed: 04/01/22 0856 Date of Service: 04/01/22 0855 Status: Signed    : Jovana Mcmillan (Pastoral Care)       Patient: Johnson Dickinson    Date: 04/01/22  Time: 8:55 AM    [facility]  Notes    / Grief Counselor has reviewed  Initial Comprehensive Assessment and plan of care. Bereavement and Spiritual Care Assessments to be completed and plan of care put in place to meet patient and family needs. Signed by: Sandrine Ely       [facility] IDG Nurse Notes by Gautam Caceres RN at 03/31/22 2112  Version 1 of 1    Author: Gautam Caceres RN Service: -- Author Type: Registered Nurse    Filed: 03/31/22 2135 Date of Service: 03/31/22 2112 Status: Signed    : Gautam Caceres RN (Registered Nurse)       Patient: Johnson Dickinson    Date: 03/31/22  Time: 9:12 PM    [facility] Nurse Notes    Patient is a 27-year-old female admitted from [facility] to [facility] for GIP LOC. Hospice diagnosis is vegetative endocarditis to manage pain, dyspnea, and secretions. Discharge plans are for patient to remain in [facility] until demise. Discharge plans to be evaluated for potential LOC change. RN reviewed POC with CNA. Per physician summary report, patient had chronic hematologic malignancy and had been admitted to the hospital earlier this month with CHF. 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 Patient has had many episodes of sepsis with recurring heart failure, hospitalization with profound AMS. Family elected for comfort measures due to the patient has deteriorated despite aggressive therapy. Patient presents with some garbled speech attempting to speak to nurse. Able to track nurse with eyes. NG tube removed by Mary Yao RN.   Patient has two peripheral IV's, one to left [facility] and one to right [facility] and has a page catheter in place. Wounds assessed, measured, and documented by Gil Dumont, RN and Ck Ramsey RN. Sacral DTI measuring 1.5 cm x 1 cm, Left buttocks DTI measuring . 75cm x .75 cm, and Stage II to sacrum measuring 1 x 1.5 x 0.1. No family on admission, but patient's sister and HCPOA in @ 2030 and oriented to room and facility and all questions answered. Admission complete and hospice care plan initiated which includes spiritual, psychosocial, and bereavement. No initial needs identified. IDG team, including MD, made aware of care plans. Volunteer services limited due to COVID-19. Signed by: Zoe Moreno RN                Care Team Present:   Team Members Present: Physician,Nurse,,,Vol Coordinator,Other (Comment)  Physician Team Member: Dr. Bharati Gusman  Nurse Team Member: Shola Bal  Social Work Team Member: Jazmyn Boucher   Team Member: Ginette Vuong  Vol Coordinator: Tere Elizondo  Other Discipline Present (Name):  Carole Jones NP

## 2022-04-01 NOTE — PROGRESS NOTES
Report received from off-going nurse,Sunitha Haddad RN,  visual identification made, assumed care of pt. Pt resting quietly with eyes closed, no agitation or restlessness, no grimacing or groaning. Pt respirations unlabored. Tab alert in place, rails up x 2, bed in lowest position, safety maintained. FLACC 0. Pt on alternating air mattress. Discharge plan is for pt to remain at [facility] [place] [facility] level of care until demise. Will continue to evaluate pt discharge plan for potential changes. CNA operating under RN supervision     3626-3154 physical assessment completed. Oxygen on at 2.5 liters per nasal cannula, lung sounds clear, heart sounds regular with murmur, pt opens eyes and follows with her eyes but non verbal at this time, not answering questions, abdomen soft with hypoactive bowel sounds, page draining clear shonna urine, upper extremities hot, and palpable pulses. Lower extremities cool, with non palpable pulses, trace edema. Right arm 2+ edema. [id number] sister, Eleazar Juárez and Aunt at bedside, discussed rationale for pt being NPO, and discussed using swabs to moisten pt's mouth. Gave [employer] blue book and answered questions. 2059-1645 flushed IV bilaterally. Moved bed so pt can see outside. Sister, aunt, son and daughter at bedside. 1340 family at bedside. [id number] family states pt nauseated. Administered haldol IVP for nausea. 1457 denies nausea, but family states she would like some ice chips. [id number] pt grimacing, sister states pt says she's having pain, administered morphine and haldol IVP for pain and nausea. 1716 pt grimacing, agitated. [id number] administered morphine and haldol IVP for dyspnea and agitation. [id number] with assist of pt's sister, Eleazar Juárez, turned pt on right side and positioned with pillows. Pt moaning and groaning with repositioning. Administered morphine IVP for pain. 1829 respirations irregular with 10 second periods of apnea.      Report given to Jaquan Retana RN

## 2022-04-01 NOTE — DISCHARGE SUMMARY
Hospitalist Discharge Summary   Admit Date:  3/25/2022  4:10 AM   DC Note date: 3/31/2022  Name:  Reyna De La Cruz   Age:  64 y.o. Sex:  female  :  1960   MRN:  [id number]   Room:  [address] [place] [address]  PCP:  Jane Poe MD    Presenting Complaint: No chief complaint on file.     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 ICD-10-CM: N17.9  ICD-9-CM: 584.9  3/29/2022 - Present No        Hyperkalemia ICD-10-CM: E87.5  ICD-9-CM: 276.7  3/28/2022 - Present No        CML (chronic myelocytic leukemia) (HCC) (Chronic) ICD-10-CM: C92.10  ICD-9-CM: 205.10  3/19/2022 - Present Yes        Pleural effusion, bilateral ICD-10-CM: J90  ICD-9-CM: 511.9  3/11/2022 - Present Yes        Hypoproteinemia ([facility] ICD-10-CM: E77.8  ICD-9-CM: 273.8  3/11/2022 - Present Yes        Thrombocytopenia ([facility] ICD-10-CM: D69.6  ICD-9-CM: 287.5  10/15/2021 - Present Yes        Leukocytosis ICD-10-CM: M09.943  ICD-9-CM: 288.60  10/16/2020 - Present Yes        Acquired hypercoagulable state ([facility] (Chronic) ICD-10-CM: W71.32  ICD-9-CM: 289.81  3/30/2015 - Present Yes        AKOSUA (iron deficiency anemia) ICD-10-CM: D50.9  ICD-9-CM: 280.9  2012 - Present Unknown        Cirrhosis (HCC) (Chronic) ICD-10-CM: K74.60  ICD-9-CM: 571.5  2012 - Present Yes        Polycythemia vera ([facility] (Chronic) ICD-10-CM: D45  ICD-9-CM: 238.4  2009 - Present Unknown    Overview Addendum 8/26/2021  2:31 PM by Mariza Munoz MD     2/2008 by [email or web address] analysis however portal vein thrombosis  And and splenomegaly since 2004  Hydroxyurea for 6 months poor tolerance spleen did not shrink   Pegasys 90 mcg weekly 4-2009 till    Restarted 12-30-09 45 mcg q 2 weeks  2-2010 reduced to q month   Increased 45 mcg 2/month 4-2010  Held 8-2010 thrombocytopenia  12-29-12 restarted 45 mcg q 2 weeks  4-1-11 45 mcg q 3 weeks  4-22-11 45 mcg q 4 weeks  Uncertain dosing thereafter   Possibly q 3 weeks 10-15-11 and held and restarted on 4-1-12 6-12 ct scan Small bowel wall thickening suggesting an enteritis. In addition, there is well thickening of the right colon which can suggest an additional   colitis although this can also be seen with severe portal hypertension. Likely reactive edematous changes it scattered fluid collections are seen of   the small bowel mesentery. . Apparent occlusion of the portal vein. In addition, the splenic vein, and superior mesenteric vein are not definitely seen and are also likely occluded. Splenomegaly, and extensive varices are seen as described above consistent with the portal vein occlusion. Omid Brittle Heterogeneous enhancement of the liver and mild periportal edema. An acute hepatitis cannot be excluded.               Splenomegaly ICD-10-CM: R16.1  ICD-9-CM: 789.2  2/16/2009 - Present Yes        RESOLVED: Endocarditis of mitral valve ICD-10-CM: I05.9  ICD-9-CM: 394.9  3/25/2022 - 3/29/2022 Yes        RESOLVED: Acute diastolic (congestive) heart failure (HCC) ICD-10-CM: I50.31  ICD-9-CM: 428.31, 428.0  3/19/2022 - 3/29/2022 Yes        RESOLVED: Acute respiratory failure with hypoxemia ([facility] ICD-10-CM: J96.01  ICD-9-CM: 518.81  3/9/2022 - 3/29/2022 Yes        RESOLVED: Chronic myelogenous leukemia (HCC) (Chronic) ICD-10-CM: C92.10  ICD-9-CM: 205.10  3/30/2015 - 3/29/2022 Yes        RESOLVED: Hypokalemia ICD-10-CM: E87.6  ICD-9-CM: 276.8  7/27/2012 - 3/29/2022 Yes        RESOLVED: Malignant ascites ICD-10-CM: R18.0  ICD-9-CM: 789.51  7/26/2012 - 3/29/2022 Yes          Hospital Course:  Zoie Montana is a 64year old CF with a PMH of CML who follows with oncology on chemotherapy, history of chronic back pain, 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 Patient was recently discharged from the hospital on 3/14 after treatment for HF. She was also noted to have splenomegaly with splenic infarct.  She was recently diagnosed with E. coli bacterial meningitis, completed course of antibiotics.     In the ER, CXR was done shows evidence of pulmonary vascular congestion. On 3/23 echocardiogram was performed showing mitral vegetations. She was transferred to the [facility] 3/24.  Cardiology, ID, and Pulmonology are on board.     Patient has since developed acutely worsening confusion with CT head concerning for evidence of SAH vs hepes encephalitis as well as Acute liver fracture, DIC. Patient continued to decline clinically. Remained encephalopathic with continued concern for sepsis with DIC and acute liver failure, possibly brain abscess. PC had multiple conversations with family with decision for focus of comfort care. Patient transferring to [address] [place] [address]. Disposition: Hospice/Medical Facility  Diet: No diet orders on file  Code Status: DNR    Follow Up Orders:  No orders of the defined types were placed in this encounter.       Follow-up Information     Follow up With Specialties Details Why Michael Vernon [address] [place] [address] [place] [address]  [phone]    Elliot De Luna MD Hematology and Oncology, Internal Medicine, Hematology, Oncology   [facility] Elida Joseph 13., MD Geriatric Medicine   [address] [place] [address]  [phone]            Follow up labs/diagnostics (ultimately defer to outpatient provider):      Time spent in patient discharge and coordination 35 minutes. Plan was discussed with family, RN, PC. All questions answered. Patient was stable at time of discharge. Instructions given to call a physician or return if any concerns.     Discharge Info:   Discharge Medication List as of 3/31/2022  4:50 PM      STOP taking these medications       furosemide (LASIX) 20 mg tablet Comments:   Reason for Stopping:         oxyCODONE-acetaminophen (PERCOCET 10)  mg per tablet Comments:   Reason for Stopping:         zolpidem (AMBIEN) 10 mg tablet Comments:   Reason for Stopping:         diphenoxylate-atropine (LomotiL) 2.5-0.025 mg per tablet Comments:   Reason for Stopping:         famotidine (PEPCID) 40 mg tablet Comments:   Reason for Stopping:         dasatinib (SpryceL) 80 mg tab Comments:   Reason for Stopping:         enoxaparin (Lovenox) 60 mg/0.6 mL injection Comments:   Reason for Stopping:         b complex vitamins tablet Comments:   Reason for Stopping:         Comp Stocking,Knee,Regular,Sml misc Comments:   Reason for Stopping:         ondansetron (ZOFRAN ODT) 8 mg disintegrating tablet Comments:   Reason for Stopping:         magnesium 250 mg tab Comments:   Reason for Stopping:         Synthroid 112 mcg tablet Comments:   Reason for Stopping:         ruxolitinib 5 mg tab Comments:   Reason for Stopping:         ondansetron (ZOFRAN ODT) 4 mg disintegrating tablet Comments:   Reason for Stopping:         calcium carbonate (CALTREX) 600 mg calcium (1,500 mg) tablet Comments:   Reason for Stopping:         cholecalciferol (VITAMIN D3) 1,000 unit cap Comments:   Reason for Stopping:         lansoprazole (PREVACID) 30 mg capsule Comments:   Reason for Stopping: Procedures done this admission:  Procedure(s):  PROCEDURE CANCELLED - NOT PERFORMED    Consults this admission:  IP CONSULT TO PULMONOLOGY  IP CONSULT TO INFECTIOUS DISEASES  IP CONSULT TO HEMATOLOGY  IP CONSULT TO PALLIATIVE CARE - PROVIDER  IP CONSULT TO NEUROLOGY    Echocardiogram/EKG results:  Results from Hospital Encounter encounter on 03/18/22    ECHO ADULT COMPLETE    Interpretation Summary    Mitral Valve: There is an approximately 16 x 34 mm echodense mass located on the posterior mitral valve leaflet consistent with vegetation. (Mitral valve vegetation was not present on study dated 2/3/2022). Possible flail leaflet of the mitral valve. Severe transvalvular regurgitation. No stenosis noted.   Left Ventricle: Left ventricle size is normal. Normal wall thickness. Normal wall motion. Normal left ventricular systolic function. EF by 2D Simpsons Biplane is 60%. Abnormal diastolic function. Average E/e' ratio is 22.60.   Right Ventricle: Right ventricle is mildly dilated. Low normal systolic function.   Tricuspid Valve: Moderate transvalvular regurgitation. RVSP is 56 mmHg.   Left Atrium: Left atrial volume index is severely increased (>48 mL/m2).   Right Atrium: Right atrium is mildly dilated.   Pericardium: Small (<1 cm) pericardial effusion present. No indication of cardiac tamponade. Left pleural effusion. Ascites present.   IVC/SVC: IVC diameter is greater than 21 mm and decreases less than 50% during inspiration; therefore the estimated right atrial pressure is elevated (~15 mmHg).   Contrast used: Definity. EKG Results     None          Diagnostic Imaging/Tests:   No results found.     All Micro Results     Procedure Component Value Units Date/Time    CULTURE, BLOOD [[id number]] Collected: 03/29/22 2752    Order Status: Completed Specimen: Blood Updated: 03/31/22 0944     Special Requests: --        RIGHT  ARM       Culture result: NO GROWTH 1 DAY       CULTURE, BLOOD [[id number]] Collected: 03/29/22 4917    Order Status: Completed Specimen: Blood Updated: 03/31/22 0907     Special Requests: --        LEFT  ARM       Culture result: NO GROWTH 1 DAY             Labs: Results:       BMP, Mg, Phos Recent Labs     03/31/22  0234 03/30/22  0138 03/29/22  1324    134* 133*   K 4.6 5.0 5.8*    99 100   CO2 23 13* 8*   AGAP 12 22* 25*   BUN 72* 58* 54*   CREA 1.90* 2.20* 2.10*   CA 8.9 8.8 8.9   * 250* 102*      CBC Recent Labs     03/31/22  0234 03/30/22  1453 03/30/22  0138 03/29/22  1151 03/29/22  1151 03/29/22  0938 03/29/22  0938   WBC 17.1*  --  19.7*  --  25.5*   < > 29.4*   RBC 3.16*  --  3.20*  --  3.54*   < > 4.17   HGB 10.5*  --  10.5*  --  11.8   < > 13.9   HCT 33.1*  --  34.5*  --  42.4   < > 46.4*   PLT 42* 47* 44*   < > 77*   < > 93*   GRANS 96*  --   --   --   --   --  90*   LYMPH 3*  --   --   --   --   --  3*   EOS 0*  --   --   --   --   --  0*   MONOS 1*  --   --   --   --   --  5   BASOS 0  --   --   --   --   --  0   IG 1  --   --   --   --   --  1   ANEU 16.3*  --   --   --   --   --  26.5*   ABL 0.4*  --   --   --   --   --  0.9   ORESTES 0.0  --   --   --   --   --  0.1   ABM 0.2  --   --   --   --   --  1.6*   ABB 0.0  --   --   --   --   --  0.1   AIG 0.1  --   --   --   --   --  0.3    < > = values in this interval not displayed. LFT Recent Labs     03/31/22  0234 03/30/22  0138 03/29/22  0938   ALT 1,385* 1,729* QUANTITY NOT SUFFICIENT.  SUGGEST RECOLLECTION   * 158* 193*   TP 6.9 6.3 7.4   ALB 3.5 3.2 3.7   GLOB 3.4 3.1 3.7*   AGRAT 1.0* 1.0* 1.0*      Cardiac Testing Lab Results   Component Value Date/Time    CK 63 08/05/2021 04:58 PM     07/26/2021 05:04 PM      Coagulation Tests Lab Results   Component Value Date/Time    Prothrombin time 46.6 (H) 03/31/2022 08:17 AM    Prothrombin time 42.0 (H) 03/31/2022 02:34 AM    Prothrombin time 37.4 (H) 03/30/2022 09:05 PM    INR 4.9 03/31/2022 08:17 AM    INR 4.3 03/31/2022 02:34 AM INR 3.7 03/30/2022 09:05 PM    aPTT 38.5 (H) 03/31/2022 08:17 AM    aPTT 37.7 (H) 03/31/2022 02:34 AM    aPTT 37.5 (H) 03/30/2022 09:05 PM      A1c Lab Results   Component Value Date/Time    Hemoglobin A1c 4.9 08/09/2021 04:41 AM      Lipid Panel Lab Results   Component Value Date/Time    Cholesterol, total 105 03/16/2015 06:30 AM    HDL Cholesterol 37 (L) 03/16/2015 06:30 AM    LDL, calculated 54.8 03/16/2015 06:30 AM    VLDL, calculated 13.2 03/16/2015 06:30 AM    Triglyceride 66 03/16/2015 06:30 AM    CHOL/HDL Ratio 2.8 03/16/2015 06:30 AM      Thyroid Panel Lab Results   Component Value Date/Time    TSH 22.100 03/18/2022 09:23 PM    TSH 12.100 (H) 10/06/2021 10:56 AM    T4, Total 9.7 03/16/2015 10:15 AM    T4, Free 1.2 03/28/2022 03:25 PM    T4, Free 1.1 05/19/2021 12:14 PM        Most Recent UA Lab Results   Component Value Date/Time    Color ISAEL 03/09/2022 10:04 PM    Appearance TURBID 03/09/2022 10:04 PM    Specific gravity 1.025 (H) 03/09/2022 10:04 PM    pH (UA) 6.0 03/09/2022 10:04 PM    Protein 100 (A) 03/09/2022 10:04 PM    Glucose Negative 03/09/2022 10:04 PM    Ketone TRACE (A) 03/09/2022 10:04 PM    Bilirubin Negative 03/09/2022 10:04 PM    Blood SMALL (A) 03/09/2022 10:04 PM    Urobilinogen 0.2 03/09/2022 10:04 PM    Nitrites Negative 03/09/2022 10:04 PM    Leukocyte Esterase SMALL (A) 03/09/2022 10:04 PM    WBC 5-10 03/09/2022 10:04 PM    RBC 0-3 03/09/2022 10:04 PM    Epithelial cells 0-3 03/09/2022 10:04 PM    Bacteria TRACE 03/09/2022 10:04 PM    Casts 0-3 03/09/2022 10:04 PM    Crystals, urine AMORPHOUS 03/09/2022 10:04 PM    Mucus 0 02/27/2022 07:49 PM    Other observations RESULTS VERIFIED MANUALLY 02/27/2022 07:49 PM          All Labs from Last 24 Hrs:  Recent Results (from the past 24 hour(s))   GLUCOSE, POC    Collection Time: 03/31/22 12:36 AM   Result Value Ref Range    Glucose (POC) 150 (H) 65 - 100 mg/dL    Performed by Daniel Alanis + INR    Collection Time: 03/31/22  2:34 AM   Result Value Ref Range    Prothrombin time 42.0 (H) 12.6 - 14.5 sec    INR 4.3     PTT    Collection Time: 03/31/22  2:34 AM   Result Value Ref Range    aPTT 37.7 (H) 24.1 - 35.1 SEC   FIBRINOGEN    Collection Time: 03/31/22  2:34 AM   Result Value Ref Range    Fibrinogen 88 (L) 190 - 501 mg/dL   D DIMER    Collection Time: 03/31/22  2:34 AM   Result Value Ref Range    D DIMER >20.00 (H) <0.56 ug/ml(FEU)   METABOLIC PANEL, COMPREHENSIVE    Collection Time: 03/31/22  2:34 AM   Result Value Ref Range    Sodium 138 136 - 145 mmol/L    Potassium 4.6 3.5 - 5.1 mmol/L    Chloride 103 98 - 107 mmol/L    CO2 23 21 - 32 mmol/L    Anion gap 12 7 - 16 mmol/L    Glucose 159 (H) 65 - 100 mg/dL    BUN 72 (H) 8 - 23 MG/DL    Creatinine 1.90 (H) 0.6 - 1.0 MG/DL    GFR est AA 35 (L) >60 ml/min/1.73m2    GFR est non-AA 29 (L) >60 ml/min/1.73m2    Calcium 8.9 8.3 - 10.4 MG/DL    Bilirubin, total 6.0 (H) 0.2 - 1.1 MG/DL    ALT (SGPT) 1,385 (H) 12 - 65 U/L    AST (SGOT) 1,606 (H) 15 - 37 U/L    Alk. phosphatase 169 (H) 50 - 136 U/L    Protein, total 6.9 6.3 - 8.2 g/dL    Albumin 3.5 3.2 - 4.6 g/dL    Globulin 3.4 2.3 - 3.5 g/dL    A-G Ratio 1.0 (L) 1.2 - 3.5     CBC WITH AUTOMATED DIFF    Collection Time: 03/31/22  2:34 AM   Result Value Ref Range    WBC 17.1 (H) 4.3 - 11.1 K/uL    RBC 3.16 (L) 4.05 - 5.2 M/uL    HGB 10.5 (L) 11.7 - 15.4 g/dL    HCT 33.1 (L) 35.8 - 46.3 %    .7 (H) 79.6 - 97.8 FL    MCH 33.2 (H) 26.1 - 32.9 PG    MCHC 31.7 31.4 - 35.0 g/dL    RDW 21.9 (H) 11.9 - 14.6 %    PLATELET 42 (L) 414 - 450 K/uL    MPV 13.1 (H) 9.4 - 12.3 FL    ABSOLUTE NRBC 0.05 0.0 - 0.2 K/uL    DF AUTOMATED      NEUTROPHILS 96 (H) 43 - 78 %    LYMPHOCYTES 3 (L) 13 - 44 %    MONOCYTES 1 (L) 4.0 - 12.0 %    EOSINOPHILS 0 (L) 0.5 - 7.8 %    BASOPHILS 0 0.0 - 2.0 %    IMMATURE GRANULOCYTES 1 0.0 - 5.0 %    ABS. NEUTROPHILS 16.3 (H) 1.7 - 8.2 K/UL    ABS. LYMPHOCYTES 0.4 (L) 0.5 - 4.6 K/UL    ABS.  MONOCYTES 0.2 0.1 - 1.3 K/UL    ABS. EOSINOPHILS 0.0 0.0 - 0.8 K/UL    ABS. BASOPHILS 0.0 0.0 - 0.2 K/UL    ABS. IMM. GRANS. 0.1 0.0 - 0.5 K/UL   LACTIC ACID    Collection Time: 03/31/22  2:35 AM   Result Value Ref Range    Lactic acid 4.3 (HH) 0.4 - 2.0 MMOL/L   GLUCOSE, POC    Collection Time: 03/31/22  7:00 AM   Result Value Ref Range    Glucose (POC) 158 (H) 65 - 100 mg/dL    Performed by Karo Burnham    PROTHROMBIN TIME + INR    Collection Time: 03/31/22  8:17 AM   Result Value Ref Range    Prothrombin time 46.6 (H) 12.6 - 14.5 sec    INR 4.9     PTT    Collection Time: 03/31/22  8:17 AM   Result Value Ref Range    aPTT 38.5 (H) 24.1 - 35.1 SEC   FIBRINOGEN    Collection Time: 03/31/22  8:17 AM   Result Value Ref Range    Fibrinogen 79 (L) 190 - 501 mg/dL   LACTIC ACID    Collection Time: 03/31/22  8:17 AM   Result Value Ref Range    Lactic acid 4.0 (HH) 0.4 - 2.0 MMOL/L       Current Med List in Hospital:   No current facility-administered medications for this encounter. No current outpatient medications on file.      Facility-Administered Medications Ordered in Other Encounters   Medication Dose Route Frequency    sodium chloride (NS) flush 3 mL  3 mL IntraVENous PRN    morphine (ROXANOL) concentrated oral syringe 10 mg  10 mg Oral Q30MIN PRN    Or    morphine (ROXANOL) concentrated oral syringe 10 mg  10 mg SubLINGual Q30MIN PRN    morphine injection 4 mg  4 mg IntraVENous Q20MIN PRN    Or    morphine injection 4 mg  4 mg SubCUTAneous Q20MIN PRN    acetaminophen (TYLENOL) tablet 650 mg  650 mg Oral Q4H PRN    acetaminophen (TYLENOL) suppository 650 mg  650 mg Rectal Q3H PRN    LORazepam (ATIVAN) injection 1 mg  1 mg IntraVENous Q2H PRN    senna (SENOKOT) tablet 8.6 mg  1 Tablet Oral BID    haloperidol lactate (HALDOL) injection 2 mg  2 mg IntraVENous Q1H PRN    Or    haloperidol lactate (HALDOL) injection 2 mg  2 mg SubCUTAneous Q1H PRN    hyoscyamine SL (LEVSIN/SL) tablet 0.125 mg  0.125 mg SubLINGual Q4H PRN    glycopyrrolate (ROBINUL) injection 0.2 mg  0.2 mg SubCUTAneous Q4H PRN    LORazepam (ATIVAN) injection 2 mg  2 mg IntraVENous Q10MIN PRN       Allergies   Allergen Reactions    Latex Other (comments)     Testing for latex allergy was positive as a 2 (on a scale of 1 to 3).     Sulfa (Sulfonamide Antibiotics) Anaphylaxis    Tramadol Other (comments)     Top lip swelled    Augmentin [Amoxicillin-Pot Clavulanate] Rash    Codeine Other (comments)    Divalproex Sodium Hives    Morphine Nausea and Vomiting     Not a true allergy    Potassium Clavulanate Hives    Rizatriptan Rash    Tetanus Immune Globulin Other (comments)     Heat, bruising, and swelling at  Site of injection    Vancomycin Rash    Xanax [Alprazolam] Other (comments)     Hallucinations    Zonegran [Zonisamide] Rash     Immunization History   Administered Date(s) Administered    (RETIRED) Pneumococcal Vaccine (Unspecified Type) 09/27/2008    COVID-19, Moderna, Primary or Immunocompromised Series, MRNA, PF, 100mcg/0.5mL 03/27/2021, 04/27/2021    Hib (PRP-T) 04/03/2019    Influenza Vaccine 10/01/2014, 10/01/2016    Influenza Vaccine (Quad) Mdck Pf (>2 Yrs Flucelvax QUAD 05219) 09/28/2020    Influenza Vaccine (Quad) PF (>6 Mo Flulaval, Fluarix, and >3 Yrs Afluria, Fluzone [id number]) 10/25/2018, 10/08/2019    Pneumococcal Conjugate (PCV-13) 04/03/2019    Pneumococcal Polysaccharide (PPSV-23) 01/01/2004    TB Skin Test (PPD) Intradermal 07/21/2014, 03/19/2022    Zoster Recombinant 03/13/2020, 08/09/2020       Recent Vital Data:  Patient Vitals for the past 24 hrs:   Temp Pulse Resp BP SpO2   03/31/22 1633 -- 97 12 133/81 96 %   03/31/22 1603 -- 100 -- 134/80 --   03/31/22 1448 -- 100 (!) 6 128/80 95 %   03/31/22 1203 -- 99 9 135/77 (!) 89 %   03/31/22 1118 99 °F (37.2 °C) 99 18 (!) 142/74 92 %   03/31/22 0900 -- 96 24 (!) 146/71 92 %   03/31/22 0845 -- 95 19 (!) 146/70 92 %   03/31/22 0830 -- 95 16 (!) 142/72 91 %   03/31/22 0815 -- 95 10 133/69 93 %   03/31/22 0800 -- 94 9 135/69 92 %   03/31/22 0745 -- 94 11 134/69 92 %   03/31/22 0730 -- 93 9 (!) 140/72 92 %   03/31/22 0703 99 °F (37.2 °C) 92 8 121/74 92 %   03/31/22 0618 -- 91 9 124/67 93 %   03/31/22 0605 -- 91 10 -- 92 %   03/31/22 0603 -- 91 9 125/71 93 %   03/31/22 0548 -- 91 10 (!) 140/75 92 %   03/31/22 0533 -- 91 13 136/74 93 %   03/31/22 0518 -- 92 14 137/73 93 %   03/31/22 0503 -- 91 10 139/70 94 %   03/31/22 0448 -- 90 13 135/70 93 %   03/31/22 0433 -- 91 12 136/71 93 %   03/31/22 0418 -- 91 17 136/71 93 %   03/31/22 0403 -- 92 21 (!) 144/76 93 %   03/31/22 0348 -- 91 12 (!) 142/76 93 %   03/31/22 0333 -- 91 18 (!) 143/73 93 %   03/31/22 0328 97.9 °F (36.6 °C) 92 11 (!) 143/73 92 %   03/31/22 0318 -- 93 18 138/73 93 %   03/31/22 0303 -- 91 11 139/74 91 %   03/31/22 0248 -- 92 13 (!) 140/75 92 %   03/31/22 0233 -- 92 18 (!) 141/74 92 %   03/31/22 0218 -- 92 14 137/72 92 %   03/31/22 0203 -- 93 16 134/77 92 %   03/31/22 0148 -- 93 16 134/76 93 %   03/31/22 0133 -- 93 20 131/73 93 %   03/31/22 0118 -- 93 19 132/75 92 %   03/31/22 0103 -- 93 23 131/74 94 %   03/31/22 0048 -- 94 15 137/74 92 %   03/31/22 0033 -- 95 11 137/69 92 %   03/31/22 0018 -- 95 11 136/71 92 %   03/31/22 0003 -- 93 11 (!) 140/70 92 %   03/30/22 2348 -- 92 11 136/76 91 %   03/30/22 2333 -- 91 11 132/75 92 %   03/30/22 2321 97.2 °F (36.2 °C) 91 18 132/75 93 %   03/30/22 2318 -- 91 12 127/72 92 %   03/30/22 2303 -- 91 14 123/72 92 %     Oxygen Therapy  O2 Sat (%): 96 % (03/31/22 1633)  Pulse via Oximetry: 96 beats per minute (03/31/22 1633)  O2 Device: Nasal cannula (03/31/22 0328)  Skin Assessment: Clean, dry, & intact (03/30/22 1910)  Skin Protection for O2 Device: N/A (03/30/22 1910)  O2 Flow Rate (L/min): 2 l/min (03/31/22 0328)    Estimated body mass index is 24.49 kg/m² as calculated from the following:    Height as of 3/18/22: 5' 3\" (1.6 m). Weight as of this encounter: 62.7 kg (138 lb 3.7 oz).     Intake/Output Summary (Last 24 hours) at 3/31/2022 2300  Last data filed at 3/31/2022 1603  Gross per 24 hour   Intake 100 ml   Output 750 ml   Net -650 ml         Physical Exam:  General:    Obtunded, anasarca  Head:  Normocephalic, atraumatic  Eyes:  Sclerae appear normal.  Pupils equally round. HENT:  Nares appear normal, no drainage. Moist mucous membranes  Neck:  No restricted ROM. Trachea midline  CV:   RRR. No m/r/g. No JVD  Lungs:   Diminished bilaterally  Abdomen:   Soft, nontender, nondistended. Extremities: +2 pitting edema upper and lower ext  Skin:     No rashes. Normal coloration  Neuro:  Not following commands      Signed:  Megan Dorado DO    Part of this note may have been written by using a voice dictation software. The note has been proof read but may still contain some grammatical/other typographical errors.

## 2022-04-02 NOTE — PROGRESS NOTES
2167: Report received from off going RN. 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 Pt received 1 dose of Glycopyrrolate 0.2mg, Haldol 2mg and Morphine 4mg IVP during the night for comfort. Family remains at bedside. No voiced requests at this time. Pt with apneic pauses in respirations. No other s/sx of pain or other discomfort observed. FLACC score 0/10. CNA is under RN supervision. Discharge plan is for pt to remain at [facility] until her demise. 2081: Pt with increased troublesome secretions, moaning and facial grimacing. Glycopyrrolate 0.2mg IVP and Morphine 4mg IVP administered to promote comfort. Family present in room. Sister inquiring about suctioning pt. Education given to sister regarding suctioning at end of life. Secretions are too far down, deep suctioning is not recommended and causes discomfort while increasing secretions. Sister voiced understanding. Head of bed raised. CNA in room to give bath. Family leaving facility for breakfast. Reassured family we would call with any changes. 0915: Pt resting in bed with eyes closed after bath. No s/sx of pain or other discomfort observed. No family present. 1015: Pt resting with eyes closed, resps apneic but not labored. No other s/sx of pain or discomfort observed. FLACC score 0/10.     1215: Family in room. No voiced requests at this time. Pt remains with a  FLACC score of 0/10. Resps remain apneic and non-labored. Slight audible congestion at times. 1451: Pt medicated with Morphine 4mg IVP to promote comfort. Pt with increased facial grimacing and periodic moaning. FLACC score 5/10.     1520: Pt resting with eyes closed, resps remain apneic, non-labored. No further s/sx of pain observed. FLACC score 0/10. Family remain at bedside. 1720: Pt remains in bed, non-responsive. Resps 4/min. Non-labored.  No further troublesome secretions. FLACC score 0/10. Family remain at bedside. 1840: Report given to oncoming RN.

## 2022-04-02 NOTE — PROGRESS NOTES
Problem: Falls - Risk of  Goal: *Absence of Falls  Description: Patient Trupti Mittal will remain free from falls AEB no injuries r/t falls each shift during inpatient hospice stay. Outcome: Progressing Towards Goal  Note: Fall Risk Interventions:  Mobility Interventions: Bed/chair exit alarm    Mentation Interventions: Adequate sleep, hydration, pain control,Bed/chair exit alarm,Door open when patient unattended    Medication Interventions: Bed/chair exit alarm    Elimination Interventions: Bed/chair exit alarm,Call light in reach    History of Falls Interventions: Bed/chair exit alarm,Door open when patient unattended         Problem: Pressure Injury - Risk of  Goal: *Prevention of pressure injury  Description: Patient Trupti Mittal will remain free from acquired pressure injuries AEB zero acquired pressure injuries during assessment of skin each shift during inpatient hospice stay. Outcome: Progressing Towards Goal  Note: Pressure Injury Interventions:  Sensory Interventions: Assess changes in LOC,Check visual cues for pain,Float heels    Moisture Interventions: Absorbent underpads,Apply protective barrier, creams and emollients,Moisture barrier    Activity Interventions: Pressure redistribution bed/mattress(bed type)    Mobility Interventions: Assess need for specialty bed,Pressure redistribution bed/mattress (bed type)    Nutrition Interventions:  (pt NPO)    Friction and Shear Interventions: Apply protective barrier, creams and emollients,Lift sheet,Minimize layers                Problem: Pain  Goal: *Control of Pain  Description: Davidson Mittal will exhibit decrease in pain AEB rating pain less than 3 on 1-10 scale or 3 FLACC score within each shift of receiving pain medication during inpatient hospice stay.    Outcome: Progressing Towards Goal     Problem: Dyspnea Due to End of Life  Goal: Demonstrate understanding of and ability to manage respiratory symptoms at end of life  Description: Patient Buren Rohith will indicate effective breathing pattern AEB absence of respiratory distress each shift during inpatient hospice stay. Outcome: Progressing Towards Goal     Problem: Anxiety/Agitation  Goal: Verbalize or staff assess the ability to manage anxiety  Description: Patient Buren Rohith will demonstrate appropriate motor behavior AEB less than 2 episodes of fidgety, picking or pulling at clothes on devices, yelling out, getting out of bed, etc. each shift during inpatient hospice stay.    Outcome: Progressing Towards Goal

## 2022-04-02 NOTE — PROGRESS NOTES
200 Rec'd report from [facility], RN. Pt admitted 3/31 with [facility] LOC with a hospice dx of vegetative endocarditis of the mitral valve for management o pain, dyspnea and increased secretions. Pt presently unresponsive with occasional moan, FLACC 2/10, RR 3, page draining to gravity, positioned off sacrum with pillows on air mattress, family is at bedside. Aysha WILLIS in to assess. D/C plan is to pass peacefully at [facility]. RN to give care tonight. 2017  Family called for nurse to assess pain. Jhonny Soto RN in to assess first and noted pt in more distress than before,  Presently noted patient to be moaning with each breath now, FLAAC 5/10, deep moaning breaths RR 4-5. Morphine given IV. Family talking soothingly, explained to patient pain relief measures, but difficult to assess level of conscience, continues deep moaning. Safety measures in place. 2109  Family left, robinol given for secretions. RR 4, eyes closed. Safety measures in place. 2232 Patient eyes closed, FLACC 1/10, RR 4 deep sighing moans, but otherwise looks relaxed. Safety measures in place, Report to night RN.

## 2022-04-03 NOTE — PROGRESS NOTES
5728 Report received from off going RN. Pt was admitted on 3/31 [facility] level of care with a hospice dx of vegetative endocarditis of mitral valve and symptom management of pain, dyspnea, secretions. Pt received four PRN doses of Morphine over ngiht and one PRN dose of Robinul. Pt is on oxygen at 3L NC. [place] to gravity that is patent. Pt has IV site in left upper arm and right AC. Pt has a stage II and DTI to her buttock area. Safety measures in place with bed low and locked, call bell in reach, side rails up x2, tab alert in place. Door remains open for continued monitoring.     0707 Pt was moaning on expiration with 4 breaths per minute. Pt has left sided JVD. Lungs clear, HR bounding, loud and regular. Pt was medicated with Haldol 2mg IVP and Morphine 4mg IVP for symptom management via right arm IV site. Left AC IV site was clotted off and was removed. Mouth care performed. Assessment completed. Pt is warm to the touch so blankets were removed, ceiling fan turned on. Pt is covered with a sheet. 0800 Pt resting with no change in condition. Pt continues to moan rhythmically on expiration. RR remains at 4/min. Safety measures in place. Door remains open for continued monitoring. 0856 Pt medicated with Morphine 4mg IVP for symptom management as pt continues to moan but sounds louder than it was earlier. Safety measures in place. 0932 Pt moaning louder than before, RR remains unchanged at 4/min. Pt was medicated with Ativan 1mg IVP for comfort measures. Safety measures in place. 1020 Pt resting better at this time. Family is at the bedside and were updated on pt's night and medications that were given. Safety measures in place. 1200 Pt is resting better, not nearly as loud on her expirations has she had been since receiving Ativan. Family at the bedside doing mouth care. Safety measures in place. 1243 Pt medicated with Ativan 1mg IVP for increased moaning.  Family is at the bedside and attentive to pt and thankful for [facility]. Safety measures in place. 1328 Pt resting without moaning at this time. Pt looks comfortable with FLACC 0/10.    1445 Pt was repositioned onto her left side with pillow support. Pt is not moaning and continues with 4 breaths per minute. fLACC 0/10. Safety measures in place. Family at the bedside. 1600 Pt continues to rest without change in condition. FLACC 0/10. Family at bedside. Safety measures in place. 1730 Pt resting without change in condition. FLACC 0/10. Family at the bedside. Safety measures in place. 1833 Pt started moaning again, RR 5/min. Pt was medicated with Ativan 1mg IVP for symptom management. Safety measures in place. Family at the bedside. Report given to oncoming RN.

## 2022-04-03 NOTE — PROGRESS NOTES
2300 Received bedside report from  Ilda Story, visual identification made. Patient admitted on 3/31 for [facility] with diagnosis of vegetative endocarditis of mitral valve. Patent lying in bed with eyes closed. Apneic breathing noted. .She is on 3 liters 02 per NC, incontinent of bowel with page draining. FLACC 0/10. Plan of care reviewed. Safety measures in place including bed in low and locked position. Tab alarms in place. Door open for continuous monitoring. [address] [place] [address] plan is for patient to remain until passing. 6009 Patient observed on rounds lying in bed with eyes closed. Patient heard moaning from the hallway. Patient with apneic breathing and moaning with each breath. Patient appears to be in discomfort. Morphine IVP given as ordered. 0120 Pain reassessed, patient lying in bed with eyes closed. #0 seconds of apnea noted. PRN medication effective. Will continue to monitor for symptoms. 6692 Patient observed on rounds lying in bed with eyes open. Patient repositioned in bed with pillows for comfort. Patient with apnea and moaning with repositioning. Morphine IVP given as ordered. 0284 Patient with apnea and continued loud moaning with each breath. Respirations 4-6. Left carotid artery visibly pulsating. Patient appears to be in distress. Morphine IVP given as ordered. 0530 Patient lying in bed with eyes slightly open. Apneic respirations and accessory muscle use with occasional moaning noted. HOB elevated for comfort. Door open for continuous monitoring.      3304 Report given to on coming RN

## 2022-04-03 NOTE — PROGRESS NOTES
Problem: Falls - Risk of  Goal: *Absence of Falls  Description: Davidson Cobb will remain free from falls AEB no injuries r/t falls each shift during inpatient hospice stay. Outcome: Progressing Towards Goal  Note: Fall Risk Interventions:  Mobility Interventions: Bed/chair exit alarm    Mentation Interventions: Bed/chair exit alarm,Family/sitter at bedside    Medication Interventions: Bed/chair exit alarm    Elimination Interventions: Bed/chair exit alarm    History of Falls Interventions: Bed/chair exit alarm         Problem: Pain  Goal: *Control of Pain  Description: Davidson Cobb will exhibit decrease in pain AEB rating pain less than 3 on 1-10 scale or 3 FLACC score within each shift of receiving pain medication during inpatient hospice stay. Outcome: Progressing Towards Goal     Problem: Dyspnea Due to End of Life  Goal: Demonstrate understanding of and ability to manage respiratory symptoms at end of life  Description: Davidson Cobb will indicate effective breathing pattern AEB absence of respiratory distress each shift during inpatient hospice stay.      Outcome: Progressing Towards Goal

## 2022-04-04 NOTE — PROGRESS NOTES
[id number] Printers received from [facility] RN. Pt name and  identified. Pt in bed, resting with eyes closed. No signs or symptoms of pain, shortness of breath, anxiety , seizures or nausea/vomiting. Rsep Rate 4. 3 Liters Oxygen in place. Peripheral edema noted to BLE, while elevated on pillows. All extremities pink, warm to touch. FLACC 0/10. Comfort and safety measures in place. HOB elevated 30 degrees. Side rails up x2. Bed low and locked. Bed alarm tab in place. Call light in reach of patient, Door open for monitored visualization and hearing of patient. Care Plan reviewed and collaboration done with CNA. Pt expected to pass under GIP however will continue to assess change in LOC, medication & comfort needs, while collaborating with the Interdisciplinary Team.    0830: Pt's sister Dev Kemp called for Update, Code verified, update provided. Emotional support offered. 0915: Pt observed on rounds, sleeping with eyes closed . Respirations even and nonlabored. RR remains at 4 bbm with an occasional sighing moan heard. No facial grimacing noted. No moaning or agitation. Flacc 0/10. No additional symptoms to manage at this time. Comfort and Safety measures maintained. Alarm exit tab in place. 6205: . PRN Robinul and Ativan IV given for audible moans and secretions. Pt repositioned for comfort, No appropriate interaction noted. Comfort and safety measures maintained. Tab Alert ion place. 8153: Total bath, mouth care and linen change done  per CNA with assist of RN. . Stage 2 (sheared area) and  DTI to sacrum addressed with applications of Barrier Cream. Pt tolerated bath without change. 7440:  PRN meds effective. Pt resting comfortably in bed with eyes closed; no signs of pain or distress noted. RR regular & non labored. No audible secretions, agitation, NVD, or SOB. FLACC 0/10. Comfort and safety measures maintained. Tab Alert intact to pt's gown. 1200: Pt's niece and 2 sisters present. Update given. Patient resting quietly in bed with eyes closed. Respirations unlabored, RR now 24  with no signs or symptoms of distress, pain, agitation, or discomfort noted. FLACC 0.    1500: Both sisters at the bedside. Patient's RR   Irregular with periods of apnea & tachyphneic. Pt having audible moans with each breath. PRN Morphine and Ativan given . FLACC 3   Dr Darlin Sung in to see pt on rounds. 1518: PRN med effective. Pt resting comfortably in bed with eyes closed; no signs of pain or distress noted. RR regular & non labored. RR  28. No agitation, NVD, or SOB. FLACC 0/10. Comfort and safety measures maintained. Tab Alert intact to pt's gown. Dr Darlin Sung speaking with sisters at bedside, addressing questions. 1730: Pt's Sisters remain at bedside, coping with situation appropriately. Pt sleeping, eyes closed . Respirations irregular with periods of apnea. RR 28. /61  Temp 98.78 axillary. No facial grimacing noted. No moaning or agitation. Flacc 0/10. No additional symptoms to manage at this time. Comfort and Safety measures maintained. Alarm exit tab in place. 1800: Patient's RR Irregular with periods of apnea & tachyphneic. Pt having audible moans with each breath. Sisters remain at bedside, feeling like \"shes in pain again\". FLACC 3. PRN Morphine and Ativan given. Comfort and safety measures maintained. Sisters stepping out for dinner once pt medicated. 1815: PRN med effective. Pt resting more comfortably, eyes closed; no signs of pain or distress noted. RR regular . No agitation, NVD, or SOB. FLACC 0/10. Comfort and safety measures maintained. Tab Alert intact to pt's gown.      Shift Report given to [facility] RN

## 2022-04-04 NOTE — PROGRESS NOTES
Problem: Falls - Risk of  Goal: *Absence of Falls  Description: Davidson Dickinson will remain free from falls AEB no injuries r/t falls each shift during inpatient hospice stay. Outcome: Progressing Towards Goal  Note: Fall Risk Interventions:  Mobility Interventions: Bed/chair exit alarm    Mentation Interventions: Bed/chair exit alarm,Family/sitter at bedside    Medication Interventions: Bed/chair exit alarm    Elimination Interventions: Bed/chair exit alarm    History of Falls Interventions: Bed/chair exit alarm         Problem: Pressure Injury - Risk of  Goal: *Prevention of pressure injury  Description: Patient Johnson Dickinson will remain free from acquired pressure injuries AEB zero acquired pressure injuries during assessment of skin each shift during inpatient hospice stay. Outcome: Progressing Towards Goal  Note: Pressure Injury Interventions:  Sensory Interventions: Assess changes in LOC,Check visual cues for pain,Float heels,Minimize linen layers,Pad between skin to skin,Turn and reposition approx. every two hours (pillows and wedges if needed)    Moisture Interventions: Absorbent underpads,Apply protective barrier, creams and emollients,Internal/External urinary devices    Activity Interventions: Pressure redistribution bed/mattress(bed type)    Mobility Interventions: Float heels    Nutrition Interventions:  (pt NPO)    Friction and Shear Interventions: Apply protective barrier, creams and emollients                Problem: Pain  Goal: *Control of Pain  Description: Davidson Dickinson will exhibit decrease in pain AEB rating pain less than 3 on 1-10 scale or 3 FLACC score within each shift of receiving pain medication during inpatient hospice stay.    Outcome: Progressing Towards Goal     Problem: Dyspnea Due to End of Life  Goal: Demonstrate understanding of and ability to manage respiratory symptoms at end of life  Description: Davidson Dickinson will indicate effective breathing pattern AEB absence of respiratory distress each shift during inpatient hospice stay. Outcome: Progressing Towards Goal     Problem: Anxiety/Agitation  Goal: Verbalize or staff assess the ability to manage anxiety  Description: Patient Alexis Enter will demonstrate appropriate motor behavior AEB less than 2 episodes of fidgety, picking or pulling at clothes on devices, yelling out, getting out of bed, etc. each shift during inpatient hospice stay.    Outcome: Progressing Towards Goal

## 2022-04-04 NOTE — PROGRESS NOTES
1900 Received bedside report from Valentino Story, visual identification made. Patient admitted on 3/31 for [facility] with diagnosis of vegetative endocarditis of mitral valve. Patent lying in bed with eyes closed. Respirations shallow with periods of apnea. .She is on 3 liters 02 per NC, incontinent of bowel with page draining. FLACC 0/10. Family at bedside. Plan of care reviewed. Safety measures in place including bed in low and locked position. Tab alarms in place. Door open for continuous monitoring. [address] [place] [address] plan is for patient to remain until passing. 2040 Patient observed on rounds with apnea and loud moaning with each breath. FLACC 1/10. Ativan given as ordered. 2120 Patient observed lying in bed with eyes closed resting comfortably. FLACC 0/10. Door open for continuous monitoring. 2350 Patient observed on rounds lying in bed with eyes closed. Patient with apneic respirations and loud moaning with each breath. Ativan IVP given as ordered. FLACC 1/10.     0030 Patient lying in bed with eyes closed. Respirations shallow with apnea. FLACC 0/10. Door open for continuous monitoring. 0210 Patient lying in bed with eyes closed. HOB elevated for comfort. Respirations shallow with apnea and accessory muscle use. FLACC 0. Door open for continuous monitoring. 0410 Patient observed lying in bed with eyes closed. Patient with apnea and loud moaning with breathing. RR 4. Ativan IVP given as ordered. 0440 Patient lying in bed with eyes closed. Respirations shallow with periods of apnea. FLACC 0. Door open for continuous monitoring. 0600 Patient lying in bed resting. Respirations shallow with apnea. Gregory Izquierdo FLACC 0. Door remains open for continued monitoring. 9020 Report given to on coming nurse.

## 2022-04-04 NOTE — PROGRESS NOTES
Problem: Falls - Risk of  Goal: *Absence of Falls  Description: Patient Alexis Enter will remain free from falls AEB no injuries r/t falls each shift during inpatient hospice stay. Outcome: Progressing Towards Goal  Note: Fall Risk Interventions:  Mobility Interventions: Bed/chair exit alarm    Mentation Interventions: Adequate sleep, hydration, pain control,Bed/chair exit alarm,Door open when patient unattended    Medication Interventions: Bed/chair exit alarm    Elimination Interventions: Bed/chair exit alarm,Call light in reach,Toileting schedule/hourly rounds    History of Falls Interventions: Bed/chair exit alarm,Door open when patient unattended         Problem: Patient Education: Go to Patient Education Activity  Goal: Patient/Family Education  Outcome: Progressing Towards Goal     Problem: Pressure Injury - Risk of  Goal: *Prevention of pressure injury  Description: Patient Alexis Grullon will remain free from acquired pressure injuries AEB zero acquired pressure injuries during assessment of skin each shift during inpatient hospice stay.     Outcome: Progressing Towards Goal  Note: Pressure Injury Interventions:  Sensory Interventions: Assess changes in LOC,Assess need for specialty bed,Float heels,Keep linens dry and wrinkle-free    Moisture Interventions: Absorbent underpads,Apply protective barrier, creams and emollients,Internal/External urinary devices    Activity Interventions: Pressure redistribution bed/mattress(bed type)    Mobility Interventions: Assess need for specialty bed,Float heels,HOB 30 degrees or less    Nutrition Interventions: Offer support with meals,snacks and hydration (mouth care)    Friction and Shear Interventions: Apply protective barrier, creams and emollients,Feet elevated on foot rest,HOB 30 degrees or less,Minimize layers                Problem: Patient Education: Go to Patient Education Activity  Goal: Patient/Family Education  Outcome: Progressing Towards Goal Problem: Pain  Goal: *Control of Pain  Description: Davidson Lopez will exhibit decrease in pain AEB rating pain less than 3 on 1-10 scale or 3 FLACC score within each shift of receiving pain medication during inpatient hospice stay. Outcome: Progressing Towards Goal  Goal: *PALLIATIVE CARE:  Alleviation of Pain  Outcome: Progressing Towards Goal     Problem: Dyspnea Due to End of Life  Goal: Demonstrate understanding of and ability to manage respiratory symptoms at end of life  Description: Davidson Lopez will indicate effective breathing pattern AEB absence of respiratory distress each shift during inpatient hospice stay. Outcome: Progressing Towards Goal     Problem: Anxiety/Agitation  Goal: Verbalize or staff assess the ability to manage anxiety  Description: Davidson Lopez will demonstrate appropriate motor behavior AEB less than 2 episodes of fidgety, picking or pulling at clothes on devices, yelling out, getting out of bed, etc. each shift during inpatient hospice stay. Outcome: Progressing Towards Goal     Problem: General Wound Care  Goal: *Non-infected wound: Improvement of existing wound, absence of infection, and maintenance of skin integrity  Description: Davidson Lopez wounds will be free from further complications AEB absence of signs and symptoms of infection each shift during inpatient hospice stay. Outcome: Progressing Towards Goal     Problem: Infection - Risk of, Urinary Catheter-Associated Urinary Tract Infection  Goal: *Absence of infection signs and symptoms  Description: Davidson Lopez will remain free from infection R/T urinary catheter AEB absence of signs and symptoms of infection each shift during inpatient hospice stay.     Outcome: Progressing Towards Goal     Problem: Nausea/Vomiting (Adult)  Goal: *Palliation of nausea/vomiting (Palliative Care)  Description: Davidson Lopez will exhibit decreased or eliminated episodes of Nausea/Vomiting AEB less than 2 PRN antiemetic medication administration each shift during inpatient hospice stay.    Outcome: Progressing Towards Goal

## 2023-04-14 NOTE — ED TRIAGE NOTES
10 days of RLE pain beginning in foot, now extending to groin with red patches with concern for cellulitis vs vasculitis seen at multiple facilities. Hx of enlarged spleen. Tenderness throughout R leg with cap refill less than 2 sec in toe on R side. Pain is significantly worse when standing/walking described as burning or tightness. 90.7

## (undated) DEVICE — BLOCK BITE AD 60FR W/ VELC STRP ADDRESSES MOST PT AND

## (undated) DEVICE — KENDALL RADIOLUCENT FOAM MONITORING ELECTRODE RECTANGULAR SHAPE: Brand: KENDALL

## (undated) DEVICE — CANNULA NSL ORAL AD FOR CAPNOFLEX CO2 O2 AIRLFE

## (undated) DEVICE — CONNECTOR TBNG OD5-7MM O2 END DISP